# Patient Record
Sex: MALE | Race: WHITE | NOT HISPANIC OR LATINO | Employment: FULL TIME | URBAN - METROPOLITAN AREA
[De-identification: names, ages, dates, MRNs, and addresses within clinical notes are randomized per-mention and may not be internally consistent; named-entity substitution may affect disease eponyms.]

---

## 2018-05-21 ENCOUNTER — HOSPITAL ENCOUNTER (OUTPATIENT)
Facility: MEDICAL CENTER | Age: 38
End: 2018-05-21
Attending: ORTHOPAEDIC SURGERY | Admitting: ORTHOPAEDIC SURGERY
Payer: COMMERCIAL

## 2018-05-21 VITALS
SYSTOLIC BLOOD PRESSURE: 121 MMHG | RESPIRATION RATE: 18 BRPM | HEART RATE: 82 BPM | DIASTOLIC BLOOD PRESSURE: 84 MMHG | BODY MASS INDEX: 31.49 KG/M2 | TEMPERATURE: 98.2 F | OXYGEN SATURATION: 96 % | WEIGHT: 258.6 LBS | HEIGHT: 76 IN

## 2018-05-21 PROCEDURE — 110454 HCHG SHELL REV 250: Performed by: ORTHOPAEDIC SURGERY

## 2018-05-21 PROCEDURE — 700102 HCHG RX REV CODE 250 W/ 637 OVERRIDE(OP)

## 2018-05-21 PROCEDURE — A6454 SELF-ADHER BAND W>=3" <5"/YD: HCPCS | Performed by: ORTHOPAEDIC SURGERY

## 2018-05-21 PROCEDURE — A6402 STERILE GAUZE <= 16 SQ IN: HCPCS | Performed by: ORTHOPAEDIC SURGERY

## 2018-05-21 PROCEDURE — 160046 HCHG PACU - 1ST 60 MINS PHASE II: Performed by: ORTHOPAEDIC SURGERY

## 2018-05-21 PROCEDURE — 160025 RECOVERY II MINUTES (STATS): Performed by: ORTHOPAEDIC SURGERY

## 2018-05-21 PROCEDURE — 700111 HCHG RX REV CODE 636 W/ 250 OVERRIDE (IP)

## 2018-05-21 PROCEDURE — 160022 HCHG BLOCK: Performed by: ORTHOPAEDIC SURGERY

## 2018-05-21 PROCEDURE — 160009 HCHG ANES TIME/MIN: Performed by: ORTHOPAEDIC SURGERY

## 2018-05-21 PROCEDURE — 160029 HCHG SURGERY MINUTES - 1ST 30 MINS LEVEL 4: Performed by: ORTHOPAEDIC SURGERY

## 2018-05-21 PROCEDURE — A6223 GAUZE >16<=48 NO W/SAL W/O B: HCPCS | Performed by: ORTHOPAEDIC SURGERY

## 2018-05-21 PROCEDURE — 500423 HCHG DRESSING, ABD COMBINE: Performed by: ORTHOPAEDIC SURGERY

## 2018-05-21 PROCEDURE — 500881 HCHG PACK, EXTREMITY: Performed by: ORTHOPAEDIC SURGERY

## 2018-05-21 PROCEDURE — 160048 HCHG OR STATISTICAL LEVEL 1-5: Performed by: ORTHOPAEDIC SURGERY

## 2018-05-21 PROCEDURE — A9270 NON-COVERED ITEM OR SERVICE: HCPCS

## 2018-05-21 PROCEDURE — 160035 HCHG PACU - 1ST 60 MINS PHASE I: Performed by: ORTHOPAEDIC SURGERY

## 2018-05-21 PROCEDURE — C1713 ANCHOR/SCREW BN/BN,TIS/BN: HCPCS | Performed by: ORTHOPAEDIC SURGERY

## 2018-05-21 PROCEDURE — 160002 HCHG RECOVERY MINUTES (STAT): Performed by: ORTHOPAEDIC SURGERY

## 2018-05-21 PROCEDURE — 160041 HCHG SURGERY MINUTES - EA ADDL 1 MIN LEVEL 4: Performed by: ORTHOPAEDIC SURGERY

## 2018-05-21 PROCEDURE — 501838 HCHG SUTURE GENERAL: Performed by: ORTHOPAEDIC SURGERY

## 2018-05-21 PROCEDURE — 502000 HCHG MISC OR IMPLANTS RC 0278: Performed by: ORTHOPAEDIC SURGERY

## 2018-05-21 PROCEDURE — 700101 HCHG RX REV CODE 250

## 2018-05-21 DEVICE — SUTURE ANCHOR TWINFIX 3.5 WITH NEEDLES SMALL JOINT: Type: IMPLANTABLE DEVICE | Site: ANKLE | Status: FUNCTIONAL

## 2018-05-21 DEVICE — IMPLANTABLE DEVICE: Type: IMPLANTABLE DEVICE | Site: ANKLE | Status: FUNCTIONAL

## 2018-05-21 RX ORDER — LIDOCAINE HYDROCHLORIDE 10 MG/ML
INJECTION, SOLUTION EPIDURAL; INFILTRATION; INTRACAUDAL; PERINEURAL
Status: COMPLETED
Start: 2018-05-21 | End: 2018-05-21

## 2018-05-21 RX ORDER — BUPIVACAINE HYDROCHLORIDE 5 MG/ML
INJECTION, SOLUTION EPIDURAL; INTRACAUDAL
Status: DISCONTINUED | OUTPATIENT
Start: 2018-05-21 | End: 2018-05-21 | Stop reason: HOSPADM

## 2018-05-21 RX ORDER — LIDOCAINE HYDROCHLORIDE 10 MG/ML
0.5 INJECTION, SOLUTION INFILTRATION; PERINEURAL
Status: DISCONTINUED | OUTPATIENT
Start: 2018-05-21 | End: 2018-05-21 | Stop reason: HOSPADM

## 2018-05-21 RX ORDER — SODIUM CHLORIDE, SODIUM LACTATE, POTASSIUM CHLORIDE, CALCIUM CHLORIDE 600; 310; 30; 20 MG/100ML; MG/100ML; MG/100ML; MG/100ML
INJECTION, SOLUTION INTRAVENOUS CONTINUOUS
Status: DISCONTINUED | OUTPATIENT
Start: 2018-05-21 | End: 2018-05-21 | Stop reason: HOSPADM

## 2018-05-21 RX ORDER — MAGNESIUM HYDROXIDE 1200 MG/15ML
LIQUID ORAL
Status: COMPLETED | OUTPATIENT
Start: 2018-05-21 | End: 2018-05-21

## 2018-05-21 RX ORDER — OXYCODONE HYDROCHLORIDE AND ACETAMINOPHEN 5; 325 MG/1; MG/1
TABLET ORAL
Status: COMPLETED
Start: 2018-05-21 | End: 2018-05-21

## 2018-05-21 RX ADMIN — FENTANYL CITRATE 50 MCG: 50 INJECTION, SOLUTION INTRAMUSCULAR; INTRAVENOUS at 18:15

## 2018-05-21 RX ADMIN — SODIUM CHLORIDE, SODIUM LACTATE, POTASSIUM CHLORIDE, CALCIUM CHLORIDE: 600; 310; 30; 20 INJECTION, SOLUTION INTRAVENOUS at 15:00

## 2018-05-21 RX ADMIN — FENTANYL CITRATE 50 MCG: 50 INJECTION, SOLUTION INTRAMUSCULAR; INTRAVENOUS at 18:22

## 2018-05-21 RX ADMIN — OXYCODONE AND ACETAMINOPHEN 2 TABLET: 5; 325 TABLET ORAL at 18:15

## 2018-05-21 RX ADMIN — HYDROMORPHONE HYDROCHLORIDE 0.5 MG: 10 INJECTION, SOLUTION INTRAMUSCULAR; INTRAVENOUS; SUBCUTANEOUS at 18:30

## 2018-05-21 RX ADMIN — LIDOCAINE HYDROCHLORIDE 0.5 ML: 10 INJECTION, SOLUTION EPIDURAL; INFILTRATION; INTRACAUDAL; PERINEURAL at 15:00

## 2018-05-21 RX ADMIN — LIDOCAINE HYDROCHLORIDE 0.5 ML: 10 INJECTION, SOLUTION INFILTRATION; PERINEURAL at 15:00

## 2018-05-21 ASSESSMENT — PAIN SCALES - GENERAL
PAINLEVEL_OUTOF10: 7
PAINLEVEL_OUTOF10: 7
PAINLEVEL_OUTOF10: 6
PAINLEVEL_OUTOF10: 6
PAINLEVEL_OUTOF10: 9

## 2018-05-21 NOTE — PROGRESS NOTES
Med rec complete per pt at bedside  Interviewed pt with family at bedside with permission from pt  Allergies reviewed and updated.

## 2018-05-22 NOTE — OP REPORT
DATE OF SERVICE:  05/21/2018    PREOPERATIVE DIAGNOSES:  1.  Right ankle arthrofibrosis.  2.  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  3.  Right chronic ankle instability.    POSTOPERATIVE DIAGNOSES:  1.  Right ankle arthrofibrosis.  2.  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  3.  Right chronic ankle instability.    PROCEDURES PERFORMED:  1.  Right ankle arthroscopic extensive debridement.  2.  Right allograft cartilage transplant with mini-open excision to the   lateral talar dome measuring 1.5 cm in diameter.  3.  Right secondary reconstruction of the ATFL and CFL ligaments with modified   Brostrom reconstruction.    SURGEON:  Seth Cruz MD    FIRST ASSISTANT:  Virgilio Ramírez MD    SECOND ASSISTANT:  Jacklyn Viveros.    ANESTHESIA:  General endotracheal with popliteal block per my request for   postoperative pain management.    ESTIMATED BLOOD LOSS:  None.    COMPLICATIONS:  None.    POSTOPERATIVE PLAN:  1.  Nonweightbearing.  2.  Preoperative antibiotics.  3.  Follow up in 2 weeks.    INDICATIONS:  The patient is a 37-year-old male who has had longstanding   problems with his right ankle.  The above diagnoses made and options were   discussed with him including operative and nonoperative.  He elected to   undergo operative intervention.  The above procedure was discussed and all   questions were answered.  Risks of surgery were explained, which include, but   not limited to wound problems, infection, nerve injury, vascular injury, need   for further surgery.  He understands he has got persistent risk of his pain   and need further surgery.  He understands and accepts these risks and agrees   to proceed.  His site was marked by myself prior to receiving psychotropic   medicines.    PROCEDURE IN DETAIL:  The patient was given a popliteal block per my request   for postoperative pain management.   He was brought into the operating room and   underwent general endotracheal anesthesia without complications.  His right   lower extremity was prepped and draped in standard fashion in supine position   with all appropriate padding.  Positive site verification confirmed his right   lower extremity as well as procedure and confirmation that he received   preoperative antibiotics.  Esmarch was used to exsanguinate his foot and ankle   and leg tourniquet was inflated to 250 mmHg.  His leg was over the well leg   bey.  An 18-gauge needle was placed medial to the preoperative marked   tibialis anterior at the level of joint line.  Ankle was insufflated with 10   mL of normal saline.  A 15 blade was used to make a full-thickness arthrotomy   and 4.0 scope was introduced.  An 18-gauge needle was then placed lateral to   the preoperatively marked peroneus tertius at the level of joint line.  Skin   incision was made and blunt dissection was made down into the joint.  He had   extensive arthrofibrosis over the anterior part of ankle.  This took an   extensive amount of time to debride this to allow for exposure of the ankle   joint.  At that time, once it was there, evaluation of the lateral talar dome   and medial talar dome was visible.  The medial talar dome appeared to be   pristine with normal palpation of cartilage.  The lateral talar dome   demonstrated extensive fibrocartilage and delaminated cartilage where his   osteochondral defect was present.  This was all curetted out to stable   margins, shaved down to good subchondral bone for bleeding.  The underlying   bone was stable and there was no obvious cystic or unstable bone at that time.    His final measurements were 1.5 cm in diameter.    At that time, the lateral portal was extended, fluid was turned off.  The area   was dried and a thin layer of glue was placed.  One package of DeNovo was   then transplanted into the defect and a freer was used to smooth it  all out to   fill the defect.  Next, a layer of fibrin glue was placed over the top.  This   was held in place for 5 minutes.  Second look demonstrated all the cartilage   to stay within the defect with good cartilage fill.  The wounds were carefully   irrigated out at skin level.  They were closed with deep and subcutaneous   layer with 3-0 Vicryl and 3-0 nylon for the skin.    Lateral incision was then made at the distal aspect of the fibula.  It was   dissected down.  The peroneal tendon sheath was opened up.  Peroneal tendons   were retracted posteriorly exposing the CFL ligament.  This was released off   of its superior and inferior margins and directed off of the fibula.  The ATFL   was then identified and released off of its superior and inferior margins and   directed off of the fibula.  Fibular periosteum was elevated up and a rongeur   was used to rongeur down to good subchondral bone.  A 3.5 corkscrew anchor   was then placed at the junction of the ATFL and CFL ligament.  One pair of   sutures was brought to the CFL ligament, the other was brought to the ATFL   ligament.  The foot was held in plantar flexion, eversion and the CFL ligament   was pretensioned and tied.  The foot was then brought into dorsiflexion,   eversion and the ATFL ligament was pretensioned and tied.  Extensor   retinaculum was then repaired to the fibular periosteum with multiple 0   Vicryls in mattress-type fashion.  It had excellent stability.  Wounds were   irrigated with copious irrigation and closed in layered fashion with 3-0   Vicryl and 3-0 nylon.  Sterile dressings were applied.  Tourniquet was   released.  All toes were pink.  He was transferred to the recovery room in   good condition.    Utilization of Dr. Ramírez was necessary for patient positioning, holding,   retracting, wound closure, dressing and splint placement.  He was present   throughout the entire procedure.       ____________________________________      MD RUTH PIERCE / DYLAN    DD:  05/21/2018 18:56:45  DT:  05/21/2018 22:15:15    D#:  [id number]  Job#:  [id number]    cc: Antoni Rojas

## 2018-05-22 NOTE — DISCHARGE INSTRUCTIONS
ACTIVITY: Rest and take it easy for the first 24 hours.  A responsible adult is recommended to remain with you during that time.  It is normal to feel sleepy.  We encourage you to not do anything that requires balance, judgment or coordination.    MILD FLU-LIKE SYMPTOMS ARE NORMAL. YOU MAY EXPERIENCE GENERALIZED MUSCLE ACHES, THROAT IRRITATION, HEADACHE AND/OR SOME NAUSEA.    FOR 24 HOURS DO NOT:  Drive, operate machinery or run household appliances.  Drink beer or alcoholic beverages.   Make important decisions or sign legal documents.    SPECIAL INSTRUCTIONS:   Elevate/ice  Take pain medications scheduled until block wears off.  Hold for sedation.  Keep splint on at all times  Do not put weight down onto your operative extremity    Knee Arthroscopy, Care After    Refer to this sheet in the next few weeks. These instructions provide you with information about caring for yourself after your procedure. Your health care provider may also give you more specific instructions. Your treatment has been planned according to current medical practices, but problems sometimes occur. Call your health care provider if you have any problems or questions after your procedure.  WHAT TO EXPECT AFTER THE PROCEDURE  After your procedure, it is common to have:  · Soreness.  · Pain.  HOME CARE INSTRUCTIONS  Bathing  · Do not take baths, swim, or use a hot tub until your health care provider approves.  Incision Care  · There are many different ways to close and cover an incision, including stitches, skin glue, and adhesive strips. Follow your health care provider's instructions about:  ¨ Incision care.  ¨ Bandage (dressing) changes and removal.  ¨ Incision closure removal.  · Check your incision area every day for signs of infection. Watch for:  ¨ Redness, swelling, or pain.  ¨ Fluid, blood, or pus.  Activity  · Avoid strenuous activities for as long as directed by your health care provider.  · Return to your normal activities as  directed by your health care provider. Ask your health care provider what activities are safe for you.  · Perform range-of-motion exercises only as directed by your health care provider.  · Do not lift anything that is heavier than 10 lb (4.5 kg).  · Do not drive or operate heavy machinery while taking pain medicine.  · If you were given crutches, use them as directed by your health care provider.  Managing Pain, Stiffness, and Swelling  · If directed, apply ice to the injured area:  ¨ Put ice in a plastic bag.  ¨ Place a towel between your skin and the bag.  ¨ Leave the ice on for 20 minutes, 2-3 times per day.  · Raise the injured area above the level of your heart while you are sitting or lying down as directed by your health care provider.  General Instructions  · Keep all follow-up visits as directed by your health care provider. This is important.  · Take medicines only as directed by your health care provider.  · Do not use any tobacco products, including cigarettes, chewing tobacco, or electronic cigarettes. If you need help quitting, ask your health care provider.  · If you were given compression stockings, wear them as directed by your health care provider. These stockings help prevent blood clots and reduce swelling in your legs.  SEEK MEDICAL CARE IF:  · You have severe pain with any movement of your knee.  · You notice a bad smell coming from the incision or dressing.  · You have redness, swelling, or pain at the site of your incision.  · You have fluid, blood, or pus coming from your incision.  SEEK IMMEDIATE MEDICAL CARE IF:  · You develop a rash.  · You have a fever.  · You have difficulty breathing or have shortness of breath.  · You develop pain in your calves or in the back of your knee.  · You develop chest pain.  · You develop numbness or tingling in your leg or foot.      DIET: To avoid nausea, slowly advance diet as tolerated, avoiding spicy or greasy foods for the first day.  Add more  substantial food to your diet according to your physician's instructions. INCREASE FLUIDS AND FIBER TO AVOID CONSTIPATION.    SURGICAL DRESSING/BATHING: Keep dressing clean and dry.    FOLLOW-UP APPOINTMENT:  A follow-up appointment should be arranged with your doctor in 1-2 weeks; call to schedule.    You should CALL YOUR PHYSICIAN if you develop:  Fever greater than 101 degrees F.  Pain not relieved by medication, or persistent nausea or vomiting.  Excessive bleeding (blood soaking through dressing) or unexpected drainage from the wound.  Extreme redness or swelling around the incision site, drainage of pus or foul smelling drainage.  Inability to urinate or empty your bladder within 8 hours.  Problems with breathing or chest pain.    You should call 911 if you develop problems with breathing or chest pain.  If you are unable to contact your doctor or surgical center, you should go to the nearest emergency room or urgent care center.  Physician's telephone #: [phone]    If any questions arise, call your doctor.  If your doctor is not available, please feel free to call the Surgical Center at [phone].  The Center is open Monday through Friday from 7AM to 7PM.  You can also call the [facility] HOTLINE open 24 hours/day, 7 days/week and speak to a nurse at [phone], or toll free at [phone].    A registered nurse may call you a few days after your surgery to see how you are doing after your procedure.    MEDICATIONS: Resume taking daily medication.  Take prescribed pain medication with food.  If no medication is prescribed, you may take non-aspirin pain medication if needed.  PAIN MEDICATION CAN BE VERY CONSTIPATING.  Take a stool softener or laxative such as senokot, pericolace, or milk of magnesia if needed.    Prescription given previously to patient.  Last pain medication given at 6:15PM.    If your physician has prescribed pain medication that includes Acetaminophen (Tylenol), do not take  additional Acetaminophen (Tylenol) while taking the prescribed medication.    Depression / Suicide Risk    As you are discharged from this [facility], it is important to learn how to keep safe from harming yourself.    Recognize the warning signs:  · Abrupt changes in personality, positive or negative- including increase in energy   · Giving away possessions  · Change in eating patterns- significant weight changes-  positive or negative  · Change in sleeping patterns- unable to sleep or sleeping all the time   · Unwillingness or inability to communicate  · Depression  · Unusual sadness, discouragement and loneliness  · Talk of wanting to die  · Neglect of personal appearance   · Rebelliousness- reckless behavior  · Withdrawal from people/activities they love  · Confusion- inability to concentrate     If you or a loved one observes any of these behaviors or has concerns about self-harm, here's what you can do:  · Talk about it- your feelings and reasons for harming yourself  · Remove any means that you might use to hurt yourself (examples: pills, rope, extension cords, firearm)  · Get professional help from the community (Mental Health, Substance Abuse, psychological counseling)  · Do not be alone:Call your Safe Contact- someone whom you trust who will be there for you.  · Call your local CRISIS HOTLINE 011-5983 or 153-469-0282  · Call your local Children's Mobile Crisis Response Team [place] (556) 995-7009 or www.De[employer]  · Call the toll free National Suicide Prevention Hotlines   · National Suicide Prevention Lifeline 906-956-IIKJ (2165)  · National Hope Line Network 800-SUICIDE (767-4490)

## 2018-12-29 ENCOUNTER — HOSPITAL ENCOUNTER (OUTPATIENT)
Dept: RADIOLOGY | Facility: MEDICAL CENTER | Age: 38
End: 2018-12-29

## 2019-03-22 ENCOUNTER — HOSPITAL ENCOUNTER (OUTPATIENT)
Dept: RADIOLOGY | Facility: MEDICAL CENTER | Age: 39
End: 2019-03-22
Attending: ORTHOPAEDIC SURGERY
Payer: COMMERCIAL

## 2019-03-22 DIAGNOSIS — M93.271 OSTEOCHONDRITIS DISSECANS OF ANKLE, RIGHT: ICD-10-CM

## 2019-03-22 DIAGNOSIS — M25.571 RIGHT ANKLE PAIN, UNSPECIFIED CHRONICITY: ICD-10-CM

## 2019-03-22 PROCEDURE — 700101 HCHG RX REV CODE 250

## 2019-03-22 PROCEDURE — 77002 NEEDLE LOCALIZATION BY XRAY: CPT

## 2019-03-22 PROCEDURE — 20605 DRAIN/INJ JOINT/BURSA W/O US: CPT | Mod: RT

## 2019-03-22 PROCEDURE — 700111 HCHG RX REV CODE 636 W/ 250 OVERRIDE (IP)

## 2019-03-22 PROCEDURE — 700117 HCHG RX CONTRAST REV CODE 255: Performed by: ORTHOPAEDIC SURGERY

## 2019-03-22 RX ORDER — BUPIVACAINE HYDROCHLORIDE 5 MG/ML
INJECTION, SOLUTION EPIDURAL; INTRACAUDAL
Status: DISPENSED
Start: 2019-03-22 | End: 2019-03-22

## 2019-03-22 RX ORDER — TRIAMCINOLONE ACETONIDE 40 MG/ML
INJECTION, SUSPENSION INTRA-ARTICULAR; INTRAMUSCULAR
Status: DISPENSED
Start: 2019-03-22 | End: 2019-03-22

## 2019-03-22 RX ORDER — LIDOCAINE HYDROCHLORIDE 10 MG/ML
INJECTION, SOLUTION INFILTRATION; PERINEURAL
Status: DISPENSED
Start: 2019-03-22 | End: 2019-03-22

## 2019-03-22 RX ADMIN — IOHEXOL 2 ML: 300 INJECTION, SOLUTION INTRAVENOUS at 11:14

## 2019-03-22 NOTE — OR SURGEON
Immediate Post- Operative Note    PostOp Diagnosis: OA, mild    Procedure(s): Rt tibiotalar injection    Estimated Blood Loss: Less than 5 ml    Complications: None    3/22/2019     1:34 PM     Franco Hernandez

## 2021-09-16 ENCOUNTER — OFFICE VISIT (OUTPATIENT)
Dept: URGENT CARE | Facility: CLINIC | Age: 41
End: 2021-09-16
Payer: COMMERCIAL

## 2021-09-16 ENCOUNTER — HOSPITAL ENCOUNTER (OUTPATIENT)
Facility: MEDICAL CENTER | Age: 41
End: 2021-09-16
Attending: PHYSICIAN ASSISTANT
Payer: COMMERCIAL

## 2021-09-16 ENCOUNTER — HOSPITAL ENCOUNTER (OUTPATIENT)
Dept: RADIOLOGY | Facility: MEDICAL CENTER | Age: 41
End: 2021-09-16
Attending: PHYSICIAN ASSISTANT
Payer: COMMERCIAL

## 2021-09-16 VITALS
BODY MASS INDEX: 30.71 KG/M2 | DIASTOLIC BLOOD PRESSURE: 86 MMHG | OXYGEN SATURATION: 98 % | HEART RATE: 71 BPM | RESPIRATION RATE: 12 BRPM | HEIGHT: 75 IN | WEIGHT: 247 LBS | TEMPERATURE: 97.9 F | SYSTOLIC BLOOD PRESSURE: 116 MMHG

## 2021-09-16 DIAGNOSIS — N50.811 PAIN IN RIGHT TESTICLE: ICD-10-CM

## 2021-09-16 DIAGNOSIS — N50.89 TESTICULAR MASS: ICD-10-CM

## 2021-09-16 LAB
APPEARANCE UR: CLEAR
BILIRUB UR STRIP-MCNC: NORMAL MG/DL
COLOR UR AUTO: YELLOW
GLUCOSE UR STRIP.AUTO-MCNC: NORMAL MG/DL
KETONES UR STRIP.AUTO-MCNC: NORMAL MG/DL
LEUKOCYTE ESTERASE UR QL STRIP.AUTO: NORMAL
NITRITE UR QL STRIP.AUTO: NORMAL
PH UR STRIP.AUTO: 6 [PH] (ref 5–8)
PROT UR QL STRIP: NORMAL MG/DL
RBC UR QL AUTO: NORMAL
SP GR UR STRIP.AUTO: 1.02
UROBILINOGEN UR STRIP-MCNC: NORMAL MG/DL

## 2021-09-16 PROCEDURE — 81002 URINALYSIS NONAUTO W/O SCOPE: CPT | Performed by: PHYSICIAN ASSISTANT

## 2021-09-16 PROCEDURE — 87491 CHLMYD TRACH DNA AMP PROBE: CPT

## 2021-09-16 PROCEDURE — 99204 OFFICE O/P NEW MOD 45 MIN: CPT | Performed by: PHYSICIAN ASSISTANT

## 2021-09-16 PROCEDURE — 76870 US EXAM SCROTUM: CPT

## 2021-09-16 PROCEDURE — 87591 N.GONORRHOEAE DNA AMP PROB: CPT

## 2021-09-16 RX ORDER — IBUPROFEN 800 MG/1
800 TABLET ORAL EVERY 8 HOURS PRN
COMMUNITY
End: 2022-03-26

## 2021-09-16 RX ORDER — DOXYCYCLINE HYCLATE 100 MG
100 TABLET ORAL 2 TIMES DAILY
Qty: 20 TABLET | Refills: 0 | Status: SHIPPED | OUTPATIENT
Start: 2021-09-16 | End: 2021-09-26

## 2021-09-16 RX ORDER — NAPROXEN 500 MG/1
500 TABLET ORAL 2 TIMES DAILY WITH MEALS
Qty: 28 TABLET | Refills: 0 | Status: SHIPPED | OUTPATIENT
Start: 2021-09-16 | End: 2021-09-30

## 2021-09-16 NOTE — PROGRESS NOTES
Subjective:   Roberto Braswell is a 41 y.o. male who presents for Bump (Right testicle, sharp pain. Started a couple weeks ago. Worsening.)      HPI  Patient is a 41-year-old male who presents to clinic with complaints of right testicular pain and a mass.  He noticed a lump to his right testicle 2 months ago.  He reports in the last few weeks the mass has enlarged.  He developed a sharp pain in the last couple days with radiation to his inguinal area.  He is sexually active in a monogamous relationship.  He usually uses a condom but did not use a condom twice about a month ago.  He has no other complaints or concerns.  The mass is located to the top of his testicle area.  Denies any fever, chills, dental pain, nausea, vomiting, penile discharge, dysuria, urinary urgency.    Medications:    • asa/apap/caffeine Tabs  • ibuprofen Tabs    Allergies: Tramadol    Problem List: Roberto Braswell does not have a problem list on file.    Surgical History:  Past Surgical History:   Procedure Laterality Date   • ANKLE ARTHROSCOPY Right 5/21/2018    Procedure: ANKLE ARTHROSCOPY, LATERAL LIGAMENT RECONSTRUCTION;  Surgeon: Seth Cruz M.D.;  Location: SURGERY [facility];  Service: Orthopedics   • BIOPSY ORTHO Right 5/21/2018    Procedure: BIOPSY ORTHO/ FOR CARTILAGE AND DENOVO PROCEDURE;  Surgeon: Seth Cruz M.D.;  Location: SURGERY [facility];  Service: Orthopedics   • OTHER ABDOMINAL SURGERY      Cholecystectomy February 2017   • OTHER ORTHOPEDIC SURGERY      2 previous ankle surgeries (2007, 2009)       Past Social Hx: Roberto Braswell  reports that he has never smoked. He has never used smokeless tobacco. He reports current alcohol use.     Past Family Hx:  Roberto Braswell family history is not on file.     Problem list, medications, and allergies reviewed by myself today in Epic.     Objective:     /86 (BP Location: Left arm, Patient Position: Sitting, BP Cuff Size: Adult long)   Pulse 71   " Temp 36.6 °C (97.9 °F) (Temporal)   Resp 12   Ht 1.905 m (6' 3\")   Wt 112 kg (247 lb)   SpO2 98%   BMI 30.87 kg/m²     Physical Exam  Vitals reviewed.   Constitutional:       General: He is not in acute distress.     Appearance: Normal appearance. He is not ill-appearing or toxic-appearing.   Eyes:      Conjunctiva/sclera: Conjunctivae normal.      Pupils: Pupils are equal, round, and reactive to light.   Cardiovascular:      Rate and Rhythm: Normal rate.   Pulmonary:      Effort: Pulmonary effort is normal.   Abdominal:      General: Abdomen is flat.      Palpations: Abdomen is soft.      Tenderness: There is no abdominal tenderness. There is no guarding or rebound.   Genitourinary:     Penis: Uncircumcised. No discharge, swelling or lesions.       Testes:         Right: Mass (posterior superior testicle ) and tenderness present. Swelling not present. Right testis is descended.      Epididymis:      Right: Mass and tenderness present.   Musculoskeletal:      Cervical back: Neck supple.   Skin:     General: Skin is warm and dry.   Neurological:      General: No focal deficit present.      Mental Status: He is alert and oriented to person, place, and time.   Psychiatric:         Mood and Affect: Mood normal.         Behavior: Behavior normal.         RADIOLOGY RESULTS   [id number]    Result Date: 9/16/2021 9/16/2021 4:37 PM HISTORY/REASON FOR EXAM: painful mass right testicle. TECHNIQUE/EXAM DESCRIPTION: Real-time sonography of the scrotum was performed with gray-scale, color and duplex Doppler imaging. COMPARISON: None FINDINGS: The testes are homogeneous in echotexture and low-resistive waveforms are confirmed bilaterally. No intratesticular mass is seen. Vascular flow is symmetric. The left testis measures 50 x 21 x 29 millimeters. The right testis measures  45 x 22 x 27 millimeters. Appearance of the epididymides are notable for an isoechoic to adjacent epididymal tissue ovoid mass measuring 9 x " 9 x 8 mm. This appears to be the area of palpable concern and this is tender. There is no internal vascular flow or regional hyperemia. There is a minute left hydrocele. No varicocele is detected.     9 mm mass adjacent to the epididymal head likely represents a torsed testicular appendage No sonographic evidence of intratesticular mass, testicular torsion or epididymoorchitis Minute left varicocele Findings were discussed with KAREN LANDA on 9/16/2021 5:50 PM.         Diagnosis and associated orders:     1. Testicular mass  [id number]   2. Pain in right testicle  [id number]    naproxen (NAPROSYN) 500 MG Tab    cefTRIAXone (ROCEPHIN) 500 mg, lidocaine (XYLOCAINE) 1 % 1.8 mL for IM use    doxycycline (VIBRAMYCIN) 100 MG Tab    POCT Urinalysis    Chlamydia/GC PCR Urine Or Swab        Comments/MDM:     • Spoke to radiologist on the phone regarding ultrasound results.  Radiology states nonsurgical at this time.  No evidence of torsion.  • Spoke with patient on the phone regarding torsed testicular appendage.  Discussed with patient these are benign, however they can cause pain as it has been.  Recommend symptomatic supportive treatment such as supportive underwear, ice application, NSAIDs, avoid exacerbating factors.  Discussed pain most likely will improve after 5 to 10 days.  If no improvement or any worsening, follow-up with PCP or return to the urgent care for reevaluation.       I personally reviewed prior external notes and test results pertinent to today's visit. Red flags discussed. Supportive care, natural history, differential diagnoses, and indications for immediate follow-up discussed. Patient expresses understanding and agrees to plan. Patient denies any other questions or concerns.     Please note that this dictation was created using voice recognition software. I have made a reasonable attempt to correct obvious errors, but I expect that there are errors of grammar and possibly  content that I did not discover before finalizing the note.    This note was electronically signed by Jasson Godwin PA-C

## 2021-09-17 LAB
C TRACH DNA SPEC QL NAA+PROBE: NEGATIVE
N GONORRHOEA DNA SPEC QL NAA+PROBE: NEGATIVE
SPECIMEN SOURCE: NORMAL

## 2022-03-26 ENCOUNTER — APPOINTMENT (OUTPATIENT)
Dept: RADIOLOGY | Facility: MEDICAL CENTER | Age: 42
DRG: 392 | End: 2022-03-26
Attending: EMERGENCY MEDICINE
Payer: COMMERCIAL

## 2022-03-26 ENCOUNTER — APPOINTMENT (OUTPATIENT)
Dept: RADIOLOGY | Facility: MEDICAL CENTER | Age: 42
DRG: 392 | End: 2022-03-26
Payer: COMMERCIAL

## 2022-03-26 ENCOUNTER — HOSPITAL ENCOUNTER (INPATIENT)
Facility: MEDICAL CENTER | Age: 42
LOS: 2 days | DRG: 392 | End: 2022-03-28
Attending: EMERGENCY MEDICINE | Admitting: INTERNAL MEDICINE
Payer: COMMERCIAL

## 2022-03-26 DIAGNOSIS — K92.2 UPPER GI BLEED: ICD-10-CM

## 2022-03-26 DIAGNOSIS — K29.80 DUODENITIS: ICD-10-CM

## 2022-03-26 PROBLEM — R68.89 PHYSIOLOGIC DISTURBANCE OF TEMPERATURE REGULATION: Status: ACTIVE | Noted: 2022-03-26

## 2022-03-26 PROBLEM — R10.13 EPIGASTRIC PAIN: Status: ACTIVE | Noted: 2022-03-26

## 2022-03-26 LAB
ABO + RH BLD: NORMAL
ABO GROUP BLD: NORMAL
ALBUMIN SERPL BCP-MCNC: 4.5 G/DL (ref 3.2–4.9)
ALBUMIN/GLOB SERPL: 1.9 G/DL
ALP SERPL-CCNC: 55 U/L (ref 30–99)
ALT SERPL-CCNC: 26 U/L (ref 2–50)
ANION GAP SERPL CALC-SCNC: 13 MMOL/L (ref 7–16)
APTT PPP: 26.2 SEC (ref 24.7–36)
AST SERPL-CCNC: 22 U/L (ref 12–45)
BASOPHILS # BLD AUTO: 1.5 % (ref 0–1.8)
BASOPHILS # BLD: 0.09 K/UL (ref 0–0.12)
BILIRUB SERPL-MCNC: 0.4 MG/DL (ref 0.1–1.5)
BLD GP AB SCN SERPL QL: NORMAL
BUN SERPL-MCNC: 23 MG/DL (ref 8–22)
CALCIUM SERPL-MCNC: 10 MG/DL (ref 8.5–10.5)
CHLORIDE SERPL-SCNC: 105 MMOL/L (ref 96–112)
CO2 SERPL-SCNC: 22 MMOL/L (ref 20–33)
CREAT SERPL-MCNC: 1.11 MG/DL (ref 0.5–1.4)
EOSINOPHIL # BLD AUTO: 0.11 K/UL (ref 0–0.51)
EOSINOPHIL NFR BLD: 1.8 % (ref 0–6.9)
ERYTHROCYTE [DISTWIDTH] IN BLOOD BY AUTOMATED COUNT: 46.5 FL (ref 35.9–50)
GFR SERPLBLD CREATININE-BSD FMLA CKD-EPI: 86 ML/MIN/1.73 M 2
GLOBULIN SER CALC-MCNC: 2.4 G/DL (ref 1.9–3.5)
GLUCOSE SERPL-MCNC: 96 MG/DL (ref 65–99)
HCT VFR BLD AUTO: 47.2 % (ref 42–52)
HGB BLD-MCNC: 16.3 G/DL (ref 14–18)
IMM GRANULOCYTES # BLD AUTO: 0.03 K/UL (ref 0–0.11)
IMM GRANULOCYTES NFR BLD AUTO: 0.5 % (ref 0–0.9)
INR PPP: 1.03 (ref 0.87–1.13)
LIPASE SERPL-CCNC: 19 U/L (ref 11–82)
LYMPHOCYTES # BLD AUTO: 2.06 K/UL (ref 1–4.8)
LYMPHOCYTES NFR BLD: 33.9 % (ref 22–41)
MCH RBC QN AUTO: 31.9 PG (ref 27–33)
MCHC RBC AUTO-ENTMCNC: 34.5 G/DL (ref 33.7–35.3)
MCV RBC AUTO: 92.4 FL (ref 81.4–97.8)
MONOCYTES # BLD AUTO: 0.66 K/UL (ref 0–0.85)
MONOCYTES NFR BLD AUTO: 10.9 % (ref 0–13.4)
NEUTROPHILS # BLD AUTO: 3.12 K/UL (ref 1.82–7.42)
NEUTROPHILS NFR BLD: 51.4 % (ref 44–72)
NRBC # BLD AUTO: 0 K/UL
NRBC BLD-RTO: 0 /100 WBC
PLATELET # BLD AUTO: 283 K/UL (ref 164–446)
PMV BLD AUTO: 9.3 FL (ref 9–12.9)
POTASSIUM SERPL-SCNC: 4.5 MMOL/L (ref 3.6–5.5)
PROT SERPL-MCNC: 6.9 G/DL (ref 6–8.2)
PROTHROMBIN TIME: 13.2 SEC (ref 12–14.6)
RBC # BLD AUTO: 5.11 M/UL (ref 4.7–6.1)
RH BLD: NORMAL
SODIUM SERPL-SCNC: 140 MMOL/L (ref 135–145)
WBC # BLD AUTO: 6.1 K/UL (ref 4.8–10.8)

## 2022-03-26 PROCEDURE — 83690 ASSAY OF LIPASE: CPT

## 2022-03-26 PROCEDURE — 700111 HCHG RX REV CODE 636 W/ 250 OVERRIDE (IP): Performed by: INTERNAL MEDICINE

## 2022-03-26 PROCEDURE — 85025 COMPLETE CBC W/AUTO DIFF WBC: CPT

## 2022-03-26 PROCEDURE — 74174 CTA ABD&PLVS W/CONTRAST: CPT

## 2022-03-26 PROCEDURE — 99285 EMERGENCY DEPT VISIT HI MDM: CPT

## 2022-03-26 PROCEDURE — 85730 THROMBOPLASTIN TIME PARTIAL: CPT

## 2022-03-26 PROCEDURE — C9113 INJ PANTOPRAZOLE SODIUM, VIA: HCPCS | Performed by: EMERGENCY MEDICINE

## 2022-03-26 PROCEDURE — 86900 BLOOD TYPING SEROLOGIC ABO: CPT

## 2022-03-26 PROCEDURE — 96374 THER/PROPH/DIAG INJ IV PUSH: CPT

## 2022-03-26 PROCEDURE — 36415 COLL VENOUS BLD VENIPUNCTURE: CPT

## 2022-03-26 PROCEDURE — 700111 HCHG RX REV CODE 636 W/ 250 OVERRIDE (IP): Performed by: EMERGENCY MEDICINE

## 2022-03-26 PROCEDURE — 770001 HCHG ROOM/CARE - MED/SURG/GYN PRIV*

## 2022-03-26 PROCEDURE — 700102 HCHG RX REV CODE 250 W/ 637 OVERRIDE(OP): Performed by: EMERGENCY MEDICINE

## 2022-03-26 PROCEDURE — 80053 COMPREHEN METABOLIC PANEL: CPT

## 2022-03-26 PROCEDURE — 86901 BLOOD TYPING SEROLOGIC RH(D): CPT

## 2022-03-26 PROCEDURE — 700102 HCHG RX REV CODE 250 W/ 637 OVERRIDE(OP): Performed by: INTERNAL MEDICINE

## 2022-03-26 PROCEDURE — A9270 NON-COVERED ITEM OR SERVICE: HCPCS | Performed by: INTERNAL MEDICINE

## 2022-03-26 PROCEDURE — 700117 HCHG RX CONTRAST REV CODE 255: Performed by: EMERGENCY MEDICINE

## 2022-03-26 PROCEDURE — 700105 HCHG RX REV CODE 258: Performed by: INTERNAL MEDICINE

## 2022-03-26 PROCEDURE — 86850 RBC ANTIBODY SCREEN: CPT

## 2022-03-26 PROCEDURE — A9270 NON-COVERED ITEM OR SERVICE: HCPCS | Performed by: EMERGENCY MEDICINE

## 2022-03-26 PROCEDURE — 71045 X-RAY EXAM CHEST 1 VIEW: CPT

## 2022-03-26 PROCEDURE — 99223 1ST HOSP IP/OBS HIGH 75: CPT | Performed by: INTERNAL MEDICINE

## 2022-03-26 PROCEDURE — 96375 TX/PRO/DX INJ NEW DRUG ADDON: CPT

## 2022-03-26 PROCEDURE — 700105 HCHG RX REV CODE 258: Performed by: EMERGENCY MEDICINE

## 2022-03-26 PROCEDURE — 85610 PROTHROMBIN TIME: CPT

## 2022-03-26 RX ORDER — IBUPROFEN 200 MG
800 TABLET ORAL
Status: ON HOLD | COMMUNITY
End: 2022-03-28

## 2022-03-26 RX ORDER — ONDANSETRON 2 MG/ML
4 INJECTION INTRAMUSCULAR; INTRAVENOUS ONCE
Status: COMPLETED | OUTPATIENT
Start: 2022-03-26 | End: 2022-03-26

## 2022-03-26 RX ORDER — HYDROMORPHONE HYDROCHLORIDE 1 MG/ML
0.25 INJECTION, SOLUTION INTRAMUSCULAR; INTRAVENOUS; SUBCUTANEOUS
Status: DISCONTINUED | OUTPATIENT
Start: 2022-03-26 | End: 2022-03-27

## 2022-03-26 RX ORDER — BISACODYL 10 MG
10 SUPPOSITORY, RECTAL RECTAL
Status: DISCONTINUED | OUTPATIENT
Start: 2022-03-26 | End: 2022-03-28 | Stop reason: HOSPADM

## 2022-03-26 RX ORDER — PROMETHAZINE HYDROCHLORIDE 25 MG/1
12.5-25 SUPPOSITORY RECTAL EVERY 4 HOURS PRN
Status: DISCONTINUED | OUTPATIENT
Start: 2022-03-26 | End: 2022-03-28 | Stop reason: HOSPADM

## 2022-03-26 RX ORDER — SODIUM CHLORIDE 9 MG/ML
1000 INJECTION, SOLUTION INTRAVENOUS ONCE
Status: COMPLETED | OUTPATIENT
Start: 2022-03-26 | End: 2022-03-26

## 2022-03-26 RX ORDER — ONDANSETRON 2 MG/ML
4 INJECTION INTRAMUSCULAR; INTRAVENOUS EVERY 4 HOURS PRN
Status: DISCONTINUED | OUTPATIENT
Start: 2022-03-26 | End: 2022-03-28 | Stop reason: HOSPADM

## 2022-03-26 RX ORDER — PROCHLORPERAZINE EDISYLATE 5 MG/ML
5-10 INJECTION INTRAMUSCULAR; INTRAVENOUS EVERY 4 HOURS PRN
Status: DISCONTINUED | OUTPATIENT
Start: 2022-03-26 | End: 2022-03-28 | Stop reason: HOSPADM

## 2022-03-26 RX ORDER — POLYETHYLENE GLYCOL 3350 17 G/17G
1 POWDER, FOR SOLUTION ORAL
Status: DISCONTINUED | OUTPATIENT
Start: 2022-03-26 | End: 2022-03-28 | Stop reason: HOSPADM

## 2022-03-26 RX ORDER — SODIUM CHLORIDE 9 MG/ML
INJECTION, SOLUTION INTRAVENOUS CONTINUOUS
Status: ACTIVE | OUTPATIENT
Start: 2022-03-26 | End: 2022-03-27

## 2022-03-26 RX ORDER — PROMETHAZINE HYDROCHLORIDE 25 MG/1
12.5-25 TABLET ORAL EVERY 4 HOURS PRN
Status: DISCONTINUED | OUTPATIENT
Start: 2022-03-26 | End: 2022-03-28 | Stop reason: HOSPADM

## 2022-03-26 RX ORDER — AMOXICILLIN 250 MG
2 CAPSULE ORAL 2 TIMES DAILY
Status: DISCONTINUED | OUTPATIENT
Start: 2022-03-27 | End: 2022-03-28 | Stop reason: HOSPADM

## 2022-03-26 RX ORDER — ACETAMINOPHEN 325 MG/1
650 TABLET ORAL EVERY 6 HOURS PRN
Status: DISCONTINUED | OUTPATIENT
Start: 2022-03-26 | End: 2022-03-28 | Stop reason: HOSPADM

## 2022-03-26 RX ORDER — LABETALOL HYDROCHLORIDE 5 MG/ML
10 INJECTION, SOLUTION INTRAVENOUS EVERY 4 HOURS PRN
Status: DISCONTINUED | OUTPATIENT
Start: 2022-03-26 | End: 2022-03-28 | Stop reason: HOSPADM

## 2022-03-26 RX ORDER — HYDROMORPHONE HYDROCHLORIDE 1 MG/ML
1 INJECTION, SOLUTION INTRAMUSCULAR; INTRAVENOUS; SUBCUTANEOUS ONCE
Status: COMPLETED | OUTPATIENT
Start: 2022-03-26 | End: 2022-03-26

## 2022-03-26 RX ORDER — PHYTONADIONE 10 MG/ML
10 INJECTION, EMULSION INTRAMUSCULAR; INTRAVENOUS; SUBCUTANEOUS ONCE
Status: DISCONTINUED | OUTPATIENT
Start: 2022-03-26 | End: 2022-03-26

## 2022-03-26 RX ORDER — PANTOPRAZOLE SODIUM 40 MG/10ML
40 INJECTION, POWDER, LYOPHILIZED, FOR SOLUTION INTRAVENOUS 2 TIMES DAILY
Status: DISCONTINUED | OUTPATIENT
Start: 2022-03-27 | End: 2022-03-28

## 2022-03-26 RX ORDER — OXYCODONE HYDROCHLORIDE 5 MG/1
5 TABLET ORAL
Status: DISCONTINUED | OUTPATIENT
Start: 2022-03-26 | End: 2022-03-27

## 2022-03-26 RX ORDER — OXYCODONE HYDROCHLORIDE 5 MG/1
2.5 TABLET ORAL
Status: DISCONTINUED | OUTPATIENT
Start: 2022-03-26 | End: 2022-03-27

## 2022-03-26 RX ORDER — ONDANSETRON 4 MG/1
4 TABLET, ORALLY DISINTEGRATING ORAL EVERY 4 HOURS PRN
Status: DISCONTINUED | OUTPATIENT
Start: 2022-03-26 | End: 2022-03-28 | Stop reason: HOSPADM

## 2022-03-26 RX ORDER — MORPHINE SULFATE 4 MG/ML
4 INJECTION INTRAVENOUS ONCE
Status: COMPLETED | OUTPATIENT
Start: 2022-03-26 | End: 2022-03-26

## 2022-03-26 RX ORDER — ASPIRIN 325 MG
3 TABLET ORAL EVERY EVENING
Status: ON HOLD | COMMUNITY
End: 2022-03-28

## 2022-03-26 RX ADMIN — HYDROMORPHONE HYDROCHLORIDE 1 MG: 1 INJECTION, SOLUTION INTRAMUSCULAR; INTRAVENOUS; SUBCUTANEOUS at 23:37

## 2022-03-26 RX ADMIN — IOHEXOL 100 ML: 350 INJECTION, SOLUTION INTRAVENOUS at 19:15

## 2022-03-26 RX ADMIN — LIDOCAINE HYDROCHLORIDE 30 ML: 20 SOLUTION OROPHARYNGEAL at 19:29

## 2022-03-26 RX ADMIN — SODIUM CHLORIDE: 9 INJECTION, SOLUTION INTRAVENOUS at 20:51

## 2022-03-26 RX ADMIN — OXYCODONE 5 MG: 5 TABLET ORAL at 20:51

## 2022-03-26 RX ADMIN — HYDROMORPHONE HYDROCHLORIDE 0.25 MG: 1 INJECTION, SOLUTION INTRAMUSCULAR; INTRAVENOUS; SUBCUTANEOUS at 22:15

## 2022-03-26 RX ADMIN — SODIUM CHLORIDE: 9 INJECTION, SOLUTION INTRAVENOUS at 22:45

## 2022-03-26 RX ADMIN — SODIUM CHLORIDE 1000 ML: 9 INJECTION, SOLUTION INTRAVENOUS at 17:44

## 2022-03-26 RX ADMIN — PANTOPRAZOLE SODIUM 80 MG: 40 INJECTION, POWDER, FOR SOLUTION INTRAVENOUS at 17:57

## 2022-03-26 RX ADMIN — CEFTRIAXONE SODIUM 2 G: 10 INJECTION, POWDER, FOR SOLUTION INTRAVENOUS at 20:51

## 2022-03-26 RX ADMIN — MORPHINE SULFATE 4 MG: 4 INJECTION INTRAVENOUS at 17:44

## 2022-03-26 RX ADMIN — ONDANSETRON 4 MG: 2 INJECTION INTRAMUSCULAR; INTRAVENOUS at 17:44

## 2022-03-26 ASSESSMENT — LIFESTYLE VARIABLES
HAVE PEOPLE ANNOYED YOU BY CRITICIZING YOUR DRINKING: NO
ALCOHOL_USE: YES
EVER HAD A DRINK FIRST THING IN THE MORNING TO STEADY YOUR NERVES TO GET RID OF A HANGOVER: NO
HAVE YOU EVER FELT YOU SHOULD CUT DOWN ON YOUR DRINKING: NO
HOW MANY TIMES IN THE PAST YEAR HAVE YOU HAD 5 OR MORE DRINKS IN A DAY: 0
ALCOHOL_USE: NO
TOTAL SCORE: 0
EVER FELT BAD OR GUILTY ABOUT YOUR DRINKING: NO
AVERAGE NUMBER OF DAYS PER WEEK YOU HAVE A DRINK CONTAINING ALCOHOL: 2
TOTAL SCORE: 0
HAVE PEOPLE ANNOYED YOU BY CRITICIZING YOUR DRINKING: NO
DOES PATIENT WANT TO STOP DRINKING: NO
ON A TYPICAL DAY WHEN YOU DRINK ALCOHOL HOW MANY DRINKS DO YOU HAVE: 0
CONSUMPTION TOTAL: NEGATIVE
EVER HAD A DRINK FIRST THING IN THE MORNING TO STEADY YOUR NERVES TO GET RID OF A HANGOVER: NO
HAVE YOU EVER FELT YOU SHOULD CUT DOWN ON YOUR DRINKING: NO
EVER FELT BAD OR GUILTY ABOUT YOUR DRINKING: NO
CONSUMPTION TOTAL: INCOMPLETE
TOTAL SCORE: 0

## 2022-03-26 ASSESSMENT — ENCOUNTER SYMPTOMS
NAUSEA: 0
WEIGHT LOSS: 0
SORE THROAT: 0
HEADACHES: 0
BLURRED VISION: 0
COUGH: 0
MYALGIAS: 0
VOMITING: 0
INSOMNIA: 0
DOUBLE VISION: 0
STRIDOR: 0
BRUISES/BLEEDS EASILY: 0
DIZZINESS: 0
NECK PAIN: 0
HEMOPTYSIS: 0
DEPRESSION: 0
FEVER: 0
PALPITATIONS: 0

## 2022-03-26 ASSESSMENT — COGNITIVE AND FUNCTIONAL STATUS - GENERAL
DAILY ACTIVITIY SCORE: 24
SUGGESTED CMS G CODE MODIFIER MOBILITY: CH
SUGGESTED CMS G CODE MODIFIER DAILY ACTIVITY: CH
DAILY ACTIVITIY SCORE: 24
SUGGESTED CMS G CODE MODIFIER DAILY ACTIVITY: CH
MOBILITY SCORE: 24

## 2022-03-26 ASSESSMENT — PATIENT HEALTH QUESTIONNAIRE - PHQ9
8. MOVING OR SPEAKING SO SLOWLY THAT OTHER PEOPLE COULD HAVE NOTICED. OR THE OPPOSITE, BEING SO FIGETY OR RESTLESS THAT YOU HAVE BEEN MOVING AROUND A LOT MORE THAN USUAL: NOT AT ALL
4. FEELING TIRED OR HAVING LITTLE ENERGY: NOT AT ALL
2. FEELING DOWN, DEPRESSED, IRRITABLE, OR HOPELESS: NOT AT ALL
6. FEELING BAD ABOUT YOURSELF - OR THAT YOU ARE A FAILURE OR HAVE LET YOURSELF OR YOUR FAMILY DOWN: NOT AL ALL
SUM OF ALL RESPONSES TO PHQ QUESTIONS 1-9: 1
3. TROUBLE FALLING OR STAYING ASLEEP OR SLEEPING TOO MUCH: NOT AT ALL
9. THOUGHTS THAT YOU WOULD BE BETTER OFF DEAD, OR OF HURTING YOURSELF: NOT AT ALL
5. POOR APPETITE OR OVEREATING: NOT AT ALL
7. TROUBLE CONCENTRATING ON THINGS, SUCH AS READING THE NEWSPAPER OR WATCHING TELEVISION: NOT AT ALL
1. LITTLE INTEREST OR PLEASURE IN DOING THINGS: SEVERAL DAYS
SUM OF ALL RESPONSES TO PHQ9 QUESTIONS 1 AND 2: 1

## 2022-03-26 ASSESSMENT — FIBROSIS 4 INDEX: FIB4 SCORE: 0.61

## 2022-03-26 ASSESSMENT — PAIN DESCRIPTION - PAIN TYPE: TYPE: ACUTE PAIN

## 2022-03-26 NOTE — ED NOTES
Chief Complaint   Patient presents with   • Melena     Onset today   • Abdominal Pain     Lower abdominal     Pt ambulated to triage with above complaints. Pt said that he had bleeding ulcer before and similar symptoms requiring blood transfusion.   Iv started,blood sent to lab.   Pt to blue

## 2022-03-27 PROBLEM — N17.9 AKI (ACUTE KIDNEY INJURY) (HCC): Status: ACTIVE | Noted: 2022-03-27

## 2022-03-27 LAB
ERYTHROCYTE [DISTWIDTH] IN BLOOD BY AUTOMATED COUNT: 46.6 FL (ref 35.9–50)
HCT VFR BLD AUTO: 43.1 % (ref 42–52)
HGB BLD-MCNC: 14 G/DL (ref 14–18)
HGB BLD-MCNC: 14.1 G/DL (ref 14–18)
HGB BLD-MCNC: 14.3 G/DL (ref 14–18)
HGB BLD-MCNC: 14.4 G/DL (ref 14–18)
LACTATE BLD-SCNC: 0.8 MMOL/L (ref 0.5–2)
LACTATE BLD-SCNC: 0.9 MMOL/L (ref 0.5–2)
LACTATE BLD-SCNC: 0.9 MMOL/L (ref 0.5–2)
LACTATE BLD-SCNC: 1 MMOL/L (ref 0.5–2)
MCH RBC QN AUTO: 31 PG (ref 27–33)
MCHC RBC AUTO-ENTMCNC: 33.4 G/DL (ref 33.7–35.3)
MCV RBC AUTO: 92.9 FL (ref 81.4–97.8)
PLATELET # BLD AUTO: 257 K/UL (ref 164–446)
PMV BLD AUTO: 9.3 FL (ref 9–12.9)
RBC # BLD AUTO: 4.64 M/UL (ref 4.7–6.1)
WBC # BLD AUTO: 6.2 K/UL (ref 4.8–10.8)

## 2022-03-27 PROCEDURE — 36415 COLL VENOUS BLD VENIPUNCTURE: CPT

## 2022-03-27 PROCEDURE — 770001 HCHG ROOM/CARE - MED/SURG/GYN PRIV*

## 2022-03-27 PROCEDURE — 700102 HCHG RX REV CODE 250 W/ 637 OVERRIDE(OP): Performed by: INTERNAL MEDICINE

## 2022-03-27 PROCEDURE — 85027 COMPLETE CBC AUTOMATED: CPT

## 2022-03-27 PROCEDURE — A9270 NON-COVERED ITEM OR SERVICE: HCPCS | Performed by: INTERNAL MEDICINE

## 2022-03-27 PROCEDURE — 700102 HCHG RX REV CODE 250 W/ 637 OVERRIDE(OP): Performed by: NURSE PRACTITIONER

## 2022-03-27 PROCEDURE — 700111 HCHG RX REV CODE 636 W/ 250 OVERRIDE (IP): Performed by: INTERNAL MEDICINE

## 2022-03-27 PROCEDURE — C9113 INJ PANTOPRAZOLE SODIUM, VIA: HCPCS | Performed by: INTERNAL MEDICINE

## 2022-03-27 PROCEDURE — C9803 HOPD COVID-19 SPEC COLLECT: HCPCS | Performed by: NURSE PRACTITIONER

## 2022-03-27 PROCEDURE — 700102 HCHG RX REV CODE 250 W/ 637 OVERRIDE(OP): Performed by: STUDENT IN AN ORGANIZED HEALTH CARE EDUCATION/TRAINING PROGRAM

## 2022-03-27 PROCEDURE — 700105 HCHG RX REV CODE 258: Performed by: STUDENT IN AN ORGANIZED HEALTH CARE EDUCATION/TRAINING PROGRAM

## 2022-03-27 PROCEDURE — 85018 HEMOGLOBIN: CPT

## 2022-03-27 PROCEDURE — A9270 NON-COVERED ITEM OR SERVICE: HCPCS | Performed by: STUDENT IN AN ORGANIZED HEALTH CARE EDUCATION/TRAINING PROGRAM

## 2022-03-27 PROCEDURE — A9270 NON-COVERED ITEM OR SERVICE: HCPCS | Performed by: NURSE PRACTITIONER

## 2022-03-27 PROCEDURE — 700111 HCHG RX REV CODE 636 W/ 250 OVERRIDE (IP): Performed by: HOSPITALIST

## 2022-03-27 PROCEDURE — 83605 ASSAY OF LACTIC ACID: CPT

## 2022-03-27 PROCEDURE — 0240U HCHG SARS-COV-2 COVID-19 NFCT DS RESP RNA 3 TRGT MIC: CPT

## 2022-03-27 PROCEDURE — 99233 SBSQ HOSP IP/OBS HIGH 50: CPT | Performed by: STUDENT IN AN ORGANIZED HEALTH CARE EDUCATION/TRAINING PROGRAM

## 2022-03-27 RX ORDER — HYDROMORPHONE HYDROCHLORIDE 1 MG/ML
0.5 INJECTION, SOLUTION INTRAMUSCULAR; INTRAVENOUS; SUBCUTANEOUS
Status: DISCONTINUED | OUTPATIENT
Start: 2022-03-27 | End: 2022-03-28 | Stop reason: HOSPADM

## 2022-03-27 RX ORDER — OXYCODONE HYDROCHLORIDE 5 MG/1
5 TABLET ORAL
Status: DISCONTINUED | OUTPATIENT
Start: 2022-03-27 | End: 2022-03-28 | Stop reason: HOSPADM

## 2022-03-27 RX ORDER — SUCRALFATE ORAL 1 G/10ML
1 SUSPENSION ORAL EVERY 6 HOURS
Status: DISCONTINUED | OUTPATIENT
Start: 2022-03-27 | End: 2022-03-28 | Stop reason: HOSPADM

## 2022-03-27 RX ORDER — OXYCODONE HYDROCHLORIDE AND ACETAMINOPHEN 5; 325 MG/1; MG/1
1 TABLET ORAL EVERY 4 HOURS PRN
Status: DISCONTINUED | OUTPATIENT
Start: 2022-03-27 | End: 2022-03-28 | Stop reason: HOSPADM

## 2022-03-27 RX ORDER — SODIUM CHLORIDE, SODIUM LACTATE, POTASSIUM CHLORIDE, CALCIUM CHLORIDE 600; 310; 30; 20 MG/100ML; MG/100ML; MG/100ML; MG/100ML
INJECTION, SOLUTION INTRAVENOUS CONTINUOUS
Status: ACTIVE | OUTPATIENT
Start: 2022-03-27 | End: 2022-03-28

## 2022-03-27 RX ADMIN — HYDROMORPHONE HYDROCHLORIDE 0.25 MG: 1 INJECTION, SOLUTION INTRAMUSCULAR; INTRAVENOUS; SUBCUTANEOUS at 17:39

## 2022-03-27 RX ADMIN — OXYCODONE 5 MG: 5 TABLET ORAL at 10:45

## 2022-03-27 RX ADMIN — HYDROMORPHONE HYDROCHLORIDE 0.25 MG: 1 INJECTION, SOLUTION INTRAMUSCULAR; INTRAVENOUS; SUBCUTANEOUS at 08:42

## 2022-03-27 RX ADMIN — OXYCODONE 5 MG: 5 TABLET ORAL at 05:55

## 2022-03-27 RX ADMIN — SENNOSIDES AND DOCUSATE SODIUM 2 TABLET: 50; 8.6 TABLET ORAL at 17:39

## 2022-03-27 RX ADMIN — OXYCODONE 5 MG: 5 TABLET ORAL at 19:36

## 2022-03-27 RX ADMIN — SODIUM CHLORIDE, POTASSIUM CHLORIDE, SODIUM LACTATE AND CALCIUM CHLORIDE: 600; 310; 30; 20 INJECTION, SOLUTION INTRAVENOUS at 23:28

## 2022-03-27 RX ADMIN — HYDROMORPHONE HYDROCHLORIDE 0.25 MG: 1 INJECTION, SOLUTION INTRAMUSCULAR; INTRAVENOUS; SUBCUTANEOUS at 02:23

## 2022-03-27 RX ADMIN — PANTOPRAZOLE SODIUM 40 MG: 40 INJECTION, POWDER, FOR SOLUTION INTRAVENOUS at 05:54

## 2022-03-27 RX ADMIN — OXYCODONE 5 MG: 5 TABLET ORAL at 01:15

## 2022-03-27 RX ADMIN — ONDANSETRON 4 MG: 4 TABLET, ORALLY DISINTEGRATING ORAL at 23:40

## 2022-03-27 RX ADMIN — HYDROMORPHONE HYDROCHLORIDE 0.25 MG: 1 INJECTION, SOLUTION INTRAMUSCULAR; INTRAVENOUS; SUBCUTANEOUS at 13:08

## 2022-03-27 RX ADMIN — SODIUM CHLORIDE, POTASSIUM CHLORIDE, SODIUM LACTATE AND CALCIUM CHLORIDE: 600; 310; 30; 20 INJECTION, SOLUTION INTRAVENOUS at 13:08

## 2022-03-27 RX ADMIN — OXYCODONE HYDROCHLORIDE AND ACETAMINOPHEN 1 TABLET: 5; 325 TABLET ORAL at 14:36

## 2022-03-27 RX ADMIN — PANTOPRAZOLE SODIUM 40 MG: 40 INJECTION, POWDER, FOR SOLUTION INTRAVENOUS at 17:39

## 2022-03-27 RX ADMIN — HYDROMORPHONE HYDROCHLORIDE 0.5 MG: 1 INJECTION, SOLUTION INTRAMUSCULAR; INTRAVENOUS; SUBCUTANEOUS at 22:05

## 2022-03-27 RX ADMIN — SUCRALFATE 1 G: 1 SUSPENSION ORAL at 23:40

## 2022-03-27 RX ADMIN — HYDROMORPHONE HYDROCHLORIDE 0.25 MG: 1 INJECTION, SOLUTION INTRAMUSCULAR; INTRAVENOUS; SUBCUTANEOUS at 20:38

## 2022-03-27 ASSESSMENT — ENCOUNTER SYMPTOMS
RESPIRATORY NEGATIVE: 1
CARDIOVASCULAR NEGATIVE: 1
COUGH: 0
ABDOMINAL PAIN: 1
BRUISES/BLEEDS EASILY: 0
BLURRED VISION: 0
NAUSEA: 0
EYES NEGATIVE: 1
PSYCHIATRIC NEGATIVE: 1
FEVER: 0
NEUROLOGICAL NEGATIVE: 1
CONSTITUTIONAL NEGATIVE: 1
VOMITING: 0
MUSCULOSKELETAL NEGATIVE: 1
DIZZINESS: 0
SHORTNESS OF BREATH: 0
MYALGIAS: 0

## 2022-03-27 ASSESSMENT — PAIN DESCRIPTION - PAIN TYPE
TYPE: ACUTE PAIN

## 2022-03-27 NOTE — CARE PLAN
Problem: Pain - Standard  Goal: Alleviation of pain or a reduction in pain to the patient’s comfort goal  Outcome: Progressing     The patient is Stable - Low risk of patient condition declining or worsening    Shift Goals  Clinical Goals: pain management    Progress made toward(s) clinical / shift goals:  yes    Patient is not progressing towards the following goals:

## 2022-03-27 NOTE — ED PROVIDER NOTES
ED Provider Note    CHIEF COMPLAINT  Chief Complaint   Patient presents with   • Melena     Onset today   • Abdominal Pain     Lower abdominal       HPI  Roberto Braswell is a 41 y.o. male who presents with a chief complaint of severe abdominal pain and melena that started 24 hours ago.  He has had countless black and watery stools since the onset of his symptoms.  He is nauseated and has had several episodes of nonbloody, nonbilious emesis.  He has not tried any medication for his symptoms.  He denies any fevers.  He denies hematemesis.  No chest pain or shortness of breath.  He is not anticoagulated.  He reports a history of GI bleed and was reportedly admitted to [facility] for bleeding ulcers that required an upper endoscopy as well as transfusion of packed red blood cells.  Patient and his wife cannot recall the name of the gastroenterologist who performed the endoscopy.  He reports the symptoms feel similar to prior.  He denies significant alcohol use.  He uses NSAIDs on occasion but not routinely.    REVIEW OF SYSTEMS  See HPI for further details.  Abdominal pain.  Melena.  Nausea and vomiting.  All other systems are negative.     PAST MEDICAL HISTORY   has a past medical history of Arthritis, Gastric ulcer, and Pain.    SOCIAL HISTORY  Social History     Tobacco Use   • Smoking status: Never Smoker   • Smokeless tobacco: Never Used   Substance and Sexual Activity   • Alcohol use: Yes     Comment: a few drinks/month   • Drug use: Not on file   • Sexual activity: Not on file       SURGICAL HISTORY   has a past surgical history that includes other orthopedic surgery; other abdominal surgery; ankle arthroscopy (Right, 5/21/2018); and biopsy ortho (Right, 5/21/2018).    CURRENT MEDICATIONS  Home Medications     Reviewed by Sunitha Ramey R.N. (Registered Nurse) on 03/26/22 at 1617  Med List Status: Complete   Medication Last Dose Status   ibuprofen (MOTRIN) 800 MG Tab 3/24/2022 Active           "      ALLERGIES  Allergies   Allergen Reactions   • Tramadol Unspecified     Seizure  UVC=[id number]       PHYSICAL EXAM  VITAL SIGNS: BP (!) 189/99   Pulse 67   Temp 36.8 °C (98.2 °F) (Temporal)   Resp 18   Ht 1.905 m (6' 3\")   Wt 111 kg (244 lb 7.8 oz)   SpO2 97%   BMI 30.56 kg/m²    Pulse ox interpretation: I interpret this pulse ox as normal.  Constitutional: Alert in no apparent distress.  HENT: No signs of trauma, Bilateral external ears normal, Nose normal.  Dry mucous membranes.  Eyes: Pupils are equal and reactive, Conjunctiva normal, Non-icteric.   Neck: Normal range of motion, No tenderness, Supple, No stridor.   Lymphatic: No lymphadenopathy noted.   Cardiovascular: Regular rate and rhythm, no murmurs. Pulses symmetrical.  Thorax & Lungs: Normal breath sounds, No respiratory distress, No wheezing, No chest tenderness.   Abdomen: Bowel sounds normal, Soft, bilateral lower quadrant tenderness, No masses, No pulsatile masses. No peritoneal signs.  Rectal: No obvious hemorrhoids.  Black Hemoccult positive stool in the rectal vault.  Skin: Warm, Dry, No erythema, No rash.   Back: Normal alignment.  Extremities: Intact distal pulses, No edema, No tenderness, No cyanosis.  Musculoskeletal: No major deformities noted.   Neurologic: Alert, No focal deficits noted.   Psychiatric: Affect normal, Judgment normal, Mood normal.     DIAGNOSTIC STUDIES / PROCEDURES    LABS  Results for orders placed or performed during the hospital encounter of 03/26/22   COD (ADULT)   Result Value Ref Range    ABO Grouping Only A     Rh Grouping Only POS     Antibody Screen-Cod NEG    CBC WITH DIFFERENTIAL   Result Value Ref Range    WBC 6.1 4.8 - 10.8 K/uL    RBC 5.11 4.70 - 6.10 M/uL    Hemoglobin 16.3 14.0 - 18.0 g/dL    Hematocrit 47.2 42.0 - 52.0 %    MCV 92.4 81.4 - 97.8 fL    MCH 31.9 27.0 - 33.0 pg    MCHC 34.5 33.7 - 35.3 g/dL    RDW 46.5 35.9 - 50.0 fL    Platelet Count 283 164 - 446 K/uL    MPV 9.3 9.0 - 12.9 fL    " Neutrophils-Polys 51.40 44.00 - 72.00 %    Lymphocytes 33.90 22.00 - 41.00 %    Monocytes 10.90 0.00 - 13.40 %    Eosinophils 1.80 0.00 - 6.90 %    Basophils 1.50 0.00 - 1.80 %    Immature Granulocytes 0.50 0.00 - 0.90 %    Nucleated RBC 0.00 /100 WBC    Neutrophils (Absolute) 3.12 1.82 - 7.42 K/uL    Lymphs (Absolute) 2.06 1.00 - 4.80 K/uL    Monos (Absolute) 0.66 0.00 - 0.85 K/uL    Eos (Absolute) 0.11 0.00 - 0.51 K/uL    Baso (Absolute) 0.09 0.00 - 0.12 K/uL    Immature Granulocytes (abs) 0.03 0.00 - 0.11 K/uL    NRBC (Absolute) 0.00 K/uL   COMP METABOLIC PANEL   Result Value Ref Range    Sodium 140 135 - 145 mmol/L    Potassium 4.5 3.6 - 5.5 mmol/L    Chloride 105 96 - 112 mmol/L    Co2 22 20 - 33 mmol/L    Anion Gap 13.0 7.0 - 16.0    Glucose 96 65 - 99 mg/dL    Bun 23 (H) 8 - 22 mg/dL    Creatinine 1.11 0.50 - 1.40 mg/dL    Calcium 10.0 8.5 - 10.5 mg/dL    AST(SGOT) 22 12 - 45 U/L    ALT(SGPT) 26 2 - 50 U/L    Alkaline Phosphatase 55 30 - 99 U/L    Total Bilirubin 0.4 0.1 - 1.5 mg/dL    Albumin 4.5 3.2 - 4.9 g/dL    Total Protein 6.9 6.0 - 8.2 g/dL    Globulin 2.4 1.9 - 3.5 g/dL    A-G Ratio 1.9 g/dL   LIPASE   Result Value Ref Range    Lipase 19 11 - 82 U/L   PROTHROMBIN TIME   Result Value Ref Range    PT 13.2 12.0 - 14.6 sec    INR 1.03 0.87 - 1.13   APTT   Result Value Ref Range    APTT 26.2 24.7 - 36.0 sec   ABO Rh Confirm   Result Value Ref Range    ABO Rh Confirm A POS    ESTIMATED GFR   Result Value Ref Range    GFR (CKD-EPI) 86 >60 mL/min/1.73 m 2     RADIOLOGY  CTA ABDOMEN PELVIS W & W/O POST PROCESS   Final Result      1.  No active GI bleed is seen.   2.  Status post cholecystectomy.         DX-CHEST-PORTABLE (1 VIEW)   Final Result      No acute cardiopulmonary process is seen.        COURSE & MEDICAL DECISION MAKING  Pertinent Labs & Imaging studies reviewed. (See chart for details)  Records obtained and reviewed: I cannot find records of a hospitalization at [facility] for GI bleed.    This is  a 41-year-old male with reported history of upper GI bleed who who is here with symptoms (abdominal pain and melena) exactly the same as when he was admitted for his reported GI bleed and required 2 units of packed red blood cells.  Differential diagnosis includes, but is not limited to, upper GI bleed, lower GI bleed, gastritis, PUD, duodenal ulcer, diverticulosis, AVM.    Arrives hypertensive but afebrile with otherwise normal vital signs.  Appears dehydrated with dry mucous membranes but nontoxic.  He does appear to be uncomfortable.  Abdomen is soft with mid abdominal tenderness which would be unusual for a stomach ulcer bleed as he reports he had last time but he states this is exactly where he had pain last time.  He does have melanotic stool in the rectal vault that is Hemoccult positive.    Patient was given pain and nausea medication without significant improvement.  He was also given a GI cocktail and started on a Protonix bolus.    CTA of the abdomen and pelvis did not demonstrate acute abnormality.    Patient will benefit from hospitalization for additional work-up and management.  He was discussed with the hospitalist and admitted in guarded condition.    HYDRATION  HYDRATION: Based on the patient's presentation of Dehydration the patient was given IV fluids. IV Hydration was used because oral hydration was not adequate alone. Upon recheck following hydration, the patient was Unchanged.    FINAL IMPRESSION  1.  Melena  2.  Abdominal pain    Electronically signed by: Gerald Mena M.D., 3/26/2022 5:34 PM

## 2022-03-27 NOTE — ED NOTES
Med rec completed per patient and spouse at bedside.  Allergies reviewed with patient.  Patient denies using any prescription medications.  No outpatient antibiotics in the last 30 days.  Patient's preferred pharmacy: [facility] on [address].

## 2022-03-27 NOTE — PROGRESS NOTES
Hospital Medicine Daily Progress Note    Date of Service  3/27/2022    Chief Complaint  Roberto Braswell is a 41 y.o. male admitted 3/26/2022 with melena    Hospital Course  41-year-old male with history of gastric ulcer, last endoscopy done 2019 presented with multiple episode of black bowel movement associated with abdominal pain.  Patient reportedly had similar episode in 2019 when he had endoscopy done at [facility] which revealed bleeding ulcer when he required 2 L PRBC transfusion.  CTA abdomen pelvis unremarkable Case was discussed with GI.  Admitted for upper GI bleeding  Interval Problem Update  3/27/2022  Vitals remained stable  H&H remained stable.  Elevated BUN  CTA abdomen pelvis unremarkable  Case discussed with GI Dr. chakraborty will be evaluating him for possible endoscopy  Continue IV fluid continue IV Protonix    I have personally seen and examined the patient at bedside. I discussed the plan of care with patient.    Consultants/Specialty  GI    Code Status  Full Code    Disposition  Patient is not medically cleared for discharge.   Anticipate discharge to to home with close outpatient follow-up.  I have placed the appropriate orders for post-discharge needs.    Review of Systems  Review of Systems   Constitutional: Negative for fever.   HENT: Negative for hearing loss.    Eyes: Negative for blurred vision.   Respiratory: Negative for cough and shortness of breath.    Cardiovascular: Negative for chest pain.   Gastrointestinal: Positive for abdominal pain and melena. Negative for nausea and vomiting.   Genitourinary: Negative for dysuria.   Musculoskeletal: Negative for myalgias.   Skin: Negative for rash.   Neurological: Negative for dizziness.   Endo/Heme/Allergies: Does not bruise/bleed easily.        Physical Exam  Temp:  [36.2 °C (97.1 °F)-36.8 °C (98.2 °F)] 36.2 °C (97.1 °F)  Pulse:  [50-75] 50  Resp:  [16-18] 18  BP: (155-189)/() 160/54  SpO2:  [95 %-97 %] 95 %    Physical  Exam  Constitutional:       General: He is not in acute distress.  HENT:      Head: Normocephalic and atraumatic.      Right Ear: Tympanic membrane normal.      Left Ear: Tympanic membrane normal.      Nose: Nose normal.      Mouth/Throat:      Mouth: Mucous membranes are dry.   Eyes:      Extraocular Movements: Extraocular movements intact.      Pupils: Pupils are equal, round, and reactive to light.   Cardiovascular:      Rate and Rhythm: Normal rate and regular rhythm.      Pulses: Normal pulses.   Pulmonary:      Effort: Pulmonary effort is normal. No respiratory distress.   Abdominal:      General: Abdomen is flat. There is no distension.      Tenderness: There is abdominal tenderness.   Musculoskeletal:      Cervical back: Normal range of motion.      Right lower leg: No edema.      Left lower leg: No edema.   Skin:     General: Skin is warm.   Neurological:      General: No focal deficit present.      Mental Status: He is alert and oriented to person, place, and time. Mental status is at baseline.   Psychiatric:         Mood and Affect: Mood normal.         Fluids    Intake/Output Summary (Last 24 hours) at 3/27/2022 1127  Last data filed at 3/27/2022 0200  Gross per 24 hour   Intake 2100 ml   Output 2 ml   Net 2098 ml       Laboratory  Recent Labs     03/26/22  1622 03/27/22  0017 03/27/22  0429   WBC 6.1 6.2  --    RBC 5.11 4.64*  --    HEMOGLOBIN 16.3 14.4 14.1   HEMATOCRIT 47.2 43.1  --    MCV 92.4 92.9  --    MCH 31.9 31.0  --    MCHC 34.5 33.4*  --    RDW 46.5 46.6  --    PLATELETCT 283 257  --    MPV 9.3 9.3  --      Recent Labs     03/26/22  1622   SODIUM 140   POTASSIUM 4.5   CHLORIDE 105   CO2 22   GLUCOSE 96   BUN 23*   CREATININE 1.11   CALCIUM 10.0     Recent Labs     03/26/22  1622   APTT 26.2   INR 1.03               Imaging  CTA ABDOMEN PELVIS W & W/O POST PROCESS   Final Result      1.  No active GI bleed is seen.   2.  Status post cholecystectomy.         DX-CHEST-PORTABLE (1 VIEW)   Final  Result      No acute cardiopulmonary process is seen.           Assessment/Plan  * Upper GI bleed- (present on admission)  Assessment & Plan  Upper GI bleeding,diagnoses likely secondary to peptic/duodenal ulcer  -Monitor CBC every 2 hour  -Guaiac test  -Monitor INR  -PPI therapy  -Continue IVF  -Type and screen  -Blood transfusion  -Avoid non-steroidal anti-inflammatory drugs, anti-platelets, anticoagulants  -Hold BP medication  -Continue n.p.o.  -Case discussed with GI Dr. chakraborty will be evaluating him for possible endoscopy    Epigastric pain  Assessment & Plan  Continue IV pain medication  CTA abdomen pelvis unremarkable  GI consulted       VTE prophylaxis: SCDs/TEDs  Chemical prophylaxis on hold given upper GI bleeding  I have performed a physical exam and reviewed and updated ROS and Plan today (3/27/2022). In review of yesterday's note (3/26/2022), there are no changes except as documented above.

## 2022-03-27 NOTE — PROGRESS NOTES
4 Eyes Skin Assessment Completed by Nicanor ZAVALA RN and Jacques FORD RN.    Head WDL  Ears WDL  Nose WDL  Mouth WDL  Neck WDL  Breast/Chest WDL  Shoulder Blades WDL  Spine WDL  (R) Arm/Elbow/Hand WDL  (L) Arm/Elbow/Hand WDL  Abdomen Bruising  Groin WDL  Scrotum/Coccyx/Buttocks WDL  (R) Leg WDL  (L) Leg WDL  (R) Heel/Foot/Toe WDL  (L) Heel/Foot/Toe WDL          Devices In Places Pulse Ox      Interventions In Place N/A    Possible Skin Injury No    Pictures Uploaded Into Epic N/A  Wound Consult Placed N/A  RN Wound Prevention Protocol Ordered No

## 2022-03-27 NOTE — CONSULTS
Gastroenterology Consult Note:    SHAJI Garcia  Date & Time note created:    3/27/2022   12:06 PM     Referring MD:  Dr. Lucius Kapadia    Patient ID:  Name:             Roberto Braswell     YOB: 1980  Age:                 41 y.o.  male   MRN:               [id number]                                                             Reason for Consult:      RUQ/epigastric pain  Melena x 1 day    History of Present Illness:    This is a 40 yo male PMH bleeding gastric/duodenal ulcers 2019, who was admitted to the hospital with a 3-4 day history of RUQ/epigastric pain with a 1 day history of black tarry stools starting yesterday. VSS. Labs:  Hgb 16.3-->14.4-->14.1. BUN: 23. Crt: 1.11. CTA: no active GI bleed. Occasional NSAID use but denies alcohol. Denies Pepto Bismol use. Has not been taking PPI medication.    In 2019, upper abdominal pain and melenic stools. EGD performed by Dr. Bourgeois: few gastric ulcer, small, clean based without stigmata. 3 duodenal ulcers which were actively bleeding treated with epi injection and hemoclip.  Received 2 units of PRBC's.    During examination, patient had a heart murmur. He stated he was told he had a heart murmur when admitted to the hospital. He denies history of heart murmur. During last hospitalization 2019, he was told that the EKG was abnormal. Has not been evaluated by cardiology    Echocardiogram 2019:  LVEF: 65-70%. Moderate mitral regurgitation. Prolapse of the posterior mitral valve.     Review of Systems:      Review of Systems   Constitutional: Negative.    HENT: Negative.    Eyes: Negative.    Respiratory: Negative.    Cardiovascular: Negative.    Gastrointestinal: Positive for abdominal pain and melena.   Genitourinary: Negative.    Musculoskeletal: Negative.    Skin: Negative.    Neurological: Negative.    Psychiatric/Behavioral: Negative.              Physical Exam:  Vitals/ General Appearance:   Weight/BMI: Body mass index is 30.75  kg/m².    Vitals:    03/27/22 0115 03/27/22 0223 03/27/22 0300 03/27/22 0425   BP:    160/54   Pulse:    (!) 50   Resp: 18 16 16 18   Temp:    36.2 °C (97.1 °F)   TempSrc:    Temporal   SpO2:    95%   Weight:       Height:         Oxygen Therapy:  Pulse Oximetry: 95 %, O2 (LPM): 0, O2 Delivery Device: None - Room Air    Physical Exam  Vitals reviewed.   Constitutional:       Appearance: Normal appearance. He is normal weight.   HENT:      Head: Normocephalic and atraumatic.      Right Ear: External ear normal.      Left Ear: External ear normal.      Mouth/Throat:      Mouth: Mucous membranes are moist.   Eyes:      Extraocular Movements: Extraocular movements intact.      Pupils: Pupils are equal, round, and reactive to light.   Cardiovascular:      Rate and Rhythm: Regular rhythm. Bradycardia present.      Pulses: Normal pulses.      Heart sounds: Murmur heard.   Pulmonary:      Effort: Pulmonary effort is normal.      Breath sounds: Normal breath sounds.   Abdominal:      General: Bowel sounds are normal.      Palpations: Abdomen is soft.      Tenderness: There is abdominal tenderness (epigastric/RUQ).   Musculoskeletal:         General: Normal range of motion.      Cervical back: Normal range of motion and neck supple.   Skin:     General: Skin is warm and dry.      Capillary Refill: Capillary refill takes less than 2 seconds.   Neurological:      General: No focal deficit present.      Mental Status: He is oriented to person, place, and time.   Psychiatric:         Mood and Affect: Mood normal.         Behavior: Behavior normal.         Thought Content: Thought content normal.         Judgment: Judgment normal.         Past Medical History:   Past Medical History:   Diagnosis Date   • Arthritis     Right ankle   • Gastric ulcer    • Pain     Right ankle       Past Surgical History:  Past Surgical History:   Procedure Laterality Date   • ANKLE ARTHROSCOPY Right 5/21/2018    Procedure: ANKLE ARTHROSCOPY, LATERAL  LIGAMENT RECONSTRUCTION;  Surgeon: Seth Cruz M.D.;  Location: SURGERY [facility];  Service: Orthopedics   • BIOPSY ORTHO Right 5/21/2018    Procedure: BIOPSY ORTHO/ FOR CARTILAGE AND DENOVO PROCEDURE;  Surgeon: Seth Cruz M.D.;  Location: SURGERY [facility];  Service: Orthopedics   • OTHER ABDOMINAL SURGERY      Cholecystectomy February 2017   • OTHER ORTHOPEDIC SURGERY      2 previous ankle surgeries (2007, 2009)       Hospital Medications:    Current Facility-Administered Medications:   •  lactated ringers infusion, , Intravenous, Continuous, Lucius Kapadia M.D.  •  senna-docusate (PERICOLACE or SENOKOT S) 8.6-50 MG per tablet 2 Tablet, 2 Tablet, Oral, BID **AND** polyethylene glycol/lytes (MIRALAX) PACKET 1 Packet, 1 Packet, Oral, QDAY PRN **AND** magnesium hydroxide (MILK OF MAGNESIA) suspension 30 mL, 30 mL, Oral, QDAY PRN **AND** bisacodyl (DULCOLAX) suppository 10 mg, 10 mg, Rectal, QDAY PRN, Judd Bess M.D.  •  acetaminophen (Tylenol) tablet 650 mg, 650 mg, Oral, Q6HRS PRN, Judd Bess M.D.  •  Notify provider if pain remains uncontrolled, , , CONTINUOUS **AND** Use the Numeric Rating Scale (NRS), Lugo-Baker Faces (WBF), or FLACC on regular floors and Critical-Care Pain Observation Tool (CPOT) on ICUs/Trauma to assess pain, , , CONTINUOUS **AND** Pulse Ox, , , CONTINUOUS **AND** Pharmacy Consult Request ...Pain Management Review 1 Each, 1 Each, Other, PHARMACY TO DOSE **AND** If patient difficult to arouse and/or has respiratory depression (respiratory rate of 10 or less), stop any opiates that are currently infusing and call a Rapid Response., , , CONTINUOUS, Judd Bess M.D.  •  oxyCODONE immediate-release (ROXICODONE) tablet 2.5 mg, 2.5 mg, Oral, Q3HRS PRN **OR** oxyCODONE immediate-release (ROXICODONE) tablet 5 mg, 5 mg, Oral, Q3HRS PRN, 5 mg at 03/27/22 1045 **OR** HYDROmorphone (Dilaudid) injection 0.25 mg, 0.25 mg, Intravenous, Q3HRS PRN, Judd Bess M.D., 0.25 mg  at 03/27/22 0842  •  Pharmacy Consult Request, , Other, PHARMACY TO DOSE, Judd Bess M.D.  •  labetalol (NORMODYNE/TRANDATE) injection 10 mg, 10 mg, Intravenous, Q4HRS PRN, Judd Bess M.D.  •  ondansetron (ZOFRAN) syringe/vial injection 4 mg, 4 mg, Intravenous, Q4HRS PRN, Judd Bess M.D.  •  ondansetron (ZOFRAN ODT) dispertab 4 mg, 4 mg, Oral, Q4HRS PRN, Judd Bess M.D.  •  promethazine (PHENERGAN) tablet 12.5-25 mg, 12.5-25 mg, Oral, Q4HRS PRN, Judd Bess M.D.  •  promethazine (PHENERGAN) suppository 12.5-25 mg, 12.5-25 mg, Rectal, Q4HRS PRN, Judd Bess M.D.  •  prochlorperazine (COMPAZINE) injection 5-10 mg, 5-10 mg, Intravenous, Q4HRS PRN, Judd Bess M.D.  •  pantoprazole (Protonix) injection 40 mg, 40 mg, Intravenous, BID, Judd eBss M.D., 40 mg at 03/27/22 0554    Current Outpatient Medications:  Current Facility-Administered Medications   Medication Dose Route Frequency Provider Last Rate Last Admin   • lactated ringers infusion   Intravenous Continuous Lucius Kapadia M.D.       • senna-docusate (PERICOLACE or SENOKOT S) 8.6-50 MG per tablet 2 Tablet  2 Tablet Oral BID Judd Bess M.D.        And   • polyethylene glycol/lytes (MIRALAX) PACKET 1 Packet  1 Packet Oral QDAY PRN Judd Bess M.D.        And   • magnesium hydroxide (MILK OF MAGNESIA) suspension 30 mL  30 mL Oral QDAY PRN Judd Bess M.D.        And   • bisacodyl (DULCOLAX) suppository 10 mg  10 mg Rectal QDAY PRN Judd Bess M.D.       • acetaminophen (Tylenol) tablet 650 mg  650 mg Oral Q6HRS PRN Judd Bess M.D.       • Pharmacy Consult Request ...Pain Management Review 1 Each  1 Each Other PHARMACY TO DOSE Judd Bess M.D.       • oxyCODONE immediate-release (ROXICODONE) tablet 2.5 mg  2.5 mg Oral Q3HRS PRN Judd Bess M.D.        Or   • oxyCODONE immediate-release (ROXICODONE) tablet 5 mg  5 mg Oral Q3HRS PRN Judd Bess M.D.   5 mg at 03/27/22 1045    Or   • HYDROmorphone  (Dilaudid) injection 0.25 mg  0.25 mg Intravenous Q3HRS PRN Judd Bess M.D.   0.25 mg at 03/27/22 0842   • Pharmacy Consult Request   Other PHARMACY TO DOSE Judd Bess M.D.       • labetalol (NORMODYNE/TRANDATE) injection 10 mg  10 mg Intravenous Q4HRS PRN Judd Bess M.D.       • ondansetron (ZOFRAN) syringe/vial injection 4 mg  4 mg Intravenous Q4HRS PRN Judd Bess M.D.       • ondansetron (ZOFRAN ODT) dispertab 4 mg  4 mg Oral Q4HRS PRN Judd Bess M.D.       • promethazine (PHENERGAN) tablet 12.5-25 mg  12.5-25 mg Oral Q4HRS PRN Judd Bess M.D.       • promethazine (PHENERGAN) suppository 12.5-25 mg  12.5-25 mg Rectal Q4HRS PRN Judd Bess M.D.       • prochlorperazine (COMPAZINE) injection 5-10 mg  5-10 mg Intravenous Q4HRS PRN Judd Bess M.D.       • pantoprazole (Protonix) injection 40 mg  40 mg Intravenous BID Judd Bess M.D.   40 mg at 03/27/22 0554       Medication Allergy:  Allergies   Allergen Reactions   • Tramadol Unspecified     Seizure  [id number]       Family History:  History reviewed. No pertinent family history.    Social History:  Social History     Socioeconomic History   • Marital status:      Spouse name: Not on file   • Number of children: Not on file   • Years of education: Not on file   • Highest education level: Not on file   Occupational History   • Not on file   Tobacco Use   • Smoking status: Never Smoker   • Smokeless tobacco: Never Used   Substance and Sexual Activity   • Alcohol use: Yes     Comment: a few drinks/month   • Drug use: Not on file   • Sexual activity: Not on file   Other Topics Concern   • Not on file   Social History Narrative   • Not on file     Social Determinants of Health     Financial Resource Strain: Not on file   Food Insecurity: Not on file   Transportation Needs: Not on file   Physical Activity: Not on file   Stress: Not on file   Social Connections: Not on file   Intimate Partner Violence: Not on file   Housing  Stability: Not on file         MDM (Data Review):     Records reviewed and summarized in current documentation    Lab Data Review:  Recent Results (from the past 24 hour(s))   COD (ADULT)    Collection Time: 03/26/22  4:22 PM   Result Value Ref Range    ABO Grouping Only A     Rh Grouping Only POS     Antibody Screen-Cod NEG    CBC WITH DIFFERENTIAL    Collection Time: 03/26/22  4:22 PM   Result Value Ref Range    WBC 6.1 4.8 - 10.8 K/uL    RBC 5.11 4.70 - 6.10 M/uL    Hemoglobin 16.3 14.0 - 18.0 g/dL    Hematocrit 47.2 42.0 - 52.0 %    MCV 92.4 81.4 - 97.8 fL    MCH 31.9 27.0 - 33.0 pg    MCHC 34.5 33.7 - 35.3 g/dL    RDW 46.5 35.9 - 50.0 fL    Platelet Count 283 164 - 446 K/uL    MPV 9.3 9.0 - 12.9 fL    Neutrophils-Polys 51.40 44.00 - 72.00 %    Lymphocytes 33.90 22.00 - 41.00 %    Monocytes 10.90 0.00 - 13.40 %    Eosinophils 1.80 0.00 - 6.90 %    Basophils 1.50 0.00 - 1.80 %    Immature Granulocytes 0.50 0.00 - 0.90 %    Nucleated RBC 0.00 /100 WBC    Neutrophils (Absolute) 3.12 1.82 - 7.42 K/uL    Lymphs (Absolute) 2.06 1.00 - 4.80 K/uL    Monos (Absolute) 0.66 0.00 - 0.85 K/uL    Eos (Absolute) 0.11 0.00 - 0.51 K/uL    Baso (Absolute) 0.09 0.00 - 0.12 K/uL    Immature Granulocytes (abs) 0.03 0.00 - 0.11 K/uL    NRBC (Absolute) 0.00 K/uL   COMP METABOLIC PANEL    Collection Time: 03/26/22  4:22 PM   Result Value Ref Range    Sodium 140 135 - 145 mmol/L    Potassium 4.5 3.6 - 5.5 mmol/L    Chloride 105 96 - 112 mmol/L    Co2 22 20 - 33 mmol/L    Anion Gap 13.0 7.0 - 16.0    Glucose 96 65 - 99 mg/dL    Bun 23 (H) 8 - 22 mg/dL    Creatinine 1.11 0.50 - 1.40 mg/dL    Calcium 10.0 8.5 - 10.5 mg/dL    AST(SGOT) 22 12 - 45 U/L    ALT(SGPT) 26 2 - 50 U/L    Alkaline Phosphatase 55 30 - 99 U/L    Total Bilirubin 0.4 0.1 - 1.5 mg/dL    Albumin 4.5 3.2 - 4.9 g/dL    Total Protein 6.9 6.0 - 8.2 g/dL    Globulin 2.4 1.9 - 3.5 g/dL    A-G Ratio 1.9 g/dL   LIPASE    Collection Time: 03/26/22  4:22 PM   Result Value Ref Range     Lipase 19 11 - 82 U/L   PROTHROMBIN TIME    Collection Time: 03/26/22  4:22 PM   Result Value Ref Range    PT 13.2 12.0 - 14.6 sec    INR 1.03 0.87 - 1.13   APTT    Collection Time: 03/26/22  4:22 PM   Result Value Ref Range    APTT 26.2 24.7 - 36.0 sec   ESTIMATED GFR    Collection Time: 03/26/22  4:22 PM   Result Value Ref Range    GFR (CKD-EPI) 86 >60 mL/min/1.73 m 2   ABO Rh Confirm    Collection Time: 03/26/22  6:00 PM   Result Value Ref Range    ABO Rh Confirm A POS    CBC WITHOUT DIFFERENTIAL    Collection Time: 03/27/22 12:17 AM   Result Value Ref Range    WBC 6.2 4.8 - 10.8 K/uL    RBC 4.64 (L) 4.70 - 6.10 M/uL    Hemoglobin 14.4 14.0 - 18.0 g/dL    Hematocrit 43.1 42.0 - 52.0 %    MCV 92.9 81.4 - 97.8 fL    MCH 31.0 27.0 - 33.0 pg    MCHC 33.4 (L) 33.7 - 35.3 g/dL    RDW 46.6 35.9 - 50.0 fL    Platelet Count 257 164 - 446 K/uL    MPV 9.3 9.0 - 12.9 fL   LACTIC ACID    Collection Time: 03/27/22 12:17 AM   Result Value Ref Range    Lactic Acid 0.8 0.5 - 2.0 mmol/L   LACTIC ACID    Collection Time: 03/27/22  4:29 AM   Result Value Ref Range    Lactic Acid 0.9 0.5 - 2.0 mmol/L   HGB (Hemoglobin) for 48 hours    Collection Time: 03/27/22  4:29 AM   Result Value Ref Range    Hemoglobin 14.1 14.0 - 18.0 g/dL   LACTIC ACID    Collection Time: 03/27/22  7:39 AM   Result Value Ref Range    Lactic Acid 0.9 0.5 - 2.0 mmol/L   LACTIC ACID    Collection Time: 03/27/22 11:36 AM   Result Value Ref Range    Lactic Acid 1.0 0.5 - 2.0 mmol/L   HGB (Hemoglobin) for 48 hours    Collection Time: 03/27/22 11:36 AM   Result Value Ref Range    Hemoglobin 14.0 14.0 - 18.0 g/dL       Imaging/Procedures Review:      CTA ABDOMEN PELVIS W & W/O POST PROCESS   Final Result      1.  No active GI bleed is seen.   2.  Status post cholecystectomy.         DX-CHEST-PORTABLE (1 VIEW)   Final Result      No acute cardiopulmonary process is seen.           MDM (Assessment and Plan):     Patient Active Problem List    Diagnosis Date Noted   •  ELISABET (acute kidney injury) (HCC) 03/27/2022   • Upper GI bleed 03/26/2022   • Physiologic disturbance of temperature regulation 03/26/2022   • Epigastric pain 03/26/2022     This is a 40 yo male admitted to the hospital 3/26/22 with a 1 day history of black tarry stools with epigastric/RUQ pain. VSS. H&H stable. Current Hgb: 14.1. History of upper GI bleed 2019. Occasional NSAID use. Denies alcohol use. Has not been taking any antacids, PPI or H2 blockers. Cardiac exam: murmur. Echocardiogram 2019: LVEF: 65-70%    ASSESSMENT:  1. Epigastric/RUQ pain: likely due to gastric/duodenal ulcers.  2. Melena: history of upper GI bleed 2019 with gastric and duodenal ulcers  3. Heart murmur: incidental finding. Echocardiogram 2019 unremarkable.    PLAN:  Risks, benefits, and alternatives of EGD were discussed with patient.  Consenting person(s) were given opportunities to ask questions and discuss other options.  Risks including but not limited to perforation, infection, bleeding, missed lesion(s), possible need for surgery(ies) and/or interventional radiology, possible need for repeat procedure(s) and/or additional testing, hospitalization possibly prolonged, cardiac and/or pulmonary event, aspiration, hypoxia, stroke, medication and/or anesthesia reaction, indefinite diagnosis, discomfort, unsuccessful and/or incomplete procedure, ineffective therapy and/or persistent symptoms, damage to adjacent organs and/or vascular structures, and other adverse events possibly life-threatening.  Interactive discussion was undertaken with Layman's terms. I answered questions in full and to satisfaction.  Consenting person(s) stated understanding and acceptance of these risks, and wished to proceed.  Informed consent was given in clear state of mind.   Scheduled for Monday at 12:30  --Clear liquid diet then Npo after midnight  --Protonix 40mg IV BID  --Sucralfate 1 g suspension QID  --Trend Hgb and transfuse >7  --Avoid  NSAIDs  --COVID    Thank your for the opportunity to assist in the care of your patient.  Please call for any questions or concerns.    BUSHRA Garcia.

## 2022-03-27 NOTE — CARE PLAN
The patient is Stable - Low risk of patient condition declining or worsening    Shift Goals  Clinical Goals: pain management  Patient Goals: pain mangement    Progress made toward(s) clinical / shift goals:  Pt reports short relief after receiving pain medications. MD aware received new orders. WCTM for effectiveness.    Patient is not progressing towards the following goals:

## 2022-03-27 NOTE — ASSESSMENT & PLAN NOTE
Upper GI bleeding,diagnoses likely secondary to peptic/duodenal ulcer  -Monitor CBC every 2 hour  -Guaiac test  -Monitor INR  -PPI therapy  -Continue IVF  -Type and screen  -Blood transfusion  -Avoid non-steroidal anti-inflammatory drugs, anti-platelets, anticoagulants  -Hold BP medication  -Continue n.p.o.  -Case discussed with GI Dr. chakraborty will be evaluating him for possible endoscopy

## 2022-03-27 NOTE — H&P
Hospital Medicine History & Physical Note    Date of Service  3/26/2022    Primary Care Physician  Pcp Pt States None      Code Status  Full Code    Chief Complaint  Chief Complaint   Patient presents with   • Melena     Onset today   • Abdominal Pain     Lower abdominal       History of Presenting Illness  Roberto Braswell is a 41 y.o. male with history of upper GI bleed requiring transfusion who presented 3/26/2022 with severe abdominal pain and melena that started 24 hours ago.  He has had countless black and watery stools since the onset of his symptoms.  He is nauseated and has had several episodes of nonbloody, nonbilious emesis.  He has not tried any medication for his symptoms.  He denies any fevers.  He denies hematemesis.  No chest pain or shortness of breath.  He is not anticoagulated.  He reports a history of GI bleed and was reportedly admitted to [facility] for bleeding ulcers that required an upper endoscopy as well as transfusion of packed red blood cells.    He denies a history of NSAID gastritis or H. pylori.  Patient and his wife cannot recall the name of the gastroenterologist who performed the endoscopy.  He reports the symptoms feel similar to prior.  He denies significant alcohol use.  He uses NSAIDs on occasion but not routinely.  His work-up shows Hemoccult positive stool with a normal H&H.  He receives symptomatic management and referred to the hospitalist for admission  At bedside he is in moderate distress secondary to epigastric pain      I discussed the plan of care with patient.    Review of Systems  Review of Systems   Constitutional: Negative for fever, malaise/fatigue and weight loss.   HENT: Negative for sore throat and tinnitus.    Eyes: Negative for blurred vision and double vision.   Respiratory: Negative for cough, hemoptysis and stridor.    Cardiovascular: Negative for chest pain and palpitations.   Gastrointestinal: Negative for nausea and vomiting.   Genitourinary:  Negative for dysuria and urgency.   Musculoskeletal: Negative for myalgias and neck pain.   Skin: Negative for itching and rash.   Neurological: Negative for dizziness and headaches.   Endo/Heme/Allergies: Does not bruise/bleed easily.   Psychiatric/Behavioral: Negative for depression. The patient does not have insomnia.        Past Medical History   has a past medical history of Arthritis, Gastric ulcer, and Pain.    Surgical History   has a past surgical history that includes other orthopedic surgery; other abdominal surgery; ankle arthroscopy (Right, 5/21/2018); and biopsy ortho (Right, 5/21/2018).     Family History  Coronary artery disease, denying GI pathology  Family history reviewed with patient. There is no family history that is pertinent to the chief complaint.     Social History   reports that he has never smoked. He has never used smokeless tobacco. He reports current alcohol use.  He denies recreational drugs    Allergies  Allergies   Allergen Reactions   • Tramadol Unspecified     Seizure  SLW=[id number]       Medications  Prior to Admission Medications   Prescriptions Last Dose Informant Patient Reported? Taking?   Multiple Vitamins-Minerals (MULTIVITAMIN GUMMIES ADULT) Chew Tab 3/24/2022 at PM Patient Yes Yes   Sig: Chew 3 Tablets every evening.   ibuprofen (MOTRIN) 200 MG Tab 3/24/2022 at PRN Patient Yes Yes   Sig: Take 800 mg by mouth 1 time a day as needed for Moderate Pain. 4 tablets = 800 mg.      Facility-Administered Medications: None       Physical Exam  Temp:  [36.2 °C (97.2 °F)-36.8 °C (98.2 °F)] 36.2 °C (97.2 °F)  Pulse:  [63-75] 75  Resp:  [18] 18  BP: (155-189)/() 155/101  SpO2:  [95 %-97 %] 97 %  Blood Pressure: 155/101 (RN notified)   Temperature: 36.2 °C (97.2 °F)   Pulse: 75   Respiration: 18   Pulse Oximetry: 97 %       Physical Exam  Vitals and nursing note reviewed.   Constitutional:       General: He is not in acute distress.     Appearance: Normal appearance. He is normal  weight. He is not toxic-appearing.   HENT:      Head: Normocephalic and atraumatic.      Nose: Nose normal. No congestion or rhinorrhea.      Mouth/Throat:      Mouth: Mucous membranes are moist.      Pharynx: Oropharynx is clear.   Eyes:      Extraocular Movements: Extraocular movements intact.      Conjunctiva/sclera: Conjunctivae normal.      Pupils: Pupils are equal, round, and reactive to light.   Neck:      Vascular: No carotid bruit.   Cardiovascular:      Rate and Rhythm: Normal rate and regular rhythm.      Pulses: Normal pulses.      Heart sounds: Murmur heard.     No gallop.   Pulmonary:      Effort: No respiratory distress.      Breath sounds: Normal breath sounds. No wheezing or rales.   Abdominal:      General: Abdomen is flat. There is no distension.      Palpations: Abdomen is soft. There is no mass.      Tenderness: There is abdominal tenderness. There is no guarding or rebound.      Hernia: No hernia is present.   Musculoskeletal:         General: No tenderness or signs of injury.      Cervical back: Normal range of motion and neck supple. No muscular tenderness.   Lymphadenopathy:      Cervical: No cervical adenopathy.   Skin:     Capillary Refill: Capillary refill takes less than 2 seconds.      Coloration: Skin is not jaundiced or pale.      Findings: No bruising.   Neurological:      General: No focal deficit present.      Mental Status: He is alert and oriented to person, place, and time. Mental status is at baseline.      Cranial Nerves: No cranial nerve deficit.      Motor: No weakness.      Coordination: Coordination normal.   Psychiatric:         Mood and Affect: Mood normal.         Thought Content: Thought content normal.         Judgment: Judgment normal.         Laboratory:  Recent Labs     03/26/22  1622   WBC 6.1   RBC 5.11   HEMOGLOBIN 16.3   HEMATOCRIT 47.2   MCV 92.4   MCH 31.9   MCHC 34.5   RDW 46.5   PLATELETCT 283   MPV 9.3     Recent Labs     03/26/22  1622   SODIUM 140    POTASSIUM 4.5   CHLORIDE 105   CO2 22   GLUCOSE 96   BUN 23*   CREATININE 1.11   CALCIUM 10.0     Recent Labs     03/26/22  1622   ALTSGPT 26   ASTSGOT 22   ALKPHOSPHAT 55   TBILIRUBIN 0.4   LIPASE 19   GLUCOSE 96     Recent Labs     03/26/22  1622   APTT 26.2   INR 1.03     No results for input(s): NTPROBNP in the last 72 hours.      No results for input(s): TROPONINT in the last 72 hours.    Imaging:  CTA ABDOMEN PELVIS W & W/O POST PROCESS   Final Result      1.  No active GI bleed is seen.   2.  Status post cholecystectomy.         DX-CHEST-PORTABLE (1 VIEW)   Final Result      No acute cardiopulmonary process is seen.          X-Ray:  I have personally reviewed the images and compared with prior images.    Assessment/Plan:  I anticipate this patient will require at least two midnights for appropriate medical management, necessitating inpatient admission.    * Upper GI bleed- (present on admission)  Assessment & Plan  GI bleed order set deployed, Rocephin, IV Protonix, IV fluid, serial H&H.  Transfuse as needed hemoglobin less than 7    Epigastric pain  Assessment & Plan  Symptomatic management, follow-up CT abdomen pelvis, H&H      VTE prophylaxis: SCDs/TEDs

## 2022-03-27 NOTE — PROGRESS NOTES
This RN has assumed care of pt, pt is axox4, vss, pt c/o 10/10 abdominal pain and states current regimen provides some relief but would like to ask the MD to make changes. This RN has notified MD of the pt request, no new orders at this time, pt started on clear liquid diet, NPO at [place], [facility].

## 2022-03-28 ENCOUNTER — ANESTHESIA EVENT (OUTPATIENT)
Dept: SURGERY | Facility: MEDICAL CENTER | Age: 42
DRG: 392 | End: 2022-03-28
Payer: COMMERCIAL

## 2022-03-28 ENCOUNTER — ANESTHESIA (OUTPATIENT)
Dept: SURGERY | Facility: MEDICAL CENTER | Age: 42
DRG: 392 | End: 2022-03-28
Payer: COMMERCIAL

## 2022-03-28 VITALS
OXYGEN SATURATION: 94 % | HEIGHT: 75 IN | HEART RATE: 49 BPM | BODY MASS INDEX: 30.59 KG/M2 | SYSTOLIC BLOOD PRESSURE: 124 MMHG | WEIGHT: 246 LBS | DIASTOLIC BLOOD PRESSURE: 68 MMHG | RESPIRATION RATE: 16 BRPM | TEMPERATURE: 97.1 F

## 2022-03-28 LAB
EKG IMPRESSION: NORMAL
EXTERNAL QUALITY CONTROL: NORMAL
HGB BLD-MCNC: 13.9 G/DL (ref 14–18)
PATHOLOGY CONSULT NOTE: NORMAL
SARS-COV+SARS-COV-2 AG RESP QL IA.RAPID: NEGATIVE

## 2022-03-28 PROCEDURE — 93005 ELECTROCARDIOGRAM TRACING: CPT | Performed by: STUDENT IN AN ORGANIZED HEALTH CARE EDUCATION/TRAINING PROGRAM

## 2022-03-28 PROCEDURE — 160048 HCHG OR STATISTICAL LEVEL 1-5: Performed by: INTERNAL MEDICINE

## 2022-03-28 PROCEDURE — 99239 HOSP IP/OBS DSCHRG MGMT >30: CPT | Performed by: STUDENT IN AN ORGANIZED HEALTH CARE EDUCATION/TRAINING PROGRAM

## 2022-03-28 PROCEDURE — 85018 HEMOGLOBIN: CPT

## 2022-03-28 PROCEDURE — 93010 ELECTROCARDIOGRAM REPORT: CPT | Performed by: INTERNAL MEDICINE

## 2022-03-28 PROCEDURE — 700111 HCHG RX REV CODE 636 W/ 250 OVERRIDE (IP): Performed by: INTERNAL MEDICINE

## 2022-03-28 PROCEDURE — 88312 SPECIAL STAINS GROUP 1: CPT

## 2022-03-28 PROCEDURE — 88305 TISSUE EXAM BY PATHOLOGIST: CPT

## 2022-03-28 PROCEDURE — C9113 INJ PANTOPRAZOLE SODIUM, VIA: HCPCS | Performed by: INTERNAL MEDICINE

## 2022-03-28 PROCEDURE — 700101 HCHG RX REV CODE 250: Performed by: ANESTHESIOLOGY

## 2022-03-28 PROCEDURE — 36415 COLL VENOUS BLD VENIPUNCTURE: CPT

## 2022-03-28 PROCEDURE — 700102 HCHG RX REV CODE 250 W/ 637 OVERRIDE(OP): Performed by: HOSPITALIST

## 2022-03-28 PROCEDURE — 0DB68ZX EXCISION OF STOMACH, VIA NATURAL OR ARTIFICIAL OPENING ENDOSCOPIC, DIAGNOSTIC: ICD-10-PCS | Performed by: INTERNAL MEDICINE

## 2022-03-28 PROCEDURE — 700111 HCHG RX REV CODE 636 W/ 250 OVERRIDE (IP): Performed by: ANESTHESIOLOGY

## 2022-03-28 PROCEDURE — 160035 HCHG PACU - 1ST 60 MINS PHASE I: Performed by: INTERNAL MEDICINE

## 2022-03-28 PROCEDURE — 700111 HCHG RX REV CODE 636 W/ 250 OVERRIDE (IP): Performed by: HOSPITALIST

## 2022-03-28 PROCEDURE — 160009 HCHG ANES TIME/MIN: Performed by: INTERNAL MEDICINE

## 2022-03-28 PROCEDURE — A9270 NON-COVERED ITEM OR SERVICE: HCPCS | Performed by: HOSPITALIST

## 2022-03-28 PROCEDURE — 87426 SARSCOV CORONAVIRUS AG IA: CPT | Performed by: INTERNAL MEDICINE

## 2022-03-28 PROCEDURE — 160002 HCHG RECOVERY MINUTES (STAT): Performed by: INTERNAL MEDICINE

## 2022-03-28 PROCEDURE — 160203 HCHG ENDO MINUTES - 1ST 30 MINS LEVEL 4: Performed by: INTERNAL MEDICINE

## 2022-03-28 RX ORDER — DEXAMETHASONE SODIUM PHOSPHATE 4 MG/ML
INJECTION, SOLUTION INTRA-ARTICULAR; INTRALESIONAL; INTRAMUSCULAR; INTRAVENOUS; SOFT TISSUE PRN
Status: DISCONTINUED | OUTPATIENT
Start: 2022-03-28 | End: 2022-03-28 | Stop reason: SURG

## 2022-03-28 RX ORDER — LABETALOL HYDROCHLORIDE 5 MG/ML
5 INJECTION, SOLUTION INTRAVENOUS
Status: DISCONTINUED | OUTPATIENT
Start: 2022-03-28 | End: 2022-03-28 | Stop reason: HOSPADM

## 2022-03-28 RX ORDER — SUCCINYLCHOLINE CHLORIDE 20 MG/ML
INJECTION INTRAMUSCULAR; INTRAVENOUS PRN
Status: DISCONTINUED | OUTPATIENT
Start: 2022-03-28 | End: 2022-03-28 | Stop reason: SURG

## 2022-03-28 RX ORDER — OMEPRAZOLE 20 MG/1
40 CAPSULE, DELAYED RELEASE ORAL DAILY
Status: DISCONTINUED | OUTPATIENT
Start: 2022-03-28 | End: 2022-03-28 | Stop reason: HOSPADM

## 2022-03-28 RX ORDER — ONDANSETRON 2 MG/ML
INJECTION INTRAMUSCULAR; INTRAVENOUS PRN
Status: DISCONTINUED | OUTPATIENT
Start: 2022-03-28 | End: 2022-03-28 | Stop reason: SURG

## 2022-03-28 RX ORDER — MIDAZOLAM HYDROCHLORIDE 1 MG/ML
INJECTION INTRAMUSCULAR; INTRAVENOUS PRN
Status: DISCONTINUED | OUTPATIENT
Start: 2022-03-28 | End: 2022-03-28 | Stop reason: SURG

## 2022-03-28 RX ORDER — HALOPERIDOL 5 MG/ML
1 INJECTION INTRAMUSCULAR
Status: DISCONTINUED | OUTPATIENT
Start: 2022-03-28 | End: 2022-03-28 | Stop reason: HOSPADM

## 2022-03-28 RX ORDER — METOPROLOL TARTRATE 1 MG/ML
1 INJECTION, SOLUTION INTRAVENOUS
Status: DISCONTINUED | OUTPATIENT
Start: 2022-03-28 | End: 2022-03-28 | Stop reason: HOSPADM

## 2022-03-28 RX ORDER — OXYCODONE HYDROCHLORIDE AND ACETAMINOPHEN 5; 325 MG/1; MG/1
1 TABLET ORAL EVERY 4 HOURS PRN
Qty: 10 TABLET | Refills: 0 | Status: SHIPPED | OUTPATIENT
Start: 2022-03-28 | End: 2022-04-02

## 2022-03-28 RX ORDER — SUCRALFATE 1 G/1
1 TABLET ORAL
Qty: 42 TABLET | Refills: 0 | Status: SHIPPED | OUTPATIENT
Start: 2022-03-28 | End: 2022-04-11

## 2022-03-28 RX ORDER — DIPHENHYDRAMINE HYDROCHLORIDE 50 MG/ML
12.5 INJECTION INTRAMUSCULAR; INTRAVENOUS
Status: DISCONTINUED | OUTPATIENT
Start: 2022-03-28 | End: 2022-03-28 | Stop reason: HOSPADM

## 2022-03-28 RX ORDER — OMEPRAZOLE 40 MG/1
40 CAPSULE, DELAYED RELEASE ORAL DAILY
Qty: 21 CAPSULE | Refills: 0 | Status: SHIPPED | OUTPATIENT
Start: 2022-03-28 | End: 2022-04-18

## 2022-03-28 RX ORDER — ROCURONIUM BROMIDE 10 MG/ML
INJECTION, SOLUTION INTRAVENOUS PRN
Status: DISCONTINUED | OUTPATIENT
Start: 2022-03-28 | End: 2022-03-28 | Stop reason: SURG

## 2022-03-28 RX ORDER — HYDRALAZINE HYDROCHLORIDE 20 MG/ML
5 INJECTION INTRAMUSCULAR; INTRAVENOUS
Status: DISCONTINUED | OUTPATIENT
Start: 2022-03-28 | End: 2022-03-28 | Stop reason: HOSPADM

## 2022-03-28 RX ORDER — SODIUM CHLORIDE, SODIUM LACTATE, POTASSIUM CHLORIDE, CALCIUM CHLORIDE 600; 310; 30; 20 MG/100ML; MG/100ML; MG/100ML; MG/100ML
INJECTION, SOLUTION INTRAVENOUS CONTINUOUS
Status: DISCONTINUED | OUTPATIENT
Start: 2022-03-28 | End: 2022-03-28 | Stop reason: HOSPADM

## 2022-03-28 RX ADMIN — ONDANSETRON 4 MG: 2 INJECTION INTRAMUSCULAR; INTRAVENOUS at 10:04

## 2022-03-28 RX ADMIN — OXYCODONE 5 MG: 5 TABLET ORAL at 11:11

## 2022-03-28 RX ADMIN — HYDROMORPHONE HYDROCHLORIDE 0.5 MG: 1 INJECTION, SOLUTION INTRAMUSCULAR; INTRAVENOUS; SUBCUTANEOUS at 07:57

## 2022-03-28 RX ADMIN — DEXAMETHASONE SODIUM PHOSPHATE 8 MG: 4 INJECTION, SOLUTION INTRA-ARTICULAR; INTRALESIONAL; INTRAMUSCULAR; INTRAVENOUS; SOFT TISSUE at 10:04

## 2022-03-28 RX ADMIN — PANTOPRAZOLE SODIUM 40 MG: 40 INJECTION, POWDER, FOR SOLUTION INTRAVENOUS at 04:45

## 2022-03-28 RX ADMIN — HYDROMORPHONE HYDROCHLORIDE 0.5 MG: 1 INJECTION, SOLUTION INTRAMUSCULAR; INTRAVENOUS; SUBCUTANEOUS at 01:10

## 2022-03-28 RX ADMIN — HYDROMORPHONE HYDROCHLORIDE 0.5 MG: 1 INJECTION, SOLUTION INTRAMUSCULAR; INTRAVENOUS; SUBCUTANEOUS at 10:18

## 2022-03-28 RX ADMIN — HYDROMORPHONE HYDROCHLORIDE 0.5 MG: 1 INJECTION, SOLUTION INTRAMUSCULAR; INTRAVENOUS; SUBCUTANEOUS at 04:44

## 2022-03-28 RX ADMIN — PROPOFOL 150 MG: 10 INJECTION, EMULSION INTRAVENOUS at 09:54

## 2022-03-28 RX ADMIN — MIDAZOLAM HYDROCHLORIDE 2 MG: 1 INJECTION, SOLUTION INTRAMUSCULAR; INTRAVENOUS at 09:50

## 2022-03-28 RX ADMIN — ROCURONIUM BROMIDE 5 MG: 10 INJECTION, SOLUTION INTRAVENOUS at 09:54

## 2022-03-28 RX ADMIN — SUCCINYLCHOLINE CHLORIDE 120 MG: 20 INJECTION, SOLUTION INTRAMUSCULAR; INTRAVENOUS; PARENTERAL at 09:54

## 2022-03-28 ASSESSMENT — PAIN DESCRIPTION - PAIN TYPE
TYPE: ACUTE PAIN

## 2022-03-28 ASSESSMENT — PAIN SCALES - GENERAL: PAIN_LEVEL: 0

## 2022-03-28 NOTE — PROCEDURES
Pre-procedure Diagnoses   Melena [K92.1]   Personal history of peptic ulcer disease [Z87.11]     Post-procedure Diagnoses   Gastroduodenitis [K29.90]     Procedures   ESOPHAGOGASTRODUODENOSCOPY UPPER GI ENDOSCOPY, MULTIPLE BIOPSIES         Endoscopist: Paco Wiggins MD, [facility], [facility] [place]    Anesthesiologist (general): Juan Abdullahi D.O.    Consent: Risks, benefits, and alternatives of aforementioned procedures were discussed with patient min detail before proceeding. Patient was given opportunities to ask questions and discuss other options.  Risks including but not limited to perforation, infection, bleeding, missed lesion(s), possible need for surgery(ies) and/or interventional radiology, possible need for repeat procedure(s) and/or additional testing, hospitalization possibly prolonged, cardiac and/or pulmonary event, aspiration, hypoxia, stroke, medication (s) and/or anesthesia reaction(s), indefinite diagnosis, discomfort/pain, unsuccessful and/or incomplete procedure, ineffective therapy, persistent symptoms, damage to adjacent organs/structure and/or vascular, and other adverse events possibly life-threatening.  Interactive discussion was undertaken with Layman's terms.  I answered questions in full and to satisfaction.   I gave opportunity to cancel, reschedule and/or delay if not completely comfortable with proceeding.  Patient stated understanding and acceptance of these risks, and wished to proceed.  I  Informed consent was given in clear state of mind and paper permit was confirmed to have been signed before proceeding.    Endoscopic procedures in detail: Diagnostic flexible Olympus endoscope was inserted from mouth into second portion of the duodenum, retroflexion was performed in the stomach.  Gastric biopsies obtained to evaluate H.pylori status.  I suctioned insufflated air and stomach fluid contents upon removal.  I suctioned insufflated air and stomach fluid contents upon removal.    Procedure  times:  - In-room 09:50  - Start 09:57  - Completed 10:00  - Out of room per nursing records    Esophagogastroduodenoscopy Findings:  - Esophagus: endoscopically unremarkable.   - Stomach: erosive antral gastritis of mild severity, biopsied.  - Duodenum: bulbar erythematous duodenitis.      Impression: Erosive gastroduodenitis    Recommendations:   1.  Routine post-endoscopy anesthesia recovery care.  Transfer back to prior hospital room when recovery criteria are met.  Aspiration and fall precautions x 24 hours.   2.  Prilosec 40mg PO QD x 21 days.  3.  Carafate 1 gram TID with meals x 14 days.  4.  Avoid NSAIDs for 8 weeks.  5.  Okay to discharge home later from GI standpoint today if doing well.  6.  GIC signed off but please feel free to contact us with problems, questions, concerns and/or unanticipated changes in patient's clinical status.   7.  No scheduled GI follow-up needed as outpatient since his GI issues have been adddressed in full and treatment implemented, but we are available anytime any needed.  8.  I plan to sent patient a pathology letter with any further recommendations.  9.  I spoke to patient and recovery nurse about impression, diagnosis and recommendations.  I answered questions in full and to satisfaction

## 2022-03-28 NOTE — PROGRESS NOTES
Pt has been discharged to home, dc paperwork discussed with pt and wife, both have verbalized understanding and denied any questions, piv has been removed, pt has left with all belongings, dc paperwork and work note as requested. Pt denies the need for assistance out of building and has left with his wife.

## 2022-03-28 NOTE — PROGRESS NOTES
Pt axox4, vss on room air, pt reports 8/10 abdominal pain, dilaudid given, pain reassessed 6/10. Pt has left unit for EGD.

## 2022-03-28 NOTE — CARE PLAN
Problem: Knowledge Deficit - Standard  Goal: Patient and family/care givers will demonstrate understanding of plan of care, disease process/condition, diagnostic tests and medications  Outcome: Progressing     Problem: Pain - Standard  Goal: Alleviation of pain or a reduction in pain to the patient’s comfort goal  Outcome: Progressing     The patient is Stable - Low risk of patient condition declining or worsening    Shift Goals  Clinical Goals: Pain management  Patient Goals: pain mangement    Progress made toward(s) clinical / shift goals:  yes    Patient is not progressing towards the following goals:

## 2022-03-28 NOTE — ANESTHESIA PROCEDURE NOTES
Airway    Date/Time: 3/28/2022 9:57 AM  Performed by: Juan Abdullahi D.O.  Authorized by: Juan Abdullahi D.O.     Location:  OR  Urgency:  Elective  Indications for Airway Management:  Anesthesia      Spontaneous Ventilation: absent    Sedation Level:  Deep  Preoxygenated: Yes    Patient Position:  Sniffing  Mask Difficulty Assessment:  2 - vent by mask + OA or adjuvant +/- NMBA  Final Airway Type:  Endotracheal airway  Final Endotracheal Airway:  ETT  Cuffed: Yes    Technique Used for Successful ETT Placement:  Direct laryngoscopy    Insertion Site:  Oral  Blade Type:  Bill  Laryngoscope Blade/Videolaryngoscope Blade Size:  3  ETT Size (mm):  7.5  Measured from:  Lips  ETT to Lips (cm):  22  Placement Verified by: auscultation and capnometry    Cormack-Lehane Classification:  Grade I - full view of glottis  Number of Attempts at Approach:  1

## 2022-03-28 NOTE — ANESTHESIA TIME REPORT
Anesthesia Start and Stop Event Times     Date Time Event    3/28/2022 0927 Ready for Procedure     0950 Anesthesia Start     1013 Anesthesia Stop        Responsible Staff  03/28/22    Name Role Begin End    Juan Abdullahi D.O. Anesth 0950 1013        Preop Diagnosis (Free Text):  Pre-op Diagnosis     UGIB        Preop Diagnosis (Codes):    Premium Reason  Non-Premium    Comments:

## 2022-03-28 NOTE — DISCHARGE INSTRUCTIONS
Gastritis, Adult  Gastritis is inflammation of the stomach. There are two kinds of gastritis:  · Acute gastritis. This kind develops suddenly.  · Chronic gastritis. This kind is much more common and lasts for a long time.  Gastritis happens when the lining of the stomach becomes weak or gets damaged. Without treatment, gastritis can lead to stomach bleeding and ulcers.  What are the causes?  This condition may be caused by:  · An infection.  · Drinking too much alcohol.  · Certain medicines. These include steroids, antibiotics, and some over-the-counter medicines, such as aspirin or ibuprofen.  · Having too much acid in the stomach.  · A disease of the intestines or stomach.  · Stress.  · An allergic reaction.  · Crohn's disease.  · Some cancer treatments (radiation).  Sometimes the cause of this condition is not known.  What are the signs or symptoms?  Symptoms of this condition include:  · Pain or a burning sensation in the upper abdomen.  · Nausea.  · Vomiting.  · An uncomfortable feeling of fullness after eating.  · Weight loss.  · Bad breath.  · Blood in your vomit or stools.  In some cases, there are no symptoms.  How is this diagnosed?  This condition may be diagnosed with:  · Your medical history and a description of your symptoms.  · A physical exam.  · Tests. These can include:  ? Blood tests.  ? Stool tests.  ? A test in which a thin, flexible instrument with a light and a camera is passed down the esophagus and into the stomach (upper endoscopy).  ? A test in which a sample of tissue is taken for testing (biopsy).  How is this treated?  This condition may be treated with medicines. The medicines that are used vary depending on the cause of the gastritis:  · If the condition is caused by a bacterial infection, you may be given antibiotic medicines.  · If the condition is caused by too much acid in the stomach, you may be given medicines called H2 blockers, proton pump inhibitors, or antacids.  Treatment  may also involve stopping the use of certain medicines, such as aspirin, ibuprofen, or other NSAIDs.  Follow these instructions at home:  Medicines  · Take over-the-counter and prescription medicines only as told by your health care provider.  · If you were prescribed an antibiotic medicine, take it as told by your health care provider. Do not stop taking the antibiotic even if you start to feel better.  Eating and drinking    · Eat small, frequent meals instead of large meals.  · Avoid foods and drinks that make your symptoms worse.  · Drink enough fluid to keep your urine pale yellow.  Alcohol use  · Do not drink alcohol if:  ? Your health care provider tells you not to drink.  ? You are pregnant, may be pregnant, or are planning to become pregnant.  · If you drink alcohol:  ? Limit your use to:  § 0-1 drink a day for women.  § 0-2 drinks a day for men.  ? Be aware of how much alcohol is in your drink. In the [place] one drink equals one 12 oz bottle of beer (355 mL), one 5 oz glass of wine (148 mL), or one 1½ oz glass of hard liquor (44 mL).  General instructions  · Talk with your health care provider about ways to manage stress, such as getting regular exercise or practicing deep breathing, meditation, or yoga.  · Do not use any products that contain nicotine or tobacco, such as cigarettes and e-cigarettes. If you need help quitting, ask your health care provider.  · Keep all follow-up visits as told by your health care provider. This is important.  Contact a health care provider if:  · Your symptoms get worse.  · Your symptoms return after treatment.  Get help right away if:  · You vomit blood or material that looks like coffee grounds.  · You have black or dark red stools.  · You are unable to keep fluids down.  · Your abdominal pain gets worse.  · You have a fever.  · You do not feel better after one week.  Summary  · Gastritis is inflammation of the lining of the stomach that can occur suddenly (acute) or  develop slowly over time (chronic).  · This condition is diagnosed with a medical history, a physical exam, or tests.  · This condition may be treated with medicines to treat infection or medicines to reduce the amount of acid in your stomach.  · Follow your health care provider's instructions about taking medicines, making changes to your diet, and knowing when to call for help.  This information is not intended to replace advice given to you by your health care provider. Make sure you discuss any questions you have with your health care provider.  Document Released: 12/12/2002 Document Revised: 05/07/2019 Document Reviewed: 05/07/2019  [employer] Patient Education © 2020 [employer]  Discharge Instructions    Discharged to home by car with relative. Discharged via walking, hospital escort: Yes.  Special equipment needed: Not Applicable    Be sure to schedule a follow-up appointment with your primary care doctor or any specialists as instructed.     Discharge Plan:   Diet Plan: Discussed  Activity Level: Discussed  Confirmed Follow up Appointment: Patient to Call and Schedule Appointment  Confirmed Symptoms Management: Discussed  Medication Reconciliation Updated: Yes  Influenza Vaccine Indication: Patient Refuses    I understand that a diet low in cholesterol, fat, and sodium is recommended for good health. Unless I have been given specific instructions below for another diet, I accept this instruction as my diet prescription.   Other diet: regular    Special Instructions: None    · Is patient discharged on Warfarin / Coumadin?   No     Depression / Suicide Risk    As you are discharged from this [facility] facility, it is important to learn how to keep safe from harming yourself.    Recognize the warning signs:  · Abrupt changes in personality, positive or negative- including increase in energy   · Giving away possessions  · Change in eating patterns- significant weight changes-  positive or negative  · Change  in sleeping patterns- unable to sleep or sleeping all the time   · Unwillingness or inability to communicate  · Depression  · Unusual sadness, discouragement and loneliness  · Talk of wanting to die  · Neglect of personal appearance   · Rebelliousness- reckless behavior  · Withdrawal from people/activities they love  · Confusion- inability to concentrate     If you or a loved one observes any of these behaviors or has concerns about self-harm, here's what you can do:  · Talk about it- your feelings and reasons for harming yourself  · Remove any means that you might use to hurt yourself (examples: pills, rope, extension cords, firearm)  · Get professional help from the community (Mental Health, Substance Abuse, psychological counseling)  · Do not be alone:Call your Safe Contact- someone whom you trust who will be there for you.  · Call your local CRISIS HOTLINE 650-5201 or 439-021-8717  · Call your local Children's Mobile Crisis Response Team [place] (642) 586-6865 or www.[employer]  · Call the toll free National Suicide Prevention Hotlines   · National Suicide Prevention Lifeline 079-259-DGZV (1484)  · National Hope Line Network 800-SUICIDE (706-4799)

## 2022-03-28 NOTE — OR NURSING
1008- pt arrives from OR to PACU 7, report received from RN and anesthesia. Pt place on monitor. VSS,  NAD noted. O2 4L via mask.      1020-pt awake, medicated per MAR for continued abd pain,  Tolerating sips of clears.     1030- EKG tech at bedside.  Report called to [facility] S6    1044- pt transported to S621 by [facility], all belongings accounted for.

## 2022-03-28 NOTE — PROGRESS NOTES
Patient seen and examined before proceeding with esophagogastroduodenoscopy with attempted hemostasis and biopsies under anesthesia with possible other endotherapy. Risks, benefits, and alternatives of aforementioned procedures were discussed with patient in detail before proceeding.  Patient was given opportunities to ask questions and discuss other options.  Risks including but not limited to perforation, infection, bleeding, missed lesion(s), possible need for surgery(ies) and/or interventional radiology, possible need for repeat procedure(s) and/or additional testing, hospitalization possibly prolonged, cardiac and/or pulmonary event, aspiration, hypoxia, stroke, medication (s) and/or anesthesia reaction(s), indefinite diagnosis, discomfort/pain, unsuccessful and/or incomplete procedure, ineffective therapy, persistent symptoms, damage to adjacent organs/structure and/or vascular, and other adverse event(s) possibly life-threatening.  Interactive discussion was undertaken with Layman's terms.  I answered questions in full and to satisfaction.  I gave opportunity to cancel, delay and/or reschedule if not completely comfortable with proceeding.  Patient stated understanding and acceptance of these risks, and wished to proceed.   Informed consent was given in clear state of mind and paper permit was confirmed to have been signed before proceeding.

## 2022-03-28 NOTE — PROGRESS NOTES
Assumed care of patient at change of shift. Patient A/Ox4, ambulatory in room. VSS. Patient reports severe 9/10 abdominal pain. PRN medications administered per orders providing minimal relief. MD Tonny Mace notified, new orders placed. Per patient new pain management regimen effectively able to control pain to an acceptable 6/10. Strict NPO maintained after midnight. Pt denies shortness of breath, nausea, and diarrhea. Pt denies having any watery tarry stools since arrival to unit. Call bell in reach. Will continue with plan of care and notify MD of any changes.

## 2022-03-28 NOTE — ANESTHESIA POSTPROCEDURE EVALUATION
Patient: Roberto Braswell    Procedure Summary     Date: 03/28/22 Room / Location: [facility] ROOM 26 / SURGERY SAME DAY [facility]    Anesthesia Start: 0950 Anesthesia Stop: 1013    Procedures:       GASTROSCOPY (Esophagus)      GASTROSCOPY, WITH BIOPSY (Esophagus) Diagnosis: (gastriis)    Surgeons: Paco Wiggins M.D. Responsible Provider: Juan Abdullahi D.O.    Anesthesia Type: general ASA Status: 2          Final Anesthesia Type: general  Last vitals  BP   Blood Pressure: 131/68    Temp   36.2 °C (97.1 °F)    Pulse   (!) 51   Resp   18    SpO2   98 %      Anesthesia Post Evaluation    Patient location during evaluation: PACU  Patient participation: complete - patient participated  Level of consciousness: awake and alert  Pain score: 0    Airway patency: patent  Anesthetic complications: no  Cardiovascular status: hemodynamically stable  Respiratory status: acceptable  Hydration status: euvolemic    PONV: none          No complications documented.     Nurse Pain Score: 7 (NPRS)

## 2022-03-28 NOTE — DISCHARGE PLANNING
Anticipated Discharge Disposition: Home     Action: pt has active discharge order, pt currently on 2 liters O2, 6 clicks are 18/17. Orientation: A&Ox4. Patient discussed in IDT rounds. DC plan to home today with no CM needs.     Barriers to Discharge: None     CM will remain available for any DC planning needs that may arise.

## 2022-03-28 NOTE — DISCHARGE SUMMARY
Discharge Summary    CHIEF COMPLAINT ON ADMISSION  Chief Complaint   Patient presents with   • Melena     Onset today   • Abdominal Pain     Lower abdominal       Reason for Admission  abdominal pain     Admission Date  3/26/2022    CODE STATUS  Full Code    HPI & HOSPITAL COURSE  41-year-old male with history of gastric ulcer, last endoscopy done 2019 presented with multiple episode of black bowel movement associated with abdominal pain.  Patient reportedly had similar episode in 2019 when he had endoscopy done at [facility] which revealed bleeding ulcer when he required 2 L PRBC transfusion.  CTA abdomen pelvis unremarkable Case was discussed with GI.  Admitted for upper GI bleeding.  Endoscopy revealed erosive gastroduodenitis.  GI recommended Prilosec 40 mg for 28 days, Carafate.  At the time of discharge patient vitals remained stable, labs remain unremarkable no new episode of bleeding.  Patient to be discharged close follow-up with PCP.  Discharge plan was discussed with the patient in details  Therefore, he is discharged in good and stable condition to home with close outpatient follow-up.    The patient met 2-midnight criteria for an inpatient stay at the time of discharge.    Discharge Date  3/28/2022          DISCHARGE DIAGNOSES  Principal Problem:    Upper GI bleed POA: Yes  Active Problems:    Epigastric pain POA: Unknown    ELISABET (acute kidney injury) (HCC) POA: Unknown  Resolved Problems:    * No resolved hospital problems. *      FOLLOW UP  No future appointments.  No follow-up provider specified.    MEDICATIONS ON DISCHARGE     Medication List      START taking these medications      Instructions   omeprazole 40 MG delayed-release capsule  Commonly known as: PRILOSEC   Take 1 Capsule by mouth every day for 21 days.  Dose: 40 mg     oxyCODONE-acetaminophen 5-325 MG Tabs  Commonly known as: PERCOCET   Take 1 Tablet by mouth every four hours as needed for up to 5 days.  Dose: 1 Tablet      sucralfate 1 GM Tabs  Commonly known as: CARAFATE   Take 1 Tablet by mouth 3 times a day with meals for 14 days.  Dose: 1 g        STOP taking these medications    ibuprofen 200 MG Tabs  Commonly known as: MOTRIN     Multivitamin Gummies Adult Chew            Allergies  Allergies   Allergen Reactions   • Tramadol Unspecified     Seizure  [id number]       DIET  Orders Placed This Encounter   Procedures   • Diet Order Diet: Regular     Standing Status:   Standing     Number of Occurrences:   1     Order Specific Question:   Diet:     Answer:   Regular [1]       ACTIVITY  As tolerated.  Weight bearing as tolerated    CONSULTATIONS  GI    PROCEDURES  Esophagogastroduodenoscopy Findings:  - Esophagus: endoscopically unremarkable.   - Stomach: erosive antral gastritis of mild severity, biopsied.  - Duodenum: bulbar erythematous duodenitis.        Impression: Erosive gastroduodenitis     Recommendations:   1.  Routine post-endoscopy anesthesia recovery care.  Transfer back to prior hospital room when recovery criteria are met.  Aspiration and fall precautions x 24 hours.   2.  Prilosec 40mg PO QD x 21 days.  3.  Carafate 1 gram TID with meals x 14 days.  4.  Avoid NSAIDs for 8 weeks.    LABORATORY  Lab Results   Component Value Date    SODIUM 140 03/26/2022    POTASSIUM 4.5 03/26/2022    CHLORIDE 105 03/26/2022    CO2 22 03/26/2022    GLUCOSE 96 03/26/2022    BUN 23 (H) 03/26/2022    CREATININE 1.11 03/26/2022        Lab Results   Component Value Date    WBC 6.2 03/27/2022    HEMOGLOBIN 13.9 (L) 03/28/2022    HEMATOCRIT 43.1 03/27/2022    PLATELETCT 257 03/27/2022        Total time of the discharge process exceeds 37  minutes.

## 2022-03-28 NOTE — ANESTHESIA PREPROCEDURE EVALUATION
Case: [id number] Date/Time: 03/28/22 1215    Procedure: GASTROSCOPY    Location: [facility] ROOM 26 / SURGERY SAME DAY [facility]    Surgeons: Paco Wiggins M.D.          Relevant Problems      (positive) ELISABET (acute kidney injury) (HCC)       Physical Exam    Airway   Mallampati: II  TM distance: >3 FB  Neck ROM: full       Cardiovascular - normal exam  Rhythm: regular  Rate: normal  (-) murmur     Dental - normal exam           Pulmonary - normal exam  Breath sounds clear to auscultation     Abdominal    Neurological - normal exam                 Anesthesia Plan    ASA 2       Plan - general       Airway plan will be ETT          Induction: intravenous      Pertinent diagnostic labs and testing reviewed    Informed Consent:    Anesthetic plan and risks discussed with patient.    Use of blood products discussed with: patient whom consented to blood products.

## 2022-03-29 LAB
FLUAV RNA SPEC QL NAA+PROBE: NEGATIVE
FLUBV RNA SPEC QL NAA+PROBE: NEGATIVE
SARS-COV-2 RNA RESP QL NAA+PROBE: NOTDETECTED
SPECIMEN SOURCE: NORMAL

## 2022-11-02 ENCOUNTER — HOSPITAL ENCOUNTER (EMERGENCY)
Facility: MEDICAL CENTER | Age: 42
End: 2022-11-02
Attending: EMERGENCY MEDICINE
Payer: COMMERCIAL

## 2022-11-02 VITALS
DIASTOLIC BLOOD PRESSURE: 91 MMHG | RESPIRATION RATE: 16 BRPM | SYSTOLIC BLOOD PRESSURE: 154 MMHG | OXYGEN SATURATION: 96 % | HEART RATE: 58 BPM | HEIGHT: 76 IN | WEIGHT: 238.32 LBS | TEMPERATURE: 96.9 F | BODY MASS INDEX: 29.02 KG/M2

## 2022-11-02 DIAGNOSIS — R10.13 EPIGASTRIC ABDOMINAL PAIN: ICD-10-CM

## 2022-11-02 LAB
ALBUMIN SERPL BCP-MCNC: 4.6 G/DL (ref 3.2–4.9)
ALBUMIN/GLOB SERPL: 1.6 G/DL
ALP SERPL-CCNC: 66 U/L (ref 30–99)
ALT SERPL-CCNC: 16 U/L (ref 2–50)
ANION GAP SERPL CALC-SCNC: 15 MMOL/L (ref 7–16)
APPEARANCE UR: CLEAR
AST SERPL-CCNC: 16 U/L (ref 12–45)
BASOPHILS # BLD AUTO: 0.8 % (ref 0–1.8)
BASOPHILS # BLD: 0.08 K/UL (ref 0–0.12)
BILIRUB SERPL-MCNC: 0.4 MG/DL (ref 0.1–1.5)
BILIRUB UR QL STRIP.AUTO: NEGATIVE
BUN SERPL-MCNC: 18 MG/DL (ref 8–22)
CALCIUM SERPL-MCNC: 9.6 MG/DL (ref 8.4–10.2)
CHLORIDE SERPL-SCNC: 102 MMOL/L (ref 96–112)
CO2 SERPL-SCNC: 22 MMOL/L (ref 20–33)
COLOR UR: YELLOW
CREAT SERPL-MCNC: 1.12 MG/DL (ref 0.5–1.4)
EKG IMPRESSION: NORMAL
EOSINOPHIL # BLD AUTO: 0.04 K/UL (ref 0–0.51)
EOSINOPHIL NFR BLD: 0.4 % (ref 0–6.9)
ERYTHROCYTE [DISTWIDTH] IN BLOOD BY AUTOMATED COUNT: 42 FL (ref 35.9–50)
GFR SERPLBLD CREATININE-BSD FMLA CKD-EPI: 84 ML/MIN/1.73 M 2
GLOBULIN SER CALC-MCNC: 2.9 G/DL (ref 1.9–3.5)
GLUCOSE SERPL-MCNC: 92 MG/DL (ref 65–99)
GLUCOSE UR STRIP.AUTO-MCNC: NEGATIVE MG/DL
HCT VFR BLD AUTO: 48.9 % (ref 42–52)
HGB BLD-MCNC: 17.2 G/DL (ref 14–18)
IMM GRANULOCYTES # BLD AUTO: 0.07 K/UL (ref 0–0.11)
IMM GRANULOCYTES NFR BLD AUTO: 0.7 % (ref 0–0.9)
KETONES UR STRIP.AUTO-MCNC: 15 MG/DL
LEUKOCYTE ESTERASE UR QL STRIP.AUTO: NEGATIVE
LIPASE SERPL-CCNC: 34 U/L (ref 7–58)
LYMPHOCYTES # BLD AUTO: 2.19 K/UL (ref 1–4.8)
LYMPHOCYTES NFR BLD: 22.6 % (ref 22–41)
MCH RBC QN AUTO: 31.5 PG (ref 27–33)
MCHC RBC AUTO-ENTMCNC: 35.2 G/DL (ref 33.7–35.3)
MCV RBC AUTO: 89.6 FL (ref 81.4–97.8)
MICRO URNS: ABNORMAL
MONOCYTES # BLD AUTO: 0.74 K/UL (ref 0–0.85)
MONOCYTES NFR BLD AUTO: 7.6 % (ref 0–13.4)
NEUTROPHILS # BLD AUTO: 6.57 K/UL (ref 1.82–7.42)
NEUTROPHILS NFR BLD: 67.9 % (ref 44–72)
NITRITE UR QL STRIP.AUTO: NEGATIVE
NRBC # BLD AUTO: 0 K/UL
NRBC BLD-RTO: 0 /100 WBC
PH UR STRIP.AUTO: 5.5 [PH] (ref 5–8)
PLATELET # BLD AUTO: 327 K/UL (ref 164–446)
PMV BLD AUTO: 9.2 FL (ref 9–12.9)
POTASSIUM SERPL-SCNC: 4.5 MMOL/L (ref 3.6–5.5)
PROT SERPL-MCNC: 7.5 G/DL (ref 6–8.2)
PROT UR QL STRIP: NEGATIVE MG/DL
RBC # BLD AUTO: 5.46 M/UL (ref 4.7–6.1)
RBC UR QL AUTO: NEGATIVE
SODIUM SERPL-SCNC: 139 MMOL/L (ref 135–145)
SP GR UR STRIP.AUTO: >=1.03
WBC # BLD AUTO: 9.7 K/UL (ref 4.8–10.8)

## 2022-11-02 PROCEDURE — 96374 THER/PROPH/DIAG INJ IV PUSH: CPT

## 2022-11-02 PROCEDURE — 99285 EMERGENCY DEPT VISIT HI MDM: CPT

## 2022-11-02 PROCEDURE — 700111 HCHG RX REV CODE 636 W/ 250 OVERRIDE (IP): Performed by: EMERGENCY MEDICINE

## 2022-11-02 PROCEDURE — A9270 NON-COVERED ITEM OR SERVICE: HCPCS | Performed by: EMERGENCY MEDICINE

## 2022-11-02 PROCEDURE — 93005 ELECTROCARDIOGRAM TRACING: CPT | Performed by: EMERGENCY MEDICINE

## 2022-11-02 PROCEDURE — 85025 COMPLETE CBC W/AUTO DIFF WBC: CPT

## 2022-11-02 PROCEDURE — 80053 COMPREHEN METABOLIC PANEL: CPT

## 2022-11-02 PROCEDURE — 96375 TX/PRO/DX INJ NEW DRUG ADDON: CPT

## 2022-11-02 PROCEDURE — 81003 URINALYSIS AUTO W/O SCOPE: CPT

## 2022-11-02 PROCEDURE — 36415 COLL VENOUS BLD VENIPUNCTURE: CPT

## 2022-11-02 PROCEDURE — 83690 ASSAY OF LIPASE: CPT

## 2022-11-02 PROCEDURE — 700102 HCHG RX REV CODE 250 W/ 637 OVERRIDE(OP): Performed by: EMERGENCY MEDICINE

## 2022-11-02 RX ORDER — ONDANSETRON 2 MG/ML
4 INJECTION INTRAMUSCULAR; INTRAVENOUS ONCE
Status: COMPLETED | OUTPATIENT
Start: 2022-11-02 | End: 2022-11-02

## 2022-11-02 RX ORDER — HYDROMORPHONE HYDROCHLORIDE 1 MG/ML
1 INJECTION, SOLUTION INTRAMUSCULAR; INTRAVENOUS; SUBCUTANEOUS ONCE
Status: COMPLETED | OUTPATIENT
Start: 2022-11-02 | End: 2022-11-02

## 2022-11-02 RX ORDER — OMEPRAZOLE 40 MG/1
40 CAPSULE, DELAYED RELEASE ORAL DAILY
Qty: 30 CAPSULE | Refills: 1 | Status: SHIPPED | OUTPATIENT
Start: 2022-11-02 | End: 2022-12-24

## 2022-11-02 RX ORDER — OMEPRAZOLE 20 MG/1
40 CAPSULE, DELAYED RELEASE ORAL ONCE
Status: COMPLETED | OUTPATIENT
Start: 2022-11-02 | End: 2022-11-02

## 2022-11-02 RX ORDER — SUCRALFATE ORAL 1 G/10ML
1 SUSPENSION ORAL 2 TIMES DAILY
Qty: 414 ML | Refills: 0 | Status: SHIPPED | OUTPATIENT
Start: 2022-11-02 | End: 2022-12-24

## 2022-11-02 RX ORDER — MORPHINE SULFATE 4 MG/ML
4 INJECTION INTRAVENOUS ONCE
Status: COMPLETED | OUTPATIENT
Start: 2022-11-02 | End: 2022-11-02

## 2022-11-02 RX ORDER — HYDROCODONE BITARTRATE AND ACETAMINOPHEN 5; 325 MG/1; MG/1
1 TABLET ORAL EVERY 4 HOURS PRN
Qty: 15 TABLET | Refills: 0 | Status: SHIPPED | OUTPATIENT
Start: 2022-11-02 | End: 2022-11-06

## 2022-11-02 RX ADMIN — ONDANSETRON 4 MG: 2 INJECTION INTRAMUSCULAR; INTRAVENOUS at 20:49

## 2022-11-02 RX ADMIN — HYDROMORPHONE HYDROCHLORIDE 1 MG: 1 INJECTION, SOLUTION INTRAMUSCULAR; INTRAVENOUS; SUBCUTANEOUS at 21:57

## 2022-11-02 RX ADMIN — LIDOCAINE HYDROCHLORIDE 30 ML: 20 SOLUTION OROPHARYNGEAL at 21:35

## 2022-11-02 RX ADMIN — OMEPRAZOLE 40 MG: 20 CAPSULE, DELAYED RELEASE ORAL at 22:01

## 2022-11-02 RX ADMIN — MORPHINE SULFATE 4 MG: 4 INJECTION INTRAVENOUS at 20:49

## 2022-11-02 ASSESSMENT — FIBROSIS 4 INDEX: FIB4 SCORE: 0.69

## 2022-11-02 ASSESSMENT — PAIN DESCRIPTION - DESCRIPTORS: DESCRIPTORS: STABBING;SHARP

## 2022-11-03 NOTE — ED TRIAGE NOTES
Chief Complaint   Patient presents with    Abdominal Pain     Pt arrives to the ER via POV with wife. Pt complains of epigastric abdominal sharp stabbing, Nausea, diarrhea. Onset yesterday.  Advised NPO until seen by provider. Pt verbalizes understanding.

## 2022-11-03 NOTE — DISCHARGE INSTRUCTIONS
Gastritis    Avoid all ibuprofen and naproxen, aspirin and alcohol for 1 month.  Take Prilosec 40 mg a day a day for 4 weeks.  Take sucralfate twice a day as well.  Return for worsening pain, bleeding or pain and fever.  Followup with your doctor or GI if not better in 3-4 days.   Take maalox and hydrocodone if needed until acid blocker takes effect.  Turn for fever severe pain uncontrolled vomiting or bleeding.      You have gastritis. Gastritis is an irritation of the stomach. This is often caused by medications, but can be from anything that bothers the stomach.   Other stomach irritants are:  Alcohol.  Caffeine.  Nicotine.  Spicy or acid foods.     Medications for pain and arthritis. Aspirin and other anti-inflammatory medicines such as ibuprofen (Advil), naproxen (Aleve), and ketoprofen (Orudis) can be highly irritating.  Emotional distress.     Symptoms of gastritis may include:  Abdominal pain. Indigestion. Nausea and or vomiting. Bleeding.      Some patients with chronic gastritis and ulcers have been infected by a germ. They may need special testing. Medications which kill germs can be used to cure this condition.    Treatment includes avoiding the substances mentioned above that are known to cause stomach trouble.    Medications used to treat gastritis can include:  Antacids.  Medicines to control vomiting.   Acid blocking medicines (Axid, Pepcid, Prevacid, Prilosec, Tagamet, Zantac).     Symptoms of gastritis usually improve within 2-3 days of starting treatment. Call your caregiver if you are not better in a few days.    Seek medical care if you:    Have increased stomach pain or chest pain.  Vomit blood.  Faint or feel light headed. Can not keep fluids down.  Pass bloody or black stools.  Develop severe back pain.     Document Released: 12/18/2006  Document Re-Released: 06/30/2008  [employer]® Patient Information ©2008 [employer].

## 2022-11-03 NOTE — ED NOTES
Assist RN: Patient medicated as ordered,    
EKG done  IV and PO med's given per order   Pain getting better per pt  VSS   
IV placed and labs drawn.   
Pt feeling much better  MD re-evaluated pt and he is now cleared for d/cpt and wife given dischg instructions  Verbally understands  Aware that Rx's norco, carafate and prilosec were sent to listed pharmacy  Instructed pt to  in AM and take as directed  Also instructed to f/u w/ GI and referral was given  Pt d/c'ed to home in NAD    
Re-evaluated pt  No effect after IV pain med per pt  Pain still 10/10   VSS  Will inform MD   
Urine collected and sent to lab  
fingers/arthritis/stiffness

## 2022-11-03 NOTE — ED PROVIDER NOTES
ED Provider Note    CHIEF COMPLAINT  Chief Complaint   Patient presents with    Abdominal Pain     Pt arrives to the ER via POV with wife. Pt complains of epigastric abdominal sharp stabbing, Nausea, diarrhea. Onset yesterday.  Advised NPO until seen by provider. Pt verbalizes understanding.           HPI  Roberto Braswell is a 42 y.o. male who presents with epigastric abdominal pain onset yesterday and worsening today until it is quite severe.  Pain is constant but variable in intensity.  He has not tried eating due to the pain.  He has nausea but no vomiting.  He is a little bit of diarrhea but has chronic diarrhea.  No fever.  Status postcholecystectomy.  He has never had pancreatitis.  Pain does not radiate to the back.  No known ulcers or gastritis now but he has had an ulcer in the past.  He is not certain if this pain is similar..  Infrequent NSAID and alcohol use.  No urinary symptoms.  History of prior kidney stones but this is different.    REVIEW OF SYSTEMS  Pertinent positives include: Gastric abdominal pain, nausea, chronic diarrhea.  Pertinent negatives include: EVAR, dysuria, urgency, frequency, hematuria, chest pain, shortness of breath, sore throat, rash or swelling.  10+ systems reviewed and negative.      PAST MEDICAL HISTORY  Past Medical History:   Diagnosis Date    Arthritis     Right ankle    Gastric ulcer     Pain     Right ankle           SOCIAL HISTORY  Social History     Tobacco Use    Smoking status: Never    Smokeless tobacco: Never   Vaping Use    Vaping Use: Never used   Substance Use Topics    Alcohol use: Yes     Comment: a few drinks/month    Drug use: Never     Social History     Substance and Sexual Activity   Drug Use Never       SURGICAL HISTORY  Past Surgical History:   Procedure Laterality Date    [place] UPPER GI ENDOSCOPY,DIAGNOSIS  3/28/2022    Procedure: GASTROSCOPY;  Surgeon: Paco Wiggins M.D.;  Location: SURGERY SAME DAY [facility];  Service: Gastroenterology    [place] UPPER GI  "ENDOSCOPY,BIOPSY  3/28/2022    Procedure: GASTROSCOPY, WITH BIOPSY;  Surgeon: Paco Wiggins M.D.;  Location: SURGERY SAME DAY [facility];  Service: Gastroenterology    ANKLE ARTHROSCOPY Right 5/21/2018    Procedure: ANKLE ARTHROSCOPY, LATERAL LIGAMENT RECONSTRUCTION;  Surgeon: Seth Cruz M.D.;  Location: SURGERY [facility];  Service: Orthopedics    BIOPSY ORTHO Right 5/21/2018    Procedure: BIOPSY ORTHO/ FOR CARTILAGE AND DENOVO PROCEDURE;  Surgeon: Seth Cruz M.D.;  Location: SURGERY [facility];  Service: Orthopedics    OTHER ABDOMINAL SURGERY      Cholecystectomy February 2017    OTHER ORTHOPEDIC SURGERY      2 previous ankle surgeries (2007, 2009)       CURRENT MEDICATIONS    No current outpatient medications on file.       ALLERGIES  Allergies   Allergen Reactions    Tramadol Unspecified     Seizure  [id number]       PHYSICAL EXAM  VITAL SIGNS: BP (!) 185/111   Pulse 73   Temp 36.7 °C (98.1 °F) (Temporal)   Resp 16   Ht 1.93 m (6' 4\")   Wt 108 kg (238 lb 5.1 oz)   SpO2 98%   BMI 29.01 kg/m²   Reviewed and hypertensive, afebrile  Constitutional: Well developed, Well nourished, holding his epigastrium, appears to be uncomfortable.  HENT: Normocephalic, atraumatic, bilateral external ears normal, Wearing mask.   Eyes: PERRLA, conjunctiva pink, no scleral icterus.   Cardiovascular: Regular S1-S2 without murmur, rub, gallop.  No dependent edema or calf tenderness.  Respiratory: No rales, rhonchi, wheeze or cough.  Gastrointestinal: Soft, moderate epigastric tenderness without rebound guarding or masses., nondistended, no organomegaly.  Skin: No erythema, no rash.   Genitourinary:  No costovertebral angle tenderness.   Neurologic: Alert & oriented x 3, cranial nerves 2-12 intact by passive exam.  No focal deficit noted.  Psychiatric: Affect normal, Judgment normal, Mood normal.     DIFFERENTIAL DIAGNOSIS:  Gastritis, peptic ulcer disease, doubt perforation, pancreatitis, choledocholithiasis, " gastroenteritis.      EKG Interpretation 10:06 PM    Interpreted by me.  Indication: Gastric abdominal pain    Rhythm: normal sinus   Rate: Normal at 67  Axis: normal  Ectopy: none  Conduction: normal  ST/T Waves: no acute change  Q Waves: Inferior  R Wave progression: normal    Clinical Impression: Normal sinus rhythm with possible prior inferior ischemia      LABORATORY:  Results for orders placed or performed during the hospital encounter of 11/02/22   CBC WITH DIFFERENTIAL   Result Value Ref Range    WBC 9.7 4.8 - 10.8 K/uL    RBC 5.46 4.70 - 6.10 M/uL    Hemoglobin 17.2 14.0 - 18.0 g/dL    Hematocrit 48.9 42.0 - 52.0 %    MCV 89.6 81.4 - 97.8 fL    MCH 31.5 27.0 - 33.0 pg    MCHC 35.2 33.7 - 35.3 g/dL    RDW 42.0 35.9 - 50.0 fL    Platelet Count 327 164 - 446 K/uL    MPV 9.2 9.0 - 12.9 fL    Neutrophils-Polys 67.90 44.00 - 72.00 %    Lymphocytes 22.60 22.00 - 41.00 %    Monocytes 7.60 0.00 - 13.40 %    Eosinophils 0.40 0.00 - 6.90 %    Basophils 0.80 0.00 - 1.80 %    Immature Granulocytes 0.70 0.00 - 0.90 %    Nucleated RBC 0.00 /100 WBC    Neutrophils (Absolute) 6.57 1.82 - 7.42 K/uL    Lymphs (Absolute) 2.19 1.00 - 4.80 K/uL    Monos (Absolute) 0.74 0.00 - 0.85 K/uL    Eos (Absolute) 0.04 0.00 - 0.51 K/uL    Baso (Absolute) 0.08 0.00 - 0.12 K/uL    Immature Granulocytes (abs) 0.07 0.00 - 0.11 K/uL    NRBC (Absolute) 0.00 K/uL   COMP METABOLIC PANEL   Result Value Ref Range    Sodium 139 135 - 145 mmol/L    Potassium 4.5 3.6 - 5.5 mmol/L    Chloride 102 96 - 112 mmol/L    Co2 22 20 - 33 mmol/L    Anion Gap 15.0 7.0 - 16.0    Glucose 92 65 - 99 mg/dL    Bun 18 8 - 22 mg/dL    Creatinine 1.12 0.50 - 1.40 mg/dL    Calcium 9.6 8.4 - 10.2 mg/dL    AST(SGOT) 16 12 - 45 U/L    ALT(SGPT) 16 2 - 50 U/L    Alkaline Phosphatase 66 30 - 99 U/L    Total Bilirubin 0.4 0.1 - 1.5 mg/dL    Albumin 4.6 3.2 - 4.9 g/dL    Total Protein 7.5 6.0 - 8.2 g/dL    Globulin 2.9 1.9 - 3.5 g/dL    A-G Ratio 1.6 g/dL   LIPASE   Result Value  Ref Range    Lipase 34 7 - 58 U/L   URINALYSIS    Specimen: Urine   Result Value Ref Range    Color Yellow     Character Clear     Specific Gravity >=1.030 <1.035    Ph 5.5 5.0 - 8.0    Glucose Negative Negative mg/dL    Ketones 15 (A) Negative mg/dL    Protein Negative Negative mg/dL    Bilirubin Negative Negative    Nitrite Negative Negative    Leukocyte Esterase Negative Negative    Occult Blood Negative Negative    Micro Urine Req see below        INTERVENTIONS:  Medications   morphine 4 MG/ML injection 4 mg (4 mg Intravenous Given 11/2/22 2049)   ondansetron (ZOFRAN) syringe/vial injection 4 mg (4 mg Intravenous Given 11/2/22 2049)   hyoscyamine-lidocaine-Maalox (GI Cocktail) oral susp cup 30 mL (30 mL Oral Given 11/2/22 2135)   HYDROmorphone (Dilaudid) injection 1 mg (1 mg Intravenous Given 11/2/22 2157)   omeprazole (PRILOSEC) capsule 40 mg (40 mg Oral Given 11/2/22 2201)     Response: No improvement with morphine or GI cocktail.  Good improvement in pain with hydromorphone.  Mild tenderness in the epigastrium on repeat exam.    COURSE & MEDICAL DECISION MAKING  This patient presents with epigastric abdominal pain nausea and diarrhea.  The pain is quite severe but there is no fever no leukocytosis and no other lab abnormalities.  Given the severity of the pain I discussed obtaining a CT with the patient to exclude perforation although I think this is probably unlikely given the absence of fever and leukocytosis.  The patient had enough pain improvement that he did not wish to have this test at this time.  He verbalized understanding to return for fever worsening pain uncontrolled vomiting or ill appearance.  At this point there is no evidence of pancreatitis or choledocholithiasis.  Colitis is a possibility given his diarrhea. .    PLAN:  New Prescriptions    HYDROCODONE-ACETAMINOPHEN (NORCO) 5-325 MG TAB PER TABLET    Take 1 Tablet by mouth every four hours as needed (mild pain) for up to 4 days.     OMEPRAZOLE (PRILOSEC) 40 MG DELAYED-RELEASE CAPSULE    Take 1 Capsule by mouth every day.    SUCRALFATE (CARAFATE) 1 GM/10ML SUSPENSION    Take 10 mL by mouth 2 times a day.       Prescription monitoring queried and score 70  Opiate risk tool utilized and patient low risk  Informed consent obtained for opiate analgesic  Indication opiate analgesic severe pain    Avoid NSAIDs and alcohol 1 month  Gastritis handout given  Return for fever uncontrolled vomiting severe pain GI bleed    Jt Burton M.D.  [address] [place] [id number]  [phone]    Schedule an appointment as soon as possible for a visit in 1 week      CONDITION: Stable, improved.    FINAL IMPRESSION  1. Epigastric abdominal pain          Electronically signed by: Mick Parra M.D., 11/2/2022 8:45 PM

## 2022-12-24 ENCOUNTER — HOSPITAL ENCOUNTER (EMERGENCY)
Facility: MEDICAL CENTER | Age: 42
End: 2022-12-25
Attending: EMERGENCY MEDICINE
Payer: COMMERCIAL

## 2022-12-24 ENCOUNTER — APPOINTMENT (OUTPATIENT)
Dept: RADIOLOGY | Facility: MEDICAL CENTER | Age: 42
End: 2022-12-24
Attending: EMERGENCY MEDICINE
Payer: COMMERCIAL

## 2022-12-24 DIAGNOSIS — F10.920 ACUTE ALCOHOLIC INTOXICATION WITHOUT COMPLICATION (HCC): ICD-10-CM

## 2022-12-24 DIAGNOSIS — R01.1 HEART MURMUR ON PHYSICAL EXAMINATION: ICD-10-CM

## 2022-12-24 DIAGNOSIS — G47.30 SLEEP APNEA, UNSPECIFIED TYPE: ICD-10-CM

## 2022-12-24 LAB
ALBUMIN SERPL BCP-MCNC: 3.8 G/DL (ref 3.2–4.9)
ALBUMIN/GLOB SERPL: 2 G/DL
ALP SERPL-CCNC: 40 U/L (ref 30–99)
ALT SERPL-CCNC: 16 U/L (ref 2–50)
ANION GAP SERPL CALC-SCNC: 11 MMOL/L (ref 7–16)
AST SERPL-CCNC: 16 U/L (ref 12–45)
BASOPHILS # BLD AUTO: 1.4 % (ref 0–1.8)
BASOPHILS # BLD: 0.09 K/UL (ref 0–0.12)
BILIRUB SERPL-MCNC: 0.2 MG/DL (ref 0.1–1.5)
BUN SERPL-MCNC: 15 MG/DL (ref 8–22)
CALCIUM ALBUM COR SERPL-MCNC: 8 MG/DL (ref 8.5–10.5)
CALCIUM SERPL-MCNC: 7.8 MG/DL (ref 8.4–10.2)
CHLORIDE SERPL-SCNC: 111 MMOL/L (ref 96–112)
CO2 SERPL-SCNC: 20 MMOL/L (ref 20–33)
CREAT SERPL-MCNC: 1.16 MG/DL (ref 0.5–1.4)
EOSINOPHIL # BLD AUTO: 0.16 K/UL (ref 0–0.51)
EOSINOPHIL NFR BLD: 2.4 % (ref 0–6.9)
ERYTHROCYTE [DISTWIDTH] IN BLOOD BY AUTOMATED COUNT: 45.5 FL (ref 35.9–50)
ETHANOL BLD-MCNC: 292.6 MG/DL
GFR SERPLBLD CREATININE-BSD FMLA CKD-EPI: 81 ML/MIN/1.73 M 2
GLOBULIN SER CALC-MCNC: 1.9 G/DL (ref 1.9–3.5)
GLUCOSE SERPL-MCNC: 82 MG/DL (ref 65–99)
HCT VFR BLD AUTO: 40.8 % (ref 42–52)
HGB BLD-MCNC: 14.1 G/DL (ref 14–18)
IMM GRANULOCYTES # BLD AUTO: 0.03 K/UL (ref 0–0.11)
IMM GRANULOCYTES NFR BLD AUTO: 0.5 % (ref 0–0.9)
LYMPHOCYTES # BLD AUTO: 3.13 K/UL (ref 1–4.8)
LYMPHOCYTES NFR BLD: 47.4 % (ref 22–41)
MCH RBC QN AUTO: 32.1 PG (ref 27–33)
MCHC RBC AUTO-ENTMCNC: 34.6 G/DL (ref 33.7–35.3)
MCV RBC AUTO: 92.9 FL (ref 81.4–97.8)
MONOCYTES # BLD AUTO: 0.58 K/UL (ref 0–0.85)
MONOCYTES NFR BLD AUTO: 8.8 % (ref 0–13.4)
NEUTROPHILS # BLD AUTO: 2.62 K/UL (ref 1.82–7.42)
NEUTROPHILS NFR BLD: 39.5 % (ref 44–72)
NRBC # BLD AUTO: 0 K/UL
NRBC BLD-RTO: 0 /100 WBC
PLATELET # BLD AUTO: 247 K/UL (ref 164–446)
PMV BLD AUTO: 9.3 FL (ref 9–12.9)
POTASSIUM SERPL-SCNC: 3.5 MMOL/L (ref 3.6–5.5)
PROT SERPL-MCNC: 5.7 G/DL (ref 6–8.2)
RBC # BLD AUTO: 4.39 M/UL (ref 4.7–6.1)
SODIUM SERPL-SCNC: 142 MMOL/L (ref 135–145)
WBC # BLD AUTO: 6.6 K/UL (ref 4.8–10.8)

## 2022-12-24 PROCEDURE — 99285 EMERGENCY DEPT VISIT HI MDM: CPT

## 2022-12-24 PROCEDURE — 82077 ASSAY SPEC XCP UR&BREATH IA: CPT

## 2022-12-24 PROCEDURE — 71045 X-RAY EXAM CHEST 1 VIEW: CPT

## 2022-12-24 PROCEDURE — 700111 HCHG RX REV CODE 636 W/ 250 OVERRIDE (IP): Performed by: EMERGENCY MEDICINE

## 2022-12-24 PROCEDURE — 96374 THER/PROPH/DIAG INJ IV PUSH: CPT

## 2022-12-24 PROCEDURE — 85025 COMPLETE CBC W/AUTO DIFF WBC: CPT

## 2022-12-24 PROCEDURE — 80053 COMPREHEN METABOLIC PANEL: CPT

## 2022-12-24 PROCEDURE — 700105 HCHG RX REV CODE 258: Performed by: EMERGENCY MEDICINE

## 2022-12-24 PROCEDURE — 36415 COLL VENOUS BLD VENIPUNCTURE: CPT

## 2022-12-24 RX ORDER — ONDANSETRON 2 MG/ML
4 INJECTION INTRAMUSCULAR; INTRAVENOUS ONCE
Status: COMPLETED | OUTPATIENT
Start: 2022-12-24 | End: 2022-12-24

## 2022-12-24 RX ORDER — SODIUM CHLORIDE 9 MG/ML
1000 INJECTION, SOLUTION INTRAVENOUS ONCE
Status: COMPLETED | OUTPATIENT
Start: 2022-12-24 | End: 2022-12-25

## 2022-12-24 RX ADMIN — ONDANSETRON 4 MG: 2 INJECTION INTRAMUSCULAR; INTRAVENOUS at 22:30

## 2022-12-24 RX ADMIN — SODIUM CHLORIDE 1000 ML: 9 INJECTION, SOLUTION INTRAVENOUS at 22:31

## 2022-12-24 ASSESSMENT — FIBROSIS 4 INDEX: FIB4 SCORE: 0.5

## 2022-12-25 VITALS
SYSTOLIC BLOOD PRESSURE: 112 MMHG | HEART RATE: 57 BPM | DIASTOLIC BLOOD PRESSURE: 59 MMHG | WEIGHT: 238 LBS | HEIGHT: 76 IN | TEMPERATURE: 98.6 F | OXYGEN SATURATION: 98 % | BODY MASS INDEX: 28.98 KG/M2 | RESPIRATION RATE: 14 BRPM

## 2022-12-25 NOTE — ED PROVIDER NOTES
"  ER Provider Note    Scribed for Kita Thomas D.o. by Usha Olivo. 12/24/2022  10:19 PM    Primary Care Provider: Pcp Pt States None  Means of Arrival: EMS  History obtained from: Patient and partner    CHIEF COMPLAINT  Chief Complaint   Patient presents with    Alcohol Intoxication     Pt arrives to the ER via EMS with his wife. Pt complains of etoh intoxication. Per the wife pt consumed approximately 2 L of tequila. Pt states he was just being \"stupid\". No SI or HI. Onset 2030.  Advised NPO until seen by provider. Pt verbalizes understanding.   Pt brought in by EMS after wife called 911. EMS gave 1 L of NS. Police did a BA on scene .244.  Pt A&O x 4. Following commands. Pt normally doesn't drink except for a couple times per month. BA here .230. Pt having no s/s or issues.         LIMITATION TO HISTORY   Select: Intoxication    HPI  Roberto Braswell is a 42 y.o. male who presents via EMS to bedside, to the ED for alcohol intoxication. The patient's wife is at the bedside and relays some of the story. The patient states he drank tonight because \"she is stupid\" and pointed to his partner. Partner states that they got into a disagreement and he decided to drink 2 L of tequila. According to her, he usually drinks about once a month, and never this much. He is currently intoxicated with associated nausea and vomiting, but does not complain of any abdominal pains. He is unable to ambulate on his own. En route, he was administered 1 L Normal Saline. Patient is unaware of history of heart murmur.     OUTSIDE HISTORIAN(S):  Select: Significant other Partner    EXTERNAL RECORDS REVIEWED  Select: EMS run sheet      REVIEW OF SYSTEMS  Pertinent positives include intoxication, nausea, vomiting. Pertinent negatives include no abdominal pain.  All other systems reviewed and negative.     PAST MEDICAL HISTORY  Past Medical History:   Diagnosis Date    Arthritis     Right ankle    Gastric ulcer     Pain     Right ankle " "      SURGICAL HISTORY  Past Surgical History:   Procedure Laterality Date    [place] UPPER GI ENDOSCOPY,DIAGNOSIS  3/28/2022    Procedure: GASTROSCOPY;  Surgeon: Paco Wiggins M.D.;  Location: SURGERY SAME DAY [facility];  Service: Gastroenterology    [place] UPPER GI ENDOSCOPY,BIOPSY  3/28/2022    Procedure: GASTROSCOPY, WITH BIOPSY;  Surgeon: Paco Wiggins M.D.;  Location: SURGERY SAME DAY [facility];  Service: Gastroenterology    ANKLE ARTHROSCOPY Right 5/21/2018    Procedure: ANKLE ARTHROSCOPY, LATERAL LIGAMENT RECONSTRUCTION;  Surgeon: Seth Cruz M.D.;  Location: SURGERY [facility];  Service: Orthopedics    BIOPSY ORTHO Right 5/21/2018    Procedure: BIOPSY ORTHO/ FOR CARTILAGE AND DENOVO PROCEDURE;  Surgeon: Seth Cruz M.D.;  Location: SURGERY [facility];  Service: Orthopedics    OTHER ABDOMINAL SURGERY      Cholecystectomy February 2017    OTHER ORTHOPEDIC SURGERY      2 previous ankle surgeries (2007, 2009)       FAMILY HISTORY  None noted on chart review    SOCIAL HISTORY   reports that he has never smoked. He has never used smokeless tobacco. He reports current alcohol use. He reports that he does not use drugs.    CURRENT MEDICATIONS  None noted on chart review    ALLERGIES  Tramadol    PHYSICAL EXAM  Pulse 65   Temp 36.1 °C (97 °F) (Temporal)   Resp 16   Ht 1.93 m (6' 4\")   Wt 108 kg (238 lb)   SpO2 96%   BMI 28.97 kg/m²   Constitutional: Patient is Extremely intoxicated. No acute distress  HENT: Normocephalic, atraumatic.  Oropharynx moist without erythema or exudates.  Eyes: PERRL, EOMI, Conjunctiva without erythema or exudates.   Neck: Supple  Lymphatic: No lymphadenopathy noted.   Cardiovascular: Grade 3 systolic ejection murmur. Normal heart rate and Regular rhythm.   Thorax & Lungs: Clear and equal breath sounds with good excursion. No respiratory distress, no rhonchi, wheezing or rales.   Abdomen: Bowel sounds normal in all four quadrants. Soft,nontender, no rebound , guarding, " palpable masses.   Skin: Warm, Dry  Back: No cervical, thoracic, or lumbosacral tenderness.   Extremities: Peripheral pulses 4/4 No edema, No tenderness,  Musculoskeletal: Normal range of motion in all major joints.  Neurologic: Alert & oriented x 3, Normal motor function, Normal sensory function, No lateralizing or focal deficits noted.   Psychiatric: Patient is very intoxicated and angry with his wife.    DIAGNOSTIC STUDIES    Results for orders placed or performed during the hospital encounter of 12/24/22   CBC WITH DIFFERENTIAL   Result Value Ref Range    WBC 6.6 4.8 - 10.8 K/uL    RBC 4.39 (L) 4.70 - 6.10 M/uL    Hemoglobin 14.1 14.0 - 18.0 g/dL    Hematocrit 40.8 (L) 42.0 - 52.0 %    MCV 92.9 81.4 - 97.8 fL    MCH 32.1 27.0 - 33.0 pg    MCHC 34.6 33.7 - 35.3 g/dL    RDW 45.5 35.9 - 50.0 fL    Platelet Count 247 164 - 446 K/uL    MPV 9.3 9.0 - 12.9 fL    Neutrophils-Polys 39.50 (L) 44.00 - 72.00 %    Lymphocytes 47.40 (H) 22.00 - 41.00 %    Monocytes 8.80 0.00 - 13.40 %    Eosinophils 2.40 0.00 - 6.90 %    Basophils 1.40 0.00 - 1.80 %    Immature Granulocytes 0.50 0.00 - 0.90 %    Nucleated RBC 0.00 /100 WBC    Neutrophils (Absolute) 2.62 1.82 - 7.42 K/uL    Lymphs (Absolute) 3.13 1.00 - 4.80 K/uL    Monos (Absolute) 0.58 0.00 - 0.85 K/uL    Eos (Absolute) 0.16 0.00 - 0.51 K/uL    Baso (Absolute) 0.09 0.00 - 0.12 K/uL    Immature Granulocytes (abs) 0.03 0.00 - 0.11 K/uL    NRBC (Absolute) 0.00 K/uL   COMP METABOLIC PANEL   Result Value Ref Range    Sodium 142 135 - 145 mmol/L    Potassium 3.5 (L) 3.6 - 5.5 mmol/L    Chloride 111 96 - 112 mmol/L    Co2 20 20 - 33 mmol/L    Anion Gap 11.0 7.0 - 16.0    Glucose 82 65 - 99 mg/dL    Bun 15 8 - 22 mg/dL    Creatinine 1.16 0.50 - 1.40 mg/dL    Calcium 7.8 (L) 8.4 - 10.2 mg/dL    AST(SGOT) 16 12 - 45 U/L    ALT(SGPT) 16 2 - 50 U/L    Alkaline Phosphatase 40 30 - 99 U/L    Total Bilirubin 0.2 0.1 - 1.5 mg/dL    Albumin 3.8 3.2 - 4.9 g/dL    Total Protein 5.7 (L) 6.0 -  8.2 g/dL    Globulin 1.9 1.9 - 3.5 g/dL    A-G Ratio 2.0 g/dL   ETHYL ALCOHOL (BLOOD)   Result Value Ref Range    Diagnostic Alcohol 292.6 (H) <10.1 mg/dL   CORRECTED CALCIUM   Result Value Ref Range    Correct Calcium 8.0 (L) 8.5 - 10.5 mg/dL   ESTIMATED GFR   Result Value Ref Range    GFR (CKD-EPI) 81 >60 mL/min/1.73 m 2       COURSE & MEDICAL DECISION MAKING     Nursing notes, vital signs, PMSFSHx reviewed in chart     Differential diagnoses include but not limited to alcohol intoxication.    Prior records reviewed which indicate no prior records available    Escalation of care considered, and ultimately not performed: IV fluids.    Barriers to care at this time, including but not limited to: Select: None .     Diagnostic tests and prescription drugs considered including, but not limited to: Select: None .    In addition to the chief complaint, the following problems were addressed: Referral to PCP or Cardiology for further work up of his heart murmur    The attending emergency physician discussed management of the patient with the following physicians and CARTER's: None    Discussion of management with other [facility] or appropriate source(s): Select: None      PLAN AND DISPOSITION   10:19 PM  I discussed the plan of care with the patient and his partner and informed them that patient will be administered fluids and Zofran for symptom control and rehydration prior to discharge. Patient verbalizes understanding and agreement to this plan of care.     Patient's labs revealed an elevated blood alcohol level of 0.292, remaining labs show potassium at 3.5 otherwise unremarkable.  chest x-ray was negative.    12:35 AM- I reassessed the patient and his wife at this time. I discussed cutting back on his alcohol consumption and schedule an appointment with a PCP for findings of a heart murmur. Discussed plan for discharge; I advised the patient to follow-up with PCP or cardiology as soon as possible, and to return to the [facility]  ED with any new or worsening symptoms. Patient was given the opportunity for questions. I addressed all questions or concerns at this time and they verbalize agreement to the plan of care.     HYDRATION: Based on the patient's presentation of CIWA the patient was given IV fluids. IV Hydration was used because oral hydration was not adequate alone. Upon recheck following hydration, the patient was mildly improved.    The patient will return for new or worsening symptoms and is stable at the time of discharge.    The patient is referred to a primary physician for blood pressure management, diabetic screening, and for all other preventative health concerns.    DISPOSITION:  Patient will be discharged home in stable condition.    FOLLOW UP:  [facility]  [address]  [place] [id number] [phone]  Schedule an appointment as soon as possible for a visit in 2 days  Call first thing Monday morning to schedule an appointment    Edin Singh M.D.  [address]  [place] [id number] [phone]    Schedule an appointment as soon as possible for a visit in 2 days  For evaluation of your sleep apnea    [facility]  [address]  [place] [address]  [phone]          OUTPATIENT MEDICATIONS:  New Prescriptions    No medications on file       FINAL IMPRESSION   1. Acute alcoholic intoxication without complication (HCC)    2. Heart murmur on physical examination    3. Sleep apnea, unspecified type             The note accurately reflects work and decisions made by me.  Kita Thomas D.O.  12/25/2022  3:15 AM     Usha MORENO (Ladi), am scribing for, and in the presence of, Kita Thomas D.O..    Electronically signed by: Usha Olivo (Ladi), 12/24/2022    Kita MORENO D.O. personally performed the services described in this documentation, as scribed by Usha Olivo in my presence, and it is both accurate and complete.

## 2022-12-25 NOTE — ED NOTES
Pt is resting comfortably. Call light within reach. Pt has no needs at this time. Advised to call for help or for any needs or concerns. Pt verbalizes understanding. Wife at bedside, adithya.

## 2022-12-25 NOTE — ED TRIAGE NOTES
"Chief Complaint   Patient presents with    Alcohol Intoxication     Pt arrives to the ER via EMS with his wife. Pt complains of etoh intoxication. Per the wife pt consumed approximately 2 L of tequila. Pt states he was just being \"stupid\". No SI or HI. Onset 2030.  Advised NPO until seen by provider. Pt verbalizes understanding.   Pt brought in by EMS after wife called 911. EMS gave 1 L of NS. Police did a BA on scene .244.  Pt A&O x 4. Following commands. Pt normally doesn't drink except for a couple times per month. BA here .230. Pt having no s/s or issues.           "

## 2022-12-25 NOTE — DISCHARGE INSTRUCTIONS
Call first thing Monday morning to schedule an appointment with [facility] (Dr. Singh for evaluation for sleep apnea)  Please cut back on your alcohol consumption.   Increase fluids and rest for the next 24 hours  Call to schedule an appointment with [facility] to establish a primary care provider.  Return if any problems or worsening.

## 2022-12-25 NOTE — ED NOTES
Pt has episodes of not breathing and saturations dropping while asleep. Wife reports he does this all the time. She has told him he needs to see a doctor but doesn't. She is very worried about this. ERP notified. Oxygen applied while patient is asleep.

## 2023-05-11 ENCOUNTER — OFFICE VISIT (OUTPATIENT)
Dept: URGENT CARE | Facility: CLINIC | Age: 43
End: 2023-05-11
Payer: COMMERCIAL

## 2023-05-11 VITALS
SYSTOLIC BLOOD PRESSURE: 138 MMHG | HEART RATE: 70 BPM | RESPIRATION RATE: 16 BRPM | HEIGHT: 75 IN | DIASTOLIC BLOOD PRESSURE: 90 MMHG | OXYGEN SATURATION: 96 % | WEIGHT: 227 LBS | BODY MASS INDEX: 28.23 KG/M2 | TEMPERATURE: 98.2 F

## 2023-05-11 DIAGNOSIS — R11.2 NAUSEA AND VOMITING, UNSPECIFIED VOMITING TYPE: ICD-10-CM

## 2023-05-11 PROCEDURE — 3075F SYST BP GE 130 - 139MM HG: CPT

## 2023-05-11 PROCEDURE — 99213 OFFICE O/P EST LOW 20 MIN: CPT

## 2023-05-11 PROCEDURE — 3080F DIAST BP >= 90 MM HG: CPT

## 2023-05-11 RX ORDER — ONDANSETRON 4 MG/1
4 TABLET, ORALLY DISINTEGRATING ORAL EVERY 6 HOURS PRN
Qty: 15 TABLET | Refills: 0 | Status: ON HOLD | OUTPATIENT
Start: 2023-05-11 | End: 2023-07-30

## 2023-05-11 RX ORDER — ONDANSETRON 4 MG/1
4 TABLET, ORALLY DISINTEGRATING ORAL ONCE
Status: COMPLETED | OUTPATIENT
Start: 2023-05-11 | End: 2023-05-11

## 2023-05-11 RX ADMIN — ONDANSETRON 4 MG: 4 TABLET, ORALLY DISINTEGRATING ORAL at 18:12

## 2023-05-11 ASSESSMENT — FIBROSIS 4 INDEX: FIB4 SCORE: 0.66

## 2023-05-11 NOTE — LETTER
May 11, 2023      To Whom It May Concern:      This is confirmation that Roberto Braswell was seen in urgent care today for illness. Please excuse him from work until 05/15/23 or sooner if he is feeling better.  If you have any questions please do not hesitate to call me at the phone number listed below.    Sincerely,      Olga Richard, VERONIKA.P.R.N.  [phone]

## 2023-05-12 NOTE — PROGRESS NOTES
Subjective:   Roberto Braswell is a 42 y.o. male who presents for Abdominal Pain (Mid abdominal, vomiting, nausea x today )      HPI:    Patient presents urgent care with his significant other with concerns of food poisoning  Reports he ate tramp last night for dinner and has been nauseated and vomiting multiple times today  He has been unable to maintain adequate oral hydration due to emesis.  He has a reduced appetite  Denies fever, chills  Denies recent URI symptoms  Denies recent travel  He states he has abdominal pain that is in in the middle, pain is described as a cramping sensation.       ROS As above in HPI    Medications:    No current outpatient medications on file prior to visit.     No current facility-administered medications on file prior to visit.        Allergies:   Tramadol    Problem List:   Patient Active Problem List   Diagnosis    Upper GI bleed    Physiologic disturbance of temperature regulation    Epigastric pain    ELISABET (acute kidney injury) (HCC)        Surgical History:  Past Surgical History:   Procedure Laterality Date    VT UPPER GI ENDOSCOPY,DIAGNOSIS  3/28/2022    Procedure: GASTROSCOPY;  Surgeon: Paco Wiggins M.D.;  Location: SURGERY SAME DAY [facility];  Service: Gastroenterology    VT UPPER GI ENDOSCOPY,BIOPSY  3/28/2022    Procedure: GASTROSCOPY, WITH BIOPSY;  Surgeon: Paco Wiggins M.D.;  Location: SURGERY SAME DAY [facility];  Service: Gastroenterology    ANKLE ARTHROSCOPY Right 5/21/2018    Procedure: ANKLE ARTHROSCOPY, LATERAL LIGAMENT RECONSTRUCTION;  Surgeon: Seth Cruz M.D.;  Location: SURGERY [facility];  Service: Orthopedics    BIOPSY ORTHO Right 5/21/2018    Procedure: BIOPSY ORTHO/ FOR CARTILAGE AND DENOVO PROCEDURE;  Surgeon: Seth Cruz M.D.;  Location: SURGERY [facility];  Service: Orthopedics    OTHER ABDOMINAL SURGERY      Cholecystectomy February 2017    OTHER ORTHOPEDIC SURGERY      2 previous ankle surgeries (2007, 2009)       Past Social  "Hx:   Social History     Tobacco Use    Smoking status: Never    Smokeless tobacco: Never   Vaping Use    Vaping Use: Never used   Substance Use Topics    Alcohol use: Yes     Comment: a few drinks/month    Drug use: Never          Problem list, medications, and allergies reviewed by myself today in Epic.     Objective:     BP (!) 138/90 (BP Location: Right arm, Patient Position: Sitting, BP Cuff Size: Adult)   Pulse 70   Temp 36.8 °C (98.2 °F) (Temporal)   Resp 16   Ht 1.905 m (6' 3\")   Wt 103 kg (227 lb)   SpO2 96%   BMI 28.37 kg/m²     Physical Exam  Vitals and nursing note reviewed.   Constitutional:       General: He is not in acute distress.     Appearance: Normal appearance. He is not ill-appearing or diaphoretic.   HENT:      Head: Normocephalic and atraumatic.      Right Ear: Tympanic membrane and ear canal normal.      Left Ear: Tympanic membrane and ear canal normal.      Nose: Nose normal.      Mouth/Throat:      Mouth: Mucous membranes are moist.      Pharynx: Oropharynx is clear.   Eyes:      Conjunctiva/sclera: Conjunctivae normal.   Cardiovascular:      Rate and Rhythm: Normal rate and regular rhythm.      Heart sounds: Normal heart sounds.   Pulmonary:      Effort: Pulmonary effort is normal.      Breath sounds: Normal breath sounds.   Abdominal:      General: Abdomen is flat. Bowel sounds are increased. There is no distension.      Palpations: Abdomen is soft. There is no hepatomegaly, splenomegaly, mass or pulsatile mass.      Tenderness: There is no abdominal tenderness. There is no right CVA tenderness, left CVA tenderness, guarding or rebound. Negative signs include Garcia's sign, Rovsing's sign and McBurney's sign.      Hernia: No hernia is present.   Skin:     General: Skin is warm and dry.      Capillary Refill: Capillary refill takes less than 2 seconds.      Findings: No rash.   Neurological:      Mental Status: He is alert and oriented to person, place, and time. "         Assessment/Plan:     Diagnosis and associated orders:   1. Nausea and vomiting, unspecified vomiting type  - ondansetron (ZOFRAN ODT) dispertab 4 mg  - ondansetron (ZOFRAN ODT) 4 MG TABLET DISPERSIBLE; Take 1 Tablet by mouth every 6 hours as needed for Nausea/Vomiting for up to 15 doses.  Dispense: 15 Tablet; Refill: 0        Comments/MDM:     No labs or stool studies ordered today as symptoms started today.  Patient ministered a friend, which did allow him to orally hydrate in office.  Patient reports improvement of nausea and overall malaise.  No acute abdomen signs requiring surgical intervention appreciated today  Supportive care: increased oral hydration, rehydration packets, diet as tolerated  Return to UC if symptoms do not improve  Strict return to ER precautions reviewed.    Verbalized understanding consented plan of care     Please note that this dictation was created using voice recognition software. I have made a reasonable attempt to correct obvious errors, but I expect that there are errors of grammar and possibly content that I did not discover before finalizing the note.    This note was electronically signed by Olga Richard, RAO

## 2023-05-28 ENCOUNTER — HOSPITAL ENCOUNTER (EMERGENCY)
Facility: MEDICAL CENTER | Age: 43
End: 2023-05-28
Attending: EMERGENCY MEDICINE
Payer: COMMERCIAL

## 2023-05-28 ENCOUNTER — APPOINTMENT (OUTPATIENT)
Dept: RADIOLOGY | Facility: MEDICAL CENTER | Age: 43
End: 2023-05-28
Attending: EMERGENCY MEDICINE
Payer: COMMERCIAL

## 2023-05-28 VITALS
BODY MASS INDEX: 27.6 KG/M2 | OXYGEN SATURATION: 92 % | WEIGHT: 222 LBS | RESPIRATION RATE: 18 BRPM | DIASTOLIC BLOOD PRESSURE: 78 MMHG | HEIGHT: 75 IN | TEMPERATURE: 97.7 F | HEART RATE: 96 BPM | SYSTOLIC BLOOD PRESSURE: 166 MMHG
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
## 2023-05-28 DIAGNOSIS — S39.012A STRAIN OF LUMBAR REGION, INITIAL ENCOUNTER: ICD-10-CM

## 2023-05-28 DIAGNOSIS — S40.012A CONTUSION OF LEFT SHOULDER, INITIAL ENCOUNTER: ICD-10-CM

## 2023-05-28 LAB
ALBUMIN SERPL BCP-MCNC: 4 G/DL (ref 3.2–4.9)
ALBUMIN/GLOB SERPL: 1.8 G/DL
ALP SERPL-CCNC: 43 U/L (ref 30–99)
ALT SERPL-CCNC: 15 U/L (ref 2–50)
ANION GAP SERPL CALC-SCNC: 16 MMOL/L (ref 7–16)
AST SERPL-CCNC: 15 U/L (ref 12–45)
BASOPHILS # BLD AUTO: 0.8 % (ref 0–1.8)
BASOPHILS # BLD: 0.07 K/UL (ref 0–0.12)
BILIRUB SERPL-MCNC: 0.4 MG/DL (ref 0.1–1.5)
BUN SERPL-MCNC: 16 MG/DL (ref 8–22)
CALCIUM ALBUM COR SERPL-MCNC: 8.7 MG/DL (ref 8.5–10.5)
CALCIUM SERPL-MCNC: 8.7 MG/DL (ref 8.5–10.5)
CHLORIDE SERPL-SCNC: 110 MMOL/L (ref 96–112)
CO2 SERPL-SCNC: 16 MMOL/L (ref 20–33)
CREAT SERPL-MCNC: 1.26 MG/DL (ref 0.5–1.4)
EOSINOPHIL # BLD AUTO: 0.01 K/UL (ref 0–0.51)
EOSINOPHIL NFR BLD: 0.1 % (ref 0–6.9)
ERYTHROCYTE [DISTWIDTH] IN BLOOD BY AUTOMATED COUNT: 41.3 FL (ref 35.9–50)
GFR SERPLBLD CREATININE-BSD FMLA CKD-EPI: 73 ML/MIN/1.73 M 2
GLOBULIN SER CALC-MCNC: 2.2 G/DL (ref 1.9–3.5)
GLUCOSE SERPL-MCNC: 94 MG/DL (ref 65–99)
HCT VFR BLD AUTO: 42.8 % (ref 42–52)
HGB BLD-MCNC: 15.5 G/DL (ref 14–18)
IMM GRANULOCYTES # BLD AUTO: 0.03 K/UL (ref 0–0.11)
IMM GRANULOCYTES NFR BLD AUTO: 0.4 % (ref 0–0.9)
LYMPHOCYTES # BLD AUTO: 1.62 K/UL (ref 1–4.8)
LYMPHOCYTES NFR BLD: 19.1 % (ref 22–41)
MCH RBC QN AUTO: 32 PG (ref 27–33)
MCHC RBC AUTO-ENTMCNC: 36.2 G/DL (ref 32.3–36.5)
MCV RBC AUTO: 88.4 FL (ref 81.4–97.8)
MONOCYTES # BLD AUTO: 0.37 K/UL (ref 0–0.85)
MONOCYTES NFR BLD AUTO: 4.4 % (ref 0–13.4)
NEUTROPHILS # BLD AUTO: 6.36 K/UL (ref 1.82–7.42)
NEUTROPHILS NFR BLD: 75.2 % (ref 44–72)
NRBC # BLD AUTO: 0 K/UL
NRBC BLD-RTO: 0 /100 WBC (ref 0–0.2)
PLATELET # BLD AUTO: 265 K/UL (ref 164–446)
PMV BLD AUTO: 9.2 FL (ref 9–12.9)
POTASSIUM SERPL-SCNC: 3.5 MMOL/L (ref 3.6–5.5)
PROT SERPL-MCNC: 6.2 G/DL (ref 6–8.2)
RBC # BLD AUTO: 4.84 M/UL (ref 4.7–6.1)
SODIUM SERPL-SCNC: 142 MMOL/L (ref 135–145)
WBC # BLD AUTO: 8.5 K/UL (ref 4.8–10.8)

## 2023-05-28 PROCEDURE — 73030 X-RAY EXAM OF SHOULDER: CPT | Mod: LT

## 2023-05-28 PROCEDURE — 96372 THER/PROPH/DIAG INJ SC/IM: CPT

## 2023-05-28 PROCEDURE — 73501 X-RAY EXAM HIP UNI 1 VIEW: CPT | Mod: LT

## 2023-05-28 PROCEDURE — 80053 COMPREHEN METABOLIC PANEL: CPT

## 2023-05-28 PROCEDURE — 99285 EMERGENCY DEPT VISIT HI MDM: CPT

## 2023-05-28 PROCEDURE — 74177 CT ABD & PELVIS W/CONTRAST: CPT

## 2023-05-28 PROCEDURE — 71101 X-RAY EXAM UNILAT RIBS/CHEST: CPT | Mod: LT

## 2023-05-28 PROCEDURE — 72131 CT LUMBAR SPINE W/O DYE: CPT

## 2023-05-28 PROCEDURE — A9270 NON-COVERED ITEM OR SERVICE: HCPCS | Performed by: EMERGENCY MEDICINE

## 2023-05-28 PROCEDURE — 700102 HCHG RX REV CODE 250 W/ 637 OVERRIDE(OP): Performed by: EMERGENCY MEDICINE

## 2023-05-28 PROCEDURE — 96374 THER/PROPH/DIAG INJ IV PUSH: CPT

## 2023-05-28 PROCEDURE — 72100 X-RAY EXAM L-S SPINE 2/3 VWS: CPT

## 2023-05-28 PROCEDURE — 85025 COMPLETE CBC W/AUTO DIFF WBC: CPT

## 2023-05-28 PROCEDURE — 36415 COLL VENOUS BLD VENIPUNCTURE: CPT

## 2023-05-28 PROCEDURE — 700111 HCHG RX REV CODE 636 W/ 250 OVERRIDE (IP): Performed by: EMERGENCY MEDICINE

## 2023-05-28 PROCEDURE — 96376 TX/PRO/DX INJ SAME DRUG ADON: CPT

## 2023-05-28 PROCEDURE — 700117 HCHG RX CONTRAST REV CODE 255: Performed by: EMERGENCY MEDICINE

## 2023-05-28 RX ORDER — KETOROLAC TROMETHAMINE 30 MG/ML
60 INJECTION, SOLUTION INTRAMUSCULAR; INTRAVENOUS ONCE
Status: COMPLETED | OUTPATIENT
Start: 2023-05-28 | End: 2023-05-28

## 2023-05-28 RX ORDER — ONDANSETRON 4 MG/1
4 TABLET, ORALLY DISINTEGRATING ORAL ONCE
Status: COMPLETED | OUTPATIENT
Start: 2023-05-28 | End: 2023-05-28

## 2023-05-28 RX ORDER — HYDROMORPHONE HYDROCHLORIDE 1 MG/ML
0.5 INJECTION, SOLUTION INTRAMUSCULAR; INTRAVENOUS; SUBCUTANEOUS ONCE
Status: COMPLETED | OUTPATIENT
Start: 2023-05-28 | End: 2023-05-28

## 2023-05-28 RX ORDER — MORPHINE SULFATE 4 MG/ML
8 INJECTION INTRAVENOUS ONCE
Status: COMPLETED | OUTPATIENT
Start: 2023-05-28 | End: 2023-05-28

## 2023-05-28 RX ORDER — HYDROMORPHONE HYDROCHLORIDE 1 MG/ML
1 INJECTION, SOLUTION INTRAMUSCULAR; INTRAVENOUS; SUBCUTANEOUS ONCE
Status: COMPLETED | OUTPATIENT
Start: 2023-05-28 | End: 2023-05-28

## 2023-05-28 RX ORDER — HYDROCODONE BITARTRATE AND ACETAMINOPHEN 5; 325 MG/1; MG/1
1 TABLET ORAL ONCE
Status: COMPLETED | OUTPATIENT
Start: 2023-05-28 | End: 2023-05-28

## 2023-05-28 RX ADMIN — HYDROCODONE BITARTRATE AND ACETAMINOPHEN 1 TABLET: 5; 325 TABLET ORAL at 15:31

## 2023-05-28 RX ADMIN — HYDROMORPHONE HYDROCHLORIDE 0.5 MG: 1 INJECTION, SOLUTION INTRAMUSCULAR; INTRAVENOUS; SUBCUTANEOUS at 18:25

## 2023-05-28 RX ADMIN — IOHEXOL 100 ML: 350 INJECTION, SOLUTION INTRAVENOUS at 20:19

## 2023-05-28 RX ADMIN — ONDANSETRON 4 MG: 4 TABLET, ORALLY DISINTEGRATING ORAL at 17:31

## 2023-05-28 RX ADMIN — HYDROMORPHONE HYDROCHLORIDE 1 MG: 1 INJECTION, SOLUTION INTRAMUSCULAR; INTRAVENOUS; SUBCUTANEOUS at 21:00

## 2023-05-28 RX ADMIN — MORPHINE SULFATE 8 MG: 4 INJECTION INTRAVENOUS at 16:45

## 2023-05-28 RX ADMIN — KETOROLAC TROMETHAMINE 60 MG: 30 INJECTION, SOLUTION INTRAMUSCULAR; INTRAVENOUS at 15:31

## 2023-05-28 ASSESSMENT — PAIN DESCRIPTION - PAIN TYPE
TYPE: ACUTE PAIN
TYPE: ACUTE PAIN

## 2023-05-28 ASSESSMENT — FIBROSIS 4 INDEX: FIB4 SCORE: 0.66
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
## 2023-05-28 NOTE — DISCHARGE INSTRUCTIONS
Ice packs for specific areas of pain, use Motrin Tylenol for other pains.    This will be painful for the next several days and will improve over time.  Please continue follow-up with a primary doctor for further evaluation and treatment.  Return for any change or worsening symptoms.

## 2023-05-29 NOTE — ED NOTES
Pt discharged to home. Discharge paperwork provided. Education provided by ERP.  Pt was given follow up instruction. Pt verbalized understanding of all instructions for discharge. Patient ambulatory, alert and oriented x 4, out of ER with all belongings and steady gait.     Work note provided per pt's request

## 2023-05-29 NOTE — ED NOTES
Hourly rounding complete. Pt resting on stretcher. VSS on RA . Respirations even and non-labored. No s/sx of distress noted. Call bell within reach, side rails up. No complaints at this time.

## 2023-07-29 ENCOUNTER — HOSPITAL ENCOUNTER (INPATIENT)
Facility: MEDICAL CENTER | Age: 43
LOS: 1 days | DRG: 378 | End: 2023-07-30
Attending: EMERGENCY MEDICINE | Admitting: INTERNAL MEDICINE
Payer: COMMERCIAL

## 2023-07-29 DIAGNOSIS — K26.9 DUODENAL ULCER: ICD-10-CM

## 2023-07-29 DIAGNOSIS — K92.2 UPPER GI BLEED: ICD-10-CM

## 2023-07-29 DIAGNOSIS — R10.13 EPIGASTRIC PAIN: ICD-10-CM

## 2023-07-29 DIAGNOSIS — K92.1 MELENA: ICD-10-CM

## 2023-07-29 DIAGNOSIS — K25.0 ACUTE GASTRIC ULCER WITH HEMORRHAGE: Primary | ICD-10-CM

## 2023-07-29 DIAGNOSIS — K92.0 HEMATEMESIS WITH NAUSEA: ICD-10-CM

## 2023-07-29 LAB
ALBUMIN SERPL BCP-MCNC: 4 G/DL (ref 3.2–4.9)
ALBUMIN/GLOB SERPL: 2.1 G/DL
ALP SERPL-CCNC: 31 U/L (ref 30–99)
ALT SERPL-CCNC: 10 U/L (ref 2–50)
ANION GAP SERPL CALC-SCNC: 12 MMOL/L (ref 7–16)
AST SERPL-CCNC: 10 U/L (ref 12–45)
BASOPHILS # BLD AUTO: 1.2 % (ref 0–1.8)
BASOPHILS # BLD: 0.08 K/UL (ref 0–0.12)
BILIRUB SERPL-MCNC: 0.6 MG/DL (ref 0.1–1.5)
BUN SERPL-MCNC: 53 MG/DL (ref 8–22)
CALCIUM ALBUM COR SERPL-MCNC: 9 MG/DL (ref 8.5–10.5)
CALCIUM SERPL-MCNC: 9 MG/DL (ref 8.4–10.2)
CHLORIDE SERPL-SCNC: 110 MMOL/L (ref 96–112)
CO2 SERPL-SCNC: 20 MMOL/L (ref 20–33)
CREAT SERPL-MCNC: 1.13 MG/DL (ref 0.5–1.4)
EKG IMPRESSION: NORMAL
EOSINOPHIL # BLD AUTO: 0.05 K/UL (ref 0–0.51)
EOSINOPHIL NFR BLD: 0.7 % (ref 0–6.9)
ERYTHROCYTE [DISTWIDTH] IN BLOOD BY AUTOMATED COUNT: 41.2 FL (ref 35.9–50)
ETHANOL BLD-MCNC: 17.8 MG/DL
GFR SERPLBLD CREATININE-BSD FMLA CKD-EPI: 83 ML/MIN/1.73 M 2
GLOBULIN SER CALC-MCNC: 1.9 G/DL (ref 1.9–3.5)
GLUCOSE SERPL-MCNC: 88 MG/DL (ref 65–99)
HCT VFR BLD AUTO: 40.2 % (ref 42–52)
HGB BLD-MCNC: 13.6 G/DL (ref 14–18)
IMM GRANULOCYTES # BLD AUTO: 0.01 K/UL (ref 0–0.11)
IMM GRANULOCYTES NFR BLD AUTO: 0.1 % (ref 0–0.9)
INR PPP: 1.1 (ref 0.87–1.13)
LIPASE SERPL-CCNC: 36 U/L (ref 11–82)
LYMPHOCYTES # BLD AUTO: 2.55 K/UL (ref 1–4.8)
LYMPHOCYTES NFR BLD: 37.7 % (ref 22–41)
MCH RBC QN AUTO: 31.1 PG (ref 27–33)
MCHC RBC AUTO-ENTMCNC: 33.8 G/DL (ref 32.3–36.5)
MCV RBC AUTO: 92 FL (ref 81.4–97.8)
MONOCYTES # BLD AUTO: 0.52 K/UL (ref 0–0.85)
MONOCYTES NFR BLD AUTO: 7.7 % (ref 0–13.4)
NEUTROPHILS # BLD AUTO: 3.56 K/UL (ref 1.82–7.42)
NEUTROPHILS NFR BLD: 52.6 % (ref 44–72)
NRBC # BLD AUTO: 0 K/UL
NRBC BLD-RTO: 0 /100 WBC (ref 0–0.2)
PLATELET # BLD AUTO: 286 K/UL (ref 164–446)
PMV BLD AUTO: 9.7 FL (ref 9–12.9)
POTASSIUM SERPL-SCNC: 4.8 MMOL/L (ref 3.6–5.5)
PROT SERPL-MCNC: 5.9 G/DL (ref 6–8.2)
PROTHROMBIN TIME: 14.6 SEC (ref 12–14.6)
RBC # BLD AUTO: 4.37 M/UL (ref 4.7–6.1)
SODIUM SERPL-SCNC: 142 MMOL/L (ref 135–145)
WBC # BLD AUTO: 6.8 K/UL (ref 4.8–10.8)

## 2023-07-29 PROCEDURE — 93005 ELECTROCARDIOGRAM TRACING: CPT | Performed by: EMERGENCY MEDICINE

## 2023-07-29 PROCEDURE — C9113 INJ PANTOPRAZOLE SODIUM, VIA: HCPCS | Mod: JZ

## 2023-07-29 PROCEDURE — 96374 THER/PROPH/DIAG INJ IV PUSH: CPT

## 2023-07-29 PROCEDURE — 85025 COMPLETE CBC W/AUTO DIFF WBC: CPT

## 2023-07-29 PROCEDURE — 94760 N-INVAS EAR/PLS OXIMETRY 1: CPT

## 2023-07-29 PROCEDURE — 86900 BLOOD TYPING SEROLOGIC ABO: CPT

## 2023-07-29 PROCEDURE — 36415 COLL VENOUS BLD VENIPUNCTURE: CPT

## 2023-07-29 PROCEDURE — 96375 TX/PRO/DX INJ NEW DRUG ADDON: CPT

## 2023-07-29 PROCEDURE — 700105 HCHG RX REV CODE 258: Performed by: EMERGENCY MEDICINE

## 2023-07-29 PROCEDURE — 83690 ASSAY OF LIPASE: CPT

## 2023-07-29 PROCEDURE — 99285 EMERGENCY DEPT VISIT HI MDM: CPT

## 2023-07-29 PROCEDURE — 86901 BLOOD TYPING SEROLOGIC RH(D): CPT

## 2023-07-29 PROCEDURE — 700111 HCHG RX REV CODE 636 W/ 250 OVERRIDE (IP): Mod: JZ | Performed by: EMERGENCY MEDICINE

## 2023-07-29 PROCEDURE — 85610 PROTHROMBIN TIME: CPT

## 2023-07-29 PROCEDURE — 80053 COMPREHEN METABOLIC PANEL: CPT

## 2023-07-29 PROCEDURE — 82077 ASSAY SPEC XCP UR&BREATH IA: CPT

## 2023-07-29 PROCEDURE — 700111 HCHG RX REV CODE 636 W/ 250 OVERRIDE (IP): Mod: JZ

## 2023-07-29 PROCEDURE — 86850 RBC ANTIBODY SCREEN: CPT

## 2023-07-29 RX ORDER — HYDROMORPHONE HYDROCHLORIDE 1 MG/ML
1 INJECTION, SOLUTION INTRAMUSCULAR; INTRAVENOUS; SUBCUTANEOUS ONCE
Status: COMPLETED | OUTPATIENT
Start: 2023-07-29 | End: 2023-07-29

## 2023-07-29 RX ORDER — ONDANSETRON 2 MG/ML
4 INJECTION INTRAMUSCULAR; INTRAVENOUS ONCE
Status: COMPLETED | OUTPATIENT
Start: 2023-07-29 | End: 2023-07-29

## 2023-07-29 RX ORDER — PANTOPRAZOLE SODIUM 40 MG/10ML
INJECTION, POWDER, LYOPHILIZED, FOR SOLUTION INTRAVENOUS
Status: COMPLETED
Start: 2023-07-29 | End: 2023-07-29

## 2023-07-29 RX ORDER — SODIUM CHLORIDE 9 MG/ML
1000 INJECTION, SOLUTION INTRAVENOUS ONCE
Status: COMPLETED | OUTPATIENT
Start: 2023-07-29 | End: 2023-07-29

## 2023-07-29 RX ADMIN — PANTOPRAZOLE SODIUM 80 MG: 40 INJECTION, POWDER, LYOPHILIZED, FOR SOLUTION INTRAVENOUS at 23:40

## 2023-07-29 RX ADMIN — ONDANSETRON 4 MG: 2 INJECTION INTRAMUSCULAR; INTRAVENOUS at 23:18

## 2023-07-29 RX ADMIN — SODIUM CHLORIDE 1000 ML: 9 INJECTION, SOLUTION INTRAVENOUS at 23:30

## 2023-07-29 RX ADMIN — HYDROMORPHONE HYDROCHLORIDE 1 MG: 1 INJECTION, SOLUTION INTRAMUSCULAR; INTRAVENOUS; SUBCUTANEOUS at 23:18

## 2023-07-29 ASSESSMENT — PAIN DESCRIPTION - PAIN TYPE
TYPE: ACUTE PAIN
TYPE: ACUTE PAIN

## 2023-07-29 ASSESSMENT — FIBROSIS 4 INDEX: FIB4 SCORE: 0.61

## 2023-07-30 ENCOUNTER — ANESTHESIA (OUTPATIENT)
Dept: SURGERY | Facility: MEDICAL CENTER | Age: 43
DRG: 378 | End: 2023-07-30
Payer: COMMERCIAL

## 2023-07-30 ENCOUNTER — ANESTHESIA EVENT (OUTPATIENT)
Dept: SURGERY | Facility: MEDICAL CENTER | Age: 43
DRG: 378 | End: 2023-07-30
Payer: COMMERCIAL

## 2023-07-30 ENCOUNTER — APPOINTMENT (OUTPATIENT)
Dept: RADIOLOGY | Facility: MEDICAL CENTER | Age: 43
DRG: 378 | End: 2023-07-30
Attending: INTERNAL MEDICINE
Payer: COMMERCIAL

## 2023-07-30 VITALS
DIASTOLIC BLOOD PRESSURE: 71 MMHG | SYSTOLIC BLOOD PRESSURE: 132 MMHG | BODY MASS INDEX: 27.71 KG/M2 | OXYGEN SATURATION: 98 % | WEIGHT: 227.51 LBS | TEMPERATURE: 97.2 F | HEIGHT: 76 IN | HEART RATE: 51 BPM | RESPIRATION RATE: 8 BRPM

## 2023-07-30 PROBLEM — K26.9 DUODENAL ULCER: Status: ACTIVE | Noted: 2023-07-30

## 2023-07-30 PROBLEM — D62 ACUTE BLOOD LOSS ANEMIA: Status: RESOLVED | Noted: 2023-07-30 | Resolved: 2023-07-30

## 2023-07-30 PROBLEM — D62 ACUTE BLOOD LOSS ANEMIA: Status: ACTIVE | Noted: 2023-07-30

## 2023-07-30 PROBLEM — Z76.5 DRUG-SEEKING BEHAVIOR: Status: ACTIVE | Noted: 2023-07-30

## 2023-07-30 PROBLEM — E86.0 DEHYDRATION: Status: ACTIVE | Noted: 2023-07-30

## 2023-07-30 PROBLEM — K92.2 GI BLEED: Status: ACTIVE | Noted: 2023-07-30

## 2023-07-30 PROBLEM — K92.2 GI BLEED: Status: RESOLVED | Noted: 2023-07-30 | Resolved: 2023-07-30

## 2023-07-30 PROBLEM — E86.0 DEHYDRATION: Status: RESOLVED | Noted: 2023-07-30 | Resolved: 2023-07-30

## 2023-07-30 LAB
ABO GROUP BLD: NORMAL
BLD GP AB SCN SERPL QL: NORMAL
HCT VFR BLD AUTO: 32.4 % (ref 42–52)
HGB BLD-MCNC: 10.9 G/DL (ref 14–18)
LIPASE SERPL-CCNC: 20 U/L (ref 11–82)
PATHOLOGY CONSULT NOTE: NORMAL
RH BLD: NORMAL

## 2023-07-30 PROCEDURE — 00731 ANES UPR GI NDSC PX NOS: CPT | Performed by: ANESTHESIOLOGY

## 2023-07-30 PROCEDURE — 160009 HCHG ANES TIME/MIN: Performed by: INTERNAL MEDICINE

## 2023-07-30 PROCEDURE — A9270 NON-COVERED ITEM OR SERVICE: HCPCS | Performed by: INTERNAL MEDICINE

## 2023-07-30 PROCEDURE — 85018 HEMOGLOBIN: CPT

## 2023-07-30 PROCEDURE — 88312 SPECIAL STAINS GROUP 1: CPT

## 2023-07-30 PROCEDURE — C9113 INJ PANTOPRAZOLE SODIUM, VIA: HCPCS | Performed by: INTERNAL MEDICINE

## 2023-07-30 PROCEDURE — 88305 TISSUE EXAM BY PATHOLOGIST: CPT

## 2023-07-30 PROCEDURE — 160035 HCHG PACU - 1ST 60 MINS PHASE I: Performed by: INTERNAL MEDICINE

## 2023-07-30 PROCEDURE — 700117 HCHG RX CONTRAST REV CODE 255: Performed by: INTERNAL MEDICINE

## 2023-07-30 PROCEDURE — 160036 HCHG PACU - EA ADDL 30 MINS PHASE I: Performed by: INTERNAL MEDICINE

## 2023-07-30 PROCEDURE — 700111 HCHG RX REV CODE 636 W/ 250 OVERRIDE (IP): Performed by: INTERNAL MEDICINE

## 2023-07-30 PROCEDURE — 770001 HCHG ROOM/CARE - MED/SURG/GYN PRIV*

## 2023-07-30 PROCEDURE — 700102 HCHG RX REV CODE 250 W/ 637 OVERRIDE(OP): Performed by: INTERNAL MEDICINE

## 2023-07-30 PROCEDURE — 87040 BLOOD CULTURE FOR BACTERIA: CPT

## 2023-07-30 PROCEDURE — 36415 COLL VENOUS BLD VENIPUNCTURE: CPT

## 2023-07-30 PROCEDURE — 700102 HCHG RX REV CODE 250 W/ 637 OVERRIDE(OP): Performed by: NURSE PRACTITIONER

## 2023-07-30 PROCEDURE — 160048 HCHG OR STATISTICAL LEVEL 1-5: Performed by: INTERNAL MEDICINE

## 2023-07-30 PROCEDURE — 700111 HCHG RX REV CODE 636 W/ 250 OVERRIDE (IP): Mod: JZ | Performed by: ANESTHESIOLOGY

## 2023-07-30 PROCEDURE — 160203 HCHG ENDO MINUTES - 1ST 30 MINS LEVEL 4: Performed by: INTERNAL MEDICINE

## 2023-07-30 PROCEDURE — 96376 TX/PRO/DX INJ SAME DRUG ADON: CPT

## 2023-07-30 PROCEDURE — 160002 HCHG RECOVERY MINUTES (STAT): Performed by: INTERNAL MEDICINE

## 2023-07-30 PROCEDURE — 700105 HCHG RX REV CODE 258: Performed by: INTERNAL MEDICINE

## 2023-07-30 PROCEDURE — 85014 HEMATOCRIT: CPT

## 2023-07-30 PROCEDURE — 99223 1ST HOSP IP/OBS HIGH 75: CPT | Performed by: INTERNAL MEDICINE

## 2023-07-30 PROCEDURE — 700102 HCHG RX REV CODE 250 W/ 637 OVERRIDE(OP): Performed by: ANESTHESIOLOGY

## 2023-07-30 PROCEDURE — 700105 HCHG RX REV CODE 258: Mod: JZ | Performed by: INTERNAL MEDICINE

## 2023-07-30 PROCEDURE — 83690 ASSAY OF LIPASE: CPT

## 2023-07-30 PROCEDURE — A9270 NON-COVERED ITEM OR SERVICE: HCPCS | Performed by: NURSE PRACTITIONER

## 2023-07-30 PROCEDURE — A9270 NON-COVERED ITEM OR SERVICE: HCPCS | Performed by: ANESTHESIOLOGY

## 2023-07-30 PROCEDURE — 74177 CT ABD & PELVIS W/CONTRAST: CPT

## 2023-07-30 PROCEDURE — 0DB68ZX EXCISION OF STOMACH, VIA NATURAL OR ARTIFICIAL OPENING ENDOSCOPIC, DIAGNOSTIC: ICD-10-PCS | Performed by: INTERNAL MEDICINE

## 2023-07-30 PROCEDURE — 700111 HCHG RX REV CODE 636 W/ 250 OVERRIDE (IP): Mod: JZ | Performed by: EMERGENCY MEDICINE

## 2023-07-30 RX ORDER — PROMETHAZINE HYDROCHLORIDE 25 MG/1
12.5-25 TABLET ORAL EVERY 4 HOURS PRN
Status: DISCONTINUED | OUTPATIENT
Start: 2023-07-30 | End: 2023-07-30 | Stop reason: HOSPADM

## 2023-07-30 RX ORDER — ONDANSETRON 4 MG/1
4 TABLET, ORALLY DISINTEGRATING ORAL EVERY 4 HOURS PRN
Status: DISCONTINUED | OUTPATIENT
Start: 2023-07-30 | End: 2023-07-30 | Stop reason: HOSPADM

## 2023-07-30 RX ORDER — PROCHLORPERAZINE EDISYLATE 5 MG/ML
5-10 INJECTION INTRAMUSCULAR; INTRAVENOUS EVERY 4 HOURS PRN
Status: DISCONTINUED | OUTPATIENT
Start: 2023-07-30 | End: 2023-07-30 | Stop reason: HOSPADM

## 2023-07-30 RX ORDER — OXYCODONE HYDROCHLORIDE 5 MG/1
5 TABLET ORAL
Status: DISCONTINUED | OUTPATIENT
Start: 2023-07-30 | End: 2023-07-30

## 2023-07-30 RX ORDER — SUCRALFATE ORAL 1 G/10ML
1 SUSPENSION ORAL EVERY 6 HOURS
Qty: 414 ML | Refills: 0 | Status: ON HOLD | OUTPATIENT
Start: 2023-07-31 | End: 2023-09-16 | Stop reason: SDUPTHER

## 2023-07-30 RX ORDER — PANTOPRAZOLE SODIUM 40 MG/10ML
40 INJECTION, POWDER, LYOPHILIZED, FOR SOLUTION INTRAVENOUS 2 TIMES DAILY
Status: DISCONTINUED | OUTPATIENT
Start: 2023-07-30 | End: 2023-07-30

## 2023-07-30 RX ORDER — HYDROMORPHONE HYDROCHLORIDE 1 MG/ML
0.4 INJECTION, SOLUTION INTRAMUSCULAR; INTRAVENOUS; SUBCUTANEOUS
Status: DISCONTINUED | OUTPATIENT
Start: 2023-07-30 | End: 2023-07-30 | Stop reason: HOSPADM

## 2023-07-30 RX ORDER — AMOXICILLIN 250 MG
2 CAPSULE ORAL 2 TIMES DAILY
Status: DISCONTINUED | OUTPATIENT
Start: 2023-07-30 | End: 2023-07-30 | Stop reason: HOSPADM

## 2023-07-30 RX ORDER — LABETALOL HYDROCHLORIDE 5 MG/ML
10 INJECTION, SOLUTION INTRAVENOUS EVERY 4 HOURS PRN
Status: DISCONTINUED | OUTPATIENT
Start: 2023-07-30 | End: 2023-07-30 | Stop reason: HOSPADM

## 2023-07-30 RX ORDER — SODIUM CHLORIDE 9 MG/ML
INJECTION, SOLUTION INTRAVENOUS CONTINUOUS
Status: DISCONTINUED | OUTPATIENT
Start: 2023-07-30 | End: 2023-07-30 | Stop reason: HOSPADM

## 2023-07-30 RX ORDER — HYDROMORPHONE HYDROCHLORIDE 1 MG/ML
1 INJECTION, SOLUTION INTRAMUSCULAR; INTRAVENOUS; SUBCUTANEOUS ONCE
Status: COMPLETED | OUTPATIENT
Start: 2023-07-30 | End: 2023-07-30

## 2023-07-30 RX ORDER — PROMETHAZINE HYDROCHLORIDE 25 MG/1
12.5-25 SUPPOSITORY RECTAL EVERY 4 HOURS PRN
Status: DISCONTINUED | OUTPATIENT
Start: 2023-07-30 | End: 2023-07-30 | Stop reason: HOSPADM

## 2023-07-30 RX ORDER — OXYCODONE HCL 5 MG/5 ML
5 SOLUTION, ORAL ORAL
Status: COMPLETED | OUTPATIENT
Start: 2023-07-30 | End: 2023-07-30

## 2023-07-30 RX ORDER — POLYETHYLENE GLYCOL 3350 17 G/17G
1 POWDER, FOR SOLUTION ORAL
Status: DISCONTINUED | OUTPATIENT
Start: 2023-07-30 | End: 2023-07-30 | Stop reason: HOSPADM

## 2023-07-30 RX ORDER — IPRATROPIUM BROMIDE AND ALBUTEROL SULFATE 2.5; .5 MG/3ML; MG/3ML
3 SOLUTION RESPIRATORY (INHALATION)
Status: DISCONTINUED | OUTPATIENT
Start: 2023-07-30 | End: 2023-07-30 | Stop reason: HOSPADM

## 2023-07-30 RX ORDER — MEPERIDINE HYDROCHLORIDE 25 MG/ML
12.5 INJECTION INTRAMUSCULAR; INTRAVENOUS; SUBCUTANEOUS
Status: DISCONTINUED | OUTPATIENT
Start: 2023-07-30 | End: 2023-07-30 | Stop reason: HOSPADM

## 2023-07-30 RX ORDER — HALOPERIDOL 5 MG/ML
1 INJECTION INTRAMUSCULAR
Status: DISCONTINUED | OUTPATIENT
Start: 2023-07-30 | End: 2023-07-30 | Stop reason: HOSPADM

## 2023-07-30 RX ORDER — OXYCODONE HCL 5 MG/5 ML
10 SOLUTION, ORAL ORAL
Status: COMPLETED | OUTPATIENT
Start: 2023-07-30 | End: 2023-07-30

## 2023-07-30 RX ORDER — OMEPRAZOLE 20 MG/1
40 CAPSULE, DELAYED RELEASE ORAL 2 TIMES DAILY
Status: DISCONTINUED | OUTPATIENT
Start: 2023-07-30 | End: 2023-07-30 | Stop reason: HOSPADM

## 2023-07-30 RX ORDER — OXYCODONE HYDROCHLORIDE 10 MG/1
10 TABLET ORAL
Status: DISCONTINUED | OUTPATIENT
Start: 2023-07-30 | End: 2023-07-30

## 2023-07-30 RX ORDER — HYDROMORPHONE HYDROCHLORIDE 1 MG/ML
0.5 INJECTION, SOLUTION INTRAMUSCULAR; INTRAVENOUS; SUBCUTANEOUS
Status: DISCONTINUED | OUTPATIENT
Start: 2023-07-30 | End: 2023-07-30

## 2023-07-30 RX ORDER — BISACODYL 10 MG
10 SUPPOSITORY, RECTAL RECTAL
Status: DISCONTINUED | OUTPATIENT
Start: 2023-07-30 | End: 2023-07-30 | Stop reason: HOSPADM

## 2023-07-30 RX ORDER — ONDANSETRON 2 MG/ML
4 INJECTION INTRAMUSCULAR; INTRAVENOUS EVERY 4 HOURS PRN
Status: DISCONTINUED | OUTPATIENT
Start: 2023-07-30 | End: 2023-07-30 | Stop reason: HOSPADM

## 2023-07-30 RX ORDER — METRONIDAZOLE 500 MG/100ML
INJECTION, SOLUTION INTRAVENOUS
Status: DISCONTINUED
Start: 2023-07-30 | End: 2023-07-30

## 2023-07-30 RX ORDER — ACETAMINOPHEN 325 MG/1
650 TABLET ORAL EVERY 6 HOURS PRN
Status: DISCONTINUED | OUTPATIENT
Start: 2023-07-30 | End: 2023-07-30 | Stop reason: HOSPADM

## 2023-07-30 RX ORDER — HYDROMORPHONE HYDROCHLORIDE 1 MG/ML
1 INJECTION, SOLUTION INTRAMUSCULAR; INTRAVENOUS; SUBCUTANEOUS
Status: DISCONTINUED | OUTPATIENT
Start: 2023-07-30 | End: 2023-07-30 | Stop reason: HOSPADM

## 2023-07-30 RX ORDER — METRONIDAZOLE 500 MG/100ML
500 INJECTION, SOLUTION INTRAVENOUS EVERY 12 HOURS
Status: DISCONTINUED | OUTPATIENT
Start: 2023-07-30 | End: 2023-07-30

## 2023-07-30 RX ORDER — ONDANSETRON 2 MG/ML
4 INJECTION INTRAMUSCULAR; INTRAVENOUS
Status: DISCONTINUED | OUTPATIENT
Start: 2023-07-30 | End: 2023-07-30 | Stop reason: HOSPADM

## 2023-07-30 RX ORDER — SODIUM CHLORIDE, SODIUM LACTATE, POTASSIUM CHLORIDE, CALCIUM CHLORIDE 600; 310; 30; 20 MG/100ML; MG/100ML; MG/100ML; MG/100ML
INJECTION, SOLUTION INTRAVENOUS CONTINUOUS
Status: ACTIVE | OUTPATIENT
Start: 2023-07-30 | End: 2023-07-30

## 2023-07-30 RX ORDER — MIDAZOLAM HYDROCHLORIDE 1 MG/ML
1 INJECTION INTRAMUSCULAR; INTRAVENOUS
Status: DISCONTINUED | OUTPATIENT
Start: 2023-07-30 | End: 2023-07-30 | Stop reason: HOSPADM

## 2023-07-30 RX ORDER — OMEPRAZOLE 40 MG/1
40 CAPSULE, DELAYED RELEASE ORAL 2 TIMES DAILY
Qty: 30 CAPSULE | Refills: 0 | Status: ON HOLD | OUTPATIENT
Start: 2023-07-30 | End: 2023-09-16

## 2023-07-30 RX ORDER — CEFTRIAXONE 1 G/1
INJECTION, POWDER, FOR SOLUTION INTRAMUSCULAR; INTRAVENOUS
Status: DISCONTINUED
Start: 2023-07-30 | End: 2023-07-30

## 2023-07-30 RX ORDER — SODIUM CHLORIDE, SODIUM GLUCONATE, SODIUM ACETATE, POTASSIUM CHLORIDE AND MAGNESIUM CHLORIDE 526; 502; 368; 37; 30 MG/100ML; MG/100ML; MG/100ML; MG/100ML; MG/100ML
500 INJECTION, SOLUTION INTRAVENOUS CONTINUOUS
Status: DISCONTINUED | OUTPATIENT
Start: 2023-07-30 | End: 2023-07-30 | Stop reason: HOSPADM

## 2023-07-30 RX ORDER — DIPHENHYDRAMINE HYDROCHLORIDE 50 MG/ML
12.5 INJECTION INTRAMUSCULAR; INTRAVENOUS
Status: DISCONTINUED | OUTPATIENT
Start: 2023-07-30 | End: 2023-07-30 | Stop reason: HOSPADM

## 2023-07-30 RX ORDER — SUCRALFATE ORAL 1 G/10ML
1 SUSPENSION ORAL EVERY 6 HOURS
Status: DISCONTINUED | OUTPATIENT
Start: 2023-07-30 | End: 2023-07-30 | Stop reason: HOSPADM

## 2023-07-30 RX ORDER — HYDROMORPHONE HYDROCHLORIDE 1 MG/ML
0.2 INJECTION, SOLUTION INTRAMUSCULAR; INTRAVENOUS; SUBCUTANEOUS
Status: DISCONTINUED | OUTPATIENT
Start: 2023-07-30 | End: 2023-07-30 | Stop reason: HOSPADM

## 2023-07-30 RX ADMIN — LIDOCAINE HYDROCHLORIDE 15 ML: 20 SOLUTION OROPHARYNGEAL at 05:16

## 2023-07-30 RX ADMIN — PANTOPRAZOLE SODIUM 40 MG: 40 INJECTION, POWDER, LYOPHILIZED, FOR SOLUTION INTRAVENOUS at 17:14

## 2023-07-30 RX ADMIN — OMEPRAZOLE 40 MG: 20 CAPSULE, DELAYED RELEASE ORAL at 18:27

## 2023-07-30 RX ADMIN — SODIUM CHLORIDE, POTASSIUM CHLORIDE, SODIUM LACTATE AND CALCIUM CHLORIDE: 600; 310; 30; 20 INJECTION, SOLUTION INTRAVENOUS at 13:26

## 2023-07-30 RX ADMIN — OXYCODONE HYDROCHLORIDE 10 MG: 10 TABLET ORAL at 01:17

## 2023-07-30 RX ADMIN — SODIUM CHLORIDE: 9 INJECTION, SOLUTION INTRAVENOUS at 09:50

## 2023-07-30 RX ADMIN — HYDROMORPHONE HYDROCHLORIDE 1 MG: 1 INJECTION, SOLUTION INTRAMUSCULAR; INTRAVENOUS; SUBCUTANEOUS at 14:04

## 2023-07-30 RX ADMIN — LIDOCAINE HYDROCHLORIDE 15 ML: 20 SOLUTION OROPHARYNGEAL at 01:11

## 2023-07-30 RX ADMIN — SUCRALFATE 1 G: 1 SUSPENSION ORAL at 01:15

## 2023-07-30 RX ADMIN — SUCRALFATE 1 G: 1 SUSPENSION ORAL at 17:13

## 2023-07-30 RX ADMIN — OXYCODONE HYDROCHLORIDE 10 MG: 10 TABLET ORAL at 08:20

## 2023-07-30 RX ADMIN — OXYCODONE HYDROCHLORIDE 10 MG: 5 SOLUTION ORAL at 14:19

## 2023-07-30 RX ADMIN — IOHEXOL 100 ML: 350 INJECTION, SOLUTION INTRAVENOUS at 09:07

## 2023-07-30 RX ADMIN — HYDROMORPHONE HYDROCHLORIDE 0.5 MG: 1 INJECTION, SOLUTION INTRAMUSCULAR; INTRAVENOUS; SUBCUTANEOUS at 06:08

## 2023-07-30 RX ADMIN — OXYCODONE HYDROCHLORIDE 10 MG: 10 TABLET ORAL at 04:44

## 2023-07-30 RX ADMIN — SUCRALFATE 1 G: 1 SUSPENSION ORAL at 05:16

## 2023-07-30 RX ADMIN — PANTOPRAZOLE SODIUM 40 MG: 40 INJECTION, POWDER, LYOPHILIZED, FOR SOLUTION INTRAVENOUS at 05:15

## 2023-07-30 RX ADMIN — SODIUM CHLORIDE: 9 INJECTION, SOLUTION INTRAVENOUS at 01:15

## 2023-07-30 RX ADMIN — HYDROMORPHONE HYDROCHLORIDE 0.5 MG: 1 INJECTION, SOLUTION INTRAMUSCULAR; INTRAVENOUS; SUBCUTANEOUS at 09:52

## 2023-07-30 RX ADMIN — HYDROMORPHONE HYDROCHLORIDE 1 MG: 1 INJECTION, SOLUTION INTRAMUSCULAR; INTRAVENOUS; SUBCUTANEOUS at 00:12

## 2023-07-30 RX ADMIN — HYDROMORPHONE HYDROCHLORIDE 0.5 MG: 1 INJECTION, SOLUTION INTRAMUSCULAR; INTRAVENOUS; SUBCUTANEOUS at 02:21

## 2023-07-30 ASSESSMENT — ENCOUNTER SYMPTOMS
FEVER: 0
LOSS OF CONSCIOUSNESS: 0
WEAKNESS: 1
VOMITING: 1
PALPITATIONS: 0
EYES NEGATIVE: 1
MUSCULOSKELETAL NEGATIVE: 1
MYALGIAS: 0
STRIDOR: 0
SHORTNESS OF BREATH: 0
DIZZINESS: 0
RESPIRATORY NEGATIVE: 1
CHILLS: 0
DEPRESSION: 0
DIARRHEA: 0
WEAKNESS: 0
ABDOMINAL PAIN: 1
COUGH: 0
CONSTIPATION: 0
SPUTUM PRODUCTION: 0
CARDIOVASCULAR NEGATIVE: 1
FALLS: 0
NAUSEA: 1
HEADACHES: 0
PSYCHIATRIC NEGATIVE: 1
TINGLING: 0

## 2023-07-30 ASSESSMENT — COGNITIVE AND FUNCTIONAL STATUS - GENERAL
SUGGESTED CMS G CODE MODIFIER DAILY ACTIVITY: CH
MOBILITY SCORE: 24
SUGGESTED CMS G CODE MODIFIER MOBILITY: CH
DAILY ACTIVITIY SCORE: 24

## 2023-07-30 ASSESSMENT — PAIN DESCRIPTION - PAIN TYPE
TYPE: SURGICAL PAIN
TYPE: SURGICAL PAIN
TYPE: ACUTE PAIN
TYPE: SURGICAL PAIN
TYPE: SURGICAL PAIN
TYPE: ACUTE PAIN
TYPE: ACUTE PAIN;CHRONIC PAIN

## 2023-07-30 ASSESSMENT — PATIENT HEALTH QUESTIONNAIRE - PHQ9
1. LITTLE INTEREST OR PLEASURE IN DOING THINGS: NOT AT ALL
SUM OF ALL RESPONSES TO PHQ9 QUESTIONS 1 AND 2: 0
2. FEELING DOWN, DEPRESSED, IRRITABLE, OR HOPELESS: NOT AT ALL

## 2023-07-30 ASSESSMENT — LIFESTYLE VARIABLES
TOTAL SCORE: 0
TOTAL SCORE: 0
HOW MANY TIMES IN THE PAST YEAR HAVE YOU HAD 5 OR MORE DRINKS IN A DAY: 0
CONSUMPTION TOTAL: NEGATIVE
ALCOHOL_USE: YES
ON A TYPICAL DAY WHEN YOU DRINK ALCOHOL HOW MANY DRINKS DO YOU HAVE: 1
HAVE PEOPLE ANNOYED YOU BY CRITICIZING YOUR DRINKING: NO
HAVE YOU EVER FELT YOU SHOULD CUT DOWN ON YOUR DRINKING: NO
AVERAGE NUMBER OF DAYS PER WEEK YOU HAVE A DRINK CONTAINING ALCOHOL: 1
EVER FELT BAD OR GUILTY ABOUT YOUR DRINKING: NO
EVER HAD A DRINK FIRST THING IN THE MORNING TO STEADY YOUR NERVES TO GET RID OF A HANGOVER: NO
TOTAL SCORE: 0

## 2023-07-30 ASSESSMENT — FIBROSIS 4 INDEX
FIB4 SCORE: 0.46

## 2023-07-30 ASSESSMENT — PAIN SCALES - GENERAL: PAIN_LEVEL: 9

## 2023-07-30 NOTE — OR NURSING
"1355 - Report received from Cristobal TY.  Pt resting VSS.    1400 -Pt rating his pain 10/10.  Medication given, no nausea, VSS    1415 - Pt still complaining of 10/10 pain, Dr Sandoval contacted r/t pain medication options.  Medication given, no nausea, VSS    1430 - PT rating pain 9/10, \"a little better\".  Pain scale and expectations discussed, pt verbalizes understanding, no nausea, VSS    1445 - Pt resting comfortably, VSS    1455 - Pt rating pain 7/10 - tolerable, no nausea, VSS.  PT meets criteria for transfer to ICU, report called to Ana TY    1500 - Pt transferred to ICU via [facility] on O2 and monitor.  O2 > 1/2 full                "

## 2023-07-30 NOTE — PROGRESS NOTES
Telephone report received from KARSON Salcido. Pt arrived to unit via gurney.  Tele monitor applied, vitals taken. Pt assessed. A&Ox4. Admit profile and med rec completed. Discussed POC with pt, including monitoring, trending lab levels and pertinent tests. Welcome folder provided and discussed. Communication board filled out. Questions and concerns addressed - patient verbalized understanding. Fall precautions in place. Pt demonstrates ability to use call light appropriately. Pt left in lowest position. Bed locked and low.

## 2023-07-30 NOTE — ED TRIAGE NOTES
"Chief Complaint   Patient presents with    Lower GI Bleed    Abdominal Pain     42 yo male ambulates to triage with black tarry stools today x 5 and 3 bloody emesis today.  Patient reports epigastric abdominal pain.  Denies fever.  HX of the same     /87   Pulse (!) 137   Temp 36.6 °C (97.9 °F) (Temporal)   Resp 18   Ht 1.905 m (6' 3\")   Wt 103 kg (226 lb 3.1 oz)   SpO2 98%   BMI 28.27 kg/m²    "

## 2023-07-30 NOTE — ASSESSMENT & PLAN NOTE
Significant bleeding per patient, coffee-ground emesis on multiple occasions as well has a significant melena  Multiple possible causes, patient does have a history of duodenal ulcers as well as erosive gastritis/duodenitis  He does not have a history of significant alcohol intake per patient, did have a normal liver on CT scan in May, I do not feel there is a high risk of variceal bleeding, no need for antibiotic or octreotide drip at this time  Start twice daily Protonix  Start Carafate  Start GI cocktail  ERP did discuss the case with GI, keep patient n.p.o. and await additional recommendations

## 2023-07-30 NOTE — ANESTHESIA TIME REPORT
Anesthesia Start and Stop Event Times     Date Time Event    7/30/2023 1312 Ready for Procedure     1326 Anesthesia Start     1344 Anesthesia Stop        Responsible Staff  07/30/23    Name Role Begin End    Torito Sandoval III, M.D. Anesth 1326 1344        Overtime Reason:  no overtime (within assigned shift)    Comments:

## 2023-07-30 NOTE — OR SURGEON
Immediate Post OP Note    PreOp Diagnosis: Melena, hematemesis and anemia      PostOp Diagnosis: Gastric ulcers and duodenal bulb ulcers. No stigmata      Procedure(s):  GASTROSCOPY - Wound Class: None    Surgeon(s):  Kumar Hope M.D.    Anesthesiologist/Type of Anesthesia:  Anesthesiologist: Torito Sandoval III, M.D./General    Surgical Staff:  Circulator: Olga Gunter R.N.  Endoscopy Technician: Hallie Alberts    Specimens removed if any:  ID Type Source Tests Collected by Time Destination   A : rule out h. pylori Tissue Gastric PATHOLOGY SPECIMEN Kumar Hope M.D. 7/30/2023  1:33 PM        Estimated Blood Loss: minimal    Findings: 1) A few small antral gastric ulcers and a single duodenal bulb ulcer.     Complications: none    Will advance diet to fulls and if tolerated tomorrow can advance to regular. NO NSAIDS!!!. I will send him a result letter with the biopsy results. We will sign off but feel free to call with any questions or concerns.     EMO        7/30/2023 1:39 PM Kumar Hope M.D.

## 2023-07-30 NOTE — PROCEDURES
DATE OF PROCEDURE:  07/30/2023     PROCEDURE:  EGD with biopsy report.     ENDOSCOPIST:  Kumar Hope MD     INDICATIONS:  Melena, anemia and hematemesis.     MEDICATIONS:  The patient received monitored anesthesia care.  Please see   anesthesia flow sheets by Dr. Sandoval for details.     DESCRIPTION OF PROCEDURE:  The patient was explained in detail the indications   as well as the risks, benefits and alternatives of the procedure and he   indicates understanding of all this and agrees to proceed.  Consent is signed   and placed on the chart.  After the patient is deemed adequately sedated, a   standard Olympus flexible gastroscope was lubricated and gently placed through   a bite block over the top of his tongue down into his esophagus.  From here,   the scope was carefully maneuvered through the esophagus into the stomach and   any residual fluid was thoroughly suctioned.  Scope was then traversed through   the body of the stomach, through the pylorus and the duodenum and farthest   reach of the endoscope was second portion of the duodenum.  Air was   insufflated.  Photographs obtained.     The scope was then slowly and carefully withdrawn looking mucosa in a   circumferential methodic manner throughout the rest of the procedure.  It was   noted in the duodenal bulb that there is evidence of a single shallow,   somewhat acute appearing ulcer without high risk stigmata of recent bleeding.    There is no active bleeding or evidence of recent bleeding.  There is   duodenitis.  The scope was withdrawn back into the stomach whereby careful   inspection revealed evidence of a few small and also shallow gastric ulcers in   the antrum with gastritis but no active bleeding, no high risk stigmata or   other concerns.  Biopsies were obtained to rule out H. pylori as a cause of   these ulcers.  Retroflexion was performed.  No additional findings were noted.    Air was suctioned out of the stomach and scope withdrawn back  into the   esophagus whereby a careful inspection revealed no evidence of any   abnormalities.  Scope was then withdrawn and the procedure terminated.     FINDINGS:  1.  Duodenal bulb ulcer.  2.  Small gastric ulcers.     COMPLICATIONS:  None.     TISSUE/BIOPSIES:  Gastric biopsies to rule out H. pylori.     THERAPY:  None.     COMPLICATIONS:  None.     IMPRESSION PLAN MEDICAL DECISION MAKIN.  Melena.  So certainly have to suspect that the cause of the melena was   indeed this peptic disease noted as above.  Cause of this is not completely   clear.  He has been tested negative for H. pylori in the past including a   little over a year ago, I believe, and unclear if he was taking NSAIDs at this   point.  At any rate, I would advise avoiding all NSAIDs.  I will send him a   letter with the results of these biopsies and of course, if he is positive for   H. pylori, would proceed with treatment at that time. I will advance his diet   to full liquids today and he can certainly advance further from there, if he   continues to otherwise do well.  If he continues to complain of abdominal   pain, which we assume would be related to this peptic disease, can start   Carafate.  I will send him result with the biopsies and he can follow up with   primary care from here.  We will sign off.        ______________________________  MD ELIZA CAMPO/LY    DD:  2023 13:48  DT:  2023 14:03    Job#:  [id number]

## 2023-07-30 NOTE — CONSULTS
"Critical Care Consult    Date of Admission: 7/29/23  Date of Consult: 7/30/23  Consulting: Dr. Cesar Sutton  Reason for consult: GIB    Chief Complaint:  Chief Complaint   Patient presents with    Lower GI Bleed    Abdominal Pain     42 yo male ambulates to triage with black tarry stools today x 5 and 3 bloody emesis today.  Patient reports epigastric abdominal pain.  Denies fever.  HX of the same      HPI:   From Dr. Morin's note: \"Roberto Braswell is a 43 y.o. male who presented 7/29/2023 with abdominal pain and cough urinalysis.  Patient states his symptoms started tonight, states they will start it once.  Patient did have significant nausea vomiting, noted coffee-ground emesis initially.  Patient states he also had severe epigastric pain that was sharp.  He states he also noted dark tarry stool.  Patient does have a history of bleeding duodenal ulcers requiring intervention, this was at [facility].  Patient presented to [facility] last March, did have a EGD again however it showed only gastritis and duodenitis, no ulcers.  Patient was placed on PPI and Carafate at that time, he has not followed up with GI or PCP since.  He denies NSAID use, denies any significant alcohol use.  I did discuss the case including labs with the ER physician.\"      ICU upgrade requested after Hgb 13.6 -> 10.9 overnight.  Patient went from being hypertensive at 160/85 to low/normal at 106/72.   General surgery and GI consulted by the hospitalist.  CT abdomen negative.     On my exam and interview, the patient is awake and alert and oriented x4.  He is hemodynamically stable.  He does have tenderness over the epigastrium.  He states he has had a gastric ulcer before and that this feels slightly more intense than last time.  He had pictures on his phone of recent coffee-ground emesis and tarry stool.  Last bowel movement was 11 PM.    Past Medical History:   Diagnosis Date    Arthritis     Right ankle    Gastric ulcer     Pain  "    Right ankle       Past Surgical History:   Procedure Laterality Date    IL UPPER GI ENDOSCOPY,DIAGNOSIS  3/28/2022    Procedure: GASTROSCOPY;  Surgeon: Paco Wiggins M.D.;  Location: SURGERY SAME DAY [facility];  Service: Gastroenterology    IL UPPER GI ENDOSCOPY,BIOPSY  3/28/2022    Procedure: GASTROSCOPY, WITH BIOPSY;  Surgeon: Paco Wiggins M.D.;  Location: SURGERY SAME DAY [facility];  Service: Gastroenterology    ANKLE ARTHROSCOPY Right 5/21/2018    Procedure: ANKLE ARTHROSCOPY, LATERAL LIGAMENT RECONSTRUCTION;  Surgeon: Seth Cruz M.D.;  Location: SURGERY [facility];  Service: Orthopedics    BIOPSY ORTHO Right 5/21/2018    Procedure: BIOPSY ORTHO/ FOR CARTILAGE AND DENOVO PROCEDURE;  Surgeon: Seth Cruz M.D.;  Location: SURGERY [facility];  Service: Orthopedics    OTHER ABDOMINAL SURGERY      Cholecystectomy February 2017    OTHER ORTHOPEDIC SURGERY      2 previous ankle surgeries (2007, 2009)       Social History     Socioeconomic History    Marital status:      Spouse name: Not on file    Number of children: Not on file    Years of education: Not on file    Highest education level: 12th grade   Occupational History    Not on file   Tobacco Use    Smoking status: Never    Smokeless tobacco: Never   Vaping Use    Vaping Use: Never used   Substance and Sexual Activity    Alcohol use: Yes     Comment: occ    Drug use: Never    Sexual activity: Not on file   Other Topics Concern    Not on file   Social History Narrative    Not on file     Social Determinants of Health     Financial Resource Strain: Low Risk  (3/28/2022)    Overall Financial Resource Strain (CARDIA)     Difficulty of Paying Living Expenses: Not hard at all   Food Insecurity: No Food Insecurity (3/28/2022)    Hunger Vital Sign     Worried About Running Out of Food in the Last Year: Never true     Ran Out of Food in the Last Year: Never true   Transportation Needs: No Transportation Needs (3/28/2022)    PRAPARE -  "Transportation     Lack of Transportation (Medical): No     Lack of Transportation (Non-Medical): No   Physical Activity: Sufficiently Active (3/28/2022)    Exercise Vital Sign     Days of Exercise per Week: 5 days     Minutes of Exercise per Session: 150+ min   Stress: No Stress Concern Present (3/28/2022)    [place] [employer] - Occupational Stress Questionnaire     Feeling of Stress : Not at all   Social Connections: Unknown (3/28/2022)    Social Connection and Isolation Panel [NHANES]     Frequency of Communication with Friends and Family: Patient refused     Frequency of Social Gatherings with Friends and Family: Patient refused     Attends Mandaeism Services: Never     Active Member of Clubs or Organizations: No     Attends Club or Organization Meetings: Never     Marital Status:    Intimate Partner Violence: Not on file   Housing Stability: Low Risk  (3/28/2022)    Housing Stability Vital Sign     Unable to Pay for Housing in the Last Year: No     Number of Places Lived in the Last Year: 1     Unstable Housing in the Last Year: No          History reviewed. No pertinent family history.    No current facility-administered medications on file prior to encounter.     Current Outpatient Medications on File Prior to Encounter   Medication Sig Dispense Refill    ASPIRIN-ACETAMINOPHEN-CAFFEINE PO Take 1 Tablet by mouth every 6 hours as needed for Headache.         Allergies: Tramadol and Morphine      ROS: A 12 point ROS was performed on intake and during my interview. ROS negative unless specifically noted in HPI.     Vitals:  /72   Pulse 81   Temp 36.8 °C (98.2 °F) (Oral)   Resp 18   Ht 1.93 m (6' 4\")   Wt 104 kg (229 lb 0.9 oz)   SpO2 99%     Physical Exam:  Physical Exam  Vitals reviewed. Exam conducted with a chaperone present.   Constitutional:       General: He is not in acute distress.     Appearance: He is not toxic-appearing or diaphoretic.   Eyes:      General:    "      Right eye: No discharge.         Left eye: No discharge.      Conjunctiva/sclera: Conjunctivae normal.      Pupils: Pupils are equal, round, and reactive to light.   Cardiovascular:      Rate and Rhythm: Normal rate.   Pulmonary:      Effort: Pulmonary effort is normal.      Breath sounds: Normal breath sounds.   Abdominal:      General: There is no distension.      Palpations: Abdomen is soft.      Tenderness: There is abdominal tenderness.   Musculoskeletal:      Right lower leg: No edema.      Left lower leg: No edema.   Skin:     General: Skin is warm.      Capillary Refill: Capillary refill takes less than 2 seconds.      Coloration: Skin is not jaundiced.   Neurological:      General: No focal deficit present.      Mental Status: He is alert and oriented to person, place, and time.   Psychiatric:         Mood and Affect: Mood is anxious.         Thought Content: Thought content normal.       Laboratory Data: I personally reviewed labs including historical lab trends.     There is no immunization history on file for this patient.    Imaging as reviewed by me personally show:      Assessment/Plan:    Pulmonary problem list:  #GIB - possible gastric ulcer vs. Gastritis   #ETOH use - no reported history of abuse; no documentation of cirrhosis   #Dehydration     CT negative.  No coagulopathy or cirrhosis identified.   GI and general surgery consulted by hospitalist.  Maintain 2 Ivs at all time; ideally at least 18 gauge  Type and cross sent by hospitalist  Q6h H and H  On bid IV PPI  Lipase negative    Upgraded to ICU for closer monitoring.     Total consult time: 50 minutes which included time spent on chart review, personally reviewing pertinent images and labs, time spent counseling and educating the patient and/or family members, and coordinating care with the healthcare team to include consultants.   __________  Barber Bruce, DO  Pulmonary and Critical Care Medicine  [facility]    Please note that  this dictation was created using voice recognition software. The accuracy of the dictation is limited to the abilities of the software. I have made every reasonable attempt to correct obvious errors, but I expect that there are errors of grammar and possibly content that I did not discover before finalizing the note.

## 2023-07-30 NOTE — ED NOTES
Protonix bolus complete. Pt reports they are still in 10/10 pain. Alert and oriented, answering questions appropriately.

## 2023-07-30 NOTE — PROGRESS NOTES
Telemetry Shift Summary     Rhythm SR, PSVT up to 166  HR Range 83-97  Ectopy r-oPVC, rPAC, rBigem  Measurements  0.16/0.08/0.32           Normal Values  Rhythm SR  HR Range    Measurements 0.12-0.20 / 0.06-0.10  / 0.30-0.52

## 2023-07-30 NOTE — PROGRESS NOTES
12-hour chart check complete.    Monitor Summary  Rhythm: SR/SB  Rate: 40's-60's  Ectopy: occ PVCs  Measurements: 0.14/0.08/0.36

## 2023-07-30 NOTE — PROGRESS NOTES
Patient admitted earlier by Dr. Cheung.  For further details please review his H&P.    Patient seen at bedside.  Patient complaining of abdominal pain.  We are pending repeat hemoglobin.  Initially DHA from GI was consulted, however it seems that the patient is a gastroenterology consultants patient.  I have consulted GI see, we appreciate further recommendations.    Patient currently on PPI IV twice daily as well as Carafate.    There is risk of decompensation, will monitor hemoglobin every 6 hours and transfuse as needed.

## 2023-07-30 NOTE — CONSULTS
Gastroenterology Consult Note:    SHAJI Garcia  Date & Time note created:    7/30/2023   10:40 AM     Referring MD:  Dr. Cesar Rea    Patient ID:  Name:             Roberto Braswell     YOB: 1980  Age:                 43 y.o.  male   MRN:               [id number]                                                             Reason for Consult:      Melena  Coffee ground emesis  Epigastric pain  Hx of gastric and duodenal bleeding ulcers      History of Present Illness:    This is a 42 yo male pMH gastric and duodenal ulcers (2019)who was seen in consultation for a sudden onset of epigastric abdominal pain, multiple episodes of melena and coffee ground emesis starting yesterday. Intermittent episodes of NSAID use. Denies alcohol use but alcohol level was 17 on ER labs. VS: Tachycardic. Hgb: 13.6-->10.9. BUN: 53. CT scan abdomen: unremarkable. No signs of perforation.    Hx of gastric ulcers and duodenal ulcers;   EGD 2019 performed by Dr. Bourgeois: Gastric ulcers, small, clean base. Three duodenal  ulcers; 1 was actively bleeding treated with epi and hemoclip.     EGD 2022 performed by Dr. Wiggins: erosive gastritis mild severity. Bulbar erythematous duodenitis. Biopsy: negative HP>    Review of Systems:      Review of Systems   Constitutional:  Positive for malaise/fatigue.   HENT: Negative.     Eyes: Negative.    Respiratory: Negative.     Cardiovascular: Negative.    Gastrointestinal:  Positive for abdominal pain, melena, nausea and vomiting.   Genitourinary: Negative.    Musculoskeletal: Negative.    Skin: Negative.    Neurological:  Positive for weakness.   Endo/Heme/Allergies: Negative.    Psychiatric/Behavioral: Negative.               Physical Exam:  Vitals/ General Appearance:   Weight/BMI: Body mass index is 27.69 kg/m².    Vitals:    07/30/23 0054 07/30/23 0440 07/30/23 0745 07/30/23 0951   BP: 131/73 127/89 106/72 123/79   Pulse: (!) 109 78 81 83   Resp: (!) 24 19 18 (!)  "22   Temp: 36.8 °C (98.2 °F) 36.6 °C (97.9 °F) 36.8 °C (98.2 °F) 36.6 °C (97.8 °F)   TempSrc: Oral Oral Oral Temporal   SpO2: 96% 98% 99% 98%   Weight: 110 kg (241 lb 6.5 oz) 104 kg (229 lb 0.9 oz)  103 kg (227 lb 8.2 oz)   Height: 1.93 m (6' 4\")   1.93 m (6' 4\")     Oxygen Therapy:  Pulse Oximetry: 98 %, O2 Delivery Device: Room air w/o2 available    Physical Exam  Vitals reviewed.   Constitutional:       Appearance: He is ill-appearing.   HENT:      Head: Normocephalic and atraumatic.      Mouth/Throat:      Mouth: Mucous membranes are dry.   Eyes:      Extraocular Movements: Extraocular movements intact.      Pupils: Pupils are equal, round, and reactive to light.   Cardiovascular:      Rate and Rhythm: Regular rhythm. Tachycardia present.      Pulses: Normal pulses.      Heart sounds: Normal heart sounds.   Pulmonary:      Effort: Pulmonary effort is normal.      Breath sounds: Normal breath sounds.   Abdominal:      General: Bowel sounds are normal.      Palpations: Abdomen is soft.      Tenderness: There is abdominal tenderness (Mainly epigastric tenderness but also generalized).   Musculoskeletal:         General: Normal range of motion.      Cervical back: Normal range of motion and neck supple.   Skin:     General: Skin is warm and dry.      Capillary Refill: Capillary refill takes 2 to 3 seconds.      Coloration: Skin is pale.   Neurological:      General: No focal deficit present.      Mental Status: He is alert and oriented to person, place, and time.   Psychiatric:         Mood and Affect: Mood normal.         Behavior: Behavior normal.         Thought Content: Thought content normal.         Judgment: Judgment normal.         Past Medical History:   Past Medical History:   Diagnosis Date    Arthritis     Right ankle    Gastric ulcer     Pain     Right ankle       Past Surgical History:  Past Surgical History:   Procedure Laterality Date    CO UPPER GI ENDOSCOPY,DIAGNOSIS  3/28/2022    Procedure: " GASTROSCOPY;  Surgeon: Paco Wiggins M.D.;  Location: SURGERY SAME DAY [facility];  Service: Gastroenterology    [place] UPPER GI ENDOSCOPY,BIOPSY  3/28/2022    Procedure: GASTROSCOPY, WITH BIOPSY;  Surgeon: Paco Wiggins M.D.;  Location: SURGERY SAME DAY [facility];  Service: Gastroenterology    ANKLE ARTHROSCOPY Right 5/21/2018    Procedure: ANKLE ARTHROSCOPY, LATERAL LIGAMENT RECONSTRUCTION;  Surgeon: Seth Cruz M.D.;  Location: SURGERY [facility];  Service: Orthopedics    BIOPSY ORTHO Right 5/21/2018    Procedure: BIOPSY ORTHO/ FOR CARTILAGE AND DENOVO PROCEDURE;  Surgeon: Seth Cruz M.D.;  Location: SURGERY [facility];  Service: Orthopedics    OTHER ABDOMINAL SURGERY      Cholecystectomy February 2017    OTHER ORTHOPEDIC SURGERY      2 previous ankle surgeries (2007, 2009)       Hospital Medications:    Current Facility-Administered Medications:     senna-docusate (Pericolace Or Senokot S) 8.6-50 MG per tablet 2 Tablet, 2 Tablet, Oral, BID **AND** polyethylene glycol/lytes (Miralax) PACKET 1 Packet, 1 Packet, Oral, QDAY PRN **AND** magnesium hydroxide (Milk Of Magnesia) suspension 30 mL, 30 mL, Oral, QDAY PRN **AND** bisacodyl (Dulcolax) suppository 10 mg, 10 mg, Rectal, QDAY PRN, Pancho Cheung D.O.    NS infusion, , Intravenous, Continuous, Pancho Cheung D.O., Last Rate: 125 mL/hr at 07/30/23 0950, New Bag at 07/30/23 0950    acetaminophen (Tylenol) tablet 650 mg, 650 mg, Oral, Q6HRS PRN, BIN Clayton.O.    Notify provider if pain remains uncontrolled, , , CONTINUOUS **AND** Use the Numeric Rating Scale (NRS), Lugo-Baker Faces (WBF), or FLACC on regular floors and Critical-Care Pain Observation Tool (CPOT) on ICUs/Trauma to assess pain, , , CONTINUOUS **AND** Pulse Ox, , , CONTINUOUS **AND** Pharmacy Consult Request ...Pain Management Review 1 Each, 1 Each, Other, PHARMACY TO DOSE **AND** If patient difficult to arouse and/or has respiratory depression (respiratory rate of 10 or  less), stop any opiates that are currently infusing and call a Rapid Response., , , CONTINUOUS, Pancho Cheung D.O.    oxyCODONE immediate-release (Roxicodone) tablet 5 mg, 5 mg, Oral, Q3HRS PRN **OR** oxyCODONE immediate release (Roxicodone) tablet 10 mg, 10 mg, Oral, Q3HRS PRN, 10 mg at 07/30/23 0820 **OR** HYDROmorphone (Dilaudid) injection 0.5 mg, 0.5 mg, Intravenous, Q3HRS PRN, MERVIN ClaytonO., 0.5 mg at 07/30/23 0952    labetalol (Normodyne/Trandate) injection 10 mg, 10 mg, Intravenous, Q4HRS PRN, Pancho Cheung D.O.    ondansetron (Zofran) syringe/vial injection 4 mg, 4 mg, Intravenous, Q4HRS PRN, Pancho Cheung D.O.    ondansetron (Zofran ODT) dispertab 4 mg, 4 mg, Oral, Q4HRS PRN, Pancho Cheung D.O.    promethazine (Phenergan) tablet 12.5-25 mg, 12.5-25 mg, Oral, Q4HRS PRN, Pancho Cheung D.O.    promethazine (Phenergan) suppository 12.5-25 mg, 12.5-25 mg, Rectal, Q4HRS PRN, Pancho Cheung D.O.    prochlorperazine (Compazine) injection 5-10 mg, 5-10 mg, Intravenous, Q4HRS PRN, Pancho Cheung D.O.    pantoprazole (Protonix) injection 40 mg, 40 mg, Intravenous, BID, MERVIN ClaytonOJennifer, 40 mg at 07/30/23 0515    sucralfate (Carafate) 1 GM/10ML suspension 1 g, 1 g, Oral, Q6HRS, BIN Clayton.O., 1 g at 07/30/23 0516    GI Cocktail (hyoscyamine-lidocaine-Maalox) oral susp cup 15 mL, 15 mL, Oral, Q4HRS PRN, Pancho Cheung D.O., 15 mL at 07/30/23 0516    pantoprazole (Protonix) 80 mg in  mL IVPB, 80 mg, Intravenous, Once, Burton Knox, Held at 07/29/23 2223    Current Outpatient Medications:  Current Facility-Administered Medications   Medication Dose Route Frequency Provider Last Rate Last Admin    senna-docusate (Pericolace Or Senokot S) 8.6-50 MG per tablet 2 Tablet  2 Tablet Oral BID Pancho Cheung D.O.        And    polyethylene glycol/lytes (Miralax) PACKET 1 Packet  1 Packet Oral QDAY PRN Pancho Cheung D.O.        And    magnesium hydroxide (Milk Of Magnesia)  suspension 30 mL  30 mL Oral QDAY PRN Pancho Cheung D.O.        And    bisacodyl (Dulcolax) suppository 10 mg  10 mg Rectal QDAY PRN Pancho hCeung D.O.        NS infusion   Intravenous Continuous Pancho Cheung D.O. 125 mL/hr at 07/30/23 0950 New Bag at 07/30/23 0950    acetaminophen (Tylenol) tablet 650 mg  650 mg Oral Q6HRS PRN Pancho Cheung D.O.        Pharmacy Consult Request ...Pain Management Review 1 Each  1 Each Other PHARMACY TO DOSE Pancho Cheung D.O.        oxyCODONE immediate-release (Roxicodone) tablet 5 mg  5 mg Oral Q3HRS PRN Pancho Cheung D.O.        Or    oxyCODONE immediate release (Roxicodone) tablet 10 mg  10 mg Oral Q3HRS PRN Pancho Cheung D.O.   10 mg at 07/30/23 0820    Or    HYDROmorphone (Dilaudid) injection 0.5 mg  0.5 mg Intravenous Q3HRS PRN Pancho Cheung D.O.   0.5 mg at 07/30/23 0952    labetalol (Normodyne/Trandate) injection 10 mg  10 mg Intravenous Q4HRS PRN Pancho Cheung D.O.        ondansetron (Zofran) syringe/vial injection 4 mg  4 mg Intravenous Q4HRS PRN Pancho Cheung D.O.        ondansetron (Zofran ODT) dispertab 4 mg  4 mg Oral Q4HRS PRN Pancho Cheung D.O.        promethazine (Phenergan) tablet 12.5-25 mg  12.5-25 mg Oral Q4HRS PRN Pancho Cheung D.O.        promethazine (Phenergan) suppository 12.5-25 mg  12.5-25 mg Rectal Q4HRS PRN Pancho Cheung D.O.        prochlorperazine (Compazine) injection 5-10 mg  5-10 mg Intravenous Q4HRS PRN Pancho Cheung D.O.        pantoprazole (Protonix) injection 40 mg  40 mg Intravenous BID Pancho Cheung D.O.   40 mg at 07/30/23 0515    sucralfate (Carafate) 1 GM/10ML suspension 1 g  1 g Oral Q6HRS MERVIN ClaytonO.   1 g at 07/30/23 0516    GI Cocktail (hyoscyamine-lidocaine-Maalox) oral susp cup 15 mL  15 mL Oral Q4HRS PRN MERVIN ClaytonO.   15 mL at 07/30/23 0516    pantoprazole (Protonix) 80 mg in  mL IVPB  80 mg Intravenous Once Burton Knox   Held at 07/29/23 1286        Medication Allergy:  Allergies   Allergen Reactions    Tramadol Unspecified     Seizure  PSO=[id number]    Morphine Vomiting       Family History:  History reviewed. No pertinent family history.    Social History:  Social History     Socioeconomic History    Marital status:      Spouse name: Not on file    Number of children: Not on file    Years of education: Not on file    Highest education level: 12th grade   Occupational History    Not on file   Tobacco Use    Smoking status: Never    Smokeless tobacco: Never   Vaping Use    Vaping Use: Never used   Substance and Sexual Activity    Alcohol use: Yes     Comment: occ    Drug use: Never    Sexual activity: Not on file   Other Topics Concern    Not on file   Social History Narrative    Not on file     Social Determinants of Health     Financial Resource Strain: Low Risk  (3/28/2022)    Overall Financial Resource Strain (CARDIA)     Difficulty of Paying Living Expenses: Not hard at all   Food Insecurity: No Food Insecurity (3/28/2022)    Hunger Vital Sign     Worried About Running Out of Food in the Last Year: Never true     Ran Out of Food in the Last Year: Never true   Transportation Needs: No Transportation Needs (3/28/2022)    PRAPARE - Transportation     Lack of Transportation (Medical): No     Lack of Transportation (Non-Medical): No   Physical Activity: Sufficiently Active (3/28/2022)    Exercise Vital Sign     Days of Exercise per Week: 5 days     Minutes of Exercise per Session: 150+ min   Stress: No Stress Concern Present (3/28/2022)    [employer] [place] [employer] - Occupational Stress Questionnaire     Feeling of Stress : Not at all   Social Connections: Unknown (3/28/2022)    Social Connection and Isolation Panel [NHANES]     Frequency of Communication with Friends and Family: Patient refused     Frequency of Social Gatherings with Friends and Family: Patient refused     Attends Confucianism Services: Never     Active Member of Clubs or  Organizations: No     Attends Club or Organization Meetings: Never     Marital Status:    Intimate Partner Violence: Not on file   Housing Stability: Low Risk  (3/28/2022)    Housing Stability Vital Sign     Unable to Pay for Housing in the Last Year: No     Number of Places Lived in the Last Year: 1     Unstable Housing in the Last Year: No         MDM (Data Review):     Records reviewed and summarized in current documentation    Lab Data Review:  Recent Results (from the past 24 hour(s))   CBC WITH DIFFERENTIAL    Collection Time: 07/29/23 11:05 PM   Result Value Ref Range    WBC 6.8 4.8 - 10.8 K/uL    RBC 4.37 (L) 4.70 - 6.10 M/uL    Hemoglobin 13.6 (L) 14.0 - 18.0 g/dL    Hematocrit 40.2 (L) 42.0 - 52.0 %    MCV 92.0 81.4 - 97.8 fL    MCH 31.1 27.0 - 33.0 pg    MCHC 33.8 32.3 - 36.5 g/dL    RDW 41.2 35.9 - 50.0 fL    Platelet Count 286 164 - 446 K/uL    MPV 9.7 9.0 - 12.9 fL    Neutrophils-Polys 52.60 44.00 - 72.00 %    Lymphocytes 37.70 22.00 - 41.00 %    Monocytes 7.70 0.00 - 13.40 %    Eosinophils 0.70 0.00 - 6.90 %    Basophils 1.20 0.00 - 1.80 %    Immature Granulocytes 0.10 0.00 - 0.90 %    Nucleated RBC 0.00 0.00 - 0.20 /100 WBC    Neutrophils (Absolute) 3.56 1.82 - 7.42 K/uL    Lymphs (Absolute) 2.55 1.00 - 4.80 K/uL    Monos (Absolute) 0.52 0.00 - 0.85 K/uL    Eos (Absolute) 0.05 0.00 - 0.51 K/uL    Baso (Absolute) 0.08 0.00 - 0.12 K/uL    Immature Granulocytes (abs) 0.01 0.00 - 0.11 K/uL    NRBC (Absolute) 0.00 K/uL   COMP METABOLIC PANEL    Collection Time: 07/29/23 11:05 PM   Result Value Ref Range    Sodium 142 135 - 145 mmol/L    Potassium 4.8 3.6 - 5.5 mmol/L    Chloride 110 96 - 112 mmol/L    Co2 20 20 - 33 mmol/L    Anion Gap 12.0 7.0 - 16.0    Glucose 88 65 - 99 mg/dL    Bun 53 (H) 8 - 22 mg/dL    Creatinine 1.13 0.50 - 1.40 mg/dL    Calcium 9.0 8.4 - 10.2 mg/dL    Correct Calcium 9.0 8.5 - 10.5 mg/dL    AST(SGOT) 10 (L) 12 - 45 U/L    ALT(SGPT) 10 2 - 50 U/L    Alkaline Phosphatase 31  30 - 99 U/L    Total Bilirubin 0.6 0.1 - 1.5 mg/dL    Albumin 4.0 3.2 - 4.9 g/dL    Total Protein 5.9 (L) 6.0 - 8.2 g/dL    Globulin 1.9 1.9 - 3.5 g/dL    A-G Ratio 2.1 g/dL   LIPASE    Collection Time: 23 11:05 PM   Result Value Ref Range    Lipase 36 11 - 82 U/L   COD (ADULT)    Collection Time: 23 11:05 PM   Result Value Ref Range    ABO Grouping Only A     Rh Grouping Only POS     Antibody Screen-Cod NEG    PROTHROMBIN TIME (INR)    Collection Time: 23 11:05 PM   Result Value Ref Range    PT 14.6 12.0 - 14.6 sec    INR 1.10 0.87 - 1.13   ETHYL ALCOHOL (BLOOD)    Collection Time: 23 11:05 PM   Result Value Ref Range    Diagnostic Alcohol 17.8 (H) <10.1 mg/dL   ESTIMATED GFR    Collection Time: 23 11:05 PM   Result Value Ref Range    GFR (CKD-EPI) 83 >60 mL/min/1.73 m 2   EKG (NOW)    Collection Time: 23 11:30 PM   Result Value Ref Range    Report       [facility]    Test Date:  2023  Pt Name:    PATI MERCEDES    Department: [facility]  MRN:        [id number]                      Room:       [facility]  Gender:     Male                         Technician: evy  :        1980                   Requested By:BONY METZ  Order #:    [id number]                    Reading MD:    Measurements  Intervals                                Axis  Rate:       97                           P:          56  KS:         164                          QRS:        -1  QRSD:       171                          T:          22  QT:         327  QTc:        416    Interpretive Statements  Sinus rhythm  Left bundle branch block  Compared to ECG 2022 21:57:15  Left bundle-branch block now present  Myocardial infarct finding no longer present     HEMOGLOBIN AND HEMATOCRIT    Collection Time: 23  6:52 AM   Result Value Ref Range    Hemoglobin 10.9 (L) 14.0 - 18.0 g/dL    Hematocrit 32.4 (L) 42.0 - 52.0 %   LIPASE    Collection Time:  07/30/23  9:14 AM   Result Value Ref Range    Lipase 20 11 - 82 U/L       Imaging/Procedures Review:      CT-ABDOMEN-PELVIS WITH   Final Result      No acute process seen.           MDM (Assessment and Plan):     Patient Active Problem List    Diagnosis Date Noted    GI bleed 07/30/2023    Acute blood loss anemia 07/30/2023    Dehydration 07/30/2023    ELISABET (acute kidney injury) (HCC) 03/27/2022    Upper GI bleed 03/26/2022    Physiologic disturbance of temperature regulation 03/26/2022    Epigastric pain 03/26/2022     This is a 44 yo male who comes in with a sudden onset of epigastric pain, melena and coffee ground emesis similar to when he had bleeding gastric/duodenal ulcers in 2019. Does take occasional NSAID's. Denies PPI use. Hgb 13.6-->10. 9. BUN: 53. CT scan no sign of perforation.    ASSESSMENT:  Epigastric abdominal pain likely gastric vs duodenal ulcers  Upper GI bleed  Azotemia  Hx gastric/duodenal ulcers    PLAN:  EGD today  PPI IV BID  NPO  Trend  Hgb and transfuse >7  Complete NSAID and alcohol cessation  Likely will need Sucralfate QID after EGD  Discontinue Octreotide: no history of cirrhosis.       Thank your for the opportunity to assist in the care of your patient.  Please call for any questions or concerns.    BUSHRA Garcia.

## 2023-07-30 NOTE — PROGRESS NOTES
1000- Assumed pt care. Report received from Pradeep. Brought pt over via wheelchair to 318. Pt a&o x4, reporting 10/10 pain in the epigastric area. Medicated per MAR and verified by intensivist. Pt able to stand and walked to bed. All ICU monitoring applied. Second PIV initiated. All orders verified.

## 2023-07-30 NOTE — ASSESSMENT & PLAN NOTE
Mild, due to acute GI bleeding  Patient has had a two-point drop from his apparent baseline  Patient is also dehydrated, anticipate hemoglobin to drop further with IV fluids alone however he does have ongoing bleeding which would not be further  Transfuse as needed   obtain frequent hemoglobin

## 2023-07-30 NOTE — H&P
Hospital Medicine History & Physical Note    Date of Service  7/30/2023    Primary Care Physician  Pcp Pt States None    Consultants  GI    Specialist Names: Dr Cottrell    Code Status  Full Code    Chief Complaint  Chief Complaint   Patient presents with    Lower GI Bleed    Abdominal Pain     42 yo male ambulates to triage with black tarry stools today x 5 and 3 bloody emesis today.  Patient reports epigastric abdominal pain.  Denies fever.  HX of the same        History of Presenting Illness  Roberto Braswell is a 43 y.o. male who presented 7/29/2023 with abdominal pain and cough urinalysis.  Patient states his symptoms started tonight, states they will start it once.  Patient did have significant nausea vomiting, noted coffee-ground emesis initially.  Patient states he also had severe epigastric pain that was sharp.  He states he also noted dark tarry stool.  Patient does have a history of bleeding duodenal ulcers requiring intervention, this was at [facility].  Patient presented to [facility] last March, did have a EGD again however it showed only gastritis and duodenitis, no ulcers.  Patient was placed on PPI and Carafate at that time, he has not followed up with GI or PCP since.  He denies NSAID use, denies any significant alcohol use.  I did discuss the case including labs with the ER physician.    I discussed the plan of care with patient and family.    Review of Systems  Review of Systems   Constitutional:  Negative for chills, fever and malaise/fatigue.   HENT:  Negative for congestion.    Respiratory:  Negative for cough, sputum production, shortness of breath and stridor.    Cardiovascular:  Negative for chest pain, palpitations and leg swelling.   Gastrointestinal:  Positive for abdominal pain, melena, nausea and vomiting. Negative for constipation and diarrhea.   Genitourinary:  Negative for dysuria and urgency.   Musculoskeletal:  Negative for falls and myalgias.   Neurological:  Negative for dizziness,  tingling, loss of consciousness, weakness and headaches.   Psychiatric/Behavioral:  Negative for depression and suicidal ideas.    All other systems reviewed and are negative.      Past Medical History   has a past medical history of Arthritis, Gastric ulcer, and Pain.    Surgical History   has a past surgical history that includes other orthopedic surgery; other abdominal surgery; ankle arthroscopy (Right, 5/21/2018); biopsy ortho (Right, 5/21/2018); pr upper gi endoscopy,diagnosis (3/28/2022); and pr upper gi endoscopy,biopsy (3/28/2022).     Family History  family history is not on file.   Family history reviewed with patient. There is no family history that is pertinent to the chief complaint.     Social History   reports that he has never smoked. He has never used smokeless tobacco. He reports current alcohol use. He reports that he does not use drugs.    Allergies  Allergies   Allergen Reactions    Tramadol Unspecified     Seizure  [id number]    Morphine Vomiting       Medications  Prior to Admission Medications   Prescriptions Last Dose Informant Patient Reported? Taking?   ondansetron (ZOFRAN ODT) 4 MG TABLET DISPERSIBLE   No No   Sig: Take 1 Tablet by mouth every 6 hours as needed for Nausea/Vomiting for up to 15 doses.      Facility-Administered Medications: None       Physical Exam  Temp:  [36.6 °C (97.9 °F)] 36.6 °C (97.9 °F)  Pulse:  [] 102  Resp:  [18-30] 24  BP: (108-160)/(68-87) 108/68  SpO2:  [96 %-98 %] 98 %  Blood Pressure: 108/68   Temperature: 36.6 °C (97.9 °F)   Pulse: (!) 102   Respiration: (!) 24   Pulse Oximetry: 98 %       Physical Exam  Vitals and nursing note reviewed.   Constitutional:       General: He is not in acute distress.     Appearance: He is well-developed. He is not toxic-appearing or diaphoretic.   HENT:      Head: Normocephalic and atraumatic.      Right Ear: External ear normal.      Left Ear: External ear normal.      Nose: Nose normal. No congestion or rhinorrhea.       Mouth/Throat:      Mouth: Mucous membranes are dry.      Pharynx: No oropharyngeal exudate.   Eyes:      General:         Right eye: No discharge.         Left eye: No discharge.   Neck:      Trachea: No tracheal deviation.   Cardiovascular:      Rate and Rhythm: Normal rate and regular rhythm.      Heart sounds: Murmur heard.      No friction rub. No gallop.   Pulmonary:      Effort: Pulmonary effort is normal. No respiratory distress.      Breath sounds: Normal breath sounds. No stridor. No wheezing or rales.   Chest:      Chest wall: No tenderness.   Abdominal:      General: Bowel sounds are normal. There is no distension.      Palpations: Abdomen is soft.      Tenderness: There is abdominal tenderness in the epigastric area.   Musculoskeletal:         General: No tenderness. Normal range of motion.      Cervical back: Normal range of motion and neck supple. No edema or erythema.      Right lower leg: No edema.      Left lower leg: No edema.   Lymphadenopathy:      Cervical: No cervical adenopathy.   Skin:     General: Skin is warm and dry.      Findings: No erythema or rash.   Neurological:      Mental Status: He is alert and oriented to person, place, and time.      Cranial Nerves: No cranial nerve deficit.   Psychiatric:         Mood and Affect: Mood normal.         Behavior: Behavior normal.         Thought Content: Thought content normal.         Judgment: Judgment normal.         Laboratory:  Recent Labs     07/29/23 2305   WBC 6.8   RBC 4.37*   HEMOGLOBIN 13.6*   HEMATOCRIT 40.2*   MCV 92.0   MCH 31.1   MCHC 33.8   RDW 41.2   PLATELETCT 286   MPV 9.7     Recent Labs     07/29/23 2305   SODIUM 142   POTASSIUM 4.8   CHLORIDE 110   CO2 20   GLUCOSE 88   BUN 53*   CREATININE 1.13   CALCIUM 9.0     Recent Labs     07/29/23 2305   ALTSGPT 10   ASTSGOT 10*   ALKPHOSPHAT 31   TBILIRUBIN 0.6   LIPASE 36   GLUCOSE 88     Recent Labs     07/29/23 2305   INR 1.10     No results for input(s): NTPROBNP in the last  72 hours.      No results for input(s): TROPONINT in the last 72 hours.    Imaging:  No orders to display       no X-Ray or EKG requiring interpretation    Assessment/Plan:  Justification for Admission Status  I anticipate this patient will require at least two midnights for appropriate medical management, necessitating inpatient admission because acute GI bleed    Patient will need a Telemetry bed on MEDICAL service .  The need is secondary to acute GI bleed.    * GI bleed- (present on admission)  Assessment & Plan  Significant bleeding per patient, coffee-ground emesis on multiple occasions as well has a significant melena  Multiple possible causes, patient does have a history of duodenal ulcers as well as erosive gastritis/duodenitis  He does not have a history of significant alcohol intake per patient, did have a normal liver on CT scan in May, I do not feel there is a high risk of variceal bleeding, no need for antibiotic or octreotide drip at this time  Start twice daily Protonix  Start Carafate  Start GI cocktail  ERP did discuss the case with GI, keep patient n.p.o. and await additional recommendations    Acute blood loss anemia- (present on admission)  Assessment & Plan  Mild, due to acute GI bleeding  Patient has had a two-point drop from his apparent baseline  Patient is also dehydrated, anticipate hemoglobin to drop further with IV fluids alone however he does have ongoing bleeding which would not be further  Transfuse as needed   obtain frequent hemoglobin    Dehydration- (present on admission)  Assessment & Plan  Start IV fluids  Due to BMP in the morning        VTE prophylaxis: SCDs/TEDs

## 2023-07-30 NOTE — PROGRESS NOTES
Report given to Cuong at bedside as pt had returned from CT.  Antibiotics have not been started.

## 2023-07-30 NOTE — ANESTHESIA POSTPROCEDURE EVALUATION
Patient: Roberto Braswell    Procedure Summary     Date: 07/30/23 Room / Location:  ENDOSCOPIC ULTRASOUND ROOM / SURGERY [facility]    Anesthesia Start: 1326 Anesthesia Stop: 1344    Procedure: GASTROSCOPY, WITH BIOPSY (Mouth) Diagnosis: (duodenal bulb ulcer, small gastric ulcers)    Surgeons: Kumar Hope M.D. Responsible Provider: Torito Sandoval III, M.D.    Anesthesia Type: general ASA Status: 2 - Emergent          Final Anesthesia Type: general  Last vitals  BP   Blood Pressure: 114/60    Temp   36.4 °C (97.5 °F)    Pulse   (!) 45   Resp   14    SpO2   99 %      Anesthesia Post Evaluation    Patient location during evaluation: PACU  Patient participation: complete - patient participated  Level of consciousness: awake and alert  Pain score: 9  Preprocedural elevated pain levels out of proportion to pathology  Airway patency: patent  Anesthetic complications: no  Cardiovascular status: hemodynamically stable  Respiratory status: acceptable  Hydration status: euvolemic    PONV: none          There were no known notable events for this encounter.     Nurse Pain Score: 9 (NPRS)

## 2023-07-30 NOTE — PROGRESS NOTES
4 Eyes Skin Assessment Completed by Cuong RN and KARSON Sahu.    Head WDL  Ears WDL  Nose WDL  Mouth WDL  Neck WDL  Breast/Chest WDL  Shoulder Blades WDL  Spine WDL  (R) Arm/Elbow/Hand WDL  (L) Arm/Elbow/Hand WDL  Abdomen WDL  Groin WDL  Scrotum/Coccyx/Buttocks WDL  (R) Leg WDL  (L) Leg WDL  (R) Heel/Foot/Toe WDL  (L) Heel/Foot/Toe WDL          Devices In Places ECG, Blood Pressure Cuff, and Pulse Ox      Interventions In Place TAP System, Pillows, and Low Air Loss Mattress    Possible Skin Injury No    Pictures Uploaded Into Epic N/A  Wound Consult Placed N/A  RN Wound Prevention Protocol Ordered No

## 2023-07-30 NOTE — CARE PLAN
The patient is Watcher - Medium risk of patient condition declining or worsening    Shift Goals  Clinical Goals: Pain control, trend  Patient Goals: Pain  Family Goals:  (JJ)    Progress made toward(s) clinical / shift goals:    Problem: Knowledge Deficit - Standard  Goal: Patient and family/care givers will demonstrate understanding of plan of care, disease process/condition, diagnostic tests and medications  Outcome: Progressing     Problem: Gastrointestinal Irritability  Goal: Nausea and vomiting will be absent or improve  Outcome: Progressing  Goal: Diarrhea will be absent or improved  Outcome: Progressing     Problem: Risk for Fluid Volume Deficit Related to Bleeding  Goal: Fluid volume balance will be maintained  Outcome: Progressing  Goal: Patient will show no signs and symptoms of excessive bleeding  Outcome: Progressing     Problem: Risk for Bleeding  Goal: Patient will take measures to prevent bleeding and recognizes signs of bleeding that need to be reported immediately to a health care professional  Outcome: Progressing  Goal: Patient will not experience bleeding as evidenced by normal blood pressure, stable hematocrit and hemoglobin levels and desired ranges for coagulation profiles  Outcome: Progressing       Patient is not progressing towards the following goals:      Problem: Pain - Standard  Goal: Alleviation of pain or a reduction in pain to the patient’s comfort goal  Outcome: Not Progressing  Note: Pt complaining of persistent abdominal pain. Administered PRN meds per MAR. Explained to patient that multiple doses may be necessary for therapeutic effect. Pt verbalized understanding.

## 2023-07-30 NOTE — ED PROVIDER NOTES
ED Provider Note    Scribed for Burton Knox by Burton Knxo. 7/29/2023  11:11 PM    Primary care provider: Pcp Pt States None  Means of arrival: private vehicel   History obtained from: Patient  History limited by: None    CHIEF COMPLAINT  Chief Complaint   Patient presents with    Lower GI Bleed    Abdominal Pain     44 yo male ambulates to triage with black tarry stools today x 5 and 3 bloody emesis today.  Patient reports epigastric abdominal pain.  Denies fever.  HX of the same        EXTERNAL RECORDS REVIEWED  Inpatient Notes patient was admitted for 2 days and in March 2022 with an upper GI bleed, abdominal pain, history of gastric ulcers and had endoscopy revealing 2 bleeding ulcers, required 2 L of packed red blood cell transfusion, GI did endoscopy showing erosive gastroduodenitis.  He was supposed to be on Prilosec and Carafate but he is not compliant with these.    HPI/ROS  LIMITATION TO HISTORY   Select: : None  OUTSIDE HISTORIAN(S):  Family wife at bedside provides helpful collateral formation    HPI  Roberto Braswell is a 43 y.o. male who presents to the Emergency Department with epigastric pain, nausea, vomiting, 3 grand rounds of coffee-ground emesis and multiple episodes of black stools.  Patient is a history of gastric ulcer disease requiring cauterization in the past.  He was admitted for 2-day stay in 2022 with findings showing erosive gastroduodenitis and supposed be on Prilosec and Carafate but he is not compliant with these, states he does not have a PCP currently.  Does drink alcohol once twice a month but has been over a month since he had a drink.  Denies any drug or tobacco abuse otherwise.  He is otherwise healthy states.  Denies any chest pain, shortness of breath, headaches, fevers.  No dysuria or hematuria.      REVIEW OF SYSTEMS  As above, all other systems reviewed and are negative.   See HPI for further details.     PAST MEDICAL HISTORY   has a past medical history  "of Arthritis, Gastric ulcer, and Pain.  SURGICAL HISTORY   has a past surgical history that includes other orthopedic surgery; other abdominal surgery; ankle arthroscopy (Right, 5/21/2018); biopsy ortho (Right, 5/21/2018); upper gi endoscopy,diagnosis (3/28/2022); and upper gi endoscopy,biopsy (3/28/2022).  SOCIAL HISTORY  Social History     Tobacco Use    Smoking status: Never    Smokeless tobacco: Never   Vaping Use    Vaping Use: Never used   Substance Use Topics    Alcohol use: Yes     Comment: occ    Drug use: Never      Social History     Substance and Sexual Activity   Drug Use Never     FAMILY HISTORY  History reviewed. No pertinent family history.  CURRENT MEDICATIONS  Home Medications       Reviewed by Kaitlyn Conrad R.N. (Registered Nurse) on 07/29/23 at 2250  Med List Status: Not Addressed     Medication Last Dose Status   ondansetron (ZOFRAN ODT) 4 MG TABLET DISPERSIBLE  Active                  ALLERGIES  Allergies   Allergen Reactions    Tramadol Unspecified     Seizure  [id number]    Morphine Vomiting       PHYSICAL EXAM    VITAL SIGNS:   Vitals:    07/29/23 2245 07/29/23 2248 07/29/23 2301 07/29/23 2329   BP:  125/87 (!) 142/83 (!) 160/85   Pulse:  (!) 137 (!) 130 94   Resp:  18 (!) 24 (!) 22   Temp:  36.6 °C (97.9 °F)     TempSrc:  Temporal     SpO2:  98% 98% 98%   Weight: 103 kg (226 lb 3.1 oz)      Height: 1.905 m (6' 3\")        Vitals: My interpretation: normotensive, tachycardic, afebrile, not hypoxic    Reinterpretation of vitals: Improved    Cardiac Monitor Interpretation: The cardiac monitor revealed normal Sinus Rhythm tachycardia as interpreted by me. The cardiac monitor was ordered secondary to the patient's history of tachycardia and to monitor for dysrhythmia and/or tachycardia.    PE:   Gen: Appears to be in moderate distress, acutely uncomfortable, clutching his epigastrium  ENT: Mucous membranes moist, posterior pharynx clear, uvula midline, nares patent bilaterally  Neck: Supple, " FROM  Pulmonary: Lungs are clear to auscultation bilaterally. No tachypnea  CV:  RRR, no murmur appreciated, pulses 2+ in both upper and lower extremities  Abdomen: soft, moderately tender in the epigastrium/ND; no rebound/guarding  : no CVA or suprapubic tenderness   Neuro: A&Ox4 (person, place, time, situation), speech fluent, gait steady, no focal deficits appreciated  Skin: No rash or lesions.  No pallor or jaundice.  No cyanosis.  Warm and dry.   : Patient's rectal exam has obvious gross melena that is Hemoccult positive, no hemorrhoids present internal or external rectal exam.    DIAGNOSTIC STUDIES / PROCEDURES    LABS  Results for orders placed or performed during the hospital encounter of 07/29/23   CBC WITH DIFFERENTIAL   Result Value Ref Range    WBC 6.8 4.8 - 10.8 K/uL    RBC 4.37 (L) 4.70 - 6.10 M/uL    Hemoglobin 13.6 (L) 14.0 - 18.0 g/dL    Hematocrit 40.2 (L) 42.0 - 52.0 %    MCV 92.0 81.4 - 97.8 fL    MCH 31.1 27.0 - 33.0 pg    MCHC 33.8 32.3 - 36.5 g/dL    RDW 41.2 35.9 - 50.0 fL    Platelet Count 286 164 - 446 K/uL    MPV 9.7 9.0 - 12.9 fL    Neutrophils-Polys 52.60 44.00 - 72.00 %    Lymphocytes 37.70 22.00 - 41.00 %    Monocytes 7.70 0.00 - 13.40 %    Eosinophils 0.70 0.00 - 6.90 %    Basophils 1.20 0.00 - 1.80 %    Immature Granulocytes 0.10 0.00 - 0.90 %    Nucleated RBC 0.00 0.00 - 0.20 /100 WBC    Neutrophils (Absolute) 3.56 1.82 - 7.42 K/uL    Lymphs (Absolute) 2.55 1.00 - 4.80 K/uL    Monos (Absolute) 0.52 0.00 - 0.85 K/uL    Eos (Absolute) 0.05 0.00 - 0.51 K/uL    Baso (Absolute) 0.08 0.00 - 0.12 K/uL    Immature Granulocytes (abs) 0.01 0.00 - 0.11 K/uL    NRBC (Absolute) 0.00 K/uL   COMP METABOLIC PANEL   Result Value Ref Range    Sodium 142 135 - 145 mmol/L    Potassium 4.8 3.6 - 5.5 mmol/L    Chloride 110 96 - 112 mmol/L    Co2 20 20 - 33 mmol/L    Anion Gap 12.0 7.0 - 16.0    Glucose 88 65 - 99 mg/dL    Bun 53 (H) 8 - 22 mg/dL    Creatinine 1.13 0.50 - 1.40 mg/dL    Calcium 9.0 8.4  - 10.2 mg/dL    Correct Calcium 9.0 8.5 - 10.5 mg/dL    AST(SGOT) 10 (L) 12 - 45 U/L    ALT(SGPT) 10 2 - 50 U/L    Alkaline Phosphatase 31 30 - 99 U/L    Total Bilirubin 0.6 0.1 - 1.5 mg/dL    Albumin 4.0 3.2 - 4.9 g/dL    Total Protein 5.9 (L) 6.0 - 8.2 g/dL    Globulin 1.9 1.9 - 3.5 g/dL    A-G Ratio 2.1 g/dL   LIPASE   Result Value Ref Range    Lipase 36 11 - 82 U/L   PROTHROMBIN TIME (INR)   Result Value Ref Range    PT 14.6 12.0 - 14.6 sec    INR 1.10 0.87 - 1.13   ETHYL ALCOHOL (BLOOD)   Result Value Ref Range    Diagnostic Alcohol 17.8 (H) <10.1 mg/dL   ESTIMATED GFR   Result Value Ref Range    GFR (CKD-EPI) 83 >60 mL/min/1.73 m 2      All labs reviewed by me. Labs were compared to prior labs if they were available. Significant for no leukocytosis, mild anemia from 15 two months ago to now 13.6, normal electrolytes, normal renal function, normal liver enzymes, normal bilirubin, lipase normal, PT and INR normal, alcohol slightly elevated at 17.    COURSE & MEDICAL DECISION MAKING  Nursing notes, VS, PMSFHx, labs, imaging, EKG reviewed in chart.    ED Observation Status? Yes; I am placing the patient in to an observation status due to a diagnostic uncertainty as well as therapeutic intensity. Patient placed in observation status at 11:18 PM, 7/29/2023.     Observation plan is as follows: There is diagnostic uncertainty and need for therapy intensity, IV pain and nausea medications, IV fluids, monitoring vital signs, frequent reevaluation before disposition can be made inpatient versus outpatient    Upon Reevaluation, the patient's condition has: not improved; and will be escalated to hospitalization.    Patient discharged from ED Observation status at 12:01 AM (Time) 7/30/2023 (Date).     Ddx: Esophageal varices, gastritis, gastric ulcer disease, GI bleed, colitis, lower GI bleed    MDM: 11:11 PM Roberto Braswell is a 43 y.o. male who presented with acute onset of GI bleed.  Patient is a history of gastric  ulcer disease requiring cauterization in the past.  He notes 3 episodes of coffee-ground emesis tonight, severe epigastric pain, and multiple episodes of black stools that started this evening.  He is noncompliant with his Carafate and outpatient Prilosec, currently does not have a PCP.  Upon arrival here patient appears to be in acute distress, in severe pain, and tachycardic.  Vital signs are hemodynamically stable but pulse is 137, immediately IV placed, started on IV fluids, Dilaudid and Zofran.  Physical exam shows tenderness in the epigastric region and his  exam shows black tarry stool that is Hemoccult positive on testing.  Labs ordered.  All labs reviewed by me. Labs were compared to prior labs if they were available. Significant for no leukocytosis, mild anemia from 15 two months ago to now 13.6, normal electrolytes, normal renal function, normal liver enzymes, normal bilirubin, lipase normal, PT and INR normal, alcohol slightly elevated at 17.  GI consulted and recommends admission and they will do endoscopy in the morning unless patient decompensates overnight.  Patient placed on 80 mg of Protonix, discussed with hospitalist, they are amenable to admission.  Updated patient and family, pain is now more improved and his nausea is now resolved.  They are amenable to admission and verbalized understanding.    HYDRATION: Based on the patient's presentation of Acute Vomiting, Dehydration and Inability to take oral fluids the patient was given IV fluids. IV Hydration was used because oral hydration was not adequate alone. Upon recheck following hydration, the patient was imroved.    ADDITIONAL PROBLEM LIST AND DISPOSITION    I have discussed management of the patient with the following physicians and CARTER's: Hospitalist    Discussion of management with other Q or appropriate source(s): None     FINAL IMPRESSION  1. Acute gastric ulcer with hemorrhage Acute   2. Epigastric pain Acute   3. Hematemesis with  nausea Acute   4. Melena Acute   5. Upper GI bleed Acute      The note accurately reflects work and decisions made by me.  Burton Knox  7/29/2023  11:11 PM

## 2023-07-30 NOTE — OR NURSING
1343 Pt arrived from OR post EGD, report received. Pt breathing spontaneous and unlabored.    1355 Report to Juliana TY

## 2023-07-30 NOTE — PROGRESS NOTES
4 Eyes Skin Assessment Completed by KARSON Galindo and KARSON Markham.    Head WDL  Ears WDL  Nose WDL  Mouth WDL  Neck WDL  Breast/Chest WDL  Shoulder Blades WDL  Spine WDL  (R) Arm/Elbow/Hand WDL  (L) Arm/Elbow/Hand WDL  Abdomen WDL  Groin WDL  Scrotum/Coccyx/Buttocks WDL  (R) Leg WDL  (L) Leg WDL  (R) Heel/Foot/Toe WDL  (L) Heel/Foot/Toe WDL          Devices In Places Tele Box      Interventions In Place Pillows    Possible Skin Injury No    Pictures Uploaded Into Epic N/A  Wound Consult Placed N/A  RN Wound Prevention Protocol Ordered No

## 2023-07-30 NOTE — CARE PLAN
The patient is Watcher - Medium risk of patient condition declining or worsening    Shift Goals  Clinical Goals: EGD, monitor for s/s of bleeding  Patient Goals: Pain control  Family Goals:  (JJ)    Progress made toward(s) clinical / shift goals:      Problem: Hemodynamics  Goal: Patient's hemodynamics, fluid balance and neurologic status will be stable or improve  Outcome: Progressing     Problem: Risk for Bleeding  Goal: Patient will take measures to prevent bleeding and recognizes signs of bleeding that need to be reported immediately to a health care professional  Outcome: Progressing  Note: EGD scheduled for early afternoon.      Patient is not progressing towards the following goals:      Problem: Pain - Standard  Goal: Alleviation of pain or a reduction in pain to the patient’s comfort goal  Outcome: Not Progressing

## 2023-07-30 NOTE — PROGRESS NOTES
Update:    Procedure note reviewed.   No ICU needs.   Continue PPI.   Advance diet.     Pain is out of proportion to exam or procedural findings. Suspect a component of pain seeking behavior. Opiods are discontinued as they are not indicated in the treatment of a duodenal bulb ulcer.    Downgrade to med/surg.     Barber Bruce D.O.     no chills

## 2023-07-31 NOTE — DISCHARGE SUMMARY
"Discharge Summary    CHIEF COMPLAINT ON ADMISSION  Chief Complaint   Patient presents with    Lower GI Bleed    Abdominal Pain     42 yo male ambulates to triage with black tarry stools today x 5 and 3 bloody emesis today.  Patient reports epigastric abdominal pain.  Denies fever.  HX of the same        Reason for Admission  Vomiting:Abdominal Pain      Admission Date  7/29/2023    CODE STATUS  Full Code    HPI & HOSPITAL COURSE  From Dr. Morin's note: \"Roberto Braswell is a 43 y.o. male who presented 7/29/2023 with abdominal pain and cough urinalysis.  Patient states his symptoms started tonight, states they will start it once.  Patient did have significant nausea vomiting, noted coffee-ground emesis initially.  Patient states he also had severe epigastric pain that was sharp.  He states he also noted dark tarry stool.  Patient does have a history of bleeding duodenal ulcers requiring intervention, this was at [facility].  Patient presented to [facility] March, did have a EGD again however it showed only gastritis and duodenitis, no ulcers.  Patient was placed on PPI and Carafate at that time, he has not followed up with GI or PCP since.  He denies NSAID use, denies any significant alcohol use.  I did discuss the case including labs with the ER physician.\"        ICU upgrade requested after Hgb 13.6 -> 10.9 overnight.  Patient went from being hypertensive at 160/85 to low/normal at 106/72.   General surgery and GI consulted by the hospitalist.  CT abdomen negative.     Underwent endoscopy on 7/30/23 with below findings:  FINDINGS:  1.  Duodenal bulb ulcer.  2.  Small gastric ulcers.     COMPLICATIONS:  None.     TISSUE/BIOPSIES:  Gastric biopsies to rule out H. pylori.      Patient was downgraded from ICU post-procedure. However, he began demanding opiod analgesia. He was educated that sucralfate and proton pump inhibitors would alleviate his pain but still insisted on opiods. Add-on of GI cocktail and/or H2 " blockers was offered. He declined and stated that he either wanted to be discharged or would leave against medical advice.     His wife accompanied him. She stated she could provide transportation since he had anesthesia today. The patient stated he can arrange his own follow up with gastroenterology and his primary care.     Therefore, he is discharged in fair and stable condition to home with close outpatient follow-up.    The patient recovered much more quickly than anticipated on admission.    Discharge Date  7/30/23    FOLLOW UP ITEMS POST DISCHARGE  Take your medications as prescribed.  Avoid the use of NSAIDS (ibuprofen, Aleve, Motrin, etc). Acetaminophen (Tylenol) is acceptable for over-the-counter pain relief.  Make appointments with Gastroenterology and your primary care provider to obtain refills, follow up biopsy results, and form a longitudinal plan.     DISCHARGE DIAGNOSES  Principal Problem (Resolved):    GI bleed (POA: Yes)  Active Problems:    Drug-seeking behavior (POA: Yes)      Overview: Demanding narcotics for a duodenal ulcer July 2023. Refused antacids or       sucralfate.     Duodenal ulcer (POA: Yes)  Resolved Problems:    Acute blood loss anemia (POA: Yes)    Dehydration (POA: Yes)      FOLLOW UP  Patient to schedule.     MEDICATIONS ON DISCHARGE     Medication List        START taking these medications        Instructions   omeprazole 40 MG delayed-release capsule  Commonly known as: PriLOSEC   Take 1 Capsule by mouth 2 times a day.  Dose: 40 mg     sucralfate 1 GM/10ML Susp  Start taking on: July 31, 2023  Commonly known as: Carafate   Take 10 mL by mouth every 6 hours.  Dose: 1 g            ASK your doctor about these medications        Instructions   ASPIRIN-ACETAMINOPHEN-CAFFEINE PO   Take 1 Tablet by mouth every 6 hours as needed for Headache.  Dose: 1 Tablet              Allergies  Allergies   Allergen Reactions    Tramadol Unspecified     Seizure  [id number]    Morphine Vomiting        DIET  Orders Placed This Encounter   Procedures    Diet Order Diet: Full Liquid     Standing Status:   Standing     Number of Occurrences:   1     Order Specific Question:   Diet:     Answer:   Full Liquid [11]       ACTIVITY  As tolerated. No alcohol intake or driving or operating machinery until 7/31.   Weight bearing as tolerated    CONSULTATIONS  Gastroenterology - Dr. Kumar Hope    PROCEDURES  Endoscopy     LABORATORY  Lab Results   Component Value Date    SODIUM 142 07/29/2023    POTASSIUM 4.8 07/29/2023    CHLORIDE 110 07/29/2023    CO2 20 07/29/2023    GLUCOSE 88 07/29/2023    BUN 53 (H) 07/29/2023    CREATININE 1.13 07/29/2023        Lab Results   Component Value Date    WBC 6.8 07/29/2023    HEMOGLOBIN 10.9 (L) 07/30/2023    HEMATOCRIT 32.4 (L) 07/30/2023    PLATELETCT 286 07/29/2023        Total time of the discharge process exceeds 20 minutes.

## 2023-08-04 LAB
BACTERIA BLD CULT: NORMAL
BACTERIA BLD CULT: NORMAL
SIGNIFICANT IND 70042: NORMAL
SIGNIFICANT IND 70042: NORMAL
SITE SITE: NORMAL
SITE SITE: NORMAL
SOURCE SOURCE: NORMAL
SOURCE SOURCE: NORMAL

## 2023-08-26 ENCOUNTER — HOSPITAL ENCOUNTER (EMERGENCY)
Facility: MEDICAL CENTER | Age: 43
End: 2023-08-26
Attending: STUDENT IN AN ORGANIZED HEALTH CARE EDUCATION/TRAINING PROGRAM
Payer: COMMERCIAL

## 2023-08-26 ENCOUNTER — APPOINTMENT (OUTPATIENT)
Dept: RADIOLOGY | Facility: MEDICAL CENTER | Age: 43
End: 2023-08-26
Attending: STUDENT IN AN ORGANIZED HEALTH CARE EDUCATION/TRAINING PROGRAM
Payer: COMMERCIAL

## 2023-08-26 VITALS
HEART RATE: 86 BPM | DIASTOLIC BLOOD PRESSURE: 64 MMHG | WEIGHT: 230 LBS | TEMPERATURE: 98.2 F | BODY MASS INDEX: 28.01 KG/M2 | HEIGHT: 76 IN | RESPIRATION RATE: 19 BRPM | SYSTOLIC BLOOD PRESSURE: 128 MMHG | OXYGEN SATURATION: 96 %

## 2023-08-26 DIAGNOSIS — R10.31 RIGHT LOWER QUADRANT ABDOMINAL PAIN: ICD-10-CM

## 2023-08-26 LAB
ALBUMIN SERPL BCP-MCNC: 4.3 G/DL (ref 3.2–4.9)
ALBUMIN/GLOB SERPL: 2.2 G/DL
ALP SERPL-CCNC: 41 U/L (ref 30–99)
ALT SERPL-CCNC: 11 U/L (ref 2–50)
ANION GAP SERPL CALC-SCNC: 11 MMOL/L (ref 7–16)
APPEARANCE UR: CLEAR
AST SERPL-CCNC: 15 U/L (ref 12–45)
BASOPHILS # BLD AUTO: 1.7 % (ref 0–1.8)
BASOPHILS # BLD: 0.08 K/UL (ref 0–0.12)
BILIRUB SERPL-MCNC: 0.3 MG/DL (ref 0.1–1.5)
BILIRUB UR QL STRIP.AUTO: NEGATIVE
BUN SERPL-MCNC: 18 MG/DL (ref 8–22)
CALCIUM ALBUM COR SERPL-MCNC: 8.4 MG/DL (ref 8.5–10.5)
CALCIUM SERPL-MCNC: 8.6 MG/DL (ref 8.5–10.5)
CHLORIDE SERPL-SCNC: 110 MMOL/L (ref 96–112)
CO2 SERPL-SCNC: 22 MMOL/L (ref 20–33)
COLOR UR: YELLOW
CREAT SERPL-MCNC: 1.09 MG/DL (ref 0.5–1.4)
EOSINOPHIL # BLD AUTO: 0.12 K/UL (ref 0–0.51)
EOSINOPHIL NFR BLD: 2.5 % (ref 0–6.9)
ERYTHROCYTE [DISTWIDTH] IN BLOOD BY AUTOMATED COUNT: 43 FL (ref 35.9–50)
GFR SERPLBLD CREATININE-BSD FMLA CKD-EPI: 87 ML/MIN/1.73 M 2
GLOBULIN SER CALC-MCNC: 2 G/DL (ref 1.9–3.5)
GLUCOSE SERPL-MCNC: 102 MG/DL (ref 65–99)
GLUCOSE UR STRIP.AUTO-MCNC: NEGATIVE MG/DL
HCT VFR BLD AUTO: 34 % (ref 42–52)
HGB BLD-MCNC: 10.6 G/DL (ref 14–18)
IMM GRANULOCYTES # BLD AUTO: 0.01 K/UL (ref 0–0.11)
IMM GRANULOCYTES NFR BLD AUTO: 0.2 % (ref 0–0.9)
KETONES UR STRIP.AUTO-MCNC: NEGATIVE MG/DL
LEUKOCYTE ESTERASE UR QL STRIP.AUTO: NEGATIVE
LIPASE SERPL-CCNC: 31 U/L (ref 11–82)
LYMPHOCYTES # BLD AUTO: 1.6 K/UL (ref 1–4.8)
LYMPHOCYTES NFR BLD: 33.6 % (ref 22–41)
MCH RBC QN AUTO: 27 PG (ref 27–33)
MCHC RBC AUTO-ENTMCNC: 31.2 G/DL (ref 32.3–36.5)
MCV RBC AUTO: 86.7 FL (ref 81.4–97.8)
MICRO URNS: NORMAL
MONOCYTES # BLD AUTO: 0.45 K/UL (ref 0–0.85)
MONOCYTES NFR BLD AUTO: 9.5 % (ref 0–13.4)
NEUTROPHILS # BLD AUTO: 2.5 K/UL (ref 1.82–7.42)
NEUTROPHILS NFR BLD: 52.5 % (ref 44–72)
NITRITE UR QL STRIP.AUTO: NEGATIVE
NRBC # BLD AUTO: 0 K/UL
NRBC BLD-RTO: 0 /100 WBC (ref 0–0.2)
PH UR STRIP.AUTO: 6 [PH] (ref 5–8)
PLATELET # BLD AUTO: 364 K/UL (ref 164–446)
PMV BLD AUTO: 8.9 FL (ref 9–12.9)
POTASSIUM SERPL-SCNC: 4.2 MMOL/L (ref 3.6–5.5)
PROT SERPL-MCNC: 6.3 G/DL (ref 6–8.2)
PROT UR QL STRIP: NEGATIVE MG/DL
RBC # BLD AUTO: 3.92 M/UL (ref 4.7–6.1)
RBC UR QL AUTO: NEGATIVE
SODIUM SERPL-SCNC: 143 MMOL/L (ref 135–145)
SP GR UR STRIP.AUTO: 1.02
UROBILINOGEN UR STRIP.AUTO-MCNC: 0.2 MG/DL
WBC # BLD AUTO: 4.8 K/UL (ref 4.8–10.8)

## 2023-08-26 PROCEDURE — 700111 HCHG RX REV CODE 636 W/ 250 OVERRIDE (IP): Mod: JZ | Performed by: STUDENT IN AN ORGANIZED HEALTH CARE EDUCATION/TRAINING PROGRAM

## 2023-08-26 PROCEDURE — 96375 TX/PRO/DX INJ NEW DRUG ADDON: CPT

## 2023-08-26 PROCEDURE — 81003 URINALYSIS AUTO W/O SCOPE: CPT

## 2023-08-26 PROCEDURE — 96376 TX/PRO/DX INJ SAME DRUG ADON: CPT

## 2023-08-26 PROCEDURE — 700105 HCHG RX REV CODE 258: Performed by: STUDENT IN AN ORGANIZED HEALTH CARE EDUCATION/TRAINING PROGRAM

## 2023-08-26 PROCEDURE — 700102 HCHG RX REV CODE 250 W/ 637 OVERRIDE(OP): Performed by: STUDENT IN AN ORGANIZED HEALTH CARE EDUCATION/TRAINING PROGRAM

## 2023-08-26 PROCEDURE — 99285 EMERGENCY DEPT VISIT HI MDM: CPT

## 2023-08-26 PROCEDURE — 85025 COMPLETE CBC W/AUTO DIFF WBC: CPT

## 2023-08-26 PROCEDURE — 83690 ASSAY OF LIPASE: CPT

## 2023-08-26 PROCEDURE — 700117 HCHG RX CONTRAST REV CODE 255: Performed by: STUDENT IN AN ORGANIZED HEALTH CARE EDUCATION/TRAINING PROGRAM

## 2023-08-26 PROCEDURE — A9270 NON-COVERED ITEM OR SERVICE: HCPCS | Performed by: STUDENT IN AN ORGANIZED HEALTH CARE EDUCATION/TRAINING PROGRAM

## 2023-08-26 PROCEDURE — 96374 THER/PROPH/DIAG INJ IV PUSH: CPT

## 2023-08-26 PROCEDURE — 36415 COLL VENOUS BLD VENIPUNCTURE: CPT

## 2023-08-26 PROCEDURE — 74177 CT ABD & PELVIS W/CONTRAST: CPT

## 2023-08-26 PROCEDURE — 80053 COMPREHEN METABOLIC PANEL: CPT

## 2023-08-26 RX ORDER — ONDANSETRON 2 MG/ML
4 INJECTION INTRAMUSCULAR; INTRAVENOUS ONCE
Status: COMPLETED | OUTPATIENT
Start: 2023-08-26 | End: 2023-08-26

## 2023-08-26 RX ORDER — ACETAMINOPHEN 500 MG
1000 TABLET ORAL ONCE
Status: COMPLETED | OUTPATIENT
Start: 2023-08-26 | End: 2023-08-26

## 2023-08-26 RX ORDER — SODIUM CHLORIDE, SODIUM LACTATE, POTASSIUM CHLORIDE, CALCIUM CHLORIDE 600; 310; 30; 20 MG/100ML; MG/100ML; MG/100ML; MG/100ML
1000 INJECTION, SOLUTION INTRAVENOUS ONCE
Status: COMPLETED | OUTPATIENT
Start: 2023-08-26 | End: 2023-08-26

## 2023-08-26 RX ORDER — HYDROMORPHONE HYDROCHLORIDE 1 MG/ML
1 INJECTION, SOLUTION INTRAMUSCULAR; INTRAVENOUS; SUBCUTANEOUS ONCE
Status: COMPLETED | OUTPATIENT
Start: 2023-08-26 | End: 2023-08-26

## 2023-08-26 RX ADMIN — SODIUM CHLORIDE, POTASSIUM CHLORIDE, SODIUM LACTATE AND CALCIUM CHLORIDE 1000 ML: 600; 310; 30; 20 INJECTION, SOLUTION INTRAVENOUS at 03:02

## 2023-08-26 RX ADMIN — ONDANSETRON 4 MG: 2 INJECTION INTRAMUSCULAR; INTRAVENOUS at 03:00

## 2023-08-26 RX ADMIN — ACETAMINOPHEN 1000 MG: 500 TABLET, FILM COATED ORAL at 03:00

## 2023-08-26 RX ADMIN — HYDROMORPHONE HYDROCHLORIDE 1 MG: 1 INJECTION, SOLUTION INTRAMUSCULAR; INTRAVENOUS; SUBCUTANEOUS at 03:51

## 2023-08-26 RX ADMIN — IOHEXOL 95 ML: 350 INJECTION, SOLUTION INTRAVENOUS at 04:45

## 2023-08-26 RX ADMIN — HYDROMORPHONE HYDROCHLORIDE 1 MG: 1 INJECTION, SOLUTION INTRAMUSCULAR; INTRAVENOUS; SUBCUTANEOUS at 03:01

## 2023-08-26 ASSESSMENT — FIBROSIS 4 INDEX: FIB4 SCORE: 0.46

## 2023-08-26 ASSESSMENT — PAIN DESCRIPTION - PAIN TYPE
TYPE: ACUTE PAIN
TYPE: ACUTE PAIN

## 2023-08-26 NOTE — ED NOTES
Notified to the provider that pt is still having abdominal pain,Pain: 9/10 scale  Awaiting for order

## 2023-08-26 NOTE — DISCHARGE INSTRUCTIONS
You were seen in the ER for abdominal pain. Your CT scan showed no signs of appendicitis. Take tylenol for pain. Return to ER for worsening pain, fever, persistently vomiting or any other concerns.

## 2023-08-26 NOTE — ED NOTES
Checked on bed, connected to monitor,  with unlabored respirations. Vital signs is stable.   Denied any new complaints. No current needs identified.  Gurney in low position, side rail up for pt safety. Call light within reach.

## 2023-08-26 NOTE — Clinical Note
Roberto Barswell was seen and treated in our emergency department on 8/26/2023.  He may return to work on 08/28/2023.       If you have any questions or concerns, please don't hesitate to call.      Shawna Chou M.D.

## 2023-08-26 NOTE — ED NOTES
Pt discharged to home. Discharge paperwork provided. Education provided by ERP. Reinforced discharge instructions.  Pt was given follow up instructions  Pt verbalized understanding of all instructions for discharge.   Patient went out of the ER ambulatory with steady gait., alert and oriented x 4, with all belongings.

## 2023-08-26 NOTE — ED TRIAGE NOTES
"Chief Complaint   Patient presents with    Abdominal Pain     The pt reports lower right abd pain that started 10pm, suppen onset. The pain is stabby, 10/10, radiates to back. The pt reports N/V and pain upon inspiration.        Pt wheelchair to triage. Pt A&Ox4, for the above complaint.     Pt to lobby . Pt educated on alerting staff in changes to condition. Pt verbalized understanding. Protocol abd pain ordered.     BP (!) 132/105   Pulse 83   Temp 36.4 °C (97.5 °F) (Temporal)   Resp 18   Ht 1.93 m (6' 4\")   Wt 104 kg (230 lb)   SpO2 100%   BMI 28.00 kg/m²     "

## 2023-09-09 ENCOUNTER — HOSPITAL ENCOUNTER (EMERGENCY)
Facility: MEDICAL CENTER | Age: 43
End: 2023-09-09
Attending: STUDENT IN AN ORGANIZED HEALTH CARE EDUCATION/TRAINING PROGRAM
Payer: COMMERCIAL

## 2023-09-09 ENCOUNTER — APPOINTMENT (OUTPATIENT)
Dept: RADIOLOGY | Facility: MEDICAL CENTER | Age: 43
End: 2023-09-09
Attending: STUDENT IN AN ORGANIZED HEALTH CARE EDUCATION/TRAINING PROGRAM
Payer: COMMERCIAL

## 2023-09-09 VITALS
SYSTOLIC BLOOD PRESSURE: 143 MMHG | BODY MASS INDEX: 28.01 KG/M2 | DIASTOLIC BLOOD PRESSURE: 63 MMHG | RESPIRATION RATE: 18 BRPM | OXYGEN SATURATION: 94 % | TEMPERATURE: 98 F | WEIGHT: 230 LBS | HEIGHT: 76 IN | HEART RATE: 80 BPM

## 2023-09-09 DIAGNOSIS — M79.89 LEG SWELLING: ICD-10-CM

## 2023-09-09 DIAGNOSIS — D64.9 ANEMIA, UNSPECIFIED TYPE: ICD-10-CM

## 2023-09-09 LAB
ALBUMIN SERPL BCP-MCNC: 4 G/DL (ref 3.2–4.9)
ALBUMIN/GLOB SERPL: 2 G/DL
ALP SERPL-CCNC: 39 U/L (ref 30–99)
ALT SERPL-CCNC: 18 U/L (ref 2–50)
ANION GAP SERPL CALC-SCNC: 11 MMOL/L (ref 7–16)
APPEARANCE UR: CLEAR
AST SERPL-CCNC: 16 U/L (ref 12–45)
BASOPHILS # BLD AUTO: 1.5 % (ref 0–1.8)
BASOPHILS # BLD: 0.09 K/UL (ref 0–0.12)
BILIRUB SERPL-MCNC: 0.2 MG/DL (ref 0.1–1.5)
BILIRUB UR QL STRIP.AUTO: NEGATIVE
BUN SERPL-MCNC: 20 MG/DL (ref 8–22)
CALCIUM ALBUM COR SERPL-MCNC: 8.7 MG/DL (ref 8.5–10.5)
CALCIUM SERPL-MCNC: 8.7 MG/DL (ref 8.5–10.5)
CHLORIDE SERPL-SCNC: 107 MMOL/L (ref 96–112)
CO2 SERPL-SCNC: 23 MMOL/L (ref 20–33)
COLOR UR: YELLOW
CREAT SERPL-MCNC: 1.21 MG/DL (ref 0.5–1.4)
D DIMER PPP IA.FEU-MCNC: 0.32 UG/ML (FEU) (ref 0–0.5)
EKG IMPRESSION: NORMAL
EOSINOPHIL # BLD AUTO: 0.17 K/UL (ref 0–0.51)
EOSINOPHIL NFR BLD: 2.9 % (ref 0–6.9)
ERYTHROCYTE [DISTWIDTH] IN BLOOD BY AUTOMATED COUNT: 45 FL (ref 35.9–50)
GFR SERPLBLD CREATININE-BSD FMLA CKD-EPI: 77 ML/MIN/1.73 M 2
GLOBULIN SER CALC-MCNC: 2 G/DL (ref 1.9–3.5)
GLUCOSE SERPL-MCNC: 91 MG/DL (ref 65–99)
GLUCOSE UR STRIP.AUTO-MCNC: NEGATIVE MG/DL
HCT VFR BLD AUTO: 27.4 % (ref 42–52)
HGB BLD-MCNC: 8.4 G/DL (ref 14–18)
IMM GRANULOCYTES # BLD AUTO: 0.02 K/UL (ref 0–0.11)
IMM GRANULOCYTES NFR BLD AUTO: 0.3 % (ref 0–0.9)
KETONES UR STRIP.AUTO-MCNC: NEGATIVE MG/DL
LEUKOCYTE ESTERASE UR QL STRIP.AUTO: NEGATIVE
LYMPHOCYTES # BLD AUTO: 2.22 K/UL (ref 1–4.8)
LYMPHOCYTES NFR BLD: 37.6 % (ref 22–41)
MCH RBC QN AUTO: 25.8 PG (ref 27–33)
MCHC RBC AUTO-ENTMCNC: 30.7 G/DL (ref 32.3–36.5)
MCV RBC AUTO: 84 FL (ref 81.4–97.8)
MICRO URNS: NORMAL
MONOCYTES # BLD AUTO: 0.5 K/UL (ref 0–0.85)
MONOCYTES NFR BLD AUTO: 8.5 % (ref 0–13.4)
NEUTROPHILS # BLD AUTO: 2.91 K/UL (ref 1.82–7.42)
NEUTROPHILS NFR BLD: 49.2 % (ref 44–72)
NITRITE UR QL STRIP.AUTO: NEGATIVE
NRBC # BLD AUTO: 0 K/UL
NRBC BLD-RTO: 0 /100 WBC (ref 0–0.2)
NT-PROBNP SERPL IA-MCNC: 139 PG/ML (ref 0–125)
PH UR STRIP.AUTO: 6 [PH] (ref 5–8)
PLATELET # BLD AUTO: 413 K/UL (ref 164–446)
PMV BLD AUTO: 9 FL (ref 9–12.9)
POTASSIUM SERPL-SCNC: 3.9 MMOL/L (ref 3.6–5.5)
PROT SERPL-MCNC: 6 G/DL (ref 6–8.2)
PROT UR QL STRIP: NEGATIVE MG/DL
RBC # BLD AUTO: 3.26 M/UL (ref 4.7–6.1)
RBC UR QL AUTO: NEGATIVE
SODIUM SERPL-SCNC: 141 MMOL/L (ref 135–145)
SP GR UR STRIP.AUTO: 1.01
TROPONIN T SERPL-MCNC: 12 NG/L (ref 6–19)
UROBILINOGEN UR STRIP.AUTO-MCNC: 0.2 MG/DL
WBC # BLD AUTO: 5.9 K/UL (ref 4.8–10.8)

## 2023-09-09 PROCEDURE — A9270 NON-COVERED ITEM OR SERVICE: HCPCS | Performed by: STUDENT IN AN ORGANIZED HEALTH CARE EDUCATION/TRAINING PROGRAM

## 2023-09-09 PROCEDURE — 84484 ASSAY OF TROPONIN QUANT: CPT

## 2023-09-09 PROCEDURE — 80053 COMPREHEN METABOLIC PANEL: CPT

## 2023-09-09 PROCEDURE — 85025 COMPLETE CBC W/AUTO DIFF WBC: CPT

## 2023-09-09 PROCEDURE — 36415 COLL VENOUS BLD VENIPUNCTURE: CPT

## 2023-09-09 PROCEDURE — 71046 X-RAY EXAM CHEST 2 VIEWS: CPT

## 2023-09-09 PROCEDURE — 96374 THER/PROPH/DIAG INJ IV PUSH: CPT

## 2023-09-09 PROCEDURE — 700111 HCHG RX REV CODE 636 W/ 250 OVERRIDE (IP): Mod: JZ | Performed by: STUDENT IN AN ORGANIZED HEALTH CARE EDUCATION/TRAINING PROGRAM

## 2023-09-09 PROCEDURE — 81003 URINALYSIS AUTO W/O SCOPE: CPT

## 2023-09-09 PROCEDURE — 83880 ASSAY OF NATRIURETIC PEPTIDE: CPT

## 2023-09-09 PROCEDURE — 73630 X-RAY EXAM OF FOOT: CPT | Mod: LT

## 2023-09-09 PROCEDURE — 99284 EMERGENCY DEPT VISIT MOD MDM: CPT

## 2023-09-09 PROCEDURE — 85379 FIBRIN DEGRADATION QUANT: CPT

## 2023-09-09 PROCEDURE — 93005 ELECTROCARDIOGRAM TRACING: CPT | Performed by: STUDENT IN AN ORGANIZED HEALTH CARE EDUCATION/TRAINING PROGRAM

## 2023-09-09 PROCEDURE — 700102 HCHG RX REV CODE 250 W/ 637 OVERRIDE(OP): Performed by: STUDENT IN AN ORGANIZED HEALTH CARE EDUCATION/TRAINING PROGRAM

## 2023-09-09 RX ORDER — FUROSEMIDE 40 MG/1
20 TABLET ORAL DAILY
Qty: 2 TABLET | Refills: 0 | Status: ON HOLD | OUTPATIENT
Start: 2023-09-09 | End: 2023-09-16

## 2023-09-09 RX ORDER — ACETAMINOPHEN 325 MG/1
650 TABLET ORAL ONCE
Status: COMPLETED | OUTPATIENT
Start: 2023-09-09 | End: 2023-09-09

## 2023-09-09 RX ORDER — KETOROLAC TROMETHAMINE 30 MG/ML
30 INJECTION, SOLUTION INTRAMUSCULAR; INTRAVENOUS ONCE
Status: COMPLETED | OUTPATIENT
Start: 2023-09-09 | End: 2023-09-09

## 2023-09-09 RX ADMIN — KETOROLAC TROMETHAMINE 30 MG: 30 INJECTION, SOLUTION INTRAMUSCULAR at 23:05

## 2023-09-09 RX ADMIN — ACETAMINOPHEN 650 MG: 325 TABLET, FILM COATED ORAL at 23:06

## 2023-09-09 ASSESSMENT — PAIN DESCRIPTION - DESCRIPTORS: DESCRIPTORS: THROBBING

## 2023-09-09 ASSESSMENT — PAIN SCALES - WONG BAKER: WONGBAKER_NUMERICALRESPONSE: HURTS EVEN MORE

## 2023-09-09 ASSESSMENT — FIBROSIS 4 INDEX: FIB4 SCORE: 0.52

## 2023-09-10 NOTE — ED TRIAGE NOTES
"Chief Complaint   Patient presents with    Foot Pain     Pt reports pain and swelling to right left foot. Pt stated; left foot region has greater swelling and pain. Symptoms appeared x 2 days ago. Denies trauma/falls.        42 y/o male ambulatory to triage for above complaint. Aaox4. Reports throbbing pain to left foot 7/10.     Pt place in waiting area. Educated on triage process. Pt will inform staff of any medical changes.    BP (!) 157/71   Pulse 84   Temp 36.6 °C (97.9 °F) (Temporal)   Resp 17   Ht 1.93 m (6' 4\")   Wt 104 kg (230 lb)   SpO2 99%   BMI 28.00 kg/m²     "

## 2023-09-10 NOTE — DISCHARGE INSTRUCTIONS
We have given you a short course of Lasix which you should take in the morning time.  If you develop chest pain, trouble breathing, passing out, fever, abdominal pain you should return to the emergency room.

## 2023-09-10 NOTE — ED PROVIDER NOTES
ED Provider Note    CHIEF COMPLAINT  Chief Complaint   Patient presents with    Foot Pain     Pt reports pain and swelling to right left foot. Pt stated; left foot region has greater swelling and pain. Symptoms appeared x 2 days ago. Denies trauma/falls.        EXTERNAL RECORDS REVIEWED      HPI/ROS  LIMITATION TO HISTORY     OUTSIDE HISTORIAN(S):      Roberto Braswell is a 43 y.o. male who presents bilateral foot swelling and pain.  Patient says that 2 days ago he noticed swelling in both of his feet with some pain while walking.  Patient says swelling and pain has been more prominent in his left foot and has wife and father encouraged him to come to the emergency room.  Patient denies recent trauma.  Patient denies drug or alcohol use.  Patient said he had 20-minute episode of sharp right-sided chest pain 1 day ago.  Patient otherwise denies chest pain, shortness of breath, abdominal pain, vomiting, diarrhea, bloody stools, black stools, fever, chills.    PAST MEDICAL HISTORY   has a past medical history of Arthritis, Drug-seeking behavior (7/30/2023), Duodenal ulcer (7/30/2023), Gastric ulcer, and Pain.    SURGICAL HISTORY   has a past surgical history that includes other orthopedic surgery; other abdominal surgery; ankle arthroscopy (Right, 5/21/2018); biopsy ortho (Right, 5/21/2018); upper gi endoscopy,diagnosis (3/28/2022); upper gi endoscopy,biopsy (3/28/2022); and upper gi endoscopy,biopsy (N/A, 7/30/2023).    FAMILY HISTORY  History reviewed. No pertinent family history.    SOCIAL HISTORY  Social History     Tobacco Use    Smoking status: Never    Smokeless tobacco: Never   Vaping Use    Vaping Use: Never used   Substance and Sexual Activity    Alcohol use: Yes     Comment: occ    Drug use: Never    Sexual activity: Not on file       CURRENT MEDICATIONS  Home Medications       Reviewed by Ramana Roberto R.N. (Registered Nurse) on 09/09/23 at 1950  Med List Status: Not Addressed     Medication Last  "Dose Status   ASPIRIN-ACETAMINOPHEN-CAFFEINE PO  Active   omeprazole (PRILOSEC) 40 MG delayed-release capsule  Active   sucralfate (CARAFATE) 1 GM/10ML Suspension  Active                    ALLERGIES  Allergies   Allergen Reactions    Tramadol Unspecified     Seizure  [id number]    Morphine Vomiting       PHYSICAL EXAM  VITAL SIGNS: BP (!) 143/63   Pulse 80   Temp 36.7 °C (98 °F)   Resp 18   Ht 1.93 m (6' 4\")   Wt 104 kg (230 lb)   SpO2 94%   BMI 28.00 kg/m²    No acute distress, clear bilateral breath sounds,, normal heart sounds, abdomen soft nontender, chest wall nontender, gross motor function sensation intact, bilateral pitting edema below  ankles more prominent on the left, equal DP/PT, compartment soft    DIAGNOSTIC STUDIES / PROCEDURES  EKG  I have independently interpreted this EKG  Results for orders placed or performed during the hospital encounter of 23   EKG (NOW)   Result Value Ref Range    Report       [facility] Emergency Dept.    Test Date:  2023  Pt Name:    PATI MERCEDES    Department: ER  MRN:        [id number]                      Room:       [facility]  Gender:     Male                         Technician: [id number]  :        1980                   Requested By:FISH MACE  Order #:    [id number]                    Reading MD: Fish Mace    Measurements  Intervals                                Axis  Rate:       82                           P:          44  NV:         177                          QRS:        -11  QRSD:       83                           T:          40  QT:         360  QTc:        421    Interpretive Statements  Interpreted by me: Sinus rhythm, rate 82, normal intervals no signs of acute  ischemia  Electronically Signed On 2023 22:39:16 PDT by Fish Mace           LABS  Labs Reviewed   PROBRAIN NATRIURETIC PEPTIDE, NT - Abnormal; Notable for the following components:       Result Value    NT-proBNP " 139 (*)     All other components within normal limits    Narrative:     Biotin intake of greater than 5 mg per day may interfere with  troponin levels, causing false low values.   CBC WITH DIFFERENTIAL - Abnormal; Notable for the following components:    RBC 3.26 (*)     Hemoglobin 8.4 (*)     Hematocrit 27.4 (*)     MCH 25.8 (*)     MCHC 30.7 (*)     All other components within normal limits    Narrative:     Biotin intake of greater than 5 mg per day may interfere with  troponin levels, causing false low values.   TROPONIN    Narrative:     Biotin intake of greater than 5 mg per day may interfere with  troponin levels, causing false low values.   COMP METABOLIC PANEL    Narrative:     Biotin intake of greater than 5 mg per day may interfere with  troponin levels, causing false low values.   D-DIMER    Narrative:     Biotin intake of greater than 5 mg per day may interfere with  troponin levels, causing false low values.   ESTIMATED GFR    Narrative:     Biotin intake of greater than 5 mg per day may interfere with  troponin levels, causing false low values.   URINALYSIS,CULTURE IF INDICATED    Narrative:     Indication for culture:->Patient WITHOUT an indwelling Bai  catheter in place with new onset of Dysuria, Frequency,  Urgency, and/or Suprapubic pain  Release to patient->Immediate         RADIOLOGY  I have independently interpreted the diagnostic imaging associated with this visit and am waiting the final reading from the radiologist.   My preliminary interpretation is as follows: No pneumothorax  Radiologist interpretation:   DX-FOOT-COMPLETE 3+ LEFT   Final Result      No evidence of fracture or dislocation.      DX-CHEST-2 VIEWS   Final Result      No active disease.            COURSE & MEDICAL DECISION MAKING    ED Observation Status?     INITIAL ASSESSMENT, COURSE AND PLAN  Care Narrative: Differential includes heart failure, kidney disease, septic arthritis, DVT.  Patient low risk for DVT this is less  likely as she has bilateral swelling no significant calf or thigh swelling  given patient's sharp pleuritic chest pain as well as lower extremity swelling will obtain D-dimer to risk stratify.  Doubt septic arthritis as his symptoms are bilateral patient is afebrile able to range ankle joint.  Obtain blood work to assess for liver disease, heart failure, metabolic arrangements as well as ECG and troponin given patient's other chest pain.    ECG nonischemic.  Troponin normal range.  D-dimer normal range.  Blood work notable for drop in hemoglobin.  Patient did have brief bout of right flank pain, abdomen nontender, nondistended.  Discussed with patient history of bleeding ulcers drop in hemoglobin to return immediately to the Emergency Department with intractable pain, bloody stools, chest pain, shortness of breath.  Patient taking PPI.  Placed order for outpatient CBC.  Given slight elevation in BNP and bilateral lower extremity swelling patient given low-dose of diuretic.  No hypoxemia, respiratory stress or pulmonary edema on chest x-ray.  Discussed with patient need to closely follow-up with primary care provider regarding lower extremity swelling and abnormal blood work.  Patient comfortable with return precautions and outpatient follow-up plan.      ADDITIONAL PROBLEM LIST    DISPOSITION AND   FINAL DIAGNOSIS  1. Leg swelling    2. Anemia, unspecified type           Electronically signed by: Fish Mace D.O., 9/9/2023 9:02 PM

## 2023-09-13 ENCOUNTER — HOSPITAL ENCOUNTER (INPATIENT)
Facility: MEDICAL CENTER | Age: 43
LOS: 3 days | DRG: 378 | End: 2023-09-16
Attending: EMERGENCY MEDICINE | Admitting: INTERNAL MEDICINE
Payer: COMMERCIAL

## 2023-09-13 ENCOUNTER — APPOINTMENT (OUTPATIENT)
Dept: RADIOLOGY | Facility: MEDICAL CENTER | Age: 43
DRG: 378 | End: 2023-09-13
Attending: EMERGENCY MEDICINE
Payer: COMMERCIAL

## 2023-09-13 DIAGNOSIS — K92.2 UPPER GI BLEED: ICD-10-CM

## 2023-09-13 DIAGNOSIS — K26.9 DUODENAL ULCER: ICD-10-CM

## 2023-09-13 DIAGNOSIS — I34.0 MITRAL VALVE INSUFFICIENCY, UNSPECIFIED ETIOLOGY: ICD-10-CM

## 2023-09-13 DIAGNOSIS — D64.9 ANEMIA, UNSPECIFIED TYPE: ICD-10-CM

## 2023-09-13 DIAGNOSIS — K92.0 HEMATEMESIS WITH NAUSEA: ICD-10-CM

## 2023-09-13 DIAGNOSIS — M79.89 LEG SWELLING: ICD-10-CM

## 2023-09-13 DIAGNOSIS — R10.13 EPIGASTRIC ABDOMINAL PAIN: ICD-10-CM

## 2023-09-13 DIAGNOSIS — K92.2 GASTROINTESTINAL HEMORRHAGE, UNSPECIFIED GASTROINTESTINAL HEMORRHAGE TYPE: ICD-10-CM

## 2023-09-13 DIAGNOSIS — R10.13 EPIGASTRIC PAIN: ICD-10-CM

## 2023-09-13 DIAGNOSIS — K26.4 DUODENAL ULCER WITH HEMORRHAGE: ICD-10-CM

## 2023-09-13 LAB
ABO GROUP BLD: NORMAL
ALBUMIN SERPL BCP-MCNC: 4.3 G/DL (ref 3.2–4.9)
ALBUMIN/GLOB SERPL: 2.2 G/DL
ALP SERPL-CCNC: 41 U/L (ref 30–99)
ALT SERPL-CCNC: 24 U/L (ref 2–50)
ANION GAP SERPL CALC-SCNC: 12 MMOL/L (ref 7–16)
APTT PPP: 26.7 SEC (ref 24.7–36)
AST SERPL-CCNC: 18 U/L (ref 12–45)
BASOPHILS # BLD AUTO: 1.4 % (ref 0–1.8)
BASOPHILS # BLD: 0.11 K/UL (ref 0–0.12)
BILIRUB SERPL-MCNC: 0.2 MG/DL (ref 0.1–1.5)
BLD GP AB SCN SERPL QL: NORMAL
BUN SERPL-MCNC: 18 MG/DL (ref 8–22)
CALCIUM ALBUM COR SERPL-MCNC: 9.1 MG/DL (ref 8.5–10.5)
CALCIUM SERPL-MCNC: 9.3 MG/DL (ref 8.5–10.5)
CHLORIDE SERPL-SCNC: 109 MMOL/L (ref 96–112)
CO2 SERPL-SCNC: 20 MMOL/L (ref 20–33)
CREAT SERPL-MCNC: 1.16 MG/DL (ref 0.5–1.4)
EOSINOPHIL # BLD AUTO: 0.04 K/UL (ref 0–0.51)
EOSINOPHIL NFR BLD: 0.5 % (ref 0–6.9)
ERYTHROCYTE [DISTWIDTH] IN BLOOD BY AUTOMATED COUNT: 48.1 FL (ref 35.9–50)
GFR SERPLBLD CREATININE-BSD FMLA CKD-EPI: 81 ML/MIN/1.73 M 2
GLOBULIN SER CALC-MCNC: 2 G/DL (ref 1.9–3.5)
GLUCOSE SERPL-MCNC: 93 MG/DL (ref 65–99)
HCT VFR BLD AUTO: 29 % (ref 42–52)
HGB BLD-MCNC: 8 G/DL (ref 14–18)
HGB BLD-MCNC: 8.5 G/DL (ref 14–18)
IMM GRANULOCYTES # BLD AUTO: 0.03 K/UL (ref 0–0.11)
IMM GRANULOCYTES NFR BLD AUTO: 0.4 % (ref 0–0.9)
INR PPP: 1.09 (ref 0.87–1.13)
LACTATE SERPL-SCNC: 1 MMOL/L (ref 0.5–2)
LIPASE SERPL-CCNC: 28 U/L (ref 11–82)
LYMPHOCYTES # BLD AUTO: 2.19 K/UL (ref 1–4.8)
LYMPHOCYTES NFR BLD: 27.6 % (ref 22–41)
MCH RBC QN AUTO: 24.6 PG (ref 27–33)
MCHC RBC AUTO-ENTMCNC: 29.3 G/DL (ref 32.3–36.5)
MCV RBC AUTO: 83.8 FL (ref 81.4–97.8)
MONOCYTES # BLD AUTO: 0.83 K/UL (ref 0–0.85)
MONOCYTES NFR BLD AUTO: 10.5 % (ref 0–13.4)
NEUTROPHILS # BLD AUTO: 4.73 K/UL (ref 1.82–7.42)
NEUTROPHILS NFR BLD: 59.6 % (ref 44–72)
NRBC # BLD AUTO: 0 K/UL
NRBC BLD-RTO: 0 /100 WBC (ref 0–0.2)
PLATELET # BLD AUTO: 475 K/UL (ref 164–446)
PMV BLD AUTO: 9.2 FL (ref 9–12.9)
POTASSIUM SERPL-SCNC: 4.2 MMOL/L (ref 3.6–5.5)
PROT SERPL-MCNC: 6.3 G/DL (ref 6–8.2)
PROTHROMBIN TIME: 14.3 SEC (ref 12–14.6)
RBC # BLD AUTO: 3.46 M/UL (ref 4.7–6.1)
RH BLD: NORMAL
SODIUM SERPL-SCNC: 141 MMOL/L (ref 135–145)
WBC # BLD AUTO: 7.9 K/UL (ref 4.8–10.8)

## 2023-09-13 PROCEDURE — 85018 HEMOGLOBIN: CPT

## 2023-09-13 PROCEDURE — 85025 COMPLETE CBC W/AUTO DIFF WBC: CPT

## 2023-09-13 PROCEDURE — 86901 BLOOD TYPING SEROLOGIC RH(D): CPT

## 2023-09-13 PROCEDURE — 80053 COMPREHEN METABOLIC PANEL: CPT

## 2023-09-13 PROCEDURE — 96376 TX/PRO/DX INJ SAME DRUG ADON: CPT

## 2023-09-13 PROCEDURE — 71045 X-RAY EXAM CHEST 1 VIEW: CPT

## 2023-09-13 PROCEDURE — 86900 BLOOD TYPING SEROLOGIC ABO: CPT

## 2023-09-13 PROCEDURE — 700117 HCHG RX CONTRAST REV CODE 255: Performed by: EMERGENCY MEDICINE

## 2023-09-13 PROCEDURE — 83605 ASSAY OF LACTIC ACID: CPT

## 2023-09-13 PROCEDURE — 85610 PROTHROMBIN TIME: CPT

## 2023-09-13 PROCEDURE — 85730 THROMBOPLASTIN TIME PARTIAL: CPT

## 2023-09-13 PROCEDURE — 83690 ASSAY OF LIPASE: CPT

## 2023-09-13 PROCEDURE — 700105 HCHG RX REV CODE 258: Performed by: INTERNAL MEDICINE

## 2023-09-13 PROCEDURE — 700111 HCHG RX REV CODE 636 W/ 250 OVERRIDE (IP): Performed by: EMERGENCY MEDICINE

## 2023-09-13 PROCEDURE — 36415 COLL VENOUS BLD VENIPUNCTURE: CPT

## 2023-09-13 PROCEDURE — 700102 HCHG RX REV CODE 250 W/ 637 OVERRIDE(OP): Performed by: EMERGENCY MEDICINE

## 2023-09-13 PROCEDURE — 86850 RBC ANTIBODY SCREEN: CPT

## 2023-09-13 PROCEDURE — 74177 CT ABD & PELVIS W/CONTRAST: CPT

## 2023-09-13 PROCEDURE — 99285 EMERGENCY DEPT VISIT HI MDM: CPT

## 2023-09-13 PROCEDURE — A9270 NON-COVERED ITEM OR SERVICE: HCPCS | Performed by: EMERGENCY MEDICINE

## 2023-09-13 PROCEDURE — 96375 TX/PRO/DX INJ NEW DRUG ADDON: CPT

## 2023-09-13 PROCEDURE — 96374 THER/PROPH/DIAG INJ IV PUSH: CPT

## 2023-09-13 PROCEDURE — 99223 1ST HOSP IP/OBS HIGH 75: CPT | Performed by: INTERNAL MEDICINE

## 2023-09-13 PROCEDURE — C9113 INJ PANTOPRAZOLE SODIUM, VIA: HCPCS | Performed by: EMERGENCY MEDICINE

## 2023-09-13 PROCEDURE — 770020 HCHG ROOM/CARE - TELE (206)

## 2023-09-13 PROCEDURE — 700105 HCHG RX REV CODE 258: Performed by: EMERGENCY MEDICINE

## 2023-09-13 RX ORDER — PROMETHAZINE HYDROCHLORIDE 25 MG/1
12.5-25 SUPPOSITORY RECTAL EVERY 4 HOURS PRN
Status: DISCONTINUED | OUTPATIENT
Start: 2023-09-13 | End: 2023-09-16 | Stop reason: HOSPADM

## 2023-09-13 RX ORDER — OXYCODONE HYDROCHLORIDE 5 MG/1
5 TABLET ORAL
Status: DISCONTINUED | OUTPATIENT
Start: 2023-09-13 | End: 2023-09-14

## 2023-09-13 RX ORDER — ONDANSETRON 4 MG/1
4 TABLET, ORALLY DISINTEGRATING ORAL EVERY 4 HOURS PRN
Status: DISCONTINUED | OUTPATIENT
Start: 2023-09-13 | End: 2023-09-16 | Stop reason: HOSPADM

## 2023-09-13 RX ORDER — HYDROMORPHONE HYDROCHLORIDE 1 MG/ML
0.5 INJECTION, SOLUTION INTRAMUSCULAR; INTRAVENOUS; SUBCUTANEOUS
Status: DISCONTINUED | OUTPATIENT
Start: 2023-09-13 | End: 2023-09-14

## 2023-09-13 RX ORDER — SODIUM CHLORIDE 9 MG/ML
INJECTION, SOLUTION INTRAVENOUS CONTINUOUS
Status: DISCONTINUED | OUTPATIENT
Start: 2023-09-13 | End: 2023-09-16

## 2023-09-13 RX ORDER — PANTOPRAZOLE SODIUM 40 MG/10ML
40 INJECTION, POWDER, LYOPHILIZED, FOR SOLUTION INTRAVENOUS 2 TIMES DAILY
Status: DISCONTINUED | OUTPATIENT
Start: 2023-09-14 | End: 2023-09-14

## 2023-09-13 RX ORDER — LABETALOL HYDROCHLORIDE 5 MG/ML
10 INJECTION, SOLUTION INTRAVENOUS EVERY 4 HOURS PRN
Status: DISCONTINUED | OUTPATIENT
Start: 2023-09-13 | End: 2023-09-16 | Stop reason: HOSPADM

## 2023-09-13 RX ORDER — ONDANSETRON 2 MG/ML
4 INJECTION INTRAMUSCULAR; INTRAVENOUS ONCE
Status: COMPLETED | OUTPATIENT
Start: 2023-09-13 | End: 2023-09-13

## 2023-09-13 RX ORDER — BISACODYL 10 MG
10 SUPPOSITORY, RECTAL RECTAL
Status: DISCONTINUED | OUTPATIENT
Start: 2023-09-13 | End: 2023-09-16 | Stop reason: HOSPADM

## 2023-09-13 RX ORDER — ACETAMINOPHEN 325 MG/1
650 TABLET ORAL EVERY 6 HOURS PRN
Status: DISCONTINUED | OUTPATIENT
Start: 2023-09-13 | End: 2023-09-16 | Stop reason: HOSPADM

## 2023-09-13 RX ORDER — HYDROMORPHONE HYDROCHLORIDE 1 MG/ML
0.5 INJECTION, SOLUTION INTRAMUSCULAR; INTRAVENOUS; SUBCUTANEOUS ONCE
Status: COMPLETED | OUTPATIENT
Start: 2023-09-13 | End: 2023-09-13

## 2023-09-13 RX ORDER — AMOXICILLIN 250 MG
2 CAPSULE ORAL 2 TIMES DAILY
Status: DISCONTINUED | OUTPATIENT
Start: 2023-09-14 | End: 2023-09-16 | Stop reason: HOSPADM

## 2023-09-13 RX ORDER — ONDANSETRON 2 MG/ML
4 INJECTION INTRAMUSCULAR; INTRAVENOUS EVERY 4 HOURS PRN
Status: DISCONTINUED | OUTPATIENT
Start: 2023-09-13 | End: 2023-09-16 | Stop reason: HOSPADM

## 2023-09-13 RX ORDER — PROMETHAZINE HYDROCHLORIDE 25 MG/1
12.5-25 TABLET ORAL EVERY 4 HOURS PRN
Status: DISCONTINUED | OUTPATIENT
Start: 2023-09-13 | End: 2023-09-16 | Stop reason: HOSPADM

## 2023-09-13 RX ORDER — POLYETHYLENE GLYCOL 3350 17 G/17G
1 POWDER, FOR SOLUTION ORAL
Status: DISCONTINUED | OUTPATIENT
Start: 2023-09-13 | End: 2023-09-16 | Stop reason: HOSPADM

## 2023-09-13 RX ORDER — PROCHLORPERAZINE EDISYLATE 5 MG/ML
5-10 INJECTION INTRAMUSCULAR; INTRAVENOUS EVERY 4 HOURS PRN
Status: DISCONTINUED | OUTPATIENT
Start: 2023-09-13 | End: 2023-09-16 | Stop reason: HOSPADM

## 2023-09-13 RX ORDER — OXYCODONE HYDROCHLORIDE 10 MG/1
10 TABLET ORAL
Status: DISCONTINUED | OUTPATIENT
Start: 2023-09-13 | End: 2023-09-14

## 2023-09-13 RX ADMIN — SODIUM CHLORIDE: 9 INJECTION, SOLUTION INTRAVENOUS at 22:10

## 2023-09-13 RX ADMIN — ONDANSETRON 4 MG: 2 INJECTION INTRAMUSCULAR; INTRAVENOUS at 17:58

## 2023-09-13 RX ADMIN — HYDROMORPHONE HYDROCHLORIDE 0.5 MG: 1 INJECTION, SOLUTION INTRAMUSCULAR; INTRAVENOUS; SUBCUTANEOUS at 19:00

## 2023-09-13 RX ADMIN — PANTOPRAZOLE SODIUM 80 MG: 40 INJECTION, POWDER, FOR SOLUTION INTRAVENOUS at 19:02

## 2023-09-13 RX ADMIN — HYDROMORPHONE HYDROCHLORIDE 0.5 MG: 1 INJECTION, SOLUTION INTRAMUSCULAR; INTRAVENOUS; SUBCUTANEOUS at 20:53

## 2023-09-13 RX ADMIN — LIDOCAINE HYDROCHLORIDE 30 ML: 20 SOLUTION OROPHARYNGEAL at 17:58

## 2023-09-13 RX ADMIN — IOHEXOL 100 ML: 350 INJECTION, SOLUTION INTRAVENOUS at 18:30

## 2023-09-13 ASSESSMENT — COGNITIVE AND FUNCTIONAL STATUS - GENERAL
MOBILITY SCORE: 23
DAILY ACTIVITIY SCORE: 24
SUGGESTED CMS G CODE MODIFIER MOBILITY: CI
CLIMB 3 TO 5 STEPS WITH RAILING: A LITTLE
SUGGESTED CMS G CODE MODIFIER DAILY ACTIVITY: CH

## 2023-09-13 ASSESSMENT — LIFESTYLE VARIABLES
DOES PATIENT WANT TO STOP DRINKING: NO
TOTAL SCORE: 0
HAVE YOU EVER FELT YOU SHOULD CUT DOWN ON YOUR DRINKING: NO
CONSUMPTION TOTAL: NEGATIVE
HAVE PEOPLE ANNOYED YOU BY CRITICIZING YOUR DRINKING: NO
TOTAL SCORE: 0
ON A TYPICAL DAY WHEN YOU DRINK ALCOHOL HOW MANY DRINKS DO YOU HAVE: 0
AVERAGE NUMBER OF DAYS PER WEEK YOU HAVE A DRINK CONTAINING ALCOHOL: 0
HOW MANY TIMES IN THE PAST YEAR HAVE YOU HAD 5 OR MORE DRINKS IN A DAY: 0
EVER HAD A DRINK FIRST THING IN THE MORNING TO STEADY YOUR NERVES TO GET RID OF A HANGOVER: NO
TOTAL SCORE: 0
ALCOHOL_USE: NO
EVER FELT BAD OR GUILTY ABOUT YOUR DRINKING: NO

## 2023-09-13 ASSESSMENT — PAIN DESCRIPTION - PAIN TYPE: TYPE: ACUTE PAIN

## 2023-09-13 ASSESSMENT — PATIENT HEALTH QUESTIONNAIRE - PHQ9
SUM OF ALL RESPONSES TO PHQ9 QUESTIONS 1 AND 2: 0
2. FEELING DOWN, DEPRESSED, IRRITABLE, OR HOPELESS: NOT AT ALL
1. LITTLE INTEREST OR PLEASURE IN DOING THINGS: NOT AT ALL

## 2023-09-13 ASSESSMENT — FIBROSIS 4 INDEX: FIB4 SCORE: 0.38

## 2023-09-13 NOTE — ED NOTES
Chief Complaint   Patient presents with    Blood in Vomit     History of bleeding ulcer, recently d/c from hospital    Abdominal Pain     Pt ambulated to triage with above complaints. Pt said that he was discharged couple days ago for GI bleed.   Discharge H/H 8.4/27.4

## 2023-09-14 ENCOUNTER — ANESTHESIA EVENT (OUTPATIENT)
Dept: SURGERY | Facility: MEDICAL CENTER | Age: 43
DRG: 378 | End: 2023-09-14
Payer: COMMERCIAL

## 2023-09-14 ENCOUNTER — ANESTHESIA (OUTPATIENT)
Dept: SURGERY | Facility: MEDICAL CENTER | Age: 43
DRG: 378 | End: 2023-09-14
Payer: COMMERCIAL

## 2023-09-14 PROBLEM — K26.4 DUODENAL ULCER WITH HEMORRHAGE: Status: ACTIVE | Noted: 2023-07-30

## 2023-09-14 PROBLEM — D50.0 IRON DEFICIENCY ANEMIA DUE TO CHRONIC BLOOD LOSS: Status: ACTIVE | Noted: 2023-09-14

## 2023-09-14 LAB
ALBUMIN SERPL BCP-MCNC: 3.8 G/DL (ref 3.2–4.9)
ALBUMIN/GLOB SERPL: 2.2 G/DL
ALP SERPL-CCNC: 37 U/L (ref 30–99)
ALT SERPL-CCNC: 20 U/L (ref 2–50)
ANION GAP SERPL CALC-SCNC: 10 MMOL/L (ref 7–16)
ANISOCYTOSIS BLD QL SMEAR: ABNORMAL
AST SERPL-CCNC: 15 U/L (ref 12–45)
BASOPHILS # BLD AUTO: 1.5 % (ref 0–1.8)
BASOPHILS # BLD: 0.07 K/UL (ref 0–0.12)
BILIRUB SERPL-MCNC: 0.4 MG/DL (ref 0.1–1.5)
BUN SERPL-MCNC: 15 MG/DL (ref 8–22)
CALCIUM ALBUM COR SERPL-MCNC: 8.6 MG/DL (ref 8.5–10.5)
CALCIUM SERPL-MCNC: 8.4 MG/DL (ref 8.5–10.5)
CHLORIDE SERPL-SCNC: 109 MMOL/L (ref 96–112)
CO2 SERPL-SCNC: 19 MMOL/L (ref 20–33)
COMMENT 1642: NORMAL
CREAT SERPL-MCNC: 1.09 MG/DL (ref 0.5–1.4)
EOSINOPHIL # BLD AUTO: 0.09 K/UL (ref 0–0.51)
EOSINOPHIL NFR BLD: 1.9 % (ref 0–6.9)
ERYTHROCYTE [DISTWIDTH] IN BLOOD BY AUTOMATED COUNT: 47.4 FL (ref 35.9–50)
GFR SERPLBLD CREATININE-BSD FMLA CKD-EPI: 87 ML/MIN/1.73 M 2
GLOBULIN SER CALC-MCNC: 1.7 G/DL (ref 1.9–3.5)
GLUCOSE SERPL-MCNC: 93 MG/DL (ref 65–99)
HCT VFR BLD AUTO: 26.5 % (ref 42–52)
HGB BLD-MCNC: 7.7 G/DL (ref 14–18)
HGB BLD-MCNC: 7.9 G/DL (ref 14–18)
HGB BLD-MCNC: 8.2 G/DL (ref 14–18)
HYPOCHROMIA BLD QL SMEAR: ABNORMAL
IMM GRANULOCYTES # BLD AUTO: 0.01 K/UL (ref 0–0.11)
IMM GRANULOCYTES NFR BLD AUTO: 0.2 % (ref 0–0.9)
LYMPHOCYTES # BLD AUTO: 2.23 K/UL (ref 1–4.8)
LYMPHOCYTES NFR BLD: 46.4 % (ref 22–41)
MCH RBC QN AUTO: 24.4 PG (ref 27–33)
MCHC RBC AUTO-ENTMCNC: 29.1 G/DL (ref 32.3–36.5)
MCV RBC AUTO: 84.1 FL (ref 81.4–97.8)
MICROCYTES BLD QL SMEAR: ABNORMAL
MONOCYTES # BLD AUTO: 0.44 K/UL (ref 0–0.85)
MONOCYTES NFR BLD AUTO: 9.1 % (ref 0–13.4)
MORPHOLOGY BLD-IMP: NORMAL
NEUTROPHILS # BLD AUTO: 1.97 K/UL (ref 1.82–7.42)
NEUTROPHILS NFR BLD: 40.9 % (ref 44–72)
NRBC # BLD AUTO: 0 K/UL
NRBC BLD-RTO: 0 /100 WBC (ref 0–0.2)
OVALOCYTES BLD QL SMEAR: NORMAL
PATHOLOGY CONSULT NOTE: NORMAL
PLATELET # BLD AUTO: 350 K/UL (ref 164–446)
PLATELET BLD QL SMEAR: NORMAL
PMV BLD AUTO: 8.8 FL (ref 9–12.9)
POIKILOCYTOSIS BLD QL SMEAR: NORMAL
POTASSIUM SERPL-SCNC: 3.9 MMOL/L (ref 3.6–5.5)
PROT SERPL-MCNC: 5.5 G/DL (ref 6–8.2)
RBC # BLD AUTO: 3.15 M/UL (ref 4.7–6.1)
RBC BLD AUTO: PRESENT
SODIUM SERPL-SCNC: 138 MMOL/L (ref 135–145)
WBC # BLD AUTO: 4.8 K/UL (ref 4.8–10.8)

## 2023-09-14 PROCEDURE — 770020 HCHG ROOM/CARE - TELE (206)

## 2023-09-14 PROCEDURE — A9270 NON-COVERED ITEM OR SERVICE: HCPCS | Performed by: STUDENT IN AN ORGANIZED HEALTH CARE EDUCATION/TRAINING PROGRAM

## 2023-09-14 PROCEDURE — 85025 COMPLETE CBC W/AUTO DIFF WBC: CPT

## 2023-09-14 PROCEDURE — 160035 HCHG PACU - 1ST 60 MINS PHASE I: Performed by: INTERNAL MEDICINE

## 2023-09-14 PROCEDURE — C9113 INJ PANTOPRAZOLE SODIUM, VIA: HCPCS | Performed by: INTERNAL MEDICINE

## 2023-09-14 PROCEDURE — 43239 EGD BIOPSY SINGLE/MULTIPLE: CPT | Performed by: INTERNAL MEDICINE

## 2023-09-14 PROCEDURE — 88312 SPECIAL STAINS GROUP 1: CPT

## 2023-09-14 PROCEDURE — 0DB98ZX EXCISION OF DUODENUM, VIA NATURAL OR ARTIFICIAL OPENING ENDOSCOPIC, DIAGNOSTIC: ICD-10-PCS | Performed by: INTERNAL MEDICINE

## 2023-09-14 PROCEDURE — 160203 HCHG ENDO MINUTES - 1ST 30 MINS LEVEL 4: Performed by: INTERNAL MEDICINE

## 2023-09-14 PROCEDURE — 700105 HCHG RX REV CODE 258: Performed by: INTERNAL MEDICINE

## 2023-09-14 PROCEDURE — 700111 HCHG RX REV CODE 636 W/ 250 OVERRIDE (IP): Performed by: STUDENT IN AN ORGANIZED HEALTH CARE EDUCATION/TRAINING PROGRAM

## 2023-09-14 PROCEDURE — 160036 HCHG PACU - EA ADDL 30 MINS PHASE I: Performed by: INTERNAL MEDICINE

## 2023-09-14 PROCEDURE — 700111 HCHG RX REV CODE 636 W/ 250 OVERRIDE (IP): Performed by: INTERNAL MEDICINE

## 2023-09-14 PROCEDURE — 160009 HCHG ANES TIME/MIN: Performed by: INTERNAL MEDICINE

## 2023-09-14 PROCEDURE — 99222 1ST HOSP IP/OBS MODERATE 55: CPT | Mod: 25 | Performed by: INTERNAL MEDICINE

## 2023-09-14 PROCEDURE — 85018 HEMOGLOBIN: CPT

## 2023-09-14 PROCEDURE — 160002 HCHG RECOVERY MINUTES (STAT): Performed by: INTERNAL MEDICINE

## 2023-09-14 PROCEDURE — 0DB68ZX EXCISION OF STOMACH, VIA NATURAL OR ARTIFICIAL OPENING ENDOSCOPIC, DIAGNOSTIC: ICD-10-PCS | Performed by: INTERNAL MEDICINE

## 2023-09-14 PROCEDURE — 700102 HCHG RX REV CODE 250 W/ 637 OVERRIDE(OP): Performed by: INTERNAL MEDICINE

## 2023-09-14 PROCEDURE — 36415 COLL VENOUS BLD VENIPUNCTURE: CPT

## 2023-09-14 PROCEDURE — 700111 HCHG RX REV CODE 636 W/ 250 OVERRIDE (IP): Performed by: ANESTHESIOLOGY

## 2023-09-14 PROCEDURE — 88305 TISSUE EXAM BY PATHOLOGIST: CPT

## 2023-09-14 PROCEDURE — A9270 NON-COVERED ITEM OR SERVICE: HCPCS | Performed by: INTERNAL MEDICINE

## 2023-09-14 PROCEDURE — 80053 COMPREHEN METABOLIC PANEL: CPT

## 2023-09-14 PROCEDURE — 160048 HCHG OR STATISTICAL LEVEL 1-5: Performed by: INTERNAL MEDICINE

## 2023-09-14 PROCEDURE — 700101 HCHG RX REV CODE 250: Performed by: ANESTHESIOLOGY

## 2023-09-14 PROCEDURE — 700102 HCHG RX REV CODE 250 W/ 637 OVERRIDE(OP): Performed by: STUDENT IN AN ORGANIZED HEALTH CARE EDUCATION/TRAINING PROGRAM

## 2023-09-14 PROCEDURE — 99233 SBSQ HOSP IP/OBS HIGH 50: CPT | Mod: GC | Performed by: STUDENT IN AN ORGANIZED HEALTH CARE EDUCATION/TRAINING PROGRAM

## 2023-09-14 RX ORDER — LIDOCAINE HYDROCHLORIDE 20 MG/ML
INJECTION, SOLUTION EPIDURAL; INFILTRATION; INTRACAUDAL; PERINEURAL PRN
Status: DISCONTINUED | OUTPATIENT
Start: 2023-09-14 | End: 2023-09-14 | Stop reason: SURG

## 2023-09-14 RX ORDER — HYDROMORPHONE HYDROCHLORIDE 1 MG/ML
0.5 INJECTION, SOLUTION INTRAMUSCULAR; INTRAVENOUS; SUBCUTANEOUS
Status: DISCONTINUED | OUTPATIENT
Start: 2023-09-14 | End: 2023-09-16 | Stop reason: HOSPADM

## 2023-09-14 RX ORDER — PANTOPRAZOLE SODIUM 40 MG/10ML
40 INJECTION, POWDER, LYOPHILIZED, FOR SOLUTION INTRAVENOUS DAILY
Status: DISCONTINUED | OUTPATIENT
Start: 2023-09-15 | End: 2023-09-16 | Stop reason: HOSPADM

## 2023-09-14 RX ORDER — HYDROMORPHONE HYDROCHLORIDE 1 MG/ML
1 INJECTION, SOLUTION INTRAMUSCULAR; INTRAVENOUS; SUBCUTANEOUS
Status: DISPENSED | OUTPATIENT
Start: 2023-09-14 | End: 2023-09-14

## 2023-09-14 RX ORDER — OMEPRAZOLE 20 MG/1
40 CAPSULE, DELAYED RELEASE ORAL 2 TIMES DAILY
Status: DISCONTINUED | OUTPATIENT
Start: 2023-09-14 | End: 2023-09-14

## 2023-09-14 RX ORDER — HALOPERIDOL 5 MG/ML
1 INJECTION INTRAMUSCULAR
Status: DISCONTINUED | OUTPATIENT
Start: 2023-09-14 | End: 2023-09-14 | Stop reason: HOSPADM

## 2023-09-14 RX ORDER — ONDANSETRON 2 MG/ML
4 INJECTION INTRAMUSCULAR; INTRAVENOUS
Status: DISCONTINUED | OUTPATIENT
Start: 2023-09-14 | End: 2023-09-14 | Stop reason: HOSPADM

## 2023-09-14 RX ORDER — OXYCODONE HYDROCHLORIDE 10 MG/1
10 TABLET ORAL
Status: DISCONTINUED | OUTPATIENT
Start: 2023-09-14 | End: 2023-09-16 | Stop reason: HOSPADM

## 2023-09-14 RX ORDER — OXYCODONE HYDROCHLORIDE 5 MG/1
5 TABLET ORAL
Status: DISCONTINUED | OUTPATIENT
Start: 2023-09-14 | End: 2023-09-16 | Stop reason: HOSPADM

## 2023-09-14 RX ORDER — DIPHENHYDRAMINE HYDROCHLORIDE 50 MG/ML
12.5 INJECTION INTRAMUSCULAR; INTRAVENOUS
Status: DISCONTINUED | OUTPATIENT
Start: 2023-09-14 | End: 2023-09-14 | Stop reason: HOSPADM

## 2023-09-14 RX ADMIN — HYDROMORPHONE HYDROCHLORIDE 0.5 MG: 1 INJECTION, SOLUTION INTRAMUSCULAR; INTRAVENOUS; SUBCUTANEOUS at 08:16

## 2023-09-14 RX ADMIN — PANTOPRAZOLE SODIUM 40 MG: 40 INJECTION, POWDER, FOR SOLUTION INTRAVENOUS at 05:04

## 2023-09-14 RX ADMIN — OXYCODONE HYDROCHLORIDE 10 MG: 10 TABLET ORAL at 19:47

## 2023-09-14 RX ADMIN — PROPOFOL 150 MG: 10 INJECTION, EMULSION INTRAVENOUS at 10:20

## 2023-09-14 RX ADMIN — OMEPRAZOLE 40 MG: 20 CAPSULE, DELAYED RELEASE ORAL at 17:11

## 2023-09-14 RX ADMIN — HYDROMORPHONE HYDROCHLORIDE 1 MG: 1 INJECTION, SOLUTION INTRAMUSCULAR; INTRAVENOUS; SUBCUTANEOUS at 10:47

## 2023-09-14 RX ADMIN — FENTANYL CITRATE 25 MCG: 50 INJECTION, SOLUTION INTRAMUSCULAR; INTRAVENOUS at 10:29

## 2023-09-14 RX ADMIN — OXYCODONE HYDROCHLORIDE 10 MG: 10 TABLET ORAL at 03:49

## 2023-09-14 RX ADMIN — HYDROMORPHONE HYDROCHLORIDE 0.5 MG: 1 INJECTION, SOLUTION INTRAMUSCULAR; INTRAVENOUS; SUBCUTANEOUS at 05:03

## 2023-09-14 RX ADMIN — SODIUM CHLORIDE: 9 INJECTION, SOLUTION INTRAVENOUS at 14:07

## 2023-09-14 RX ADMIN — HYDROMORPHONE HYDROCHLORIDE 0.5 MG: 1 INJECTION, SOLUTION INTRAMUSCULAR; INTRAVENOUS; SUBCUTANEOUS at 01:33

## 2023-09-14 RX ADMIN — OXYCODONE HYDROCHLORIDE 10 MG: 10 TABLET ORAL at 17:11

## 2023-09-14 RX ADMIN — LIDOCAINE HYDROCHLORIDE 100 MG: 20 INJECTION, SOLUTION EPIDURAL; INFILTRATION; INTRACAUDAL at 10:20

## 2023-09-14 RX ADMIN — HYDROMORPHONE HYDROCHLORIDE 0.5 MG: 1 INJECTION, SOLUTION INTRAMUSCULAR; INTRAVENOUS; SUBCUTANEOUS at 14:59

## 2023-09-14 RX ADMIN — OXYCODONE HYDROCHLORIDE 10 MG: 10 TABLET ORAL at 13:47

## 2023-09-14 RX ADMIN — OXYCODONE HYDROCHLORIDE 10 MG: 10 TABLET ORAL at 00:10

## 2023-09-14 RX ADMIN — HYDROMORPHONE HYDROCHLORIDE 0.5 MG: 1 INJECTION, SOLUTION INTRAMUSCULAR; INTRAVENOUS; SUBCUTANEOUS at 18:12

## 2023-09-14 RX ADMIN — HYDROMORPHONE HYDROCHLORIDE 0.5 MG: 1 INJECTION, SOLUTION INTRAMUSCULAR; INTRAVENOUS; SUBCUTANEOUS at 21:24

## 2023-09-14 RX ADMIN — OXYCODONE HYDROCHLORIDE 10 MG: 10 TABLET ORAL at 23:33

## 2023-09-14 RX ADMIN — FENTANYL CITRATE 75 MCG: 50 INJECTION, SOLUTION INTRAMUSCULAR; INTRAVENOUS at 10:32

## 2023-09-14 RX ADMIN — PROPOFOL 50 MG: 10 INJECTION, EMULSION INTRAVENOUS at 10:26

## 2023-09-14 ASSESSMENT — ENCOUNTER SYMPTOMS
NERVOUS/ANXIOUS: 0
HEARTBURN: 0
HEADACHES: 0
TREMORS: 0
WEIGHT LOSS: 0
WEAKNESS: 0
PALPITATIONS: 0
PHOTOPHOBIA: 0
DIARRHEA: 0
FOCAL WEAKNESS: 0
NAUSEA: 0
NECK PAIN: 0
POLYDIPSIA: 0
BLURRED VISION: 0
DOUBLE VISION: 0
SHORTNESS OF BREATH: 0
HALLUCINATIONS: 0
ORTHOPNEA: 0
ABDOMINAL PAIN: 1
BACK PAIN: 0
VOMITING: 0
ROS GI COMMENTS: COFFEE-GROUND EMESIS
CONSTIPATION: 0
SPEECH CHANGE: 0
BRUISES/BLEEDS EASILY: 0
FLANK PAIN: 0
HEMOPTYSIS: 0
NAUSEA: 1
CHILLS: 0
VOMITING: 1
FEVER: 0
DIZZINESS: 1
SPUTUM PRODUCTION: 0
COUGH: 0
BLOOD IN STOOL: 0

## 2023-09-14 ASSESSMENT — COGNITIVE AND FUNCTIONAL STATUS - GENERAL
DAILY ACTIVITIY SCORE: 24
SUGGESTED CMS G CODE MODIFIER DAILY ACTIVITY: CH
MOBILITY SCORE: 24
SUGGESTED CMS G CODE MODIFIER MOBILITY: CH

## 2023-09-14 ASSESSMENT — PAIN SCALES - PAIN ASSESSMENT IN ADVANCED DEMENTIA (PAINAD)
TOTALSCORE: 1
BREATHING: OCCASIONAL LABORED BREATHING, SHORT PERIOD OF HYPERVENTILATION
CONSOLABILITY: NO NEED TO CONSOLE
BODYLANGUAGE: TENSE, DISTRESSED PACING, FIDGETING
CONSOLABILITY: DISTRACTED OR REASSURED BY VOICE/TOUCH
CONSOLABILITY: NO NEED TO CONSOLE
BODYLANGUAGE: TENSE, DISTRESSED PACING, FIDGETING
BREATHING: NORMAL
BODYLANGUAGE: RELAXED
BREATHING: NORMAL
BREATHING: NORMAL
BODYLANGUAGE: RELAXED
FACIALEXPRESSION: SAD, FRIGHTENED, FROWN
TOTALSCORE: 3
FACIALEXPRESSION: SAD, FRIGHTENED, FROWN
NEGVOCALIZATION: OCCASIONAL MOAN/GROAN, LOW SPEECH, NEGATIVE/DISAPPROVING QUALITY
FACIALEXPRESSION: SAD, FRIGHTENED, FROWN

## 2023-09-14 ASSESSMENT — LIFESTYLE VARIABLES: SUBSTANCE_ABUSE: 0

## 2023-09-14 ASSESSMENT — PAIN SCALES - WONG BAKER
WONGBAKER_NUMERICALRESPONSE: HURTS A LITTLE MORE
WONGBAKER_NUMERICALRESPONSE: HURTS A WHOLE LOT

## 2023-09-14 ASSESSMENT — PAIN DESCRIPTION - PAIN TYPE
TYPE: ACUTE PAIN

## 2023-09-14 ASSESSMENT — FIBROSIS 4 INDEX: FIB4 SCORE: 0.32

## 2023-09-14 ASSESSMENT — PATIENT HEALTH QUESTIONNAIRE - PHQ9
SUM OF ALL RESPONSES TO PHQ9 QUESTIONS 1 AND 2: 0
1. LITTLE INTEREST OR PLEASURE IN DOING THINGS: NOT AT ALL
2. FEELING DOWN, DEPRESSED, IRRITABLE, OR HOPELESS: NOT AT ALL

## 2023-09-14 NOTE — ANESTHESIA TIME REPORT
Anesthesia Start and Stop Event Times     Date Time Event    9/14/2023 0956 Ready for Procedure     1016 Anesthesia Start     1039 Anesthesia Stop        Responsible Staff  09/14/23    Name Role Begin End    Jethro Aburto M.D. Anesth 1016 1039        Overtime Reason:  no overtime (within assigned shift)    Comments:

## 2023-09-14 NOTE — PROGRESS NOTES
[facility] Internal Medicine Daily Progress Note    Date of Service  9/14/2023    [facility] Team: R IM Orange Team   Attending: Jung Pelayo M.d.  Senior Resident: Dr. Chin  Contact Number: [phone]    Chief Complaint  Roberto Braswell is a 43 y.o. male admitted 9/13/2023 with abdominal pain and hematemesis    Hospital Course  Roberto Braswell is a 43 year-old male who was admitted on 9/13/2023 due to abdominal pain and hematemesis.  His past medical history of previous GI bleeds with recent admission on July 30, 2023.  At that time he had an EGD done per GI which showed a gastric ulcer and duodenal ulcer.  H. pylori was negative per biopsy.  He was discharged on omeprazole and Carafate, but reports persistence of symptoms.  Patient reports that he has been having epigastric abdominal pain, sharp and stabbing in character, without radiation.  He denies any aggravating factors such as NSAID use or alcohol and further denies any alleviating factors.  Does not note any hematochezia or melena.  In the ED, CT abdomen pelvis with contrast was negative for perforation but showed likely duodenitis.  Hemoglobin found to be 8.5, which was previously around 10 2 weeks prior.  GI was consulted in the ED who recommended IV Protonix twice daily, blood transfusion as needed, with plans for EGD.    Interval Problem Update  This AM, patient still with significant abdominal pain. Did not report recurrence of hematemesis and denied hematochezia/melena.   AM labs show hgb dropping to 7.7, repeat 7.9  EGD done today per GI which showed: incompletely healed duodenal ulcer, biopsy taken. Esophagus normal.  -Likely discharge tomorrow AM.    I have discussed this patient's plan of care and discharge plan at IDT rounds today with Case Management, Nursing, Nursing leadership, and other members of the IDT team.    Consultants/Specialty  GI    Code Status  Full Code    Disposition  The patient is not medically cleared for discharge to home or  a post-acute facility.  Anticipate discharge to: home with close outpatient follow-up    I have placed the appropriate orders for post-discharge needs.    Review of Systems  Review of Systems   Constitutional:  Positive for malaise/fatigue. Negative for chills, fever and weight loss.   Respiratory:  Negative for cough, hemoptysis and shortness of breath.    Cardiovascular:  Negative for chest pain and palpitations.   Gastrointestinal:  Positive for abdominal pain. Negative for blood in stool, constipation, diarrhea, melena, nausea and vomiting.   Genitourinary:  Negative for hematuria.   Neurological:  Positive for dizziness. Negative for weakness and headaches.        Physical Exam  Temp:  [36.2 °C (97.1 °F)-36.7 °C (98.1 °F)] 36.6 °C (97.8 °F)  Pulse:  [48-93] 48  Resp:  [12-24] 24  BP: (128-179)/() 128/70  SpO2:  [95 %-100 %] 96 %    Physical Exam  Constitutional:       General: He is not in acute distress.     Appearance: He is not toxic-appearing.   HENT:      Head: Normocephalic and atraumatic.      Right Ear: External ear normal.      Left Ear: External ear normal.      Nose: Nose normal. No congestion.      Mouth/Throat:      Mouth: Mucous membranes are moist.      Pharynx: Oropharynx is clear.   Eyes:      General:         Right eye: No discharge.         Left eye: No discharge.      Extraocular Movements: Extraocular movements intact.      Comments: Pale conjunctiva   Cardiovascular:      Rate and Rhythm: Normal rate and regular rhythm.      Pulses: Normal pulses.      Heart sounds: No murmur heard.  Pulmonary:      Effort: Pulmonary effort is normal. No respiratory distress.      Breath sounds: Normal breath sounds. No wheezing or rhonchi.   Abdominal:      General: There is no distension.      Palpations: Abdomen is soft.      Tenderness: There is abdominal tenderness. There is no guarding or rebound.   Musculoskeletal:         General: No swelling or deformity. Normal range of motion.   Skin:      General: Skin is warm and dry.      Coloration: Skin is pale.   Neurological:      General: No focal deficit present.      Mental Status: He is alert and oriented to person, place, and time.      Motor: No weakness.   Psychiatric:         Mood and Affect: Mood normal.         Behavior: Behavior normal.         Fluids    Intake/Output Summary (Last 24 hours) at 9/14/2023 1529  Last data filed at 9/14/2023 1039  Gross per 24 hour   Intake 839.02 ml   Output 550 ml   Net 289.02 ml       Laboratory  Recent Labs     09/13/23  1658 09/13/23  1929 09/14/23  0517 09/14/23  1402   WBC 7.9  --  4.8  --    RBC 3.46*  --  3.15*  --    HEMOGLOBIN 8.5* 8.0* 7.7* 7.9*   HEMATOCRIT 29.0*  --  26.5*  --    MCV 83.8  --  84.1  --    MCH 24.6*  --  24.4*  --    MCHC 29.3*  --  29.1*  --    RDW 48.1  --  47.4  --    PLATELETCT 475*  --  350  --    MPV 9.2  --  8.8*  --      Recent Labs     09/13/23  1658 09/14/23  0517   SODIUM 141 138   POTASSIUM 4.2 3.9   CHLORIDE 109 109   CO2 20 19*   GLUCOSE 93 93   BUN 18 15   CREATININE 1.16 1.09   CALCIUM 9.3 8.4*     Recent Labs     09/13/23  1658   APTT 26.7   INR 1.09               Imaging  CT-ABDOMEN-PELVIS WITH   Final Result      1.  Mild fat stranding adjacent to the first portion of the duodenum may represent mild duodenitis.   2.  Prior cholecystectomy. No biliary dilatation.      DX-CHEST-PORTABLE (1 VIEW)   Final Result      No acute cardiopulmonary abnormality.      US-EXTREMITY VENOUS LOWER BILAT    (Results Pending)   EC-ECHOCARDIOGRAM COMPLETE W/O CONT    (Results Pending)        Assessment/Plan  Problem Representation:    * Upper GI bleed- (present on admission)  Assessment & Plan  43-year-old patient with prior history of bleeding from peptic ulcer disease in stomach and duodenum, presented with upper abdominal pain, coffee-ground emesis  -CT of the abdomen and pelvis with contrast showed signs of duodenitis, no evidence of active bleeding  -Hemoglobin 8.5, with repeat 7.7,  then 7.9  -EGD done per GI showed: incompletely healed duodenal ulcer, biopsy taken. Esophagus normal  -Will change IV Protonix to oral omeprazole 40mg twice daily  -Monitor H&H, transfuse for hemoglobin 7.0 and below  -Follow up with GI as outpatient    Acute blood loss anemia  Assessment & Plan  -Iron studies pending, consider iron replacement         VTE prophylaxis: SCDs/TEDs    I have performed a physical exam and reviewed and updated ROS and Plan today (9/14/2023). In review of yesterday's note (9/13/2023), there are no changes except as documented above.

## 2023-09-14 NOTE — PROGRESS NOTES
4 Eyes Skin Assessment Completed by Raúl RN and KARSON Velazquez.    Head WDL  Ears WDL  Nose WDL  Mouth WDL  Neck WDL  Breast/Chest WDL  Shoulder Blades WDL  Spine WDL  (R) Arm/Elbow/Hand WDL  (L) Arm/Elbow/Hand WDL  Abdomen WDL  Groin WDL  Scrotum/Coccyx/Buttocks WDL  (R) Leg WDL  (L) Leg WDL  (R) Heel/Foot/Toe WDL  (L) Heel/Foot/Toe WDL          Devices In Places Tele Box      Interventions In Place Waffle Overlay    Possible Skin Injury No    Pictures Uploaded Into Epic N/A  Wound Consult Placed N/A  RN Wound Prevention Protocol Ordered No

## 2023-09-14 NOTE — ANESTHESIA POSTPROCEDURE EVALUATION
Patient: Roberto Braswell    Procedure Summary     Date: 09/14/23 Room / Location: [facility] ROOM 26 / SURGERY SAME DAY [facility]    Anesthesia Start: 1016 Anesthesia Stop: 1039    Procedures:       GASTROSCOPY (Esophagus)      GASTROSCOPY, WITH BIOPSY (Esophagus) Diagnosis: (Duodenal Ulcer)    Surgeons: Johnny Dalton M.D. Responsible Provider: Jethro Aburto M.D.    Anesthesia Type: general ASA Status: 2          Final Anesthesia Type: general  Last vitals  BP   Blood Pressure: (!) 154/80    Temp   36.6 °C (97.8 °F)    Pulse   69   Resp   20    SpO2   100 %      Anesthesia Post Evaluation    Patient location during evaluation: PACU  Patient participation: complete - patient participated  Level of consciousness: awake and alert    Airway patency: patent  Anesthetic complications: no  Cardiovascular status: hemodynamically stable  Respiratory status: acceptable  Hydration status: euvolemic    PONV: none          There were no known notable events for this encounter.     Nurse Pain Score: 9 (NPRS)

## 2023-09-14 NOTE — PROGRESS NOTES
Patient: : Roberto Braswell  MRN: [id number]    The patient is a 43 years old gentleman with recent admission to the hospital for abdominal pain, s/p upper GI scope by GI consultant provider on July 30 this year, which show gastric ulcer and duodenal ulcer with negative HP biopsy, presented to our emergency room for persistent epigastric/abdominal pain.    CAT scan at emergency showed duodenitis, without perforation or other complication.    Hemoglobin dropped to 8.5 from recent 10.62 weeks ago.  Chemical panel was grossly normal.    GI team agree overnight observation, midnight n.p.o., we will consider repeat upper GI scope on September 14.     Continue IV PPI twice daily.  Hemoglobin follow-up.  Transfusion per protocol.    Diagnosis: upper gastrointestinal bleeding.   Procedure: Esophagogastroduodenoscopy with bleeding control including banding.           Labs:  Recent Labs     09/13/23  1658   WBC 7.9   RBC 3.46*   HEMOGLOBIN 8.5*   HEMATOCRIT 29.0*   MCV 83.8   MCH 24.6*   MCHC 29.3*   RDW 48.1   PLATELETCT 475*   MPV 9.2     Recent Labs     09/13/23  1658   SODIUM 141   POTASSIUM 4.2   CHLORIDE 109   CO2 20   GLUCOSE 93   BUN 18     Recent Labs     09/13/23  1658   APTT 26.7   INR 1.09       Recent Labs     09/13/23  1658   ASTSGOT 18   ALTSGPT 24   TBILIRUBIN 0.2   ALKPHOSPHAT 41   GLOBULIN 2.0   INR 1.09

## 2023-09-14 NOTE — ED PROVIDER NOTES
"  ER Provider Note    Scribed for Octaviano Johnson M.d. by Misa Elliott. 9/13/2023  5:33 PM    Primary Care Provider: Pcp Pt States None Noted    CHIEF COMPLAINT  Chief Complaint   Patient presents with    Blood in Vomit     History of bleeding ulcer, recently d/c from hospital    Abdominal Pain     EXTERNAL RECORDS REVIEWED  Outpatient Notes Patient was seen at ED on 9/9/23 for foot pain. On 7/30/23 he had an EEG found to have duodenal ulcer and small gastric ulcer and supposed to start 40 mg of omeprazole and Carafate.    HPI/ROS  LIMITATION TO HISTORY   Select: : None  OUTSIDE HISTORIAN(S):  None    Roberto Braswell is a 43 y.o. male who presents to the ED complaining of worsening abdominal pain onset prior to arrival. Patient has a history of a bleeding ulcer. He notes he was recently discharged from the hospital a couple of days ago for a GI bleed. He notes it feels like \"knives are stabbing\" his stomach. He has associated blood in his vomit and notes he had an episode of coffee ground emesis. Patient denies light headedness and dizziness. Patient denies alcohol use. Patient notes he has been compliant with Omeprazole and Carafate. No alleviating or exacerbating factors noted.     PAST MEDICAL HISTORY  Past Medical History:   Diagnosis Date    Arthritis     Right ankle    Drug-seeking behavior 7/30/2023    Demanding narcotics for a duodenal ulcer July 2023. Refused antacids or sucralfate.     Duodenal ulcer 7/30/2023    Gastric ulcer     Pain     Right ankle     SURGICAL HISTORY  Past Surgical History:   Procedure Laterality Date    DE UPPER GI ENDOSCOPY,BIOPSY N/A 7/30/2023    Procedure: GASTROSCOPY, WITH BIOPSY;  Surgeon: Kumar Hope M.D.;  Location: SURGERY [facility];  Service: Gastroenterology    DE UPPER GI ENDOSCOPY,DIAGNOSIS  3/28/2022    Procedure: GASTROSCOPY;  Surgeon: Paco Wiggins M.D.;  Location: SURGERY SAME DAY [facility];  Service: Gastroenterology    DE UPPER GI ENDOSCOPY,BIOPSY  " "3/28/2022    Procedure: GASTROSCOPY, WITH BIOPSY;  Surgeon: Paco Wiggins M.D.;  Location: SURGERY SAME DAY [facility];  Service: Gastroenterology    ANKLE ARTHROSCOPY Right 5/21/2018    Procedure: ANKLE ARTHROSCOPY, LATERAL LIGAMENT RECONSTRUCTION;  Surgeon: Seth Cruz M.D.;  Location: SURGERY [facility];  Service: Orthopedics    BIOPSY ORTHO Right 5/21/2018    Procedure: BIOPSY ORTHO/ FOR CARTILAGE AND DENOVO PROCEDURE;  Surgeon: Seth Cruz M.D.;  Location: SURGERY [facility];  Service: Orthopedics    OTHER ABDOMINAL SURGERY      Cholecystectomy February 2017    OTHER ORTHOPEDIC SURGERY      2 previous ankle surgeries (2007, 2009)     FAMILY HISTORY  No pertinent family history.    SOCIAL HISTORY   reports that he has never smoked. He has never used smokeless tobacco. He reports current alcohol use. He reports that he does not use drugs.    CURRENT MEDICATIONS  Previous Medications    ASPIRIN-ACETAMINOPHEN-CAFFEINE PO    Take 1 Tablet by mouth every 6 hours as needed for Headache.    FUROSEMIDE (LASIX) 40 MG TAB    Take 0.5 Tablets by mouth every day for 4 days.    OMEPRAZOLE (PRILOSEC) 40 MG DELAYED-RELEASE CAPSULE    Take 1 Capsule by mouth 2 times a day.    SUCRALFATE (CARAFATE) 1 GM/10ML SUSPENSION    Take 10 mL by mouth every 6 hours.     ALLERGIES  Tramadol and Morphine    PHYSICAL EXAM  BP (!) 179/102   Pulse 71   Temp 36.7 °C (98.1 °F) (Temporal)   Resp 16   Ht 1.93 m (6' 4\")   Wt 108 kg (238 lb 12.1 oz)   SpO2 100%   BMI 29.06 kg/m²   Constitutional: Well developed, Well nourished, mild distress.   HENT: Normocephalic, Atraumatic.   Eyes: No discharge. Slightly pale conjuctiva.  Cardiovascular: Normal heart rate, Normal rhythm, No murmurs, equal pulses.   Pulmonary: Normal breath sounds, No respiratory distress, No wheezing, No rales, No rhonchi.  Chest: No chest wall tenderness or deformity.   Abdomen:Soft, No masses, no rebound, no guarding. Significant tenderness in the " epigastric region. No McBurney's Point Tenderness. Negative Garcia's sign.   Back: No CVA tenderness.   Musculoskeletal: No major deformities noted, No tenderness.   Skin: Warm, Dry, No erythema, No rash.   Neurologic: Alert & oriented x 3, Normal motor function,  No focal deficits noted.   Psychiatric: Affect normal, Judgment normal, Mood normal.     DIAGNOSTIC STUDIES  Labs:   Results for orders placed or performed during the hospital encounter of 09/13/23   COD (ADULT)   Result Value Ref Range    ABO Grouping Only A     Rh Grouping Only POS     Antibody Screen-Cod NEG    COMP METABOLIC PANEL   Result Value Ref Range    Sodium 141 135 - 145 mmol/L    Potassium 4.2 3.6 - 5.5 mmol/L    Chloride 109 96 - 112 mmol/L    Co2 20 20 - 33 mmol/L    Anion Gap 12.0 7.0 - 16.0    Glucose 93 65 - 99 mg/dL    Bun 18 8 - 22 mg/dL    Creatinine 1.16 0.50 - 1.40 mg/dL    Calcium 9.3 8.5 - 10.5 mg/dL    Correct Calcium 9.1 8.5 - 10.5 mg/dL    AST(SGOT) 18 12 - 45 U/L    ALT(SGPT) 24 2 - 50 U/L    Alkaline Phosphatase 41 30 - 99 U/L    Total Bilirubin 0.2 0.1 - 1.5 mg/dL    Albumin 4.3 3.2 - 4.9 g/dL    Total Protein 6.3 6.0 - 8.2 g/dL    Globulin 2.0 1.9 - 3.5 g/dL    A-G Ratio 2.2 g/dL   LIPASE   Result Value Ref Range    Lipase 28 11 - 82 U/L   ESTIMATED GFR   Result Value Ref Range    GFR (CKD-EPI) 81 >60 mL/min/1.73 m 2     Radiology:   The attending emergency physician has independently interpreted the diagnostic imaging associated with this visit and am waiting the final reading from the radiologist.   Preliminary interpretation is a follows: No obvious free air on CT of the abdomen pelvis  Radiologist interpretation:   CT-ABDOMEN-PELVIS WITH   Final Result      1.  Mild fat stranding adjacent to the first portion of the duodenum may represent mild duodenitis.   2.  Prior cholecystectomy. No biliary dilatation.      DX-CHEST-PORTABLE (1 VIEW)   Final Result      No acute cardiopulmonary abnormality.        COURSE & MEDICAL  DECISION MAKING     ED Observation Status? Yes; I am placing the patient in to an observation status due to a diagnostic uncertainty as well as therapeutic intensity. Patient placed in observation status at 5:40 PM, 9/13/2023.     Observation plan is as follows: We will manage their symptoms, evaluate with diagnostic testing, and then reassess after results are reviewed     Upon Reevaluation, the patient's condition has: not improved; and will be escalated to hospitalization.    Patient discharged from ED Observation status at 8:45 PM (Time) 9/13/2023  (Date).     INITIAL ASSESSMENT, COURSE AND PLAN  Care Narrative:   5:33 PM - Patient was seen and evaluated at bedside. Patient presents to the ED for worsening abdominal pain onset prior to arrival. After my exam, I discussed with the patient the plan of care, which includes treating the patient with medication for their symptoms, as well as obtaining lab work and imaging for further evaluation. Patient understands and verbalizes agreement to plan of care. Patient will be treated with Protonix 80 mg in  mL, GI cocktail 30 mL and Zofran 4 mg. Ordered CT-abdomen-pelvis, DX-chest, lactic acid, COD, CBC w/ diff, lipase, prothrombin time and APTT to evaluate. Differential diagnoses include but not limited to: Duodenal ulcer, anemia, perforated ulcer    8:08 PM - Patient was reevaluated at bedside. Informed him we are waiting for lab work at this time.     8:50 PM discussed the case with Dr. Sha PENG.  He recommends the patient be admitted for CDU and kept n.p.o,  The PPI Drip and they will plan to see him in the morning for possible repeat EGD.    Dr. Jimenez hospitalist is agreed to consult on hospitalization    HTN/IDDM FOLLOW UP:  The patient has known hypertension and is being followed by their primary care doctor  PROBLEM LIST  Problem #1 hematemesis at this point time I think this is likely secondary from bleeding from his duodenal ulcer.  There is  inflammation seen on CT but no obvious perforation.  I think the patient would benefit from hospitalization to trend his hemoglobin as well as for pain control.  Patient may need to have EGD in the morning.    DISPOSITION AND DISCUSSIONS  I have discussed management of the patient with the following physicians and CARTER's: Dr. Sha Rodriguez    Discussion of management with other [facility] [place] or appropriate source(s): None       Barriers to care at this time, including but not limited to: Patient does not have established PCP.     Decision tools and prescription drugs considered including, but not limited to: Pain Medications patient was given Dilaudid and PPI .    DISPOSITION:  Patient will be hospitalized by Dr. Jimenez  in guarded condition.    FINAL DIAGNOSIS  1. Hematemesis with nausea    2. Duodenal ulcer    3. Epigastric abdominal pain           The note accurately reflects work and decisions made by me.  Octaviano Johnson M.D.  9/13/2023  9:02 PM

## 2023-09-14 NOTE — CARE PLAN
The patient is Watcher - Medium risk of patient condition declining or worsening    Shift Goals  Clinical Goals: Monitor hgb, echo, GI scope  Patient Goals: pain management, inform nurses of blood in stool or emesis  Family Goals: JJ    Progress made toward(s) clinical / shift goals:      Problem: Knowledge Deficit - Standard  Goal: Patient and family/care givers will demonstrate understanding of plan of care, disease process/condition, diagnostic tests and medications  Outcome: Progressing  Note: Patient is a good historian and has an understanding of his disease process and how it relates to his medical history of gastric ulcers. Patient also has a good understanding of his plan of care while in the hospital and patient has been actively tracking his hemoglobin levels through mychart.        Patient is not progressing towards the following goals:      Problem: Pain - Standard  Goal: Alleviation of pain or a reduction in pain to the patient’s comfort goal  Outcome: Not Progressing  Flowsheets (Taken 9/14/2023 0635)  OB Pain Intervention:   Medication - See MAR   Rest  Pain Rating Scale (NPRS): 9  Note: Patient complained of 9/10 sharp, stabbing pain in his mid abdomen throughout the shift which was not relieved effectively by his prn Oxy 10 and 0.5 dilaudid. Patient consistently rated his pain an 8-9/10 throughout the shift.

## 2023-09-14 NOTE — ANESTHESIA PREPROCEDURE EVALUATION
Case: [id number] Date/Time: 09/14/23 1230    Procedure: GASTROSCOPY (Esophagus)    Anesthesia type: MAC    Pre-op diagnosis: GI Bleed    Location: [facility] ROOM 26 / SURGERY SAME DAY [facility]    Surgeons: Johnny Dalton M.D.          Relevant Problems   GI   (positive) Duodenal ulcer         (positive) ELISABET (acute kidney injury) (HCC)       Physical Exam    Airway   Mallampati: II  TM distance: >3 FB  Neck ROM: full       Cardiovascular - normal exam  Rhythm: regular  Rate: normal  (-) murmur     Dental - normal exam           Pulmonary - normal exam  Breath sounds clear to auscultation     Abdominal    Neurological - normal exam                 Anesthesia Plan    ASA 2       Plan - general       Airway plan will be mask          Induction: intravenous      Pertinent diagnostic labs and testing reviewed    Informed Consent:    Anesthetic plan and risks discussed with patient.

## 2023-09-14 NOTE — HOSPITAL COURSE
Roberto Braswell is a 43 year-old male who was admitted on 9/13/2023 due to abdominal pain and hematemesis.  His past medical history of previous GI bleeds with recent admission on July 30, 2023.  At that time he had an EGD done per GI which showed a gastric ulcer and duodenal ulcer.  H. pylori was negative per biopsy.  He was discharged on omeprazole and Carafate, but reports persistence of symptoms.  Patient reports that he has been having epigastric abdominal pain, sharp and stabbing in character, without radiation.  He denies any aggravating factors such as NSAID use or alcohol and further denies any alleviating factors.  Does not note any hematochezia or melena.  In the ED, CT abdomen pelvis with contrast was negative for perforation but showed likely duodenitis.  Hemoglobin found to be 8.5, which was previously around 10 2 weeks prior.  GI was consulted in the ED who recommended IV Protonix twice daily, blood transfusion as needed, with plans for EGD.  EGD done 9/14/23 showed incompletely healed duodenal ulcer, biopsy taken. Esophagus normal.  Discharged patient on omeprazole 40mg twice daily for 8 weeks. Plans for outpatient GI follow up with possible repeat EGD.

## 2023-09-14 NOTE — CONSULTS
Date of Consultation:  9/14/2023    Patient: : Roberto Braswell  MRN: [id number]    Referring Physician:  Dr. Jung Pelayo      GI:Johnny Dalton M.D.     Reason for Consultation: GI bleeding abd pain.     History of Present Illness:   The patient is a 43 years old gentleman with recent admission to the hospital for abdominal pain, s/p upper GI scope by GI consultant provider on July 30 this year, which show gastric ulcer and duodenal ulcer with negative HP biopsy, presented to our emergency room for persistent epigastric/abdominal pain.     GI team visited the patient early morning at bedside.  The patient continued to have abdominal pain.  He looks pale.  Hemoglobin in the morning back around 7.5, dropped another 1 unit for him yesterday.  No evidence of acute abdomen however over tenderness in the middle abdomen    No bowel movement this morning.      Past Medical History:   Diagnosis Date    Drug-seeking behavior 7/30/2023    Demanding narcotics for a duodenal ulcer July 2023. Refused antacids or sucralfate.     Duodenal ulcer 7/30/2023    Arthritis     Right ankle    Gastric ulcer     Pain     Right ankle         Past Surgical History:   Procedure Laterality Date    NH UPPER GI ENDOSCOPY,BIOPSY N/A 7/30/2023    Procedure: GASTROSCOPY, WITH BIOPSY;  Surgeon: Kumar Hope M.D.;  Location: SURGERY [facility];  Service: Gastroenterology    NH UPPER GI ENDOSCOPY,DIAGNOSIS  3/28/2022    Procedure: GASTROSCOPY;  Surgeon: Paco Wiggins M.D.;  Location: SURGERY SAME DAY [facility];  Service: Gastroenterology    NH UPPER GI ENDOSCOPY,BIOPSY  3/28/2022    Procedure: GASTROSCOPY, WITH BIOPSY;  Surgeon: Paco Wiggins M.D.;  Location: SURGERY SAME DAY [facility];  Service: Gastroenterology    ANKLE ARTHROSCOPY Right 5/21/2018    Procedure: ANKLE ARTHROSCOPY, LATERAL LIGAMENT RECONSTRUCTION;  Surgeon: Seth Cruz M.D.;  Location: SURGERY [facility];  Service: Orthopedics    BIOPSY ORTHO Right 5/21/2018    Procedure:  BIOPSY ORTHO/ FOR CARTILAGE AND DENOVO PROCEDURE;  Surgeon: Seth Cruz M.D.;  Location: SURGERY [facility];  Service: Orthopedics    OTHER ABDOMINAL SURGERY      Cholecystectomy February 2017    OTHER ORTHOPEDIC SURGERY      2 previous ankle surgeries (2007, 2009)       No family history on file.    Social History     Socioeconomic History    Marital status:     Highest education level: 12th grade   Tobacco Use    Smoking status: Never    Smokeless tobacco: Never   Vaping Use    Vaping Use: Never used   Substance and Sexual Activity    Alcohol use: Yes     Comment: occ    Drug use: Never     Social Determinants of Health     Financial Resource Strain: Low Risk  (3/28/2022)    Overall Financial Resource Strain (CARDIA)     Difficulty of Paying Living Expenses: Not hard at all   Food Insecurity: No Food Insecurity (3/28/2022)    Hunger Vital Sign     Worried About Running Out of Food in the Last Year: Never true     Ran Out of Food in the Last Year: Never true   Transportation Needs: No Transportation Needs (3/28/2022)    PRAPARE - Transportation     Lack of Transportation (Medical): No     Lack of Transportation (Non-Medical): No   Physical Activity: Sufficiently Active (3/28/2022)    Exercise Vital Sign     Days of Exercise per Week: 5 days     Minutes of Exercise per Session: 150+ min   Stress: No Stress Concern Present (3/28/2022)    [place] [employer] - Occupational Stress Questionnaire     Feeling of Stress : Not at all   Social Connections: Unknown (3/28/2022)    Social Connection and Isolation Panel [NHANES]     Frequency of Communication with Friends and Family: Patient refused     Frequency of Social Gatherings with Friends and Family: Patient refused     Attends [employer] Services: Never     Active Member of Clubs or Organizations: No     Attends Club or Organization Meetings: Never     Marital Status:    Housing Stability: Low Risk  (3/28/2022)    Housing  Stability Vital Sign     Unable to Pay for Housing in the Last Year: No     Number of Places Lived in the Last Year: 1     Unstable Housing in the Last Year: No     Constitutional: Denies fevers.  Eyes: Denies symptoms.   Ears/Nose/Throat/Mouth: Denies choking.  Cardiovascular: Denies chest pain.   Respiratory: Denies shortness of breath.  Gastrointestinal/Hepatic: Per H/P.   Skin/Breast: Denies rash       HEENT: grossly normal.  Cardiovascular: Normal heart rate.  Lungs: Respiratory effort is normal.   Abdomen: No acute abdomen.   Skin: No erythema, No rash.  Lower limbs: normal, no pitting edema.   Neurologic: Alert & oriented x 3, Normal motor function, No focal deficits noted.  PSY: stable mood.         Physical Exam:  Vitals:    09/14/23 0128 09/14/23 0348 09/14/23 0420 09/14/23 0503   BP: 133/75 130/83 138/78    Pulse:   64    Resp:   18    Temp:   36.4 °C (97.5 °F)    TempSrc:   Temporal    SpO2:   98%    Weight:    108 kg (237 lb 10.5 oz)   Height:                 Labs:  Recent Labs     09/13/23 1658 09/13/23 1929 09/14/23  0517   WBC 7.9  --  4.8   RBC 3.46*  --  3.15*   HEMOGLOBIN 8.5* 8.0* 7.7*   HEMATOCRIT 29.0*  --  26.5*   MCV 83.8  --  84.1   MCH 24.6*  --  24.4*   MCHC 29.3*  --  29.1*   RDW 48.1  --  47.4   PLATELETCT 475*  --  350   MPV 9.2  --  8.8*     Recent Labs     09/13/23 1658 09/14/23  0517   SODIUM 141 138   POTASSIUM 4.2 3.9   CHLORIDE 109 109   CO2 20 19*   GLUCOSE 93 93   BUN 18 15     Recent Labs     09/13/23 1658   APTT 26.7   INR 1.09       Recent Labs     09/13/23 1658 09/14/23  0517   ASTSGOT 18 15   ALTSGPT 24 20   TBILIRUBIN 0.2 0.4   ALKPHOSPHAT 41 37   GLOBULIN 2.0 1.7*   INR 1.09  --          Imaging:  CT-ABDOMEN-PELVIS WITH  Narrative: 9/13/2023 6:22 PM    HISTORY/REASON FOR EXAM:  IV contrast only.  Abdominal pain. Hematemesis    TECHNIQUE/EXAM DESCRIPTION:   CT scan of the abdomen and pelvis with contrast.    Contrast-enhanced helical scanning was obtained from the  diaphragmatic domes through the pubic symphysis following the bolus administration of nonionic contrast without complication.    100 mL of Omnipaque 350 nonionic contrast was administered without complication.    Low dose optimization technique was utilized for this CT exam including automated exposure control and adjustment of the mA and/or kV according to patient size.    COMPARISON: No prior studies available.    FINDINGS:  Lower Chest: Unremarkable.    Liver: Normal.    Spleen: Unremarkable.    Pancreas: Unremarkable.    Gallbladder: The gallbladder has been resected.    Biliary: Nondilated.    Adrenal glands: Normal.    Kidneys: Unremarkable without hydronephrosis.    Bowel: There is mild fat stranding about the gastroduodenal junction which may represent mild infection/inflammation. Discrete ulcer is not visualized however correlate with endoscopy. Normal appendix.    Lymph nodes: No adenopathy.    Vasculature: Unremarkable.    Peritoneum: Unremarkable without ascites.    Musculoskeletal: No acute or destructive process.    Pelvis: No adenopathy or free fluid.  Impression: 1.  Mild fat stranding adjacent to the first portion of the duodenum may represent mild duodenitis.  2.  Prior cholecystectomy. No biliary dilatation.  DX-CHEST-PORTABLE (1 VIEW)  Narrative: 9/13/2023 5:34 PM    HISTORY/REASON FOR EXAM:  Blood in vomit or stool or dark tarry stool.    TECHNIQUE/EXAM DESCRIPTION AND NUMBER OF VIEWS:  Single portable view of the chest.    COMPARISON: 9/9/2023    FINDINGS:    Cardiomediastinal silhouette is normal.    No focal consolidation, pleural effusion, pulmonary edema or pneumothorax.    No acute osseous abnormality.  Impression: No acute cardiopulmonary abnormality.            Impressions:  The patient is a 43 years old gentleman with recent admission to the hospital for abdominal pain, s/p upper GI scope by GI consultant provider on July 30 this year, which show gastric ulcer and duodenal ulcer with  negative HP biopsy, presented to our emergency room for persistent epigastric/abdominal pain.     CAT scan at emergency showed duodenitis, without perforation or other complication.     Hemoglobin dropped to 8.5 from recent 10.62 weeks ago.  Chemical panel was grossly normal.     GI team agree overnight observation, midnight n.p.o., we will consider repeat upper GI scope on September 14.      Continue IV PPI twice daily.  Hemoglobin follow-up.  Transfusion per protocol.     Diagnosis: upper gastrointestinal bleeding.   Procedure: Esophagogastroduodenoscopy with bleeding control including banding.          This note was generated using voice recognition software which has a small chance of producing errors of grammar and possibly content. I have made every reasonable attempt to find and correct any obvious errors, but expect that some may not be found prior to finalization of this note.

## 2023-09-14 NOTE — H&P
Hospital Medicine History & Physical Note    Date of Service  9/13/2023    Primary Care Physician  Pcp Pt States None    Consultants  GI    Code Status  Full Code    Chief Complaint  Chief Complaint   Patient presents with    Blood in Vomit     History of bleeding ulcer, recently d/c from hospital    Abdominal Pain       History of Presenting Illness  Roberto Braswell is a 43 y.o. male with history of bleeding from gastric and duodenal ulcer who presented 9/13/2023 with complaints of acute onset abdominal pain in the epigastrium, sharp stabbing and dull constant without elevating aggravating factors, nausea and vomiting several times with coffee-ground.  He denies melena.  He was taking omeprazole and Carafate after recent admission/discharge for duodenal/gastric ulcer bleeding on EGD.  CT of the abdomen pelvis with contrast did not show signs of perforation, showed some fat stranding at the first portion of duodenum.  Hemoglobin 8.5.  Dr. Dalton recommended to continue Protonix and RBC transfusion as needed with consideration for repeat EGD tomorrow.      I discussed the plan of care with patient and ERP, gastroenterology .    Review of Systems  Review of Systems   Constitutional:  Negative for chills, fever and weight loss.   HENT:  Negative for ear pain, hearing loss and tinnitus.    Eyes:  Negative for blurred vision, double vision and photophobia.   Respiratory:  Negative for cough, hemoptysis and sputum production.    Cardiovascular:  Negative for chest pain, palpitations and orthopnea.   Gastrointestinal:  Positive for abdominal pain, nausea and vomiting. Negative for constipation, diarrhea and heartburn.        Coffee-ground emesis   Genitourinary:  Negative for dysuria, flank pain, frequency and hematuria.   Musculoskeletal:  Negative for back pain, joint pain and neck pain.   Skin:  Negative for itching and rash.   Neurological:  Negative for tremors, speech change, focal weakness and headaches.    Endo/Heme/Allergies:  Negative for environmental allergies and polydipsia. Does not bruise/bleed easily.   Psychiatric/Behavioral:  Negative for hallucinations and substance abuse. The patient is not nervous/anxious.        Past Medical History   has a past medical history of Arthritis, Drug-seeking behavior (7/30/2023), Duodenal ulcer (7/30/2023), Gastric ulcer, and Pain.    Surgical History   has a past surgical history that includes other orthopedic surgery; other abdominal surgery; ankle arthroscopy (Right, 5/21/2018); biopsy ortho (Right, 5/21/2018); pr upper gi endoscopy,diagnosis (3/28/2022); pr upper gi endoscopy,biopsy (3/28/2022); and pr upper gi endoscopy,biopsy (N/A, 7/30/2023).     Family History  family history is not on file.   Family history reviewed with patient. There is no family history that is pertinent to the chief complaint.     Social History   reports that he has never smoked. He has never used smokeless tobacco. He reports current alcohol use. He reports that he does not use drugs.    Allergies  Allergies   Allergen Reactions    Tramadol Unspecified     Seizure  STC=[id number]    Morphine Vomiting       Medications  Prior to Admission Medications   Prescriptions Last Dose Informant Patient Reported? Taking?   ASPIRIN-ACETAMINOPHEN-CAFFEINE PO  Patient Yes No   Sig: Take 1 Tablet by mouth every 6 hours as needed for Headache.   furosemide (LASIX) 40 MG Tab   No No   Sig: Take 0.5 Tablets by mouth every day for 4 days.   omeprazole (PRILOSEC) 40 MG delayed-release capsule   No No   Sig: Take 1 Capsule by mouth 2 times a day.   sucralfate (CARAFATE) 1 GM/10ML Suspension   No No   Sig: Take 10 mL by mouth every 6 hours.      Facility-Administered Medications: None       Physical Exam  Temp:  [36.7 °C (98.1 °F)] 36.7 °C (98.1 °F)  Pulse:  [60-93] 65  Resp:  [12-18] 18  BP: (142-179)/() 158/91  SpO2:  [95 %-100 %] 96 %  Blood Pressure: (!) 158/91   Temperature: 36.7 °C (98.1 °F)   Pulse: 65  "  Respiration: 18   Pulse Oximetry: 96 %       Physical Exam  Vitals and nursing note reviewed.   Constitutional:       General: He is not in acute distress.     Appearance: Normal appearance.   HENT:      Head: Normocephalic and atraumatic.      Nose: Nose normal.      Mouth/Throat:      Mouth: Mucous membranes are moist.   Eyes:      Extraocular Movements: Extraocular movements intact.      Pupils: Pupils are equal, round, and reactive to light.   Cardiovascular:      Rate and Rhythm: Normal rate and regular rhythm.   Pulmonary:      Effort: Pulmonary effort is normal.      Breath sounds: Normal breath sounds.   Abdominal:      General: Abdomen is flat. There is no distension.      Tenderness: There is abdominal tenderness in the epigastric area. There is no guarding or rebound.   Musculoskeletal:         General: No swelling or deformity. Normal range of motion.      Cervical back: Normal range of motion and neck supple.   Skin:     General: Skin is warm and dry.   Neurological:      General: No focal deficit present.      Mental Status: He is alert and oriented to person, place, and time.   Psychiatric:         Mood and Affect: Mood normal.         Behavior: Behavior normal.         Laboratory:  Recent Labs     09/13/23  1658   WBC 7.9   RBC 3.46*   HEMOGLOBIN 8.5*   HEMATOCRIT 29.0*   MCV 83.8   MCH 24.6*   MCHC 29.3*   RDW 48.1   PLATELETCT 475*   MPV 9.2     Recent Labs     09/13/23  1658   SODIUM 141   POTASSIUM 4.2   CHLORIDE 109   CO2 20   GLUCOSE 93   BUN 18   CREATININE 1.16   CALCIUM 9.3     Recent Labs     09/13/23  1658   ALTSGPT 24   ASTSGOT 18   ALKPHOSPHAT 41   TBILIRUBIN 0.2   LIPASE 28   GLUCOSE 93     Recent Labs     09/13/23  1658   APTT 26.7   INR 1.09     No results for input(s): \"NTPROBNP\" in the last 72 hours.      No results for input(s): \"TROPONINT\" in the last 72 hours.    Imaging:  CT-ABDOMEN-PELVIS WITH   Final Result      1.  Mild fat stranding adjacent to the first portion of the " duodenum may represent mild duodenitis.   2.  Prior cholecystectomy. No biliary dilatation.      DX-CHEST-PORTABLE (1 VIEW)   Final Result      No acute cardiopulmonary abnormality.          X-Ray:  I have personally reviewed the images and compared with prior images.    Assessment/Plan:  Justification for Admission Status  I anticipate this patient will require at least two midnights for appropriate medical management, necessitating inpatient admission because GI bleed    Patient will need a Telemetry bed on MEDICAL service .  The need is secondary to GI bleed.    Upper GI bleed- (present on admission)  Assessment & Plan  43-year-old patient with prior history of bleeding from peptic ulcer disease in stomach and duodenum, presented with upper abdominal pain, coffee-ground emesis  CT of the abdomen and pelvis with contrast showed signs of duodenitis, no evidence of active bleeding  Hemoglobin 8.5, did not significantly changed from 9/9 which is reassuring  Plan to observe on telemetry closely  IV Protonix  H&H, transfuse for hemoglobin 7.0 and below  GI input noted    Acute blood loss anemia  Assessment & Plan  We will check iron studies, consider iron replacement        VTE prophylaxis: SCDs/TEDs

## 2023-09-14 NOTE — OR NURSING
1035- Pt to PACU from Endo. Report from anesthesia and OR RN. On 4L O2 via oxymask. Respirations even and unlabored. VSS. Pt awake & alert.    1037-Sips of water given. Pt declined heat pack or repositioning.    1042-MD to bedside.    1047-PRN pain medication given per MAR for 10/10 abdominal pain.    1102- Pt appears more comfortable, no grimacing    1110-pt nodding off to sleep, appears comfortable. Sinus elda. Pt states HR has been sinus elda. Message sent to primary RN.    1120-Report given to primary RN on floor, [facility]. All questions answered. Pt up to BR with assistance. Hooked to Tele box     1140-pt off floor via ada with RN.

## 2023-09-14 NOTE — PROCEDURES
OPERATIVE REPORT    PATIENT:   Roberto Braswell   1980       PREOPERATIVE DIAGNOSES/INDICATIONS: Upper Gi bleeding.     POSTOPERATIVE DIAGNOSIS:   Duodenal ulcer, not completely healed, some oozing at post side of duodenal sweep. We used transparent cap to find and investigate it. S/p biopsy. The location of this 1cm ulcer could possibly give the patient some partial obstruction symptoms. No hemostatic intervention needed.    Gastric ulcers from previous scopes healed. S/p biopsy to recheck HP.   Esophagus was grossly normal.     PROCEDURE:  ESOPHAGOGASTRODUODENOSCOPY    PHYSICIAN:  Johnny Dalton MD. MPH.    ANESTHESIA:  Per anesthesiologist or ICU team.     LOCATION: [facility]    CONSENT:  OBTAINED. The risks, benefits and alternatives of the procedure were discussed in details. The risks include and are not limited to bleeding, infection, perforation, missed lesions, and sedations risks (cardiopulmonary compromise and allergic reaction to medications).    DESCRIPTION: The patient presented to the procedure room.  After routine checkup was performed, patient was brought into the endoscopy suite.  Patient was placed on his left lateral decubitus position. A bite block was placed in patient's mouth. Patient was sedated by anesthesia.  Vital signs were monitored throughout the procedure.  Oxygenation support was provided throughout the procedure. Upper endoscope was inserted into patient's mouth and advanced to the second portion of the duodenum under direct visualization.      Once the site was reached and examined, the upper endoscope was withdrawn.  Retroflexion was made within the stomach.  The stomach was decompressed, scope was withdrawn and the procedure was terminated.    The patient tolerated the procedure well.  There were no immediate complications.    OPERATIVE FINDINGS:  Duodenal ulcer, not completely healed, some oozing at post side of duodenal sweep. We used transparent cap to find and investigate  it. S/p biopsy. The location of this 1cm ulcer could possibly give the patient some partial obstruction symptoms.     Gastric ulcers from previous scopes healed. S/p biopsy to recheck HP.     Esophagus was grossly normal.     RECOMMENDATIONS:  PPI 40mg bid oral dose for 8 weeks then taper.    Consider outpatient GI referral to have follow up and repeat EGD to confirm the healing.   Okay to resume diet, soft today, then advanced by primary team.   GI team will follow up on the pathology with the patient.   GI sign off.       This note has been transcribed with digital voice recognition software and although it has been reviewed may contain grammatical or word errors

## 2023-09-14 NOTE — CARE PLAN
Patient complained of pain at 9/10 this shift receiving PRN pain medications RTC. EGD completed and CBC being monitored for decrease in hemoglobin. Attended to needs. Call light within reach.       Problem: Pain - Standard  Goal: Alleviation of pain or a reduction in pain to the patient’s comfort goal  Outcome: Progressing     Problem: Knowledge Deficit - Standard  Goal: Patient and family/care givers will demonstrate understanding of plan of care, disease process/condition, diagnostic tests and medications  Outcome: Progressing         The patient is Stable - Low risk of patient condition declining or worsening    Shift Goals  Clinical Goals: Monitor hgb, echo, GI scope  Patient Goals: pain management, inform nurses of blood in stool or emesis  Family Goals: JJ

## 2023-09-15 ENCOUNTER — APPOINTMENT (OUTPATIENT)
Dept: CARDIOLOGY | Facility: MEDICAL CENTER | Age: 43
DRG: 378 | End: 2023-09-15
Attending: INTERNAL MEDICINE
Payer: COMMERCIAL

## 2023-09-15 ENCOUNTER — APPOINTMENT (OUTPATIENT)
Dept: RADIOLOGY | Facility: MEDICAL CENTER | Age: 43
DRG: 378 | End: 2023-09-15
Attending: INTERNAL MEDICINE
Payer: COMMERCIAL

## 2023-09-15 PROBLEM — I34.0 MITRAL REGURGITATION: Status: ACTIVE | Noted: 2023-09-15

## 2023-09-15 LAB
FERRITIN SERPL-MCNC: 9.1 NG/ML (ref 22–322)
HGB BLD-MCNC: 8.2 G/DL (ref 14–18)
HGB BLD-MCNC: 8.8 G/DL (ref 14–18)
HGB RETIC QN AUTO: 17 PG/CELL (ref 29–35)
IMM RETICS NFR: 19.5 % (ref 2.6–16.1)
IRON SATN MFR SERPL: 4 % (ref 15–55)
IRON SERPL-MCNC: 12 UG/DL (ref 50–180)
LV EJECT FRACT  99904: 65
LV EJECT FRACT MOD 2C 99903: 66.5
LV EJECT FRACT MOD 4C 99902: 59.41
LV EJECT FRACT MOD BP 99901: 62.4
RETICS # AUTO: 0.08 M/UL (ref 0.04–0.12)
RETICS/RBC NFR: 2.2 % (ref 0.8–2.6)
TIBC SERPL-MCNC: 340 UG/DL (ref 250–450)
UIBC SERPL-MCNC: 328 UG/DL (ref 110–370)

## 2023-09-15 PROCEDURE — A9270 NON-COVERED ITEM OR SERVICE: HCPCS | Performed by: INTERNAL MEDICINE

## 2023-09-15 PROCEDURE — 93970 EXTREMITY STUDY: CPT | Mod: 26 | Performed by: INTERNAL MEDICINE

## 2023-09-15 PROCEDURE — 700111 HCHG RX REV CODE 636 W/ 250 OVERRIDE (IP): Performed by: STUDENT IN AN ORGANIZED HEALTH CARE EDUCATION/TRAINING PROGRAM

## 2023-09-15 PROCEDURE — 82728 ASSAY OF FERRITIN: CPT

## 2023-09-15 PROCEDURE — 93306 TTE W/DOPPLER COMPLETE: CPT | Mod: 26 | Performed by: INTERNAL MEDICINE

## 2023-09-15 PROCEDURE — 85046 RETICYTE/HGB CONCENTRATE: CPT

## 2023-09-15 PROCEDURE — C9113 INJ PANTOPRAZOLE SODIUM, VIA: HCPCS | Performed by: STUDENT IN AN ORGANIZED HEALTH CARE EDUCATION/TRAINING PROGRAM

## 2023-09-15 PROCEDURE — 700102 HCHG RX REV CODE 250 W/ 637 OVERRIDE(OP): Performed by: STUDENT IN AN ORGANIZED HEALTH CARE EDUCATION/TRAINING PROGRAM

## 2023-09-15 PROCEDURE — 93970 EXTREMITY STUDY: CPT

## 2023-09-15 PROCEDURE — 700102 HCHG RX REV CODE 250 W/ 637 OVERRIDE(OP): Performed by: INTERNAL MEDICINE

## 2023-09-15 PROCEDURE — 700105 HCHG RX REV CODE 258: Performed by: STUDENT IN AN ORGANIZED HEALTH CARE EDUCATION/TRAINING PROGRAM

## 2023-09-15 PROCEDURE — 770020 HCHG ROOM/CARE - TELE (206)

## 2023-09-15 PROCEDURE — 83550 IRON BINDING TEST: CPT

## 2023-09-15 PROCEDURE — 93306 TTE W/DOPPLER COMPLETE: CPT

## 2023-09-15 PROCEDURE — 700105 HCHG RX REV CODE 258: Performed by: INTERNAL MEDICINE

## 2023-09-15 PROCEDURE — 36415 COLL VENOUS BLD VENIPUNCTURE: CPT

## 2023-09-15 PROCEDURE — 99233 SBSQ HOSP IP/OBS HIGH 50: CPT | Mod: GC | Performed by: STUDENT IN AN ORGANIZED HEALTH CARE EDUCATION/TRAINING PROGRAM

## 2023-09-15 PROCEDURE — A9270 NON-COVERED ITEM OR SERVICE: HCPCS | Performed by: STUDENT IN AN ORGANIZED HEALTH CARE EDUCATION/TRAINING PROGRAM

## 2023-09-15 PROCEDURE — 85018 HEMOGLOBIN: CPT

## 2023-09-15 PROCEDURE — 83540 ASSAY OF IRON: CPT

## 2023-09-15 RX ORDER — ACETAMINOPHEN 500 MG
1000 TABLET ORAL EVERY 6 HOURS
Status: DISCONTINUED | OUTPATIENT
Start: 2023-09-15 | End: 2023-09-16 | Stop reason: HOSPADM

## 2023-09-15 RX ORDER — SUCRALFATE ORAL 1 G/10ML
1 SUSPENSION ORAL EVERY 6 HOURS
Status: DISCONTINUED | OUTPATIENT
Start: 2023-09-15 | End: 2023-09-16 | Stop reason: HOSPADM

## 2023-09-15 RX ADMIN — ACETAMINOPHEN 1000 MG: 500 TABLET, FILM COATED ORAL at 09:57

## 2023-09-15 RX ADMIN — PANTOPRAZOLE SODIUM 40 MG: 40 INJECTION, POWDER, FOR SOLUTION INTRAVENOUS at 05:53

## 2023-09-15 RX ADMIN — SODIUM CHLORIDE: 9 INJECTION, SOLUTION INTRAVENOUS at 16:00

## 2023-09-15 RX ADMIN — SUCRALFATE 1 G: 1 SUSPENSION ORAL at 23:13

## 2023-09-15 RX ADMIN — SENNOSIDES AND DOCUSATE SODIUM 2 TABLET: 50; 8.6 TABLET ORAL at 05:52

## 2023-09-15 RX ADMIN — OXYCODONE HYDROCHLORIDE 10 MG: 10 TABLET ORAL at 23:12

## 2023-09-15 RX ADMIN — HYDROMORPHONE HYDROCHLORIDE 0.5 MG: 1 INJECTION, SOLUTION INTRAMUSCULAR; INTRAVENOUS; SUBCUTANEOUS at 04:02

## 2023-09-15 RX ADMIN — HYDROMORPHONE HYDROCHLORIDE 0.5 MG: 1 INJECTION, SOLUTION INTRAMUSCULAR; INTRAVENOUS; SUBCUTANEOUS at 00:41

## 2023-09-15 RX ADMIN — SUCRALFATE 1 G: 1 SUSPENSION ORAL at 12:18

## 2023-09-15 RX ADMIN — HYDROMORPHONE HYDROCHLORIDE 0.5 MG: 1 INJECTION, SOLUTION INTRAMUSCULAR; INTRAVENOUS; SUBCUTANEOUS at 13:30

## 2023-09-15 RX ADMIN — LIDOCAINE HYDROCHLORIDE 15 ML: 20 SOLUTION OROPHARYNGEAL at 12:18

## 2023-09-15 RX ADMIN — OXYCODONE HYDROCHLORIDE 10 MG: 10 TABLET ORAL at 09:06

## 2023-09-15 RX ADMIN — SENNOSIDES AND DOCUSATE SODIUM 2 TABLET: 50; 8.6 TABLET ORAL at 17:38

## 2023-09-15 RX ADMIN — OXYCODONE HYDROCHLORIDE 10 MG: 10 TABLET ORAL at 02:29

## 2023-09-15 RX ADMIN — SODIUM CHLORIDE 250 MG: 9 INJECTION, SOLUTION INTRAVENOUS at 09:09

## 2023-09-15 RX ADMIN — HYDROMORPHONE HYDROCHLORIDE 0.5 MG: 1 INJECTION, SOLUTION INTRAMUSCULAR; INTRAVENOUS; SUBCUTANEOUS at 09:57

## 2023-09-15 RX ADMIN — OXYCODONE HYDROCHLORIDE 10 MG: 10 TABLET ORAL at 05:53

## 2023-09-15 RX ADMIN — ACETAMINOPHEN 1000 MG: 500 TABLET, FILM COATED ORAL at 23:13

## 2023-09-15 RX ADMIN — HYDROMORPHONE HYDROCHLORIDE 0.5 MG: 1 INJECTION, SOLUTION INTRAMUSCULAR; INTRAVENOUS; SUBCUTANEOUS at 17:08

## 2023-09-15 RX ADMIN — SODIUM CHLORIDE 250 MG: 9 INJECTION, SOLUTION INTRAVENOUS at 17:38

## 2023-09-15 RX ADMIN — HYDROMORPHONE HYDROCHLORIDE 0.5 MG: 1 INJECTION, SOLUTION INTRAMUSCULAR; INTRAVENOUS; SUBCUTANEOUS at 07:32

## 2023-09-15 RX ADMIN — OXYCODONE HYDROCHLORIDE 10 MG: 10 TABLET ORAL at 20:09

## 2023-09-15 RX ADMIN — SUCRALFATE 1 G: 1 SUSPENSION ORAL at 17:39

## 2023-09-15 RX ADMIN — HYDROMORPHONE HYDROCHLORIDE 0.5 MG: 1 INJECTION, SOLUTION INTRAMUSCULAR; INTRAVENOUS; SUBCUTANEOUS at 21:49

## 2023-09-15 RX ADMIN — LIDOCAINE HYDROCHLORIDE 15 ML: 20 SOLUTION OROPHARYNGEAL at 17:08

## 2023-09-15 RX ADMIN — OXYCODONE HYDROCHLORIDE 10 MG: 10 TABLET ORAL at 15:56

## 2023-09-15 RX ADMIN — ACETAMINOPHEN 1000 MG: 500 TABLET, FILM COATED ORAL at 17:38

## 2023-09-15 RX ADMIN — OXYCODONE HYDROCHLORIDE 10 MG: 10 TABLET ORAL at 12:18

## 2023-09-15 ASSESSMENT — ENCOUNTER SYMPTOMS
VOMITING: 0
HEADACHES: 0
WEAKNESS: 0
SHORTNESS OF BREATH: 0
DIZZINESS: 1
WEIGHT LOSS: 0
NAUSEA: 0
COUGH: 0
DIARRHEA: 0
HEMOPTYSIS: 0
CONSTIPATION: 0
BLOOD IN STOOL: 0
ABDOMINAL PAIN: 1
PALPITATIONS: 0
CHILLS: 0
FEVER: 0

## 2023-09-15 ASSESSMENT — PAIN DESCRIPTION - PAIN TYPE
TYPE: ACUTE PAIN

## 2023-09-15 ASSESSMENT — FIBROSIS 4 INDEX: FIB4 SCORE: 0.4

## 2023-09-15 NOTE — PROGRESS NOTES
Monitor Summary:  Rhythm: SB/SR  Rate: 45-56  Ectopy: BARBARA, PVC, PAC    .14/.08/.40       No monitoring strips uploaded

## 2023-09-15 NOTE — CARE PLAN
The patient is Watcher - Medium risk of patient condition declining or worsening    Shift Goals  Clinical Goals: Monitor HGB, echo, bilat LE US  Patient Goals: pain control, bleed prevention, rest, ambulate as tolerated  Family Goals: JJ    Progress made toward(s) clinical / shift goals:    Problem: Knowledge Deficit - Standard  Goal: Patient and family/care givers will demonstrate understanding of plan of care, disease process/condition, diagnostic tests and medications  Outcome: Progressing  Note: Patient's wife is a nurse and the patient has a good understanding of hospital interventions and the medications in which he is being administered during this current stay.        Patient is not progressing towards the following goals:      Problem: Pain - Standard  Goal: Alleviation of pain or a reduction in pain to the patient’s comfort goal  Outcome: Not Progressing  Flowsheets (Taken 9/15/2023 2732)  OB Pain Intervention: Medication - See MAR  Pain Rating Scale (NPRS): 9  Note: Patient's pain is consistently a 9/10 and the patient requires his pain medications to be administered around the clock when they are due.

## 2023-09-15 NOTE — PROGRESS NOTES
Bedside report received and patient care assumed. Pt is resting in bed, A&O4, with complaints of sharp stabbing 8/10 abdominal pain, and is on room air. Tele box on. All low fall precautions are in place, belongings at bedside table.  Pt was updated on POC, no questions or concerns. Pt educated on use of call light for assistance.

## 2023-09-15 NOTE — CARE PLAN
The patient is Stable - Low risk of patient condition declining or worsening    Shift Goals  Clinical Goals: comfort, monitor labs  Patient Goals: pain control  Family Goals: JJ    Progress made toward(s) clinical / shift goals:  Hgb stable though remains low and iron levels also low. IV iron given per MAR. Monitoring pain levels and providing prn medication as ordered. Also collaborating with care team to provide alternatives to narcotics for pain relief. Abhijit was educated on prn medications, including the pain scale and breakthrough medications.     Patient is not progressing towards the following goals:  N/A

## 2023-09-16 ENCOUNTER — PHARMACY VISIT (OUTPATIENT)
Dept: PHARMACY | Facility: MEDICAL CENTER | Age: 43
End: 2023-09-16
Payer: COMMERCIAL

## 2023-09-16 VITALS
TEMPERATURE: 98 F | OXYGEN SATURATION: 98 % | HEART RATE: 59 BPM | BODY MASS INDEX: 29.72 KG/M2 | DIASTOLIC BLOOD PRESSURE: 81 MMHG | HEIGHT: 75 IN | WEIGHT: 239 LBS | SYSTOLIC BLOOD PRESSURE: 155 MMHG | RESPIRATION RATE: 18 BRPM

## 2023-09-16 PROBLEM — R10.9 INTRACTABLE ABDOMINAL PAIN: Status: ACTIVE | Noted: 2023-09-16

## 2023-09-16 LAB
ALBUMIN SERPL BCP-MCNC: 3.7 G/DL (ref 3.2–4.9)
ALBUMIN/GLOB SERPL: 2.2 G/DL
ALP SERPL-CCNC: 41 U/L (ref 30–99)
ALT SERPL-CCNC: 14 U/L (ref 2–50)
ANION GAP SERPL CALC-SCNC: 9 MMOL/L (ref 7–16)
AST SERPL-CCNC: 14 U/L (ref 12–45)
BASOPHILS # BLD AUTO: 2.2 % (ref 0–1.8)
BASOPHILS # BLD: 0.1 K/UL (ref 0–0.12)
BILIRUB SERPL-MCNC: 0.2 MG/DL (ref 0.1–1.5)
BUN SERPL-MCNC: 16 MG/DL (ref 8–22)
CALCIUM ALBUM COR SERPL-MCNC: 8.1 MG/DL (ref 8.5–10.5)
CALCIUM SERPL-MCNC: 7.9 MG/DL (ref 8.5–10.5)
CHLORIDE SERPL-SCNC: 110 MMOL/L (ref 96–112)
CO2 SERPL-SCNC: 23 MMOL/L (ref 20–33)
CREAT SERPL-MCNC: 1.03 MG/DL (ref 0.5–1.4)
EOSINOPHIL # BLD AUTO: 0.17 K/UL (ref 0–0.51)
EOSINOPHIL NFR BLD: 3.8 % (ref 0–6.9)
ERYTHROCYTE [DISTWIDTH] IN BLOOD BY AUTOMATED COUNT: 45.2 FL (ref 35.9–50)
GFR SERPLBLD CREATININE-BSD FMLA CKD-EPI: 93 ML/MIN/1.73 M 2
GLOBULIN SER CALC-MCNC: 1.7 G/DL (ref 1.9–3.5)
GLUCOSE SERPL-MCNC: 93 MG/DL (ref 65–99)
HCT VFR BLD AUTO: 27.9 % (ref 42–52)
HGB BLD-MCNC: 8.6 G/DL (ref 14–18)
IMM GRANULOCYTES # BLD AUTO: 0.02 K/UL (ref 0–0.11)
IMM GRANULOCYTES NFR BLD AUTO: 0.4 % (ref 0–0.9)
LYMPHOCYTES # BLD AUTO: 1.7 K/UL (ref 1–4.8)
LYMPHOCYTES NFR BLD: 37.9 % (ref 22–41)
MAGNESIUM SERPL-MCNC: 2.3 MG/DL (ref 1.5–2.5)
MCH RBC QN AUTO: 24.9 PG (ref 27–33)
MCHC RBC AUTO-ENTMCNC: 30.8 G/DL (ref 32.3–36.5)
MCV RBC AUTO: 80.9 FL (ref 81.4–97.8)
MONOCYTES # BLD AUTO: 0.57 K/UL (ref 0–0.85)
MONOCYTES NFR BLD AUTO: 12.7 % (ref 0–13.4)
NEUTROPHILS # BLD AUTO: 1.92 K/UL (ref 1.82–7.42)
NEUTROPHILS NFR BLD: 43 % (ref 44–72)
NRBC # BLD AUTO: 0 K/UL
NRBC BLD-RTO: 0 /100 WBC (ref 0–0.2)
PHOSPHATE SERPL-MCNC: 3 MG/DL (ref 2.5–4.5)
PLATELET # BLD AUTO: 342 K/UL (ref 164–446)
PMV BLD AUTO: 8.6 FL (ref 9–12.9)
POTASSIUM SERPL-SCNC: 4.1 MMOL/L (ref 3.6–5.5)
PROT SERPL-MCNC: 5.4 G/DL (ref 6–8.2)
RBC # BLD AUTO: 3.45 M/UL (ref 4.7–6.1)
SODIUM SERPL-SCNC: 142 MMOL/L (ref 135–145)
WBC # BLD AUTO: 4.5 K/UL (ref 4.8–10.8)

## 2023-09-16 PROCEDURE — 83735 ASSAY OF MAGNESIUM: CPT

## 2023-09-16 PROCEDURE — 700102 HCHG RX REV CODE 250 W/ 637 OVERRIDE(OP): Performed by: STUDENT IN AN ORGANIZED HEALTH CARE EDUCATION/TRAINING PROGRAM

## 2023-09-16 PROCEDURE — 700111 HCHG RX REV CODE 636 W/ 250 OVERRIDE (IP): Performed by: STUDENT IN AN ORGANIZED HEALTH CARE EDUCATION/TRAINING PROGRAM

## 2023-09-16 PROCEDURE — 700105 HCHG RX REV CODE 258: Performed by: INTERNAL MEDICINE

## 2023-09-16 PROCEDURE — A9270 NON-COVERED ITEM OR SERVICE: HCPCS | Performed by: STUDENT IN AN ORGANIZED HEALTH CARE EDUCATION/TRAINING PROGRAM

## 2023-09-16 PROCEDURE — 99239 HOSP IP/OBS DSCHRG MGMT >30: CPT | Mod: GC | Performed by: STUDENT IN AN ORGANIZED HEALTH CARE EDUCATION/TRAINING PROGRAM

## 2023-09-16 PROCEDURE — 36415 COLL VENOUS BLD VENIPUNCTURE: CPT

## 2023-09-16 PROCEDURE — 84100 ASSAY OF PHOSPHORUS: CPT

## 2023-09-16 PROCEDURE — 700105 HCHG RX REV CODE 258: Performed by: STUDENT IN AN ORGANIZED HEALTH CARE EDUCATION/TRAINING PROGRAM

## 2023-09-16 PROCEDURE — A9270 NON-COVERED ITEM OR SERVICE: HCPCS | Performed by: INTERNAL MEDICINE

## 2023-09-16 PROCEDURE — 80053 COMPREHEN METABOLIC PANEL: CPT

## 2023-09-16 PROCEDURE — C9113 INJ PANTOPRAZOLE SODIUM, VIA: HCPCS | Performed by: STUDENT IN AN ORGANIZED HEALTH CARE EDUCATION/TRAINING PROGRAM

## 2023-09-16 PROCEDURE — 85025 COMPLETE CBC W/AUTO DIFF WBC: CPT

## 2023-09-16 PROCEDURE — 700102 HCHG RX REV CODE 250 W/ 637 OVERRIDE(OP): Performed by: INTERNAL MEDICINE

## 2023-09-16 PROCEDURE — RXMED WILLOW AMBULATORY MEDICATION CHARGE

## 2023-09-16 PROCEDURE — 700111 HCHG RX REV CODE 636 W/ 250 OVERRIDE (IP): Mod: JZ | Performed by: STUDENT IN AN ORGANIZED HEALTH CARE EDUCATION/TRAINING PROGRAM

## 2023-09-16 RX ORDER — SUCRALFATE ORAL 1 G/10ML
1 SUSPENSION ORAL EVERY 6 HOURS
Qty: 414 ML | Refills: 1 | Status: SHIPPED | OUTPATIENT
Start: 2023-09-16 | End: 2023-10-10 | Stop reason: SDUPTHER

## 2023-09-16 RX ORDER — FUROSEMIDE 20 MG/1
20 TABLET ORAL
Qty: 10 TABLET | Refills: 0 | Status: SHIPPED | OUTPATIENT
Start: 2023-09-16 | End: 2023-09-26

## 2023-09-16 RX ORDER — DULOXETIN HYDROCHLORIDE 30 MG/1
30 CAPSULE, DELAYED RELEASE ORAL DAILY
Status: DISCONTINUED | OUTPATIENT
Start: 2023-09-16 | End: 2023-09-16 | Stop reason: HOSPADM

## 2023-09-16 RX ORDER — OMEPRAZOLE 40 MG/1
40 CAPSULE, DELAYED RELEASE ORAL 2 TIMES DAILY
Qty: 60 CAPSULE | Refills: 0 | Status: CANCELLED | OUTPATIENT
Start: 2023-09-16

## 2023-09-16 RX ORDER — PANTOPRAZOLE SODIUM 40 MG/1
40 TABLET, DELAYED RELEASE ORAL 2 TIMES DAILY
Qty: 120 TABLET | Refills: 0 | Status: SHIPPED | OUTPATIENT
Start: 2023-09-16 | End: 2023-10-10 | Stop reason: SDUPTHER

## 2023-09-16 RX ORDER — DULOXETIN HYDROCHLORIDE 30 MG/1
CAPSULE, DELAYED RELEASE ORAL
Qty: 53 CAPSULE | Refills: 0 | Status: SHIPPED | OUTPATIENT
Start: 2023-09-16 | End: 2023-10-16

## 2023-09-16 RX ORDER — ACETAMINOPHEN 325 MG/1
650 TABLET ORAL EVERY 6 HOURS PRN
Qty: 30 TABLET | Refills: 0 | Status: SHIPPED | OUTPATIENT
Start: 2023-09-16 | End: 2023-11-17

## 2023-09-16 RX ORDER — OXYCODONE HYDROCHLORIDE 5 MG/1
5 TABLET ORAL EVERY 8 HOURS PRN
Qty: 15 TABLET | Refills: 0 | Status: SHIPPED | OUTPATIENT
Start: 2023-09-16 | End: 2023-10-10 | Stop reason: SDUPTHER

## 2023-09-16 RX ADMIN — HYDROMORPHONE HYDROCHLORIDE 0.5 MG: 1 INJECTION, SOLUTION INTRAMUSCULAR; INTRAVENOUS; SUBCUTANEOUS at 00:44

## 2023-09-16 RX ADMIN — SENNOSIDES AND DOCUSATE SODIUM 2 TABLET: 50; 8.6 TABLET ORAL at 05:17

## 2023-09-16 RX ADMIN — OXYCODONE HYDROCHLORIDE 10 MG: 10 TABLET ORAL at 12:25

## 2023-09-16 RX ADMIN — LIDOCAINE HYDROCHLORIDE 15 ML: 20 SOLUTION OROPHARYNGEAL at 05:18

## 2023-09-16 RX ADMIN — SODIUM CHLORIDE 250 MG: 9 INJECTION, SOLUTION INTRAVENOUS at 13:58

## 2023-09-16 RX ADMIN — OXYCODONE HYDROCHLORIDE 10 MG: 10 TABLET ORAL at 15:31

## 2023-09-16 RX ADMIN — SUCRALFATE 1 G: 1 SUSPENSION ORAL at 12:25

## 2023-09-16 RX ADMIN — OXYCODONE HYDROCHLORIDE 10 MG: 10 TABLET ORAL at 09:15

## 2023-09-16 RX ADMIN — OXYCODONE HYDROCHLORIDE 10 MG: 10 TABLET ORAL at 02:18

## 2023-09-16 RX ADMIN — SODIUM CHLORIDE 250 MG: 9 INJECTION, SOLUTION INTRAVENOUS at 05:23

## 2023-09-16 RX ADMIN — ACETAMINOPHEN 1000 MG: 500 TABLET, FILM COATED ORAL at 05:17

## 2023-09-16 RX ADMIN — HYDROMORPHONE HYDROCHLORIDE 0.5 MG: 1 INJECTION, SOLUTION INTRAMUSCULAR; INTRAVENOUS; SUBCUTANEOUS at 04:05

## 2023-09-16 RX ADMIN — DULOXETINE HYDROCHLORIDE 30 MG: 30 CAPSULE, DELAYED RELEASE ORAL at 09:16

## 2023-09-16 RX ADMIN — PANTOPRAZOLE SODIUM 40 MG: 40 INJECTION, POWDER, FOR SOLUTION INTRAVENOUS at 05:17

## 2023-09-16 RX ADMIN — ACETAMINOPHEN 1000 MG: 500 TABLET, FILM COATED ORAL at 12:25

## 2023-09-16 RX ADMIN — SODIUM CHLORIDE: 9 INJECTION, SOLUTION INTRAVENOUS at 05:23

## 2023-09-16 RX ADMIN — HYDROMORPHONE HYDROCHLORIDE 0.5 MG: 1 INJECTION, SOLUTION INTRAMUSCULAR; INTRAVENOUS; SUBCUTANEOUS at 13:51

## 2023-09-16 RX ADMIN — HYDROMORPHONE HYDROCHLORIDE 0.5 MG: 1 INJECTION, SOLUTION INTRAMUSCULAR; INTRAVENOUS; SUBCUTANEOUS at 06:28

## 2023-09-16 RX ADMIN — OXYCODONE HYDROCHLORIDE 10 MG: 10 TABLET ORAL at 05:27

## 2023-09-16 RX ADMIN — SUCRALFATE 1 G: 1 SUSPENSION ORAL at 05:17

## 2023-09-16 ASSESSMENT — PAIN DESCRIPTION - PAIN TYPE
TYPE: ACUTE PAIN

## 2023-09-16 ASSESSMENT — FIBROSIS 4 INDEX: FIB4 SCORE: 0.4

## 2023-09-16 NOTE — DISCHARGE INSTRUCTIONS
Discharge Instructions    Discharged to home by car with self. Discharged via walking, hospital escort: Refused.  Special equipment needed: Not Applicable    Be sure to schedule a follow-up appointment with your primary care doctor or any specialists as instructed.     Discharge Plan:   Diet Plan: Discussed  Activity Level: Discussed  Confirmed Follow up Appointment: Patient to Call and Schedule Appointment    I understand that a diet low in cholesterol, fat, and sodium is recommended for good health. Unless I have been given specific instructions below for another diet, I accept this instruction as my diet prescription.   Other diet: regular    Special Instructions: None    -Is this patient being discharged with medication to prevent blood clots?  No    Is patient discharged on Warfarin / Coumadin?   No

## 2023-09-16 NOTE — PROGRESS NOTES
[facility] Internal Medicine Daily Progress Note    Date of Service  9/15/2023    R Team: R IM Orange Team   Attending: Jung Pelayo M.d.  Senior Resident: Dr. Chin  Contact Number: [phone]    Chief Complaint  Roberto Braswell is a 43 y.o. male admitted 9/13/2023 with abdominal pain and hematemesis    Hospital Course  Roberto Braswell is a 43 year-old male who was admitted on 9/13/2023 due to abdominal pain and hematemesis.  His past medical history of previous GI bleeds with recent admission on July 30, 2023.  At that time he had an EGD done per GI which showed a gastric ulcer and duodenal ulcer.  H. pylori was negative per biopsy.  He was discharged on omeprazole and Carafate, but reports persistence of symptoms.  Patient reports that he has been having epigastric abdominal pain, sharp and stabbing in character, without radiation.  He denies any aggravating factors such as NSAID use or alcohol and further denies any alleviating factors.  Does not note any hematochezia or melena.  In the ED, CT abdomen pelvis with contrast was negative for perforation but showed likely duodenitis.  Hemoglobin found to be 8.5, which was previously around 10 2 weeks prior.  GI was consulted in the ED who recommended IV Protonix twice daily, blood transfusion as needed, with plans for EGD.  EGD done 9/14/23 showed incompletely healed duodenal ulcer, biopsy taken. Esophagus normal.  Discharged patient on omeprazole 40mg twice daily for 8 weeks. Plans for outpatient GI follow up with possible repeat EGD.    Interval Problem Update    Continues to feel significant abdominal pain and weakness, does not feel ready for discharge.  Has not had any bowel movements in the past 2 days.  VSS, -150s  Hgb stable at 8, INR/PTT WNL  *H. pylori biopsy negative  *Iron panel (9/15/2023): Iron 12, TIBC 340, 4% sat, ferritin 9.1  *Echo (9/15/23): probably severe mitral regurgitation. Consider LEONEL    -Patient was given scheduled  acetaminophen 1 g 4 times daily  -Sucralfate was added  -GI cocktail was added to help control pain  -Outpatient f/u for probably severe mitral regurgitation after recovery from GIB  -Possible discharge tomorrow if pain under control    I have discussed this patient's plan of care and discharge plan at IDT rounds today with Case Management, Nursing, Nursing leadership, and other members of the IDT team.    Consultants/Specialty  GI    Code Status  Full Code    Disposition  The patient is not medically cleared for discharge to home or a post-acute facility.  Anticipate discharge to: home with close outpatient follow-up    I have placed the appropriate orders for post-discharge needs.    Review of Systems  Review of Systems   Constitutional:  Positive for malaise/fatigue. Negative for chills, fever and weight loss.   Respiratory:  Negative for cough, hemoptysis and shortness of breath.    Cardiovascular:  Negative for chest pain and palpitations.   Gastrointestinal:  Positive for abdominal pain. Negative for blood in stool, constipation, diarrhea, melena, nausea and vomiting.   Genitourinary:  Negative for hematuria.   Neurological:  Positive for dizziness. Negative for weakness and headaches.        Physical Exam  Temp:  [36.2 °C (97.2 °F)-36.8 °C (98.2 °F)] 36.7 °C (98.1 °F)  Pulse:  [58-81] 64  Resp:  [16-18] 16  BP: (125-155)/(69-87) 155/87  SpO2:  [97 %-100 %] 100 %    Physical Exam  Constitutional:       General: He is not in acute distress.     Appearance: He is not toxic-appearing.   HENT:      Head: Normocephalic and atraumatic.      Right Ear: External ear normal.      Left Ear: External ear normal.      Nose: Nose normal. No congestion.      Mouth/Throat:      Mouth: Mucous membranes are moist.      Pharynx: Oropharynx is clear.   Eyes:      General:         Right eye: No discharge.         Left eye: No discharge.      Extraocular Movements: Extraocular movements intact.      Comments: Pale conjunctiva    Cardiovascular:      Rate and Rhythm: Normal rate and regular rhythm.      Pulses: Normal pulses.      Heart sounds: No murmur heard.  Pulmonary:      Effort: Pulmonary effort is normal. No respiratory distress.      Breath sounds: Normal breath sounds. No wheezing or rhonchi.   Abdominal:      General: There is no distension.      Palpations: Abdomen is soft.      Tenderness: There is abdominal tenderness. There is no guarding or rebound.   Musculoskeletal:         General: No swelling or deformity. Normal range of motion.   Skin:     General: Skin is warm and dry.      Coloration: Skin is pale.   Neurological:      General: No focal deficit present.      Mental Status: He is alert and oriented to person, place, and time.      Motor: No weakness.   Psychiatric:         Mood and Affect: Mood normal.         Behavior: Behavior normal.         Fluids    Intake/Output Summary (Last 24 hours) at 9/15/2023 1823  Last data filed at 9/15/2023 1300  Gross per 24 hour   Intake 800.01 ml   Output 150 ml   Net 650.01 ml       Laboratory  Recent Labs     09/13/23  1658 09/13/23  1929 09/14/23  0517 09/14/23  1402 09/14/23  2104 09/15/23  0526 09/15/23  1347   WBC 7.9  --  4.8  --   --   --   --    RBC 3.46*  --  3.15*  --   --   --   --    HEMOGLOBIN 8.5*   < > 7.7*   < > 8.2* 8.2* 8.8*   HEMATOCRIT 29.0*  --  26.5*  --   --   --   --    MCV 83.8  --  84.1  --   --   --   --    MCH 24.6*  --  24.4*  --   --   --   --    MCHC 29.3*  --  29.1*  --   --   --   --    RDW 48.1  --  47.4  --   --   --   --    PLATELETCT 475*  --  350  --   --   --   --    MPV 9.2  --  8.8*  --   --   --   --     < > = values in this interval not displayed.     Recent Labs     09/13/23 1658 09/14/23  0517   SODIUM 141 138   POTASSIUM 4.2 3.9   CHLORIDE 109 109   CO2 20 19*   GLUCOSE 93 93   BUN 18 15   CREATININE 1.16 1.09   CALCIUM 9.3 8.4*     Recent Labs     09/13/23  1658   APTT 26.7   INR 1.09               Imaging  EC-ECHOCARDIOGRAM COMPLETE  W/O CONT   Final Result      US-EXTREMITY VENOUS LOWER BILAT   Final Result      CT-ABDOMEN-PELVIS WITH   Final Result      1.  Mild fat stranding adjacent to the first portion of the duodenum may represent mild duodenitis.   2.  Prior cholecystectomy. No biliary dilatation.      DX-CHEST-PORTABLE (1 VIEW)   Final Result      No acute cardiopulmonary abnormality.           Assessment/Plan  Problem Representation:    * Upper GI bleed- (present on admission)  Assessment & Plan  43-year-old patient with prior history of bleeding from peptic ulcer disease in stomach and duodenum, presented with upper abdominal pain, coffee-ground emesis  -CT of the abdomen and pelvis with contrast showed signs of duodenitis, no evidence of active bleeding  -Hemoglobin 8.5, with repeat 7.7, then 7.9  -EGD done per GI showed: incompletely healed duodenal ulcer, biopsy taken. Esophagus normal.  H. pylori negative  -Continue oral omeprazole 40mg twice daily  -Monitor H&H, transfuse for hemoglobin 7.0 and below  -Follow up with GI as outpatient  -Patient was given scheduled acetaminophen 1 g 4 times daily  -Sucralfate was added  -GI cocktail was added to help control pain    Mitral regurgitation  Assessment & Plan  *Echo (9/15/23): probably severe mitral regurgitation. Consider LEONEL    -Outpatient f/u after recovery from GIB    Iron deficiency anemia due to chronic blood loss- (present on admission)  Assessment & Plan  IV iron    Duodenal ulcer with hemorrhage- (present on admission)  Assessment & Plan  Likely cause of upper GI bleed.  Continue omeprazole.    Acute blood loss anemia  Assessment & Plan  **Iron panel (9/15/2023): Iron 12, TIBC 340, 4% sat, ferritin 9.1    -Iron replacement         VTE prophylaxis: SCDs/TEDs    I have performed a physical exam and reviewed and updated ROS and Plan today (9/15/2023). In review of yesterday's note (9/14/2023), there are no changes except as documented above.

## 2023-09-16 NOTE — DISCHARGE SUMMARY
[facility] Internal Medicine Discharge Summary    Attending: Jung Pelayo M.d.  Resident: Dr. Luh Heredia   Contact Number: [phone]    CHIEF COMPLAINT ON ADMISSION  Chief Complaint   Patient presents with    Blood in Vomit     History of bleeding ulcer, recently d/c from hospital    Abdominal Pain       Reason for Admission  Abdominal Pain     Admission Date  9/13/2023    Discharge Date  09/16/23    CODE STATUS  Full Code    HPI & HOSPITAL COURSE  Roberto Braswell is a 43 year-old male who was admitted on 9/13/2023 due to abdominal pain and hematemesis.  His past medical history of previous GI bleeds with recent admission on July 30, 2023.  At that time he had an EGD done per GI which showed a gastric ulcer and duodenal ulcer.  H. pylori was negative per biopsy.  He was discharged on omeprazole and Carafate, but reports persistence of symptoms. He reported that he had been having epigastric abdominal pain, sharp and stabbing in character, without radiation.  He denied any aggravating factors such as NSAID use or alcohol. He did not note any hematochezia or melena.  In the ED, CT abdomen pelvis with contrast was negative for perforation but showed likely duodenitis.  Hemoglobin found to be 8.5, which was previously around 10 2 weeks prior.  GI was consulted and he was started on IV Protonix and underwent EGD which showed an incompletely healed duodenal ulcer, and biopsy results were significant for gastric antral and oxyntic gastritis with erosion, negative H pylori, and duodenal biopsy with no evidence of celiac disease but with focal duodenitis. He did not require blood transfusion during hospitalization, however was given IV iron infusion. IV Protonix was completed and switched to oral PPI to be continued on discharge. In addition his sucralfate was resumed and GI cocktail was added for the associated abdominal pain; duloxetine was prescribed at discharge to aid with pain and his mood. He did require pain  control with narcotics but his narcotic use decreased with the sucralfate addition. Of note he was found to have severe mitral regurgitation on echocardiogram. He had no evidence of cardiovascular or pulmonary compromise from this, and thus outpatient cardiology referral was placed.     Therefore, he is discharged in fair and stable condition to home with close outpatient follow-up.    The patient met 2-midnight criteria for an inpatient stay at the time of discharge.      Physical Exam on Day of Discharge  Physical Exam  Vitals reviewed.   Constitutional:       General: He is not in acute distress.     Appearance: Normal appearance. He is not ill-appearing, toxic-appearing or diaphoretic.   HENT:      Head: Normocephalic and atraumatic.      Mouth/Throat:      Mouth: Mucous membranes are moist.      Pharynx: Oropharynx is clear.   Cardiovascular:      Rate and Rhythm: Normal rate and regular rhythm.      Heart sounds: Murmur (systolic) heard.   Pulmonary:      Effort: Pulmonary effort is normal. No respiratory distress.      Breath sounds: Normal breath sounds. No wheezing or rales.   Abdominal:      General: Abdomen is flat. Bowel sounds are normal. There is no distension.      Palpations: Abdomen is soft.      Tenderness: There is abdominal tenderness (mild epigastric). There is no guarding or rebound.   Musculoskeletal:      Right lower leg: No edema.      Left lower leg: No edema.   Skin:     General: Skin is warm and dry.      Coloration: Skin is pale.   Neurological:      General: No focal deficit present.      Mental Status: He is alert and oriented to person, place, and time. Mental status is at baseline.   Psychiatric:         Mood and Affect: Mood normal.         Behavior: Behavior normal.         FOLLOW UP ITEMS POST DISCHARGE  #Duodenal ulcer with duodenitis  #Gastritis   #Iron deficiency anemia   -Discharging with BID pantoprazole, sucralfate, GI cocktail  -Avoid NSAIDs and alcohol  -Discharging with  pain control in form of limited supply of oxycodone as well as duloxetine  -Outpatient GI referral placed    #Severe mitral regurgitation  -Outpatient cardiology referral placed  -Discharging with Lasix as needed for BLE edema (patient-reported)     DISCHARGE DIAGNOSES  Principal Problem:    Upper GI bleed (POA: Yes)  Active Problems:    Acute blood loss anemia (POA: Unknown)    Duodenal ulcer with hemorrhage (POA: Yes)    GI bleed (POA: Yes)    Iron deficiency anemia due to chronic blood loss (POA: Yes)    Mitral regurgitation (POA: Unknown)  Resolved Problems:    * No resolved hospital problems. *      FOLLOW UP  Future Appointments   Date Time Provider Department Center   9/26/2023 11:40 AM Sal Schafer D.O. [facility]     No follow-up provider specified.    MEDICATIONS ON DISCHARGE     Medication List        START taking these medications        Instructions   acetaminophen 325 MG Tabs  Commonly known as: Tylenol   Take 2 Tablets by mouth every 6 hours as needed for Moderate Pain.  Dose: 650 mg     DULoxetine 30 MG Cpep  Start taking on: September 16, 2023  Commonly known as: Cymbalta   Take 1 Capsule by mouth every day for 7 days, THEN 2 Capsules every day for 23 days.     hyoscyamine-mag hydrox-al hydrox-simeth-lidocaine   Doctor's comments: Ingredients: 34 mL hyoscyamine 0.125 mg/5 mL, 126 mL Maalox, and 40 mL viscous lidocaine 2%.  Take 15ml by mouth every 6 hours as needed (abdominal pain or cramping).     oxyCODONE immediate-release 5 MG Tabs  Commonly known as: Roxicodone   Take 1 Tablet by mouth every 8 hours as needed for Severe Pain for up to 5 days.  Dose: 5 mg     pantoprazole 40 MG Tbec  Commonly known as: Protonix   Take 1 Tablet by mouth 2 times a day.  Dose: 40 mg            CHANGE how you take these medications        Instructions   furosemide 20 MG Tabs  What changed:   medication strength  when to take this  reasons to take this  Commonly known as: Lasix   Take 1 Tablet by mouth 1  "time a day as needed (for leg swelling) for up to 10 days.  Dose: 20 mg            CONTINUE taking these medications        Instructions   doxylamine 25 MG Tabs tablet  Commonly known as: Unisom   Take 25-50 mg by mouth at bedtime. \"Santacruz brand sleep aid\"  Dose: 25-50 mg     sucralfate 1 GM/10ML Susp  Commonly known as: Carafate   Take 10 mL by mouth every 6 hours.  Dose: 1 g            STOP taking these medications      ASPIRIN-ACETAMINOPHEN-CAFFEINE PO     omeprazole 40 MG delayed-release capsule  Commonly known as: PriLOSEC              Allergies  Allergies   Allergen Reactions    Morphine Vomiting    Tramadol Unspecified     Seizure  [id number]       DIET  Orders Placed This Encounter   Procedures    Diet Order Diet: Low Fiber(GI Soft) (Avoid red food/drinks)     Standing Status:   Standing     Number of Occurrences:   1     Order Specific Question:   Diet:     Answer:   Low Fiber(GI Soft) [2]     Comments:   Avoid red food/drinks       ACTIVITY  As tolerated.  Weight bearing as tolerated    CONSULTATIONS  Gastroenterology- Dr. Dalton     PROCEDURES  ESOPHAGOGASTRODUODENOSCOPY- 9/14/23    LABORATORY  Lab Results   Component Value Date    SODIUM 142 09/16/2023    POTASSIUM 4.1 09/16/2023    CHLORIDE 110 09/16/2023    CO2 23 09/16/2023    GLUCOSE 93 09/16/2023    BUN 16 09/16/2023    CREATININE 1.03 09/16/2023        Lab Results   Component Value Date    WBC 4.5 (L) 09/16/2023    HEMOGLOBIN 8.6 (L) 09/16/2023    HEMATOCRIT 27.9 (L) 09/16/2023    PLATELETCT 342 09/16/2023        Total time of the discharge process: 60 minutes.  "

## 2023-09-16 NOTE — CARE PLAN
The patient is Stable - Low risk of patient condition declining or worsening    Shift Goals  Clinical Goals: Patient will report pain at 5 or less this shift  Patient Goals: pain control  Family Goals: JJ    Progress made toward(s) clinical / shift goals:  Patient reports high pain levels, even with pain medication, both PO and IV      Problem: Pain - Standard  Goal: Alleviation of pain or a reduction in pain to the patient’s comfort goal  Outcome: Not Progressing

## 2023-09-16 NOTE — ASSESSMENT & PLAN NOTE
*Echo (9/15/23): probably severe mitral regurgitation. Consider ELONEL    -Outpatient f/u after recovery from GIB

## 2023-09-16 NOTE — PROGRESS NOTES
Pt discharged at 1540, pt walked out of hospital by self, refused escort Discharge education paper work and education has been completed. All IV's pulled. All belongings checked and taken home with patient. All questions and concerns were met at time of discharge.

## 2023-09-19 ENCOUNTER — TELEPHONE (OUTPATIENT)
Dept: CARDIOLOGY | Facility: MEDICAL CENTER | Age: 43
End: 2023-09-19
Payer: COMMERCIAL

## 2023-09-20 NOTE — TELEPHONE ENCOUNTER
Referral from: Dr. Luh Heredia for sev MR    Patient called on 9/19/2023. Scheduled with Dr. Whitney as patient's next available.     All questions answered.    Phone number given to patient for [facility] for any further questions or concerns.

## 2023-09-26 ENCOUNTER — OFFICE VISIT (OUTPATIENT)
Dept: MEDICAL GROUP | Facility: LAB | Age: 43
End: 2023-09-26
Payer: COMMERCIAL

## 2023-09-26 VITALS
BODY MASS INDEX: 29.05 KG/M2 | HEART RATE: 76 BPM | DIASTOLIC BLOOD PRESSURE: 78 MMHG | HEIGHT: 75 IN | WEIGHT: 233.6 LBS | RESPIRATION RATE: 18 BRPM | SYSTOLIC BLOOD PRESSURE: 144 MMHG | TEMPERATURE: 97.6 F | OXYGEN SATURATION: 98 %

## 2023-09-26 DIAGNOSIS — I34.0 MITRAL VALVE INSUFFICIENCY, UNSPECIFIED ETIOLOGY: ICD-10-CM

## 2023-09-26 DIAGNOSIS — D50.0 IRON DEFICIENCY ANEMIA DUE TO CHRONIC BLOOD LOSS: ICD-10-CM

## 2023-09-26 DIAGNOSIS — Z00.00 HEALTHCARE MAINTENANCE: ICD-10-CM

## 2023-09-26 PROBLEM — R10.9 INTRACTABLE ABDOMINAL PAIN: Status: RESOLVED | Noted: 2023-09-16 | Resolved: 2023-09-26

## 2023-09-26 PROBLEM — D62 ACUTE BLOOD LOSS ANEMIA: Status: RESOLVED | Noted: 2023-07-30 | Resolved: 2023-09-26

## 2023-09-26 PROBLEM — R68.89 PHYSIOLOGIC DISTURBANCE OF TEMPERATURE REGULATION: Status: RESOLVED | Noted: 2022-03-26 | Resolved: 2023-09-26

## 2023-09-26 PROBLEM — Z76.5 DRUG-SEEKING BEHAVIOR: Status: RESOLVED | Noted: 2023-07-30 | Resolved: 2023-09-26

## 2023-09-26 PROBLEM — N17.9 AKI (ACUTE KIDNEY INJURY) (HCC): Status: RESOLVED | Noted: 2022-03-27 | Resolved: 2023-09-26

## 2023-09-26 PROBLEM — R10.13 EPIGASTRIC PAIN: Status: RESOLVED | Noted: 2022-03-26 | Resolved: 2023-09-26

## 2023-09-26 PROCEDURE — 3077F SYST BP >= 140 MM HG: CPT | Performed by: STUDENT IN AN ORGANIZED HEALTH CARE EDUCATION/TRAINING PROGRAM

## 2023-09-26 PROCEDURE — 99204 OFFICE O/P NEW MOD 45 MIN: CPT | Performed by: STUDENT IN AN ORGANIZED HEALTH CARE EDUCATION/TRAINING PROGRAM

## 2023-09-26 PROCEDURE — 3078F DIAST BP <80 MM HG: CPT | Performed by: STUDENT IN AN ORGANIZED HEALTH CARE EDUCATION/TRAINING PROGRAM

## 2023-09-26 SDOH — ECONOMIC STABILITY: TRANSPORTATION INSECURITY
IN THE PAST 12 MONTHS, HAS THE LACK OF TRANSPORTATION KEPT YOU FROM MEDICAL APPOINTMENTS OR FROM GETTING MEDICATIONS?: NO

## 2023-09-26 SDOH — ECONOMIC STABILITY: INCOME INSECURITY: HOW HARD IS IT FOR YOU TO PAY FOR THE VERY BASICS LIKE FOOD, HOUSING, MEDICAL CARE, AND HEATING?: NOT HARD AT ALL

## 2023-09-26 SDOH — HEALTH STABILITY: MENTAL HEALTH
STRESS IS WHEN SOMEONE FEELS TENSE, NERVOUS, ANXIOUS, OR CAN'T SLEEP AT NIGHT BECAUSE THEIR MIND IS TROUBLED. HOW STRESSED ARE YOU?: TO SOME EXTENT

## 2023-09-26 SDOH — ECONOMIC STABILITY: HOUSING INSECURITY: IN THE LAST 12 MONTHS, HOW MANY PLACES HAVE YOU LIVED?: 1

## 2023-09-26 SDOH — ECONOMIC STABILITY: FOOD INSECURITY: WITHIN THE PAST 12 MONTHS, THE FOOD YOU BOUGHT JUST DIDN'T LAST AND YOU DIDN'T HAVE MONEY TO GET MORE.: NEVER TRUE

## 2023-09-26 SDOH — ECONOMIC STABILITY: HOUSING INSECURITY
IN THE LAST 12 MONTHS, WAS THERE A TIME WHEN YOU DID NOT HAVE A STEADY PLACE TO SLEEP OR SLEPT IN A SHELTER (INCLUDING NOW)?: NO

## 2023-09-26 SDOH — ECONOMIC STABILITY: TRANSPORTATION INSECURITY
IN THE PAST 12 MONTHS, HAS LACK OF TRANSPORTATION KEPT YOU FROM MEETINGS, WORK, OR FROM GETTING THINGS NEEDED FOR DAILY LIVING?: NO

## 2023-09-26 SDOH — ECONOMIC STABILITY: INCOME INSECURITY: IN THE LAST 12 MONTHS, WAS THERE A TIME WHEN YOU WERE NOT ABLE TO PAY THE MORTGAGE OR RENT ON TIME?: NO

## 2023-09-26 SDOH — ECONOMIC STABILITY: TRANSPORTATION INSECURITY
IN THE PAST 12 MONTHS, HAS LACK OF RELIABLE TRANSPORTATION KEPT YOU FROM MEDICAL APPOINTMENTS, MEETINGS, WORK OR FROM GETTING THINGS NEEDED FOR DAILY LIVING?: NO

## 2023-09-26 SDOH — ECONOMIC STABILITY: FOOD INSECURITY: WITHIN THE PAST 12 MONTHS, YOU WORRIED THAT YOUR FOOD WOULD RUN OUT BEFORE YOU GOT MONEY TO BUY MORE.: NEVER TRUE

## 2023-09-26 SDOH — HEALTH STABILITY: PHYSICAL HEALTH: ON AVERAGE, HOW MANY DAYS PER WEEK DO YOU ENGAGE IN MODERATE TO STRENUOUS EXERCISE (LIKE A BRISK WALK)?: 6 DAYS

## 2023-09-26 SDOH — HEALTH STABILITY: PHYSICAL HEALTH: ON AVERAGE, HOW MANY MINUTES DO YOU ENGAGE IN EXERCISE AT THIS LEVEL?: 120 MIN

## 2023-09-26 ASSESSMENT — ENCOUNTER SYMPTOMS
CHILLS: 0
FEVER: 0
SHORTNESS OF BREATH: 0

## 2023-09-26 ASSESSMENT — SOCIAL DETERMINANTS OF HEALTH (SDOH)
DO YOU BELONG TO ANY CLUBS OR ORGANIZATIONS SUCH AS CHURCH GROUPS UNIONS, FRATERNAL OR ATHLETIC GROUPS, OR SCHOOL GROUPS?: NO
HOW OFTEN DO YOU GET TOGETHER WITH FRIENDS OR RELATIVES?: TWICE A WEEK
WITHIN THE PAST 12 MONTHS, YOU WORRIED THAT YOUR FOOD WOULD RUN OUT BEFORE YOU GOT THE MONEY TO BUY MORE: NEVER TRUE
HOW OFTEN DO YOU HAVE A DRINK CONTAINING ALCOHOL: MONTHLY OR LESS
HOW OFTEN DO YOU GET TOGETHER WITH FRIENDS OR RELATIVES?: TWICE A WEEK
HOW OFTEN DO YOU ATTENT MEETINGS OF THE CLUB OR ORGANIZATION YOU BELONG TO?: NEVER
IN A TYPICAL WEEK, HOW MANY TIMES DO YOU TALK ON THE PHONE WITH FAMILY, FRIENDS, OR NEIGHBORS?: ONCE A WEEK
HOW HARD IS IT FOR YOU TO PAY FOR THE VERY BASICS LIKE FOOD, HOUSING, MEDICAL CARE, AND HEATING?: NOT HARD AT ALL
IN A TYPICAL WEEK, HOW MANY TIMES DO YOU TALK ON THE PHONE WITH FAMILY, FRIENDS, OR NEIGHBORS?: ONCE A WEEK
HOW MANY DRINKS CONTAINING ALCOHOL DO YOU HAVE ON A TYPICAL DAY WHEN YOU ARE DRINKING: 1 OR 2
DO YOU BELONG TO ANY CLUBS OR ORGANIZATIONS SUCH AS CHURCH GROUPS UNIONS, FRATERNAL OR ATHLETIC GROUPS, OR SCHOOL GROUPS?: NO
HOW OFTEN DO YOU HAVE SIX OR MORE DRINKS ON ONE OCCASION: NEVER
HOW OFTEN DO YOU ATTENT MEETINGS OF THE CLUB OR ORGANIZATION YOU BELONG TO?: NEVER
HOW OFTEN DO YOU ATTEND CHURCH OR RELIGIOUS SERVICES?: NEVER
HOW OFTEN DO YOU ATTEND CHURCH OR RELIGIOUS SERVICES?: NEVER

## 2023-09-26 ASSESSMENT — LIFESTYLE VARIABLES
AUDIT-C TOTAL SCORE: 1
HOW OFTEN DO YOU HAVE A DRINK CONTAINING ALCOHOL: MONTHLY OR LESS
HOW OFTEN DO YOU HAVE SIX OR MORE DRINKS ON ONE OCCASION: NEVER
SKIP TO QUESTIONS 9-10: 1
HOW MANY STANDARD DRINKS CONTAINING ALCOHOL DO YOU HAVE ON A TYPICAL DAY: 1 OR 2

## 2023-09-26 ASSESSMENT — FIBROSIS 4 INDEX: FIB4 SCORE: 0.46

## 2023-09-26 NOTE — PROGRESS NOTES
"Subjective:     CC: establishing with new provider    HPI:   44 yo male with history chronic blood loss anemia, recently diagnosed with severe mitral valve disease. He was discharged from the hospital 10 days ago.    Problem   Mitral Regurgitation    recently diagnosed with echo due to murmer finding. Patient will be seeing cardiology soon. Patient denies history of rheumatic heart disease or history of meth. Denies chest pain, syncope. He did have a recent episode with leg swelling      Iron Deficiency Anemia Due to Chronic Blood Loss    Secondary to GI etiology. Has had two transfusions in the past. Has had multiple egds. He did have iron infusion during his recent hospitalizations      Intractable Abdominal Pain (Resolved)   Acute Blood Loss Anemia (Resolved)   Drug-Seeking Behavior (Resolved)    Demanding narcotics for a duodenal ulcer July 2023. Refused antacids or sucralfate.      Dwain (Acute Kidney Injury) (Hcc) (Resolved)   Physiologic Disturbance of Temperature Regulation (Resolved)   Epigastric Pain (Resolved)       Current Outpatient Medications Ordered in Epic   Medication Sig Dispense Refill    sucralfate (CARAFATE) 1 GM/10ML Suspension Take 10 mL by mouth every 6 hours. 414 mL 1    acetaminophen (TYLENOL) 325 MG Tab Take 2 Tablets by mouth every 6 hours as needed for Moderate Pain. 30 Tablet 0    DULoxetine (CYMBALTA) 30 MG Cap DR Particles Take 1 Capsule by mouth every day for 7 days, THEN 2 Capsules every day for 23 days. 53 Capsule 0    furosemide (LASIX) 20 MG Tab Take 1 Tablet by mouth 1 time a day as needed (for leg swelling) for up to 10 days. 10 Tablet 0    pantoprazole (PROTONIX) 40 MG Tablet Delayed Response Take 1 Tablet by mouth 2 times a day. 120 Tablet 0    hyoscyamine-mag hydrox-al hydrox-simeth-lidocaine Take 15ml by mouth every 6 hours as needed (abdominal pain or cramping). 200 mL 1    doxylamine (UNISOM) 25 MG Tab tablet Take 25-50 mg by mouth at bedtime. \"Santacruz brand sleep aid\"   " "    No current [facility]-ordered facility-administered medications on file.           ROS:  Review of Systems   Constitutional:  Negative for chills and fever.   Respiratory:  Negative for shortness of breath.    Cardiovascular:  Negative for chest pain.       Objective:     Exam:  BP (!) 144/78 (BP Location: Right arm, Patient Position: Sitting, BP Cuff Size: Large adult)   Pulse 76   Temp 36.4 °C (97.6 °F)   Resp 18   Ht 1.905 m (6' 3\")   Wt 106 kg (233 lb 9.6 oz)   SpO2 98%   BMI 29.20 kg/m²  Body mass index is 29.2 kg/m².    Physical Exam  Constitutional:       General: He is not in acute distress.     Appearance: He is not ill-appearing.   Pulmonary:      Effort: Pulmonary effort is normal.   Neurological:      Mental Status: He is alert.   Psychiatric:         Mood and Affect: Mood normal.         Behavior: Behavior normal.         Thought Content: Thought content normal.         Judgment: Judgment normal.               Assessment & Plan:     Problem List Items Addressed This Visit       Iron deficiency anemia due to chronic blood loss     Chronic  -check cbc  -continue protonix and sucralfate          Relevant Orders    CBC WITH DIFFERENTIAL    Comp Metabolic Panel    Mitral regurgitation     Other Visit Diagnoses       Healthcare maintenance        Relevant Orders    HEMOGLOBIN A1C    Lipid Profile    TSH WITH REFLEX TO FT4                2-3 week follow up     Please note that this dictation was created using voice recognition software. I have made every reasonable attempt to correct obvious errors, but I expect that there are errors of grammar and possibly content that I did not discover before finalizing the note.        "

## 2023-10-05 ENCOUNTER — TELEPHONE (OUTPATIENT)
Dept: CARDIOLOGY | Facility: MEDICAL CENTER | Age: 43
End: 2023-10-05
Payer: COMMERCIAL

## 2023-10-05 NOTE — TELEPHONE ENCOUNTER
Called patient in regards to records for NP appointment with Dr. Mitchell. To review most recent lab results, ekg, any cardiac testing or ,if patient has been treated by a cardiologist. No answer, left voicemail to call back

## 2023-10-05 NOTE — TELEPHONE ENCOUNTER
Fco Kline,    This pt was returning your call and stated he's not had any previous cardio provider ever and all recent lab/imaging has been done at [facility].      Thank you,    HUONG

## 2023-10-09 ENCOUNTER — HOSPITAL ENCOUNTER (OUTPATIENT)
Dept: LAB | Facility: MEDICAL CENTER | Age: 43
End: 2023-10-09
Attending: STUDENT IN AN ORGANIZED HEALTH CARE EDUCATION/TRAINING PROGRAM
Payer: COMMERCIAL

## 2023-10-09 DIAGNOSIS — Z00.00 HEALTHCARE MAINTENANCE: ICD-10-CM

## 2023-10-09 DIAGNOSIS — D50.0 IRON DEFICIENCY ANEMIA DUE TO CHRONIC BLOOD LOSS: ICD-10-CM

## 2023-10-09 LAB
ALBUMIN SERPL BCP-MCNC: 4.2 G/DL (ref 3.2–4.9)
ALBUMIN/GLOB SERPL: 2 G/DL
ALP SERPL-CCNC: 45 U/L (ref 30–99)
ALT SERPL-CCNC: 14 U/L (ref 2–50)
ANION GAP SERPL CALC-SCNC: 12 MMOL/L (ref 7–16)
ANISOCYTOSIS BLD QL SMEAR: ABNORMAL
AST SERPL-CCNC: 14 U/L (ref 12–45)
BASOPHILS # BLD AUTO: 6 % (ref 0–1.8)
BASOPHILS # BLD: 0.27 K/UL (ref 0–0.12)
BILIRUB SERPL-MCNC: 0.3 MG/DL (ref 0.1–1.5)
BUN SERPL-MCNC: 18 MG/DL (ref 8–22)
CALCIUM ALBUM COR SERPL-MCNC: 8.6 MG/DL (ref 8.5–10.5)
CALCIUM SERPL-MCNC: 8.8 MG/DL (ref 8.5–10.5)
CHLORIDE SERPL-SCNC: 109 MMOL/L (ref 96–112)
CHOLEST SERPL-MCNC: 183 MG/DL (ref 100–199)
CO2 SERPL-SCNC: 22 MMOL/L (ref 20–33)
CREAT SERPL-MCNC: 1.25 MG/DL (ref 0.5–1.4)
EOSINOPHIL # BLD AUTO: 0.27 K/UL (ref 0–0.51)
EOSINOPHIL NFR BLD: 6 % (ref 0–6.9)
ERYTHROCYTE [DISTWIDTH] IN BLOOD BY AUTOMATED COUNT: 53.5 FL (ref 35.9–50)
EST. AVERAGE GLUCOSE BLD GHB EST-MCNC: 91 MG/DL
FASTING STATUS PATIENT QL REPORTED: NORMAL
GFR SERPLBLD CREATININE-BSD FMLA CKD-EPI: 74 ML/MIN/1.73 M 2
GLOBULIN SER CALC-MCNC: 2.1 G/DL (ref 1.9–3.5)
GLUCOSE SERPL-MCNC: 87 MG/DL (ref 65–99)
HBA1C MFR BLD: 4.8 % (ref 4–5.6)
HCT VFR BLD AUTO: 42 % (ref 42–52)
HDLC SERPL-MCNC: 59 MG/DL
HGB BLD-MCNC: 12.5 G/DL (ref 14–18)
LDLC SERPL CALC-MCNC: 110 MG/DL
LYMPHOCYTES # BLD AUTO: 1.48 K/UL (ref 1–4.8)
LYMPHOCYTES NFR BLD: 32.8 % (ref 22–41)
MACROCYTES BLD QL SMEAR: ABNORMAL
MANUAL DIFF BLD: NORMAL
MCH RBC QN AUTO: 24.2 PG (ref 27–33)
MCHC RBC AUTO-ENTMCNC: 29.8 G/DL (ref 32.3–36.5)
MCV RBC AUTO: 81.2 FL (ref 81.4–97.8)
MICROCYTES BLD QL SMEAR: ABNORMAL
MONOCYTES # BLD AUTO: 0.39 K/UL (ref 0–0.85)
MONOCYTES NFR BLD AUTO: 8.6 % (ref 0–13.4)
MORPHOLOGY BLD-IMP: NORMAL
NEUTROPHILS # BLD AUTO: 2.1 K/UL (ref 1.82–7.42)
NEUTROPHILS NFR BLD: 46.6 % (ref 44–72)
NRBC # BLD AUTO: 0 K/UL
NRBC BLD-RTO: 0 /100 WBC (ref 0–0.2)
OVALOCYTES BLD QL SMEAR: NORMAL
PLATELET # BLD AUTO: 348 K/UL (ref 164–446)
PLATELET BLD QL SMEAR: NORMAL
PMV BLD AUTO: 9.5 FL (ref 9–12.9)
POIKILOCYTOSIS BLD QL SMEAR: NORMAL
POLYCHROMASIA BLD QL SMEAR: NORMAL
POTASSIUM SERPL-SCNC: 4 MMOL/L (ref 3.6–5.5)
PROT SERPL-MCNC: 6.3 G/DL (ref 6–8.2)
RBC # BLD AUTO: 5.17 M/UL (ref 4.7–6.1)
RBC BLD AUTO: PRESENT
SODIUM SERPL-SCNC: 143 MMOL/L (ref 135–145)
TRIGL SERPL-MCNC: 69 MG/DL (ref 0–149)
TSH SERPL DL<=0.005 MIU/L-ACNC: 3.08 UIU/ML (ref 0.38–5.33)
WBC # BLD AUTO: 4.5 K/UL (ref 4.8–10.8)

## 2023-10-09 PROCEDURE — 83036 HEMOGLOBIN GLYCOSYLATED A1C: CPT

## 2023-10-09 PROCEDURE — 84443 ASSAY THYROID STIM HORMONE: CPT

## 2023-10-09 PROCEDURE — 85025 COMPLETE CBC W/AUTO DIFF WBC: CPT

## 2023-10-09 PROCEDURE — 85007 BL SMEAR W/DIFF WBC COUNT: CPT

## 2023-10-09 PROCEDURE — 80061 LIPID PANEL: CPT

## 2023-10-09 PROCEDURE — 36415 COLL VENOUS BLD VENIPUNCTURE: CPT

## 2023-10-09 PROCEDURE — 80053 COMPREHEN METABOLIC PANEL: CPT

## 2023-10-10 ENCOUNTER — TELEPHONE (OUTPATIENT)
Dept: MEDICAL GROUP | Facility: LAB | Age: 43
End: 2023-10-10

## 2023-10-10 ENCOUNTER — OFFICE VISIT (OUTPATIENT)
Dept: MEDICAL GROUP | Facility: LAB | Age: 43
End: 2023-10-10
Payer: COMMERCIAL

## 2023-10-10 VITALS
SYSTOLIC BLOOD PRESSURE: 142 MMHG | OXYGEN SATURATION: 96 % | RESPIRATION RATE: 20 BRPM | WEIGHT: 238.1 LBS | BODY MASS INDEX: 29.6 KG/M2 | HEART RATE: 72 BPM | TEMPERATURE: 97.4 F | DIASTOLIC BLOOD PRESSURE: 86 MMHG | HEIGHT: 75 IN

## 2023-10-10 DIAGNOSIS — K26.9 DUODENAL ULCER: ICD-10-CM

## 2023-10-10 DIAGNOSIS — R10.13 EPIGASTRIC PAIN: ICD-10-CM

## 2023-10-10 DIAGNOSIS — K92.2 GASTROINTESTINAL HEMORRHAGE, UNSPECIFIED GASTROINTESTINAL HEMORRHAGE TYPE: ICD-10-CM

## 2023-10-10 DIAGNOSIS — D50.0 IRON DEFICIENCY ANEMIA DUE TO CHRONIC BLOOD LOSS: ICD-10-CM

## 2023-10-10 DIAGNOSIS — I10 ESSENTIAL HYPERTENSION: ICD-10-CM

## 2023-10-10 PROCEDURE — 99214 OFFICE O/P EST MOD 30 MIN: CPT | Performed by: STUDENT IN AN ORGANIZED HEALTH CARE EDUCATION/TRAINING PROGRAM

## 2023-10-10 PROCEDURE — 3079F DIAST BP 80-89 MM HG: CPT | Performed by: STUDENT IN AN ORGANIZED HEALTH CARE EDUCATION/TRAINING PROGRAM

## 2023-10-10 PROCEDURE — 3077F SYST BP >= 140 MM HG: CPT | Performed by: STUDENT IN AN ORGANIZED HEALTH CARE EDUCATION/TRAINING PROGRAM

## 2023-10-10 RX ORDER — LOSARTAN POTASSIUM 25 MG/1
25 TABLET ORAL DAILY
Qty: 60 TABLET | Refills: 0 | Status: SHIPPED | OUTPATIENT
Start: 2023-10-10 | End: 2023-12-06

## 2023-10-10 RX ORDER — SUCRALFATE ORAL 1 G/10ML
1 SUSPENSION ORAL EVERY 6 HOURS
Qty: 414 ML | Refills: 1 | Status: ON HOLD | OUTPATIENT
Start: 2023-10-10 | End: 2023-11-22 | Stop reason: SDUPTHER

## 2023-10-10 RX ORDER — PANTOPRAZOLE SODIUM 40 MG/1
40 TABLET, DELAYED RELEASE ORAL 2 TIMES DAILY
Qty: 120 TABLET | Refills: 0 | Status: SHIPPED | OUTPATIENT
Start: 2023-10-10 | End: 2023-12-06

## 2023-10-10 RX ORDER — OXYCODONE HYDROCHLORIDE 5 MG/1
5 TABLET ORAL EVERY 8 HOURS PRN
Qty: 15 TABLET | Refills: 0 | Status: SHIPPED | OUTPATIENT
Start: 2023-10-10 | End: 2023-10-15

## 2023-10-10 ASSESSMENT — ENCOUNTER SYMPTOMS
SHORTNESS OF BREATH: 0
CHILLS: 0
FEVER: 0

## 2023-10-10 ASSESSMENT — FIBROSIS 4 INDEX: FIB4 SCORE: 0.45

## 2023-10-10 NOTE — PROGRESS NOTES
"Subjective:     CC: follow up    HPI:   Roberto presents today for the following;    Problem   Essential Hypertension    BP elevated at two office appointments      Iron Deficiency Anemia Due to Chronic Blood Loss    Secondary to GI etiology. Has had two transfusions in the past. Has had multiple egds. He did have iron infusion during his recent hospitalizations     10/10/23  -hgb now 12.5 improving from 8.6 3 weeks ago         Current Outpatient Medications Ordered in Epic   Medication Sig Dispense Refill    losartan (COZAAR) 25 MG Tab Take 1 Tablet by mouth every day. 60 Tablet 0    pantoprazole (PROTONIX) 40 MG Tablet Delayed Response Take 1 Tablet by mouth 2 times a day. 120 Tablet 0    sucralfate (CARAFATE) 1 GM/10ML Suspension Take 10 mL by mouth every 6 hours. 414 mL 1    oxyCODONE immediate-release (ROXICODONE) 5 MG Tab Take 1 Tablet by mouth every 8 hours as needed for Severe Pain for up to 5 days. 15 Tablet 0    acetaminophen (TYLENOL) 325 MG Tab Take 2 Tablets by mouth every 6 hours as needed for Moderate Pain. 30 Tablet 0    DULoxetine (CYMBALTA) 30 MG Cap DR Particles Take 1 Capsule by mouth every day for 7 days, THEN 2 Capsules every day for 23 days. 53 Capsule 0    hyoscyamine-mag hydrox-al hydrox-simeth-lidocaine Take 15ml by mouth every 6 hours as needed (abdominal pain or cramping). 200 mL 1    doxylamine (UNISOM) 25 MG Tab tablet Take 25-50 mg by mouth at bedtime. \"Santacruz brand sleep aid\"       No current Epic-ordered facility-administered medications on file.           ROS:  Review of Systems   Constitutional:  Negative for chills and fever.   Respiratory:  Negative for shortness of breath.    Cardiovascular:  Negative for chest pain.       Objective:     Exam:  BP (!) 142/86 (BP Location: Right arm, Patient Position: Sitting, BP Cuff Size: Adult long)   Pulse 72   Temp 36.3 °C (97.4 °F)   Resp 20   Ht 1.905 m (6' 3\")   Wt 108 kg (238 lb 1.6 oz)   SpO2 96%   BMI 29.76 kg/m²  Body " mass index is 29.76 kg/m².    Physical Exam  Constitutional:       General: He is not in acute distress.     Appearance: He is not ill-appearing.   Pulmonary:      Effort: Pulmonary effort is normal.   Neurological:      Mental Status: He is alert.   Psychiatric:         Mood and Affect: Mood normal.         Behavior: Behavior normal.         Thought Content: Thought content normal.         Judgment: Judgment normal.               Assessment & Plan:     Problem List Items Addressed This Visit       Essential hypertension     New diag  -start losartan 25mg          Relevant Medications    losartan (COZAAR) 25 MG Tab    GI bleed    Relevant Medications    pantoprazole (PROTONIX) 40 MG Tablet Delayed Response    Iron deficiency anemia due to chronic blood loss     Acute-improving          Relevant Medications    pantoprazole (PROTONIX) 40 MG Tablet Delayed Response     Other Visit Diagnoses       Duodenal ulcer        Relevant Medications    sucralfate (CARAFATE) 1 GM/10ML Suspension    Epigastric pain        Relevant Medications    oxyCODONE immediate-release (ROXICODONE) 5 MG Tab    Other Relevant Orders    Controlled Substance Treatment Agreement          CS signed, pdmp checked    3+ labs reviewed         1-2 month annual     Please note that this dictation was created using voice recognition software. I have made every reasonable attempt to correct obvious errors, but I expect that there are errors of grammar and possibly content that I did not discover before finalizing the note.

## 2023-10-10 NOTE — TELEPHONE ENCOUNTER
MEDICATION PRIOR AUTHORIZATION NEEDED:    1. Name of Medication: oxyCODONE immediate-release (ROXICODONE)       2. Requested By (Name of Pharmacy): [facility]     3. Is insurance on file current? yes    4. What is the name & phone number of the 3rd party payor? [phone]

## 2023-10-23 ENCOUNTER — TELEPHONE (OUTPATIENT)
Dept: CARDIOLOGY | Facility: MEDICAL CENTER | Age: 43
End: 2023-10-23
Payer: COMMERCIAL

## 2023-10-23 NOTE — TELEPHONE ENCOUNTER
Received call from centralized scheduling regarding patient's scheduling concerns. Advised RN would reach out to patient to reschedule.     Called and spoke to patient. Consultation appointment rescheduled. All questions answered. Patient verbalizes understanding.

## 2023-10-24 ENCOUNTER — TELEPHONE (OUTPATIENT)
Dept: CARDIOLOGY | Facility: MEDICAL CENTER | Age: 43
End: 2023-10-24
Payer: COMMERCIAL

## 2023-10-24 ENCOUNTER — OFFICE VISIT (OUTPATIENT)
Dept: CARDIOLOGY | Facility: MEDICAL CENTER | Age: 43
End: 2023-10-24
Payer: COMMERCIAL

## 2023-10-24 VITALS
BODY MASS INDEX: 30.09 KG/M2 | SYSTOLIC BLOOD PRESSURE: 122 MMHG | WEIGHT: 242 LBS | HEIGHT: 75 IN | OXYGEN SATURATION: 98 % | DIASTOLIC BLOOD PRESSURE: 88 MMHG | RESPIRATION RATE: 18 BRPM | HEART RATE: 73 BPM

## 2023-10-24 DIAGNOSIS — I34.0 SEVERE MITRAL REGURGITATION: ICD-10-CM

## 2023-10-24 PROCEDURE — 3074F SYST BP LT 130 MM HG: CPT | Performed by: INTERNAL MEDICINE

## 2023-10-24 PROCEDURE — 99204 OFFICE O/P NEW MOD 45 MIN: CPT | Performed by: INTERNAL MEDICINE

## 2023-10-24 PROCEDURE — 99212 OFFICE O/P EST SF 10 MIN: CPT | Performed by: INTERNAL MEDICINE

## 2023-10-24 PROCEDURE — 3079F DIAST BP 80-89 MM HG: CPT | Performed by: INTERNAL MEDICINE

## 2023-10-24 ASSESSMENT — FIBROSIS 4 INDEX: FIB4 SCORE: 0.45

## 2023-10-24 NOTE — PROGRESS NOTES
"    Interventional cardiology Initial Consultation Note      Chief Complaint: Severe mitral regurgitation    Roberto Braswell is a 43 y.o. male  patient presented today for consultation regarding mitral regurgitation.  He had recurrent hospital admissions due to GI bleed from nonhealing duodenal ulcer, underwent an echocardiogram which showed posterior mitral valve prolapse with possibly severe mitral regurgitation.  For several months he has been having significant dyspnea on exertion, not able to do exercise that he used to do, it is lifestyle limiting for him.        Past Medical History:   Diagnosis Date    Arthritis     Right ankle    Drug-seeking behavior 7/30/2023    Demanding narcotics for a duodenal ulcer July 2023. Refused antacids or sucralfate.     Duodenal ulcer 7/30/2023    Gastric ulcer     Pain     Right ankle             Current Outpatient Medications   Medication Sig Dispense Refill    losartan (COZAAR) 25 MG Tab Take 1 Tablet by mouth every day. 60 Tablet 0    pantoprazole (PROTONIX) 40 MG Tablet Delayed Response Take 1 Tablet by mouth 2 times a day. 120 Tablet 0    sucralfate (CARAFATE) 1 GM/10ML Suspension Take 10 mL by mouth every 6 hours. 414 mL 1    acetaminophen (TYLENOL) 325 MG Tab Take 2 Tablets by mouth every 6 hours as needed for Moderate Pain. 30 Tablet 0    hyoscyamine-mag hydrox-al hydrox-simeth-lidocaine Take 15ml by mouth every 6 hours as needed (abdominal pain or cramping). (Patient not taking: Reported on 10/24/2023) 200 mL 1    doxylamine (UNISOM) 25 MG Tab tablet Take 25-50 mg by mouth at bedtime. \"Santacruz brand sleep aid\" (Patient not taking: Reported on 10/24/2023)       No current facility-administered medications for this visit.             Physical Exam:  Ambulatory Vitals  /88 (BP Location: Left arm, Patient Position: Sitting, BP Cuff Size: Adult)   Pulse 73   Resp 18   Ht 1.905 m (6' 3\")   Wt 110 kg (242 lb)   SpO2 98%    Oxygen Therapy:  Pulse Oximetry: 98 " %  BP Readings from Last 4 Encounters:   10/24/23 122/88   10/10/23 (!) 142/86   09/26/23 (!) 144/78   09/16/23 (!) 155/81       Weight/BMI: Body mass index is 30.25 kg/m².  Wt Readings from Last 4 Encounters:   10/24/23 110 kg (242 lb)   10/10/23 108 kg (238 lb 1.6 oz)   09/26/23 106 kg (233 lb 9.6 oz)   09/16/23 108 kg (239 lb)           General: Well appearing and in no apparent distress  Neck: carotid bruits absent  Lungs: respiratory sounds  normal  Heart: Regular rhythm,  No palpable thrills on palpation, murmurs present, no rubs,   Lower extremity edema absent.     Echocardiogram reviewed, personally interpreted, posterior mitral valve leaflet prolapse with eccentric mitral regurgitation, likely severe    Medical Decision Making:  Problem List Items Addressed This Visit    None  Visit Diagnoses       Severe mitral regurgitation        Relevant Orders    EC-LEONEL W/O CONT          His most recent hemoglobin improved however he still reports lifestyle-limiting dyspnea on exertion, fatigue NYHA class II stage C.  Recommend transesophageal echocardiogram to evaluate mitral regurgitation, if severe will perform coronary angiogram, CT surgery consultation.  He will most likely need a repeat endoscopy to make sure duodenal ulcer is healing prior to setting up surgical mitral valve repair.  Discussed pathophysiology of mitral valve, work-up, treatment options in detail.      This note was dictated using Dragon speech recognition software.    Yinka CLARKE  Interventional cardiologist  [facility], [address]  [address] [place] [address]  [place]A Cor[place]es [id number]  Phone: [phone]  Fax: [phone]

## 2023-10-24 NOTE — TELEPHONE ENCOUNTER
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    Called patient back and LVM advising that's fine, we will still see him.

## 2023-10-24 NOTE — TELEPHONE ENCOUNTER
For this LEONEL, do you want anesthesia or conscious sedation? Any time frame I need to schedule this in?

## 2023-10-25 ENCOUNTER — APPOINTMENT (OUTPATIENT)
Dept: CARDIOLOGY | Facility: MEDICAL CENTER | Age: 43
End: 2023-10-25
Attending: INTERNAL MEDICINE
Payer: COMMERCIAL

## 2023-10-25 NOTE — TELEPHONE ENCOUNTER
Patient is scheduled for a LEONEL with anesthesia on 11- with Dr. Spain. Patient has been instructed to check in at 11:00 am for 1:00 procedure. No meds to hold. Message sent to authorizations. Sent [employer] message to pt with instructions. FYI sent to Jessy.

## 2023-10-26 ENCOUNTER — APPOINTMENT (OUTPATIENT)
Dept: ADMISSIONS | Facility: MEDICAL CENTER | Age: 43
End: 2023-10-26
Attending: INTERNAL MEDICINE
Payer: COMMERCIAL

## 2023-10-31 ENCOUNTER — TELEPHONE (OUTPATIENT)
Dept: CARDIOLOGY | Facility: MEDICAL CENTER | Age: 43
End: 2023-10-31
Payer: COMMERCIAL

## 2023-11-01 ENCOUNTER — TELEPHONE (OUTPATIENT)
Dept: CARDIOLOGY | Facility: MEDICAL CENTER | Age: 43
End: 2023-11-01
Payer: COMMERCIAL

## 2023-11-01 NOTE — TELEPHONE ENCOUNTER
Phone Number Called: [phone]    Call outcome: Left detailed message for patient. Informed to call back with any additional questions.    Message: Left message with patient that FMLA forms had been filled out and faxed to appropriate location.

## 2023-11-03 ENCOUNTER — PRE-ADMISSION TESTING (OUTPATIENT)
Dept: ADMISSIONS | Facility: MEDICAL CENTER | Age: 43
End: 2023-11-03
Attending: INTERNAL MEDICINE
Payer: COMMERCIAL

## 2023-11-09 ENCOUNTER — ANESTHESIA EVENT (OUTPATIENT)
Dept: CARDIOLOGY | Facility: MEDICAL CENTER | Age: 43
End: 2023-11-09
Payer: COMMERCIAL

## 2023-11-09 NOTE — TELEPHONE ENCOUNTER
Due to scheduling conflict, patient is now to check in at 12:00 pm for a 2:00 procedure time. I have spoken to patient regarding this and he is ok with this change. Updated case with cath lab scheduling.

## 2023-11-10 ENCOUNTER — ANESTHESIA (OUTPATIENT)
Dept: CARDIOLOGY | Facility: MEDICAL CENTER | Age: 43
End: 2023-11-10
Payer: COMMERCIAL

## 2023-11-10 ENCOUNTER — HOSPITAL ENCOUNTER (OUTPATIENT)
Facility: MEDICAL CENTER | Age: 43
End: 2023-11-10
Attending: INTERNAL MEDICINE | Admitting: INTERNAL MEDICINE
Payer: COMMERCIAL

## 2023-11-10 ENCOUNTER — APPOINTMENT (OUTPATIENT)
Dept: CARDIOLOGY | Facility: MEDICAL CENTER | Age: 43
End: 2023-11-10
Attending: INTERNAL MEDICINE
Payer: COMMERCIAL

## 2023-11-10 VITALS
HEIGHT: 75 IN | WEIGHT: 232.81 LBS | TEMPERATURE: 98 F | HEART RATE: 66 BPM | OXYGEN SATURATION: 94 % | DIASTOLIC BLOOD PRESSURE: 73 MMHG | BODY MASS INDEX: 28.95 KG/M2 | RESPIRATION RATE: 18 BRPM | SYSTOLIC BLOOD PRESSURE: 127 MMHG

## 2023-11-10 DIAGNOSIS — I34.0 SEVERE MITRAL REGURGITATION: ICD-10-CM

## 2023-11-10 LAB — LV EJECT FRACT  99904: 70

## 2023-11-10 PROCEDURE — 160046 HCHG PACU - 1ST 60 MINS PHASE II

## 2023-11-10 PROCEDURE — 93312 ECHO TRANSESOPHAGEAL: CPT | Mod: 26 | Performed by: INTERNAL MEDICINE

## 2023-11-10 PROCEDURE — 93319 3D ECHO IMG CGEN CAR ANOMAL: CPT | Performed by: INTERNAL MEDICINE

## 2023-11-10 PROCEDURE — 160002 HCHG RECOVERY MINUTES (STAT)

## 2023-11-10 PROCEDURE — 160035 HCHG PACU - 1ST 60 MINS PHASE I

## 2023-11-10 PROCEDURE — 700111 HCHG RX REV CODE 636 W/ 250 OVERRIDE (IP): Performed by: ANESTHESIOLOGY

## 2023-11-10 PROCEDURE — 93320 DOPPLER ECHO COMPLETE: CPT | Mod: 26 | Performed by: INTERNAL MEDICINE

## 2023-11-10 PROCEDURE — 700105 HCHG RX REV CODE 258: Performed by: INTERNAL MEDICINE

## 2023-11-10 PROCEDURE — 4410588 EC-TEE W/O CONT

## 2023-11-10 RX ORDER — HYDROMORPHONE HYDROCHLORIDE 1 MG/ML
0.2 INJECTION, SOLUTION INTRAMUSCULAR; INTRAVENOUS; SUBCUTANEOUS
Status: DISCONTINUED | OUTPATIENT
Start: 2023-11-10 | End: 2023-11-10 | Stop reason: HOSPADM

## 2023-11-10 RX ORDER — HYDROMORPHONE HYDROCHLORIDE 1 MG/ML
0.4 INJECTION, SOLUTION INTRAMUSCULAR; INTRAVENOUS; SUBCUTANEOUS
Status: DISCONTINUED | OUTPATIENT
Start: 2023-11-10 | End: 2023-11-10 | Stop reason: HOSPADM

## 2023-11-10 RX ORDER — OXYCODONE HCL 5 MG/5 ML
5 SOLUTION, ORAL ORAL
Status: DISCONTINUED | OUTPATIENT
Start: 2023-11-10 | End: 2023-11-10 | Stop reason: HOSPADM

## 2023-11-10 RX ORDER — SODIUM CHLORIDE, SODIUM LACTATE, POTASSIUM CHLORIDE, CALCIUM CHLORIDE 600; 310; 30; 20 MG/100ML; MG/100ML; MG/100ML; MG/100ML
INJECTION, SOLUTION INTRAVENOUS CONTINUOUS
Status: DISCONTINUED | OUTPATIENT
Start: 2023-11-10 | End: 2023-11-10 | Stop reason: HOSPADM

## 2023-11-10 RX ORDER — DIPHENHYDRAMINE HYDROCHLORIDE 50 MG/ML
12.5 INJECTION INTRAMUSCULAR; INTRAVENOUS
Status: DISCONTINUED | OUTPATIENT
Start: 2023-11-10 | End: 2023-11-10 | Stop reason: HOSPADM

## 2023-11-10 RX ORDER — HYDROMORPHONE HYDROCHLORIDE 1 MG/ML
0.1 INJECTION, SOLUTION INTRAMUSCULAR; INTRAVENOUS; SUBCUTANEOUS
Status: DISCONTINUED | OUTPATIENT
Start: 2023-11-10 | End: 2023-11-10 | Stop reason: HOSPADM

## 2023-11-10 RX ORDER — OXYCODONE HCL 5 MG/5 ML
10 SOLUTION, ORAL ORAL
Status: DISCONTINUED | OUTPATIENT
Start: 2023-11-10 | End: 2023-11-10 | Stop reason: HOSPADM

## 2023-11-10 RX ORDER — MIDAZOLAM HYDROCHLORIDE 1 MG/ML
INJECTION INTRAMUSCULAR; INTRAVENOUS PRN
Status: DISCONTINUED | OUTPATIENT
Start: 2023-11-10 | End: 2023-11-10 | Stop reason: SURG

## 2023-11-10 RX ORDER — ONDANSETRON 2 MG/ML
4 INJECTION INTRAMUSCULAR; INTRAVENOUS
Status: DISCONTINUED | OUTPATIENT
Start: 2023-11-10 | End: 2023-11-10 | Stop reason: HOSPADM

## 2023-11-10 RX ADMIN — PROPOFOL 50 MG: 10 INJECTION, EMULSION INTRAVENOUS at 12:48

## 2023-11-10 RX ADMIN — FENTANYL CITRATE 100 MCG: 50 INJECTION, SOLUTION INTRAMUSCULAR; INTRAVENOUS at 12:43

## 2023-11-10 RX ADMIN — MIDAZOLAM HYDROCHLORIDE 2 MG: 2 INJECTION, SOLUTION INTRAMUSCULAR; INTRAVENOUS at 12:44

## 2023-11-10 RX ADMIN — PROPOFOL 50 MG: 10 INJECTION, EMULSION INTRAVENOUS at 13:13

## 2023-11-10 RX ADMIN — PROPOFOL 50 MG: 10 INJECTION, EMULSION INTRAVENOUS at 12:58

## 2023-11-10 RX ADMIN — SODIUM CHLORIDE, POTASSIUM CHLORIDE, SODIUM LACTATE AND CALCIUM CHLORIDE: 600; 310; 30; 20 INJECTION, SOLUTION INTRAVENOUS at 12:42

## 2023-11-10 RX ADMIN — PROPOFOL 50 MG: 10 INJECTION, EMULSION INTRAVENOUS at 12:46

## 2023-11-10 ASSESSMENT — PAIN SCALES - GENERAL: PAIN_LEVEL: 0

## 2023-11-10 ASSESSMENT — PAIN DESCRIPTION - PAIN TYPE: TYPE: ACUTE PAIN

## 2023-11-10 ASSESSMENT — FIBROSIS 4 INDEX: FIB4 SCORE: 0.45

## 2023-11-10 NOTE — DISCHARGE INSTRUCTIONS
If any questions arise, call your provider.  If your provider is not available, please feel free to call the Surgical Center at [phone] ([phone]).    MEDICATIONS: Resume taking daily medication.  Take prescribed pain medication with food.  If no medication is prescribed, you may take non-aspirin pain medication if needed.  PAIN MEDICATION CAN BE VERY CONSTIPATING.  Take a stool softener or laxative such as senokot, pericolace, or milk of magnesia if needed.    Last pain medication given at n/a    What to Expect Post Anesthesia    Rest and take it easy for the first 24 hours.  A responsible adult is recommended to remain with you during that time.  It is normal to feel sleepy.  We encourage you to not do anything that requires balance, judgment or coordination.    FOR 24 HOURS DO NOT:  Drive, operate machinery or run household appliances.  Drink beer or alcoholic beverages.  Make important decisions or sign legal documents.    To avoid nausea, slowly advance diet as tolerated, avoiding spicy or greasy foods for the first day.  Add more substantial food to your diet according to your provider's instructions.  Babies can be fed formula or breast milk as soon as they are hungry.  INCREASE FLUIDS AND FIBER TO AVOID CONSTIPATION.    MILD FLU-LIKE SYMPTOMS ARE NORMAL.  YOU MAY EXPERIENCE GENERALIZED MUSCLE ACHES, THROAT IRRITATION, HEADACHE AND/OR SOME NAUSEA.

## 2023-11-10 NOTE — ANESTHESIA POSTPROCEDURE EVALUATION
Patient: Roberto Braswell    Procedure Summary       Date: 11/10/23 Room / Location: [facility] Imaging - Echocardiology [facility]    Anesthesia Start: 1242 Anesthesia Stop: 1330    Procedure: EC-LEONEL W/O CONT Diagnosis:       Severe mitral regurgitation      Nonrheumatic mitral (valve) insufficiency    Scheduled Providers: Mike Spain M.D.; Spencer Fox M.D. Responsible Provider: Spencer Fox M.D.    Anesthesia Type: MAC ASA Status: 2            Final Anesthesia Type: MAC  Last vitals  BP   Blood Pressure: (!) 161/88    Temp   36.7 °C (98 °F)    Pulse   67   Resp   18    SpO2   97 %      Anesthesia Post Evaluation    Patient location during evaluation: PACU  Patient participation: complete - patient participated  Level of consciousness: awake and alert  Pain score: 0    Airway patency: patent  Anesthetic complications: no  Cardiovascular status: hemodynamically stable  Respiratory status: acceptable  Hydration status: euvolemic    PONV: none          No notable events documented.     Nurse Pain Score: 8 (NPRS)

## 2023-11-10 NOTE — ANESTHESIA PREPROCEDURE EVALUATION
Date/Time: 11/10/23 1400    Scheduled providers: Mike Spain M.D.; Spencer Fox M.D.    Procedure: EC-LEONEL W/O CONT    Diagnosis:       Severe mitral regurgitation [I34.0]      Nonrheumatic mitral (valve) insufficiency [I34.0]    Location: [facility] Imaging - Echocardiology [facility]            Relevant Problems   CARDIAC   (positive) Essential hypertension   (positive) Mitral regurgitation      GI   (positive) Duodenal ulcer with hemorrhage       Physical Exam    Airway   Mallampati: II  TM distance: >3 FB  Neck ROM: full       Cardiovascular - normal exam  Rhythm: regular  Rate: normal  (-) murmur     Dental - normal exam           Pulmonary - normal exam  Breath sounds clear to auscultation     Abdominal    Neurological - normal exam         Other findings: Hoarse voice              Anesthesia Plan    ASA 2       Plan - MAC               Induction: intravenous    Postoperative Plan: Postoperative administration of opioids is intended.    Pertinent diagnostic labs and testing reviewed    Informed Consent:    Anesthetic plan and risks discussed with patient.    Use of blood products discussed with: patient whom consented to blood products.

## 2023-11-10 NOTE — ANESTHESIA TIME REPORT
Anesthesia Start and Stop Event Times       Date Time Event    11/10/2023 1225 Ready for Procedure     1242 Anesthesia Start     1330 Anesthesia Stop          Responsible Staff  11/10/23      Name Role Begin End    Spencer Fox M.D. Anesth 1242 1330          Overtime Reason:  no overtime (within assigned shift)    Comments:

## 2023-11-10 NOTE — OR NURSING
1321 Patient arrived to PACU from OR. Report received from RN and anesthesia. Patient attached to monitoring. VSS. Patient oxygenating well on 6 L via mask.     1340 Patient meets phase two criteria. Tolerating PO liquids without complication.     1400 RN went over discharge instructions with patients father, over the phone. All questions answered.     1410 Intact IV removed by RN. Patient meets discharge criteria. VSS. Pt discharged to wife who is in [facility] as a patient. Patient father is to pick patient up in the afternoon. Pt in possession of all personal belongings.

## 2023-11-13 ENCOUNTER — TELEPHONE (OUTPATIENT)
Dept: CARDIOLOGY | Facility: MEDICAL CENTER | Age: 43
End: 2023-11-13
Payer: COMMERCIAL

## 2023-11-13 DIAGNOSIS — I34.0 SEVERE MITRAL REGURGITATION: ICD-10-CM

## 2023-11-13 NOTE — TELEPHONE ENCOUNTER
Yinka Mitchell M.D.  You; Georgia Reed, BUSHRA.; Mike Spain M.D.3 days ago     Thank you Mike.  Patricia Spears set up coronary angio and Cts consult  Please notify his GI doc about need for open heart and ask them to repeat endoscope to ensure ulcer healing   -------------------------------------------------------------------------------------------  Received VM from patient wanting to know next steps post LEONEL.    Called patient back. Advised of Dr. Mitchell's recommendations. He is agreeable to the plan. Advised I would send a message to Allyssa to call him to schedule. He also states he gave more FMLA paperwork to one of the nurses at the hospital. Advised I would alert Jordyn TY.

## 2023-11-14 NOTE — TELEPHONE ENCOUNTER
Patient called stating he's scheduled for his angiogram on 11/20. He states he will be sending more [facility] paperwork to be filled out for his work. We also discussed getting in with GI per Dr. Mitchell's recommendations. Patient has not established with GI yet. Advised to call and get established.     Patient called back again he states GI can't get him in for a consult until mid-December so he didn't book the appt. Advised him to book the appt and request to be put on a cancellation list. He states he has not had any abdominal symptoms, and his PCP advised him his H&H has improved. Advised patient I would let Dr. Mitchell know about the delay in GI appt.

## 2023-11-14 NOTE — TELEPHONE ENCOUNTER
Patient is scheduled on 11-20-23 for a Pre mitral clip angio with . No meds to stop and patient to check in at 9:30 for an 11:30 procedure. H&P was done on 10-24-23 by . Pre admit to call patient.

## 2023-11-15 ENCOUNTER — TELEPHONE (OUTPATIENT)
Dept: CARDIOLOGY | Facility: MEDICAL CENTER | Age: 43
End: 2023-11-15
Payer: COMMERCIAL

## 2023-11-15 ENCOUNTER — HOSPITAL ENCOUNTER (OUTPATIENT)
Facility: MEDICAL CENTER | Age: 43
End: 2023-11-15
Attending: INTERNAL MEDICINE | Admitting: INTERNAL MEDICINE
Payer: COMMERCIAL

## 2023-11-15 ENCOUNTER — PATIENT MESSAGE (OUTPATIENT)
Dept: CARDIOLOGY | Facility: MEDICAL CENTER | Age: 43
End: 2023-11-15
Payer: COMMERCIAL

## 2023-11-15 DIAGNOSIS — Z01.812 PRE-OPERATIVE LABORATORY EXAMINATION: ICD-10-CM

## 2023-11-15 DIAGNOSIS — I34.0 SEVERE MITRAL REGURGITATION: ICD-10-CM

## 2023-11-15 DIAGNOSIS — Z01.810 PRE-OPERATIVE CARDIOVASCULAR EXAMINATION: ICD-10-CM

## 2023-11-15 NOTE — TELEPHONE ENCOUNTER
Yinka Mitchell M.D.  You19 hours ago (3:25 PM)     [facility]     You  Yinka Mitchell M.D.23 hours ago (11:05 AM)     Soonest patient can get established with GI is mid-December. He states he's not having any abdominal issues and his H&H has improved. Please advise.

## 2023-11-15 NOTE — TELEPHONE ENCOUNTER
Phone Number Called: [phone]    Call outcome:  spoke to [employer]     Message: Called to clarify which documentation was needed on our end. Informed that patient has not been seen in office since original office visit note was sent but has had a LEONEL and has upcoming heart cath. Informed by representative that she would add this to patient documentation and that at this time no further documents were needed.

## 2023-11-15 NOTE — TELEPHONE ENCOUNTER
Patient has been rescheduled to 11-21-23 for Pre mitral clip angio with . Patient to check in at 6:30 for an 8:30 procedure.No meds to stop. H&P was done on 10-24-23 by Dr. Arechiga. Pre admit to call patient.

## 2023-11-16 ENCOUNTER — PRE-ADMISSION TESTING (OUTPATIENT)
Dept: ADMISSIONS | Facility: MEDICAL CENTER | Age: 43
End: 2023-11-16
Attending: INTERNAL MEDICINE
Payer: COMMERCIAL

## 2023-11-16 DIAGNOSIS — Z01.810 PRE-OPERATIVE CARDIOVASCULAR EXAMINATION: ICD-10-CM

## 2023-11-16 DIAGNOSIS — Z01.812 PRE-OPERATIVE LABORATORY EXAMINATION: ICD-10-CM

## 2023-11-16 LAB
ALBUMIN SERPL BCP-MCNC: 4.2 G/DL (ref 3.2–4.9)
ALBUMIN/GLOB SERPL: 2 G/DL
ALP SERPL-CCNC: 40 U/L (ref 30–99)
ALT SERPL-CCNC: 11 U/L (ref 2–50)
ANION GAP SERPL CALC-SCNC: 9 MMOL/L (ref 7–16)
APTT PPP: 27.3 SEC (ref 24.7–36)
AST SERPL-CCNC: 13 U/L (ref 12–45)
BILIRUB SERPL-MCNC: 0.2 MG/DL (ref 0.1–1.5)
BUN SERPL-MCNC: 19 MG/DL (ref 8–22)
CALCIUM ALBUM COR SERPL-MCNC: 8.2 MG/DL (ref 8.5–10.5)
CALCIUM SERPL-MCNC: 8.4 MG/DL (ref 8.5–10.5)
CHLORIDE SERPL-SCNC: 109 MMOL/L (ref 96–112)
CO2 SERPL-SCNC: 22 MMOL/L (ref 20–33)
CREAT SERPL-MCNC: 1.05 MG/DL (ref 0.5–1.4)
EKG IMPRESSION: NORMAL
ERYTHROCYTE [DISTWIDTH] IN BLOOD BY AUTOMATED COUNT: 57.4 FL (ref 35.9–50)
GFR SERPLBLD CREATININE-BSD FMLA CKD-EPI: 91 ML/MIN/1.73 M 2
GLOBULIN SER CALC-MCNC: 2.1 G/DL (ref 1.9–3.5)
GLUCOSE SERPL-MCNC: 112 MG/DL (ref 65–99)
HCT VFR BLD AUTO: 34 % (ref 42–52)
HGB BLD-MCNC: 10.4 G/DL (ref 14–18)
INR PPP: 1.13 (ref 0.87–1.13)
MCH RBC QN AUTO: 24.1 PG (ref 27–33)
MCHC RBC AUTO-ENTMCNC: 30.6 G/DL (ref 32.3–36.5)
MCV RBC AUTO: 78.9 FL (ref 81.4–97.8)
PLATELET # BLD AUTO: 302 K/UL (ref 164–446)
PMV BLD AUTO: 9.2 FL (ref 9–12.9)
POTASSIUM SERPL-SCNC: 3.7 MMOL/L (ref 3.6–5.5)
PROT SERPL-MCNC: 6.3 G/DL (ref 6–8.2)
PROTHROMBIN TIME: 14.7 SEC (ref 12–14.6)
RBC # BLD AUTO: 4.31 M/UL (ref 4.7–6.1)
SODIUM SERPL-SCNC: 140 MMOL/L (ref 135–145)
WBC # BLD AUTO: 4.8 K/UL (ref 4.8–10.8)

## 2023-11-16 PROCEDURE — 80053 COMPREHEN METABOLIC PANEL: CPT

## 2023-11-16 PROCEDURE — 85610 PROTHROMBIN TIME: CPT

## 2023-11-16 PROCEDURE — 36415 COLL VENOUS BLD VENIPUNCTURE: CPT

## 2023-11-16 PROCEDURE — 85730 THROMBOPLASTIN TIME PARTIAL: CPT

## 2023-11-16 PROCEDURE — 93010 ELECTROCARDIOGRAM REPORT: CPT | Performed by: INTERNAL MEDICINE

## 2023-11-16 PROCEDURE — 93005 ELECTROCARDIOGRAM TRACING: CPT | Performed by: INTERNAL MEDICINE

## 2023-11-16 PROCEDURE — 85027 COMPLETE CBC AUTOMATED: CPT

## 2023-11-17 ENCOUNTER — HOSPITAL ENCOUNTER (INPATIENT)
Facility: MEDICAL CENTER | Age: 43
LOS: 5 days | DRG: 356 | End: 2023-11-22
Attending: EMERGENCY MEDICINE | Admitting: STUDENT IN AN ORGANIZED HEALTH CARE EDUCATION/TRAINING PROGRAM
Payer: COMMERCIAL

## 2023-11-17 ENCOUNTER — APPOINTMENT (OUTPATIENT)
Dept: RADIOLOGY | Facility: MEDICAL CENTER | Age: 43
DRG: 356 | End: 2023-11-17
Attending: EMERGENCY MEDICINE
Payer: COMMERCIAL

## 2023-11-17 DIAGNOSIS — R42 LIGHTHEADEDNESS: ICD-10-CM

## 2023-11-17 DIAGNOSIS — D62 ACUTE BLOOD LOSS ANEMIA: ICD-10-CM

## 2023-11-17 DIAGNOSIS — R10.13 EPIGASTRIC PAIN: ICD-10-CM

## 2023-11-17 DIAGNOSIS — R00.0 SINUS TACHYCARDIA: ICD-10-CM

## 2023-11-17 DIAGNOSIS — K92.0 HEMATEMESIS WITH NAUSEA: Primary | ICD-10-CM

## 2023-11-17 DIAGNOSIS — K26.4 GASTROINTESTINAL HEMORRHAGE ASSOCIATED WITH DUODENAL ULCER: ICD-10-CM

## 2023-11-17 DIAGNOSIS — K26.9 DUODENAL ULCER: ICD-10-CM

## 2023-11-17 DIAGNOSIS — K92.1 MELANOTIC STOOLS: ICD-10-CM

## 2023-11-17 LAB
ABO GROUP BLD: NORMAL
ALBUMIN SERPL BCP-MCNC: 3.4 G/DL (ref 3.2–4.9)
ALBUMIN/GLOB SERPL: 2.1 G/DL
ALP SERPL-CCNC: 35 U/L (ref 30–99)
ALT SERPL-CCNC: 13 U/L (ref 2–50)
ANION GAP SERPL CALC-SCNC: 13 MMOL/L (ref 7–16)
APTT PPP: 26.4 SEC (ref 24.7–36)
AST SERPL-CCNC: 12 U/L (ref 12–45)
BARCODED ABORH UBTYP: 6200
BARCODED ABORH UBTYP: 6200
BARCODED PRD CODE UBPRD: NORMAL
BARCODED PRD CODE UBPRD: NORMAL
BARCODED UNIT NUM UBUNT: NORMAL
BARCODED UNIT NUM UBUNT: NORMAL
BASOPHILS # BLD AUTO: 0.6 % (ref 0–1.8)
BASOPHILS # BLD: 0.04 K/UL (ref 0–0.12)
BILIRUB SERPL-MCNC: 0.2 MG/DL (ref 0.1–1.5)
BLD GP AB SCN SERPL QL: NORMAL
BUN SERPL-MCNC: 33 MG/DL (ref 8–22)
CALCIUM ALBUM COR SERPL-MCNC: 8.3 MG/DL (ref 8.5–10.5)
CALCIUM SERPL-MCNC: 7.8 MG/DL (ref 8.5–10.5)
CHLORIDE SERPL-SCNC: 111 MMOL/L (ref 96–112)
CO2 SERPL-SCNC: 17 MMOL/L (ref 20–33)
COMPONENT R 8504R: NORMAL
COMPONENT R 8504R: NORMAL
CREAT SERPL-MCNC: 1.36 MG/DL (ref 0.5–1.4)
EOSINOPHIL # BLD AUTO: 0.03 K/UL (ref 0–0.51)
EOSINOPHIL NFR BLD: 0.4 % (ref 0–6.9)
ERYTHROCYTE [DISTWIDTH] IN BLOOD BY AUTOMATED COUNT: 58.4 FL (ref 35.9–50)
GFR SERPLBLD CREATININE-BSD FMLA CKD-EPI: 66 ML/MIN/1.73 M 2
GLOBULIN SER CALC-MCNC: 1.6 G/DL (ref 1.9–3.5)
GLUCOSE SERPL-MCNC: 109 MG/DL (ref 65–99)
HCT VFR BLD AUTO: 25.2 % (ref 42–52)
HGB BLD-MCNC: 7.9 G/DL (ref 14–18)
IMM GRANULOCYTES # BLD AUTO: 0.03 K/UL (ref 0–0.11)
IMM GRANULOCYTES NFR BLD AUTO: 0.4 % (ref 0–0.9)
INR PPP: 1.32 (ref 0.87–1.13)
LIPASE SERPL-CCNC: 24 U/L (ref 11–82)
LYMPHOCYTES # BLD AUTO: 2.23 K/UL (ref 1–4.8)
LYMPHOCYTES NFR BLD: 33 % (ref 22–41)
MCH RBC QN AUTO: 24.9 PG (ref 27–33)
MCHC RBC AUTO-ENTMCNC: 31.3 G/DL (ref 32.3–36.5)
MCV RBC AUTO: 79.5 FL (ref 81.4–97.8)
MONOCYTES # BLD AUTO: 0.58 K/UL (ref 0–0.85)
MONOCYTES NFR BLD AUTO: 8.6 % (ref 0–13.4)
NEUTROPHILS # BLD AUTO: 3.84 K/UL (ref 1.82–7.42)
NEUTROPHILS NFR BLD: 57 % (ref 44–72)
NRBC # BLD AUTO: 0 K/UL
NRBC BLD-RTO: 0 /100 WBC (ref 0–0.2)
PLATELET # BLD AUTO: 266 K/UL (ref 164–446)
PMV BLD AUTO: 9.4 FL (ref 9–12.9)
POTASSIUM SERPL-SCNC: 4 MMOL/L (ref 3.6–5.5)
PRODUCT TYPE UPROD: NORMAL
PRODUCT TYPE UPROD: NORMAL
PROT SERPL-MCNC: 5 G/DL (ref 6–8.2)
PROTHROMBIN TIME: 16.6 SEC (ref 12–14.6)
RBC # BLD AUTO: 3.17 M/UL (ref 4.7–6.1)
RH BLD: NORMAL
SODIUM SERPL-SCNC: 141 MMOL/L (ref 135–145)
UNIT STATUS USTAT: NORMAL
UNIT STATUS USTAT: NORMAL
WBC # BLD AUTO: 6.8 K/UL (ref 4.8–10.8)

## 2023-11-17 PROCEDURE — 83690 ASSAY OF LIPASE: CPT

## 2023-11-17 PROCEDURE — P9016 RBC LEUKOCYTES REDUCED: HCPCS

## 2023-11-17 PROCEDURE — C9113 INJ PANTOPRAZOLE SODIUM, VIA: HCPCS | Performed by: EMERGENCY MEDICINE

## 2023-11-17 PROCEDURE — 99223 1ST HOSP IP/OBS HIGH 75: CPT | Mod: GC,AI | Performed by: STUDENT IN AN ORGANIZED HEALTH CARE EDUCATION/TRAINING PROGRAM

## 2023-11-17 PROCEDURE — 700105 HCHG RX REV CODE 258: Performed by: EMERGENCY MEDICINE

## 2023-11-17 PROCEDURE — 36415 COLL VENOUS BLD VENIPUNCTURE: CPT

## 2023-11-17 PROCEDURE — 86900 BLOOD TYPING SEROLOGIC ABO: CPT

## 2023-11-17 PROCEDURE — 82728 ASSAY OF FERRITIN: CPT

## 2023-11-17 PROCEDURE — 99285 EMERGENCY DEPT VISIT HI MDM: CPT

## 2023-11-17 PROCEDURE — 700111 HCHG RX REV CODE 636 W/ 250 OVERRIDE (IP): Performed by: EMERGENCY MEDICINE

## 2023-11-17 PROCEDURE — 85730 THROMBOPLASTIN TIME PARTIAL: CPT

## 2023-11-17 PROCEDURE — 96375 TX/PRO/DX INJ NEW DRUG ADDON: CPT

## 2023-11-17 PROCEDURE — 86850 RBC ANTIBODY SCREEN: CPT

## 2023-11-17 PROCEDURE — 700102 HCHG RX REV CODE 250 W/ 637 OVERRIDE(OP): Performed by: EMERGENCY MEDICINE

## 2023-11-17 PROCEDURE — A9270 NON-COVERED ITEM OR SERVICE: HCPCS | Performed by: EMERGENCY MEDICINE

## 2023-11-17 PROCEDURE — 770020 HCHG ROOM/CARE - TELE (206)

## 2023-11-17 PROCEDURE — 36430 TRANSFUSION BLD/BLD COMPNT: CPT

## 2023-11-17 PROCEDURE — 85025 COMPLETE CBC W/AUTO DIFF WBC: CPT

## 2023-11-17 PROCEDURE — 71045 X-RAY EXAM CHEST 1 VIEW: CPT

## 2023-11-17 PROCEDURE — 86923 COMPATIBILITY TEST ELECTRIC: CPT

## 2023-11-17 PROCEDURE — 96376 TX/PRO/DX INJ SAME DRUG ADON: CPT

## 2023-11-17 PROCEDURE — 83540 ASSAY OF IRON: CPT

## 2023-11-17 PROCEDURE — 80053 COMPREHEN METABOLIC PANEL: CPT

## 2023-11-17 PROCEDURE — 86901 BLOOD TYPING SEROLOGIC RH(D): CPT

## 2023-11-17 PROCEDURE — 85610 PROTHROMBIN TIME: CPT

## 2023-11-17 PROCEDURE — 83550 IRON BINDING TEST: CPT

## 2023-11-17 RX ORDER — POLYETHYLENE GLYCOL 3350 17 G/17G
1 POWDER, FOR SOLUTION ORAL
Status: DISCONTINUED | OUTPATIENT
Start: 2023-11-17 | End: 2023-11-20

## 2023-11-17 RX ORDER — SUCRALFATE ORAL 1 G/10ML
1 SUSPENSION ORAL ONCE
Status: COMPLETED | OUTPATIENT
Start: 2023-11-17 | End: 2023-11-17

## 2023-11-17 RX ORDER — HYDROMORPHONE HYDROCHLORIDE 1 MG/ML
1 INJECTION, SOLUTION INTRAMUSCULAR; INTRAVENOUS; SUBCUTANEOUS ONCE
Status: COMPLETED | OUTPATIENT
Start: 2023-11-17 | End: 2023-11-17

## 2023-11-17 RX ORDER — BISACODYL 10 MG
10 SUPPOSITORY, RECTAL RECTAL
Status: DISCONTINUED | OUTPATIENT
Start: 2023-11-17 | End: 2023-11-20

## 2023-11-17 RX ORDER — AMOXICILLIN 250 MG
2 CAPSULE ORAL 2 TIMES DAILY
Status: DISCONTINUED | OUTPATIENT
Start: 2023-11-18 | End: 2023-11-20

## 2023-11-17 RX ORDER — SODIUM CHLORIDE 9 MG/ML
1000 INJECTION, SOLUTION INTRAVENOUS ONCE
Status: COMPLETED | OUTPATIENT
Start: 2023-11-17 | End: 2023-11-17

## 2023-11-17 RX ORDER — ONDANSETRON 2 MG/ML
4 INJECTION INTRAMUSCULAR; INTRAVENOUS ONCE
Status: DISCONTINUED | OUTPATIENT
Start: 2023-11-17 | End: 2023-11-18

## 2023-11-17 RX ADMIN — SUCRALFATE 1 G: 1 SUSPENSION ORAL at 22:23

## 2023-11-17 RX ADMIN — SODIUM CHLORIDE 1000 ML: 9 INJECTION, SOLUTION INTRAVENOUS at 22:23

## 2023-11-17 RX ADMIN — HYDROMORPHONE HYDROCHLORIDE 1 MG: 1 INJECTION, SOLUTION INTRAMUSCULAR; INTRAVENOUS; SUBCUTANEOUS at 22:08

## 2023-11-17 RX ADMIN — HYDROMORPHONE HYDROCHLORIDE 1 MG: 1 INJECTION, SOLUTION INTRAMUSCULAR; INTRAVENOUS; SUBCUTANEOUS at 23:19

## 2023-11-17 RX ADMIN — PANTOPRAZOLE SODIUM 80 MG: 40 INJECTION, POWDER, FOR SOLUTION INTRAVENOUS at 22:49

## 2023-11-17 ASSESSMENT — ENCOUNTER SYMPTOMS
HEMOPTYSIS: 0
BRUISES/BLEEDS EASILY: 0
DIZZINESS: 1
VOMITING: 1
NECK PAIN: 0
CHILLS: 0
HEADACHES: 0
FEVER: 0
MYALGIAS: 0
PALPITATIONS: 0
NAUSEA: 1
BLOOD IN STOOL: 1
BLURRED VISION: 0
DEPRESSION: 0
COUGH: 0

## 2023-11-17 ASSESSMENT — PAIN DESCRIPTION - PAIN TYPE: TYPE: ACUTE PAIN

## 2023-11-17 ASSESSMENT — FIBROSIS 4 INDEX: FIB4 SCORE: 0.55

## 2023-11-18 ENCOUNTER — APPOINTMENT (OUTPATIENT)
Dept: RADIOLOGY | Facility: MEDICAL CENTER | Age: 43
DRG: 356 | End: 2023-11-18
Attending: INTERNAL MEDICINE
Payer: COMMERCIAL

## 2023-11-18 ENCOUNTER — APPOINTMENT (OUTPATIENT)
Dept: RADIOLOGY | Facility: MEDICAL CENTER | Age: 43
DRG: 356 | End: 2023-11-18
Attending: STUDENT IN AN ORGANIZED HEALTH CARE EDUCATION/TRAINING PROGRAM
Payer: COMMERCIAL

## 2023-11-18 PROBLEM — E87.20 NORMAL ANION GAP METABOLIC ACIDOSIS: Status: ACTIVE | Noted: 2023-11-18

## 2023-11-18 PROBLEM — I10 HYPERTENSION: Status: ACTIVE | Noted: 2023-11-18

## 2023-11-18 PROBLEM — R10.9 ABDOMINAL PAIN: Status: ACTIVE | Noted: 2023-11-18

## 2023-11-18 PROBLEM — K26.9 DUODENAL ULCER: Status: ACTIVE | Noted: 2023-11-18

## 2023-11-18 LAB
ALBUMIN SERPL BCP-MCNC: 2.7 G/DL (ref 3.2–4.9)
ALBUMIN/GLOB SERPL: 2.1 G/DL
ALP SERPL-CCNC: 27 U/L (ref 30–99)
ALT SERPL-CCNC: 10 U/L (ref 2–50)
ANION GAP SERPL CALC-SCNC: 8 MMOL/L (ref 7–16)
ANION GAP SERPL CALC-SCNC: 8 MMOL/L (ref 7–16)
AST SERPL-CCNC: 10 U/L (ref 12–45)
BARCODED ABORH UBTYP: 5100
BARCODED ABORH UBTYP: 6200
BARCODED PRD CODE UBPRD: NORMAL
BARCODED UNIT NUM UBUNT: NORMAL
BILIRUB SERPL-MCNC: 0.5 MG/DL (ref 0.1–1.5)
BUN SERPL-MCNC: 22 MG/DL (ref 8–22)
BUN SERPL-MCNC: 32 MG/DL (ref 8–22)
CALCIUM ALBUM COR SERPL-MCNC: 8.2 MG/DL (ref 8.5–10.5)
CALCIUM SERPL-MCNC: 7.2 MG/DL (ref 8.5–10.5)
CALCIUM SERPL-MCNC: 7.3 MG/DL (ref 8.5–10.5)
CFT BLD TEG: 3.7 MIN (ref 4.6–9.1)
CFT BLD TEG: 3.7 MIN (ref 4.6–9.1)
CFT P HPASE BLD TEG: 3.9 MIN (ref 4.3–8.3)
CFT P HPASE BLD TEG: 4.7 MIN (ref 4.3–8.3)
CHLORIDE SERPL-SCNC: 112 MMOL/L (ref 96–112)
CHLORIDE SERPL-SCNC: 115 MMOL/L (ref 96–112)
CLOT ANGLE BLD TEG: 70.9 DEGREES (ref 63–78)
CLOT ANGLE BLD TEG: 73.9 DEGREES (ref 63–78)
CLOT LYSIS 30M P MA LENFR BLD TEG: 0 % (ref 0–2.6)
CLOT LYSIS 30M P MA LENFR BLD TEG: 0 % (ref 0–2.6)
CO2 SERPL-SCNC: 17 MMOL/L (ref 20–33)
CO2 SERPL-SCNC: 22 MMOL/L (ref 20–33)
COMPONENT F 8504F: NORMAL
COMPONENT P 8504P: NORMAL
COMPONENT R 8504R: NORMAL
CREAT SERPL-MCNC: 1.08 MG/DL (ref 0.5–1.4)
CREAT SERPL-MCNC: 1.13 MG/DL (ref 0.5–1.4)
CT.EXTRINSIC BLD ROTEM: 1.2 MIN (ref 0.8–2.1)
CT.EXTRINSIC BLD ROTEM: 1.3 MIN (ref 0.8–2.1)
EKG IMPRESSION: NORMAL
EKG IMPRESSION: NORMAL
ERYTHROCYTE [DISTWIDTH] IN BLOOD BY AUTOMATED COUNT: 51.8 FL (ref 35.9–50)
ERYTHROCYTE [DISTWIDTH] IN BLOOD BY AUTOMATED COUNT: 51.9 FL (ref 35.9–50)
ERYTHROCYTE [DISTWIDTH] IN BLOOD BY AUTOMATED COUNT: 53.1 FL (ref 35.9–50)
ERYTHROCYTE [DISTWIDTH] IN BLOOD BY AUTOMATED COUNT: 53.4 FL (ref 35.9–50)
ERYTHROCYTE [DISTWIDTH] IN BLOOD BY AUTOMATED COUNT: 54.6 FL (ref 35.9–50)
FERRITIN SERPL-MCNC: 14.2 NG/ML (ref 22–322)
GFR SERPLBLD CREATININE-BSD FMLA CKD-EPI: 83 ML/MIN/1.73 M 2
GFR SERPLBLD CREATININE-BSD FMLA CKD-EPI: 88 ML/MIN/1.73 M 2
GLOBULIN SER CALC-MCNC: 1.3 G/DL (ref 1.9–3.5)
GLUCOSE SERPL-MCNC: 114 MG/DL (ref 65–99)
GLUCOSE SERPL-MCNC: 92 MG/DL (ref 65–99)
HCT VFR BLD AUTO: 21.1 % (ref 42–52)
HCT VFR BLD AUTO: 22.9 % (ref 42–52)
HCT VFR BLD AUTO: 23.4 % (ref 42–52)
HCT VFR BLD AUTO: 24 % (ref 42–52)
HCT VFR BLD AUTO: 24.4 % (ref 42–52)
HCT VFR BLD AUTO: 25.4 % (ref 42–52)
HGB BLD-MCNC: 6.5 G/DL (ref 14–18)
HGB BLD-MCNC: 7 G/DL (ref 14–18)
HGB BLD-MCNC: 7.8 G/DL (ref 14–18)
HGB BLD-MCNC: 7.9 G/DL (ref 14–18)
HGB BLD-MCNC: 7.9 G/DL (ref 14–18)
HGB BLD-MCNC: 8.2 G/DL (ref 14–18)
IRON SATN MFR SERPL: 10 % (ref 15–55)
IRON SERPL-MCNC: 25 UG/DL (ref 50–180)
LACTATE SERPL-SCNC: 0.6 MMOL/L (ref 0.5–2)
LACTATE SERPL-SCNC: 1 MMOL/L (ref 0.5–2)
LACTATE SERPL-SCNC: 1.1 MMOL/L (ref 0.5–2)
LACTATE SERPL-SCNC: 1.7 MMOL/L (ref 0.5–2)
MAGNESIUM SERPL-MCNC: 1.9 MG/DL (ref 1.5–2.5)
MAGNESIUM SERPL-MCNC: 2.7 MG/DL (ref 1.5–2.5)
MCF BLD TEG: 57.3 MM (ref 52–69)
MCF BLD TEG: 58 MM (ref 52–69)
MCF.PLATELET INHIB BLD ROTEM: 15.9 MM (ref 15–32)
MCF.PLATELET INHIB BLD ROTEM: 16.4 MM (ref 15–32)
MCH RBC QN AUTO: 25.1 PG (ref 27–33)
MCH RBC QN AUTO: 26.8 PG (ref 27–33)
MCH RBC QN AUTO: 26.9 PG (ref 27–33)
MCH RBC QN AUTO: 27.2 PG (ref 27–33)
MCH RBC QN AUTO: 27.3 PG (ref 27–33)
MCHC RBC AUTO-ENTMCNC: 30.6 G/DL (ref 32.3–36.5)
MCHC RBC AUTO-ENTMCNC: 32 G/DL (ref 32.3–36.5)
MCHC RBC AUTO-ENTMCNC: 32.3 G/DL (ref 32.3–36.5)
MCHC RBC AUTO-ENTMCNC: 32.9 G/DL (ref 32.3–36.5)
MCHC RBC AUTO-ENTMCNC: 33.8 G/DL (ref 32.3–36.5)
MCV RBC AUTO: 81 FL (ref 81.4–97.8)
MCV RBC AUTO: 82.1 FL (ref 81.4–97.8)
MCV RBC AUTO: 82.8 FL (ref 81.4–97.8)
MCV RBC AUTO: 83 FL (ref 81.4–97.8)
MCV RBC AUTO: 84.1 FL (ref 81.4–97.8)
PA AA BLD-ACNC: 33.6 % (ref 0–11)
PA AA BLD-ACNC: 74.5 % (ref 0–11)
PA ADP BLD-ACNC: 32 % (ref 0–17)
PA ADP BLD-ACNC: 35.4 % (ref 0–17)
PLATELET # BLD AUTO: 152 K/UL (ref 164–446)
PLATELET # BLD AUTO: 154 K/UL (ref 164–446)
PLATELET # BLD AUTO: 224 K/UL (ref 164–446)
PMV BLD AUTO: 9.2 FL (ref 9–12.9)
PMV BLD AUTO: 9.3 FL (ref 9–12.9)
PMV BLD AUTO: 9.5 FL (ref 9–12.9)
PMV BLD AUTO: 9.6 FL (ref 9–12.9)
PMV BLD AUTO: 9.7 FL (ref 9–12.9)
POTASSIUM SERPL-SCNC: 3.8 MMOL/L (ref 3.6–5.5)
POTASSIUM SERPL-SCNC: 4.4 MMOL/L (ref 3.6–5.5)
PRODUCT TYPE UPROD: NORMAL
PROT SERPL-MCNC: 4 G/DL (ref 6–8.2)
RBC # BLD AUTO: 2.79 M/UL (ref 4.7–6.1)
RBC # BLD AUTO: 2.89 M/UL (ref 4.7–6.1)
RBC # BLD AUTO: 2.9 M/UL (ref 4.7–6.1)
RBC # BLD AUTO: 2.9 M/UL (ref 4.7–6.1)
RBC # BLD AUTO: 3.06 M/UL (ref 4.7–6.1)
SODIUM SERPL-SCNC: 140 MMOL/L (ref 135–145)
SODIUM SERPL-SCNC: 142 MMOL/L (ref 135–145)
TEG ALGORITHM TGALG: ABNORMAL
TEG ALGORITHM TGALG: ABNORMAL
TIBC SERPL-MCNC: 252 UG/DL (ref 250–450)
UIBC SERPL-MCNC: 227 UG/DL (ref 110–370)
UNIT STATUS USTAT: NORMAL
WBC # BLD AUTO: 4.7 K/UL (ref 4.8–10.8)
WBC # BLD AUTO: 4.7 K/UL (ref 4.8–10.8)
WBC # BLD AUTO: 5.1 K/UL (ref 4.8–10.8)
WBC # BLD AUTO: 6.2 K/UL (ref 4.8–10.8)
WBC # BLD AUTO: 6.5 K/UL (ref 4.8–10.8)

## 2023-11-18 PROCEDURE — 99291 CRITICAL CARE FIRST HOUR: CPT | Performed by: STUDENT IN AN ORGANIZED HEALTH CARE EDUCATION/TRAINING PROGRAM

## 2023-11-18 PROCEDURE — 99291 CRITICAL CARE FIRST HOUR: CPT | Performed by: INTERNAL MEDICINE

## 2023-11-18 PROCEDURE — 85018 HEMOGLOBIN: CPT

## 2023-11-18 PROCEDURE — 80053 COMPREHEN METABOLIC PANEL: CPT

## 2023-11-18 PROCEDURE — 700111 HCHG RX REV CODE 636 W/ 250 OVERRIDE (IP): Performed by: INTERNAL MEDICINE

## 2023-11-18 PROCEDURE — 700102 HCHG RX REV CODE 250 W/ 637 OVERRIDE(OP)

## 2023-11-18 PROCEDURE — 700105 HCHG RX REV CODE 258: Performed by: INTERNAL MEDICINE

## 2023-11-18 PROCEDURE — 99292 CRITICAL CARE ADDL 30 MIN: CPT | Performed by: INTERNAL MEDICINE

## 2023-11-18 PROCEDURE — 85014 HEMATOCRIT: CPT

## 2023-11-18 PROCEDURE — 700102 HCHG RX REV CODE 250 W/ 637 OVERRIDE(OP): Performed by: RADIOLOGY

## 2023-11-18 PROCEDURE — 99153 MOD SED SAME PHYS/QHP EA: CPT

## 2023-11-18 PROCEDURE — A9270 NON-COVERED ITEM OR SERVICE: HCPCS | Performed by: RADIOLOGY

## 2023-11-18 PROCEDURE — 85347 COAGULATION TIME ACTIVATED: CPT

## 2023-11-18 PROCEDURE — 36247 INS CATH ABD/L-EXT ART 3RD: CPT

## 2023-11-18 PROCEDURE — 93010 ELECTROCARDIOGRAM REPORT: CPT | Performed by: STUDENT IN AN ORGANIZED HEALTH CARE EDUCATION/TRAINING PROGRAM

## 2023-11-18 PROCEDURE — 30233R1 TRANSFUSION OF NONAUTOLOGOUS PLATELETS INTO PERIPHERAL VEIN, PERCUTANEOUS APPROACH: ICD-10-PCS | Performed by: STUDENT IN AN ORGANIZED HEALTH CARE EDUCATION/TRAINING PROGRAM

## 2023-11-18 PROCEDURE — 30233N1 TRANSFUSION OF NONAUTOLOGOUS RED BLOOD CELLS INTO PERIPHERAL VEIN, PERCUTANEOUS APPROACH: ICD-10-PCS | Performed by: STUDENT IN AN ORGANIZED HEALTH CARE EDUCATION/TRAINING PROGRAM

## 2023-11-18 PROCEDURE — P9016 RBC LEUKOCYTES REDUCED: HCPCS

## 2023-11-18 PROCEDURE — P9034 PLATELETS, PHERESIS: HCPCS

## 2023-11-18 PROCEDURE — 85027 COMPLETE CBC AUTOMATED: CPT | Mod: 91

## 2023-11-18 PROCEDURE — 700111 HCHG RX REV CODE 636 W/ 250 OVERRIDE (IP): Mod: JZ

## 2023-11-18 PROCEDURE — 93005 ELECTROCARDIOGRAM TRACING: CPT | Performed by: INTERNAL MEDICINE

## 2023-11-18 PROCEDURE — 93005 ELECTROCARDIOGRAM TRACING: CPT

## 2023-11-18 PROCEDURE — 700111 HCHG RX REV CODE 636 W/ 250 OVERRIDE (IP)

## 2023-11-18 PROCEDURE — 86923 COMPATIBILITY TEST ELECTRIC: CPT | Mod: 91

## 2023-11-18 PROCEDURE — 36415 COLL VENOUS BLD VENIPUNCTURE: CPT

## 2023-11-18 PROCEDURE — 36430 TRANSFUSION BLD/BLD COMPNT: CPT

## 2023-11-18 PROCEDURE — 83605 ASSAY OF LACTIC ACID: CPT | Mod: 91

## 2023-11-18 PROCEDURE — 96365 THER/PROPH/DIAG IV INF INIT: CPT

## 2023-11-18 PROCEDURE — 700105 HCHG RX REV CODE 258

## 2023-11-18 PROCEDURE — 770022 HCHG ROOM/CARE - ICU (200)

## 2023-11-18 PROCEDURE — 85384 FIBRINOGEN ACTIVITY: CPT

## 2023-11-18 PROCEDURE — 04L33DZ OCCLUSION OF HEPATIC ARTERY WITH INTRALUMINAL DEVICE, PERCUTANEOUS APPROACH: ICD-10-PCS | Performed by: RADIOLOGY

## 2023-11-18 PROCEDURE — 700117 HCHG RX CONTRAST REV CODE 255: Performed by: RADIOLOGY

## 2023-11-18 PROCEDURE — 83735 ASSAY OF MAGNESIUM: CPT

## 2023-11-18 PROCEDURE — 700117 HCHG RX CONTRAST REV CODE 255: Performed by: STUDENT IN AN ORGANIZED HEALTH CARE EDUCATION/TRAINING PROGRAM

## 2023-11-18 PROCEDURE — 700111 HCHG RX REV CODE 636 W/ 250 OVERRIDE (IP): Mod: JZ | Performed by: RADIOLOGY

## 2023-11-18 PROCEDURE — 71275 CT ANGIOGRAPHY CHEST: CPT

## 2023-11-18 PROCEDURE — A9270 NON-COVERED ITEM OR SERVICE: HCPCS

## 2023-11-18 PROCEDURE — C9113 INJ PANTOPRAZOLE SODIUM, VIA: HCPCS

## 2023-11-18 PROCEDURE — 80048 BASIC METABOLIC PNL TOTAL CA: CPT

## 2023-11-18 PROCEDURE — B4121ZZ FLUOROSCOPY OF HEPATIC ARTERY USING LOW OSMOLAR CONTRAST: ICD-10-PCS | Performed by: RADIOLOGY

## 2023-11-18 PROCEDURE — C9113 INJ PANTOPRAZOLE SODIUM, VIA: HCPCS | Performed by: INTERNAL MEDICINE

## 2023-11-18 PROCEDURE — 30233K1 TRANSFUSION OF NONAUTOLOGOUS FROZEN PLASMA INTO PERIPHERAL VEIN, PERCUTANEOUS APPROACH: ICD-10-PCS | Performed by: STUDENT IN AN ORGANIZED HEALTH CARE EDUCATION/TRAINING PROGRAM

## 2023-11-18 PROCEDURE — 96375 TX/PRO/DX INJ NEW DRUG ADDON: CPT

## 2023-11-18 PROCEDURE — 85576 BLOOD PLATELET AGGREGATION: CPT

## 2023-11-18 RX ORDER — SODIUM CHLORIDE 9 MG/ML
500 INJECTION, SOLUTION INTRAVENOUS
Status: DISCONTINUED | OUTPATIENT
Start: 2023-11-18 | End: 2023-11-18 | Stop reason: HOSPADM

## 2023-11-18 RX ORDER — ONDANSETRON 2 MG/ML
4 INJECTION INTRAMUSCULAR; INTRAVENOUS PRN
Status: DISCONTINUED | OUTPATIENT
Start: 2023-11-18 | End: 2023-11-18 | Stop reason: HOSPADM

## 2023-11-18 RX ORDER — OXYCODONE HYDROCHLORIDE 5 MG/1
5-10 TABLET ORAL
Status: DISCONTINUED | OUTPATIENT
Start: 2023-11-18 | End: 2023-11-18

## 2023-11-18 RX ORDER — CALCIUM CHLORIDE 100 MG/ML
1 INJECTION INTRAVENOUS; INTRAVENTRICULAR ONCE
Status: COMPLETED | OUTPATIENT
Start: 2023-11-18 | End: 2023-11-18

## 2023-11-18 RX ORDER — HYDROMORPHONE HYDROCHLORIDE 1 MG/ML
1-2 INJECTION, SOLUTION INTRAMUSCULAR; INTRAVENOUS; SUBCUTANEOUS
Status: DISCONTINUED | OUTPATIENT
Start: 2023-11-18 | End: 2023-11-18

## 2023-11-18 RX ORDER — ONDANSETRON 2 MG/ML
4 INJECTION INTRAMUSCULAR; INTRAVENOUS EVERY 4 HOURS PRN
Status: DISCONTINUED | OUTPATIENT
Start: 2023-11-18 | End: 2023-11-22 | Stop reason: HOSPADM

## 2023-11-18 RX ORDER — SODIUM CHLORIDE 9 MG/ML
INJECTION, SOLUTION INTRAVENOUS CONTINUOUS
Status: ACTIVE | OUTPATIENT
Start: 2023-11-18 | End: 2023-11-18

## 2023-11-18 RX ORDER — SUCRALFATE ORAL 1 G/10ML
1 SUSPENSION ORAL EVERY 6 HOURS
Status: DISCONTINUED | OUTPATIENT
Start: 2023-11-18 | End: 2023-11-22 | Stop reason: HOSPADM

## 2023-11-18 RX ORDER — HYDROMORPHONE HYDROCHLORIDE 1 MG/ML
1 INJECTION, SOLUTION INTRAMUSCULAR; INTRAVENOUS; SUBCUTANEOUS ONCE
Status: DISCONTINUED | OUTPATIENT
Start: 2023-11-18 | End: 2023-11-18

## 2023-11-18 RX ORDER — MIDAZOLAM HYDROCHLORIDE 1 MG/ML
.5-2 INJECTION INTRAMUSCULAR; INTRAVENOUS PRN
Status: DISCONTINUED | OUTPATIENT
Start: 2023-11-18 | End: 2023-11-18 | Stop reason: HOSPADM

## 2023-11-18 RX ORDER — HYDROMORPHONE HYDROCHLORIDE 1 MG/ML
0.5 INJECTION, SOLUTION INTRAMUSCULAR; INTRAVENOUS; SUBCUTANEOUS EVERY 4 HOURS PRN
Status: DISCONTINUED | OUTPATIENT
Start: 2023-11-18 | End: 2023-11-18

## 2023-11-18 RX ORDER — HYDROMORPHONE HYDROCHLORIDE 1 MG/ML
.5-2 INJECTION, SOLUTION INTRAMUSCULAR; INTRAVENOUS; SUBCUTANEOUS
Status: DISCONTINUED | OUTPATIENT
Start: 2023-11-18 | End: 2023-11-18

## 2023-11-18 RX ORDER — LOSARTAN POTASSIUM 50 MG/1
25 TABLET ORAL DAILY
Status: DISCONTINUED | OUTPATIENT
Start: 2023-11-18 | End: 2023-11-18

## 2023-11-18 RX ORDER — OXYCODONE HYDROCHLORIDE 5 MG/1
5 TABLET ORAL
Status: DISCONTINUED | OUTPATIENT
Start: 2023-11-18 | End: 2023-11-18

## 2023-11-18 RX ORDER — CALCIUM GLUCONATE 20 MG/ML
2 INJECTION, SOLUTION INTRAVENOUS ONCE
Status: DISCONTINUED | OUTPATIENT
Start: 2023-11-18 | End: 2023-11-18

## 2023-11-18 RX ORDER — PANTOPRAZOLE SODIUM 40 MG/10ML
40 INJECTION, POWDER, LYOPHILIZED, FOR SOLUTION INTRAVENOUS 2 TIMES DAILY
Status: DISCONTINUED | OUTPATIENT
Start: 2023-11-18 | End: 2023-11-18

## 2023-11-18 RX ORDER — MIDAZOLAM HYDROCHLORIDE 1 MG/ML
INJECTION INTRAMUSCULAR; INTRAVENOUS
Status: COMPLETED
Start: 2023-11-18 | End: 2023-11-18

## 2023-11-18 RX ORDER — MAGNESIUM SULFATE HEPTAHYDRATE 40 MG/ML
2 INJECTION, SOLUTION INTRAVENOUS ONCE
Status: COMPLETED | OUTPATIENT
Start: 2023-11-18 | End: 2023-11-18

## 2023-11-18 RX ORDER — SODIUM CHLORIDE, SODIUM LACTATE, POTASSIUM CHLORIDE, AND CALCIUM CHLORIDE .6; .31; .03; .02 G/100ML; G/100ML; G/100ML; G/100ML
1000 INJECTION, SOLUTION INTRAVENOUS ONCE
Status: COMPLETED | OUTPATIENT
Start: 2023-11-18 | End: 2023-11-18

## 2023-11-18 RX ADMIN — HYDROMORPHONE HYDROCHLORIDE 0.5 MG: 1 INJECTION, SOLUTION INTRAMUSCULAR; INTRAVENOUS; SUBCUTANEOUS at 03:07

## 2023-11-18 RX ADMIN — HYDROMORPHONE HYDROCHLORIDE 1 MG: 1 INJECTION, SOLUTION INTRAMUSCULAR; INTRAVENOUS; SUBCUTANEOUS at 07:56

## 2023-11-18 RX ADMIN — SUCRALFATE ORAL 1 G: 1 SUSPENSION ORAL at 17:03

## 2023-11-18 RX ADMIN — SUCRALFATE ORAL 1 G: 1 SUSPENSION ORAL at 12:21

## 2023-11-18 RX ADMIN — PANTOPRAZOLE SODIUM 8 MG/HR: 40 INJECTION, POWDER, FOR SOLUTION INTRAVENOUS at 06:45

## 2023-11-18 RX ADMIN — MIDAZOLAM HYDROCHLORIDE 2 MG: 1 INJECTION INTRAMUSCULAR; INTRAVENOUS at 09:39

## 2023-11-18 RX ADMIN — Medication: at 21:03

## 2023-11-18 RX ADMIN — FENTANYL CITRATE 50 MCG: 50 INJECTION, SOLUTION INTRAMUSCULAR; INTRAVENOUS at 09:39

## 2023-11-18 RX ADMIN — SUCRALFATE ORAL 1 G: 1 SUSPENSION ORAL at 00:45

## 2023-11-18 RX ADMIN — OXYCODONE 5 MG: 5 TABLET ORAL at 12:20

## 2023-11-18 RX ADMIN — HYDROMORPHONE HYDROCHLORIDE 2 MG: 1 INJECTION, SOLUTION INTRAMUSCULAR; INTRAVENOUS; SUBCUTANEOUS at 16:21

## 2023-11-18 RX ADMIN — HYDROMORPHONE HYDROCHLORIDE 2 MG: 1 INJECTION, SOLUTION INTRAMUSCULAR; INTRAVENOUS; SUBCUTANEOUS at 19:09

## 2023-11-18 RX ADMIN — HYDROMORPHONE HYDROCHLORIDE 1 MG: 1 INJECTION, SOLUTION INTRAMUSCULAR; INTRAVENOUS; SUBCUTANEOUS at 10:37

## 2023-11-18 RX ADMIN — IOHEXOL 75 ML: 300 INJECTION, SOLUTION INTRAVENOUS at 10:45

## 2023-11-18 RX ADMIN — ONDANSETRON 4 MG: 2 INJECTION INTRAMUSCULAR; INTRAVENOUS at 00:28

## 2023-11-18 RX ADMIN — IOHEXOL 95 ML: 350 INJECTION, SOLUTION INTRAVENOUS at 05:45

## 2023-11-18 RX ADMIN — LIDOCAINE HYDROCHLORIDE 30 ML: 20 SOLUTION OROPHARYNGEAL at 01:43

## 2023-11-18 RX ADMIN — HYDROMORPHONE HYDROCHLORIDE 1 MG: 1 INJECTION, SOLUTION INTRAMUSCULAR; INTRAVENOUS; SUBCUTANEOUS at 05:00

## 2023-11-18 RX ADMIN — PANTOPRAZOLE SODIUM 40 MG: 40 INJECTION, POWDER, FOR SOLUTION INTRAVENOUS at 03:45

## 2023-11-18 RX ADMIN — MAGNESIUM SULFATE HEPTAHYDRATE 2 G: 2 INJECTION, SOLUTION INTRAVENOUS at 08:25

## 2023-11-18 RX ADMIN — CALCIUM CHLORIDE 1 G: 100 INJECTION INTRAVENOUS; INTRAVENTRICULAR at 07:02

## 2023-11-18 RX ADMIN — HYDROMORPHONE HYDROCHLORIDE 1 MG: 1 INJECTION, SOLUTION INTRAMUSCULAR; INTRAVENOUS; SUBCUTANEOUS at 06:41

## 2023-11-18 RX ADMIN — HYDROMORPHONE HYDROCHLORIDE 2 MG: 1 INJECTION, SOLUTION INTRAMUSCULAR; INTRAVENOUS; SUBCUTANEOUS at 17:40

## 2023-11-18 RX ADMIN — FENTANYL CITRATE 50 MCG: 50 INJECTION, SOLUTION INTRAMUSCULAR; INTRAVENOUS at 09:44

## 2023-11-18 RX ADMIN — SUCRALFATE ORAL 1 G: 1 SUSPENSION ORAL at 23:20

## 2023-11-18 RX ADMIN — HYDROMORPHONE HYDROCHLORIDE 2 MG: 1 INJECTION, SOLUTION INTRAMUSCULAR; INTRAVENOUS; SUBCUTANEOUS at 13:31

## 2023-11-18 RX ADMIN — SODIUM CHLORIDE, POTASSIUM CHLORIDE, SODIUM LACTATE AND CALCIUM CHLORIDE 1000 ML: 600; 310; 30; 20 INJECTION, SOLUTION INTRAVENOUS at 01:48

## 2023-11-18 RX ADMIN — PANTOPRAZOLE SODIUM 8 MG/HR: 40 INJECTION, POWDER, FOR SOLUTION INTRAVENOUS at 17:02

## 2023-11-18 RX ADMIN — PANTOPRAZOLE SODIUM 8 MG/HR: 40 INJECTION, POWDER, FOR SOLUTION INTRAVENOUS at 00:08

## 2023-11-18 RX ADMIN — MIDAZOLAM HYDROCHLORIDE 2 MG: 1 INJECTION, SOLUTION INTRAMUSCULAR; INTRAVENOUS at 09:39

## 2023-11-18 RX ADMIN — HYDROMORPHONE HYDROCHLORIDE 2 MG: 1 INJECTION, SOLUTION INTRAMUSCULAR; INTRAVENOUS; SUBCUTANEOUS at 14:38

## 2023-11-18 ASSESSMENT — PAIN DESCRIPTION - PAIN TYPE
TYPE: ACUTE PAIN

## 2023-11-18 ASSESSMENT — COGNITIVE AND FUNCTIONAL STATUS - GENERAL
WALKING IN HOSPITAL ROOM: A LITTLE
CLIMB 3 TO 5 STEPS WITH RAILING: A LITTLE
SUGGESTED CMS G CODE MODIFIER MOBILITY: CJ
SUGGESTED CMS G CODE MODIFIER DAILY ACTIVITY: CH
DAILY ACTIVITIY SCORE: 24
MOBILITY SCORE: 22

## 2023-11-18 ASSESSMENT — LIFESTYLE VARIABLES
AVERAGE NUMBER OF DAYS PER WEEK YOU HAVE A DRINK CONTAINING ALCOHOL: 0
HOW MANY TIMES IN THE PAST YEAR HAVE YOU HAD 5 OR MORE DRINKS IN A DAY: 0
TOTAL SCORE: 0
HAVE PEOPLE ANNOYED YOU BY CRITICIZING YOUR DRINKING: NO
ALCOHOL_USE: NO
HAVE YOU EVER FELT YOU SHOULD CUT DOWN ON YOUR DRINKING: NO
TOTAL SCORE: 0
EVER FELT BAD OR GUILTY ABOUT YOUR DRINKING: NO
TOTAL SCORE: 0
DOES PATIENT WANT TO STOP DRINKING: NO
EVER HAD A DRINK FIRST THING IN THE MORNING TO STEADY YOUR NERVES TO GET RID OF A HANGOVER: NO
ON A TYPICAL DAY WHEN YOU DRINK ALCOHOL HOW MANY DRINKS DO YOU HAVE: 0
CONSUMPTION TOTAL: NEGATIVE

## 2023-11-18 ASSESSMENT — FIBROSIS 4 INDEX
FIB4 SCORE: 0.53
FIB4 SCORE: 0.53
FIB4 SCORE: 0.5
FIB4 SCORE: 0.5

## 2023-11-18 NOTE — ED NOTES
Med rec complete per patient  Allergies reviewed.   Patient denies any outpatient antibiotics in the last 30 days.   Anticoagulants taken in the last 14 days? No    Dayanara Montenegro CPhT

## 2023-11-18 NOTE — PROGRESS NOTES
Pt back to T924.  Monitored, RA.  AAOx4.  Right femoral access site c/d/I.  No s/s of hematoma.  Right pedal pulse +2, normal.   n/a

## 2023-11-18 NOTE — ASSESSMENT & PLAN NOTE
Healed in 3/2023. Previous biopsies H. Pylori negative.   CTA abd negative  11/20-keep PPI, Hb stable, advance diet slowly, carafate  11/19 start clear liquid diet  11/18 had GDA embolization by IR

## 2023-11-18 NOTE — PROGRESS NOTES
0430 pt arrives with RRT RN and Dr. Murray.   Red box brought to pt room, verbal order from Dr. Murray to transfuse 2 PRBCs, 2 FFP, and 1 platelet. These products were transfused and the patient was transported to CT.     Blood that was collected and sent at 0445 was drawn before all of these blood products were transfused.     Pts wife was updated by MD.

## 2023-11-18 NOTE — ED NOTES
Pt is A&Ox4 and awake in bed. Pt placed on cardiac monitor, pulse ox, and automatic BP. VSS, saturating above 95% on RA, ST in the 100s. Call light within reach and chart up for ERP.

## 2023-11-18 NOTE — CARE PLAN
The patient is Unstable - High likelihood or risk of patient condition declining or worsening    Shift Goals  Clinical Goals: Pain Management, Monitoring Labs, Monitoring H + H, Blood Transfusion, Monitoring Intake and Output  Patient Goals: Pain Management, Comfort  Family Goals: JJ    Progress made toward(s) clinical / shift goals:    Problem: Pain - Standard  Goal: Alleviation of pain or a reduction in pain to the patient’s comfort goal  11/18/2023 0454 by Evelia Martins R.N.  Outcome: Progressing  Flowsheets (Taken 11/18/2023 0454)  Pain Rating Scale (NPRS): 10  Note: Pt educated about pharmacologic and non-pharmacologic measures for pain management.Pt complained of 10/10 abdominal pain. Pt received 0.5mg Dilaudid, but pain did not decrease. Provider aware and pt transferred to TICU.   11/18/2023 0453 by Evelia Martins R.N.  Outcome: Progressing     Problem: Knowledge Deficit - Standard  Goal: Patient and family/care givers will demonstrate understanding of plan of care, disease process/condition, diagnostic tests and medications  Outcome: Progressing  Note: Pt educated about the disease process, course of treatment, medications to be administered, diagnostic tests.      Problem: Fall Risk  Goal: Patient will remain free from falls  Outcome: Progressing  Note: Fall risk precautions in place, call light within reach, bed in lowest position, upper bed rails raised, bed alarm activated.      Problem: Risk for Fluid Volume Deficit Related to Bleeding  Goal: Fluid volume balance will be maintained  Outcome: Progressing  Note: Pt's intake and output monitored, Pt assessed for signs and symptoms of bleeding. Pt bleeding excessively. He has had 4 bowel movements which have been very bloody and black.      Problem: Risk for Fluid Volume Deficit Related to Bleeding  Goal: Patient will show no signs and symptoms of excessive bleeding  Outcome: Progressing  Flowsheets (Taken 11/18/2023 0454)  Stool Description:    Large   Black   Bloody  Number of Times Stooled: 4  Last BM: 11/18/23  Note: The pt has had x 4 large black and bloody bowel movements. Provider notified.

## 2023-11-18 NOTE — ED NOTES
Bedside report received from off going RN/tech: Amanda assumed care of patient.  POC discussed with patient. Call light within reach, all needs addressed at this time.       Fall risk interventions in place: Patient's personal possessions are with in their safe reach, Place fall risk sign on patient's door, Give patient urinal if applicable, Keep floor surfaces clean and dry, and Accompanied to restroom (all applicable per [place] Fall risk assessment)   Continuous monitoring: Cardiac Leads, Pulse Ox, or Blood Pressure  IVF/IV medications: Not Applicable   Oxygen: Room Air  Bedside sitter: Not Applicable   Isolation: Not Applicable

## 2023-11-18 NOTE — PROGRESS NOTES
..4 Eyes Skin Assessment Completed by KARSON Altamirano and KARSON Ho.    Head WDLPale  Ears WDLPale  Nose WDLPale  Mouth WDLPale  Neck WDLPale  Breast/Chest WDLPale  Shoulder Blades WDLPale  Spine WDLPale  (R) Arm/Elbow/Hand WDLPale  (L) Arm/Elbow/Hand WDLPale  Abdomen WDLPale  Groin WDLPale  Scrotum/Coccyx/Buttocks WDLPale  (R) Leg WDLPale  (L) Leg WDLPale  (R) Heel/Foot/Toe WDLPale  (L) Heel/Foot/Toe WDLPale          Devices In Places Tele Box      Interventions In Place Pillows    Possible Skin Injury No    Pictures Uploaded Into Epic N/A  Wound Consult Placed N/A  RN Wound Prevention Protocol Ordered No

## 2023-11-18 NOTE — HOSPITAL COURSE
Mr. Braswell is a 43 year old male with the past medical history significant for severe mitral regurgitation with for heart catheterization and mitral clip in the next week, and a known nonhealing duodenal ulcer with last EG on 9/14/2023 and in July who presented to the ER on 11/17/2023 with complaints of hematochezia, melena, and lightheadedness.  The patient was initially admitted to the medical/telemetry floor; however, had hematemesis and signs of hemorrhagic shock.  He was emergently transferred to the ICU for massive transfusion protocol.

## 2023-11-18 NOTE — ASSESSMENT & PLAN NOTE
Unclear why such large quantities of IV opiates are needed for his pain  Repeat 2-view abdominal xray personally reviewed by me shows no free air and otherwise normal, some stool  Admission CT scan with minimal findings, vitals are ok  I suspect his N/V is from severe constipation  Greatly reduce IV dilaudid today  Encourage oral oxycodone PRN and oral APAP, defer NSAIDs given ulcerations  Encourage mobility and aggressive bowel regimen  No prior opiate dependence per patient, but I suspect some element of pain medication seeking  See GI ulcer

## 2023-11-18 NOTE — ED NOTES
Bedside report to KARSON Bethea. All questions answered. Pt awake and breathing with saturating above 90% on RA at time of report.

## 2023-11-18 NOTE — ASSESSMENT & PLAN NOTE
Known poorly healing duodenal ulcer  S/p Massive transfusion protocol: 4 units PRBCs, 4 FFP, 1 unit platelets, calcium chloride  TEG reviewed, no coagulopathy  Stat CTA-->no active bleeding  IR consulted-->s/p GDA coil embolization on 11/18  Continue Protonix infusion  Follow Hb/Hct

## 2023-11-18 NOTE — ED PROVIDER NOTES
ED Provider Note    Scribed for Burton Knox by Aniya Brown. 11/17/2023  9:56 PM    Primary care provider: Sal Schafer D.O.  Means of arrival: EMS  History obtained from: Patient  History limited by: None    CHIEF COMPLAINT  Chief Complaint   Patient presents with    Throwing Up Blood     Pt was walking, became extremely nauseous and dizzy, and started throwing up bright red blood.     Abdominal Pain     Generalized abdominal pain x2 hours.      EXTERNAL RECORDS REVIEWED  Other The patient has history of a recurrent GI bleed secondary to duodenal ulcer that is not healing. Mitral valve regergitation and the patient is scheduled for surgical intervention with cardiology. Further chart review shows that the patient was admitted on the 13th for 3 days for abdominal pain and a bleeding ulcer. Repeated EDG which showed an incompletely healed ulcer. Negative h pylori test.      HPI/ROS  LIMITATION TO HISTORY   Select: : None  OUTSIDE HISTORIAN(S):  Significant other  at bedside to confirm sequence of events and collateral information provided.     HPI  Roberto Braswell is a 43 y.o. male who presents to the Emergency Department for evaluation of hematemesis onset 7:30 this afternoon. The patient's girlfriend reports that the patient has had multiple episodes of hematemesis. The patient notes that he felt fine earlier today and that this came on suddenly. The girlfriend notes that the patient is currently regurgitating 116. She also states that he has a cath scheduled on Tuesday and then plans to meet with the surgeon to schedule the surgery. The patient states he also has dizziness and generalized abdominal pain but denies any dark tarry stools. The patient's girlfriend mentions that the patient has been getting dizzy when he stands up. She also notes that the patient has been having more frequent bowel movements today. The patient has history of blood transfusions and cholecystectomy.   "    REVIEW OF SYSTEMS  As above, all other systems reviewed and are negative.   See HPI for further details.     PAST MEDICAL HISTORY   has a past medical history of Arthritis, Breath shortness (11/03/2023), Drug-seeking behavior (07/30/2023), Duodenal ulcer (07/30/2023), Gastric ulcer, Heart valve disease, Hypertension, and Pain.    SURGICAL HISTORY   has a past surgical history that includes other orthopedic surgery; other abdominal surgery; ankle arthroscopy (Right, 5/21/2018); biopsy ortho (Right, 5/21/2018); upper gi endoscopy,diagnosis (3/28/2022); upper gi endoscopy,biopsy (3/28/2022); upper gi endoscopy,biopsy (N/A, 7/30/2023); upper gi endoscopy,diagnosis (N/A, 9/14/2023); and upper gi endoscopy,biopsy (N/A, 9/14/2023).    SOCIAL HISTORY  Social History     Tobacco Use    Smoking status: Never    Smokeless tobacco: Never   Vaping Use    Vaping Use: Never used   Substance Use Topics    Alcohol use: Not Currently    Drug use: Never      Social History     Substance and Sexual Activity   Drug Use Never     FAMILY HISTORY  No family history pertinent.    CURRENT MEDICATIONS  Home Medications    **Home medications have not yet been reviewed for this encounter**       ALLERGIES  Allergies   Allergen Reactions    Morphine Vomiting    Tramadol Unspecified     Seizure  PQT=2202       PHYSICAL EXAM    VITAL SIGNS:   Vitals:    11/17/23 2137 11/17/23 2139   BP:  132/73   Pulse:  (!) 102   Resp:  (!) 22   Temp:  36.5 °C (97.7 °F)   TempSrc:  Temporal   SpO2:  99%   Weight: 109 kg (240 lb)    Height: 1.905 m (6' 3\")      Vitals: My interpretation: normotensive, tachycardic, afebrile, not hypoxic    Reinterpretation of vitals: Improving with pain medication    PE:   Gen: sitting comfortably, speaking clearly, appears to be grimacing in pain. Not comfortable appearing.   ENT: Mucous membranes moist, posterior pharynx clear, uvula midline, nares patent bilaterally   Neck: Supple, FROM  Pulmonary: Lungs are clear to " auscultation bilaterally. No tachypnea  CV:  tachycardic, no murmur appreciated, pulses 2+ in both upper and lower extremities  Abdomen: Generalized tenderness throughout the belly. Non surgical, soft. ND; no rebound/guarding  : no CVA or suprapubic tenderness   Neuro: A&Ox4 (person, place, time, situation), speech fluent, gait steady, no focal deficits appreciated  Skin: No rash or lesions.  No pallor or jaundice.  No cyanosis.  Warm and dry.     DIAGNOSTIC STUDIES / PROCEDURES    LABS  Results for orders placed or performed during the hospital encounter of 11/17/23   COD (ADULT)   Result Value Ref Range    ABO Grouping Only A     Rh Grouping Only POS     Antibody Screen-Cod NEG    CBC WITH DIFFERENTIAL   Result Value Ref Range    WBC 6.8 4.8 - 10.8 K/uL    RBC 3.17 (L) 4.70 - 6.10 M/uL    Hemoglobin 7.9 (L) 14.0 - 18.0 g/dL    Hematocrit 25.2 (L) 42.0 - 52.0 %    MCV 79.5 (L) 81.4 - 97.8 fL    MCH 24.9 (L) 27.0 - 33.0 pg    MCHC 31.3 (L) 32.3 - 36.5 g/dL    RDW 58.4 (H) 35.9 - 50.0 fL    Platelet Count 266 164 - 446 K/uL    MPV 9.4 9.0 - 12.9 fL    Neutrophils-Polys 57.00 44.00 - 72.00 %    Lymphocytes 33.00 22.00 - 41.00 %    Monocytes 8.60 0.00 - 13.40 %    Eosinophils 0.40 0.00 - 6.90 %    Basophils 0.60 0.00 - 1.80 %    Immature Granulocytes 0.40 0.00 - 0.90 %    Nucleated RBC 0.00 0.00 - 0.20 /100 WBC    Neutrophils (Absolute) 3.84 1.82 - 7.42 K/uL    Lymphs (Absolute) 2.23 1.00 - 4.80 K/uL    Monos (Absolute) 0.58 0.00 - 0.85 K/uL    Eos (Absolute) 0.03 0.00 - 0.51 K/uL    Baso (Absolute) 0.04 0.00 - 0.12 K/uL    Immature Granulocytes (abs) 0.03 0.00 - 0.11 K/uL    NRBC (Absolute) 0.00 K/uL   COMP METABOLIC PANEL   Result Value Ref Range    Sodium 141 135 - 145 mmol/L    Potassium 4.0 3.6 - 5.5 mmol/L    Chloride 111 96 - 112 mmol/L    Co2 17 (L) 20 - 33 mmol/L    Anion Gap 13.0 7.0 - 16.0    Glucose 109 (H) 65 - 99 mg/dL    Bun 33 (H) 8 - 22 mg/dL    Creatinine 1.36 0.50 - 1.40 mg/dL    Calcium 7.8 (L)  8.5 - 10.5 mg/dL    Correct Calcium 8.3 (L) 8.5 - 10.5 mg/dL    AST(SGOT) 12 12 - 45 U/L    ALT(SGPT) 13 2 - 50 U/L    Alkaline Phosphatase 35 30 - 99 U/L    Total Bilirubin 0.2 0.1 - 1.5 mg/dL    Albumin 3.4 3.2 - 4.9 g/dL    Total Protein 5.0 (L) 6.0 - 8.2 g/dL    Globulin 1.6 (L) 1.9 - 3.5 g/dL    A-G Ratio 2.1 g/dL   LIPASE   Result Value Ref Range    Lipase 24 11 - 82 U/L   PROTHROMBIN TIME   Result Value Ref Range    PT 16.6 (H) 12.0 - 14.6 sec    INR 1.32 (H) 0.87 - 1.13   APTT   Result Value Ref Range    APTT 26.4 24.7 - 36.0 sec   ESTIMATED GFR   Result Value Ref Range    GFR (CKD-EPI) 66 >60 mL/min/1.73 m 2      All labs reviewed by me. Labs were compared to prior labs if they were available. Significant for no leukocytosis, severe anemia with a hemoglobin of 7.9 and active bleeding, electrolytes show bicarb of 17 concerning for dehydration but otherwise are normal, normal glucose, normal renal function, normal liver enzymes, normal bilirubin, lipase normal, PT and INR are essentially normal.    RADIOLOGY  I have independently interpreted the diagnostic imaging associated with this visit and am waiting the final reading from the radiologist.   My preliminary interpretation is a follows: no signs of widened mediastinum. No infiltrates. No sign of cardiomegaly.   Radiologist interpretation is as follows:  DX-CHEST-PORTABLE (1 VIEW)   Final Result      No acute cardiac or pulmonary abnormalities are identified.        COURSE & MEDICAL DECISION MAKING  Nursing notes, VS, PMSFHx, labs, imaging, EKG reviewed in chart.    Ddx: GI bleeding, gastritis, diverticulitis    MDM: 9:56 PM Roberto Braswell is a 43 y.o. male who presented with acute, severe hematemesis.  Patient has a longstanding history of known duodenal ulcer.  He has undergone multiple EGDs in the past.  He had negative H. pylori study done not long ago.  Had been well controlled since the last effort by GI to cauterize his ulcer.  However tonight he  had sudden onset of epigastric pain and nausea and vomiting of coffee-ground emesis, x6.  Wife of samples at bedside to show me melanotic emesis.  Wife provides helpful collateral formation regarding patient's presentation.  Upon arrival patient is writhing in pain, appears uncomfortable and was given Dilaudid as he is allergic to morphine.  He already received 8 mg of Zofran by EMS in route.  EMS did provide helpful collateral information about patient's vitals and presenting symptoms when they picked them up.  Abdomen and has some mild upper quadrant tenderness but nonsurgical, soft and no rebound or guarding noted.  Vital signs show tachycardia likely secondary to pain but thankfully patient is hemodynamically stable.  Initiated work-up here in the ED including laboratory evaluation and chest x-ray.  Patient has required multiple blood transfusions in the past for persistent hematemesis with acute blood loss anemia.  All labs reviewed by me. Labs were compared to prior labs if they were available. Significant for no leukocytosis, severe anemia with a hemoglobin of 7.9 and active bleeding, electrolytes show bicarb of 17 concerning for dehydration but otherwise are normal, normal glucose, normal renal function, normal liver enzymes, normal bilirubin, lipase normal, PT and INR are essentially normal.  Chest x-ray my independent or potation shows no complications such as Boerhaave syndrome and is otherwise negative, radiologist agrees.  Ultimately as patient's hemoglobin is 7.9 he is still having active bleeding here in the emergency department passing melanotic stools now, he will be transfused 2 units of packed red blood cells and admitted to the hospitalist team for emergent GI consultation and likely repeat EGD.  Patient and wife at bedside verbalized understand plan to admit.  Patient's repeat abdominal exam is improved and appears more comfortable after pain medications administered.  Verbalized understanding  plan for admission.  1 L of IV fluids was administered for patient's dehydration with a bicarb of 17 and is sinus tachycardia    CRITICAL CARE TIME 38 minutes: Acute blood loss anemia requiring blood transfusion secondary to GI bleeding, and admission for emergent EGD  There was a very real possibility of deterioration of the patient's condition.  This patient required the highest level of care.  I provided critical care services which included: review of the medical record, treatment orders, ordering and reviewing test results, frequent reevaluation of the patient's condition and response to treatment, as well as discussing the case with appropriate personnel and various consultants. The critical care time associated with the care of this patient is exclusive of any procedures or specific interventions.     ADDITIONAL PROBLEM LIST AND DISPOSITION    I have discussed management of the patient with the following physicians and CARTER's: Hospitalist    Discussion of management with other [facility] or appropriate source(s): None     FINAL IMPRESSION  1. Hematemesis with nausea Acute   2. Epigastric pain Acute   3. Sinus tachycardia Acute   4. Lightheadedness Acute   5. Gastrointestinal hemorrhage associated with duodenal ulcer Acute   6. Acute blood loss anemia Acute   7. Melanotic stools Acute      Aniya MORENO (Scribe), am scribing for, and in the presence of, Burton Knox.    Electronically signed by: Aniya Brown (Ladi), 11/17/2023    IBurton personally performed the services described in this documentation, as scribed by Aniya Brown in my presence, and it is both accurate and complete.    The note accurately reflects work and decisions made by me.  Burton Knox  11/17/2023  10:44 PM

## 2023-11-18 NOTE — ED NOTES
Lab called with critical result of Hemoglobin at 7.9. Critical lab result read back to lab.   Dr. Knox notified of critical lab result at 2221.  Critical lab result read back by Dr. Knox.

## 2023-11-18 NOTE — PROGRESS NOTES
Spoke with Dr. Murray at change of shift. Verbal order to give the rest of red box. Pt still having active bloody bowel movements. Verified and administered 2 units PRBCs and 2 units FFP. Plan for pt to go to IR.

## 2023-11-18 NOTE — ED TRIAGE NOTES
"Chief Complaint   Patient presents with    Throwing Up Blood     Pt was walking, became extremely nauseous and dizzy, and started throwing up bright red blood.     Abdominal Pain     Generalized abdominal pain x2 hours.      Pt BIB EMS with above complaints. Pt has history of gastric ulcers, requiring transfusing. Pt arrives pale and diaphoretic. EMS administered 300 fentanyl, 8 zofran, and 1L NS.     /73   Pulse (!) 102   Temp 36.5 °C (97.7 °F) (Temporal)   Resp (!) 22   Ht 1.905 m (6' 3\")   Wt 109 kg (240 lb)   SpO2 99%   BMI 30.00 kg/m²     "

## 2023-11-18 NOTE — PROGRESS NOTES
Pt transferred safely to the TICU. Pt is A&O x4. He has been experiencing severe 10/10 abdominal pain and he had 4 large black and bloody bowel movements. Provider notified. Administered 0.5 mg Dilaudid, but pain did not resolve.

## 2023-11-18 NOTE — H&P
"History & Physical Note    Date of Admission: 11/18/2023  Admission Status: Admit to tele  [facility] Team:   Attending: Kolton Schafer  Senior Resident: Dr. Estevez  Contact Number: [phone]    Chief Complaint: Hematemesis     History of Present Illness (HPI):     Roberto Braswell is a 43 y.o. male who presented 11/17/2023 with a PMHx of severe MVP with regurgitation h/o duodenal ulcer, mitral valve disease who presents with hematemesis with nausea melena, hematocheiza, abdominal pain, dizziness and lightheadedness today at 7PM with multiple episodes of \"coffee ground\" emesis and melanic stools with one episode of BM with melanic stool which occurred just prior to patient encounter. Patient has a h/o bleeding duodenal ulcers (biopsies H. Pylori-negative) and 4-5 admissions for UGIB bleed for the last four years.  Patient has a h/o posterior mitral valve prolapse and suspected mitral valve disease which is currently being evaluated by cardiology.  Scheduled for cath planning for mitral clip on 10/22/2023.    In ED, VSS with exception , RR 22. Hgb 7.9 (previous 10.4; baseline 14-15).  AG 13. Bicarb 17.  BUN 33. Cr 1.36.  Ca 7.8.  Cedric Ca 8.3.  In ED, patient was given 1 mg hydromorphone ONCE x 2.  80 mg IV pantoprazole.  Sucralfate. 1L NS.      Received called from nursing around 0200 noting that patient's abdominal pain had worsened. Performed serial abdominal exams and noted patient had developed peritoneal sx.  He had only been given 2 units pRBCs even though 3-4 had been ordered.  Hgb 10.4-->7.9-->6.4 after one unit.  Patient's BM had changed from melanic to bright red blood concerning for brisk bleeding.  Consulted ICU doc.  Massive transfusion protocl initiated.  IR consulted.  CTA negative.      Review of Systems:   Review of Systems   Constitutional:  Negative for chills and fever.   HENT:  Negative for ear discharge and hearing loss.    Eyes:  Negative for blurred vision.   Respiratory:  Negative for cough " "and hemoptysis.    Cardiovascular:  Negative for chest pain, palpitations and leg swelling.   Gastrointestinal:  Positive for blood in stool, melena, nausea and vomiting (hematemesis).   Genitourinary:  Negative for dysuria and urgency.   Musculoskeletal:  Negative for myalgias and neck pain.   Skin:  Negative for rash.   Neurological:  Positive for dizziness. Negative for headaches.   Endo/Heme/Allergies:  Negative for environmental allergies. Does not bruise/bleed easily.   Psychiatric/Behavioral:  Negative for depression and suicidal ideas.        Past Medical History:   Past Medical History was reviewed with patient.   has a past medical history of Arthritis, Breath shortness (11/03/2023), Drug-seeking behavior (07/30/2023), Duodenal ulcer (07/30/2023), Gastric ulcer, Heart valve disease, Hypertension, and Pain.    Past Surgical History: Past Surgical History was reviewed with patient. Cholecystectomy.   has a past surgical history that includes other orthopedic surgery; other abdominal surgery; ankle arthroscopy (Right, 5/21/2018); biopsy ortho (Right, 5/21/2018); pr upper gi endoscopy,diagnosis (3/28/2022); pr upper gi endoscopy,biopsy (3/28/2022); pr upper gi endoscopy,biopsy (N/A, 7/30/2023); pr upper gi endoscopy,diagnosis (N/A, 9/14/2023); and pr upper gi endoscopy,biopsy (N/A, 9/14/2023).    Medications: Medications have been reviewed with patient.  Prior to Admission Medications   Prescriptions Last Dose Informant Patient Reported? Taking?   acetaminophen (TYLENOL) 325 MG Tab  Patient No No   Sig: Take 2 Tablets by mouth every 6 hours as needed for Moderate Pain.   doxylamine (UNISOM) 25 MG Tab tablet  Patient Yes No   Sig: Take 25-50 mg by mouth at bedtime. \"Santacruz brand sleep aid\"   Patient not taking: Reported on 10/24/2023   hyoscyamine-mag hydrox-al hydrox-simeth-lidocaine  Patient No No   Sig: Take 15ml by mouth every 6 hours as needed (abdominal pain or cramping).   Patient not taking: Reported " on 10/24/2023   losartan (COZAAR) 25 MG Tab  Patient No No   Sig: Take 1 Tablet by mouth every day.   pantoprazole (PROTONIX) 40 MG Tablet Delayed Response  Patient No No   Sig: Take 1 Tablet by mouth 2 times a day.   sucralfate (CARAFATE) 1 GM/10ML Suspension  Patient No No   Sig: Take 10 mL by mouth every 6 hours.      Facility-Administered Medications: None        Allergies: Allergies have been reviewed with patient.  Allergies   Allergen Reactions    Morphine Vomiting    Tramadol Unspecified     Seizure  CPW=0411       Family History:     Dad  at 42 years ago from OD.  Grandfather had CAD with stents, CABG, T2DM.  Mother is currently in her 60s.  Had a stroke in her 40s.       Social History:   Tobacco: Never   Alcohol: Socially but not anymore   Recreational drugs (illegal and prescription):  Marijuana, cocaine, crystal meth, or any other street drug.   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   Activity Level: No restrictions.    Living situation:  [place], [place] with wife.    Recent travel:    Primary Care Provider: reviewed Sal Schafer D.O.  Other (stressors, spirituality, exposures):      Physical Exam:  Vitals:  Temp:  [36.5 °C (97.7 °F)-37.1 °C (98.8 °F)] 36.9 °C (98.4 °F)  Pulse:  [] 83  Resp:  [18-50] 26  BP: (100-156)/(56-88) 131/60  SpO2:  [96 %-100 %] 100 %    Physical Exam  Constitutional:       Appearance: He is ill-appearing and diaphoretic.   HENT:      Head: Normocephalic and atraumatic.   Eyes:      General: No scleral icterus.     Extraocular Movements: Extraocular movements intact.      Pupils: Pupils are equal, round, and reactive to light.   Cardiovascular:      Rate and Rhythm: Tachycardia present.   Abdominal:      Tenderness: There is abdominal tenderness. There is guarding (involuntary) and rebound.   Musculoskeletal:         General: No swelling. Normal range of motion.      Cervical back: Normal range of motion. No rigidity.   Skin:     General: Skin is warm.       Capillary Refill: Capillary refill takes more than 3 seconds.      Coloration: Skin is not jaundiced.   Neurological:      Mental Status: He is oriented to person, place, and time.      Cranial Nerves: No cranial nerve deficit.      Sensory: No sensory deficit.      Motor: No weakness.      Coordination: Coordination normal.   Psychiatric:         Mood and Affect: Mood normal.         Behavior: Behavior normal.         Labs:   Lab Results   Component Value Date/Time    WBC 6.5 11/18/2023 04:45 AM    RBC 2.79 (L) 11/18/2023 04:45 AM    HEMOGLOBIN 7.0 (L) 11/18/2023 04:45 AM    HEMATOCRIT 22.9 (L) 11/18/2023 04:45 AM    MCV 82.1 11/18/2023 04:45 AM    MCH 25.1 (L) 11/18/2023 04:45 AM    MCHC 30.6 (L) 11/18/2023 04:45 AM    MPV 9.3 11/18/2023 04:45 AM    NEUTSPOLYS 57.00 11/17/2023 09:39 PM    LYMPHOCYTES 33.00 11/17/2023 09:39 PM    MONOCYTES 8.60 11/17/2023 09:39 PM    EOSINOPHILS 0.40 11/17/2023 09:39 PM    BASOPHILS 0.60 11/17/2023 09:39 PM    HYPOCHROMIA 1+ 09/14/2023 05:17 AM    ANISOCYTOSIS 1+ 10/09/2023 09:33 AM       Lab Results   Component Value Date/Time    SODIUM 140 11/18/2023 02:17 AM    POTASSIUM 4.4 11/18/2023 02:17 AM    CHLORIDE 115 (H) 11/18/2023 02:17 AM    CO2 17 (L) 11/18/2023 02:17 AM    GLUCOSE 114 (H) 11/18/2023 02:17 AM    BUN 32 (H) 11/18/2023 02:17 AM    CREATININE 1.13 11/18/2023 02:17 AM       Lab Results   Component Value Date/Time    PROTHROMBTM 16.6 (H) 11/17/2023 09:39 PM    INR 1.32 (H) 11/17/2023 09:39 PM             Imaging:   CT-CTA COMPLETE THORACOABDOMINAL AORTA   Final Result   Addendum (preliminary) 1 of 1   Additional history provided of potential gastrointestinal bleeding and    duodenal abnormality.      In review of the scan, there is no evidence of active gastrointestinal    bleeding. There is no contrast extravasation. Also, there is no high    density fluid within the bowel to suggest recent bleeding.      In regards to the duodenum, there is very subtle fat stranding  "adjacent to    the proximal second portion of the duodenum. Therefore, ulceration or    duodenitis is possible. Recommend endoscopy if not previously performed.      Final      Normal exam. No acute process seen.         DX-CHEST-PORTABLE (1 VIEW)   Final Result      No acute cardiac or pulmonary abnormalities are identified.      IR-CONSULT AND TREAT    (Results Pending)       Previous Data Review: reviewed    Problem Representation:    Roberto Braswell is a 43 y.o. male who admitted on 11/17/2023 with UGIB suspected to be due to erosion of known non-healing duodenal ulcer.  Patient had initially been admitted to [facility], but sx worsened requiring transfer to ICU to perform massive transfusion protocol.      * Upper GI bleed- (present on admission)  Assessment & Plan  Gastric and/or duodenal ulcer. Previous EGD in 3/2023 had suggested healing.  However, patient had presented with   - transfer to ICU  - pulse ox  - massive transfusion protocol per ICU doc ( additional 2 units PRBCs, 2 FFP, 1 unit platelets, calcium chloride)  - TEG showed no coagulopathy  - IR consulted  - discussed case with GI who recommend general surgery consult  - pantoprazole drip  - transfusion parameters set at Hbg<7  - CTA abd/pelvis negative    Abdominal pain  Assessment & Plan  Received call from RN about abdominal pain around 0142.  Patient had been given carafate.  GI cocktail given.  On recheck, patient now had involuntary guarding in the epigastrium.  Unable to perform deep palpation secondary to pain.  - 0.5 mg IV hydromorphone Q4H PRN  - ICU consult  - General surgery consult    Hematemesis- (present on admission)  Assessment & Plan  - please refer to \"upper GI bleed\"    Acute blood loss anemia- (present on admission)  Assessment & Plan  Suspected source: duodenum  - iron panel  - ferritin  - H&H Q4H   - pRBCs x 2 units total  - GI recommended general surgery consult  - ICU consulted  - massive transfusion protocol  - CT A&P to " determine urgency of general surgery consult  - pantoprazole drip    Normal anion gap metabolic acidosis  Assessment & Plan  *2/2 loose stools.    Hypertension  Assessment & Plan  - continue home antihypertensives    Duodenal ulcer  Assessment & Plan  Healed in 3/2023. GI consulted.  Previous biopsies H. Pylori negative.  Possible source of perforation.  - CTA negative  - pantoprazole drip    Mitral regurgitation- (present on admission)  Assessment & Plan  - followed by surgery as outpatient consult        Code Status: FULL  Diet: NPO with meds  Lines: pIV  DVT ppx: SCDs only  GI ppx: Protonix drip  Disposition: Admit to T

## 2023-11-18 NOTE — ASSESSMENT & PLAN NOTE
Due to upper GI bleeding  S/p 4 units pRBCs, 4 units FFP, and 1 platelet  Serial Hb/Hct, stable  S/p GDA embolization

## 2023-11-18 NOTE — OR SURGEON
Immediate Post- Operative Note        Findings: Faint extravasation from duodenal branch of GDA      Procedure(s): Coil Embolization of GDA      Estimated Blood Loss: Less than 5 ml        Complications: None            11/18/2023     10:13 AM     Savage Ortiz M.D.

## 2023-11-18 NOTE — PROGRESS NOTES
Contacted blood bank about the remaining product in the red box. Plan communicated to me by Dr. Murray was that the patient would be going to IR. The timer on the red box not running, Verónica from blood bank said the cooler just needs to be returned to them by 0800. Plan to send remaining product with patient if he leaves before 0800.

## 2023-11-18 NOTE — PROGRESS NOTES
Patient admitted before midnight by Dr. Estevez and myself.     Approximately 0200 visited patient at [facility] to reassess  Had 1 episode of melena here in room, first since arrival.  On abdominal exam he now has rebounding, increased tenderness, complaining of increased pain, abdomen is soft, not rigid.  Prompted concern for possible perforation, I ordered stat CTA of the abdomen pelvis at 0214.  Notified resident and nursing, requested to call CT to have patient prioritized.  At this time patient had received one of the 2 units PRBC that were ordered on admission.  Requested nursing run the second as fast as possible, and to call blood bank for 1 more unit at bedside, orders placed.    0340 Returned to reassess patient.  H&H returned at 6.5 after 1 unit PRBC.  Second unit had just finished running at this time.  CTA pending.  Patient now reports he has had hematochezia.  Immediately consulted GI, ICU and gen surg.  I have ordered massive transfusion protocol stat.    Discussed with Dr. Murray in ICU, agrees with massive transfusion protocol and CTA, will transfer patient to ICU.  Dr. Oliver recommend general surgery consult, currently have paged out to general surgery.     Patient heading to CT    Called blood bank to have them prepare red box stat.  Patient transferred to T924, I arrived to room and red box had arrived, started transfusion.  Dr. Murray at bedside as well, discussing with surgery.  We discussed plan of care and had warm handoff, patient under care of intensivist at this time, much appreciated.    I called patient's wife Destin and gave her an update.    Patient is critically ill.   The patient continues to have: GI bleed  The vital organ system that is affected is the: Gastrointestinal, circulatory  If untreated there is a high chance of deterioration into: Hemorrhagic shock, cardiopulmonary collapse, exsanguination  And eventually death.   The critical care that I am providing today is: IV Protonix,  massive transfusion of blood products, frequent reassessments, consulting specialist including ICU, GI, general surgery  The critical that has been undertaken is medically complex.   There has been no overlap in critical care time.   Critical Care Time not including procedures: 55 minutes

## 2023-11-18 NOTE — CARE PLAN
The patient is Watcher - Medium risk of patient condition declining or worsening    Shift Goals  Clinical Goals: IR for embolization; pain management  Patient Goals: control pain; stop bleed  Family Goals: Updates    Progress made toward(s) clinical / shift goals:    Problem: Pain - Standard  Goal: Alleviation of pain or a reduction in pain to the patient’s comfort goal  Outcome: Progressing     Problem: Skin Integrity  Goal: Skin integrity is maintained or improved  Outcome: Progressing     Problem: Fall Risk  Goal: Patient will remain free from falls  Outcome: Progressing     Problem: Risk for Fluid Volume Deficit Related to Bleeding  Goal: Fluid volume balance will be maintained  Outcome: Progressing     Problem: Risk for Bleeding  Goal: Patient will take measures to prevent bleeding and recognizes signs of bleeding that need to be reported immediately to a health care professional  Outcome: Progressing       Patient is not progressing towards the following goals:      Problem: Risk for Fluid Volume Deficit Related to Bleeding  Goal: Patient will show no signs and symptoms of excessive bleeding  Outcome: Not Progressing     Problem: Risk for Bleeding  Goal: Patient will not experience bleeding as evidenced by normal blood pressure, stable hematocrit and hemoglobin levels and desired ranges for coagulation profiles  Outcome: Not Progressing

## 2023-11-18 NOTE — PROGRESS NOTES
Pt presents to IR 2. Pt was consented by MD at bedside, confirmed by this RN and consent at bedside. Patient underwent a visceral angiogram with possible embolization of duodenal ulcer by Dr. Ortiz. Site marked and initialed by MD prior to procedure starting and verified by patient and procedure team. Pedal pulses prior to case Right +2, marked and Left +2, marked. Patient was placed in a supine position. Right femoral artery was accessed. Vitals were taken every 5 minutes and remained stable during procedure (see doc flow sheet for results). CO2 waveform capnography was monitored and remained WNL throughout procedure. Angioseal was used to achieve hemostasis. Report called to KARSON Fernández. Pt transported via gurney with RN to T924 in a stable condition.    Angioseal 6 Fr  REF: [id number]  LOT: [id number]  EXP: 2024-06-12    [employer] POD 5 Coil  REF: [id number]  LOT: [id number]  EXP: 2031-06-26    [employer] Embold Fibered Coil  REF: [id number]  LOT: [id number]  EXP: 2026-07-24

## 2023-11-18 NOTE — PROGRESS NOTES
Critical Care Progress Note    Date of admission  11/17/2023    Chief Complaint  43 y.o. male admitted 11/17/2023 with acute upper GI bleed with hematemesis and melena with known duodenal ulcer    Hospital Course  Mr. Braswell is a 43 year old male with the past medical history significant for severe mitral regurgitation with for heart catheterization and mitral clip in the next week, and a known nonhealing duodenal ulcer with last EG on 9/14/2023 and in July who presented to the ER on 11/17/2023 with complaints of hematochezia, melena, and lightheadedness.  The patient was initially admitted to the medical/telemetry floor; however, had hematemesis and signs of hemorrhagic shock.  He was emergently transferred to the ICU for massive transfusion protocol.    Interval Problem Update  Reviewed last 24 hour events:  - s/p 2 units pRBCs, 2 units FFP, and 1 platelet  - discussed case with IR and plan for embolization  - GI aware  - a/ox4  - SR/ST 70-120s  - -150s  - Tmax 98.8  - NPO  - adequate UOP since arrival  - melena  - no RT issues  - CTA abd without active extravasation  - SCDs  - PPI gtt  - carafate    Review of Systems  Review of Systems   Unable to perform ROS: Critical illness        Vital Signs for last 24 hours   Temp:  [36.5 °C (97.7 °F)-37.1 °C (98.8 °F)] 37.1 °C (98.8 °F)  Pulse:  [] 77  Resp:  [18-50] 31  BP: (100-152)/(58-88) 115/58  SpO2:  [96 %-100 %] 99 %    Hemodynamic parameters for last 24 hours       Respiratory Information for the last 24 hours       Physical Exam   Physical Exam  Vitals and nursing note reviewed.   Constitutional:       General: He is not in acute distress.     Appearance: He is ill-appearing. He is not toxic-appearing.   HENT:      Head: Normocephalic and atraumatic.      Right Ear: External ear normal.      Left Ear: External ear normal.      Nose: Nose normal. No rhinorrhea.      Mouth/Throat:      Mouth: Mucous membranes are moist.      Pharynx: Oropharynx is  clear. No oropharyngeal exudate.   Eyes:      General: No scleral icterus.     Conjunctiva/sclera: Conjunctivae normal.      Pupils: Pupils are equal, round, and reactive to light.   Cardiovascular:      Rate and Rhythm: Normal rate and regular rhythm.      Pulses: Normal pulses.      Heart sounds: Normal heart sounds. No murmur heard.  Pulmonary:      Breath sounds: Normal breath sounds. No wheezing.   Chest:      Chest wall: No tenderness.   Abdominal:      Palpations: Abdomen is soft.      Tenderness: There is no abdominal tenderness. There is no guarding or rebound.   Musculoskeletal:         General: Normal range of motion.      Cervical back: Normal range of motion and neck supple.      Right lower leg: No edema.      Left lower leg: No edema.   Lymphadenopathy:      Cervical: No cervical adenopathy.   Skin:     General: Skin is warm and dry.      Capillary Refill: Capillary refill takes less than 2 seconds.      Coloration: Skin is not pale.      Findings: No rash.   Neurological:      Mental Status: He is alert and oriented to person, place, and time.      Cranial Nerves: No cranial nerve deficit.      Sensory: No sensory deficit.      Motor: No weakness.   Psychiatric:         Mood and Affect: Mood normal.         Behavior: Behavior normal.         Thought Content: Thought content normal.         Medications  Current Facility-Administered Medications   Medication Dose Route Frequency Provider Last Rate Last Admin    ondansetron (Zofran) syringe/vial injection 4 mg  4 mg Intravenous Q4HRS PRN Burton Estevez M.D.   4 mg at 11/18/23 0028    sucralfate (Carafate) 1 GM/10ML suspension 1 g  1 g Oral Q6HRS Burotn Estevez M.D.   1 g at 11/18/23 0045    GI Cocktail (hyoscyamine-lidocaine-Maalox) oral susp cup 30 mL  30 mL Oral Q4HRS PRN Burton Estevez M.D.   30 mL at 11/18/23 0143    NS infusion   Intravenous Continuous Kolton Schafer M.D.        calcium CHLORIDE 10 % (Emergency Push Dose Syringe) injection 1  g  1 g Intravenous Once Darien Murray M.D.        HYDROmorphone (Dilaudid) injection 0.5-2 mg  0.5-2 mg Intravenous Q HOUR PRN Darien Murray M.D.   1 mg at 11/18/23 0641    pantoprazole (Protonix) 80 mg in  mL infusion  8 mg/hr Intravenous Continuous Darien Murray M.D. 25 mL/hr at 11/18/23 0645 8 mg/hr at 11/18/23 0645    magnesium sulfate IVPB premix 2 g  2 g Intravenous Once Darien Murray M.D.        MD Alert...ICU Electrolyte Replacement per Pharmacy   Other PHARMACY TO DOSE Darien Murray M.D.        senna-docusate (Pericolace Or Senokot S) 8.6-50 MG per tablet 2 Tablet  2 Tablet Oral BID Burton Estevez M.D.        And    polyethylene glycol/lytes (Miralax) PACKET 1 Packet  1 Packet Oral QDAY PRN Burton Estevez M.D.        And    magnesium hydroxide (Milk Of Magnesia) suspension 30 mL  30 mL Oral QDAY PRN Burton Estevez M.D.        And    bisacodyl (Dulcolax) suppository 10 mg  10 mg Rectal QDAY PRN Burton Estevez M.D.           Fluids    Intake/Output Summary (Last 24 hours) at 11/18/2023 0648  Last data filed at 11/18/2023 0600  Gross per 24 hour   Intake 1765.9 ml   Output 0 ml   Net 1765.9 ml       Laboratory          Recent Labs     11/16/23  1407 11/17/23 2139 11/18/23 0217   SODIUM 140 141 140   POTASSIUM 3.7 4.0 4.4   CHLORIDE 109 111 115*   CO2 22 17* 17*   BUN 19 33* 32*   CREATININE 1.05 1.36 1.13   MAGNESIUM  --   --  1.9   CALCIUM 8.4* 7.8* 7.2*     Recent Labs     11/16/23  1407 11/17/23 2139 11/18/23 0217   ALTSGPT 11 13 10   ASTSGOT 13 12 10*   ALKPHOSPHAT 40 35 27*   TBILIRUBIN 0.2 0.2 0.5   LIPASE  --  24  --    GLUCOSE 112* 109* 114*     Recent Labs     11/16/23  1407 11/17/23 2139 11/18/23  0217 11/18/23  0445   WBC 4.8 6.8  --  6.5   NEUTSPOLYS  --  57.00  --   --    LYMPHOCYTES  --  33.00  --   --    MONOCYTES  --  8.60  --   --    EOSINOPHILS  --  0.40  --   --    BASOPHILS  --  0.60  --   --    ASTSGOT 13 12 10*  --    ALTSGPT 11 13 10  --    ALKPHOSPHAT 40 35 27*   --    TBILIRUBIN 0.2 0.2 0.5  --      Recent Labs     11/16/23  1407 11/17/23  2139 11/18/23  0313 11/18/23  0445   RBC 4.31* 3.17*  --  2.79*   HEMOGLOBIN 10.4* 7.9* 6.5* 7.0*   HEMATOCRIT 34.0* 25.2* 21.1* 22.9*   PLATELETCT 302 266  --  224   PROTHROMBTM 14.7* 16.6*  --   --    APTT 27.3 26.4  --   --    INR 1.13 1.32*  --   --    IRON  --  25*  --   --    FERRITIN  --  14.2*  --   --    TOTIRONBC  --  252  --   --        Imaging  CTA thoracoabdominal aorta:  Normal exam. No acute process seen.     Assessment/Plan  * Upper GI bleed- (present on admission)  Assessment & Plan  Known poorly healing duodenal ulcer  Acutely bleeding with hematemesis and ongoing hematochezia  Massive transfusion protocol: 4 units PRBCs, 4 FFP, 1 unit platelets, calcium chloride  TEG reviewed, no coagulopathy  Stat CTA-->no active bleeding  IR consulted-->s/p GDA coil embolization  Continue Protonix infusion  Trend H&H    Hypertension  Assessment & Plan  Holding home BP medications while actively bleeding    Duodenal ulcer  Assessment & Plan  Known poorly healing DU as above  GI consulted  Continue PPI, sucralfate  S/p GDA embolization     Mitral regurgitation- (present on admission)  Assessment & Plan  Severe MR  No history of rheumatic heart disease or drug use by report  Scheduled for Cath Lab and mitral valve clip next week  Consider inpatient cardiology consultation to facilitate  Monitor for volume overload/pulmonary edema with massive transfusion protocol resuscitation for GI bleed.    Acute blood loss anemia- (present on admission)  Assessment & Plan  Due to upper GI bleeding  S/p 4 units pRBCs, 4 units FFP, and 1 platelet  Serial Hb/Hct  S/p GDA embolization           VTE:  Contraindicated  Ulcer: PPI  Lines:  2 18G IVs    I have performed a physical exam and reviewed and updated ROS and Plan today (11/18/2023). In review of yesterday's note (11/17/2023), there are no changes except as documented above.     Discussed patient  condition and risk of morbidity and/or mortality with RN, RT, Pharmacy, Charge nurse / hot rounds, and Patient    The patient remains critically ill requiring massive transfusion protocol and continuous hemodynamic monitoring prior to and after undergoing GDA embolization.  I have assessed and reassessed the patient's hemodynamics, cardiovascular status, and laboratories.  The patient remains at high risk of clinical deterioration, worsening vital organ dysfunction, and death without the above critical care interventions.    Additional critical care time to Dr. Murray earlier today = 65 minutes in directly providing and coordinating critical care and extensive data review.  No time overlap and excludes procedures.

## 2023-11-18 NOTE — ASSESSMENT & PLAN NOTE
Followed by surgery as outpatient consult  Unclear etiology, severe in nature, volume status is ok

## 2023-11-18 NOTE — CONSULTS
Critical Care Consultation    Date of consult: 11/18/2023    Referring Physician  Kolton Schafer M.D.    Reason for Consultation  Acute upper GIB with hematemesis and melena, known DU    History of Presenting Illness  43 y.o. male, PMH severe MR with plan for catheterization and mitral clip next week, known nonhealing duodenal ulcer, EGD in July and again 9/14/2023 incompletely healed DU with some oozing, who presented 11/17/2023 with acute onset hematochezia melena dizziness and lightheadedness.  Initial hemoglobin 7.9, dropped to 6.5.  Patient was initially admitted to [facility] telemetry, developed ongoing hematochezia and tachycardia.  Transferred to ICU emergently for further resuscitation with MTP and plan for urgent CTA and embolization.    Code Status  Full Code    Review of Systems  Review of Systems   Unable to perform ROS: Acuity of condition       Past Medical History  Past Medical History:   Diagnosis Date    Arthritis     Right ankle    Breath shortness 11/03/2023    With exertion.    Drug-seeking behavior 07/30/2023    Demanding narcotics for a duodenal ulcer July 2023. Refused antacids or sucralfate.     Duodenal ulcer 07/30/2023    Gastric ulcer     Heart valve disease     Mitral valve prolapse    Hypertension     Pain     Resolved; Right ankle       Surgical History   has a past surgical history that includes other orthopedic surgery; other abdominal surgery; ankle arthroscopy (Right, 5/21/2018); biopsy ortho (Right, 5/21/2018); pr upper gi endoscopy,diagnosis (3/28/2022); pr upper gi endoscopy,biopsy (3/28/2022); pr upper gi endoscopy,biopsy (N/A, 7/30/2023); pr upper gi endoscopy,diagnosis (N/A, 9/14/2023); and pr upper gi endoscopy,biopsy (N/A, 9/14/2023).    Family History  family history is not on file.    Social History   reports that he has never smoked. He has never used smokeless tobacco. He reports that he does not currently use alcohol. He reports that he does not use  drugs.    Medications  Home Medications       Reviewed by Evelia Martins R.N. (Registered Nurse) on 11/18/23 at 0149  Med List Status: Complete     Medication Last Dose Status   doxylamine (UNISOM) 25 MG Tab tablet PRN Active   hyoscyamine-mag hydrox-al hydrox-simeth-lidocaine 11/17/2023 Active   losartan (COZAAR) 25 MG Tab 11/17/2023 Active   pantoprazole (PROTONIX) 40 MG Tablet Delayed Response FEW DAYS AGO Active   sucralfate (CARAFATE) 1 GM/10ML Suspension 11/17/2023 Active                  Current Facility-Administered Medications   Medication Dose Route Frequency Provider Last Rate Last Admin    ondansetron (Zofran) syringe/vial injection 4 mg  4 mg Intravenous Q4HRS PRN Burton Estevez M.D.   4 mg at 11/18/23 0028    sucralfate (Carafate) 1 GM/10ML suspension 1 g  1 g Oral Q6HRS Burton Estevez M.D.   1 g at 11/18/23 0045    losartan (Cozaar) tablet 25 mg  25 mg Oral DAILY Burton Estevez M.D.        GI Cocktail (hyoscyamine-lidocaine-Maalox) oral susp cup 30 mL  30 mL Oral Q4HRS PRN Burton Estevez M.D.   30 mL at 11/18/23 0143    HYDROmorphone (Dilaudid) injection 0.5 mg  0.5 mg Intravenous Q4HRS PRN Burton Estevez M.D.   0.5 mg at 11/18/23 0307    pantoprazole (Protonix) injection 40 mg  40 mg Intravenous BID Burton Estevez M.D.   40 mg at 11/18/23 0345    NS infusion   Intravenous Continuous Kolton Schafer M.D.        calcium CHLORIDE 10 % (Emergency Push Dose Syringe) injection 1 g  1 g Intravenous Once Darien Murray M.D.        HYDROmorphone (Dilaudid) injection 1 mg  1 mg Intravenous Once Darien Murray M.D.        [COMPLETED] iohexol (OMNIPAQUE) 350 mg/mL (IV)  95 mL Intravenous Once Kolton Schafer M.D.   95 mL at 11/18/23 0545    senna-docusate (Pericolace Or Senokot S) 8.6-50 MG per tablet 2 Tablet  2 Tablet Oral BID Burton Estevez M.D.        And    polyethylene glycol/lytes (Miralax) PACKET 1 Packet  1 Packet Oral QDAY PRN Burton Estevez M.D.        And    magnesium hydroxide  (Milk Of Magnesia) suspension 30 mL  30 mL Oral QDAY PRN Burton Estevez M.D.        And    bisacodyl (Dulcolax) suppository 10 mg  10 mg Rectal QDAY PRN Burton Estevez M.D.           Allergies  Allergies   Allergen Reactions    Morphine Vomiting    Tramadol Unspecified     Seizure  [id number]       Vital Signs last 24 hours  Temp:  [36.5 °C (97.7 °F)-37.1 °C (98.8 °F)] 36.6 °C (97.8 °F)  Pulse:  [] 86  Resp:  [18-22] 18  BP: (100-137)/(59-78) 100/63  SpO2:  [96 %-99 %] 98 %    Physical Exam  Physical Exam  Vitals and nursing note reviewed.   Constitutional:       General: He is in acute distress.      Appearance: He is ill-appearing.      Comments: Pale, Moaning in pain, hematemesis   HENT:      Mouth/Throat:      Mouth: Mucous membranes are moist.   Eyes:      Pupils: Pupils are equal, round, and reactive to light.   Cardiovascular:      Rate and Rhythm: Regular rhythm. Tachycardia present.      Pulses: Normal pulses.   Pulmonary:      Effort: Pulmonary effort is normal. No respiratory distress.      Breath sounds: No wheezing.   Abdominal:      General: There is distension.      Tenderness: There is abdominal tenderness.   Musculoskeletal:         General: No swelling.      Cervical back: Neck supple.   Skin:     General: Skin is dry.      Comments: Cool, dry, pale   Neurological:      General: No focal deficit present.      Mental Status: He is oriented to person, place, and time.      Cranial Nerves: No cranial nerve deficit.         Fluids    Intake/Output Summary (Last 24 hours) at 11/18/2023 0522  Last data filed at 11/18/2023 0300  Gross per 24 hour   Intake 1765.9 ml   Output 0 ml   Net 1765.9 ml       Laboratory  Recent Results (from the past 48 hour(s))   CBC WITHOUT DIFFERENTIAL    Collection Time: 11/16/23  2:07 PM   Result Value Ref Range    WBC 4.8 4.8 - 10.8 K/uL    RBC 4.31 (L) 4.70 - 6.10 M/uL    Hemoglobin 10.4 (L) 14.0 - 18.0 g/dL    Hematocrit 34.0 (L) 42.0 - 52.0 %    MCV 78.9 (L) 81.4 -  97.8 fL    MCH 24.1 (L) 27.0 - 33.0 pg    MCHC 30.6 (L) 32.3 - 36.5 g/dL    RDW 57.4 (H) 35.9 - 50.0 fL    Platelet Count 302 164 - 446 K/uL    MPV 9.2 9.0 - 12.9 fL   Comp Metabolic Panel    Collection Time: 23  2:07 PM   Result Value Ref Range    Sodium 140 135 - 145 mmol/L    Potassium 3.7 3.6 - 5.5 mmol/L    Chloride 109 96 - 112 mmol/L    Co2 22 20 - 33 mmol/L    Anion Gap 9.0 7.0 - 16.0    Glucose 112 (H) 65 - 99 mg/dL    Bun 19 8 - 22 mg/dL    Creatinine 1.05 0.50 - 1.40 mg/dL    Calcium 8.4 (L) 8.5 - 10.5 mg/dL    Correct Calcium 8.2 (L) 8.5 - 10.5 mg/dL    AST(SGOT) 13 12 - 45 U/L    ALT(SGPT) 11 2 - 50 U/L    Alkaline Phosphatase 40 30 - 99 U/L    Total Bilirubin 0.2 0.1 - 1.5 mg/dL    Albumin 4.2 3.2 - 4.9 g/dL    Total Protein 6.3 6.0 - 8.2 g/dL    Globulin 2.1 1.9 - 3.5 g/dL    A-G Ratio 2.0 g/dL   PT    Collection Time: 23  2:07 PM   Result Value Ref Range    PT 14.7 (H) 12.0 - 14.6 sec    INR 1.13 0.87 - 1.13   APTT    Collection Time: 23  2:07 PM   Result Value Ref Range    APTT 27.3 24.7 - 36.0 sec   ESTIMATED GFR    Collection Time: 23  2:07 PM   Result Value Ref Range    GFR (CKD-EPI) 91 >60 mL/min/1.73 m 2   EKG    Collection Time: 23  2:09 PM   Result Value Ref Range    Report       [facility]    Test Date:  2023  Pt Name:    PATI MERCEDES           Department: [facility]  MRN:        [id number]                      Room:  Gender:     Male                         Technician: LYDIA  :        1980                   Requested By:MATT ZURITA  Order #:    [id number]                    Reading MD: Pati Pantoja MD    Measurements  Intervals                                Axis  Rate:       59                           P:          56  NJ:         166                          QRS:        4  QRSD:       100                          T:          -24  QT:         391  QTc:        388    Interpretive Statements  Slow sinus arrhythmia  Borderline T  abnormalities, inferior leads  Compared to ECG 09/09/2023 21:43:53  NO SIGNIFICANT CHANGES  Electronically Signed On 11- 21:37:34 PST by Roberto Pantoja MD     COD (ADULT)    Collection Time: 11/17/23  9:34 PM   Result Value Ref Range    ABO Grouping Only A     Rh Grouping Only POS     Antibody Screen-Cod NEG     Component R       R66                 Red Blood Cells6    [id number]   transfused   11/17/23   23:01      Product Type Red Blood Cells  LR Pheresis     Dispense Status transfused     Unit Number (Barcoded) [id number]     Product Code (Barcoded) P5687B86     Blood Type (Barcoded) 6200     Component R       R99                 Red Cells, LR       [id number]   issued       11/18/23   02:18      Product Type R99     Dispense Status issued     Unit Number (Barcoded) [id number]     Product Code (Barcoded) R3886I15     Blood Type (Barcoded) 6200    MASSIVE TRANSFUSION    Collection Time: 11/17/23  9:34 PM   Result Value Ref Range    Component R       R99                 Red Cells, LR       [id number]   issued       11/18/23   04:30      Product Type R99     Dispense Status issued     Unit Number (Barcoded) [id number]     Product Code (Barcoded) W2895U25     Blood Type (Barcoded) 5100     Component R       R99                 Red Cells, LR       [id number]   issued       11/18/23   04:30      Product Type R99     Dispense Status issued     Unit Number (Barcoded) [id number]     Product Code (Barcoded) V1791O28     Blood Type (Barcoded) 5100     Component R       R99                 Red Cells, LR       [id number]   issued       11/18/23   04:30      Product Type R99     Dispense Status issued     Unit Number (Barcoded) [id number]     Product Code (Barcoded) C6415W09     Blood Type (Barcoded) 5100     Component R       R3.                 Red Blood Cells     [id number]   issued       11/18/23   04:30      Product Type Red Blood Cells  LR Pheresis     Dispense Status issued      Unit Number (Barcoded) [id number]     Product Code (Barcoded) E8187M81     Blood Type (Barcoded) 5100     Component P       P07                 Plts,Pheresis       [id number]   issued       11/18/23   04:36      Product Type Platelets  Pheresis LR     Dispense Status issued     Unit Number (Barcoded) [id number]     Product Code (Barcoded) O8173L00     Blood Type (Barcoded) 6200     Component F       LP                  LiquidPlasmaIRR     [id number]   issued       11/18/23   04:36      Product Type LP     Dispense Status issued     Unit Number (Barcoded) [id number]     Product Code (Barcoded) Z9753M57     Blood Type (Barcoded) 6200     Component F       LP                  LiquidPlasmaIRR     [id number]   issued       11/18/23   04:36      Product Type LP     Dispense Status issued     Unit Number (Barcoded) [id number]     Product Code (Barcoded) Q9995X89     Blood Type (Barcoded) 6200     Component F       LP                  LiquidPlasmaIRR     [id number]   issued       11/18/23   04:36      Product Type LP     Dispense Status issued     Unit Number (Barcoded) [id number]     Product Code (Barcoded) M9753D90     Blood Type (Barcoded) 6200     Component F       LP                  LiquidPlasmaIRR     [id number]   issued       11/18/23   04:36      Product Type LP     Dispense Status issued     Unit Number (Barcoded) [id number]     Product Code (Barcoded) W5886K68     Blood Type (Barcoded) 6200    CBC WITH DIFFERENTIAL    Collection Time: 11/17/23  9:39 PM   Result Value Ref Range    WBC 6.8 4.8 - 10.8 K/uL    RBC 3.17 (L) 4.70 - 6.10 M/uL    Hemoglobin 7.9 (L) 14.0 - 18.0 g/dL    Hematocrit 25.2 (L) 42.0 - 52.0 %    MCV 79.5 (L) 81.4 - 97.8 fL    MCH 24.9 (L) 27.0 - 33.0 pg    MCHC 31.3 (L) 32.3 - 36.5 g/dL    RDW 58.4 (H) 35.9 - 50.0 fL    Platelet Count 266 164 - 446 K/uL    MPV 9.4 9.0 - 12.9 fL    Neutrophils-Polys 57.00 44.00 - 72.00 %    Lymphocytes 33.00 22.00 - 41.00 %     Monocytes 8.60 0.00 - 13.40 %    Eosinophils 0.40 0.00 - 6.90 %    Basophils 0.60 0.00 - 1.80 %    Immature Granulocytes 0.40 0.00 - 0.90 %    Nucleated RBC 0.00 0.00 - 0.20 /100 WBC    Neutrophils (Absolute) 3.84 1.82 - 7.42 K/uL    Lymphs (Absolute) 2.23 1.00 - 4.80 K/uL    Monos (Absolute) 0.58 0.00 - 0.85 K/uL    Eos (Absolute) 0.03 0.00 - 0.51 K/uL    Baso (Absolute) 0.04 0.00 - 0.12 K/uL    Immature Granulocytes (abs) 0.03 0.00 - 0.11 K/uL    NRBC (Absolute) 0.00 K/uL   COMP METABOLIC PANEL    Collection Time: 11/17/23  9:39 PM   Result Value Ref Range    Sodium 141 135 - 145 mmol/L    Potassium 4.0 3.6 - 5.5 mmol/L    Chloride 111 96 - 112 mmol/L    Co2 17 (L) 20 - 33 mmol/L    Anion Gap 13.0 7.0 - 16.0    Glucose 109 (H) 65 - 99 mg/dL    Bun 33 (H) 8 - 22 mg/dL    Creatinine 1.36 0.50 - 1.40 mg/dL    Calcium 7.8 (L) 8.5 - 10.5 mg/dL    Correct Calcium 8.3 (L) 8.5 - 10.5 mg/dL    AST(SGOT) 12 12 - 45 U/L    ALT(SGPT) 13 2 - 50 U/L    Alkaline Phosphatase 35 30 - 99 U/L    Total Bilirubin 0.2 0.1 - 1.5 mg/dL    Albumin 3.4 3.2 - 4.9 g/dL    Total Protein 5.0 (L) 6.0 - 8.2 g/dL    Globulin 1.6 (L) 1.9 - 3.5 g/dL    A-G Ratio 2.1 g/dL   LIPASE    Collection Time: 11/17/23  9:39 PM   Result Value Ref Range    Lipase 24 11 - 82 U/L   PROTHROMBIN TIME    Collection Time: 11/17/23  9:39 PM   Result Value Ref Range    PT 16.6 (H) 12.0 - 14.6 sec    INR 1.32 (H) 0.87 - 1.13   APTT    Collection Time: 11/17/23  9:39 PM   Result Value Ref Range    APTT 26.4 24.7 - 36.0 sec   ESTIMATED GFR    Collection Time: 11/17/23  9:39 PM   Result Value Ref Range    GFR (CKD-EPI) 66 >60 mL/min/1.73 m 2   FERRITIN    Collection Time: 11/17/23  9:39 PM   Result Value Ref Range    Ferritin 14.2 (L) 22.0 - 322.0 ng/mL   IRON/TOTAL IRON BIND    Collection Time: 11/17/23  9:39 PM   Result Value Ref Range    Iron 25 (L) 50 - 180 ug/dL    Total Iron Binding 252 250 - 450 ug/dL    Unsat Iron Binding 227 110 - 370 ug/dL    % Saturation 10 (L)  15 - 55 %   EKG    Collection Time: 23 12:36 AM   Result Value Ref Range    Report       [facility]    Test Date:  2023  Pt Name:    PATI MERCEDES           Department: ER  MRN:        [id number]                      Room:        12  Gender:     Male                         Technician: [id number]  :        1980                   Requested By:BONY MILLER  Order #:    [id number]                    Reading MD:    Measurements  Intervals                                Axis  Rate:       90                           P:          64  DC:         150                          QRS:        16  QRSD:       79                           T:          -17  QT:         341  QTc:        418    Interpretive Statements  Sinus rhythm  Borderline T abnormalities, inferior leads  Compared to ECG 2023 14:09:38  Sinus arrhythmia no longer present  T-wave abnormality still present     Comp Metabolic Panel (CMP)    Collection Time: 23  2:17 AM   Result Value Ref Range    Sodium 140 135 - 145 mmol/L    Potassium 4.4 3.6 - 5.5 mmol/L    Chloride 115 (H) 96 - 112 mmol/L    Co2 17 (L) 20 - 33 mmol/L    Anion Gap 8.0 7.0 - 16.0    Glucose 114 (H) 65 - 99 mg/dL    Bun 32 (H) 8 - 22 mg/dL    Creatinine 1.13 0.50 - 1.40 mg/dL    Calcium 7.2 (L) 8.5 - 10.5 mg/dL    Correct Calcium 8.2 (L) 8.5 - 10.5 mg/dL    AST(SGOT) 10 (L) 12 - 45 U/L    ALT(SGPT) 10 2 - 50 U/L    Alkaline Phosphatase 27 (L) 30 - 99 U/L    Total Bilirubin 0.5 0.1 - 1.5 mg/dL    Albumin 2.7 (L) 3.2 - 4.9 g/dL    Total Protein 4.0 (L) 6.0 - 8.2 g/dL    Globulin 1.3 (L) 1.9 - 3.5 g/dL    A-G Ratio 2.1 g/dL   Magnesium    Collection Time: 23  2:17 AM   Result Value Ref Range    Magnesium 1.9 1.5 - 2.5 mg/dL   PLATELET MAPPING WITH BASIC TEG    Collection Time: 23  2:17 AM   Result Value Ref Range    Reaction Time Initial-R 3.7 (L) 4.6 - 9.1 min    React Time Initial Hep 3.9 (L) 4.3 - 8.3 min    Clot  Kinetics-K 1.3 0.8 - 2.1 min    Clot Angle-Angle 70.9 63.0 - 78.0 degrees    Maximum Clot Strength-MA 57.3 52.0 - 69.0 mm    TEG Functional Fibrinogen(MA) 16.4 15.0 - 32.0 mm    Lysis 30 minutes-LY30 0.0 0.0 - 2.6 %    % Inhibition ADP 32.0 (H) 0.0 - 17.0 %    % Inhibition AA 74.5 (H) 0.0 - 11.0 %    TEG Algorithm Link Algorithm    ESTIMATED GFR    Collection Time: 11/18/23  2:17 AM   Result Value Ref Range    GFR (CKD-EPI) 83 >60 mL/min/1.73 m 2   LACTIC ACID    Collection Time: 11/18/23  3:13 AM   Result Value Ref Range    Lactic Acid 1.1 0.5 - 2.0 mmol/L   HEMOGLOBIN AND HEMATOCRIT    Collection Time: 11/18/23  3:13 AM   Result Value Ref Range    Hemoglobin 6.5 (L) 14.0 - 18.0 g/dL    Hematocrit 21.1 (L) 42.0 - 52.0 %   LACTIC ACID    Collection Time: 11/18/23  4:45 AM   Result Value Ref Range    Lactic Acid 1.7 0.5 - 2.0 mmol/L   CBC WITHOUT DIFFERENTIAL    Collection Time: 11/18/23  4:45 AM   Result Value Ref Range    WBC 6.5 4.8 - 10.8 K/uL    RBC 2.79 (L) 4.70 - 6.10 M/uL    Hemoglobin 7.0 (L) 14.0 - 18.0 g/dL    Hematocrit 22.9 (L) 42.0 - 52.0 %    MCV 82.1 81.4 - 97.8 fL    MCH 25.1 (L) 27.0 - 33.0 pg    MCHC 30.6 (L) 32.3 - 36.5 g/dL    RDW 54.6 (H) 35.9 - 50.0 fL    Platelet Count 224 164 - 446 K/uL    MPV 9.3 9.0 - 12.9 fL       Imaging  CT-CTA COMPLETE THORACOABDOMINAL AORTA   Final Result   Addendum (preliminary) 1 of 1   Additional history provided of potential gastrointestinal bleeding and    duodenal abnormality.      In review of the scan, there is no evidence of active gastrointestinal    bleeding. There is no contrast extravasation. Also, there is no high    density fluid within the bowel to suggest recent bleeding.      In regards to the duodenum, there is very subtle fat stranding adjacent to    the proximal second portion of the duodenum. Therefore, ulceration or    duodenitis is possible. Recommend endoscopy if not previously performed.      Final      Normal exam. No acute process seen.          DX-CHEST-PORTABLE (1 VIEW)   Final Result      No acute cardiac or pulmonary abnormalities are identified.      IR-CONSULT AND TREAT    (Results Pending)       Assessment/Plan  Duodenal ulcer  Assessment & Plan  Known poorly healing DU as above  GI consulted, recommend embolization for more definitive therapy rather than repeat EGD  Continue PPI, sucralfate    Mitral regurgitation- (present on admission)  Assessment & Plan  Severe MR  No history of rheumatic heart disease or drug use by report  Scheduled for Cath Lab and mitral valve clip next week  Consider inpatient cardiology consultation to facilitate  Monitor for volume overload/pulmonary edema with massive transfusion protocol resuscitation for GI bleed    Upper GI bleed- (present on admission)  Assessment & Plan  Known poorly healing duodenal ulcer  Acutely bleeding with hematemesis and ongoing hematochezia  Massive transfusion protocol initiated, additional 2 units PRBCs, 2 FFP, 1 unit platelets, calcium chloride  TEG reviewed, no coagulopathy  Stat CTA  IR consulted, plan for urgent embolization  Transition to Protonix infusion  Trend H&H        Discussed patient condition and risk of morbidity and/or mortality with RN, RT, Patient, and GI, IR .    The patient remains critically ill.  Critical care time = 110 minutes in directly providing and coordinating critical care and extensive data review.  No time overlap and excludes procedures.

## 2023-11-18 NOTE — ASSESSMENT & PLAN NOTE
Severe MR  No history of rheumatic heart disease or drug use by report  Scheduled for Cath Lab and mitral valve clip next week  Consider inpatient cardiology consultation to facilitate  Monitor for volume overload/pulmonary edema with massive transfusion protocol resuscitation for GI bleed.

## 2023-11-18 NOTE — PROGRESS NOTES
CHLOE Daniel RN at bedside.  Bedside report given.  Pt transported by CHLOE Daniel RN to IR via bed, monitored.  Family notified of procedure.

## 2023-11-19 LAB
ALBUMIN SERPL BCP-MCNC: 2.7 G/DL (ref 3.2–4.9)
ALBUMIN/GLOB SERPL: 1.3 G/DL
ALP SERPL-CCNC: 35 U/L (ref 30–99)
ALT SERPL-CCNC: 9 U/L (ref 2–50)
ANION GAP SERPL CALC-SCNC: 8 MMOL/L (ref 7–16)
AST SERPL-CCNC: 15 U/L (ref 12–45)
BASOPHILS # BLD AUTO: 0.6 % (ref 0–1.8)
BASOPHILS # BLD: 0.03 K/UL (ref 0–0.12)
BILIRUB SERPL-MCNC: 0.5 MG/DL (ref 0.1–1.5)
BUN SERPL-MCNC: 15 MG/DL (ref 8–22)
CALCIUM ALBUM COR SERPL-MCNC: 8.5 MG/DL (ref 8.5–10.5)
CALCIUM SERPL-MCNC: 7.5 MG/DL (ref 8.5–10.5)
CHLORIDE SERPL-SCNC: 109 MMOL/L (ref 96–112)
CO2 SERPL-SCNC: 23 MMOL/L (ref 20–33)
CREAT SERPL-MCNC: 1.02 MG/DL (ref 0.5–1.4)
EOSINOPHIL # BLD AUTO: 0.16 K/UL (ref 0–0.51)
EOSINOPHIL NFR BLD: 3.2 % (ref 0–6.9)
ERYTHROCYTE [DISTWIDTH] IN BLOOD BY AUTOMATED COUNT: 53.1 FL (ref 35.9–50)
GFR SERPLBLD CREATININE-BSD FMLA CKD-EPI: 94 ML/MIN/1.73 M 2
GLOBULIN SER CALC-MCNC: 2.1 G/DL (ref 1.9–3.5)
GLUCOSE SERPL-MCNC: 85 MG/DL (ref 65–99)
HCT VFR BLD AUTO: 24.2 % (ref 42–52)
HGB BLD-MCNC: 7.9 G/DL (ref 14–18)
IMM GRANULOCYTES # BLD AUTO: 0.01 K/UL (ref 0–0.11)
IMM GRANULOCYTES NFR BLD AUTO: 0.2 % (ref 0–0.9)
LYMPHOCYTES # BLD AUTO: 1.62 K/UL (ref 1–4.8)
LYMPHOCYTES NFR BLD: 32.4 % (ref 22–41)
MAGNESIUM SERPL-MCNC: 2.3 MG/DL (ref 1.5–2.5)
MCH RBC QN AUTO: 26.9 PG (ref 27–33)
MCHC RBC AUTO-ENTMCNC: 32.6 G/DL (ref 32.3–36.5)
MCV RBC AUTO: 82.3 FL (ref 81.4–97.8)
MONOCYTES # BLD AUTO: 0.45 K/UL (ref 0–0.85)
MONOCYTES NFR BLD AUTO: 9 % (ref 0–13.4)
NEUTROPHILS # BLD AUTO: 2.73 K/UL (ref 1.82–7.42)
NEUTROPHILS NFR BLD: 54.6 % (ref 44–72)
NRBC # BLD AUTO: 0 K/UL
NRBC BLD-RTO: 0 /100 WBC (ref 0–0.2)
PLATELET # BLD AUTO: 151 K/UL (ref 164–446)
PMV BLD AUTO: 9.2 FL (ref 9–12.9)
POTASSIUM SERPL-SCNC: 4.4 MMOL/L (ref 3.6–5.5)
PROT SERPL-MCNC: 4.8 G/DL (ref 6–8.2)
RBC # BLD AUTO: 2.94 M/UL (ref 4.7–6.1)
SODIUM SERPL-SCNC: 140 MMOL/L (ref 135–145)
WBC # BLD AUTO: 5 K/UL (ref 4.8–10.8)

## 2023-11-19 PROCEDURE — C9113 INJ PANTOPRAZOLE SODIUM, VIA: HCPCS | Performed by: INTERNAL MEDICINE

## 2023-11-19 PROCEDURE — 85025 COMPLETE CBC W/AUTO DIFF WBC: CPT

## 2023-11-19 PROCEDURE — 83735 ASSAY OF MAGNESIUM: CPT

## 2023-11-19 PROCEDURE — A9270 NON-COVERED ITEM OR SERVICE: HCPCS

## 2023-11-19 PROCEDURE — 700111 HCHG RX REV CODE 636 W/ 250 OVERRIDE (IP): Performed by: HOSPITALIST

## 2023-11-19 PROCEDURE — 99233 SBSQ HOSP IP/OBS HIGH 50: CPT | Performed by: INTERNAL MEDICINE

## 2023-11-19 PROCEDURE — 700111 HCHG RX REV CODE 636 W/ 250 OVERRIDE (IP): Performed by: NURSE PRACTITIONER

## 2023-11-19 PROCEDURE — A9270 NON-COVERED ITEM OR SERVICE: HCPCS | Performed by: NURSE PRACTITIONER

## 2023-11-19 PROCEDURE — 99233 SBSQ HOSP IP/OBS HIGH 50: CPT | Mod: GC | Performed by: HOSPITALIST

## 2023-11-19 PROCEDURE — 700102 HCHG RX REV CODE 250 W/ 637 OVERRIDE(OP)

## 2023-11-19 PROCEDURE — 700111 HCHG RX REV CODE 636 W/ 250 OVERRIDE (IP): Performed by: INTERNAL MEDICINE

## 2023-11-19 PROCEDURE — 80053 COMPREHEN METABOLIC PANEL: CPT

## 2023-11-19 PROCEDURE — 700102 HCHG RX REV CODE 250 W/ 637 OVERRIDE(OP): Performed by: NURSE PRACTITIONER

## 2023-11-19 PROCEDURE — 700111 HCHG RX REV CODE 636 W/ 250 OVERRIDE (IP): Mod: JZ

## 2023-11-19 PROCEDURE — 770006 HCHG ROOM/CARE - MED/SURG/GYN SEMI*

## 2023-11-19 PROCEDURE — 700105 HCHG RX REV CODE 258: Performed by: INTERNAL MEDICINE

## 2023-11-19 RX ORDER — HYDROMORPHONE HYDROCHLORIDE 2 MG/ML
4 INJECTION, SOLUTION INTRAMUSCULAR; INTRAVENOUS; SUBCUTANEOUS
Status: DISCONTINUED | OUTPATIENT
Start: 2023-11-19 | End: 2023-11-19

## 2023-11-19 RX ORDER — PANTOPRAZOLE SODIUM 40 MG/10ML
40 INJECTION, POWDER, LYOPHILIZED, FOR SOLUTION INTRAVENOUS 2 TIMES DAILY
Status: DISCONTINUED | OUTPATIENT
Start: 2023-11-19 | End: 2023-11-22

## 2023-11-19 RX ORDER — ACETAMINOPHEN 325 MG/1
650 TABLET ORAL EVERY 6 HOURS PRN
Status: DISCONTINUED | OUTPATIENT
Start: 2023-11-19 | End: 2023-11-20

## 2023-11-19 RX ORDER — HYDROMORPHONE HYDROCHLORIDE 2 MG/ML
4 INJECTION, SOLUTION INTRAMUSCULAR; INTRAVENOUS; SUBCUTANEOUS EVERY 4 HOURS PRN
Status: DISCONTINUED | OUTPATIENT
Start: 2023-11-19 | End: 2023-11-20

## 2023-11-19 RX ADMIN — HYDROMORPHONE HYDROCHLORIDE 4 MG: 2 INJECTION, SOLUTION INTRAMUSCULAR; INTRAVENOUS; SUBCUTANEOUS at 19:32

## 2023-11-19 RX ADMIN — HYDROMORPHONE HYDROCHLORIDE 4 MG: 2 INJECTION, SOLUTION INTRAMUSCULAR; INTRAVENOUS; SUBCUTANEOUS at 23:29

## 2023-11-19 RX ADMIN — ONDANSETRON 4 MG: 2 INJECTION INTRAMUSCULAR; INTRAVENOUS at 16:53

## 2023-11-19 RX ADMIN — PANTOPRAZOLE SODIUM 40 MG: 40 INJECTION, POWDER, FOR SOLUTION INTRAVENOUS at 16:08

## 2023-11-19 RX ADMIN — SUCRALFATE ORAL 1 G: 1 SUSPENSION ORAL at 16:08

## 2023-11-19 RX ADMIN — SUCRALFATE ORAL 1 G: 1 SUSPENSION ORAL at 05:31

## 2023-11-19 RX ADMIN — PANTOPRAZOLE SODIUM 8 MG/HR: 40 INJECTION, POWDER, FOR SOLUTION INTRAVENOUS at 03:29

## 2023-11-19 RX ADMIN — HYDROMORPHONE HYDROCHLORIDE 4 MG: 2 INJECTION, SOLUTION INTRAMUSCULAR; INTRAVENOUS; SUBCUTANEOUS at 08:14

## 2023-11-19 RX ADMIN — SUCRALFATE ORAL 1 G: 1 SUSPENSION ORAL at 11:17

## 2023-11-19 RX ADMIN — DOCUSATE SODIUM 50 MG AND SENNOSIDES 8.6 MG 2 TABLET: 8.6; 5 TABLET, FILM COATED ORAL at 16:08

## 2023-11-19 RX ADMIN — ACETAMINOPHEN 650 MG: 325 TABLET, FILM COATED ORAL at 08:14

## 2023-11-19 RX ADMIN — HYDROMORPHONE HYDROCHLORIDE 4 MG: 2 INJECTION, SOLUTION INTRAMUSCULAR; INTRAVENOUS; SUBCUTANEOUS at 15:18

## 2023-11-19 RX ADMIN — ACETAMINOPHEN 650 MG: 325 TABLET, FILM COATED ORAL at 00:48

## 2023-11-19 RX ADMIN — HYDROMORPHONE HYDROCHLORIDE 4 MG: 2 INJECTION, SOLUTION INTRAMUSCULAR; INTRAVENOUS; SUBCUTANEOUS at 11:17

## 2023-11-19 RX ADMIN — Medication: at 04:16

## 2023-11-19 RX ADMIN — SUCRALFATE ORAL 1 G: 1 SUSPENSION ORAL at 23:29

## 2023-11-19 RX ADMIN — DOCUSATE SODIUM 50 MG AND SENNOSIDES 8.6 MG 2 TABLET: 8.6; 5 TABLET, FILM COATED ORAL at 05:30

## 2023-11-19 ASSESSMENT — ENCOUNTER SYMPTOMS
FEVER: 0
BACK PAIN: 0
EYE DISCHARGE: 0
DIZZINESS: 1
WEIGHT LOSS: 0
NAUSEA: 0
NERVOUS/ANXIOUS: 0
DIZZINESS: 0
BRUISES/BLEEDS EASILY: 0
CHILLS: 0
NECK PAIN: 0
NAUSEA: 1
FLANK PAIN: 0
BLOOD IN STOOL: 1
EYE REDNESS: 0
VOMITING: 1
HEADACHES: 0
DEPRESSION: 0
PALPITATIONS: 0
SORE THROAT: 0
SHORTNESS OF BREATH: 0
ABDOMINAL PAIN: 1
COUGH: 0

## 2023-11-19 ASSESSMENT — PAIN DESCRIPTION - PAIN TYPE
TYPE: ACUTE PAIN

## 2023-11-19 NOTE — CARE PLAN
The patient is Watcher - Medium risk of patient condition declining or worsening    Shift Goals  Clinical Goals: Pain management  Patient Goals: Pain management  Family Goals: JJ, family not at bedside    Progress made toward(s) clinical / shift goals:      Problem: Knowledge Deficit - Standard  Goal: Patient and family/care givers will demonstrate understanding of plan of care, disease process/condition, diagnostic tests and medications  11/19/2023 0649 by Hallie Mace R.N.  Outcome: Progressing  11/19/2023 0648 by Hallie Mace RJJ.  Outcome: Progressing     Problem: Skin Integrity  Goal: Skin integrity is maintained or improved  11/19/2023 0649 by Hallie Mace R.N.  Outcome: Progressing  11/19/2023 0648 by Hallie Mace R.N.  Outcome: Progressing     Problem: Fall Risk  Goal: Patient will remain free from falls  11/19/2023 0649 by Hallie Mace RJenniferN.  Outcome: Progressing  11/19/2023 0648 by Hallie Mace R.N.  Outcome: Progressing     Problem: Risk for Fluid Volume Deficit Related to Bleeding  Goal: Fluid volume balance will be maintained  11/19/2023 0649 by Hallie Mace R.N.  Outcome: Progressing  11/19/2023 0648 by Hallie Mace R.N.  Outcome: Progressing  Goal: Patient will show no signs and symptoms of excessive bleeding  11/19/2023 0649 by Hallie Mace R.N.  Outcome: Progressing  11/19/2023 0648 by Hallie Mace R.N.  Outcome: Progressing     Problem: Risk for Bleeding  Goal: Patient will take measures to prevent bleeding and recognizes signs of bleeding that need to be reported immediately to a health care professional  11/19/2023 0649 by Hallie Mace RJenniferN.  Outcome: Progressing  11/19/2023 0648 by Hallie Mace R.N.  Outcome: Progressing  Goal: Patient will not experience bleeding as evidenced by normal blood pressure, stable hematocrit and hemoglobin levels and desired ranges for coagulation profiles  11/19/2023 0649 by Hallie Mace R.N.  Outcome:  Progressing  11/19/2023 0648 by Hallie Mace, R.N.  Outcome: Progressing       Patient is not progressing towards the following goals:      Problem: Pain - Standard  Goal: Alleviation of pain or a reduction in pain to the patient’s comfort goal  11/19/2023 0649 by Hallie Mace, R.N.  Outcome: Not Met  11/19/2023 0648 by Hallie Mace, R.N.  Outcome: Not Met

## 2023-11-19 NOTE — PROGRESS NOTES
Pt with ongoing c/o abdominal pain this morning, despite use of PCA.  Discussed during rounds, pt with transfer orders out of ICU and PCA discontinued.  New orders received for dilaudid IV for pain control.  Explained plan to patient and he expressed understanding, medicated for pain per order.  No hematemesis or bloody stool this morning.

## 2023-11-19 NOTE — PROGRESS NOTES
Hospital Medicine Daily Progress Note    Date of Service  11/19/2023    Chief Complaint  Vomiting blood and dark stools.    Hospital Course  Roberto Braswell is a 43 y.o. male admitted 11/17/2023 with vomiting bright red blood and black stool.  He has a history of a known duodenal ulcer for which he has been battling over the past year.    He has the additional history of severe mitral valve regurgitation and is suppose to have near future surgical repair.  His last EGD was 9/14/23 with nonhealing duodenal ulcer. He was admitted but had additional hematemesis and due to hemorrhagic shock was transferred to ICU for massive transfusion.  He required GDA embolization.      Interval Problem Update  11/19/23 He has 7/10 abdominal pain.  I have stopped his dilaudid pca in hopes we can wean the narcotics.  Patient amenable.  Start clear liquids and advance as tolerates. Hgb:7.9.  Feels fatigued.    I have discussed this patient's plan of care and discharge plan at IDT rounds today with Case Management, Nursing, Nursing leadership, and other members of the IDT team.      Code Status  Full Code    Disposition  The patient is not medically cleared for discharge to home or a post-acute facility.      I have placed the appropriate orders for post-discharge needs.    Review of Systems  Review of Systems   Constitutional:  Positive for malaise/fatigue. Negative for weight loss.   Respiratory:  Negative for shortness of breath.    Cardiovascular:  Negative for chest pain and palpitations.   Gastrointestinal:  Positive for abdominal pain. Negative for nausea.   Genitourinary:  Negative for dysuria.   Musculoskeletal:  Negative for neck pain.   Neurological:  Positive for dizziness (upon standing). Negative for headaches.   Psychiatric/Behavioral:  The patient is not nervous/anxious.         Physical Exam  Temp:  [36.2 °C (97.1 °F)-37.1 °C (98.8 °F)] 36.7 °C (98 °F)  Pulse:  [46-80] 72  Resp:  [10-34] 19  BP: (102-141)/(52-78)  114/52  SpO2:  [90 %-100 %] 93 %    Physical Exam  Vitals reviewed.   Constitutional:       Appearance: Normal appearance. He is not diaphoretic.   HENT:      Head: Normocephalic and atraumatic.      Nose: Nose normal.      Mouth/Throat:      Mouth: Mucous membranes are moist.      Pharynx: No oropharyngeal exudate.   Eyes:      General: No scleral icterus.        Right eye: No discharge.         Left eye: No discharge.      Extraocular Movements: Extraocular movements intact.      Conjunctiva/sclera: Conjunctivae normal.   Cardiovascular:      Rate and Rhythm: Normal rate and regular rhythm.      Pulses:           Radial pulses are 2+ on the right side and 2+ on the left side.        Dorsalis pedis pulses are 2+ on the right side and 2+ on the left side.      Heart sounds: Murmur heard.   Pulmonary:      Effort: Pulmonary effort is normal. No respiratory distress.      Breath sounds: Normal breath sounds. No wheezing or rales.   Abdominal:      General: Bowel sounds are normal. There is no distension.      Palpations: Abdomen is soft.   Musculoskeletal:         General: No swelling or tenderness.      Cervical back: Neck supple. No muscular tenderness.      Right lower leg: No edema.      Left lower leg: No edema.   Lymphadenopathy:      Cervical: No cervical adenopathy.   Skin:     Coloration: Skin is pale. Skin is not jaundiced.      Comments: Tattoos over upper chest and bilateral arms   Neurological:      General: No focal deficit present.      Mental Status: He is alert and oriented to person, place, and time. Mental status is at baseline.      Cranial Nerves: No cranial nerve deficit.   Psychiatric:         Mood and Affect: Mood normal.         Behavior: Behavior normal.         Fluids    Intake/Output Summary (Last 24 hours) at 11/19/2023 1324  Last data filed at 11/19/2023 0800  Gross per 24 hour   Intake 492.33 ml   Output 1380 ml   Net -887.67 ml       Laboratory  Recent Labs     11/18/23  5708  11/18/23  2310 11/19/23  0430   WBC 4.7* 5.1 5.0   RBC 2.89* 2.90* 2.94*   HEMOGLOBIN 7.9* 7.8* 7.9*   HEMATOCRIT 23.4* 24.4* 24.2*   MCV 81.0* 84.1 82.3   MCH 27.3 26.9* 26.9*   MCHC 33.8 32.0* 32.6   RDW 51.9* 53.4* 53.1*   PLATELETCT 154* 152* 151*   MPV 9.7 9.6 9.2     Recent Labs     11/18/23  0217 11/18/23  1403 11/19/23  0430   SODIUM 140 142 140   POTASSIUM 4.4 3.8 4.4   CHLORIDE 115* 112 109   CO2 17* 22 23   GLUCOSE 114* 92 85   BUN 32* 22 15   CREATININE 1.13 1.08 1.02   CALCIUM 7.2* 7.3* 7.5*     Recent Labs     11/16/23  1407 11/17/23 2139   APTT 27.3 26.4   INR 1.13 1.32*               Imaging  IR-VISCERAL ANGIOGRAM - SELECTIVE   Final Result      1.  Celiac axis and gastroduodenal arteriograms demonstrating normal anatomy without evidence of active extravasation.      2.  Empiric coiling of the gastroduodenal artery.      3.  Normal right common femoral arteriogram with deployment of Angio-Seal device for hemostasis.            CT-CTA COMPLETE THORACOABDOMINAL AORTA   Final Result   Addendum (preliminary) 1 of 1   Additional history provided of potential gastrointestinal bleeding and    duodenal abnormality.      In review of the scan, there is no evidence of active gastrointestinal    bleeding. There is no contrast extravasation. Also, there is no high    density fluid within the bowel to suggest recent bleeding.      In regards to the duodenum, there is very subtle fat stranding adjacent to    the proximal second portion of the duodenum. Therefore, ulceration or    duodenitis is possible. Recommend endoscopy if not previously performed.      Final      Normal exam. No acute process seen.         DX-CHEST-PORTABLE (1 VIEW)   Final Result      No acute cardiac or pulmonary abnormalities are identified.           Assessment/Plan  * Upper GI bleed- (present on admission)  Assessment & Plan  Vomiting bright red blood and dark stools  Gastric and/or duodenal ulcer. Previous EGD in 3/2023 had suggested  healing.    Hpylori negative on past biopsy  PPI IV BID  S/p GDA emoblization  Monitor Hgb.  Transfuse for Hgb < 7  S/p massive transfusion protocol per ICU doc ( additional 2 units PRBCs, 2 FFP, 1 unit platelets, calcium chloride)  TEG showed no coagulopathy  During admit GI was consulted who recommend general surgery and/or IR for consult  CTA abd/pelvis negative  No NSAIDS, no EtOH    Abdominal pain  Assessment & Plan  Stop dialudid PCA  Wean narcotics  Carafate and PPI    Normal anion gap metabolic acidosis  Assessment & Plan  *2/2 loose stools.    Hypertension  Assessment & Plan  Was on losartan 25mg qd prior to admit  Holding losartan in face of hypotension  Restart as vitals require    Duodenal ulcer  Assessment & Plan  Healed in 3/2023. Previous biopsies H. Pylori negative.   CTA abd negative  pantoprazole drip  11/19 start clear liquid diet  11/18 had GDA embolization by IR    Hematemesis- (present on admission)  Assessment & Plan  Due to duodenal ulcer    Mitral regurgitation- (present on admission)  Assessment & Plan  Followed by surgery as outpatient consult    Acute blood loss anemia- (present on admission)  Assessment & Plan  S/p massive transfusion 11/18  Monitor hgb  Transfusion for Hgb<7         VTE prophylaxis:   SCDs/TEDs      I have performed a physical exam and reviewed and updated ROS and Plan today (11/19/2023). In review of yesterday's note (11/18/2023), there are no changes except as documented above.

## 2023-11-19 NOTE — PROGRESS NOTES
Critical Care Progress Note    Date of admission  11/17/2023    Chief Complaint  43 y.o. male admitted 11/17/2023 with acute upper GI bleed with hematemesis and melena with known duodenal ulcer    Hospital Course  Mr. Braswell is a 43 year old male with the past medical history significant for severe mitral regurgitation with for heart catheterization and mitral clip in the next week, and a known nonhealing duodenal ulcer with last EG on 9/14/2023 and in July who presented to the ER on 11/17/2023 with complaints of hematochezia, melena, and lightheadedness.  The patient was initially admitted to the medical/telemetry floor; however, had hematemesis and signs of hemorrhagic shock.  He was emergently transferred to the ICU for massive transfusion protocol.    Interval Problem Update  Reviewed last 24 hour events:   - s/p GDA embolization   - no further events overnight   - pt c/o abdominal pain   - a/ox4   - SB/SR 40-60s   - -140s   - Tmax 98.4   - tolerating sips/ice chips   - UOP 580cc overnight, urinal   - BM yesterday   - no RT issues   - SCDs   - PPI gtt   - Hb 7.9   - platelets 151    Yesterday's Events:  - s/p 2 units pRBCs, 2 units FFP, and 1 platelet  - discussed case with IR and plan for embolization  - GI aware  - a/ox4  - SR/ST 70-120s  - -150s  - Tmax 98.8  - NPO  - adequate UOP since arrival  - melena  - no RT issues  - CTA abd without active extravasation  - SCDs  - PPI gtt  - carafate    Review of Systems  Review of Systems   Constitutional:  Positive for malaise/fatigue. Negative for chills and fever.   HENT:  Negative for congestion and sore throat.    Eyes:  Negative for discharge and redness.   Respiratory:  Negative for cough and shortness of breath.    Cardiovascular:  Negative for chest pain and leg swelling.   Gastrointestinal:  Positive for abdominal pain, blood in stool, melena, nausea and vomiting.   Genitourinary:  Negative for flank pain and hematuria.   Musculoskeletal:   Negative for back pain and neck pain.   Skin:  Negative for rash.   Neurological:  Negative for dizziness and headaches.   Endo/Heme/Allergies:  Does not bruise/bleed easily.   Psychiatric/Behavioral:  Negative for depression. The patient is not nervous/anxious.         Vital Signs for last 24 hours   Temp:  [36.2 °C (97.1 °F)-37.1 °C (98.8 °F)] 37.1 °C (98.8 °F)  Pulse:  [46-98] 54  Resp:  [10-64] 14  BP: (102-166)/(54-82) 114/60  SpO2:  [86 %-100 %] 90 %    Hemodynamic parameters for last 24 hours       Respiratory Information for the last 24 hours       Physical Exam   Physical Exam  Vitals and nursing note reviewed.   Constitutional:       General: He is not in acute distress.     Appearance: He is ill-appearing. He is not toxic-appearing.   HENT:      Head: Normocephalic and atraumatic.      Right Ear: External ear normal.      Left Ear: External ear normal.      Nose: Nose normal. No rhinorrhea.      Mouth/Throat:      Mouth: Mucous membranes are moist.      Pharynx: Oropharynx is clear. No oropharyngeal exudate.   Eyes:      General: No scleral icterus.     Conjunctiva/sclera: Conjunctivae normal.      Pupils: Pupils are equal, round, and reactive to light.   Cardiovascular:      Rate and Rhythm: Normal rate and regular rhythm.      Pulses: Normal pulses.      Heart sounds: Normal heart sounds. No murmur heard.  Pulmonary:      Breath sounds: Normal breath sounds. No wheezing.   Chest:      Chest wall: No tenderness.   Abdominal:      Palpations: Abdomen is soft.      Tenderness: There is no abdominal tenderness. There is no guarding or rebound.   Musculoskeletal:         General: Normal range of motion.      Cervical back: Normal range of motion and neck supple.      Right lower leg: No edema.      Left lower leg: No edema.   Lymphadenopathy:      Cervical: No cervical adenopathy.   Skin:     General: Skin is warm and dry.      Capillary Refill: Capillary refill takes less than 2 seconds.      Coloration:  Skin is not pale.      Findings: No rash.   Neurological:      Mental Status: He is alert and oriented to person, place, and time.      Cranial Nerves: No cranial nerve deficit.      Sensory: No sensory deficit.      Motor: No weakness.   Psychiatric:         Mood and Affect: Mood normal.         Behavior: Behavior normal.         Thought Content: Thought content normal.         Medications  Current Facility-Administered Medications   Medication Dose Route Frequency Provider Last Rate Last Admin    acetaminophen (Tylenol) tablet 650 mg  650 mg Oral Q6HRS PRN Glenis L. Latona   650 mg at 11/19/23 0048    HYDROmorphone (Dilaudid) 0.2 mg/mL in 50 mL NS (PCA)   Intravenous Continuous Glenis L. Latona   New Bag at 11/19/23 0416    And    Pharmacy Consult Request ...Pain Management Review 1 Each  1 Each Other PHARMACY TO DOSE Glenis BEY [facility]ats[facility]        ondansetron (Zofran) syringe/vial injection 4 mg  4 mg Intravenous Q4HRS PRN Burton Estevez M.D.   4 mg at 11/18/23 0028    sucralfate (Carafate) 1 GM/10ML suspension 1 g  1 g Oral Q6HRS Burton Estevez M.D.   1 g at 11/19/23 0531    GI Cocktail (hyoscyamine-lidocaine-Maalox) oral susp cup 30 mL  30 mL Oral Q4HRS PRN Burton Estevez M.D.   30 mL at 11/18/23 0143    pantoprazole (Protonix) 80 mg in  mL infusion  8 mg/hr Intravenous Continuous Darien Murray M.D. 25 mL/hr at 11/19/23 0329 8 mg/hr at 11/19/23 0329    MD Alert...ICU Electrolyte Replacement per Pharmacy   Other PHARMACY TO DOSE Darien Murray M.D.        senna-docusate (Pericolace Or Senokot S) 8.6-50 MG per tablet 2 Tablet  2 Tablet Oral BID Burton Estevez M.D.   2 Tablet at 11/19/23 0530    And    polyethylene glycol/lytes (Miralax) PACKET 1 Packet  1 Packet Oral QDAY PRN Burton Estevez M.D.        And    magnesium hydroxide (Milk Of Magnesia) suspension 30 mL  30 mL Oral QDAY PRN Burton Estevez M.D.        And    bisacodyl (Dulcolax) suppository 10 mg  10 mg Rectal QDAY PRN Burton Estevez M.D.            Fluids    Intake/Output Summary (Last 24 hours) at 11/19/2023 0636  Last data filed at 11/19/2023 0400  Gross per 24 hour   Intake 275.16 ml   Output 2480 ml   Net -2204.84 ml         Laboratory          Recent Labs     11/18/23 0217 11/18/23 1403 11/19/23  0430   SODIUM 140 142 140   POTASSIUM 4.4 3.8 4.4   CHLORIDE 115* 112 109   CO2 17* 22 23   BUN 32* 22 15   CREATININE 1.13 1.08 1.02   MAGNESIUM 1.9 2.7* 2.3   CALCIUM 7.2* 7.3* 7.5*       Recent Labs     11/17/23 2139 11/18/23 0217 11/18/23 1403 11/19/23  0430   ALTSGPT 13 10  --  9   ASTSGOT 12 10*  --  15   ALKPHOSPHAT 35 27*  --  35   TBILIRUBIN 0.2 0.5  --  0.5   LIPASE 24  --   --   --    GLUCOSE 109* 114* 92 85       Recent Labs     11/17/23 2139 11/18/23 0217 11/18/23 0445 11/18/23 1708 11/18/23 2310 11/19/23  0430   WBC 6.8  --    < > 4.7* 5.1 5.0   NEUTSPOLYS 57.00  --   --   --   --  54.60   LYMPHOCYTES 33.00  --   --   --   --  32.40   MONOCYTES 8.60  --   --   --   --  9.00   EOSINOPHILS 0.40  --   --   --   --  3.20   BASOPHILS 0.60  --   --   --   --  0.60   ASTSGOT 12 10*  --   --   --  15   ALTSGPT 13 10  --   --   --  9   ALKPHOSPHAT 35 27*  --   --   --  35   TBILIRUBIN 0.2 0.5  --   --   --  0.5    < > = values in this interval not displayed.       Recent Labs     11/16/23 1407 11/17/23 2139 11/18/23 0313 11/18/23 1708 11/18/23 2310 11/19/23  0430   RBC 4.31* 3.17*   < > 2.89* 2.90* 2.94*   HEMOGLOBIN 10.4* 7.9*   < > 7.9* 7.8* 7.9*   HEMATOCRIT 34.0* 25.2*   < > 23.4* 24.4* 24.2*   PLATELETCT 302 266   < > 154* 152* 151*   PROTHROMBTM 14.7* 16.6*  --   --   --   --    APTT 27.3 26.4  --   --   --   --    INR 1.13 1.32*  --   --   --   --    IRON  --  25*  --   --   --   --    FERRITIN  --  14.2*  --   --   --   --    TOTIRONBC  --  252  --   --   --   --     < > = values in this interval not displayed.         Imaging  CTA thoracoabdominal aorta:  Normal exam. No acute process seen.     Assessment/Plan  * Upper GI bleed-  (present on admission)  Assessment & Plan  Known poorly healing duodenal ulcer  S/p Massive transfusion protocol: 4 units PRBCs, 4 FFP, 1 unit platelets, calcium chloride  TEG reviewed, no coagulopathy  Stat CTA-->no active bleeding  IR consulted-->s/p GDA coil embolization on 11/18  Continue Protonix infusion  Follow Hb/Hct    Abdominal pain  Assessment & Plan  Ongoing  Getting dilaudid/oxycodone    Hypertension  Assessment & Plan  Holding home BP medication and will resumed in next 1-2 days    Duodenal ulcer  Assessment & Plan  Known poorly healing DU as above  GI consulted  Continue PPI, sucralfate  S/p GDA embolization     Mitral regurgitation- (present on admission)  Assessment & Plan  Severe MR  No history of rheumatic heart disease or drug use by report  Scheduled for Cath Lab and mitral valve clip next week  Consider inpatient cardiology consultation to facilitate  Monitor for volume overload/pulmonary edema with massive transfusion protocol resuscitation for GI bleed.    Acute blood loss anemia- (present on admission)  Assessment & Plan  Due to upper GI bleeding  S/p 4 units pRBCs, 4 units FFP, and 1 platelet  Serial Hb/Hct, stable  S/p GDA embolization           VTE:  Contraindicated  Ulcer: PPI  Lines:  2 18G IVs    I have performed a physical exam and reviewed and updated ROS and Plan today (11/19/2023). In review of yesterday's note (11/18/2023), there are no changes except as documented above.     Discussed patient condition and risk of morbidity and/or mortality with Hospitalist, RN, RT, Pharmacy, Charge nurse / hot rounds, and Patient    The patient underwent embolization with IR yesterday and hemoglobins have remained stable.  The patient is also hemodynamically stable and no longer requires the ICU.  The case was discussed with the hospitalist team who will take over care at this time.  The critical care team will sign off at this point.

## 2023-11-19 NOTE — PROGRESS NOTES
Pt transferred to [place] [id number] via wheelchair by CORONA Lozano.  All personal belongings sent with pt.  Dilaudid PCA discontinued and extra medication wasted with KARSON Sanches.  Protonix drip stopped per order with BID administration ordered.

## 2023-11-20 ENCOUNTER — APPOINTMENT (OUTPATIENT)
Dept: RADIOLOGY | Facility: MEDICAL CENTER | Age: 43
DRG: 356 | End: 2023-11-20
Attending: INTERNAL MEDICINE
Payer: COMMERCIAL

## 2023-11-20 PROBLEM — M79.89 LEFT ARM SWELLING: Status: ACTIVE | Noted: 2023-11-20

## 2023-11-20 LAB
ANION GAP SERPL CALC-SCNC: 10 MMOL/L (ref 7–16)
BUN SERPL-MCNC: 10 MG/DL (ref 8–22)
CALCIUM SERPL-MCNC: 7.9 MG/DL (ref 8.5–10.5)
CHLORIDE SERPL-SCNC: 107 MMOL/L (ref 96–112)
CO2 SERPL-SCNC: 21 MMOL/L (ref 20–33)
CREAT SERPL-MCNC: 0.96 MG/DL (ref 0.5–1.4)
ERYTHROCYTE [DISTWIDTH] IN BLOOD BY AUTOMATED COUNT: 53.1 FL (ref 35.9–50)
GFR SERPLBLD CREATININE-BSD FMLA CKD-EPI: 101 ML/MIN/1.73 M 2
GLUCOSE SERPL-MCNC: 87 MG/DL (ref 65–99)
HCT VFR BLD AUTO: 26.7 % (ref 42–52)
HGB BLD-MCNC: 8.4 G/DL (ref 14–18)
MCH RBC QN AUTO: 26.4 PG (ref 27–33)
MCHC RBC AUTO-ENTMCNC: 31.5 G/DL (ref 32.3–36.5)
MCV RBC AUTO: 84 FL (ref 81.4–97.8)
PLATELET # BLD AUTO: 175 K/UL (ref 164–446)
PMV BLD AUTO: 9.4 FL (ref 9–12.9)
POTASSIUM SERPL-SCNC: 3.9 MMOL/L (ref 3.6–5.5)
RBC # BLD AUTO: 3.18 M/UL (ref 4.7–6.1)
SODIUM SERPL-SCNC: 138 MMOL/L (ref 135–145)
WBC # BLD AUTO: 6.6 K/UL (ref 4.8–10.8)

## 2023-11-20 PROCEDURE — 700102 HCHG RX REV CODE 250 W/ 637 OVERRIDE(OP): Performed by: INTERNAL MEDICINE

## 2023-11-20 PROCEDURE — 700102 HCHG RX REV CODE 250 W/ 637 OVERRIDE(OP)

## 2023-11-20 PROCEDURE — 700111 HCHG RX REV CODE 636 W/ 250 OVERRIDE (IP): Performed by: INTERNAL MEDICINE

## 2023-11-20 PROCEDURE — 80048 BASIC METABOLIC PNL TOTAL CA: CPT

## 2023-11-20 PROCEDURE — 99232 SBSQ HOSP IP/OBS MODERATE 35: CPT | Performed by: INTERNAL MEDICINE

## 2023-11-20 PROCEDURE — 36415 COLL VENOUS BLD VENIPUNCTURE: CPT

## 2023-11-20 PROCEDURE — C9113 INJ PANTOPRAZOLE SODIUM, VIA: HCPCS | Performed by: INTERNAL MEDICINE

## 2023-11-20 PROCEDURE — 700111 HCHG RX REV CODE 636 W/ 250 OVERRIDE (IP): Mod: JZ

## 2023-11-20 PROCEDURE — 74019 RADEX ABDOMEN 2 VIEWS: CPT

## 2023-11-20 PROCEDURE — 700111 HCHG RX REV CODE 636 W/ 250 OVERRIDE (IP): Performed by: HOSPITALIST

## 2023-11-20 PROCEDURE — A9270 NON-COVERED ITEM OR SERVICE: HCPCS | Performed by: INTERNAL MEDICINE

## 2023-11-20 PROCEDURE — 85027 COMPLETE CBC AUTOMATED: CPT

## 2023-11-20 PROCEDURE — 770006 HCHG ROOM/CARE - MED/SURG/GYN SEMI*

## 2023-11-20 PROCEDURE — A9270 NON-COVERED ITEM OR SERVICE: HCPCS

## 2023-11-20 RX ORDER — OXYCODONE HYDROCHLORIDE 10 MG/1
20 TABLET ORAL EVERY 6 HOURS PRN
Status: DISCONTINUED | OUTPATIENT
Start: 2023-11-20 | End: 2023-11-21

## 2023-11-20 RX ORDER — AMOXICILLIN 250 MG
2 CAPSULE ORAL 2 TIMES DAILY
Status: DISCONTINUED | OUTPATIENT
Start: 2023-11-20 | End: 2023-11-22 | Stop reason: HOSPADM

## 2023-11-20 RX ORDER — OXYCODONE HYDROCHLORIDE 10 MG/1
20 TABLET ORAL
Status: DISCONTINUED | OUTPATIENT
Start: 2023-11-20 | End: 2023-11-20

## 2023-11-20 RX ORDER — ACETAMINOPHEN 500 MG
1000 TABLET ORAL EVERY 6 HOURS
Status: DISCONTINUED | OUTPATIENT
Start: 2023-11-20 | End: 2023-11-20

## 2023-11-20 RX ORDER — HYDROMORPHONE HYDROCHLORIDE 1 MG/ML
1 INJECTION, SOLUTION INTRAMUSCULAR; INTRAVENOUS; SUBCUTANEOUS EVERY 6 HOURS PRN
Status: DISCONTINUED | OUTPATIENT
Start: 2023-11-20 | End: 2023-11-21

## 2023-11-20 RX ORDER — ACETAMINOPHEN 500 MG
1000 TABLET ORAL EVERY 6 HOURS PRN
Status: DISCONTINUED | OUTPATIENT
Start: 2023-11-25 | End: 2023-11-22 | Stop reason: HOSPADM

## 2023-11-20 RX ORDER — HYDROMORPHONE HYDROCHLORIDE 1 MG/ML
1 INJECTION, SOLUTION INTRAMUSCULAR; INTRAVENOUS; SUBCUTANEOUS
Status: DISCONTINUED | OUTPATIENT
Start: 2023-11-20 | End: 2023-11-20

## 2023-11-20 RX ORDER — PROCHLORPERAZINE EDISYLATE 5 MG/ML
10 INJECTION INTRAMUSCULAR; INTRAVENOUS EVERY 6 HOURS PRN
Status: DISCONTINUED | OUTPATIENT
Start: 2023-11-20 | End: 2023-11-22 | Stop reason: HOSPADM

## 2023-11-20 RX ORDER — OXYCODONE HYDROCHLORIDE 10 MG/1
10 TABLET ORAL
Status: DISCONTINUED | OUTPATIENT
Start: 2023-11-20 | End: 2023-11-20

## 2023-11-20 RX ORDER — ACETAMINOPHEN 500 MG
1000 TABLET ORAL EVERY 6 HOURS PRN
Status: DISCONTINUED | OUTPATIENT
Start: 2023-11-25 | End: 2023-11-20

## 2023-11-20 RX ORDER — BISACODYL 10 MG
10 SUPPOSITORY, RECTAL RECTAL
Status: DISCONTINUED | OUTPATIENT
Start: 2023-11-20 | End: 2023-11-22 | Stop reason: HOSPADM

## 2023-11-20 RX ORDER — POLYETHYLENE GLYCOL 3350 17 G/17G
1 POWDER, FOR SOLUTION ORAL DAILY
Status: DISCONTINUED | OUTPATIENT
Start: 2023-11-20 | End: 2023-11-22 | Stop reason: HOSPADM

## 2023-11-20 RX ORDER — OXYCODONE HYDROCHLORIDE 10 MG/1
10 TABLET ORAL EVERY 6 HOURS PRN
Status: DISCONTINUED | OUTPATIENT
Start: 2023-11-20 | End: 2023-11-21

## 2023-11-20 RX ORDER — ACETAMINOPHEN 500 MG
1000 TABLET ORAL EVERY 6 HOURS
Status: DISCONTINUED | OUTPATIENT
Start: 2023-11-20 | End: 2023-11-22 | Stop reason: HOSPADM

## 2023-11-20 RX ADMIN — SUCRALFATE ORAL 1 G: 1 SUSPENSION ORAL at 13:44

## 2023-11-20 RX ADMIN — DOCUSATE SODIUM 50 MG AND SENNOSIDES 8.6 MG 2 TABLET: 8.6; 5 TABLET, FILM COATED ORAL at 17:59

## 2023-11-20 RX ADMIN — OXYCODONE HYDROCHLORIDE 20 MG: 10 TABLET ORAL at 13:44

## 2023-11-20 RX ADMIN — PROCHLORPERAZINE EDISYLATE 10 MG: 5 INJECTION INTRAMUSCULAR; INTRAVENOUS at 13:44

## 2023-11-20 RX ADMIN — HYDROMORPHONE HYDROCHLORIDE 1 MG: 1 INJECTION, SOLUTION INTRAMUSCULAR; INTRAVENOUS; SUBCUTANEOUS at 15:01

## 2023-11-20 RX ADMIN — HYDROMORPHONE HYDROCHLORIDE 1 MG: 1 INJECTION, SOLUTION INTRAMUSCULAR; INTRAVENOUS; SUBCUTANEOUS at 21:56

## 2023-11-20 RX ADMIN — SUCRALFATE ORAL 1 G: 1 SUSPENSION ORAL at 18:00

## 2023-11-20 RX ADMIN — HYDROMORPHONE HYDROCHLORIDE 4 MG: 2 INJECTION, SOLUTION INTRAMUSCULAR; INTRAVENOUS; SUBCUTANEOUS at 03:28

## 2023-11-20 RX ADMIN — OXYCODONE HYDROCHLORIDE 10 MG: 10 TABLET ORAL at 20:21

## 2023-11-20 RX ADMIN — POLYETHYLENE GLYCOL 3350 1 PACKET: 17 POWDER, FOR SOLUTION ORAL at 08:25

## 2023-11-20 RX ADMIN — PANTOPRAZOLE SODIUM 40 MG: 40 INJECTION, POWDER, FOR SOLUTION INTRAVENOUS at 05:21

## 2023-11-20 RX ADMIN — ACETAMINOPHEN 1000 MG: 500 TABLET, FILM COATED ORAL at 17:59

## 2023-11-20 RX ADMIN — PANTOPRAZOLE SODIUM 40 MG: 40 INJECTION, POWDER, FOR SOLUTION INTRAVENOUS at 18:00

## 2023-11-20 RX ADMIN — ACETAMINOPHEN 1000 MG: 500 TABLET, FILM COATED ORAL at 08:26

## 2023-11-20 RX ADMIN — SUCRALFATE ORAL 1 G: 1 SUSPENSION ORAL at 05:21

## 2023-11-20 RX ADMIN — HYDROMORPHONE HYDROCHLORIDE 4 MG: 2 INJECTION, SOLUTION INTRAMUSCULAR; INTRAVENOUS; SUBCUTANEOUS at 07:41

## 2023-11-20 RX ADMIN — ONDANSETRON 4 MG: 2 INJECTION INTRAMUSCULAR; INTRAVENOUS at 07:41

## 2023-11-20 RX ADMIN — ACETAMINOPHEN 1000 MG: 500 TABLET, FILM COATED ORAL at 13:44

## 2023-11-20 ASSESSMENT — PAIN DESCRIPTION - PAIN TYPE
TYPE: ACUTE PAIN

## 2023-11-20 ASSESSMENT — ENCOUNTER SYMPTOMS
WEIGHT LOSS: 0
SHORTNESS OF BREATH: 0
DIZZINESS: 0
VOMITING: 1
PALPITATIONS: 0
HEADACHES: 0
NAUSEA: 1
NERVOUS/ANXIOUS: 0
ABDOMINAL PAIN: 1
NECK PAIN: 0
FEVER: 0
CHILLS: 0
WEAKNESS: 0
VOMITING: 0

## 2023-11-20 NOTE — CARE PLAN
The patient is Stable - Low risk of patient condition declining or worsening    Shift Goals  Clinical Goals: pain control, advance diet  Patient Goals: pain control  Family Goals: none present    Progress made toward(s) clinical / shift goals:    Problem: Pain - Standard  Goal: Alleviation of pain or a reduction in pain to the patient’s comfort goal  Outcome: Progressing     Problem: Knowledge Deficit - Standard  Goal: Patient and family/care givers will demonstrate understanding of plan of care, disease process/condition, diagnostic tests and medications  Outcome: Progressing       Patient is not progressing towards the following goals:

## 2023-11-20 NOTE — ASSESSMENT & PLAN NOTE
In prior PIV site  Mild purulence, but minimal surrounding erythema, but some swelling  Defer abx's for now  Check LUE US to R/O SVT/DVT  Serial exams

## 2023-11-20 NOTE — CARE PLAN
The patient is Stable - Low risk of patient condition declining or worsening    Shift Goals  Clinical Goals: Patient's pain will be <3 throughout shift  Patient Goals: Sleep and rest  Family Goals: none present    Progress made toward(s) clinical / shift goals:      Patient is alert and oriented x4; able to communicate needs. Patient's pain is controlled with current PRN pain medication and rest. Patient reported having nausea that is controlled with ice chips and rest. Educated pt on safety precautions, to dangle feet on the edge of the bed before standing and to call for help when feeling dizzy before standing up. Pt verbalized understanding. Pt's call light within reach.

## 2023-11-20 NOTE — PROGRESS NOTES
Report received. Pt reported pain during report 9/10, checked on patient post report Pain medication given, IV Dilaudid 4 mg and also reported nausea, IV Zofran 4 mg given. Pt A&Ox4. Reports he sometimes gets a little dizzy when getting out of bed, there fore he dangles legs before getting up. Pt encouraged to use call light for help to restroom as needed for dizziness. Pt agreeable. No other complaints reported to Nurse at this time.

## 2023-11-20 NOTE — PROGRESS NOTES
Hospital Medicine Daily Progress Note    Date of Service  11/20/2023    Chief Complaint  Vomiting blood and dark stools.    Hospital Course  Roberto Braswell is a 43 y.o. male admitted 11/17/2023 with vomiting bright red blood and black stool.  He has a history of a known duodenal ulcer for which he has been battling over the past year.    He has the additional history of severe mitral valve regurgitation and is suppose to have near future surgical repair.  His last EGD was 9/14/23 with nonhealing duodenal ulcer. He was admitted but had additional hematemesis and due to hemorrhagic shock was transferred to ICU for massive transfusion.  He required GDA embolization.      Interval Problem Update  11/20  Transferred to the general medical floor. Patient appears comfortable in bed, but having some mild vomiting, non bloody. Patient has been receiving large quantities of IV opiates since being admitted. I asked patient about prior opiate use and he said none for a long time and denies having prior issues with these medications. Has not had a BM In 4-5 days and is passing some gas. Vitals are ok. Nursing removed his PIV from the L antecubital area and some mild purulence came out with associated swelling.     I have discussed this patient's plan of care and discharge plan at IDT rounds today with Case Management, Nursing, Nursing leadership, and other members of the IDT team.    Code Status  Full Code    Disposition  The patient is not medically cleared for discharge to home or a post-acute facility.  Anticipate discharge to: home with close outpatient follow-up    I have placed the appropriate orders for post-discharge needs.    Review of Systems  Review of Systems   Constitutional:  Negative for malaise/fatigue and weight loss.   Respiratory:  Negative for shortness of breath.    Cardiovascular:  Negative for chest pain and palpitations.   Gastrointestinal:  Positive for abdominal pain, nausea and vomiting.    Genitourinary:  Negative for dysuria.   Musculoskeletal:  Negative for neck pain.   Neurological:  Negative for dizziness (upon standing) and headaches.   Psychiatric/Behavioral:  The patient is not nervous/anxious.         Physical Exam  Temp:  [36.4 °C (97.5 °F)-36.6 °C (97.8 °F)] 36.5 °C (97.7 °F)  Pulse:  [64-86] 71  Resp:  [16-18] 18  BP: (130-141)/(64-66) 130/64  SpO2:  [90 %-100 %] 90 %    Physical Exam  Vitals reviewed.   Constitutional:       Appearance: Normal appearance. He is not diaphoretic.      Comments: Appears mostly comfortable lying in bed   HENT:      Head: Normocephalic and atraumatic.      Nose: Nose normal.      Mouth/Throat:      Mouth: Mucous membranes are moist.      Pharynx: No oropharyngeal exudate.   Eyes:      General: No scleral icterus.        Right eye: No discharge.         Left eye: No discharge.      Extraocular Movements: Extraocular movements intact.      Conjunctiva/sclera: Conjunctivae normal.   Cardiovascular:      Rate and Rhythm: Normal rate and regular rhythm.      Pulses:           Radial pulses are 2+ on the right side and 2+ on the left side.        Dorsalis pedis pulses are 2+ on the right side and 2+ on the left side.      Heart sounds: Murmur heard.   Pulmonary:      Effort: Pulmonary effort is normal. No respiratory distress.      Breath sounds: Normal breath sounds. No wheezing or rales.   Abdominal:      General: Bowel sounds are normal. There is no distension.      Palpations: Abdomen is soft. There is no mass.      Tenderness: There is abdominal tenderness (mild, some voluntary guarding). There is guarding. There is no rebound.   Musculoskeletal:         General: No swelling or tenderness.      Cervical back: Neck supple. No muscular tenderness.      Right lower leg: No edema.      Left lower leg: No edema.   Lymphadenopathy:      Cervical: No cervical adenopathy.   Skin:     Coloration: Skin is pale (mild). Skin is not jaundiced.      Comments: Tattoos over  upper chest and bilateral arms   Neurological:      General: No focal deficit present.      Mental Status: He is alert and oriented to person, place, and time. Mental status is at baseline.      Cranial Nerves: No cranial nerve deficit.   Psychiatric:         Mood and Affect: Mood normal.         Behavior: Behavior normal.         Fluids    Intake/Output Summary (Last 24 hours) at 11/20/2023 1516  Last data filed at 11/20/2023 1100  Gross per 24 hour   Intake 600 ml   Output --   Net 600 ml       Laboratory  Recent Labs     11/18/23  2310 11/19/23  0430 11/20/23  0518   WBC 5.1 5.0 6.6   RBC 2.90* 2.94* 3.18*   HEMOGLOBIN 7.8* 7.9* 8.4*   HEMATOCRIT 24.4* 24.2* 26.7*   MCV 84.1 82.3 84.0   MCH 26.9* 26.9* 26.4*   MCHC 32.0* 32.6 31.5*   RDW 53.4* 53.1* 53.1*   PLATELETCT 152* 151* 175   MPV 9.6 9.2 9.4     Recent Labs     11/18/23  1403 11/19/23  0430 11/20/23  0518   SODIUM 142 140 138   POTASSIUM 3.8 4.4 3.9   CHLORIDE 112 109 107   CO2 22 23 21   GLUCOSE 92 85 87   BUN 22 15 10   CREATININE 1.08 1.02 0.96   CALCIUM 7.3* 7.5* 7.9*     Recent Labs     11/17/23  2139   APTT 26.4   INR 1.32*               Imaging  [id number] VIEWS   Final Result      Normal two views of the abdomen.      IR-VISCERAL ANGIOGRAM - SELECTIVE   Final Result      1.  Celiac axis and gastroduodenal arteriograms demonstrating normal anatomy without evidence of active extravasation.      2.  Empiric coiling of the gastroduodenal artery.      3.  Normal right common femoral arteriogram with deployment of Angio-Seal device for hemostasis.            CT-CTA COMPLETE THORACOABDOMINAL AORTA   Final Result   Addendum (preliminary) 1 of 1   Additional history provided of potential gastrointestinal bleeding and    duodenal abnormality.      In review of the scan, there is no evidence of active gastrointestinal    bleeding. There is no contrast extravasation. Also, there is no high    density fluid within the bowel to suggest recent bleeding.       In regards to the duodenum, there is very subtle fat stranding adjacent to    the proximal second portion of the duodenum. Therefore, ulceration or    duodenitis is possible. Recommend endoscopy if not previously performed.      Final      Normal exam. No acute process seen.         DX-CHEST-PORTABLE (1 VIEW)   Final Result      No acute cardiac or pulmonary abnormalities are identified.      US-EXTREMITY VENOUS UPPER UNILAT LEFT    (Results Pending)        Assessment/Plan  * Upper GI bleed- (present on admission)  Assessment & Plan  See above  CTA abd/pelvis negative  No NSAIDS, no EtOH    Left arm swelling  Assessment & Plan  In prior PIV site  Mild purulence, but minimal surrounding erythema, but some swelling  Defer abx's for now  Check LUE US to R/O SVT/DVT  Serial exams    Abdominal pain- (present on admission)  Assessment & Plan  Unclear why such large quantities of IV opiates are needed for his pain  Repeat 2-view abdominal xray personally reviewed by me shows no free air and otherwise normal, some stool  Admission CT scan with minimal findings, vitals are ok  I suspect his N/V is from severe constipation  Greatly reduce IV dilaudid today  Encourage oral oxycodone PRN and oral APAP, defer NSAIDs given ulcerations  Encourage mobility and aggressive bowel regimen  No prior opiate dependence per patient, but I suspect some element of pain medication seeking  See GI ulcer    Normal anion gap metabolic acidosis- (present on admission)  Assessment & Plan  *2/2 loose stools.    Hypertension- (present on admission)  Assessment & Plan  Consider restarting outpatient losartan given hemorrhagic shock resolving    Duodenal ulcer- (present on admission)  Assessment & Plan  Healed in 3/2023. Previous biopsies H. Pylori negative.   CTA abd negative  11/20-keep PPI, Hb stable, advance diet slowly, carafate  11/19 start clear liquid diet  11/18 had GDA embolization by IR    Hematemesis- (present on admission)  Assessment &  Plan  Due to duodenal ulcer    Mitral regurgitation- (present on admission)  Assessment & Plan  Followed by surgery as outpatient consult  Unclear etiology, severe in nature, volume status is ok    Acute blood loss anemia- (present on admission)  Assessment & Plan  Hb stable  I personally reviewed the cbc on 11/20         VTE prophylaxis:   SCDs/TEDs      I have performed a physical exam and reviewed and updated ROS and Plan today (11/20/2023). In review of yesterday's note (11/19/2023), there are no changes except as documented above.

## 2023-11-21 ENCOUNTER — PATIENT MESSAGE (OUTPATIENT)
Dept: CARDIOLOGY | Facility: MEDICAL CENTER | Age: 43
End: 2023-11-21
Payer: COMMERCIAL

## 2023-11-21 ENCOUNTER — APPOINTMENT (OUTPATIENT)
Dept: CARDIOLOGY | Facility: MEDICAL CENTER | Age: 43
DRG: 356 | End: 2023-11-21
Attending: INTERNAL MEDICINE
Payer: COMMERCIAL

## 2023-11-21 ENCOUNTER — APPOINTMENT (OUTPATIENT)
Dept: CARDIOLOGY | Facility: MEDICAL CENTER | Age: 43
End: 2023-11-21
Attending: INTERNAL MEDICINE
Payer: COMMERCIAL

## 2023-11-21 LAB
ALBUMIN SERPL BCP-MCNC: 2.9 G/DL (ref 3.2–4.9)
ALBUMIN/GLOB SERPL: 1.5 G/DL
ALP SERPL-CCNC: 37 U/L (ref 30–99)
ALT SERPL-CCNC: 12 U/L (ref 2–50)
ANION GAP SERPL CALC-SCNC: 7 MMOL/L (ref 7–16)
AST SERPL-CCNC: 11 U/L (ref 12–45)
BASOPHILS # BLD AUTO: 0.5 % (ref 0–1.8)
BASOPHILS # BLD: 0.02 K/UL (ref 0–0.12)
BILIRUB SERPL-MCNC: 0.2 MG/DL (ref 0.1–1.5)
BUN SERPL-MCNC: 7 MG/DL (ref 8–22)
CALCIUM ALBUM COR SERPL-MCNC: 8.9 MG/DL (ref 8.5–10.5)
CALCIUM SERPL-MCNC: 8 MG/DL (ref 8.5–10.5)
CHLORIDE SERPL-SCNC: 110 MMOL/L (ref 96–112)
CO2 SERPL-SCNC: 25 MMOL/L (ref 20–33)
CREAT SERPL-MCNC: 0.97 MG/DL (ref 0.5–1.4)
EOSINOPHIL # BLD AUTO: 0.07 K/UL (ref 0–0.51)
EOSINOPHIL NFR BLD: 1.6 % (ref 0–6.9)
ERYTHROCYTE [DISTWIDTH] IN BLOOD BY AUTOMATED COUNT: 51.7 FL (ref 35.9–50)
GFR SERPLBLD CREATININE-BSD FMLA CKD-EPI: 100 ML/MIN/1.73 M 2
GLOBULIN SER CALC-MCNC: 1.9 G/DL (ref 1.9–3.5)
GLUCOSE SERPL-MCNC: 120 MG/DL (ref 65–99)
HCT VFR BLD AUTO: 26.4 % (ref 42–52)
HGB BLD-MCNC: 8.8 G/DL (ref 14–18)
IMM GRANULOCYTES # BLD AUTO: 0.02 K/UL (ref 0–0.11)
IMM GRANULOCYTES NFR BLD AUTO: 0.5 % (ref 0–0.9)
LYMPHOCYTES # BLD AUTO: 0.96 K/UL (ref 1–4.8)
LYMPHOCYTES NFR BLD: 21.9 % (ref 22–41)
MCH RBC QN AUTO: 27.3 PG (ref 27–33)
MCHC RBC AUTO-ENTMCNC: 33.3 G/DL (ref 32.3–36.5)
MCV RBC AUTO: 82 FL (ref 81.4–97.8)
MONOCYTES # BLD AUTO: 0.38 K/UL (ref 0–0.85)
MONOCYTES NFR BLD AUTO: 8.7 % (ref 0–13.4)
NEUTROPHILS # BLD AUTO: 2.94 K/UL (ref 1.82–7.42)
NEUTROPHILS NFR BLD: 66.8 % (ref 44–72)
NRBC # BLD AUTO: 0 K/UL
NRBC BLD-RTO: 0 /100 WBC (ref 0–0.2)
PLATELET # BLD AUTO: 200 K/UL (ref 164–446)
PMV BLD AUTO: 9.1 FL (ref 9–12.9)
POTASSIUM SERPL-SCNC: 3.6 MMOL/L (ref 3.6–5.5)
PROT SERPL-MCNC: 4.8 G/DL (ref 6–8.2)
RBC # BLD AUTO: 3.22 M/UL (ref 4.7–6.1)
SODIUM SERPL-SCNC: 142 MMOL/L (ref 135–145)
WBC # BLD AUTO: 4.4 K/UL (ref 4.8–10.8)

## 2023-11-21 PROCEDURE — A9270 NON-COVERED ITEM OR SERVICE: HCPCS | Performed by: STUDENT IN AN ORGANIZED HEALTH CARE EDUCATION/TRAINING PROGRAM

## 2023-11-21 PROCEDURE — 700101 HCHG RX REV CODE 250

## 2023-11-21 PROCEDURE — C9113 INJ PANTOPRAZOLE SODIUM, VIA: HCPCS | Performed by: INTERNAL MEDICINE

## 2023-11-21 PROCEDURE — 700111 HCHG RX REV CODE 636 W/ 250 OVERRIDE (IP): Performed by: STUDENT IN AN ORGANIZED HEALTH CARE EDUCATION/TRAINING PROGRAM

## 2023-11-21 PROCEDURE — 700111 HCHG RX REV CODE 636 W/ 250 OVERRIDE (IP): Performed by: INTERNAL MEDICINE

## 2023-11-21 PROCEDURE — 4A023N7 MEASUREMENT OF CARDIAC SAMPLING AND PRESSURE, LEFT HEART, PERCUTANEOUS APPROACH: ICD-10-PCS | Performed by: INTERNAL MEDICINE

## 2023-11-21 PROCEDURE — B2111ZZ FLUOROSCOPY OF MULTIPLE CORONARY ARTERIES USING LOW OSMOLAR CONTRAST: ICD-10-PCS | Performed by: INTERNAL MEDICINE

## 2023-11-21 PROCEDURE — B2151ZZ FLUOROSCOPY OF LEFT HEART USING LOW OSMOLAR CONTRAST: ICD-10-PCS | Performed by: INTERNAL MEDICINE

## 2023-11-21 PROCEDURE — 700111 HCHG RX REV CODE 636 W/ 250 OVERRIDE (IP)

## 2023-11-21 PROCEDURE — 700102 HCHG RX REV CODE 250 W/ 637 OVERRIDE(OP): Performed by: STUDENT IN AN ORGANIZED HEALTH CARE EDUCATION/TRAINING PROGRAM

## 2023-11-21 PROCEDURE — 700102 HCHG RX REV CODE 250 W/ 637 OVERRIDE(OP)

## 2023-11-21 PROCEDURE — A9270 NON-COVERED ITEM OR SERVICE: HCPCS

## 2023-11-21 PROCEDURE — 80053 COMPREHEN METABOLIC PANEL: CPT

## 2023-11-21 PROCEDURE — 93458 L HRT ARTERY/VENTRICLE ANGIO: CPT | Mod: 26 | Performed by: INTERNAL MEDICINE

## 2023-11-21 PROCEDURE — 160002 HCHG RECOVERY MINUTES (STAT)

## 2023-11-21 PROCEDURE — 160035 HCHG PACU - 1ST 60 MINS PHASE I

## 2023-11-21 PROCEDURE — 85025 COMPLETE CBC W/AUTO DIFF WBC: CPT

## 2023-11-21 PROCEDURE — 700102 HCHG RX REV CODE 250 W/ 637 OVERRIDE(OP): Performed by: INTERNAL MEDICINE

## 2023-11-21 PROCEDURE — 770006 HCHG ROOM/CARE - MED/SURG/GYN SEMI*

## 2023-11-21 PROCEDURE — 99152 MOD SED SAME PHYS/QHP 5/>YRS: CPT

## 2023-11-21 PROCEDURE — 700105 HCHG RX REV CODE 258: Performed by: INTERNAL MEDICINE

## 2023-11-21 PROCEDURE — A9270 NON-COVERED ITEM OR SERVICE: HCPCS | Performed by: INTERNAL MEDICINE

## 2023-11-21 PROCEDURE — 99233 SBSQ HOSP IP/OBS HIGH 50: CPT | Performed by: STUDENT IN AN ORGANIZED HEALTH CARE EDUCATION/TRAINING PROGRAM

## 2023-11-21 PROCEDURE — 99152 MOD SED SAME PHYS/QHP 5/>YRS: CPT | Performed by: INTERNAL MEDICINE

## 2023-11-21 PROCEDURE — 700117 HCHG RX CONTRAST REV CODE 255: Performed by: INTERNAL MEDICINE

## 2023-11-21 RX ORDER — MIDAZOLAM HYDROCHLORIDE 1 MG/ML
INJECTION INTRAMUSCULAR; INTRAVENOUS
Status: COMPLETED
Start: 2023-11-21 | End: 2023-11-21

## 2023-11-21 RX ORDER — OXYCODONE HYDROCHLORIDE 10 MG/1
20 TABLET ORAL EVERY 4 HOURS PRN
Status: DISCONTINUED | OUTPATIENT
Start: 2023-11-21 | End: 2023-11-22 | Stop reason: HOSPADM

## 2023-11-21 RX ORDER — SODIUM CHLORIDE 9 MG/ML
3 INJECTION, SOLUTION INTRAVENOUS CONTINUOUS
Status: ACTIVE | OUTPATIENT
Start: 2023-11-21 | End: 2023-11-21

## 2023-11-21 RX ORDER — HYDROMORPHONE HYDROCHLORIDE 1 MG/ML
1 INJECTION, SOLUTION INTRAMUSCULAR; INTRAVENOUS; SUBCUTANEOUS EVERY 4 HOURS PRN
Status: DISCONTINUED | OUTPATIENT
Start: 2023-11-21 | End: 2023-11-21

## 2023-11-21 RX ORDER — LIDOCAINE HYDROCHLORIDE 20 MG/ML
INJECTION, SOLUTION INFILTRATION; PERINEURAL
Status: COMPLETED
Start: 2023-11-21 | End: 2023-11-21

## 2023-11-21 RX ORDER — HEPARIN SODIUM 1000 [USP'U]/ML
INJECTION, SOLUTION INTRAVENOUS; SUBCUTANEOUS
Status: COMPLETED
Start: 2023-11-21 | End: 2023-11-21

## 2023-11-21 RX ORDER — OXYCODONE HYDROCHLORIDE 10 MG/1
20 TABLET ORAL EVERY 4 HOURS PRN
Status: DISCONTINUED | OUTPATIENT
Start: 2023-11-21 | End: 2023-11-21

## 2023-11-21 RX ORDER — OXYCODONE HYDROCHLORIDE 10 MG/1
10 TABLET ORAL EVERY 4 HOURS PRN
Status: DISCONTINUED | OUTPATIENT
Start: 2023-11-21 | End: 2023-11-22 | Stop reason: HOSPADM

## 2023-11-21 RX ORDER — HEPARIN SODIUM 200 [USP'U]/100ML
INJECTION, SOLUTION INTRAVENOUS
Status: COMPLETED
Start: 2023-11-21 | End: 2023-11-21

## 2023-11-21 RX ORDER — VERAPAMIL HYDROCHLORIDE 2.5 MG/ML
INJECTION, SOLUTION INTRAVENOUS
Status: COMPLETED
Start: 2023-11-21 | End: 2023-11-21

## 2023-11-21 RX ORDER — OXYCODONE HYDROCHLORIDE 10 MG/1
10 TABLET ORAL EVERY 4 HOURS PRN
Status: DISCONTINUED | OUTPATIENT
Start: 2023-11-21 | End: 2023-11-21

## 2023-11-21 RX ORDER — HYDROMORPHONE HYDROCHLORIDE 1 MG/ML
1 INJECTION, SOLUTION INTRAMUSCULAR; INTRAVENOUS; SUBCUTANEOUS EVERY 4 HOURS PRN
Status: DISCONTINUED | OUTPATIENT
Start: 2023-11-21 | End: 2023-11-22

## 2023-11-21 RX ADMIN — FENTANYL CITRATE 100 MCG: 50 INJECTION, SOLUTION INTRAMUSCULAR; INTRAVENOUS at 09:08

## 2023-11-21 RX ADMIN — SUCRALFATE ORAL 1 G: 1 SUSPENSION ORAL at 04:28

## 2023-11-21 RX ADMIN — HEPARIN SODIUM: 1000 INJECTION, SOLUTION INTRAVENOUS; SUBCUTANEOUS at 08:48

## 2023-11-21 RX ADMIN — FENTANYL CITRATE 50 MCG: 50 INJECTION, SOLUTION INTRAMUSCULAR; INTRAVENOUS at 09:11

## 2023-11-21 RX ADMIN — PANTOPRAZOLE SODIUM 40 MG: 40 INJECTION, POWDER, FOR SOLUTION INTRAVENOUS at 04:28

## 2023-11-21 RX ADMIN — OXYCODONE HYDROCHLORIDE 20 MG: 10 TABLET ORAL at 02:43

## 2023-11-21 RX ADMIN — SUCRALFATE ORAL 1 G: 1 SUSPENSION ORAL at 23:02

## 2023-11-21 RX ADMIN — OXYCODONE HYDROCHLORIDE 20 MG: 10 TABLET ORAL at 20:58

## 2023-11-21 RX ADMIN — HYDROMORPHONE HYDROCHLORIDE 1 MG: 1 INJECTION, SOLUTION INTRAMUSCULAR; INTRAVENOUS; SUBCUTANEOUS at 12:12

## 2023-11-21 RX ADMIN — HYDROMORPHONE HYDROCHLORIDE 1 MG: 1 INJECTION, SOLUTION INTRAMUSCULAR; INTRAVENOUS; SUBCUTANEOUS at 04:26

## 2023-11-21 RX ADMIN — SUCRALFATE ORAL 1 G: 1 SUSPENSION ORAL at 00:17

## 2023-11-21 RX ADMIN — SUCRALFATE ORAL 1 G: 1 SUSPENSION ORAL at 17:37

## 2023-11-21 RX ADMIN — ACETAMINOPHEN 1000 MG: 500 TABLET, FILM COATED ORAL at 00:17

## 2023-11-21 RX ADMIN — LIDOCAINE HYDROCHLORIDE: 20 INJECTION, SOLUTION INFILTRATION; PERINEURAL at 08:47

## 2023-11-21 RX ADMIN — IOHEXOL 50 ML: 350 INJECTION, SOLUTION INTRAVENOUS at 09:13

## 2023-11-21 RX ADMIN — HYDROMORPHONE HYDROCHLORIDE 1 MG: 1 INJECTION, SOLUTION INTRAMUSCULAR; INTRAVENOUS; SUBCUTANEOUS at 22:30

## 2023-11-21 RX ADMIN — HYDROMORPHONE HYDROCHLORIDE 1 MG: 1 INJECTION, SOLUTION INTRAMUSCULAR; INTRAVENOUS; SUBCUTANEOUS at 18:14

## 2023-11-21 RX ADMIN — OXYCODONE HYDROCHLORIDE 20 MG: 10 TABLET ORAL at 09:35

## 2023-11-21 RX ADMIN — MIDAZOLAM HYDROCHLORIDE 2 MG: 1 INJECTION, SOLUTION INTRAMUSCULAR; INTRAVENOUS at 09:06

## 2023-11-21 RX ADMIN — ACETAMINOPHEN 1000 MG: 500 TABLET, FILM COATED ORAL at 17:37

## 2023-11-21 RX ADMIN — ACETAMINOPHEN 1000 MG: 500 TABLET, FILM COATED ORAL at 12:07

## 2023-11-21 RX ADMIN — VERAPAMIL HYDROCHLORIDE 2.5 MG: 2.5 INJECTION, SOLUTION INTRAVENOUS at 08:48

## 2023-11-21 RX ADMIN — SODIUM CHLORIDE 3 ML/KG/HR: 9 INJECTION, SOLUTION INTRAVENOUS at 12:11

## 2023-11-21 RX ADMIN — HEPARIN SODIUM 2000 UNITS: 200 INJECTION, SOLUTION INTRAVENOUS at 08:30

## 2023-11-21 RX ADMIN — ACETAMINOPHEN 1000 MG: 500 TABLET, FILM COATED ORAL at 23:02

## 2023-11-21 RX ADMIN — OXYCODONE HYDROCHLORIDE 20 MG: 10 TABLET ORAL at 15:51

## 2023-11-21 RX ADMIN — PANTOPRAZOLE SODIUM 40 MG: 40 INJECTION, POWDER, FOR SOLUTION INTRAVENOUS at 17:38

## 2023-11-21 RX ADMIN — SUCRALFATE ORAL 1 G: 1 SUSPENSION ORAL at 12:07

## 2023-11-21 RX ADMIN — NITROGLYCERIN 10 ML: 20 INJECTION INTRAVENOUS at 08:48

## 2023-11-21 ASSESSMENT — PAIN DESCRIPTION - PAIN TYPE
TYPE: ACUTE PAIN

## 2023-11-21 ASSESSMENT — ENCOUNTER SYMPTOMS
VOMITING: 1
ABDOMINAL PAIN: 1
SHORTNESS OF BREATH: 0
WEIGHT LOSS: 0
NERVOUS/ANXIOUS: 0
PALPITATIONS: 0
DIZZINESS: 0
NAUSEA: 1
NECK PAIN: 0
HEADACHES: 0

## 2023-11-21 NOTE — PROGRESS NOTES
Radiology Progress Note   Author: SHAJI Baeza Date & Time created: 11/20/2023  4:04 PM   Date of admission  11/17/2023  Note to reader: this note follows the APSO format rather than the historical SOAP format. Assessment and plan located at the top of the note for ease of use.    Chief Complaint  43 y.o. male admitted 11/17/2023 with   Chief Complaint   Patient presents with    Throwing Up Blood     Pt was walking, became extremely nauseous and dizzy, and started throwing up bright red blood.     Abdominal Pain     Generalized abdominal pain x2 hours.          HPI  43-year-old male with history of duodenal ulcer, mitral valve regurgitation admitted 11/17/2023 for hematemesis, melena, and abdominal pain.  Patient underwent an angiogram that did not show any apparent active extravasation followed by an empiric GDA coil embolization with IR Dr. Ortiz on 11/18/2023.    Interval history:  11/20/2023 - hemoglobin and hematocrit improving.  Transferred out of ICU to general medical floor yesterday.      Assessment/Plan     Principal Problem:    Upper GI bleed  Active Problems:    Acute blood loss anemia    Mitral regurgitation    Hematemesis    Duodenal ulcer    Hypertension    Normal anion gap metabolic acidosis    Abdominal pain    Left arm swelling      Plan IR  - Post-angioseal instructions: no lifting greater than 5 lbs and no baths/ swimming/ soaking in tub for 5 days. Shower OK. OK to change dressings/band aid as needed.  - H&H improving; no further interventions planned at this time  - Signing off    -  -Thank you for allowing Interventional Radiology team to participate in the patients care, if any additional care or requests are needed in the future please do not hesitate call or place IR order   [phone]           Review of Systems  Physical Exam   Review of Systems   Constitutional:  Positive for malaise/fatigue. Negative for chills and fever.   Respiratory:  Negative for shortness of breath.     Cardiovascular:  Negative for chest pain and palpitations.   Gastrointestinal:  Positive for abdominal pain and nausea. Negative for vomiting.   Neurological:  Negative for dizziness, weakness and headaches.      Vitals:    11/20/23 1501   BP:    Pulse:    Resp: 18   Temp:    SpO2:         Physical Exam  Constitutional:       General: He is sleeping.   Cardiovascular:      Rate and Rhythm: Normal rate.      Pulses: Normal pulses.   Pulmonary:      Effort: Pulmonary effort is normal.   Abdominal:      Palpations: Abdomen is soft.      Tenderness: There is abdominal tenderness.   Skin:     General: Skin is warm and dry.      Comments: Right groin access site soft, non-tender.   Neurological:      General: No focal deficit present.      Mental Status: He is oriented to person, place, and time and easily aroused.   Psychiatric:         Mood and Affect: Mood normal.         Behavior: Behavior normal.             Labs    Recent Labs     11/18/23  2310 11/19/23  0430 11/20/23  0518   WBC 5.1 5.0 6.6   RBC 2.90* 2.94* 3.18*   HEMOGLOBIN 7.8* 7.9* 8.4*   HEMATOCRIT 24.4* 24.2* 26.7*   MCV 84.1 82.3 84.0   MCH 26.9* 26.9* 26.4*   MCHC 32.0* 32.6 31.5*   RDW 53.4* 53.1* 53.1*   PLATELETCT 152* 151* 175   MPV 9.6 9.2 9.4     Recent Labs     11/18/23  1403 11/19/23  0430 11/20/23  0518   SODIUM 142 140 138   POTASSIUM 3.8 4.4 3.9   CHLORIDE 112 109 107   CO2 22 23 21   GLUCOSE 92 85 87   BUN 22 15 10   CREATININE 1.08 1.02 0.96   CALCIUM 7.3* 7.5* 7.9*     Recent Labs     11/17/23  2139 11/18/23  0217 11/18/23  1403 11/19/23  0430 11/20/23  0518   ALBUMIN 3.4 2.7*  --  2.7*  --    TBILIRUBIN 0.2 0.5  --  0.5  --    ALKPHOSPHAT 35 27*  --  35  --    TOTPROTEIN 5.0* 4.0*  --  4.8*  --    ALTSGPT 13 10  --  9  --    ASTSGOT 12 10*  --  15  --    CREATININE 1.36 1.13 1.08 1.02 0.96     [id number]-4 VIEWS   Final Result      Normal two views of the abdomen.      IR-VISCERAL ANGIOGRAM - SELECTIVE   Final Result      1.  Celiac axis  "and gastroduodenal arteriograms demonstrating normal anatomy without evidence of active extravasation.      2.  Empiric coiling of the gastroduodenal artery.      3.  Normal right common femoral arteriogram with deployment of Angio-Seal device for hemostasis.            CT-CTA COMPLETE THORACOABDOMINAL AORTA   Final Result   Addendum (preliminary) 1 of 1   Additional history provided of potential gastrointestinal bleeding and    duodenal abnormality.      In review of the scan, there is no evidence of active gastrointestinal    bleeding. There is no contrast extravasation. Also, there is no high    density fluid within the bowel to suggest recent bleeding.      In regards to the duodenum, there is very subtle fat stranding adjacent to    the proximal second portion of the duodenum. Therefore, ulceration or    duodenitis is possible. Recommend endoscopy if not previously performed.      Final      Normal exam. No acute process seen.         DX-CHEST-PORTABLE (1 VIEW)   Final Result      No acute cardiac or pulmonary abnormalities are identified.      US-EXTREMITY VENOUS UPPER UNILAT LEFT    (Results Pending)       INR   Date Value Ref Range Status   11/17/2023 1.32 (H) 0.87 - 1.13 Final     Comment:     INR - Non-therapeutic Reference Range: 0.87-1.13  INR - Therapeutic Reference Range: 2.0-4.0       No results found for: \"POCINR\"     Intake/Output Summary (Last 24 hours) at 11/20/2023 1604  Last data filed at 11/20/2023 1100  Gross per 24 hour   Intake 600 ml   Output --   Net 600 ml      Labs not explicitly included in this progress note were reviewed by the author. Radiology/imaging not explicitly included in this progress note was reviewed by the author.     I have performed a physical exam and reviewed and updated ROS and Plan today (11/20/2023).     40 minutes in directly providing and coordinating care and extensive data review.  No time overlap and excludes procedures.  "

## 2023-11-21 NOTE — PROCEDURES
Cardiac Catheterization report    11/21/2023  9:23 AM      Primary Care Provider: Sal Schafer D.O.    Indication for procedure: Severe mitral regurgitation    Procedures performed:   Coronary arteriograms  Left heart catheterization and Left ventriculogram     Final impression:  Angiographically normal appearing coronary arteries.  Dilated aortic root    Findings:  1.  Left main coronary artery:  Normal.  2.  Left anterior descending artery:  Normal. Two major diagonal branches are seen.  3.  Left circumflex coronary artery:  Normal. Gives one large marginal branch, which has no significant disease   4.  Right coronary artery:  Normal.  This is a right dominant system.  5.  Left ventricular end diastolic pressure:  12 mmHg.  No signficant gradient across the aortic valve.  6. Left ventriculogram showed normal EF 65%, dilated aortic root.    EBL: <10 CC    Specimens: None    Procedure details:  The risks and benefits of cardiac catheterization and possible intervention were explained to the patient including death, heart attack, stroke, and emergency surgery.  The patient verbalized understanding and wished to proceed.  The patient was brought to the cardiac catheterization laboratory in the fasting state and prepped and draped in the usual sterile fashion.  Timeout was performed.  The right wrist was locally anesthetized with lidocaine and the right radial artery was cannulated with 5/6-[place] equipment and standard radial cocktail was given.  Coronary angiography was performed using JR 4 and JL 3.5  diagnostic catheters in the usual fashion, results mentioned below.  Pigtail catheter was used to cross the aortic valve to perform  left heart catheterization and left ventriculogram.      Once all the views were obtained, all wires and catheters were removed from the patient without difficulty.  A Vasc-Band was placed over the right radial artery and the radial artery sheath was removed without difficulty.       Complications:  None    Contrast: 50 cc    Sedation time:  I supervised moderate sedation over a trained independent nursing staff,  Sedation Start time:  08:55   Sedation Stop time: 09:15      VINCE Camargo  [facility]

## 2023-11-21 NOTE — PATIENT COMMUNICATION
Phone Number Called: [phone]    Call outcome:  spoke to [employer]     Message: Spoke to [employer] and provided updated information on patients situation. Advised to send updated notes from current admission at this time.

## 2023-11-21 NOTE — CARE PLAN
The patient is Stable - Low risk of patient condition declining or worsening    Shift Goals  Clinical Goals: Patient's pain will be <5 throughout shift  Patient Goals: Sleep and rest  Family Goals: JJ    Progress made toward(s) clinical / shift goals:      Patient is alert and oriented x4; able to communicate needs. Patient schedule for mitral clip this morning. Patient on NPO sips with meds. Patient's pain is currently controlled with PRN pain meds and rest. Call light within reach.

## 2023-11-21 NOTE — PROGRESS NOTES
Patient has a schedule mitral clip procedure tomorrow at [facility]; APRN notified, NPO sips with meds ordered.

## 2023-11-21 NOTE — OR NURSING
0931: Pt arrived from cath lab post L heart cath. Pt is awake and alert. R radial sight is CDI with TR band; pt reports some pain radiating up arm from wrist. Cardiac rhythm appears to be SR.     0938: POC discussed; pt tolerating orals.     1127: TR band removed; gauze and tegaderm to sight; education provided.     1131: Report to KARSON Fischer.     1138: Pt back to room via bed with transport.

## 2023-11-21 NOTE — PROGRESS NOTES
[facility] Medicine Daily Progress Note    Date of Service  11/21/2023    Chief Complaint  Vomiting blood and dark stools.    Hospital Course  Roberto Braswell is a 43 y.o. male admitted 11/17/2023 with vomiting bright red blood and black stool.  He has a history of a known duodenal ulcer for which he has been battling over the past year.    He has the additional history of severe mitral valve regurgitation and is suppose to have near future surgical repair.  His last EGD was 9/14/23 with nonhealing duodenal ulcer. He was admitted but had additional hematemesis and due to hemorrhagic shock was transferred to ICU for massive transfusion.  He required GDA embolization.      Interval Problem Update  No acute events overnight.  Patient had left heart catheterization and angiogram today in anticipation of outpatient mitral valve repair. LHC showing clear coronary arteries.  Discussed with cardiothoracic surgery, not ideal timing now for open surgical repair as he would need to be on anticoagulation for 3 months post op. Due to currently healing duodenal ulcer bleed causing hemorrhagic shock, do not recommend heart surgery and AC until cleared by GI.  Recommend close follow up with GI next month for EGD to assess healing of known duodenal ulcer, then can follow up with CTS thereafter for open repair of mitral valve. Discussed plan of care with patient who is understanding.  GI referral placed.  Patient feeling well overall, having dark stools.  Hgb stable and uptrending, monitor.  Resume diet.  Bilateral upper extremity US ordered to evaluate for suspected superficial thrombophlebitis.  Anticipate discharge home tomorrow if hgb remains stable and patient tolerating oral diet.    Code Status  Full Code    Disposition  The patient is not medically cleared for discharge to home or a post-acute facility.  Anticipate discharge to: home with close outpatient follow-up    I have placed the appropriate orders for post-discharge  needs.    Review of Systems  Review of Systems   Constitutional:  Negative for malaise/fatigue and weight loss.   Respiratory:  Negative for shortness of breath.    Cardiovascular:  Negative for chest pain and palpitations.   Gastrointestinal:  Positive for abdominal pain, nausea and vomiting.   Genitourinary:  Negative for dysuria.   Musculoskeletal:  Negative for neck pain.   Neurological:  Negative for dizziness (upon standing) and headaches.   Psychiatric/Behavioral:  The patient is not nervous/anxious.         Physical Exam  Temp:  [36.4 °C (97.5 °F)-37.3 °C (99.1 °F)] 36.9 °C (98.4 °F)  Pulse:  [61-96] (P) 69  Resp:  [11-25] (P) 17  BP: (133-154)/(61-89) (P) 154/84  SpO2:  [89 %-98 %] (P) 98 %    Physical Exam  Vitals reviewed.   Constitutional:       Appearance: Normal appearance. He is not diaphoretic.      Comments: Appears mostly comfortable lying in bed   HENT:      Head: Normocephalic and atraumatic.      Nose: Nose normal.      Mouth/Throat:      Mouth: Mucous membranes are moist.      Pharynx: No oropharyngeal exudate.   Eyes:      General: No scleral icterus.        Right eye: No discharge.         Left eye: No discharge.      Extraocular Movements: Extraocular movements intact.      Conjunctiva/sclera: Conjunctivae normal.   Cardiovascular:      Rate and Rhythm: Normal rate and regular rhythm.      Pulses:           Radial pulses are 2+ on the right side and 2+ on the left side.        Dorsalis pedis pulses are 2+ on the right side and 2+ on the left side.      Heart sounds: Murmur heard.   Pulmonary:      Effort: Pulmonary effort is normal. No respiratory distress.      Breath sounds: Normal breath sounds. No wheezing or rales.   Abdominal:      General: Bowel sounds are normal. There is no distension.      Palpations: Abdomen is soft. There is no mass.      Tenderness: There is abdominal tenderness (mild, some voluntary guarding). There is guarding. There is no rebound.   Musculoskeletal:          General: No swelling or tenderness.      Cervical back: Neck supple. No muscular tenderness.      Right lower leg: No edema.      Left lower leg: No edema.   Lymphadenopathy:      Cervical: No cervical adenopathy.   Skin:     Coloration: Skin is pale (mild). Skin is not jaundiced.      Comments: Tattoos over upper chest and bilateral arms   Neurological:      General: No focal deficit present.      Mental Status: He is alert and oriented to person, place, and time. Mental status is at baseline.      Cranial Nerves: No cranial nerve deficit.   Psychiatric:         Mood and Affect: Mood normal.         Behavior: Behavior normal.         Fluids    Intake/Output Summary (Last 24 hours) at 11/21/2023 1431  Last data filed at 11/20/2023 2125  Gross per 24 hour   Intake 240 ml   Output --   Net 240 ml       Laboratory  Recent Labs     11/19/23  0430 11/20/23  0518 11/21/23  0204   WBC 5.0 6.6 4.4*   RBC 2.94* 3.18* 3.22*   HEMOGLOBIN 7.9* 8.4* 8.8*   HEMATOCRIT 24.2* 26.7* 26.4*   MCV 82.3 84.0 82.0   MCH 26.9* 26.4* 27.3   MCHC 32.6 31.5* 33.3   RDW 53.1* 53.1* 51.7*   PLATELETCT 151* 175 200   MPV 9.2 9.4 9.1     Recent Labs     11/19/23  0430 11/20/23  0518 11/21/23  0204   SODIUM 140 138 142   POTASSIUM 4.4 3.9 3.6   CHLORIDE 109 107 110   CO2 23 21 25   GLUCOSE 85 87 120*   BUN 15 10 7*   CREATININE 1.02 0.96 0.97   CALCIUM 7.5* 7.9* 8.0*                     Imaging  [id number] [id number]   Final Result      Normal two views of the abdomen.      IR-VISCERAL ANGIOGRAM - SELECTIVE   Final Result      1.  Celiac axis and gastroduodenal arteriograms demonstrating normal anatomy without evidence of active extravasation.      2.  Empiric coiling of the gastroduodenal artery.      3.  Normal right common femoral arteriogram with deployment of Angio-Seal device for hemostasis.            CT-CTA COMPLETE THORACOABDOMINAL AORTA   Final Result   Addendum (preliminary) 1 of 1   Additional history provided of potential  gastrointestinal bleeding and    duodenal abnormality.      In review of the scan, there is no evidence of active gastrointestinal    bleeding. There is no contrast extravasation. Also, there is no high    density fluid within the bowel to suggest recent bleeding.      In regards to the duodenum, there is very subtle fat stranding adjacent to    the proximal second portion of the duodenum. Therefore, ulceration or    duodenitis is possible. Recommend endoscopy if not previously performed.      Final      Normal exam. No acute process seen.         DX-CHEST-PORTABLE (1 VIEW)   Final Result      No acute cardiac or pulmonary abnormalities are identified.      CL-LEFT HEART CATHETERIZATION WITH POSSIBLE INTERVENTION    (Results Pending)   US-EXTREMITY VENOUS UPPER BILAT    (Results Pending)        Assessment/Plan  * Upper GI bleed- (present on admission)  Assessment & Plan  See above  CTA abd/pelvis negative  No NSAIDS, no EtOH    Left arm swelling  Assessment & Plan  In prior PIV site  Mild purulence, but minimal surrounding erythema, but some swelling  Defer abx's for now  Check LUE US to R/O SVT/DVT  Serial exams    Abdominal pain- (present on admission)  Assessment & Plan  Unclear why such large quantities of IV opiates are needed for his pain  Repeat 2-view abdominal xray personally reviewed by me shows no free air and otherwise normal, some stool  Admission CT scan with minimal findings, vitals are ok  I suspect his N/V is from severe constipation  Greatly reduce IV dilaudid today  Encourage oral oxycodone PRN and oral APAP, defer NSAIDs given ulcerations  Encourage mobility and aggressive bowel regimen  No prior opiate dependence per patient, but I suspect some element of pain medication seeking  See GI ulcer    Normal anion gap metabolic acidosis- (present on admission)  Assessment & Plan  *2/2 loose stools.    Hypertension- (present on admission)  Assessment & Plan  Consider restarting outpatient losartan  given hemorrhagic shock resolving    Duodenal ulcer- (present on admission)  Assessment & Plan  Healed in 3/2023. Previous biopsies H. Pylori negative.   CTA abd negative  11/20-keep PPI, Hb stable, advance diet slowly, carafate  11/19 start clear liquid diet  11/18 had GDA embolization by IR    Hematemesis- (present on admission)  Assessment & Plan  Due to duodenal ulcer    Mitral regurgitation- (present on admission)  Assessment & Plan  Followed by surgery as outpatient consult  Unclear etiology, severe in nature, volume status is ok    Acute blood loss anemia- (present on admission)  Assessment & Plan  Hb stable  I personally reviewed the cbc on 11/20         VTE prophylaxis:   SCDs/TEDs      My total time spent caring for the patient on the day of the encounter was 53 minutes. This time includes reviewing the hospital chart vitals, lab tests, and imaging; counseling and educating the patient about their diagnoses; coordinating care with the nurse, and specialists; documenting clinical information in the electronic medical record.  This does not include time spent on separately billable procedures/tests.

## 2023-11-21 NOTE — CARE PLAN
"The patient is Stable - Low risk of patient condition declining or worsening    Shift Goals  Clinical Goals: Pt will be able to tolerate advancing diet with out vomitting after eating  Patient Goals: Pain control  Family Goals: JJ    Progress made toward(s) clinical / shift goals: Pt reports not vomitting post lunch  Patient  rating pain at 7/10 after medication but stating that is \"tolerable\" for him    Problem: Pain - Standard  Goal: Alleviation of pain or a reduction in pain to the patient’s comfort goal  Outcome: Progressing     Problem: Knowledge Deficit - Standard  Goal: Patient and family/care givers will demonstrate understanding of plan of care, disease process/condition, diagnostic tests and medications  Outcome: Progressing     Problem: Skin Integrity  Goal: Skin integrity is maintained or improved  Outcome: Progressing     Problem: Fall Risk  Goal: Patient will remain free from falls  Outcome: Progressing     Problem: Risk for Fluid Volume Deficit Related to Bleeding  Goal: Fluid volume balance will be maintained  Outcome: Progressing     Problem: Risk for Bleeding  Goal: Patient will not experience bleeding as evidenced by normal blood pressure, stable hematocrit and hemoglobin levels and desired ranges for coagulation profiles  Outcome: Progressing       Patient is not progressing towards the following goals: pt reports vomitting after breakfast      "

## 2023-11-22 ENCOUNTER — PHARMACY VISIT (OUTPATIENT)
Dept: PHARMACY | Facility: MEDICAL CENTER | Age: 43
End: 2023-11-22
Payer: COMMERCIAL

## 2023-11-22 ENCOUNTER — APPOINTMENT (OUTPATIENT)
Dept: RADIOLOGY | Facility: MEDICAL CENTER | Age: 43
DRG: 356 | End: 2023-11-22
Attending: STUDENT IN AN ORGANIZED HEALTH CARE EDUCATION/TRAINING PROGRAM
Payer: COMMERCIAL

## 2023-11-22 VITALS
DIASTOLIC BLOOD PRESSURE: 75 MMHG | WEIGHT: 235.45 LBS | TEMPERATURE: 98.3 F | BODY MASS INDEX: 29.28 KG/M2 | HEART RATE: 68 BPM | OXYGEN SATURATION: 94 % | HEIGHT: 75 IN | SYSTOLIC BLOOD PRESSURE: 143 MMHG | RESPIRATION RATE: 17 BRPM

## 2023-11-22 LAB
ANION GAP SERPL CALC-SCNC: 9 MMOL/L (ref 7–16)
BUN SERPL-MCNC: 6 MG/DL (ref 8–22)
CALCIUM SERPL-MCNC: 8.1 MG/DL (ref 8.5–10.5)
CHLORIDE SERPL-SCNC: 109 MMOL/L (ref 96–112)
CO2 SERPL-SCNC: 22 MMOL/L (ref 20–33)
CREAT SERPL-MCNC: 1.02 MG/DL (ref 0.5–1.4)
ERYTHROCYTE [DISTWIDTH] IN BLOOD BY AUTOMATED COUNT: 54.1 FL (ref 35.9–50)
GFR SERPLBLD CREATININE-BSD FMLA CKD-EPI: 94 ML/MIN/1.73 M 2
GLUCOSE SERPL-MCNC: 91 MG/DL (ref 65–99)
HCT VFR BLD AUTO: 26.1 % (ref 42–52)
HGB BLD-MCNC: 8.4 G/DL (ref 14–18)
MCH RBC QN AUTO: 26.7 PG (ref 27–33)
MCHC RBC AUTO-ENTMCNC: 32.2 G/DL (ref 32.3–36.5)
MCV RBC AUTO: 82.9 FL (ref 81.4–97.8)
PLATELET # BLD AUTO: 223 K/UL (ref 164–446)
PMV BLD AUTO: 9 FL (ref 9–12.9)
POTASSIUM SERPL-SCNC: 3.4 MMOL/L (ref 3.6–5.5)
RBC # BLD AUTO: 3.15 M/UL (ref 4.7–6.1)
SODIUM SERPL-SCNC: 140 MMOL/L (ref 135–145)
WBC # BLD AUTO: 3.8 K/UL (ref 4.8–10.8)

## 2023-11-22 PROCEDURE — 700102 HCHG RX REV CODE 250 W/ 637 OVERRIDE(OP): Performed by: INTERNAL MEDICINE

## 2023-11-22 PROCEDURE — C9113 INJ PANTOPRAZOLE SODIUM, VIA: HCPCS | Performed by: INTERNAL MEDICINE

## 2023-11-22 PROCEDURE — A9270 NON-COVERED ITEM OR SERVICE: HCPCS | Performed by: STUDENT IN AN ORGANIZED HEALTH CARE EDUCATION/TRAINING PROGRAM

## 2023-11-22 PROCEDURE — 700102 HCHG RX REV CODE 250 W/ 637 OVERRIDE(OP)

## 2023-11-22 PROCEDURE — 700102 HCHG RX REV CODE 250 W/ 637 OVERRIDE(OP): Performed by: STUDENT IN AN ORGANIZED HEALTH CARE EDUCATION/TRAINING PROGRAM

## 2023-11-22 PROCEDURE — 700111 HCHG RX REV CODE 636 W/ 250 OVERRIDE (IP)

## 2023-11-22 PROCEDURE — 80048 BASIC METABOLIC PNL TOTAL CA: CPT

## 2023-11-22 PROCEDURE — RXMED WILLOW AMBULATORY MEDICATION CHARGE: Performed by: STUDENT IN AN ORGANIZED HEALTH CARE EDUCATION/TRAINING PROGRAM

## 2023-11-22 PROCEDURE — 700111 HCHG RX REV CODE 636 W/ 250 OVERRIDE (IP): Performed by: INTERNAL MEDICINE

## 2023-11-22 PROCEDURE — 700111 HCHG RX REV CODE 636 W/ 250 OVERRIDE (IP): Mod: JZ

## 2023-11-22 PROCEDURE — A9270 NON-COVERED ITEM OR SERVICE: HCPCS

## 2023-11-22 PROCEDURE — 93970 EXTREMITY STUDY: CPT | Mod: 26,50 | Performed by: INTERNAL MEDICINE

## 2023-11-22 PROCEDURE — 93970 EXTREMITY STUDY: CPT

## 2023-11-22 PROCEDURE — A9270 NON-COVERED ITEM OR SERVICE: HCPCS | Performed by: INTERNAL MEDICINE

## 2023-11-22 PROCEDURE — 85027 COMPLETE CBC AUTOMATED: CPT

## 2023-11-22 PROCEDURE — 99239 HOSP IP/OBS DSCHRG MGMT >30: CPT | Performed by: STUDENT IN AN ORGANIZED HEALTH CARE EDUCATION/TRAINING PROGRAM

## 2023-11-22 RX ORDER — OMEPRAZOLE 20 MG/1
20 CAPSULE, DELAYED RELEASE ORAL 2 TIMES DAILY
Status: DISCONTINUED | OUTPATIENT
Start: 2023-11-22 | End: 2023-11-22 | Stop reason: HOSPADM

## 2023-11-22 RX ORDER — OXYCODONE HYDROCHLORIDE 20 MG/1
20 TABLET ORAL EVERY 6 HOURS PRN
Qty: 20 TABLET | Refills: 0 | Status: SHIPPED | OUTPATIENT
Start: 2023-11-22 | End: 2023-12-05 | Stop reason: SDUPTHER

## 2023-11-22 RX ORDER — HYDROMORPHONE HYDROCHLORIDE 2 MG/1
1 TABLET ORAL EVERY 4 HOURS PRN
Status: DISCONTINUED | OUTPATIENT
Start: 2023-11-22 | End: 2023-11-22 | Stop reason: HOSPADM

## 2023-11-22 RX ORDER — SUCRALFATE ORAL 1 G/10ML
1 SUSPENSION ORAL EVERY 6 HOURS
Qty: 414 ML | Refills: 1 | Status: SHIPPED | OUTPATIENT
Start: 2023-11-22 | End: 2023-12-29

## 2023-11-22 RX ADMIN — HYDROMORPHONE HYDROCHLORIDE 1 MG: 2 TABLET ORAL at 10:42

## 2023-11-22 RX ADMIN — HYDROMORPHONE HYDROCHLORIDE 1 MG: 1 INJECTION, SOLUTION INTRAMUSCULAR; INTRAVENOUS; SUBCUTANEOUS at 03:40

## 2023-11-22 RX ADMIN — PANTOPRAZOLE SODIUM 40 MG: 40 INJECTION, POWDER, FOR SOLUTION INTRAVENOUS at 05:35

## 2023-11-22 RX ADMIN — ACETAMINOPHEN 1000 MG: 500 TABLET, FILM COATED ORAL at 05:35

## 2023-11-22 RX ADMIN — ACETAMINOPHEN 1000 MG: 500 TABLET, FILM COATED ORAL at 11:34

## 2023-11-22 RX ADMIN — DOCUSATE SODIUM 50 MG AND SENNOSIDES 8.6 MG 2 TABLET: 8.6; 5 TABLET, FILM COATED ORAL at 05:35

## 2023-11-22 RX ADMIN — OXYCODONE HYDROCHLORIDE 20 MG: 10 TABLET ORAL at 06:20

## 2023-11-22 RX ADMIN — SUCRALFATE ORAL 1 G: 1 SUSPENSION ORAL at 05:34

## 2023-11-22 RX ADMIN — ONDANSETRON 4 MG: 2 INJECTION INTRAMUSCULAR; INTRAVENOUS at 04:12

## 2023-11-22 RX ADMIN — SUCRALFATE ORAL 1 G: 1 SUSPENSION ORAL at 10:42

## 2023-11-22 ASSESSMENT — PAIN DESCRIPTION - PAIN TYPE: TYPE: ACUTE PAIN

## 2023-11-22 NOTE — DISCHARGE PLANNING
Care Transition Team Final Discharge Disposition    Actual Discharge Information  Discharge Disposition: Discharged to home/self care (01)    Pt cleared for DC today   No DC needs identified      Care Transition Team Assessment    Pt lives with his spouse, he is IADL's, and drives at baseline.   Anticipate DC to home upon medical clearance.     Information Source  Orientation Level: Oriented X4  Information Given By: Patient  Who is responsible for making decisions for patient? : Patient    Readmission Evaluation  Is this a readmission?: No    Elopement Risk  Legal Hold: No  Ambulatory or Self Mobile in Wheelchair: Yes  Disoriented: No  Psychiatric Symptoms: None  History of Wandering: No  Elopement this Admit: No  Vocalizing Wanting to Leave: No  Displays Behaviors, Body Language Wanting to Leave: No-Not at Risk for Elopement  Elopement Risk: Not at Risk for Elopement    Interdisciplinary Discharge Planning  Primary Care Physician: Sal Schafer DO  Lives with - Patient's Self Care Capacity: Spouse  Patient or legal guardian wants to designate a caregiver: No  Support Systems: Spouse / Significant Other  Housing / Facility: [address]  Do You Take your Prescribed Medications Regularly: Yes  Able to Return to Previous ADL's: Yes  Mobility Issues: No  Prior Services: None, Home-Independent  Patient Prefers to be Discharged to:: Home  Durable Medical Equipment: Not Applicable    Discharge Preparedness  What is your plan after discharge?: Home with help  What are your discharge supports?: Spouse  Prior Functional Level: Ambulatory, Drives Self  Difficulity with ADLs: None    Functional Assesment  Prior Functional Level: Ambulatory, Drives Self    Finances  Financial Barriers to Discharge: No  Prescription Coverage: Yes    Vision / Hearing Impairment  Vision Impairment : No  Hearing Impairment : No    Values / Beliefs / Concerns  Values / Beliefs Concerns : No    Advance Directive  Advance Directive?: None  Advance  Directive offered?: AD Booklet refused    Domestic Abuse  Have you ever been the victim of abuse or violence?: No  Physical Abuse or Sexual Abuse: No  Verbal Abuse or Emotional Abuse: No  Possible Abuse/Neglect Reported to:: Not Applicable    Anticipated Discharge Information  Discharge Disposition: Discharged to home/self care (01)

## 2023-11-22 NOTE — DISCHARGE SUMMARY
Discharge Summary    CHIEF COMPLAINT ON ADMISSION  Chief Complaint   Patient presents with    Throwing Up Blood     Pt was walking, became extremely nauseous and dizzy, and started throwing up bright red blood.     Abdominal Pain     Generalized abdominal pain x2 hours.        Reason for Admission  EMS     Admission Date  11/17/2023    CODE STATUS  Full Code    HPI & HOSPITAL COURSE  Roberto Braswell is a 43 y.o. male admitted 11/17/2023 with vomiting bright red blood and black stool.  He has a history of a known duodenal ulcer for which he has been battling over the past year.    He has the additional history of severe mitral valve regurgitation with plans for open surgical repair in the future.  His last EGD was 9/14/23 with nonhealing duodenal ulcer. Patient developed hemorrhagic shock and was transferred to ICU for massive transfusion.  He required empiric GDA embolization for stabilization of his bleeding. After embolization patient without any recurrence of bleeding. His hemoglobin remains stable. He is treated for suspected duodenal bleed with PPI. Patient tolerating diet well with stable hemoglobin. He is discharged to home, GI referral placed for patient. He is to continue PPI BID therapy and have repeat EGD for follow up of duodenal ulcer. During hospitalization, he also underwent cardiac catheterization in preparation for his mitral valve repair. His left heart cath showed clean coronary arteries. Discussed with cardiothoracic surgery, patient will need open surgical repair of mitral valve, not a mitral valve clip candidate. However this surgery would require full heparinization and 3 months of anticoagulation post operatively. Patient is not a good candidate for this surgery at this time given profuse hemorrhagic shock. Patient is advised to follow up closely with GI for EGD, and they can provide medical risk assessment for mitral valve repair and anticoagulation. Patient is understanding and will  follow up with his medical team. Hospitalization also notable for superficial thrombophlebitis from peripheral IV use, superficial clot seen bilaterally on upper extremities. He is to continue supportive care at home for these.    Therefore, he is discharged in fair and stable condition to home with close outpatient follow-up.    The patient met 2-midnight criteria for an inpatient stay at the time of discharge.    Discharge Date  11/22/2023    FOLLOW UP ITEMS POST DISCHARGE  Take medications as prescribed.  Follow up with GI, cardiology/cardiothoracic surgery, PCP.    DISCHARGE DIAGNOSES  Principal Problem:    Upper GI bleed (POA: Yes)  Active Problems:    Acute blood loss anemia (POA: Yes)    Mitral regurgitation (POA: Yes)      Overview: recently diagnosed with echo due to murmer finding. Patient will be seeing       cardiology soon. Patient denies history of rheumatic heart disease or       history of meth. Denies chest pain, syncope. He did have a recent episode       with leg swelling     Hematemesis (POA: Yes)    Duodenal ulcer (POA: Yes)    Hypertension (POA: Yes)    Normal anion gap metabolic acidosis (POA: Yes)    Abdominal pain (POA: Yes)    Left arm swelling (POA: No)  Resolved Problems:    * No resolved hospital problems. *      FOLLOW UP  Future Appointments   Date Time Provider Department Center   11/29/2023  9:00 AM Bigg Santana M.D. [facility] None     Sal Schafer D.O.  [address] [facility] [address]  [facility] [address]  [phone]    Follow up in 1 week(s)      Bigg Santana M.D.  [address] [place] [address] [address]  [facility] [address]  [phone]    Go in 7 day(s)  0900 AM    please arrive 30min prior to your appointment      MEDICATIONS ON DISCHARGE     Medication List        START taking these medications        Instructions   Oxycodone HCl 20 MG Tabs   Take 1 Tablet by mouth every 6 hours as needed for Severe Pain for up to 7 days.  Dose: 20 mg            CHANGE how you take these medications         "Instructions   * lidocaine-hyoscyamine-mag hydrox-al hydrox-simeth  What changed: You were already taking a medication with the same name, and this prescription was added. Make sure you understand how and when to take each.   Doctor's comments: Ingredients: 34 mL hyoscyamine 0.125 mg/5 mL, 126 mL Maalox, and 40 mL viscous lidocaine 2%.  Take 30mL  by mouth every 6 hours as needed (upset stomach).     * hyoscyamine-mag hydrox-al hydrox-simeth-lidocaine  What changed: how much to take   Doctor's comments: Ingredients: 34 mL hyoscyamine 0.125 mg/5 mL, 126 mL Maalox, and 40 mL viscous lidocaine 2%.  Take 15ml by mouth every 6 hours as needed (abdominal pain or cramping).           * This list has 2 medication(s) that are the same as other medications prescribed for you. Read the directions carefully, and ask your doctor or other care provider to review them with you.                CONTINUE taking these medications        Instructions   Carafate 1 GM/10ML Susp  Generic drug: sucralfate   Take 10 mL by mouth every 6 hours.  Dose: 1 g     doxylamine 25 MG Tabs tablet  Commonly known as: Unisom   Take 25-50 mg by mouth at bedtime. \"Santacruz brand sleep aid\"  Dose: 25-50 mg     losartan 25 MG Tabs  Commonly known as: Cozaar   Take 1 Tablet by mouth every day.  Dose: 25 mg     pantoprazole 40 MG Tbec  Commonly known as: Protonix   Take 1 Tablet by mouth 2 times a day.  Dose: 40 mg              Allergies  Allergies   Allergen Reactions    Morphine Vomiting    Tramadol Unspecified     Seizure  [id number]       DIET  Orders Placed This Encounter   Procedures    Diet Order Diet: Low Fiber(GI Soft)     Standing Status:   Standing     Number of Occurrences:   1     Order Specific Question:   Diet:     Answer:   Low Fiber(GI Soft) [2]       ACTIVITY  As tolerated.  Weight bearing as tolerated    CONSULTATIONS  Critical care    PROCEDURES  11/18: coil embolization of GDA  11/21: coronary arteriogram, left heart " catheterization    LABORATORY  Lab Results   Component Value Date    SODIUM 140 11/22/2023    POTASSIUM 3.4 (L) 11/22/2023    CHLORIDE 109 11/22/2023    CO2 22 11/22/2023    GLUCOSE 91 11/22/2023    BUN 6 (L) 11/22/2023    CREATININE 1.02 11/22/2023        Lab Results   Component Value Date    WBC 3.8 (L) 11/22/2023    HEMOGLOBIN 8.4 (L) 11/22/2023    HEMATOCRIT 26.1 (L) 11/22/2023    PLATELETCT 223 11/22/2023        Total time of the discharge process exceeds 39 minutes.

## 2023-11-22 NOTE — CARE PLAN
The patient is Watcher - Medium risk of patient condition declining or worsening    Shift Goals  Clinical Goals: Pain control 4-5/10  Patient Goals: rest  Family Goals: JJ    Progress made toward(s) clinical / shift goals:  Patient still having severe abdominal pain.     Patient is not progressing towards the following goals: N/A

## 2023-11-22 NOTE — PROGRESS NOTES
REFERRING PHYSICIAN: Yinka Mitchell MD    CONSULTING PHYSICIAN: Bigg Santana MD, FACS    CHIEF COMPLAINT:  Shortness of breath    HISTORY OF PRESENT ILLNESS: The patient is a 43 y.o. male with history of hypertension, recent GI bleed - duodenal ulcer coiling 5 weeks ago and severe mitral regurgitation. Today, he states he has been having increasing shortness of breath for the last 6 months. He has occasional chest pains even at rest that resolve with rest. He denies orthopnea, syncope. He went from going to the gym and 3 days a week to only being able to walk around his house and climb 1 flight of stairs. He was seen by GI on 12/7/23 and had EGD and colonoscopy scheduled for 12/28/23 to check that the duodenal ulcer is healed prior to proceeding with surgery.    PAST MEDICAL HISTORY:   Active Ambulatory Problems     Diagnosis Date Noted    Upper GI bleed 03/26/2022    Acute blood loss anemia 07/30/2023    Duodenal ulcer with hemorrhage 07/30/2023    GI bleed 09/13/2023    Iron deficiency anemia due to chronic blood loss 09/14/2023    Mitral regurgitation 09/15/2023    Essential hypertension 10/10/2023    Hematemesis 11/17/2023    Duodenal ulcer 11/18/2023    Hypertension 11/18/2023    Normal anion gap metabolic acidosis 11/18/2023    Abdominal pain 11/18/2023    Left arm swelling 11/20/2023    Superficial thrombophlebitis of right upper extremity 12/05/2023     Resolved Ambulatory Problems     Diagnosis Date Noted    Physiologic disturbance of temperature regulation 03/26/2022    Epigastric pain 03/26/2022    ELISABET (acute kidney injury) (HCC) 03/27/2022    GI bleed 07/30/2023    Dehydration 07/30/2023    Drug-seeking behavior 07/30/2023    Intractable abdominal pain 09/16/2023     Past Medical History:   Diagnosis Date    Arthritis     Breath shortness 11/03/2023    Gastric ulcer     Heart valve disease     Pain      PAST SURGICAL HISTORY:   Past Surgical History:   Procedure Laterality Date    MA UPPER GI  ENDOSCOPY,DIAGNOSIS N/A 9/14/2023    Procedure: GASTROSCOPY;  Surgeon: Johnny Dalton M.D.;  Location: SURGERY SAME DAY [facility];  Service: Gastroenterology    IN UPPER GI ENDOSCOPY,BIOPSY N/A 9/14/2023    Procedure: GASTROSCOPY, WITH BIOPSY;  Surgeon: Johnny Dalton M.D.;  Location: SURGERY SAME DAY [facility];  Service: Gastroenterology    IN UPPER GI ENDOSCOPY,BIOPSY N/A 7/30/2023    Procedure: GASTROSCOPY, WITH BIOPSY;  Surgeon: Kumar Hope M.D.;  Location: SURGERY [facility];  Service: Gastroenterology    IN UPPER GI ENDOSCOPY,DIAGNOSIS  3/28/2022    Procedure: GASTROSCOPY;  Surgeon: Paco Wiggins M.D.;  Location: SURGERY SAME DAY [facility];  Service: Gastroenterology    IN UPPER GI ENDOSCOPY,BIOPSY  3/28/2022    Procedure: GASTROSCOPY, WITH BIOPSY;  Surgeon: Paco Wiggins M.D.;  Location: SURGERY SAME DAY [facility];  Service: Gastroenterology    ANKLE ARTHROSCOPY Right 5/21/2018    Procedure: ANKLE ARTHROSCOPY, LATERAL LIGAMENT RECONSTRUCTION;  Surgeon: Seth Cruz M.D.;  Location: SURGERY [facility];  Service: Orthopedics    BIOPSY ORTHO Right 5/21/2018    Procedure: BIOPSY ORTHO/ FOR CARTILAGE AND DENOVO PROCEDURE;  Surgeon: Seth Cruz M.D.;  Location: SURGERY [facility];  Service: Orthopedics    OTHER ABDOMINAL SURGERY      Cholecystectomy February 2017    OTHER ORTHOPEDIC SURGERY      2 previous ankle surgeries (2007, 2009)     ALLERGIES:   Allergies   Allergen Reactions    Morphine Vomiting    Tramadol Unspecified     Seizure  [id number]     CURRENT MEDICATIONS:   Current Outpatient Medications:     Oxycodone HCl 20 MG Tab, Take 1 Tablet by mouth every 6 hours as needed (severe pain) for up to 7 days., Disp: 20 Tablet, Rfl: 0    losartan (COZAAR) 25 MG Tab, TAKE 1 TABLET BY MOUTH EVERY DAY, Disp: 60 Tablet, Rfl: 0    pantoprazole (PROTONIX) 40 MG Tablet Delayed Response, TAKE 1 TABLET BY MOUTH TWICE A DAY, Disp: 120 Tablet, Rfl: 0    lidocaine-hyoscyamine-mag hydrox-al  "hydrox-simeth, Take 30mL  by mouth every 6 hours as needed (upset stomach)., Disp: 200 mL, Rfl: 0    hyoscyamine-mag hydrox-al hydrox-simeth-lidocaine, Take 15ml by mouth every 6 hours as needed (abdominal pain or cramping)., Disp: 200 mL, Rfl: 1    doxylamine (UNISOM) 25 MG Tab tablet, Take 25-50 mg by mouth at bedtime. \"Santacruz brand sleep aid\", Disp: , Rfl:     sucralfate (CARAFATE) 1 GM/10ML Suspension, Take 10 mL by mouth every 6 hours. (Patient not taking: Reported on 12/20/2023), Disp: 414 mL, Rfl: 1    FAMILY HISTORY:   Family History   Problem Relation Age of Onset    Heart Disease Mother     No Known Problems Father      SOCIAL HISTORY:   Social History     Socioeconomic History    Marital status:      Spouse name: Not on file    Number of children: Not on file    Years of education: Not on file    Highest education level: Associate degree: occupational, technical, or vocational program   Occupational History    Not on file   Tobacco Use    Smoking status: Never    Smokeless tobacco: Never   Vaping Use    Vaping Use: Never used   Substance and Sexual Activity    Alcohol use: Not Currently    Drug use: Never    Sexual activity: Not on file   Other Topics Concern    Not on file   Social History Narrative    Not on file     Social Determinants of Health     Financial Resource Strain: Low Risk  (9/26/2023)    Overall Financial Resource Strain (CARDIA)     Difficulty of Paying Living Expenses: Not hard at all   Food Insecurity: No Food Insecurity (9/26/2023)    Hunger Vital Sign     Worried About Running Out of Food in the Last Year: Never true     Ran Out of Food in the Last Year: Never true   Transportation Needs: No Transportation Needs (9/26/2023)    PRAPARE - Transportation     Lack of Transportation (Medical): No     Lack of Transportation (Non-Medical): No   Physical Activity: Sufficiently Active (9/26/2023)    Exercise Vital Sign     Days of Exercise per Week: 6 days     Minutes of Exercise per " "Session: 120 min   Stress: Stress Concern Present (9/26/2023)    [employer] [place] [employer] - Occupational Stress Questionnaire     Feeling of Stress : To some extent   Social Connections: Moderately Isolated (9/26/2023)    Social Connection and Isolation Panel [NHANES]     Frequency of Communication with Friends and Family: Once a week     Frequency of Social Gatherings with Friends and Family: Twice a week     Attends [employer]al Services: Never     Active Member of Clubs or Organizations: No     Attends Club or Organization Meetings: Never     Marital Status:    Intimate Partner Violence: Not on file   Housing Stability: Low Risk  (9/26/2023)    Housing Stability Vital Sign     Unable to Pay for Housing in the Last Year: No     Number of Places Lived in the Last Year: 1     Unstable Housing in the Last Year: No     REVIEW OF SYSTEMS:  Review of Systems   Constitutional:  Positive for malaise/fatigue.   HENT: Negative.     Eyes: Negative.    Respiratory:  Positive for shortness of breath.    Cardiovascular:  Positive for chest pain and leg swelling.   Gastrointestinal: Negative.    Genitourinary: Negative.    Musculoskeletal: Negative.    Skin: Negative.    Neurological: Negative.    Endo/Heme/Allergies: Negative.    Psychiatric/Behavioral: Negative.       PHYSICAL EXAMINATION:    /80 (BP Location: Left arm, Patient Position: Sitting, BP Cuff Size: Adult)   Pulse 89   Temp 37.2 °C (99 °F) (Temporal)   Ht 1.905 m (6' 3\")   Wt 103 kg (227 lb)   SpO2 100%   BMI 28.37 kg/m²      Physical Exam  Constitutional:       General: He is not in acute distress.  HENT:      Head: Normocephalic.   Eyes:      Pupils: Pupils are equal, round, and reactive to light.   Cardiovascular:      Rate and Rhythm: Normal rate and regular rhythm.      Heart sounds: Murmur heard.      Systolic murmur is present with a grade of 3/6.      No gallop.   Pulmonary:      Effort: Pulmonary effort is normal. No " respiratory distress.      Breath sounds: Normal breath sounds. No wheezing or rales.   Abdominal:      General: Bowel sounds are normal. There is no distension.      Palpations: Abdomen is soft.      Tenderness: There is no abdominal tenderness.   Musculoskeletal:         General: Normal range of motion.      Cervical back: Neck supple.   Skin:     General: Skin is warm and dry.   Neurological:      Mental Status: He is alert and oriented to person, place, and time.   Psychiatric:         Mood and Affect: Mood and affect normal.         Cognition and Memory: Memory normal.         Judgment: Judgment normal.     LABS REVIEWED:  Lab Results   Component Value Date/Time    SODIUM 140 11/22/2023 03:31 AM    POTASSIUM 3.4 (L) 11/22/2023 03:31 AM    CHLORIDE 109 11/22/2023 03:31 AM    CO2 22 11/22/2023 03:31 AM    GLUCOSE 91 11/22/2023 03:31 AM    BUN 6 (L) 11/22/2023 03:31 AM    CREATININE 1.02 11/22/2023 03:31 AM      Lab Results   Component Value Date/Time    PROTHROMBTM 16.6 (H) 11/17/2023 09:39 PM    INR 1.32 (H) 11/17/2023 09:39 PM      Lab Results   Component Value Date/Time    WBC 4.7 (L) 12/05/2023 03:29 PM    RBC 3.93 (L) 12/05/2023 03:29 PM    HEMOGLOBIN 9.5 (L) 12/05/2023 03:29 PM    HEMATOCRIT 31.2 (L) 12/05/2023 03:29 PM    MCV 79.4 (L) 12/05/2023 03:29 PM    MCH 24.2 (L) 12/05/2023 03:29 PM    MCHC 30.4 (L) 12/05/2023 03:29 PM    MPV 9.3 12/05/2023 03:29 PM    NEUTSPOLYS 53.60 12/05/2023 03:29 PM    LYMPHOCYTES 34.50 12/05/2023 03:29 PM    MONOCYTES 9.80 12/05/2023 03:29 PM    EOSINOPHILS 0.40 12/05/2023 03:29 PM    BASOPHILS 1.50 12/05/2023 03:29 PM    HYPOCHROMIA 1+ 09/14/2023 05:17 AM    ANISOCYTOSIS 1+ 10/09/2023 09:33 AM     IMAGING REVIEWED AND INTERPRETED:    TRANSESOPHAGEAL ECHOCARDIOGRAM [facility] 11/10/2023:  There is extremely severe eccentric mitral regurgitation that appears to be secondary to flail P1/P2 segments.  Flail gap measures 6mm.EROA 1.3cm2.MR volume 115mL.  Normal left ventricular systolic  function.  The left ventricular ejection fraction is visually estimated to be 70%.  Normal right ventricular size and systolic function.  No thrombus detected in the left atrial appendage.  Color doppler appeared to show patent foramen ovale (PFO).  Bubble study was negative which could be secondary to higher left atrial pressures due to MR.  Unable to estimate right ventricular systolic pressure due to an   inadequate tricuspid regurgitant jet.     CARDIAC CATHETERIZATION [facility] 11/21/2023:  Final impression:  Angiographically normal appearing coronary arteries.  Dilated aortic root  Findings:  1.  Left main coronary artery:  Normal.  2.  Left anterior descending artery:  Normal. Two major diagonal branches are seen.  3.  Left circumflex coronary artery:  Normal. Gives one large marginal branch, which has no significant disease   4.  Right coronary artery:  Normal.  This is a right dominant system.  5.  Left ventricular end diastolic pressure:  12 mmHg.  No signficant gradient across the aortic valve.  6. Left ventriculogram showed normal EF 65%, dilated aortic root.    CT SCAN CHEST [facility] 11/18/2023:  Normal exam. No acute process seen.       IMPRESSION:  Severe symptomatic mitral regurgitation (4+, degenerative), mitral valve prolapse, recent GI bleeding (status post duodenal ulcer coiling), anemia, hypertension    PLAN:  I recommend that he undergo mitral valve repair or replacement and intraoperative transesophageal echocardiography.    The procedure, its risks, benefits, potential complications and alternative treatments were discussed with the patient in detail including the risks should he decide not to undergo my recommended treatment. All of his questions were answered to his satisfaction and he is willing to proceed with the operation. The risks include death, stroke, infection: to include a rare bacterial infection related to the use of the heart/lung machine, oleg-operative myocardial infarction,  dysrhythmias, diaphragmatic paralysis, chest wall paresthesia, tracheostomy, kidney or other organ failure, possible return to the operating room for bleeding, bleeding requiring transfusion with its attendant risks including AIDS or hepatitis, dehiscence of surgical incisions, respiratory complications including the need for prolonged ventilator support, Protamine or other drug reaction, peripheral neuropathy, loss of limb, and miscount of surgical items. The operative mortality risk is approximately 1%. The STS mortality risk score is 0.8% and the morbidity and mortality risk score is 9% for mitral valve repair. The STS mortality risk score is 0.9% and the morbidity and mortality risk score is 10% for mitral valve repair. The scores were discussed with patient.    The operation is scheduled for Thursday, January 25, 2024 at 7:30 AM at [facility].  The differences between tissue and mechanical valves were discussed in detail with the patient including the anticoagulation requirements with each.  At this point the patient would prefer to have a mechanical valve implanted.    Findings and recommendations have been discussed with the patient’s cardiologist, Yinka Mitchell MD.  Thank you for this very challenging consultation and participation in the patient’s care.  I will keep you apprised of all future developments.    Sincerely,    Bigg Santana MD, FACS

## 2023-11-22 NOTE — CARE PLAN
The patient is Stable - Low risk of patient condition declining or worsening    Shift Goals  Clinical Goals: pain control; monitor hgb  Patient Goals: rest  Family Goals: JJ    Progress made toward(s) clinical / shift goals:  pt verbalizes understanding of his plan of care such as monitoring trend of his hemoglobin; pt is receiving pain medicine accordingly.   Patient is not progressing towards the following goals:

## 2023-11-22 NOTE — CARE PLAN
The patient is Stable - Low risk of patient condition declining or worsening    Shift Goals  Clinical Goals: pain control, monitor Hgb trends  Patient Goals: rest and comfort  Family Goals: eloise    Progress made toward(s) clinical / shift goals:    AXO4. Pt ambulatory. Skin intact. Able to make needs known. VSS WNL. Afebrile. Pt remains free from injury or fall. Due medications given as ordered. Placed call light and personal belongings within reach and encouraged increased oral fluid intake as tolerated.      Problem: Pain - Standard  Goal: Alleviation of pain or a reduction in pain to the patient’s comfort goal  Outcome: Met     Problem: Knowledge Deficit - Standard  Goal: Patient and family/care givers will demonstrate understanding of plan of care, disease process/condition, diagnostic tests and medications  Outcome: Met     Problem: Skin Integrity  Goal: Skin integrity is maintained or improved  Outcome: Met     Problem: Risk for Fluid Volume Deficit Related to Bleeding  Goal: Fluid volume balance will be maintained  Outcome: Met  Goal: Patient will show no signs and symptoms of excessive bleeding  Outcome: Met     Problem: Risk for Bleeding  Goal: Patient will take measures to prevent bleeding and recognizes signs of bleeding that need to be reported immediately to a health care professional  Outcome: Met  Goal: Patient will not experience bleeding as evidenced by normal blood pressure, stable hematocrit and hemoglobin levels and desired ranges for coagulation profiles  Outcome: Met

## 2023-11-22 NOTE — DISCHARGE INSTRUCTIONS
Radial Site Care  The following information offers guidance on how to care for yourself after your procedure. Your health care provider may also give you more specific instructions. If you have problems or questions, contact your health care provider.  What can I expect after the procedure?  After the procedure, it is common to have bruising and tenderness in the incision area.  Follow these instructions at home:  Incision site care    Follow instructions from your health care provider about how to take care of your incision site. Make sure you:  Wash your hands with soap and water for at least 20 seconds before and after you change your bandage (dressing). If soap and water are not available, use hand .  Change or remove your dressing as told by your health care provider.  Leave stitches (sutures), skin glue, or adhesive strips in place. These skin closures may need to stay in place for 2 weeks or longer. If adhesive strip edges start to loosen and curl up, you may trim the loose edges. Do not remove adhesive strips completely unless your health care provider tells you to do that.  Do not take baths, swim, or use a hot tub until your health care provider approves.  You may shower 24-48 hours after the procedure or as told by your health care provider.  Remove the dressing and gently wash the incision area with plain soap and water.  Pat the area dry with a clean towel.  Do not rub the site. That could cause bleeding.  Do not apply powder or lotion to the site.  Check your incision site every day for signs of infection. Check for:  Redness, swelling, or pain.  Fluid or blood.  Warmth.  Pus or a bad smell.  Activity  For 24 hours after the procedure, or as directed by your health care provider:  Do not flex or bend the affected arm.  Do not push or pull heavy objects with the affected arm.  Do not operate machinery or power tools.  Do not drive. You should not drive yourself home from the hospital or clinic  if you go home during that time period. You may drive 24 hours after the procedure unless your health care provider tells you not to.  Do not lift anything that is heavier than 10 lb (4.5 kg), or the limit that you are told, until your health care provider says that it is safe.  Return to your normal activities as told by your health care provider. Ask your health care provider what activities are safe for you and when you can return to work.  If you were given a sedative during the procedure, it can affect you for several hours. Do not drive or operate machinery until your health care provider says that it is safe.  General instructions  Take over-the-counter and prescription medicines only as told by your health care provider.  If you will be going home right after the procedure, plan to have a responsible adult care for you for the time you are told. This is important.  Keep all follow-up visits. This is important.  Contact a health care provider if:  You have a fever or chills.  You have any of these signs of infection at your incision site:  Redness, swelling, or pain.  Fluid or blood.  Warmth.  Pus or a bad smell.  Get help right away if:  The incision area swells very fast.  The incision area is bleeding, and the bleeding does not stop when you hold steady pressure on the area.  Your arm or hand becomes pale, cool, tingly, or numb.  These symptoms may represent a serious problem that is an emergency. Do not wait to see if the symptoms will go away. Get medical help right away. Call your local emergency services (911 in the [place] Do not drive yourself to the hospital.  Summary  After the procedure, it is common to have bruising and tenderness at the incision site.  Follow instructions from your health care provider about how to take care of your radial site incision. Check the incision every day for signs of infection.  Do not lift anything that is heavier than 10 lb (4.5 kg), or the limit that you are told,  until your health care provider says that it is safe.  Get help right away if the incision area swells very fast, you have bleeding at the incision site that will not stop, or your arm or hand becomes pale, cool, or numb.  This information is not intended to replace advice given to you by your health care provider. Make sure you discuss any questions you have with your health care provider.  Document Revised: 02/06/2022 Document Reviewed: 02/06/2022  Elsevier Patient Education © 2023 [employer]

## 2023-12-05 ENCOUNTER — HOSPITAL ENCOUNTER (OUTPATIENT)
Dept: LAB | Facility: MEDICAL CENTER | Age: 43
End: 2023-12-05
Attending: STUDENT IN AN ORGANIZED HEALTH CARE EDUCATION/TRAINING PROGRAM
Payer: COMMERCIAL

## 2023-12-05 ENCOUNTER — OFFICE VISIT (OUTPATIENT)
Dept: MEDICAL GROUP | Facility: LAB | Age: 43
End: 2023-12-05
Payer: COMMERCIAL

## 2023-12-05 VITALS
HEIGHT: 75 IN | HEART RATE: 66 BPM | WEIGHT: 224.4 LBS | SYSTOLIC BLOOD PRESSURE: 132 MMHG | BODY MASS INDEX: 27.9 KG/M2 | OXYGEN SATURATION: 96 % | DIASTOLIC BLOOD PRESSURE: 72 MMHG | RESPIRATION RATE: 18 BRPM | TEMPERATURE: 97.8 F

## 2023-12-05 DIAGNOSIS — D50.0 IRON DEFICIENCY ANEMIA DUE TO CHRONIC BLOOD LOSS: ICD-10-CM

## 2023-12-05 DIAGNOSIS — K26.4 GASTROINTESTINAL HEMORRHAGE ASSOCIATED WITH DUODENAL ULCER: ICD-10-CM

## 2023-12-05 DIAGNOSIS — I80.8 SUPERFICIAL THROMBOPHLEBITIS OF RIGHT UPPER EXTREMITY: ICD-10-CM

## 2023-12-05 DIAGNOSIS — Z09 HOSPITAL DISCHARGE FOLLOW-UP: Primary | ICD-10-CM

## 2023-12-05 LAB
BASOPHILS # BLD AUTO: 1.5 % (ref 0–1.8)
BASOPHILS # BLD: 0.07 K/UL (ref 0–0.12)
EOSINOPHIL # BLD AUTO: 0.02 K/UL (ref 0–0.51)
EOSINOPHIL NFR BLD: 0.4 % (ref 0–6.9)
ERYTHROCYTE [DISTWIDTH] IN BLOOD BY AUTOMATED COUNT: 50.9 FL (ref 35.9–50)
HCT VFR BLD AUTO: 31.2 % (ref 42–52)
HGB BLD-MCNC: 9.5 G/DL (ref 14–18)
IMM GRANULOCYTES # BLD AUTO: 0.01 K/UL (ref 0–0.11)
IMM GRANULOCYTES NFR BLD AUTO: 0.2 % (ref 0–0.9)
LYMPHOCYTES # BLD AUTO: 1.62 K/UL (ref 1–4.8)
LYMPHOCYTES NFR BLD: 34.5 % (ref 22–41)
MCH RBC QN AUTO: 24.2 PG (ref 27–33)
MCHC RBC AUTO-ENTMCNC: 30.4 G/DL (ref 32.3–36.5)
MCV RBC AUTO: 79.4 FL (ref 81.4–97.8)
MONOCYTES # BLD AUTO: 0.46 K/UL (ref 0–0.85)
MONOCYTES NFR BLD AUTO: 9.8 % (ref 0–13.4)
NEUTROPHILS # BLD AUTO: 2.52 K/UL (ref 1.82–7.42)
NEUTROPHILS NFR BLD: 53.6 % (ref 44–72)
NRBC # BLD AUTO: 0 K/UL
NRBC BLD-RTO: 0 /100 WBC (ref 0–0.2)
PLATELET # BLD AUTO: 549 K/UL (ref 164–446)
PMV BLD AUTO: 9.3 FL (ref 9–12.9)
RBC # BLD AUTO: 3.93 M/UL (ref 4.7–6.1)
WBC # BLD AUTO: 4.7 K/UL (ref 4.8–10.8)

## 2023-12-05 PROCEDURE — 3078F DIAST BP <80 MM HG: CPT | Performed by: STUDENT IN AN ORGANIZED HEALTH CARE EDUCATION/TRAINING PROGRAM

## 2023-12-05 PROCEDURE — 36415 COLL VENOUS BLD VENIPUNCTURE: CPT

## 2023-12-05 PROCEDURE — 85025 COMPLETE CBC W/AUTO DIFF WBC: CPT

## 2023-12-05 PROCEDURE — 3075F SYST BP GE 130 - 139MM HG: CPT | Performed by: STUDENT IN AN ORGANIZED HEALTH CARE EDUCATION/TRAINING PROGRAM

## 2023-12-05 PROCEDURE — 99214 OFFICE O/P EST MOD 30 MIN: CPT | Performed by: STUDENT IN AN ORGANIZED HEALTH CARE EDUCATION/TRAINING PROGRAM

## 2023-12-05 RX ORDER — OXYCODONE HYDROCHLORIDE 20 MG/1
20 TABLET ORAL EVERY 6 HOURS PRN
Qty: 20 TABLET | Refills: 0 | Status: SHIPPED | OUTPATIENT
Start: 2023-12-05 | End: 2023-12-15 | Stop reason: SDUPTHER

## 2023-12-05 ASSESSMENT — ENCOUNTER SYMPTOMS
FEVER: 0
CHILLS: 0
SHORTNESS OF BREATH: 0

## 2023-12-05 ASSESSMENT — FIBROSIS 4 INDEX: FIB4 SCORE: 0.6

## 2023-12-05 NOTE — PROGRESS NOTES
"Subjective:     Roberto Braswell is a 43 y.o. male who presents for Hospital Follow-up.    Transitional Care Management          HPI:   Recently hospitalized for severe GI bleed require rapid transfusion in the ICU. He was admitted for a total of 5 days. DC summary below     \"Roberto Braswell is a 43 y.o. male admitted 11/17/2023 with vomiting bright red blood and black stool.  He has a history of a known duodenal ulcer for which he has been battling over the past year.    He has the additional history of severe mitral valve regurgitation with plans for open surgical repair in the future.  His last EGD was 9/14/23 with nonhealing duodenal ulcer. Patient developed hemorrhagic shock and was transferred to ICU for massive transfusion.  He required empiric GDA embolization for stabilization of his bleeding. After embolization patient without any recurrence of bleeding. His hemoglobin remains stable. He is treated for suspected duodenal bleed with PPI. Patient tolerating diet well with stable hemoglobin. He is discharged to home, GI referral placed for patient. He is to continue PPI BID therapy and have repeat EGD for follow up of duodenal ulcer. During hospitalization, he also underwent cardiac catheterization in preparation for his mitral valve repair. His left heart cath showed clean coronary arteries. Discussed with cardiothoracic surgery, patient will need open surgical repair of mitral valve, not a mitral valve clip candidate. However this surgery would require full heparinization and 3 months of anticoagulation post operatively. Patient is not a good candidate for this surgery at this time given profuse hemorrhagic shock. Patient is advised to follow up closely with GI for EGD, and they can provide medical risk assessment for mitral valve repair and anticoagulation. Patient is understanding and will follow up with his medical team. Hospitalization also notable for superficial thrombophlebitis from peripheral " "IV use, superficial clot seen bilaterally on upper extremities. He is to continue supportive care at home for these\"     Interval hx:   -patient is improving but continues to have fatigue   -he has an appointment with GI tomorrow for clearance for possible cardiothoracic surgery       Current medicines (including reconciliation performed today)  Current Outpatient Medications   Medication Sig Dispense Refill    Oxycodone HCl 20 MG Tab Take 1 Tablet by mouth every 6 hours as needed (severe pain) for up to 7 days. 20 Tablet 0    sucralfate (CARAFATE) 1 GM/10ML Suspension Take 10 mL by mouth every 6 hours. 414 mL 1    lidocaine-hyoscyamine-mag hydrox-al hydrox-simeth Take 30mL  by mouth every 6 hours as needed (upset stomach). 200 mL 0    hyoscyamine-mag hydrox-al hydrox-simeth-lidocaine Take 15ml by mouth every 6 hours as needed (abdominal pain or cramping). 200 mL 1    losartan (COZAAR) 25 MG Tab Take 1 Tablet by mouth every day. 60 Tablet 0    pantoprazole (PROTONIX) 40 MG Tablet Delayed Response Take 1 Tablet by mouth 2 times a day. 120 Tablet 0    doxylamine (UNISOM) 25 MG Tab tablet Take 25-50 mg by mouth at bedtime. \"Housekeep brand sleep aid\"       No current facility-administered medications for this visit.       Allergies:   Morphine and Tramadol    Social History     Tobacco Use    Smoking status: Never    Smokeless tobacco: Never   Vaping Use    Vaping Use: Never used   Substance Use Topics    Alcohol use: Not Currently    Drug use: Never       ROS:  Review of Systems   Constitutional:  Negative for chills and fever.   Respiratory:  Negative for shortness of breath.    Cardiovascular:  Negative for chest pain.         Objective:     Vitals:    12/05/23 1311   BP: 132/72   BP Location: Right arm   Patient Position: Sitting   BP Cuff Size: Adult   Pulse: 66   Resp: 18   Temp: 36.6 °C (97.8 °F)   SpO2: 96%   Weight: 102 kg (224 lb 6.4 oz)   Height: 1.905 m (6' 3\")     Body mass index is 28.05 kg/m².    Physical " Exam:  Physical Exam  Constitutional:       General: He is not in acute distress.     Appearance: He is not ill-appearing.      Comments: Pale appearing skin    Pulmonary:      Effort: Pulmonary effort is normal.   Skin:     Comments: B/l hardened veins palpations on medial aspect of upper arms    Neurological:      Mental Status: He is alert.   Psychiatric:         Mood and Affect: Mood normal.         Behavior: Behavior normal.         Thought Content: Thought content normal.         Judgment: Judgment normal.           Assessment and Plan:   1. Hospital discharge follow-up    2. Iron deficiency anemia due to chronic blood loss  - CBC WITH DIFFERENTIAL; Future    3. Gastrointestinal hemorrhage associated with duodenal ulcer  - Oxycodone HCl 20 MG Tab; Take 1 Tablet by mouth every 6 hours as needed (severe pain) for up to 7 days.  Dispense: 20 Tablet; Refill: 0    4. Superficial thrombophlebitis of right upper extremity  - Oxycodone HCl 20 MG Tab; Take 1 Tablet by mouth every 6 hours as needed (severe pain) for up to 7 days.  Dispense: 20 Tablet; Refill: 0    - Chart and discharge summary were reviewed.   - Hospitalization and results reviewed with patient.   - Medications reviewed including instructions regarding high risk medications, dosing and side effects.  - Recommended Services: No services needed at this time  - Advance directive/POLST on file?  No     Follow-up:No follow-ups on file.    Face-to-face transitional care management services with [facility] (today's visit is within 14 days post discharge & LACE+ score of 28-58) medical decision complexity were provided.

## 2023-12-06 DIAGNOSIS — D50.0 IRON DEFICIENCY ANEMIA DUE TO CHRONIC BLOOD LOSS: ICD-10-CM

## 2023-12-06 DIAGNOSIS — I10 ESSENTIAL HYPERTENSION: ICD-10-CM

## 2023-12-06 DIAGNOSIS — K92.2 GASTROINTESTINAL HEMORRHAGE, UNSPECIFIED GASTROINTESTINAL HEMORRHAGE TYPE: ICD-10-CM

## 2023-12-06 RX ORDER — LOSARTAN POTASSIUM 25 MG/1
25 TABLET ORAL DAILY
Qty: 60 TABLET | Refills: 0 | Status: ON HOLD | OUTPATIENT
Start: 2023-12-06 | End: 2024-01-30

## 2023-12-06 RX ORDER — PANTOPRAZOLE SODIUM 40 MG/1
40 TABLET, DELAYED RELEASE ORAL 2 TIMES DAILY
Qty: 120 TABLET | Refills: 0 | Status: SHIPPED | OUTPATIENT
Start: 2023-12-06 | End: 2023-12-29

## 2023-12-06 NOTE — TELEPHONE ENCOUNTER
Received request via: Pharmacy    Was the patient seen in the last year in this department? Yes  12/5/23  Does the patient have an active prescription (recently filled or refills available) for medication(s) requested? No    Does the patient have [employer] Plus and need 100 day supply (blood pressure, diabetes and cholesterol meds only)? Medication is not for cholesterol, blood pressure or diabetes

## 2023-12-08 ENCOUNTER — HOSPITAL ENCOUNTER (EMERGENCY)
Facility: MEDICAL CENTER | Age: 43
End: 2023-12-08
Attending: EMERGENCY MEDICINE
Payer: COMMERCIAL

## 2023-12-08 ENCOUNTER — APPOINTMENT (OUTPATIENT)
Dept: RADIOLOGY | Facility: MEDICAL CENTER | Age: 43
End: 2023-12-08
Attending: EMERGENCY MEDICINE
Payer: COMMERCIAL

## 2023-12-08 ENCOUNTER — PATIENT MESSAGE (OUTPATIENT)
Dept: CARDIOLOGY | Facility: MEDICAL CENTER | Age: 43
End: 2023-12-08
Payer: COMMERCIAL

## 2023-12-08 VITALS
HEIGHT: 75 IN | WEIGHT: 227.07 LBS | DIASTOLIC BLOOD PRESSURE: 86 MMHG | RESPIRATION RATE: 18 BRPM | SYSTOLIC BLOOD PRESSURE: 131 MMHG | BODY MASS INDEX: 28.23 KG/M2 | TEMPERATURE: 99.4 F | OXYGEN SATURATION: 97 % | HEART RATE: 90 BPM

## 2023-12-08 DIAGNOSIS — S59.912A INJURY OF LEFT FOREARM, INITIAL ENCOUNTER: ICD-10-CM

## 2023-12-08 PROCEDURE — 73090 X-RAY EXAM OF FOREARM: CPT | Mod: LT

## 2023-12-08 PROCEDURE — 700102 HCHG RX REV CODE 250 W/ 637 OVERRIDE(OP): Performed by: EMERGENCY MEDICINE

## 2023-12-08 PROCEDURE — 99284 EMERGENCY DEPT VISIT MOD MDM: CPT

## 2023-12-08 PROCEDURE — A9270 NON-COVERED ITEM OR SERVICE: HCPCS | Performed by: EMERGENCY MEDICINE

## 2023-12-08 RX ORDER — OXYCODONE HYDROCHLORIDE AND ACETAMINOPHEN 5; 325 MG/1; MG/1
1 TABLET ORAL ONCE
Status: COMPLETED | OUTPATIENT
Start: 2023-12-08 | End: 2023-12-08

## 2023-12-08 RX ADMIN — OXYCODONE AND ACETAMINOPHEN 1 TABLET: 5; 325 TABLET ORAL at 20:00

## 2023-12-08 ASSESSMENT — PAIN DESCRIPTION - PAIN TYPE
TYPE: ACUTE PAIN
TYPE: ACUTE PAIN

## 2023-12-08 ASSESSMENT — FIBROSIS 4 INDEX: FIB4 SCORE: 0.24

## 2023-12-08 NOTE — PATIENT COMMUNICATION
Phone Number Called: [phone]    Call outcome: Spoke to patient regarding message below.    Message: Spoke to patient about disability claim. Patient updated that he was recently in the hospital in the ICU with a GI bleed. Patient reports he needs an endoscopy/colonoscopy completed but that he made need his heart surgery prior to this. RN updated that she would reach out to disability to provide updated information to extend claim. All questions answered at this time. Advised to call back with any further questions or concerns.     Phone Number Called: [phone]    Call outcome:  spoke to [employer]    Message: CLAIM NUMBER [id number] Spoke to disability. Disability informed they would fax over extension paperwork to office. Patient condition updates also provided at this time.

## 2023-12-09 NOTE — ED TRIAGE NOTES
"Patient presents to the ER with the following complaints:    Chief Complaint   Patient presents with    Arm Injury     Patient slipped on icy ramp at home. Landed on left proximal forearm and left elbow. Sensation intact in lower extremity. Patient able to move fingers.        Pulse 99   Temp 36.9 °C (98.5 °F) (Temporal)   Resp 20   Ht 1.905 m (6' 3\")   Wt 103 kg (227 lb 1.2 oz)   SpO2 98%   BMI 28.38 kg/m²       "

## 2023-12-09 NOTE — ED NOTES
Patient discharged in ambulatory state after verbally confirming understanding of discharge paperwork and education provided by ER physician. Patient advised to return to the ER for any worsening pain of any other symptoms.

## 2023-12-09 NOTE — DISCHARGE INSTRUCTIONS
Keep your arm at rest and protected, use intermittent cold compresses on the area that hurts.  Contact the orthopedic clinic Monday morning and arrange recheck during the week you may use Tylenol and Motrin if needed for discomfort

## 2023-12-09 NOTE — ED PROVIDER NOTES
ED Provider Note    CHIEF COMPLAINT  Chief Complaint   Patient presents with    Arm Injury     Patient slipped on icy ramp at home. Landed on left proximal forearm and left elbow. Sensation intact in lower extremity. Patient able to move fingers.        EXTERNAL RECORDS REVIEWED  PDMP PDMP review shows the patient has been feeling monthly prescriptions for oxycodone since September of this year the last was filled 3 days ago for 7-day course of oxycodone.    HPI/ROS    Roberto Braswell is a 43 y.o. male who presents to the emergency department complaining of left forearm pain after a fall.  The patient says that he slipped on an icy ramp at his house and landed striking the ulnar aspect of his left forearm on a rail.  He now has pain in that area but feels he was otherwise uninjured.  Review of systems: He did not hit his head he does not have any other extremity pains or torso injury.    PAST MEDICAL HISTORY   has a past medical history of Arthritis, Breath shortness (11/03/2023), Drug-seeking behavior (07/30/2023), Duodenal ulcer (07/30/2023), Gastric ulcer, Heart valve disease, Hypertension, and Pain.    SURGICAL HISTORY   has a past surgical history that includes other orthopedic surgery; other abdominal surgery; ankle arthroscopy (Right, 5/21/2018); biopsy ortho (Right, 5/21/2018); upper gi endoscopy,diagnosis (3/28/2022); upper gi endoscopy,biopsy (3/28/2022); upper gi endoscopy,biopsy (N/A, 7/30/2023); upper gi endoscopy,diagnosis (N/A, 9/14/2023); and upper gi endoscopy,biopsy (N/A, 9/14/2023).    FAMILY HISTORY  History reviewed. No pertinent family history.    SOCIAL HISTORY  Social History     Tobacco Use    Smoking status: Never    Smokeless tobacco: Never   Vaping Use    Vaping Use: Never used   Substance and Sexual Activity    Alcohol use: Not Currently    Drug use: Never    Sexual activity: Not on file       CURRENT MEDICATIONS  Home Medications       Reviewed by Adolfo Sapp R.N. (Registered  "Nurse) on 12/08/23 at 1914  Med List Status: Not Addressed     Medication Last Dose Status   doxylamine (UNISOM) 25 MG Tab tablet  Active   hyoscyamine-mag hydrox-al hydrox-simeth-lidocaine  Active   lidocaine-hyoscyamine-mag hydrox-al hydrox-simeth  Active   losartan (COZAAR) 25 MG Tab  Active   Oxycodone HCl 20 MG Tab  Active   pantoprazole (PROTONIX) 40 MG Tablet Delayed Response  Active   sucralfate (CARAFATE) 1 GM/10ML Suspension  Active                    ALLERGIES  Allergies   Allergen Reactions    Morphine Vomiting    Tramadol Unspecified     Seizure  [id number]       PHYSICAL EXAM  VITAL SIGNS: Pulse 99   Temp 36.9 °C (98.5 °F) (Temporal)   Resp 20   Ht 1.905 m (6' 3\")   Wt 103 kg (227 lb 1.2 oz)   SpO2 98%   BMI 28.38 kg/m²    Constitutional: Awake lucid verbal anxious but nontoxic-appearing  HENT: No sign of trauma to the head  Musculoskeletal: Examination of the left upper extremity shows no tenderness around the clavicle or shoulder no sulcus sign no tenderness of the bony structures of the elbow.  The patient does complain of tenderness about 3 inches distal from the elbow over the ulna but I do not see marks or any contusions or swelling or visual signs of trauma in that area and there is no bony deformity.  Distally the hand looks normal there is a strong easily palpable radial pulse the patient can flex and extend his fingers without difficulty    DIAGNOSTIC STUDIES / PROCEDURES    RADIOLOGY  I have independently interpreted the diagnostic imaging associated with this visit and am waiting the final reading from the radiologist.   My preliminary interpretation is as follows: I do not see fracture or dislocation on the x-ray  Radiologist interpretation:   DX-FOREARM LEFT   Final Result         No acute osseous abnormality.            COURSE & MEDICAL DECISION MAKING  In the emergency department the patient generally looks well there is no bony deformity on physical exam.  The patient was given 1 " Percocet tablet while we waited for his x-ray results and fortunately no fracture or dislocation is identified.  I reviewed the x-ray findings with the patient and his family and at this point in time I think it is safe for him to go home I have ordered him an arm sling to help with comfort I recommended over-the-counter Tylenol and Motrin if needed and he can intermittently apply cold compresses to the area that hurts.  I have recommended that he call the [facility] Monday morning and arrange office recheck during the week.  The patient is inquiring about prescription pain medications but given that there is no fracture or dislocations and minimal findings on physical exam I do not feel that prescription pain medications are indicated at this time.      FINAL DIAGNOSIS  1. Injury of left forearm, initial encounter           Electronically signed by: Jun Mendoza M.D., 12/8/2023 7:59 PM

## 2023-12-14 ENCOUNTER — TELEPHONE (OUTPATIENT)
Dept: CARDIOLOGY | Facility: MEDICAL CENTER | Age: 43
End: 2023-12-14
Payer: COMMERCIAL

## 2023-12-14 NOTE — TELEPHONE ENCOUNTER
Last OV: 10/24/2023  Proposed Surgery: Colonoscopy with Anesthesia   Surgery Date: 12/27/2023  Requesting Office Name: [facility]   Fax Number: [phone]  Preference of Location (default is surgery center unless specified by Cardiologist or [facility])  Prior Clearance Addressed: No      Anticoags/Antiplatelets: Other none   Outstanding Cardiac Imaging : No  Stent, Cardiac Devices, or Catheterization: Yes  Date : 11/21/2023   Ablation, TAVR/Valve (including open heart), Cardioversion: No  Recent Cardiac Hospitalization: Yes  Date:  11/21/2023            When: Hospitalized in the last 6 months. Forward to provider to review.   History (cardiac history):   Past Medical History:   Diagnosis Date    Arthritis     Right ankle    Breath shortness 11/03/2023    With exertion.    Drug-seeking behavior 07/30/2023    Demanding narcotics for a duodenal ulcer July 2023. Refused antacids or sucralfate.     Duodenal ulcer 07/30/2023    Gastric ulcer     Heart valve disease     Mitral valve prolapse    Hypertension     Pain     Resolved; Right ankle             Surgical Clearance Letter Sent: NO. Provider to advise.   **Scan clearance request [facility] into [facility].**

## 2023-12-14 NOTE — LETTER
PROCEDURE/SURGERY CLEARANCE FORM      Encounter Date: 12/14/2023    Patient: Roberto Braswell  YOB: 1980    CARDIOLOGIST:  SHAJI Vargas    REFERRING DOCTOR:  No ref. provider found    The above patient is cleared to have the following procedure/surgery: Colonoscopy with Anesthesia                                             Additional comments:  Ok to proceed in hospital setting. SC        Electronically signed         MD Signature   SHAJI Vargas

## 2023-12-14 NOTE — TELEPHONE ENCOUNTER
Per SC - Ok to proceed in hospital setting. SC    Letter faxed to [facility]   Fax Number: [phone]

## 2023-12-15 DIAGNOSIS — K26.4 GASTROINTESTINAL HEMORRHAGE ASSOCIATED WITH DUODENAL ULCER: ICD-10-CM

## 2023-12-15 DIAGNOSIS — I80.8 SUPERFICIAL THROMBOPHLEBITIS OF RIGHT UPPER EXTREMITY: ICD-10-CM

## 2023-12-15 RX ORDER — OXYCODONE HYDROCHLORIDE 20 MG/1
20 TABLET ORAL EVERY 6 HOURS PRN
Qty: 20 TABLET | Refills: 0 | Status: SHIPPED | OUTPATIENT
Start: 2023-12-15 | End: 2023-12-22 | Stop reason: SDUPTHER

## 2023-12-15 NOTE — TELEPHONE ENCOUNTER
Received request via: Pharmacy    Was the patient seen in the last year in this department? Yes  12/5/23  Does the patient have an active prescription (recently filled or refills available) for medication(s) requested? No    Does the patient have retirement Plus and need 100 day supply (blood pressure, diabetes and cholesterol meds only)? Medication is not for cholesterol, blood pressure or diabetes

## 2023-12-19 ENCOUNTER — PATIENT MESSAGE (OUTPATIENT)
Dept: CARDIOLOGY | Facility: MEDICAL CENTER | Age: 43
End: 2023-12-19
Payer: COMMERCIAL

## 2023-12-19 DIAGNOSIS — I80.8 SUPERFICIAL THROMBOPHLEBITIS OF RIGHT UPPER EXTREMITY: ICD-10-CM

## 2023-12-19 DIAGNOSIS — K26.4 GASTROINTESTINAL HEMORRHAGE ASSOCIATED WITH DUODENAL ULCER: ICD-10-CM

## 2023-12-19 NOTE — PATIENT COMMUNICATION
Phone Number Called: [phone]    Call outcome:  spoke to [employer]     Message: [employer] informed that extension documentation was never received. Requested that paperwork be emailed directly to this RN at this time. Informed that paperwork is for additional clinical documentation as well as extension form.

## 2023-12-20 ENCOUNTER — OFFICE VISIT (OUTPATIENT)
Dept: CARDIOTHORACIC SURGERY | Facility: MEDICAL CENTER | Age: 43
End: 2023-12-20
Payer: COMMERCIAL

## 2023-12-20 ENCOUNTER — TELEPHONE (OUTPATIENT)
Dept: MEDICAL GROUP | Facility: LAB | Age: 43
End: 2023-12-20

## 2023-12-20 VITALS
WEIGHT: 227 LBS | SYSTOLIC BLOOD PRESSURE: 118 MMHG | OXYGEN SATURATION: 100 % | BODY MASS INDEX: 28.23 KG/M2 | HEART RATE: 89 BPM | HEIGHT: 75 IN | DIASTOLIC BLOOD PRESSURE: 80 MMHG | TEMPERATURE: 99 F

## 2023-12-20 DIAGNOSIS — I34.0 SEVERE MITRAL REGURGITATION: ICD-10-CM

## 2023-12-20 PROCEDURE — 99205 OFFICE O/P NEW HI 60 MIN: CPT | Performed by: THORACIC SURGERY (CARDIOTHORACIC VASCULAR SURGERY)

## 2023-12-20 PROCEDURE — 3079F DIAST BP 80-89 MM HG: CPT | Performed by: THORACIC SURGERY (CARDIOTHORACIC VASCULAR SURGERY)

## 2023-12-20 PROCEDURE — 3074F SYST BP LT 130 MM HG: CPT | Performed by: THORACIC SURGERY (CARDIOTHORACIC VASCULAR SURGERY)

## 2023-12-20 RX ORDER — OXYCODONE HYDROCHLORIDE 20 MG/1
20 TABLET ORAL EVERY 6 HOURS PRN
Qty: 20 TABLET | Refills: 0 | OUTPATIENT
Start: 2023-12-20 | End: 2023-12-27

## 2023-12-20 ASSESSMENT — ENCOUNTER SYMPTOMS
NEUROLOGICAL NEGATIVE: 1
PSYCHIATRIC NEGATIVE: 1
MUSCULOSKELETAL NEGATIVE: 1
EYES NEGATIVE: 1
SHORTNESS OF BREATH: 1
GASTROINTESTINAL NEGATIVE: 1

## 2023-12-20 ASSESSMENT — FIBROSIS 4 INDEX: FIB4 SCORE: 0.24

## 2023-12-20 NOTE — PROGRESS NOTES
Problem: Prehabilitation    Goal: optimize identified modifiable risk factors prior to cardiac surgery.     Intervention: Screening and interventions for the following  risk factors with educational materials provided if indicated  and patient demonstrates readiness to participate.  Dentition, malnutrition, CAD and dietary cholesterol,  obesity, alcohol and tobacco abuse, illegal drug use, and   social support system for post discharge planning.    Additionally, home exercise regimen initiation or continuation  (if appropriate), and teaching of and participation of  inspiratory muscle training via incentive spirometer (provided).    Review of post-surgical physical limitations, upcoming  appointments & testing, ordered diagnostics, and medication review  to identify and educate on anticoagulant and antihypertensives  that need cessation in the days prior to surgery.    The cardiac surgery prehabilitation sheet, surgery instruction sheet,  MAP, education booklet, vitals logbook, normals after heart surgery,  and cardiac rehab flier was provided for patient to read and review.    Surgical CHG wipes were also provided with instructions on use.    DENTITION:  Routine dental appointment prior to surgery is   indicated for valve procedure.    he was not encouraged to get a dental   cleaning as he does get regular  dental cleaning and denies current dental   infection or issues that should be  addressed prior to surgery.    INCENTIVE SPIROMETRY:  Discussed importance of incentive spirometry (IS) use,  20 times a day AT MINIMUM but more often if possible to  optimize cardio-pulmonary function prior to surgery.  They were instructed to allow a 5 minute rest in  between uses to achieve max IS volume.  Education with return demonstration performed.   Patient is effective, coaching was not needed.    Prehabilitation IS baseline is 4000.    HOME EXERCISE REGIMEN:  We discussed importance of preventing deconditioning and  muscle  wasting in the time preceding surgery as this will occur  post surgery.    > 50 % left main disease present? NO  EF-- 65%  Active sub lingual nitro use? NO  he is appropriate for initiation  of a home exercise regimen.    he is minimally physically active; current  exercise tolerance/level is low due to significant  symptoms of SOB with minimal activity.    he  was educated to start an   exercise regimen of walking on   a flat surface 30 minutes a day, adjusting the  length for tolerance level, even if that is just around his house.     he was educated  that if chest pain or SOB occurs patient is to stop  immediately and if symptoms do not  resolve after stopping to call 911.    he was also encouraged to practice sit to stand  from a chair with one arm to assist or no arm assistance  12 times a day to strengthen quadriceps and improve balance.    CAD:  Patient does not have known CAD; education on  cholesterol, diet, and the heart are not indicated  and were not provided.    OBESITY:  Patient's BMI is not over 30.  Education regarding portion sizing and diet are not indicated and were not provided.    MALNUTRITION:  Malnutrition screening tool (MST) shows he is not at risk  with a score of 0. (0-1 not at risk; greater or equal to 2 is at risk).    Given MST score, functional capacity,   and no reported muscle loss, it was not  recommended they increase their protein  intake to 1.2 to 2.5 gram/kg/day.    SMOKING:  Patient denies current smoking history.  Smoking risks  and cessation education not indicated and was not provided.    ALCOHOL ABUSE:  Patient denies alcohol abuse.  Alcohol risks and cessation  education not indicated and was not provided.    ILLEGAL DRUG USE:  Patient denies illicit drug use.  Illicit drug use risks  and cessation education not indicated and was not provided    SOCIAL SUPPORT SYSTEM FOR DISCHARGE NEEDS:    Patient does have family,  his wife and dad to stay for  1-week post discharge to  assist with ADL's. No barriers  to hospital discharge anticipated.    PSYCHOLOGICAL PREPARATION:  We discussed the basics of physical limitation post op to include:               No driving for 4 weeks               No lifting, pushing or pulling > 10 lbs for 6 weeks  Sternal precautions to include moving within the tube and safe mobility in and out of bed and the chair.    ANTIBIOTIC STEWARDSHIP:  MRSA swab will be collected by [facility] during pre-admit testing.    MEDICATION AN UPCOMING APPOINTMENTS REVIEW:  Current medications were reviewed that may need  specific stop dates prior to surgery. The patient was instructed   to stop the following medications after the indicated date.    Losartan to stop 2 full days prior to surgery; last dose 1/21    Vascular scheduling sheet was not provided and explained.    Walk test Times: 4 4 4    A phone appointment for pre op education was made  for 1/22 to review all provided education materials.

## 2023-12-20 NOTE — TELEPHONE ENCOUNTER
Patient called regarding script requested for Oxycodone.  Pt. Will be out of the medication today.  Patient has sent [employer] message regarding the request yesterday.    Please respond to patient through [employer].

## 2023-12-20 NOTE — TELEPHONE ENCOUNTER
Patient needs to follow-up in person for any refills of opiates as this is a very high dose of oxycodone.  Would suggest he switch over to 1000 mg of Tylenol 2-3 times a day.

## 2023-12-20 NOTE — PATIENT COMMUNICATION
[facility] disability form completed, signed, uploaded to media and faxed to [facility] with clinical documentation.

## 2023-12-21 ENCOUNTER — APPOINTMENT (OUTPATIENT)
Dept: ADMISSIONS | Facility: MEDICAL CENTER | Age: 43
DRG: 219 | End: 2023-12-21
Attending: THORACIC SURGERY (CARDIOTHORACIC VASCULAR SURGERY)
Payer: COMMERCIAL

## 2023-12-22 ENCOUNTER — OFFICE VISIT (OUTPATIENT)
Dept: MEDICAL GROUP | Facility: LAB | Age: 43
End: 2023-12-22
Payer: COMMERCIAL

## 2023-12-22 VITALS
WEIGHT: 227 LBS | HEIGHT: 75 IN | BODY MASS INDEX: 28.23 KG/M2 | SYSTOLIC BLOOD PRESSURE: 124 MMHG | RESPIRATION RATE: 18 BRPM | TEMPERATURE: 98.2 F | OXYGEN SATURATION: 97 % | HEART RATE: 98 BPM | DIASTOLIC BLOOD PRESSURE: 80 MMHG

## 2023-12-22 DIAGNOSIS — I80.8 SUPERFICIAL THROMBOPHLEBITIS OF RIGHT UPPER EXTREMITY: ICD-10-CM

## 2023-12-22 DIAGNOSIS — M79.632 PAIN OF LEFT FOREARM: ICD-10-CM

## 2023-12-22 DIAGNOSIS — K26.4 GASTROINTESTINAL HEMORRHAGE ASSOCIATED WITH DUODENAL ULCER: ICD-10-CM

## 2023-12-22 PROCEDURE — 3074F SYST BP LT 130 MM HG: CPT | Performed by: FAMILY MEDICINE

## 2023-12-22 PROCEDURE — 3079F DIAST BP 80-89 MM HG: CPT | Performed by: FAMILY MEDICINE

## 2023-12-22 PROCEDURE — 99214 OFFICE O/P EST MOD 30 MIN: CPT | Performed by: FAMILY MEDICINE

## 2023-12-22 RX ORDER — OXYCODONE HYDROCHLORIDE 20 MG/1
20 TABLET ORAL
Qty: 10 TABLET | Refills: 0 | Status: SHIPPED | OUTPATIENT
Start: 2023-12-22 | End: 2023-12-28 | Stop reason: SDUPTHER

## 2023-12-22 ASSESSMENT — FIBROSIS 4 INDEX: FIB4 SCORE: 0.24

## 2023-12-22 NOTE — PROGRESS NOTES
Chief Complaint:   Chief Complaint   Patient presents with    Follow-Up     Medication followup/refills       HPI: Established patient, PCP out of office  Roberto Braswell is a 43 y.o. male who presents for refill of his oxycodone    1. Gastrointestinal hemorrhage associated with duodenal ulcer  Recently admitted back in November for GI bleeding.  Patient is unable to use NSAIDs because of that.  He is having pain in forearm because of recent fall.  Not well-controlled on over-the-counter analgesia.  Tylenol is not helping much    2. Superficial thrombophlebitis of right upper extremity  Still complaining of pain on his right upper extremity because of recent thrombophlebitis.  No severe pain    3. Pain of left forearm    Status post ER visit for recent fall, still having pain in the left forearm, Tylenol is not helping much and he is unable to use NSAIDs because of recent GI bleed.  Requesting to refill limited amount of oxycodone to help him control his symptoms through the holidays.  And he will come back and follow-up with his PCP to help further evaluate.        Past medical history, family history, social history and medications reviewed and updated in the record.  Today  Current medications, problem list and allergies reviewed in [facility] today  Health maintenance topics are reviewed and updated.    Patient Active Problem List    Diagnosis Date Noted    Superficial thrombophlebitis of right upper extremity 12/05/2023    Left arm swelling 11/20/2023    Duodenal ulcer 11/18/2023    Hypertension 11/18/2023    Normal anion gap metabolic acidosis 11/18/2023    Abdominal pain 11/18/2023    Hematemesis 11/17/2023    Essential hypertension 10/10/2023    Mitral regurgitation 09/15/2023    Iron deficiency anemia due to chronic blood loss 09/14/2023    GI bleed 09/13/2023    Acute blood loss anemia 07/30/2023    Duodenal ulcer with hemorrhage 07/30/2023    Upper GI bleed 03/26/2022     Family History   Problem Relation  Age of Onset    Heart Disease Mother     No Known Problems Father      Social History     Socioeconomic History    Marital status:      Spouse name: Not on file    Number of children: Not on file    Years of education: Not on file    Highest education level: Associate degree: occupational, technical, or vocational program   Occupational History    Not on file   Tobacco Use    Smoking status: Never    Smokeless tobacco: Never   Vaping Use    Vaping Use: Never used   Substance and Sexual Activity    Alcohol use: Not Currently    Drug use: Never    Sexual activity: Not on file   Other Topics Concern    Not on file   Social History Narrative    Not on file     Social Determinants of Health     Financial Resource Strain: Low Risk  (9/26/2023)    Overall Financial Resource Strain (CARDIA)     Difficulty of Paying Living Expenses: Not hard at all   Food Insecurity: No Food Insecurity (9/26/2023)    Hunger Vital Sign     Worried About Running Out of Food in the Last Year: Never true     Ran Out of Food in the Last Year: Never true   Transportation Needs: No Transportation Needs (9/26/2023)    PRAPARE - Transportation     Lack of Transportation (Medical): No     Lack of Transportation (Non-Medical): No   Physical Activity: Sufficiently Active (9/26/2023)    Exercise Vital Sign     Days of Exercise per Week: 6 days     Minutes of Exercise per Session: 120 min   Stress: Stress Concern Present (9/26/2023)    [employer] [place] [place] [employer] - Occupational Stress Questionnaire     Feeling of Stress : To some extent   Social Connections: Moderately Isolated (9/26/2023)    Social Connection and Isolation Panel [NHANES]     Frequency of Communication with Friends and Family: Once a week     Frequency of Social Gatherings with Friends and Family: Twice a week     Attends Jehovah's witness Services: Never     Active Member of Clubs or Organizations: No     Attends Club or Organization Meetings: Never     Marital Status:  "   Intimate Partner Violence: Not on file   Housing Stability: Low Risk  (9/26/2023)    Housing Stability Vital Sign     Unable to Pay for Housing in the Last Year: No     Number of Places Lived in the Last Year: 1     Unstable Housing in the Last Year: No     Current Outpatient Medications   Medication Sig Dispense Refill    Oxycodone HCl 20 MG Tab Take 1 Tablet by mouth 1 time a day as needed (severe pain) for up to 10 days. 10 Tablet 0    losartan (COZAAR) 25 MG Tab TAKE 1 TABLET BY MOUTH EVERY DAY 60 Tablet 0    pantoprazole (PROTONIX) 40 MG Tablet Delayed Response TAKE 1 TABLET BY MOUTH TWICE A  Tablet 0    sucralfate (CARAFATE) 1 GM/10ML Suspension Take 10 mL by mouth every 6 hours. (Patient not taking: Reported on 12/20/2023) 414 mL 1    lidocaine-hyoscyamine-mag hydrox-al hydrox-simeth Take 30mL  by mouth every 6 hours as needed (upset stomach). 200 mL 0    hyoscyamine-mag hydrox-al hydrox-simeth-lidocaine Take 15ml by mouth every 6 hours as needed (abdominal pain or cramping). 200 mL 1    doxylamine (UNISOM) 25 MG Tab tablet Take 25-50 mg by mouth at bedtime. \"Syntensia brand sleep aid\"       No current facility-administered medications for this visit.           Review Of Systems  As documented in HPI above  PHYSICAL EXAMINATION:    /80 (BP Location: Right arm, Patient Position: Sitting, BP Cuff Size: Adult)   Pulse 98   Temp 36.8 °C (98.2 °F) (Temporal)   Resp 18   Ht 1.905 m (6' 3\")   Wt 103 kg (227 lb)   SpO2 97%   BMI 28.37 kg/m²   Gen.: Well-developed, well-nourished, no apparent distress, pleasant and cooperative with the examination  HEENT: Normocephalic/atraumatic,   Neck: No JVD or bruits, no adenopathy  Cor: Regular rate and rhythm without murmur gallop or rub  Lungs: Clear to auscultation with equal breath sounds bilaterally. No wheezes, rhonchi.  Abdomen: Soft nontender without hepatosplenomegaly or masses appreciated, normoactive bowel sounds  Extremities: No cyanosis, " clubbing or edema  Forearm exam no deformity noted no tenderness    Reviewed most recent ER visit, x-ray without fracture    ASSESSMENT/Plan:  1. Gastrointestinal hemorrhage associated with duodenal ulcer Acute Patient to avoid NSAIDs, Tylenol is not controlling his arm pain.          2. Superficial thrombophlebitis of right upper extremity  Chronic, continue pain control and follow-up with PCP for further evaluation      3. Pain of left forearm  Status post ER visit for recent fall, x-ray without fracture.  Discussed with the patient to avoid the use of oxycodone unless if he is having severe pain, to avoid addiction since it is a high risk of addiction medication.  Will give him only limited amount until he is able to follow-up with his PCP and evaluate further if he needs to be on narcotics at all.  Patient voices understanding, he said he will follow-up with PCP, 10 tablets was provided to patient today, PDMP reviewed today  Oxycodone HCl 20 MG Tab

## 2023-12-28 ENCOUNTER — OFFICE VISIT (OUTPATIENT)
Dept: MEDICAL GROUP | Facility: LAB | Age: 43
End: 2023-12-28
Payer: COMMERCIAL

## 2023-12-28 VITALS
TEMPERATURE: 98.3 F | DIASTOLIC BLOOD PRESSURE: 74 MMHG | WEIGHT: 225 LBS | BODY MASS INDEX: 27.98 KG/M2 | SYSTOLIC BLOOD PRESSURE: 120 MMHG | RESPIRATION RATE: 12 BRPM | OXYGEN SATURATION: 100 % | HEIGHT: 75 IN | HEART RATE: 101 BPM

## 2023-12-28 DIAGNOSIS — I80.8 SUPERFICIAL THROMBOPHLEBITIS OF RIGHT UPPER EXTREMITY: ICD-10-CM

## 2023-12-28 DIAGNOSIS — K26.4 GASTROINTESTINAL HEMORRHAGE ASSOCIATED WITH DUODENAL ULCER: ICD-10-CM

## 2023-12-28 DIAGNOSIS — M79.632 PAIN OF LEFT FOREARM: ICD-10-CM

## 2023-12-28 PROBLEM — M79.89 LEFT ARM SWELLING: Status: RESOLVED | Noted: 2023-11-20 | Resolved: 2023-12-28

## 2023-12-28 PROCEDURE — 3078F DIAST BP <80 MM HG: CPT | Performed by: STUDENT IN AN ORGANIZED HEALTH CARE EDUCATION/TRAINING PROGRAM

## 2023-12-28 PROCEDURE — 99213 OFFICE O/P EST LOW 20 MIN: CPT | Performed by: STUDENT IN AN ORGANIZED HEALTH CARE EDUCATION/TRAINING PROGRAM

## 2023-12-28 PROCEDURE — 3074F SYST BP LT 130 MM HG: CPT | Performed by: STUDENT IN AN ORGANIZED HEALTH CARE EDUCATION/TRAINING PROGRAM

## 2023-12-28 RX ORDER — OXYCODONE HYDROCHLORIDE 20 MG/1
20 TABLET ORAL
Qty: 30 TABLET | Refills: 0 | Status: ON HOLD | OUTPATIENT
Start: 2023-12-28 | End: 2024-01-30

## 2023-12-28 ASSESSMENT — FIBROSIS 4 INDEX: FIB4 SCORE: 0.24

## 2023-12-28 ASSESSMENT — ENCOUNTER SYMPTOMS
CHILLS: 0
FEVER: 0
SHORTNESS OF BREATH: 0

## 2023-12-28 NOTE — PROGRESS NOTES
"Subjective:     CC: follow up     HPI:   Roberto presents today for the following;    Problem   Superficial Thrombophlebitis of Right Upper Extremity    Patient has multiple superficial thrombophlebitis from his IV's. Her reports having constant pain that occasionally he uses oxycodone 20mg which does help and he only takes when his pain is severe. He has been trying superficial warm compresses as well     12/28/23  -patient continues to have pain and is unable to take NSAIDS due to bleed risks with recent gi bleed . He has also been using voltaren.     Left Arm Swelling (Resolved)       Current Outpatient Medications Ordered in Epic   Medication Sig Dispense Refill    Oxycodone HCl 20 MG Tab Take 1 Tablet by mouth 1 time a day as needed (severe pain) for up to 30 days. 30 Tablet 0    losartan (COZAAR) 25 MG Tab TAKE 1 TABLET BY MOUTH EVERY DAY 60 Tablet 0    pantoprazole (PROTONIX) 40 MG Tablet Delayed Response TAKE 1 TABLET BY MOUTH TWICE A  Tablet 0    sucralfate (CARAFATE) 1 GM/10ML Suspension Take 10 mL by mouth every 6 hours. (Patient not taking: Reported on 12/20/2023) 414 mL 1    lidocaine-hyoscyamine-mag hydrox-al hydrox-simeth Take 30mL  by mouth every 6 hours as needed (upset stomach). 200 mL 0    hyoscyamine-mag hydrox-al hydrox-simeth-lidocaine Take 15ml by mouth every 6 hours as needed (abdominal pain or cramping). 200 mL 1    doxylamine (UNISOM) 25 MG Tab tablet Take 25-50 mg by mouth at bedtime. \"Santacruz brand sleep aid\"       No current Epic-ordered facility-administered medications on file.           ROS:  Review of Systems   Constitutional:  Negative for chills and fever.   Respiratory:  Negative for shortness of breath.    Cardiovascular:  Negative for chest pain.       Objective:     Exam:  /74 (BP Location: Left arm, Patient Position: Sitting, BP Cuff Size: Adult)   Pulse (!) 101   Temp 36.8 °C (98.3 °F)   Resp 12   Ht 1.905 m (6' 3\")   Wt 102 kg (225 lb)   SpO2 100%   " BMI 28.12 kg/m²  Body mass index is 28.12 kg/m².    Physical Exam  Constitutional:       General: He is not in acute distress.     Appearance: He is not ill-appearing.   Pulmonary:      Effort: Pulmonary effort is normal.   Musculoskeletal:      Comments: Hard vein palpated at medial surface at elbow moving to upper arm. Tender to touch   Neurological:      Mental Status: He is alert.   Psychiatric:         Mood and Affect: Mood normal.         Behavior: Behavior normal.         Thought Content: Thought content normal.         Judgment: Judgment normal.               Assessment & Plan:     Problem List Items Addressed This Visit       GI bleed    Relevant Medications    Oxycodone HCl 20 MG Tab    Superficial thrombophlebitis of right upper extremity     Acute   -will refill oxycodone  -pdmp checked  -cs signed previously            Relevant Medications    Oxycodone HCl 20 MG Tab    Other Relevant Orders    Referral to Vascular Medicine     Other Visit Diagnoses       Pain of left forearm        Relevant Medications    Oxycodone HCl 20 MG Tab            3+ labs and er note reviewed     Please note that this dictation was created using voice recognition software. I have made every reasonable attempt to correct obvious errors, but I expect that there are errors of grammar and possibly content that I did not discover before finalizing the note.

## 2023-12-29 ENCOUNTER — PRE-ADMISSION TESTING (OUTPATIENT)
Dept: ADMISSIONS | Facility: MEDICAL CENTER | Age: 43
DRG: 219 | End: 2023-12-29
Attending: THORACIC SURGERY (CARDIOTHORACIC VASCULAR SURGERY)
Payer: COMMERCIAL

## 2023-12-29 ENCOUNTER — TELEPHONE (OUTPATIENT)
Dept: MEDICAL GROUP | Facility: LAB | Age: 43
End: 2023-12-29
Payer: COMMERCIAL

## 2023-12-29 RX ORDER — SUCRALFATE 1 G/1
1 TABLET ORAL
COMMUNITY
Start: 2023-12-06

## 2023-12-29 RX ORDER — OMEPRAZOLE 20 MG/1
20 CAPSULE, DELAYED RELEASE ORAL DAILY
COMMUNITY

## 2023-12-29 NOTE — TELEPHONE ENCOUNTER
Phone Number Called: There are no phone numbers on file.     Call outcome: Left detailed message for patient. Informed to call back with any additional questions.    Message: LVM letting pt know that I called the pharmacy to ok the early refill but it is his insurance that won't pay that he can pick it up with an out of pocket cost if any questions to call the office back at [phone]

## 2024-01-11 ENCOUNTER — APPOINTMENT (OUTPATIENT)
Dept: VASCULAR LAB | Facility: MEDICAL CENTER | Age: 44
End: 2024-01-11
Payer: COMMERCIAL

## 2024-01-22 ENCOUNTER — TELEPHONE (OUTPATIENT)
Dept: CARDIOTHORACIC SURGERY | Facility: MEDICAL CENTER | Age: 44
End: 2024-01-22
Payer: COMMERCIAL

## 2024-01-22 NOTE — TELEPHONE ENCOUNTER
Called patient and left a VM with my call back number and instructions to stop losartan; and remind him of his PAT appointment tomorrow at 0900, and to call me back at his convenience.

## 2024-01-22 NOTE — TELEPHONE ENCOUNTER
Patient was reminded that MV repair vs replacement surgery with  Dr. Santana is on 1/25 at 0730 in the morning. he   are aware check in is at 0515 am.    PAT date and time was reviewed with patient and  verified it is within 72 hours of surgery.    Vascular studies and procedures: none  were scheduled, completed,  and reviewed by myself for concerning  results did not need escalation to the CARTER/MD.    he did not need a dental check/work.    Baseline IS was 4000. he has been compliant   with the IS. Volume has improved to.    he was prescribed a walking regimen or  told to continue he current regimen and   he has been compliant.   he has been practicing sit to stand.    he was reminded that losartan needs to stop after 1/22.    he was told that no medication(s) are okay to  take the morning of surgery prior to check in.    The CHG wipes instructions were reviewed and understood.    he was reminded no food after midnight.    Call time 5 minutes

## 2024-01-23 ENCOUNTER — HOSPITAL ENCOUNTER (OUTPATIENT)
Dept: RADIOLOGY | Facility: MEDICAL CENTER | Age: 44
DRG: 219 | End: 2024-01-23
Attending: THORACIC SURGERY (CARDIOTHORACIC VASCULAR SURGERY) | Admitting: THORACIC SURGERY (CARDIOTHORACIC VASCULAR SURGERY)
Payer: COMMERCIAL

## 2024-01-23 ENCOUNTER — PRE-ADMISSION TESTING (OUTPATIENT)
Dept: ADMISSIONS | Facility: MEDICAL CENTER | Age: 44
DRG: 219 | End: 2024-01-23
Attending: THORACIC SURGERY (CARDIOTHORACIC VASCULAR SURGERY)
Payer: COMMERCIAL

## 2024-01-23 DIAGNOSIS — Z01.812 PRE-OPERATIVE LABORATORY EXAMINATION: ICD-10-CM

## 2024-01-23 DIAGNOSIS — Z01.811 PRE-PROCEDURAL RESPIRATORY EXAMINATION: ICD-10-CM

## 2024-01-23 DIAGNOSIS — Z01.810 PRE-OPERATIVE CARDIOVASCULAR EXAMINATION: ICD-10-CM

## 2024-01-23 LAB
ABO GROUP BLD: ABNORMAL
ALBUMIN SERPL BCP-MCNC: 4.1 G/DL (ref 3.2–4.9)
ALBUMIN/GLOB SERPL: 1.8 G/DL
ALP SERPL-CCNC: 50 U/L (ref 30–99)
ALT SERPL-CCNC: 17 U/L (ref 2–50)
AMPHET UR QL SCN: NEGATIVE
ANION GAP SERPL CALC-SCNC: 8 MMOL/L (ref 7–16)
APPEARANCE UR: CLEAR
APTT PPP: 26.8 SEC (ref 24.7–36)
AST SERPL-CCNC: 13 U/L (ref 12–45)
BARBITURATES UR QL SCN: NEGATIVE
BASOPHILS # BLD AUTO: 2.1 % (ref 0–1.8)
BASOPHILS # BLD: 0.08 K/UL (ref 0–0.12)
BENZODIAZ UR QL SCN: NEGATIVE
BILIRUB SERPL-MCNC: 0.4 MG/DL (ref 0.1–1.5)
BILIRUB UR QL STRIP.AUTO: NEGATIVE
BLD GP AB SCN SERPL QL: ABNORMAL
BUN SERPL-MCNC: 14 MG/DL (ref 8–22)
BZE UR QL SCN: NEGATIVE
CALCIUM ALBUM COR SERPL-MCNC: 8.4 MG/DL (ref 8.5–10.5)
CALCIUM SERPL-MCNC: 8.5 MG/DL (ref 8.5–10.5)
CANNABINOIDS UR QL SCN: NEGATIVE
CHLORIDE SERPL-SCNC: 108 MMOL/L (ref 96–112)
CO2 SERPL-SCNC: 24 MMOL/L (ref 20–33)
COLOR UR: YELLOW
CREAT SERPL-MCNC: 0.99 MG/DL (ref 0.5–1.4)
EOSINOPHIL # BLD AUTO: 0.18 K/UL (ref 0–0.51)
EOSINOPHIL NFR BLD: 4.6 % (ref 0–6.9)
ERYTHROCYTE [DISTWIDTH] IN BLOOD BY AUTOMATED COUNT: 47.5 FL (ref 35.9–50)
EST. AVERAGE GLUCOSE BLD GHB EST-MCNC: 108 MG/DL
FENTANYL UR QL: NEGATIVE
GFR SERPLBLD CREATININE-BSD FMLA CKD-EPI: 97 ML/MIN/1.73 M 2
GLOBULIN SER CALC-MCNC: 2.3 G/DL (ref 1.9–3.5)
GLUCOSE SERPL-MCNC: 90 MG/DL (ref 65–99)
GLUCOSE UR STRIP.AUTO-MCNC: NEGATIVE MG/DL
HBA1C MFR BLD: 5.4 % (ref 4–5.6)
HCT VFR BLD AUTO: 35.7 % (ref 42–52)
HGB BLD-MCNC: 10.7 G/DL (ref 14–18)
IMM GRANULOCYTES # BLD AUTO: 0.01 K/UL (ref 0–0.11)
IMM GRANULOCYTES NFR BLD AUTO: 0.3 % (ref 0–0.9)
INR PPP: 1.03 (ref 0.87–1.13)
KETONES UR STRIP.AUTO-MCNC: NEGATIVE MG/DL
LEUKOCYTE ESTERASE UR QL STRIP.AUTO: NEGATIVE
LYMPHOCYTES # BLD AUTO: 1.43 K/UL (ref 1–4.8)
LYMPHOCYTES NFR BLD: 36.8 % (ref 22–41)
MCH RBC QN AUTO: 21.7 PG (ref 27–33)
MCHC RBC AUTO-ENTMCNC: 30 G/DL (ref 32.3–36.5)
MCV RBC AUTO: 72.4 FL (ref 81.4–97.8)
METHADONE UR QL SCN: NEGATIVE
MICRO URNS: NORMAL
MONOCYTES # BLD AUTO: 0.46 K/UL (ref 0–0.85)
MONOCYTES NFR BLD AUTO: 11.8 % (ref 0–13.4)
NEUTROPHILS # BLD AUTO: 1.73 K/UL (ref 1.82–7.42)
NEUTROPHILS NFR BLD: 44.4 % (ref 44–72)
NITRITE UR QL STRIP.AUTO: NEGATIVE
NRBC # BLD AUTO: 0 K/UL
NRBC BLD-RTO: 0 /100 WBC (ref 0–0.2)
OPIATES UR QL SCN: NEGATIVE
OXYCODONE UR QL SCN: NEGATIVE
PCP UR QL SCN: NEGATIVE
PH UR STRIP.AUTO: 6.5 [PH] (ref 5–8)
PLATELET # BLD AUTO: 412 K/UL (ref 164–446)
PMV BLD AUTO: 9.3 FL (ref 9–12.9)
POTASSIUM SERPL-SCNC: 4.2 MMOL/L (ref 3.6–5.5)
PROPOXYPH UR QL SCN: NEGATIVE
PROT SERPL-MCNC: 6.4 G/DL (ref 6–8.2)
PROT UR QL STRIP: NEGATIVE MG/DL
PROTHROMBIN TIME: 13.6 SEC (ref 12–14.6)
RBC # BLD AUTO: 4.93 M/UL (ref 4.7–6.1)
RBC UR QL AUTO: NEGATIVE
RH BLD: ABNORMAL
SCCMEC + MECA PNL NOSE NAA+PROBE: NEGATIVE
SCCMEC + MECA PNL NOSE NAA+PROBE: POSITIVE
SODIUM SERPL-SCNC: 140 MMOL/L (ref 135–145)
SP GR UR STRIP.AUTO: 1.01
UROBILINOGEN UR STRIP.AUTO-MCNC: 0.2 MG/DL
WBC # BLD AUTO: 3.9 K/UL (ref 4.8–10.8)

## 2024-01-23 PROCEDURE — 83036 HEMOGLOBIN GLYCOSYLATED A1C: CPT

## 2024-01-23 PROCEDURE — 93005 ELECTROCARDIOGRAM TRACING: CPT

## 2024-01-23 PROCEDURE — 87640 STAPH A DNA AMP PROBE: CPT

## 2024-01-23 PROCEDURE — 86850 RBC ANTIBODY SCREEN: CPT

## 2024-01-23 PROCEDURE — 71046 X-RAY EXAM CHEST 2 VIEWS: CPT

## 2024-01-23 PROCEDURE — 87641 MR-STAPH DNA AMP PROBE: CPT

## 2024-01-23 PROCEDURE — 81003 URINALYSIS AUTO W/O SCOPE: CPT

## 2024-01-23 PROCEDURE — 80053 COMPREHEN METABOLIC PANEL: CPT

## 2024-01-23 PROCEDURE — 85025 COMPLETE CBC W/AUTO DIFF WBC: CPT

## 2024-01-23 PROCEDURE — 86900 BLOOD TYPING SEROLOGIC ABO: CPT

## 2024-01-23 PROCEDURE — 80307 DRUG TEST PRSMV CHEM ANLYZR: CPT

## 2024-01-23 PROCEDURE — 36415 COLL VENOUS BLD VENIPUNCTURE: CPT

## 2024-01-23 PROCEDURE — 85610 PROTHROMBIN TIME: CPT

## 2024-01-23 PROCEDURE — 86901 BLOOD TYPING SEROLOGIC RH(D): CPT

## 2024-01-23 PROCEDURE — 85730 THROMBOPLASTIN TIME PARTIAL: CPT

## 2024-01-24 ENCOUNTER — ANESTHESIA EVENT (OUTPATIENT)
Dept: SURGERY | Facility: MEDICAL CENTER | Age: 44
DRG: 219 | End: 2024-01-24
Payer: COMMERCIAL

## 2024-01-24 LAB — EKG IMPRESSION: NORMAL

## 2024-01-24 PROCEDURE — 93010 ELECTROCARDIOGRAM REPORT: CPT | Performed by: INTERNAL MEDICINE

## 2024-01-24 RX ORDER — DEXMEDETOMIDINE HYDROCHLORIDE 4 UG/ML
0-1.5 INJECTION, SOLUTION INTRAVENOUS CONTINUOUS
Status: DISCONTINUED | OUTPATIENT
Start: 2024-01-25 | End: 2024-01-25

## 2024-01-24 RX ORDER — NOREPINEPHRINE BITARTRATE 0.03 MG/ML
0-1 INJECTION, SOLUTION INTRAVENOUS CONTINUOUS
Status: DISCONTINUED | OUTPATIENT
Start: 2024-01-25 | End: 2024-01-25

## 2024-01-24 RX ORDER — EPINEPHRINE HCL IN 0.9 % NACL 4MG/250ML
0-.5 PLASTIC BAG, INJECTION (ML) INTRAVENOUS CONTINUOUS
Status: DISCONTINUED | OUTPATIENT
Start: 2024-01-25 | End: 2024-01-25

## 2024-01-25 ENCOUNTER — ANESTHESIA (OUTPATIENT)
Dept: SURGERY | Facility: MEDICAL CENTER | Age: 44
DRG: 219 | End: 2024-01-25
Payer: COMMERCIAL

## 2024-01-25 ENCOUNTER — APPOINTMENT (OUTPATIENT)
Dept: CARDIOLOGY | Facility: MEDICAL CENTER | Age: 44
DRG: 219 | End: 2024-01-25
Attending: ANESTHESIOLOGY
Payer: COMMERCIAL

## 2024-01-25 ENCOUNTER — APPOINTMENT (OUTPATIENT)
Dept: RADIOLOGY | Facility: MEDICAL CENTER | Age: 44
DRG: 219 | End: 2024-01-25
Attending: THORACIC SURGERY (CARDIOTHORACIC VASCULAR SURGERY)
Payer: COMMERCIAL

## 2024-01-25 ENCOUNTER — HOSPITAL ENCOUNTER (INPATIENT)
Facility: MEDICAL CENTER | Age: 44
LOS: 6 days | DRG: 219 | End: 2024-01-31
Attending: THORACIC SURGERY (CARDIOTHORACIC VASCULAR SURGERY) | Admitting: THORACIC SURGERY (CARDIOTHORACIC VASCULAR SURGERY)
Payer: COMMERCIAL

## 2024-01-25 DIAGNOSIS — G89.18 ACUTE POST-OPERATIVE PAIN: ICD-10-CM

## 2024-01-25 DIAGNOSIS — R11.0 NAUSEA: ICD-10-CM

## 2024-01-25 DIAGNOSIS — Z98.890 S/P MVR (MITRAL VALVE REPAIR): ICD-10-CM

## 2024-01-25 DIAGNOSIS — I34.0 MITRAL VALVE INSUFFICIENCY, UNSPECIFIED ETIOLOGY: ICD-10-CM

## 2024-01-25 PROBLEM — Z99.11 ENCOUNTER FOR WEANING FROM VENTILATOR (HCC): Status: ACTIVE | Noted: 2024-01-25

## 2024-01-25 LAB
APTT PPP: 28.1 SEC (ref 24.7–36)
BARCODED ABORH UBTYP: 6200
BARCODED PRD CODE UBPRD: NORMAL
BARCODED UNIT NUM UBUNT: NORMAL
BASE EXCESS BLDA CALC-SCNC: -1 MMOL/L (ref -4–3)
BASE EXCESS BLDA CALC-SCNC: -3 MMOL/L (ref -4–3)
BASE EXCESS BLDA CALC-SCNC: -3 MMOL/L (ref -4–3)
BASE EXCESS BLDA CALC-SCNC: -4 MMOL/L (ref -4–3)
BASE EXCESS BLDA CALC-SCNC: -5 MMOL/L (ref -4–3)
BASE EXCESS BLDA CALC-SCNC: -5 MMOL/L (ref -4–3)
BASE EXCESS BLDA CALC-SCNC: -6 MMOL/L (ref -4–3)
BASE EXCESS BLDA CALC-SCNC: -6 MMOL/L (ref -4–3)
BASE EXCESS BLDA CALC-SCNC: -7 MMOL/L (ref -4–3)
BASE EXCESS BLDA CALC-SCNC: -7 MMOL/L (ref -4–3)
BODY TEMPERATURE: ABNORMAL DEGREES
CA-I BLD ISE-SCNC: 1.01 MMOL/L (ref 1.1–1.3)
CA-I BLD ISE-SCNC: 1.03 MMOL/L (ref 1.1–1.3)
CA-I BLD ISE-SCNC: 1.06 MMOL/L (ref 1.1–1.3)
CA-I BLD ISE-SCNC: 1.11 MMOL/L (ref 1.1–1.3)
CA-I BLD ISE-SCNC: 1.19 MMOL/L (ref 1.1–1.3)
CA-I BLD ISE-SCNC: 1.22 MMOL/L (ref 1.1–1.3)
CA-I BLD ISE-SCNC: 1.29 MMOL/L (ref 1.1–1.3)
CO2 BLDA-SCNC: 18 MMOL/L (ref 20–33)
CO2 BLDA-SCNC: 18 MMOL/L (ref 20–33)
CO2 BLDA-SCNC: 21 MMOL/L (ref 20–33)
CO2 BLDA-SCNC: 22 MMOL/L (ref 20–33)
CO2 BLDA-SCNC: 22 MMOL/L (ref 20–33)
CO2 BLDA-SCNC: 23 MMOL/L (ref 20–33)
CO2 BLDA-SCNC: 24 MMOL/L (ref 20–33)
CO2 BLDA-SCNC: 25 MMOL/L (ref 20–33)
COMPONENT P 8504P: NORMAL
DELSYS IDSYS: ABNORMAL
EKG IMPRESSION: NORMAL
END TIDAL CARBON DIOXIDE IECO2: 32 MMHG
END TIDAL CARBON DIOXIDE IECO2: 34 MMHG
END TIDAL CARBON DIOXIDE IECO2: 36 MMHG
GLUCOSE BLD STRIP.AUTO-MCNC: 113 MG/DL (ref 65–99)
GLUCOSE BLD STRIP.AUTO-MCNC: 143 MG/DL (ref 65–99)
GLUCOSE BLD STRIP.AUTO-MCNC: 148 MG/DL (ref 65–99)
GLUCOSE BLD STRIP.AUTO-MCNC: 176 MG/DL (ref 65–99)
GLUCOSE BLD STRIP.AUTO-MCNC: 185 MG/DL (ref 65–99)
GLUCOSE BLD STRIP.AUTO-MCNC: 200 MG/DL (ref 65–99)
GLUCOSE BLD STRIP.AUTO-MCNC: 218 MG/DL (ref 65–99)
GLUCOSE BLD STRIP.AUTO-MCNC: 90 MG/DL (ref 65–99)
HCO3 BLDA-SCNC: 17 MMOL/L (ref 17–25)
HCO3 BLDA-SCNC: 17.2 MMOL/L (ref 17–25)
HCO3 BLDA-SCNC: 19.6 MMOL/L (ref 17–25)
HCO3 BLDA-SCNC: 20.4 MMOL/L (ref 17–25)
HCO3 BLDA-SCNC: 21.1 MMOL/L (ref 17–25)
HCO3 BLDA-SCNC: 21.7 MMOL/L (ref 17–25)
HCO3 BLDA-SCNC: 22.9 MMOL/L (ref 17–25)
HCO3 BLDA-SCNC: 23.9 MMOL/L (ref 17–25)
HCT VFR BLD AUTO: 29.6 % (ref 42–52)
HCT VFR BLD CALC: 26 % (ref 42–52)
HCT VFR BLD CALC: 26 % (ref 42–52)
HCT VFR BLD CALC: 29 % (ref 42–52)
HCT VFR BLD CALC: 29 % (ref 42–52)
HCT VFR BLD CALC: 30 % (ref 42–52)
HCT VFR BLD CALC: 34 % (ref 42–52)
HGB BLD-MCNC: 10.2 G/DL (ref 14–18)
HGB BLD-MCNC: 11.6 G/DL (ref 14–18)
HGB BLD-MCNC: 8.8 G/DL (ref 14–18)
HGB BLD-MCNC: 8.8 G/DL (ref 14–18)
HGB BLD-MCNC: 9 G/DL (ref 14–18)
HGB BLD-MCNC: 9.9 G/DL (ref 14–18)
HGB BLD-MCNC: 9.9 G/DL (ref 14–18)
HOROWITZ INDEX BLDA+IHG-RTO: 230 MM[HG]
HOROWITZ INDEX BLDA+IHG-RTO: 240 MM[HG]
HOROWITZ INDEX BLDA+IHG-RTO: 324 MM[HG]
HOROWITZ INDEX BLDA+IHG-RTO: 333 MM[HG]
INR PPP: 1.36 (ref 0.87–1.13)
LACTATE BLD-SCNC: 3.4 MMOL/L (ref 0.5–2)
LACTATE BLD-SCNC: 3.9 MMOL/L (ref 0.5–2)
LACTATE BLD-SCNC: 4.4 MMOL/L (ref 0.5–2)
MAGNESIUM SERPL-MCNC: 2.9 MG/DL (ref 1.5–2.5)
MODE IMODE: ABNORMAL
O2/TOTAL GAS SETTING VFR VENT: 100 %
O2/TOTAL GAS SETTING VFR VENT: 40 %
O2/TOTAL GAS SETTING VFR VENT: 50 %
O2/TOTAL GAS SETTING VFR VENT: 50 %
PATHOLOGY CONSULT NOTE: NORMAL
PCO2 BLDA: 30.2 MMHG (ref 26–37)
PCO2 BLDA: 30.9 MMHG (ref 26–37)
PCO2 BLDA: 37.1 MMHG (ref 26–37)
PCO2 BLDA: 42.1 MMHG (ref 26–37)
PCO2 BLDA: 42.1 MMHG (ref 26–37)
PCO2 BLDA: 42.4 MMHG (ref 26–37)
PCO2 BLDA: 45.8 MMHG (ref 26–37)
PCO2 BLDA: 47.1 MMHG (ref 26–37)
PCO2 BLDA: 47.5 MMHG (ref 26–37)
PCO2 BLDA: 48.4 MMHG (ref 26–37)
PCO2 TEMP ADJ BLDA: 30.4 MMHG (ref 26–37)
PCO2 TEMP ADJ BLDA: 30.6 MMHG (ref 26–37)
PCO2 TEMP ADJ BLDA: 35.8 MMHG (ref 26–37)
PCO2 TEMP ADJ BLDA: 40.7 MMHG (ref 26–37)
PCO2 TEMP ADJ BLDA: 40.7 MMHG (ref 26–37)
PCO2 TEMP ADJ BLDA: 41.3 MMHG (ref 26–37)
PCO2 TEMP ADJ BLDA: 44.8 MMHG (ref 26–37)
PCO2 TEMP ADJ BLDA: 44.9 MMHG (ref 26–37)
PCO2 TEMP ADJ BLDA: 44.9 MMHG (ref 26–37)
PCO2 TEMP ADJ BLDA: 46.8 MMHG (ref 26–37)
PEEP END EXPIRATORY PRESSURE IPEEP: 8 CMH20
PERCENT MINUTE VOLUME IPMV: 160
PH BLDA: 7.26 [PH] (ref 7.4–7.5)
PH BLDA: 7.29 [PH] (ref 7.4–7.5)
PH BLDA: 7.29 [PH] (ref 7.4–7.5)
PH BLDA: 7.3 [PH] (ref 7.4–7.5)
PH BLDA: 7.31 [PH] (ref 7.4–7.5)
PH BLDA: 7.31 [PH] (ref 7.4–7.5)
PH BLDA: 7.33 [PH] (ref 7.4–7.5)
PH BLDA: 7.35 [PH] (ref 7.4–7.5)
PH BLDA: 7.36 [PH] (ref 7.4–7.5)
PH BLDA: 7.36 [PH] (ref 7.4–7.5)
PH TEMP ADJ BLDA: 7.27 [PH] (ref 7.4–7.5)
PH TEMP ADJ BLDA: 7.3 [PH] (ref 7.4–7.5)
PH TEMP ADJ BLDA: 7.31 [PH] (ref 7.4–7.5)
PH TEMP ADJ BLDA: 7.32 [PH] (ref 7.4–7.5)
PH TEMP ADJ BLDA: 7.34 [PH] (ref 7.4–7.5)
PH TEMP ADJ BLDA: 7.36 [PH] (ref 7.4–7.5)
PH TEMP ADJ BLDA: 7.36 [PH] (ref 7.4–7.5)
PH TEMP ADJ BLDA: 7.37 [PH] (ref 7.4–7.5)
PLATELET # BLD AUTO: 377 K/UL (ref 164–446)
PO2 BLDA: 115 MMHG (ref 64–87)
PO2 BLDA: 120 MMHG (ref 64–87)
PO2 BLDA: 133 MMHG (ref 64–87)
PO2 BLDA: 266 MMHG (ref 64–87)
PO2 BLDA: 322 MMHG (ref 64–87)
PO2 BLDA: 324 MMHG (ref 64–87)
PO2 BLDA: 339 MMHG (ref 64–87)
PO2 BLDA: 362 MMHG (ref 64–87)
PO2 BLDA: 460 MMHG (ref 64–87)
PO2 BLDA: 470 MMHG (ref 64–87)
PO2 TEMP ADJ BLDA: 114 MMHG (ref 64–87)
PO2 TEMP ADJ BLDA: 115 MMHG (ref 64–87)
PO2 TEMP ADJ BLDA: 134 MMHG (ref 64–87)
PO2 TEMP ADJ BLDA: 262 MMHG (ref 64–87)
PO2 TEMP ADJ BLDA: 319 MMHG (ref 64–87)
PO2 TEMP ADJ BLDA: 320 MMHG (ref 64–87)
PO2 TEMP ADJ BLDA: 332 MMHG (ref 64–87)
PO2 TEMP ADJ BLDA: 358 MMHG (ref 64–87)
PO2 TEMP ADJ BLDA: 453 MMHG (ref 64–87)
PO2 TEMP ADJ BLDA: 466 MMHG (ref 64–87)
POTASSIUM BLD-SCNC: 3.4 MMOL/L (ref 3.6–5.5)
POTASSIUM BLD-SCNC: 3.9 MMOL/L (ref 3.6–5.5)
POTASSIUM BLD-SCNC: 4.3 MMOL/L (ref 3.6–5.5)
POTASSIUM BLD-SCNC: 4.3 MMOL/L (ref 3.6–5.5)
POTASSIUM BLD-SCNC: 4.6 MMOL/L (ref 3.6–5.5)
POTASSIUM BLD-SCNC: 5.4 MMOL/L (ref 3.6–5.5)
POTASSIUM BLD-SCNC: 5.7 MMOL/L (ref 3.6–5.5)
POTASSIUM SERPL-SCNC: 3.7 MMOL/L (ref 3.6–5.5)
POTASSIUM SERPL-SCNC: 4.8 MMOL/L (ref 3.6–5.5)
POTASSIUM SERPL-SCNC: 5.2 MMOL/L (ref 3.6–5.5)
PRESSURE SUPPORT SETTING VENT: 5 CM[H2O]
PRODUCT TYPE UPROD: NORMAL
PROTHROMBIN TIME: 16.9 SEC (ref 12–14.6)
SAO2 % BLDA: 100 % (ref 93–99)
SAO2 % BLDA: 98 % (ref 93–99)
SAO2 % BLDA: 98 % (ref 93–99)
SAO2 % BLDA: 99 % (ref 93–99)
SODIUM BLD-SCNC: 136 MMOL/L (ref 135–145)
SODIUM BLD-SCNC: 139 MMOL/L (ref 135–145)
SODIUM BLD-SCNC: 139 MMOL/L (ref 135–145)
SODIUM BLD-SCNC: 141 MMOL/L (ref 135–145)
SODIUM BLD-SCNC: 141 MMOL/L (ref 135–145)
SPECIMEN DRAWN FROM PATIENT: ABNORMAL
UNIT STATUS USTAT: NORMAL

## 2024-01-25 PROCEDURE — C1894 INTRO/SHEATH, NON-LASER: HCPCS | Performed by: THORACIC SURGERY (CARDIOTHORACIC VASCULAR SURGERY)

## 2024-01-25 PROCEDURE — 82330 ASSAY OF CALCIUM: CPT | Mod: 91

## 2024-01-25 PROCEDURE — 85014 HEMATOCRIT: CPT | Mod: 91

## 2024-01-25 PROCEDURE — 700102 HCHG RX REV CODE 250 W/ 637 OVERRIDE(OP): Performed by: THORACIC SURGERY (CARDIOTHORACIC VASCULAR SURGERY)

## 2024-01-25 PROCEDURE — 700111 HCHG RX REV CODE 636 W/ 250 OVERRIDE (IP): Performed by: ANESTHESIOLOGY

## 2024-01-25 PROCEDURE — 93005 ELECTROCARDIOGRAM TRACING: CPT | Performed by: NURSE PRACTITIONER

## 2024-01-25 PROCEDURE — C1898 LEAD, PMKR, OTHER THAN TRANS: HCPCS | Performed by: THORACIC SURGERY (CARDIOTHORACIC VASCULAR SURGERY)

## 2024-01-25 PROCEDURE — P9034 PLATELETS, PHERESIS: HCPCS

## 2024-01-25 PROCEDURE — 700101 HCHG RX REV CODE 250

## 2024-01-25 PROCEDURE — 700111 HCHG RX REV CODE 636 W/ 250 OVERRIDE (IP)

## 2024-01-25 PROCEDURE — C1713 ANCHOR/SCREW BN/BN,TIS/BN: HCPCS | Performed by: THORACIC SURGERY (CARDIOTHORACIC VASCULAR SURGERY)

## 2024-01-25 PROCEDURE — 5A1221Z PERFORMANCE OF CARDIAC OUTPUT, CONTINUOUS: ICD-10-PCS | Performed by: THORACIC SURGERY (CARDIOTHORACIC VASCULAR SURGERY)

## 2024-01-25 PROCEDURE — 503001 HCHG PERFUSION: Performed by: THORACIC SURGERY (CARDIOTHORACIC VASCULAR SURGERY)

## 2024-01-25 PROCEDURE — 33427 REPAIR OF MITRAL VALVE: CPT | Performed by: THORACIC SURGERY (CARDIOTHORACIC VASCULAR SURGERY)

## 2024-01-25 PROCEDURE — 700101 HCHG RX REV CODE 250: Performed by: NURSE PRACTITIONER

## 2024-01-25 PROCEDURE — 36430 TRANSFUSION BLD/BLD COMPNT: CPT

## 2024-01-25 PROCEDURE — 160031 HCHG SURGERY MINUTES - 1ST 30 MINS LEVEL 5: Performed by: THORACIC SURGERY (CARDIOTHORACIC VASCULAR SURGERY)

## 2024-01-25 PROCEDURE — 94669 MECHANICAL CHEST WALL OSCILL: CPT

## 2024-01-25 PROCEDURE — 94002 VENT MGMT INPAT INIT DAY: CPT

## 2024-01-25 PROCEDURE — 02UG0JZ SUPPLEMENT MITRAL VALVE WITH SYNTHETIC SUBSTITUTE, OPEN APPROACH: ICD-10-PCS | Performed by: THORACIC SURGERY (CARDIOTHORACIC VASCULAR SURGERY)

## 2024-01-25 PROCEDURE — 85347 COAGULATION TIME ACTIVATED: CPT | Mod: 91

## 2024-01-25 PROCEDURE — 160048 HCHG OR STATISTICAL LEVEL 1-5: Performed by: THORACIC SURGERY (CARDIOTHORACIC VASCULAR SURGERY)

## 2024-01-25 PROCEDURE — 770022 HCHG ROOM/CARE - ICU (200)

## 2024-01-25 PROCEDURE — 30233R1 TRANSFUSION OF NONAUTOLOGOUS PLATELETS INTO PERIPHERAL VEIN, PERCUTANEOUS APPROACH: ICD-10-PCS | Performed by: THORACIC SURGERY (CARDIOTHORACIC VASCULAR SURGERY)

## 2024-01-25 PROCEDURE — 82803 BLOOD GASES ANY COMBINATION: CPT | Mod: 91

## 2024-01-25 PROCEDURE — 700101 HCHG RX REV CODE 250: Performed by: ANESTHESIOLOGY

## 2024-01-25 PROCEDURE — 99292 CRITICAL CARE ADDL 30 MIN: CPT | Performed by: INTERNAL MEDICINE

## 2024-01-25 PROCEDURE — 33427 REPAIR OF MITRAL VALVE: CPT | Mod: AS | Performed by: NURSE PRACTITIONER

## 2024-01-25 PROCEDURE — C9248 INJ, CLEVIDIPINE BUTYRATE: HCPCS | Mod: JZ | Performed by: ANESTHESIOLOGY

## 2024-01-25 PROCEDURE — 84132 ASSAY OF SERUM POTASSIUM: CPT | Mod: 91

## 2024-01-25 PROCEDURE — 700111 HCHG RX REV CODE 636 W/ 250 OVERRIDE (IP): Mod: JZ | Performed by: THORACIC SURGERY (CARDIOTHORACIC VASCULAR SURGERY)

## 2024-01-25 PROCEDURE — C1751 CATH, INF, PER/CENT/MIDLINE: HCPCS | Performed by: THORACIC SURGERY (CARDIOTHORACIC VASCULAR SURGERY)

## 2024-01-25 PROCEDURE — 700105 HCHG RX REV CODE 258

## 2024-01-25 PROCEDURE — 83735 ASSAY OF MAGNESIUM: CPT

## 2024-01-25 PROCEDURE — 94799 UNLISTED PULMONARY SVC/PX: CPT

## 2024-01-25 PROCEDURE — 700111 HCHG RX REV CODE 636 W/ 250 OVERRIDE (IP): Performed by: NURSE PRACTITIONER

## 2024-01-25 PROCEDURE — 83605 ASSAY OF LACTIC ACID: CPT | Mod: 91

## 2024-01-25 PROCEDURE — B24BZZ4 ULTRASONOGRAPHY OF HEART WITH AORTA, TRANSESOPHAGEAL: ICD-10-PCS | Performed by: THORACIC SURGERY (CARDIOTHORACIC VASCULAR SURGERY)

## 2024-01-25 PROCEDURE — 85018 HEMOGLOBIN: CPT

## 2024-01-25 PROCEDURE — 37799 UNLISTED PX VASCULAR SURGERY: CPT

## 2024-01-25 PROCEDURE — 82962 GLUCOSE BLOOD TEST: CPT | Mod: 91

## 2024-01-25 PROCEDURE — 700102 HCHG RX REV CODE 250 W/ 637 OVERRIDE(OP): Performed by: NURSE PRACTITIONER

## 2024-01-25 PROCEDURE — 93319 3D ECHO IMG CGEN CAR ANOMAL: CPT

## 2024-01-25 PROCEDURE — 99291 CRITICAL CARE FIRST HOUR: CPT | Performed by: INTERNAL MEDICINE

## 2024-01-25 PROCEDURE — C1729 CATH, DRAINAGE: HCPCS | Performed by: THORACIC SURGERY (CARDIOTHORACIC VASCULAR SURGERY)

## 2024-01-25 PROCEDURE — 84295 ASSAY OF SERUM SODIUM: CPT | Mod: 91

## 2024-01-25 PROCEDURE — 94150 VITAL CAPACITY TEST: CPT

## 2024-01-25 PROCEDURE — 88305 TISSUE EXAM BY PATHOLOGIST: CPT

## 2024-01-25 PROCEDURE — 160042 HCHG SURGERY MINUTES - EA ADDL 1 MIN LEVEL 5: Performed by: THORACIC SURGERY (CARDIOTHORACIC VASCULAR SURGERY)

## 2024-01-25 PROCEDURE — 110372 HCHG SHELL REV 278: Performed by: THORACIC SURGERY (CARDIOTHORACIC VASCULAR SURGERY)

## 2024-01-25 PROCEDURE — 700105 HCHG RX REV CODE 258: Performed by: ANESTHESIOLOGY

## 2024-01-25 PROCEDURE — 71045 X-RAY EXAM CHEST 1 VIEW: CPT

## 2024-01-25 PROCEDURE — A9270 NON-COVERED ITEM OR SERVICE: HCPCS | Performed by: NURSE PRACTITIONER

## 2024-01-25 PROCEDURE — 160009 HCHG ANES TIME/MIN: Performed by: THORACIC SURGERY (CARDIOTHORACIC VASCULAR SURGERY)

## 2024-01-25 PROCEDURE — 85730 THROMBOPLASTIN TIME PARTIAL: CPT

## 2024-01-25 PROCEDURE — 85049 AUTOMATED PLATELET COUNT: CPT

## 2024-01-25 PROCEDURE — P9047 ALBUMIN (HUMAN), 25%, 50ML: HCPCS

## 2024-01-25 PROCEDURE — 700111 HCHG RX REV CODE 636 W/ 250 OVERRIDE (IP): Mod: JZ

## 2024-01-25 PROCEDURE — 93010 ELECTROCARDIOGRAM REPORT: CPT | Performed by: STUDENT IN AN ORGANIZED HEALTH CARE EDUCATION/TRAINING PROGRAM

## 2024-01-25 PROCEDURE — 85610 PROTHROMBIN TIME: CPT

## 2024-01-25 PROCEDURE — 700102 HCHG RX REV CODE 250 W/ 637 OVERRIDE(OP): Performed by: ANESTHESIOLOGY

## 2024-01-25 PROCEDURE — A9270 NON-COVERED ITEM OR SERVICE: HCPCS | Performed by: THORACIC SURGERY (CARDIOTHORACIC VASCULAR SURGERY)

## 2024-01-25 PROCEDURE — 700105 HCHG RX REV CODE 258: Performed by: NURSE PRACTITIONER

## 2024-01-25 DEVICE — ANNULO. RING MITRAL 4600-38 ---ALWAYS SHIP OVERNIGHT---: Type: IMPLANTABLE DEVICE | Site: HEART | Status: FUNCTIONAL

## 2024-01-25 DEVICE — BONE PUTTY HEMOSTATIC ABSORBABLE (12/BX): Type: IMPLANTABLE DEVICE | Site: HEART | Status: FUNCTIONAL

## 2024-01-25 RX ORDER — SODIUM CHLORIDE 9 MG/ML
INJECTION, SOLUTION INTRAVENOUS CONTINUOUS
Status: DISCONTINUED | OUTPATIENT
Start: 2024-01-25 | End: 2024-01-27

## 2024-01-25 RX ORDER — METHADONE HYDROCHLORIDE 10 MG/ML
INJECTION, SOLUTION INTRAMUSCULAR; INTRAVENOUS; SUBCUTANEOUS PRN
Status: DISCONTINUED | OUTPATIENT
Start: 2024-01-25 | End: 2024-01-25 | Stop reason: SURG

## 2024-01-25 RX ORDER — METHADONE HYDROCHLORIDE 10 MG/ML
20 INJECTION, SOLUTION INTRAMUSCULAR; INTRAVENOUS; SUBCUTANEOUS ONCE
Status: DISCONTINUED | OUTPATIENT
Start: 2024-01-25 | End: 2024-01-25 | Stop reason: HOSPADM

## 2024-01-25 RX ORDER — MIDAZOLAM HYDROCHLORIDE 1 MG/ML
INJECTION INTRAMUSCULAR; INTRAVENOUS PRN
Status: DISCONTINUED | OUTPATIENT
Start: 2024-01-25 | End: 2024-01-25

## 2024-01-25 RX ORDER — PROTAMINE SULFATE 10 MG/ML
INJECTION, SOLUTION INTRAVENOUS PRN
Status: DISCONTINUED | OUTPATIENT
Start: 2024-01-25 | End: 2024-01-25 | Stop reason: SURG

## 2024-01-25 RX ORDER — HYDROMORPHONE HYDROCHLORIDE 1 MG/ML
1 INJECTION, SOLUTION INTRAMUSCULAR; INTRAVENOUS; SUBCUTANEOUS
Status: DISCONTINUED | OUTPATIENT
Start: 2024-01-25 | End: 2024-01-29

## 2024-01-25 RX ORDER — ONDANSETRON 2 MG/ML
INJECTION INTRAMUSCULAR; INTRAVENOUS PRN
Status: DISCONTINUED | OUTPATIENT
Start: 2024-01-25 | End: 2024-01-25

## 2024-01-25 RX ORDER — OXYCODONE HYDROCHLORIDE 10 MG/1
20 TABLET ORAL
Status: DISCONTINUED | OUTPATIENT
Start: 2024-01-25 | End: 2024-01-25

## 2024-01-25 RX ORDER — CEFAZOLIN 2 G/1
2 INJECTION, POWDER, FOR SOLUTION INTRAMUSCULAR; INTRAVENOUS ONCE
Status: DISCONTINUED | OUTPATIENT
Start: 2024-01-25 | End: 2024-01-25 | Stop reason: HOSPADM

## 2024-01-25 RX ORDER — NOREPINEPHRINE BITARTRATE 0.03 MG/ML
0-1 INJECTION, SOLUTION INTRAVENOUS CONTINUOUS
Status: DISCONTINUED | OUTPATIENT
Start: 2024-01-25 | End: 2024-01-27

## 2024-01-25 RX ORDER — SODIUM CHLORIDE, SODIUM GLUCONATE, SODIUM ACETATE, POTASSIUM CHLORIDE AND MAGNESIUM CHLORIDE 526; 502; 368; 37; 30 MG/100ML; MG/100ML; MG/100ML; MG/100ML; MG/100ML
INJECTION, SOLUTION INTRAVENOUS
Status: DISCONTINUED | OUTPATIENT
Start: 2024-01-25 | End: 2024-01-25

## 2024-01-25 RX ORDER — MIDAZOLAM HYDROCHLORIDE 1 MG/ML
2 INJECTION INTRAMUSCULAR; INTRAVENOUS
Status: DISCONTINUED | OUTPATIENT
Start: 2024-01-25 | End: 2024-01-27

## 2024-01-25 RX ORDER — MORPHINE SULFATE 4 MG/ML
INJECTION INTRAVENOUS
Status: COMPLETED
Start: 2024-01-25 | End: 2024-01-25

## 2024-01-25 RX ORDER — BISACODYL 10 MG
10 SUPPOSITORY, RECTAL RECTAL
Status: DISCONTINUED | OUTPATIENT
Start: 2024-01-25 | End: 2024-01-31 | Stop reason: HOSPADM

## 2024-01-25 RX ORDER — MORPHINE SULFATE 15 MG/1
15 TABLET, FILM COATED, EXTENDED RELEASE ORAL EVERY 12 HOURS
Status: DISCONTINUED | OUTPATIENT
Start: 2024-01-25 | End: 2024-01-28

## 2024-01-25 RX ORDER — EPHEDRINE SULFATE 50 MG/ML
INJECTION, SOLUTION INTRAVENOUS PRN
Status: DISCONTINUED | OUTPATIENT
Start: 2024-01-25 | End: 2024-01-25 | Stop reason: SURG

## 2024-01-25 RX ORDER — SODIUM CHLORIDE 9 MG/ML
INJECTION, SOLUTION INTRAVENOUS
Status: DISCONTINUED | OUTPATIENT
Start: 2024-01-25 | End: 2024-01-25 | Stop reason: SURG

## 2024-01-25 RX ORDER — ACETAMINOPHEN 500 MG
1000 TABLET ORAL ONCE
Status: COMPLETED | OUTPATIENT
Start: 2024-01-25 | End: 2024-01-25

## 2024-01-25 RX ORDER — DEXAMETHASONE SODIUM PHOSPHATE 4 MG/ML
INJECTION, SOLUTION INTRA-ARTICULAR; INTRALESIONAL; INTRAMUSCULAR; INTRAVENOUS; SOFT TISSUE PRN
Status: DISCONTINUED | OUTPATIENT
Start: 2024-01-25 | End: 2024-01-25 | Stop reason: SURG

## 2024-01-25 RX ORDER — DEXMEDETOMIDINE HYDROCHLORIDE 4 UG/ML
0-1.5 INJECTION, SOLUTION INTRAVENOUS CONTINUOUS
Status: DISCONTINUED | OUTPATIENT
Start: 2024-01-25 | End: 2024-01-27

## 2024-01-25 RX ORDER — OXYCODONE HYDROCHLORIDE 10 MG/1
10 TABLET ORAL
Status: DISCONTINUED | OUTPATIENT
Start: 2024-01-25 | End: 2024-01-25

## 2024-01-25 RX ORDER — HEPARIN SODIUM,PORCINE 1000/ML
VIAL (ML) INJECTION PRN
Status: DISCONTINUED | OUTPATIENT
Start: 2024-01-25 | End: 2024-01-25 | Stop reason: SURG

## 2024-01-25 RX ORDER — NITROGLYCERIN 20 MG/100ML
0-100 INJECTION INTRAVENOUS CONTINUOUS
Status: DISCONTINUED | OUTPATIENT
Start: 2024-01-25 | End: 2024-01-27

## 2024-01-25 RX ORDER — PROCHLORPERAZINE EDISYLATE 5 MG/ML
10 INJECTION INTRAMUSCULAR; INTRAVENOUS EVERY 6 HOURS PRN
Status: DISCONTINUED | OUTPATIENT
Start: 2024-01-25 | End: 2024-01-31 | Stop reason: HOSPADM

## 2024-01-25 RX ORDER — DEXMEDETOMIDINE HYDROCHLORIDE 4 UG/ML
INJECTION, SOLUTION INTRAVENOUS
Status: DISCONTINUED | OUTPATIENT
Start: 2024-01-25 | End: 2024-01-25 | Stop reason: SURG

## 2024-01-25 RX ORDER — SUCRALFATE 1 G/1
1 TABLET ORAL
Status: DISCONTINUED | OUTPATIENT
Start: 2024-01-25 | End: 2024-01-31 | Stop reason: HOSPADM

## 2024-01-25 RX ORDER — HYDROMORPHONE HYDROCHLORIDE 1 MG/ML
1 INJECTION, SOLUTION INTRAMUSCULAR; INTRAVENOUS; SUBCUTANEOUS
Status: DISCONTINUED | OUTPATIENT
Start: 2024-01-25 | End: 2024-01-25

## 2024-01-25 RX ORDER — MORPHINE SULFATE 15 MG/1
15 TABLET, FILM COATED, EXTENDED RELEASE ORAL EVERY 12 HOURS
Status: DISCONTINUED | OUTPATIENT
Start: 2024-01-25 | End: 2024-01-25

## 2024-01-25 RX ORDER — PHENYLEPHRINE HYDROCHLORIDE 10 MG/ML
INJECTION, SOLUTION INTRAMUSCULAR; INTRAVENOUS; SUBCUTANEOUS PRN
Status: DISCONTINUED | OUTPATIENT
Start: 2024-01-25 | End: 2024-01-25 | Stop reason: SURG

## 2024-01-25 RX ORDER — ONDANSETRON 2 MG/ML
8 INJECTION INTRAMUSCULAR; INTRAVENOUS EVERY 6 HOURS PRN
Status: DISCONTINUED | OUTPATIENT
Start: 2024-01-25 | End: 2024-01-31 | Stop reason: HOSPADM

## 2024-01-25 RX ORDER — POLYETHYLENE GLYCOL 3350 17 G/17G
1 POWDER, FOR SOLUTION ORAL DAILY
Status: DISCONTINUED | OUTPATIENT
Start: 2024-01-26 | End: 2024-01-31 | Stop reason: HOSPADM

## 2024-01-25 RX ORDER — INSULIN LISPRO 100 [IU]/ML
0-14 INJECTION, SOLUTION INTRAVENOUS; SUBCUTANEOUS
Status: DISCONTINUED | OUTPATIENT
Start: 2024-01-25 | End: 2024-01-26

## 2024-01-25 RX ORDER — AMOXICILLIN 250 MG
2 CAPSULE ORAL 2 TIMES DAILY
Status: DISCONTINUED | OUTPATIENT
Start: 2024-01-25 | End: 2024-01-31 | Stop reason: HOSPADM

## 2024-01-25 RX ORDER — HYDROMORPHONE HYDROCHLORIDE 1 MG/ML
1 INJECTION, SOLUTION INTRAMUSCULAR; INTRAVENOUS; SUBCUTANEOUS ONCE
Status: COMPLETED | OUTPATIENT
Start: 2024-01-25 | End: 2024-01-25

## 2024-01-25 RX ORDER — ENOXAPARIN SODIUM 100 MG/ML
40 INJECTION SUBCUTANEOUS DAILY
Status: DISCONTINUED | OUTPATIENT
Start: 2024-01-26 | End: 2024-01-31 | Stop reason: HOSPADM

## 2024-01-25 RX ORDER — OXYCODONE HYDROCHLORIDE 5 MG/1
5 TABLET ORAL
Status: DISCONTINUED | OUTPATIENT
Start: 2024-01-25 | End: 2024-01-25

## 2024-01-25 RX ORDER — MAGNESIUM SULFATE 1 G/100ML
1 INJECTION INTRAVENOUS DAILY
Status: COMPLETED | OUTPATIENT
Start: 2024-01-25 | End: 2024-01-27

## 2024-01-25 RX ORDER — OXYCODONE HYDROCHLORIDE 10 MG/1
20 TABLET ORAL
Status: DISCONTINUED | OUTPATIENT
Start: 2024-01-25 | End: 2024-01-31 | Stop reason: HOSPADM

## 2024-01-25 RX ORDER — ACETAMINOPHEN 500 MG
1000 TABLET ORAL EVERY 6 HOURS PRN
Status: DISCONTINUED | OUTPATIENT
Start: 2024-02-04 | End: 2024-01-31 | Stop reason: HOSPADM

## 2024-01-25 RX ORDER — DIPHENHYDRAMINE HCL 25 MG
25 TABLET ORAL
Status: DISCONTINUED | OUTPATIENT
Start: 2024-01-25 | End: 2024-01-31 | Stop reason: HOSPADM

## 2024-01-25 RX ORDER — EPINEPHRINE HCL IN 0.9 % NACL 4MG/250ML
0-.5 PLASTIC BAG, INJECTION (ML) INTRAVENOUS CONTINUOUS
Status: DISCONTINUED | OUTPATIENT
Start: 2024-01-25 | End: 2024-01-27

## 2024-01-25 RX ORDER — VANCOMYCIN HYDROCHLORIDE 500 MG/10ML
INJECTION, POWDER, LYOPHILIZED, FOR SOLUTION INTRAVENOUS
Status: COMPLETED | OUTPATIENT
Start: 2024-01-25 | End: 2024-01-25

## 2024-01-25 RX ORDER — ACETAMINOPHEN 500 MG
1000 TABLET ORAL EVERY 6 HOURS
Status: DISCONTINUED | OUTPATIENT
Start: 2024-01-25 | End: 2024-01-31 | Stop reason: HOSPADM

## 2024-01-25 RX ORDER — DEXTROSE MONOHYDRATE 25 G/50ML
12.5-25 INJECTION, SOLUTION INTRAVENOUS PRN
Status: ACTIVE | OUTPATIENT
Start: 2024-01-25 | End: 2024-01-26

## 2024-01-25 RX ORDER — ASPIRIN 81 MG/1
81 TABLET ORAL DAILY
Status: DISCONTINUED | OUTPATIENT
Start: 2024-01-26 | End: 2024-01-31 | Stop reason: HOSPADM

## 2024-01-25 RX ORDER — SODIUM CHLORIDE, SODIUM LACTATE, POTASSIUM CHLORIDE, CALCIUM CHLORIDE 600; 310; 30; 20 MG/100ML; MG/100ML; MG/100ML; MG/100ML
INJECTION, SOLUTION INTRAVENOUS
Status: DISCONTINUED | OUTPATIENT
Start: 2024-01-25 | End: 2024-01-25 | Stop reason: SURG

## 2024-01-25 RX ORDER — CEFAZOLIN SODIUM 1 G/3ML
INJECTION, POWDER, FOR SOLUTION INTRAMUSCULAR; INTRAVENOUS PRN
Status: DISCONTINUED | OUTPATIENT
Start: 2024-01-25 | End: 2024-01-25 | Stop reason: SURG

## 2024-01-25 RX ORDER — ACETAMINOPHEN 500 MG
1000 TABLET ORAL
COMMUNITY

## 2024-01-25 RX ORDER — LIDOCAINE HYDROCHLORIDE 20 MG/ML
INJECTION, SOLUTION EPIDURAL; INFILTRATION; INTRACAUDAL; PERINEURAL PRN
Status: DISCONTINUED | OUTPATIENT
Start: 2024-01-25 | End: 2024-01-25 | Stop reason: SURG

## 2024-01-25 RX ORDER — OXYCODONE HYDROCHLORIDE 10 MG/1
10 TABLET ORAL
Status: DISCONTINUED | OUTPATIENT
Start: 2024-01-25 | End: 2024-01-31 | Stop reason: HOSPADM

## 2024-01-25 RX ORDER — MORPHINE SULFATE 4 MG/ML
4 INJECTION INTRAVENOUS ONCE
Status: COMPLETED | OUTPATIENT
Start: 2024-01-25 | End: 2024-01-25

## 2024-01-25 RX ORDER — SODIUM CHLORIDE, SODIUM GLUCONATE, SODIUM ACETATE, POTASSIUM CHLORIDE AND MAGNESIUM CHLORIDE 526; 502; 368; 37; 30 MG/100ML; MG/100ML; MG/100ML; MG/100ML; MG/100ML
INJECTION, SOLUTION INTRAVENOUS PRN
Status: DISCONTINUED | OUTPATIENT
Start: 2024-01-25 | End: 2024-01-31 | Stop reason: HOSPADM

## 2024-01-25 RX ORDER — OMEPRAZOLE 20 MG/1
20 CAPSULE, DELAYED RELEASE ORAL DAILY
Status: DISCONTINUED | OUTPATIENT
Start: 2024-01-26 | End: 2024-01-31 | Stop reason: HOSPADM

## 2024-01-25 RX ORDER — METOCLOPRAMIDE HYDROCHLORIDE 5 MG/ML
INJECTION INTRAMUSCULAR; INTRAVENOUS PRN
Status: DISCONTINUED | OUTPATIENT
Start: 2024-01-25 | End: 2024-01-25

## 2024-01-25 RX ORDER — POTASSIUM CHLORIDE 14.9 MG/ML
20 INJECTION INTRAVENOUS ONCE
Status: COMPLETED | OUTPATIENT
Start: 2024-01-25 | End: 2024-01-25

## 2024-01-25 RX ORDER — ALUMINA, MAGNESIA, AND SIMETHICONE 2400; 2400; 240 MG/30ML; MG/30ML; MG/30ML
30 SUSPENSION ORAL EVERY 4 HOURS PRN
Status: DISCONTINUED | OUTPATIENT
Start: 2024-01-25 | End: 2024-01-31 | Stop reason: HOSPADM

## 2024-01-25 RX ADMIN — SODIUM CHLORIDE 2 UNITS/HR: 9 INJECTION, SOLUTION INTRAVENOUS at 10:02

## 2024-01-25 RX ADMIN — MORPHINE SULFATE 4 MG: 4 INJECTION INTRAVENOUS at 11:26

## 2024-01-25 RX ADMIN — OXYCODONE HYDROCHLORIDE 10 MG: 10 TABLET ORAL at 23:24

## 2024-01-25 RX ADMIN — MORPHINE SULFATE 15 MG: 15 TABLET, FILM COATED, EXTENDED RELEASE ORAL at 16:43

## 2024-01-25 RX ADMIN — OXYCODONE HYDROCHLORIDE 20 MG: 10 TABLET ORAL at 15:46

## 2024-01-25 RX ADMIN — SODIUM CHLORIDE, POTASSIUM CHLORIDE, SODIUM LACTATE AND CALCIUM CHLORIDE: 600; 310; 30; 20 INJECTION, SOLUTION INTRAVENOUS at 07:28

## 2024-01-25 RX ADMIN — ACETAMINOPHEN 1000 MG: 500 TABLET ORAL at 23:24

## 2024-01-25 RX ADMIN — SODIUM CHLORIDE: 9 INJECTION, SOLUTION INTRAVENOUS at 16:09

## 2024-01-25 RX ADMIN — EPHEDRINE SULFATE 10 MG: 50 INJECTION, SOLUTION INTRAVENOUS at 08:50

## 2024-01-25 RX ADMIN — Medication 1 APPLICATOR: at 11:37

## 2024-01-25 RX ADMIN — DEXMEDETOMIDINE HYDROCHLORIDE 0.4 MCG/KG/HR: 100 INJECTION, SOLUTION INTRAVENOUS at 10:19

## 2024-01-25 RX ADMIN — METHADONE HYDROCHLORIDE 10 MG: 10 INJECTION, SOLUTION INTRAMUSCULAR; INTRAVENOUS; SUBCUTANEOUS at 07:34

## 2024-01-25 RX ADMIN — DEXAMETHASONE SODIUM PHOSPHATE 8 MG: 4 INJECTION INTRA-ARTICULAR; INTRALESIONAL; INTRAMUSCULAR; INTRAVENOUS; SOFT TISSUE at 07:34

## 2024-01-25 RX ADMIN — CLEVIPIDINE 2 MG/HR: 0.5 EMULSION INTRAVENOUS at 10:18

## 2024-01-25 RX ADMIN — OXYCODONE HYDROCHLORIDE 20 MG: 10 TABLET ORAL at 18:47

## 2024-01-25 RX ADMIN — ACETAMINOPHEN 1000 MG: 500 TABLET ORAL at 06:46

## 2024-01-25 RX ADMIN — SODIUM CHLORIDE, SODIUM GLUCONATE, SODIUM ACETATE, POTASSIUM CHLORIDE AND MAGNESIUM CHLORIDE: 526; 502; 368; 37; 30 INJECTION, SOLUTION INTRAVENOUS at 08:05

## 2024-01-25 RX ADMIN — FENTANYL CITRATE 50 MCG: 50 INJECTION, SOLUTION INTRAMUSCULAR; INTRAVENOUS at 13:31

## 2024-01-25 RX ADMIN — POTASSIUM CHLORIDE 20 MEQ: 14.9 INJECTION, SOLUTION INTRAVENOUS at 12:10

## 2024-01-25 RX ADMIN — Medication 1 APPLICATOR: at 21:29

## 2024-01-25 RX ADMIN — CLEVIPIDINE 250 MCG: 0.5 EMULSION INTRAVENOUS at 10:12

## 2024-01-25 RX ADMIN — METHADONE HYDROCHLORIDE 10 MG: 10 INJECTION, SOLUTION INTRAMUSCULAR; INTRAVENOUS; SUBCUTANEOUS at 08:20

## 2024-01-25 RX ADMIN — PROPOFOL 200 MG: 10 INJECTION, EMULSION INTRAVENOUS at 07:34

## 2024-01-25 RX ADMIN — ONDANSETRON 4 MG: 2 INJECTION INTRAMUSCULAR; INTRAVENOUS at 10:30

## 2024-01-25 RX ADMIN — AMINOCAPROIC ACID 5 G: 250 INJECTION, SOLUTION INTRAVENOUS at 08:45

## 2024-01-25 RX ADMIN — LIDOCAINE HYDROCHLORIDE 100 MG: 20 INJECTION, SOLUTION EPIDURAL; INFILTRATION; INTRACAUDAL at 07:34

## 2024-01-25 RX ADMIN — HEPARIN SODIUM 43000 UNITS: 1000 INJECTION, SOLUTION INTRAVENOUS; SUBCUTANEOUS at 08:27

## 2024-01-25 RX ADMIN — PHENYLEPHRINE HYDROCHLORIDE 200 MCG: 10 INJECTION INTRAVENOUS at 08:46

## 2024-01-25 RX ADMIN — ACETAMINOPHEN 1000 MG: 500 TABLET ORAL at 18:10

## 2024-01-25 RX ADMIN — DEXMEDETOMIDINE 1.2 MCG/KG/HR: 100 INJECTION, SOLUTION INTRAVENOUS at 18:13

## 2024-01-25 RX ADMIN — HYDROMORPHONE HYDROCHLORIDE 1 MG: 1 INJECTION, SOLUTION INTRAMUSCULAR; INTRAVENOUS; SUBCUTANEOUS at 15:25

## 2024-01-25 RX ADMIN — AMINOCAPROIC ACID 10 G: 250 INJECTION, SOLUTION INTRAVENOUS at 08:05

## 2024-01-25 RX ADMIN — MAGNESIUM SULFATE IN DEXTROSE 1 G: 10 INJECTION, SOLUTION INTRAVENOUS at 12:12

## 2024-01-25 RX ADMIN — HEPARIN SODIUM 10000 UNITS: 1000 INJECTION, SOLUTION INTRAVENOUS; SUBCUTANEOUS at 08:41

## 2024-01-25 RX ADMIN — ROCURONIUM BROMIDE 50 MG: 10 INJECTION, SOLUTION INTRAVENOUS at 09:37

## 2024-01-25 RX ADMIN — ROCURONIUM BROMIDE 100 MG: 10 INJECTION, SOLUTION INTRAVENOUS at 07:34

## 2024-01-25 RX ADMIN — ROCURONIUM BROMIDE 50 MG: 10 INJECTION, SOLUTION INTRAVENOUS at 08:20

## 2024-01-25 RX ADMIN — EPINEPHRINE 0.04 MCG/KG/MIN: 1 INJECTION INTRAMUSCULAR; INTRAVENOUS; SUBCUTANEOUS at 09:57

## 2024-01-25 RX ADMIN — SUCRALFATE 1 G: 1 TABLET ORAL at 19:35

## 2024-01-25 RX ADMIN — MORPHINE SULFATE 4 MG: 4 INJECTION, SOLUTION INTRAMUSCULAR; INTRAVENOUS at 11:26

## 2024-01-25 RX ADMIN — CEFAZOLIN 2 G: 2 INJECTION, POWDER, FOR SOLUTION INTRAMUSCULAR; INTRAVENOUS at 16:05

## 2024-01-25 RX ADMIN — MIDAZOLAM HYDROCHLORIDE 4 MG: 1 INJECTION, SOLUTION INTRAMUSCULAR; INTRAVENOUS at 07:34

## 2024-01-25 RX ADMIN — CEFAZOLIN 2 G: 1 INJECTION, POWDER, FOR SOLUTION INTRAMUSCULAR; INTRAVENOUS at 07:34

## 2024-01-25 RX ADMIN — METOPROLOL TARTRATE 12.5 MG: 25 TABLET, FILM COATED ORAL at 06:46

## 2024-01-25 RX ADMIN — EPHEDRINE SULFATE 5 MG: 50 INJECTION, SOLUTION INTRAVENOUS at 08:27

## 2024-01-25 RX ADMIN — DEXMEDETOMIDINE 1.3 MCG/KG/HR: 100 INJECTION, SOLUTION INTRAVENOUS at 21:29

## 2024-01-25 RX ADMIN — SODIUM CHLORIDE: 9 INJECTION, SOLUTION INTRAVENOUS at 08:05

## 2024-01-25 RX ADMIN — PROTAMINE SULFATE 500 MG: 10 INJECTION, SOLUTION INTRAVENOUS at 10:14

## 2024-01-25 RX ADMIN — METOCLOPRAMIDE 10 MG: 5 INJECTION, SOLUTION INTRAMUSCULAR; INTRAVENOUS at 10:30

## 2024-01-25 RX ADMIN — HYDROMORPHONE HYDROCHLORIDE 1 MG: 1 INJECTION, SOLUTION INTRAMUSCULAR; INTRAVENOUS; SUBCUTANEOUS at 20:07

## 2024-01-25 RX ADMIN — DOCUSATE SODIUM 50 MG AND SENNOSIDES 8.6 MG 2 TABLET: 8.6; 5 TABLET, FILM COATED ORAL at 18:10

## 2024-01-25 ASSESSMENT — ENCOUNTER SYMPTOMS
GASTROINTESTINAL NEGATIVE: 1
NEUROLOGICAL NEGATIVE: 1
SHORTNESS OF BREATH: 1
MUSCULOSKELETAL NEGATIVE: 1
EYES NEGATIVE: 1
PSYCHIATRIC NEGATIVE: 1

## 2024-01-25 ASSESSMENT — PAIN DESCRIPTION - PAIN TYPE
TYPE: SURGICAL PAIN;ACUTE PAIN
TYPE: ACUTE PAIN;SURGICAL PAIN
TYPE: SURGICAL PAIN
TYPE: ACUTE PAIN;SURGICAL PAIN
TYPE: SURGICAL PAIN
TYPE: ACUTE PAIN;SURGICAL PAIN

## 2024-01-25 ASSESSMENT — COPD QUESTIONNAIRES
DO YOU EVER COUGH UP ANY MUCUS OR PHLEGM?: NO/ONLY WITH OCCASIONAL COLDS OR INFECTIONS
HAVE YOU SMOKED AT LEAST 100 CIGARETTES IN YOUR ENTIRE LIFE: NO/DON'T KNOW
DURING THE PAST 4 WEEKS HOW MUCH DID YOU FEEL SHORT OF BREATH: NONE/LITTLE OF THE TIME
COPD SCREENING SCORE: 0

## 2024-01-25 ASSESSMENT — PULMONARY FUNCTION TESTS: FVC: 1.8

## 2024-01-25 ASSESSMENT — FIBROSIS 4 INDEX: FIB4 SCORE: 0.32

## 2024-01-25 NOTE — CONSULTS
Critical Care Consultation    Date of consult: 1/25/2024    Referring Physician  Bigg Santana M.D.    Reason for Consultation  Post operative ventilator management after mitral valve repair    History of Presenting Illness  All history was obtained from thorough chart review as well as bedside handoff with anesthesiology, Dr. Delaney, as the patient was intubated and sedated at the time of my evaluation    Mr. Braswell is a 43 y.o. male with the past medical history significant for hypertension, non-healing duodenal/gastric ulcer s/p GDA embolization in 11/2023, and severe mitral regurgitation who presented on 1/25/2024 to undergo elective mitral valve repair.  The surgery was uncomplicated aside from minor bleeding from the sternum and received 1 unit of platelets.  He did not require pacing and came off pump easily.  He was admitted to the ICU still intubated and sedated on Precedex and epinephrine infusions.    Code Status  Full Code    Review of Systems  Review of Systems   Unable to perform ROS: Intubated       Past Medical History   has a past medical history of Arthritis, Breath shortness (11/03/2023), Drug-seeking behavior (07/30/2023), Duodenal ulcer (07/30/2023), Gastric ulcer, Heart valve disease, Hemorrhagic disorder (HCC), Hypertension, Pain, and Seizure (HCC).    Surgical History   has a past surgical history that includes other orthopedic surgery; other abdominal surgery; ankle arthroscopy (Right, 5/21/2018); biopsy ortho (Right, 5/21/2018); pr upper gi endoscopy,diagnosis (3/28/2022); pr upper gi endoscopy,biopsy (3/28/2022); pr upper gi endoscopy,biopsy (N/A, 7/30/2023); pr upper gi endoscopy,diagnosis (N/A, 9/14/2023); and pr upper gi endoscopy,biopsy (N/A, 9/14/2023).    Family History  family history includes Heart Disease in his mother; No Known Problems in his father.    Social History   reports that he has never smoked. He has never used smokeless tobacco. He reports that he does not currently  use alcohol. He reports that he does not use drugs.    Medications  Home Medications       Reviewed by Juli Cooley (Pharmacy [facility]) on 01/25/24 at 0718  Med List Status: Complete     Medication Last Dose Status   acetaminophen (TYLENOL) 500 MG Tab 1/22/2024 Active   lidocaine-hyoscyamine-mag hydrox-al hydrox-simeth NEW Active   losartan (COZAAR) 25 MG Tab > 1 MONTH Active   omeprazole (PRILOSEC) 20 MG delayed-release capsule 1/24/2024 Active   Oxycodone HCl 20 MG Tab 1/22/2024 Active   sucralfate (CARAFATE) 1 GM Tab 1/24/2024 Active                  Current Facility-Administered Medications   Medication Dose Route Frequency Provider Last Rate Last Admin    lidocaine (Xylocaine) 1 % injection 0.5 mL  0.5 mL Intradermal Once PRN Bigg Santana M.D.        methadone 10 mg/mL (Dolophine) injection 20 mg  20 mg Intravenous Once Bigg Santana M.D.        ceFAZolin (Ancef) injection 2 g  2 g Intravenous Once Bigg Santana M.D.        K+ Scale: Goal of 4.5  1 Each Intravenous Q6HRS Keerthi Arzate, A.P.NJennifer        sucralfate (Carafate) tablet 1 g  1 g Oral TID AC Keerthi Arzate, A.P.NJennifer        vancomycin (Vancocin) injection    Intra-Op Continuous Bigg Santana M.D.   1 g at 01/25/24 1017    insulin regular (Humulin R) 100 Units in  mL Infusion  1-6 Units/hr Intravenous Continuous Bigg Snatana M.D.        EPINEPHrine (Adrenalin) infusion 4 mg/250 mL (premix)  0-0.5 mcg/kg/min (Ideal) Intravenous Continuous Bigg Santana M.D.        norepinephrine (Levophed) 8 mg in 250 mL NS infusion (premix)  0-1 mcg/kg/min (Ideal) Intravenous Continuous Bigg Santana M.D.        dexmedetomidine (PRECEDEX) 400 mcg/100mL NS premix infusion  0-1.5 mcg/kg/hr (Ideal) Intravenous Continuous Bigg Santana M.D.        aminocaproic acid (Amicar) 10 g in  mL IV Bolus  10 g Intravenous Once Bigg Santana M.D.        aminocaproic acid (Amicar) 5 g in  mL Infusion  2 g/hr Intravenous Once Bigg Santana M.D.          Facility-Administered Medications Ordered in Other Encounters   Medication Dose Route Frequency Provider Last Rate Last Admin    heparin OR USE ONLY   Intravenous MAURICIO Delaney M.D.   10,000 Units at 01/25/24 0841    ePHEDrine injection   Intravenous MAURICIO Delaney M.D.   10 mg at 01/25/24 0850    phenylephrine (Aston-Synephrine) injection   Intravenous MAURICIO Delaney M.D.   200 mcg at 01/25/24 0846    Lactated Ringers   Intravenous Intra-Op Continuous Marvin Delaney M.D.   New Bag at 01/25/24 0728    NS infusion   Intravenous Intra-Op Continuous Marvin Delaney M.D.   New Bag at 01/25/24 0805    electrolyte-A (Plasmalyte-A) infusion   Intravenous Intra-Op Continuous Marvin Delaney M.D.   New Bag at 01/25/24 0805    ceFAZolin (Ancef) injection   Intravenous MAURICIO Delaney M.D.   2 g at 01/25/24 0734    midazolam (Versed) injection   Intravenous MAURICIO Delaney M.D.   4 mg at 01/25/24 0734    methadone 10 mg/mL (Dolophine) injection   Intravenous MAURICIO Delaney M.D.   10 mg at 01/25/24 0820    lidocaine PF (Xylocaine-MPF) 2 % injection PF   Intravenous MAURICIO Delaney M.D.   100 mg at 01/25/24 0734    propofol (DIPRIVAN) injection   Intravenous MAURICIO Delaney M.D.   200 mg at 01/25/24 0734    rocuronium (Zemuron) injection   Intravenous MAURICIO Delaney M.D.   50 mg at 01/25/24 0937    dexamethasone (Decadron) injection   Intravenous MAURICIO Delaney M.D.   8 mg at 01/25/24 0734    aminocaproic acid (Amicar) IVPB 10 g   Intravenous Intra-Op Continuous Marvin Delaney M.D.   5 g at 01/25/24 0845    EPINEPHrine (Adrenalin) 16 mg in  mL Infusion   Intravenous Intra-Op Derrell Delaney M.D. 6.338 mL/hr at 01/25/24 0957 0.04 mcg/kg/min at 01/25/24 0957    insulin regular (Humulin R) 100 Units in  mL Infusion   Intravenous Intra-Op Continuous Marvin Delaney M.D.   Stopped at 01/25/24 1029    protamine injection   Intravenous PRN Marvin Delaney M.D.   500 mg at 01/25/24 1014    clevidipine (Cleviprex) IV emulsion    Intravenous Intra-Op Continuous Marvin Delaney M.D. 2 mL/hr at 01/25/24 1100 1 mg/hr at 01/25/24 1100    dexmedetomidine (PRECEDEX) 400 mcg/100mL NS premix infusion   Intravenous Intra-Op Continuous Marvin Delaney M.D. 8.45 mL/hr at 01/25/24 1019 0.4 mcg/kg/hr at 01/25/24 1019    ondansetron (Zofran) syringe/vial injection   Intravenous PRN Marvin Delaney M.D.   4 mg at 01/25/24 1030    metoclopramide (Reglan) injection   Intravenous PRN Marvin Delaney M.D.   10 mg at 01/25/24 1030       Allergies  Allergies   Allergen Reactions    Morphine Vomiting    Tramadol Unspecified     Seizure  GPQ=[id number]       Vital Signs last 24 hours  Temp:  [35.9 °C (96.6 °F)] 35.9 °C (96.6 °F)  Pulse:  [76-77] 77  Resp:  [18] 18  BP: (144-149)/(88-89) 144/89  SpO2:  [98 %] 98 %    Physical Exam  Physical Exam  Vitals and nursing note reviewed.   Constitutional:       General: He is not in acute distress.     Appearance: He is ill-appearing. He is not toxic-appearing.   HENT:      Head: Normocephalic and atraumatic.      Right Ear: External ear normal.      Left Ear: External ear normal.      Nose: Nose normal. No rhinorrhea.      Mouth/Throat:      Mouth: Mucous membranes are moist.      Comments: ETT in place  Eyes:      General: No scleral icterus.     Conjunctiva/sclera: Conjunctivae normal.      Pupils: Pupils are equal, round, and reactive to light.   Neck:      Comments: Right IJ cordis with PA catheter  Cardiovascular:      Rate and Rhythm: Normal rate and regular rhythm.      Pulses:           Radial pulses are 2+ on the right side and 2+ on the left side.        Dorsalis pedis pulses are 2+ on the right side and 2+ on the left side.      Heart sounds: Heart sounds not distant. No murmur heard.     Friction rub present.   Pulmonary:      Breath sounds: Normal breath sounds. No wheezing.      Comments: Breathing comfortably on the ventilator, chest tubes in place  Chest:      Chest wall: No tenderness.   Abdominal:      Palpations: Abdomen is  soft.      Tenderness: There is no abdominal tenderness. There is no guarding or rebound.   Musculoskeletal:         General: Normal range of motion.      Cervical back: Normal range of motion and neck supple.      Right lower leg: No edema.      Left lower leg: No edema.   Lymphadenopathy:      Cervical: No cervical adenopathy.   Skin:     General: Skin is warm and dry.      Capillary Refill: Capillary refill takes less than 2 seconds.      Findings: No rash.   Neurological:      Cranial Nerves: No cranial nerve deficit.      Sensory: No sensory deficit.      Motor: No weakness.      Comments: Sedated, but moving all   Psychiatric:      Comments: Unable to assess         Fluids    Intake/Output Summary (Last 24 hours) at 1/25/2024 1100  Last data filed at 1/25/2024 1050  Gross per 24 hour   Intake 400 ml   Output 400 ml   Net 0 ml       Laboratory  Recent Results (from the past 48 hour(s))   PLATELETS REQUEST    Collection Time: 01/25/24  6:09 AM   Result Value Ref Range    Component P       P75                 Plts,PheresisIRR    [id number]   issued       01/25/24   10:27      Product Type Platelets Pheresis IRR LR     Dispense Status issued     Unit Number (Barcoded) [id number]     Product Code (Barcoded) O1873U79     Blood Type (Barcoded) 6200        Imaging  CXR(reviewed): ETT about 5cm above the kay, slight cephalization    Assessment/Plan  Encounter for weaning from ventilator (HCC)- (present on admission)  Assessment & Plan  Intubated for surgery on 1/25  Cont full vent support  RT/O2 protocols  Cont rapid weaning protocols  I am actively adjusting ASV settings based on hourly ABGs  IS and early mobility upon extubation    Essential hypertension- (present on admission)  Assessment & Plan  SBP goals > 90 and < 120  Monitor need for oral BP medications    Mitral regurgitation- (present on admission)  Assessment & Plan  S/p mitral valve repair with mean gradient of 2 and no leak  Cont CTS  protocols  Hemodynamics per CTS  SBP goals >90 and <120  Active titration of epinephrine infusion for SBP goals  Chest tubes per CTS  Warfarin therapy to start in next 1-2 days    Duodenal ulcer with hemorrhage- (present on admission)  Assessment & Plan  S/p EGD and GDA embolization  Cont PPI post operatively  Monitor Hb    Acute blood loss anemia- (present on admission)  Assessment & Plan  Likely due to surgery  S/p 1 unit platelets and 200cc cell saver  Serial Hb  Transfuse for Hb<7  Monitor chest tube output        Discussed patient condition and risk of morbidity and/or mortality with RN, RT, Pharmacy, Charge nurse / hot rounds, Patient, and CVS.      The patient remains critically ill.  I have assessed and reassessed the respiratory status and made ventilator adjustments based upon arterial blood gas analysis, ventilator waveforms and airway mechanics.  I have assessed and reassessed the blood pressure, hemodynamics, cardiovascular status. This patient remains at high risk for worsening cardiopulmonary dysfunction and death without the above critical care interventions.    Critical care time = 110 minutes in directly providing and coordinating critical care and extensive data review.  No time overlap and excludes procedures.

## 2024-01-25 NOTE — OR NURSING
Assume care for patient in pre-op. Allergies and NPO status verified. Consents for surgical procedure signed and on chart.Call light within reach. Per blood bank, no need for COD everything is completed and ready.   Bed at lowest position. Clipping done.   0650 PIV started .

## 2024-01-25 NOTE — PROGRESS NOTES
Patient awake and following commands, moves all extremities, PERRLA intact at 3. More in depth neuro assessment to be completed once extubated.

## 2024-01-25 NOTE — PROGRESS NOTES
Medication history reviewed with PT at bedside    [facility] rec is complete per PT reporting    Allergies reviewed.     Patient denies any outpatient antibiotics in the last 30 days.     Patient is not taking anticoagulants.    Preferred pharmacy for this visit - [facility] on [address] ([phone])

## 2024-01-25 NOTE — PROGRESS NOTES
Pt eligible for extubation per protocol, parameters as follows:    RR 14    VC 1300  NIF -28  Pinsp 5  PEEP 8  FiO2 30  PaCO2 30  PH 7.36  SpO2 99  RSBI <100 (26)  Hemodynamically stable, less than 2 pressors.    Pt extubated to 4 L NC by Tavo, RTs. No stridor noted. Tolerated well.    Total time intubated 3 hrs 53 minutes

## 2024-01-25 NOTE — FLOWSHEET NOTE
01/25/24 1504   Weaning Parameters   RR (bpm) 24   $ FVC / Vital Capacity (liters)  1.8   NIF (cm H2O)  -27   Rapid Shallow Breathing Index (RR/VT) 24

## 2024-01-25 NOTE — H&P
REFERRING PHYSICIAN: Yinka Mitchell MD    CONSULTING PHYSICIAN: Bigg Santana MD, FACS    CHIEF COMPLAINT: Shortness of breath    HISTORY OF PRESENT ILLNESS: The patient is a 43 y.o. male with history of hypertension, recent GI bleed - duodenal ulcer coiling 5 weeks ago and severe mitral regurgitation. Today, he states he has been having increasing shortness of breath for the last 6-8 months. He has occasional chest pressure with exertion that resolves with rest. He denies orthopnea and syncope. He had a GI bleed and was awaiting GI clearance prior to surgery.    PAST MEDICAL HISTORY:   Active Ambulatory Problems     Diagnosis Date Noted    Upper GI bleed 03/26/2022    Acute blood loss anemia 07/30/2023    Duodenal ulcer with hemorrhage 07/30/2023    GI bleed 09/13/2023    Iron deficiency anemia due to chronic blood loss 09/14/2023    Mitral regurgitation 09/15/2023    Essential hypertension 10/10/2023    Hematemesis 11/17/2023    Duodenal ulcer 11/18/2023    Hypertension 11/18/2023    Normal anion gap metabolic acidosis 11/18/2023    Abdominal pain 11/18/2023    Superficial thrombophlebitis of right upper extremity 12/05/2023     Resolved Ambulatory Problems     Diagnosis Date Noted    Physiologic disturbance of temperature regulation 03/26/2022    Epigastric pain 03/26/2022    ELISABET (acute kidney injury) (HCC) 03/27/2022    GI bleed 07/30/2023    Dehydration 07/30/2023    Drug-seeking behavior 07/30/2023    Intractable abdominal pain 09/16/2023    Left arm swelling 11/20/2023     Past Medical History:   Diagnosis Date    Arthritis     Breath shortness 11/03/2023    Gastric ulcer     Heart valve disease     Hemorrhagic disorder (HCC)     Pain     Seizure (HCC)      PAST SURGICAL HISTORY:   Past Surgical History:   Procedure Laterality Date    [place] UPPER GI ENDOSCOPY,DIAGNOSIS N/A 9/14/2023    Procedure: GASTROSCOPY;  Surgeon: Johnny Dalton M.D.;  Location: SURGERY SAME DAY [facility];  Service: Gastroenterology     LA UPPER GI ENDOSCOPY,BIOPSY N/A 9/14/2023    Procedure: GASTROSCOPY, WITH BIOPSY;  Surgeon: Johnny Dalton M.D.;  Location: SURGERY SAME DAY [facility];  Service: Gastroenterology    LA UPPER GI ENDOSCOPY,BIOPSY N/A 7/30/2023    Procedure: GASTROSCOPY, WITH BIOPSY;  Surgeon: Kumar Hope M.D.;  Location: SURGERY [facility];  Service: Gastroenterology    LA UPPER GI ENDOSCOPY,DIAGNOSIS  3/28/2022    Procedure: GASTROSCOPY;  Surgeon: Paco Wiggins M.D.;  Location: SURGERY SAME DAY [facility];  Service: Gastroenterology    LA UPPER GI ENDOSCOPY,BIOPSY  3/28/2022    Procedure: GASTROSCOPY, WITH BIOPSY;  Surgeon: Paco Wiggins M.D.;  Location: SURGERY SAME DAY [facility];  Service: Gastroenterology    ANKLE ARTHROSCOPY Right 5/21/2018    Procedure: ANKLE ARTHROSCOPY, LATERAL LIGAMENT RECONSTRUCTION;  Surgeon: Seth Cruz M.D.;  Location: SURGERY [facility];  Service: Orthopedics    BIOPSY ORTHO Right 5/21/2018    Procedure: BIOPSY ORTHO/ FOR CARTILAGE AND DENOVO PROCEDURE;  Surgeon: Seth Cruz M.D.;  Location: SURGERY [facility];  Service: Orthopedics    OTHER ABDOMINAL SURGERY      Cholecystectomy February 2017    OTHER ORTHOPEDIC SURGERY      2 previous ankle surgeries (2007, 2009)     ALLERGIES:   Allergies   Allergen Reactions    Morphine Vomiting    Tramadol Unspecified     Seizure  [id number]     CURRENT MEDICATIONS:   Current Facility-Administered Medications:     acetaminophen (Tylenol) tablet 1,000 mg, 1,000 mg, Oral, Once, Bigg Santana M.D.    lidocaine (Xylocaine) 1 % injection 0.5 mL, 0.5 mL, Intradermal, Once PRN, Bigg Santana M.D.    metoprolol tartrate (Lopressor) tablet 12.5 mg, 12.5 mg, Oral, Once, Bigg Santana M.D.    ceFAZolin (Ancef) injection 2 g, 2 g, Intravenous, Once, Bigg Santana M.D.    insulin regular (Humulin R) 100 Units in  mL Infusion, 1-6 Units/hr, Intravenous, Continuous, Bigg Santana M.D.    EPINEPHrine (Adrenalin) infusion 4 mg/250 mL  (premix), 0-0.5 mcg/kg/min (Ideal), Intravenous, Continuous, Bigg Santana M.D.    norepinephrine (Levophed) 8 mg in 250 mL NS infusion (premix), 0-1 mcg/kg/min (Ideal), Intravenous, Continuous, Bigg Santana M.D.    dexmedetomidine (PRECEDEX) 400 mcg/100mL NS premix infusion, 0-1.5 mcg/kg/hr (Ideal), Intravenous, Continuous, Bigg Santana M.D.    aminocaproic acid (Amicar) 10 g in  mL IV Bolus, 10 g, Intravenous, Once, Bigg Santana M.D.    aminocaproic acid (Amicar) 5 g in  mL Infusion, 2 g/hr, Intravenous, Once, Bigg Santana M.D.    FAMILY HISTORY:   Family History   Problem Relation Age of Onset    Heart Disease Mother     No Known Problems Father      SOCIAL HISTORY:   Social History     Socioeconomic History    Marital status:      Spouse name: Not on file    Number of children: Not on file    Years of education: Not on file    Highest education level: Associate degree: occupational, technical, or vocational program   Occupational History    Not on file   Tobacco Use    Smoking status: Never    Smokeless tobacco: Never   Vaping Use    Vaping Use: Never used   Substance and Sexual Activity    Alcohol use: Not Currently    Drug use: Never    Sexual activity: Not on file   Other Topics Concern    Not on file   Social History Narrative    Not on file     Social Determinants of Health     Financial Resource Strain: Low Risk  (9/26/2023)    Overall Financial Resource Strain (CARDIA)     Difficulty of Paying Living Expenses: Not hard at all   Food Insecurity: No Food Insecurity (9/26/2023)    Hunger Vital Sign     Worried About Running Out of Food in the Last Year: Never true     Ran Out of Food in the Last Year: Never true   Transportation Needs: No Transportation Needs (9/26/2023)    PRAPARE - Transportation     Lack of Transportation (Medical): No     Lack of Transportation (Non-Medical): No   Physical Activity: Sufficiently Active (9/26/2023)    Exercise Vital Sign     Days of  "Exercise per Week: 6 days     Minutes of Exercise per Session: 120 min   Stress: Stress Concern Present (9/26/2023)    [employer] [place] [employer] - Occupational Stress Questionnaire     Feeling of Stress : To some extent   Social Connections: Moderately Isolated (9/26/2023)    Social Connection and Isolation Panel [NHANES]     Frequency of Communication with Friends and Family: Once a week     Frequency of Social Gatherings with Friends and Family: Twice a week     Attends Latter-day Services: Never     Active Member of Clubs or Organizations: No     Attends Club or Organization Meetings: Never     Marital Status:    Intimate Partner Violence: Not on file   Housing Stability: Low Risk  (9/26/2023)    Housing Stability Vital Sign     Unable to Pay for Housing in the Last Year: No     Number of Places Lived in the Last Year: 1     Unstable Housing in the Last Year: No     REVIEW OF SYSTEMS:  Review of Systems   Constitutional:  Positive for malaise/fatigue.   HENT: Negative.     Eyes: Negative.    Respiratory:  Positive for shortness of breath.    Cardiovascular:  Positive for chest pain.   Gastrointestinal: Negative.    Genitourinary: Negative.    Musculoskeletal: Negative.    Skin: Negative.    Neurological: Negative.    Endo/Heme/Allergies: Negative.    Psychiatric/Behavioral: Negative.       PHYSICAL EXAMINATION:    BP (!) 144/89   Pulse 77   Temp 35.9 °C (96.6 °F) (Temporal)   Resp 18   Ht 1.905 m (6' 3\")   Wt 107 kg (236 lb 15.9 oz)   SpO2 98%   BMI 29.62 kg/m²    ,Physical Exam  Constitutional:       General: He is not in acute distress.  HENT:      Head: Normocephalic and atraumatic.      Right Ear: Hearing normal.      Left Ear: Hearing normal.      Nose: Nose normal.      Mouth/Throat:      Dentition: Normal dentition.      Pharynx: Uvula midline.   Eyes:      Conjunctiva/sclera: Conjunctivae normal.      Pupils: Pupils are equal, round, and reactive to light.   Neck:      Thyroid: " No thyromegaly.      Vascular: No JVD.      Trachea: Trachea normal.   Cardiovascular:      Rate and Rhythm: Normal rate and regular rhythm.      Heart sounds: Murmur heard.      Systolic murmur is present with a grade of 3/6.   Pulmonary:      Effort: Pulmonary effort is normal.      Breath sounds: Normal breath sounds.   Abdominal:      General: Bowel sounds are normal.      Palpations: Abdomen is soft.      Tenderness: There is no abdominal tenderness.   Musculoskeletal:         General: Normal range of motion.   Skin:     General: Skin is warm and dry.   Neurological:      Mental Status: He is alert and oriented to person, place, and time.      Sensory: Sensation is intact.      Motor: Motor function is intact.   Psychiatric:         Mood and Affect: Mood and affect normal.         Cognition and Memory: Memory normal.         Judgment: Judgment normal.     LABS REVIEWED:  Lab Results   Component Value Date/Time    SODIUM 140 01/23/2024 09:04 AM    POTASSIUM 4.2 01/23/2024 09:04 AM    CHLORIDE 108 01/23/2024 09:04 AM    CO2 24 01/23/2024 09:04 AM    GLUCOSE 90 01/23/2024 09:04 AM    BUN 14 01/23/2024 09:04 AM    CREATININE 0.99 01/23/2024 09:04 AM      Lab Results   Component Value Date/Time    PROTHROMBTM 13.6 01/23/2024 09:04 AM    INR 1.03 01/23/2024 09:04 AM      Lab Results   Component Value Date/Time    WBC 3.9 (L) 01/23/2024 09:04 AM    RBC 4.93 01/23/2024 09:04 AM    HEMOGLOBIN 10.7 (L) 01/23/2024 09:04 AM    HEMATOCRIT 35.7 (L) 01/23/2024 09:04 AM    MCV 72.4 (L) 01/23/2024 09:04 AM    MCH 21.7 (L) 01/23/2024 09:04 AM    MCHC 30.0 (L) 01/23/2024 09:04 AM    MPV 9.3 01/23/2024 09:04 AM    NEUTSPOLYS 44.40 01/23/2024 09:04 AM    LYMPHOCYTES 36.80 01/23/2024 09:04 AM    MONOCYTES 11.80 01/23/2024 09:04 AM    EOSINOPHILS 4.60 01/23/2024 09:04 AM    BASOPHILS 2.10 (H) 01/23/2024 09:04 AM    HYPOCHROMIA 1+ 09/14/2023 05:17 AM    ANISOCYTOSIS 1+ 10/09/2023 09:33 AM      IMAGING REVIEWED AND  INTERPRETED:    TRANSESOPHAGEAL ECHOCARDIOGRAM [facility] [place] 11/10/2023:  There is extremely severe eccentric mitral regurgitation that appears to be secondary to flail P1/P2 segments.  Flail gap measures 6mm.EROA 1.3cm2.MR volume 115mL.  Normal left ventricular systolic function.  The left ventricular ejection fraction is visually estimated to be 70%.  Normal right ventricular size and systolic function.  No thrombus detected in the left atrial appendage.  Color doppler appeared to show patent foramen ovale (PFO).  Bubble study was negative which could be secondary to higher left atrial pressures due to MR.  Unable to estimate right ventricular systolic pressure due to an   inadequate tricuspid regurgitant jet.     CARDIAC CATHETERIZATION [facility] [place] 11/21/2023:  Final impression:  Angiographically normal appearing coronary arteries.  Dilated aortic root  Findings:  1.  Left main coronary artery:  Normal.  2.  Left anterior descending artery:  Normal. Two major diagonal branches are seen.  3.  Left circumflex coronary artery:  Normal. Gives one large marginal branch, which has no significant disease   4.  Right coronary artery:  Normal.  This is a right dominant system.  5.  Left ventricular end diastolic pressure:  12 mmHg.  No signficant gradient across the aortic valve.  6. Left ventriculogram showed normal EF 65%, dilated aortic root.     CT SCAN CHEST [facility] [place] 11/18/2023:  Normal exam. No acute process seen.      IMPRESSION:  Severe symptomatic mitral regurgitation (4+, degenerative), mitral valve prolapse, recent GI bleeding (status post duodenal ulcer coiling), anemia, hypertension    PLAN:  I recommend that he undergo mitral valve repair or replacement and intraoperative transesophageal echocardiography.    The procedure, its risks, benefits, potential complications and alternative treatments were discussed with the patient in detail including the risks should he decide not to undergo my recommended treatment. All of his  questions were answered to his satisfaction and he is willing to proceed with the operation. The risks include death, stroke, infection: to include a rare bacterial infection related to the use of the heart/lung machine, oleg-operative myocardial infarction, dysrhythmias, diaphragmatic paralysis, chest wall paresthesia, tracheostomy, kidney or other organ failure, possible return to the operating room for bleeding, bleeding requiring transfusion with its attendant risks including AIDS or hepatitis, dehiscence of surgical incisions, respiratory complications including the need for prolonged ventilator support, Protamine or other drug reaction, peripheral neuropathy, loss of limb, and miscount of surgical items. The operative mortality risk is approximately 1%. The STS mortality risk score is 0.8% and the morbidity and mortality risk score is 9% for mitral valve repair. The STS mortality risk score is 0.9% and the morbidity and mortality risk score is 10% for mitral valve repair. The scores were discussed with patient.     The operation is scheduled for Thursday, January 25, 2024 at 7:30 AM at [facility].  The differences between tissue and mechanical valves were discussed in detail with the patient including the anticoagulation requirements with each.  At this point the patient would prefer to have a mechanical valve implanted.     Findings and recommendations have been discussed with the patient’s cardiologist, Yinka Mitchell MD.  Thank you for this very challenging consultation and participation in the patient’s care.  I will keep you apprised of all future developments.    Sincerely,    Bigg Santana MD, FACS

## 2024-01-25 NOTE — ANESTHESIA PROCEDURE NOTES
LEONEL    Date/Time: 1/25/2024 8:05 AM    Performed by: Marvin Delaney M.D.  Authorized by: Marvin Delaney M.D.    Start Time:1/25/2024 8:05 AM  Preanesthetic Checklist: patient identified, IV checked, risks and benefits discussed, surgical consent, monitors and equipment checked, pre-op evaluation and timeout performed    Indication for LEONEL: diagnostic   Patient Location: OR  Intubated: Yes  Bite Block: Yes  Heart Visualized: Yes  Insertion: atraumatic    **See FULL LEONEL report in patient's chart via CV Synapse**

## 2024-01-25 NOTE — ANESTHESIA PROCEDURE NOTES
Central Venous Line    Performed by: Marvin Delaney M.D.  Authorized by: Marvin Delaney M.D.    Start Time:  1/25/2024 7:55 AM  End Time:  1/25/2024 8:00 AM  Patient Location:  OR  Indication: central venous access and hemodynamic monitoring        provider hand hygiene performed prior to central venous catheter insertion, all 5 sterile barriers used (gloves, gown, cap, mask, large sterile drape) during central venous catheter insertion and skin prep agent completely dried prior to procedure    Patient Position:  Trendelenburg  Laterality:  Left  Site:  Internal jugular  Prep:  Chlorhexidine  Catheter Size:  8.5 Fr  Catheter Length (cm):  10  Catheter Type:  Introducer  Number of Lumens:  Single lumen  target vein identified, needle advanced into vein and blood aspirated and guidewire advanced into vein    Seldinger Technique?: Yes    Ultrasound-Guided: ultrasound-guided  Image captured, interpreted and electronically stored.  Sterile Gel and Probe Cover Used for Ultrasound?: Yes    Intravenous Verification: verified by ultrasound, venous blood return and chest x-ray pending    all ports aspirated, all ports flushed easily, guidewire was removed intact, biopatch was applied, line was sutured in place and dressing was applied    Events: patient tolerated procedure well with no complications    PA Catheter Placed?: Yes    PA Catheter Type:  Oximetric  PA Catheter Size:  7  Laterality:  Right  Site:  Through introducer  Placement Confirmation: waveform    PA Catheter Depth (cm):  60  Events: patient tolerated procedure well with no complications

## 2024-01-25 NOTE — ANESTHESIA PROCEDURE NOTES
Central Venous Line    Performed by: Marvin Delaney M.D.  Authorized by: Marvin Delaney M.D.    Start Time:  1/25/2024 7:50 AM  End Time:  1/25/2024 7:55 AM  Patient Location:  OR  Indication: central venous access        provider hand hygiene performed prior to central venous catheter insertion, all 5 sterile barriers used (gloves, gown, cap, mask, large sterile drape) during central venous catheter insertion and skin prep agent completely dried prior to procedure    Patient Position:  Trendelenburg  Site:  Internal jugular  Prep:  Chlorhexidine  Catheter Size:  8 Fr  Catheter Length (cm):  16  Number of Lumens:  Double lumen  target vein identified, needle advanced into vein and blood aspirated and guidewire advanced into vein    Seldinger Technique?: Yes    Ultrasound-Guided: ultrasound-guided  Image captured, interpreted and electronically stored.  Sterile Gel and Probe Cover Used for Ultrasound?: Yes    Intravenous Verification: verified by ultrasound, venous blood return and chest x-ray pending    all ports aspirated, all ports flushed easily, guidewire was removed intact, biopatch was applied, line was sutured in place and dressing was applied    Events: patient tolerated procedure well with no complications

## 2024-01-25 NOTE — PROGRESS NOTES
Pt transported to T622 by OR and Rhett Anesthesia MD on zoll. RT and Ruben BALES at bedside. Art line connected and patient placed on monitor. Chest tubes connected to suction, EKG called for, initial labs drawn, blood glucose checked, and ABG running. Epi, clevi, dex gtts verified. PA catheter locked at ~62 cm at yellow hub, wedging appropriately. Initial patient assessment complete. Patient awake, alert, and uncomfortable. One time dose of analgesia administered per MD.     Late entry: Pacer inappropriately pacing upon connecting, sensitivity decreased to 0.8 mV with resolution of over-pacing.

## 2024-01-25 NOTE — ANESTHESIA PROCEDURE NOTES
Arterial Line    Performed by: Marvin Delaney M.D.  Authorized by: Marvin Delaney M.D.    Start Time:  1/25/2024 7:41 AM  End Time:  1/25/2024 7:43 AM  Localization: ultrasound guidance  Image captured, interpreted and electronically stored.  Patient Location:  OR  Indication: continuous blood pressure monitoring and blood sampling needed        Catheter Size:  20 G  Seldinger Technique?: Yes    Laterality:  Left  Site:  Radial artery  Line Secured:  Antimicrobial disc, tape and transparent dressing  Events: patient tolerated procedure well with no complications

## 2024-01-25 NOTE — DIETARY
Nutrition Services: Day 0 of admit.  Roberto Braswell is a 43 y.o. male with admitting DX of Severe mitral valve regurgitation.    Consult received for Cardiac Rehab Diet Education.  S/p RADICAL MITRAL VALVE REPAIR (P2 quadrangular resection, cord reimplantation, 38 mm Syed flexible annuloplasty band) and intraoperative transesophageal echocardiography.    Specific cardiac diet education is not indicated for the above Dx/procedure.

## 2024-01-25 NOTE — ANESTHESIA PROCEDURE NOTES
Airway    Date/Time: 1/25/2024 7:35 AM    Performed by: Marvin Delaney M.D.  Authorized by: Marvin Delaney M.D.    Location:  OR  Urgency:  Elective  Difficult Airway: No    Indications for Airway Management:  Anesthesia      Spontaneous Ventilation: absent    Sedation Level:  Deep  Preoxygenated: Yes    Patient Position:  Sniffing  Final Airway Type:  Endotracheal airway  Final Endotracheal Airway:  ETT  Cuffed: Yes    Technique Used for Successful ETT Placement:  Direct laryngoscopy  Devices/Methods Used in Placement:  Intubating stylet    Insertion Site:  Oral  Blade Type:  Bill  Laryngoscope Blade/Videolaryngoscope Blade Size:  4  ETT Size (mm):  8.5  Measured from:  Teeth  ETT to Teeth (cm):  23  Placement Verified by: auscultation and capnometry    Cormack-Lehane Classification:  Grade I - full view of glottis  Number of Attempts at Approach:  1

## 2024-01-25 NOTE — ASSESSMENT & PLAN NOTE
Likely due to surgery  S/p 1 unit platelets and 200cc cell saver  Serial Hb at 7.7-->2 units pRBCs now  Transfuse for Hb<8  Monitor chest tube output

## 2024-01-25 NOTE — RESPIRATORY CARE
Extubation    Cuff leak noted Yes  Stridor present No     FiO2%: 40 % (01/25/24 1354)  O2 (LPM): 4 (01/25/24 1504)     Patient toleration Well, resting on 4L  RCP Complete? Yes  Events/Summary/Plan: Pt extubated, placed on NC @ 4L (01/25/24 1504)

## 2024-01-25 NOTE — ANESTHESIA PREPROCEDURE EVALUATION
Case: [id number] Anesthesia Start Date/Time: 01/25/24 0728    Procedures:       MITRAL VALVE REPAIR VERSUS REPLACEMENT, TRANSESOPHAGEAL ECHOCARDIOGRAM (Chest)      VALVULOPLASTY, MITRAL VALVE (Chest)      ECHOCARDIOGRAM, TRANSESOPHAGEAL, INTRAOPERATIVE (Chest)    Anesthesia type: general    Pre-op diagnosis: SEVERE MITRAL REGURGITATION    Location: [facility] OR 02 / SURGERY [facility]    Surgeons: Bigg Santana M.D.            Relevant Problems   CARDIAC   (positive) Essential hypertension   (positive) Mitral regurgitation      GI   (positive) Duodenal ulcer       Physical Exam    Airway   Mallampati: II  TM distance: >3 FB  Neck ROM: full       Cardiovascular - normal exam  Rhythm: regular  Rate: normal  (-) murmur     Dental - normal exam           Pulmonary - normal exam  Breath sounds clear to auscultation     Abdominal    Neurological - normal exam                   Anesthesia Plan    ASA 4 (Severe valvular dysfunction)   ASA physical status 4 criteria: other (comment)    Plan - general       Airway plan will be ETT  LEONEL Planned        Induction: intravenous    Postoperative Plan: Postoperative administration of opioids is intended.    Pertinent diagnostic labs and testing reviewed    Informed Consent:    Anesthetic plan and risks discussed with patient and spouse.    Use of blood products discussed with: patient and spouse whom.

## 2024-01-25 NOTE — OR SURGEON
OPERATIVE REPORT    Operation date: 1/25/2024    Referring physician: Yinka Mitchell MD     PreOp Diagnosis: Severe symptomatic mitral regurgitation (4+, degenerative), mitral valve prolapse, recent GI bleeding (status post duodenal ulcer coiling), anemia, hypertension     PostOp Diagnosis: Severe symptomatic mitral regurgitation (4+, degenerative), mitral valve prolapse, recent GI bleeding (status post duodenal ulcer coiling), anemia, hypertension     Procedure(s):  RADICAL MITRAL VALVE REPAIR (P2 quadrangular resection, cord reimplantation, 38 mm Syed flexible annuloplasty band) and intraoperative transesophageal echocardiography    Surgeon(s):  Bigg Santana M.D.    Assistant:  IVAN Rolon    Anesthesiologist/Type of Anesthesia:  Anesthesiologist: Marvin Delaney M.D./General    Surgical Staff:  Assistant: CLEOPATRA Muhammad  Circulator: Lukas D. Gansert, R.N.  Perfusionist: Nolan Jackson Circulator: Roel Mace R.N.  Scrub Person: Nahomy Amaya; Veronica Perez    Specimens removed if any:  ID Type Source Tests Collected by Time Destination   A : PIECE OF MITRAL VALVE Tissue Heart Valve PATHOLOGY SPECIMEN Bigg Santana M.D. 1/25/2024  9:10 AM      Estimated Blood Loss: Minimal    Findings: Severe mitral regurgitation, vision P2 prolapse with ruptured cords, LVEF approximately 60%    Complications: None    Indications:  Mr. Braswell is a 43-year-old male with severe mitral regurgitation.    Procedure:  The patient was brought to the operating room and placed on the operating room table in the supine position.  After successful induction of general anesthesia and endotracheal intubation, the patient was prepped and draped in the usual sterile fashion.  IVAN Rolon assisted with retraction and exposure during the operation and closed the sternal wound.  Intraoperative transesophageal echocardiography showed severe mitral regurgitation, severe P2 prolapse with  ruptured cords and good left ventricular ejection fraction of approximately 60%.    An incision was made from the sternal notch to the xiphoid.  The sternum was opened longitudinally with a sternal saw.  Hemostasis was obtained with electrocautery at the sternal edges.  The patient was systemically heparinized.  The pericardium was opened longitudinally and tented anteriorly with Ethibond stay stitches.  The aortic cannula was inserted first followed by a dual stage venous cannula.  An antegrade cardioplegia cannula was placed in the ascending aorta.  Cardiopulmonary bypass was instituted.  The aorta was crossclamped and the patient was given 1 L of cold [place] cardioplegia in an antegrade fashion.  There was prompt cardiac arrest.  Ice slush was placed on the heart for further myocardial protection.  A phrenic nerve protector pad was used.    A left atriotomy was performed just below the interatrial groove.  There was severe P2 prolapse with ruptured primary cords.  The P2 scallop was stretched out.  A large quadrangular resection of the P2 scallop was performed.  Several large primary cords were preserved and reimplanted along the repair.  The leaflet edges were reapproximated using #4-0 Ethibond stitches in a figure-of-eight fashion.  Testing of the repair with cold saline did not show any mitral regurgitation.  #2-0 Ethibond stitches were then placed around the posterior mitral valve annulus from trigone to trigone.  A 38 mm Syed flexible annuloplasty band was then placed in the mitral valve annulus and secured in place with the Ethibond stitches utilizing the CorKnot device.  Rewarming of the patient was initiated.  The left atriotomy was closed in 2 layers using #4-0 Prolene sutures.  The left ventricle was de-aired in the usual fashion.  The aortic cross-clamp was removed.  Aortic cross-clamp time was 62 minutes and total cardiopulmonary bypass time was 78 minutes.  The carbon dioxide which had been  released over the operative field during the operation was discontinued.  A straight and an angled 32 [place] chest tubes were placed in the mediastinum.  Temporary epicardial ventricular pacemaker wires were inserted.  There was spontaneous conversion into sinus rhythm.  The antegrade cardioplegia cannula was removed.  When he was adequately warmed, he was slowly taken off cardiopulmonary bypass which he tolerated well.  The dual stage venous cannula was removed.  Protamine was given to reverse the effects of the heparin.  The aortic cannula was removed.  When adequate hemostasis had been obtained, the sternum was reapproximated using size 5 sternal wires and the remainder of the incision was closed in several layers using Vicryl sutures.    Intraoperative transesophageal echocardiography showed no evidence of residual mitral regurgitation.  There was no systolic anterior motion of the mitral valve and the mean mitral transvalvular gradient was 2 mmHg.  His left ventricular ejection fraction remained normal at approximately 60%.  There were no apparent complications.  The patient tolerated the procedure well and left the operating room in guarded condition.      1/25/2024 10:39 AM Bigg Santana MD, FACS

## 2024-01-25 NOTE — ANESTHESIA POSTPROCEDURE EVALUATION
Patient: Roberto Braswell    Procedure Summary       Date: 01/25/24 Room / Location: [facility] 02 / SURGERY [facility]    Anesthesia Start: 0728 Anesthesia Stop: 1124    Procedures:       MITRAL VALVE REPAIR, TRANSESOPHAGEAL ECHOCARDIOGRAM (Chest)      ECHOCARDIOGRAM, TRANSESOPHAGEAL, INTRAOPERATIVE (Chest) Diagnosis: (SEVERE MITRAL REGURGITATION)    Surgeons: Bigg Santana M.D. Responsible Provider: Marvin Delaney M.D.    Anesthesia Type: general ASA Status: 4            Final Anesthesia Type: general  Last vitals  BP   Blood Pressure: (!) 87/48, Arterial BP: (!) 96/48    Temp   36.7 °C (98.1 °F)    Pulse   62   Resp   17    SpO2   99 %      Anesthesia Post Evaluation    Patient location during evaluation: ICU  Level of consciousness: awake and alert    Airway patency: patent  Anesthetic complications: no  Cardiovascular status: adequate  Respiratory status: acceptable  Hydration status: euvolemic    PONV: none          No notable events documented.     Nurse Pain Score: 0 (NPRS)

## 2024-01-25 NOTE — DISCHARGE PLANNING
Discharge Appointments/outpatient referrals/ and HH set up:    Cardiac surgery follow up appointment made for 4-5 weeks out    Hospital discharge team/schedulers called for cardiology f/u appointment for MD or APRN within 3-4 weeks if possible.    Aortic surveillance program/vascular medicine referral not needed, orders not placed.    Anticoagulation referral/ coumadin clinic referral needed and orders placed .    INR draws are indicated for MV repair procedure and coumadin.    Will await PT/OT notes and medical progression to determine further discharge needs.

## 2024-01-25 NOTE — ASSESSMENT & PLAN NOTE
S/p mitral valve repair with mean gradient of 2 and no leak  Cont CTS protocols  Hemodynamics per CTS  SBP goals >90 and <120  Off vasopressors  Chest tubes per CTS  Warfarin therapy to start tonight

## 2024-01-25 NOTE — PROGRESS NOTES
Patient in excruciating 10/10 pain post extubation. States the IV fentanyl given before did not help at all. Dex gtt resumed post extubation. Dr. Miranda updated. RAE Rolon updated, orders for increased oxycodone doses, dilaudid for breakthrough pain, one time dose of dilaudid now, and order scheduled MS contin (see orders).

## 2024-01-25 NOTE — ASSESSMENT & PLAN NOTE
Intubated for surgery on 1/25  Extubated on 1/25 per rapid weaning protocols  RT/O2 protocols  Early mobility  IS/PEP

## 2024-01-25 NOTE — ANESTHESIA TIME REPORT
Anesthesia Start and Stop Event Times       Date Time Event    1/25/2024 0647 Ready for Procedure     0728 Anesthesia Start     1124 Anesthesia Stop          Responsible Staff  01/25/24      Name Role Begin End    Marvin Delaney M.D. Anesth 0728 1124          Overtime Reason:  no overtime (within assigned shift)    Comments:

## 2024-01-26 ENCOUNTER — HOSPITAL ENCOUNTER (OUTPATIENT)
Dept: RADIOLOGY | Facility: MEDICAL CENTER | Age: 44
End: 2024-01-26
Attending: NURSE PRACTITIONER
Payer: COMMERCIAL

## 2024-01-26 LAB
ACT BLD: 114 SEC (ref 74–137)
ACT BLD: 120 SEC (ref 74–137)
ACT BLD: 385 SEC (ref 74–137)
ACT BLD: 488 SEC (ref 74–137)
ACT BLD: 493 SEC (ref 74–137)
ACT BLD: 531 SEC (ref 74–137)
ACT BLD: 547 SEC (ref 74–137)
ANION GAP SERPL CALC-SCNC: 11 MMOL/L (ref 7–16)
BARCODED ABORH UBTYP: 6200
BARCODED ABORH UBTYP: 6200
BARCODED PRD CODE UBPRD: NORMAL
BARCODED PRD CODE UBPRD: NORMAL
BARCODED UNIT NUM UBUNT: NORMAL
BARCODED UNIT NUM UBUNT: NORMAL
BUN SERPL-MCNC: 20 MG/DL (ref 8–22)
CALCIUM SERPL-MCNC: 7.6 MG/DL (ref 8.5–10.5)
CHLORIDE SERPL-SCNC: 108 MMOL/L (ref 96–112)
CO2 SERPL-SCNC: 19 MMOL/L (ref 20–33)
COMPONENT R 8504R: NORMAL
COMPONENT R 8504R: NORMAL
CREAT SERPL-MCNC: 0.95 MG/DL (ref 0.5–1.4)
EKG IMPRESSION: NORMAL
ERYTHROCYTE [DISTWIDTH] IN BLOOD BY AUTOMATED COUNT: 47.8 FL (ref 35.9–50)
GFR SERPLBLD CREATININE-BSD FMLA CKD-EPI: 102 ML/MIN/1.73 M 2
GLUCOSE BLD STRIP.AUTO-MCNC: 107 MG/DL (ref 65–99)
GLUCOSE BLD STRIP.AUTO-MCNC: 115 MG/DL (ref 65–99)
GLUCOSE BLD STRIP.AUTO-MCNC: 116 MG/DL (ref 65–99)
GLUCOSE BLD STRIP.AUTO-MCNC: 129 MG/DL (ref 65–99)
GLUCOSE BLD STRIP.AUTO-MCNC: 132 MG/DL (ref 65–99)
GLUCOSE BLD STRIP.AUTO-MCNC: 138 MG/DL (ref 65–99)
GLUCOSE BLD STRIP.AUTO-MCNC: 138 MG/DL (ref 65–99)
GLUCOSE BLD STRIP.AUTO-MCNC: 145 MG/DL (ref 65–99)
GLUCOSE BLD STRIP.AUTO-MCNC: 145 MG/DL (ref 65–99)
GLUCOSE BLD STRIP.AUTO-MCNC: 146 MG/DL (ref 65–99)
GLUCOSE BLD STRIP.AUTO-MCNC: 148 MG/DL (ref 65–99)
GLUCOSE BLD STRIP.AUTO-MCNC: 151 MG/DL (ref 65–99)
GLUCOSE BLD STRIP.AUTO-MCNC: 151 MG/DL (ref 65–99)
GLUCOSE BLD STRIP.AUTO-MCNC: 153 MG/DL (ref 65–99)
GLUCOSE BLD STRIP.AUTO-MCNC: 161 MG/DL (ref 65–99)
GLUCOSE BLD STRIP.AUTO-MCNC: 97 MG/DL (ref 65–99)
GLUCOSE SERPL-MCNC: 152 MG/DL (ref 65–99)
HCT VFR BLD AUTO: 26.6 % (ref 42–52)
HCT VFR BLD AUTO: 29.6 % (ref 42–52)
HGB BLD-MCNC: 7.7 G/DL (ref 14–18)
HGB BLD-MCNC: 8.5 G/DL (ref 14–18)
INR PPP: 1.23 (ref 0.87–1.13)
MCH RBC QN AUTO: 21.2 PG (ref 27–33)
MCHC RBC AUTO-ENTMCNC: 28.7 G/DL (ref 32.3–36.5)
MCV RBC AUTO: 73.8 FL (ref 81.4–97.8)
PLATELET # BLD AUTO: 270 K/UL (ref 164–446)
PMV BLD AUTO: 9.1 FL (ref 9–12.9)
POTASSIUM SERPL-SCNC: 4.9 MMOL/L (ref 3.6–5.5)
POTASSIUM SERPL-SCNC: 5.1 MMOL/L (ref 3.6–5.5)
PRODUCT TYPE UPROD: NORMAL
PRODUCT TYPE UPROD: NORMAL
PROTHROMBIN TIME: 15.7 SEC (ref 12–14.6)
RBC # BLD AUTO: 4.01 M/UL (ref 4.7–6.1)
SODIUM SERPL-SCNC: 138 MMOL/L (ref 135–145)
UNIT STATUS USTAT: NORMAL
UNIT STATUS USTAT: NORMAL
WBC # BLD AUTO: 13.8 K/UL (ref 4.8–10.8)

## 2024-01-26 PROCEDURE — P9016 RBC LEUKOCYTES REDUCED: HCPCS

## 2024-01-26 PROCEDURE — 700101 HCHG RX REV CODE 250: Performed by: NURSE PRACTITIONER

## 2024-01-26 PROCEDURE — 85027 COMPLETE CBC AUTOMATED: CPT

## 2024-01-26 PROCEDURE — 700102 HCHG RX REV CODE 250 W/ 637 OVERRIDE(OP): Performed by: THORACIC SURGERY (CARDIOTHORACIC VASCULAR SURGERY)

## 2024-01-26 PROCEDURE — 700111 HCHG RX REV CODE 636 W/ 250 OVERRIDE (IP): Mod: JZ | Performed by: NURSE PRACTITIONER

## 2024-01-26 PROCEDURE — 71045 X-RAY EXAM CHEST 1 VIEW: CPT

## 2024-01-26 PROCEDURE — 93010 ELECTROCARDIOGRAM REPORT: CPT | Performed by: STUDENT IN AN ORGANIZED HEALTH CARE EDUCATION/TRAINING PROGRAM

## 2024-01-26 PROCEDURE — 93005 ELECTROCARDIOGRAM TRACING: CPT | Performed by: NURSE PRACTITIONER

## 2024-01-26 PROCEDURE — 770022 HCHG ROOM/CARE - ICU (200)

## 2024-01-26 PROCEDURE — 36430 TRANSFUSION BLD/BLD COMPNT: CPT

## 2024-01-26 PROCEDURE — 94669 MECHANICAL CHEST WALL OSCILL: CPT

## 2024-01-26 PROCEDURE — A9270 NON-COVERED ITEM OR SERVICE: HCPCS | Performed by: THORACIC SURGERY (CARDIOTHORACIC VASCULAR SURGERY)

## 2024-01-26 PROCEDURE — 99024 POSTOP FOLLOW-UP VISIT: CPT | Performed by: NURSE PRACTITIONER

## 2024-01-26 PROCEDURE — 700102 HCHG RX REV CODE 250 W/ 637 OVERRIDE(OP): Performed by: NURSE PRACTITIONER

## 2024-01-26 PROCEDURE — 700105 HCHG RX REV CODE 258: Performed by: NURSE PRACTITIONER

## 2024-01-26 PROCEDURE — 86923 COMPATIBILITY TEST ELECTRIC: CPT

## 2024-01-26 PROCEDURE — A9270 NON-COVERED ITEM OR SERVICE: HCPCS | Performed by: NURSE PRACTITIONER

## 2024-01-26 PROCEDURE — 99233 SBSQ HOSP IP/OBS HIGH 50: CPT | Performed by: INTERNAL MEDICINE

## 2024-01-26 PROCEDURE — 82962 GLUCOSE BLOOD TEST: CPT | Mod: 91

## 2024-01-26 PROCEDURE — 85018 HEMOGLOBIN: CPT

## 2024-01-26 PROCEDURE — 85610 PROTHROMBIN TIME: CPT

## 2024-01-26 PROCEDURE — 80048 BASIC METABOLIC PNL TOTAL CA: CPT

## 2024-01-26 PROCEDURE — 85014 HEMATOCRIT: CPT

## 2024-01-26 PROCEDURE — 84132 ASSAY OF SERUM POTASSIUM: CPT

## 2024-01-26 RX ORDER — DEXTROSE MONOHYDRATE 25 G/50ML
25 INJECTION, SOLUTION INTRAVENOUS
Status: DISCONTINUED | OUTPATIENT
Start: 2024-01-26 | End: 2024-01-27

## 2024-01-26 RX ORDER — LIDOCAINE 4 G/G
1 PATCH TOPICAL EVERY 24 HOURS
Status: DISCONTINUED | OUTPATIENT
Start: 2024-01-26 | End: 2024-01-31 | Stop reason: HOSPADM

## 2024-01-26 RX ORDER — POTASSIUM CHLORIDE 20 MEQ/1
20 TABLET, EXTENDED RELEASE ORAL 2 TIMES DAILY
Status: DISCONTINUED | OUTPATIENT
Start: 2024-01-26 | End: 2024-01-26

## 2024-01-26 RX ORDER — FUROSEMIDE 10 MG/ML
20 INJECTION INTRAMUSCULAR; INTRAVENOUS
Status: DISCONTINUED | OUTPATIENT
Start: 2024-01-26 | End: 2024-01-27

## 2024-01-26 RX ORDER — WARFARIN SODIUM 5 MG/1
5 TABLET ORAL
Status: COMPLETED | OUTPATIENT
Start: 2024-01-26 | End: 2024-01-26

## 2024-01-26 RX ORDER — KETOROLAC TROMETHAMINE 15 MG/ML
15 INJECTION, SOLUTION INTRAMUSCULAR; INTRAVENOUS EVERY 6 HOURS
Status: DISCONTINUED | OUTPATIENT
Start: 2024-01-26 | End: 2024-01-26

## 2024-01-26 RX ORDER — BACLOFEN 10 MG/1
10 TABLET ORAL 3 TIMES DAILY
Status: DISCONTINUED | OUTPATIENT
Start: 2024-01-26 | End: 2024-01-30

## 2024-01-26 RX ORDER — GABAPENTIN 100 MG/1
200 CAPSULE ORAL 3 TIMES DAILY
Status: DISCONTINUED | OUTPATIENT
Start: 2024-01-26 | End: 2024-01-31 | Stop reason: HOSPADM

## 2024-01-26 RX ADMIN — ACETAMINOPHEN 1000 MG: 500 TABLET ORAL at 05:42

## 2024-01-26 RX ADMIN — SODIUM CHLORIDE, SODIUM GLUCONATE, SODIUM ACETATE, POTASSIUM CHLORIDE AND MAGNESIUM CHLORIDE: 526; 502; 368; 37; 30 INJECTION, SOLUTION INTRAVENOUS at 04:09

## 2024-01-26 RX ADMIN — WARFARIN SODIUM 5 MG: 5 TABLET ORAL at 17:24

## 2024-01-26 RX ADMIN — BACLOFEN 10 MG: 10 TABLET ORAL at 17:24

## 2024-01-26 RX ADMIN — MORPHINE SULFATE 15 MG: 15 TABLET, FILM COATED, EXTENDED RELEASE ORAL at 09:13

## 2024-01-26 RX ADMIN — OXYCODONE HYDROCHLORIDE 20 MG: 10 TABLET ORAL at 23:06

## 2024-01-26 RX ADMIN — HYDROMORPHONE HYDROCHLORIDE 1 MG: 1 INJECTION, SOLUTION INTRAMUSCULAR; INTRAVENOUS; SUBCUTANEOUS at 05:40

## 2024-01-26 RX ADMIN — ASPIRIN 81 MG: 81 TABLET, COATED ORAL at 05:42

## 2024-01-26 RX ADMIN — ACETAMINOPHEN 1000 MG: 500 TABLET ORAL at 23:06

## 2024-01-26 RX ADMIN — SUCRALFATE 1 G: 1 TABLET ORAL at 09:13

## 2024-01-26 RX ADMIN — HYDROMORPHONE HYDROCHLORIDE 1 MG: 1 INJECTION, SOLUTION INTRAMUSCULAR; INTRAVENOUS; SUBCUTANEOUS at 22:01

## 2024-01-26 RX ADMIN — DOCUSATE SODIUM 50 MG AND SENNOSIDES 8.6 MG 2 TABLET: 8.6; 5 TABLET, FILM COATED ORAL at 17:24

## 2024-01-26 RX ADMIN — SUCRALFATE 1 G: 1 TABLET ORAL at 17:25

## 2024-01-26 RX ADMIN — BACLOFEN 10 MG: 10 TABLET ORAL at 11:26

## 2024-01-26 RX ADMIN — Medication 1 APPLICATOR: at 09:13

## 2024-01-26 RX ADMIN — ACETAMINOPHEN 1000 MG: 500 TABLET ORAL at 11:26

## 2024-01-26 RX ADMIN — FUROSEMIDE 20 MG: 10 INJECTION, SOLUTION INTRAVENOUS at 11:26

## 2024-01-26 RX ADMIN — HYDROMORPHONE HYDROCHLORIDE 1 MG: 1 INJECTION, SOLUTION INTRAMUSCULAR; INTRAVENOUS; SUBCUTANEOUS at 12:58

## 2024-01-26 RX ADMIN — OXYCODONE HYDROCHLORIDE 20 MG: 10 TABLET ORAL at 04:27

## 2024-01-26 RX ADMIN — OXYCODONE HYDROCHLORIDE 20 MG: 10 TABLET ORAL at 20:01

## 2024-01-26 RX ADMIN — SUCRALFATE 1 G: 1 TABLET ORAL at 13:40

## 2024-01-26 RX ADMIN — MAGNESIUM SULFATE IN DEXTROSE 1 G: 10 INJECTION, SOLUTION INTRAVENOUS at 05:47

## 2024-01-26 RX ADMIN — POLYETHYLENE GLYCOL 3350 1 PACKET: 17 POWDER, FOR SOLUTION ORAL at 05:42

## 2024-01-26 RX ADMIN — HYDROMORPHONE HYDROCHLORIDE 1 MG: 1 INJECTION, SOLUTION INTRAMUSCULAR; INTRAVENOUS; SUBCUTANEOUS at 16:51

## 2024-01-26 RX ADMIN — OXYCODONE HYDROCHLORIDE 10 MG: 10 TABLET ORAL at 11:29

## 2024-01-26 RX ADMIN — ACETAMINOPHEN 1000 MG: 500 TABLET ORAL at 17:24

## 2024-01-26 RX ADMIN — GABAPENTIN 200 MG: 100 CAPSULE ORAL at 08:26

## 2024-01-26 RX ADMIN — FUROSEMIDE 20 MG: 10 INJECTION, SOLUTION INTRAVENOUS at 16:41

## 2024-01-26 RX ADMIN — HYDROMORPHONE HYDROCHLORIDE 1 MG: 1 INJECTION, SOLUTION INTRAMUSCULAR; INTRAVENOUS; SUBCUTANEOUS at 00:28

## 2024-01-26 RX ADMIN — OXYCODONE HYDROCHLORIDE 20 MG: 10 TABLET ORAL at 15:48

## 2024-01-26 RX ADMIN — DOCUSATE SODIUM 50 MG AND SENNOSIDES 8.6 MG 2 TABLET: 8.6; 5 TABLET, FILM COATED ORAL at 05:42

## 2024-01-26 RX ADMIN — ENOXAPARIN SODIUM 40 MG: 100 INJECTION SUBCUTANEOUS at 17:24

## 2024-01-26 RX ADMIN — Medication 1 APPLICATOR: at 20:13

## 2024-01-26 RX ADMIN — GABAPENTIN 200 MG: 100 CAPSULE ORAL at 17:24

## 2024-01-26 RX ADMIN — GABAPENTIN 200 MG: 100 CAPSULE ORAL at 11:25

## 2024-01-26 RX ADMIN — KETOROLAC TROMETHAMINE 15 MG: 15 INJECTION, SOLUTION INTRAMUSCULAR; INTRAVENOUS at 08:22

## 2024-01-26 RX ADMIN — OMEPRAZOLE 20 MG: 20 CAPSULE, DELAYED RELEASE ORAL at 05:42

## 2024-01-26 RX ADMIN — DEXMEDETOMIDINE 0.9 MCG/KG/HR: 100 INJECTION, SOLUTION INTRAVENOUS at 02:04

## 2024-01-26 RX ADMIN — MORPHINE SULFATE 15 MG: 15 TABLET, FILM COATED, EXTENDED RELEASE ORAL at 17:24

## 2024-01-26 RX ADMIN — LIDOCAINE 1 PATCH: 4 PATCH TOPICAL at 11:32

## 2024-01-26 ASSESSMENT — ENCOUNTER SYMPTOMS
FEVER: 0
EYE REDNESS: 0
PALPITATIONS: 1
CHILLS: 0
SHORTNESS OF BREATH: 1
BRUISES/BLEEDS EASILY: 0
SENSORY CHANGE: 0
DEPRESSION: 0
DIZZINESS: 0
BACK PAIN: 0
SORE THROAT: 0
SPEECH CHANGE: 0
FLANK PAIN: 0
EYE DISCHARGE: 0
ABDOMINAL PAIN: 1
NECK PAIN: 0
NAUSEA: 0
HEADACHES: 0
NERVOUS/ANXIOUS: 0
COUGH: 0
VOMITING: 0

## 2024-01-26 ASSESSMENT — PAIN DESCRIPTION - PAIN TYPE
TYPE: CHRONIC PAIN;SURGICAL PAIN
TYPE: SURGICAL PAIN;CHRONIC PAIN
TYPE: CHRONIC PAIN;SURGICAL PAIN
TYPE: CHRONIC PAIN
TYPE: SURGICAL PAIN
TYPE: CHRONIC PAIN;SURGICAL PAIN
TYPE: CHRONIC PAIN;SURGICAL PAIN
TYPE: SURGICAL PAIN;CHRONIC PAIN
TYPE: CHRONIC PAIN;SURGICAL PAIN
TYPE: SURGICAL PAIN
TYPE: SURGICAL PAIN;CHRONIC PAIN
TYPE: CHRONIC PAIN;SURGICAL PAIN
TYPE: CHRONIC PAIN;SURGICAL PAIN
TYPE: SURGICAL PAIN
TYPE: CHRONIC PAIN;SURGICAL PAIN
TYPE: SURGICAL PAIN
TYPE: CHRONIC PAIN;SURGICAL PAIN
TYPE: CHRONIC PAIN;SURGICAL PAIN

## 2024-01-26 NOTE — PROGRESS NOTES
Cardiovascular Surgery Progress Note    Name: Roberto Braswell  MRN: [id number]  : 1980  Admit Date: 2024  5:25 AM  1 Day Post-Op     Procedure:  Procedure(s) and Anesthesia Type:     * MITRAL VALVE REPAIR, TRANSESOPHAGEAL ECHOCARDIOGRAM - General     * ECHOCARDIOGRAM, TRANSESOPHAGEAL, INTRAOPERATIVE - General    Vitals:  Vitals:    24 0839 24 0854 24 0915 24 0942   BP: 97/57 97/56 99/51    Pulse: 61 62 60    Resp: 16 12 19 16   Temp: 36.3 °C (97.3 °F) 36.2 °C (97.1 °F) 36.1 °C (96.9 °F) 36.1 °C (96.9 °F)   TempSrc: Temporal Temporal Temporal    SpO2: 94% 99% 97%    Weight:       Height:          Temp (24hrs), Av.6 °C (97.9 °F), Min:36.1 °C (96.9 °F), Max:37.4 °C (99.3 °F)      Respiratory:  Vent Mode: Spont, PEEP/CPAP: 8, PIP: 19, MAP: 12 Respiration: 16, Pulse Oximetry: 97 %       Fluids:    Intake/Output Summary (Last 24 hours) at 2024 1008  Last data filed at 2024 0915  Gross per 24 hour   Intake 6463.61 ml   Output 3660 ml   Net 2803.61 ml     Admit weight: Weight: 107 kg (236 lb 15.9 oz)  Current weight: Weight: 107 kg (236 lb 15.9 oz) (24 0546)    Labs:  Recent Labs     24  1115 24  0135 24  0605   WBC  --  13.8*  --    RBC  --  4.01*  --    HEMOGLOBIN 9.0* 8.5* 7.7*   HEMATOCRIT 29.6* 29.6* 26.6*   MCV  --  73.8*  --    MCH  --  21.2*  --    MCHC  --  28.7*  --    RDW  --  47.8  --    PLATELETCT 377 270  --    MPV  --  9.1  --      Recent Labs     24  2329 24  0135 24  0525   SODIUM  --  138  --    POTASSIUM 5.2 4.9 5.1   CHLORIDE  --  108  --    CO2  --  19*  --    GLUCOSE  --  152*  --    BUN  --  20  --    CREATININE  --  0.95  --    CALCIUM  --  7.6*  --      Recent Labs     24  1115 24  0525   APTT 28.1  --    INR 1.36* 1.23*       HgbA1c:  5.4    Diabetic Educator Consult:  no    Medications:  Scheduled Medications   Medication Dose Frequency    gabapentin  200 mg TID    ketorolac  15 mg Q6HRS     enoxaparin (LOVENOX) injection  40 mg DAILY AT 1800    Nozin nasal  swab  1 Applicator BID    magnesium sulfate  1 g DAILY    K+ Scale: Goal of 4.5  1 Each Q6HRS    aspirin  81 mg DAILY    Pharmacy Consult Request  1 Each PHARMACY TO DOSE    acetaminophen  1,000 mg Q6HRS    senna-docusate  2 Tablet BID    And    polyethylene glycol/lytes  1 Packet DAILY    And    [START ON 1/27/2024] magnesium hydroxide  30 mL DAILY    omeprazole  20 mg DAILY    MD Alert...Warfarin per Pharmacy   PHARMACY TO DOSE    sucralfate  1 g TID AC    insulin regular  0-14 Units Once    insulin lispro  0-14 Units TID AC    morphine ER  15 mg Q12HRS        Exam:   Physical Exam  Vitals and nursing note reviewed.   Constitutional:       General: He is not in acute distress.     Appearance: Normal appearance.   HENT:      Head: Normocephalic.      Right Ear: External ear normal.      Left Ear: External ear normal.      Nose: Nose normal. No congestion.      Mouth/Throat:      Mouth: Mucous membranes are moist.      Pharynx: Oropharynx is clear.   Eyes:      Extraocular Movements: Extraocular movements intact.      Pupils: Pupils are equal, round, and reactive to light.   Cardiovascular:      Rate and Rhythm: Normal rate and regular rhythm.      Pulses: Normal pulses.      Heart sounds: Normal heart sounds.   Pulmonary:      Effort: Pulmonary effort is normal.      Breath sounds: Decreased breath sounds present.   Abdominal:      General: Bowel sounds are decreased. There is no distension.      Palpations: Abdomen is soft.   Musculoskeletal:      Cervical back: Normal range of motion and neck supple. No tenderness.      Right lower leg: Edema present.      Left lower leg: Edema present.   Skin:     General: Skin is warm and dry.      Comments: Midline surgical incision   Neurological:      General: No focal deficit present.      Mental Status: He is alert and oriented to person, place, and time. Mental status is at baseline.    Psychiatric:         Mood and Affect: Mood normal.         Behavior: Behavior normal.         Thought Content: Thought content normal.       Cardiac Medications:    ASA - Yes    Plavix - No; contraindicated because of On Coumadin / NOAC    Post-operative Beta Blockers - Yes    Ace/ARB- No; contraindicated because of Normal EF    Statin - No; contraindicated because of No CAD    Aldactone- No; contraindicated because of Normal EF    SGLT2i-  No contraindicated because of No; contraindicated because of Normal EF    Ejection Fraction:  70%    Telemetry:   1/26 SB-SR    Assessment/Plan:  POD 1  HDS, SB/SR, neuro intact, wounds intact, abdomen soft, fluid balance positive, wt up,  2 L NC. 270 out of chest tubes overnight. H/H 7.7/26.6 transfused this morning. Pt c/o uncontrolled pain. Plan:  Keep chest tubes, pacing wires and walker. Diurese. Add baclofen and lidocaine for pain. IS/ambulate.     Disposition:  TBD

## 2024-01-26 NOTE — CARE PLAN
The patient is Watcher - Medium risk of patient condition declining or worsening    Shift Goals  Clinical Goals: extubate, stable BP  Patient Goals: Extubate  Family Goals: Stable, pain manageable    Progress made toward(s) clinical / shift goals:  extubated, BP stable, pain control improving      Problem: Day of surgery post CABG/Heart valve replacement  Goal: Stabilization in immediate post op period  Outcome: Progressing  Intervention: VS q 15 min x 4 hours, then q 1 hour. Include temperature immediately upon arrival. Check CO/CI q 2-4 hours and PRN  Note: VS per protocol, CO/CI q4hrs per protocol  Intervention: If radial artery used, elevate arm, no BP checks or needle sticks from affected arm, monitor ulnar pulse and capillary refill  Note: NA  Intervention: First post op hour labs and EKG per order  Note: Labs and EKG completed  Intervention: Serum K q 6 hours x 24 hours.  ABG and CBC prn.  Note: K scale per protocol, ABGs completed.  Intervention: Initiate post cardiac insulin infusion protocol orders for FSBS greater than 140 and check frequency per protocol  Note: Insulin gtt per protocol  Intervention: FSBS frequency as per Cardiac Surgery Insulin Drip Protocol  Note: BG checks q1hr  Intervention: For patients on Beta Blockers: verify dose given prior to surgery or within 6 hours after arrival to the unit  Note: NA  Intervention: Chest tube to 20 cm suction, record CT drainage with VS, and check for air leak  Note: CT to suction, output recorded per protocol, no air leak  Intervention: For CT drainage >300 mL in first hour post op and/or 150 mL in subsequent hours: Stat platelets, PT, INR, TEG, iSTAT, and H&H per order  Note: No excessive drainage  Intervention: Titrate and wean off vasoactive drips per patient's condition and per MD order while maintaining SBP  mmHg per MD order  Note: Epi and clevi weaned off per protocol  Intervention: VAP protocol in place  Note: HOB elevated, oral care per  protocol, extubated  Intervention: Wean from Vent per protocol (see protocol), extubation goal within 6 hours post op  Note: Extubated 1504, 3 hrs 53 mins intubated  Intervention: IS q 1 hour while awake post extubation  Note: IS as tolerated d/t excessive pain issues, 1500 with baseline of >3500  Intervention: Bedrest until extubated and groin lines out  Note: Extubated, unable to mobilize to EOB dayshift d/t excessive uncontrolled pain, APRN aware  Intervention: Dangle within 4 hours post extubation  Note: Pain control issues, APRN aware  Intervention: Up in chair 4 hours, day of extubation  Note: NA  Intervention: Maintain all original surgical dressings per provider orders and specifications  Note: Dressings in place per protocol  Intervention: Clear liquids post extubation, order carbohydrate free (post cardiac surgery) diet, advance as tolerated  Note: Clear liquid carb free ordered  Intervention: Discontinue Independence denisha and arterial line 12-18 hours post op if hemodynamically stable and off vasoactive drips  Note: NA yet  Intervention: A-Fib and DVT prophylaxis per MD order or contraindications documented (refer to DVT/VTE problem on Care Plan)  Note: SCDs, mag repleted, and k scale per protocol  Intervention: Amiodarone protocol per MD order  Note: NA     Problem: Pain - Standard  Goal: Alleviation of pain or a reduction in pain to the patient’s comfort goal  Outcome: Progressing, pain improving slowly with ordered interventions     Problem: Knowledge Deficit - Standard  Goal: Patient and family/care givers will demonstrate understanding of plan of care, disease process/condition, diagnostic tests and medications  Outcome: Progressing     Problem: Skin Integrity  Goal: Skin integrity is maintained or improved  Outcome: Progressing     Problem: Fall Risk  Goal: Patient will remain free from falls  Outcome: Progressing       Patient is not progressing towards the following goals:

## 2024-01-26 NOTE — CARE PLAN
Problem: Hyperinflation  Goal: Prevent or improve atelectasis  Description: Target End Date:  3 to 4 days    1. Instruct incentive spirometry usage  2.  Perform hyperinflation therapy as indicated  Outcome: Progressing     IS 1000

## 2024-01-26 NOTE — PROGRESS NOTES
"Critical Care Progress Note    Date of admission  1/25/2024    Chief Complaint  43 y.o. male admitted 1/25/2024 with elective mitral valve repair    Hospital Course  Mr. Braswell is a 43 y.o. male with the past medical history significant for hypertension, non-healing duodenal/gastric ulcer s/p GDA embolization in 11/2023, and severe mitral regurgitation who presented on 1/25/2024 and underwent elective mitral valve repair.  The surgery was uncomplicated aside from minor bleeding from the sternum and received 1 unit of platelets.  He did not require pacing and came off pump easily.  He was admitted to the ICU still intubated and sedated on Precedex and epinephrine infusions.     Interval Problem Update  Reviewed last 24 hour events:   - POD# for mitral valve repair   - extubated per rapid weaning protocols   - pt c/o of \"severe pain\"   - precedex at 0.8   - a/ox4   - SB/SR 50-60s   - SBP 90-100s   - Tmax 97.3   - clear liquids-->feels nauseous   - UOP of 2.1 liters overnight, Bai   - chest tube output: 272 cc overnight   - BM pta   - edge of bed with dizziness-->2 units of pRBCs   - O2 2 lpm NC   - IS 1750cc   - CXR(reviewed): hypoinflated, bibasilar atelectasis L>R, mild cephalization   - lovenox   - PPI   - asa   - Warfarin   - Hb 7.7-->2 units pRBCs   - K 4.9   - creat 0.95      Review of Systems  Review of Systems   Constitutional:  Positive for malaise/fatigue. Negative for chills and fever.   HENT:  Negative for congestion and sore throat.    Eyes:  Negative for discharge and redness.   Respiratory:  Positive for shortness of breath. Negative for cough.    Cardiovascular:  Positive for chest pain and palpitations. Negative for leg swelling.   Gastrointestinal:  Positive for abdominal pain. Negative for nausea and vomiting.   Genitourinary:  Negative for flank pain and hematuria.   Musculoskeletal:  Negative for back pain and neck pain.   Skin:  Negative for rash.   Neurological:  Negative for dizziness, " sensory change, speech change and headaches.   Endo/Heme/Allergies:  Does not bruise/bleed easily.   Psychiatric/Behavioral:  Negative for depression. The patient is not nervous/anxious.         Vital Signs for last 24 hours   Temp:  [36.1 °C (97 °F)-37.4 °C (99.3 °F)] 36.2 °C (97.2 °F)  Pulse:  [51-74] 58  Resp:  [11-35] 13  BP: ()/(42-67) 93/51  SpO2:  [93 %-100 %] 95 %    Hemodynamic parameters for last 24 hours  CVP:  [3 MM HG-10 MM HG] 9 MM HG  PCWP:  [10 MM HG-14 MM HG] 10 MM HG  CO:  [5.63-11.4] 7.55  CI:  [2.39-4.84] 3.21    Respiratory Information for the last 24 hours  Vent Mode: Spont  PEEP/CPAP: 8  MAP: 12  Control VTE (exp VT): 971    Physical Exam   Physical Exam  Vitals and nursing note reviewed.   Constitutional:       General: He is not in acute distress.     Appearance: He is ill-appearing. He is not toxic-appearing.   HENT:      Head: Normocephalic and atraumatic.      Right Ear: External ear normal.      Left Ear: External ear normal.      Nose: Nose normal. No rhinorrhea.      Mouth/Throat:      Mouth: Mucous membranes are moist.      Pharynx: Oropharynx is clear. No oropharyngeal exudate.   Eyes:      General: No scleral icterus.     Conjunctiva/sclera: Conjunctivae normal.      Pupils: Pupils are equal, round, and reactive to light.   Neck:      Comments: Right IJ cordis with PA catheter  Cardiovascular:      Rate and Rhythm: Normal rate and regular rhythm.      Pulses:           Radial pulses are 2+ on the right side and 2+ on the left side.        Dorsalis pedis pulses are 2+ on the right side and 2+ on the left side.      Heart sounds: Heart sounds not distant. No murmur heard.     Friction rub present.   Pulmonary:      Breath sounds: Normal breath sounds. No wheezing.      Comments: chest tubes in place  Chest:      Chest wall: No tenderness.   Abdominal:      Palpations: Abdomen is soft.      Tenderness: There is no abdominal tenderness. There is no guarding or rebound.    Musculoskeletal:         General: Normal range of motion.      Cervical back: Normal range of motion and neck supple.      Right lower leg: No edema.      Left lower leg: No edema.   Lymphadenopathy:      Cervical: No cervical adenopathy.   Skin:     General: Skin is warm and dry.      Capillary Refill: Capillary refill takes less than 2 seconds.      Findings: No rash.   Neurological:      Mental Status: He is alert and oriented to person, place, and time.      Cranial Nerves: No cranial nerve deficit.      Sensory: No sensory deficit.      Motor: No weakness.   Psychiatric:         Behavior: Behavior normal.         Thought Content: Thought content normal.         Medications  Current Facility-Administered Medications   Medication Dose Route Frequency Provider Last Rate Last Admin    gabapentin (Neurontin) capsule 200 mg  200 mg Oral TID VERONIKA Muhammad.P.N.        ketorolac (Toradol) 15 MG/ML injection 15 mg  15 mg Intravenous Q6HRS VERONIKA Muhammad.P.N.        Respiratory Therapy Consult   Nebulization Continuous RT Keerthi Arzate A.P.N.        NS infusion   Intravenous Continuous VERONIKA Muhammad.P.N. 10 mL/hr at 01/25/24 1115 Rate Verify at 01/25/24 1115    NS infusion   Intravenous Continuous VERONIKA Muhammad.P.N. 30 mL/hr at 01/25/24 1609 New Bag at 01/25/24 1609    electrolyte-A (Plasmalyte-A) infusion   Intravenous PRN VERONIKA Muhammad.P.N. 999 mL/hr at 01/26/24 0409 New Bag at 01/26/24 0409    enoxaparin (Lovenox) inj 40 mg  40 mg Subcutaneous DAILY AT 1800 VERONIKA Muhammad.P.N.        Nozin nasal  swab  1 Applicator Each Nostril BID VERONIKA Muhammad.P.N.   1 Applicator at 01/25/24 2129    calcium CHLORIDE 10 % 1,000 mg in dextrose 5% 100 mL IVPB  1,000 mg Intravenous Once PRN VERONIKA Muhammad.P.N.        magnesium sulfate in D5W IVPB premix 1 g  1 g Intravenous DAILY VERONIKA Muhammad.P.N. 100 mL/hr at 01/26/24 0547 1 g at 01/26/24 0547    K+ Scale: Goal of 4.5  1 Each  Intravenous Q6HRS VERONIKA Muhammad.P.N.   1 Each at 01/25/24 1200    aspirin EC tablet 81 mg  81 mg Oral DAILY VERONIKA Muhammad.P.NJennifer   81 mg at 01/26/24 0542    clevidipine (Cleviprex) IV emulsion  0-21 mg/hr Intravenous Continuous RAJEEV MuhammadP.GYPSY.   Stopped at 01/25/24 2015    nitroglycerin 50 mg in D5W 250 ml infusion  0-100 mcg/min Intravenous Continuous VERONIKA Muhammad.P.N.        Pharmacy Consult Request ...Pain Management Review 1 Each  1 Each Other PHARMACY TO DOSE VERONIKA Muhammad.P.ESTER        acetaminophen (Tylenol) tablet 1,000 mg  1,000 mg Oral Q6HRS VERONIKA Muhammad.P.N.   1,000 mg at 01/26/24 0542    Followed by    [START ON 2/4/2024] acetaminophen (Tylenol) tablet 1,000 mg  1,000 mg Oral Q6HRS PRN VERONIKA Muhammad.P.N.        midazolam (Versed) injection 2 mg  2 mg Intravenous Q HOUR PRN VERONIKA Muhammad.P.N.        dexmedetomidine (PRECEDEX) 400 mcg/100mL NS premix infusion  0-1.5 mcg/kg/hr (Ideal) Intravenous Continuous VERONIKA Muhammad.P.N. 16.9 mL/hr at 01/26/24 0310 0.8 mcg/kg/hr at 01/26/24 0310    sodium bicarbonate 8.4 % injection 50 mEq  50 mEq Intravenous Q HOUR PRN VERONIKA Muhammad.P.N.        ondansetron (Zofran) syringe/vial injection 8 mg  8 mg Intravenous Q6HRS PRN VERONIKA Muhammad.P.N.        Or    prochlorperazine (Compazine) injection 10 mg  10 mg Intravenous Q6HRS PRN VERONIKA Muhammad.P.N.        senna-docusate (Pericolace Or Senokot S) 8.6-50 MG per tablet 2 Tablet  2 Tablet Oral BID VERONIKA Muhammad.P.N.   2 Tablet at 01/26/24 0542    And    polyethylene glycol/lytes (Miralax) Packet 1 Packet  1 Packet Oral DAILY Keerthi Arzate, A.P.N.   1 Packet at 01/26/24 0542    And    [START ON 1/27/2024] magnesium hydroxide (Milk Of Magnesia) suspension 30 mL  30 mL Oral DAILY Keerthi Arzate, A.P.N.        And    bisacodyl (Dulcolax) suppository 10 mg  10 mg Rectal QDAY PRN Keerthi Arzate, A.P.N.        omeprazole (PriLOSEC) capsule 20 mg  20 mg Oral DAILY  VERONIKA Muhammad.P.NJennifer   20 mg at 01/26/24 0542    mag hydrox-al hydrox-simeth (Maalox Plus Es Or Mylanta Ds) suspension 30 mL  30 mL Oral Q4HRS PRN RAJEEV MuhammadPTANVI        diphenhydrAMINE (Benadryl) tablet/capsule 25 mg  25 mg Oral HS PRN - MR X 1 CLEOPATRA Muhammad MD Alert...Warfarin per Pharmacy   Other PHARMACY TO DOSE RAJEEV MuhammadPTANVI        sucralfate (Carafate) tablet 1 g  1 g Oral TID AC RAJEEV MuhammadPJenniferNJennifer   1 g at 01/25/24 1935    insulin regular (HumuLIN R,NovoLIN R) injection  0-14 Units Intravenous Once RAJEEV MuhammadP.ESTER        insulin lispro (HumaLOG,AdmeLOG) injection  0-14 Units Subcutaneous TID AC RAJEEV MuhammadPTANVI        insulin regular (Humulin R) 100 Units in  mL Infusion  0-29 Units/hr Intravenous Continuous RAJEEV MuhammadPTANVI   Stopped. (Insulin or Heparin) at 01/25/24 2029    dextrose 50% (D50W) injection 12.5-25 g  12.5-25 g Intravenous PRN CLEOPATRA Muhammad MD Alert...Pharmacy to initiate transition from insulin infusion to subcutaneous insulin for cardiothoracic surgery 1 Each  1 Each Other Continuous RAJEEV MuhammadPTANVI        norepinephrine (Levophed) 8 mg in 250 mL NS infusion (premix)  0-1 mcg/kg/min (Ideal) Intravenous Continuous RAJEEV MuhammadP.NJennifer   Dose not Required at 01/26/24 0529    EPINEPHrine (Adrenalin) infusion 4 mg/250 mL (premix)  0-0.5 mcg/kg/min (Ideal) Intravenous Continuous VERONIKA Muhammad.P.N.   Stopped at 01/25/24 1715    morphine ER (Ms Contin) tablet 15 mg  15 mg Oral Q12HRS RAJEEV MuhammadP.NJennifer   15 mg at 01/25/24 1643    oxyCODONE immediate release (Roxicodone) tablet 10 mg  10 mg Oral Q3HRS PRN Keerthi Arzate, A.P.N.   10 mg at 01/25/24 2324    Or    oxyCODONE immediate release (Roxicodone) tablet 20 mg  20 mg Oral Q3HRS PRN Keerthi Arzate, A.P.N.   20 mg at 01/26/24 0427    Or    HYDROmorphone (Dilaudid) injection 1 mg  1 mg Intravenous Q3HRS PRN Keerthi Arzate, A.P.N.   1 mg at  01/26/24 0540       Fluids    Intake/Output Summary (Last 24 hours) at 1/26/2024 0651  Last data filed at 1/26/2024 0600  Gross per 24 hour   Intake 6163.61 ml   Output 3660 ml   Net 2503.61 ml       Laboratory  Recent Labs     01/25/24  1145 01/25/24  1241 01/25/24  1459   ISTATAPH 7.330* 7.354* 7.359*   ISTATAPCO2 37.1* 30.9 30.2   ISTATAPO2 120* 115* 133*   ISTATATCO2 21 18* 18*   [id number] 98 98 99   ISTATARTHCO3 19.6 17.2 17.0   ISTATARTBE -6* -7* -7*   ISTATTEMP 36.2 C 36.8 C 37.2 C   ISTATFIO2 50 50 40   ISTATSPEC Arterial Arterial Arterial   ISTATAPHTC 7.342* 7.357* 7.356*   GHAXFNEN0NW 115* 114* 134*         Recent Labs     01/23/24  0904 01/25/24  1115 01/25/24  1712 01/25/24  2329 01/26/24  0135 01/26/24  0525   SODIUM 140  --   --   --  138  --    POTASSIUM 4.2 3.7   < > 5.2 4.9 5.1   CHLORIDE 108  --   --   --  108  --    CO2 24  --   --   --  19*  --    BUN 14  --   --   --  20  --    CREATININE 0.99  --   --   --  0.95  --    MAGNESIUM  --  2.9*  --   --   --   --    CALCIUM 8.5  --   --   --  7.6*  --     < > = values in this interval not displayed.     Recent Labs     01/23/24  0904 01/26/24  0135   ALTSGPT 17  --    ASTSGOT 13  --    ALKPHOSPHAT 50  --    TBILIRUBIN 0.4  --    GLUCOSE 90 152*     Recent Labs     01/23/24  0904 01/26/24  0135   WBC 3.9* 13.8*   NEUTSPOLYS 44.40  --    LYMPHOCYTES 36.80  --    MONOCYTES 11.80  --    EOSINOPHILS 4.60  --    BASOPHILS 2.10*  --    ASTSGOT 13  --    ALTSGPT 17  --    ALKPHOSPHAT 50  --    TBILIRUBIN 0.4  --      Recent Labs     01/23/24  0904 01/25/24  1115 01/26/24  0135 01/26/24  0525 01/26/24  0605   RBC 4.93  --  4.01*  --   --    HEMOGLOBIN 10.7* 9.0* 8.5*  --  7.7*   HEMATOCRIT 35.7* 29.6* 29.6*  --  26.6*   PLATELETCT 412 377 270  --   --    PROTHROMBTM 13.6 16.9*  --  15.7*  --    APTT 26.8 28.1  --   --   --    INR 1.03 1.36*  --  1.23*  --        Imaging  Reviewed    Assessment/Plan  Encounter for weaning from ventilator (HCC)- (present on  admission)  Assessment & Plan  Intubated for surgery on 1/25  Extubated on 1/25 per rapid weaning protocols  RT/O2 protocols  Early mobility  IS/PEP    Essential hypertension- (present on admission)  Assessment & Plan  SBP goals > 90 and < 120  Monitor need for oral BP medications    Mitral regurgitation- (present on admission)  Assessment & Plan  S/p mitral valve repair with mean gradient of 2 and no leak  Cont CTS protocols  Hemodynamics per CTS  SBP goals >90 and <120  Off vasopressors  Chest tubes per CTS  Warfarin therapy to start tonight    Duodenal ulcer with hemorrhage- (present on admission)  Assessment & Plan  S/p EGD and GDA embolization  Cont PPI post operatively  Monitor Hb  No signs of GI bleeding    Acute blood loss anemia- (present on admission)  Assessment & Plan  Likely due to surgery  S/p 1 unit platelets and 200cc cell saver  Serial Hb at 7.7-->2 units pRBCs now  Transfuse for Hb<8  Monitor chest tube output         VTE:  Lovenox  Ulcer: PPI  Lines: Central Line  Ongoing indication addressed, Arterial Line  Ongoing indication addressed, and Bai Catheter  Ongoing indication addressed    I have performed a physical exam and reviewed and updated ROS and Plan today (1/26/2024). In review of yesterday's note (1/25/2024), there are no changes except as documented above.     Discussed patient condition and risk of morbidity and/or mortality with RN, RT, Pharmacy, Charge nurse / hot rounds, Patient, and CVS    The patient was successfully extubated per rapid weaning protocols on 1/25 and has been weaned off all vasopressors.  He remains hemodynamically stable.  He is receiving 2 units of blood for hemoglobin of 7.7, but otherwise continues to improve and do well.  He has been transitioned to telemetry status and the critical care team will sign off at this point.

## 2024-01-26 NOTE — CARE PLAN
12 hr chart check complete    MS  SB-SR 50-80s, rare PVCs, epicardial pacer wires present, no pacing required  0.16/0.08/0.52      VVI  40  10  0.8

## 2024-01-26 NOTE — THERAPY
Physical Therapy Contact Note    PT consult received and acknowledged. EMR indicates patient is POD1 following OHS. Will initiate PT evaluation POD2 per protocol. Protocol in place due to limited tolerance to OOB activity and limited ability to meaningfully participate with cardiac rehab education for majority of patients following OHS until at least POD2.     Carlota Brown, PT, DPT  [phone]

## 2024-01-26 NOTE — HOSPITAL COURSE
Mr. Braswell is a 43 y.o. male with the past medical history significant for hypertension, non-healing duodenal/gastric ulcer s/p GDA embolization in 11/2023, and severe mitral regurgitation who presented on 1/25/2024 and underwent elective mitral valve repair.  The surgery was uncomplicated aside from minor bleeding from the sternum and received 1 unit of platelets.  He did not require pacing and came off pump easily.  He was admitted to the ICU still intubated and sedated on Precedex and epinephrine infusions.

## 2024-01-26 NOTE — CARE PLAN
Problem: Pain - Standard  Goal: Alleviation of pain or a reduction in pain to the patient’s comfort goal  Outcome: Not Progressing  Note: Patient reports 9/10 pain despite prn pain medicataions. CPOT at 0, and RASS of -1 during the same time. Education provided regarding next dose available for pain medication. Patient agreeable      Problem: Knowledge Deficit - Standard  Goal: Patient and family/care givers will demonstrate understanding of plan of care, disease process/condition, diagnostic tests and medications  Outcome: Progressing     Problem: Skin Integrity  Goal: Skin integrity is maintained or improved  Outcome: Progressing     Problem: Day of surgery post CABG/Heart valve replacement  Goal: Stabilization in immediate post op period  Note: Completed   Intervention: VS q 15 min x 4 hours, then q 1 hour. Include temperature immediately upon arrival. Check CO/CI q 2-4 hours and PRN  Note: Completed   Intervention: If radial artery used, elevate arm, no BP checks or needle sticks from affected arm, monitor ulnar pulse and capillary refill  Note: Completed   Intervention: First post op hour labs and EKG per order  Note: Completed   Intervention: Serum K q 6 hours x 24 hours.  ABG and CBC prn.  Note: Completed   Intervention: Initiate post cardiac insulin infusion protocol orders for FSBS greater than 140 and check frequency per protocol  Note: Insulin turned off per protocol   Intervention: FSBS frequency as per Cardiac Surgery Insulin Drip Protocol  Note: Completed   Intervention: For patients on Beta Blockers: verify dose given prior to surgery or within 6 hours after arrival to the unit  Note: Not applicable   Intervention: Chest tube to 20 cm suction, record CT drainage with VS, and check for air leak  Note: No air leak noted, Connected to suction, output in Is and Os   Intervention: For CT drainage >300 mL in first hour post op and/or 150 mL in subsequent hours: Stat platelets, PT, INR, TEG, iSTAT, and H&H  per order  Note: WDL   Intervention: Titrate and wean off vasoactive drips per patient's condition and per MD order while maintaining SBP  mmHg per MD order  Note: Completed   Intervention: VAP protocol in place  Note: Completed   Intervention: Wean from Vent per protocol (see protocol), extubation goal within 6 hours post op  Note: Per Dayshift   Intervention: IS q 1 hour while awake post extubation  Note: Weak effort. Education provided   Intervention: Bedrest until extubated and groin lines out  Note: Completed   Intervention: Dangle within 4 hours post extubation  Note: Performed   Intervention: Up in chair 4 hours, day of extubation  Note: Orthostatic hypotensive during attempt this am. BP improved , to attempt again for breakfast   Intervention: Maintain all original surgical dressings per provider orders and specifications  Note: Complete   Intervention: Clear liquids post extubation, order carbohydrate free (post cardiac surgery) diet, advance as tolerated  Note: Clear liquid diet ordered   Intervention: Discontinue [place] denisha and arterial line 12-18 hours post op if hemodynamically stable and off vasoactive drips  Note: Discontinued   Intervention: A-Fib and DVT prophylaxis per MD order or contraindications documented (refer to DVT/VTE problem on Care Plan)  Note: DVT Prophylaxis   Intervention: Amiodarone protocol per MD order  Note: Not applicable    The patient is Watcher - Medium risk of patient condition declining or worsening    Shift Goals  Clinical Goals: monitor Is and Os, hemodynamic stability, pain tolerance  Patient Goals: decrease pain  Family Goals: eloise    Progress made toward(s) clinical / shift goals:  I's and O's WDL, BP WDL     Patient is not progressing towards the following goals: Pain not controlled per patient's report       Problem: Pain - Standard  Goal: Alleviation of pain or a reduction in pain to the patient’s comfort goal  Outcome: Not Progressing  Note: Patient reports 9/10  pain despite prn pain medicataions. CPOT at 0, and RASS of -1 during the same time. Education provided regarding next dose available for pain medication. Patient agreeable

## 2024-01-26 NOTE — PROGRESS NOTES
Updated APRN White on patient's continued pain despite ordered interventions, pain with stripping mediastinals that limits ability to strip them, and inability to mobilize to EOB d/t pain. Continue with current interventions.

## 2024-01-26 NOTE — PROGRESS NOTES
Patient's BP decreased to 77/42 during mobilization attempt.  With minimal dizziness. Plasmalyte increased, chest tube output adequate, H/H drawn. Patient returned back to bed. Patient's BP improved to 93/51. CTS paged.  RAE Arzate returned call, order receive to give two units of PRBCs.

## 2024-01-26 NOTE — PROGRESS NOTES
Inpatient Anticoagulation Service Note for 1/26/2024    Reason for Anticoagulation: Mitral Valve Repair     Hemoglobin Value: (Abnormal) 7.7  Hematocrit Value: (Abnormal) 26.6  Lab Platelet Value: 270  Target INR: 2.0 to 3.0    INR from last 7 days       Date/Time INR Value    01/26/24 0525 (Abnormal) 1.23    01/25/24 1115 (Abnormal) 1.36          Dose from last 7 days       Date/Time Dose (mg)    01/26/24 1418 5          Average Dose (mg):  (new start)  Significant Interactions: NSAID, Antiplatelet Medications  Bridge Therapy: No  Reversal Agent Administered: Not Applicable    A/P  Cleared to resume warfarin following cardiac surgery.  DDI noted above.  Give warfarin 5 mg x 1 tonight.  INR tomorrow to guide subsequent dosing.    Education Material Provided?: No    Pharmacist suggested discharge dosing: TBD pending INR trends, perhaps warfarin 2.5 mg PO daily.  Recommend a follow-up PT/INR within 48-72 hours of discharge.    Cesia Zacarias, PharmD, BCPS, BCCCP

## 2024-01-26 NOTE — PROGRESS NOTES
Monitor Summary    SB- SR Rates 56- 70, with occasional PVCs, PACs    P 0.19 QRS 0.08 QTC 0.47

## 2024-01-26 NOTE — PROGRESS NOTES
4 Eyes Skin Assessment Completed by KARSON Guzmán and KARSON Ahn.    Head WDL  Ears WDL  Nose WDL  Mouth WDL  Neck WDL  Breast/Chest Incision  Shoulder Blades WDL  Spine WDL  (R) Arm/Elbow/Hand WDL  (L) Arm/Elbow/Hand WDL  Abdomen Bruising  Groin WDL  Scrotum/Coccyx/Buttocks WDL  (R) Leg WDL  (L) Leg WDL  (R) Heel/Foot/Toe WDL  (L) Heel/Foot/Toe WDL          Devices In Places ECG, Blood Pressure Cuff, Pulse Ox, Bai, Arterial Line, SCD's, Central Line, and Pacer      Interventions In Place Gray Ear Foams, NC W/Ear Foams, Pillows, Q2 Turns, Low Air Loss Mattress, Heels Loaded W/Pillows, and Pressure Redistribution Mattress    Possible Skin Injury No    Pictures Uploaded Into Epic N/A  Wound Consult Placed N/A  RN Wound Prevention Protocol Ordered Yes

## 2024-01-27 LAB
ANION GAP SERPL CALC-SCNC: 7 MMOL/L (ref 7–16)
BUN SERPL-MCNC: 19 MG/DL (ref 8–22)
CALCIUM SERPL-MCNC: 7.6 MG/DL (ref 8.5–10.5)
CHLORIDE SERPL-SCNC: 106 MMOL/L (ref 96–112)
CO2 SERPL-SCNC: 25 MMOL/L (ref 20–33)
CREAT SERPL-MCNC: 1.05 MG/DL (ref 0.5–1.4)
ERYTHROCYTE [DISTWIDTH] IN BLOOD BY AUTOMATED COUNT: 52.1 FL (ref 35.9–50)
GFR SERPLBLD CREATININE-BSD FMLA CKD-EPI: 91 ML/MIN/1.73 M 2
GLUCOSE BLD STRIP.AUTO-MCNC: 101 MG/DL (ref 65–99)
GLUCOSE BLD STRIP.AUTO-MCNC: 105 MG/DL (ref 65–99)
GLUCOSE BLD STRIP.AUTO-MCNC: 121 MG/DL (ref 65–99)
GLUCOSE SERPL-MCNC: 102 MG/DL (ref 65–99)
HCT VFR BLD AUTO: 29.5 % (ref 42–52)
HGB BLD-MCNC: 8.9 G/DL (ref 14–18)
INR PPP: 1.2 (ref 0.87–1.13)
MCH RBC QN AUTO: 22.9 PG (ref 27–33)
MCHC RBC AUTO-ENTMCNC: 30.2 G/DL (ref 32.3–36.5)
MCV RBC AUTO: 76 FL (ref 81.4–97.8)
PLATELET # BLD AUTO: 236 K/UL (ref 164–446)
PMV BLD AUTO: 9.5 FL (ref 9–12.9)
POTASSIUM SERPL-SCNC: 3.9 MMOL/L (ref 3.6–5.5)
PROTHROMBIN TIME: 15.3 SEC (ref 12–14.6)
RBC # BLD AUTO: 3.88 M/UL (ref 4.7–6.1)
SODIUM SERPL-SCNC: 138 MMOL/L (ref 135–145)
WBC # BLD AUTO: 9.2 K/UL (ref 4.8–10.8)

## 2024-01-27 PROCEDURE — 700102 HCHG RX REV CODE 250 W/ 637 OVERRIDE(OP): Performed by: NURSE PRACTITIONER

## 2024-01-27 PROCEDURE — A9270 NON-COVERED ITEM OR SERVICE: HCPCS | Performed by: NURSE PRACTITIONER

## 2024-01-27 PROCEDURE — 85027 COMPLETE CBC AUTOMATED: CPT

## 2024-01-27 PROCEDURE — 85610 PROTHROMBIN TIME: CPT

## 2024-01-27 PROCEDURE — 700111 HCHG RX REV CODE 636 W/ 250 OVERRIDE (IP): Mod: JZ | Performed by: NURSE PRACTITIONER

## 2024-01-27 PROCEDURE — 80048 BASIC METABOLIC PNL TOTAL CA: CPT

## 2024-01-27 PROCEDURE — 770020 HCHG ROOM/CARE - TELE (206)

## 2024-01-27 PROCEDURE — 99024 POSTOP FOLLOW-UP VISIT: CPT | Performed by: NURSE PRACTITIONER

## 2024-01-27 PROCEDURE — 82962 GLUCOSE BLOOD TEST: CPT

## 2024-01-27 PROCEDURE — 94669 MECHANICAL CHEST WALL OSCILL: CPT

## 2024-01-27 PROCEDURE — 700102 HCHG RX REV CODE 250 W/ 637 OVERRIDE(OP): Performed by: THORACIC SURGERY (CARDIOTHORACIC VASCULAR SURGERY)

## 2024-01-27 PROCEDURE — A9270 NON-COVERED ITEM OR SERVICE: HCPCS | Performed by: THORACIC SURGERY (CARDIOTHORACIC VASCULAR SURGERY)

## 2024-01-27 RX ORDER — WARFARIN SODIUM 2.5 MG/1
2.5 TABLET ORAL DAILY
Status: DISCONTINUED | OUTPATIENT
Start: 2024-01-27 | End: 2024-01-29

## 2024-01-27 RX ORDER — LOSARTAN POTASSIUM 25 MG/1
25 TABLET ORAL DAILY
Status: DISCONTINUED | OUTPATIENT
Start: 2024-01-27 | End: 2024-01-29

## 2024-01-27 RX ORDER — POTASSIUM CHLORIDE 20 MEQ/1
40 TABLET, EXTENDED RELEASE ORAL DAILY
Status: DISCONTINUED | OUTPATIENT
Start: 2024-01-27 | End: 2024-01-29

## 2024-01-27 RX ORDER — FUROSEMIDE 10 MG/ML
40 INJECTION INTRAMUSCULAR; INTRAVENOUS
Status: DISCONTINUED | OUTPATIENT
Start: 2024-01-27 | End: 2024-01-29

## 2024-01-27 RX ADMIN — Medication 1 APPLICATOR: at 09:24

## 2024-01-27 RX ADMIN — ACETAMINOPHEN 1000 MG: 500 TABLET ORAL at 17:47

## 2024-01-27 RX ADMIN — OXYCODONE HYDROCHLORIDE 20 MG: 10 TABLET ORAL at 03:31

## 2024-01-27 RX ADMIN — GABAPENTIN 200 MG: 100 CAPSULE ORAL at 11:52

## 2024-01-27 RX ADMIN — OXYCODONE HYDROCHLORIDE 20 MG: 10 TABLET ORAL at 19:40

## 2024-01-27 RX ADMIN — FUROSEMIDE 20 MG: 10 INJECTION, SOLUTION INTRAVENOUS at 05:20

## 2024-01-27 RX ADMIN — GABAPENTIN 200 MG: 100 CAPSULE ORAL at 05:20

## 2024-01-27 RX ADMIN — MAGNESIUM HYDROXIDE 30 ML: 1200 LIQUID ORAL at 10:54

## 2024-01-27 RX ADMIN — SUCRALFATE 1 G: 1 TABLET ORAL at 10:54

## 2024-01-27 RX ADMIN — MAGNESIUM SULFATE IN DEXTROSE 1 G: 10 INJECTION, SOLUTION INTRAVENOUS at 05:26

## 2024-01-27 RX ADMIN — ASPIRIN 81 MG: 81 TABLET, COATED ORAL at 05:19

## 2024-01-27 RX ADMIN — FUROSEMIDE 40 MG: 10 INJECTION, SOLUTION INTRAMUSCULAR; INTRAVENOUS at 17:46

## 2024-01-27 RX ADMIN — HYDROMORPHONE HYDROCHLORIDE 1 MG: 1 INJECTION, SOLUTION INTRAMUSCULAR; INTRAVENOUS; SUBCUTANEOUS at 01:15

## 2024-01-27 RX ADMIN — BACLOFEN 10 MG: 10 TABLET ORAL at 05:19

## 2024-01-27 RX ADMIN — WARFARIN SODIUM 2.5 MG: 2.5 TABLET ORAL at 17:48

## 2024-01-27 RX ADMIN — OMEPRAZOLE 20 MG: 20 CAPSULE, DELAYED RELEASE ORAL at 05:19

## 2024-01-27 RX ADMIN — OXYCODONE HYDROCHLORIDE 20 MG: 10 TABLET ORAL at 08:01

## 2024-01-27 RX ADMIN — SUCRALFATE 1 G: 1 TABLET ORAL at 17:47

## 2024-01-27 RX ADMIN — BACLOFEN 10 MG: 10 TABLET ORAL at 17:47

## 2024-01-27 RX ADMIN — DOCUSATE SODIUM 50 MG AND SENNOSIDES 8.6 MG 2 TABLET: 8.6; 5 TABLET, FILM COATED ORAL at 17:48

## 2024-01-27 RX ADMIN — HYDROMORPHONE HYDROCHLORIDE 1 MG: 1 INJECTION, SOLUTION INTRAMUSCULAR; INTRAVENOUS; SUBCUTANEOUS at 05:19

## 2024-01-27 RX ADMIN — GABAPENTIN 200 MG: 100 CAPSULE ORAL at 17:48

## 2024-01-27 RX ADMIN — LOSARTAN POTASSIUM 25 MG: 25 TABLET, FILM COATED ORAL at 09:24

## 2024-01-27 RX ADMIN — ENOXAPARIN SODIUM 40 MG: 100 INJECTION SUBCUTANEOUS at 18:38

## 2024-01-27 RX ADMIN — Medication 1 APPLICATOR: at 21:31

## 2024-01-27 RX ADMIN — POLYETHYLENE GLYCOL 3350 1 PACKET: 17 POWDER, FOR SOLUTION ORAL at 05:18

## 2024-01-27 RX ADMIN — BACLOFEN 10 MG: 10 TABLET ORAL at 11:53

## 2024-01-27 RX ADMIN — OXYCODONE HYDROCHLORIDE 20 MG: 10 TABLET ORAL at 12:43

## 2024-01-27 RX ADMIN — DOCUSATE SODIUM 50 MG AND SENNOSIDES 8.6 MG 2 TABLET: 8.6; 5 TABLET, FILM COATED ORAL at 05:19

## 2024-01-27 RX ADMIN — MORPHINE SULFATE 15 MG: 15 TABLET, FILM COATED, EXTENDED RELEASE ORAL at 05:19

## 2024-01-27 RX ADMIN — HYDROMORPHONE HYDROCHLORIDE 1 MG: 1 INJECTION, SOLUTION INTRAMUSCULAR; INTRAVENOUS; SUBCUTANEOUS at 21:32

## 2024-01-27 RX ADMIN — MORPHINE SULFATE 15 MG: 15 TABLET, FILM COATED, EXTENDED RELEASE ORAL at 17:47

## 2024-01-27 RX ADMIN — ACETAMINOPHEN 1000 MG: 500 TABLET ORAL at 23:42

## 2024-01-27 RX ADMIN — HYDROMORPHONE HYDROCHLORIDE 1 MG: 1 INJECTION, SOLUTION INTRAMUSCULAR; INTRAVENOUS; SUBCUTANEOUS at 10:55

## 2024-01-27 RX ADMIN — OXYCODONE HYDROCHLORIDE 20 MG: 10 TABLET ORAL at 23:43

## 2024-01-27 RX ADMIN — ONDANSETRON 8 MG: 2 INJECTION INTRAMUSCULAR; INTRAVENOUS at 09:27

## 2024-01-27 RX ADMIN — ACETAMINOPHEN 1000 MG: 500 TABLET ORAL at 05:19

## 2024-01-27 RX ADMIN — ACETAMINOPHEN 1000 MG: 500 TABLET ORAL at 11:52

## 2024-01-27 RX ADMIN — POTASSIUM CHLORIDE 40 MEQ: 20 TABLET, EXTENDED RELEASE ORAL at 09:23

## 2024-01-27 ASSESSMENT — COGNITIVE AND FUNCTIONAL STATUS - GENERAL
WALKING IN HOSPITAL ROOM: A LITTLE
TOILETING: A LITTLE
HELP NEEDED FOR BATHING: A LITTLE
TURNING FROM BACK TO SIDE WHILE IN FLAT BAD: A LITTLE
PERSONAL GROOMING: A LITTLE
EATING MEALS: A LITTLE
DRESSING REGULAR LOWER BODY CLOTHING: A LITTLE
STANDING UP FROM CHAIR USING ARMS: A LITTLE
SUGGESTED CMS G CODE MODIFIER DAILY ACTIVITY: CK
DAILY ACTIVITIY SCORE: 18
MOBILITY SCORE: 18
SUGGESTED CMS G CODE MODIFIER MOBILITY: CK
MOVING FROM LYING ON BACK TO SITTING ON SIDE OF FLAT BED: A LITTLE
MOVING TO AND FROM BED TO CHAIR: A LITTLE
DRESSING REGULAR UPPER BODY CLOTHING: A LITTLE
CLIMB 3 TO 5 STEPS WITH RAILING: A LITTLE

## 2024-01-27 ASSESSMENT — PAIN SCALES - PAIN ASSESSMENT IN ADVANCED DEMENTIA (PAINAD)
BODYLANGUAGE: RELAXED
CONSOLABILITY: NO NEED TO CONSOLE
TOTALSCORE: 0
BREATHING: NORMAL
FACIALEXPRESSION: SMILING OR INEXPRESSIVE

## 2024-01-27 ASSESSMENT — LIFESTYLE VARIABLES
ON A TYPICAL DAY WHEN YOU DRINK ALCOHOL HOW MANY DRINKS DO YOU HAVE: 0
CONSUMPTION TOTAL: NEGATIVE
TOTAL SCORE: 0
HOW MANY TIMES IN THE PAST YEAR HAVE YOU HAD 5 OR MORE DRINKS IN A DAY: 0
EVER HAD A DRINK FIRST THING IN THE MORNING TO STEADY YOUR NERVES TO GET RID OF A HANGOVER: NO
TOTAL SCORE: 0
HAVE PEOPLE ANNOYED YOU BY CRITICIZING YOUR DRINKING: NO
HAVE YOU EVER FELT YOU SHOULD CUT DOWN ON YOUR DRINKING: NO
DOES PATIENT WANT TO STOP DRINKING: NO
EVER FELT BAD OR GUILTY ABOUT YOUR DRINKING: NO
ALCOHOL_USE: NO
TOTAL SCORE: 0
AVERAGE NUMBER OF DAYS PER WEEK YOU HAVE A DRINK CONTAINING ALCOHOL: 0

## 2024-01-27 ASSESSMENT — PAIN SCALES - WONG BAKER: WONGBAKER_NUMERICALRESPONSE: HURTS JUST A LITTLE BIT

## 2024-01-27 ASSESSMENT — PAIN DESCRIPTION - PAIN TYPE
TYPE: SURGICAL PAIN
TYPE: CHRONIC PAIN;SURGICAL PAIN
TYPE: ACUTE PAIN;SURGICAL PAIN
TYPE: ACUTE PAIN
TYPE: SURGICAL PAIN
TYPE: ACUTE PAIN

## 2024-01-27 ASSESSMENT — FIBROSIS 4 INDEX
FIB4 SCORE: 0.56

## 2024-01-27 NOTE — CARE PLAN
Problem: Hyperinflation  Goal: Prevent or improve atelectasis  Description: Target End Date:  3 to 4 days    1. Instruct incentive spirometry usage  2.  Perform hyperinflation therapy as indicated  Outcome: Progressing   PEP QID IS: 3000

## 2024-01-27 NOTE — CARE PLAN
Problem: Hyperinflation  Goal: Prevent or improve atelectasis  Description: Target End Date:  3 to 4 days    1. Instruct incentive spirometry usage  2.  Perform hyperinflation therapy as indicated  Outcome: Progressing   PEP QID.  Patient achieving 1500 ml on IS  Receiving 2L

## 2024-01-27 NOTE — CARE PLAN
The patient is Watcher - Medium risk of patient condition declining or worsening    Shift Goals  Clinical Goals: Maintain hemodyanmic stability, transfuse blood, ambulate, drsg change, IS, up to chair  Patient Goals: Pain management  Family Goals: JJ    Progress made toward(s) clinical / shift goals:    Problem: Pain - Standard  Goal: Alleviation of pain or a reduction in pain to the patient’s comfort goal  Outcome: Progressing     Problem: Knowledge Deficit - Standard  Goal: Patient and family/care givers will demonstrate understanding of plan of care, disease process/condition, diagnostic tests and medications  Outcome: Progressing     Problem: Post Op Day 1 CABG/Heart Valve Replacement  Goal: Optimal care of the post op CABG/heart valve replacement Post Op Day 1  Outcome: Progressing  Intervention: EKG and CXR completed  Note: Completed by NOC shft  Intervention: All valve patients: PT/INR daily  Note: PT/INR assessed, pt started on warfarin and lovenox  Intervention: Antibiotics are discontinued within 24 hours of anesthesia end time unless indication documented for continuation beyond 24 hours  Note: Abx discontinued  Intervention: Daily weights in the morning  Note: Weight assessed by NOC RN  Intervention: Up in chair for all meals  Note: Pt up in chair for lunch, dinner. Receiving blood during breakfast, did not mobilize until after.  Intervention: Ambulate in am if stable. First ambulation 25 feet. Repeat x 3 as tolerated  Note: Pt delayed mobilization due to transfusion. Did mobilize x2, best 250' with FWW  Intervention: Discontinue walker catheter unless documented reason for continuation  Note: Walker to remain until POD per CTS  Intervention: Assess surgical dressing and check provider orders for potential removal  Note: [place] drsg removed, chest tube drsg changed  Intervention: OHS trained RN to remove chest tubes if ordered by provider  Note: Chest tubes to be removed POD per CTS  Intervention: IS q 1  hour while awake and record best IS volume  Note: Pt performs IS hourly, is highly motivated. Best 3000. Base 4000  Intervention: Knee high JONO hose, on during the day, off at night  Note: JONO in place during day shift  Intervention: Saline lock IV  Note: PIV removed  Intervention: After 24th hour post-anesthesia end time, transition patient to Cardiac Surgery SQ Insulin Protocol  Note: Pt transitioned to SQ insulin  Intervention: If patient is CABG or on home beta-blocker, start/resume beta-blocker on POD 1 or POD 2 or document contraindication  Note: Valve, not CABG. N/A       Patient is not progressing towards the following goals:

## 2024-01-27 NOTE — CARE PLAN
The patient is Watcher - Medium risk of patient condition declining or worsening    Shift Goals  Clinical Goals: Ambulate, Pain control  Patient Goals: Pain control  Family Goals: JJ    Problem: Post Op Day 1 CABG/Heart Valve Replacement  Goal: Optimal care of the post op CABG/heart valve replacement Post Op Day 1  Outcome: Progressing  Intervention: Daily weights in the morning  Note: 110.6kg   Intervention: Up in chair for all meals  Note: Pt. Up to chair this morning for breakfast.   Intervention: Ambulate in am if stable. First ambulation 25 feet. Repeat x 3 as tolerated  Note: Pt. Ambulated in the morning.   Intervention: Discontinue walker catheter unless documented reason for continuation  Note: Walker still in place.   Intervention: Assess surgical dressing and check provider orders for potential removal  Note: Sternal incision open to air.   Intervention: OHS trained RN to remove chest tubes if ordered by provider  Note: Chest tubes still in place.   Intervention: IS q 1 hour while awake and record best IS volume  Note: 1500   Intervention: Knee high JONO hose, on during the day, off at night  Note: JNOO hose placed on pt. Before walk.      Problem: Pain - Standard  Goal: Alleviation of pain or a reduction in pain to the patient’s comfort goal  Description: Target End Date:  Prior to discharge or change in level of care    Document on Vitals flowsheet    1.  Document pain using the appropriate pain scale per order or unit policy  2.  Educate and implement non-pharmacologic comfort measures (i.e. relaxation, distraction, massage, cold/heat therapy, etc.)  3.  Pain management medications as ordered  4.  Reassess pain after pain med administration per policy  5.  If opiods administered assess patient's response to pain medication is appropriate per POSS sedation scale  6.  Follow pain management plan developed in collaboration with patient and interdisciplinary team (including palliative care or pain specialists  if applicable)  Outcome: Progressing

## 2024-01-27 NOTE — PROGRESS NOTES
Inpatient Anticoagulation Service Note for 1/27/2024    Reason for Anticoagulation: Mitral Valve Repair      Hemoglobin Value: (Abnormal) 8.9  Hematocrit Value: (Abnormal) 29.5  Lab Platelet Value: 236  Target INR: 2.0 to 3.0    INR from last 7 days       Date/Time INR Value    01/27/24 0335 (Abnormal) 1.2    01/26/24 0525 (Abnormal) 1.23    01/25/24 1115 (Abnormal) 1.36          Dose from last 7 days       Date/Time Dose (mg)    01/27/24 1508 2.5    01/26/24 1418 5          Average Dose (mg):  (new start)  Significant Interactions: Antiplatelet Medications  Bridge Therapy: No   Reversal Agent Administered: Not Applicable    A/P  Sub-therapeutic INR as expected after starting warfarin yesteray following surgery.  Still have not seen impact of this dose.  Will start warfarin 2.5 mg PO daily.  INR tomorrow.    Education Material Provided?: No    Pharmacist suggested discharge dosing: TBD pending INR trends, perhaps warfarin 2.5 mg PO daily.  Recommend a follow-up PT/INR within 48-72 hours of discharge.     Cesia Zacarias, PharmD, BCPS, BCCCP

## 2024-01-27 NOTE — PROGRESS NOTES
Cardiovascular Surgery Progress Note    Name: Roberto Braswell  MRN: [id number]  : 1980  Admit Date: 2024  5:25 AM  2 Days Post-Op     Procedure:  Procedure(s) and Anesthesia Type:     * MITRAL VALVE REPAIR, TRANSESOPHAGEAL ECHOCARDIOGRAM - General     * ECHOCARDIOGRAM, TRANSESOPHAGEAL, INTRAOPERATIVE - General    Vitals:  Vitals:    24 0538 24 0600 24 0700 24 0705   BP:  (!) 140/66 137/71 137/71   Pulse:  73 72 73   Resp:    18   Temp:       TempSrc:       SpO2:  91% 92% 92%   Weight: 111 kg (243 lb 13.3 oz)      Height:          Temp (24hrs), Av.3 °C (97.3 °F), Min:36.1 °C (96.9 °F), Max:36.5 °C (97.7 °F)      Respiratory:    Respiration: 18, Pulse Oximetry: 92 %       Fluids:    Intake/Output Summary (Last 24 hours) at 2024 0755  Last data filed at 2024 0600  Gross per 24 hour   Intake 2165.97 ml   Output 1960 ml   Net 205.97 ml       Admit weight: Weight: 107 kg (236 lb 15.9 oz)  Current weight: Weight: 111 kg (243 lb 13.3 oz) (24 0538)    Labs:  Recent Labs     24  1115 24  0135 24  0605 24  0335   WBC  --  13.8*  --  9.2   RBC  --  4.01*  --  3.88*   HEMOGLOBIN 9.0* 8.5* 7.7* 8.9*   HEMATOCRIT 29.6* 29.6* 26.6* 29.5*   MCV  --  73.8*  --  76.0*   MCH  --  21.2*  --  22.9*   MCHC  --  28.7*  --  30.2*   RDW  --  47.8  --  52.1*   PLATELETCT 377 270  --  236   MPV  --  9.1  --  9.5       Recent Labs     24  0135 24  0525 24  0335   SODIUM 138  --  138   POTASSIUM 4.9 5.1 3.9   CHLORIDE 108  --  106   CO2 19*  --  25   GLUCOSE 152*  --  102*   BUN 20  --  19   CREATININE 0.95  --  1.05   CALCIUM 7.6*  --  7.6*       Recent Labs     24  1115 24  0525 24  0335   APTT 28.1  --   --    INR 1.36* 1.23* 1.20*         HgbA1c:  5.4    Diabetic Educator Consult:  no    Medications:  Scheduled Medications   Medication Dose Frequency    gabapentin  200 mg TID    baclofen  10 mg TID    lidocaine  1 Patch Q24HR     furosemide  20 mg BID DIURETIC    insulin regular  2-9 Units 4X/DAY ACHS    enoxaparin (LOVENOX) injection  40 mg DAILY AT 1800    Nozin nasal  swab  1 Applicator BID    aspirin  81 mg DAILY    Pharmacy Consult Request  1 Each PHARMACY TO DOSE    acetaminophen  1,000 mg Q6HRS    senna-docusate  2 Tablet BID    And    polyethylene glycol/lytes  1 Packet DAILY    And    magnesium hydroxide  30 mL DAILY    omeprazole  20 mg DAILY    MD Alert...Warfarin per Pharmacy   PHARMACY TO DOSE    sucralfate  1 g TID AC    morphine ER  15 mg Q12HRS        Exam:   Physical Exam  Vitals and nursing note reviewed.   Constitutional:       General: He is not in acute distress.     Appearance: Normal appearance.   HENT:      Head: Normocephalic.      Right Ear: External ear normal.      Left Ear: External ear normal.      Nose: Nose normal. No congestion.      Mouth/Throat:      Mouth: Mucous membranes are moist.      Pharynx: Oropharynx is clear.   Eyes:      Extraocular Movements: Extraocular movements intact.      Pupils: Pupils are equal, round, and reactive to light.   Cardiovascular:      Rate and Rhythm: Normal rate and regular rhythm.      Pulses: Normal pulses.      Heart sounds: Normal heart sounds.   Pulmonary:      Effort: Pulmonary effort is normal.      Breath sounds: Decreased breath sounds present.   Abdominal:      General: Bowel sounds are decreased. There is no distension.      Palpations: Abdomen is soft.   Musculoskeletal:      Cervical back: Normal range of motion and neck supple. No tenderness.      Right lower leg: Edema present.      Left lower leg: Edema present.   Skin:     General: Skin is warm and dry.      Comments: Midline surgical incision   Neurological:      General: No focal deficit present.      Mental Status: He is alert and oriented to person, place, and time. Mental status is at baseline.   Psychiatric:         Mood and Affect: Mood normal.         Behavior: Behavior normal.          Thought Content: Thought content normal.       Cardiac Medications:    ASA - Yes    Plavix - No; contraindicated because of On Coumadin / NOAC    Post-operative Beta Blockers - Yes    Ace/ARB- No; contraindicated because of Normal EF    Statin - No; contraindicated because of No CAD    Aldactone- No; contraindicated because of Normal EF    SGLT2i-  No contraindicated because of No; contraindicated because of Normal EF    Ejection Fraction:  70%    Telemetry:   1/26 SB-SR  1/27 SR    Assessment/Plan:  POD 1  HDS, SB/SR, neuro intact, wounds intact, abdomen soft, fluid balance positive, wt up,  2 L NC. 270 out of chest tubes overnight. H/H 7.7/26.6 transfused this morning. Pt c/o uncontrolled pain. Plan:  Keep chest tubes, pacing wires and walker. Diurese. Add baclofen and lidocaine for pain. IS/ambulate.     POD 2  HDS, SR, neuro intact, wounds intact, abdomen soft, fluid balance positive, wt up,  3 L NC.  60 out of chest tubes overnight. Plan:  Remove chest tubes, pacing wires, and walker. Increase diuresis. Add losartan. IS/ambulate. Transfer to [facility].     Disposition:  TBD

## 2024-01-28 LAB
ANION GAP SERPL CALC-SCNC: 7 MMOL/L (ref 7–16)
BUN SERPL-MCNC: 13 MG/DL (ref 8–22)
CALCIUM SERPL-MCNC: 7.7 MG/DL (ref 8.5–10.5)
CHLORIDE SERPL-SCNC: 106 MMOL/L (ref 96–112)
CO2 SERPL-SCNC: 25 MMOL/L (ref 20–33)
CREAT SERPL-MCNC: 0.9 MG/DL (ref 0.5–1.4)
ERYTHROCYTE [DISTWIDTH] IN BLOOD BY AUTOMATED COUNT: 52 FL (ref 35.9–50)
GFR SERPLBLD CREATININE-BSD FMLA CKD-EPI: 109 ML/MIN/1.73 M 2
GLUCOSE SERPL-MCNC: 100 MG/DL (ref 65–99)
HCT VFR BLD AUTO: 29.2 % (ref 42–52)
HGB BLD-MCNC: 8.8 G/DL (ref 14–18)
INR PPP: 1.14 (ref 0.87–1.13)
MCH RBC QN AUTO: 22.6 PG (ref 27–33)
MCHC RBC AUTO-ENTMCNC: 30.1 G/DL (ref 32.3–36.5)
MCV RBC AUTO: 74.9 FL (ref 81.4–97.8)
PLATELET # BLD AUTO: 252 K/UL (ref 164–446)
PMV BLD AUTO: 9.2 FL (ref 9–12.9)
POTASSIUM SERPL-SCNC: 4.1 MMOL/L (ref 3.6–5.5)
PROTHROMBIN TIME: 14.7 SEC (ref 12–14.6)
RBC # BLD AUTO: 3.9 M/UL (ref 4.7–6.1)
SODIUM SERPL-SCNC: 138 MMOL/L (ref 135–145)
WBC # BLD AUTO: 7.3 K/UL (ref 4.8–10.8)

## 2024-01-28 PROCEDURE — A9270 NON-COVERED ITEM OR SERVICE: HCPCS | Performed by: THORACIC SURGERY (CARDIOTHORACIC VASCULAR SURGERY)

## 2024-01-28 PROCEDURE — 99024 POSTOP FOLLOW-UP VISIT: CPT | Performed by: NURSE PRACTITIONER

## 2024-01-28 PROCEDURE — 97535 SELF CARE MNGMENT TRAINING: CPT

## 2024-01-28 PROCEDURE — 700111 HCHG RX REV CODE 636 W/ 250 OVERRIDE (IP): Mod: JZ | Performed by: NURSE PRACTITIONER

## 2024-01-28 PROCEDURE — 97163 PT EVAL HIGH COMPLEX 45 MIN: CPT

## 2024-01-28 PROCEDURE — 700111 HCHG RX REV CODE 636 W/ 250 OVERRIDE (IP): Performed by: NURSE PRACTITIONER

## 2024-01-28 PROCEDURE — 700102 HCHG RX REV CODE 250 W/ 637 OVERRIDE(OP): Performed by: THORACIC SURGERY (CARDIOTHORACIC VASCULAR SURGERY)

## 2024-01-28 PROCEDURE — 85610 PROTHROMBIN TIME: CPT

## 2024-01-28 PROCEDURE — A9270 NON-COVERED ITEM OR SERVICE: HCPCS | Performed by: NURSE PRACTITIONER

## 2024-01-28 PROCEDURE — 770020 HCHG ROOM/CARE - TELE (206)

## 2024-01-28 PROCEDURE — 97165 OT EVAL LOW COMPLEX 30 MIN: CPT

## 2024-01-28 PROCEDURE — 80048 BASIC METABOLIC PNL TOTAL CA: CPT

## 2024-01-28 PROCEDURE — 700102 HCHG RX REV CODE 250 W/ 637 OVERRIDE(OP): Performed by: NURSE PRACTITIONER

## 2024-01-28 PROCEDURE — 94669 MECHANICAL CHEST WALL OSCILL: CPT

## 2024-01-28 PROCEDURE — 85027 COMPLETE CBC AUTOMATED: CPT

## 2024-01-28 RX ADMIN — LOSARTAN POTASSIUM 25 MG: 25 TABLET, FILM COATED ORAL at 05:12

## 2024-01-28 RX ADMIN — OXYCODONE HYDROCHLORIDE 20 MG: 10 TABLET ORAL at 02:55

## 2024-01-28 RX ADMIN — BACLOFEN 10 MG: 10 TABLET ORAL at 12:44

## 2024-01-28 RX ADMIN — SUCRALFATE 1 G: 1 TABLET ORAL at 16:39

## 2024-01-28 RX ADMIN — ACETAMINOPHEN 1000 MG: 500 TABLET ORAL at 12:45

## 2024-01-28 RX ADMIN — BACLOFEN 10 MG: 10 TABLET ORAL at 05:12

## 2024-01-28 RX ADMIN — Medication 1 APPLICATOR: at 21:00

## 2024-01-28 RX ADMIN — Medication 1 APPLICATOR: at 09:40

## 2024-01-28 RX ADMIN — OXYCODONE HYDROCHLORIDE 20 MG: 10 TABLET ORAL at 12:44

## 2024-01-28 RX ADMIN — ONDANSETRON 8 MG: 2 INJECTION INTRAMUSCULAR; INTRAVENOUS at 07:19

## 2024-01-28 RX ADMIN — HYDROMORPHONE HYDROCHLORIDE 1 MG: 1 INJECTION, SOLUTION INTRAMUSCULAR; INTRAVENOUS; SUBCUTANEOUS at 15:39

## 2024-01-28 RX ADMIN — GABAPENTIN 200 MG: 100 CAPSULE ORAL at 18:26

## 2024-01-28 RX ADMIN — FUROSEMIDE 40 MG: 10 INJECTION, SOLUTION INTRAMUSCULAR; INTRAVENOUS at 05:13

## 2024-01-28 RX ADMIN — OXYCODONE HYDROCHLORIDE 20 MG: 10 TABLET ORAL at 18:32

## 2024-01-28 RX ADMIN — ACETAMINOPHEN 1000 MG: 500 TABLET ORAL at 18:26

## 2024-01-28 RX ADMIN — GABAPENTIN 200 MG: 100 CAPSULE ORAL at 05:12

## 2024-01-28 RX ADMIN — ONDANSETRON 8 MG: 2 INJECTION INTRAMUSCULAR; INTRAVENOUS at 18:21

## 2024-01-28 RX ADMIN — ASPIRIN 81 MG: 81 TABLET, COATED ORAL at 05:12

## 2024-01-28 RX ADMIN — ENOXAPARIN SODIUM 40 MG: 100 INJECTION SUBCUTANEOUS at 18:26

## 2024-01-28 RX ADMIN — ACETAMINOPHEN 1000 MG: 500 TABLET ORAL at 05:12

## 2024-01-28 RX ADMIN — WARFARIN SODIUM 2.5 MG: 2.5 TABLET ORAL at 18:27

## 2024-01-28 RX ADMIN — MORPHINE SULFATE 15 MG: 15 TABLET, FILM COATED, EXTENDED RELEASE ORAL at 05:12

## 2024-01-28 RX ADMIN — FUROSEMIDE 40 MG: 10 INJECTION, SOLUTION INTRAMUSCULAR; INTRAVENOUS at 16:39

## 2024-01-28 RX ADMIN — GABAPENTIN 200 MG: 100 CAPSULE ORAL at 12:44

## 2024-01-28 RX ADMIN — OXYCODONE HYDROCHLORIDE 20 MG: 10 TABLET ORAL at 21:31

## 2024-01-28 RX ADMIN — OMEPRAZOLE 20 MG: 20 CAPSULE, DELAYED RELEASE ORAL at 05:13

## 2024-01-28 RX ADMIN — BACLOFEN 10 MG: 10 TABLET ORAL at 18:27

## 2024-01-28 RX ADMIN — DOCUSATE SODIUM 50 MG AND SENNOSIDES 8.6 MG 2 TABLET: 8.6; 5 TABLET, FILM COATED ORAL at 05:12

## 2024-01-28 RX ADMIN — HYDROMORPHONE HYDROCHLORIDE 1 MG: 1 INJECTION, SOLUTION INTRAMUSCULAR; INTRAVENOUS; SUBCUTANEOUS at 23:09

## 2024-01-28 RX ADMIN — DOCUSATE SODIUM 50 MG AND SENNOSIDES 8.6 MG 2 TABLET: 8.6; 5 TABLET, FILM COATED ORAL at 18:28

## 2024-01-28 RX ADMIN — SUCRALFATE 1 G: 1 TABLET ORAL at 09:40

## 2024-01-28 RX ADMIN — POTASSIUM CHLORIDE 40 MEQ: 20 TABLET, EXTENDED RELEASE ORAL at 05:13

## 2024-01-28 ASSESSMENT — GAIT ASSESSMENTS
DISTANCE (FEET): 125
DEVIATION: BRADYKINETIC
GAIT LEVEL OF ASSIST: SUPERVISED

## 2024-01-28 ASSESSMENT — COGNITIVE AND FUNCTIONAL STATUS - GENERAL
HELP NEEDED FOR BATHING: A LITTLE
CLIMB 3 TO 5 STEPS WITH RAILING: A LITTLE
SUGGESTED CMS G CODE MODIFIER DAILY ACTIVITY: CJ
SUGGESTED CMS G CODE MODIFIER MOBILITY: CJ
MOBILITY SCORE: 21
HELP NEEDED FOR BATHING: A LITTLE
DRESSING REGULAR LOWER BODY CLOTHING: A LITTLE
MOBILITY SCORE: 22
DAILY ACTIVITIY SCORE: 21
DRESSING REGULAR LOWER BODY CLOTHING: A LITTLE
CLIMB 3 TO 5 STEPS WITH RAILING: A LITTLE
DRESSING REGULAR UPPER BODY CLOTHING: A LITTLE
SUGGESTED CMS G CODE MODIFIER MOBILITY: CJ
TOILETING: A LITTLE
SUGGESTED CMS G CODE MODIFIER DAILY ACTIVITY: CJ
TURNING FROM BACK TO SIDE WHILE IN FLAT BAD: A LITTLE
TURNING FROM BACK TO SIDE WHILE IN FLAT BAD: A LOT
DAILY ACTIVITIY SCORE: 21

## 2024-01-28 ASSESSMENT — PAIN DESCRIPTION - PAIN TYPE
TYPE: ACUTE PAIN
TYPE: ACUTE PAIN
TYPE: ACUTE PAIN;SURGICAL PAIN
TYPE: ACUTE PAIN

## 2024-01-28 ASSESSMENT — FIBROSIS 4 INDEX: FIB4 SCORE: 0.53

## 2024-01-28 ASSESSMENT — ACTIVITIES OF DAILY LIVING (ADL): TOILETING: INDEPENDENT

## 2024-01-28 NOTE — PROGRESS NOTES
Monitor Summary:     Rhythm: SR  Rate: 66-69  Ectopy: (R) PVC  Measurements: 0.14/0.08/0.37           12 Hour Chart Check

## 2024-01-28 NOTE — PROGRESS NOTES
4 Eyes Skin Assessment Completed by KARSON Love and KARSON Helton.    Head WDL  Ears WDL  Nose WDL  Mouth WDL  Neck WDL  Breast/Chest Redness, Bruising, and Incision  Shoulder Blades WDL  Spine WDL  (R) Arm/Elbow/Hand WDL  (L) Arm/Elbow/Hand WDL  Abdomen Bruising  Groin WDL  Scrotum/Coccyx/Buttocks WDL  (R) Leg WDL  (L) Leg WDL  (R) Heel/Foot/Toe WDL  (L) Heel/Foot/Toe WDL          Devices In Places Tele Box, Blood Pressure Cuff, Pulse Ox, SCD's, and Nasal Cannula      Interventions In Place Gray Ear Foams, Waffle Overlay, and Pillows    Possible Skin Injury No    Pictures Uploaded Into Epic N/A  Wound Consult Placed N/A  RN Wound Prevention Protocol Ordered No

## 2024-01-28 NOTE — PROGRESS NOTES
Received bedside report from KARSON Love, pt care assumed. VS WDL, pt assessment complete. Pt A&Ox4, c/o 9/10 pain at this time. POC discussed with pt and verbalizes no questions. Pt denies any additional needs at this time. Bed locked and in lowest position, bed alarm off as client is a low fall risk, fully alert and oriented, and calls appropriately. Pt educated on fall risk and verbalized understanding, call light within reach, hourly rounding initiated.

## 2024-01-28 NOTE — PROGRESS NOTES
Inpatient Anticoagulation Service Note for 1/28/2024    Reason for Anticoagulation: Mitral Valve Repair   Target INR: 2.0 to 3.0      Hemoglobin Value: (!) 8.8  Hematocrit Value: (!) 29.2  Lab Platelet Value: 252    INR from last 7 days       Date/Time INR Value    01/28/24 0220 1.14    01/27/24 0335 1.2    01/26/24 0525 1.23    01/25/24 1115 1.36          Dose from last 7 days       Date/Time Dose (mg)    01/28/24 0923 2.5    01/27/24 1508 2.5    01/26/24 1418 5          Assessment:  - Day #3 of new start warfarin s/p MV repair.  - INR 1.14 (goal 2-3), subtherapeutic as anticipated. Likely seeing no impact of previous 2 doses yet. Hopeful for INR movement in the next 48-72 hours; if not, may need to cautiously increase warfarin dose.  - CBC stable w/o overt bleeding, however acute blood loss anemia s/p 1 unit of PLTs & recent duodenal ulcer bleed s/p embolization in Nov' 23 noted. Will remain conservative w/ initial warfarin dosing given high risk. Ongoing ASA noted.  - No indication for full bridging. Utilizing prophylactic lovenox per cardiac surgery recommendations.  - Remains on cardiac diet.    Plan:  - Continue conservative warfarin dosing, 2.5 mg daily.  - Continue prophylactic lovenox.  - Daily INR's and CBC's ordered.    Pharmacist suggested discharge dosing:  - Warfarin 2.5 mg daily may be appropriate, however TBD pending subsequent INR trends.  - Recommend INR check within 3-4 days of discharge.    Pharmacy will continue following.     Kumar Shoemaker, TrevorD

## 2024-01-28 NOTE — THERAPY
Occupational Therapy   Initial Evaluation     Patient Name: Roberto Braswell  Age:  43 y.o., Sex:  male  Medical Record #: [id number]  Today's Date: 1/28/2024     Precautions  Precautions: Fall Risk, Cardiac Precautions (See Comments), Sternal Precautions (See Comments)    Assessment    Patient is 43 y.o. male admitted for SOB and occasional chest pressure with exertion, found to have severe symptomatic mitral regurgitation, mitral valve prolapse, and anemia. Pt is now s/p radical mitral valve repair. Other pertinent medical history includes recent GI bleed s/p duodenal ulcer coiling, HTN, and abdominal pain. Pt seen for OT evaluation. Pt donned/doffed socks, stood to wash hands, performed toilet transfer, and toilet hygiene w/ supv-SBA. Pt lives with his wife who requires occasional steadying assist. Pt has an uncle who lives downstairs and reported that his father is moving in to assist as needed. Pt educated regarding the role of OT, precautions in relation to ADLs, shower safety, and energy conservation principles. Patient will not be actively followed for occupational therapy services at this time, however may be seen if requested by physician for 1 more visit within 30 days to address any discharge or equipment needs.      Plan    Occupational Therapy Initial Treatment Plan   Duration: Discharge Needs Only    DC Equipment Recommendations: None  Discharge Recommendations: Anticipate that the patient will have no further occupational therapy needs after discharge from the hospital      Objective     01/28/24 1134   Prior Living Situation   Prior Services None   Housing / Facility 3 Story House   Steps Into Home   (ramp)   Steps In Home   (2 FOS, can stay on entry level)   Bathroom Set up Walk In Shower;Shower Chair;Grab Bars   Equipment Owned Front-Wheel Walker;Wheelchair;Tub / Shower Seat;Grab Bar(s) In Tub / Shower   Lives with - Patient's Self Care Capacity Spouse   Comments Pt lives with his wife who requires  stabilizing assist occasionally. Pt has an uncle who lives downstairs and his father is moving in to assist as needed.   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[employer])   Precautions   Precautions Fall Risk;Cardiac Precautions (See Comments);Sternal Precautions (See Comments)   Vitals   Vitals Comments Remained WDL throughout   Pain   Pain Scales 0 to 10 Scale    Pain 0 - 10 Group   Therapist Pain Assessment Post Activity Pain Same as Prior to Activity;Nurse Notified  (not rated, agreeable to session)   Non Verbal Descriptors   Non Verbal Scale  Calm   Cognition    Cognition / Consciousness WDL   Level of Consciousness Alert   Comments Very pleasant and cooperative, receptive to education, flat affect   Active ROM Upper Body   Active ROM Upper Body  WDL   Comments tested within precautions   Strength Upper Body   Upper Body Strength  WDL   Comments tested within precautions   Sensation Upper Body   Comments reported intermittent numb/tingly feeling to ulnar region B UE   Upper Body Muscle Tone   Upper Body Muscle Tone  WDL   Coordination Upper Body   Coordination WDL   Balance Assessment   Sitting Balance (Static) Fair   Sitting Balance (Dynamic) Fair   Standing Balance (Static) Fair   Standing Balance (Dynamic) Fair   Weight Shift Sitting Fair   Weight Shift Standing Fair   Comments w/ no AD   Bed Mobility    Supine to Sit Supervised   Sit to Supine Supervised   Scooting  "Supervised   Rolling Supervised   Comments via log roll, HOB elevated slightly, reported adjustable bed at home   ADL Assessment   Grooming Supervision;Standing  (washed hands)   Lower Body Dressing Standby Assist  (don/doff socks)   Toileting Supervision  (stood to urinate)   Functional Mobility   Sit to Stand Supervised   Bed, Chair, Wheelchair Transfer Supervised   Toilet Transfers Supervised   Transfer Method Stand Step   Mobility EOB>bathroom>bed   Comments w/ no AD   Activity Tolerance   Sitting in Chair <2 min on toilet, declined up to chair post, reported he has \"been up to the chair a lot\"   Sitting Edge of Bed >5 min   Standing >5 min   Comments functional   Education Group   Education Provided Role of Occupational Therapist;Cardiac Precautions;Sternal Precautions;Energy Conservation;Home Safety;Activities of Daily Living   Role of Occupational Therapist Patient Response Patient;Acceptance;Explanation;Verbal Demonstration   Cardiac Precautions Patient Response Patient;Acceptance;Demonstration;Explanation;Verbal Demonstration;Action Demonstration   Sternal Precautions Patient Response Patient;Acceptance;Explanation;Demonstration;Verbal Demonstration;Action Demonstration   Energy Conservation Patient Response Patient;Acceptance;Demonstration;Explanation;Verbal Demonstration;Action Demonstration   Home Safety Patient Response Patient;Acceptance;Explanation;Verbal Demonstration  (shower safety)   ADL Patient Response Patient;Acceptance;Explanation;Demonstration;Verbal Demonstration;Action Demonstration   Occupational Therapy Initial Treatment Plan    Duration Discharge Needs Only                 "

## 2024-01-28 NOTE — PROGRESS NOTES
Bedside report received by RN and patient care assumed. Tele Box in place, patient is A&O4, resting in bed, and on 1/2 LNC. Patient states 6/10 pain. Fall precautions in place and patient educated on use of call light for assistance. Pt updated on POC with no questions or concerns. Hourly monitoring initiated.

## 2024-01-28 NOTE — CARE PLAN
"The patient is Watcher - Medium risk of patient condition declining or worsening    Shift Goals  Clinical Goals: Patient will verbalize POC for POD #2 and #3 S/P open heart surgery for valve repair. Patient will acheive adequate pain management throughout shift.  Patient Goals: \"Less pain.\"  Family Goals: JJ    Progress made toward(s) clinical / shift goals:  Provided patient with a verbal discussion related to plan of care for POD#3 S/P open heart surgery. Discussed the need to ambulate x4/day, being up to chair for all meals, importance of incision care and daily CHG bath. Discussed importance of using incentive spirometer x10/hour as a means to improve respiratory function as a means of helping wean off of supplemental oxygen. Patient encouraged to finish at least 50% of all meals on tray. Provided patient with a verbal discussion and printed handout related to after care instructions following open heart surgery for mitral valve repair. Patient verbalized understanding. Pt is able to verbalize pain on a scale of 1-10 and is able to verbalize comfort goal. Pain management plan followed and pt educated on nonpharmacologic and pharmacological comfort measures. Pt remains free from falls at this time. Safety precautions in place. Pt educated on calling for assistance when needed. Proper hand hygiene before and after patient care to ensure patient will remain free from infection.     Patient is not progressing towards the following goals: Patient's pain remains at a 7/10 despite aggressive pain management. Patient has yet to have a bowel movement, but patient did self report he has not been eating very much because he has not liked anything he has been served while admitted.        Problem: Pain - Standard  Goal: Alleviation of pain or a reduction in pain to the patient’s comfort goal  Outcome: Not Progressing     "

## 2024-01-28 NOTE — PROGRESS NOTES
"Cardiovascular Surgery Progress Note    Name: Roberto Braswell  MRN: [id number]  : 1980  Admit Date: 2024  5:25 AM  3 Days Post-Op     Procedure:  Procedure(s) and Anesthesia Type:     * MITRAL VALVE REPAIR, TRANSESOPHAGEAL ECHOCARDIOGRAM - General     * ECHOCARDIOGRAM, TRANSESOPHAGEAL, INTRAOPERATIVE - General    Vitals:  Vitals:    24 0510 24 0527 24 0600 24 0700   BP: 122/65   121/65   Pulse: 70   70   Resp: 16   17   Temp:    36.3 °C (97.3 °F)   TempSrc:    Temporal   SpO2: 94%  93% 91%   Weight:  107 kg (236 lb 12.4 oz)     Height:  1.905 m (6' 3\")        Temp (24hrs), Av.7 °C (98 °F), Min:36.3 °C (97.3 °F), Max:37.1 °C (98.8 °F)      Respiratory:    Respiration: 17, Pulse Oximetry: 91 %       Fluids:    Intake/Output Summary (Last 24 hours) at 2024 0731  Last data filed at 2024 0529  Gross per 24 hour   Intake 1130 ml   Output 3525 ml   Net -2395 ml       Admit weight: Weight: 107 kg (236 lb 15.9 oz)  Current weight: Weight: 107 kg (236 lb 12.4 oz) (24 05)    Labs:  Recent Labs     24  0135 24  0605 24  0335 24  0220   WBC 13.8*  --  9.2 7.3   RBC 4.01*  --  3.88* 3.90*   HEMOGLOBIN 8.5* 7.7* 8.9* 8.8*   HEMATOCRIT 29.6* 26.6* 29.5* 29.2*   MCV 73.8*  --  76.0* 74.9*   MCH 21.2*  --  22.9* 22.6*   MCHC 28.7*  --  30.2* 30.1*   RDW 47.8  --  52.1* 52.0*   PLATELETCT 270  --  236 252   MPV 9.1  --  9.5 9.2       Recent Labs     24  0135 24  0524   SODIUM 138  --  138 138   POTASSIUM 4.9 5.1 3.9 4.1   CHLORIDE 108  --  106 106   CO2 19*  --  25 25   GLUCOSE 152*  --  102* 100*   BUN 20  --  19 13   CREATININE 0.95  --  1.05 0.90   CALCIUM 7.6*  --  7.6* 7.7*       Recent Labs     24  1115 24  0524   APTT 28.1  --   --   --    INR 1.36* 1.23* 1.20* 1.14*         HgbA1c:  5.4    Diabetic Educator Consult:  no    Medications:  Scheduled Medications "   Medication Dose Frequency    losartan  25 mg DAILY    furosemide  40 mg BID DIURETIC    potassium chloride SA  40 mEq DAILY    warfarin  2.5 mg DAILY AT 1800    gabapentin  200 mg TID    baclofen  10 mg TID    lidocaine  1 Patch Q24HR    enoxaparin (LOVENOX) injection  40 mg DAILY AT 1800    Nozin nasal  swab  1 Applicator BID    aspirin  81 mg DAILY    Pharmacy Consult Request  1 Each PHARMACY TO DOSE    acetaminophen  1,000 mg Q6HRS    senna-docusate  2 Tablet BID    And    polyethylene glycol/lytes  1 Packet DAILY    And    magnesium hydroxide  30 mL DAILY    omeprazole  20 mg DAILY    MD Alert...Warfarin per Pharmacy   PHARMACY TO DOSE    sucralfate  1 g TID AC    morphine ER  15 mg Q12HRS        Exam:   Physical Exam  Vitals and nursing note reviewed.   Constitutional:       General: He is not in acute distress.     Appearance: Normal appearance.   HENT:      Head: Normocephalic.      Right Ear: External ear normal.      Left Ear: External ear normal.      Nose: Nose normal. No congestion.      Mouth/Throat:      Mouth: Mucous membranes are moist.      Pharynx: Oropharynx is clear.   Eyes:      Extraocular Movements: Extraocular movements intact.      Pupils: Pupils are equal, round, and reactive to light.   Cardiovascular:      Rate and Rhythm: Normal rate and regular rhythm.      Pulses: Normal pulses.      Heart sounds: Normal heart sounds.   Pulmonary:      Effort: Pulmonary effort is normal.      Breath sounds: Decreased breath sounds present.   Abdominal:      General: Bowel sounds are decreased. There is no distension.      Palpations: Abdomen is soft.   Musculoskeletal:      Cervical back: Normal range of motion and neck supple. No tenderness.      Right lower leg: Edema present.      Left lower leg: Edema present.   Skin:     General: Skin is warm and dry.      Comments: Midline surgical incision   Neurological:      General: No focal deficit present.      Mental Status: He is alert and  oriented to person, place, and time. Mental status is at baseline.   Psychiatric:         Mood and Affect: Mood normal.         Behavior: Behavior normal.         Thought Content: Thought content normal.       Cardiac Medications:    ASA - Yes    Plavix - No; contraindicated because of On Coumadin / NOAC    Post-operative Beta Blockers - Yes    Ace/ARB- No; contraindicated because of Normal EF    Statin - No; contraindicated because of No CAD    Aldactone- No; contraindicated because of Normal EF    SGLT2i-  No contraindicated because of No; contraindicated because of Normal EF    Ejection Fraction:  70%    Telemetry:   1/26 SB-SR  1/27 SR    Assessment/Plan:  POD 1  HDS, SB/SR, neuro intact, wounds intact, abdomen soft, fluid balance positive, wt up,  2 L NC. 270 out of chest tubes overnight. H/H 7.7/26.6 transfused this morning. Pt c/o uncontrolled pain. Plan:  Keep chest tubes, pacing wires and walker. Diurese. Add baclofen and lidocaine for pain. IS/ambulate.     POD 2  HDS, SR, neuro intact, wounds intact, abdomen soft, fluid balance positive, wt up,  3 L NC.  60 out of chest tubes overnight. Plan:  Remove chest tubes, pacing wires, and walker. Increase diuresis. Add losartan. IS/ambulate. Transfer to [facility].     POD 3  HDS, SR, neuro intact, wounds intact, abdomen soft no BM, fluid balance positive, wt at admission wt,  0.5 L NC.  Plan:  Continue diuresis, wean MS contin, IS/ambulate. Anticipate home 1-2 days.    Disposition:  D

## 2024-01-28 NOTE — CARE PLAN
The patient is Stable - Low risk of patient condition declining or worsening    Shift Goals  Clinical Goals: POD2 OH Management, Mobility, Pain Management  Patient Goals: Rest, Pain Management  Family Goals: JJ    Progress made toward(s) clinical / shift goals:      Problem: Post Op Day 3 CABG/Heart Valve replacement  Goal: Optimal care of the post op CABG/Heart Valve replacement post op day 3  Intervention: Daily weights in the morning  Note: Stand up weight completed and charted  Intervention: Shower daily and clean incisions twice daily with soap and water  Note: Educated patient about taking a shower on day 3 and the importance of incision care for infection prevention.   Intervention: Up in chair for all meals  Note: Patient up in chair for lunch and for 2 hours afterwards. Tolerated well.   Intervention: Ambulate 4 times daily, increasing the distance each time  Note: Patient ambulated 3 times during this RN's shift and tolerated well. Ambulated in room twice and half around the unit for 250 feet on a walk. Tolerated well.   Intervention: IS q 1 hour while awake and record best IS volume  Note: Patient using IS and getting up to 1500. Strong effort.   Intervention: Consider removal of walker, chest tube and pacer wires if not already done  Note: Walker removed in CIC and voided 150 during this RN's shift.         Problem: Pain - Standard  Goal: Alleviation of pain or a reduction in pain to the patient’s comfort goal  Outcome: Progressing       Patient is not progressing towards the following goals: NA

## 2024-01-29 DIAGNOSIS — F11.220 OPIOID DEPENDENCE WITH UNCOMPLICATED INTOXICATION (HCC): ICD-10-CM

## 2024-01-29 DIAGNOSIS — G89.29 OTHER CHRONIC PAIN: ICD-10-CM

## 2024-01-29 DIAGNOSIS — G89.18 ACUTE POST-OPERATIVE PAIN: ICD-10-CM

## 2024-01-29 LAB
ANION GAP SERPL CALC-SCNC: 12 MMOL/L (ref 7–16)
BUN SERPL-MCNC: 14 MG/DL (ref 8–22)
CALCIUM SERPL-MCNC: 8.5 MG/DL (ref 8.5–10.5)
CHLORIDE SERPL-SCNC: 106 MMOL/L (ref 96–112)
CO2 SERPL-SCNC: 24 MMOL/L (ref 20–33)
CREAT SERPL-MCNC: 0.88 MG/DL (ref 0.5–1.4)
ERYTHROCYTE [DISTWIDTH] IN BLOOD BY AUTOMATED COUNT: 52 FL (ref 35.9–50)
GFR SERPLBLD CREATININE-BSD FMLA CKD-EPI: 110 ML/MIN/1.73 M 2
GLUCOSE SERPL-MCNC: 100 MG/DL (ref 65–99)
HCT VFR BLD AUTO: 35.1 % (ref 42–52)
HGB BLD-MCNC: 10.8 G/DL (ref 14–18)
INR PPP: 1.03 (ref 0.87–1.13)
MCH RBC QN AUTO: 22.9 PG (ref 27–33)
MCHC RBC AUTO-ENTMCNC: 30.8 G/DL (ref 32.3–36.5)
MCV RBC AUTO: 74.4 FL (ref 81.4–97.8)
PLATELET # BLD AUTO: 322 K/UL (ref 164–446)
PMV BLD AUTO: 9 FL (ref 9–12.9)
POTASSIUM SERPL-SCNC: 4.1 MMOL/L (ref 3.6–5.5)
PROTHROMBIN TIME: 13.6 SEC (ref 12–14.6)
RBC # BLD AUTO: 4.72 M/UL (ref 4.7–6.1)
SODIUM SERPL-SCNC: 142 MMOL/L (ref 135–145)
WBC # BLD AUTO: 6.6 K/UL (ref 4.8–10.8)

## 2024-01-29 PROCEDURE — A9270 NON-COVERED ITEM OR SERVICE: HCPCS | Performed by: THORACIC SURGERY (CARDIOTHORACIC VASCULAR SURGERY)

## 2024-01-29 PROCEDURE — 80048 BASIC METABOLIC PNL TOTAL CA: CPT

## 2024-01-29 PROCEDURE — A9270 NON-COVERED ITEM OR SERVICE: HCPCS | Performed by: NURSE PRACTITIONER

## 2024-01-29 PROCEDURE — 770020 HCHG ROOM/CARE - TELE (206)

## 2024-01-29 PROCEDURE — 700102 HCHG RX REV CODE 250 W/ 637 OVERRIDE(OP): Performed by: NURSE PRACTITIONER

## 2024-01-29 PROCEDURE — 700101 HCHG RX REV CODE 250: Performed by: NURSE PRACTITIONER

## 2024-01-29 PROCEDURE — 700111 HCHG RX REV CODE 636 W/ 250 OVERRIDE (IP): Performed by: NURSE PRACTITIONER

## 2024-01-29 PROCEDURE — 85610 PROTHROMBIN TIME: CPT

## 2024-01-29 PROCEDURE — 700102 HCHG RX REV CODE 250 W/ 637 OVERRIDE(OP): Performed by: THORACIC SURGERY (CARDIOTHORACIC VASCULAR SURGERY)

## 2024-01-29 PROCEDURE — 85027 COMPLETE CBC AUTOMATED: CPT

## 2024-01-29 RX ORDER — FUROSEMIDE 10 MG/ML
40 INJECTION INTRAMUSCULAR; INTRAVENOUS
Status: DISCONTINUED | OUTPATIENT
Start: 2024-01-30 | End: 2024-01-29

## 2024-01-29 RX ORDER — POTASSIUM CHLORIDE 20 MEQ/1
20 TABLET, EXTENDED RELEASE ORAL DAILY
Status: DISCONTINUED | OUTPATIENT
Start: 2024-01-30 | End: 2024-01-29

## 2024-01-29 RX ORDER — TORSEMIDE 20 MG/1
20 TABLET ORAL
Status: DISCONTINUED | OUTPATIENT
Start: 2024-01-30 | End: 2024-01-31 | Stop reason: HOSPADM

## 2024-01-29 RX ORDER — POTASSIUM CHLORIDE 20 MEQ/1
10 TABLET, EXTENDED RELEASE ORAL DAILY
Status: DISCONTINUED | OUTPATIENT
Start: 2024-01-30 | End: 2024-01-31 | Stop reason: HOSPADM

## 2024-01-29 RX ORDER — WARFARIN SODIUM 5 MG/1
5 TABLET ORAL DAILY
Status: DISCONTINUED | OUTPATIENT
Start: 2024-01-29 | End: 2024-01-30

## 2024-01-29 RX ORDER — LOSARTAN POTASSIUM 50 MG/1
50 TABLET ORAL DAILY
Status: DISCONTINUED | OUTPATIENT
Start: 2024-01-30 | End: 2024-01-31 | Stop reason: HOSPADM

## 2024-01-29 RX ADMIN — OMEPRAZOLE 20 MG: 20 CAPSULE, DELAYED RELEASE ORAL at 05:39

## 2024-01-29 RX ADMIN — DOCUSATE SODIUM 50 MG AND SENNOSIDES 8.6 MG 2 TABLET: 8.6; 5 TABLET, FILM COATED ORAL at 05:39

## 2024-01-29 RX ADMIN — OXYCODONE HYDROCHLORIDE 20 MG: 10 TABLET ORAL at 15:07

## 2024-01-29 RX ADMIN — OXYCODONE HYDROCHLORIDE 20 MG: 10 TABLET ORAL at 18:10

## 2024-01-29 RX ADMIN — DOCUSATE SODIUM 50 MG AND SENNOSIDES 8.6 MG 2 TABLET: 8.6; 5 TABLET, FILM COATED ORAL at 16:53

## 2024-01-29 RX ADMIN — FUROSEMIDE 40 MG: 10 INJECTION, SOLUTION INTRAMUSCULAR; INTRAVENOUS at 05:39

## 2024-01-29 RX ADMIN — OXYCODONE HYDROCHLORIDE 10 MG: 10 TABLET ORAL at 10:34

## 2024-01-29 RX ADMIN — Medication 1 APPLICATOR: at 09:00

## 2024-01-29 RX ADMIN — POLYETHYLENE GLYCOL 3350 1 PACKET: 17 POWDER, FOR SOLUTION ORAL at 05:38

## 2024-01-29 RX ADMIN — BACLOFEN 10 MG: 10 TABLET ORAL at 16:53

## 2024-01-29 RX ADMIN — ACETAMINOPHEN 1000 MG: 500 TABLET ORAL at 16:53

## 2024-01-29 RX ADMIN — ASPIRIN 81 MG: 81 TABLET, COATED ORAL at 05:39

## 2024-01-29 RX ADMIN — OXYCODONE HYDROCHLORIDE 20 MG: 10 TABLET ORAL at 05:44

## 2024-01-29 RX ADMIN — BACLOFEN 10 MG: 10 TABLET ORAL at 05:40

## 2024-01-29 RX ADMIN — GABAPENTIN 200 MG: 100 CAPSULE ORAL at 05:39

## 2024-01-29 RX ADMIN — POTASSIUM CHLORIDE 40 MEQ: 20 TABLET, EXTENDED RELEASE ORAL at 05:44

## 2024-01-29 RX ADMIN — OXYCODONE HYDROCHLORIDE 10 MG: 10 TABLET ORAL at 12:05

## 2024-01-29 RX ADMIN — OXYCODONE HYDROCHLORIDE 20 MG: 10 TABLET ORAL at 21:25

## 2024-01-29 RX ADMIN — LIDOCAINE 1 PATCH: 4 PATCH TOPICAL at 10:35

## 2024-01-29 RX ADMIN — HYDROMORPHONE HYDROCHLORIDE 1 MG: 1 INJECTION, SOLUTION INTRAMUSCULAR; INTRAVENOUS; SUBCUTANEOUS at 03:38

## 2024-01-29 RX ADMIN — HYDROMORPHONE HYDROCHLORIDE 1 MG: 1 INJECTION, SOLUTION INTRAMUSCULAR; INTRAVENOUS; SUBCUTANEOUS at 07:17

## 2024-01-29 RX ADMIN — ENOXAPARIN SODIUM 40 MG: 100 INJECTION SUBCUTANEOUS at 16:53

## 2024-01-29 RX ADMIN — ACETAMINOPHEN 1000 MG: 500 TABLET ORAL at 12:05

## 2024-01-29 RX ADMIN — SUCRALFATE 1 G: 1 TABLET ORAL at 10:35

## 2024-01-29 RX ADMIN — SUCRALFATE 1 G: 1 TABLET ORAL at 16:53

## 2024-01-29 RX ADMIN — MAGNESIUM HYDROXIDE 30 ML: 1200 LIQUID ORAL at 05:38

## 2024-01-29 RX ADMIN — OXYCODONE HYDROCHLORIDE 20 MG: 10 TABLET ORAL at 02:40

## 2024-01-29 RX ADMIN — BACLOFEN 10 MG: 10 TABLET ORAL at 12:05

## 2024-01-29 RX ADMIN — SUCRALFATE 1 G: 1 TABLET ORAL at 07:17

## 2024-01-29 RX ADMIN — WARFARIN SODIUM 5 MG: 5 TABLET ORAL at 18:10

## 2024-01-29 RX ADMIN — ACETAMINOPHEN 1000 MG: 500 TABLET ORAL at 05:38

## 2024-01-29 RX ADMIN — GABAPENTIN 200 MG: 100 CAPSULE ORAL at 12:06

## 2024-01-29 RX ADMIN — Medication 1 APPLICATOR: at 20:17

## 2024-01-29 RX ADMIN — ONDANSETRON 8 MG: 2 INJECTION INTRAMUSCULAR; INTRAVENOUS at 02:40

## 2024-01-29 RX ADMIN — LOSARTAN POTASSIUM 25 MG: 25 TABLET, FILM COATED ORAL at 05:39

## 2024-01-29 RX ADMIN — GABAPENTIN 200 MG: 100 CAPSULE ORAL at 16:53

## 2024-01-29 ASSESSMENT — PAIN DESCRIPTION - PAIN TYPE
TYPE: ACUTE PAIN

## 2024-01-29 ASSESSMENT — FIBROSIS 4 INDEX: FIB4 SCORE: 0.41

## 2024-01-29 NOTE — DISCHARGE PLANNING
Case Management Discharge Planning    Admission Date: 1/25/2024  GMLOS: 8.7  ALOS: 4    6-Clicks ADL Score: 21  6-Clicks Mobility Score: 22      Anticipated Discharge Dispo: Discharge Disposition: D/T to home under HHA care in anticipation of covered skilled care (06)    DME Needed: No    Action(s) Taken: Updated Provider/Nurse on Discharge Plan, DC Assessment Complete (See below), Choice obtained, and Referral(s) sent    RN CM obtained choice for Home Health and fax to [facility].     Pt provided his physical address: [address] [place] [address] [place] [address].    Escalations Completed: None    Medically Clear: No    Next Steps: CM will continue to assist Pt with discharge needs.      Barriers to Discharge: Medical clearance    Is the patient up for discharge tomorrow: No      Care Transition Team Assessment  RN CM met with Pt at bedside to obtain assessment informations. Demographics verified. RN CM introduced self and purpose of visit.   Pt lives with spouse, parent and family members in a 3 story house with a ramp to main entrance.  Pt has  spouse and family members for support.  Pt has VIOLA CHAPMAN D.O. for PCP  Pt was independent with ADLs prior to hospitalization.        Information Source  Orientation Level: Oriented X4  Information Given By: Patient  Who is responsible for making decisions for patient? : Patient     Elopement Risk  Legal Hold: No  Ambulatory or Self Mobile in Wheelchair: Yes  Disoriented: No  Psychiatric Symptoms: None  History of Wandering: No  Elopement this Admit: No  Vocalizing Wanting to Leave: No  Displays Behaviors, Body Language Wanting to Leave: No-Not at Risk for Elopement  Elopement Risk: Not at Risk for Elopement    Interdisciplinary Discharge Planning  Primary Care Physician: VIOLA CHAPMAN D.O.  Lives with - Patient's Self Care Capacity: Spouse, Parents  Patient or legal guardian wants to designate a caregiver: No  Support Systems: Spouse / Significant Other, Family  Member(s)  Housing / Facility: 3 Story House (with a ramp to main entrance)  Prior Services: None  Durable Medical Equipment: Not Applicable    Discharge Preparedness  What is your plan after discharge?: Home with help  What are your discharge supports?: Spouse  Prior Functional Level: Ambulatory, Independent with Activities of Daily Living, Independent with Medication Management    Functional Assesment  Prior Functional Level: Ambulatory, Independent with Activities of Daily Living, Independent with Medication Management    Finances  Financial Barriers to Discharge: No  Prescription Coverage: Yes      Domestic Abuse  Have you ever been the victim of abuse or violence?: No  Physical Abuse or Sexual Abuse: No  Verbal Abuse or Emotional Abuse: No  Possible Abuse/Neglect Reported to:: Not Applicable    Psychological Assessment  History of Substance Abuse: None  History of Psychiatric Problems: No         Anticipated Discharge Information  Discharge Disposition: D/T to home under A care in anticipation of covered skilled care (06)

## 2024-01-29 NOTE — DISCHARGE PLANNING
"Discharge Discussion and home care recap prior to discharge:      Discharge review was completed with patient.    Patient was reminded that outpatient cardiac rehab beginning 6 weeks post op. They were told it is highly recommended and available to them if desired, but not required. They were told to look at the provided flyer for the contact number and additional information.    Patient was reminded to utilize the vitals log book provided to take his/her weight daily and record.    Patient was told to call me with weight gain > 3 lbs in 1 day or 5 lbs in 1 week  Patient was also told to record heart rate and blood pressure morning and night; and to follow the hold parameters provided in the discharge summary on beta blockers and ACE/ARB (if prescribed on discharge).    Patient was reminded to review the \"recovery after heart surgery\" packet with information on normal expectations such as clicking in the chest, loud/pounding heart/ hot and cold flashes, weight loss, constipation, insomnia, tingling on left side of chest (CABG with LIMA).  I also reviewed the decision tree of whom to call and when with concerns after going home. RN navigator Monday through Friday during business hours for any questions or urgent concerns, and the office for urgent concerns at night or on the weekends.    I briefly recapped the discharge instructions that their primary RN will review in depth prior to discharge   1) appointments   2) medication reconciliation (start, continue, change, stop)   3) care instructions    All additional questions were answered or deferred to the bedside RN.    Event time 20 minutes    "

## 2024-01-29 NOTE — PROGRESS NOTES
Monitor Summary:     Rhythm: SR  Rate: 64-72  Ectopy: (R) PVC  Measurements: 0.13/0.07/0.42           12 Hour Chart Check

## 2024-01-29 NOTE — DISCHARGE PLANNING
[id number]  Received Choice form at [phone]  Agency/Facility Name: [facility]   Referral sent per Choice form @ [phone]

## 2024-01-29 NOTE — PROGRESS NOTES
Inpatient Anticoagulation Service Note for 1/29/2024    Reason for Anticoagulation: Mitral Valve Repair   Target INR: 2.0 to 3.0     Hemoglobin Value: (!) 10.8  Hematocrit Value: (!) 35.1  Lab Platelet Value: 322    INR from last 7 days       Date/Time INR Value    01/29/24 0305 1.03    01/28/24 0220 1.14    01/27/24 0335 1.2    01/26/24 0525 1.23    01/25/24 1115 1.36          Dose from last 7 days       Date/Time Dose (mg)    01/29/24 0856 5    01/28/24 0923 2.5    01/27/24 1508 2.5    01/26/24 1418 5          Assessment:  - Day #4 of new start warfarin s/p MV repair.  - INR 1.03 (goal 2-3), remains subtherapeutic. Initial warfarin doses should be taking effect, however INR has suprisingly downtrended from baseline. Started with cautious dosing given postop acute blood loss anemia & recent duodenal ulcer bleed s/p embolization in Nov' 23. Cautious dose increase is now warranted given stagnant INR.  - No indication for full bridging; utilizing prophylactic lovenox per cardiac surgery recommendations. Ongoing ASA therapy also noted.  - Remains on cardiac diet.     Plan:  - Increase warfarin to 5 mg daily.  - Continue prophylactic lovenox.  - Daily INR's and CBC's ordered.     Pharmacist suggested discharge dosing:  - Consider warfarin 5 mg daily. TBD pending subsequent INR trends.  - Recommend INR check within 3-4 days of discharge.     Pharmacy will continue following.     Kumar Shoemaker, TrevorD

## 2024-01-29 NOTE — THERAPY
"Physical Therapy   Initial Evaluation     Patient Name: Roberto Braswell  Age:  43 y.o., Sex:  male  Medical Record #: [id number]  Today's Date: 1/28/2024     Precautions  Precautions: Fall Risk;Cardiac Precautions (See Comments);Sternal Precautions (See Comments)  Comments: Mitral Valve Repair    Assessment  Patient is 43 y.o. male s/p radical mitral valve repair.  Other pertinent medical history includes recent GI bleed s/p duodenal ulcer coiling, HTN, and abdominal pain.    Pt doing well with mobility. BP dropped with standing, pt asymptomatic. Did well with amb and talk test, no S/S distress. Pt educated on the following information from the \"Recovering from Heart Surgery\" handout:  Hospital Exercise Program  Sternal Precautions  Home Exercise Program  Normal Expectations after Open Heart Surgery  RPE  Talk Test  Activity Schedule Following Discharge after Open Heart Surgery  Cardiac Surgery Discharge Concerns Decision Tree  Heart Healthy Program Cardiac Rehab Phase II  Heart Surgery Log  S/S heart failure    Pt will benefit from 1-2 more visits to review education and stair training. Anticipate pt will benefit from home with family assistance and Cardiac Rehab Phase II upon DC from hospital.      Plan    Physical Therapy Initial Treatment Plan   Treatment Plan : Gait Training, Stair Training, Therapeutic Activities, Therapeutic Exercise  Treatment Frequency: 4 Times per Week  Duration: Until Therapy Goals Met    DC Equipment Recommendations: None  Discharge Recommendations: Anticipate that the patient will have no further physical therapy needs after discharge from the hospital       Subjective    Pt agreed to PT eval. Requesting to use the bathroom first.      Objective       01/28/24 1530   Initial Contact Note    Initial Contact Note Order Received and Verified, Physical Therapy Evaluation in Progress with Full Report to Follow.   Precautions   Precautions Fall Risk;Cardiac Precautions (See Comments);Sternal " Precautions (See Comments)   Comments Mitral Valve Repair   Vitals   Vitals Comments BP sitting 134/73, standing 113/76, after amb 132/82. No c/o dizziness or distress. Notified RN of decrease in systolic with standing.   Pain 0 - 10 Group   Location Incision;Sternum   Therapist Pain Assessment During Activity;Post Activity Pain Same as Prior to Activity;Nurse Notified   Prior Living Situation   Prior Services None   Housing / Facility 3 Story House   Steps Into Home   (steep ramp)   Steps In Home 2  (3 flights of stairs- does not need to do them)   Rail None   Bathroom Set up Walk In Shower;Shower Chair;Grab Bars   Equipment Owned Front-Wheel Walker;Wheelchair;Tub / Shower Seat;Grab Bar(s) In Tub / Shower   Lives with - Patient's Self Care Capacity Spouse   Comments Pt lives with spouse who has a back injury. She was a surgery nurse. Father is going to move in, he is a nurse. Uncle lives downstairs. Has a sleep number bed.   Prior Level of Functional Mobility   Ambulation Independent   Ambulation Distance community   Assistive Devices Used None   Comments works at [employer], has 3 months off   Cognition    Cognition / Consciousness WDL   Level of Consciousness Alert   Comments pleasant, cooperative, receptive and involved with education   Active ROM Lower Body    Active ROM Lower Body  WDL   Strength Lower Body   Lower Body Strength  WDL   Sensation Lower Body   Lower Extremity Sensation   WDL   Coordination Lower Body    Coordination Lower Body  WDL   Balance Assessment   Sitting Balance (Static) Good   Sitting Balance (Dynamic) Good   Standing Balance (Static) Fair +   Standing Balance (Dynamic) Fair +   Weight Shift Sitting Good   Weight Shift Standing Good   Comments no AD   Bed Mobility    Supine to Sit Supervised   Sit to Supine Supervised   Scooting Supervised   Rolling Supervised   Comments HOB min up, log roll   Gait Analysis   Gait Level Of Assist Supervised   Assistive Device None   Distance (Feet) 125   #  of Times Distance was Traveled 2   Deviation Bradykinetic  (hx of R ankle injury, pt with antalgic type gait)   Functional Mobility   Sit to Stand Supervised   Bed, Chair, Wheelchair Transfer Supervised   Transfer Method Stand Step   Mobility no AD   How much difficulty does the patient currently have...   Turning over in bed (including adjusting bedclothes, sheets and blankets)? 3   Sitting down on and standing up from a chair with arms (e.g., wheelchair, bedside commode, etc.) 4   Moving from lying on back to sitting on the side of the bed? 4   How much help from another person does the patient currently need...   Moving to and from a bed to a chair (including a wheelchair)? 4   Need to walk in a hospital room? 4   Climbing 3-5 steps with a railing? 3   6 clicks Mobility Score 22   Activity Tolerance   Comments no SOB with talk test with amb, no distress. Pt about to have a shower.   Short Term Goals    Short Term Goal # 1 amb 350ft supervised in 6 visits to progress with functional distances for cardiac rehab   Short Term Goal # 2 up/down 2 steps with SBA in 6 visits to enter bedroom   Short Term Goal # 3 Patient will verbalize cardiac rehab education including sternal precautions, talk test, and strategies for monitoring and modifying activity within 6tx in order to demonstrate understanding   Education Group   Education Provided Cardiac Precautions;Sternal Precautions;Role of Physical Therapist;Gait Training   Cardiac Precautions Patient Response Patient;Acceptance;Explanation;Demonstration;Handout;Teach Back;Verbal Demonstration;Action Demonstration   Sternal Precautions Patient Response Patient;Acceptance;Explanation;Demonstration;Handout;Teach Back;Action Demonstration;Verbal Demonstration   Role of Physical Therapist Patient Response Patient;Acceptance;Explanation;Verbal Demonstration   Gait Training Patient Response Patient;Acceptance;Explanation;Demonstration;Action Demonstration   Physical Therapy  Initial Treatment Plan    Treatment Plan  Gait Training;Stair Training;Therapeutic Activities;Therapeutic Exercise   Treatment Frequency 4 Times per Week   Duration Until Therapy Goals Met   Problem List    Problems Pain;Impaired Ambulation;Decreased Activity Tolerance;Limited Knowledge of Post-Op Precautions   Anticipated Discharge Equipment and Recommendations   DC Equipment Recommendations None   Discharge Recommendations Anticipate that the patient will have no further physical therapy needs after discharge from the hospital   Interdisciplinary Plan of Care Collaboration   IDT Collaboration with  Nursing;Occupational Therapist   Patient Position at End of Therapy In Bed;Call Light within Reach;Tray Table within Reach;Phone within Reach;Other (Comments)  (CNA entered room to assist pt with shower)   Collaboration Comments RN updated   Session Information   Date / Session Number  1/28 1(1/4, 2/3)   Priority 4  ([facility])

## 2024-01-29 NOTE — CARE PLAN
The patient is Stable - Low risk of patient condition declining or worsening    Shift Goals  Clinical Goals: increase INR with wafarin, pain control  Patient Goals: control nausea  Family Goals: JJ    Progress made toward(s) clinical / shift goals:       Problem: Knowledge Deficit - Standard  Goal: Patient and family/care givers will demonstrate understanding of plan of care, disease process/condition, diagnostic tests and medications  Outcome: Progressing     Problem: Post Op Day 4 CABG/Heart Valve Replacement  Goal: Optimal care of the Post Op CABG/Heart Valve replacement Post Op Day 4  Outcome: Progressing     Problem: Respiratory  Goal: Patient will achieve/maintain optimum respiratory ventilation and gas exchange  Outcome: Progressing       Patient is not progressing towards the following goals:      Problem: Pain - Standard  Goal: Alleviation of pain or a reduction in pain to the patient’s comfort goal  Outcome: Not Progressing  Had discussion with provider and patient about pain management. Discontinued dilauded

## 2024-01-29 NOTE — CARE PLAN
The patient is Stable - Low risk of patient condition declining or worsening    Shift Goals  Clinical Goals: POD3 OH Management, Pain Management  Patient Goals: Pain Control, Eat Better food  Family Goals: JJ    Progress made toward(s) clinical / shift goals:     Problem: Post Op Day 4 CABG/Heart Valve Replacement  Goal: Optimal care of the Post Op CABG/Heart Valve replacement Post Op Day 4  Outcome: Progressing  Intervention: Daily weights in the morning  Note: Stand up weight completed and documented.  Intervention: Shower daily and clean incisions twice daily with soap and water  Note: Shower and incision care and education provided. Patient tolerated well.   Intervention: Up in chair for all meals  Note: Patient up in chair for all meals today. Tolerated well and Motivated.   Intervention: Ambulate 4 times daily, increasing the distance each time  Note: Patient ambulated 3 times around the unit today, tolerated well. Ambulated to the bathroom several times, tolerating very well. Patient no longer needing walker.   Intervention: IS q 1 hour while awake and record best IS volume  Note: Weaned patient down to RA today, tolerating well at 95%. Patient IS at 2000.   Intervention: Consider removal of walker, chest tube and pacer wires if not already done  Note: Walker, chest tubes, and pacer wires removed in CIC. All sites are clean, dry, and intact. Midline incision and chest tube sites healing well and open to air.   Intervention: Discharge Education  Note: DC Education initiated with importance of use of IS, walking, keeping incision clean, compliance with medications.        Problem: Pain - Standard  Goal: Alleviation of pain or a reduction in pain to the patient’s comfort goal  Outcome: Progressing         Patient is not progressing towards the following goals: NA

## 2024-01-29 NOTE — CARE PLAN
Problem: Pain - Standard  Goal: Alleviation of pain or a reduction in pain to the patient’s comfort goal  1/28/2024 2156 by Liseth Rosa, R.N.  Outcome: Progressing  1/28/2024 2109 by Liseth Rosa, R.N.  Outcome: Progressing  1/28/2024 1911 by Liseth Rosa R.N.  Outcome: Progressing     Problem: Knowledge Deficit - Standard  Goal: Patient and family/care givers will demonstrate understanding of plan of care, disease process/condition, diagnostic tests and medications  1/28/2024 2156 by Liseth Rosa, R.N.  Outcome: Progressing  1/28/2024 2109 by Liseth Rosa R.N.  Outcome: Progressing  1/28/2024 1911 by Liseth Rosa R.N.  Outcome: Progressing     Problem: Skin Integrity  Goal: Skin integrity is maintained or improved  1/28/2024 2156 by Liseth Rosa R.N.  Outcome: Progressing  1/28/2024 2109 by Liseth Rosa, R.N.  Outcome: Progressing  1/28/2024 1911 by Liseth Rosa, R.N.  Outcome: Progressing     Problem: Fall Risk  Goal: Patient will remain free from falls  1/28/2024 2156 by Liseth Rosa, R.N.  Outcome: Progressing  1/28/2024 2109 by Liseth Rosa, R.N.  Outcome: Progressing  1/28/2024 1911 by Liseth Rosa, R.N.  Outcome: Progressing     Problem: Post Op Day 3 CABG/Heart Valve replacement  Goal: Optimal care of the post op CABG/Heart Valve replacement post op day 3  1/28/2024 2156 by Listeh Rosa, R.N.  Outcome: Progressing  1/28/2024 2109 by Liseth Rosa, R.N.  Outcome: Progressing  1/28/2024 1911 by Liseth Rosa, R.N.  Outcome: Progressing     Problem: Hemodynamics  Goal: Patient's hemodynamics, fluid balance and neurologic status will be stable or improve  1/28/2024 2156 by Liseth Rosa, R.N.  Outcome: Progressing  1/28/2024 2109 by Liseth Rosa R.N.  Outcome: Progressing  1/28/2024 1911 by Liseth Rosa, R.N.  Outcome: Progressing     Problem: Respiratory  Goal: Patient will achieve/maintain optimum respiratory ventilation and gas exchange  1/28/2024 2156 by Liseth Rosa R.N.  Outcome: Progressing  1/28/2024 2109 by Liseth Glass  Shelley R.N.  Outcome: Progressing  1/28/2024 1911 by Liseth Rosa R.N.  Outcome: Progressing     Problem: Risk for Aspiration  Goal: Patient's risk for aspiration will be absent or decrease  1/28/2024 2156 by Liseth Rosa R.N.  Outcome: Progressing  1/28/2024 2109 by Liseth Rosa R.N.  Outcome: Progressing  1/28/2024 1911 by Liseth Rosa R.N.  Outcome: Progressing     Problem: Nutrition - Advanced  Goal: Patient will display progressive weight gain toward goal have adequate food and fluid intake  1/28/2024 2156 by Liseth Rosa R.N.  Outcome: Progressing  1/28/2024 2109 by Liseth Rosa R.N.  Outcome: Progressing  1/28/2024 1911 by Liseth Rosa R.N.  Outcome: Progressing     Problem: Self Care  Goal: Patient will have the ability to perform ADLs independently or with assistance (bathe, groom, dress, toilet and feed)  1/28/2024 2156 by Liseth Rosa R.N.  Outcome: Progressing  1/28/2024 2109 by Liseth Rosa R.N.  Outcome: Progressing  1/28/2024 1911 by Liseth Rosa R.N.  Outcome: Progressing     Problem: Bowel Elimination - Post Surgical  Goal: Patient will resume regular bowel sounds and function with no discomfort or distention  1/28/2024 2156 by Listeh Rosa R.N.  Outcome: Progressing  1/28/2024 2109 by Liseth Rosa R.N.  Outcome: Progressing  1/28/2024 1911 by Liseth Rosa R.N.  Outcome: Progressing   The patient is Stable - Low risk of patient condition declining or worsening    Shift Goals  Clinical Goals: VSS, safety, POD 3 open heart  Patient Goals: pain management, to go home  Family Goals: JJ    Progress made toward(s) clinical / shift goals:  VSS, safety    Patient is not progressing towards the following goals:

## 2024-01-29 NOTE — PROGRESS NOTES
Cardiovascular Surgery Progress Note    Name: Roberto Braswell  MRN: [id number]  : 1980  Admit Date: 2024  5:25 AM  4 Days Post-Op     Procedure:  Procedure(s) and Anesthesia Type:     * MITRAL VALVE REPAIR, TRANSESOPHAGEAL ECHOCARDIOGRAM - General     * ECHOCARDIOGRAM, TRANSESOPHAGEAL, INTRAOPERATIVE - General    Vitals:  Vitals:    24 0337 24 0345 24 0544 24 0725   BP: 130/72 130/72  125/76   Pulse: 78   80   Resp: 20   18   Temp:    36.7 °C (98 °F)   TempSrc: Temporal   Temporal   SpO2:  93%  94%   Weight:   104 kg (229 lb 8 oz)    Height:          Temp (24hrs), Av.8 °C (98.2 °F), Min:36.6 °C (97.9 °F), Max:37.1 °C (98.8 °F)      Respiratory:    Respiration: 18, Pulse Oximetry: 94 %       Fluids:    Intake/Output Summary (Last 24 hours) at 2024 1109  Last data filed at 2024 0700  Gross per 24 hour   Intake 1820 ml   Output 1475 ml   Net 345 ml       Admit weight: Weight: 107 kg (236 lb 15.9 oz)  Current weight: Weight: 104 kg (229 lb 8 oz) (24 0544)    Labs:  Recent Labs     24  03324  0220 24  0305   WBC 9.2 7.3 6.6   RBC 3.88* 3.90* 4.72   HEMOGLOBIN 8.9* 8.8* 10.8*   HEMATOCRIT 29.5* 29.2* 35.1*   MCV 76.0* 74.9* 74.4*   MCH 22.9* 22.6* 22.9*   MCHC 30.2* 30.1* 30.8*   RDW 52.1* 52.0* 52.0*   PLATELETCT 236 252 322   MPV 9.5 9.2 9.0       Recent Labs     24  0335 24  0220 24  0305   SODIUM 138 138 142   POTASSIUM 3.9 4.1 4.1   CHLORIDE 106 106 106   CO2  24   GLUCOSE 102* 100* 100*   BUN 19 13 14   CREATININE 1.05 0.90 0.88   CALCIUM 7.6* 7.7* 8.5       Recent Labs     24  0335 24  0220 24  0305   INR 1.20* 1.14* 1.03         HgbA1c:  5.4    Diabetic Educator Consult:  no    Medications:  Scheduled Medications   Medication Dose Frequency    [START ON 2024] losartan  50 mg DAILY    [START ON 2024] furosemide  40 mg Q DAY    [START ON 2024] potassium chloride SA  20 mEq DAILY     warfarin  5 mg DAILY AT 1800    gabapentin  200 mg TID    baclofen  10 mg TID    lidocaine  1 Patch Q24HR    enoxaparin (LOVENOX) injection  40 mg DAILY AT 1800    Nozin nasal  swab  1 Applicator BID    aspirin  81 mg DAILY    Pharmacy Consult Request  1 Each PHARMACY TO DOSE    acetaminophen  1,000 mg Q6HRS    senna-docusate  2 Tablet BID    And    polyethylene glycol/lytes  1 Packet DAILY    And    magnesium hydroxide  30 mL DAILY    omeprazole  20 mg DAILY    MD Alert...Warfarin per Pharmacy   PHARMACY TO DOSE    sucralfate  1 g TID AC        Exam:   Physical Exam  Vitals and nursing note reviewed.   Constitutional:       General: He is not in acute distress.     Appearance: Normal appearance.   HENT:      Head: Normocephalic.      Nose: Nose normal. No congestion.      Mouth/Throat:      Mouth: Mucous membranes are moist.   Eyes:      Pupils: Pupils are equal, round, and reactive to light.   Cardiovascular:      Rate and Rhythm: Normal rate and regular rhythm.      Pulses: Normal pulses.      Heart sounds: Normal heart sounds.   Pulmonary:      Effort: Pulmonary effort is normal.      Breath sounds: Decreased breath sounds present.   Abdominal:      General: Bowel sounds are normal. There is no distension.      Palpations: Abdomen is soft.   Musculoskeletal:      Cervical back: Neck supple. No tenderness.   Skin:     General: Skin is warm and dry.      Comments: Midline surgical incision   Neurological:      General: No focal deficit present.      Mental Status: He is alert and oriented to person, place, and time.       Cardiac Medications:    ASA - Yes    Plavix - No; contraindicated because of On Coumadin / NOAC    Post-operative Beta Blockers - Yes    Ace/ARB- No; contraindicated because of Normal EF    Statin - No; contraindicated because of No CAD    Aldactone- No; contraindicated because of Normal EF    SGLT2i-  No contraindicated because of No; contraindicated because of Normal EF    Ejection  Fraction:  70%    Telemetry:   1/26 SB-SR  1/27 SR  1/29 SR    Assessment/Plan:  POD 1  HDS, SB/SR, neuro intact, wounds intact, abdomen soft, fluid balance positive, wt up,  2 L NC. 270 out of chest tubes overnight. H/H 7.7/26.6 transfused this morning. Pt c/o uncontrolled pain. Plan:  Keep chest tubes, pacing wires and walker. Diurese. Add baclofen and lidocaine for pain. IS/ambulate.     POD 2  HDS, SR, neuro intact, wounds intact, abdomen soft, fluid balance positive, wt up,  3 L NC.  60 out of chest tubes overnight. Plan:  Remove chest tubes, pacing wires, and walker. Increase diuresis. Add losartan. IS/ambulate. Transfer to tele.     POD 3  HDS, SR, neuro intact, wounds intact, abdomen soft no BM, fluid balance positive, wt at admission wt,  0.5 L NC.  Plan:  Continue diuresis, wean MS contin, IS/ambulate. Anticipate home 1-2 days.    POD 4 HTN - inc losartan, SR, diuresed well- dec lasix, Room air, ambulating, pain- improved- d/c dilaudid, INR- not therapeutic- keep one more day to ensure INR trending in the right direction     Disposition:  PT/OT- no needs  1/29- referral sent to outpatient pain management

## 2024-01-30 ENCOUNTER — APPOINTMENT (OUTPATIENT)
Dept: RADIOLOGY | Facility: MEDICAL CENTER | Age: 44
DRG: 219 | End: 2024-01-30
Attending: NURSE PRACTITIONER
Payer: COMMERCIAL

## 2024-01-30 LAB
ANION GAP SERPL CALC-SCNC: 11 MMOL/L (ref 7–16)
BUN SERPL-MCNC: 13 MG/DL (ref 8–22)
CALCIUM SERPL-MCNC: 8.6 MG/DL (ref 8.5–10.5)
CHLORIDE SERPL-SCNC: 107 MMOL/L (ref 96–112)
CO2 SERPL-SCNC: 24 MMOL/L (ref 20–33)
CREAT SERPL-MCNC: 0.81 MG/DL (ref 0.5–1.4)
ERYTHROCYTE [DISTWIDTH] IN BLOOD BY AUTOMATED COUNT: 52.8 FL (ref 35.9–50)
GFR SERPLBLD CREATININE-BSD FMLA CKD-EPI: 112 ML/MIN/1.73 M 2
GLUCOSE SERPL-MCNC: 100 MG/DL (ref 65–99)
HCT VFR BLD AUTO: 34.6 % (ref 42–52)
HGB BLD-MCNC: 10.4 G/DL (ref 14–18)
INR PPP: 1.12 (ref 0.87–1.13)
MCH RBC QN AUTO: 22.6 PG (ref 27–33)
MCHC RBC AUTO-ENTMCNC: 30.1 G/DL (ref 32.3–36.5)
MCV RBC AUTO: 75.1 FL (ref 81.4–97.8)
PLATELET # BLD AUTO: 367 K/UL (ref 164–446)
PMV BLD AUTO: 9 FL (ref 9–12.9)
POTASSIUM SERPL-SCNC: 4.2 MMOL/L (ref 3.6–5.5)
PROTHROMBIN TIME: 14.5 SEC (ref 12–14.6)
RBC # BLD AUTO: 4.61 M/UL (ref 4.7–6.1)
SODIUM SERPL-SCNC: 142 MMOL/L (ref 135–145)
WBC # BLD AUTO: 5.8 K/UL (ref 4.8–10.8)

## 2024-01-30 PROCEDURE — A9270 NON-COVERED ITEM OR SERVICE: HCPCS | Performed by: NURSE PRACTITIONER

## 2024-01-30 PROCEDURE — A9270 NON-COVERED ITEM OR SERVICE: HCPCS | Performed by: THORACIC SURGERY (CARDIOTHORACIC VASCULAR SURGERY)

## 2024-01-30 PROCEDURE — 770020 HCHG ROOM/CARE - TELE (206)

## 2024-01-30 PROCEDURE — 700102 HCHG RX REV CODE 250 W/ 637 OVERRIDE(OP): Performed by: NURSE PRACTITIONER

## 2024-01-30 PROCEDURE — 700102 HCHG RX REV CODE 250 W/ 637 OVERRIDE(OP): Performed by: THORACIC SURGERY (CARDIOTHORACIC VASCULAR SURGERY)

## 2024-01-30 PROCEDURE — 80048 BASIC METABOLIC PNL TOTAL CA: CPT

## 2024-01-30 PROCEDURE — 85027 COMPLETE CBC AUTOMATED: CPT

## 2024-01-30 PROCEDURE — 97530 THERAPEUTIC ACTIVITIES: CPT

## 2024-01-30 PROCEDURE — 85610 PROTHROMBIN TIME: CPT

## 2024-01-30 PROCEDURE — 97116 GAIT TRAINING THERAPY: CPT

## 2024-01-30 PROCEDURE — RXMED WILLOW AMBULATORY MEDICATION CHARGE: Performed by: NURSE PRACTITIONER

## 2024-01-30 PROCEDURE — 36415 COLL VENOUS BLD VENIPUNCTURE: CPT

## 2024-01-30 PROCEDURE — 74018 RADEX ABDOMEN 1 VIEW: CPT

## 2024-01-30 PROCEDURE — 700111 HCHG RX REV CODE 636 W/ 250 OVERRIDE (IP): Mod: JZ | Performed by: NURSE PRACTITIONER

## 2024-01-30 RX ORDER — ASPIRIN 81 MG/1
81 TABLET ORAL DAILY
Qty: 100 TABLET | Refills: 0 | Status: SHIPPED | OUTPATIENT
Start: 2024-01-31 | End: 2024-02-13 | Stop reason: SDUPTHER

## 2024-01-30 RX ORDER — WARFARIN SODIUM 7.5 MG/1
7.5 TABLET ORAL DAILY
Status: DISCONTINUED | OUTPATIENT
Start: 2024-01-30 | End: 2024-01-31 | Stop reason: HOSPADM

## 2024-01-30 RX ORDER — OXYCODONE HYDROCHLORIDE 10 MG/1
10 TABLET ORAL EVERY 6 HOURS PRN
Qty: 56 TABLET | Refills: 0 | Status: SHIPPED | OUTPATIENT
Start: 2024-01-30 | End: 2024-02-13 | Stop reason: SDUPTHER

## 2024-01-30 RX ORDER — WARFARIN SODIUM 7.5 MG/1
7.5 TABLET ORAL DAILY
Qty: 30 TABLET | Refills: 3 | Status: ACTIVE | OUTPATIENT
Start: 2024-01-30 | End: 2024-02-13 | Stop reason: SDUPTHER

## 2024-01-30 RX ORDER — TORSEMIDE 20 MG/1
10 TABLET ORAL DAILY
Qty: 30 TABLET | Refills: 0 | Status: SHIPPED | OUTPATIENT
Start: 2024-01-31 | End: 2024-02-27

## 2024-01-30 RX ORDER — LOSARTAN POTASSIUM 50 MG/1
50 TABLET ORAL DAILY
Qty: 30 TABLET | Refills: 2 | Status: SHIPPED | OUTPATIENT
Start: 2024-01-31 | End: 2024-02-13 | Stop reason: SDUPTHER

## 2024-01-30 RX ORDER — POTASSIUM CHLORIDE 750 MG/1
10 TABLET, EXTENDED RELEASE ORAL DAILY
Qty: 30 TABLET | Refills: 0 | Status: SHIPPED | OUTPATIENT
Start: 2024-01-31 | End: 2024-02-27

## 2024-01-30 RX ADMIN — ONDANSETRON 8 MG: 2 INJECTION INTRAMUSCULAR; INTRAVENOUS at 04:20

## 2024-01-30 RX ADMIN — METOPROLOL TARTRATE 12.5 MG: 25 TABLET, FILM COATED ORAL at 11:44

## 2024-01-30 RX ADMIN — POLYETHYLENE GLYCOL 3350 1 PACKET: 17 POWDER, FOR SOLUTION ORAL at 08:20

## 2024-01-30 RX ADMIN — Medication 1 APPLICATOR: at 19:28

## 2024-01-30 RX ADMIN — SUCRALFATE 1 G: 1 TABLET ORAL at 08:21

## 2024-01-30 RX ADMIN — ASPIRIN 81 MG: 81 TABLET, COATED ORAL at 08:20

## 2024-01-30 RX ADMIN — MAGNESIUM HYDROXIDE 30 ML: 1200 LIQUID ORAL at 08:22

## 2024-01-30 RX ADMIN — OXYCODONE HYDROCHLORIDE 20 MG: 10 TABLET ORAL at 22:29

## 2024-01-30 RX ADMIN — PROCHLORPERAZINE EDISYLATE 10 MG: 5 INJECTION INTRAMUSCULAR; INTRAVENOUS at 07:30

## 2024-01-30 RX ADMIN — ACETAMINOPHEN 1000 MG: 500 TABLET ORAL at 00:20

## 2024-01-30 RX ADMIN — DOCUSATE SODIUM 50 MG AND SENNOSIDES 8.6 MG 2 TABLET: 8.6; 5 TABLET, FILM COATED ORAL at 08:21

## 2024-01-30 RX ADMIN — ACETAMINOPHEN 1000 MG: 500 TABLET ORAL at 11:44

## 2024-01-30 RX ADMIN — ENOXAPARIN SODIUM 40 MG: 100 INJECTION SUBCUTANEOUS at 17:58

## 2024-01-30 RX ADMIN — GABAPENTIN 200 MG: 100 CAPSULE ORAL at 08:21

## 2024-01-30 RX ADMIN — OXYCODONE HYDROCHLORIDE 20 MG: 10 TABLET ORAL at 01:08

## 2024-01-30 RX ADMIN — METOPROLOL TARTRATE 12.5 MG: 25 TABLET, FILM COATED ORAL at 17:58

## 2024-01-30 RX ADMIN — OXYCODONE HYDROCHLORIDE 20 MG: 10 TABLET ORAL at 19:28

## 2024-01-30 RX ADMIN — LOSARTAN POTASSIUM 50 MG: 50 TABLET, FILM COATED ORAL at 08:22

## 2024-01-30 RX ADMIN — TORSEMIDE 20 MG: 20 TABLET ORAL at 08:22

## 2024-01-30 RX ADMIN — WARFARIN SODIUM 7.5 MG: 7.5 TABLET ORAL at 17:58

## 2024-01-30 RX ADMIN — SUCRALFATE 1 G: 1 TABLET ORAL at 17:58

## 2024-01-30 RX ADMIN — OXYCODONE HYDROCHLORIDE 20 MG: 10 TABLET ORAL at 11:43

## 2024-01-30 RX ADMIN — SUCRALFATE 1 G: 1 TABLET ORAL at 11:44

## 2024-01-30 RX ADMIN — POTASSIUM CHLORIDE 10 MEQ: 1500 TABLET, EXTENDED RELEASE ORAL at 08:21

## 2024-01-30 RX ADMIN — GABAPENTIN 200 MG: 100 CAPSULE ORAL at 17:58

## 2024-01-30 RX ADMIN — DOCUSATE SODIUM 50 MG AND SENNOSIDES 8.6 MG 2 TABLET: 8.6; 5 TABLET, FILM COATED ORAL at 17:58

## 2024-01-30 RX ADMIN — GABAPENTIN 200 MG: 100 CAPSULE ORAL at 11:44

## 2024-01-30 RX ADMIN — OXYCODONE HYDROCHLORIDE 20 MG: 10 TABLET ORAL at 08:31

## 2024-01-30 RX ADMIN — ACETAMINOPHEN 1000 MG: 500 TABLET ORAL at 08:22

## 2024-01-30 RX ADMIN — ACETAMINOPHEN 1000 MG: 500 TABLET ORAL at 17:58

## 2024-01-30 RX ADMIN — OXYCODONE HYDROCHLORIDE 20 MG: 10 TABLET ORAL at 04:19

## 2024-01-30 RX ADMIN — Medication 1 APPLICATOR: at 08:31

## 2024-01-30 RX ADMIN — ACETAMINOPHEN 1000 MG: 500 TABLET ORAL at 22:40

## 2024-01-30 RX ADMIN — OMEPRAZOLE 20 MG: 20 CAPSULE, DELAYED RELEASE ORAL at 08:21

## 2024-01-30 RX ADMIN — OXYCODONE HYDROCHLORIDE 20 MG: 10 TABLET ORAL at 16:08

## 2024-01-30 ASSESSMENT — COGNITIVE AND FUNCTIONAL STATUS - GENERAL
MOBILITY SCORE: 24
SUGGESTED CMS G CODE MODIFIER MOBILITY: CH

## 2024-01-30 ASSESSMENT — PAIN DESCRIPTION - PAIN TYPE
TYPE: ACUTE PAIN
TYPE: ACUTE PAIN
TYPE: ACUTE PAIN;SURGICAL PAIN
TYPE: ACUTE PAIN
TYPE: ACUTE PAIN;SURGICAL PAIN
TYPE: ACUTE PAIN
TYPE: ACUTE PAIN
TYPE: ACUTE PAIN;SURGICAL PAIN

## 2024-01-30 ASSESSMENT — GAIT ASSESSMENTS
DISTANCE (FEET): 600
GAIT LEVEL OF ASSIST: SUPERVISED

## 2024-01-30 ASSESSMENT — FIBROSIS 4 INDEX: FIB4 SCORE: 0.36

## 2024-01-30 NOTE — PROGRESS NOTES
Patient having nausea and vomiting. Patient open to taking morning medications after ZOfran has had time to take effect.

## 2024-01-30 NOTE — THERAPY
Physical Therapy   Daily Treatment     Patient Name: Roberto Braswell  Age:  43 y.o., Sex:  male  Medical Record #: [id number]  Today's Date: 1/30/2024     Precautions  Precautions: Fall Risk;Cardiac Precautions (See Comments);Sternal Precautions (See Comments)    Assessment    Pt tolerating ambulation around unit with no problems, no balance issues, no weakness, good endurance. Education done, reviewed packet completely, pt with no questions. Pt reports that his Dad is coming to stay to help while pt's wife is in hospital. Dad can do driving and assist with all as needed.     Plan    Treatment Plan Status: Modify Current Treatment Plan  Type of Treatment: Gait Training, Stair Training, Therapeutic Activities, Therapeutic Exercise  Treatment Frequency: 4 Times per Week  Treatment Duration: Until Therapy Goals Met    DC Equipment Recommendations: None  Discharge Recommendations: Other - (phase II CR)         Objective       01/30/24 0852   Charge Group   Charges  Yes   PT Gait Training (Units) 1   PT Therapeutic Activities (Units) 1   Total Time Spent   PT Total Time Yes   PT Therapeutic Activities Time Spent (Mins) 10   PT Self Care/Home Evaluation Time Spent (Mins) 8   PT Total Time Spent (Calculated) 18   Precautions   Precautions Fall Risk;Cardiac Precautions (See Comments);Sternal Precautions (See Comments)   Cognition    Cognition / Consciousness WDL   Level of Consciousness Alert   Active ROM Lower Body    Active ROM Lower Body  WDL   Strength Lower Body   Lower Body Strength  WDL   Home Exercise Program   Comments reviewed walking program for home, handout reviewed   Balance   Sitting Balance (Static) Good   Sitting Balance (Dynamic) Good   Standing Balance (Static) Good   Standing Balance (Dynamic) Good   Weight Shift Sitting Good   Weight Shift Standing Good   Skilled Intervention Verbal Cuing   Comments with no AD   Bed Mobility    Supine to Sit Supervised   Sit to Supine Supervised   Scooting Supervised    Rolling Supervised   Skilled Intervention Verbal Cuing   Comments HOB up, has mechanical HOB at home.   Gait Analysis   Gait Level Of Assist Supervised   Assistive Device None   Distance (Feet) 600   # of Times Distance was Traveled 1   Deviation   (stable gait, no limp today)   # of Stairs Climbed 5   Level of Assist with Stairs Supervised   Skilled Intervention Verbal Cuing;Sequencing   Functional Mobility   Sit to Stand Supervised   Bed, Chair, Wheelchair Transfer Supervised   Skilled Intervention Verbal Cuing   How much difficulty does the patient currently have...   Turning over in bed (including adjusting bedclothes, sheets and blankets)? 4   Sitting down on and standing up from a chair with arms (e.g., wheelchair, bedside commode, etc.) 4   Moving from lying on back to sitting on the side of the bed? 4   How much help from another person does the patient currently need...   Moving to and from a bed to a chair (including a wheelchair)? 4   Need to walk in a hospital room? 4   Climbing 3-5 steps with a railing? 4   6 clicks Mobility Score 24   Activity Tolerance   Standing 10   Short Term Goals    Short Term Goal # 1 amb 350ft supervised in 6 visits to progress with functional distances for cardiac rehab   Goal Outcome # 1 Goal met   Short Term Goal # 2 up/down 2 steps with SBA in 6 visits to enter bedroom   Goal Outcome # 2 Goal met   Short Term Goal # 3 Patient will verbalize cardiac rehab education including sternal precautions, talk test, and strategies for monitoring and modifying activity within 6tx in order to demonstrate understanding   Goal Outcome # 3 Goal met   Education Group   Cardiac Precautions Patient Response Patient;Acceptance;Explanation;Verbal Demonstration   Sternal Precautions Patient Response Patient;Acceptance;Explanation;Action Demonstration   Additional Comments pt reports clear on handout. Reviewed entire packet, pt with no additional questions.   Physical Therapy Treatment Plan    Physical Therapy Treatment Plan Modify Current Treatment Plan   Reason For Discharge Discharge Secondary to Goals Met   Anticipated Discharge Equipment and Recommendations   DC Equipment Recommendations None   Discharge Recommendations Other -  (phase II CR)   Interdisciplinary Plan of Care Collaboration   IDT Collaboration with  Nursing   Patient Position at End of Therapy In Bed;Call Light within Reach;Tray Table within Reach;Phone within Reach   Collaboration Comments nsg updated. Pt up in chair after therapy eating breakfast, stopped in to check pt a few minutes later and pt climbing back into bed with no assist needed.   Session Information   Date / Session Number  1/30-2, dc PT, goals met   Priority   (dc PT, goals met)

## 2024-01-30 NOTE — CARE PLAN
"The patient is Stable - Low risk of patient condition declining or worsening    Shift Goals  Clinical Goals: Patient will verbalize understanding of POC. Patient's pain will remain at mutually defined goal of 7/10. Patient will develop a support system plan for when discharged home.  Patient Goals: \"Stay admitted until my INR levels are where they should be.\"  Family Goals: JJ    Progress made toward(s) clinical / shift goals: Educated patient about 1-10 pain scale, regular pain assessments, and available pharmaciological and non-pharmacological pain relief.  Provided PRN pain relief as ordered and utilized oxycodone 20 mg PO. Patient expressed understanding and had no further questions at this time.  Proper hand hygiene before and after patient care to ensure patient will remain free from infection. Discussed importance of watching sodium intake and total fat/saturate fate content of foods following discharge.   Provided patient with therapeutic communication while patient was upset and tearful over his wife whom is currently admitted to the hospital for spinal injury that resulted from a ground level fall at home. Patient was provided a verbal discussion related to social support system prior to discharge to help him in his recovery. Proper hand hygiene before and after patient care to ensure patient will remain free from infection.        Problem: Post Op Day 5 CABG/Heart Valve Replacement  Goal: Optimal care of the Post Op CABG/Heart Valve replacement Post Op Day 5  1/30/2024 0431 by Aftab Castillo R.N.  Note: Provided patient with a verbal discussion related to POC POD#5 to include importance of being up to chair for all meals, ambulating x4/day, IS every hour while awake. Need for incision care and daily CHG bath. Standing weight obtained  1/30/2024 0431 by Aftab Castillo R.N.  Outcome: Progressing  1/30/2024 0309 by Aftab Castillo R.N.  Outcome: Progressing         Patient is not progressing towards " the following goals: Patient's pain remains uncontrolled and above goal of 7/10 despite pharmacologic means of aggressive pain management.   Patient has not had a bowel movement in 5 days since procedure. Will provide all stool softeners/laxatives. Encouraged more ambulation, increased water intake, and prune juice.   Provided patient with a verbal discussion related to diet following admission.       Problem: Pain - Standard  Goal: Alleviation of pain or a reduction in pain to the patient’s comfort goal  1/30/2024 0431 by Aftab Castillo R.N.  Outcome: Not Progressing  1/30/2024 0431 by Aftab Castillo R.N.  Outcome: Progressing  1/30/2024 0309 by Aftab Castillo R.N.  Outcome: Not Progressing     Problem: Nutrition - Advanced  Goal: Patient will display progressive weight gain toward goal have adequate food and fluid intake  1/30/2024 0431 by Aftab Castillo R.N.  Outcome: Not Progressing  1/30/2024 0431 by Aftab Castillo R.N.  Outcome: Progressing     Problem: Bowel Elimination - Post Surgical  Goal: Patient will resume regular bowel sounds and function with no discomfort or distention  1/30/2024 0431 by Aftab Castillo R.N.  Outcome: Not Progressing  1/30/2024 0431 by Aftab Castillo, R.N.  Outcome: Progressing     Problem: Psychosocial  Goal: Patient's ability to verbalize feelings about condition will improve  1/30/2024 0431 by Aftab Castillo R.N.  Outcome: Not Progressing  1/30/2024 0431 by Aftab Castillo, R.N.  Outcome: Progressing  Goal: Patient's ability to re-evaluate and adapt role responsibilities will improve  1/30/2024 0431 by Aftab Castillo R.N.  Outcome: Not Progressing  1/30/2024 0431 by Aftab Castillo, R.N.  Outcome: Progressing  Goal: Patient and family will demonstrate ability to cope with life altering diagnosis and/or procedure  1/30/2024 0431 by Aftab Castillo R.N.  Outcome: Not Progressing  1/30/2024 0431 by Aftab Castillo R.N.  Outcome: Progressing     Problem:  Discharge Barriers/Planning  Goal: Patient's continuum of care needs are met  1/30/2024 0431 by Aftab Castillo RJJ.  Outcome: Not Progressing  1/30/2024 0431 by Aftab Castillo R.GYPSY.  Outcome: Progressing

## 2024-01-30 NOTE — DISCHARGE PLANNING
Case Management Discharge Planning    Admission Date: 1/25/2024  GMLOS: 8.7  ALOS: 5    6-Clicks ADL Score: 21  6-Clicks Mobility Score: 24      Anticipated Discharge Dispo: Discharge Disposition: D/T to home under [facility] care in anticipation of covered skilled care (06)    DME Needed: No    Action(s) Taken: CHAGO informed by APRN that pt is medically cleared. Wanting to know if HH accepted. Per Epic response, [facility] accepted pt. Notified APRN.    [facility] informed by APRN that pt is no longer medically cleared.    Escalations Completed: None    Medically Clear: Yes    Next Steps: F/U with medical team to discuss discharge needs and barriers.    Barriers to Discharge: Medical clearance

## 2024-01-30 NOTE — PROGRESS NOTES
Cardiovascular Surgery Progress Note    Name: Roberto Braswell  MRN: [id number]  : 1980  Admit Date: 2024  5:25 AM  5 Days Post-Op     Procedure:  Procedure(s) and Anesthesia Type:     * MITRAL VALVE REPAIR, TRANSESOPHAGEAL ECHOCARDIOGRAM - General     * ECHOCARDIOGRAM, TRANSESOPHAGEAL, INTRAOPERATIVE - General    Vitals:  Vitals:    24 2355 24 0428 24 0731 24 0822   BP: 134/78 (!) 145/81 139/79 (!) 147/88   Pulse: 61 95 78    Resp: 18 18 17    Temp: 36.5 °C (97.7 °F) 36.7 °C (98.1 °F) 36.6 °C (97.9 °F)    TempSrc: Temporal Temporal Temporal    SpO2: 95% 96% 94%    Weight:  103 kg (227 lb 15.3 oz)     Height:          Temp (24hrs), Av.6 °C (97.9 °F), Min:36.4 °C (97.5 °F), Max:36.7 °C (98.1 °F)      Respiratory:    Respiration: 17, Pulse Oximetry: 94 %       Fluids:    Intake/Output Summary (Last 24 hours) at 2024 1036  Last data filed at 2024 1000  Gross per 24 hour   Intake 1240 ml   Output 450 ml   Net 790 ml       Admit weight: Weight: 107 kg (236 lb 15.9 oz)  Current weight: Weight: 103 kg (227 lb 15.3 oz) (24 042)    Labs:  Recent Labs     24  03024  0314   WBC 7.3 6.6 5.8   RBC 3.90* 4.72 4.61*   HEMOGLOBIN 8.8* 10.8* 10.4*   HEMATOCRIT 29.2* 35.1* 34.6*   MCV 74.9* 74.4* 75.1*   MCH 22.6* 22.9* 22.6*   MCHC 30.1* 30.8* 30.1*   RDW 52.0* 52.0* 52.8*   PLATELETCT 252 322 367   MPV 9.2 9.0 9.0       Recent Labs     01/28/24   SODIUM 138 142 142   POTASSIUM 4.1 4.1 4.2   CHLORIDE 106 106 107   CO2  24   GLUCOSE 100* 100* 100*   BUN 13 14 13   CREATININE 0.90 0.88 0.81   CALCIUM 7.7* 8.5 8.6       Recent Labs     24   INR 1.14* 1.03 1.12         HgbA1c:  5.4    Diabetic Educator Consult:  no    Medications:  Scheduled Medications   Medication Dose Frequency    warfarin  7.5 mg DAILY AT 1800    losartan  50 mg DAILY    torsemide  20 mg Q DAY     potassium chloride SA  10 mEq DAILY    gabapentin  200 mg TID    lidocaine  1 Patch Q24HR    enoxaparin (LOVENOX) injection  40 mg DAILY AT 1800    Nozin nasal  swab  1 Applicator BID    aspirin  81 mg DAILY    Pharmacy Consult Request  1 Each PHARMACY TO DOSE    acetaminophen  1,000 mg Q6HRS    senna-docusate  2 Tablet BID    And    polyethylene glycol/lytes  1 Packet DAILY    And    magnesium hydroxide  30 mL DAILY    omeprazole  20 mg DAILY    MD Alert...Warfarin per Pharmacy   PHARMACY TO DOSE    sucralfate  1 g TID AC        Exam:   Physical Exam  Vitals and nursing note reviewed.   Constitutional:       General: He is not in acute distress.     Appearance: Normal appearance.   HENT:      Head: Normocephalic.      Nose: Nose normal. No congestion.      Mouth/Throat:      Mouth: Mucous membranes are moist.   Eyes:      Pupils: Pupils are equal, round, and reactive to light.   Cardiovascular:      Rate and Rhythm: Normal rate and regular rhythm.      Pulses: Normal pulses.      Heart sounds: Normal heart sounds.   Pulmonary:      Effort: Pulmonary effort is normal.      Breath sounds: Decreased breath sounds present.   Abdominal:      General: Bowel sounds are normal. There is no distension.      Palpations: Abdomen is soft.   Musculoskeletal:      Cervical back: Neck supple. No tenderness.   Skin:     General: Skin is warm and dry.      Comments: Midline surgical incision   Neurological:      General: No focal deficit present.      Mental Status: He is alert and oriented to person, place, and time.       Cardiac Medications:    ASA - Yes    Plavix - No; contraindicated because of On Coumadin / NOAC    Post-operative Beta Blockers - Yes    Ace/ARB- No; contraindicated because of Normal EF    Statin - No; contraindicated because of No CAD    Aldactone- No; contraindicated because of Normal EF    SGLT2i-  No contraindicated because of No; contraindicated because of Normal EF    Ejection  Fraction:  70%    Telemetry:   1/26 SB-SR  1/27 SR  1/29 SR    Assessment/Plan:  POD 1  HDS, SB/SR, neuro intact, wounds intact, abdomen soft, fluid balance positive, wt up,  2 L NC. 270 out of chest tubes overnight. H/H 7.7/26.6 transfused this morning. Pt c/o uncontrolled pain. Plan:  Keep chest tubes, pacing wires and walker. Diurese. Add baclofen and lidocaine for pain. IS/ambulate.     POD 2  HDS, SR, neuro intact, wounds intact, abdomen soft, fluid balance positive, wt up,  3 L NC.  60 out of chest tubes overnight. Plan:  Remove chest tubes, pacing wires, and walker. Increase diuresis. Add losartan. IS/ambulate. Transfer to [facility].     POD 3  HDS, SR, neuro intact, wounds intact, abdomen soft no BM, fluid balance positive, wt at admission wt,  0.5 L NC.  Plan:  Continue diuresis, wean MS contin, IS/ambulate. Anticipate home 1-2 days.    POD 4 HTN - inc losartan, SR, diuresed well- dec lasix, Room air, ambulating, pain- improved- d/c dilaudid, INR- not therapeutic- keep one more day to ensure INR trending in the right direction     POD 5  HDS, SR, neuro intact, wounds intact, abdomen soft +BM, pt c/o N/V this morning. Fluid balance positive, wt below admission wt,  room air. INR remains subtherapeutic. Plan: KUB today, continue bowel protocol. Plan for DC tomorrow with home health for INR draws. Referral placed to pain management.       Disposition:  PT/OT- no needs  1/29- referral sent to outpatient pain management

## 2024-01-30 NOTE — DISCHARGE SUMMARY
DISCHARGE SUMMARY    ADMISSION DATE: 1/25/2024    DISCHARGE DATE: 1/30/24    ADMITTING DIAGNOSES: Severe symptomatic mitral regurgitation (4+, degenerative), mitral valve prolapse, recent GI bleeding (status post duodenal ulcer coiling), anemia, hypertension     DISCHARGE DIAGNOSES: Severe symptomatic mitral regurgitation (4+, degenerative), mitral valve prolapse, recent GI bleeding (status post duodenal ulcer coiling), anemia, hypertension     PROCEDURES PERFORMED:   1/25/24 Dr. Santana  RADICAL MITRAL VALVE REPAIR (P2 quadrangular resection, cord reimplantation, 38 mm Syed flexible annuloplasty band) and intraoperative transesophageal echocardiography     HISTORY OF PRESENT ILLNESS:  The patient is a 43 y.o. male with history of hypertension, recent GI bleed - duodenal ulcer coiling 5 weeks ago and severe mitral regurgitation. Today, he states he has been having increasing shortness of breath for the last 6 months. He has occasional chest pains even at rest that resolve with rest. He denies orthopnea, syncope. He went from going to the gym and 3 days a week to only being able to walk around his house and climb 1 flight of stairs. He was seen by GI on 12/7/23 and had EGD and colonoscopy scheduled for 12/28/23 to check that the duodenal ulcer is healed prior to proceeding with surgery.     HOSPITAL COURSE:   POD 1  HDS, SB/SR, neuro intact, wounds intact, abdomen soft, fluid balance positive, wt up,  2 L NC. 270 out of chest tubes overnight. H/H 7.7/26.6 transfused this morning. Pt c/o uncontrolled pain. Plan:  Keep chest tubes, pacing wires and walker. Diurese. Add baclofen and lidocaine for pain. IS/ambulate.      POD 2  HDS, SR, neuro intact, wounds intact, abdomen soft, fluid balance positive, wt up,  3 L NC.  60 out of chest tubes overnight. Plan:  Remove chest tubes, pacing wires, and walker. Increase diuresis. Add losartan. IS/ambulate. Transfer to [facility].      POD 3  HDS, SR, neuro intact, wounds intact,  abdomen soft no BM, fluid balance positive, wt at admission wt,  0.5 L NC.  Plan:  Continue diuresis, wean MS contin, IS/ambulate. Anticipate home 1-2 days.     POD 4 HTN - inc losartan, SR, diuresed well- dec lasix, Room air, ambulating, pain- improved- d/c dilaudid, INR- not therapeutic- keep one more day to ensure INR trending in the right direction     POD 5  HDS, SR, neuro intact, wounds intact, abdomen soft +BM, fluid balance positive, wt below admission wt,  room air.  Plan:  Discharge home today.  for INR draws- next INR draw 72 hours after discharge. Referral placed to pain management. Patient to follow up in clinic in one month.     POD 6 HDS. SR. Neuro intact.  Wounds CDI.  Abdomen soft +BM yesterday.  Nausea resolved.  Weight below admit.  RA. Cr 0.81 Hgb stable.  Plan: DC home with home health.  Meds to beds.  Add PRN zofran.    TELEMETRY:  1/26 SB-SR  1/27 SR  1/28 SR  1/29 SR  1/30 SR  1/31 SR    RECENT LABS:     Lab Results   Component Value Date/Time    SODIUM 142 01/31/2024 01:42 AM    POTASSIUM 4.3 01/31/2024 01:42 AM    CHLORIDE 106 01/31/2024 01:42 AM    CO2 25 01/31/2024 01:42 AM    GLUCOSE 96 01/31/2024 01:42 AM    BUN 19 01/31/2024 01:42 AM    CREATININE 1.25 01/31/2024 01:42 AM      Lab Results   Component Value Date/Time    WBC 6.7 01/31/2024 01:42 AM    RBC 4.96 01/31/2024 01:42 AM    HEMOGLOBIN 11.1 (L) 01/31/2024 01:42 AM    HEMATOCRIT 37.1 (L) 01/31/2024 01:42 AM    MCV 74.8 (L) 01/31/2024 01:42 AM    MCH 22.4 (L) 01/31/2024 01:42 AM    MCHC 29.9 (L) 01/31/2024 01:42 AM    MPV 9.0 01/31/2024 01:42 AM    NEUTSPOLYS 44.40 01/23/2024 09:04 AM    LYMPHOCYTES 36.80 01/23/2024 09:04 AM    MONOCYTES 11.80 01/23/2024 09:04 AM    EOSINOPHILS 4.60 01/23/2024 09:04 AM    BASOPHILS 2.10 (H) 01/23/2024 09:04 AM    HYPOCHROMIA 1+ 09/14/2023 05:17 AM    ANISOCYTOSIS 1+ 10/09/2023 09:33 AM      Lab Results   Component Value Date/Time    PROTHROMBTM 13.8 01/31/2024 01:42 AM    INR 1.05 01/31/2024  01:42 AM        Fluids:    Intake/Output Summary (Last 24 hours) at 1/31/2024 1045  Last data filed at 1/31/2024 0754  Gross per 24 hour   Intake 1376 ml   Output 1775 ml   Net -399 ml     Admit weight: Weight: 107 kg (236 lb 15.9 oz)  Current weight: Weight: 104 kg (228 lb 13.4 oz) (01/31/24 0147)    ALLERGIES:     Morphine and Tramadol    EJECTION FRACTION:  70%    CARDIAC MEDICATIONS:    ASA - Yes    Plavix - No; contraindicated because of On Coumadin / NOAC    Post-operative Beta Blockers - Yes    Ace/ARB- Yes    Statin - No; contraindicated because of No CAD    Aldactone- No; contraindicated because of Normal EF    SGLT2i-  No contraindicated because of No; contraindicated because of Normal EF    DISCHARGE MEDICATIONS:      Medication List        START taking these medications        Instructions   aspirin 81 MG EC tablet   Take 1 Tablet by mouth every day.  Dose: 81 mg     metoprolol tartrate 25 MG Tabs  Commonly known as: Lopressor   Take 0.5 Tablet by mouth 2 times a day.  Dose: 12.5 mg     ondansetron 4 MG Tabs tablet  Commonly known as: Zofran   Take 1 Tablet by mouth every four hours as needed for Nausea/Vomiting for up to 5 days.  Dose: 4 mg     potassium chloride SA 10 MEQ Tbcr  Commonly known as: K-Dur   Take 1 Tablet by mouth every day.  Dose: 10 mEq     torsemide 20 MG Tabs  Commonly known as: Demadex   Take 0.5 Tablet by mouth every day.  Dose: 10 mg     warfarin 7.5 MG Tabs  Commonly known as: Coumadin   Take 1 Tablet by mouth every day at 6 PM.  Dose: 7.5 mg            CHANGE how you take these medications        Instructions   losartan 50 MG Tabs  What changed:   medication strength  how much to take  Commonly known as: Cozaar   Take 1 Tablet by mouth every day.  Dose: 50 mg     oxyCODONE immediate release 10 MG immediate release tablet  What changed:   medication strength  how much to take  when to take this  reasons to take this  Commonly known as: Roxicodone   Take 1 Tablet by mouth every 6  hours as needed for Severe Pain for up to 14 days.  Dose: 10 mg            CONTINUE taking these medications        Instructions   acetaminophen 500 MG Tabs  Commonly known as: Tylenol   Take 1,000 mg by mouth 1 time a day as needed for Moderate Pain.  Dose: 1,000 mg     lidocaine-hyoscyamine-mag hydrox-al hydrox-simeth   Doctor's comments: Ingredients: 34 mL hyoscyamine 0.125 mg/5 mL, 126 mL Maalox, and 40 mL viscous lidocaine 2%.  Take 30mL  by mouth every 6 hours as needed (upset stomach).     omeprazole 20 MG delayed-release capsule  Commonly known as: PriLOSEC   Take 20 mg by mouth every day.  Dose: 20 mg     sucralfate 1 GM Tabs  Commonly known as: Carafate   Take 1 g by mouth 3 times a day before meals.  Dose: 1 g              NARCOTIC PAIN MEDICATIONS:   In prescribing controlled substances to this patient, I certify that I have obtained and reviewed their medical history. I have also made a good johnnie effort to obtain applicable records from other providers who have treated the patient .    I have conducted a physical exam and documented it. I have reviewed the patient's prescription history as maintained by the [place] Prescription Monitoring Program.     I have assessed the patient’s risk for abuse, dependency, and addiction using the validated Opioid Risk Tool.    Given the above, I believe the benefits of controlled substance therapy outweigh the risks. The reasons for prescribing controlled substances include non-narcotic, oral analgesic alternatives have been inadequate for pain control. Accordingly, I have discussed the risk and benefits, treatment plan, and alternative therapies with the patient.     Pt understands this prescription is a controlled substance which is potentially habit-forming and its use is regulated by the [employer]. It must be submitted to the pharmacy within 5 days of the date written and can not be called in or faxed to the pharmacy. Refills are subject to terms of a medicine  agreement. Any refill requires a new prescription that must be obtained from this office during regular office hours Monday through Thursday 7 am to 4 pm. We ask for 72 hours notice to get an appointment for a narcotic pain medication refill. This medicine can cause nausea, significant constipation, sedation, confusion.     DIET:   Cardiac diet    DISCHARGE INSTRUCTIONS DISCUSSED WITH THE PATIENT:      1. NO driving for 4 weeks after surgery. You may ride as a passenger.  2. NO lifting of any item over 10 lbs (e.g. gallon of milk) for 6 weeks after surgery.  3. DO walk as much as possible! Walk a minimum of once a day. Depending on your fatigue and comfort level, you may walk as much as you wish. There is no maximum.  4. Other physical activities (sex, housework, gardening, etc.) are OK after 4 weeks   5. Continue using incentive spirometer for 2 weeks, especially if going home on oxygen.    Incision Care:  1. SHOWER ONLY - no baths. Clean incision daily with plain Ivory ® soap or any other dye or perfume free soap. Then pat incision dry with clean towel. Avoid creams or lotions on the incision(s).  a. If there is any increase in redness or swelling, or separation of the incision line, or thick drainage* from any of the incisions, call right away  * Clear, thin drainage is not abnormal especially from the leg incision and/or                         chest tube sites.  2. Continue to wear your JONO Stockings for 4 weeks. You may take off the stockings when in bed or when the legs are elevated.    Patient instructed to call [facility] surgery at [phone]  if any increased shortness of breath, uncontrolled pain, weight gain greater than 3 pounds in 1 day or 5 pounds in 1 week, SBP >140, HR <60 or redness swelling or drainage of incisions.      FOLLOW-UP:   Future Appointments   Date Time Provider Department Center   2/20/2024  2:40 PM Kermit Valerio M.D. [facility] None   2/26/2024 12:15 PM CT RESOURCE PROVIDER CT  None   2/27/2024  3:45 PM SHAJI Carpenter [facility] None

## 2024-01-30 NOTE — PROGRESS NOTES
Received bedside report from KARSON Morelos, pt care assumed. VS WDL, pt assessment complete. Pt A&Ox4, c/o 7/10 pain at this time. POC discussed with pt and verbalizes no questions. Pt denies any additional needs at this time. Bed locked and in lowest position, bed alarm off as client is up to self. Pt educated on fall risk and verbalized understanding, call light within reach, hourly rounding initiated.

## 2024-01-30 NOTE — PROGRESS NOTES
Inpatient Anticoagulation Service Note for 1/30/2024    Reason for Anticoagulation: Mitral Valve Repair   Target INR: 2.0 to 3.0     Hemoglobin Value: (!) 10.4  Hematocrit Value: (!) 34.6  Lab Platelet Value: 367    INR from last 7 days       Date/Time INR Value    01/30/24 0314 1.12    01/29/24 0305 1.03    01/28/24 0220 1.14    01/27/24 0335 1.2    01/26/24 0525 1.23    01/25/24 1115 1.36          Dose from last 7 days       Date/Time Dose (mg)    01/30/24 0816 7.5    01/29/24 0856 5    01/28/24 0923 2.5    01/27/24 1508 2.5    01/26/24 1418 5          Assessment:  - Day #5 of new start warfarin s/p MV repair.  - INR 1.12 (goal 2-3), remains subtherapeutic. Initial warfarin doses should be taking effect, however INR remains stagnant. Started with cautious dosing given postop anemia (which is improving) & recent duodenal ulcer bleed s/p embolization in Nov' 23. Given no overt bleeding and high thrombotic risk s/p MV repair - further dose increase is now warranted.  - No indication for full bridging; utilizing prophylactic lovenox per cardiac surgery recommendations. Ongoing ASA therapy also noted.  - Remains on cardiac diet.     Plan:  - Increase warfarin to 7.5 mg daily.  - Continue prophylactic lovenox.  - Daily INR's and CBC's ordered.     Pharmacist suggested discharge dosing:  - Consider warfarin 7.5 mg daily. TBD pending subsequent INR trends.  - Recommend prompt INR check within 3-4 days of discharge.     Pharmacy will continue following.     Kumar Shoemaker, TrevorD

## 2024-01-31 ENCOUNTER — PHARMACY VISIT (OUTPATIENT)
Dept: PHARMACY | Facility: MEDICAL CENTER | Age: 44
End: 2024-01-31
Payer: MEDICARE

## 2024-01-31 VITALS
TEMPERATURE: 98.1 F | RESPIRATION RATE: 16 BRPM | SYSTOLIC BLOOD PRESSURE: 124 MMHG | HEIGHT: 75 IN | OXYGEN SATURATION: 95 % | HEART RATE: 65 BPM | BODY MASS INDEX: 28.45 KG/M2 | DIASTOLIC BLOOD PRESSURE: 77 MMHG | WEIGHT: 228.84 LBS

## 2024-01-31 PROBLEM — K26.4 DUODENAL ULCER WITH HEMORRHAGE: Status: RESOLVED | Noted: 2023-07-30 | Resolved: 2024-01-31

## 2024-01-31 PROBLEM — Z99.11 ENCOUNTER FOR WEANING FROM VENTILATOR (HCC): Status: RESOLVED | Noted: 2024-01-25 | Resolved: 2024-01-31

## 2024-01-31 LAB
ANION GAP SERPL CALC-SCNC: 11 MMOL/L (ref 7–16)
BUN SERPL-MCNC: 19 MG/DL (ref 8–22)
CALCIUM SERPL-MCNC: 9.1 MG/DL (ref 8.5–10.5)
CHLORIDE SERPL-SCNC: 106 MMOL/L (ref 96–112)
CO2 SERPL-SCNC: 25 MMOL/L (ref 20–33)
CREAT SERPL-MCNC: 1.25 MG/DL (ref 0.5–1.4)
ERYTHROCYTE [DISTWIDTH] IN BLOOD BY AUTOMATED COUNT: 52.8 FL (ref 35.9–50)
GFR SERPLBLD CREATININE-BSD FMLA CKD-EPI: 73 ML/MIN/1.73 M 2
GLUCOSE SERPL-MCNC: 96 MG/DL (ref 65–99)
HCT VFR BLD AUTO: 37.1 % (ref 42–52)
HGB BLD-MCNC: 11.1 G/DL (ref 14–18)
INR PPP: 1.05 (ref 0.87–1.13)
LV EJECT FRACT  99904: 60
MCH RBC QN AUTO: 22.4 PG (ref 27–33)
MCHC RBC AUTO-ENTMCNC: 29.9 G/DL (ref 32.3–36.5)
MCV RBC AUTO: 74.8 FL (ref 81.4–97.8)
PLATELET # BLD AUTO: 443 K/UL (ref 164–446)
PMV BLD AUTO: 9 FL (ref 9–12.9)
POTASSIUM SERPL-SCNC: 4.3 MMOL/L (ref 3.6–5.5)
PROTHROMBIN TIME: 13.8 SEC (ref 12–14.6)
RBC # BLD AUTO: 4.96 M/UL (ref 4.7–6.1)
SODIUM SERPL-SCNC: 142 MMOL/L (ref 135–145)
WBC # BLD AUTO: 6.7 K/UL (ref 4.8–10.8)

## 2024-01-31 PROCEDURE — A9270 NON-COVERED ITEM OR SERVICE: HCPCS | Performed by: NURSE PRACTITIONER

## 2024-01-31 PROCEDURE — 99024 POSTOP FOLLOW-UP VISIT: CPT | Performed by: NURSE PRACTITIONER

## 2024-01-31 PROCEDURE — 700102 HCHG RX REV CODE 250 W/ 637 OVERRIDE(OP): Performed by: NURSE PRACTITIONER

## 2024-01-31 PROCEDURE — 85027 COMPLETE CBC AUTOMATED: CPT

## 2024-01-31 PROCEDURE — 85610 PROTHROMBIN TIME: CPT

## 2024-01-31 PROCEDURE — RXMED WILLOW AMBULATORY MEDICATION CHARGE: Performed by: NURSE PRACTITIONER

## 2024-01-31 PROCEDURE — 80048 BASIC METABOLIC PNL TOTAL CA: CPT

## 2024-01-31 RX ORDER — ONDANSETRON 4 MG/1
4 TABLET, FILM COATED ORAL EVERY 4 HOURS PRN
Qty: 20 TABLET | Refills: 0 | Status: SHIPPED | OUTPATIENT
Start: 2024-01-31 | End: 2024-02-05

## 2024-01-31 RX ADMIN — OXYCODONE HYDROCHLORIDE 20 MG: 10 TABLET ORAL at 07:54

## 2024-01-31 RX ADMIN — DOCUSATE SODIUM 50 MG AND SENNOSIDES 8.6 MG 2 TABLET: 8.6; 5 TABLET, FILM COATED ORAL at 04:38

## 2024-01-31 RX ADMIN — SUCRALFATE 1 G: 1 TABLET ORAL at 11:41

## 2024-01-31 RX ADMIN — MAGNESIUM HYDROXIDE 30 ML: 1200 LIQUID ORAL at 04:39

## 2024-01-31 RX ADMIN — POTASSIUM CHLORIDE 10 MEQ: 1500 TABLET, EXTENDED RELEASE ORAL at 04:38

## 2024-01-31 RX ADMIN — ACETAMINOPHEN 1000 MG: 500 TABLET ORAL at 11:41

## 2024-01-31 RX ADMIN — POLYETHYLENE GLYCOL 3350 1 PACKET: 17 POWDER, FOR SOLUTION ORAL at 04:39

## 2024-01-31 RX ADMIN — LOSARTAN POTASSIUM 50 MG: 50 TABLET, FILM COATED ORAL at 04:38

## 2024-01-31 RX ADMIN — SUCRALFATE 1 G: 1 TABLET ORAL at 07:46

## 2024-01-31 RX ADMIN — OXYCODONE HYDROCHLORIDE 20 MG: 10 TABLET ORAL at 01:32

## 2024-01-31 RX ADMIN — GABAPENTIN 200 MG: 100 CAPSULE ORAL at 04:38

## 2024-01-31 RX ADMIN — ACETAMINOPHEN 1000 MG: 500 TABLET ORAL at 04:37

## 2024-01-31 RX ADMIN — GABAPENTIN 200 MG: 100 CAPSULE ORAL at 11:41

## 2024-01-31 RX ADMIN — METOPROLOL TARTRATE 12.5 MG: 25 TABLET, FILM COATED ORAL at 04:39

## 2024-01-31 RX ADMIN — OXYCODONE HYDROCHLORIDE 20 MG: 10 TABLET ORAL at 04:38

## 2024-01-31 RX ADMIN — ASPIRIN 81 MG: 81 TABLET, COATED ORAL at 04:39

## 2024-01-31 RX ADMIN — Medication 1 APPLICATOR: at 08:56

## 2024-01-31 RX ADMIN — OXYCODONE HYDROCHLORIDE 10 MG: 10 TABLET ORAL at 11:41

## 2024-01-31 RX ADMIN — TORSEMIDE 20 MG: 20 TABLET ORAL at 04:38

## 2024-01-31 RX ADMIN — OMEPRAZOLE 20 MG: 20 CAPSULE, DELAYED RELEASE ORAL at 04:38

## 2024-01-31 ASSESSMENT — PAIN DESCRIPTION - PAIN TYPE
TYPE: ACUTE PAIN

## 2024-01-31 ASSESSMENT — FIBROSIS 4 INDEX: FIB4 SCORE: 0.36

## 2024-01-31 NOTE — PROGRESS NOTES
Received bedside report from RN Loly, pt care assumed. VS WDL, pt assessment complete. Pt A&Ox4, no c/o pain at this time. POC discussed with pt and verbalizes no questions. Pt denies any additional needs at this time. Bed locked and in lowest position, bed alarm off as client is up to self. Pt educated on fall risk and verbalized understanding, call light within reach, hourly rounding initiated.

## 2024-01-31 NOTE — PROGRESS NOTES
Handoff report received from night shift nurse and pt care assumed. Pt is currently resting in bed, A&Ox4 with reports of 7/10 sternal site pain, on RA, breathing equal and unlabored. Tele box on, rate and rhythm verified. Call light and belongings within reach, bed in lowest and locked position, fall precautions in place. Pt educated on use of call light, all needs addressed. Hourly rounding in place.

## 2024-01-31 NOTE — CARE PLAN
"The patient is Stable - Low risk of patient condition declining or worsening    Shift Goal  Clinical Goals: Patient will receive education on non-pharmacological means of pain management. Patient will receive education on Warfarin. Patient will verbalize understanding of POC.  Patient Goals: \"Keep my pain down and get to go home tomorrow.\"  Family Goals: JJ    Progress made toward(s) clinical / shift goals:  Provided patient with a review of the plan of care for POD#6. Patient verbalized understanding and has been meeting all expected outcomes for POD plan of care. Educated patient about 1-10 pain scale, regular pain assessments, and available pharmaciological and non-pharmacological pain relief.  Provided PRN pain relief as ordered and utilized breathing technique, relaxation techniques, and PRN oxycodone. Patient expressed understanding and had no further questions at this time. Provided patient with a verbal discussion and printed hand out from the [employer] related to; heart healthy cooking tips, cutting back on sodium, and increasing omega-3 fatty acids, and fiber recommendations. Patient educated to use call light for assistance. Provided patient with a verbal discussion related to Warfarin to include need for blood work as outpatient to confirm therapeutic levels. Effect of Vitamin K on Warfarin. Patient verbalized understanding.     Patient is not progressing towards the following goals: Pain remains at 6-7/10 despite aggressive pain management with PRN oxycodone and non-pharm methods.       Problem: Pain - Standard  Goal: Alleviation of pain or a reduction in pain to the patient’s comfort goal  Outcome: Not Progressing  Problem: Nutrition - Advanced  Goal: Patient will display progressive weight gain toward goal have adequate food and fluid intake  Outcome: Not Progressing     "

## 2024-01-31 NOTE — PROGRESS NOTES
AVS and discharge instructions, including post-open heart surgery, appointments, and medication instructions, have been reviewed. All patient questions answered. AVS and opioid agreement signed. All lines and tele monitor removed.

## 2024-01-31 NOTE — CARE PLAN
"The patient is Stable - Low risk of patient condition declining or worsening    Shift Goals  Clinical Goals: pain control, mobility, IS, incision care, have BM  Patient Goals: pain management, nausea control  Family Goals: JJ    Progress made toward(s) clinical / shift goals:      Problem: Post Op Day 5 CABG/Heart Valve Replacement  Goal: Optimal care of the Post Op CABG/Heart Valve replacement Post Op Day 5  Outcome: Progressing  Intervention: Shower daily and clean incisions twice daily with soap and water  Note: Shower and second incision care completed  Intervention: Up in chair for all meals  Note: Patient up to chair for all meals  Intervention: Ambulate 4 times daily, increasing the distance each time  Note: Patient on track with walks for the day and requires to assistance  Intervention: Complete \"Home O2 Assessment' in vitals flowsheets if unable to wean off O2  Note: Patient is off of oxygen  Intervention: Consider removal of walker, chest tube and pacer wires if not already done  Note: All items previously removed  Intervention: Add special instructions for cardiac surgery discharge instructions to after visit summary and review with patient  Note: Ongoing discharge instructions provider     Problem: Bowel Elimination - Post Surgical  Goal: Patient will resume regular bowel sounds and function with no discomfort or distention  Outcome: Progressing  Note: Patient had a bowel movement today       Patient is not progressing towards the following goals:      "

## 2024-01-31 NOTE — DISCHARGE PLANNING
Case Management Discharge Planning    Admission Date: 1/25/2024  GMLOS: 8.7  ALOS: 6    6-Clicks ADL Score: 21  6-Clicks Mobility Score: 24    Anticipated Discharge Dispo: Discharge Disposition: D/T to home under [facility]HA care in anticipation of covered skilled care (06)    DME Needed: No    Action(s) Taken: Pt's physical address is [address] [place] [id number].    Escalations Completed: None    Medically Clear: Yes    Next Steps: None    Barriers to Discharge: None

## 2024-01-31 NOTE — DISCHARGE INSTRUCTIONS
DIVISION OF CARDIAC SURGERY   DISCHARGE INSTRUCTIONS    Activity:    NO driving for 4 weeks after surgery. You may ride as a passenger.  NO lifting, pushing, or pulling more than 10 pounds for 6 weeks.  For the next 6 weeks, keep your elbows close to your body and move within a pain-free motion when lifting, pushing or pulling.  Do not stretch both arms backwards at the same time.    Walk at least 4 times per day, there is no maximum. The goal is to increase your distance over time.  Continue using incentive spirometer for 2 weeks or until your baseline volume is reached.  If you are going home on oxygen and you were not on oxygen prior to surgery, keep using until you are oxygen free.  Weigh yourself daily.  Call your Cardiologist for a weight gain of 3 or more pounds in 1 day or more than 5 pounds in 7 days.  Take all of your medications as prescribed. Do not use a pill box for the first month at home. If you have questions, please call your nurse navigator at [phone].  Continue to wear the JONO (compression) stockings for 2-4 weeks or until all swelling is gone. You may take them off when you are in bed or when your legs are elevated.    Incision Care:    Make sure to clean your incision(s) TWICE DAILY.  Once by showering AND once using the no rinse Foam cleanser provided in the hospital.  During the shower, cleanse the incision(s) with a perfume and dye free soap (Dial, Dove, [place])  Use gentle pressure and rub up and down over incision with your hands or a washcloth. Rinse off and pat incision(s) dry with clean towel.  Keep the incision open to air. No creams or lotions on your incision(s). No baths.  If there is any increased redness or swelling, separation of the incision line, or thick drainage from any of your incisions, call the Cardiac Surgeons ([phone]).      General Instructions:    You have been referred to Cardiac Rehab.  You can start Cardiac Rehab 30 days after surgery.  If you do  not have an appointment at the time of discharge call [phone] to schedule an appointment.  Your Primary Care Doctor typically handles home oxygen. Oxygen may be stopped when your oxygen level is consistently greater than 90.  Check with your Primary Care Doctor if you are unsure.  Take all of your medications (including pain medications) as prescribed.  Taking medications other than prescribed can result in serious injury.    For Patients Discharged with Narcotic Pain Medication:     If a refill is needed, understand that only 1 refill will be provided and you must come to the Cardiac Surgeons’ office for an appointment (72 hours’ notice is required to schedule and there are no weekend appointments).  If the pain medications you are discharged on are not working, you will need to bring your remaining prescription into the office in order to receive a new prescription.  If you were taking narcotics prior to your heart surgery, the Cardiac Surgeons will provide you with one prescription and additional medications will need to be provided by your pain management doctor.  Do not drink alcohol while taking narcotics.  Lost or stolen medications will not be refilled.  If medications are stolen, report to law enforcement.    Contact Cardiac Surgery at [phone] if you have any questions.

## 2024-01-31 NOTE — DISCHARGE PLANNING
[id number]  Per Epic response [facility] has accepted. Per internal comment, [facility] is requesting a physical address. DPA to find address and call [facility] with updates.

## 2024-02-01 ENCOUNTER — TELEPHONE (OUTPATIENT)
Dept: VASCULAR LAB | Facility: MEDICAL CENTER | Age: 44
End: 2024-02-01
Payer: COMMERCIAL

## 2024-02-01 DIAGNOSIS — I34.0 MITRAL VALVE INSUFFICIENCY, UNSPECIFIED ETIOLOGY: ICD-10-CM

## 2024-02-01 DIAGNOSIS — I80.8 SUPERFICIAL THROMBOPHLEBITIS OF RIGHT UPPER EXTREMITY: ICD-10-CM

## 2024-02-01 DIAGNOSIS — Z86.718 HISTORY OF DVT IN ADULTHOOD: ICD-10-CM

## 2024-02-01 NOTE — TELEPHONE ENCOUNTER
[facility]     Received anticoagulation referral from hospital team on 1/25/24.     Pt was discharged on warfarin w/ [facility]  Spoke w/ [facility]ure[facility] Clinical Manager who confirmed he will be on service with them and is pending initial visit. Will send INR order to check upon 1st visit to fax #[phone]    1st call     Insurance: [employer]  PCP: [facility]  Locations to be seen: Any    If no response by 2/25/24 OR 2 no shows/cancellations, will remove from referral list     [facility] at [phone], fax [phone]    Jenni Vasquez, PharmD

## 2024-02-01 NOTE — DISCHARGE PLANNING
Meds-to-Beds: Discharge prescription orders listed below delivered to patient's bedside. KARSON Otto notified. Patient counseled. Patient elected to have co-payment billed to patient account.      Provided warfarin new education booklet. Reviewed signs/symptoms of bleeding and when to seek medical attention. Reviewed dietary restrictions and potential drug-drug interactions. Educated patient on importance of follow-up for INR monitoring.     Current Outpatient Medications   Medication Sig Dispense Refill    ondansetron (ZOFRAN) 4 MG Tab tablet Take 1 Tablet by mouth every four hours as needed for Nausea/Vomiting for up to 5 days. 20 Tablet 0    aspirin 81 MG EC tablet Take 1 Tablet by mouth every day. 100 Tablet 0    losartan (COZAAR) 50 MG Tab Take 1 Tablet by mouth every day. 30 Tablet 2    torsemide (DEMADEX) 20 MG Tab Take 0.5 Tablet by mouth every day. 30 Tablet 0    potassium chloride SA (K-DUR) 10 MEQ Tab CR Take 1 Tablet by mouth every day. 30 Tablet 0    warfarin (COUMADIN) 7.5 MG Tab Take 1 Tablet by mouth every day at 6 PM. 30 Tablet 3    oxyCODONE immediate release (ROXICODONE) 10 MG immediate release tablet Take 1 Tablet by mouth every 6 hours as needed for Severe Pain for up to 14 days. 56 Tablet 0    metoprolol tartrate (LOPRESSOR) 25 MG Tab Take 0.5 Tablet by mouth 2 times a day. 60 Tablet 2      Bernadette Beard, PharmD

## 2024-02-02 ENCOUNTER — TELEPHONE (OUTPATIENT)
Dept: VASCULAR LAB | Facility: MEDICAL CENTER | Age: 44
End: 2024-02-02
Payer: COMMERCIAL

## 2024-02-02 ENCOUNTER — ANTICOAGULATION MONITORING (OUTPATIENT)
Dept: VASCULAR LAB | Facility: MEDICAL CENTER | Age: 44
End: 2024-02-02
Payer: COMMERCIAL

## 2024-02-02 DIAGNOSIS — I34.0 MITRAL VALVE INSUFFICIENCY, UNSPECIFIED ETIOLOGY: ICD-10-CM

## 2024-02-02 LAB — INR PPP: 1.4 (ref 2–3.5)

## 2024-02-02 NOTE — TELEPHONE ENCOUNTER
24 Hour Monitor / ABPM / Standing Order / INR / Other: INR    PT Name: Roberto Braswell      PT MRN: [id number]      Best Call Back Number: [phone]     What does the PT need to schedule?: n/a    Any special instructions: n/a    INR Result: 1.4 (Temperature of 100.5 and other vitals normal per Anabelle with [facility])      Thank you    -Kolton RENAE

## 2024-02-03 NOTE — PROGRESS NOTES
OP Anticoagulation Service Note    Date: 2024  There were no vitals filed for this visit. - this visit was conducted telephonically as patient is receiving his INR's from home health    Anticoagulation Summary  As of 2024      INR goal:  2.0-3.0   TTR:  --   INR used for dosin.40 (2024)   Warfarin maintenance plan:  7.5 mg (7.5 mg x 1) every day   Weekly warfarin total:  52.5 mg   Plan last modified:  Trevor SingletaryD (2024)   Next INR check:  2024   Target end date:  2024    Indications    Mitral regurgitation [I34.0]                 Anticoagulation Episode Summary       INR check location:      Preferred lab:      Send INR reminders to:      Comments:  Mitral valve repair          Anticoagulation Patient Findings  Patient Findings       Negatives:  Signs/symptoms of thrombosis, Signs/symptoms of bleeding, Laboratory test error suspected, Change in health, Change in alcohol use, Change in activity, Upcoming invasive procedure, Emergency department visit, Upcoming dental procedure, Missed doses, Extra doses, Change in medications, Change in diet/appetite, Hospital admission, Bruising, Other complaints            HPI:   Roberto Braswell is new to our services, on anticoagulation therapy with warfarin (a high risk medication) for Mitral valve repair.    Also has a history of     CHADS-VASC = n/a  HAS - BLED = 1 (prior disposition to bleeding)    Provided pt education on warfarin. Including how to take, what to do if missed dose, importance of INR monitoring, drug and food interactions. Patient is aware to avoid NSAIDs and ASA (unless directed by provider). Educated pt on s/s of bleeding and thrombosis. Patient is aware to seek medical attention for severe falls or injury to their heads, to report all medication changes to our office and to notify our office of unusual bleeding or bruising. Reinforced education provided from inpatient.   Medication reviewed and updated  Hx of  bleeding ? duodenal ulcers w/ coiling (5 weeks ago) - imaging showed healed prior to surgery   Pt counseled to avoid pregnancy - n/a  Pt counseled to avoid estrogen- n/a  Pt counseled to quit smoking -n/a    A/P   INR  SUB-therapeutic.   Continue warfarin 7.5 mg daily      CC: Dr Bloch    Follow up appointment in 3 day(s) as pt is new to warfarin and will require close follow up.

## 2024-02-05 ENCOUNTER — TELEPHONE (OUTPATIENT)
Dept: CARDIOLOGY | Facility: MEDICAL CENTER | Age: 44
End: 2024-02-05
Payer: COMMERCIAL

## 2024-02-05 ENCOUNTER — ANTICOAGULATION MONITORING (OUTPATIENT)
Dept: VASCULAR LAB | Facility: MEDICAL CENTER | Age: 44
End: 2024-02-05
Payer: COMMERCIAL

## 2024-02-05 ENCOUNTER — DOCUMENTATION (OUTPATIENT)
Dept: VASCULAR LAB | Facility: MEDICAL CENTER | Age: 44
End: 2024-02-05
Payer: COMMERCIAL

## 2024-02-05 DIAGNOSIS — I34.0 MITRAL VALVE INSUFFICIENCY, UNSPECIFIED ETIOLOGY: ICD-10-CM

## 2024-02-05 LAB — INR PPP: 1.4 (ref 2–3.5)

## 2024-02-05 NOTE — PROGRESS NOTES
Anticoagulation Summary  As of 2024      INR goal:  2.0-3.0   TTR:  --   INR used for dosin.40 (2024)   Warfarin maintenance plan:  7.5 mg (7.5 mg x 1) every day   Weekly warfarin total:  52.5 mg   Plan last modified:  Trevor SingletaryD (2024)   Next INR check:  2024   Target end date:  2024    Indications    Mitral regurgitation [I34.0]                 Anticoagulation Episode Summary       INR check location:      Preferred lab:      Send INR reminders to:      Comments:  Mitral valve repair          Anticoagulation Patient Findings      Left voicemail message to report a SUB therapeutic INR of 1.4.    Will have pt BOLUS x 2 doses w/ 11.25 mg.   Requested pt contact the clinic for any s/s of unusual bleeding, bruising, clotting or any changes to diet or medication.    FU INR in 2 days via Nadine Mcmillan, PharmD, BCACP

## 2024-02-05 NOTE — TELEPHONE ENCOUNTER
Caller; Home health     PT Name: Roberto Braswell     PT Reports INR Value: 1.4    Concerns/Questions Reported: or N/A: NA    Callback Number: [phone]     Thank you  Jennifer BOO

## 2024-02-06 NOTE — PROGRESS NOTES
Initial anticoagulation clinic note and discharge summary reviewed    Patient started on anticoagulation status post mitral valve repair    Pending further recommendations from cardiology, we will continue with 3 months of oral anti-coagulation with warfarin and indefinite low-dose aspirin.    Will defer all other cardiovascular care, aside from anticoagulation, to cardiology    Michael J. Bloch, MD  [facility]    CC:   LIANNE Robledo

## 2024-02-07 ENCOUNTER — TELEPHONE (OUTPATIENT)
Dept: CARDIOTHORACIC SURGERY | Facility: MEDICAL CENTER | Age: 44
End: 2024-02-07
Payer: COMMERCIAL

## 2024-02-07 ENCOUNTER — ANTICOAGULATION MONITORING (OUTPATIENT)
Dept: VASCULAR LAB | Facility: MEDICAL CENTER | Age: 44
End: 2024-02-07
Payer: COMMERCIAL

## 2024-02-07 ENCOUNTER — TELEPHONE (OUTPATIENT)
Dept: VASCULAR LAB | Facility: MEDICAL CENTER | Age: 44
End: 2024-02-07
Payer: COMMERCIAL

## 2024-02-07 DIAGNOSIS — I34.0 MITRAL VALVE INSUFFICIENCY, UNSPECIFIED ETIOLOGY: ICD-10-CM

## 2024-02-07 LAB — INR PPP: 2 (ref 2–3.5)

## 2024-02-07 NOTE — TELEPHONE ENCOUNTER
Hospital follow up call    he is showering everyday or every other day.    he states that all incisions are healing well and approximated with no s/s of infection (redness, puss, fever, purulent drainage, or tenderness).    he states that the post op pain is mostly well controlled.  Narcotics are being utilized. Tylenol is being utilized.    he is using the IS; volume is improved, he is now at 4000.    he is walking everyday, distance is increased from the hospital.    he is obtaining daily weights. Current weight is 227 lbs which is more than the first home weight of 226 lbs. He is wearing the compression/JONO hose. He denies LE edema. He states his appetite is better too.    he is taking all prescribed meds as directed.  he has no medication questions.    he is taking vitals (HR and BP).    BP's: 130's to 150's / 70's to 80's  HR's: 80's to 90's    he has had a bowel movement since discharge.    he has no additional questions at this time. Follow up education was not needed on anything. Follow up appointments were reviewed and I ensured they have my number if issues or concerns arise.      Follow up call time was 6 minutes.

## 2024-02-07 NOTE — TELEPHONE ENCOUNTER
PT Name: Roberto Braswell    PT Reports INR Value: 2.0    Concerns/Questions Reported: or N/A: N/A    Callback Number: [phone]    Thank you,     Kalani JEFFRIES

## 2024-02-07 NOTE — PROGRESS NOTES
"CHIEF COMPLAINT: Post-op visit     PROCEDURE:   1/25/24 Dr. Santana  RADICAL MITRAL VALVE REPAIR (P2 quadrangular resection, cord reimplantation, 38 mm Syed flexible annuloplasty band) and intraoperative transesophageal echocardiography     HPI: He has been improving in his post operative period.  He verbalizes having to go to the ER for high blood pressure once but has been following up with Dr Valerio for his blood pressure which is better controlled in the office today.  His incisions are nearly fully healed with a small scab over his left mediastinal tube site, improved over the last few days per his SO.  He denies fever, chills.  He has been walking for exercise.      /84 (BP Location: Left arm, Patient Position: Sitting, BP Cuff Size: Adult)   Pulse 87   Temp 36.5 °C (97.7 °F) (Temporal)   Ht 1.905 m (6' 3\")   Wt 106 kg (233 lb)   SpO2 97%     PHYSICAL EXAM:  Cardiac: S1S2  Neuro:  AAO x 3  Resp:  CTA  Wounds:  CDI, small scab over left mediastinal site. No purulence or drainage on exam  Sternum:  Stable    PLAN: Discharge from clinic.    Continue coumadin for 3 months or per your Cardiologist's recommendations.  We reviewed post operative sternal precautions, weight limits and driving precautions moving forward.  We reviewed SBE prophylaxis.      Overall, we are very pleased with the patient’s progress and we have instructed the patient to follow-up with us in the future should they have any concerns related to their surgery. Otherwise, we will see the patient on a PRN basis. The patient will continue to follow-up with their Cardiologist and PCP.  The patient has been informed that any further prescription refills should be done through their primary care physician and/or cardiologist.  They acknowledged understanding.  Thank you for allowing us to participate in the care of this very pleasant patient and please let us know if there is any way we may be of further assistance.    "

## 2024-02-07 NOTE — PROGRESS NOTES
Anticoagulation Summary  As of 2024      INR goal:  2.0-3.0   TTR:  --   INR used for dosin.00 (2024)   Warfarin maintenance plan:  11.25 mg (7.5 mg x 1.5) every day   Weekly warfarin total:  78.75 mg   Plan last modified:  Jenni Vasquez, PharmD (2024)   Next INR check:  2024   Target end date:  2024    Indications    Mitral regurgitation [I34.0]                 Anticoagulation Episode Summary       INR check location:      Preferred lab:      Send INR reminders to:      Comments:  Mitral valve repair          Anticoagulation Patient Findings      INR is therapeutic    Called and left VM for patient.    Pt is on antiplatelet therapy with ASA for mitral valve repair.    Requested patient to contact the clinic for any s/sx of unusual bleeding, bruising, clotting or any changes to diet or medications. Unless patient reports any changes that would warrant an adjustment to the plan:  Warfarin Plan: Increase to 11.25mg daily    Next INR in 2 day(s) via Nadine FERMIN.    Jenni Vasquez, PharmD

## 2024-02-09 ENCOUNTER — ANTICOAGULATION MONITORING (OUTPATIENT)
Dept: MEDICAL GROUP | Facility: PHYSICIAN GROUP | Age: 44
End: 2024-02-09
Payer: COMMERCIAL

## 2024-02-09 ENCOUNTER — TELEPHONE (OUTPATIENT)
Dept: VASCULAR LAB | Facility: MEDICAL CENTER | Age: 44
End: 2024-02-09

## 2024-02-09 DIAGNOSIS — I34.0 MITRAL VALVE INSUFFICIENCY, UNSPECIFIED ETIOLOGY: ICD-10-CM

## 2024-02-09 LAB — INR PPP: 2.4 (ref 2–3.5)

## 2024-02-09 NOTE — TELEPHONE ENCOUNTER
PT Name: Roberto Braswell    PT Reports INR Value: 2.4    Concerns/Questions Reported: or N/A: N/A     Callback Number: [phone]    Thank you,    Kalani JEFFRIES

## 2024-02-10 NOTE — PROGRESS NOTES
OP   Telephone Anticoagulation Service Note      Anticoagulation Summary  As of 2024      INR goal:  2.0-3.0   TTR:  --   INR used for dosin.40 (2024)   Warfarin maintenance plan:  11.25 mg (7.5 mg x 1.5) every day   Weekly warfarin total:  78.75 mg   Plan last modified:  Jenni Vasquez PharmD (2024)   Next INR check:  2024   Target end date:  2024    Indications    Mitral regurgitation [I34.0]                 Anticoagulation Episode Summary       INR check location:      Preferred lab:      Send INR reminders to:      Comments:  Mitral valve repair          Anticoagulation Patient Findings  Patient Findings       Negatives:  Signs/symptoms of thrombosis, Signs/symptoms of bleeding, Laboratory test error suspected, Change in health, Change in alcohol use, Change in activity, Upcoming invasive procedure, Emergency department visit, Upcoming dental procedure, Missed doses, Extra doses, Change in medications, Change in diet/appetite, Hospital admission, Bruising, Other complaints          Spoke with the patient on the phone today, reporting a therapeutic INR of 2.4.  Confirmed the current warfarin dosing regimen and patient compliance.  Patient denies any interval changes to diet and/or medications. Patient denies any signs/symptoms of bleeding or clotting.  Patient instructed to continue with the current warfarin dosing regimen, and asked to follow up again in 3 days when HH is due back to see the patient.   Orders sent to Nadine FERMIN.     Soraya Cadena PharmD

## 2024-02-12 ENCOUNTER — APPOINTMENT (OUTPATIENT)
Dept: RADIOLOGY | Facility: MEDICAL CENTER | Age: 44
End: 2024-02-12
Attending: EMERGENCY MEDICINE
Payer: COMMERCIAL

## 2024-02-12 ENCOUNTER — TELEPHONE (OUTPATIENT)
Dept: VASCULAR LAB | Facility: MEDICAL CENTER | Age: 44
End: 2024-02-12
Payer: COMMERCIAL

## 2024-02-12 ENCOUNTER — ANTICOAGULATION MONITORING (OUTPATIENT)
Dept: MEDICAL GROUP | Facility: PHYSICIAN GROUP | Age: 44
End: 2024-02-12
Payer: COMMERCIAL

## 2024-02-12 ENCOUNTER — HOSPITAL ENCOUNTER (EMERGENCY)
Facility: MEDICAL CENTER | Age: 44
End: 2024-02-12
Attending: EMERGENCY MEDICINE
Payer: COMMERCIAL

## 2024-02-12 VITALS
OXYGEN SATURATION: 97 % | HEART RATE: 99 BPM | SYSTOLIC BLOOD PRESSURE: 119 MMHG | HEIGHT: 75 IN | DIASTOLIC BLOOD PRESSURE: 79 MMHG | TEMPERATURE: 98.6 F | BODY MASS INDEX: 28.12 KG/M2 | WEIGHT: 226.19 LBS | RESPIRATION RATE: 18 BRPM

## 2024-02-12 DIAGNOSIS — R07.9 CHEST PAIN, UNSPECIFIED TYPE: ICD-10-CM

## 2024-02-12 DIAGNOSIS — Z86.718 HISTORY OF DVT IN ADULTHOOD: ICD-10-CM

## 2024-02-12 DIAGNOSIS — I34.0 MITRAL VALVE INSUFFICIENCY, UNSPECIFIED ETIOLOGY: ICD-10-CM

## 2024-02-12 DIAGNOSIS — G89.18 ACUTE POST-OPERATIVE PAIN: ICD-10-CM

## 2024-02-12 LAB
ALBUMIN SERPL BCP-MCNC: 4.2 G/DL (ref 3.2–4.9)
ALBUMIN/GLOB SERPL: 1.2 G/DL
ALP SERPL-CCNC: 97 U/L (ref 30–99)
ALT SERPL-CCNC: 15 U/L (ref 2–50)
ANION GAP SERPL CALC-SCNC: 14 MMOL/L (ref 7–16)
AST SERPL-CCNC: 14 U/L (ref 12–45)
BASOPHILS # BLD AUTO: 1.6 % (ref 0–1.8)
BASOPHILS # BLD: 0.09 K/UL (ref 0–0.12)
BILIRUB SERPL-MCNC: 0.3 MG/DL (ref 0.1–1.5)
BUN SERPL-MCNC: 11 MG/DL (ref 8–22)
CALCIUM ALBUM COR SERPL-MCNC: 9.1 MG/DL (ref 8.5–10.5)
CALCIUM SERPL-MCNC: 9.3 MG/DL (ref 8.4–10.2)
CHLORIDE SERPL-SCNC: 106 MMOL/L (ref 96–112)
CO2 SERPL-SCNC: 20 MMOL/L (ref 20–33)
CREAT SERPL-MCNC: 1.02 MG/DL (ref 0.5–1.4)
EKG IMPRESSION: NORMAL
EOSINOPHIL # BLD AUTO: 0.03 K/UL (ref 0–0.51)
EOSINOPHIL NFR BLD: 0.5 % (ref 0–6.9)
ERYTHROCYTE [DISTWIDTH] IN BLOOD BY AUTOMATED COUNT: 49.7 FL (ref 35.9–50)
GFR SERPLBLD CREATININE-BSD FMLA CKD-EPI: 94 ML/MIN/1.73 M 2
GLOBULIN SER CALC-MCNC: 3.4 G/DL (ref 1.9–3.5)
GLUCOSE SERPL-MCNC: 101 MG/DL (ref 65–99)
HCT VFR BLD AUTO: 38.7 % (ref 42–52)
HGB BLD-MCNC: 11.6 G/DL (ref 14–18)
IMM GRANULOCYTES # BLD AUTO: 0.02 K/UL (ref 0–0.11)
IMM GRANULOCYTES NFR BLD AUTO: 0.3 % (ref 0–0.9)
INR PPP: 2.3 (ref 2–3.5)
LYMPHOCYTES # BLD AUTO: 1.51 K/UL (ref 1–4.8)
LYMPHOCYTES NFR BLD: 26.3 % (ref 22–41)
MCH RBC QN AUTO: 21.8 PG (ref 27–33)
MCHC RBC AUTO-ENTMCNC: 30 G/DL (ref 32.3–36.5)
MCV RBC AUTO: 72.6 FL (ref 81.4–97.8)
MONOCYTES # BLD AUTO: 0.41 K/UL (ref 0–0.85)
MONOCYTES NFR BLD AUTO: 7.1 % (ref 0–13.4)
NEUTROPHILS # BLD AUTO: 3.68 K/UL (ref 1.82–7.42)
NEUTROPHILS NFR BLD: 64.2 % (ref 44–72)
NRBC # BLD AUTO: 0 K/UL
NRBC BLD-RTO: 0 /100 WBC (ref 0–0.2)
PLATELET # BLD AUTO: 866 K/UL (ref 164–446)
PMV BLD AUTO: 7.9 FL (ref 9–12.9)
POTASSIUM SERPL-SCNC: 4.3 MMOL/L (ref 3.6–5.5)
PROT SERPL-MCNC: 7.6 G/DL (ref 6–8.2)
RBC # BLD AUTO: 5.33 M/UL (ref 4.7–6.1)
SODIUM SERPL-SCNC: 140 MMOL/L (ref 135–145)
TROPONIN T SERPL-MCNC: 26 NG/L (ref 6–19)
TROPONIN T SERPL-MCNC: 29 NG/L (ref 6–19)
WBC # BLD AUTO: 5.7 K/UL (ref 4.8–10.8)

## 2024-02-12 PROCEDURE — 93005 ELECTROCARDIOGRAM TRACING: CPT

## 2024-02-12 PROCEDURE — A9270 NON-COVERED ITEM OR SERVICE: HCPCS | Performed by: EMERGENCY MEDICINE

## 2024-02-12 PROCEDURE — 84484 ASSAY OF TROPONIN QUANT: CPT

## 2024-02-12 PROCEDURE — 80053 COMPREHEN METABOLIC PANEL: CPT

## 2024-02-12 PROCEDURE — 99284 EMERGENCY DEPT VISIT MOD MDM: CPT

## 2024-02-12 PROCEDURE — 36415 COLL VENOUS BLD VENIPUNCTURE: CPT

## 2024-02-12 PROCEDURE — 93005 ELECTROCARDIOGRAM TRACING: CPT | Performed by: EMERGENCY MEDICINE

## 2024-02-12 PROCEDURE — 85025 COMPLETE CBC W/AUTO DIFF WBC: CPT

## 2024-02-12 PROCEDURE — 71045 X-RAY EXAM CHEST 1 VIEW: CPT

## 2024-02-12 PROCEDURE — 700102 HCHG RX REV CODE 250 W/ 637 OVERRIDE(OP): Performed by: EMERGENCY MEDICINE

## 2024-02-12 RX ORDER — WARFARIN SODIUM 5 MG/1
10 TABLET ORAL DAILY
Status: COMPLETED | OUTPATIENT
Start: 2024-02-12 | End: 2024-02-12

## 2024-02-12 RX ORDER — OXYCODONE HYDROCHLORIDE 10 MG/1
10 TABLET ORAL ONCE
Status: COMPLETED | OUTPATIENT
Start: 2024-02-12 | End: 2024-02-12

## 2024-02-12 RX ORDER — OXYCODONE HYDROCHLORIDE 5 MG/1
5 TABLET ORAL ONCE
Status: COMPLETED | OUTPATIENT
Start: 2024-02-12 | End: 2024-02-12

## 2024-02-12 RX ORDER — GABAPENTIN 100 MG/1
CAPSULE ORAL
Qty: 207 CAPSULE | Refills: 0 | Status: SHIPPED | OUTPATIENT
Start: 2024-02-12 | End: 2024-03-09

## 2024-02-12 RX ADMIN — OXYCODONE HYDROCHLORIDE 10 MG: 10 TABLET ORAL at 17:43

## 2024-02-12 RX ADMIN — WARFARIN SODIUM 10 MG: 5 TABLET ORAL at 18:49

## 2024-02-12 RX ADMIN — OXYCODONE HYDROCHLORIDE 5 MG: 5 TABLET ORAL at 20:04

## 2024-02-12 ASSESSMENT — FIBROSIS 4 INDEX: FIB4 SCORE: 0.3

## 2024-02-12 NOTE — TELEPHONE ENCOUNTER
INR Reporting  PT Name: Roberto Braswell     PT Reports INR Value: 2.3    Concerns/Questions Reported: or N/A: N/A    Callback Number: [phone]     Thank you,  Lisy GARLAND

## 2024-02-12 NOTE — PROGRESS NOTES
Anticoagulation Summary  As of 2024      INR goal:  2.0-3.0   TTR:  --   INR used for dosin.30 (2024)   Warfarin maintenance plan:  11.25 mg (7.5 mg x 1.5) every day   Weekly warfarin total:  78.75 mg   Plan last modified:  Trevor RiceD (2024)   Next INR check:  2024   Target end date:  2024    Indications    Mitral regurgitation [I34.0]                 Anticoagulation Episode Summary       INR check location:      Preferred lab:      Send INR reminders to:      Comments:  Mitral valve repair          Anticoagulation Care Providers       Provider Role Specialty Phone number    Bigg Santana M.D. Referring Thoracic Surgery (Cardiothoracic Vascular Surgery) [phone]    [facility] Responsible  [phone]          Anticoagulation Patient Findings  Patient Findings       Negatives:  Signs/symptoms of thrombosis, Signs/symptoms of bleeding, Laboratory test error suspected, Change in health, Change in alcohol use, Change in activity, Upcoming invasive procedure, Emergency department visit, Upcoming dental procedure, Missed doses, Extra doses, Change in medications, Change in diet/appetite, Hospital admission, Bruising, Other complaints          Spoke with patient today regarding therapeutic INR of 2.3.  Patient denies any signs/symptoms of bruising or bleeding or any changes in diet and medications.  Instructed patient to call clinic with any questions or concerns.    Pt is on antiplatelet therapy with ASA for mitral valve repair.     Pt is to continue with current warfarin dosing regimen.  Follow up in 4 days, to reduce risk of adverse events related to this high risk medication,  Warfarin.    Octaviano Jones, PharmD, BCACP

## 2024-02-13 ENCOUNTER — APPOINTMENT (OUTPATIENT)
Dept: MEDICAL GROUP | Facility: LAB | Age: 44
End: 2024-02-13
Payer: COMMERCIAL

## 2024-02-13 ENCOUNTER — PATIENT MESSAGE (OUTPATIENT)
Dept: VASCULAR LAB | Facility: MEDICAL CENTER | Age: 44
End: 2024-02-13

## 2024-02-13 ENCOUNTER — OFFICE VISIT (OUTPATIENT)
Dept: CARDIOTHORACIC SURGERY | Facility: MEDICAL CENTER | Age: 44
End: 2024-02-13
Payer: COMMERCIAL

## 2024-02-13 VITALS
BODY MASS INDEX: 28.2 KG/M2 | WEIGHT: 226.8 LBS | HEART RATE: 114 BPM | SYSTOLIC BLOOD PRESSURE: 122 MMHG | DIASTOLIC BLOOD PRESSURE: 74 MMHG | TEMPERATURE: 98.6 F | OXYGEN SATURATION: 98 % | HEIGHT: 75 IN

## 2024-02-13 DIAGNOSIS — Z98.890 S/P MVR (MITRAL VALVE REPAIR): ICD-10-CM

## 2024-02-13 PROCEDURE — 99024 POSTOP FOLLOW-UP VISIT: CPT | Performed by: NURSE PRACTITIONER

## 2024-02-13 PROCEDURE — 3074F SYST BP LT 130 MM HG: CPT | Performed by: NURSE PRACTITIONER

## 2024-02-13 PROCEDURE — 3078F DIAST BP <80 MM HG: CPT | Performed by: NURSE PRACTITIONER

## 2024-02-13 RX ORDER — LOSARTAN POTASSIUM 50 MG/1
50 TABLET ORAL DAILY
Qty: 30 TABLET | Refills: 2 | Status: SHIPPED | OUTPATIENT
Start: 2024-02-13 | End: 2024-02-27 | Stop reason: SDUPTHER

## 2024-02-13 RX ORDER — OXYCODONE HYDROCHLORIDE 10 MG/1
10 TABLET ORAL EVERY 6 HOURS PRN
Qty: 28 TABLET | Refills: 0 | Status: SHIPPED | OUTPATIENT
Start: 2024-02-13 | End: 2024-02-20

## 2024-02-13 RX ORDER — ASPIRIN 81 MG/1
81 TABLET ORAL DAILY
Qty: 100 TABLET | Refills: 0 | Status: SHIPPED | OUTPATIENT
Start: 2024-02-13 | End: 2024-02-27

## 2024-02-13 RX ORDER — WARFARIN SODIUM 7.5 MG/1
7.5 TABLET ORAL DAILY
Qty: 30 TABLET | Refills: 3 | Status: SHIPPED | OUTPATIENT
Start: 2024-02-13 | End: 2024-02-20 | Stop reason: SDUPTHER

## 2024-02-13 RX ORDER — BACLOFEN 10 MG/1
10 TABLET ORAL 3 TIMES DAILY
Qty: 21 TABLET | Refills: 0 | Status: SHIPPED | OUTPATIENT
Start: 2024-02-13 | End: 2024-02-20

## 2024-02-13 ASSESSMENT — FIBROSIS 4 INDEX: FIB4 SCORE: 0.18

## 2024-02-13 NOTE — PATIENT COMMUNICATION
Spoke to patient in regarding NP appointment with  Dr. Valerio.    Patient has seen a Vascular Provider before?  No    Any recent testing (labs or imaging) outside of [facility]?  No    Were any records requested?  No    New patient packet sent via [employer] or mail?  Yes  Correct appointment type (New/Established/virtual)? Yes  Referral attached to appointment (renewals and New patients only)? Yes

## 2024-02-13 NOTE — DISCHARGE INSTRUCTIONS
Your tests here were reassuring.  There is no sign of a dangerous cause of your discomfort. It is safe to let you go to follow up with [facility] regular doctors.      Long-term narcotic pain medication gradually makes pain worse, and the medications themselves stop working.  We strongly recommend that you work to get off of narcotic pain medication with the help of the pain management doctor.     [facility] may be an option for you between now and your pain management appointment.

## 2024-02-13 NOTE — ED PROVIDER NOTES
".ER Provider Note    Scribed for Dr. Hidalgo by Jonah Cabello. 2/12/2024  5:38 PM    Primary Care Provider: Sal Schafer D.O.    CHIEF COMPLAINT  Chief Complaint   Patient presents with    Chest Pain     Pt s/p open heart surg  [facility]  1/25 for MVRepair  by Dr Dc  Ran out of pain meds last night and pain incr. All day  No SOB but hurts to breath/cough  Achy and sharp pain  Denies cough No fever No N/V     EXTERNAL RECORDS REVIEWED  Other Records reviewed indicate a history of pre narcotic dependency. Concern for drug seeking behavior was documented in July 2023.  PDMP review shows patient has prematurely run out of his most recent narcotic prescription.     HPI/ROS  LIMITATION TO HISTORY   Select: : None    OUTSIDE HISTORIAN(S):  The patient's significant other was present at bedside to provide additional context to history.     Roberto Braswell is a 43 y.o. male who presents to the ED complaining of chest pain onset earlier today. The patient explains he had an open heart surgery 2 weeks ago with [facility] performed by Dr. Santana (cardiology). He notes he recently ran out of his pain medication which is when his symptoms began. The patient tried to receive a new dose of medication, but was unable to communicate with his provider, which prompted him to present at the ED. The patient reports an abnormal heart rate and blood pressure and adds that it is \"all over the place\". He states associated symptoms of shortness of breath. The patient notes he has a pain management talk with [facility] on 2/22/2024. The patient's next [facility] post-op follow up visit is on 2/26/2024. The patient's significant other adds that the patient is mediated with Warfarin which he takes daily at 6 PM .    PAST MEDICAL HISTORY  Past Medical History:   Diagnosis Date    Arthritis     Right ankle    Breath shortness 11/03/2023    With exertion.    Drug-seeking behavior 07/30/2023    Demanding narcotics for a duodenal ulcer July 2023. " Refused antacids or sucralfate.     Duodenal ulcer 07/30/2023    Gastric ulcer     Heart valve disease     Mitral valve prolapse    Hemorrhagic disorder (HCC)     GI bleed    Hypertension     Pain     Resolved; Right ankle    Seizure (HCC)     while taking tramadol     SURGICAL HISTORY  Past Surgical History:   Procedure Laterality Date    KS REPLACEMENT OF MITRAL VALVE  1/25/2024    Procedure: MITRAL VALVE REPAIR, TRANSESOPHAGEAL ECHOCARDIOGRAM;  Surgeon: Bigg Santana M.D.;  Location: SURGERY [facility];  Service: Cardiothoracic    ECHOCARDIOGRAM, TRANSESOPHAGEAL, INTRAOPERATIVE  1/25/2024    Procedure: ECHOCARDIOGRAM, TRANSESOPHAGEAL, INTRAOPERATIVE;  Surgeon: Bigg Santana M.D.;  Location: [facility];  Service: Cardiothoracic    KS UPPER GI ENDOSCOPY,DIAGNOSIS N/A 9/14/2023    Procedure: GASTROSCOPY;  Surgeon: Johnny Dalton M.D.;  Location: SURGERY SAME DAY [facility];  Service: Gastroenterology    KS UPPER GI ENDOSCOPY,BIOPSY N/A 9/14/2023    Procedure: GASTROSCOPY, WITH BIOPSY;  Surgeon: Johnny Dalton M.D.;  Location: SURGERY SAME DAY [facility];  Service: Gastroenterology    KS UPPER GI ENDOSCOPY,BIOPSY N/A 7/30/2023    Procedure: GASTROSCOPY, WITH BIOPSY;  Surgeon: Kumar Hope M.D.;  Location: [facility];  Service: Gastroenterology    KS UPPER GI ENDOSCOPY,DIAGNOSIS  3/28/2022    Procedure: GASTROSCOPY;  Surgeon: Paco Wiggins M.D.;  Location: SURGERY SAME DAY [facility];  Service: Gastroenterology    KS UPPER GI ENDOSCOPY,BIOPSY  3/28/2022    Procedure: GASTROSCOPY, WITH BIOPSY;  Surgeon: Paco Wiggins M.D.;  Location: SURGERY SAME DAY [facility];  Service: Gastroenterology    ANKLE ARTHROSCOPY Right 5/21/2018    Procedure: ANKLE ARTHROSCOPY, LATERAL LIGAMENT RECONSTRUCTION;  Surgeon: Seth Cruz M.D.;  Location: [facility];  Service: Orthopedics    BIOPSY ORTHO Right 5/21/2018    Procedure: BIOPSY ORTHO/ FOR CARTILAGE AND DENOVO PROCEDURE;  Surgeon: Seth  ASHLYN Cruz;  Location: SURGERY [facility];  Service: Orthopedics    OTHER ABDOMINAL SURGERY      Cholecystectomy February 2017    OTHER ORTHOPEDIC SURGERY      2 previous ankle surgeries (2007, 2009)     FAMILY HISTORY  Family History   Problem Relation Age of Onset    Heart Disease Mother     No Known Problems Father      SOCIAL HISTORY   reports that he has never smoked. He has never used smokeless tobacco. He reports that he does not currently use alcohol. He reports that he does not use drugs.    CURRENT MEDICATIONS  Discharge Medication List as of 2/12/2024  8:08 PM        CONTINUE these medications which have NOT CHANGED    Details   aspirin 81 MG EC tablet Take 1 Tablet by mouth every day., Disp-100 Tablet, R-0, Normal      losartan (COZAAR) 50 MG Tab Take 1 Tablet by mouth every day., Disp-30 Tablet, R-2, Normal      torsemide (DEMADEX) 20 MG Tab Take 0.5 Tablet by mouth every day., Disp-30 Tablet, R-0, Normal      potassium chloride SA (K-DUR) 10 MEQ Tab CR Take 1 Tablet by mouth every day., Disp-30 Tablet, R-0, Normal      warfarin (COUMADIN) 7.5 MG Tab Take 1 Tablet by mouth every day at 6 PM., Disp-30 Tablet, R-3, Normal      oxyCODONE immediate release (ROXICODONE) 10 MG immediate release tablet Take 1 Tablet by mouth every 6 hours as needed for Severe Pain for up to 14 days., Disp-56 Tablet, R-0, Normal      metoprolol tartrate (LOPRESSOR) 25 MG Tab Take 0.5 Tablet by mouth 2 times a day., Disp-60 Tablet, R-2, Normal      acetaminophen (TYLENOL) 500 MG Tab Take 1,000 mg by mouth 1 time a day as needed for Moderate Pain., Historical Med      sucralfate (CARAFATE) 1 GM Tab Take 1 g by mouth 3 times a day before meals., Historical Med      omeprazole (PRILOSEC) 20 MG delayed-release capsule Take 20 mg by mouth every day., Historical Med      lidocaine-hyoscyamine-mag hydrox-al hydrox-simeth Take 30mL  by mouth every 6 hours as needed (upset stomach).Ingredients: 34 mL hyoscyamine 0.125 mg/5 mL,  "126 mL Maalox, and 40 mL viscous lidocaine 2%.Disp-200 mL, R-0, Normal           ALLERGIES  Morphine and Tramadol    PHYSICAL EXAM  VITAL SIGNS: /86   Pulse (!) 114   Temp 36.7 °C (98.1 °F) (Temporal)   Resp 18   Ht 1.905 m (6' 3\")   Wt 103 kg (226 lb 3.1 oz)   SpO2 100%   BMI 28.27 kg/m²     Constitutional: Alert in no apparent distress.  HENT: No signs of trauma, Bilateral external ears normal, Nose normal.   Eyes: Conjunctiva normal, Non-icteric.   Neck: Normal range of motion, Supple, No stridor.   Lymphatic: No lymphadenopathy noted.   Cardiovascular: Regular tachycardia and rhythm, no murmurs.   Thorax & Lungs: Normal breath sounds, No respiratory distress, No wheezing, Lungs clear, Well healed vertical midline sternotomy scar.   Abdomen: Bowel sounds normal, Soft, No tenderness, No masses, No pulsatile masses. No peritoneal signs.  Skin: Warm, Dry, No erythema, No rash.   Back: No midline bony tenderness.   Extremities: Intact distal pulses, No edema, No cyanosis.  Musculoskeletal: Good range of motion in all major joints. No or major deformities noted.   Neurologic: Alert , Normal motor function, Normal sensory function, No focal deficits noted.   Psychiatric: Inconsistent affect, Judgment normal, Anxious, Patient calmly answers questions and then returns to hyperventilating.     DIAGNOSTIC STUDIES    Labs:   Labs Reviewed   CBC WITH DIFFERENTIAL - Abnormal; Notable for the following components:       Result Value    Hemoglobin 11.6 (*)     Hematocrit 38.7 (*)     MCV 72.6 (*)     MCH 21.8 (*)     MCHC 30.0 (*)     Platelet Count 866 (*)     MPV 7.9 (*)     All other components within normal limits    Narrative:     Biotin intake of greater than 5 mg per day may interfere with  troponin levels, causing false low values.   COMP METABOLIC PANEL - Abnormal; Notable for the following components:    Glucose 101 (*)     All other components within normal limits    Narrative:     Biotin intake of " greater than 5 mg per day may interfere with  troponin levels, causing false low values.   TROPONIN - Abnormal; Notable for the following components:    Troponin T 26 (*)     All other components within normal limits    Narrative:     Biotin intake of greater than 5 mg per day may interfere with  troponin levels, causing false low values.   TROPONIN - Abnormal; Notable for the following components:    Troponin T 29 (*)     All other components within normal limits    Narrative:     Biotin intake of greater than 5 mg per day may interfere with  troponin levels, causing false low values.   ESTIMATED GFR    Narrative:     Biotin intake of greater than 5 mg per day may interfere with  troponin levels, causing false low values.     EKG:   I have independently interpreted this EKG  Results for orders placed or performed during the hospital encounter of 24   EKG   Result Value Ref Range    Report       [facility] Emergency Dept[facility]    Test Date:  2024  Pt Name:    PATI MERCEDES           Department: [facility]  MRN:        [id number]                      Room:  Gender:     Male                         Technician: [id number]  :        1980                   Requested By:ER TRIAGE PROTOCOL  Order #:    [id number]                    Reading MD:    Measurements  Intervals                                Axis  Rate:       106                          P:          77  RI:         163                          QRS:        41  QRSD:       86                           T:          80  QT:         338  QTc:        449    Interpretive Statements  Sinus tachycardia  Consider left atrial enlargement  Low voltage, extremity and precordial leads  Anteroseptal infarct, old  ST elevation, consider inferior injury  Compared to ECG 2024 00:24:38  Low QRS voltage now present  ST (T wave) deviation now present  Sinus rhythm no longer present  Left ventricular hypertrophy no l onger present  Myocardial infarct  finding still present       Radiology:   The attending emergency physician has independently interpreted the diagnostic imaging associated with this visit and am waiting the final reading from the radiologist.   Preliminary interpretation is a follows: unremarkable    Radiologist interpretation:   DX-CHEST-PORTABLE (1 VIEW)   Final Result      No acute cardiac or pulmonary abnormalities are identified.        COURSE & MEDICAL DECISION MAKING     ED Observation Status? No; Patient does not meet criteria for ED Observation.     INITIAL ASSESSMENT, COURSE AND PLAN  Care Narrative:     5:38 PM - Patient presents to the ED with chest pain. Patient evaluated at bedside and discussed plan of care, including lab analysis, imaging, and an EKG. I explained to the patient that his current symptoms are concerning for at least some component of narcotic withdrawal. Patient will be treated with Roxicodone 10 mg PO. Ordered for DX-Chest, EKG, CMP, Troponin, and CBC w/ Diff to evaluate his symptoms. Differential diagnoses include but not limited to: Narcotic withdrawal as above, but also ACS, postop infection or complication.    6:13 PM  - tachycardia resolved after oxycodone.  As above, discussed with the patient that due to his symptoms, I am concerned for opioid dependence and withdrawal.The patient was medicated with Coumadin.     8:20 PM - I reevaluated the patient at bedside. The patient informs me they feel improved following medication administration. I advised the patient to taper off of their narcotic pain medication or switch to Buprenorphine, with the help of pain management. Given his history, I expressed concern for existing or developing dependence on prescribed narcotics. We discussed the slight stable troponin elevations, which are understandable given recent open heart surgery, but there is no sign of ACS or other emergency. The patient and his wife inquired about other pain management options, including the  gabapentin he found helpful in the hospital but was not prescribed on discharge. I discussed the patient's diagnostic study results which are generally reassuring. I discussed plan for discharge and follow up as outlined below. The patient is stable for discharge at this time and will return for any new or worsening symptoms. Patient verbalizes understanding and support with my plan for discharge, expresses appreciation for thoughtful discussion of pain amangement strategies and follow up options.     ADDITIONAL PROBLEM LIST  PT and family and I discussed our shared concern for evolving prescription narcotic dependence, and how this can be managed/avoided.    DISPOSITION AND DISCUSSIONS  I have discussed management of the patient with the following physicians and CARTER's:  None    Discussion of management with other QHP or appropriate source(s): None     Escalation of care considered, and ultimately not performed: acute inpatient care management, however at this time, the patient is most appropriate for outpatient management.    Barriers to care at this time, including but not limited to:  None .     Decision tools and prescription drugs considered including, but not limited to: Pain Medications Gabapentin .    The patient will return for new or worsening symptoms and is stable at the time of discharge.    The patient is referred to a primary physician for blood pressure management, diabetic screening, and for all other preventative health concerns.    DISPOSITION:  Patient will be discharged home in stable condition.    FOLLOW UP:  [facility]  [address] [facility] [place] [address]  [place] [id number]  [phone]  Go to       FINAL DIAGNOSIS  1. Chest pain, unspecified type       I, Jonah Cabello (Scribe), am scribing for, and in the presence of, Harry Hidalgo M.D..    Electronically signed by: Jonah Cabello (Ladi), 2/12/2024    IHarry M.D. personally performed the services described in this  documentation, as scribed by Jonah Cabello in my presence, and it is both accurate and complete.

## 2024-02-13 NOTE — PROGRESS NOTES
"CHIEF COMPLAINT: Post-op visit     PROCEDURE:   1/25/24 Dr. Santana  RADICAL MITRAL VALVE REPAIR (P2 quadrangular resection, cord reimplantation, 38 mm Syed flexible annuloplasty band) and intraoperative transesophageal echocardiography     HPI: Patient is status post MVR with Dr. Santana on 1/25/24. He presents to clinic complaining of pain. He reports he has run out of his pain medication that he received upon discharge. Patient reports 8-10/10 sternal pain. Patient was seen in the emergency department yesterday due to uncontrolled pain. He reports he was on pain medication prior to surgery due to a fall. He is planning to see pain management at [facility] on 2/22/24. Patient also reports increased heart rate and blood pressures with SBP 140s-150s.       /74 (BP Location: Right arm, Patient Position: Sitting, BP Cuff Size: Adult)   Pulse (!) 114   Temp 37 °C (98.6 °F) (Temporal)   Ht 1.905 m (6' 3\")   Wt 103 kg (226 lb 12.8 oz)   SpO2 98%     PHYSICAL EXAM:  Cardiac: S1S2  Neuro:  AAO x 3  Resp:  CTA  Wounds:  CDI  Sternum:  Stable      PLAN: Reviewed office opiate policies with patient and had extensive discussion regarding sternal precautions. One week supply of oxycodone provided as well as baclofen. Referral sent to pain management and patient encouraged to get in with provider within the week if possible.     "

## 2024-02-13 NOTE — ED TRIAGE NOTES
Pt pale Warm and dry  Conjunctiva and hand creases pink  Appears very uncomfortable Restless    Pt reports rapid heart beat and HTN all day    BP Max - 150/ 82

## 2024-02-14 RX ORDER — OXYCODONE HYDROCHLORIDE 10 MG/1
10 TABLET ORAL EVERY 6 HOURS PRN
Qty: 56 TABLET | Refills: 0 | OUTPATIENT
Start: 2024-02-14 | End: 2024-02-28

## 2024-02-16 ENCOUNTER — TELEPHONE (OUTPATIENT)
Dept: VASCULAR LAB | Facility: MEDICAL CENTER | Age: 44
End: 2024-02-16
Payer: COMMERCIAL

## 2024-02-16 ENCOUNTER — ANTICOAGULATION MONITORING (OUTPATIENT)
Dept: VASCULAR LAB | Facility: MEDICAL CENTER | Age: 44
End: 2024-02-16
Payer: COMMERCIAL

## 2024-02-16 DIAGNOSIS — I34.0 MITRAL VALVE INSUFFICIENCY, UNSPECIFIED ETIOLOGY: ICD-10-CM

## 2024-02-16 LAB — INR PPP: 2 (ref 2–3.5)

## 2024-02-16 NOTE — TELEPHONE ENCOUNTER
INR Reading    Reported by : Anabelle Mccoy [facility]    2/1624  INR: 2.0    [facility] - [phone]    Thank you,   Seble BETANCUR

## 2024-02-17 NOTE — PROGRESS NOTES
Anticoagulation Summary  As of 2024      INR goal:  2.0-3.0   TTR:  100.0% (4 d)   INR used for dosin.00 (2024)   Warfarin maintenance plan:  11.25 mg (7.5 mg x 1.5) every day   Weekly warfarin total:  78.75 mg   Plan last modified:  Trevor RiceD (2024)   Next INR check:  2024   Target end date:  2024    Indications    Mitral regurgitation [I34.0]                 Anticoagulation Episode Summary       INR check location:      Preferred lab:      Send INR reminders to:      Comments:  Mitral valve repair          Anticoagulation Care Providers       Provider Role Specialty Phone number    Bigg Santana M.D. Referring Thoracic Surgery (Cardiothoracic Vascular Surgery) [phone]    [facility] Responsible  [phone]          Anticoagulation Patient Findings  Patient Findings       Negatives:  Signs/symptoms of thrombosis, Signs/symptoms of bleeding, Laboratory test error suspected, Change in health, Change in alcohol use, Change in activity, Upcoming invasive procedure, Emergency department visit, Upcoming dental procedure, Missed doses, Extra doses, Change in medications, Change in diet/appetite, Hospital admission, Bruising, Other complaints            Called and spoke to patient.    INR therapeutic but trending down. Will bolus today.    Warfarin Plan: Bolus with 15 mg today, then Continue regimen as listed above.    Next INR in 5 days via Nadine Perez, TrevorD, BCACP

## 2024-02-20 ENCOUNTER — ANTICOAGULATION MONITORING (OUTPATIENT)
Dept: VASCULAR LAB | Facility: MEDICAL CENTER | Age: 44
End: 2024-02-20

## 2024-02-20 ENCOUNTER — OFFICE VISIT (OUTPATIENT)
Dept: VASCULAR LAB | Facility: MEDICAL CENTER | Age: 44
End: 2024-02-20
Attending: FAMILY MEDICINE
Payer: COMMERCIAL

## 2024-02-20 VITALS
WEIGHT: 233 LBS | DIASTOLIC BLOOD PRESSURE: 78 MMHG | SYSTOLIC BLOOD PRESSURE: 112 MMHG | HEART RATE: 76 BPM | BODY MASS INDEX: 28.97 KG/M2 | HEIGHT: 75 IN

## 2024-02-20 DIAGNOSIS — K27.9 PUD (PEPTIC ULCER DISEASE): ICD-10-CM

## 2024-02-20 DIAGNOSIS — I34.0 MITRAL VALVE INSUFFICIENCY, UNSPECIFIED ETIOLOGY: ICD-10-CM

## 2024-02-20 DIAGNOSIS — Z79.01 CHRONIC ANTICOAGULATION: ICD-10-CM

## 2024-02-20 DIAGNOSIS — I80.8 SUPERFICIAL THROMBOPHLEBITIS OF RIGHT UPPER EXTREMITY: ICD-10-CM

## 2024-02-20 DIAGNOSIS — I10 PRIMARY HYPERTENSION: ICD-10-CM

## 2024-02-20 DIAGNOSIS — Z98.890 S/P MVR (MITRAL VALVE REPAIR): ICD-10-CM

## 2024-02-20 DIAGNOSIS — I34.0 SEVERE MITRAL REGURGITATION: ICD-10-CM

## 2024-02-20 LAB — INR PPP: 2.3 (ref 2–3.5)

## 2024-02-20 PROCEDURE — 85610 PROTHROMBIN TIME: CPT

## 2024-02-20 PROCEDURE — 3078F DIAST BP <80 MM HG: CPT | Performed by: FAMILY MEDICINE

## 2024-02-20 PROCEDURE — 99203 OFFICE O/P NEW LOW 30 MIN: CPT | Performed by: FAMILY MEDICINE

## 2024-02-20 PROCEDURE — 99212 OFFICE O/P EST SF 10 MIN: CPT

## 2024-02-20 PROCEDURE — 3074F SYST BP LT 130 MM HG: CPT | Performed by: FAMILY MEDICINE

## 2024-02-20 RX ORDER — PANTOPRAZOLE SODIUM 40 MG/1
TABLET, DELAYED RELEASE ORAL
COMMUNITY
Start: 2024-01-31 | End: 2024-02-27

## 2024-02-20 RX ORDER — WARFARIN SODIUM 7.5 MG/1
11.25-15 TABLET ORAL DAILY
Qty: 180 TABLET | Refills: 1 | Status: SHIPPED | OUTPATIENT
Start: 2024-02-20

## 2024-02-20 ASSESSMENT — ENCOUNTER SYMPTOMS
COUGH: 0
FEVER: 0
PND: 0
HEMOPTYSIS: 0
CHILLS: 0
SHORTNESS OF BREATH: 0
ORTHOPNEA: 0
BLOOD IN STOOL: 0
BRUISES/BLEEDS EASILY: 0
PALPITATIONS: 0
CLAUDICATION: 0
WHEEZING: 0
SPUTUM PRODUCTION: 0

## 2024-02-20 ASSESSMENT — FIBROSIS 4 INDEX: FIB4 SCORE: 0.18

## 2024-02-20 NOTE — PROGRESS NOTES
INITIAL VASCULAR ANTICOAGULATION VISIT  02/20/24     Roberto Braswell is a 43 y.o. male who presents for initial  Initially referred by Sal Schafer D.O. for length of therapy (LOT) determination and management of anticoagulation in context of acute VTE disease, est 2/2024    Subjective      VTE disease / Anticoagulation:   Date of initiation of anticoagulation: 1/2024   Date of Diagnosis: 11/2023  Type of Venous thromboembolic disease (VTE): acute bilat cephalic vv SVTs   Initial visit hx/sx:  after IV line placed after GIB requiring blood transfusions.  Unable to use OAC or NSAIDs at the time due to PUD and severe GIB  Antithrombotic therapy at time of VTE event: none   Current antithrombotic agent: none   VTE tx course: VKA initiated 1/2024   Complications: none, tolerating well, no bleeding or bruising   Adherence: reports complete, no missed doses    PROVOKING FACTORS:  Personal VTE hx? No  Family VTE hx or clotting d/o? No  Recent surgery ? No  Recent trauma ? Peripheral IV line placement with high volume transfusions   Smoker?  reports that he has never smoked. He has never used smokeless tobacco.    Extended travel? No  Recent periods of immobility? Yes, Details: hospitalized due to hemorrhagic shock, severe anemia, PUD  Other risk factors for VTE:  no  MEN:  Hx of cancer or abnormal cancer screenings: no  Testosterone therapy: no  Hypercoagulability work-up completed?  No    Valvular disease, s/p MVR 1/2025 (Dr. Santana)  INITIAL VISIT HISTORY: s/p surgery, reports ongoing chronic pain despite current pain mgmt  Ongoing care with cardiology, CT surgeon.   Reports ongoing pain from surgery, prior tx with oxycodone by PCP and CT surg, pending ref to [facility] for ongoing  care.    Patient Active Problem List   Diagnosis    Upper GI bleed    Acute blood loss anemia    GI bleed    Iron deficiency anemia due to chronic blood loss    Mitral regurgitation    Essential hypertension    Hematemesis     Duodenal ulcer    Hypertension    Normal anion gap metabolic acidosis    Abdominal pain    Superficial thrombophlebitis of right upper extremity      Past Surgical History:   Procedure Laterality Date    TN REPLACEMENT OF MITRAL VALVE  1/25/2024    Procedure: MITRAL VALVE REPAIR, TRANSESOPHAGEAL ECHOCARDIOGRAM;  Surgeon: Bigg Santana M.D.;  Location: [facility];  Service: Cardiothoracic    ECHOCARDIOGRAM, TRANSESOPHAGEAL, INTRAOPERATIVE  1/25/2024    Procedure: ECHOCARDIOGRAM, TRANSESOPHAGEAL, INTRAOPERATIVE;  Surgeon: Bigg Santana M.D.;  Location: [facility];  Service: Cardiothoracic    TN UPPER GI ENDOSCOPY,DIAGNOSIS N/A 9/14/2023    Procedure: GASTROSCOPY;  Surgeon: Johnny Dalton M.D.;  Location: SURGERY SAME DAY [facility];  Service: Gastroenterology    TN UPPER GI ENDOSCOPY,BIOPSY N/A 9/14/2023    Procedure: GASTROSCOPY, WITH BIOPSY;  Surgeon: Johnny Dalton M.D.;  Location: SURGERY SAME DAY [facility];  Service: Gastroenterology    TN UPPER GI ENDOSCOPY,BIOPSY N/A 7/30/2023    Procedure: GASTROSCOPY, WITH BIOPSY;  Surgeon: Kumar Hope M.D.;  Location: SURGERY [facility];  Service: Gastroenterology    TN UPPER GI ENDOSCOPY,DIAGNOSIS  3/28/2022    Procedure: GASTROSCOPY;  Surgeon: Paco Wiggins M.D.;  Location: SURGERY SAME DAY [facility];  Service: Gastroenterology    TN UPPER GI ENDOSCOPY,BIOPSY  3/28/2022    Procedure: GASTROSCOPY, WITH BIOPSY;  Surgeon: Paco Wiggins M.D.;  Location: SURGERY SAME DAY [facility];  Service: Gastroenterology    ANKLE ARTHROSCOPY Right 5/21/2018    Procedure: ANKLE ARTHROSCOPY, LATERAL LIGAMENT RECONSTRUCTION;  Surgeon: Seth Cruz M.D.;  Location: SURGERY [facility];  Service: Orthopedics    BIOPSY ORTHO Right 5/21/2018    Procedure: BIOPSY ORTHO/ FOR CARTILAGE AND DENOVO PROCEDURE;  Surgeon: Seth Cruz M.D.;  Location: [facility];  Service: Orthopedics    OTHER ABDOMINAL SURGERY      Cholecystectomy February 2017    OTHER  ORTHOPEDIC SURGERY      2 previous ankle surgeries (2007, 2009)       Current Outpatient Medications:     pantoprazole, PLEASE SEE ATTACHED FOR DETAILED DIRECTIONS    oxyCODONE immediate release, 10 mg, Oral, Q6HRS PRN, Taking    metoprolol tartrate, 25 mg, Oral, BID, Taking    warfarin, 7.5 mg, Oral, DAILY AT 1800, Taking    baclofen, 10 mg, Oral, TID, Taking    losartan, 50 mg, Oral, DAILY, Taking    aspirin, 81 mg, Oral, DAILY, Taking    gabapentin, Take 1 Capsule by mouth 3 times a day for 3 days, THEN 2 Capsules 3 times a day for 3 days, THEN 3 Capsules 3 times a day for 20 days., Taking    torsemide, 10 mg, Oral, DAILY, Taking    potassium chloride SA, 10 mEq, Oral, DAILY, Taking    acetaminophen, 1,000 mg, Oral, QDAY PRN, Taking    sucralfate, 1 g, Oral, TID AC, Taking    omeprazole, 20 mg, Oral, DAILY, Taking    lidocaine-hyoscyamine-mag hydrox-al hydrox-simeth, Take 30mL  by mouth every 6 hours as needed (upset stomach)., Taking     Allergies   Allergen Reactions    Morphine Vomiting    Tramadol Unspecified     Seizure  [id number]     Family History   Problem Relation Age of Onset    Heart Disease Mother     No Known Problems Father     Clotting Disorder Neg Hx      Social History     Tobacco Use    Smoking status: Never    Smokeless tobacco: Never   Vaping Use    Vaping Use: Never used   Substance Use Topics    Alcohol use: Not Currently    Drug use: Never     DIET AND EXERCISE:  Weight Change:stable   Wt Readings from Last 3 Encounters:   02/20/24 106 kg (233 lb)   02/13/24 103 kg (226 lb 12.8 oz)   02/12/24 103 kg (226 lb 3.1 oz)      Diet: common adult  Exercise: minimal exercise     Review of Systems   Constitutional:  Negative for chills, fever and malaise/fatigue.   HENT:  Negative for nosebleeds.    Respiratory:  Negative for cough, hemoptysis, sputum production, shortness of breath and wheezing.    Cardiovascular:  Negative for chest pain, palpitations, orthopnea, claudication, leg swelling and PND.  "  Gastrointestinal:  Negative for blood in stool.   Endo/Heme/Allergies:  Does not bruise/bleed easily.         Objective    Vitals:    02/20/24 1453   BP: 112/78   BP Location: Left arm   Patient Position: Sitting   BP Cuff Size: Large adult   Pulse: 76   Weight: 106 kg (233 lb)   Height: 1.905 m (6' 3\")      BP Readings from Last 5 Encounters:   02/20/24 112/78   02/13/24 122/74   02/12/24 119/79   01/31/24 124/77   12/28/23 120/74      Body mass index is 29.12 kg/m².  Physical Exam  Vitals reviewed.   Constitutional:       General: He is not in acute distress.     Appearance: He is well-developed. He is not diaphoretic.   HENT:      Head: Normocephalic and atraumatic.   Neck:      Thyroid: No thyromegaly.      Vascular: No JVD.   Cardiovascular:      Rate and Rhythm: Normal rate and regular rhythm.      Pulses: Normal pulses.           Radial pulses are 2+ on the right side and 2+ on the left side.        Posterior tibial pulses are 2+ on the right side and 2+ on the left side.      Heart sounds: No murmur heard.     Comments: Warm  No RUE/LUE venous cording, stasis changes   Pulmonary:      Effort: No respiratory distress.      Breath sounds: Normal breath sounds. No wheezing or rales.   Musculoskeletal:         General: No swelling or tenderness.      Right lower leg: No edema.      Left lower leg: No edema.   Neurological:      General: No focal deficit present.      Mental Status: He is oriented to person, place, and time.      Cranial Nerves: No cranial nerve deficit.      Coordination: Coordination normal.   Psychiatric:         Mood and Affect: Mood normal.         Behavior: Behavior normal.         Lab Results   Component Value Date    CHOLSTRLTOT 183 10/09/2023     (H) 10/09/2023    HDL 59 10/09/2023    TRIGLYCERIDE 69 10/09/2023      No results found for: \"LIPOPROTA\"   No results found for: \"APOB\"    Lab Results   Component Value Date    PROTHROMBTM 13.8 01/31/2024    INR 2.00 02/16/2024     "   Lab Results   Component Value Date    HBA1C 5.4 2024      Lab Results   Component Value Date    SODIUM 140 2024    POTASSIUM 4.3 2024    CHLORIDE 106 2024    CO2 20 2024    GLUCOSE 101 (H) 2024    BUN 11 2024    CREATININE 1.02 2024        Lab Results   Component Value Date    WBC 5.7 2024    RBC 5.33 2024    HEMOGLOBIN 11.6 (L) 2024    HEMATOCRIT 38.7 (L) 2024    MCV 72.6 (L) 2024    MCH 21.8 (L) 2024    MCHC 30.0 (L) 2024    MPV 7.9 (L) 2024        VASCULAR IMAGING:  Results for orders placed or performed during the hospital encounter of 24   EKG   Result Value Ref Range    Report       [facility] Emergency Dept.    Test Date:  2024  Pt Name:    PATI MERCEDES           Department: [facility]  MRN:        [id number]                      Room:  Gender:     Male                         Technician: [id number]  :        1980                   Requested By:ER TRIAGE PROTOCOL  Order #:    [id number]                    Reading MD:    Measurements  Intervals                                Axis  Rate:       106                          P:          77  SD:         163                          QRS:        41  QRSD:       86                           T:          80  QT:         338  QTc:        449    Interpretive Statements  Sinus tachycardia  Consider left atrial enlargement  Low voltage, extremity and precordial leads  Anteroseptal infarct, old  ST elevation, consider inferior injury  Compared to ECG 2024 00:24:38  Low QRS voltage now present  ST (T wave) deviation now present  Sinus rhythm no longer present  Left ventricular hypertrophy no l onger present  Myocardial infarct finding still present        CT a/p 2023   Vasculature: Unremarkable.     BLE venous 2023   No prior study is available for comparison.    Bilateral lower extremities.    No deep venous thrombosis.     Echo  9/2023   The left ventricular ejection fraction is visually estimated to be 65%.  Probably severe mitral regurgitation. Consider LEONEL if clinically indicated.  Prolapse of the posterior mitral leaflet.    BUE venous 11/22/23  Superficial thrombus, near-occlusive and sub-acute, visualized within the right distal cephalic vein and with partial extension into the right median antecubital vein.   Superficial thrombus visualized within the left cephalic vein at the proximal-mid bicep level extending distally through the bicep and into the cephalic vein at the proximal forearm. Does not extend into the median antecubital vein.    OP report 1/2024   PostOp Diagnosis: Severe symptomatic mitral regurgitation (4+, degenerative), mitral valve prolapse, recent GI bleeding (status post duodenal ulcer coiling), anemia, hypertension    Procedure(s):  RADICAL MITRAL VALVE REPAIR (P2 quadrangular resection, cord reimplantation, 38 mm Syed flexible annuloplasty band) and intraoperative transesophageal echocardiography    Echo 1/25/24  Post:   MV:   Perfectly repaird mitral valve.  No evidence of mitral valve   regurgitation.  Mean PG across mitral valve of 2 mmHg.  There is no   evidence of SUMIT.  There is no longer reveral of pulmonary vein flow.          Medical Decision Making:  Today's Assessment / Status / Plan:     1. Severe mitral regurgitation        2. S/P MVR (mitral valve repair)        3. Superficial thrombophlebitis of right upper extremity        4. Chronic anticoagulation        5. Primary hypertension        6. PUD (peptic ulcer disease)          PATIENT TYPE: Primary Prevention    Etiology of Established CVD if Present:     1) VTE disease   11/2023 - acute RUE cephalic, median antecub and LUE cephalic v SVT - normal exam today, resolved, minimal RUE cording   Thrombophilia/hypercoag evaluation:  not recommended as isolated and related to periph IVs and blood product transfusions  Plan:  - no imaging surveillance  needed at this time  - antithrombotic plan as noted below   - counseled on signs and symptoms of acute VTE that require seeking prompt attention in the ED including shortness of breath, chest pain, pain with deep inhalation, acute leg swelling and/or pain in calf or leg   - elevate legs as much as possible, use compression stockings/socks if directed by your provider  - avoid hormonal therapies containing E2 or testosterone, raloxifene, tamoxifene, and other meds increasing risk for VTE   - avoid or limit sedentary periods  - continue complete avoidance of tobacco products  - if having any invasive procedure,please make sure the doctor knows of your history of blood clots and current anticoagulation status  - recommend f/u with PCP for age-appropriate cancer-screening due to risk of malig-associated VTE    2) MV regurg, severe, s/p 1/2024 (DR. Santana)  Stable postop echo with good MVR function   Plan  - ongoing postop f/u with CT surg, then ongoing with cardiology for surveillance     ANTITHROMBOTIC THERAPY:  Date of initiation: 1/2024   HAS-BLED bleeding risk calc ([email or web address]): 1 pt, 3.4%, low risk  Factors to consider for indefinite OAC: N/A  Last CBC, BMP: reviewed above   Expected duration: on VKA due to s/p MV repair, mgmt per INR clinic, defer LOT to CT surg and cardiology as not for VTE tx   Antithrombotic therapy plan:  - continue VKA, INR testing today and ongoing with AC clinic   - continue ASA 81mg daily as per CT surg recs   - LOT per CT surg recs  - avoid Aspirin and NSAIDs while anticoagulated   - limit or avoid sports or high-risk for head injury to reduce risk for intracranial bleeds  - advised to seek urgent eval for any GI bleeding, stroke-like sx, or minor bleeding >30min duration     BLOOD PRESSURE CONTROL   ACC/AHA (2017) goal <130/80  Home BP at goal: yes  Office BP at goal:  yes   Plan:   - continue healthy diet, activity, weight mgmt   Monitoring:   - routine clinic-based BP measurements at  least once annually   Medications:   - continue losartan 50mg daily   - continue metoprolol 25mg BID   - continue torsemide 20mg 1/2 tab daily with Kcl     GLYCEMIC STATUS:  Normal    LIFESTYLE INTERVENTIONS:    SMOKING:    reports that he has never smoked. He has never used smokeless tobacco.   - continued complete avoidance of all tobacco products     PHYSICAL ACTIVITY: continue healthy activity to improve CV fitness.  In general, targeting >150min/week of moderate-level activity or as much as tolerated in light of functional status and co-morbidities     WEIGHT MANAGEMENT AND NUTRITION: Mediterranean style dietary approach       OTHER:     # hx PUD, s/p transfusions - stable h/h, mild low     # iron def anemia, presumed blood loss - improving, ongoing surveillance per PCP     Instructed to follow-up with PCP for remainder of adult medical needs: yes  We will partner with other providers in the management of established vascular disease and cardiometabolic risk factors.    Studies to Be Obtained: none    Labs to Be Obtained: as noted in assessment    Follow up in:  ongoing with CT surg, cardiology, AC clinic.   VM available prfito Valerio M.D.  [facility]   [facility]   [phone]

## 2024-02-20 NOTE — PROGRESS NOTES
Anticoagulation Summary  As of 2024      INR goal:  2.0-3.0   TTR:  100.0% (1.1 wk)   INR used for dosin.30 (2024)   Warfarin maintenance plan:  15 mg (7.5 mg x 2) every Fri; 11.25 mg (7.5 mg x 1.5) all other days   Weekly warfarin total:  82.5 mg   Plan last modified:  Trevor RiceD (2024)   Next INR check:  3/1/2024   Target end date:  2024    Indications    Mitral regurgitation [I34.0]                 Anticoagulation Episode Summary       INR check location:      Preferred lab:      Send INR reminders to:      Comments:  Mitral valve repair          Anticoagulation Care Providers       Provider Role Specialty Phone number    Bigg Santana M.D. Referring Thoracic Surgery (Cardiothoracic Vascular Surgery) [phone]    [facility] Responsible  [phone]                  Refer to Patient Findings for HPI:  Patient Findings       Negatives:  Signs/symptoms of thrombosis, Signs/symptoms of bleeding, Laboratory test error suspected, Change in health, Change in alcohol use, Change in activity, Upcoming invasive procedure, Emergency department visit, Upcoming dental procedure, Missed doses, Extra doses, Change in medications, Change in diet/appetite, Hospital admission, Bruising, Other complaints            There were no vitals filed for this visit.  See vascular encounter for vitals    Verified current warfarin dosing schedule.    Medications reconciled: Yes  Pt is on antiplatelet therapy with ASA for mitral valve repair.      A/P   INR is therapeutic    Warfarin dosing recommendation: Continue regimen as listed above.    Pt educated to contact our clinic with any changes in medications or s/s of bleeding or thrombosis. Pt is aware to seek immediate medical attention for falls, head injury or deep cuts.    INR in 1 week via [facility] - will let HH know not to draw INR tomorrow.    Jenni Vasquez, TrevorD

## 2024-02-26 ENCOUNTER — OFFICE VISIT (OUTPATIENT)
Dept: CARDIOTHORACIC SURGERY | Facility: MEDICAL CENTER | Age: 44
End: 2024-02-26
Payer: COMMERCIAL

## 2024-02-26 VITALS
HEART RATE: 87 BPM | BODY MASS INDEX: 28.97 KG/M2 | OXYGEN SATURATION: 97 % | WEIGHT: 233 LBS | DIASTOLIC BLOOD PRESSURE: 84 MMHG | TEMPERATURE: 97.7 F | HEIGHT: 75 IN | SYSTOLIC BLOOD PRESSURE: 122 MMHG

## 2024-02-26 DIAGNOSIS — Z98.890 POST-OPERATIVE STATE: ICD-10-CM

## 2024-02-26 PROCEDURE — 99024 POSTOP FOLLOW-UP VISIT: CPT | Performed by: NURSE PRACTITIONER

## 2024-02-26 PROCEDURE — 3074F SYST BP LT 130 MM HG: CPT | Performed by: NURSE PRACTITIONER

## 2024-02-26 PROCEDURE — 3079F DIAST BP 80-89 MM HG: CPT | Performed by: NURSE PRACTITIONER

## 2024-02-26 RX ORDER — OXYCODONE HYDROCHLORIDE 20 MG/1
TABLET ORAL
COMMUNITY
Start: 2024-02-22

## 2024-02-26 RX ORDER — BACLOFEN 10 MG/1
TABLET ORAL
COMMUNITY
Start: 2024-02-22

## 2024-02-26 RX ORDER — NALOXONE HYDROCHLORIDE 4 MG/.1ML
SPRAY NASAL
COMMUNITY
Start: 2024-02-22

## 2024-02-26 ASSESSMENT — FIBROSIS 4 INDEX: FIB4 SCORE: 0.18

## 2024-02-27 ENCOUNTER — OFFICE VISIT (OUTPATIENT)
Dept: CARDIOLOGY | Facility: MEDICAL CENTER | Age: 44
End: 2024-02-27
Payer: COMMERCIAL

## 2024-02-27 VITALS
RESPIRATION RATE: 16 BRPM | DIASTOLIC BLOOD PRESSURE: 72 MMHG | HEIGHT: 75 IN | OXYGEN SATURATION: 100 % | WEIGHT: 236 LBS | HEART RATE: 92 BPM | BODY MASS INDEX: 29.34 KG/M2 | SYSTOLIC BLOOD PRESSURE: 114 MMHG

## 2024-02-27 DIAGNOSIS — Z98.890 S/P MITRAL VALVE REPAIR: ICD-10-CM

## 2024-02-27 DIAGNOSIS — I10 ESSENTIAL HYPERTENSION: ICD-10-CM

## 2024-02-27 DIAGNOSIS — Z98.890 S/P MVR (MITRAL VALVE REPAIR): ICD-10-CM

## 2024-02-27 DIAGNOSIS — I10 PRIMARY HYPERTENSION: ICD-10-CM

## 2024-02-27 PROBLEM — I34.0 MITRAL REGURGITATION: Status: RESOLVED | Noted: 2023-09-15 | Resolved: 2024-02-27

## 2024-02-27 PROBLEM — E87.20 NORMAL ANION GAP METABOLIC ACIDOSIS: Status: RESOLVED | Noted: 2023-11-18 | Resolved: 2024-02-27

## 2024-02-27 PROBLEM — D62 ACUTE BLOOD LOSS ANEMIA: Status: RESOLVED | Noted: 2023-07-30 | Resolved: 2024-02-27

## 2024-02-27 PROCEDURE — 3074F SYST BP LT 130 MM HG: CPT

## 2024-02-27 PROCEDURE — 99213 OFFICE O/P EST LOW 20 MIN: CPT

## 2024-02-27 PROCEDURE — 99214 OFFICE O/P EST MOD 30 MIN: CPT

## 2024-02-27 PROCEDURE — 3078F DIAST BP <80 MM HG: CPT

## 2024-02-27 RX ORDER — LOSARTAN POTASSIUM 50 MG/1
50 TABLET ORAL DAILY
Qty: 100 TABLET | Refills: 3 | Status: SHIPPED | OUTPATIENT
Start: 2024-02-27

## 2024-02-27 ASSESSMENT — ENCOUNTER SYMPTOMS
NERVOUS/ANXIOUS: 0
MUSCULOSKELETAL NEGATIVE: 1
NEUROLOGICAL NEGATIVE: 1
SHORTNESS OF BREATH: 0
DEPRESSION: 0
PALPITATIONS: 0
PND: 0
GASTROINTESTINAL NEGATIVE: 1
ORTHOPNEA: 0
EYES NEGATIVE: 1
CONSTITUTIONAL NEGATIVE: 1

## 2024-02-27 ASSESSMENT — FIBROSIS 4 INDEX: FIB4 SCORE: 0.18

## 2024-02-27 NOTE — PROGRESS NOTES
Chief Complaint   Patient presents with    Follow-Up     Hospital Follow up       Subjective     Roberto Braswell is a 43 y.o. male who presents today for hospital follow up from mitral valve repair.  He has a history of hypertension, recent GI bleed - duodenal ulcer coiling 5 weeks ago and severe mitral regurgitation.     He underwent mitral valve repair on 1/25/2024 with Dr. Santana.  Postoperative course without significant complication.    Today 2/27/2024 he has been having some residual chest wall pain, he has been going on frequent walks with his dogs.  He reports that his wife has been dealing with significant back issues and has also been in the hospital.    Past Medical History:   Diagnosis Date    Arthritis     Right ankle    Breath shortness 11/03/2023    With exertion.    Drug-seeking behavior 07/30/2023    Demanding narcotics for a duodenal ulcer July 2023. Refused antacids or sucralfate.     Duodenal ulcer 07/30/2023    Gastric ulcer     Heart valve disease     Mitral valve prolapse    Hemorrhagic disorder (HCC)     GI bleed    Hypertension     Pain     Resolved; Right ankle    Seizure (HCC)     while taking tramadol     Past Surgical History:   Procedure Laterality Date    MO REPLACEMENT OF MITRAL VALVE  1/25/2024    Procedure: MITRAL VALVE REPAIR, TRANSESOPHAGEAL ECHOCARDIOGRAM;  Surgeon: Bigg Santana M.D.;  Location: SURGERY [facility];  Service: Cardiothoracic    ECHOCARDIOGRAM, TRANSESOPHAGEAL, INTRAOPERATIVE  1/25/2024    Procedure: ECHOCARDIOGRAM, TRANSESOPHAGEAL, INTRAOPERATIVE;  Surgeon: Bigg Santana M.D.;  Location: SURGERY [facility];  Service: Cardiothoracic    MO UPPER GI ENDOSCOPY,DIAGNOSIS N/A 9/14/2023    Procedure: GASTROSCOPY;  Surgeon: Johnny Dalton M.D.;  Location: SURGERY SAME DAY [facility];  Service: Gastroenterology    MO UPPER GI ENDOSCOPY,BIOPSY N/A 9/14/2023    Procedure: GASTROSCOPY, WITH BIOPSY;  Surgeon: Johnny Dalton M.D.;  Location: SURGERY SAME DAY [facility];   Service: Gastroenterology    MD UPPER GI ENDOSCOPY,BIOPSY N/A 7/30/2023    Procedure: GASTROSCOPY, WITH BIOPSY;  Surgeon: Kumar Hope M.D.;  Location: SURGERY [facility];  Service: Gastroenterology    MD UPPER GI ENDOSCOPY,DIAGNOSIS  3/28/2022    Procedure: GASTROSCOPY;  Surgeon: Paco Wiggins M.D.;  Location: SURGERY SAME DAY [facility];  Service: Gastroenterology    MD UPPER GI ENDOSCOPY,BIOPSY  3/28/2022    Procedure: GASTROSCOPY, WITH BIOPSY;  Surgeon: Paco Wiggins M.D.;  Location: SURGERY SAME DAY [facility];  Service: Gastroenterology    ANKLE ARTHROSCOPY Right 5/21/2018    Procedure: ANKLE ARTHROSCOPY, LATERAL LIGAMENT RECONSTRUCTION;  Surgeon: Seth Cruz M.D.;  Location: SURGERY [facility];  Service: Orthopedics    BIOPSY ORTHO Right 5/21/2018    Procedure: BIOPSY ORTHO/ FOR CARTILAGE AND DENOVO PROCEDURE;  Surgeon: Seth Cruz M.D.;  Location: SURGERY [facility];  Service: Orthopedics    OTHER ABDOMINAL SURGERY      Cholecystectomy February 2017    OTHER ORTHOPEDIC SURGERY      2 previous ankle surgeries (2007, 2009)     Family History   Problem Relation Age of Onset    Heart Disease Mother     No Known Problems Father     Clotting Disorder Neg Hx      Social History     Socioeconomic History    Marital status:      Spouse name: Not on file    Number of children: Not on file    Years of education: Not on file    Highest education level: Associate degree: occupational, technical, or vocational program   Occupational History    Not on file   Tobacco Use    Smoking status: Never    Smokeless tobacco: Never   Vaping Use    Vaping Use: Never used   Substance and Sexual Activity    Alcohol use: Not Currently    Drug use: Never    Sexual activity: Not on file   Other Topics Concern    Not on file   Social History Narrative    Not on file     Social Determinants of Health     Financial Resource Strain: Low Risk  (9/26/2023)    Overall Financial Resource Strain (CARDIA)      Difficulty of Paying Living Expenses: Not hard at all   Food Insecurity: No Food Insecurity (9/26/2023)    Hunger Vital Sign     Worried About Running Out of Food in the Last Year: Never true     Ran Out of Food in the Last Year: Never true   Transportation Needs: No Transportation Needs (9/26/2023)    PRAPARE - Transportation     Lack of Transportation (Medical): No     Lack of Transportation (Non-Medical): No   Physical Activity: Sufficiently Active (9/26/2023)    Exercise Vital Sign     Days of Exercise per Week: 6 days     Minutes of Exercise per Session: 120 min   Stress: Stress Concern Present (9/26/2023)    [employer] [place] [employer] - Occupational Stress Questionnaire     Feeling of Stress : To some extent   Social Connections: Moderately Isolated (9/26/2023)    Social Connection and Isolation Panel [NHANES]     Frequency of Communication with Friends and Family: Once a week     Frequency of Social Gatherings with Friends and Family: Twice a week     Attends Church Services: Never     Active Member of Clubs or Organizations: No     Attends Club or Organization Meetings: Never     Marital Status:    Intimate Partner Violence: Not on file   Housing Stability: Low Risk  (9/26/2023)    Housing Stability Vital Sign     Unable to Pay for Housing in the Last Year: No     Number of Places Lived in the Last Year: 1     Unstable Housing in the Last Year: No     Allergies   Allergen Reactions    Morphine Vomiting    Tramadol Unspecified     Seizure  [id number]     Outpatient Encounter Medications as of 2/27/2024   Medication Sig Dispense Refill    baclofen (LIORESAL) 10 MG Tab TAKE 1 TABLET 3 TIMES A DAY BY ORAL ROUTE AS NEEDED FOR 30 DAYS, FOR MUSCLE SPASMS.      Oxycodone HCl 20 MG Tab PLEASE SEE ATTACHED FOR DETAILED DIRECTIONS      Naloxone (NARCAN) 4 MG/0.1ML Liquid USE AS DIRECTED FOR OPIOID EMERGENCY      warfarin (COUMADIN) 7.5 MG Tab Take 1.5-2 Tablets by mouth every day at 6 PM. 180  Tablet 1    metoprolol tartrate (LOPRESSOR) 25 MG Tab Take 1 Tablet by mouth 2 times a day. 60 Tablet 2    losartan (COZAAR) 50 MG Tab Take 1 Tablet by mouth every day. 30 Tablet 2    gabapentin (NEURONTIN) 100 MG Cap Take 1 Capsule by mouth 3 times a day for 3 days, THEN 2 Capsules 3 times a day for 3 days, THEN 3 Capsules 3 times a day for 20 days. 207 Capsule 0    acetaminophen (TYLENOL) 500 MG Tab Take 1,000 mg by mouth 1 time a day as needed for Moderate Pain.      sucralfate (CARAFATE) 1 GM Tab Take 1 g by mouth 3 times a day before meals.      omeprazole (PRILOSEC) 20 MG delayed-release capsule Take 20 mg by mouth every day.      lidocaine-hyoscyamine-mag hydrox-al hydrox-simeth Take 30mL  by mouth every 6 hours as needed (upset stomach). 200 mL 0    [DISCONTINUED] pantoprazole (PROTONIX) 40 MG Tablet Delayed Response PLEASE SEE ATTACHED FOR DETAILED DIRECTIONS (Patient not taking: Reported on 2/27/2024)      [DISCONTINUED] aspirin 81 MG EC tablet Take 1 Tablet by mouth every day. 100 Tablet 0    [DISCONTINUED] torsemide (DEMADEX) 20 MG Tab Take 0.5 Tablet by mouth every day. (Patient not taking: Reported on 2/27/2024) 30 Tablet 0    [DISCONTINUED] potassium chloride SA (K-DUR) 10 MEQ Tab CR Take 1 Tablet by mouth every day. (Patient not taking: Reported on 2/27/2024) 30 Tablet 0     No facility-administered encounter medications on file as of 2/27/2024.     Review of Systems   Constitutional: Negative.    HENT: Negative.     Eyes: Negative.    Respiratory:  Negative for shortness of breath.    Cardiovascular:  Negative for chest pain, palpitations, orthopnea, leg swelling and PND.   Gastrointestinal: Negative.    Genitourinary: Negative.    Musculoskeletal: Negative.    Skin: Negative.    Neurological: Negative.    Endo/Heme/Allergies: Negative.    Psychiatric/Behavioral:  Negative for depression. The patient is not nervous/anxious.               Objective     /72 (BP Location: Left arm, Patient  "Position: Sitting, BP Cuff Size: Adult)   Pulse 92   Resp 16   Ht 1.905 m (6' 3\")   Wt 107 kg (236 lb)   SpO2 100%   BMI 29.50 kg/m²     Physical Exam  Constitutional:       Appearance: Normal appearance.   HENT:      Head: Normocephalic.   Neck:      Vascular: No JVD.   Cardiovascular:      Rate and Rhythm: Normal rate and regular rhythm.      Pulses: Normal pulses.      Heart sounds: Normal heart sounds. No murmur heard.     No friction rub.      Comments: Midline incision is CDI, 3 tips of sutures visible  Pulmonary:      Effort: Pulmonary effort is normal.      Breath sounds: Normal breath sounds.   Abdominal:      Palpations: Abdomen is soft.   Musculoskeletal:         General: Normal range of motion.      Right lower leg: No edema.      Left lower leg: No edema.   Skin:     General: Skin is warm and dry.   Neurological:      General: No focal deficit present.      Mental Status: He is alert and oriented to person, place, and time.   Psychiatric:         Mood and Affect: Mood normal.         Behavior: Behavior normal.            Lab Results   Component Value Date/Time    CHOLSTRLTOT 183 10/09/2023 09:33 AM     (H) 10/09/2023 09:33 AM    HDL 59 10/09/2023 09:33 AM    TRIGLYCERIDE 69 10/09/2023 09:33 AM       Lab Results   Component Value Date/Time    SODIUM 140 02/12/2024 05:25 PM    POTASSIUM 4.3 02/12/2024 05:25 PM    CHLORIDE 106 02/12/2024 05:25 PM    CO2 20 02/12/2024 05:25 PM    GLUCOSE 101 (H) 02/12/2024 05:25 PM    BUN 11 02/12/2024 05:25 PM    CREATININE 1.02 02/12/2024 05:25 PM     Lab Results   Component Value Date/Time    ALKPHOSPHAT 97 02/12/2024 05:25 PM    ASTSGOT 14 02/12/2024 05:25 PM    ALTSGPT 15 02/12/2024 05:25 PM    TBILIRUBIN 0.3 02/12/2024 05:25 PM          Assessment & Plan     1. S/P mitral valve repair  EC-ECHOCARDIOGRAM COMPLETE W/O CONT      2. Essential hypertension        3. Primary hypertension            Medical Decision Making: Today's Assessment/Status/Plan:      "     Status post Mitral valve repair on 1/25/2024  -Doing well postop  -continue coumadin, followed by anticoagulation clinic, restart baby aspirin when coumadin therapy completed  -Continue BB: Metoprolol 25 mg twice daily  -echo in 3 months   - referral to cardiac rehab   -Continue postop precautions:  1. NO driving for 4 weeks after surgery. You may ride as a passenger.  2. NO lifting of any item over 10 lbs (e.g. gallon of milk) for 6 weeks after surgery.  3. DO walk as much as possible! Walk a minimum of once a day. Depending on your fatigue and comfort level, you may walk as much as you wish. There is no maximum.  4. Other physical activities (sex, housework, gardening, etc.) are OK after 4 weeks     Hypertension  - good control  - continue current regimen  - goal < 130/80     Hyperlipidemia  -Most recent   -Continue dietary measures to lower cholesterol  -Goal of less than 100  -Check lipid panel annually    Follow up with Caroline MCBRIDE in 3 months with echo    This note was dictated using Dragon speech recognition software.      .

## 2024-02-29 RX ORDER — ONDANSETRON 4 MG/1
4 TABLET, FILM COATED ORAL EVERY 6 HOURS PRN
Qty: 20 TABLET | Refills: 0 | Status: SHIPPED | OUTPATIENT
Start: 2024-02-29 | End: 2024-03-05

## 2024-03-01 ENCOUNTER — APPOINTMENT (OUTPATIENT)
Dept: RADIOLOGY | Facility: MEDICAL CENTER | Age: 44
End: 2024-03-01
Attending: EMERGENCY MEDICINE
Payer: COMMERCIAL

## 2024-03-01 ENCOUNTER — HOSPITAL ENCOUNTER (EMERGENCY)
Facility: MEDICAL CENTER | Age: 44
End: 2024-03-01
Attending: EMERGENCY MEDICINE
Payer: COMMERCIAL

## 2024-03-01 ENCOUNTER — ANTICOAGULATION MONITORING (OUTPATIENT)
Dept: VASCULAR LAB | Facility: MEDICAL CENTER | Age: 44
End: 2024-03-01

## 2024-03-01 ENCOUNTER — TELEPHONE (OUTPATIENT)
Dept: VASCULAR LAB | Facility: MEDICAL CENTER | Age: 44
End: 2024-03-01

## 2024-03-01 ENCOUNTER — OFFICE VISIT (OUTPATIENT)
Dept: CARDIOTHORACIC SURGERY | Facility: MEDICAL CENTER | Age: 44
End: 2024-03-01
Payer: COMMERCIAL

## 2024-03-01 VITALS
TEMPERATURE: 97.8 F | SYSTOLIC BLOOD PRESSURE: 121 MMHG | BODY MASS INDEX: 29.24 KG/M2 | DIASTOLIC BLOOD PRESSURE: 69 MMHG | RESPIRATION RATE: 16 BRPM | WEIGHT: 233.91 LBS | HEART RATE: 70 BPM | OXYGEN SATURATION: 97 %

## 2024-03-01 VITALS
WEIGHT: 233 LBS | HEIGHT: 75 IN | BODY MASS INDEX: 28.97 KG/M2 | OXYGEN SATURATION: 99 % | DIASTOLIC BLOOD PRESSURE: 76 MMHG | SYSTOLIC BLOOD PRESSURE: 128 MMHG | HEART RATE: 78 BPM | TEMPERATURE: 96.8 F

## 2024-03-01 DIAGNOSIS — R07.89 CHEST WALL PAIN: ICD-10-CM

## 2024-03-01 DIAGNOSIS — R11.2 NAUSEA AND VOMITING, UNSPECIFIED VOMITING TYPE: ICD-10-CM

## 2024-03-01 LAB
ALBUMIN SERPL BCP-MCNC: 4.1 G/DL (ref 3.2–4.9)
ALBUMIN/GLOB SERPL: 1.6 G/DL
ALP SERPL-CCNC: 73 U/L (ref 30–99)
ALT SERPL-CCNC: 14 U/L (ref 2–50)
AMPHET UR QL SCN: NEGATIVE
ANION GAP SERPL CALC-SCNC: 11 MMOL/L (ref 7–16)
APPEARANCE UR: CLEAR
AST SERPL-CCNC: 16 U/L (ref 12–45)
BARBITURATES UR QL SCN: NEGATIVE
BASOPHILS # BLD AUTO: 1.3 % (ref 0–1.8)
BASOPHILS # BLD: 0.07 K/UL (ref 0–0.12)
BENZODIAZ UR QL SCN: NEGATIVE
BILIRUB SERPL-MCNC: 0.5 MG/DL (ref 0.1–1.5)
BILIRUB UR QL STRIP.AUTO: NEGATIVE
BUN SERPL-MCNC: 16 MG/DL (ref 8–22)
BZE UR QL SCN: NEGATIVE
CALCIUM ALBUM COR SERPL-MCNC: 8.9 MG/DL (ref 8.5–10.5)
CALCIUM SERPL-MCNC: 9 MG/DL (ref 8.5–10.5)
CANNABINOIDS UR QL SCN: NEGATIVE
CHLORIDE SERPL-SCNC: 106 MMOL/L (ref 96–112)
CO2 SERPL-SCNC: 22 MMOL/L (ref 20–33)
COLOR UR: YELLOW
CREAT SERPL-MCNC: 1.17 MG/DL (ref 0.5–1.4)
EKG IMPRESSION: NORMAL
EOSINOPHIL # BLD AUTO: 0.04 K/UL (ref 0–0.51)
EOSINOPHIL NFR BLD: 0.7 % (ref 0–6.9)
ERYTHROCYTE [DISTWIDTH] IN BLOOD BY AUTOMATED COUNT: 48.4 FL (ref 35.9–50)
FENTANYL UR QL: NEGATIVE
GFR SERPLBLD CREATININE-BSD FMLA CKD-EPI: 79 ML/MIN/1.73 M 2
GLOBULIN SER CALC-MCNC: 2.6 G/DL (ref 1.9–3.5)
GLUCOSE SERPL-MCNC: 101 MG/DL (ref 65–99)
GLUCOSE UR STRIP.AUTO-MCNC: NEGATIVE MG/DL
HCT VFR BLD AUTO: 36.7 % (ref 42–52)
HGB BLD-MCNC: 11.2 G/DL (ref 14–18)
IMM GRANULOCYTES # BLD AUTO: 0.02 K/UL (ref 0–0.11)
IMM GRANULOCYTES NFR BLD AUTO: 0.4 % (ref 0–0.9)
INR PPP: 2.04 (ref 0.87–1.13)
KETONES UR STRIP.AUTO-MCNC: NEGATIVE MG/DL
LEUKOCYTE ESTERASE UR QL STRIP.AUTO: NEGATIVE
LIPASE SERPL-CCNC: 26 U/L (ref 11–82)
LYMPHOCYTES # BLD AUTO: 1.94 K/UL (ref 1–4.8)
LYMPHOCYTES NFR BLD: 34.9 % (ref 22–41)
MCH RBC QN AUTO: 21.5 PG (ref 27–33)
MCHC RBC AUTO-ENTMCNC: 30.5 G/DL (ref 32.3–36.5)
MCV RBC AUTO: 70.6 FL (ref 81.4–97.8)
METHADONE UR QL SCN: NEGATIVE
MICRO URNS: ABNORMAL
MONOCYTES # BLD AUTO: 0.44 K/UL (ref 0–0.85)
MONOCYTES NFR BLD AUTO: 7.9 % (ref 0–13.4)
NEUTROPHILS # BLD AUTO: 3.05 K/UL (ref 1.82–7.42)
NEUTROPHILS NFR BLD: 54.8 % (ref 44–72)
NITRITE UR QL STRIP.AUTO: NEGATIVE
NRBC # BLD AUTO: 0 K/UL
NRBC BLD-RTO: 0 /100 WBC (ref 0–0.2)
OPIATES UR QL SCN: NEGATIVE
OXYCODONE UR QL SCN: POSITIVE
PCP UR QL SCN: NEGATIVE
PH UR STRIP.AUTO: 6.5 [PH] (ref 5–8)
PLATELET # BLD AUTO: 365 K/UL (ref 164–446)
PMV BLD AUTO: 8.6 FL (ref 9–12.9)
POTASSIUM SERPL-SCNC: 4.3 MMOL/L (ref 3.6–5.5)
PROPOXYPH UR QL SCN: NEGATIVE
PROT SERPL-MCNC: 6.7 G/DL (ref 6–8.2)
PROT UR QL STRIP: NEGATIVE MG/DL
PROTHROMBIN TIME: 23.4 SEC (ref 12–14.6)
RBC # BLD AUTO: 5.2 M/UL (ref 4.7–6.1)
RBC UR QL AUTO: NEGATIVE
SODIUM SERPL-SCNC: 139 MMOL/L (ref 135–145)
SP GR UR STRIP.AUTO: >=1.045
TROPONIN T SERPL-MCNC: 15 NG/L (ref 6–19)
TROPONIN T SERPL-MCNC: 16 NG/L (ref 6–19)
UROBILINOGEN UR STRIP.AUTO-MCNC: 0.2 MG/DL
WBC # BLD AUTO: 5.6 K/UL (ref 4.8–10.8)

## 2024-03-01 PROCEDURE — 36415 COLL VENOUS BLD VENIPUNCTURE: CPT

## 2024-03-01 PROCEDURE — 93005 ELECTROCARDIOGRAM TRACING: CPT | Performed by: EMERGENCY MEDICINE

## 2024-03-01 PROCEDURE — 84484 ASSAY OF TROPONIN QUANT: CPT | Mod: 91

## 2024-03-01 PROCEDURE — 85610 PROTHROMBIN TIME: CPT

## 2024-03-01 PROCEDURE — 3078F DIAST BP <80 MM HG: CPT | Performed by: NURSE PRACTITIONER

## 2024-03-01 PROCEDURE — 93005 ELECTROCARDIOGRAM TRACING: CPT

## 2024-03-01 PROCEDURE — 99285 EMERGENCY DEPT VISIT HI MDM: CPT

## 2024-03-01 PROCEDURE — 3074F SYST BP LT 130 MM HG: CPT | Performed by: NURSE PRACTITIONER

## 2024-03-01 PROCEDURE — 80053 COMPREHEN METABOLIC PANEL: CPT

## 2024-03-01 PROCEDURE — 99024 POSTOP FOLLOW-UP VISIT: CPT | Performed by: NURSE PRACTITIONER

## 2024-03-01 PROCEDURE — 85025 COMPLETE CBC W/AUTO DIFF WBC: CPT

## 2024-03-01 PROCEDURE — 96375 TX/PRO/DX INJ NEW DRUG ADDON: CPT

## 2024-03-01 PROCEDURE — 700117 HCHG RX CONTRAST REV CODE 255: Performed by: EMERGENCY MEDICINE

## 2024-03-01 PROCEDURE — 700111 HCHG RX REV CODE 636 W/ 250 OVERRIDE (IP): Performed by: EMERGENCY MEDICINE

## 2024-03-01 PROCEDURE — 80307 DRUG TEST PRSMV CHEM ANLYZR: CPT

## 2024-03-01 PROCEDURE — 96376 TX/PRO/DX INJ SAME DRUG ADON: CPT

## 2024-03-01 PROCEDURE — 81003 URINALYSIS AUTO W/O SCOPE: CPT

## 2024-03-01 PROCEDURE — 83690 ASSAY OF LIPASE: CPT

## 2024-03-01 PROCEDURE — 700105 HCHG RX REV CODE 258: Performed by: EMERGENCY MEDICINE

## 2024-03-01 PROCEDURE — 96374 THER/PROPH/DIAG INJ IV PUSH: CPT

## 2024-03-01 PROCEDURE — 74177 CT ABD & PELVIS W/CONTRAST: CPT

## 2024-03-01 PROCEDURE — 71045 X-RAY EXAM CHEST 1 VIEW: CPT

## 2024-03-01 RX ORDER — HYDROMORPHONE HYDROCHLORIDE 1 MG/ML
0.5 INJECTION, SOLUTION INTRAMUSCULAR; INTRAVENOUS; SUBCUTANEOUS ONCE
Status: COMPLETED | OUTPATIENT
Start: 2024-03-01 | End: 2024-03-01

## 2024-03-01 RX ORDER — AMOXICILLIN AND CLAVULANATE POTASSIUM 500; 125 MG/1; MG/1
1 TABLET, FILM COATED ORAL 3 TIMES DAILY
Qty: 30 TABLET | Refills: 0 | Status: SHIPPED | OUTPATIENT
Start: 2024-03-01

## 2024-03-01 RX ORDER — SODIUM CHLORIDE 9 MG/ML
1000 INJECTION, SOLUTION INTRAVENOUS ONCE
Status: COMPLETED | OUTPATIENT
Start: 2024-03-01 | End: 2024-03-01

## 2024-03-01 RX ORDER — ONDANSETRON 4 MG/1
4 TABLET, ORALLY DISINTEGRATING ORAL EVERY 6 HOURS PRN
Qty: 10 TABLET | Refills: 0 | Status: SHIPPED | OUTPATIENT
Start: 2024-03-01 | End: 2024-03-11

## 2024-03-01 RX ORDER — DIPHENHYDRAMINE HYDROCHLORIDE 50 MG/ML
25 INJECTION INTRAMUSCULAR; INTRAVENOUS ONCE
Status: COMPLETED | OUTPATIENT
Start: 2024-03-01 | End: 2024-03-01

## 2024-03-01 RX ORDER — PROCHLORPERAZINE MALEATE 5 MG/1
5 TABLET ORAL EVERY 6 HOURS PRN
Qty: 30 TABLET | Refills: 0 | Status: SHIPPED | OUTPATIENT
Start: 2024-03-01

## 2024-03-01 RX ORDER — ONDANSETRON 2 MG/ML
4 INJECTION INTRAMUSCULAR; INTRAVENOUS ONCE
Status: COMPLETED | OUTPATIENT
Start: 2024-03-01 | End: 2024-03-01

## 2024-03-01 RX ORDER — METOCLOPRAMIDE 10 MG/1
10 TABLET ORAL 4 TIMES DAILY PRN
Qty: 10 TABLET | Refills: 0 | Status: SHIPPED | OUTPATIENT
Start: 2024-03-01

## 2024-03-01 RX ORDER — HYDROMORPHONE HYDROCHLORIDE 1 MG/ML
0.5 INJECTION, SOLUTION INTRAMUSCULAR; INTRAVENOUS; SUBCUTANEOUS ONCE
Status: DISCONTINUED | OUTPATIENT
Start: 2024-03-01 | End: 2024-03-01 | Stop reason: HOSPADM

## 2024-03-01 RX ORDER — HYDROMORPHONE HYDROCHLORIDE 1 MG/ML
0.5 INJECTION, SOLUTION INTRAMUSCULAR; INTRAVENOUS; SUBCUTANEOUS
Status: COMPLETED | OUTPATIENT
Start: 2024-03-01 | End: 2024-03-01

## 2024-03-01 RX ORDER — METOCLOPRAMIDE HYDROCHLORIDE 5 MG/ML
10 INJECTION INTRAMUSCULAR; INTRAVENOUS ONCE
Status: COMPLETED | OUTPATIENT
Start: 2024-03-01 | End: 2024-03-01

## 2024-03-01 RX ADMIN — HYDROMORPHONE HYDROCHLORIDE 0.5 MG: 1 INJECTION, SOLUTION INTRAMUSCULAR; INTRAVENOUS; SUBCUTANEOUS at 13:11

## 2024-03-01 RX ADMIN — IOHEXOL 100 ML: 350 INJECTION, SOLUTION INTRAVENOUS at 13:32

## 2024-03-01 RX ADMIN — SODIUM CHLORIDE 1000 ML: 9 INJECTION, SOLUTION INTRAVENOUS at 13:17

## 2024-03-01 RX ADMIN — HYDROMORPHONE HYDROCHLORIDE 0.5 MG: 1 INJECTION, SOLUTION INTRAMUSCULAR; INTRAVENOUS; SUBCUTANEOUS at 14:51

## 2024-03-01 RX ADMIN — METOCLOPRAMIDE 10 MG: 5 INJECTION, SOLUTION INTRAMUSCULAR; INTRAVENOUS at 14:12

## 2024-03-01 RX ADMIN — DIPHENHYDRAMINE HYDROCHLORIDE 25 MG: 50 INJECTION, SOLUTION INTRAMUSCULAR; INTRAVENOUS at 14:11

## 2024-03-01 RX ADMIN — HYDROMORPHONE HYDROCHLORIDE 0.5 MG: 1 INJECTION, SOLUTION INTRAMUSCULAR; INTRAVENOUS; SUBCUTANEOUS at 14:16

## 2024-03-01 RX ADMIN — ONDANSETRON 4 MG: 2 INJECTION INTRAMUSCULAR; INTRAVENOUS at 13:01

## 2024-03-01 ASSESSMENT — FIBROSIS 4 INDEX
FIB4 SCORE: 0.18
FIB4 SCORE: 0.18

## 2024-03-01 ASSESSMENT — PAIN DESCRIPTION - PAIN TYPE: TYPE: ACUTE PAIN

## 2024-03-01 NOTE — PROGRESS NOTES
CHIEF COMPLAINT: Wound Check    HPI: He had a small amount of purulent drainage from his wound 2 days ago that has resolved.  No drainage with palpation currently.  Small scab over mid sternal incision, likely superficial drainage.  He has been nauseated and vomited yesterday a few times.  Having a hard time tolerating PO.  Still urinating well.  Abdomen is soft non-distended.  Last bowel movement was yesterday.  Has tried zofran but doesn't feel that it is helping.      PHYSICAL EXAM:   Cardiac: S1S2  Neuro:  AAO x 3  Resp:  CTA  Wounds:  CDI, small scab over middle of the incision.  No drainage, purulence currently.     Sternum:  Stable      PLAN: Compazine PRN for nausea.  Discussed small sips and try to rehydrate.  Discussed may need to visit urgent care/ER if unable to tolerate PO and becoming more dehydrated without resolution of N/V for IV hydration.  Augmentin if continued drainage and tolerating PO, otherwise ok to watch and wait.  He verbalizes understanding and will contact the office if his symptoms are not improving or wound looks worse.

## 2024-03-01 NOTE — ED TRIAGE NOTES
Chief Complaint   Patient presents with    Vomiting     X 24 hours, accompanied with abdominal pain.  Pt reports his emesis is lime green and he is not able to tolerate any po intake or meds.      Chest Wall Pain     H/O open heart surgery 1 month ago, he is now experiencing severe chest wall pain secondary to vomiting.       CP protocol and abdominal pain protocol initiated due to pt h /o recent mitral valve replacement and current symptoms.

## 2024-03-01 NOTE — ED PROVIDER NOTES
Emergency Physician Note    Chief Concern:  Chief Complaint   Patient presents with    Vomiting     X 24 hours, accompanied with abdominal pain.  Pt reports his emesis is lime green and he is not able to tolerate any po intake or meds.      Chest Wall Pain     H/O open heart surgery 1 month ago, he is now experiencing severe chest wall pain secondary to vomiting.       HPI/ROS     External Records Reviewed:  1.  Outpatient Clinic Notes: Mr. Braswell was seen in [facility] earlier today.  APRN note reviewed from that visit.  He reported a small amount of purulent drainage from his wound 2 days prior that resolved.  This was thought to be superficial.  He also reported nausea and vomiting that began yesterday, states that he is having difficulty tolerating p.o.  Compazine was prescribed for nausea.  He was counseled to go to urgent care or the emergency department if inability to tolerate p.o. continued.    2.  Outpatient clinic notes: Patient was seen in cardiothoracic surgery clinic 2/27/2024.  APRN note reviewed from that visit.  He presented for follow-up from mitral valve repair.  Noted to have a history of hypertension, and recent GI bleed from duodenal ulcer requiring coiling 5 weeks ago.  He underwent mitral valve repair on 1/25/2024 with Dr. Santana, postoperative course without significant complication.    HPI:  Roberto Braswell is a 43 y.o. male who presents to the emergency department today for evaluation of nausea and vomiting.  His symptoms began yesterday, he describes frequent vomiting, and difficulty tolerating oral food and fluids that began yesterday.  He has a history of open heart surgery for mitral valve repair and went to a clinic appointment today for evaluation of his surgical incision which had a small area of drainage a few days ago.  He discussed the nausea and vomiting with APRGYPSY in clinic, who recommended presenting to the emergency department if his symptoms were  uncontrolled.  He has tried Zofran at home without improvement, states he is having difficulty keeping that medication down due to ongoing vomiting.  He was also prescribed Compazine today, which did not improve his symptoms.  He is not having any associated fevers.  He states he does not have any significant abdominal pain, his primary concern is chest pain in the area of his surgical incision, which becomes much more severe with vomiting.  He reports no pain radiating to the thoracic back, no constipation, no diarrhea.  He does report a past surgical history significant for cholecystectomy for treatment of cholelithiasis.  He does not report any recent hematemesis.    PAST MEDICAL HISTORY  Past Medical History:   Diagnosis Date    Arthritis     Right ankle    Breath shortness 11/03/2023    With exertion.    Drug-seeking behavior 07/30/2023    Demanding narcotics for a duodenal ulcer July 2023. Refused antacids or sucralfate.     Duodenal ulcer 07/30/2023    Gastric ulcer     Heart valve disease     Mitral valve prolapse    Hemorrhagic disorder (HCC)     GI bleed    Hypertension     Pain     Resolved; Right ankle    Seizure (HCC)     while taking tramadol       SURGICAL HISTORY  Past Surgical History:   Procedure Laterality Date    MI REPLACEMENT OF MITRAL VALVE  1/25/2024    Procedure: MITRAL VALVE REPAIR, TRANSESOPHAGEAL ECHOCARDIOGRAM;  Surgeon: Bigg Santana M.D.;  Location: SURGERY [facility];  Service: Cardiothoracic    ECHOCARDIOGRAM, TRANSESOPHAGEAL, INTRAOPERATIVE  1/25/2024    Procedure: ECHOCARDIOGRAM, TRANSESOPHAGEAL, INTRAOPERATIVE;  Surgeon: Bigg Santana M.D.;  Location: SURGERY [facility];  Service: Cardiothoracic    MI UPPER GI ENDOSCOPY,DIAGNOSIS N/A 9/14/2023    Procedure: GASTROSCOPY;  Surgeon: Johnny Dalton M.D.;  Location: SURGERY SAME DAY [facility];  Service: Gastroenterology    MI UPPER GI ENDOSCOPY,BIOPSY N/A 9/14/2023    Procedure: GASTROSCOPY, WITH BIOPSY;  Surgeon: Johnny Dalton  M.D.;  Location: SURGERY SAME DAY [facility];  Service: Gastroenterology    MD UPPER GI ENDOSCOPY,BIOPSY N/A 7/30/2023    Procedure: GASTROSCOPY, WITH BIOPSY;  Surgeon: Kumar Hope M.D.;  Location: SURGERY [facility];  Service: Gastroenterology    MD UPPER GI ENDOSCOPY,DIAGNOSIS  3/28/2022    Procedure: GASTROSCOPY;  Surgeon: Paco Wiggins M.D.;  Location: SURGERY SAME DAY [facility];  Service: Gastroenterology    MD UPPER GI ENDOSCOPY,BIOPSY  3/28/2022    Procedure: GASTROSCOPY, WITH BIOPSY;  Surgeon: Paco Wiggins M.D.;  Location: SURGERY SAME DAY [facility];  Service: Gastroenterology    ANKLE ARTHROSCOPY Right 5/21/2018    Procedure: ANKLE ARTHROSCOPY, LATERAL LIGAMENT RECONSTRUCTION;  Surgeon: Seth Cruz M.D.;  Location: SURGERY [facility];  Service: Orthopedics    BIOPSY ORTHO Right 5/21/2018    Procedure: BIOPSY ORTHO/ FOR CARTILAGE AND DENOVO PROCEDURE;  Surgeon: Seth Cruz M.D.;  Location: SURGERY [facility];  Service: Orthopedics    OTHER ABDOMINAL SURGERY      Cholecystectomy February 2017    OTHER ORTHOPEDIC SURGERY      2 previous ankle surgeries (2007, 2009)       FAMILY HISTORY  Family History   Problem Relation Age of Onset    Heart Disease Mother     No Known Problems Father     Clotting Disorder Neg Hx        SOCIAL HISTORY   reports that he has never smoked. He has never used smokeless tobacco. He reports that he does not currently use alcohol. He reports that he does not use drugs.    CURRENT MEDICATIONS  Discharge Medication List as of 3/1/2024  4:04 PM        CONTINUE these medications which have NOT CHANGED    Details   prochlorperazine (COMPAZINE) 5 MG Tab Take 1 Tablet by mouth every 6 hours as needed for Nausea/Vomiting., Disp-30 Tablet, R-0, Normal      amoxicillin-clavulanate (AUGMENTIN) 500-125 MG Tab Take 1 Tablet by mouth 3 times a day., Disp-30 Tablet, R-0, Normal      ondansetron (ZOFRAN) 4 MG Tab tablet Take 1 Tablet by mouth every 6 hours as needed for  Nausea/Vomiting for up to 5 days., Disp-20 Tablet, R-0, Normal      losartan (COZAAR) 50 MG Tab Take 1 Tablet by mouth every day., Disp-100 Tablet, R-3, Normal      metoprolol tartrate (LOPRESSOR) 25 MG Tab Take 1 Tablet by mouth 2 times a day., Disp-200 Tablet, R-3, Normal      baclofen (LIORESAL) 10 MG Tab TAKE 1 TABLET 3 TIMES A DAY BY ORAL ROUTE AS NEEDED FOR 30 DAYS, FOR MUSCLE SPASMS., Historical Med      Oxycodone HCl 20 MG Tab PLEASE SEE ATTACHED FOR DETAILED DIRECTIONS, Historical Med      Naloxone (NARCAN) 4 MG/0.1ML Liquid USE AS DIRECTED FOR OPIOID EMERGENCY, Historical Med      warfarin (COUMADIN) 7.5 MG Tab Take 1.5-2 Tablets by mouth every day at 6 PM., Disp-180 Tablet, R-1, Normal      gabapentin (NEURONTIN) 100 MG Cap Take 1 Capsule by mouth 3 times a day for 3 days, THEN 2 Capsules 3 times a day for 3 days, THEN 3 Capsules 3 times a day for 20 days., Disp-207 Capsule, R-0, Normal      acetaminophen (TYLENOL) 500 MG Tab Take 1,000 mg by mouth 1 time a day as needed for Moderate Pain., Historical Med      sucralfate (CARAFATE) 1 GM Tab Take 1 g by mouth 3 times a day before meals., Historical Med      omeprazole (PRILOSEC) 20 MG delayed-release capsule Take 20 mg by mouth every day., Historical Med      lidocaine-hyoscyamine-mag hydrox-al hydrox-simeth Take 30mL  by mouth every 6 hours as needed (upset stomach).Ingredients: 34 mL hyoscyamine 0.125 mg/5 mL, 126 mL Maalox, and 40 mL viscous lidocaine 2%.Disp-200 mL, R-0, Normal             ALLERGIES  Morphine and Tramadol    PHYSICAL EXAM  Vital Signs: /69   Pulse 70   Temp 36.6 °C (97.8 °F) (Temporal)   Resp 16   Wt 106 kg (233 lb 14.5 oz)   SpO2 97%   BMI 29.24 kg/m²   Constitutional: Alert, no acute distress  HENT: Normocephalic, atraumatic.  Cardiovascular: Regular rate and rhythm, no tachycardia, healing midline sternal incision with a tiny clot overlying the incision site, no active drainage, no purulent drainage, no palpable fluid  collection.  Pulmonary: No respiratory distress, normal work of breathing  Abdomen: Soft, non tender, no peritoneal signs, no rebound, no guarding  Skin: Warm, dry, no rashes or lesions  Musculoskeletal: Normal range of motion in all extremities, no swelling or deformity noted  Neurologic: Alert, oriented, normal motor function, no speech deficits  Psychiatric: Normal and appropriate mood and affect    Diagnostic Studies & Procedures    Labs:  All labs reviewed by me as noted below.    EK Lead EKG interpreted by me as noted below  Results for orders placed or performed during the hospital encounter of 24   EKG   Result Value Ref Range    Report       [facility] Emergency Dept.    Test Date:  2024  Pt Name:    PATI MERCEDES           Department: ER  MRN:        [id number]                      Room:        15  Gender:     Male                         Technician: [id number]  :        1980                   Requested By:ER TRIAGE PROTOCOL  Order #:    [id number]                    Reading MD: ISRRAEL NATH MD    Measurements  Intervals                                Axis  Rate:       78                           P:          56  IN:         169                          QRS:        -10  QRSD:       91                           T:          56  QT:         369  QTc:        421    Interpretive Statements  Rate 78, sinus rhythm, no ST elevation or depression, no pathologic T wave  inversions, no ectopy.  Electronically Signed On 2024 15:30:41 PST by ISRRAEL NATH MD         Radiology:  The attending Emergency Physician has independently interpreted the following imaging:  I independently interpreted the CT of the abdomen and pelvis, no large dilated loops of bowel to suggest obstruction.    CT-ABDOMEN-PELVIS WITH   Final Result      1. No acute inflammatory process in the abdomen or pelvis.   2. I do not identify the appendix, but there are no inflammatory changes in the  right lower quadrant.   3. Diverticulosis, without diverticulitis.   4. Embolization coil in the gastroduodenal artery.      DX-CHEST-PORTABLE (1 VIEW)   Final Result      No acute cardiac or pulmonary abnormalities are identified.          Course and Medical Decision Making    Initial Assessment and Plan:  Mr. Braswell presents to the emergency department today for an episode of nausea and vomiting.  Symptoms began yesterday, he does not describe significant associated abdominal pain, but states that he does have chest wall pain from open heart surgery in January, and the vomiting has significantly exacerbated the pain localized to the sternum and side of the incision.  On arrival to the emergency department his vital signs are reassuring, he has no tachycardia, no hypotension, he is afebrile.  His abdominal exam is benign without peritoneal signs or localizable tenderness to palpation.  He does have some discomfort on palpation of the sternum.  He is not having any significant diarrhea, primary concern is pain from the vomiting.    On laboratory evaluation urinalysis is negative for urinary tract infection, negative for ketones.  CMP does not show any significant abnormalities, no electrolyte abnormalities, glucose is 101.  Lipase is within normal limits, no evidence of pancreatitis.  Screening high-sensitivity troponin was ordered due to chest pain, this is negative, no evidence of myocardial injury.  Believe chest pain is musculoskeletal, and due to ongoing vomiting.  He has a normal white blood count, hemoglobin today is 11.2 consistent with prior baseline values.  Platelet count is normal.  No abnormalities on differential, no bands resulted at this time, less concerning for infectious etiology.    Due to ongoing vomiting, CT of the abdomen pelvis was obtained.  This demonstrates no acute inflammatory process.  Diverticulosis present without diverticulitis.    He was initially treated with Zofran, and continued  to have ongoing nausea and vomiting.  He was then treated with Reglan and Benadryl.  He also required repeat doses of hydromorphone, which she states is typical as he does have a tolerance to these medications.  Due to ongoing vomiting he received IV fluid bolus.    He states that he had been on opiate pain medications for some time due to his open heart surgery, opiate dosing was escalated gradually in the emergency department, but we were able to control his pain after a few doses.  Antiemetics were effective, and he was able to stop vomiting.  On reassessment after receiving medications and fluids he is feeling significantly improved, and feels well enough for discharge home.  He had previously been prescribed Zofran tablets, but had difficulty taking the medication because he get vomiting the tablets up.  I prescribed ODT Zofran, which is easier to take with nausea.  I also provided a prescription for Reglan if that works better, he is counseled not to take the medications together.  He will follow-up with his primary care clinic within 2 to 3 days as long as his symptoms continue to improve. Return precautions were discussed with the patient, and provided in written form with the patient's discharge instructions.       Additional Problems and Disposition    Escalation of care considered, and ultimately not performed:   As he required multiple doses of IV narcotic pain medications, I considered admission, in the event that his pain and nausea were intractable.  However, once we were able to escalate the pain medication overcome his opiate tolerance, we were safely able to control his pain.  Given good response to therapy, do not believe he requires admission at this time and can be safely discharged with close follow-up and return precautions.    Prescription medication considerations and management:  1.  ODT Zofran prescription provided, Reglan prescription provided    Disposition:  Discharged in stable  condition    FINAL IMPRESSION   1. Chest wall pain    2. Nausea and vomiting, unspecified vomiting type        FOLLOW UP:  Sal Schafer D.O.  [address] [address] [phone]    Call in 2 days      [facility], Emergency Dept  [address] [facility] [address] [phone]  Go to   If symptoms worsen      OUTPATIENT MEDICATIONS:  Discharge Medication List as of 3/1/2024  4:04 PM        START taking these medications    Details   ondansetron (ZOFRAN ODT) 4 MG TABLET DISPERSIBLE Take 1 Tablet by mouth every 6 hours as needed for Nausea/Vomiting for up to 10 days., Disp-10 Tablet, R-0, Normal      metoclopramide (REGLAN) 10 MG Tab Take 1 Tablet by mouth 4 times a day as needed (nausea and vomiting) for up to 10 doses., Disp-10 Tablet, R-0, Normal

## 2024-03-02 NOTE — PROGRESS NOTES
OP   Telephone Anticoagulation Service Note      Anticoagulation Summary  As of 3/1/2024      INR goal:  2.0-3.0   TTR:  100.0% (2.6 wk)   INR used for dosin.04 (3/1/2024)   Warfarin maintenance plan:  15 mg (7.5 mg x 2) every Fri; 11.25 mg (7.5 mg x 1.5) all other days   Weekly warfarin total:  82.5 mg   Plan last modified:  Jenni Vasquez, TrevorD (2024)   Next INR check:  3/8/2024   Target end date:  2024    Indications    Mitral regurgitation (Resolved) [I34.0]                 Anticoagulation Episode Summary       INR check location:      Preferred lab:      Send INR reminders to:      Comments:  Mitral valve repair          Anticoagulation Care Providers       Provider Role Specialty Phone number    Bigg Santana M.D. Referring Thoracic Surgery (Cardiothoracic Vascular Surgery) [phone]    [facility] Responsible  [phone]          Anticoagulation Patient Findings  Patient Findings       Negatives:  Signs/symptoms of thrombosis, Signs/symptoms of bleeding, Laboratory test error suspected, Change in health, Change in alcohol use, Change in activity, Upcoming invasive procedure, Emergency department visit, Upcoming dental procedure, Missed doses, Extra doses, Change in medications, Change in diet/appetite, Hospital admission, Bruising, Other complaints          Spoke with the patient on the phone today, reporting a therapeutic INR of 2.04.  Confirmed the current warfarin dosing regimen and patient compliance.  Patient reports he was in ED earlier today due to uncontrollable nausea and vomiting. He reports antinausea medication helped to get this under control and he is feeling better.   Patient denies any interval changes to diet.   Patient denies any signs/symptoms of bleeding or clotting.  Patient instructed to continue with the current dosing regimen and will retest again in 1 week.   Orders sent to Nadine  to retest INR again in 1 week.     Soraya Cadena  PharmD

## 2024-03-02 NOTE — TELEPHONE ENCOUNTER
INR Reading    3/1/24  INR : 2.04  [facility] - Home Health Nurse    CallBack  - Roberto  -[phone]    Thank you,   Seble BETANCUR

## 2024-03-08 ENCOUNTER — ANTICOAGULATION MONITORING (OUTPATIENT)
Dept: VASCULAR LAB | Facility: MEDICAL CENTER | Age: 44
End: 2024-03-08
Payer: COMMERCIAL

## 2024-03-08 LAB — INR PPP: 1.5 (ref 2–3.5)

## 2024-03-08 NOTE — PROGRESS NOTES
OP   Telephone Anticoagulation Service Note      Anticoagulation Summary  As of 3/8/2024      INR goal:  2.0-3.0   TTR:  76.0% (3.6 wk)   INR used for dosin.50 (3/8/2024)   Warfarin maintenance plan:  15 mg (7.5 mg x 2) every Fri; 11.25 mg (7.5 mg x 1.5) all other days   Weekly warfarin total:  82.5 mg   Plan last modified:  Trevor RiceD (2024)   Next INR check:  3/11/2024   Target end date:  2024    Indications    Mitral regurgitation (Resolved) [I34.0]                 Anticoagulation Episode Summary       INR check location:      Preferred lab:      Send INR reminders to:      Comments:  Mitral valve repair          Anticoagulation Care Providers       Provider Role Specialty Phone number    Bigg Santana M.D. Referring Thoracic Surgery (Cardiothoracic Vascular Surgery) [phone]    [facility] Responsible  [phone]          Anticoagulation Patient Findings  Patient Findings       Negatives:  Signs/symptoms of thrombosis, Signs/symptoms of bleeding, Laboratory test error suspected, Change in health, Change in alcohol use, Change in activity, Upcoming invasive procedure, Emergency department visit, Upcoming dental procedure, Missed doses, Extra doses, Change in medications, Change in diet/appetite, Hospital admission, Bruising, Other complaints          Spoke with the patient on the phone today, reporting a SUB-therapeutic INR of 1.5.  Confirmed the current warfarin dosing regimen and patient compliance.  Patient denies any missed doses.   Patient denies any interval changes to diet and/or medications.   Reason for low INR is undetermined.   Patient denies any signs/symptoms of bleeding or clotting.  Patient instructed to bolus with 18.75mg TONIGHT,  then 15mg TOMORROW, then 11.25mg on Sun.   Patient to retest INR again on Monday via Nadine [facility].   Orders sent to Nadine FERMIN.     Soraya Cadena PharmD

## 2024-03-12 ENCOUNTER — TELEPHONE (OUTPATIENT)
Dept: VASCULAR LAB | Facility: MEDICAL CENTER | Age: 44
End: 2024-03-12
Payer: COMMERCIAL

## 2024-03-12 ENCOUNTER — ANTICOAGULATION MONITORING (OUTPATIENT)
Dept: VASCULAR LAB | Facility: MEDICAL CENTER | Age: 44
End: 2024-03-12
Payer: COMMERCIAL

## 2024-03-12 LAB — INR PPP: 1.9 (ref 2–3.5)

## 2024-03-12 NOTE — PROGRESS NOTES
Anticoagulation Summary  As of 3/12/2024      INR goal:  2.0-3.0   TTR:  65.5% (4.1 wk)   INR used for dosin.90 (3/12/2024)   Warfarin maintenance plan:  15 mg (7.5 mg x 2) every Fri; 11.25 mg (7.5 mg x 1.5) all other days   Weekly warfarin total:  82.5 mg   Plan last modified:  Jenni Vasquez PharmD (2024)   Next INR check:  3/15/2024   Target end date:  2024    Indications    Mitral regurgitation (Resolved) [I34.0]                 Anticoagulation Episode Summary       INR check location:      Preferred lab:      Send INR reminders to:      Comments:  Mitral valve repair          Anticoagulation Care Providers       Provider Role Specialty Phone number    Bigg Santana M.D. Referring Thoracic Surgery (Cardiothoracic Vascular Surgery) [phone]    [facility] Responsible  [phone]          Anticoagulation Patient Findings  Patient Findings       Positives:  Change in health (has been vomiting but denies vomiting up his warfarin doses), Emergency department visit (yesterday for nausea), Extra doses (took 18.75mg on Sat instead of 15mg)    Negatives:  Signs/symptoms of thrombosis, Signs/symptoms of bleeding, Laboratory test error suspected, Change in alcohol use, Change in activity, Upcoming invasive procedure, Upcoming dental procedure, Missed doses, Change in medications, Change in diet/appetite, Hospital admission, Bruising, Other complaints            INR is slightly subtherapeutic    Called and spoke to patient.    Pt is not on antiplatelet therapy.    Warfarin Plan: Bolus with 22.5mg x1 day, then Continue regimen as listed above.    Next INR in 3 day(s) via Nadine FERMIN.    Jenni Vasquez, PharmD

## 2024-03-12 NOTE — TELEPHONE ENCOUNTER
FITO Crain    Called in by; [facility] - Home Health Nurse    Roberto was in ER yesterday, and they sent him home with Nausea medication.     3/12/24  INR: 1.9    Call Back - Roberto  - [phone]    Thank you,   Seble BETANCUR

## 2024-03-12 NOTE — TELEPHONE ENCOUNTER
Called Nad[facility] and spoke with Noemy. She states that patient is scheduled for a visit today. Await INR result.    Taylor Perez, TrevorD, BCACP

## 2024-03-15 ENCOUNTER — PATIENT MESSAGE (OUTPATIENT)
Dept: CARDIOLOGY | Facility: MEDICAL CENTER | Age: 44
End: 2024-03-15
Payer: COMMERCIAL

## 2024-03-15 ENCOUNTER — ANTICOAGULATION MONITORING (OUTPATIENT)
Dept: VASCULAR LAB | Facility: MEDICAL CENTER | Age: 44
End: 2024-03-15
Payer: COMMERCIAL

## 2024-03-15 LAB — INR PPP: 2.1 (ref 2–3.5)

## 2024-03-15 NOTE — PROGRESS NOTES
OP   Telephone Anticoagulation Service Note      Anticoagulation Summary  As of 3/15/2024      INR goal:  2.0-3.0   TTR:  64.0% (1.1 mo)   INR used for dosin.10 (3/15/2024)   Warfarin maintenance plan:  11.25 mg (7.5 mg x 1.5) every Mon, Wed, Fri; 15 mg (7.5 mg x 2) all other days   Weekly warfarin total:  93.75 mg   Plan last modified:  Shabnam Cadena (3/15/2024)   Next INR check:  3/19/2024   Target end date:  2024    Indications    Mitral regurgitation (Resolved) [I34.0]                 Anticoagulation Episode Summary       INR check location:      Preferred lab:      Send INR reminders to:      Comments:  Mitral valve repair          Anticoagulation Care Providers       Provider Role Specialty Phone number    Bigg Santana M.D. Referring Thoracic Surgery (Cardiothoracic Vascular Surgery) [phone]    [facility] Responsible  [phone]          Anticoagulation Patient Findings  Patient Findings       Negatives:  Signs/symptoms of thrombosis, Signs/symptoms of bleeding, Laboratory test error suspected, Change in health, Change in alcohol use, Change in activity, Upcoming invasive procedure, Emergency department visit, Upcoming dental procedure, Missed doses, Extra doses, Change in medications, Change in diet/appetite, Hospital admission, Bruising, Other complaints          Spoke with the patient on the phone today, reporting a therapeutic INR of 2.1.  Confirmed the current warfarin dosing regimen and patient compliance.  Patient has taken boost doses with increased weekly regimen to be about 93.75mg.   Patient denies any missed doses.   Patient denies any interval changes to diet and/or medications.   Patient denies any signs/symptoms of bleeding or clotting.  Patient instructed to begin increased weekly regimen of 11.25mg (1.5 tablets) on Mon, Wed, Fri and 15mg (2 tabs) all the other days.   Patient asked to retest again in 4 days on Tuesday.  Orders sent to Nadine FERMIN.      Soraya MurrayD

## 2024-03-19 ENCOUNTER — ANTICOAGULATION MONITORING (OUTPATIENT)
Dept: CARDIOLOGY | Facility: MEDICAL CENTER | Age: 44
End: 2024-03-19
Payer: COMMERCIAL

## 2024-03-19 DIAGNOSIS — Z98.890 S/P MITRAL VALVE REPAIR: ICD-10-CM

## 2024-03-19 LAB — INR PPP: 1.7 (ref 2–3.5)

## 2024-03-19 NOTE — PATIENT COMMUNICATION
Phone Number Called: [phone]    Call outcome:  spoke to Michael    Message: Michael disability reports that at this time no updated information is required from our office for patients claim. Informed that it is in the process of being transferred to long term claims and that [employer] will reach out for information.

## 2024-03-20 ENCOUNTER — PATIENT MESSAGE (OUTPATIENT)
Dept: CARDIOLOGY | Facility: MEDICAL CENTER | Age: 44
End: 2024-03-20
Payer: COMMERCIAL

## 2024-03-20 NOTE — TELEPHONE ENCOUNTER
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  He will not have any effect restrictions after 6 weeks postop.  However I do not recommend going straight into working where he would have to be lifting really heavy objects, best option is if he can ease back into the physical aspects of his job

## 2024-03-20 NOTE — PROGRESS NOTES
INR Reporting  PT Name: Roberto Braswell     PT Reports INR Value: 1.7     Date of INR Results: 2024     Concerns/Questions Reported: or N/A: Patient will be getting discharged from Home Health on 2024. Home health nurse is wondering what the plan is for INR draws going forward. Please call her back to discuss.              Anabelle Mccoy [facility]              Ph.: [phone]     Callback Number: [phone]     Thank you,  Lisy DAVE Anticoagulation Service Note    Date: 3/19/2024    Anticoagulation Summary  As of 3/19/2024      INR goal:  2.0-3.0   TTR:  59.7% (1.2 mo)   INR used for dosin.70 (3/19/2024)   Warfarin maintenance plan:  15 mg (7.5 mg x 2) every day   Weekly warfarin total:  105 mg   Plan last modified:  Julio Ashley, PharmD (3/19/2024)   Next INR check:  3/23/2024   Target end date:  2024    Indications    Mitral regurgitation (Resolved) [I34.0]                 Anticoagulation Episode Summary       INR check location:      Preferred lab:      Send INR reminders to:      Comments:  Mitral valve repair          Anticoagulation Care Providers       Provider Role Specialty Phone number    Bigg Santana M.D. Referring Thoracic Surgery (Cardiothoracic Vascular Surgery) [phone]    [facility] Responsible  [phone]          Anticoagulation Patient Findings        Patient's preferred phone number:  [phone]        HPI:   The reason for today's call is to prevent morbidity and mortality from a blood clot and/or stroke and to reduce the risk of bleeding while on a anticoagulant.     PCP:  Sal Schafer D.O.  [address] [address]    Assessment:     INR goal for this PT: 2.0-3.0  INR   Date Value Ref Range Status   2024 1.70 (A) 2 - 3.5 Final       Lab Results   Component Value Date/Time    BUN 16 2024 12:13 PM    CREATININE 1.17 2024 12:13 PM     Lab Results   Component Value Date/Time     HEMOGLOBIN 11.2 (L) 03/01/2024 12:13 PM    HEMATOCRIT 36.7 (L) 03/01/2024 12:13 PM    PLATELETCT 365 03/01/2024 12:13 PM    ALKPHOSPHAT 73 03/01/2024 12:13 PM    ASTSGOT 16 03/01/2024 12:13 PM    ALTSGPT 14 03/01/2024 12:13 PM          Current Outpatient Medications:     prochlorperazine, 5 mg, Oral, Q6HRS PRN    amoxicillin-clavulanate, 1 Tablet, Oral, TID    metoclopramide, 10 mg, Oral, 4X/DAY PRN    losartan, 50 mg, Oral, DAILY    metoprolol tartrate, 25 mg, Oral, BID    baclofen, TAKE 1 TABLET 3 TIMES A DAY BY ORAL ROUTE AS NEEDED FOR 30 DAYS, FOR MUSCLE SPASMS.    Oxycodone HCl, PLEASE SEE ATTACHED FOR DETAILED DIRECTIONS    Naloxone, USE AS DIRECTED FOR OPIOID EMERGENCY    warfarin, 11.25-15 mg, Oral, DAILY AT 1800    acetaminophen, 1,000 mg, Oral, QDAY PRN    sucralfate, 1 g, Oral, TID AC    omeprazole, 20 mg, Oral, DAILY    lidocaine-hyoscyamine-mag hydrox-al hydrox-simeth, Take 30mL  by mouth every 6 hours as needed (upset stomach).      Plan:     Increase weekly warfarin dose as noted      Follow-up:     On date seen above    Additional information discussed with patient:     Asked patient to please call the anticoagulation clinic if they have any signs/symptoms of bleeding and/or thrombosis or any changes to diet or medications.      National recommendations regarding anticoagulation therapy:     The CHEST guidelines recommends frequent INR monitoring at regular intervals (a few days up to a max of 12 weeks) to ensure patients are on the proper dose of warfarin, and patients are not having any complications from therapy.  INRs can dramatically change over a short time period due to diet, medications, and medical conditions.       [facility]  Phone: [phone]  Fax: [phone]  On call: [phone]  General scheduling/information [phone]  For emergencies please dial 911  Please do not use [facility] for urgent matters, call the phone numbers listed  above.    This note was created using voice recognition software (Dragon). The accuracy of the dictation is limited by the abilities of the software. I have reviewed the note prior to signing, however some errors in grammar and context are still possible. If you have any questions related to this note please do not hesitate to contact our office.

## 2024-03-21 ENCOUNTER — PATIENT MESSAGE (OUTPATIENT)
Dept: CARDIOLOGY | Facility: MEDICAL CENTER | Age: 44
End: 2024-03-21
Payer: COMMERCIAL

## 2024-03-23 ENCOUNTER — ANTICOAGULATION MONITORING (OUTPATIENT)
Dept: VASCULAR LAB | Facility: MEDICAL CENTER | Age: 44
End: 2024-03-23
Payer: COMMERCIAL

## 2024-03-23 DIAGNOSIS — Z98.890 S/P MITRAL VALVE REPAIR: ICD-10-CM

## 2024-03-23 LAB — INR PPP: 1.3 (ref 2–3.5)

## 2024-03-24 NOTE — PROGRESS NOTES
Anticoagulation Summary  As of 3/23/2024      INR goal:  2.0-3.0   TTR:  53.7% (1.3 mo)   INR used for dosin.30 (3/23/2024)   Warfarin maintenance plan:  15 mg (7.5 mg x 2) every day   Weekly warfarin total:  105 mg   Plan last modified:  Trevor CoyleD (3/19/2024)   Next INR check:  3/25/2024   Target end date:  2024    Indications    Mitral regurgitation (Resolved) [I34.0]                 Anticoagulation Episode Summary       INR check location:      Preferred lab:      Send INR reminders to:      Comments:  Mitral valve repair          Anticoagulation Care Providers       Provider Role Specialty Phone number    Bigg Santana M.D. Referring Thoracic Surgery (Cardiothoracic Vascular Surgery) [phone]    [facility] Responsible  [phone]            Refer to Anticoagulation Patient Findings for HPI  Patient Findings       Negatives:  Signs/symptoms of thrombosis, Signs/symptoms of bleeding, Laboratory test error suspected, Change in health, Change in alcohol use, Change in activity, Upcoming invasive procedure, Emergency department visit, Upcoming dental procedure, Missed doses, Extra doses, Change in medications, Change in diet/appetite, Hospital admission, Bruising, Other complaints            Spoke with pt.  INR is SUB therapeutic.     Pt verifies warfarin weekly dosing.     Will have pt BOLUS x 2 doses w/ 22.5 mg today and tomorrow.    Repeat INR in 2 days via [facility].     Emmanuel Mcmillan, TrevorD, BCACP

## 2024-03-25 ENCOUNTER — ANTICOAGULATION MONITORING (OUTPATIENT)
Dept: VASCULAR LAB | Facility: MEDICAL CENTER | Age: 44
End: 2024-03-25
Payer: COMMERCIAL

## 2024-03-25 ENCOUNTER — PATIENT MESSAGE (OUTPATIENT)
Dept: CARDIOLOGY | Facility: MEDICAL CENTER | Age: 44
End: 2024-03-25
Payer: COMMERCIAL

## 2024-03-25 ENCOUNTER — TELEPHONE (OUTPATIENT)
Dept: VASCULAR LAB | Facility: MEDICAL CENTER | Age: 44
End: 2024-03-25
Payer: COMMERCIAL

## 2024-03-25 LAB — INR PPP: 1.9 (ref 2–3.5)

## 2024-03-25 NOTE — PROGRESS NOTES
Anticoagulation Summary  As of 3/25/2024      INR goal:  2.0-3.0   TTR:  51.2% (1.4 mo)   INR used for dosin.90 (3/25/2024)   Warfarin maintenance plan:  15 mg (7.5 mg x 2) every day   Weekly warfarin total:  105 mg   Plan last modified:  Julio Ashley PharmD (3/19/2024)   Next INR check:  3/28/2024   Target end date:  2024    Indications    Mitral regurgitation (Resolved) [I34.0]                 Anticoagulation Episode Summary       INR check location:      Preferred lab:      Send INR reminders to:      Comments:  Mitral valve repair          Anticoagulation Care Providers       Provider Role Specialty Phone number    Bigg Santana M.D. Referring Thoracic Surgery (Cardiothoracic Vascular Surgery) [phone]    [facility] Responsible  [phone]          Anticoagulation Patient Findings  Patient Findings       Positives:  Other complaints (Pt d/c from [facility] on 24, will schedule clinic f/u thereafter)    Negatives:  Signs/symptoms of thrombosis, Signs/symptoms of bleeding, Laboratory test error suspected, Change in health, Change in alcohol use, Change in activity, Upcoming invasive procedure, Emergency department visit, Upcoming dental procedure, Missed doses, Extra doses, Change in medications, Change in diet/appetite, Hospital admission, Bruising            Called and spoke to patient .    INR slightly subtherapeutic following bolus doses though anticipate INR will continue to trend up since we are not observing the full effect of the bolus doses yet. Will resume 15 mg daily and follow closely    Warfarin Plan: Continue regimen as listed above.    Next INR in 3 day(s) via [facility] Perez, TrevorD, BCACP

## 2024-03-25 NOTE — TELEPHONE ENCOUNTER
PT Name: Roberto Braswell    PT Reports INR Value: 1.9    Date of INR Results: 3.25.24    Concerns/Questions Reported: or N/A: Patient is being discharged from home health on 04.01.24 and will need to be set up for his INR for after    Callback Number: [phone]    Thank you,     Tabby

## 2024-03-28 ENCOUNTER — ANTICOAGULATION MONITORING (OUTPATIENT)
Dept: VASCULAR LAB | Facility: MEDICAL CENTER | Age: 44
End: 2024-03-28
Payer: COMMERCIAL

## 2024-03-28 ENCOUNTER — TELEPHONE (OUTPATIENT)
Dept: VASCULAR LAB | Facility: MEDICAL CENTER | Age: 44
End: 2024-03-28
Payer: COMMERCIAL

## 2024-03-28 LAB — INR PPP: 2.2 (ref 2–3.5)

## 2024-03-28 NOTE — PROGRESS NOTES
Anticoagulation Summary  As of 3/28/2024      INR goal:  2.0-3.0   TTR:  52.2% (1.5 mo)   INR used for dosin.20 (3/28/2024)   Warfarin maintenance plan:  15 mg (7.5 mg x 2) every day   Weekly warfarin total:  105 mg   Plan last modified:  Trevor CoyleD (3/19/2024)   Next INR check:  2024   Target end date:  2024    Indications    Mitral regurgitation (Resolved) [I34.0]                 Anticoagulation Episode Summary       INR check location:      Preferred lab:      Send INR reminders to:      Comments:  Mitral valve repair          Anticoagulation Care Providers       Provider Role Specialty Phone number    Bigg Santana M.D. Referring Thoracic Surgery (Cardiothoracic Vascular Surgery) [phone]    [facility] Responsible  [phone]            Refer to Anticoagulation Patient Findings for HPI  Patient Findings       Negatives:  Signs/symptoms of thrombosis, Signs/symptoms of bleeding, Laboratory test error suspected, Change in health, Change in alcohol use, Change in activity, Upcoming invasive procedure, Emergency department visit, Upcoming dental procedure, Missed doses, Extra doses, Change in medications, Change in diet/appetite, Hospital admission, Bruising, Other complaints            Spoke with patient.  INR is slightly sub-therapeutic.     Pt verifies warfarin weekly dosing.     Pt is NOT on antiplatelet therapy     Will have pt continue warfarin 15 mg daily.    Repeat INR in 4 day(s) via home health.     Trevor SingletaryD

## 2024-03-28 NOTE — TELEPHONE ENCOUNTER
INR Reporting  PT Name: Roberto Braswell    PT Reports INR Value: 2.2    Date of INR Results: 03/28/2024    Concerns/Questions Reported: or N/A: N/A    Callback Number: [phone]    Thank you,  Lisy GARLAND

## 2024-03-29 ENCOUNTER — PATIENT MESSAGE (OUTPATIENT)
Dept: CARDIOLOGY | Facility: MEDICAL CENTER | Age: 44
End: 2024-03-29
Payer: COMMERCIAL

## 2024-03-29 NOTE — PATIENT COMMUNICATION
Phone Number Called: [phone]    Call outcome:  spoke to [employer]     Message: Spoke to long-term disability for [facility] and was informed that patient does not have claim with them at this time. Spoke with [facility] short term disability to find information on a return to work form that would need to be filled out for patient when he returns to work. Informed that claim was being transferred over to long term disability through [facility] not through [facility].

## 2024-03-29 NOTE — PATIENT COMMUNICATION
Phone Number Called: [phone]    Call outcome:  spoke to Teto with [facility]    Message: Teto reports that once patient is ready to return to work that at as far as he know there is not a particular form we have to fill out just a letter stating if patient can return on full duty or with restrictions.

## 2024-04-01 ENCOUNTER — ANTICOAGULATION MONITORING (OUTPATIENT)
Dept: VASCULAR LAB | Facility: MEDICAL CENTER | Age: 44
End: 2024-04-01
Payer: COMMERCIAL

## 2024-04-01 LAB — INR PPP: 2.1 (ref 2–3.5)

## 2024-04-01 NOTE — PROGRESS NOTES
Anticoagulation Summary  As of 2024      INR goal:  2.0-3.0   TTR:  56.1% (1.6 mo)   INR used for dosin.10 (2024)   Warfarin maintenance plan:  22.5 mg (7.5 mg x 3) every Mon; 15 mg (7.5 mg x 2) all other days   Weekly warfarin total:  112.5 mg   Plan last modified:  Jenni Vasquez PharmD (2024)   Next INR check:  2024   Target end date:  2024    Indications    Mitral regurgitation (Resolved) [I34.0]                 Anticoagulation Episode Summary       INR check location:      Preferred lab:      Send INR reminders to:      Comments:  Mitral valve repair          Anticoagulation Care Providers       Provider Role Specialty Phone number    Bigg Santana M.D. Referring Thoracic Surgery (Cardiothoracic Vascular Surgery) [phone]    [facility] Responsible  [phone]          Anticoagulation Patient Findings  Patient Findings       Negatives:  Signs/symptoms of thrombosis, Signs/symptoms of bleeding, Laboratory test error suspected, Change in health, Change in alcohol use, Change in activity, Upcoming invasive procedure, Emergency department visit, Upcoming dental procedure, Missed doses, Extra doses, Change in medications, Change in diet/appetite, Hospital admission, Bruising, Other complaints            INR is therapeutic    Called and spoke to patient's wife    Pt is not on antiplatelet therapy.    Warfarin Plan: Increase to 22.5mg Mon, 15mg AOD    Next INR in 1 week.    Jenni Vasquez, Aida

## 2024-04-08 ENCOUNTER — APPOINTMENT (OUTPATIENT)
Dept: VASCULAR LAB | Facility: MEDICAL CENTER | Age: 44
End: 2024-04-08
Attending: INTERNAL MEDICINE
Payer: COMMERCIAL

## 2024-04-10 ENCOUNTER — ANTICOAGULATION VISIT (OUTPATIENT)
Dept: VASCULAR LAB | Facility: MEDICAL CENTER | Age: 44
End: 2024-04-10
Attending: INTERNAL MEDICINE
Payer: COMMERCIAL

## 2024-04-10 DIAGNOSIS — Z98.890 S/P MITRAL VALVE REPAIR: ICD-10-CM

## 2024-04-10 DIAGNOSIS — I80.8 SUPERFICIAL THROMBOPHLEBITIS OF RIGHT UPPER EXTREMITY: ICD-10-CM

## 2024-04-10 LAB — INR PPP: 2.6 (ref 2–3.5)

## 2024-04-10 PROCEDURE — 99211 OFF/OP EST MAY X REQ PHY/QHP: CPT | Performed by: NURSE PRACTITIONER

## 2024-04-10 PROCEDURE — 85610 PROTHROMBIN TIME: CPT

## 2024-04-10 NOTE — ED NOTES
1 [id number] 03/29/2024 03/29/2024 ALPRAZolam (Tablet) 90.0 30 0.5 MG NA [facility] Commercial Insurance 0 / 0 [place]    1 [id number] 03/13/2024 03/12/2024 Zolpidem Tartrate (Tablet) 30.0 30 10 MG [facility] Commercial Insurance 0 / 0 [place]    1 [id number] 02/28/2024 02/27/2024 ALPRAZolam (Tablet) 90.0 30 0.5 MG NA [facility] Commercial Insurance 0 / 0 [place]    1 [id number] 02/15/2024 02/15/2024 Zolpidem Tartrate (Tablet) 30.0 30 10 MG [facility] Commercial Insurance 0 / 0 [place]    1 [id number] 01/31/2024 01/31/2024 ALPRAZolam (Tablet) 90.0 30 0.5 MG NA [facility] Private Pay 0 / 0 [place]    1 [id number] 01/17/2024 01/17/2024 Zolpidem Tartrate (Tablet) 30.0 30 10 MG [facility] Commercial Insurance 0 / 0 [place]    1 [id number] 12/20/2023 12/20/2023 Zolpidem Tartrate (Tablet) 30.0 30 10 MG [facility] Commercial Insurance 0 / 0 [place]    1 [id number] 12/19/2023 12/19/2023 ALPRAZolam (Tablet) 90.0 30 0.5 MG NA [facility] Commercial Insurance 0 / 0 [place]    1 [id number] 11/22/2023 11/22/2023 Zolpidem Tartrate (Tablet) 30.0 30 10 MG NA [facility] Commercial Insurance 0 / 0 [place]    1 [id number] 11/20/2023 11/20/2023 ALPRAZolam (Tablet) 90.0 30 0.5 MG NA [facility]OADT [facility] Commercial Insurance 0 / 1        Urine sent to lab. Pt ambulatory to and from the bathroom

## 2024-04-10 NOTE — PROGRESS NOTES
Anticoagulation Summary  As of 4/10/2024      INR goal:  2.0-3.0   TTR:  62.9% (1.9 mo)   INR used for dosin.60 (4/10/2024)   Warfarin maintenance plan:  22.5 mg (7.5 mg x 3) every Fri; 15 mg (7.5 mg x 2) all other days   Weekly warfarin total:  112.5 mg   Plan last modified:  RAJEEV RdzPTANVI (4/10/2024)   Next INR check:  2024   Target end date:  2024    Indications    GI bleed (Resolved) [K92.2]  Mitral regurgitation (Resolved) [I34.0]  Superficial thrombophlebitis of right upper extremity [I80.8]  S/P mitral valve repair [Z98.890]                 Anticoagulation Episode Summary       INR check location:      Preferred lab:      Send INR reminders to:      Comments:  ASA 81 mg curently on hold until after 3 mo of warfarin due to GI bleed in March per cards          Anticoagulation Care Providers       Provider Role Specialty Phone number    Bigg Santana M.D. Referring Thoracic Surgery (Cardiothoracic Vascular Surgery) [phone]    [facility] Responsible  [phone]                  Refer to Patient Findings for HPI:  Patient Findings       Negatives:  Signs/symptoms of thrombosis, Signs/symptoms of bleeding, Laboratory test error suspected, Change in health, Change in alcohol use, Change in activity, Upcoming invasive procedure, Emergency department visit, Upcoming dental procedure, Missed doses, Extra doses, Change in medications, Change in diet/appetite, Hospital admission, Bruising, Other complaints    Comments:  He is taking 22.5 mg on  (not Monday per our dosing calendar). He has been dc'd from .            There were no vitals filed for this visit.    Verified current warfarin dosing schedule.    Medications reconciled: Yes  Pt is not on antiplatelet therapy. No ASA currently due to GIB. Will start after 3 mo of warfarin per cards.    Pt aware of the need to stay consistent with his vitamin k intake. Also knows to monitor for any prolonged or abnormal  bleeding, as well as for s/sx of CVA/TIA. Today is his first face to face visit, however, he has been on warfarin since his MV repair 1/25/24.      A/P   INR is therapeutic. Will have pt continue his current regimen. Will update dosing calendar to reflect that he is taking 22.5 mg on Fridays instead of Mondays.    Warfarin dosing recommendation: Continue regimen as listed above.    Pt educated to contact our clinic with any changes in medications or s/s of bleeding or thrombosis. Pt is aware to seek immediate medical attention for falls, head injury or deep cuts.    Request pt to return in 1 week(s). Pt agrees.    INEZ Rdz.

## 2024-04-12 ENCOUNTER — PATIENT MESSAGE (OUTPATIENT)
Dept: CARDIOLOGY | Facility: MEDICAL CENTER | Age: 44
End: 2024-04-12
Payer: COMMERCIAL

## 2024-04-15 ENCOUNTER — PATIENT MESSAGE (OUTPATIENT)
Dept: CARDIOLOGY | Facility: MEDICAL CENTER | Age: 44
End: 2024-04-15
Payer: COMMERCIAL

## 2024-04-16 ENCOUNTER — ANTICOAGULATION VISIT (OUTPATIENT)
Dept: VASCULAR LAB | Facility: MEDICAL CENTER | Age: 44
End: 2024-04-16
Attending: INTERNAL MEDICINE
Payer: COMMERCIAL

## 2024-04-16 DIAGNOSIS — I80.8 SUPERFICIAL THROMBOPHLEBITIS OF RIGHT UPPER EXTREMITY: ICD-10-CM

## 2024-04-16 DIAGNOSIS — Z98.890 S/P MITRAL VALVE REPAIR: ICD-10-CM

## 2024-04-16 LAB — INR PPP: 3 (ref 2–3.5)

## 2024-04-16 PROCEDURE — 99211 OFF/OP EST MAY X REQ PHY/QHP: CPT

## 2024-04-16 PROCEDURE — 85610 PROTHROMBIN TIME: CPT

## 2024-04-16 NOTE — PROGRESS NOTES
Anticoagulation Summary  As of 4/16/2024      INR goal:  2.0-3.0   TTR:  66.4% (2.1 mo)   INR used for dosing:  3.00 (4/16/2024)   Warfarin maintenance plan:  22.5 mg (7.5 mg x 3) every Fri; 15 mg (7.5 mg x 2) all other days   Weekly warfarin total:  112.5 mg   Plan last modified:  RAJEEV RdzPJenniferNJennifer (4/10/2024)   Next INR check:  4/29/2024   Target end date:  5/2/2024    Indications    GI bleed (Resolved) [K92.2]  Mitral regurgitation (Resolved) [I34.0]  Superficial thrombophlebitis of right upper extremity [I80.8]  S/P mitral valve repair [Z98.890]                 Anticoagulation Episode Summary       INR check location:      Preferred lab:      Send INR reminders to:      Comments:  ASA 81 mg curently on hold until after 3 mo of warfarin due to GI bleed in March per cards          Anticoagulation Care Providers       Provider Role Specialty Phone number    Bigg Santana M.D. Referring Thoracic Surgery (Cardiothoracic Vascular Surgery) [phone]    [facility] Responsible  [phone]                  Refer to Patient Findings for HPI:  Patient Findings       Negatives:  Signs/symptoms of thrombosis, Signs/symptoms of bleeding, Laboratory test error suspected, Change in health, Change in alcohol use, Change in activity, Upcoming invasive procedure, Emergency department visit, Upcoming dental procedure, Missed doses, Extra doses, Change in medications, Change in diet/appetite, Hospital admission, Bruising, Other complaints            There were no vitals filed for this visit.    Verified current warfarin dosing schedule.    Medications reconciled: No  Pt is not on antiplatelet therapy.      A/P   INR is therapeutic    Warfarin dosing recommendation: Continue regimen as listed above.    Pt educated to contact our clinic with any changes in medications or s/s of bleeding or thrombosis. Pt is aware to seek immediate medical attention for falls, head injury or deep cuts.    Request pt to  return in 2 week(s). Pt agrees.    Jenni Vasquez, PharmD

## 2024-04-19 NOTE — ED NOTES
"History  Chief Complaint   Patient presents with    Psychiatric Evaluation     Pt BIBA c/o \"episodes\" where he loses track of time for the past year. States the last thing he remembers is using the bathroom and the police were called by family as he was reportedly unresponsive. Patient has no recollection of blackout. Aox4. Reports hx of fentanyl use w/ OD in 2022. Denies recent drug use. - SI/HI     48-year-old male history of chronic back pain, history of substance use disorder (in remission) who presents to the emergency department for altered mental status.  Patient has been having these episodes where he has uncontrolled jerking/motor tics.  Patient was admitted in February 2024 for similar symptoms had extensive workup at that time.  Exposed to see psychiatry next week.  Patient has been doing well since 3/31/2024 until today.  Per patient's wife patient had been doing well, patient had gone to the bathroom I have been there for a little bit.  Patient wife states that she heard him grunting when she went to check on him he was sitting on the toilet making the jerking/motor tic movements with his legs and his arms, rubbing his face with his hand.  His wife states that his eyes also rolled back of his head and was unresponsive.  Patient states last thing he remembers that he went to the bathroom and the next thing he knew was that EMS and police right at home and he is being taken to the emergency department.  Patient's wife states that towards the end of his episode in the bathroom he  developed stiffness in arms and legs but did not have any shaking.  Wife states patient does not drink water frequently.  Patient wife also states during the episodes of the bathroom he had 2 episodes of vomiting.  No fevers, chills, chest pain, shortness of breath, blood in vomit, bowel changes, bladder changes, headache, focal weakness, paresthesias.      History provided by:  Patient and spouse      Prior to Admission " PIV established.   Medications   Prescriptions Last Dose Informant Patient Reported? Taking?   LORazepam (ATIVAN) 0.5 mg tablet   No No   Sig: Take 1 tablet (0.5 mg total) by mouth every 8 (eight) hours as needed for anxiety   sertraline (ZOLOFT) 25 mg tablet   No No   Sig: Take 1 tablet (25 mg total) by mouth daily      Facility-Administered Medications: None       Past Medical History:   Diagnosis Date    Chronic back pain        Past Surgical History:   Procedure Laterality Date    WISDOM TOOTH EXTRACTION         Family History   Problem Relation Age of Onset    Cirrhosis Mother     Cholelithiasis Mother     Diabetes type II Father     Stroke Maternal Grandfather     Diabetes Paternal Grandfather      I have reviewed and agree with the history as documented.    E-Cigarette/Vaping    E-Cigarette Use Never User      E-Cigarette/Vaping Substances    Nicotine No     THC No     CBD No     Flavoring No     Other No     Unknown No      Social History     Tobacco Use    Smoking status: Former     Types: Cigars    Smokeless tobacco: Never    Tobacco comments:     has a cigar about 2/month   Vaping Use    Vaping status: Never Used   Substance Use Topics    Alcohol use: Yes     Alcohol/week: 2.0 standard drinks of alcohol     Types: 1 Glasses of wine, 1 Cans of beer per week     Comment: 3 drinks/week    Drug use: Not Currently     Types: Marijuana, Cocaine, Oxycodone     Comment: snorted cocain about 10 years ago for 1 year        Review of Systems   Constitutional:  Negative for chills and fever.   HENT:  Negative for ear pain and sore throat.    Eyes:  Negative for pain and visual disturbance.   Respiratory:  Negative for cough and shortness of breath.    Cardiovascular:  Negative for chest pain and palpitations.   Gastrointestinal:  Positive for nausea and vomiting. Negative for abdominal pain, blood in stool, constipation and diarrhea.   Genitourinary:  Negative for dysuria and hematuria.   Musculoskeletal:  Negative for arthralgias and  back pain.   Skin:  Negative for color change and rash.   Neurological:  Negative for seizures, syncope, light-headedness and headaches.        +jerks/motor tics, AMS   All other systems reviewed and are negative.      Physical Exam  ED Triage Vitals   Temperature Pulse Respirations Blood Pressure SpO2   04/18/24 2050 04/18/24 1951 04/18/24 1951 04/18/24 1952 04/18/24 1951   98.2 °F (36.8 °C) (!) 107 22 111/75 97 %      Temp Source Heart Rate Source Patient Position - Orthostatic VS BP Location FiO2 (%)   04/18/24 2050 04/18/24 1951 04/18/24 1951 04/18/24 1952 --   Oral Monitor Lying Right arm       Pain Score       --                    Orthostatic Vital Signs  Vitals:    04/18/24 2030 04/18/24 2050 04/18/24 2200 04/18/24 2300   BP: 107/76  148/75 137/75   Pulse: (!) 110 95 101 97   Patient Position - Orthostatic VS: Lying  Sitting        Physical Exam  Vitals and nursing note reviewed.   Constitutional:       General: He is in acute distress.      Appearance: He is well-developed. He is ill-appearing and diaphoretic.      Comments: Patient was slightly somnolent but easily arousable, able to answer questions.   HENT:      Head: Normocephalic and atraumatic.      Mouth/Throat:      Mouth: Mucous membranes are dry.   Eyes:      Conjunctiva/sclera: Conjunctivae normal.   Cardiovascular:      Rate and Rhythm: Regular rhythm. Tachycardia present.      Heart sounds: No murmur heard.  Pulmonary:      Effort: Pulmonary effort is normal. No respiratory distress.      Breath sounds: Normal breath sounds.   Abdominal:      Palpations: Abdomen is soft.      Tenderness: There is no abdominal tenderness.   Musculoskeletal:         General: No swelling.      Cervical back: Neck supple.      Right lower leg: No edema.      Left lower leg: No edema.   Skin:     General: Skin is warm and moist.      Capillary Refill: Capillary refill takes less than 2 seconds.   Neurological:      Mental Status: He is oriented to person, place, and  time.      Cranial Nerves: Cranial nerves 2-12 are intact.      Sensory: Sensation is intact.      Motor: Motor function is intact.      Comments: Intermittent jerking of bilateral lower extremities, appears to be involuntary.  Initially intermittently dozing off during exam however is easily arousable.  Strength sensation intact to all 4 extremities.  Patient is able to answer questions appropriately.   Psychiatric:         Mood and Affect: Mood normal.         ED Medications  Medications   lactated ringers bolus 1,000 mL (0 mL Intravenous Stopped 4/18/24 2206)   lactated ringers bolus 1,000 mL (1,000 mL Intravenous New Bag 4/18/24 2206)       Diagnostic Studies  Results Reviewed       Procedure Component Value Units Date/Time    HS Troponin I 4hr [[id number]]     Lab Status: No result Specimen: Blood     Lipase [[id number]]  (Normal) Collected: 04/18/24 2023    Lab Status: Final result Specimen: Blood from Arm, Right Updated: 04/18/24 2155     Lipase 12 u/L     CK [[id number]]  (Normal) Collected: 04/18/24 2023    Lab Status: Final result Specimen: Blood from Arm, Right Updated: 04/18/24 2118     Total  U/L     Comprehensive metabolic panel [[id number]]  (Abnormal) Collected: 04/18/24 2023    Lab Status: Final result Specimen: Blood from Arm, Right Updated: 04/18/24 2117     Sodium 142 mmol/L      Potassium 4.2 mmol/L      Chloride 101 mmol/L      CO2 26 mmol/L      ANION GAP 15 mmol/L      BUN 22 mg/dL      Creatinine 1.85 mg/dL      Glucose 115 mg/dL      Calcium 10.2 mg/dL      AST 18 U/L      ALT 22 U/L      Alkaline Phosphatase 100 U/L      Total Protein 8.0 g/dL      Albumin 5.0 g/dL      Total Bilirubin 0.55 mg/dL      eGFR 42 ml/min/1.73sq m     Narrative:      National Kid[employer] Foundation guidelines for Chronic Kidney Disease (CKD):     Stage 1 with normal or high GFR (GFR > 90 mL/min/1.73 square meters)    Stage 2 Mild CKD (GFR = 60-89 mL/min/1.73 square meters)    Stage 3A Moderate CKD (GFR =  45-59 mL/min/1.73 square meters)    Stage 3B Moderate CKD (GFR = 30-44 mL/min/1.73 square meters)    Stage 4 Severe CKD (GFR = 15-29 mL/min/1.73 square meters)    Stage 5 End Stage CKD (GFR <15 mL/min/1.73 square meters)  Note: GFR calculation is accurate only with a steady state creatinine    TSH, 3rd generation with Free T4 reflex [[id number]]  (Normal) Collected: 04/18/24 2023    Lab Status: Final result Specimen: Blood from Arm, Right Updated: 04/18/24 2115     TSH 3RD GENERATON 3.463 uIU/mL     Salicylate level [[id number]]  (Normal) Collected: 04/18/24 2023    Lab Status: Final result Specimen: Blood from Arm, Right Updated: 04/18/24 2111     Salicylate Lvl <5 mg/dL     Acetaminophen level-If concentration is detectable, please discuss with medical  on call. [[id number]]  (Abnormal) Collected: 04/18/24 2023    Lab Status: Final result Specimen: Blood from Arm, Right Updated: 04/18/24 2111     Acetaminophen Level <2 ug/mL     HS Troponin 0hr (reflex protocol) [[id number]]  (Normal) Collected: 04/18/24 2023    Lab Status: Final result Specimen: Blood from Arm, Right Updated: 04/18/24 2106     hs TnI 0hr 6 ng/L     HS Troponin I 2hr [[id number]]     Lab Status: No result Specimen: Blood     Ethanol [[id number]]  (Normal) Collected: 04/18/24 2023    Lab Status: Final result Specimen: Blood from Arm, Right Updated: 04/18/24 2059     Ethanol Lvl <10 mg/dL     Magnesium [[id number]]  (Normal) Collected: 04/18/24 2023    Lab Status: Final result Specimen: Blood from Arm, Right Updated: 04/18/24 2059     Magnesium 2.3 mg/dL     Protime-INR [[id number]]  (Normal) Collected: 04/18/24 2023    Lab Status: Final result Specimen: Blood from Arm, Right Updated: 04/18/24 2058     Protime 13.1 seconds      INR 0.93    APTT [[id number]]  (Normal) Collected: 04/18/24 2023    Lab Status: Final result Specimen: Blood from Arm, Right Updated: 04/18/24 2058     PTT 24 seconds     CBC and differential [[id number]]  (Abnormal)  Collected: 04/18/24 2023    Lab Status: Final result Specimen: Blood from Arm, Right Updated: 04/18/24 2047     WBC 17.24 Thousand/uL      RBC 5.19 Million/uL      Hemoglobin 15.8 g/dL      Hematocrit 47.1 %      MCV 91 fL      MCH 30.4 pg      MCHC 33.5 g/dL      RDW 13.0 %      MPV 9.7 fL      Platelets 295 Thousands/uL      nRBC 0 /100 WBCs      Segmented % 84 %      Immature Grans % 1 %      Lymphocytes % 7 %      Monocytes % 6 %      Eosinophils Relative 1 %      Basophils Relative 1 %      Absolute Neutrophils 14.78 Thousands/µL      Absolute Immature Grans 0.08 Thousand/uL      Absolute Lymphocytes 1.22 Thousands/µL      Absolute Monocytes 0.99 Thousand/µL      Eosinophils Absolute 0.09 Thousand/µL      Basophils Absolute 0.08 Thousands/µL     Blood gas, venous [[id number]]  (Abnormal) Collected: 04/18/24 2023    Lab Status: Final result Specimen: Blood from Arm, Right Updated: 04/18/24 2043     pH, Abdoul 7.410     pCO2, Abdoul 39.2 mm Hg      pO2, Abdoul 99.1 mm Hg      HCO3, Abdoul 24.3 mmol/L      Base Excess, Abdoul -0.2 mmol/L      O2 Content, Abdoul 21.4 ml/dL      O2 HGB, VENOUS 97.1 %     UA w Reflex to Microscopic w Reflex to Culture [[id number]]     Lab Status: No result Specimen: Urine     Rapid drug screen, urine [[id number]]     Lab Status: No result Specimen: Urine                    XR chest 1 view portable   ED Interpretation by Theresa Sanchez DO (04/18 2219)   No acute cardiopulmonary process visualized.            Procedures  ECG 12 Lead Documentation Only    Date/Time: 4/18/2024 8:10 PM    Performed by: Theresa Sanchez DO  Authorized by: Theresa Sanchez DO    Patient location:  ED  Previous ECG:     Previous ECG:  Compared to current  Interpretation:     Interpretation: non-specific    Quality:     Tracing quality:  Limited by artifact  Rate:     ECG rate:  106    ECG rate assessment: tachycardic    Rhythm:     Rhythm: sinus tachycardia    Ectopy:     Ectopy: none    QRS:     QRS axis:   Normal    QRS intervals:  Normal  Conduction:     Conduction: normal    ST segments:     ST segments:  Non-specific  T waves:     T waves: non-specific          ED Course  ED Course as of 04/18/24 2314   Thu Apr 18, 2024 2044 Blood gas, venous(!)  Normal pH.  Slightly decreased pCO2.  Elevated oxygen.  Normal calculated bicarb.   2056 CBC and differential(!)  Leukocytosis however per patient's family he was contracted in arms and legs and has been twitching significantly, could be the cause of leukocytosis.  Hemoglobin hematocrit within normal limits.  Platelets within normal limits.  Otherwise unremarkable.   2114 ETHANOL: <10   2114 SALICYLATE LEVEL: <5   2114 ACETAMINOPHEN LEVEL(!): <2   2114 PTT: 24   2114 hs TnI 0hr: 6  EKG did not show any acute ischemic changes.  Will obtain repeat troponin.   2114 MAGNESIUM: 2.3   2114 PROTIME: 13.1   2114 POCT INR: 0.93   2117 TSH 3RD GENERATON: 3.463   2122 Comprehensive metabolic panel(!)  CHAGO.  Elevated anion gap.  Otherwise electrolytes within normal limits.  Liver function testing within normal limits.   2123 Total CK: 112   2218 LIPASE: 12   2219 XR chest 1 view portable  No acute cardiopulmonary process visualized.   2227 Patient had an episode of vomiting here in the emergency department after drinking water and or juice.  Patient was offered Zofran, declined at this time.  Patient states if he has another episode of nausea and vomiting he will take the Zofran.            Clinical Opiate Withdrawal Scale       Row Name 04/18/24 2005                Pulse 109  -AH        Resting Pulse Rate: Measured After Patient is Sitting or Lying for One Minute 2  -AH        GI Upset: Over Last Half Hour 2  -AH        Sweating: Over Past Half Hour Not Accounted for by Room Temperature of Patient Activity 2  -AH        Tremor: Observation of Outstretched Hands 4  -AH        Restlessness: Observation During Assessment 5  -AH        Yawning: Observation During Assessment 1  -AH         Pupil Size 1  -        Anxiety and Irritability 2  -AH        Bone or Joint Aches: If Patient was Having Pain Previously, Only the Additional Component Attributed to Opiate Withdrawal is Scored 1  -        Gooseflesh Skin 0  -        Runny Nose or Tearing: Not Accounted for by Cold Symptoms or Allergies 1  -        Clinical Opiate Withdrawal Scale Total Score 21  -AH        Heart Rate Source --        Patient Position - Orthostatic VS --                  User Key  (r) = Recorded By, (t) = Taken By, (c) = Cosigned By      Initials Name    WILMAR Melita Zavala RN                                  SBIRT 20yo+      Flowsheet Row Most Recent Value   Initial Alcohol Screen: US AUDIT-C     1. How often do you have a drink containing alcohol? 5 Filed at: 04/18/2024 1956   2. How many drinks containing alcohol do you have on a typical day you are drinking?  1 Filed at: 04/18/2024 1956   3a. Male UNDER 65: How often do you have five or more drinks on one occasion? 1 Filed at: 04/18/2024 1956   3b. FEMALE Any Age, or MALE 65+: How often do you have 4 or more drinks on one occassion? 0 Filed at: 04/18/2024 1956   Audit-C Score 7 Filed at: 04/18/2024 1956   Full Alcohol Screen: US AUDIT    4. How often during the last year have you found that you were not able to stop drinking once you had started? 0 Filed at: 04/18/2024 1956   5. How often during past year have you failed to do what was normally expected of you because of drinking?  0 Filed at: 04/18/2024 1956   6. How often in past year have you needed a first drink in the morning to get yourself going after a heavy drinking session?  0 Filed at: 04/18/2024 1956   7. How often in past year have you had feeling of guilt or remorse after drinking?  0 Filed at: 04/18/2024 1956   8. How often in past year have you been unable to remember what happened night before because you had been drinking?  0 Filed at: 04/18/2024 1956   9. Have you or someone else been injured as a  result of your drinking?  0 Filed at: 04/18/2024 1956   10. Has a relative, friend, doctor or other health worker been concerned about your drinking and suggested you cut down?  0 Filed at: 04/18/2024 1956   AUDIT Total Score 7 Filed at: 04/18/2024 1956   HANY: How many times in the past year have you...    Used an illegal drug or used a prescription medication for non-medical reasons? Once or Twice Filed at: 04/18/2024 1956                  Medical Decision Making  49 yo M presented to ED for AMS. Associated symptoms: diaphoresis, intermittent involuntary jerking movements. Exam findings: Intermittent jerking of bilateral lower extremities, appears to be involuntary.  Initially intermittently dozing off during exam however is easily arousable.  Strength sensation intact to all 4 extremities.  Patient is able to answer questions appropriately.  Diaphoretic, tachycardic but regular rhythm.  Lungs clear to auscultation bilaterally, abdomen soft nontender.  Differentials diagnoses considered: Seizure versus psychiatric conversion disorder versus electrolyte disturbance versus dysrhythmia versus ACS versus ingestion versus pancreatitis versus rhabdomyolysis. Patient was given IV fluids for dehydration. On re-examination, patient had some improvement.  See ED course for more details on lab and imaging interpretation.  Based on these results and H&P, unsure of cause of the jerking movements, patient currently has an CHAGO. Results and clinical impressions were discussed with patient and family. They expressed understanding. Plan: Admit to the hospital due to CHAGO and further evaluation of jerking movements. This plan was also discussed with patient, who was agreeable with this plan. Patient was given the opportunity to ask questions in ED. All questions and concerns were addressed in ED.     Amount and/or Complexity of Data Reviewed  Labs: ordered. Decision-making details documented in ED Course.  Radiology: ordered and  independent interpretation performed. Decision-making details documented in ED Course.    Risk  Decision regarding hospitalization.          Disposition  Final diagnoses:   CHAGO (acute kidney injury) (HCC)   Jerking movements of extremities     Time reflects when diagnosis was documented in both MDM as applicable and the Disposition within this note       Time User Action Codes Description Comment    4/18/2024 10:39 PM Theresa Sanchez Add [N17.9] CHAGO (acute kidney injury) (HCC)     4/18/2024 10:40 PM Theresa Sanchez Add [R25.2] Jerking movements of extremities           ED Disposition       ED Disposition   Admit    Condition   Stable    Date/Time   Thu Apr 18, 2024 9957    Comment   Case was discussed with SCOTT and the patient's admission status was agreed to be Admission Status: inpatient status to the service of Dr. Ramires .               Follow-up Information    None         Patient's Medications   Discharge Prescriptions    No medications on file     No discharge procedures on file.    PDMP Review       None             ED Provider  Attending physically available and evaluated Ibrahima Adams. I managed the patient along with the ED Attending.    Electronically Signed by           Theresa Sanchez DO  04/18/24 9628

## 2024-04-29 ENCOUNTER — APPOINTMENT (OUTPATIENT)
Dept: VASCULAR LAB | Facility: MEDICAL CENTER | Age: 44
End: 2024-04-29
Attending: INTERNAL MEDICINE
Payer: COMMERCIAL

## 2024-04-29 VITALS — SYSTOLIC BLOOD PRESSURE: 125 MMHG | DIASTOLIC BLOOD PRESSURE: 79 MMHG | HEART RATE: 106 BPM

## 2024-04-29 DIAGNOSIS — I80.8 SUPERFICIAL THROMBOPHLEBITIS OF RIGHT UPPER EXTREMITY: ICD-10-CM

## 2024-04-29 DIAGNOSIS — Z98.890 S/P MITRAL VALVE REPAIR: ICD-10-CM

## 2024-04-29 LAB — INR PPP: 7.4 (ref 2–3.5)

## 2024-04-29 PROCEDURE — 85610 PROTHROMBIN TIME: CPT

## 2024-04-29 PROCEDURE — 99212 OFFICE O/P EST SF 10 MIN: CPT

## 2024-04-29 NOTE — PROGRESS NOTES
Anticoagulation Summary  As of 2024      INR goal:  2.0-3.0   TTR:  55.2% (2.6 mo)   INR used for dosin.40 (2024)   Warfarin maintenance plan:  22.5 mg (7.5 mg x 3) every Fri; 15 mg (7.5 mg x 2) all other days   Weekly warfarin total:  112.5 mg   Plan last modified:  RAJEEV RdzPJenniferNJennifer (4/10/2024)   Next INR check:  2024   Target end date:  2024    Indications    GI bleed (Resolved) [K92.2]  Mitral regurgitation (Resolved) [I34.0]  Superficial thrombophlebitis of right upper extremity [I80.8]  S/P mitral valve repair [Z98.890]                 Anticoagulation Episode Summary       INR check location:      Preferred lab:      Send INR reminders to:      Comments:  ASA 81 mg curently on hold until after 3 mo of warfarin due to GI bleed in March per cards          Anticoagulation Care Providers       Provider Role Specialty Phone number    Bigg Santana M.D. Referring Thoracic Surgery (Cardiothoracic Vascular Surgery) [phone]    [facility] Responsible  [phone]             Refer to Patient Findings for HPI:  Patient Findings       Negatives:  Signs/symptoms of thrombosis, Signs/symptoms of bleeding, Laboratory test error suspected, Change in health, Change in alcohol use, Change in activity, Upcoming invasive procedure, Emergency department visit, Upcoming dental procedure, Missed doses, Extra doses, Change in medications, Change in diet/appetite, Hospital admission, Bruising, Other complaints            Vitals:    24 1111   BP: 125/79   Pulse: (!) 106       Verified current warfarin dosing schedule.    Medications reconciled: Yes  Pt is not on antiplatelet therapy.      A/P   INR is supratherapeutic    Warfarin dosing recommendation: Hold until your appointment on Thursday.    Of note, patient's expected EOT is 24. Will send patient's chart to cardiology to see if patient is ok to stop warfarin on the expected EOT date or if he should continue.     Pt  educated to contact our clinic with any changes in medications or s/s of bleeding or thrombosis. Pt is aware to seek immediate medical attention for falls, head injury or deep cuts.    Request pt to return in 3 day(s). Pt agrees.    Bobby Mclaughlin, TrevorD

## 2024-05-02 ENCOUNTER — ANTICOAGULATION VISIT (OUTPATIENT)
Dept: VASCULAR LAB | Facility: MEDICAL CENTER | Age: 44
End: 2024-05-02
Attending: INTERNAL MEDICINE
Payer: COMMERCIAL

## 2024-05-02 DIAGNOSIS — Z98.890 S/P MITRAL VALVE REPAIR: ICD-10-CM

## 2024-05-02 DIAGNOSIS — I80.8 SUPERFICIAL THROMBOPHLEBITIS OF RIGHT UPPER EXTREMITY: ICD-10-CM

## 2024-05-02 LAB — INR PPP: 1.3 (ref 2–3.5)

## 2024-05-02 RX ORDER — ASPIRIN 81 MG/1
81 TABLET ORAL DAILY
COMMUNITY

## 2024-05-02 NOTE — PROGRESS NOTES
Anticoagulation Summary  As of 2024      INR goal:  2.0-3.0   TTR:  53.7% (2.7 mo)   INR used for dosin.30 (2024)   Warfarin maintenance plan:  No maintenance plan   Plan last modified:  CLEOPATRA Rdz (4/10/2024)   Target end date:  2024    Indications    GI bleed (Resolved) [K92.2]  Mitral regurgitation (Resolved) [I34.0]  Superficial thrombophlebitis of right upper extremity (Resolved) [I80.8]  S/P mitral valve repair (Resolved) [Z98.890]                 Anticoagulation Episode Summary       INR check location:      Preferred lab:      Resolved date:  2024    Resolved reason:  Therapy Completed    Send INR reminders to:      Comments:  ASA 81 mg curently on hold until after 3 mo of warfarin due to GI bleed in March per cards          Anticoagulation Care Providers       Provider Role Specialty Phone number    Bigg Santana M.D. Referring Thoracic Surgery (Cardiothoracic Vascular Surgery) [phone]    [facility] Responsible  [phone]                  Refer to Patient Findings for HPI:  Patient Findings       Negatives:  Signs/symptoms of thrombosis, Signs/symptoms of bleeding, Laboratory test error suspected, Change in health, Change in alcohol use, Change in activity, Upcoming invasive procedure, Emergency department visit, Upcoming dental procedure, Missed doses, Extra doses, Change in medications, Change in diet/appetite, Hospital admission, Bruising, Other complaints          Verified current warfarin dosing schedule.    Medications reconciled: Yes  Pt is not on antiplatelet therapy.      A/P   INR is subtherapeutic at 1.3. INR down from 7.4 on Monday with three dose holds. He has completed 3 mo of therapy s/p MV repair done 24. Will have patient stop warfarin and let his cardiologist know he has completed 3 months of therapy. Instructed to began taking aspirin 81 mg by mouth daily. Med rec updated.    Pt educated to contact our clinic with any  changes in medications or s/s of bleeding or thrombosis. Pt is aware to seek immediate medical attention for falls, head injury or deep cuts.    Will discharge from the anticoagulation clinic    INEZ Rdz.    Cc: ORALIA MCBRIDE

## 2024-05-02 NOTE — Clinical Note
Fco Ho, This pt has completed 3 mo of warfarin therapy s/p MV repair 1/25/24. I stopped warfarin and now he's on asa 81 mg. Just FYI. Thanks, Aparna

## 2024-05-12 ENCOUNTER — APPOINTMENT (OUTPATIENT)
Dept: RADIOLOGY | Facility: MEDICAL CENTER | Age: 44
End: 2024-05-12
Attending: EMERGENCY MEDICINE
Payer: COMMERCIAL

## 2024-05-12 ENCOUNTER — HOSPITAL ENCOUNTER (EMERGENCY)
Facility: MEDICAL CENTER | Age: 44
End: 2024-05-13
Attending: EMERGENCY MEDICINE
Payer: COMMERCIAL

## 2024-05-12 DIAGNOSIS — L02.413 ABSCESS OF FOREARM, RIGHT: ICD-10-CM

## 2024-05-12 LAB
ALBUMIN SERPL BCP-MCNC: 4.3 G/DL (ref 3.2–4.9)
ALBUMIN/GLOB SERPL: 1.6 G/DL
ALP SERPL-CCNC: 67 U/L (ref 30–99)
ALT SERPL-CCNC: 17 U/L (ref 2–50)
ANION GAP SERPL CALC-SCNC: 15 MMOL/L (ref 7–16)
AST SERPL-CCNC: 16 U/L (ref 12–45)
BASOPHILS # BLD AUTO: 1 % (ref 0–1.8)
BASOPHILS # BLD: 0.09 K/UL (ref 0–0.12)
BILIRUB SERPL-MCNC: 0.5 MG/DL (ref 0.1–1.5)
BUN SERPL-MCNC: 18 MG/DL (ref 8–22)
CALCIUM ALBUM COR SERPL-MCNC: 9.1 MG/DL (ref 8.5–10.5)
CALCIUM SERPL-MCNC: 9.3 MG/DL (ref 8.5–10.5)
CHLORIDE SERPL-SCNC: 106 MMOL/L (ref 96–112)
CK SERPL-CCNC: 100 U/L (ref 0–154)
CO2 SERPL-SCNC: 18 MMOL/L (ref 20–33)
CREAT SERPL-MCNC: 1.16 MG/DL (ref 0.5–1.4)
EOSINOPHIL # BLD AUTO: 0.02 K/UL (ref 0–0.51)
EOSINOPHIL NFR BLD: 0.2 % (ref 0–6.9)
ERYTHROCYTE [DISTWIDTH] IN BLOOD BY AUTOMATED COUNT: 57.1 FL (ref 35.9–50)
GFR SERPLBLD CREATININE-BSD FMLA CKD-EPI: 80 ML/MIN/1.73 M 2
GLOBULIN SER CALC-MCNC: 2.7 G/DL (ref 1.9–3.5)
GLUCOSE SERPL-MCNC: 103 MG/DL (ref 65–99)
HCT VFR BLD AUTO: 44.1 % (ref 42–52)
HGB BLD-MCNC: 13.5 G/DL (ref 14–18)
IMM GRANULOCYTES # BLD AUTO: 0.03 K/UL (ref 0–0.11)
IMM GRANULOCYTES NFR BLD AUTO: 0.3 % (ref 0–0.9)
LACTATE SERPL-SCNC: 1.6 MMOL/L (ref 0.5–2)
LYMPHOCYTES # BLD AUTO: 3.14 K/UL (ref 1–4.8)
LYMPHOCYTES NFR BLD: 33.6 % (ref 22–41)
MCH RBC QN AUTO: 22 PG (ref 27–33)
MCHC RBC AUTO-ENTMCNC: 30.6 G/DL (ref 32.3–36.5)
MCV RBC AUTO: 71.7 FL (ref 81.4–97.8)
MONOCYTES # BLD AUTO: 0.73 K/UL (ref 0–0.85)
MONOCYTES NFR BLD AUTO: 7.8 % (ref 0–13.4)
NEUTROPHILS # BLD AUTO: 5.34 K/UL (ref 1.82–7.42)
NEUTROPHILS NFR BLD: 57.1 % (ref 44–72)
NRBC # BLD AUTO: 0 K/UL
NRBC BLD-RTO: 0 /100 WBC (ref 0–0.2)
PLATELET # BLD AUTO: 459 K/UL (ref 164–446)
PMV BLD AUTO: 8.8 FL (ref 9–12.9)
POTASSIUM SERPL-SCNC: 3.9 MMOL/L (ref 3.6–5.5)
PROT SERPL-MCNC: 7 G/DL (ref 6–8.2)
RBC # BLD AUTO: 6.15 M/UL (ref 4.7–6.1)
SODIUM SERPL-SCNC: 139 MMOL/L (ref 135–145)
WBC # BLD AUTO: 9.4 K/UL (ref 4.8–10.8)

## 2024-05-12 PROCEDURE — 36415 COLL VENOUS BLD VENIPUNCTURE: CPT

## 2024-05-12 PROCEDURE — 87641 MR-STAPH DNA AMP PROBE: CPT

## 2024-05-12 PROCEDURE — 99285 EMERGENCY DEPT VISIT HI MDM: CPT

## 2024-05-12 PROCEDURE — 80053 COMPREHEN METABOLIC PANEL: CPT

## 2024-05-12 PROCEDURE — 71045 X-RAY EXAM CHEST 1 VIEW: CPT

## 2024-05-12 PROCEDURE — 700111 HCHG RX REV CODE 636 W/ 250 OVERRIDE (IP): Mod: JZ | Performed by: EMERGENCY MEDICINE

## 2024-05-12 PROCEDURE — 83605 ASSAY OF LACTIC ACID: CPT

## 2024-05-12 PROCEDURE — 87040 BLOOD CULTURE FOR BACTERIA: CPT

## 2024-05-12 PROCEDURE — 85025 COMPLETE CBC W/AUTO DIFF WBC: CPT

## 2024-05-12 PROCEDURE — 700101 HCHG RX REV CODE 250: Performed by: EMERGENCY MEDICINE

## 2024-05-12 PROCEDURE — 700105 HCHG RX REV CODE 258: Performed by: EMERGENCY MEDICINE

## 2024-05-12 PROCEDURE — 82550 ASSAY OF CK (CPK): CPT

## 2024-05-12 PROCEDURE — 96365 THER/PROPH/DIAG IV INF INIT: CPT

## 2024-05-12 PROCEDURE — 96375 TX/PRO/DX INJ NEW DRUG ADDON: CPT

## 2024-05-12 RX ORDER — HYDROMORPHONE HYDROCHLORIDE 1 MG/ML
0.5 INJECTION, SOLUTION INTRAMUSCULAR; INTRAVENOUS; SUBCUTANEOUS ONCE
Status: COMPLETED | OUTPATIENT
Start: 2024-05-12 | End: 2024-05-12

## 2024-05-12 RX ORDER — ONDANSETRON 2 MG/ML
4 INJECTION INTRAMUSCULAR; INTRAVENOUS ONCE
Status: COMPLETED | OUTPATIENT
Start: 2024-05-12 | End: 2024-05-12

## 2024-05-12 RX ORDER — SODIUM CHLORIDE, SODIUM LACTATE, POTASSIUM CHLORIDE, AND CALCIUM CHLORIDE .6; .31; .03; .02 G/100ML; G/100ML; G/100ML; G/100ML
30 INJECTION, SOLUTION INTRAVENOUS ONCE
Status: COMPLETED | OUTPATIENT
Start: 2024-05-12 | End: 2024-05-13

## 2024-05-12 RX ADMIN — VANCOMYCIN HYDROCHLORIDE 2750 MG: 5 INJECTION, POWDER, LYOPHILIZED, FOR SOLUTION INTRAVENOUS at 23:02

## 2024-05-12 RX ADMIN — HYDROMORPHONE HYDROCHLORIDE 0.5 MG: 1 INJECTION, SOLUTION INTRAMUSCULAR; INTRAVENOUS; SUBCUTANEOUS at 22:36

## 2024-05-12 RX ADMIN — ONDANSETRON 4 MG: 2 INJECTION INTRAMUSCULAR; INTRAVENOUS at 22:36

## 2024-05-12 RX ADMIN — KETAMINE HYDROCHLORIDE 25 MG: 10 INJECTION INTRAMUSCULAR; INTRAVENOUS at 23:40

## 2024-05-12 RX ADMIN — SODIUM CHLORIDE, POTASSIUM CHLORIDE, SODIUM LACTATE AND CALCIUM CHLORIDE 2535 ML: 600; 310; 30; 20 INJECTION, SOLUTION INTRAVENOUS at 22:37

## 2024-05-12 ASSESSMENT — FIBROSIS 4 INDEX: FIB4 SCORE: 0.49

## 2024-05-13 VITALS
SYSTOLIC BLOOD PRESSURE: 159 MMHG | HEART RATE: 88 BPM | TEMPERATURE: 97.6 F | OXYGEN SATURATION: 95 % | WEIGHT: 240.3 LBS | DIASTOLIC BLOOD PRESSURE: 97 MMHG | HEIGHT: 75 IN | RESPIRATION RATE: 18 BRPM | BODY MASS INDEX: 29.88 KG/M2

## 2024-05-13 LAB — SCCMEC + MECA PNL NOSE NAA+PROBE: NEGATIVE

## 2024-05-13 PROCEDURE — 700102 HCHG RX REV CODE 250 W/ 637 OVERRIDE(OP): Performed by: EMERGENCY MEDICINE

## 2024-05-13 PROCEDURE — A9270 NON-COVERED ITEM OR SERVICE: HCPCS | Performed by: EMERGENCY MEDICINE

## 2024-05-13 PROCEDURE — 73201 CT UPPER EXTREMITY W/DYE: CPT | Mod: RT

## 2024-05-13 PROCEDURE — 700117 HCHG RX CONTRAST REV CODE 255: Performed by: EMERGENCY MEDICINE

## 2024-05-13 PROCEDURE — 93971 EXTREMITY STUDY: CPT | Mod: RT

## 2024-05-13 RX ORDER — OXYCODONE HCL 20 MG/1
20 TABLET, FILM COATED, EXTENDED RELEASE ORAL ONCE
Status: DISCONTINUED | OUTPATIENT
Start: 2024-05-13 | End: 2024-05-13

## 2024-05-13 RX ORDER — OXYCODONE HYDROCHLORIDE 10 MG/1
20 TABLET ORAL ONCE
Status: COMPLETED | OUTPATIENT
Start: 2024-05-13 | End: 2024-05-13

## 2024-05-13 RX ADMIN — OXYCODONE HYDROCHLORIDE 20 MG: 10 TABLET ORAL at 00:57

## 2024-05-13 RX ADMIN — IOHEXOL 100 ML: 350 INJECTION, SOLUTION INTRAVENOUS at 00:15

## 2024-05-13 NOTE — ED TRIAGE NOTES
Chief Complaint   Patient presents with    Arm Pain     Right arm pain, swelling, warm to touch  Pt said he is concern that it may be DVT    History of open heart surgery January 2024  Off to Coumadin 2 weeks ago, but on daily asprin       + Numbness and tingling sensation at right fingers    Pain: 10/10  Taken Oxycodone 20 mg tab PO 11 am     Pt came in to triage ambulatory with steady gait for the above complaints.     Pt is alert and oriented x 4, speaking in full sentences, follows commands and responds appropriately to questions.     Respirations are even and unlabored.    Pt placed in lobby. Pt educated on triage process.     Pt encouraged to inform staff for any changes in condition or if needs help while waiting to be room in.    Vitals:    05/12/24 2057   BP: (!) 161/115   Pulse: (!) 111   Resp: 18   Temp: 36.4 °C (97.6 °F)   SpO2: 98%     Past Medical History:   Diagnosis Date    Arthritis     Right ankle    Breath shortness 11/03/2023    With exertion.    Drug-seeking behavior 07/30/2023    Demanding narcotics for a duodenal ulcer July 2023. Refused antacids or sucralfate.     Duodenal ulcer 07/30/2023    Gastric ulcer     Heart valve disease     Mitral valve prolapse    Hemorrhagic disorder (HCC)     GI bleed    Hypertension     Pain     Resolved; Right ankle    Seizure (HCC)     while taking tramadol     Past Surgical History:   Procedure Laterality Date    ND REPLACEMENT OF MITRAL VALVE  1/25/2024    Procedure: MITRAL VALVE REPAIR, TRANSESOPHAGEAL ECHOCARDIOGRAM;  Surgeon: Bigg Santana M.D.;  Location: SURGERY [facility];  Service: Cardiothoracic    ECHOCARDIOGRAM, TRANSESOPHAGEAL, INTRAOPERATIVE  1/25/2024    Procedure: ECHOCARDIOGRAM, TRANSESOPHAGEAL, INTRAOPERATIVE;  Surgeon: Bigg Santana M.D.;  Location: SURGERY [facility];  Service: Cardiothoracic    ND UPPER GI ENDOSCOPY,DIAGNOSIS N/A 9/14/2023    Procedure: GASTROSCOPY;  Surgeon: Johnny Dalton M.D.;  Location: SURGERY SAME DAY [facility];   Service: Gastroenterology    ND UPPER GI ENDOSCOPY,BIOPSY N/A 9/14/2023    Procedure: GASTROSCOPY, WITH BIOPSY;  Surgeon: Johnny Dalton M.D.;  Location: SURGERY SAME DAY [facility];  Service: Gastroenterology    ND UPPER GI ENDOSCOPY,BIOPSY N/A 7/30/2023    Procedure: GASTROSCOPY, WITH BIOPSY;  Surgeon: Kumar Hope M.D.;  Location: SURGERY [facility];  Service: Gastroenterology    ND UPPER GI ENDOSCOPY,DIAGNOSIS  3/28/2022    Procedure: GASTROSCOPY;  Surgeon: Paco Wiggins M.D.;  Location: SURGERY SAME DAY [facility];  Service: Gastroenterology    ND UPPER GI ENDOSCOPY,BIOPSY  3/28/2022    Procedure: GASTROSCOPY, WITH BIOPSY;  Surgeon: Paco Wiggins M.D.;  Location: SURGERY SAME DAY [facility];  Service: Gastroenterology    ANKLE ARTHROSCOPY Right 5/21/2018    Procedure: ANKLE ARTHROSCOPY, LATERAL LIGAMENT RECONSTRUCTION;  Surgeon: Seth Cruz M.D.;  Location: SURGERY [facility];  Service: Orthopedics    BIOPSY ORTHO Right 5/21/2018    Procedure: BIOPSY ORTHO/ FOR CARTILAGE AND DENOVO PROCEDURE;  Surgeon: Seth Cruz M.D.;  Location: SURGERY [facility];  Service: Orthopedics    OTHER ABDOMINAL SURGERY      Cholecystectomy February 2017    OTHER ORTHOPEDIC SURGERY      2 previous ankle surgeries (2007, 2009)

## 2024-05-13 NOTE — ED NOTES
Phlebotomy asked to draw second set of blood cultures, piv access established, blood drawn and sent to lab, patient medicated per mar, updated on poc, call light within reach, fall precautions in place.

## 2024-05-13 NOTE — ED NOTES
Patient ambulatory from lobby to blue 18 with steady gait, agree with triage note, visible swelling to right arm with palpable mass, changed into hospital gown, placed on continuous monitoring, chart up for ERP to see.

## 2024-05-13 NOTE — ED NOTES
Ketamine infusion completed, US remains at bedside. Patient tolerated infusion well, vitals stable, call light within reach, fall precautions in place.

## 2024-05-13 NOTE — ED PROVIDER NOTES
ED Provider Note    CHIEF COMPLAINT  Chief Complaint   Patient presents with    Arm Pain     Right arm pain, swelling, warm to touch  Pt said he is concern that it may be DVT    History of open heart surgery January 2024  Off to Coumadin 2 weeks ago, but on daily asprin         EXTERNAL RECORDS REVIEWED  Inpatient Notes ER visit 3/1/2024 when the patient presented with chest pain with vomiting    HPI/ROS  LIMITATION TO HISTORY   Select: : None  OUTSIDE HISTORIAN(S):  Family At bedside providing majority of history    Roberto Braswell is a 43 y.o. male who presents to the ER with right arm nodular density and pain.  Patient on chronic pain management secondary to recent cardiac surgery.  He did have some thrombophlebitis after his hospitalization.  Now with focal right forearm discomfort which is moderate to severe.  On aspirin.    PAST MEDICAL HISTORY   has a past medical history of Arthritis, Breath shortness (11/03/2023), Drug-seeking behavior (07/30/2023), Duodenal ulcer (07/30/2023), Gastric ulcer, Heart valve disease, Hemorrhagic disorder (HCC), Hypertension, Pain, and Seizure (HCC).    SURGICAL HISTORY   has a past surgical history that includes other orthopedic surgery; other abdominal surgery; ankle arthroscopy (Right, 5/21/2018); biopsy ortho (Right, 5/21/2018); upper gi endoscopy,diagnosis (3/28/2022); upper gi endoscopy,biopsy (3/28/2022); upper gi endoscopy,biopsy (N/A, 7/30/2023); upper gi endoscopy,diagnosis (N/A, 9/14/2023); upper gi endoscopy,biopsy (N/A, 9/14/2023); replacement of mitral valve (1/25/2024); and echocardiogram, transesophageal, intraoperative (1/25/2024).    FAMILY HISTORY  Family History   Problem Relation Age of Onset    Heart Disease Mother     No Known Problems Father     Clotting Disorder Neg Hx        SOCIAL HISTORY  Social History     Tobacco Use    Smoking status: Never    Smokeless tobacco: Never   Vaping Use    Vaping Use: Never used   Substance and Sexual Activity     "Alcohol use: Not Currently    Drug use: Never    Sexual activity: Not on file       CURRENT MEDICATIONS  Home Medications       Reviewed by Tiara Neville R.N. (Registered Nurse) on 05/12/24 at 2107  Med List Status: Partial     Medication Last Dose Status   acetaminophen (TYLENOL) 500 MG Tab  Active   amoxicillin-clavulanate (AUGMENTIN) 500-125 MG Tab  Active   aspirin 81 MG EC tablet  Active   baclofen (LIORESAL) 10 MG Tab  Active   lidocaine-hyoscyamine-mag hydrox-al hydrox-simeth  Active   losartan (COZAAR) 50 MG Tab  Active   metoclopramide (REGLAN) 10 MG Tab  Active   metoprolol tartrate (LOPRESSOR) 25 MG Tab  Active   Naloxone (NARCAN) 4 MG/0.1ML Liquid  Active   omeprazole (PRILOSEC) 20 MG delayed-release capsule  Active   Oxycodone HCl 20 MG Tab  Active   prochlorperazine (COMPAZINE) 5 MG Tab  Active   sucralfate (CARAFATE) 1 GM Tab  Active                    ALLERGIES  Allergies   Allergen Reactions    Morphine Vomiting    Tramadol Unspecified     Seizure  RBV=[id number]       PHYSICAL EXAM  VITAL SIGNS: BP (!) 159/97   Pulse 88   Temp 36.4 °C (97.6 °F) (Temporal)   Resp 18   Ht 1.905 m (6' 3\")   Wt 109 kg (240 lb 4.8 oz)   SpO2 95%   BMI 30.04 kg/m²      Pulse ox interpretation: I interpret this pulse ox as normal.  Constitutional: Alert in no apparent distress.  HENT: No signs of trauma, Bilateral external ears normal, Nose normal.   Eyes: Pupils are equal and reactive  Neck: Normal range of motion, No tenderness, Supple  Cardiovascular: tachy, Regular rate and rhythm, no murmurs.   Thorax & Lungs: Normal breath sounds, No respiratory distress, No wheezing, No chest tenderness.   Skin: Warm, Dry    Extremities: right upper ext: Roughly 2 cm circumference and 2 cm height lesion to the mid forearm which is tender.  No significant erythema or warmth noted.  No drainage.  Upper extremity otherwise without abnormalities or symmetric difference to left on inspection    Musculoskeletal: Good range of motion " in all major joints. No tenderness to palpation or major deformities noted.   Neurologic: Alert , Normal motor function, Normal sensory function, No focal deficits noted.   Psychiatric: Affect normal, Judgment normal, Mood normal.         EKG/LABS  Results for orders placed or performed during the hospital encounter of 05/12/24   Lactic Acid   Result Value Ref Range    Lactic Acid 1.6 0.5 - 2.0 mmol/L   CBC with Differential   Result Value Ref Range    WBC 9.4 4.8 - 10.8 K/uL    RBC 6.15 (H) 4.70 - 6.10 M/uL    Hemoglobin 13.5 (L) 14.0 - 18.0 g/dL    Hematocrit 44.1 42.0 - 52.0 %    MCV 71.7 (L) 81.4 - 97.8 fL    MCH 22.0 (L) 27.0 - 33.0 pg    MCHC 30.6 (L) 32.3 - 36.5 g/dL    RDW 57.1 (H) 35.9 - 50.0 fL    Platelet Count 459 (H) 164 - 446 K/uL    MPV 8.8 (L) 9.0 - 12.9 fL    Neutrophils-Polys 57.10 44.00 - 72.00 %    Lymphocytes 33.60 22.00 - 41.00 %    Monocytes 7.80 0.00 - 13.40 %    Eosinophils 0.20 0.00 - 6.90 %    Basophils 1.00 0.00 - 1.80 %    Immature Granulocytes 0.30 0.00 - 0.90 %    Nucleated RBC 0.00 0.00 - 0.20 /100 WBC    Neutrophils (Absolute) 5.34 1.82 - 7.42 K/uL    Lymphs (Absolute) 3.14 1.00 - 4.80 K/uL    Monos (Absolute) 0.73 0.00 - 0.85 K/uL    Eos (Absolute) 0.02 0.00 - 0.51 K/uL    Baso (Absolute) 0.09 0.00 - 0.12 K/uL    Immature Granulocytes (abs) 0.03 0.00 - 0.11 K/uL    NRBC (Absolute) 0.00 K/uL   Complete Metabolic Panel   Result Value Ref Range    Sodium 139 135 - 145 mmol/L    Potassium 3.9 3.6 - 5.5 mmol/L    Chloride 106 96 - 112 mmol/L    Co2 18 (L) 20 - 33 mmol/L    Anion Gap 15.0 7.0 - 16.0    Glucose 103 (H) 65 - 99 mg/dL    Bun 18 8 - 22 mg/dL    Creatinine 1.16 0.50 - 1.40 mg/dL    Calcium 9.3 8.5 - 10.5 mg/dL    Correct Calcium 9.1 8.5 - 10.5 mg/dL    AST(SGOT) 16 12 - 45 U/L    ALT(SGPT) 17 2 - 50 U/L    Alkaline Phosphatase 67 30 - 99 U/L    Total Bilirubin 0.5 0.1 - 1.5 mg/dL    Albumin 4.3 3.2 - 4.9 g/dL    Total Protein 7.0 6.0 - 8.2 g/dL    Globulin 2.7 1.9 - 3.5 g/dL     A-G Ratio 1.6 g/dL   Blood Culture - Draw one from central line and one from peripheral site    Specimen: Peripheral; Blood   Result Value Ref Range    Significant Indicator NEG     Source BLD     Site PERIPHERAL     Culture Result       No Growth  Note: Blood cultures are incubated for 5 days and  are monitored continuously.Positive blood cultures  are called to the RN and reported as soon as  they are identified.     Blood Culture - Draw one from central line and one from peripheral site    Specimen: Line; Blood   Result Value Ref Range    Significant Indicator NEG     Source BLD     Site Peripheral     Culture Result       No Growth  Note: Blood cultures are incubated for 5 days and  are monitored continuously.Positive blood cultures  are called to the RN and reported as soon as  they are identified.     CREATINE KINASE   Result Value Ref Range    CPK Total 100 0 - 154 U/L   ESTIMATED GFR   Result Value Ref Range    GFR (CKD-EPI) 80 >60 mL/min/1.73 m 2         RADIOLOGY/PROCEDURES   I have independently interpreted the diagnostic imaging associated with this visit and am waiting the final reading from the radiologist.   My preliminary interpretation is as follows: Chest x-ray without acute consolidative process    Radiologist interpretation:  US-EXTREMITY VENOUS UPPER UNILAT RIGHT         CT-EXTREMITY, UPPER WITH RIGHT   Final Result         1.  Features favoring focal cellulitis of the mid forearm overlying the ulna with small focal phlegmon or developing abscess.   2.  No evidence of DVT. If there is continued clinical concern, ultrasound can be performed.   3.  Likely focal superficial thrombophlebitis of the vein adjacent to the inflammatory changes of the mid forearm.      DX-CHEST-PORTABLE (1 VIEW)   Final Result      1.  No acute cardiac or pulmonary abnormalities are identified.          COURSE & MEDICAL DECISION MAKING    ASSESSMENT, COURSE AND PLAN  Care Narrative: Patient presenting with right  forearm pain.  History and physical exam as above.  Will complete imaging to include DVT rule out as well as soft tissue evaluation given focal mass    DISPOSITION AND DISCUSSIONS  I have discussed management of the patient with the following physicians and CARTER's: None    Discussion of management with other QHP or appropriate source(s): None     Escalation of care considered, and ultimately not performed:acute inpatient care management, however at this time, the patient is most appropriate for outpatient management    Barriers to care at this time, including but not limited to:  None .     43-year-old male presenting to the emergency department with the above presentation.  Workup as above.  As I clinically Specter there is no finding of DVT.  Ultrasound and CT however do confirm inflammatory process.  Given vascularity, and on ultrasound I will not complete I&D and will initiate antibiotics empirically.  Patient setting of ongoing home care and will return to the ER with any change or worsening.    The patient is followed by chronic pain management I have had bedside conversation with regards to opiate safety and risks.    FINAL DIAGNOSIS  1. Abscess of forearm, right           Electronically signed by: Kumar Horton M.D., 5/12/2024 10:22 PM

## 2024-05-18 ENCOUNTER — ANESTHESIA (OUTPATIENT)
Dept: SURGERY | Facility: MEDICAL CENTER | Age: 44
End: 2024-05-18
Payer: COMMERCIAL

## 2024-05-18 ENCOUNTER — ANESTHESIA EVENT (OUTPATIENT)
Dept: SURGERY | Facility: MEDICAL CENTER | Age: 44
End: 2024-05-18
Payer: COMMERCIAL

## 2024-05-18 ENCOUNTER — APPOINTMENT (OUTPATIENT)
Dept: RADIOLOGY | Facility: MEDICAL CENTER | Age: 44
End: 2024-05-18
Attending: EMERGENCY MEDICINE
Payer: COMMERCIAL

## 2024-05-18 ENCOUNTER — HOSPITAL ENCOUNTER (OUTPATIENT)
Facility: MEDICAL CENTER | Age: 44
End: 2024-05-19
Attending: EMERGENCY MEDICINE | Admitting: STUDENT IN AN ORGANIZED HEALTH CARE EDUCATION/TRAINING PROGRAM
Payer: COMMERCIAL

## 2024-05-18 DIAGNOSIS — L02.413 ABSCESS OF FOREARM, RIGHT: ICD-10-CM

## 2024-05-18 DIAGNOSIS — D50.0 IRON DEFICIENCY ANEMIA DUE TO CHRONIC BLOOD LOSS: ICD-10-CM

## 2024-05-18 DIAGNOSIS — K92.2 GASTROINTESTINAL HEMORRHAGE, UNSPECIFIED GASTROINTESTINAL HEMORRHAGE TYPE: ICD-10-CM

## 2024-05-18 DIAGNOSIS — K92.2 UPPER GI BLEED: ICD-10-CM

## 2024-05-18 PROBLEM — L02.419 ABSCESS OF FOREARM: Status: ACTIVE | Noted: 2024-05-18

## 2024-05-18 PROBLEM — L02.91 ABSCESS: Status: ACTIVE | Noted: 2024-05-18

## 2024-05-18 LAB
ABO GROUP BLD: NORMAL
ALBUMIN SERPL BCP-MCNC: 3.8 G/DL (ref 3.2–4.9)
ALBUMIN/GLOB SERPL: 1.9 G/DL
ALP SERPL-CCNC: 57 U/L (ref 30–99)
ALT SERPL-CCNC: 16 U/L (ref 2–50)
ANION GAP SERPL CALC-SCNC: 12 MMOL/L (ref 7–16)
ANISOCYTOSIS BLD QL SMEAR: ABNORMAL
APTT PPP: 24.9 SEC (ref 24.7–36)
AST SERPL-CCNC: 19 U/L (ref 12–45)
BACTERIA BLD CULT: NORMAL
BACTERIA BLD CULT: NORMAL
BASOPHILS # BLD AUTO: 1.5 % (ref 0–1.8)
BASOPHILS # BLD: 0.07 K/UL (ref 0–0.12)
BILIRUB SERPL-MCNC: <0.2 MG/DL (ref 0.1–1.5)
BLD GP AB SCN SERPL QL: NORMAL
BUN SERPL-MCNC: 26 MG/DL (ref 8–22)
CALCIUM ALBUM COR SERPL-MCNC: 8.6 MG/DL (ref 8.5–10.5)
CALCIUM SERPL-MCNC: 8.4 MG/DL (ref 8.5–10.5)
CHLORIDE SERPL-SCNC: 108 MMOL/L (ref 96–112)
CO2 SERPL-SCNC: 18 MMOL/L (ref 20–33)
COMMENT 1642: NORMAL
CREAT SERPL-MCNC: 1.05 MG/DL (ref 0.5–1.4)
EOSINOPHIL # BLD AUTO: 0.26 K/UL (ref 0–0.51)
EOSINOPHIL NFR BLD: 5.4 % (ref 0–6.9)
ERYTHROCYTE [DISTWIDTH] IN BLOOD BY AUTOMATED COUNT: 63.6 FL (ref 35.9–50)
FERRITIN SERPL-MCNC: 13 NG/ML (ref 22–322)
GFR SERPLBLD CREATININE-BSD FMLA CKD-EPI: 90 ML/MIN/1.73 M 2
GLOBULIN SER CALC-MCNC: 2 G/DL (ref 1.9–3.5)
GLUCOSE SERPL-MCNC: 81 MG/DL (ref 65–99)
HCT VFR BLD AUTO: 31.8 % (ref 42–52)
HCT VFR BLD AUTO: 32.8 % (ref 42–52)
HCT VFR BLD AUTO: 33.1 % (ref 42–52)
HGB BLD-MCNC: 10.1 G/DL (ref 14–18)
HGB BLD-MCNC: 9.4 G/DL (ref 14–18)
HGB BLD-MCNC: 9.9 G/DL (ref 14–18)
HYPOCHROMIA BLD QL SMEAR: ABNORMAL
IMM GRANULOCYTES # BLD AUTO: 0.02 K/UL (ref 0–0.11)
IMM GRANULOCYTES NFR BLD AUTO: 0.4 % (ref 0–0.9)
INR PPP: 0.98 (ref 0.87–1.13)
IRON SATN MFR SERPL: 6 % (ref 15–55)
IRON SERPL-MCNC: 22 UG/DL (ref 50–180)
LIPASE SERPL-CCNC: 28 U/L (ref 11–82)
LYMPHOCYTES # BLD AUTO: 1.98 K/UL (ref 1–4.8)
LYMPHOCYTES NFR BLD: 41.1 % (ref 22–41)
MCH RBC QN AUTO: 22.9 PG (ref 27–33)
MCHC RBC AUTO-ENTMCNC: 29.9 G/DL (ref 32.3–36.5)
MCV RBC AUTO: 76.6 FL (ref 81.4–97.8)
MICROCYTES BLD QL SMEAR: ABNORMAL
MONOCYTES # BLD AUTO: 0.43 K/UL (ref 0–0.85)
MONOCYTES NFR BLD AUTO: 8.9 % (ref 0–13.4)
MORPHOLOGY BLD-IMP: NORMAL
NEUTROPHILS # BLD AUTO: 2.06 K/UL (ref 1.82–7.42)
NEUTROPHILS NFR BLD: 42.7 % (ref 44–72)
NRBC # BLD AUTO: 0 K/UL
NRBC BLD-RTO: 0 /100 WBC (ref 0–0.2)
OVALOCYTES BLD QL SMEAR: NORMAL
PLATELET # BLD AUTO: 346 K/UL (ref 164–446)
PLATELET BLD QL SMEAR: NORMAL
PMV BLD AUTO: 8.8 FL (ref 9–12.9)
POTASSIUM SERPL-SCNC: 3.8 MMOL/L (ref 3.6–5.5)
PROT SERPL-MCNC: 5.8 G/DL (ref 6–8.2)
PROTHROMBIN TIME: 13.1 SEC (ref 12–14.6)
RBC # BLD AUTO: 4.32 M/UL (ref 4.7–6.1)
RBC BLD AUTO: PRESENT
RH BLD: NORMAL
SIGNIFICANT IND 70042: NORMAL
SIGNIFICANT IND 70042: NORMAL
SITE SITE: NORMAL
SITE SITE: NORMAL
SODIUM SERPL-SCNC: 138 MMOL/L (ref 135–145)
SOURCE SOURCE: NORMAL
SOURCE SOURCE: NORMAL
TIBC SERPL-MCNC: 363 UG/DL (ref 250–450)
UIBC SERPL-MCNC: 341 UG/DL (ref 110–370)
WBC # BLD AUTO: 4.8 K/UL (ref 4.8–10.8)

## 2024-05-18 PROCEDURE — 85025 COMPLETE CBC W/AUTO DIFF WBC: CPT

## 2024-05-18 PROCEDURE — 83540 ASSAY OF IRON: CPT

## 2024-05-18 PROCEDURE — 700111 HCHG RX REV CODE 636 W/ 250 OVERRIDE (IP): Performed by: STUDENT IN AN ORGANIZED HEALTH CARE EDUCATION/TRAINING PROGRAM

## 2024-05-18 PROCEDURE — 80053 COMPREHEN METABOLIC PANEL: CPT

## 2024-05-18 PROCEDURE — G0378 HOSPITAL OBSERVATION PER HR: HCPCS

## 2024-05-18 PROCEDURE — 43255 EGD CONTROL BLEEDING ANY: CPT | Performed by: SPECIALIST

## 2024-05-18 PROCEDURE — 85610 PROTHROMBIN TIME: CPT

## 2024-05-18 PROCEDURE — 86900 BLOOD TYPING SEROLOGIC ABO: CPT

## 2024-05-18 PROCEDURE — 700111 HCHG RX REV CODE 636 W/ 250 OVERRIDE (IP): Mod: JZ | Performed by: STUDENT IN AN ORGANIZED HEALTH CARE EDUCATION/TRAINING PROGRAM

## 2024-05-18 PROCEDURE — 86850 RBC ANTIBODY SCREEN: CPT

## 2024-05-18 PROCEDURE — 82728 ASSAY OF FERRITIN: CPT

## 2024-05-18 PROCEDURE — 99222 1ST HOSP IP/OBS MODERATE 55: CPT | Mod: 25 | Performed by: SPECIALIST

## 2024-05-18 PROCEDURE — 700111 HCHG RX REV CODE 636 W/ 250 OVERRIDE (IP): Performed by: EMERGENCY MEDICINE

## 2024-05-18 PROCEDURE — 700102 HCHG RX REV CODE 250 W/ 637 OVERRIDE(OP): Performed by: STUDENT IN AN ORGANIZED HEALTH CARE EDUCATION/TRAINING PROGRAM

## 2024-05-18 PROCEDURE — 160035 HCHG PACU - 1ST 60 MINS PHASE I: Performed by: SPECIALIST

## 2024-05-18 PROCEDURE — 88305 TISSUE EXAM BY PATHOLOGIST: CPT

## 2024-05-18 PROCEDURE — A9270 NON-COVERED ITEM OR SERVICE: HCPCS | Performed by: STUDENT IN AN ORGANIZED HEALTH CARE EDUCATION/TRAINING PROGRAM

## 2024-05-18 PROCEDURE — 85014 HEMATOCRIT: CPT

## 2024-05-18 PROCEDURE — 36415 COLL VENOUS BLD VENIPUNCTURE: CPT

## 2024-05-18 PROCEDURE — 700101 HCHG RX REV CODE 250: Performed by: EMERGENCY MEDICINE

## 2024-05-18 PROCEDURE — 85730 THROMBOPLASTIN TIME PARTIAL: CPT

## 2024-05-18 PROCEDURE — C9113 INJ PANTOPRAZOLE SODIUM, VIA: HCPCS | Performed by: EMERGENCY MEDICINE

## 2024-05-18 PROCEDURE — 83550 IRON BINDING TEST: CPT

## 2024-05-18 PROCEDURE — 96376 TX/PRO/DX INJ SAME DRUG ADON: CPT

## 2024-05-18 PROCEDURE — 303977 HCHG I & D

## 2024-05-18 PROCEDURE — 160048 HCHG OR STATISTICAL LEVEL 1-5: Performed by: SPECIALIST

## 2024-05-18 PROCEDURE — 700105 HCHG RX REV CODE 258: Performed by: STUDENT IN AN ORGANIZED HEALTH CARE EDUCATION/TRAINING PROGRAM

## 2024-05-18 PROCEDURE — C9113 INJ PANTOPRAZOLE SODIUM, VIA: HCPCS | Performed by: STUDENT IN AN ORGANIZED HEALTH CARE EDUCATION/TRAINING PROGRAM

## 2024-05-18 PROCEDURE — 99223 1ST HOSP IP/OBS HIGH 75: CPT | Performed by: STUDENT IN AN ORGANIZED HEALTH CARE EDUCATION/TRAINING PROGRAM

## 2024-05-18 PROCEDURE — 83690 ASSAY OF LIPASE: CPT

## 2024-05-18 PROCEDURE — 86901 BLOOD TYPING SEROLOGIC RH(D): CPT

## 2024-05-18 PROCEDURE — 99285 EMERGENCY DEPT VISIT HI MDM: CPT

## 2024-05-18 PROCEDURE — 96375 TX/PRO/DX INJ NEW DRUG ADDON: CPT

## 2024-05-18 PROCEDURE — 82941 ASSAY OF GASTRIN: CPT

## 2024-05-18 PROCEDURE — 160203 HCHG ENDO MINUTES - 1ST 30 MINS LEVEL 4: Performed by: SPECIALIST

## 2024-05-18 PROCEDURE — 85018 HEMOGLOBIN: CPT

## 2024-05-18 PROCEDURE — 304217 HCHG IRRIGATION SYSTEM

## 2024-05-18 PROCEDURE — 160009 HCHG ANES TIME/MIN: Performed by: SPECIALIST

## 2024-05-18 PROCEDURE — 160002 HCHG RECOVERY MINUTES (STAT): Performed by: SPECIALIST

## 2024-05-18 RX ORDER — LABETALOL HYDROCHLORIDE 5 MG/ML
5 INJECTION, SOLUTION INTRAVENOUS
Status: DISCONTINUED | OUTPATIENT
Start: 2024-05-18 | End: 2024-05-18 | Stop reason: HOSPADM

## 2024-05-18 RX ORDER — HYDROMORPHONE HYDROCHLORIDE 1 MG/ML
1 INJECTION, SOLUTION INTRAMUSCULAR; INTRAVENOUS; SUBCUTANEOUS EVERY 4 HOURS PRN
Status: DISCONTINUED | OUTPATIENT
Start: 2024-05-18 | End: 2024-05-19 | Stop reason: HOSPADM

## 2024-05-18 RX ORDER — PANTOPRAZOLE SODIUM 40 MG/10ML
40 INJECTION, POWDER, LYOPHILIZED, FOR SOLUTION INTRAVENOUS ONCE
Status: COMPLETED | OUTPATIENT
Start: 2024-05-18 | End: 2024-05-18

## 2024-05-18 RX ORDER — HYDROMORPHONE HYDROCHLORIDE 1 MG/ML
0.4 INJECTION, SOLUTION INTRAMUSCULAR; INTRAVENOUS; SUBCUTANEOUS
Status: DISCONTINUED | OUTPATIENT
Start: 2024-05-18 | End: 2024-05-18 | Stop reason: HOSPADM

## 2024-05-18 RX ORDER — ONDANSETRON 4 MG/1
4 TABLET, ORALLY DISINTEGRATING ORAL EVERY 4 HOURS PRN
Status: DISCONTINUED | OUTPATIENT
Start: 2024-05-18 | End: 2024-05-19 | Stop reason: HOSPADM

## 2024-05-18 RX ORDER — PROMETHAZINE HYDROCHLORIDE 25 MG/1
12.5-25 TABLET ORAL EVERY 4 HOURS PRN
Status: DISCONTINUED | OUTPATIENT
Start: 2024-05-18 | End: 2024-05-19 | Stop reason: HOSPADM

## 2024-05-18 RX ORDER — HYDRALAZINE HYDROCHLORIDE 20 MG/ML
5 INJECTION INTRAMUSCULAR; INTRAVENOUS
Status: DISCONTINUED | OUTPATIENT
Start: 2024-05-18 | End: 2024-05-18 | Stop reason: HOSPADM

## 2024-05-18 RX ORDER — HYDROMORPHONE HYDROCHLORIDE 1 MG/ML
0.2 INJECTION, SOLUTION INTRAMUSCULAR; INTRAVENOUS; SUBCUTANEOUS
Status: DISCONTINUED | OUTPATIENT
Start: 2024-05-18 | End: 2024-05-18 | Stop reason: HOSPADM

## 2024-05-18 RX ORDER — ONDANSETRON 2 MG/ML
4 INJECTION INTRAMUSCULAR; INTRAVENOUS EVERY 4 HOURS PRN
Status: DISCONTINUED | OUTPATIENT
Start: 2024-05-18 | End: 2024-05-19 | Stop reason: HOSPADM

## 2024-05-18 RX ORDER — OXYCODONE HCL 5 MG/5 ML
10 SOLUTION, ORAL ORAL
Status: COMPLETED | OUTPATIENT
Start: 2024-05-18 | End: 2024-05-18

## 2024-05-18 RX ORDER — ONDANSETRON 2 MG/ML
4 INJECTION INTRAMUSCULAR; INTRAVENOUS
Status: DISCONTINUED | OUTPATIENT
Start: 2024-05-18 | End: 2024-05-18 | Stop reason: HOSPADM

## 2024-05-18 RX ORDER — GABAPENTIN 300 MG/1
300 CAPSULE ORAL 3 TIMES DAILY
Status: DISCONTINUED | OUTPATIENT
Start: 2024-05-18 | End: 2024-05-19 | Stop reason: HOSPADM

## 2024-05-18 RX ORDER — GABAPENTIN 300 MG/1
300 CAPSULE ORAL 3 TIMES DAILY
COMMUNITY
Start: 2024-04-19

## 2024-05-18 RX ORDER — OXYCODONE HYDROCHLORIDE 5 MG/1
2.5 TABLET ORAL
Status: DISCONTINUED | OUTPATIENT
Start: 2024-05-18 | End: 2024-05-18

## 2024-05-18 RX ORDER — ONDANSETRON 2 MG/ML
4 INJECTION INTRAMUSCULAR; INTRAVENOUS ONCE
Status: COMPLETED | OUTPATIENT
Start: 2024-05-18 | End: 2024-05-18

## 2024-05-18 RX ORDER — LOSARTAN POTASSIUM 50 MG/1
50 TABLET ORAL DAILY
Status: DISCONTINUED | OUTPATIENT
Start: 2024-05-18 | End: 2024-05-19 | Stop reason: HOSPADM

## 2024-05-18 RX ORDER — DIPHENHYDRAMINE HYDROCHLORIDE 50 MG/ML
12.5 INJECTION INTRAMUSCULAR; INTRAVENOUS
Status: DISCONTINUED | OUTPATIENT
Start: 2024-05-18 | End: 2024-05-18 | Stop reason: HOSPADM

## 2024-05-18 RX ORDER — LIDOCAINE HYDROCHLORIDE 20 MG/ML
INJECTION, SOLUTION EPIDURAL; INFILTRATION; INTRACAUDAL; PERINEURAL PRN
Status: DISCONTINUED | OUTPATIENT
Start: 2024-05-18 | End: 2024-05-18 | Stop reason: SURG

## 2024-05-18 RX ORDER — SODIUM CHLORIDE 9 MG/ML
INJECTION, SOLUTION INTRAVENOUS CONTINUOUS
Status: DISCONTINUED | OUTPATIENT
Start: 2024-05-18 | End: 2024-05-19 | Stop reason: HOSPADM

## 2024-05-18 RX ORDER — PANTOPRAZOLE SODIUM 40 MG/10ML
40 INJECTION, POWDER, LYOPHILIZED, FOR SOLUTION INTRAVENOUS 2 TIMES DAILY
Status: DISCONTINUED | OUTPATIENT
Start: 2024-05-18 | End: 2024-05-19 | Stop reason: HOSPADM

## 2024-05-18 RX ORDER — ACETAMINOPHEN 325 MG/1
650 TABLET ORAL EVERY 6 HOURS PRN
Status: DISCONTINUED | OUTPATIENT
Start: 2024-05-18 | End: 2024-05-19 | Stop reason: HOSPADM

## 2024-05-18 RX ORDER — OXYCODONE HCL 5 MG/5 ML
5 SOLUTION, ORAL ORAL
Status: COMPLETED | OUTPATIENT
Start: 2024-05-18 | End: 2024-05-18

## 2024-05-18 RX ORDER — EPHEDRINE SULFATE 50 MG/ML
5 INJECTION, SOLUTION INTRAVENOUS
Status: DISCONTINUED | OUTPATIENT
Start: 2024-05-18 | End: 2024-05-18 | Stop reason: HOSPADM

## 2024-05-18 RX ORDER — LIDOCAINE HYDROCHLORIDE AND EPINEPHRINE BITARTRATE 20; .01 MG/ML; MG/ML
10 INJECTION, SOLUTION SUBCUTANEOUS ONCE
Status: COMPLETED | OUTPATIENT
Start: 2024-05-18 | End: 2024-05-18

## 2024-05-18 RX ORDER — HYDROMORPHONE HYDROCHLORIDE 1 MG/ML
0.25 INJECTION, SOLUTION INTRAMUSCULAR; INTRAVENOUS; SUBCUTANEOUS
Status: DISCONTINUED | OUTPATIENT
Start: 2024-05-18 | End: 2024-05-18

## 2024-05-18 RX ORDER — PROCHLORPERAZINE EDISYLATE 5 MG/ML
5-10 INJECTION INTRAMUSCULAR; INTRAVENOUS EVERY 4 HOURS PRN
Status: DISCONTINUED | OUTPATIENT
Start: 2024-05-18 | End: 2024-05-19 | Stop reason: HOSPADM

## 2024-05-18 RX ORDER — AMOXICILLIN AND CLAVULANATE POTASSIUM 875; 125 MG/1; MG/1
1 TABLET, FILM COATED ORAL EVERY 12 HOURS
Qty: 4 TABLET | Refills: 0 | Status: DISCONTINUED | OUTPATIENT
Start: 2024-05-18 | End: 2024-05-19 | Stop reason: HOSPADM

## 2024-05-18 RX ORDER — SODIUM CHLORIDE, SODIUM LACTATE, POTASSIUM CHLORIDE, CALCIUM CHLORIDE 600; 310; 30; 20 MG/100ML; MG/100ML; MG/100ML; MG/100ML
INJECTION, SOLUTION INTRAVENOUS CONTINUOUS
Status: DISCONTINUED | OUTPATIENT
Start: 2024-05-18 | End: 2024-05-18 | Stop reason: HOSPADM

## 2024-05-18 RX ORDER — OXYCODONE HYDROCHLORIDE 5 MG/1
5 TABLET ORAL
Status: DISCONTINUED | OUTPATIENT
Start: 2024-05-18 | End: 2024-05-18

## 2024-05-18 RX ORDER — OXYCODONE HYDROCHLORIDE 10 MG/1
10 TABLET ORAL EVERY 4 HOURS PRN
Status: DISCONTINUED | OUTPATIENT
Start: 2024-05-18 | End: 2024-05-19 | Stop reason: HOSPADM

## 2024-05-18 RX ORDER — HALOPERIDOL 5 MG/ML
1 INJECTION INTRAMUSCULAR
Status: DISCONTINUED | OUTPATIENT
Start: 2024-05-18 | End: 2024-05-18 | Stop reason: HOSPADM

## 2024-05-18 RX ORDER — IPRATROPIUM BROMIDE AND ALBUTEROL SULFATE 2.5; .5 MG/3ML; MG/3ML
3 SOLUTION RESPIRATORY (INHALATION)
Status: DISCONTINUED | OUTPATIENT
Start: 2024-05-18 | End: 2024-05-18 | Stop reason: HOSPADM

## 2024-05-18 RX ORDER — HYDROMORPHONE HYDROCHLORIDE 1 MG/ML
0.1 INJECTION, SOLUTION INTRAMUSCULAR; INTRAVENOUS; SUBCUTANEOUS
Status: DISCONTINUED | OUTPATIENT
Start: 2024-05-18 | End: 2024-05-18 | Stop reason: HOSPADM

## 2024-05-18 RX ORDER — HYDROMORPHONE HYDROCHLORIDE 1 MG/ML
1 INJECTION, SOLUTION INTRAMUSCULAR; INTRAVENOUS; SUBCUTANEOUS ONCE
Status: COMPLETED | OUTPATIENT
Start: 2024-05-18 | End: 2024-05-18

## 2024-05-18 RX ORDER — SODIUM CHLORIDE, SODIUM LACTATE, POTASSIUM CHLORIDE, CALCIUM CHLORIDE 600; 310; 30; 20 MG/100ML; MG/100ML; MG/100ML; MG/100ML
INJECTION, SOLUTION INTRAVENOUS
Status: DISCONTINUED | OUTPATIENT
Start: 2024-05-18 | End: 2024-05-18 | Stop reason: SURG

## 2024-05-18 RX ORDER — PROMETHAZINE HYDROCHLORIDE 25 MG/1
12.5-25 SUPPOSITORY RECTAL EVERY 4 HOURS PRN
Status: DISCONTINUED | OUTPATIENT
Start: 2024-05-18 | End: 2024-05-19 | Stop reason: HOSPADM

## 2024-05-18 RX ORDER — BACLOFEN 10 MG/1
10 TABLET ORAL 3 TIMES DAILY PRN
Status: DISCONTINUED | OUTPATIENT
Start: 2024-05-18 | End: 2024-05-19 | Stop reason: HOSPADM

## 2024-05-18 RX ORDER — MEPERIDINE HYDROCHLORIDE 25 MG/ML
12.5 INJECTION INTRAMUSCULAR; INTRAVENOUS; SUBCUTANEOUS
Status: DISCONTINUED | OUTPATIENT
Start: 2024-05-18 | End: 2024-05-18 | Stop reason: HOSPADM

## 2024-05-18 RX ADMIN — PROPOFOL 50 MG: 10 INJECTION, EMULSION INTRAVENOUS at 16:21

## 2024-05-18 RX ADMIN — PROPOFOL 50 MG: 10 INJECTION, EMULSION INTRAVENOUS at 16:19

## 2024-05-18 RX ADMIN — SODIUM CHLORIDE: 9 INJECTION, SOLUTION INTRAVENOUS at 09:58

## 2024-05-18 RX ADMIN — LIDOCAINE HYDROCHLORIDE,EPINEPHRINE BITARTRATE 10 ML: 20; .01 INJECTION, SOLUTION INFILTRATION; PERINEURAL at 08:11

## 2024-05-18 RX ADMIN — PANTOPRAZOLE SODIUM 40 MG: 40 INJECTION, POWDER, FOR SOLUTION INTRAVENOUS at 17:53

## 2024-05-18 RX ADMIN — OXYCODONE HYDROCHLORIDE 10 MG: 5 SOLUTION ORAL at 16:47

## 2024-05-18 RX ADMIN — HYDROMORPHONE HYDROCHLORIDE 0.4 MG: 1 INJECTION, SOLUTION INTRAMUSCULAR; INTRAVENOUS; SUBCUTANEOUS at 17:02

## 2024-05-18 RX ADMIN — AMOXICILLIN AND CLAVULANATE POTASSIUM 1 TABLET: 875; 125 TABLET, FILM COATED ORAL at 09:49

## 2024-05-18 RX ADMIN — SODIUM CHLORIDE, POTASSIUM CHLORIDE, SODIUM LACTATE AND CALCIUM CHLORIDE: 600; 310; 30; 20 INJECTION, SOLUTION INTRAVENOUS at 16:12

## 2024-05-18 RX ADMIN — HYDROMORPHONE HYDROCHLORIDE 0.2 MG: 1 INJECTION, SOLUTION INTRAMUSCULAR; INTRAVENOUS; SUBCUTANEOUS at 17:07

## 2024-05-18 RX ADMIN — OXYCODONE HYDROCHLORIDE 10 MG: 10 TABLET ORAL at 10:32

## 2024-05-18 RX ADMIN — GABAPENTIN 300 MG: 300 CAPSULE ORAL at 12:04

## 2024-05-18 RX ADMIN — FENTANYL CITRATE 50 MCG: 50 INJECTION, SOLUTION INTRAMUSCULAR; INTRAVENOUS at 16:47

## 2024-05-18 RX ADMIN — ONDANSETRON 4 MG: 2 INJECTION INTRAMUSCULAR; INTRAVENOUS at 09:49

## 2024-05-18 RX ADMIN — GABAPENTIN 300 MG: 300 CAPSULE ORAL at 17:53

## 2024-05-18 RX ADMIN — HYDROMORPHONE HYDROCHLORIDE 1 MG: 1 INJECTION, SOLUTION INTRAMUSCULAR; INTRAVENOUS; SUBCUTANEOUS at 09:41

## 2024-05-18 RX ADMIN — OXYCODONE HYDROCHLORIDE 10 MG: 10 TABLET ORAL at 20:21

## 2024-05-18 RX ADMIN — ONDANSETRON 4 MG: 2 INJECTION INTRAMUSCULAR; INTRAVENOUS at 07:26

## 2024-05-18 RX ADMIN — HYDROMORPHONE HYDROCHLORIDE 0.4 MG: 1 INJECTION, SOLUTION INTRAMUSCULAR; INTRAVENOUS; SUBCUTANEOUS at 16:57

## 2024-05-18 RX ADMIN — PROPOFOL 50 MG: 10 INJECTION, EMULSION INTRAVENOUS at 16:15

## 2024-05-18 RX ADMIN — HYDROMORPHONE HYDROCHLORIDE 1 MG: 1 INJECTION, SOLUTION INTRAMUSCULAR; INTRAVENOUS; SUBCUTANEOUS at 22:19

## 2024-05-18 RX ADMIN — HYDROMORPHONE HYDROCHLORIDE 0.4 MG: 1 INJECTION, SOLUTION INTRAMUSCULAR; INTRAVENOUS; SUBCUTANEOUS at 17:10

## 2024-05-18 RX ADMIN — PANTOPRAZOLE SODIUM 40 MG: 40 INJECTION, POWDER, FOR SOLUTION INTRAVENOUS at 07:26

## 2024-05-18 RX ADMIN — METOPROLOL TARTRATE 25 MG: 25 TABLET, FILM COATED ORAL at 17:52

## 2024-05-18 RX ADMIN — PROPOFOL 50 MG: 10 INJECTION, EMULSION INTRAVENOUS at 16:23

## 2024-05-18 RX ADMIN — SODIUM CHLORIDE: 9 INJECTION, SOLUTION INTRAVENOUS at 18:00

## 2024-05-18 RX ADMIN — HYDROMORPHONE HYDROCHLORIDE 1 MG: 1 INJECTION, SOLUTION INTRAMUSCULAR; INTRAVENOUS; SUBCUTANEOUS at 07:26

## 2024-05-18 RX ADMIN — BACLOFEN 10 MG: 10 TABLET ORAL at 12:04

## 2024-05-18 RX ADMIN — AMOXICILLIN AND CLAVULANATE POTASSIUM 1 TABLET: 875; 125 TABLET, FILM COATED ORAL at 17:52

## 2024-05-18 RX ADMIN — HYDROMORPHONE HYDROCHLORIDE 0.4 MG: 1 INJECTION, SOLUTION INTRAMUSCULAR; INTRAVENOUS; SUBCUTANEOUS at 17:16

## 2024-05-18 RX ADMIN — PROPOFOL 50 MG: 10 INJECTION, EMULSION INTRAVENOUS at 16:17

## 2024-05-18 RX ADMIN — LIDOCAINE HYDROCHLORIDE 100 MG: 20 INJECTION, SOLUTION EPIDURAL; INFILTRATION; INTRACAUDAL at 16:14

## 2024-05-18 RX ADMIN — PROPOFOL 50 MG: 10 INJECTION, EMULSION INTRAVENOUS at 16:14

## 2024-05-18 RX ADMIN — FENTANYL CITRATE 50 MCG: 50 INJECTION, SOLUTION INTRAMUSCULAR; INTRAVENOUS at 16:55

## 2024-05-18 RX ADMIN — HYDROMORPHONE HYDROCHLORIDE 0.2 MG: 1 INJECTION, SOLUTION INTRAMUSCULAR; INTRAVENOUS; SUBCUTANEOUS at 17:22

## 2024-05-18 RX ADMIN — LOSARTAN POTASSIUM 50 MG: 50 TABLET, FILM COATED ORAL at 09:49

## 2024-05-18 SDOH — ECONOMIC STABILITY: TRANSPORTATION INSECURITY
IN THE PAST 12 MONTHS, HAS LACK OF RELIABLE TRANSPORTATION KEPT YOU FROM MEDICAL APPOINTMENTS, MEETINGS, WORK OR FROM GETTING THINGS NEEDED FOR DAILY LIVING?: PATIENT DECLINED

## 2024-05-18 SDOH — ECONOMIC STABILITY: TRANSPORTATION INSECURITY
IN THE PAST 12 MONTHS, HAS THE LACK OF TRANSPORTATION KEPT YOU FROM MEDICAL APPOINTMENTS OR FROM GETTING MEDICATIONS?: PATIENT DECLINED

## 2024-05-18 ASSESSMENT — ENCOUNTER SYMPTOMS
FEVER: 0
SHORTNESS OF BREATH: 0
BACK PAIN: 0
COUGH: 0
NAUSEA: 1
VOMITING: 0
HEADACHES: 0
DIZZINESS: 0
BLURRED VISION: 0
INSOMNIA: 0
ABDOMINAL PAIN: 1
EYE PAIN: 0
CHILLS: 0
PALPITATIONS: 0

## 2024-05-18 ASSESSMENT — LIFESTYLE VARIABLES
TOTAL SCORE: 0
EVER FELT BAD OR GUILTY ABOUT YOUR DRINKING: NO
ALCOHOL_USE: NO
TOTAL SCORE: 0
ON A TYPICAL DAY WHEN YOU DRINK ALCOHOL HOW MANY DRINKS DO YOU HAVE: 0
HAVE PEOPLE ANNOYED YOU BY CRITICIZING YOUR DRINKING: NO
HOW MANY TIMES IN THE PAST YEAR HAVE YOU HAD 5 OR MORE DRINKS IN A DAY: 0
DOES PATIENT WANT TO STOP DRINKING: NO
HAVE YOU EVER FELT YOU SHOULD CUT DOWN ON YOUR DRINKING: NO
CONSUMPTION TOTAL: NEGATIVE
EVER HAD A DRINK FIRST THING IN THE MORNING TO STEADY YOUR NERVES TO GET RID OF A HANGOVER: NO
SUBSTANCE_ABUSE: 0
TOTAL SCORE: 0
AVERAGE NUMBER OF DAYS PER WEEK YOU HAVE A DRINK CONTAINING ALCOHOL: 0

## 2024-05-18 ASSESSMENT — SOCIAL DETERMINANTS OF HEALTH (SDOH)
WITHIN THE LAST YEAR, HAVE YOU BEEN AFRAID OF YOUR PARTNER OR EX-PARTNER?: PATIENT DECLINED
WITHIN THE LAST YEAR, HAVE YOU BEEN KICKED, HIT, SLAPPED, OR OTHERWISE PHYSICALLY HURT BY YOUR PARTNER OR EX-PARTNER?: PATIENT DECLINED
WITHIN THE LAST YEAR, HAVE TO BEEN RAPED OR FORCED TO HAVE ANY KIND OF SEXUAL ACTIVITY BY YOUR PARTNER OR EX-PARTNER?: PATIENT DECLINED
WITHIN THE PAST 12 MONTHS, THE FOOD YOU BOUGHT JUST DIDN'T LAST AND YOU DIDN'T HAVE MONEY TO GET MORE: PATIENT DECLINED
WITHIN THE LAST YEAR, HAVE YOU BEEN HUMILIATED OR EMOTIONALLY ABUSED IN OTHER WAYS BY YOUR PARTNER OR EX-PARTNER?: PATIENT DECLINED
WITHIN THE PAST 12 MONTHS, YOU WORRIED THAT YOUR FOOD WOULD RUN OUT BEFORE YOU GOT THE MONEY TO BUY MORE: PATIENT DECLINED
IN THE PAST 12 MONTHS, HAS THE ELECTRIC, GAS, OIL, OR WATER COMPANY THREATENED TO SHUT OFF SERVICE IN YOUR HOME?: PATIENT DECLINED

## 2024-05-18 ASSESSMENT — FIBROSIS 4 INDEX
FIB4 SCORE: 0.36
FIB4 SCORE: 0.58

## 2024-05-18 ASSESSMENT — PAIN DESCRIPTION - PAIN TYPE
TYPE: ACUTE PAIN
TYPE: SURGICAL PAIN
TYPE: ACUTE PAIN
TYPE: SURGICAL PAIN
TYPE: ACUTE PAIN
TYPE: ACUTE PAIN
TYPE: SURGICAL PAIN
TYPE: SURGICAL PAIN

## 2024-05-18 ASSESSMENT — PAIN SCALES - GENERAL: PAIN_LEVEL: 2

## 2024-05-18 NOTE — ANESTHESIA POSTPROCEDURE EVALUATION
Patient: Roberto Braswell    Procedure Summary       Date: 05/18/24 Room / Location: [facility] OR 06 / SURGERY [facility]    Anesthesia Start: 1612 Anesthesia Stop: 1637    Procedures:       GASTROSCOPY (Esophagus)      EGD, WITH CAUTERIZATION (Esophagus)      GASTROSCOPY, WITH BIOPSY (Esophagus) Diagnosis: (eosinophilic esophagitis)    Surgeons: Kaitlyn Ontiveros M.D. Responsible Provider: Cesar Winn M.D.    Anesthesia Type: MAC ASA Status: 3 - Emergent            Final Anesthesia Type: MAC  Last vitals  BP   Blood Pressure: 137/73    Temp   36.6 °C (97.8 °F)    Pulse   84   Resp   16    SpO2   97 %      Anesthesia Post Evaluation    Patient location during evaluation: PACU  Patient participation: complete - patient participated  Level of consciousness: awake  Pain score: 2    Airway patency: patent  Anesthetic complications: no  Cardiovascular status: hemodynamically stable  Respiratory status: acceptable  Hydration status: acceptable    PONV: none          No notable events documented.     Nurse Pain Score: 9 (NPRS)

## 2024-05-18 NOTE — ED NOTES
Pt ambulated to  red 8 from the lobby with a steady gait, changed into gown, monitors on. This RN agrees with triage note. Chart up for ERP.

## 2024-05-18 NOTE — H&P
Hospital Medicine History & Physical Note    Date of Service  5/18/2024    Primary Care Physician  Sal Schafer D.O.    Consultants  GI      Code Status  Full Code    Chief Complaint  Chief Complaint   Patient presents with    Abdominal Pain     Since 0400, RUQ and epigastric abdominal pain. Reports black tarry stools since yesterday, hx of GI bleeds and states this is similar to last time. Required surgery for GI bleed. Taken off blood thinners 3 weeks ago after a mitral valve repair. (+) nausea. Per Dad pt looks pale.        History of Presenting Illness  Roberto Braswell is a 43 y.o. male who presented 5/18/2024 with abdominal pain. Patient with history of mitral regurgitation s/p MVR, duodenal ulcer s/p empiric GDA coiling embolization, who presents with epigastric abdominal pain for one day. Patient reports upper mid abdominal pain since yesterday, severe quality, non radiating, similar to previous duodenal ulcer pain, associated with melena yesterday. Patient also with nausea, denies vomiting. He completed 3 months of post MVR anticoagulation with warfarin, only takes baby aspirin at home now for past few weeks. Denies taking OTC NSAIDs. Denies chest pain, dyspnea. Patient recently in the ED last week for forearm pain and nodularity, found to have abscess and discharged home on oral antibiotics, US negative for DVT at that time. He has been compliant with augmentin.  In the ED, /111. Hgb 9.9 (previously 13.5), MCV 76. CMP unremarkable. Patient given PPI. GI consulted by ERP. ERP has also performed bedside incision and drainage of forearm abscess with gauze packing. Patient is admitted for suspected upper GI bleed and anemia.    I discussed the plan of care with patient and family.    Review of Systems  Review of Systems   Constitutional:  Negative for chills and fever.   Eyes:  Negative for blurred vision and pain.   Respiratory:  Negative for cough and shortness of breath.    Cardiovascular:   Negative for chest pain, palpitations and leg swelling.   Gastrointestinal:  Positive for abdominal pain, melena and nausea. Negative for vomiting.   Genitourinary:  Negative for dysuria and urgency.   Musculoskeletal:  Negative for back pain.   Skin:  Negative for itching and rash.   Neurological:  Negative for dizziness and headaches.   Psychiatric/Behavioral:  Negative for substance abuse. The patient does not have insomnia.        Past Medical History   has a past medical history of Arthritis, Breath shortness (11/03/2023), Drug-seeking behavior (07/30/2023), Duodenal ulcer (07/30/2023), Gastric ulcer, Heart valve disease, Hemorrhagic disorder (HCC), Hypertension, Pain, and Seizure (HCC).    Surgical History   has a past surgical history that includes other orthopedic surgery; other abdominal surgery; ankle arthroscopy (Right, 5/21/2018); biopsy ortho (Right, 5/21/2018); pr upper gi endoscopy,diagnosis (3/28/2022); pr upper gi endoscopy,biopsy (3/28/2022); pr upper gi endoscopy,biopsy (N/A, 7/30/2023); pr upper gi endoscopy,diagnosis (N/A, 9/14/2023); pr upper gi endoscopy,biopsy (N/A, 9/14/2023); pr replacement of mitral valve (1/25/2024); and echocardiogram, transesophageal, intraoperative (1/25/2024).     Family History  family history includes Heart Disease in his mother; No Known Problems in his father.   Family history reviewed with patient. There is no family history that is pertinent to the chief complaint.     Social History   reports that he has never smoked. He has never used smokeless tobacco. He reports that he does not currently use alcohol. He reports that he does not use drugs.    Allergies  Allergies   Allergen Reactions    Morphine Vomiting    Tramadol Unspecified     Seizure  [id number]       Medications  Prior to Admission Medications   Prescriptions Last Dose Informant Patient Reported? Taking?   Oxycodone HCl 20 MG Tab 5/18/2024 at AM Patient Yes Yes   Sig: Take 20 mg by mouth 3 times a day  as needed (Pain).   acetaminophen (TYLENOL) 500 MG Tab PRN at PRN Patient Yes No   Sig: Take 500-1,000 mg by mouth every 8 hours as needed. Indications: Pain   amoxicillin-clavulanate (AUGMENTIN) 875-125 MG Tab 5/17/2024 at PM Patient No Yes   Sig: Take 1 Tablet by mouth 2 times a day for 7 days.   aspirin 81 MG EC tablet 5/17/2024 at PM Patient Yes Yes   Sig: Take 81 mg by mouth every day.   baclofen (LIORESAL) 10 MG Tab 5/18/2024 at AM Patient Yes Yes   Sig: Take 10 mg by mouth 3 times a day as needed. Indications: Muscle Spasm   gabapentin (NEURONTIN) 300 MG Cap 5/18/2024 at AM Patient Yes Yes   Sig: Take 300 mg by mouth 3 times a day.   losartan (COZAAR) 50 MG Tab > 1 WEEK at UNK Patient No No   Sig: Take 1 Tablet by mouth every day.   metoprolol tartrate (LOPRESSOR) 25 MG Tab 5/18/2024 at AM Patient No Yes   Sig: Take 1 Tablet by mouth 2 times a day.   omeprazole (PRILOSEC) 20 MG delayed-release capsule 5/18/2024 at AM Patient Yes Yes   Sig: Take 20 mg by mouth every day.   sucralfate (CARAFATE) 1 GM Tab 5/18/2024 at AM Patient Yes Yes   Sig: Take 1 g by mouth 3 times a day before meals.      Facility-Administered Medications: None       Physical Exam  Temp:  [36.2 °C (97.1 °F)-36.5 °C (97.7 °F)] 36.5 °C (97.7 °F)  Pulse:  [80-98] 87  Resp:  [16-20] 20  BP: ()/() 144/79  SpO2:  [96 %-97 %] 96 %  Blood Pressure: 120/88   Temperature: 36.2 °C (97.1 °F)   Pulse: 80   Respiration: 16   Pulse Oximetry: 97 %       Physical Exam  Constitutional:       General: He is not in acute distress.     Appearance: He is not ill-appearing.   HENT:      Head: Normocephalic and atraumatic.      Right Ear: External ear normal.      Left Ear: External ear normal.      Mouth/Throat:      Pharynx: No oropharyngeal exudate or posterior oropharyngeal erythema.   Eyes:      Extraocular Movements: Extraocular movements intact.      Pupils: Pupils are equal, round, and reactive to light.   Cardiovascular:      Rate and Rhythm:  "Normal rate and regular rhythm.      Pulses: Normal pulses.      Heart sounds: Murmur heard.   Pulmonary:      Effort: Pulmonary effort is normal. No respiratory distress.      Breath sounds: Normal breath sounds. No wheezing.   Abdominal:      General: Bowel sounds are normal. There is no distension.      Palpations: Abdomen is soft.      Tenderness: There is abdominal tenderness. There is no guarding or rebound.   Musculoskeletal:         General: No swelling or tenderness.      Cervical back: Normal range of motion and neck supple.      Right lower leg: No edema.      Left lower leg: No edema.   Skin:     General: Skin is warm and dry.   Neurological:      General: No focal deficit present.      Mental Status: He is oriented to person, place, and time.      Cranial Nerves: No cranial nerve deficit.      Sensory: No sensory deficit.      Motor: No weakness.   Psychiatric:         Mood and Affect: Mood normal.         Behavior: Behavior normal.         Laboratory:  Recent Labs     05/18/24  0617   WBC 4.8   RBC 4.32*   HEMOGLOBIN 9.9*   HEMATOCRIT 33.1*   MCV 76.6*   MCH 22.9*   MCHC 29.9*   RDW 63.6*   PLATELETCT 346   MPV 8.8*     Recent Labs     05/18/24  0617   SODIUM 138   POTASSIUM 3.8   CHLORIDE 108   CO2 18*   GLUCOSE 81   BUN 26*   CREATININE 1.05   CALCIUM 8.4*     Recent Labs     05/18/24  0617   ALTSGPT 16   ASTSGOT 19   ALKPHOSPHAT 57   TBILIRUBIN <0.2   LIPASE 28   GLUCOSE 81     Recent Labs     05/18/24  0617   APTT 24.9   INR 0.98     No results for input(s): \"NTPROBNP\" in the last 72 hours.      No results for input(s): \"TROPONINT\" in the last 72 hours.    Imaging:  No orders to display           Assessment/Plan:  Justification for Admission Status  I anticipate this patient is appropriate for observation status at this time because treatment of anemia and upper GI bleed is expected to take <2 midnights.    Patient will need a Med/Surg bed on MEDICAL service .  The need is secondary to continued " monitoring and treatment of upper GI bleed.    * Upper GI bleed- (present on admission)  Assessment & Plan  Patient presents with one day of epigastric pain and melena  Hx of duodenal ulcer s/p GDA coiling embolization  Hx of mitral valve repair on baby aspirin  Hgb 9.9, previously 13.5 last week  GI consulted  Start PPI BID, IV fluids  NPO for now pending GI recommendations, anticipate EGD  Monitor H/H q8h, transfuse for Hgb<7    Duodenal ulcer- (present on admission)  Assessment & Plan  History of,  S/p GDA embolization during previous hospitalization  GI consulted given suspected upper GI bleed  Continue PPI    Abscess of forearm  Assessment & Plan  Recent ED visit last week for same  US negative for DVT at that time, discharged home on oral antibiotics  S/p I+D in ED on presentation 5/18  Wound consult  Continue oral augmentin    H/O mitral valve repair- (present on admission)  Assessment & Plan  S/p MVR January 2024  Completed 3 months warfarin postop, now on baby aspirin at home  Holding aspirin for now in setting of GI bleed, resume when appropriate    Hypertension- (present on admission)  Assessment & Plan  Continue home medications, monitor BP closely        VTE prophylaxis: SCDs/TEDs

## 2024-05-18 NOTE — PROGRESS NOTES
2 RN Skin Assessment Completed by Shanon RN and Luc RN.     Head: WDL  Ears: WDL  Nose: WDL  Mouth: WDL  Neck: WDL  Breasts/Chest: WDL  Shoulder Blades: WDL  Spine: WDL  (R) Arm/Elbow/hand: I&D site from ER. Wrapped in koban and gauze.  (L) Arm/Elbow/hand: WDL  Abdomen:WDL  Groin: WDL  Sacrum/Coccyx/Buttocks: blanching  (R) Leg: WDL  (L) Leg: WDL  (R) Heel/Foot/Toe: blanching  (L) Heel/Foot/Toe: blanching              Devices in place: BP Cuff and Pulse Ox     Interventions in place: Pillows     Possible skin injury found: No     Pictures uploaded into Epic: N/A  Wound Consult Placed: N/A

## 2024-05-18 NOTE — ED TRIAGE NOTES
"Chief Complaint   Patient presents with    Abdominal Pain     Since 0400, RUQ and epigastric abdominal pain. Reports black tarry stools since yesterday, hx of GI bleeds and states this is similar to last time. Required surgery for GI bleed. Taken off blood thinners 3 weeks ago after a mitral valve repair. (+) nausea. Per Dad pt looks pale.        Pt ambulatory to triage. Pt A&Ox4, room air.     Pt to phlebotomy . Pt educated on alerting staff in changes to condition. Pt verbalized understanding. Protocol ordered.     BP (!) 141/111   Pulse 98   Temp 36.2 °C (97.1 °F) (Temporal)   Resp 18   Ht 1.905 m (6' 3\")   Wt 109 kg (240 lb)   SpO2 96%   BMI 30.00 kg/m²     "

## 2024-05-18 NOTE — ANESTHESIA PREPROCEDURE EVALUATION
Case: [id number] Date/Time: 05/18/24 1700    Procedure: GASTROSCOPY    Location: [facility] OR 06 / SURGERY [facility]    Surgeons: Kaitlyn Ontiveros M.D.          42 yo M w/ melena and h/o duodenal ulcer, h/o MVR (1/2024)    Hgb 9.9 from 13.4    Relevant Problems   CARDIAC   (positive) Essential hypertension   (positive) Hypertension      GI   (positive) Duodenal ulcer       Physical Exam    Airway   Mallampati: III  TM distance: >3 FB  Neck ROM: full       Cardiovascular - normal exam  Rhythm: regular  Rate: normal  (-) murmur     Dental - normal exam           Pulmonary - normal exam  Breath sounds clear to auscultation     Abdominal    Neurological - normal exam                   Anesthesia Plan    ASA 3 (H/o mitral valve repair)- EMERGENT   ASA physical status 3 criteria: a thrombophilic disease requiring anticoagulationASA physical status emergent criteria: acute hemorrhage    Plan - MAC               Induction: intravenous    Postoperative Plan: Postoperative administration of opioids is intended.    Pertinent diagnostic labs and testing reviewed    Informed Consent:    Anesthetic plan and risks discussed with patient.    Use of blood products discussed with: patient whom consented to blood products.

## 2024-05-18 NOTE — PROGRESS NOTES
Pt arrived to unit via stretcher at 0845. Pt oriented to room, unit, and plan of care. All questions answered at this time. Call light within reach; fall precautions in place.

## 2024-05-18 NOTE — ASSESSMENT & PLAN NOTE
Recent ED visit last week for same  US negative for DVT at that time, discharged home on oral antibiotics  S/p I+D in ED on presentation 5/18  Wound consult  Continue oral augmentin

## 2024-05-18 NOTE — CARE PLAN
The patient is Watcher - Medium risk of patient condition declining or worsening    Shift Goals  Clinical Goals: pain control, EGD  Patient Goals: pain control, rest  Family Goals: JJ    Progress made toward(s) clinical / shift goals: Patient understands plan of care and hospitalization      Problem: Knowledge Deficit - Standard  Goal: Patient and family/care givers will demonstrate understanding of plan of care, disease process/condition, diagnostic tests and medications  Outcome: Progressing     Patient is not progressing towards the following goals:    Unable to meet pain goals, utilizing both pharm and non-pharm interventions      Problem: Pain - Standard  Goal: Alleviation of pain or a reduction in pain to the patient’s comfort goal  Outcome: Not Met

## 2024-05-18 NOTE — CONSULTS
GASTROENTEROLOGY CONSULTATION    PATIENT NAME: Roberto Braswell  : 1980  CSN: [id number]  MRN:  [id number]     CONSULTATION DATE:  2024    PRIMARY CARE PROVIDER:  Sal Schafer D.O.      REASON FOR CONSULT:  Melena  Consult requested by Dr. Pancho Lozano    HISTORY OF PRESENT ILLNESS:  Roberto Braswell is a 43 y.o. male who has had three EGD's previously for gastric and duodenal ulcers. He had one duodenal ulcer  23 and he was scoped by Dr. Dalton. EGD showed: Duodenal ulcer, not completely healed, some oozing at post side of duodenal sweep. We used transparent cap to find and investigate it. S/p biopsy. The location of this 1cm ulcer could possibly give the patient some partial obstruction symptoms. No hemostatic intervention needed He also had EGD  that showed the gastric ulcer.s This was done by Jayy Xiao.     He states that he has been taking his PPI and carafate daily since that time. He does not have symptoms in between his episodes and need for hospitalization. He has no acid reflux or pain. He states this morning around 4 am he woke with pain and has had 4 stools consistent with melena. He just had another one in the room. His hemoglobin was 13.4 and now is 9.9. Patient also with nausea, denies vomiting. He has his pain controlled by dilaudid that was just given. He is on PPI. His blood pressure has been labile.       Taken off blood thinners 3 weeks ago after a mitral valve repair. He does take a baby ASA a day    PAST MEDICAL HISTORY:  Past Medical History:   Diagnosis Date    Arthritis     Right ankle    Breath shortness 2023    With exertion.    Drug-seeking behavior 2023    Demanding narcotics for a duodenal ulcer 2023. Refused antacids or sucralfate.     Duodenal ulcer 2023    Gastric ulcer     Heart valve disease     Mitral valve prolapse    Hemorrhagic disorder (HCC)     GI bleed    Hypertension     Pain     Resolved; Right ankle    Seizure (HCC)     while  taking tramadol       PAST SURGICAL HISTORY:  Past Surgical History:   Procedure Laterality Date    IN REPLACEMENT OF MITRAL VALVE  1/25/2024    Procedure: MITRAL VALVE REPAIR, TRANSESOPHAGEAL ECHOCARDIOGRAM;  Surgeon: Bigg Santana M.D.;  Location: [facility];  Service: Cardiothoracic    ECHOCARDIOGRAM, TRANSESOPHAGEAL, INTRAOPERATIVE  1/25/2024    Procedure: ECHOCARDIOGRAM, TRANSESOPHAGEAL, INTRAOPERATIVE;  Surgeon: Bigg Santana M.D.;  Location: [facility];  Service: Cardiothoracic    IN UPPER GI ENDOSCOPY,DIAGNOSIS N/A 9/14/2023    Procedure: GASTROSCOPY;  Surgeon: Johnny Dalton M.D.;  Location: SURGERY SAME DAY [facility];  Service: Gastroenterology    IN UPPER GI ENDOSCOPY,BIOPSY N/A 9/14/2023    Procedure: GASTROSCOPY, WITH BIOPSY;  Surgeon: Johnny Dalton M.D.;  Location: SURGERY SAME DAY [facility];  Service: Gastroenterology    IN UPPER GI ENDOSCOPY,BIOPSY N/A 7/30/2023    Procedure: GASTROSCOPY, WITH BIOPSY;  Surgeon: Kumar Hope M.D.;  Location: [facility];  Service: Gastroenterology    IN UPPER GI ENDOSCOPY,DIAGNOSIS  3/28/2022    Procedure: GASTROSCOPY;  Surgeon: Paco Wiggins M.D.;  Location: SURGERY SAME DAY [facility];  Service: Gastroenterology    IN UPPER GI ENDOSCOPY,BIOPSY  3/28/2022    Procedure: GASTROSCOPY, WITH BIOPSY;  Surgeon: Paco Wiggins M.D.;  Location: SURGERY SAME DAY [facility];  Service: Gastroenterology    ANKLE ARTHROSCOPY Right 5/21/2018    Procedure: ANKLE ARTHROSCOPY, LATERAL LIGAMENT RECONSTRUCTION;  Surgeon: Seth Cruz M.D.;  Location: [facility];  Service: Orthopedics    BIOPSY ORTHO Right 5/21/2018    Procedure: BIOPSY ORTHO/ FOR CARTILAGE AND DENOVO PROCEDURE;  Surgeon: Seth Cruz M.D.;  Location: [facility];  Service: Orthopedics    OTHER ABDOMINAL SURGERY      Cholecystectomy February 2017    OTHER ORTHOPEDIC SURGERY      2 previous ankle surgeries (2007, 2009)        CURRENT MEDS:  Current  Facility-Administered Medications   Medication Dose Route Frequency Provider Last Rate Last Admin    NS infusion   Intravenous Continuous Pancho Lozano M.D.        acetaminophen (Tylenol) tablet 650 mg  650 mg Oral Q6HRS PRN Pancho Lozano M.D.        Pharmacy Consult Request ...Pain Management Review 1 Each  1 Each Other PHARMACY TO DOSE Pancho Lozano M.D.        ondansetron (Zofran) syringe/vial injection 4 mg  4 mg Intravenous Q4HRS PRN Pancho Lozano M.D.        ondansetron (Zofran ODT) dispertab 4 mg  4 mg Oral Q4HRS PRN Pancho Lozano M.D.        promethazine (Phenergan) tablet 12.5-25 mg  12.5-25 mg Oral Q4HRS PRN Pancho Lozano M.D.        promethazine (Phenergan) suppository 12.5-25 mg  12.5-25 mg Rectal Q4HRS PRN Pancho Lozano M.D.        prochlorperazine (Compazine) injection 5-10 mg  5-10 mg Intravenous Q4HRS PRN Pancho Lozano M.D.        pantoprazole (Protonix) injection 40 mg  40 mg Intravenous BID Pancho Lozano M.D.        losartan (Cozaar) tablet 50 mg  50 mg Oral DAILY Pancho Lozano M.D.        metoprolol tartrate (Lopressor) tablet 25 mg  25 mg Oral BID Pancho Lozano M.D.        oxyCODONE immediate release (Roxicodone) tablet 10 mg  10 mg Oral Q4HRS PRN Pancho Lozano M.D.        HYDROmorphone (Dilaudid) injection 1 mg  1 mg Intravenous Q4HRS PRN Pancho Lozano M.D.        amoxicillin-clavulanate (Augmentin) 875-125 MG per tablet 1 Tablet  1 Tablet Oral Q12HRS Pancho Lozano M.D.         Current Outpatient Medications   Medication Sig Dispense Refill    gabapentin (NEURONTIN) 300 MG Cap Take 300 mg by mouth 3 times a day.      amoxicillin-clavulanate (AUGMENTIN) 875-125 MG Tab Take 1 Tablet by mouth 2 times a day for 7 days. 14 Tablet 0    aspirin 81 MG EC tablet Take 81 mg by mouth every day.      metoprolol tartrate (LOPRESSOR) 25 MG Tab Take 1 Tablet by mouth 2 times a day. 200 Tablet 3    baclofen (LIORESAL) 10 MG Tab Take 10 mg by mouth 3 times a day as needed. Indications: Muscle Spasm       Oxycodone HCl 20 MG Tab Take 20 mg by mouth 3 times a day as needed (Pain).      sucralfate (CARAFATE) 1 GM Tab Take 1 g by mouth 3 times a day before meals.      omeprazole (PRILOSEC) 20 MG delayed-release capsule Take 20 mg by mouth every day.      losartan (COZAAR) 50 MG Tab Take 1 Tablet by mouth every day. 100 Tablet 3    acetaminophen (TYLENOL) 500 MG Tab Take 500-1,000 mg by mouth every 8 hours as needed. Indications: Pain          ALLERGIES:  Allergies   Allergen Reactions    Morphine Vomiting    Tramadol Unspecified     Seizure  [id number]       SOCIAL HISTORY:  Social History     Socioeconomic History    Marital status:      Spouse name: Not on file    Number of children: Not on file    Years of education: Not on file    Highest education level: Associate degree: occupational, technical, or vocational program   Occupational History    Not on file   Tobacco Use    Smoking status: Never    Smokeless tobacco: Never   Vaping Use    Vaping status: Never Used   Substance and Sexual Activity    Alcohol use: Not Currently    Drug use: Never    Sexual activity: Not on file   Other Topics Concern    Not on file   Social History Narrative    Not on file     Social Determinants of Health     Financial Resource Strain: Low Risk  (9/26/2023)    Overall Financial Resource Strain (CARDIA)     Difficulty of Paying Living Expenses: Not hard at all   Food Insecurity: No Food Insecurity (9/26/2023)    Hunger Vital Sign     Worried About Running Out of Food in the Last Year: Never true     Ran Out of Food in the Last Year: Never true   Transportation Needs: No Transportation Needs (9/26/2023)    PRAPARE - Transportation     Lack of Transportation (Medical): No     Lack of Transportation (Non-Medical): No   Physical Activity: Sufficiently Active (9/26/2023)    Exercise Vital Sign     Days of Exercise per Week: 6 days     Minutes of Exercise per Session: 120 min   Stress: Stress Concern Present (9/26/2023)    [place]  "[place] [employer] Occup[employer] - Occupational Stress Questionnaire     Feeling of Stress : To some extent   Social Connections: Moderately Isolated (9/26/2023)    Social Connection and Isolation Panel [NHANES]     Frequency of Communication with Friends and Family: Once a week     Frequency of Social Gatherings with Friends and Family: Twice a week     Attends [employer] Services: Never     Active Member of Clubs or Organizations: No     Attends Club or Organization Meetings: Never     Marital Status:    Intimate Partner Violence: Not on file   Housing Stability: Low Risk  (9/26/2023)    Housing Stability Vital Sign     Unable to Pay for Housing in the Last Year: No     Number of Places Lived in the Last Year: 1     Unstable Housing in the Last Year: No       FAMILY HISTORY:  Family History   Problem Relation Age of Onset    Heart Disease Mother     No Known Problems Father     Clotting Disorder Neg Hx         REVIEW OF SYSTEMS:  General ROS: Negative for - chills, fever, night sweats or weight loss.  HEENT ROS: Negative  Respiratory ROS: Negative for - cough or shortness of breath.  Cardiovascular ROS:  Negative for - chest pain or palpitations.  Gastrointestinal ROS: As per the history of present illness.  Genito-Urinary ROS: Negative  Musculoskeletal ROS: Negative.  Neurological ROS: Negative  Skin ROS: negative  Hematology ROS: negative  Endocrinology ROS: Negative        PHYSICAL EXAM:  VITALS: /88   Pulse 80   Temp 36.2 °C (97.1 °F) (Temporal)   Resp 16   Ht 1.905 m (6' 3\")   Wt 109 kg (240 lb)   SpO2 97%   BMI 30.00 kg/m²   GEN:  Roberto Braswell is a 43 y.o. male in no acute distress.  HEENT: Mucous membranes pink and moist.  Sclera anicteric.    NECK:    Neck supple without lymphadenopathy or thyromegaly.  LUNGS: Clear to auscultation posteriorly.  HEART: Regular rate and rhythm. S1 and S2 normal. No murmurs, gallops  ABD:  + BS tenderness in epigastric area and RUQ  RECTAL: Not done " "at this time.  EXT:  Without cyanosis, deformity or pitting edema.  SKIN:  Pink, warm, dry.  NEURO: Grossly intact, A/OR.    LABS:  Recent Labs     05/18/24 0617   WBC 4.8   MCV 76.6*     Recent Labs     05/18/24 0617   GLUCOSE 81   BUN 26*   CO2 18*     Lab Results   Component Value Date    INR 0.98 05/18/2024    INR 1.30 (A) 05/02/2024    INR 7.40 (A) 04/29/2024     No components found for: \"ALT\", \"AST\", \"GGT\", \"ALKPHOS\"  No results found for: \"BILINEO\"      @LASTIMGCAT(CE4644)@     @LASTIMGCAT(BO5515)@       IMPRESSION/PLAN:  Melena  Anemia due to GI bleed - microcytic  History of bleeding duodenal ulcer and gastric ulcers  Abdominal pain - epigastric and RUQ  Nausea without vomiting    EGD  NPO   PPI  Trend h/h  Iron studies and replace if low  Discussed with Dr. Lozano.          Kaitlyn Ontiveros M.D.  Gastroenterology      "

## 2024-05-18 NOTE — ED NOTES
Med Rec completed per patient   Allergies reviewed    Patient started a 7 day course of Augmentin 5/13/2024     Patient is not taking anticoagulants

## 2024-05-18 NOTE — PROGRESS NOTES
Patient up to unit via gurney, ambulated from gurney to bed, breathing even and unlabored, bed locked and in lowest position

## 2024-05-18 NOTE — ANESTHESIA TIME REPORT
Anesthesia Start and Stop Event Times       Date Time Event    5/18/2024 1604 Ready for Procedure     1612 Anesthesia Start     1637 Anesthesia Stop          Responsible Staff  05/18/24      Name Role Begin End    Cesar Winn M.D. Anesth 1612 1637          Overtime Reason:  no overtime (within assigned shift)    Comments:

## 2024-05-18 NOTE — ASSESSMENT & PLAN NOTE
Patient presents with one day of epigastric pain and melena  Hx of duodenal ulcer s/p GDA coiling embolization  Hx of mitral valve repair on baby aspirin  Hgb 9.9, previously 13.5 last week  GI consulted  Start PPI BID, IV fluids  NPO for now pending GI recommendations, anticipate EGD  Monitor H/H q8h, transfuse for Hgb<7

## 2024-05-18 NOTE — ASSESSMENT & PLAN NOTE
History of,  S/p GDA embolization during previous hospitalization  GI consulted given suspected upper GI bleed  Continue PPI

## 2024-05-18 NOTE — ASSESSMENT & PLAN NOTE
S/p MVR January 2024  Completed 3 months warfarin postop, now on baby aspirin at home  Holding aspirin for now in setting of GI bleed, resume when appropriate

## 2024-05-18 NOTE — ED PROVIDER NOTES
ED Provider Note    CHIEF COMPLAINT  Chief Complaint   Patient presents with    Abdominal Pain     Since 0400, RUQ and epigastric abdominal pain. Reports black tarry stools since yesterday, hx of GI bleeds and states this is similar to last time. Required surgery for GI bleed. Taken off blood thinners 3 weeks ago after a mitral valve repair. (+) nausea. Per Dad pt looks pale.        HPI/ROS    Roberto Braswell is a 43 y.o. male who presents with abdominal pain.  The patient states that started around 4 AM.  He states he has severe epigastric pain with associated nausea and vomiting.  He states his stools have been black and tarry.  He states he has a history of a duodenal artery bleed that required banding in the past.  He states has been taking his Prilosec, sucralfate, and pain medication at home with no relief.  He has not had any associated fevers.    The patient also has an abscess to the right forearm.  He states that he had arm pain a couple of days ago and was seen here and is instructed return if he is not worse.    PAST MEDICAL HISTORY   has a past medical history of Arthritis, Breath shortness (11/03/2023), Drug-seeking behavior (07/30/2023), Duodenal ulcer (07/30/2023), Gastric ulcer, Heart valve disease, Hemorrhagic disorder (HCC), Hypertension, Pain, and Seizure (HCC).    SURGICAL HISTORY   has a past surgical history that includes other orthopedic surgery; other abdominal surgery; ankle arthroscopy (Right, 5/21/2018); biopsy ortho (Right, 5/21/2018); upper gi endoscopy,diagnosis (3/28/2022); upper gi endoscopy,biopsy (3/28/2022); upper gi endoscopy,biopsy (N/A, 7/30/2023); upper gi endoscopy,diagnosis (N/A, 9/14/2023); upper gi endoscopy,biopsy (N/A, 9/14/2023); replacement of mitral valve (1/25/2024); and echocardiogram, transesophageal, intraoperative (1/25/2024).    FAMILY HISTORY  Family History   Problem Relation Age of Onset    Heart Disease Mother     No Known Problems Father     Clotting  "Disorder Neg Hx        SOCIAL HISTORY  Social History     Tobacco Use    Smoking status: Never    Smokeless tobacco: Never   Vaping Use    Vaping status: Never Used   Substance and Sexual Activity    Alcohol use: Not Currently    Drug use: Never    Sexual activity: Not on file       CURRENT MEDICATIONS  Home Medications       Reviewed by Ellie Alcaraz R.N. (Registered Nurse) on 05/18/24 at 0606  Med List Status: Partial     Medication Last Dose Status   acetaminophen (TYLENOL) 500 MG Tab  Active   amoxicillin-clavulanate (AUGMENTIN) 500-125 MG Tab  Active   amoxicillin-clavulanate (AUGMENTIN) 875-125 MG Tab  Active   aspirin 81 MG EC tablet  Active   baclofen (LIORESAL) 10 MG Tab  Active   lidocaine-hyoscyamine-mag hydrox-al hydrox-simeth  Active   losartan (COZAAR) 50 MG Tab  Active   metoclopramide (REGLAN) 10 MG Tab  Active   metoprolol tartrate (LOPRESSOR) 25 MG Tab  Active   Naloxone (NARCAN) 4 MG/0.1ML Liquid  Active   omeprazole (PRILOSEC) 20 MG delayed-release capsule  Active   Oxycodone HCl 20 MG Tab  Active   prochlorperazine (COMPAZINE) 5 MG Tab  Active   sucralfate (CARAFATE) 1 GM Tab  Active                  Audit from Redirected Encounters    **Home medications have not yet been reviewed for this encounter**         ALLERGIES  Allergies   Allergen Reactions    Morphine Vomiting    Tramadol Unspecified     Seizure  WBP=9112       PHYSICAL EXAM  VITAL SIGNS: BP (!) 141/111   Pulse 98   Temp 36.2 °C (97.1 °F) (Temporal)   Resp 18   Ht 1.905 m (6' 3\")   Wt 109 kg (240 lb)   SpO2 96%   BMI 30.00 kg/m²    In general the patient appears ill    HEENT unremarkable    Pulmonary the patient's lungs are clear to auscultation bilaterally    Cardiovascular S1-S2 with a regular rate and rhythm    GI the patient is epigastric discomfort to deep palpation with no rebound or guarding    Extremities the patient also has a fluctuant abscess on the right forearm    Skin the abscess described " above    Neurovascular examination is grossly intact    EKG/LABS  Results for orders placed or performed during the hospital encounter of 05/18/24   COD (ADULT)   Result Value Ref Range    ABO Grouping Only A     Rh Grouping Only POS     Antibody Screen-Cod NEG    CBC WITH DIFFERENTIAL   Result Value Ref Range    WBC 4.8 4.8 - 10.8 K/uL    RBC 4.32 (L) 4.70 - 6.10 M/uL    Hemoglobin 9.9 (L) 14.0 - 18.0 g/dL    Hematocrit 33.1 (L) 42.0 - 52.0 %    MCV 76.6 (L) 81.4 - 97.8 fL    MCH 22.9 (L) 27.0 - 33.0 pg    MCHC 29.9 (L) 32.3 - 36.5 g/dL    RDW 63.6 (H) 35.9 - 50.0 fL    Platelet Count 346 164 - 446 K/uL    MPV 8.8 (L) 9.0 - 12.9 fL    Neutrophils-Polys 42.70 (L) 44.00 - 72.00 %    Lymphocytes 41.10 (H) 22.00 - 41.00 %    Monocytes 8.90 0.00 - 13.40 %    Eosinophils 5.40 0.00 - 6.90 %    Basophils 1.50 0.00 - 1.80 %    Immature Granulocytes 0.40 0.00 - 0.90 %    Nucleated RBC 0.00 0.00 - 0.20 /100 WBC    Neutrophils (Absolute) 2.06 1.82 - 7.42 K/uL    Lymphs (Absolute) 1.98 1.00 - 4.80 K/uL    Monos (Absolute) 0.43 0.00 - 0.85 K/uL    Eos (Absolute) 0.26 0.00 - 0.51 K/uL    Baso (Absolute) 0.07 0.00 - 0.12 K/uL    Immature Granulocytes (abs) 0.02 0.00 - 0.11 K/uL    NRBC (Absolute) 0.00 K/uL    Hypochromia 1+     Anisocytosis 1+     Microcytosis 2+ (A)    COMP METABOLIC PANEL   Result Value Ref Range    Sodium 138 135 - 145 mmol/L    Potassium 3.8 3.6 - 5.5 mmol/L    Chloride 108 96 - 112 mmol/L    Co2 18 (L) 20 - 33 mmol/L    Anion Gap 12.0 7.0 - 16.0    Glucose 81 65 - 99 mg/dL    Bun 26 (H) 8 - 22 mg/dL    Creatinine 1.05 0.50 - 1.40 mg/dL    Calcium 8.4 (L) 8.5 - 10.5 mg/dL    Correct Calcium 8.6 8.5 - 10.5 mg/dL    AST(SGOT) 19 12 - 45 U/L    ALT(SGPT) 16 2 - 50 U/L    Alkaline Phosphatase 57 30 - 99 U/L    Total Bilirubin <0.2 0.1 - 1.5 mg/dL    Albumin 3.8 3.2 - 4.9 g/dL    Total Protein 5.8 (L) 6.0 - 8.2 g/dL    Globulin 2.0 1.9 - 3.5 g/dL    A-G Ratio 1.9 g/dL   LIPASE   Result Value Ref Range    Lipase 28  11 - 82 U/L   PROTHROMBIN TIME   Result Value Ref Range    PT 13.1 12.0 - 14.6 sec    INR 0.98 0.87 - 1.13   APTT   Result Value Ref Range    APTT 24.9 24.7 - 36.0 sec   ESTIMATED GFR   Result Value Ref Range    GFR (CKD-EPI) 90 >60 mL/min/1.73 m 2   PLATELET ESTIMATE   Result Value Ref Range    Plt Estimation Normal    MORPHOLOGY   Result Value Ref Range    RBC Morphology Present     Ovalocytes 1+    PERIPHERAL SMEAR REVIEW   Result Value Ref Range    Peripheral Smear Review see below    DIFFERENTIAL COMMENT   Result Value Ref Range    Comments-Diff see below        I have independently interpreted this EKG    PROCEDURES incision and drainage of right forearm abscess    The abscess was initially cleansed with alcohol swab.  Subsequently anesthetized the abscess with approximately 6 cc of lidocaine with epinephrine.  I then used a #11 blade to open up the abscess cavity with a mild amount of drainage.  The cavity was then irrigated.  Subsequently packed the cavity with 1/4 inch gauze.      COURSE & MEDICAL DECISION MAKING    This a 43-year-old male who presents to the emergency department with abdominal discomfort and associated melanotic stool.  He does have a history of a duodenal bleed in the past and therefore he was treated aggressively with peripheral intravenous access and IV fluids.  He has had a significant drop in his hemoglobin from 13.5-9.9 consistent with a GI bleed.  He has not had any vomiting throughout his stay.  His hemodynamics have been stable.  The patient's metabolic panel does show slight acidosis and a slight elevation of BUN at 26 all consistent with upper GI bleed.  He did receive Protonix intravenously as well as Zofran.  Incidentally the patient also had an abscess to the right forearm that was drained without any difficulty please see my procedural note.  I did speak with GI for consultation and the patient be admitted to the hospitalist in guarded condition.    FINAL DIAGNOSIS  1.   Upper GI bleed  2.  Anemia  3.  Right forearm abscess with incision and drainage  4.  Critical care time 35 minutes    Disposition  The patient will be admitted in guarded condition       Electronically signed by: Homer Hooks M.D., 5/18/2024 6:48 AM

## 2024-05-19 ENCOUNTER — PHARMACY VISIT (OUTPATIENT)
Dept: PHARMACY | Facility: MEDICAL CENTER | Age: 44
End: 2024-05-19
Payer: MEDICARE

## 2024-05-19 VITALS
HEIGHT: 75 IN | TEMPERATURE: 97.2 F | RESPIRATION RATE: 14 BRPM | WEIGHT: 251.32 LBS | BODY MASS INDEX: 31.25 KG/M2 | OXYGEN SATURATION: 97 % | DIASTOLIC BLOOD PRESSURE: 83 MMHG | SYSTOLIC BLOOD PRESSURE: 142 MMHG | HEART RATE: 65 BPM

## 2024-05-19 PROBLEM — K92.2 UPPER GI BLEED: Status: RESOLVED | Noted: 2022-03-26 | Resolved: 2024-05-19

## 2024-05-19 LAB
ANION GAP SERPL CALC-SCNC: 8 MMOL/L (ref 7–16)
BASOPHILS # BLD AUTO: 1.6 % (ref 0–1.8)
BASOPHILS # BLD: 0.07 K/UL (ref 0–0.12)
BUN SERPL-MCNC: 13 MG/DL (ref 8–22)
CALCIUM SERPL-MCNC: 7.5 MG/DL (ref 8.5–10.5)
CHLORIDE SERPL-SCNC: 112 MMOL/L (ref 96–112)
CO2 SERPL-SCNC: 20 MMOL/L (ref 20–33)
CREAT SERPL-MCNC: 0.99 MG/DL (ref 0.5–1.4)
EOSINOPHIL # BLD AUTO: 0.24 K/UL (ref 0–0.51)
EOSINOPHIL NFR BLD: 5.4 % (ref 0–6.9)
ERYTHROCYTE [DISTWIDTH] IN BLOOD BY AUTOMATED COUNT: 65.1 FL (ref 35.9–50)
GFR SERPLBLD CREATININE-BSD FMLA CKD-EPI: 97 ML/MIN/1.73 M 2
GLUCOSE SERPL-MCNC: 117 MG/DL (ref 65–99)
H PYLORI AG STL QL IA: NOT DETECTED
HCT VFR BLD AUTO: 30.7 % (ref 42–52)
HCT VFR BLD AUTO: 31 % (ref 42–52)
HGB BLD-MCNC: 8.9 G/DL (ref 14–18)
HGB BLD-MCNC: 9.3 G/DL (ref 14–18)
IMM GRANULOCYTES # BLD AUTO: 0.01 K/UL (ref 0–0.11)
IMM GRANULOCYTES NFR BLD AUTO: 0.2 % (ref 0–0.9)
LYMPHOCYTES # BLD AUTO: 1.79 K/UL (ref 1–4.8)
LYMPHOCYTES NFR BLD: 40.3 % (ref 22–41)
MCH RBC QN AUTO: 22.4 PG (ref 27–33)
MCHC RBC AUTO-ENTMCNC: 29 G/DL (ref 32.3–36.5)
MCV RBC AUTO: 77.3 FL (ref 81.4–97.8)
MONOCYTES # BLD AUTO: 0.5 K/UL (ref 0–0.85)
MONOCYTES NFR BLD AUTO: 11.3 % (ref 0–13.4)
NEUTROPHILS # BLD AUTO: 1.83 K/UL (ref 1.82–7.42)
NEUTROPHILS NFR BLD: 41.2 % (ref 44–72)
NRBC # BLD AUTO: 0 K/UL
NRBC BLD-RTO: 0 /100 WBC (ref 0–0.2)
PLATELET # BLD AUTO: 295 K/UL (ref 164–446)
PMV BLD AUTO: 9 FL (ref 9–12.9)
POTASSIUM SERPL-SCNC: 3.7 MMOL/L (ref 3.6–5.5)
RBC # BLD AUTO: 3.97 M/UL (ref 4.7–6.1)
SODIUM SERPL-SCNC: 140 MMOL/L (ref 135–145)
WBC # BLD AUTO: 4.4 K/UL (ref 4.8–10.8)

## 2024-05-19 PROCEDURE — 99232 SBSQ HOSP IP/OBS MODERATE 35: CPT | Performed by: NURSE PRACTITIONER

## 2024-05-19 PROCEDURE — 85014 HEMATOCRIT: CPT

## 2024-05-19 PROCEDURE — 700111 HCHG RX REV CODE 636 W/ 250 OVERRIDE (IP): Mod: JZ

## 2024-05-19 PROCEDURE — 36415 COLL VENOUS BLD VENIPUNCTURE: CPT

## 2024-05-19 PROCEDURE — 700102 HCHG RX REV CODE 250 W/ 637 OVERRIDE(OP): Performed by: STUDENT IN AN ORGANIZED HEALTH CARE EDUCATION/TRAINING PROGRAM

## 2024-05-19 PROCEDURE — 700105 HCHG RX REV CODE 258

## 2024-05-19 PROCEDURE — 700111 HCHG RX REV CODE 636 W/ 250 OVERRIDE (IP): Performed by: STUDENT IN AN ORGANIZED HEALTH CARE EDUCATION/TRAINING PROGRAM

## 2024-05-19 PROCEDURE — 87338 HPYLORI STOOL AG IA: CPT

## 2024-05-19 PROCEDURE — 80048 BASIC METABOLIC PNL TOTAL CA: CPT

## 2024-05-19 PROCEDURE — G0378 HOSPITAL OBSERVATION PER HR: HCPCS

## 2024-05-19 PROCEDURE — 85018 HEMOGLOBIN: CPT

## 2024-05-19 PROCEDURE — A9270 NON-COVERED ITEM OR SERVICE: HCPCS | Performed by: STUDENT IN AN ORGANIZED HEALTH CARE EDUCATION/TRAINING PROGRAM

## 2024-05-19 PROCEDURE — 85025 COMPLETE CBC W/AUTO DIFF WBC: CPT

## 2024-05-19 PROCEDURE — C9113 INJ PANTOPRAZOLE SODIUM, VIA: HCPCS | Performed by: STUDENT IN AN ORGANIZED HEALTH CARE EDUCATION/TRAINING PROGRAM

## 2024-05-19 PROCEDURE — RXMED WILLOW AMBULATORY MEDICATION CHARGE

## 2024-05-19 PROCEDURE — 700105 HCHG RX REV CODE 258: Performed by: STUDENT IN AN ORGANIZED HEALTH CARE EDUCATION/TRAINING PROGRAM

## 2024-05-19 PROCEDURE — 96366 THER/PROPH/DIAG IV INF ADDON: CPT

## 2024-05-19 PROCEDURE — 96376 TX/PRO/DX INJ SAME DRUG ADON: CPT

## 2024-05-19 PROCEDURE — 99239 HOSP IP/OBS DSCHRG MGMT >30: CPT | Performed by: HOSPITALIST

## 2024-05-19 RX ORDER — FERROUS SULFATE 325(65) MG
325 TABLET ORAL DAILY
Qty: 30 TABLET | Refills: 0 | Status: SHIPPED | OUTPATIENT
Start: 2024-05-19 | End: 2024-06-18

## 2024-05-19 RX ORDER — OMEPRAZOLE 20 MG/1
20 CAPSULE, DELAYED RELEASE ORAL 2 TIMES DAILY
Qty: 60 CAPSULE | Refills: 0 | Status: ON HOLD | OUTPATIENT
Start: 2024-05-19 | End: 2024-05-29

## 2024-05-19 RX ORDER — AMOXICILLIN AND CLAVULANATE POTASSIUM 875; 125 MG/1; MG/1
1 TABLET, FILM COATED ORAL 2 TIMES DAILY
Qty: 8 TABLET | Refills: 0 | Status: ON HOLD | OUTPATIENT
Start: 2024-05-19 | End: 2024-05-29

## 2024-05-19 RX ADMIN — OXYCODONE HYDROCHLORIDE 10 MG: 10 TABLET ORAL at 10:22

## 2024-05-19 RX ADMIN — HYDROMORPHONE HYDROCHLORIDE 1 MG: 1 INJECTION, SOLUTION INTRAMUSCULAR; INTRAVENOUS; SUBCUTANEOUS at 02:32

## 2024-05-19 RX ADMIN — HYDROMORPHONE HYDROCHLORIDE 1 MG: 1 INJECTION, SOLUTION INTRAMUSCULAR; INTRAVENOUS; SUBCUTANEOUS at 07:33

## 2024-05-19 RX ADMIN — GABAPENTIN 300 MG: 300 CAPSULE ORAL at 06:04

## 2024-05-19 RX ADMIN — PANTOPRAZOLE SODIUM 40 MG: 40 INJECTION, POWDER, FOR SOLUTION INTRAVENOUS at 06:05

## 2024-05-19 RX ADMIN — METOPROLOL TARTRATE 25 MG: 25 TABLET, FILM COATED ORAL at 06:04

## 2024-05-19 RX ADMIN — LOSARTAN POTASSIUM 50 MG: 50 TABLET, FILM COATED ORAL at 06:04

## 2024-05-19 RX ADMIN — AMOXICILLIN AND CLAVULANATE POTASSIUM 1 TABLET: 875; 125 TABLET, FILM COATED ORAL at 06:04

## 2024-05-19 RX ADMIN — GABAPENTIN 300 MG: 300 CAPSULE ORAL at 11:16

## 2024-05-19 RX ADMIN — SODIUM CHLORIDE: 9 INJECTION, SOLUTION INTRAVENOUS at 02:08

## 2024-05-19 RX ADMIN — SODIUM CHLORIDE 250 MG: 9 INJECTION, SOLUTION INTRAVENOUS at 08:24

## 2024-05-19 RX ADMIN — OXYCODONE HYDROCHLORIDE 10 MG: 10 TABLET ORAL at 00:33

## 2024-05-19 RX ADMIN — OXYCODONE HYDROCHLORIDE 10 MG: 10 TABLET ORAL at 04:36

## 2024-05-19 ASSESSMENT — ENCOUNTER SYMPTOMS
COUGH: 0
FALLS: 0
MYALGIAS: 0
DIARRHEA: 0
HEADACHES: 0
BLOOD IN STOOL: 0
ABDOMINAL PAIN: 1
INSOMNIA: 0
FEVER: 0
WEAKNESS: 0
FLANK PAIN: 0
CONSTIPATION: 0
SHORTNESS OF BREATH: 0
VOMITING: 0
SORE THROAT: 1
CHILLS: 0
NERVOUS/ANXIOUS: 0
NAUSEA: 0

## 2024-05-19 ASSESSMENT — PAIN DESCRIPTION - PAIN TYPE
TYPE: SURGICAL PAIN
TYPE: CHRONIC PAIN
TYPE: CHRONIC PAIN
TYPE: ACUTE PAIN
TYPE: SURGICAL PAIN
TYPE: CHRONIC PAIN

## 2024-05-19 NOTE — DISCHARGE INSTRUCTIONS
Discharge Instructions    Discharged to home by car with relative. Discharged via wheelchair, hospital escort: Yes.  Special equipment needed: Not Applicable    Be sure to schedule a follow-up appointment with your primary care doctor or any specialists as instructed.     Discharge Plan:   Diet Plan: Discussed  Activity Level: Discussed  Confirmed Follow up Appointment: Patient to Call and Schedule Appointment  Confirmed Symptoms Management: Discussed  Medication Reconciliation Updated: Yes    I understand that a diet low in cholesterol, fat, and sodium is recommended for good health. Unless I have been given specific instructions below for another diet, I accept this instruction as my diet prescription.   Other diet: GI soft    Special Instructions: None    -Is this patient being discharged with medication to prevent blood clots?  No    Is patient discharged on Warfarin / Coumadin?   No     .          Gastrointestinal Bleeding  Gastrointestinal (GI) bleeding is bleeding anywhere along the digestive tract. The digestive tract carries food from your mouth until it leaves your body as waste, or poop. You may have blood in your poop or have black poop. If you vomit, there may be blood in it too.  This condition can be mild, serious, or even life-threatening. If you have a lot of bleeding, you may need to stay in the hospital.  What are the causes?  This condition may be caused by:  Irritation and swelling of the esophagus (esophagitis). The esophagus is part of the body that moves food from your mouth to your stomach.  Swollen veins in the butt (hemorrhoids).  Tears in the opening of the butt. These are often caused by passing hard poop.  Pouches that form on the colon (diverticulosis).  Irritation and swelling of pouches that have formed in your colon (diverticulitis).  Growths (polyps) or cancer.  Irritation of the stomach lining (gastritis).  Sores (ulcers) in the stomach.  What increases the risk?  You are  more likely to develop this condition if:  You have a certain type of infection in your stomach called Helicobacter pylori infection.  You take certain medicines.  You smoke.  You drink alcohol.  What are the signs or symptoms?  Common symptoms of this condition include:  Vomiting material that has bright red blood in it. It may look like coffee grounds.  Changes in your poop. The poop:  May have red blood in it.  May be black, look like tar, and smell stronger than normal.  May be red.  Pain or cramping in the belly (abdomen).  How is this treated?  Treatment for this condition depends on the cause of the bleeding. For example:  Your doctor may treat the bleeding during diagnosis. Common ways of diagnosing this condition is endoscopy or colonoscopy.  Medicines can be used to:  Help control irritation, swelling, or infection.  Reduce acid in your stomach.  Certain problems can be treated with:  Creams.  Medicines that are put in the butt (suppositories).  Warm baths.  Surgery is sometimes needed.  If you lose a lot of blood, you may need a blood transfusion.  If there is a lot of bleeding, you will need to stay in the hospital. If bleeding is mild, you may be allowed to go home.  Follow these instructions at home:    Take over-the-counter and prescription medicines only as told by your doctor.  Eat foods that have a lot of fiber in them. These foods include beans, whole grains, and fresh fruits and vegetables. You can also try eating 1-3 prunes each day.  Drink enough fluid to keep your pee (urine) pale yellow.  Keep all follow-up visits.  Contact a doctor if:  Your symptoms do not get better with treatment.  Get help right away if:  Your bleeding does not stop.  You feel dizzy or you pass out (faint).  You feel weak.  You have very bad cramps in your back or belly.  You pass large clumps of blood (clots) in your poop.  Your symptoms are getting worse.  You have chest pain or fast heartbeats.  These symptoms may be  an emergency. Get help right away. Call 911.  Do not wait to see if the symptoms will go away.  Do not drive yourself to the hospital.  Summary  Gastrointestinal (GI) bleeding is bleeding anywhere along the digestive tract. The digestive tract carries food from your mouth until it leaves your body as waste, or poop.  This bleeding can be caused by many things. Treatment depends on the cause of the bleeding.  Take medicines only as told by your doctor.  Keep all follow-up visits.  This information is not intended to replace advice given to you by your health care provider. Make sure you discuss any questions you have with your health care provider.  Document Revised: 07/22/2022 Document Reviewed: 07/22/2022  [employer] Patient Education © 2023 [employer]      Plan:  PPI IV, then PPI twice daily x 3 months.  Repeat EGD in 3 months for biopsy and reassess healing-referral to outpatient GI placed  Check 8 point H. pylori antigen-this to be done with stool sampling on outpatient basis follow-up with primary care provider  Gastrin level pending  GI to follow biopsies     No further interventions from acute GI service. GI to sign off. Please reconsult for any further questions or concerns.   Per Carlota Roes NP- GI         Discharge Instructions per BUSHRA Perea    -Follow-up with your primary care status post hospitalization.  -A referral has been placed for outpatient GI.  Anticipate a phone call to schedule an appointment.  Per GI recommendations you will likely require a follow-up EGD with biopsy in 3 months.  -I have adjusted your prescription for omeprazole.  Please start taking it 2 times a day.  -Due to completing an I&D on the abscess on your forearm I am prolonging your antibiotic duration for 4 more days.  End date being 5/23/2024.  Prescription has been sent to pharmacy.  -Will be sending home with supplies for dressing changes of the abscess.  If the wound starts to worsen please reach out to your  primary care provider for referral to outpatient wound clinic further assistance with dressing changes.  -Lab work showed findings of iron deficiency anemia.  We have given you an IV replacement while here in the hospital.  I have now prescribed you iron p.o. daily.  There is a possibility that your stools will become black with the iron.  -I have placed a lab order for H. pylori.  Please complete prior to seeing your primary care provider.     DIET: GI soft (low fiber)     ACTIVITY: As tolerated     DIAGNOSIS: GI bleed     Return to ER if you should experience any numbness and tingling, palpitations, chest pain, shortness of breath.

## 2024-05-19 NOTE — PROGRESS NOTES
Report received from Cliff RN. Assume care. Pt is AAOx4.  Assessment completed. VSS. Denies pain, fully ambulatory, Pt was update for the care for the day. White board updated, All question answered. Pt has call light within reach,  bed is in the lowest position. Pt has no other needs at this time.   [address] APRN at bedside

## 2024-05-19 NOTE — DISCHARGE SUMMARY
Discharge Summary    CHIEF COMPLAINT ON ADMISSION  Chief Complaint   Patient presents with    Abdominal Pain     Since 0400, RUQ and epigastric abdominal pain. Reports black tarry stools since yesterday, hx of GI bleeds and states this is similar to last time. Required surgery for GI bleed. Taken off blood thinners 3 weeks ago after a mitral valve repair. (+) nausea. Per Dad pt looks pale.        Reason for Admission  Blood in stool     Admission Date  5/18/2024    CODE STATUS  Full Code    HPI & HOSPITAL COURSE    Roberto Braswell is a 43 y.o. male with a past medical history of mitral regurgitation s/p MVR, duodenal ulcer s/p empiric GDA coiling embolization who presented 5/18/2024 with abdominal pain.     Patient states that he has been experiencing epigastric abdominal pain for one day. Patient reports upper mid abdominal pain since yesterday, severe quality, non radiating, similar to previous duodenal ulcer pain, associated with melena yesterday. Patient also with nausea, denies vomiting. He completed 3 months of post MVR anticoagulation with warfarin, only takes baby aspirin at home now for past few weeks. Denies taking OTC NSAIDs. Denies chest pain, dyspnea. Patient recently in the ED last week for forearm pain and nodularity, found to have abscess and discharged home on oral antibiotics, US negative for DVT at that time. He has been compliant with augmentin.    In the ED, /111. Hgb 9.9 (previously 13.5), MCV 76. CMP unremarkable. Patient given PPI. GI consulted by ERP. ERP has also performed bedside incision and drainage of forearm abscess with gauze packing. Patient is admitted for suspected upper GI bleed and anemia.    Patient seen and examined this a.m.  He states that he has chronic pain and is following with a pain management doctor outpatient.  Current pain management regimen is equivalent to what he takes at home.  I discussed with him the results of the EGD and recommendations per GI.   Patient expresses understanding.  Additionally we looked at the patient's abscess under the dressing.  There is some dried blood on the dressing but otherwise the wound looks clean with no pus.  There is packed gauze inside the wound currently.  We recommended that we would place a outpatient wound clinic order for further dressing changes.  The patient did state that his wife is a nurse for the past 9 years and would prefer that she do the dressing changes at this time.  Educated the patient that we will be sending him home with materials for dressing changes but that if it worsened to reach out to his primary care provider for referral for outpatient wound clinic.    Patient expresses understanding for all recommendations.  All questions answered at this time.  Patient okay to discharge.    Therefore, he is discharged in good and stable condition to home with close outpatient follow-up.    The patient recovered much more quickly than anticipated on admission.    Discharge Date  5/19/2024    FOLLOW UP ITEMS POST DISCHARGE      DISCHARGE DIAGNOSES  Principal Problem (Resolved):    Upper GI bleed (POA: Yes)  Active Problems:    Duodenal ulcer (POA: Yes)    Hypertension (POA: Yes)    H/O mitral valve repair (POA: Yes)    Abscess of forearm (POA: Unknown)      FOLLOW UP  Future Appointments   Date Time Provider Department Center   5/28/2024  8:15 AM [facility] 1 RAZIA Jang   6/5/2024  2:45 PM SHAJI Carpenter None     Sal Schafer D.O.  [address] [address]  [phone]    Call in 1 day(s)        MEDICATIONS ON DISCHARGE     Medication List        START taking these medications        Instructions   ferrous sulfate 325 (65 Fe) MG tablet   Take 1 Tablet by mouth every day for 30 days.  Dose: 325 mg            CHANGE how you take these medications        Instructions   omeprazole 20 MG delayed-release capsule  What changed: when to take this  Commonly known as:  PriLOSEC   Take 1 Capsule by mouth 2 times a day for 30 days.  Dose: 20 mg            CONTINUE taking these medications        Instructions   acetaminophen 500 MG Tabs  Commonly known as: Tylenol   Take 500-1,000 mg by mouth every 8 hours as needed. Indications: Pain  Dose: 500-1,000 mg     amoxicillin-clavulanate 875-125 MG Tabs  Commonly known as: Augmentin   Take 1 Tablet by mouth 2 times a day for 4 days.  Dose: 1 Tablet     aspirin 81 MG EC tablet   Take 81 mg by mouth every day.  Dose: 81 mg     baclofen 10 MG Tabs  Commonly known as: Lioresal   Take 10 mg by mouth 3 times a day as needed. Indications: Muscle Spasm  Dose: 10 mg     gabapentin 300 MG Caps  Commonly known as: Neurontin   Take 300 mg by mouth 3 times a day.  Dose: 300 mg     losartan 50 MG Tabs  Commonly known as: Cozaar   Take 1 Tablet by mouth every day.  Dose: 50 mg     metoprolol tartrate 25 MG Tabs  Commonly known as: Lopressor   Take 1 Tablet by mouth 2 times a day.  Dose: 25 mg     Oxycodone HCl 20 MG Tabs   Take 20 mg by mouth 3 times a day as needed (Pain).  Dose: 20 mg     sucralfate 1 GM Tabs  Commonly known as: Carafate   Take 1 g by mouth 3 times a day before meals.  Dose: 1 g              Allergies  Allergies   Allergen Reactions    Morphine Vomiting    Tramadol Unspecified     Seizure  [id number]       DIET  Orders Placed This Encounter   Procedures    Diet Order Diet: Low Fiber(GI Soft)     Standing Status:   Standing     Number of Occurrences:   1     Order Specific Question:   Diet:     Answer:   Low Fiber(GI Soft) [2]       ACTIVITY  As tolerated.  Weight bearing as tolerated    CONSULTATIONS      PROCEDURES      LABORATORY  Lab Results   Component Value Date    SODIUM 140 05/19/2024    POTASSIUM 3.7 05/19/2024    CHLORIDE 112 05/19/2024    CO2 20 05/19/2024    GLUCOSE 117 (H) 05/19/2024    BUN 13 05/19/2024    CREATININE 0.99 05/19/2024        Lab Results   Component Value Date    WBC 4.4 (L) 05/19/2024    HEMOGLOBIN 9.3 (L)  05/19/2024    HEMATOCRIT 31.0 (L) 05/19/2024    PLATELETCT 295 05/19/2024        Total time of the discharge process exceeds 38  minutes.

## 2024-05-19 NOTE — PROGRESS NOTES
Received report from Luc RN. Pt alert and oriented x4. Pt on 3 liters of oxygen reports pain 7/10. Call light in reach. Bed in lowest position. Plan of care discussed with pt. Pt agreeing to plan of care. Communication board updated. All questions answered. Assessment completed.

## 2024-05-19 NOTE — PROGRESS NOTES
Gastroenterology Progress Note               Author:  SHAJI Salter Date & Time Created: 5/19/2024 10:53 AM       Patient ID:  Name:             Roberto Braswell    YOB: 1980  Age:                 43 y.o.  male  MRN:               [id number]        Medical Decision Making, by Problem:  Active Hospital Problems    Diagnosis     Abscess of forearm [L02.419]     H/O mitral valve repair [Z98.890]     Duodenal ulcer [K26.9]     Hypertension [I10]            Presenting Chief Complaint:  Melena  Consult requested by Dr. Pancho Lozano     HISTORY OF PRESENT ILLNESS:  Roberto Braswell is a 43 y.o. male who has had three EGD's previously for gastric and duodenal ulcers. He had one duodenal ulcer  9/14/23 and he was scoped by Dr. Dalton. EGD showed: Duodenal ulcer, not completely healed, some oozing at post side of duodenal sweep. We used transparent cap to find and investigate it. S/p biopsy. The location of this 1cm ulcer could possibly give the patient some partial obstruction symptoms. No hemostatic intervention needed He also had EGD 7/23 that showed the gastric ulcer.s This was done by Jayy Xiao.      He states that he has been taking his PPI and carafate daily since that time. He does not have symptoms in between his episodes and need for hospitalization. He has no acid reflux or pain. He states this morning around 4 am he woke with pain and has had 4 stools consistent with melena. He just had another one in the room. His hemoglobin was 13.4 and now is 9.9. Patient also with nausea, denies vomiting. He has his pain controlled by dilaudid that was just given. He is on PPI. His blood pressure has been labile.         Taken off blood thinners 3 weeks ago after a mitral valve repair. He does take a baby ASA a day      Interval History:  5/19/2024: Patient seen at bedside.  He reports some abdominal pain but otherwise her feels well without nausea, vomiting.  He is tolerating oral intake without  difficulty.  Denies bowel movement today or sense of urgency.  Hemoglobin stable 9.4-8.9-9.3.  BUN normal at 13.  EGD findings discussed with patient as well as recommendations to check for H. pylori and stool.  Patient states he is not able to provide a bowel movement at this time.  He has a primary care provider that he is happy to follow-up with.  Discussed that he will need to see outpatient GI for repeat EGD in 3 months as well as ongoing monitoring of ringed esophagus.  He is agreeable to this.        Hospital Medications:  Current Facility-Administered Medications   Medication Dose Frequency Provider Last Rate Last Admin    ferric gluconate complex (Ferrlecit) 250 mg in  mL IVPB  250 mg BID SHAJI Wilkinson   Stopped at 05/19/24 0924    NS infusion   Continuous Pancho Lozano M.D. 125 mL/hr at 05/19/24 0208 New Bag at 05/19/24 0208    acetaminophen (Tylenol) tablet 650 mg  650 mg Q6HRS PRN Pancho Lozano M.D.        Pharmacy Consult Request ...Pain Management Review 1 Each  1 Each PHARMACY TO DOSE Pancho Lozano M.D.        ondansetron (Zofran) syringe/vial injection 4 mg  4 mg Q4HRS PRN Pancho Lozano M.D.   4 mg at 05/18/24 0949    ondansetron (Zofran ODT) dispertab 4 mg  4 mg Q4HRS PRN Pancho Lozano M.D.        promethazine (Phenergan) tablet 12.5-25 mg  12.5-25 mg Q4HRS PRN Pancho Lozano M.D.        promethazine (Phenergan) suppository 12.5-25 mg  12.5-25 mg Q4HRS PRN Pancho Lozano M.D.        prochlorperazine (Compazine) injection 5-10 mg  5-10 mg Q4HRS PRN Pancho Lozano M.D.        pantoprazole (Protonix) injection 40 mg  40 mg BID Pancho Lozano M.D.   40 mg at 05/19/24 0605    losartan (Cozaar) tablet 50 mg  50 mg DAILY Pancho Lozano M.D.   50 mg at 05/19/24 0604    metoprolol tartrate (Lopressor) tablet 25 mg  25 mg BID Pancho Lozano M.D.   25 mg at 05/19/24 0604    oxyCODONE immediate release (Roxicodone) tablet 10 mg  10 mg Q4HRS PRN Pancho Lozano M.D.   10 mg at 05/19/24 1022     "HYDROmorphone (Dilaudid) injection 1 mg  1 mg Q4HRS PRN Pancho Lozano M.D.   1 mg at 05/19/24 0733    amoxicillin-clavulanate (Augmentin) 875-125 MG per tablet 1 Tablet  1 Tablet Q12HRS Pancho Lozano M.D.   1 Tablet at 05/19/24 0604    baclofen (Lioresal) tablet 10 mg  10 mg TID PRN Pancho Lozano M.D.   10 mg at 05/18/24 1204    gabapentin (Neurontin) capsule 300 mg  300 mg TID Pancho Lozano M.D.   300 mg at 05/19/24 0604   Last reviewed on 5/18/2024  8:03 AM by Gypsy Gates T       Review of Systems:  Review of Systems   Constitutional:  Negative for chills, fever and malaise/fatigue.   HENT:  Positive for sore throat. Negative for congestion.    Respiratory:  Negative for cough and shortness of breath.    Cardiovascular:  Negative for chest pain and leg swelling.   Gastrointestinal:  Positive for abdominal pain. Negative for blood in stool, constipation, diarrhea, melena, nausea and vomiting.   Genitourinary:  Negative for dysuria and flank pain.   Musculoskeletal:  Negative for falls and myalgias.   Neurological:  Negative for weakness and headaches.   Psychiatric/Behavioral:  The patient is not nervous/anxious and does not have insomnia.    All other systems reviewed and are negative.        Vital signs:  Weight/BMI: Body mass index is 31.41 kg/m².  BP (!) 142/83   Pulse 65   Temp 36.2 °C (97.2 °F) (Temporal)   Resp 14   Ht 1.905 m (6' 3\")   Wt 114 kg (251 lb 5.2 oz)   SpO2 97%   Vitals:    05/19/24 0012 05/19/24 0408 05/19/24 0600 05/19/24 0722   BP: 111/55 110/74 125/73 (!) 142/83   Pulse: 67 65  65   Resp: 16 16  14   Temp: 36.1 °C (97 °F) 36.2 °C (97.1 °F)  36.2 °C (97.2 °F)   TempSrc: Temporal Temporal  Temporal   SpO2: 97% 95% 97% 97%   Weight:       Height:         Oxygen Therapy:  Pulse Oximetry: 97 %, O2 (LPM): 0, O2 Delivery Device: None - Room Air    Intake/Output Summary (Last 24 hours) at 5/19/2024 1053  Last data filed at 5/19/2024 0824  Gross per 24 hour   Intake 840 ml   Output --   Net " 840 ml         Physical Exam:  Physical Exam  Vitals and nursing note reviewed.   Constitutional:       General: He is awake. He is not in acute distress.     Appearance: Normal appearance. He is well-developed, well-groomed and normal weight. He is not toxic-appearing.   HENT:      Head: Normocephalic.      Mouth/Throat:      Mouth: Mucous membranes are moist.      Pharynx: Oropharynx is clear. No oropharyngeal exudate.   Eyes:      General: No scleral icterus.     Extraocular Movements: Extraocular movements intact.      Conjunctiva/sclera: Conjunctivae normal.   Cardiovascular:      Rate and Rhythm: Normal rate and regular rhythm.      Pulses: Normal pulses.      Heart sounds: Normal heart sounds.   Pulmonary:      Effort: Pulmonary effort is normal. No respiratory distress.      Breath sounds: Normal breath sounds.   Abdominal:      General: Abdomen is flat. Bowel sounds are normal. There is no distension.      Palpations: Abdomen is soft.      Tenderness: There is no abdominal tenderness. There is no guarding.   Musculoskeletal:      Right lower leg: No edema.      Left lower leg: No edema.   Skin:     General: Skin is warm and dry.      Capillary Refill: Capillary refill takes less than 2 seconds.      Coloration: Skin is not jaundiced.   Neurological:      General: No focal deficit present.      Mental Status: He is alert and oriented to person, place, and time. Mental status is at baseline.   Psychiatric:         Mood and Affect: Mood normal.         Behavior: Behavior normal. Behavior is cooperative.             Labs:  Recent Labs     05/18/24 0617 05/19/24 0106   SODIUM 138 140   POTASSIUM 3.8 3.7   CHLORIDE 108 112   CO2 18* 20   BUN 26* 13   CREATININE 1.05 0.99   CALCIUM 8.4* 7.5*     Recent Labs     05/18/24  0617 05/19/24  0106   ALTSGPT 16  --    ASTSGOT 19  --    ALKPHOSPHAT 57  --    TBILIRUBIN <0.2  --    LIPASE 28  --    GLUCOSE 81 117*     Recent Labs     05/18/24  0617 05/19/24  0106   WBC  4.8 4.4*   NEUTSPOLYS 42.70* 41.20*   LYMPHOCYTES 41.10* 40.30   MONOCYTES 8.90 11.30   EOSINOPHILS 5.40 5.40   BASOPHILS 1.50 1.60   ASTSGOT 19  --    ALTSGPT 16  --    ALKPHOSPHAT 57  --    TBILIRUBIN <0.2  --      Recent Labs     05/18/24  0617 05/18/24  0917 05/18/24  1817 05/19/24  0106 05/19/24  0917   RBC 4.32*  --   --  3.97*  --    HEMOGLOBIN 9.9*   < > 9.4* 8.9* 9.3*   HEMATOCRIT 33.1*   < > 31.8* 30.7* 31.0*   PLATELETCT 346  --   --  295  --    PROTHROMBTM 13.1  --   --   --   --    APTT 24.9  --   --   --   --    INR 0.98  --   --   --   --    IRON 22*  --   --   --   --    FERRITIN 13.0*  --   --   --   --    TOTIRONBC 363  --   --   --   --     < > = values in this interval not displayed.     Recent Results (from the past 24 hour(s))   HEMOGLOBIN AND HEMATOCRIT    Collection Time: 05/18/24  6:17 PM   Result Value Ref Range    Hemoglobin 9.4 (L) 14.0 - 18.0 g/dL    Hematocrit 31.8 (L) 42.0 - 52.0 %   CBC with Differential    Collection Time: 05/19/24  1:06 AM   Result Value Ref Range    WBC 4.4 (L) 4.8 - 10.8 K/uL    RBC 3.97 (L) 4.70 - 6.10 M/uL    Hemoglobin 8.9 (L) 14.0 - 18.0 g/dL    Hematocrit 30.7 (L) 42.0 - 52.0 %    MCV 77.3 (L) 81.4 - 97.8 fL    MCH 22.4 (L) 27.0 - 33.0 pg    MCHC 29.0 (L) 32.3 - 36.5 g/dL    RDW 65.1 (H) 35.9 - 50.0 fL    Platelet Count 295 164 - 446 K/uL    MPV 9.0 9.0 - 12.9 fL    Neutrophils-Polys 41.20 (L) 44.00 - 72.00 %    Lymphocytes 40.30 22.00 - 41.00 %    Monocytes 11.30 0.00 - 13.40 %    Eosinophils 5.40 0.00 - 6.90 %    Basophils 1.60 0.00 - 1.80 %    Immature Granulocytes 0.20 0.00 - 0.90 %    Nucleated RBC 0.00 0.00 - 0.20 /100 WBC    Neutrophils (Absolute) 1.83 1.82 - 7.42 K/uL    Lymphs (Absolute) 1.79 1.00 - 4.80 K/uL    Monos (Absolute) 0.50 0.00 - 0.85 K/uL    Eos (Absolute) 0.24 0.00 - 0.51 K/uL    Baso (Absolute) 0.07 0.00 - 0.12 K/uL    Immature Granulocytes (abs) 0.01 0.00 - 0.11 K/uL    NRBC (Absolute) 0.00 K/uL   Basic Metabolic Panel (BMP)     Collection Time: 05/19/24  1:06 AM   Result Value Ref Range    Sodium 140 135 - 145 mmol/L    Potassium 3.7 3.6 - 5.5 mmol/L    Chloride 112 96 - 112 mmol/L    Co2 20 20 - 33 mmol/L    Glucose 117 (H) 65 - 99 mg/dL    Bun 13 8 - 22 mg/dL    Creatinine 0.99 0.50 - 1.40 mg/dL    Calcium 7.5 (L) 8.5 - 10.5 mg/dL    Anion Gap 8.0 7.0 - 16.0   ESTIMATED GFR    Collection Time: 05/19/24  1:06 AM   Result Value Ref Range    GFR (CKD-EPI) 97 >60 mL/min/1.73 m 2   HEMOGLOBIN AND HEMATOCRIT    Collection Time: 05/19/24  9:17 AM   Result Value Ref Range    Hemoglobin 9.3 (L) 14.0 - 18.0 g/dL    Hematocrit 31.0 (L) 42.0 - 52.0 %       Radiology Review:  No orders to display         MDM (Data Review):   -Records reviewed and summarized in current documentation  -I personally reviewed and interpreted the laboratory results  -I personally reviewed the radiology images    Assessment/Recommendations:  Possible EOE  Gastric ulcers  Narrowing of pylorus due to ulceration  Duodenal ulcers    MDM:  This is a 43-year-old male with a past medical history as listed above who presented to [facility] on 5/18/2024 with melena and abdominal pain.  He underwent EGD the same day for which he was found to have a longitudinal furrows and ringed esophagus suspicious for possible EOE.  Biopsy was obtained and is pending.  He was also found to have multiple clean-based ulcers in the antrum and the prepyloric and pyloric region was ulcerated, narrow, oozing.  Treated with gold probe.  Furthermore, patient had oozing ulcerations at the duodenal bulb which is also treated with gold probe cautery.  Postprocedurally, patient AAOx4.  States he has some abdominal pain but otherwise reports feeling well without nausea, vomiting, melena, hematochezia.  Tolerating oral intake without difficulty.  Hemoglobin stable 9.4-8.9-9.3.  BUN normal at 13.  Findings discussed with patient regarding EGD as well as recommendations for evaluation for H.  pylori and outpatient GI follow-up.  Patient states that he does not have an outpatient GI doctor that he sees.  He is agreeable to follow-up with primary care provider for stool collection for H. pylori in the meantime while waiting to get into outpatient GI.    Plan:  PPI IV, then PPI twice daily x 3 months.  Repeat EGD in 3 months for biopsy and reassess healing-referral to outpatient GI placed  Check 8 point H. pylori antigen-this to be done with stool sampling on outpatient basis follow-up with primary care provider  Gastrin level pending  GI to follow biopsies    No further interventions from acute GI service. GI to sign off. Please reconsult for any further questions or concerns.      Discussed with patient, Cande TY, Gayle MCBRIDE, Dr. Meadows, Dr. Seals.    Core Quality Measures   Reviewed items::  Labs, Medications and Radiology reports reviewed

## 2024-05-19 NOTE — PROCEDURES
OPERATIVE REPORT    PATIENT:   Roberto Braswell   1980       PREOPERATIVE DIAGNOSES/INDICATIONS: melena, abdominal pain    POSTOPERATIVE DIAGNOSIS: appearance of eosinophilic esophagitis, clean based ulcers in the antrum, food in stomach , narrowing due to ulcer at pylorus, oozing and multiple ulcers of duodenum    PROCEDURE:  ESOPHAGOGASTRODUODENOSCOPY with control of bleeding and biopsy    PHYSICIAN:  Kaitlyn Ontiveros MD    ANESTHESIA:  Per anesthesiologist.    LOCATION: [facility]    CONSENT:  OBTAINED. The risks, benefits and alternatives of the procedure were discussed in details. The risks include and are not limited to bleeding, infection, perforation, missed lesions, and sedations risks (cardiopulmonary compromise and allergic reaction to medications).    DESCRIPTION: The patient presented to the procedure room.  After routine checkup was performed, patient was brought into the endoscopy suite.  Patient was placed on his left lateral decubitus position. A bite block was placed in patient's mouth. Patient was sedated by anesthesia.  Vital signs were monitored throughout the procedure.  Oxygenation support was provided throughout the procedure. Upper endoscope was inserted into patient's mouth and advanced to the second portion of the duodenum under direct visualization.      Once the site was reached and examined, the upper endoscope was withdrawn.  Retroflexion was made within the stomach.  The stomach was decompressed, scope was withdrawn and the procedure was terminated.    The patient tolerated the procedure well.  There were no immediate complications.    OPERATIVE FINDINGS:    1. Esophagus: longitudinal furrows and ringed esophagus - biopsy for EOE  2. Stomach: There are a couple of clean based ulcers in antrum, and the pre-pyloric and pyloric region is ulcerated and narrow. There is a lot of food in stomach. The pylorus is oozing. Treated with Gold Probe  3. Duodenum: the bulb had oozing ulcerations  and they were treated with Gold Probe. The second portion as well. The duodenal mucosa was abnormal with ulcerations throughout      IMPRESSION/RECOMMENDATIONS:  Possible EOE  Gastric ulcers  Narrowing of Pylorus due to ulceration  Food in stomach due to narrowing from ulceration  Duodenum ulcers throughout, small and oozing    IV PPI, then PPI BID for three months, if he is taking medication this doesnt make since  He also needs to have repeat EGD in three months to biopsy and look for healing  Check h. Pylori antigen  Check gastrin level  Await biopsy        This note has been transcribed with digital voice recognition software and although it has been reviewed may contain grammatical or word errors

## 2024-05-19 NOTE — CARE PLAN
The patient is Stable - Low risk of patient condition declining or worsening    Shift Goals  Clinical Goals: pain control, wean off of O2  Patient Goals: pain control, rest  Family Goals: not at the bedside    Progress made toward(s) clinical / shift goals:    Problem: Pain - Standard  Goal: Alleviation of pain or a reduction in pain to the patient’s comfort goal  Description: Target End Date:  5/19/24    1.  Document pain using the appropriate pain scale per order or unit policy  2.  Educate and implement non-pharmacologic comfort measures (i.e. relaxation, distraction, massage, cold/heat therapy, etc.)  3.  Pain management medications as ordered  4.  Reassess pain after pain med administration per policy  5.  If opiods administered assess patient's response to pain medication is appropriate per POSS sedation scale  6.  Follow pain management plan developed in collaboration with patient and interdisciplinary team (including palliative care or pain specialists if applicable)  Outcome: Progressing     Problem: Knowledge Deficit - Standard  Goal: Patient and family/care givers will demonstrate understanding of plan of care, disease process/condition, diagnostic tests and medications  Description: Target End Date:  5/19/24    1.  Patient oriented to unit, equipment, visitation policy and means for communicating concern  2.  Complete/review Learning Assessment  3.  Assess knowledge level of disease process/condition, treatment plan, diagnostic tests and medications  4.  Explain disease process/condition, treatment plan, diagnostic tests and medications  Outcome: Progressing

## 2024-05-19 NOTE — HOSPITAL COURSE
Roberto Braswell is a 43 y.o. male with a past medical history of mitral regurgitation s/p MVR, duodenal ulcer s/p empiric GDA coiling embolization who presented 5/18/2024 with abdominal pain.     Patient states that he has been experiencing epigastric abdominal pain for one day. Patient reports upper mid abdominal pain since yesterday, severe quality, non radiating, similar to previous duodenal ulcer pain, associated with melena yesterday. Patient also with nausea, denies vomiting. He completed 3 months of post MVR anticoagulation with warfarin, only takes baby aspirin at home now for past few weeks. Denies taking OTC NSAIDs. Denies chest pain, dyspnea. Patient recently in the ED last week for forearm pain and nodularity, found to have abscess and discharged home on oral antibiotics, US negative for DVT at that time. He has been compliant with augmentin.    In the ED, /111. Hgb 9.9 (previously 13.5), MCV 76. CMP unremarkable. Patient given PPI. GI consulted by TASHI. ERP has also performed bedside incision and drainage of forearm abscess with gauze packing. Patient is admitted for suspected upper GI bleed and anemia.    Patient seen and examined this a.m.  He states that he has chronic pain and is following with a pain management doctor outpatient.  Current pain management regimen is equivalent to what he takes at home.  I discussed with him the results of the EGD and recommendations per GI.  Patient expresses understanding.  Additionally we looked at the patient's abscess under the dressing.  There is some dried blood on the dressing but otherwise the wound looks clean with no pus.  There is packed gauze inside the wound currently.  We recommended that we would place a outpatient wound clinic order for further dressing changes.  The patient did state that his wife is a nurse for the past 9 years and would prefer that she do the dressing changes at this time.  Educated the patient that we will be sending him  home with materials for dressing changes but that if it worsened to reach out to his primary care provider for referral for outpatient wound clinic.    Patient expresses understanding for all recommendations.  All questions answered at this time.  Patient okay to discharge.

## 2024-05-19 NOTE — CARE PLAN
Problem: Pain - Standard  Goal: Alleviation of pain or a reduction in pain to the patient’s comfort goal  Outcome: Met     Problem: Knowledge Deficit - Standard  Goal: Patient and family/care givers will demonstrate understanding of plan of care, disease process/condition, diagnostic tests and medications  Outcome: Met   The patient is Stable - Low risk of patient condition declining or worsening    Shift Goals  Clinical Goals: monitor and tx H&H if low  Patient Goals: pain control  Family Goals: not at the bedside    Progress made toward(s) clinical / shift goals:  Yes. Ferric G. Given IV, pt tolerated well. GI sing off. Pt is going home in good and stable conditions    Patient is not progressing towards the following goals:

## 2024-05-20 LAB — PATHOLOGY CONSULT NOTE: NORMAL

## 2024-05-21 LAB — GASTRIN SERPL-MCNC: 35 PG/ML (ref 0–100)

## 2024-05-22 ENCOUNTER — HOSPITAL ENCOUNTER (INPATIENT)
Facility: MEDICAL CENTER | Age: 44
LOS: 6 days | End: 2024-05-29
Attending: EMERGENCY MEDICINE | Admitting: INTERNAL MEDICINE
Payer: COMMERCIAL

## 2024-05-22 ENCOUNTER — APPOINTMENT (OUTPATIENT)
Dept: MEDICAL GROUP | Facility: LAB | Age: 44
End: 2024-05-22
Payer: COMMERCIAL

## 2024-05-22 DIAGNOSIS — K92.1 MELENA: ICD-10-CM

## 2024-05-22 DIAGNOSIS — K92.0 HEMATEMESIS, UNSPECIFIED WHETHER NAUSEA PRESENT: ICD-10-CM

## 2024-05-22 DIAGNOSIS — K92.2 UPPER GI BLEED: ICD-10-CM

## 2024-05-22 DIAGNOSIS — R10.9 ACUTE ABDOMINAL PAIN: ICD-10-CM

## 2024-05-22 DIAGNOSIS — R10.13 ABDOMINAL PAIN, ACUTE, EPIGASTRIC: ICD-10-CM

## 2024-05-22 PROBLEM — G89.4 CHRONIC PAIN SYNDROME: Status: ACTIVE | Noted: 2024-05-22

## 2024-05-22 LAB
ALBUMIN SERPL BCP-MCNC: 4 G/DL (ref 3.2–4.9)
ALBUMIN/GLOB SERPL: 1.4 G/DL
ALP SERPL-CCNC: 66 U/L (ref 30–99)
ALT SERPL-CCNC: 21 U/L (ref 2–50)
ANION GAP SERPL CALC-SCNC: 14 MMOL/L (ref 7–16)
AST SERPL-CCNC: 22 U/L (ref 12–45)
BASOPHILS # BLD AUTO: 0.8 % (ref 0–1.8)
BASOPHILS # BLD: 0.05 K/UL (ref 0–0.12)
BILIRUB SERPL-MCNC: 0.3 MG/DL (ref 0.1–1.5)
BUN SERPL-MCNC: 7 MG/DL (ref 8–22)
CALCIUM ALBUM COR SERPL-MCNC: 9 MG/DL (ref 8.5–10.5)
CALCIUM SERPL-MCNC: 9 MG/DL (ref 8.5–10.5)
CHLORIDE SERPL-SCNC: 111 MMOL/L (ref 96–112)
CO2 SERPL-SCNC: 17 MMOL/L (ref 20–33)
CREAT SERPL-MCNC: 1.07 MG/DL (ref 0.5–1.4)
EKG IMPRESSION: NORMAL
EOSINOPHIL # BLD AUTO: 0.02 K/UL (ref 0–0.51)
EOSINOPHIL NFR BLD: 0.3 % (ref 0–6.9)
ERYTHROCYTE [DISTWIDTH] IN BLOOD BY AUTOMATED COUNT: 64.2 FL (ref 35.9–50)
GFR SERPLBLD CREATININE-BSD FMLA CKD-EPI: 88 ML/MIN/1.73 M 2
GLOBULIN SER CALC-MCNC: 2.8 G/DL (ref 1.9–3.5)
GLUCOSE SERPL-MCNC: 98 MG/DL (ref 65–99)
HCT VFR BLD AUTO: 36.5 % (ref 42–52)
HGB BLD-MCNC: 11.6 G/DL (ref 14–18)
IMM GRANULOCYTES # BLD AUTO: 0.03 K/UL (ref 0–0.11)
IMM GRANULOCYTES NFR BLD AUTO: 0.5 % (ref 0–0.9)
LIPASE SERPL-CCNC: 23 U/L (ref 11–82)
LYMPHOCYTES # BLD AUTO: 1.55 K/UL (ref 1–4.8)
LYMPHOCYTES NFR BLD: 24 % (ref 22–41)
MCH RBC QN AUTO: 23.2 PG (ref 27–33)
MCHC RBC AUTO-ENTMCNC: 31.8 G/DL (ref 32.3–36.5)
MCV RBC AUTO: 72.9 FL (ref 81.4–97.8)
MONOCYTES # BLD AUTO: 0.49 K/UL (ref 0–0.85)
MONOCYTES NFR BLD AUTO: 7.6 % (ref 0–13.4)
NEUTROPHILS # BLD AUTO: 4.31 K/UL (ref 1.82–7.42)
NEUTROPHILS NFR BLD: 66.8 % (ref 44–72)
NRBC # BLD AUTO: 0 K/UL
NRBC BLD-RTO: 0 /100 WBC (ref 0–0.2)
PLATELET # BLD AUTO: 436 K/UL (ref 164–446)
PMV BLD AUTO: 8.6 FL (ref 9–12.9)
POTASSIUM SERPL-SCNC: 3.8 MMOL/L (ref 3.6–5.5)
PROT SERPL-MCNC: 6.8 G/DL (ref 6–8.2)
RBC # BLD AUTO: 5.01 M/UL (ref 4.7–6.1)
SODIUM SERPL-SCNC: 142 MMOL/L (ref 135–145)
WBC # BLD AUTO: 6.5 K/UL (ref 4.8–10.8)

## 2024-05-22 PROCEDURE — 36415 COLL VENOUS BLD VENIPUNCTURE: CPT

## 2024-05-22 PROCEDURE — 96376 TX/PRO/DX INJ SAME DRUG ADON: CPT

## 2024-05-22 PROCEDURE — G0378 HOSPITAL OBSERVATION PER HR: HCPCS

## 2024-05-22 PROCEDURE — 99222 1ST HOSP IP/OBS MODERATE 55: CPT | Performed by: NURSE PRACTITIONER

## 2024-05-22 PROCEDURE — 96374 THER/PROPH/DIAG INJ IV PUSH: CPT

## 2024-05-22 PROCEDURE — 96375 TX/PRO/DX INJ NEW DRUG ADDON: CPT

## 2024-05-22 PROCEDURE — 80053 COMPREHEN METABOLIC PANEL: CPT

## 2024-05-22 PROCEDURE — C9113 INJ PANTOPRAZOLE SODIUM, VIA: HCPCS | Performed by: NURSE PRACTITIONER

## 2024-05-22 PROCEDURE — 700102 HCHG RX REV CODE 250 W/ 637 OVERRIDE(OP): Performed by: NURSE PRACTITIONER

## 2024-05-22 PROCEDURE — 93005 ELECTROCARDIOGRAM TRACING: CPT | Performed by: EMERGENCY MEDICINE

## 2024-05-22 PROCEDURE — 700105 HCHG RX REV CODE 258: Performed by: EMERGENCY MEDICINE

## 2024-05-22 PROCEDURE — A9270 NON-COVERED ITEM OR SERVICE: HCPCS | Performed by: NURSE PRACTITIONER

## 2024-05-22 PROCEDURE — 99285 EMERGENCY DEPT VISIT HI MDM: CPT

## 2024-05-22 PROCEDURE — 93005 ELECTROCARDIOGRAM TRACING: CPT

## 2024-05-22 PROCEDURE — 700111 HCHG RX REV CODE 636 W/ 250 OVERRIDE (IP): Mod: JZ | Performed by: EMERGENCY MEDICINE

## 2024-05-22 PROCEDURE — 700102 HCHG RX REV CODE 250 W/ 637 OVERRIDE(OP): Performed by: EMERGENCY MEDICINE

## 2024-05-22 PROCEDURE — 700111 HCHG RX REV CODE 636 W/ 250 OVERRIDE (IP): Performed by: NURSE PRACTITIONER

## 2024-05-22 PROCEDURE — A9270 NON-COVERED ITEM OR SERVICE: HCPCS | Performed by: EMERGENCY MEDICINE

## 2024-05-22 PROCEDURE — 83690 ASSAY OF LIPASE: CPT

## 2024-05-22 PROCEDURE — 85025 COMPLETE CBC W/AUTO DIFF WBC: CPT

## 2024-05-22 RX ORDER — HYDROMORPHONE HYDROCHLORIDE 1 MG/ML
1 INJECTION, SOLUTION INTRAMUSCULAR; INTRAVENOUS; SUBCUTANEOUS ONCE
Status: COMPLETED | OUTPATIENT
Start: 2024-05-22 | End: 2024-05-22

## 2024-05-22 RX ORDER — SODIUM CHLORIDE, SODIUM LACTATE, POTASSIUM CHLORIDE, CALCIUM CHLORIDE 600; 310; 30; 20 MG/100ML; MG/100ML; MG/100ML; MG/100ML
1000 INJECTION, SOLUTION INTRAVENOUS ONCE
Status: COMPLETED | OUTPATIENT
Start: 2024-05-22 | End: 2024-05-22

## 2024-05-22 RX ORDER — METOPROLOL TARTRATE 25 MG/1
25 TABLET, FILM COATED ORAL 2 TIMES DAILY
Status: DISCONTINUED | OUTPATIENT
Start: 2024-05-22 | End: 2024-05-29 | Stop reason: HOSPADM

## 2024-05-22 RX ORDER — PROMETHAZINE HYDROCHLORIDE 25 MG/1
12.5-25 SUPPOSITORY RECTAL EVERY 4 HOURS PRN
Status: DISCONTINUED | OUTPATIENT
Start: 2024-05-22 | End: 2024-05-29 | Stop reason: HOSPADM

## 2024-05-22 RX ORDER — OXYCODONE HYDROCHLORIDE 5 MG/1
10 TABLET ORAL
Status: DISCONTINUED | OUTPATIENT
Start: 2024-05-22 | End: 2024-05-29 | Stop reason: HOSPADM

## 2024-05-22 RX ORDER — OXYCODONE HYDROCHLORIDE 10 MG/1
10 TABLET ORAL EVERY 4 HOURS PRN
Status: ON HOLD | COMMUNITY
Start: 2024-05-16 | End: 2024-06-23

## 2024-05-22 RX ORDER — LOSARTAN POTASSIUM 50 MG/1
50 TABLET ORAL DAILY
Status: DISCONTINUED | OUTPATIENT
Start: 2024-05-23 | End: 2024-05-29 | Stop reason: HOSPADM

## 2024-05-22 RX ORDER — ONDANSETRON 2 MG/ML
4 INJECTION INTRAMUSCULAR; INTRAVENOUS EVERY 4 HOURS PRN
Status: DISCONTINUED | OUTPATIENT
Start: 2024-05-22 | End: 2024-05-29 | Stop reason: HOSPADM

## 2024-05-22 RX ORDER — BACLOFEN 10 MG/1
10 TABLET ORAL 3 TIMES DAILY
Status: DISCONTINUED | OUTPATIENT
Start: 2024-05-22 | End: 2024-05-29 | Stop reason: HOSPADM

## 2024-05-22 RX ORDER — ASPIRIN 81 MG/1
81 TABLET ORAL DAILY
Status: DISCONTINUED | OUTPATIENT
Start: 2024-05-23 | End: 2024-05-23

## 2024-05-22 RX ORDER — PROMETHAZINE HYDROCHLORIDE 25 MG/1
12.5-25 TABLET ORAL EVERY 4 HOURS PRN
Status: DISCONTINUED | OUTPATIENT
Start: 2024-05-22 | End: 2024-05-29 | Stop reason: HOSPADM

## 2024-05-22 RX ORDER — PANTOPRAZOLE SODIUM 40 MG/10ML
40 INJECTION, POWDER, LYOPHILIZED, FOR SOLUTION INTRAVENOUS 3 TIMES DAILY
Status: DISCONTINUED | OUTPATIENT
Start: 2024-05-22 | End: 2024-05-23

## 2024-05-22 RX ORDER — ONDANSETRON 4 MG/1
4 TABLET, ORALLY DISINTEGRATING ORAL EVERY 4 HOURS PRN
Status: DISCONTINUED | OUTPATIENT
Start: 2024-05-22 | End: 2024-05-29 | Stop reason: HOSPADM

## 2024-05-22 RX ORDER — PANTOPRAZOLE SODIUM 40 MG/1
40 TABLET, DELAYED RELEASE ORAL 3 TIMES DAILY
Status: ON HOLD | COMMUNITY
Start: 2024-05-10 | End: 2024-06-23

## 2024-05-22 RX ORDER — FERROUS SULFATE 325(65) MG
325 TABLET ORAL DAILY
Status: DISCONTINUED | OUTPATIENT
Start: 2024-05-23 | End: 2024-05-23

## 2024-05-22 RX ORDER — PROCHLORPERAZINE EDISYLATE 5 MG/ML
5-10 INJECTION INTRAMUSCULAR; INTRAVENOUS EVERY 4 HOURS PRN
Status: DISCONTINUED | OUTPATIENT
Start: 2024-05-22 | End: 2024-05-29 | Stop reason: HOSPADM

## 2024-05-22 RX ORDER — ONDANSETRON 2 MG/ML
4 INJECTION INTRAMUSCULAR; INTRAVENOUS ONCE
Status: COMPLETED | OUTPATIENT
Start: 2024-05-22 | End: 2024-05-22

## 2024-05-22 RX ORDER — SUCRALFATE 1 G/1
1 TABLET ORAL
Status: DISCONTINUED | OUTPATIENT
Start: 2024-05-22 | End: 2024-05-29 | Stop reason: HOSPADM

## 2024-05-22 RX ORDER — GABAPENTIN 300 MG/1
300 CAPSULE ORAL 3 TIMES DAILY
Status: DISCONTINUED | OUTPATIENT
Start: 2024-05-22 | End: 2024-05-29 | Stop reason: HOSPADM

## 2024-05-22 RX ADMIN — GABAPENTIN 300 MG: 300 CAPSULE ORAL at 20:39

## 2024-05-22 RX ADMIN — PANTOPRAZOLE SODIUM 40 MG: 40 INJECTION, POWDER, FOR SOLUTION INTRAVENOUS at 20:38

## 2024-05-22 RX ADMIN — OXYCODONE HYDROCHLORIDE 10 MG: 10 TABLET ORAL at 20:39

## 2024-05-22 RX ADMIN — LIDOCAINE HYDROCHLORIDE 30 ML: 20 SOLUTION ORAL; TOPICAL at 17:11

## 2024-05-22 RX ADMIN — BACLOFEN 10 MG: 10 TABLET ORAL at 20:39

## 2024-05-22 RX ADMIN — SUCRALFATE 1 G: 1 TABLET ORAL at 20:39

## 2024-05-22 RX ADMIN — HYDROMORPHONE HYDROCHLORIDE 1 MG: 1 INJECTION, SOLUTION INTRAMUSCULAR; INTRAVENOUS; SUBCUTANEOUS at 17:11

## 2024-05-22 RX ADMIN — HYDROMORPHONE HYDROCHLORIDE 1 MG: 1 INJECTION, SOLUTION INTRAMUSCULAR; INTRAVENOUS; SUBCUTANEOUS at 18:56

## 2024-05-22 RX ADMIN — LIDOCAINE HYDROCHLORIDE 15 ML: 20 SOLUTION OROPHARYNGEAL at 20:39

## 2024-05-22 RX ADMIN — ONDANSETRON 4 MG: 2 INJECTION INTRAMUSCULAR; INTRAVENOUS at 17:11

## 2024-05-22 RX ADMIN — METOPROLOL TARTRATE 25 MG: 25 TABLET, FILM COATED ORAL at 20:39

## 2024-05-22 RX ADMIN — SODIUM CHLORIDE, POTASSIUM CHLORIDE, SODIUM LACTATE AND CALCIUM CHLORIDE 1000 ML: 600; 310; 30; 20 INJECTION, SOLUTION INTRAVENOUS at 17:12

## 2024-05-22 RX ADMIN — AMOXICILLIN AND CLAVULANATE POTASSIUM 1 TABLET: 875; 125 TABLET, FILM COATED ORAL at 20:39

## 2024-05-22 RX ADMIN — OXYCODONE HYDROCHLORIDE 10 MG: 10 TABLET ORAL at 23:46

## 2024-05-22 ASSESSMENT — SOCIAL DETERMINANTS OF HEALTH (SDOH)
WITHIN THE LAST YEAR, HAVE YOU BEEN AFRAID OF YOUR PARTNER OR EX-PARTNER?: NO
WITHIN THE LAST YEAR, HAVE YOU BEEN HUMILIATED OR EMOTIONALLY ABUSED IN OTHER WAYS BY YOUR PARTNER OR EX-PARTNER?: NO
WITHIN THE LAST YEAR, HAVE TO BEEN RAPED OR FORCED TO HAVE ANY KIND OF SEXUAL ACTIVITY BY YOUR PARTNER OR EX-PARTNER?: NO
WITHIN THE PAST 12 MONTHS, YOU WORRIED THAT YOUR FOOD WOULD RUN OUT BEFORE YOU GOT THE MONEY TO BUY MORE: NEVER TRUE
WITHIN THE LAST YEAR, HAVE YOU BEEN KICKED, HIT, SLAPPED, OR OTHERWISE PHYSICALLY HURT BY YOUR PARTNER OR EX-PARTNER?: NO
IN THE PAST 12 MONTHS, HAS THE ELECTRIC, GAS, OIL, OR WATER COMPANY THREATENED TO SHUT OFF SERVICE IN YOUR HOME?: NO
WITHIN THE PAST 12 MONTHS, THE FOOD YOU BOUGHT JUST DIDN'T LAST AND YOU DIDN'T HAVE MONEY TO GET MORE: NEVER TRUE

## 2024-05-22 ASSESSMENT — ENCOUNTER SYMPTOMS
DEPRESSION: 0
WHEEZING: 0
NERVOUS/ANXIOUS: 0
PALPITATIONS: 0
DIZZINESS: 0
SORE THROAT: 0
WEIGHT LOSS: 0
MYALGIAS: 0
HEADACHES: 0
DIARRHEA: 1
NAUSEA: 1
FEVER: 0
FLANK PAIN: 0
CHILLS: 0
ORTHOPNEA: 0
DIAPHORESIS: 0
ABDOMINAL PAIN: 1
SINUS PAIN: 0
SHORTNESS OF BREATH: 0
COUGH: 0

## 2024-05-22 ASSESSMENT — LIFESTYLE VARIABLES
TOTAL SCORE: 0
EVER HAD A DRINK FIRST THING IN THE MORNING TO STEADY YOUR NERVES TO GET RID OF A HANGOVER: NO
TOTAL SCORE: 0
CONSUMPTION TOTAL: NEGATIVE
TOTAL SCORE: 0
HAVE YOU EVER FELT YOU SHOULD CUT DOWN ON YOUR DRINKING: NO
HAVE PEOPLE ANNOYED YOU BY CRITICIZING YOUR DRINKING: NO
HOW MANY TIMES IN THE PAST YEAR HAVE YOU HAD 5 OR MORE DRINKS IN A DAY: 0
AVERAGE NUMBER OF DAYS PER WEEK YOU HAVE A DRINK CONTAINING ALCOHOL: 0
DOES PATIENT WANT TO STOP DRINKING: NO
EVER FELT BAD OR GUILTY ABOUT YOUR DRINKING: NO
ON A TYPICAL DAY WHEN YOU DRINK ALCOHOL HOW MANY DRINKS DO YOU HAVE: 0
ALCOHOL_USE: NO

## 2024-05-22 ASSESSMENT — FIBROSIS 4 INDEX
FIB4 SCORE: 0.68
FIB4 SCORE: 0.46

## 2024-05-22 ASSESSMENT — PAIN DESCRIPTION - PAIN TYPE
TYPE: ACUTE PAIN
TYPE: ACUTE PAIN

## 2024-05-22 NOTE — ED PROVIDER NOTES
ER Provider Note    Scribed for Jose De Jesus Duff D.O. by Alisa Palmer. 5/22/2024  4:58 PM    Primary Care Provider: Sal Schafer D.O.    CHIEF COMPLAINT  Chief Complaint   Patient presents with    Abdominal Pain     'Mid abd pain X2 hours. Radiates across entire stomach'.   +nausea.        HPI/ROS    Roberto Braswell is a 43 y.o. male who presents to the Emergency Department for abdominal pain onset 2 hours ago. The patient was hospitalized 5/18/24 for bleeding ulcers that were surgically repaired. The patient was discharged with pain medication and antibiotics. He reports that he did not have any abdominal pain following discharge but his pain slowly returned today. He confirms that he finished his medications. He describes his abdominal pain as radiating throughout his entire stomach and severe. He has associated nausea and diarrhea. Denies chest pain, black or bloody stool, or vomiting. The patient has history of hypertension and a Mitral valve repair in January 2024.     ROS as per HPI.    PAST MEDICAL HISTORY  Past Medical History:   Diagnosis Date    Arthritis     Right ankle    Breath shortness 11/03/2023    With exertion.    Drug-seeking behavior 07/30/2023    Demanding narcotics for a duodenal ulcer July 2023. Refused antacids or sucralfate.     Duodenal ulcer 07/30/2023    Gastric ulcer     Heart valve disease     Mitral valve prolapse    Hemorrhagic disorder (HCC)     GI bleed    Hypertension     Pain     Resolved; Right ankle    Seizure (HCC)     while taking tramadol       SURGICAL HISTORY  Past Surgical History:   Procedure Laterality Date    KS UPPER GI ENDOSCOPY,DIAGNOSIS N/A 5/18/2024    Procedure: GASTROSCOPY;  Surgeon: Kaitlyn Ontiveros M.D.;  Location: SURGERY [facility];  Service: Gastroenterology    KS UPPER GI ENDOSCOPY,BIOPSY N/A 5/18/2024    Procedure: GASTROSCOPY, WITH BIOPSY;  Surgeon: Kaitlyn Ontiveros M.D.;  Location: SURGERY [facility];  Service: Gastroenterology    GASTROSCOPY  WITH ENDOSTAT N/A 5/18/2024    Procedure: EGD, WITH CAUTERIZATION;  Surgeon: Kaitlyn Ontiveros M.D.;  Location: SURGERY [facility];  Service: Gastroenterology    DC REPLACEMENT OF MITRAL VALVE  1/25/2024    Procedure: MITRAL VALVE REPAIR, TRANSESOPHAGEAL ECHOCARDIOGRAM;  Surgeon: Bigg Santana M.D.;  Location: SURGERY [facility];  Service: Cardiothoracic    ECHOCARDIOGRAM, TRANSESOPHAGEAL, INTRAOPERATIVE  1/25/2024    Procedure: ECHOCARDIOGRAM, TRANSESOPHAGEAL, INTRAOPERATIVE;  Surgeon: Bigg Santana M.D.;  Location: [facility];  Service: Cardiothoracic    DC UPPER GI ENDOSCOPY,DIAGNOSIS N/A 9/14/2023    Procedure: GASTROSCOPY;  Surgeon: Johnny Dalton M.D.;  Location: SURGERY SAME DAY [facility];  Service: Gastroenterology    DC UPPER GI ENDOSCOPY,BIOPSY N/A 9/14/2023    Procedure: GASTROSCOPY, WITH BIOPSY;  Surgeon: Johnny Dalton M.D.;  Location: SURGERY SAME DAY [facility];  Service: Gastroenterology    DC UPPER GI ENDOSCOPY,BIOPSY N/A 7/30/2023    Procedure: GASTROSCOPY, WITH BIOPSY;  Surgeon: Kumar Hope M.D.;  Location: SURGERY [facility];  Service: Gastroenterology    DC UPPER GI ENDOSCOPY,DIAGNOSIS  3/28/2022    Procedure: GASTROSCOPY;  Surgeon: Paco Wiggins M.D.;  Location: SURGERY SAME DAY [facility];  Service: Gastroenterology    DC UPPER GI ENDOSCOPY,BIOPSY  3/28/2022    Procedure: GASTROSCOPY, WITH BIOPSY;  Surgeon: Paco Wiggins M.D.;  Location: SURGERY SAME DAY [facility];  Service: Gastroenterology    ANKLE ARTHROSCOPY Right 5/21/2018    Procedure: ANKLE ARTHROSCOPY, LATERAL LIGAMENT RECONSTRUCTION;  Surgeon: Seth Cruz M.D.;  Location: SURGERY [facility];  Service: Orthopedics    BIOPSY ORTHO Right 5/21/2018    Procedure: BIOPSY ORTHO/ FOR CARTILAGE AND DENOVO PROCEDURE;  Surgeon: Seth Cruz M.D.;  Location: [facility];  Service: Orthopedics    OTHER ABDOMINAL SURGERY      Cholecystectomy February 2017    OTHER ORTHOPEDIC SURGERY      2 previous ankle  "surgeries (2007, 2009)       FAMILY HISTORY  Family History   Problem Relation Age of Onset    Heart Disease Mother     No Known Problems Father     Clotting Disorder Neg Hx        SOCIAL HISTORY   reports that he has never smoked. He has never used smokeless tobacco. He reports that he does not currently use alcohol. He reports that he does not use drugs.    CURRENT MEDICATIONS  Current Outpatient Medications   Medication Instructions    acetaminophen (TYLENOL) 500-1,000 mg, Oral, EVERY 8 HOURS PRN    amoxicillin-clavulanate (AUGMENTIN) 875-125 MG Tab 1 Tablet, Oral, 2 TIMES DAILY    aspirin 81 mg, Oral, DAILY    baclofen (LIORESAL) 10 mg, Oral, 3 TIMES DAILY PRN    ferrous sulfate 325 mg, Oral, DAILY    gabapentin (NEURONTIN) 300 mg, Oral, 3 TIMES DAILY    losartan (COZAAR) 50 mg, Oral, DAILY    metoprolol tartrate (LOPRESSOR) 25 mg, Oral, 2 TIMES DAILY    omeprazole (PRILOSEC) 20 mg, Oral, 2 TIMES DAILY    Oxycodone HCl 20 mg, Oral, 3 TIMES DAILY PRN    sucralfate (CARAFATE) 1 g, Oral, 3 TIMES DAILY BEFORE MEALS      ALLERGIES  Morphine and Tramadol    PHYSICAL EXAM  BP (!) 165/108   Pulse (!) 111   Temp 36.7 °C (98.1 °F) (Temporal)   Resp 20   Ht 1.905 m (6' 3\")   Wt 111 kg (244 lb 7.8 oz)   SpO2 99%   BMI 30.56 kg/m²     General: Severe distress due to pain. Patient is using arms to splint abdomen.   HENT: Normocephalic, Mucus membranes are moist.   Chest: Lungs have even and unlabored respirations, Clear to auscultation.   Cardiovascular: Tachycardic rate and regular rhythm, No peripheral cyanosis.  Abdomen: Mid-epigastric tenderness to palpation, Non distended.  Neuro: Awake, Conversive, Able to relay recent events.  Psychiatric: Calm and cooperative.     EXTERNAL RECORDS REVIEWED  Review of patient's past medical records show hospitalization 5/18/24 for bleeding ulcers. These were surgically repaired and the patient was discharged with pain medication and antibiotics. The patient had a mitral valve " repair January 25 2024.     INITIAL ASSESSMENT  Patient has recent diagnosis of bleeding ulcers. His pain is similar to past pain. He has abdominal tenderness in epigastric area.  There are concerns for gastritis, esophagitis, and pancreatitis. Patient is tachycardic. This may related to his pain. However,  hypovolemia will be evaluated. Will treat with IV fluids. Will treat for pain and evaluate for anemia.     ED Observation Status? No; Patient does not meet criteria for ED Observation.     DIAGNOSTIC STUDIES    Labs:   Results for orders placed or performed during the hospital encounter of 05/22/24   CBC WITH DIFFERENTIAL   Result Value Ref Range    WBC 6.5 4.8 - 10.8 K/uL    RBC 5.01 4.70 - 6.10 M/uL    Hemoglobin 11.6 (L) 14.0 - 18.0 g/dL    Hematocrit 36.5 (L) 42.0 - 52.0 %    MCV 72.9 (L) 81.4 - 97.8 fL    MCH 23.2 (L) 27.0 - 33.0 pg    MCHC 31.8 (L) 32.3 - 36.5 g/dL    RDW 64.2 (H) 35.9 - 50.0 fL    Platelet Count 436 164 - 446 K/uL    MPV 8.6 (L) 9.0 - 12.9 fL    Neutrophils-Polys 66.80 44.00 - 72.00 %    Lymphocytes 24.00 22.00 - 41.00 %    Monocytes 7.60 0.00 - 13.40 %    Eosinophils 0.30 0.00 - 6.90 %    Basophils 0.80 0.00 - 1.80 %    Immature Granulocytes 0.50 0.00 - 0.90 %    Nucleated RBC 0.00 0.00 - 0.20 /100 WBC    Neutrophils (Absolute) 4.31 1.82 - 7.42 K/uL    Lymphs (Absolute) 1.55 1.00 - 4.80 K/uL    Monos (Absolute) 0.49 0.00 - 0.85 K/uL    Eos (Absolute) 0.02 0.00 - 0.51 K/uL    Baso (Absolute) 0.05 0.00 - 0.12 K/uL    Immature Granulocytes (abs) 0.03 0.00 - 0.11 K/uL    NRBC (Absolute) 0.00 K/uL   COMP METABOLIC PANEL   Result Value Ref Range    Sodium 142 135 - 145 mmol/L    Potassium 3.8 3.6 - 5.5 mmol/L    Chloride 111 96 - 112 mmol/L    Co2 17 (L) 20 - 33 mmol/L    Anion Gap 14.0 7.0 - 16.0    Glucose 98 65 - 99 mg/dL    Bun 7 (L) 8 - 22 mg/dL    Creatinine 1.07 0.50 - 1.40 mg/dL    Calcium 9.0 8.5 - 10.5 mg/dL    Correct Calcium 9.0 8.5 - 10.5 mg/dL    AST(SGOT) 22 12 - 45 U/L     ALT(SGPT) 21 2 - 50 U/L    Alkaline Phosphatase 66 30 - 99 U/L    Total Bilirubin 0.3 0.1 - 1.5 mg/dL    Albumin 4.0 3.2 - 4.9 g/dL    Total Protein 6.8 6.0 - 8.2 g/dL    Globulin 2.8 1.9 - 3.5 g/dL    A-G Ratio 1.4 g/dL   LIPASE   Result Value Ref Range    Lipase 23 11 - 82 U/L   ESTIMATED GFR   Result Value Ref Range    GFR (CKD-EPI) 88 >60 mL/min/1.73 m 2   EKG   Result Value Ref Range    Report       [facility] Emergency Dept.    Test Date:  2024  Pt Name:    PATI MERCEDES           Department: ER  MRN:        [id number]                      Room:  Gender:     Male                         Technician: [id number]  :        1980                   Requested By:ER TRIAGE PROTOCOL  Order #:    [id number]                    Reading MD: TIFFANIE FAUSTIN D.O.    Measurements  Intervals                                Axis  Rate:       99                           P:          55  IL:         147                          QRS:        -12  QRSD:       82                           T:          101  QT:         403  QTc:        518    Interpretive Statements  Sinus rhythm    Prolonged QT interval  Compared to ECG 2024 12:03:37    Prolonged QT interval now present  ST (T wave) deviation no longer present  Electronically Signed On 2024 17:37:47 PDT by TIFFANIE FAUSTIN D.O.        EKG:   I have independently interpreted the above EKG.    COURSE & MEDICAL DECISION MAKING     COURSE AND PLAN  4:58 PM - Patient seen and examined at bedside. Discussed plan of care, including labs. Patient agrees to the plan of care. The patient will be resuscitated with 1L NS IV and medicated with GI cocktail, 4 mg Zofran, and Dilaudid injection. Ordered for EKG, UA, CBC with diff, and CMP to evaluate his symptoms.     5:40 PM - Patient was reevaluated at bedside. Discussed lab results with the patient. The patient had the opportunity to ask any questions. The plan for hospitalization was discussed with  the patient given their current presentation and diagnostic study results. The patient is understanding and agreeable to the plan for hospitalization.      6:19 PM - I discussed the patient's case and the above findings with Dr. Tian (Hospitalist) who agrees to evaluate the patient for hospitalization.     ED Summary: Patient is having severe midepigastric abdominal pain.  He has a history of bleeding ulcers, he was just admitted and discharged 4 days ago for bleeding ulcer that he had cauterized.  He says his pain is similar to when he had the bleeding.  The patient is on iron tablets so a guaiac of stool would be pointless.    His hemoglobin is better than it was before.  But I am unable to determine if he has any active bleeding he has not been vomiting.  With this I spoke with the [facility]U hospitalist for admission for observation and serial hemoglobin.      HYDRATION: Based on the patient's presentation of Tachycardia the patient was given IV fluids. IV Hydration was used because oral hydration was not adequate alone. Upon recheck following hydration, the patient was feeling improved.      DISPOSITION AND DISCUSSIONS  I have discussed management of the patient with the following physicians and CARTER's: Dr. Tian (Hospitalist)    Discussion of management with other [facility] or appropriate source(s): None    Barriers to care at this time, including but not limited to: None     DISPOSITION:  Patient will be hospitalized by Dr. Tian in guarded condition.     FINAL DIAGNOSIS  1. Abdominal pain, acute, epigastric    2. Melena        Alisa MORENO (Stuartibanali), am scribing for, and in the presence of, Jose De Jesus Duff D.O..    Electronically signed by: Alisa Palmer (Ladi), 5/22/2024    Jose De Jesus MORENO D.O. personally performed the services described in this documentation, as scribed by Alisa Palmer in my presence, and it is both accurate and complete.     The note accurately reflects work and decisions made by  me.  Jose De Jesus Duff D.O.  5/22/2024  8:58 PM

## 2024-05-22 NOTE — ED TRIAGE NOTES
".  Chief Complaint   Patient presents with    Abdominal Pain     'Mid abd pain X2 hours. Radiates across entire stomach'.   +nausea.      Hospitalized last week d/t stomach ulcers. Cauterized ulcers last week during hospitalization. Per pt ' this pain is very similar to duodenal ulcer pain, if not worse\"  HX of GI bleeds. Pt states he feels like looks pale. Denies bleeding at this time.   Pt appears to be in a lot of pain in triage.   Pt aox4, gcs15 and speaking in complete sentences. Protocol ordered.   Denies taking blood thinners.     "

## 2024-05-23 ENCOUNTER — APPOINTMENT (OUTPATIENT)
Dept: RADIOLOGY | Facility: MEDICAL CENTER | Age: 44
End: 2024-05-23
Payer: COMMERCIAL

## 2024-05-23 ENCOUNTER — APPOINTMENT (OUTPATIENT)
Dept: RADIOLOGY | Facility: MEDICAL CENTER | Age: 44
End: 2024-05-23
Attending: INTERNAL MEDICINE
Payer: COMMERCIAL

## 2024-05-23 PROBLEM — K92.2 UPPER GI BLEED: Status: ACTIVE | Noted: 2024-05-23

## 2024-05-23 LAB
ABO GROUP BLD: NORMAL
ANION GAP SERPL CALC-SCNC: 10 MMOL/L (ref 7–16)
APPEARANCE UR: CLEAR
BILIRUB UR QL STRIP.AUTO: NEGATIVE
BLD GP AB SCN SERPL QL: NORMAL
BUN SERPL-MCNC: 11 MG/DL (ref 8–22)
CALCIUM SERPL-MCNC: 8.6 MG/DL (ref 8.5–10.5)
CHLORIDE SERPL-SCNC: 110 MMOL/L (ref 96–112)
CO2 SERPL-SCNC: 22 MMOL/L (ref 20–33)
COLOR UR: YELLOW
CREAT SERPL-MCNC: 1.11 MG/DL (ref 0.5–1.4)
ERYTHROCYTE [DISTWIDTH] IN BLOOD BY AUTOMATED COUNT: 67.6 FL (ref 35.9–50)
GFR SERPLBLD CREATININE-BSD FMLA CKD-EPI: 85 ML/MIN/1.73 M 2
GLUCOSE SERPL-MCNC: 95 MG/DL (ref 65–99)
GLUCOSE UR STRIP.AUTO-MCNC: NEGATIVE MG/DL
HCT VFR BLD AUTO: 33.1 % (ref 42–52)
HGB BLD-MCNC: 10.2 G/DL (ref 14–18)
HGB BLD-MCNC: 10.4 G/DL (ref 14–18)
HGB BLD-MCNC: 10.6 G/DL (ref 14–18)
HGB RETIC QN AUTO: 25.7 PG/CELL (ref 29–35)
IMM RETICS NFR: 31.1 % (ref 2.6–16.1)
IRON SATN MFR SERPL: 26 % (ref 15–55)
IRON SERPL-MCNC: 94 UG/DL (ref 50–180)
KETONES UR STRIP.AUTO-MCNC: NEGATIVE MG/DL
LEUKOCYTE ESTERASE UR QL STRIP.AUTO: NEGATIVE
MCH RBC QN AUTO: 23.2 PG (ref 27–33)
MCHC RBC AUTO-ENTMCNC: 30.8 G/DL (ref 32.3–36.5)
MCV RBC AUTO: 75.4 FL (ref 81.4–97.8)
MICRO URNS: NORMAL
NITRITE UR QL STRIP.AUTO: NEGATIVE
PH UR STRIP.AUTO: 6 [PH] (ref 5–8)
PLATELET # BLD AUTO: 362 K/UL (ref 164–446)
PMV BLD AUTO: 8.5 FL (ref 9–12.9)
POTASSIUM SERPL-SCNC: 4 MMOL/L (ref 3.6–5.5)
PROT UR QL STRIP: NEGATIVE MG/DL
RBC # BLD AUTO: 4.39 M/UL (ref 4.7–6.1)
RBC UR QL AUTO: NEGATIVE
RETICS # AUTO: 0.11 M/UL (ref 0.04–0.12)
RETICS/RBC NFR: 2.4 % (ref 0.8–2.6)
RH BLD: NORMAL
SODIUM SERPL-SCNC: 142 MMOL/L (ref 135–145)
SP GR UR STRIP.AUTO: >=1.03
TIBC SERPL-MCNC: 355 UG/DL (ref 250–450)
UIBC SERPL-MCNC: 261 UG/DL (ref 110–370)
UROBILINOGEN UR STRIP.AUTO-MCNC: 0.2 MG/DL
WBC # BLD AUTO: 4.6 K/UL (ref 4.8–10.8)

## 2024-05-23 PROCEDURE — 36415 COLL VENOUS BLD VENIPUNCTURE: CPT

## 2024-05-23 PROCEDURE — 86901 BLOOD TYPING SEROLOGIC RH(D): CPT

## 2024-05-23 PROCEDURE — 86900 BLOOD TYPING SEROLOGIC ABO: CPT

## 2024-05-23 PROCEDURE — 81003 URINALYSIS AUTO W/O SCOPE: CPT

## 2024-05-23 PROCEDURE — 96376 TX/PRO/DX INJ SAME DRUG ADON: CPT

## 2024-05-23 PROCEDURE — 99233 SBSQ HOSP IP/OBS HIGH 50: CPT | Performed by: INTERNAL MEDICINE

## 2024-05-23 PROCEDURE — C9113 INJ PANTOPRAZOLE SODIUM, VIA: HCPCS | Performed by: INTERNAL MEDICINE

## 2024-05-23 PROCEDURE — 83540 ASSAY OF IRON: CPT

## 2024-05-23 PROCEDURE — 85046 RETICYTE/HGB CONCENTRATE: CPT

## 2024-05-23 PROCEDURE — 99222 1ST HOSP IP/OBS MODERATE 55: CPT | Performed by: INTERNAL MEDICINE

## 2024-05-23 PROCEDURE — 700111 HCHG RX REV CODE 636 W/ 250 OVERRIDE (IP): Performed by: INTERNAL MEDICINE

## 2024-05-23 PROCEDURE — 700102 HCHG RX REV CODE 250 W/ 637 OVERRIDE(OP): Performed by: NURSE PRACTITIONER

## 2024-05-23 PROCEDURE — 80048 BASIC METABOLIC PNL TOTAL CA: CPT

## 2024-05-23 PROCEDURE — 86850 RBC ANTIBODY SCREEN: CPT

## 2024-05-23 PROCEDURE — 85027 COMPLETE CBC AUTOMATED: CPT

## 2024-05-23 PROCEDURE — 700105 HCHG RX REV CODE 258: Performed by: INTERNAL MEDICINE

## 2024-05-23 PROCEDURE — A9270 NON-COVERED ITEM OR SERVICE: HCPCS | Performed by: NURSE PRACTITIONER

## 2024-05-23 PROCEDURE — 83550 IRON BINDING TEST: CPT

## 2024-05-23 PROCEDURE — 74018 RADEX ABDOMEN 1 VIEW: CPT

## 2024-05-23 PROCEDURE — 700111 HCHG RX REV CODE 636 W/ 250 OVERRIDE (IP): Performed by: NURSE PRACTITIONER

## 2024-05-23 PROCEDURE — 770006 HCHG ROOM/CARE - MED/SURG/GYN SEMI*

## 2024-05-23 PROCEDURE — C9113 INJ PANTOPRAZOLE SODIUM, VIA: HCPCS | Performed by: NURSE PRACTITIONER

## 2024-05-23 PROCEDURE — 85018 HEMOGLOBIN: CPT

## 2024-05-23 RX ORDER — METOCLOPRAMIDE HYDROCHLORIDE 5 MG/ML
10 INJECTION INTRAMUSCULAR; INTRAVENOUS ONCE
Status: ACTIVE | OUTPATIENT
Start: 2024-05-23 | End: 2024-05-24

## 2024-05-23 RX ORDER — HYDROMORPHONE HYDROCHLORIDE 1 MG/ML
1 INJECTION, SOLUTION INTRAMUSCULAR; INTRAVENOUS; SUBCUTANEOUS
Status: DISCONTINUED | OUTPATIENT
Start: 2024-05-23 | End: 2024-05-29 | Stop reason: HOSPADM

## 2024-05-23 RX ORDER — SODIUM CHLORIDE, SODIUM LACTATE, POTASSIUM CHLORIDE, CALCIUM CHLORIDE 600; 310; 30; 20 MG/100ML; MG/100ML; MG/100ML; MG/100ML
INJECTION, SOLUTION INTRAVENOUS CONTINUOUS
Status: DISCONTINUED | OUTPATIENT
Start: 2024-05-23 | End: 2024-05-29 | Stop reason: HOSPADM

## 2024-05-23 RX ADMIN — OXYCODONE HYDROCHLORIDE 10 MG: 10 TABLET ORAL at 15:01

## 2024-05-23 RX ADMIN — OXYCODONE HYDROCHLORIDE 10 MG: 10 TABLET ORAL at 19:50

## 2024-05-23 RX ADMIN — GABAPENTIN 300 MG: 300 CAPSULE ORAL at 15:01

## 2024-05-23 RX ADMIN — GABAPENTIN 300 MG: 300 CAPSULE ORAL at 08:41

## 2024-05-23 RX ADMIN — SODIUM CHLORIDE, POTASSIUM CHLORIDE, SODIUM LACTATE AND CALCIUM CHLORIDE: 600; 310; 30; 20 INJECTION, SOLUTION INTRAVENOUS at 23:07

## 2024-05-23 RX ADMIN — HYDROMORPHONE HYDROCHLORIDE 1 MG: 1 INJECTION, SOLUTION INTRAMUSCULAR; INTRAVENOUS; SUBCUTANEOUS at 16:30

## 2024-05-23 RX ADMIN — SODIUM CHLORIDE, POTASSIUM CHLORIDE, SODIUM LACTATE AND CALCIUM CHLORIDE: 600; 310; 30; 20 INJECTION, SOLUTION INTRAVENOUS at 15:18

## 2024-05-23 RX ADMIN — METOPROLOL TARTRATE 25 MG: 25 TABLET, FILM COATED ORAL at 16:33

## 2024-05-23 RX ADMIN — ASPIRIN 81 MG: 81 TABLET, COATED ORAL at 05:04

## 2024-05-23 RX ADMIN — HYDROMORPHONE HYDROCHLORIDE 1 MG: 1 INJECTION, SOLUTION INTRAMUSCULAR; INTRAVENOUS; SUBCUTANEOUS at 20:51

## 2024-05-23 RX ADMIN — AMOXICILLIN AND CLAVULANATE POTASSIUM 1 TABLET: 875; 125 TABLET, FILM COATED ORAL at 08:41

## 2024-05-23 RX ADMIN — LOSARTAN POTASSIUM 50 MG: 50 TABLET, FILM COATED ORAL at 05:04

## 2024-05-23 RX ADMIN — SUCRALFATE 1 G: 1 TABLET ORAL at 16:33

## 2024-05-23 RX ADMIN — SODIUM CHLORIDE, POTASSIUM CHLORIDE, SODIUM LACTATE AND CALCIUM CHLORIDE: 600; 310; 30; 20 INJECTION, SOLUTION INTRAVENOUS at 14:43

## 2024-05-23 RX ADMIN — BACLOFEN 10 MG: 10 TABLET ORAL at 08:41

## 2024-05-23 RX ADMIN — FERROUS SULFATE TAB 325 MG (65 MG ELEMENTAL FE) 325 MG: 325 (65 FE) TAB at 05:04

## 2024-05-23 RX ADMIN — OXYCODONE HYDROCHLORIDE 10 MG: 10 TABLET ORAL at 23:23

## 2024-05-23 RX ADMIN — PANTOPRAZOLE SODIUM 8 MG/HR: 40 INJECTION, POWDER, FOR SOLUTION INTRAVENOUS at 14:42

## 2024-05-23 RX ADMIN — HYDROMORPHONE HYDROCHLORIDE 1 MG: 1 INJECTION, SOLUTION INTRAMUSCULAR; INTRAVENOUS; SUBCUTANEOUS at 13:24

## 2024-05-23 RX ADMIN — BACLOFEN 10 MG: 10 TABLET ORAL at 20:56

## 2024-05-23 RX ADMIN — PANTOPRAZOLE SODIUM 40 MG: 40 INJECTION, POWDER, FOR SOLUTION INTRAVENOUS at 08:41

## 2024-05-23 RX ADMIN — OXYCODONE HYDROCHLORIDE 10 MG: 10 TABLET ORAL at 06:14

## 2024-05-23 RX ADMIN — SUCRALFATE 1 G: 1 TABLET ORAL at 20:52

## 2024-05-23 RX ADMIN — GABAPENTIN 300 MG: 300 CAPSULE ORAL at 20:56

## 2024-05-23 RX ADMIN — SUCRALFATE 1 G: 1 TABLET ORAL at 06:15

## 2024-05-23 RX ADMIN — AMOXICILLIN AND CLAVULANATE POTASSIUM 1 TABLET: 875; 125 TABLET, FILM COATED ORAL at 19:51

## 2024-05-23 RX ADMIN — PANTOPRAZOLE SODIUM 8 MG/HR: 40 INJECTION, POWDER, FOR SOLUTION INTRAVENOUS at 15:20

## 2024-05-23 RX ADMIN — OXYCODONE HYDROCHLORIDE 10 MG: 10 TABLET ORAL at 11:45

## 2024-05-23 RX ADMIN — SUCRALFATE 1 G: 1 TABLET ORAL at 13:24

## 2024-05-23 RX ADMIN — METOPROLOL TARTRATE 25 MG: 25 TABLET, FILM COATED ORAL at 05:04

## 2024-05-23 RX ADMIN — BACLOFEN 10 MG: 10 TABLET ORAL at 15:01

## 2024-05-23 ASSESSMENT — PATIENT HEALTH QUESTIONNAIRE - PHQ9
1. LITTLE INTEREST OR PLEASURE IN DOING THINGS: NOT AT ALL
2. FEELING DOWN, DEPRESSED, IRRITABLE, OR HOPELESS: NOT AT ALL
SUM OF ALL RESPONSES TO PHQ9 QUESTIONS 1 AND 2: 0

## 2024-05-23 ASSESSMENT — COGNITIVE AND FUNCTIONAL STATUS - GENERAL
SUGGESTED CMS G CODE MODIFIER MOBILITY: CH
SUGGESTED CMS G CODE MODIFIER DAILY ACTIVITY: CH
MOBILITY SCORE: 24
DAILY ACTIVITIY SCORE: 24

## 2024-05-23 ASSESSMENT — PAIN DESCRIPTION - PAIN TYPE
TYPE: ACUTE PAIN
TYPE: ACUTE PAIN
TYPE: CHRONIC PAIN
TYPE: ACUTE PAIN
TYPE: CHRONIC PAIN
TYPE: ACUTE PAIN
TYPE: CHRONIC PAIN
TYPE: ACUTE PAIN
TYPE: CHRONIC PAIN
TYPE: CHRONIC PAIN

## 2024-05-23 ASSESSMENT — ENCOUNTER SYMPTOMS
CARDIOVASCULAR NEGATIVE: 1
EYES NEGATIVE: 1
CONSTITUTIONAL NEGATIVE: 1
RESPIRATORY NEGATIVE: 1
MUSCULOSKELETAL NEGATIVE: 1
ABDOMINAL PAIN: 1
NEUROLOGICAL NEGATIVE: 1
NAUSEA: 1

## 2024-05-23 NOTE — DISCHARGE INSTRUCTIONS
Discharge Instructions    Discharged to home by car with relative. Discharged via wheelchair, hospital escort: Yes.  Special equipment needed: Not Applicable    Be sure to schedule a follow-up appointment with your primary care doctor or any specialists as instructed.     Discharge Plan:        I understand that a diet low in cholesterol, fat, and sodium is recommended for good health. Unless I have been given specific instructions below for another diet, I accept this instruction as my diet prescription.   Other diet: Diet as tolerated    Special Instructions: None    -Is this patient being discharged with medication to prevent blood clots?  No    Is patient discharged on Warfarin / Coumadin?   No

## 2024-05-23 NOTE — PROGRESS NOTES
Pt arrived to the floor via gurney with transport.  Pt A&Ox4, ambulated to bed with standby assist.  IVMF and Protonix resumed.  Pt oriented to room, call light, and when to call.  Bed locked and low with call-light in reach.

## 2024-05-23 NOTE — CARE PLAN
The patient is Stable - Low risk of patient condition declining or worsening    Shift Goals  Clinical Goals: monitor H&H, GI cocktail  Patient Goals: rest, pain control  Family Goals: not at bedside    Progress made toward(s) clinical / shift goals:    Problem: Pain - Standard  Goal: Alleviation of pain or a reduction in pain to the patient’s comfort goal  Description: Target End Date:  Prior to discharge or change in level of care    Document on Vitals flowsheet    1.  Document pain using the appropriate pain scale per order or unit policy  2.  Educate and implement non-pharmacologic comfort measures (i.e. relaxation, distraction, massage, cold/heat therapy, etc.)  3.  Pain management medications as ordered  4.  Reassess pain after pain med administration per policy  5.  If opiods administered assess patient's response to pain medication is appropriate per POSS sedation scale  6.  Follow pain management plan developed in collaboration with patient and interdisciplinary team (including palliative care or pain specialists if applicable)  Outcome: Progressing     Problem: Knowledge Deficit - Standard  Goal: Patient and family/care givers will demonstrate understanding of plan of care, disease process/condition, diagnostic tests and medications  Description: Target End Date:  1-3 days or as soon as patient condition allows    Document in Patient Education    1.  Patient and family/caregiver oriented to unit, equipment, visitation policy and means for communicating concern  2.  Complete/review Learning Assessment  3.  Assess knowledge level of disease process/condition, treatment plan, diagnostic tests and medications  4.  Explain disease process/condition, treatment plan, diagnostic tests and medications  Outcome: Progressing

## 2024-05-23 NOTE — ASSESSMENT & PLAN NOTE
Follows with pain management at [facility], [place].  Patient reports his chronic pain is secondary to heart surgery and that they are currently reducing his medications from oxy 20 mg 5 times a day to 10 mg 5 times a day.  He has not seen his pain management specialist in 2 weeks.  - continue home meds, including oxy, gabapentin, baclofen

## 2024-05-23 NOTE — CARE PLAN
The patient is Stable - Low risk of patient condition declining or worsening    Shift Goals  Clinical Goals: Monitor H&H, pain management, collect labs  Patient Goals: pain control, updates on POC  Family Goals: none present    Progress made toward(s) clinical / shift goals:    Problem: Pain - Standard  Goal: Alleviation of pain or a reduction in pain to the patient’s comfort goal  Outcome: Progressing     Problem: Knowledge Deficit - Standard  Goal: Patient and family/care givers will demonstrate understanding of plan of care, disease process/condition, diagnostic tests and medications  Outcome: Progressing       Patient is not progressing towards the following goals:

## 2024-05-23 NOTE — ED NOTES
Med rec complete per pt.     Aspirin was last taken on 5/21/24.  Amoxicillin was prescribed for 4 days- has one day left and last dose was on 5/21/24

## 2024-05-23 NOTE — H&P
Hospital Medicine History & Physical Note    Date of Service  5/22/2024    Primary Care Physician  Sal Schafer D.O.      Code Status  Full Code    Chief Complaint  Chief Complaint   Patient presents with    Abdominal Pain     'Mid abd pain X2 hours. Radiates across entire stomach'.   +nausea.        History of Presenting Illness  Roberto Braswell is a 43 y.o. male with a past medical history of mitral valve regurgitation s/p MVR, duodenal ulcer status post GDA coiling embolization and chronic pain for which he follows with outpatient pain management post cardiac surgery who presented to the ED on 5/22/2024 with a 1 day history of worsening epigastric pain.   The patient was just discharged on 5/19/2024 after being being admitted for abdominal pain. He underwent an EGD which found to eosinophilic esophagitis, clean-based ulcers in the antrum, food in the stomach, narrowing due to ulcer at pylorus, oozing and multiple ulcers of the duodenum. Bleeding was treated and area was biopsied. Gastrin found to be normal. Stool for H. pylori was negative. The patient was discharged with twice daily PPI for 3 months, carafate QID. It was also recommended to have a repeat EGD in 3 months to assess for healing. Patient was also prescribed oral iron for microcytic anemia.   The patient says that his epigastric pain is worse than it was on his recent admission. He also reports nausea secondary to pain. He reports recent diarrhea, denies noting blood in the stool. He denies vomiting.  He says he has not otherwise been ill. He reports taking his GI medications as prescribed.   Patient tells me he has chronic pain secondary to his heart surgery. He follows with outpatient pain management at [facility], and tells me they have been tapering down his oxycodone from 20 mg 5 times a day to 10 mg 5 times a day. He says he has not seen pain management in 2 weeks. He has not yet had follow-up with his primary care provider or  gastroenterology.    Labs obtained in the ED show hemoglobin 11.6, which is quite improved from hemoglobin at discharge on 5/19.  CMP is unremarkable.  Hospitalist service was consulted for overnight admission to do serial H&H's to determine if patient has a possible GI bleed.    I discussed the plan of care with patient and ERP .    Review of Systems  Review of Systems   Constitutional:  Negative for chills, diaphoresis, fever, malaise/fatigue and weight loss.   HENT:  Negative for congestion, sinus pain and sore throat.    Respiratory:  Negative for cough, shortness of breath and wheezing.    Cardiovascular:  Negative for chest pain, palpitations, orthopnea and leg swelling.   Gastrointestinal:  Positive for abdominal pain, diarrhea and nausea.   Genitourinary:  Negative for dysuria, flank pain, frequency, hematuria and urgency.   Musculoskeletal:  Negative for myalgias.   Skin:  Negative for itching and rash.   Neurological:  Negative for dizziness and headaches.   Psychiatric/Behavioral:  Negative for depression. The patient is not nervous/anxious.        Past Medical History   has a past medical history of Arthritis, Breath shortness (11/03/2023), Drug-seeking behavior (07/30/2023), Duodenal ulcer (07/30/2023), Gastric ulcer, Heart valve disease, Hemorrhagic disorder (HCC), Hypertension, Pain, and Seizure (HCC).    Surgical History   has a past surgical history that includes other orthopedic surgery; other abdominal surgery; ankle arthroscopy (Right, 5/21/2018); biopsy ortho (Right, 5/21/2018); pr upper gi endoscopy,diagnosis (3/28/2022); pr upper gi endoscopy,biopsy (3/28/2022); pr upper gi endoscopy,biopsy (N/A, 7/30/2023); pr upper gi endoscopy,diagnosis (N/A, 9/14/2023); pr upper gi endoscopy,biopsy (N/A, 9/14/2023); pr replacement of mitral valve (1/25/2024); echocardiogram, transesophageal, intraoperative (1/25/2024); pr upper gi endoscopy,diagnosis (N/A, 5/18/2024); pr upper gi endoscopy,biopsy (N/A,  5/18/2024); and gastroscopy with endostat (N/A, 5/18/2024).     Family History  family history includes Heart Disease in his mother; No Known Problems in his father.   Family history reviewed with patient. There is no family history that is pertinent to the chief complaint.     Social History   reports that he has never smoked. He has never used smokeless tobacco. He reports that he does not currently use alcohol. He reports that he does not use drugs.    Allergies  Allergies   Allergen Reactions    Morphine Vomiting    Tramadol Unspecified     Seizure  ROE=[id number]       Medications  Prior to Admission Medications   Prescriptions Last Dose Informant Patient Reported? Taking?   acetaminophen (TYLENOL) 500 MG Tab unk at k Patient Yes No   Sig: Take 500-1,000 mg by mouth every 8 hours as needed. Indications: Pain   amoxicillin-clavulanate (AUGMENTIN) 875-125 MG Tab 5/21/2024 at k  No Yes   Sig: Take 1 Tablet by mouth 2 times a day for 4 days.   aspirin 81 MG EC tablet 5/21/2024 at am Patient Yes Yes   Sig: Take 81 mg by mouth every day.   baclofen (LIORESAL) 10 MG Tab 5/21/2024 at hs Patient Yes Yes   Sig: Take 10 mg by mouth 3 times a day. Indications: Muscle Spasm   ferrous sulfate 325 (65 Fe) MG tablet 5/21/2024 at k  No Yes   Sig: Take 1 Tablet by mouth every day for 30 days.   gabapentin (NEURONTIN) 300 MG Cap 5/21/2024 at [facility] Patient Yes Yes   Sig: Take 300 mg by mouth 3 times a day.   losartan (COZAAR) 50 MG Tab Not Taking Patient No No   Sig: Take 1 Tablet by mouth every day.   Patient not taking: Reported on 5/22/2024   metoprolol tartrate (LOPRESSOR) 25 MG Tab Not Taking Patient No No   Sig: Take 1 Tablet by mouth 2 times a day.   Patient not taking: Reported on 5/22/2024   omeprazole (PRILOSEC) 20 MG delayed-release capsule Not Taking  No No   Sig: Take 1 Capsule by mouth 2 times a day for 30 days.   Patient not taking: Reported on 5/22/2024   oxyCODONE immediate release (ROXICODONE) 10 MG immediate  release tablet 5/21/2024 at k  Yes Yes   Sig: Take 10 mg by mouth 5 Times a Day.   pantoprazole (PROTONIX) 40 MG Tablet Delayed Response 5/21/2024 at   Yes Yes   Sig: Take 40 mg by mouth in the morning, at noon, and at bedtime.   sucralfate (CARAFATE) 1 GM Tab 5/21/2024 at unk Patient Yes Yes   Sig: Take 1 g by mouth 4 Times a Day,Before Meals and at Bedtime.      Facility-Administered Medications: None       Physical Exam  Temp:  [36.7 °C (98.1 °F)-36.9 °C (98.5 °F)] 36.9 °C (98.5 °F)  Pulse:  [] 106  Resp:  [20] 20  BP: (142-165)/() 159/86  SpO2:  [94 %-99 %] 94 %  Blood Pressure: (!) 142/96   Temperature: 36.7 °C (98.1 °F)   Pulse: (!) 102   Respiration: 20   Pulse Oximetry: 99 %       Physical Exam  Vitals and nursing note reviewed.   Constitutional:       General: He is not in acute distress.     Appearance: He is not ill-appearing, toxic-appearing or diaphoretic.   HENT:      Head: Normocephalic and atraumatic.      Nose: Nose normal. No congestion or rhinorrhea.      Mouth/Throat:      Mouth: Mucous membranes are dry.      Pharynx: Oropharynx is clear.   Eyes:      Extraocular Movements: Extraocular movements intact.      Pupils: Pupils are equal, round, and reactive to light.   Cardiovascular:      Rate and Rhythm: Regular rhythm. Tachycardia present.      Pulses: Normal pulses.      Heart sounds: Normal heart sounds.   Pulmonary:      Effort: Pulmonary effort is normal. No respiratory distress.      Breath sounds: Normal breath sounds. No wheezing or rhonchi.   Abdominal:      General: Bowel sounds are normal.      Palpations: Abdomen is soft.      Tenderness: There is abdominal tenderness (epigastric).   Musculoskeletal:         General: Normal range of motion.      Cervical back: Normal range of motion.      Right lower leg: No edema.      Left lower leg: No edema.   Skin:     General: Skin is warm and dry.      Capillary Refill: Capillary refill takes less than 2 seconds.      Findings:  No lesion or rash.   Neurological:      General: No focal deficit present.      Mental Status: He is oriented to person, place, and time.   Psychiatric:         Mood and Affect: Mood normal.         Behavior: Behavior normal.         Laboratory:  Recent Labs     05/22/24  1519   WBC 6.5   RBC 5.01   HEMOGLOBIN 11.6*   HEMATOCRIT 36.5*   MCV 72.9*   MCH 23.2*   MCHC 31.8*   RDW 64.2*   PLATELETCT 436   MPV 8.6*     Recent Labs     05/22/24  1519   SODIUM 142   POTASSIUM 3.8   CHLORIDE 111   CO2 17*   GLUCOSE 98   BUN 7*   CREATININE 1.07   CALCIUM 9.0     Recent Labs     05/22/24  1519   ALTSGPT 21   ASTSGOT 22   ALKPHOSPHAT 66   TBILIRUBIN 0.3   LIPASE 23   GLUCOSE 98       Imaging:  No orders to display       EKG 5/22/24  Measurements   Intervals                               Axis   Rate:       99                           P:          55   WV:         147                          QRS:    -12   QRSD:     82                           T:          101   QT:         403   QTc:        518   Interpretive Statements   Sinus rhythm   Prolonged QT interval   Compared to ECG 03/01/2024 12:03:37     Prolonged QT interval now present   ST (T wave) deviation no longer present     ASSESSMENT/PLAN:  * Acute abdominal pain- (present on admission)  Assessment & Plan  Unknown etiology.  Patient recently admitted for gastric ulcers.  Hemoglobin appears stable. Hx med seeking bx  - H&H every 6 hours  - Guaiac stool  - Continue home dose oxy  - GI cocktail  - continue carafate, PPI    Duodenal ulcer- (present on admission)  Assessment & Plan  - continue PPI IV TID  - continue carafate    Iron deficiency anemia due to chronic blood loss- (present on admission)  Assessment & Plan  - continue iron    Chronic pain syndrome- (present on admission)  Assessment & Plan  Follows with pain management at [facility].  Patient reports his chronic pain is secondary to heart surgery and that they are currently reducing his medications from oxy 20 mg 5  times a day to 10 mg 5 times a day.  He has not seen his pain management specialist in 2 weeks.  - continue home meds, including oxy, gabapentin, baclofen    H/O mitral valve repair- (present on admission)  Assessment & Plan  Hx of radical valve repair in January, 2024.  He reports his chronic sternal and clavicular chest pain is related to the surgery.  Concern for drug-seeking behavior  - Continue aspirin daily if hgb stable    Primary hypertension- (present on admission)  Assessment & Plan  Reports taking his blood pressure medications only as needed.  Currently blood pressure is elevated  - continue losartan, metoprolol    Hx of drug seeking behavior  Will hold off on additional narcotics beyond those he reports taking at home. Will use PPI/carafate, give tylenol and gi cocktail.       Justification for Admission Status  I anticipate this patient is appropriate for observation status at this time because patient requires a serial hemoglobin and hematocrit to determine if he is suffering from GI bleed    Patient will need a Med/Surg bed on MEDICAL service .  The need is secondary to need for serial H&Hs for evaluation of possible GI bleed    VTE prophylaxis: SCDs/TEDs

## 2024-05-23 NOTE — PROGRESS NOTES
4 Eyes Skin Assessment Completed by KARSON Manjarrez and KARSON Degroot.    Head WDL  Ears WDL  Nose WDL  Mouth WDL  Neck WDL  Breast/Chest WDL  Shoulder Blades WDL  Spine WDL  (R) Arm/Elbow/Hand: dressing to RFA  (L) Arm/Elbow/Hand WDL  Abdomen WDL  Groin WDL  Scrotum/Coccyx/Buttocks WDL  (R) Leg WDL  (L) Leg WDL  (R) Heel/Foot/Toe WDL  (L) Heel/Foot/Toe WDL          Devices In Places Pulse Ox      Interventions In Place Pillows    Possible Skin Injury No    Pictures Uploaded Into Epic N/A  Wound Consult Placed N/A  RN Wound Prevention Protocol Ordered No

## 2024-05-23 NOTE — ASSESSMENT & PLAN NOTE
- continue iron  Recent Labs     05/26/24  0223 05/26/24  1528 05/27/24  0645 05/27/24  4882   HEMOGLOBIN 10.6* 10.4* 10.2* 9.7*   HEMATOCRIT 34.5* 34.1* 33.5* 32.6*   MCV 75.2*  --  76.1* 78.6*   MCH 23.1*  --  23.2* 23.4*   ANISOCYTOSIS 1+  --   --   --    PLATELETCT 355  --  347 319

## 2024-05-23 NOTE — CONSULTS
Gastroenterology Initial Consult Note               Author:  Janae Oliver M.D. Date & Time Created: 5/23/2024 3:39 PM       Patient ID:  Name:             Roberto Braswell    YOB: 1980  Age:                 43 y.o.  male  MRN:               [id number]      Referring Provider:  Fab Tian MD        Presenting Chief Complaint:  Abdominal pain and PUD      History of Present Illness:    This is a very pleasant 43 y.o. male with history of recent mitral valve repair, history of gastric and duodenal ulcers.  In September 2023 he had an EGD showing incompletely healed duodenal ulcer with some oozing at the duodenal sweep (gastric biopsies negative for H. Pylori and mild eosinophils.).  He had an EGD July 2023 with gastric ulcers by GI consultants (gastric biopsies negative for H. Pylori).  He had a GDA coil embolization pre cardiac surgery due to risk of bleeding from PUD.  States he takes his PPI and Carafate daily since then.  He has denied acid reflux or epigastric pain.      On the morning of May 18 he was awakened with abdominal pain and 4 episodes of melena.  He was taken off anticoagulation 3-4 weeks ago for mitral valve repair but was taking a baby aspirin daily.  He underwent EGD same day showing several clean-based ulcers in the antrum, prepyloric and pyloric region was ulcerated and narrow.   There was a large amount of food in the stomach.  He had oozing ulcerations in the duodenal bulb.  Both gastric and duodenal ulcers were treated with the Gold probe.  Also at EGD there was suspicion for EOE and biopsies were taken showing eosinophilic esophagitis with greater than >30 eosinophils in 1 high magnification field.  Again he has been on a PPI daily.  Serum gastrin 35    He presented to the ER yesterday with mid abdominal pain, nausea.  Hemoglobin 11.6 on presentation with MCV 72.9.  BUN 7, lipase 23.  The pain is more intense and was not bothersome several weeks ago.  He takes no  medications that would cause PUD.  He does not use recreational drugs and denies history of narcotic addiction.  Is currently working with  [facility] and is tapering off meds.              Review of Systems:  Review of Systems   Constitutional: Negative.    HENT: Negative.     Eyes: Negative.    Respiratory: Negative.     Cardiovascular: Negative.    Gastrointestinal:  Positive for abdominal pain.   Genitourinary: Negative.    Musculoskeletal: Negative.    Skin: Negative.    Neurological: Negative.              Past Medical History:  Past Medical History:   Diagnosis Date    Arthritis     Right ankle    Breath shortness 11/03/2023    With exertion.    Drug-seeking behavior 07/30/2023    Demanding narcotics for a duodenal ulcer July 2023. Refused antacids or sucralfate.     Duodenal ulcer 07/30/2023    Gastric ulcer     Heart valve disease     Mitral valve prolapse    Hemorrhagic disorder (HCC)     GI bleed    Hypertension     Pain     Resolved; Right ankle    Seizure (HCC)     while taking tramadol     Active Hospital Problems    Diagnosis     Upper GI bleed [K92.2]     Acute abdominal pain [R10.9]     Chronic pain syndrome [G89.4]     H/O mitral valve repair [Z98.890]     Duodenal ulcer [K26.9]     Primary hypertension [I10]     Iron deficiency anemia due to chronic blood loss [D50.0]          Past Surgical History:  Past Surgical History:   Procedure Laterality Date    LA UPPER GI ENDOSCOPY,DIAGNOSIS N/A 5/18/2024    Procedure: GASTROSCOPY;  Surgeon: Kaitlyn Ontiveros M.D.;  Location: [facility];  Service: Gastroenterology    LA UPPER GI ENDOSCOPY,BIOPSY N/A 5/18/2024    Procedure: GASTROSCOPY, WITH BIOPSY;  Surgeon: Kaitlyn Ontiveros M.D.;  Location: SURGERY [facility];  Service: Gastroenterology    GASTROSCOPY WITH ENDOSTAT N/A 5/18/2024    Procedure: EGD, WITH CAUTERIZATION;  Surgeon: Kaitlyn Ontiveros M.D.;  Location: SURGERY [facility];  Service: Gastroenterology    LA REPLACEMENT OF  MITRAL VALVE  1/25/2024    Procedure: MITRAL VALVE REPAIR, TRANSESOPHAGEAL ECHOCARDIOGRAM;  Surgeon: Bigg Santana M.D.;  Location: SURGERY [facility];  Service: Cardiothoracic    ECHOCARDIOGRAM, TRANSESOPHAGEAL, INTRAOPERATIVE  1/25/2024    Procedure: ECHOCARDIOGRAM, TRANSESOPHAGEAL, INTRAOPERATIVE;  Surgeon: Bigg Santana M.D.;  Location: SURGERY [facility];  Service: Cardiothoracic    [place] UPPER GI ENDOSCOPY,DIAGNOSIS N/A 9/14/2023    Procedure: GASTROSCOPY;  Surgeon: Johnny Dalton M.D.;  Location: SURGERY SAME DAY [facility];  Service: Gastroenterology    [place] UPPER GI ENDOSCOPY,BIOPSY N/A 9/14/2023    Procedure: GASTROSCOPY, WITH BIOPSY;  Surgeon: Johnny Dalton M.D.;  Location: SURGERY SAME DAY [facility];  Service: Gastroenterology    [place] UPPER GI ENDOSCOPY,BIOPSY N/A 7/30/2023    Procedure: GASTROSCOPY, WITH BIOPSY;  Surgeon: Kumar Hope M.D.;  Location: [facility];  Service: Gastroenterology    [place] UPPER GI ENDOSCOPY,DIAGNOSIS  3/28/2022    Procedure: GASTROSCOPY;  Surgeon: Paco Wiggins M.D.;  Location: SURGERY SAME DAY [facility];  Service: Gastroenterology    [place] UPPER GI ENDOSCOPY,BIOPSY  3/28/2022    Procedure: GASTROSCOPY, WITH BIOPSY;  Surgeon: Paco Wiggins M.D.;  Location: SURGERY SAME DAY [facility];  Service: Gastroenterology    ANKLE ARTHROSCOPY Right 5/21/2018    Procedure: ANKLE ARTHROSCOPY, LATERAL LIGAMENT RECONSTRUCTION;  Surgeon: Seth Cruz M.D.;  Location: SURGERY [facility];  Service: Orthopedics    BIOPSY ORTHO Right 5/21/2018    Procedure: BIOPSY ORTHO/ FOR CARTILAGE AND DENOVO PROCEDURE;  Surgeon: Seth Cruz M.D.;  Location: SURGERY [facility];  Service: Orthopedics    OTHER ABDOMINAL SURGERY      Cholecystectomy February 2017    OTHER ORTHOPEDIC SURGERY      2 previous ankle surgeries (2007, 2009)           Hospital Medications:  Current Facility-Administered Medications   Medication Dose Frequency Provider Last Rate Last Admin    metoclopramide (Reglan)  injection 10 mg  10 mg Once MERVIN ZayasNJenniferP.        pantoprazole (Protonix) 80 mg in  mL continuous infusion  8 mg/hr Continuous Fab Tian M.D. 10 mL/hr at 05/23/24 1520 8 mg/hr at 05/23/24 1520    MD Alert...Total Body Iron Replacement per Pharmacy   PHARMACY TO DOSE Fab Tian M.D.        HYDROmorphone (Dilaudid) injection 1 mg  1 mg Q3HRS PRN Fab Tian M.D.   1 mg at 05/23/24 1324    lactated ringers infusion   Continuous Fab Tian M.D. 125 mL/hr at 05/23/24 1518 New Bag at 05/23/24 1518    ondansetron (Zofran) syringe/vial injection 4 mg  4 mg Q4HRS PRN Deana Lepe, A.P.R.N.        ondansetron (Zofran ODT) dispertab 4 mg  4 mg Q4HRS PRN Deana Lepe, A.P.R.N.        promethazine (Phenergan) tablet 12.5-25 mg  12.5-25 mg Q4HRS PRN Deana Lepe, A.P.R.N.        promethazine (Phenergan) suppository 12.5-25 mg  12.5-25 mg Q4HRS PRN Deana Lepe, A.P.R.N.        prochlorperazine (Compazine) injection 5-10 mg  5-10 mg Q4HRS PRN Deana Lepe, A.P.R.N.        metoprolol tartrate (Lopressor) tablet 25 mg  25 mg BID Deana Lepe, A.P.R.N.   25 mg at 05/23/24 0504    sucralfate (Carafate) tablet 1 g  1 g 4X/DAY ACHS Deana Lepe, A.P.R.N.   1 g at 05/23/24 1324    baclofen (Lioresal) tablet 10 mg  10 mg TID Deana Lepe, A.P.R.N.   10 mg at 05/23/24 1501    gabapentin (Neurontin) capsule 300 mg  300 mg TID Deana Lepe, A.P.R.N.   300 mg at 05/23/24 1501    amoxicillin-clavulanate (Augmentin) 875-125 MG per tablet 1 Tablet  1 Tablet BID Deana Lepe, A.P.R.N.   1 Tablet at 05/23/24 0841    oxyCODONE immediate-release (Roxicodone) tablet 10 mg  10 mg 5X/DAY Deana Lepe A.P.R.N.   10 mg at 05/23/24 1501    losartan (Cozaar) tablet 50 mg  50 mg DAILY Deana Lepe, A.P.R.N.   50 mg at 05/23/24 0504   Last reviewed on 5/22/2024  7:30 PM by Josseline Tobias R.N.       Current Outpatient Medications:  Medications Prior to Admission   Medication Sig Dispense Refill Last  Dose    oxyCODONE immediate release (ROXICODONE) 10 MG immediate release tablet Take 10 mg by mouth 5 Times a Day.   5/21/2024 at k    pantoprazole (PROTONIX) 40 MG Tablet Delayed Response Take 40 mg by mouth in the morning, at noon, and at bedtime.   5/21/2024 at     ferrous sulfate 325 (65 Fe) MG tablet Take 1 Tablet by mouth every day for 30 days. 30 Tablet 0 5/21/2024 at k    amoxicillin-clavulanate (AUGMENTIN) 875-125 MG Tab Take 1 Tablet by mouth 2 times a day for 4 days. 8 Tablet 0 5/21/2024 at k    gabapentin (NEURONTIN) 300 MG Cap Take 300 mg by mouth 3 times a day.   5/21/2024 at k    aspirin 81 MG EC tablet Take 81 mg by mouth every day.   5/21/2024 at am    baclofen (LIORESAL) 10 MG Tab Take 10 mg by mouth 3 times a day. Indications: Muscle Spasm   5/21/2024 at     sucralfate (CARAFATE) 1 GM Tab Take 1 g by mouth 4 Times a Day,Before Meals and at Bedtime.   5/21/2024 at k    omeprazole (PRILOSEC) 20 MG delayed-release capsule Take 1 Capsule by mouth 2 times a day for 30 days. (Patient not taking: Reported on 5/22/2024) 60 Capsule 0 Not Taking    losartan (COZAAR) 50 MG Tab Take 1 Tablet by mouth every day. (Patient not taking: Reported on 5/22/2024) 100 Tablet 3 Not Taking    metoprolol tartrate (LOPRESSOR) 25 MG Tab Take 1 Tablet by mouth 2 times a day. (Patient not taking: Reported on 5/22/2024) 200 Tablet 3 Not Taking    acetaminophen (TYLENOL) 500 MG Tab Take 500-1,000 mg by mouth every 8 hours as needed. Indications: Pain   unk at unk         Medication Allergies:  Allergies   Allergen Reactions    Morphine Vomiting    Tramadol Unspecified     Seizure  [id number]         Family Medical History:  Family History   Problem Relation Age of Onset    Heart Disease Mother     No Known Problems Father     Clotting Disorder Neg Hx          Social History:  Social History     Socioeconomic History    Marital status:      Spouse name: Not on file    Number of children: Not on file    Years  of education: Not on file    Highest education level: Associate degree: occupational, technical, or vocational program   Occupational History    Not on file   Tobacco Use    Smoking status: Never    Smokeless tobacco: Never   Vaping Use    Vaping status: Never Used   Substance and Sexual Activity    Alcohol use: Not Currently    Drug use: Never    Sexual activity: Not on file   Other Topics Concern    Not on file   Social History Narrative    Not on file     Social Determinants of Health     Financial Resource Strain: Low Risk  (5/19/2024)    Overall Financial Resource Strain (CARDIA)     Difficulty of Paying Living Expenses: Not hard at all   Food Insecurity: No Food Insecurity (5/22/2024)    Hunger Vital Sign     Worried About Running Out of Food in the Last Year: Never true     Ran Out of Food in the Last Year: Never true   Transportation Needs: No Transportation Needs (5/22/2024)    PRAPARE - Transportation     Lack of Transportation (Medical): No     Lack of Transportation (Non-Medical): No   Physical Activity: Sufficiently Active (5/19/2024)    Exercise Vital Sign     Days of Exercise per Week: 6 days     Minutes of Exercise per Session: 120 min   Stress: Stress Concern Present (5/19/2024)    [employer] [place] [employer] - Occupational Stress Questionnaire     Feeling of Stress : To some extent   Social Connections: Socially Isolated (5/19/2024)    Social Connection and Isolation Panel [NHANES]     Frequency of Communication with Friends and Family: Once a week     Frequency of Social Gatherings with Friends and Family: Once a week     Attends Yazidi Services: Never     Active Member of Clubs or Organizations: No     Attends Club or Organization Meetings: Never     Marital Status:    Intimate Partner Violence: Not At Risk (5/22/2024)    Humiliation, Afraid, Rape, and Kick questionnaire     Fear of Current or Ex-Partner: No     Emotionally Abused: No     Physically Abused: No     Sexually  "Abused: No   Housing Stability: Low Risk  (5/22/2024)    Housing Stability Vital Sign     Unable to Pay for Housing in the Last Year: No     Number of Places Lived in the Last Year: 0     Unstable Housing in the Last Year: No         Vital signs:  Weight/BMI: Body mass index is 30.64 kg/m².  BP (!) 156/87   Pulse 60   Temp 36.8 °C (98.3 °F) (Temporal)   Resp 20   Ht 1.905 m (6' 3\")   Wt 111 kg (245 lb 2.4 oz)   SpO2 93%   Vitals:    05/23/24 1145 05/23/24 1324 05/23/24 1410 05/23/24 1501   BP:       Pulse:       Resp: 18 20 18 20   Temp:       TempSrc:       SpO2:       Weight:       Height:         Oxygen Therapy:  Pulse Oximetry: 93 %, O2 (LPM): 0, O2 Delivery Device: None - Room Air  No intake or output data in the 24 hours ending 05/23/24 1539      Physical Exam:  Physical Exam  Constitutional:       Appearance: Normal appearance.   HENT:      Head: Normocephalic and atraumatic.      Nose: Nose normal.      Mouth/Throat:      Mouth: Mucous membranes are moist.   Eyes:      General: No scleral icterus.     Pupils: Pupils are equal, round, and reactive to light.   Pulmonary:      Breath sounds: Normal breath sounds.   Abdominal:      General: Bowel sounds are normal.      Palpations: Abdomen is soft.      Comments: Tender at port site upper abdomen   Musculoskeletal:         General: Normal range of motion.   Skin:     General: Skin is warm and dry.   Neurological:      Mental Status: He is alert and oriented to person, place, and time.                 Labs:  Recent Labs     05/22/24  1519 05/23/24  0340   SODIUM 142 142   POTASSIUM 3.8 4.0   CHLORIDE 111 110   CO2 17* 22   BUN 7* 11   CREATININE 1.07 1.11   CALCIUM 9.0 8.6     Recent Labs     05/22/24  1519 05/23/24  0340   ALTSGPT 21  --    ASTSGOT 22  --    ALKPHOSPHAT 66  --    TBILIRUBIN 0.3  --    LIPASE 23  --    GLUCOSE 98 95     Recent Labs     05/22/24  1519 05/23/24  0340   WBC 6.5 4.6*   NEUTSPOLYS 66.80  --    LYMPHOCYTES 24.00  --    MONOCYTES " 7.60  --    EOSINOPHILS 0.30  --    BASOPHILS 0.80  --    ASTSGOT 22  --    ALTSGPT 21  --    ALKPHOSPHAT 66  --    TBILIRUBIN 0.3  --      Recent Labs     05/22/24  1519 05/23/24  0340 05/23/24  1257   RBC 5.01 4.39*  --    HEMOGLOBIN 11.6* 10.2* 10.6*   HEMATOCRIT 36.5* 33.1*  --    PLATELETCT 436 362  --    IRON  --   --  94   TOTIRONBC  --   --  355     Recent Results (from the past 24 hour(s))   Basic Metabolic Panel (BMP)    Collection Time: 05/23/24  3:40 AM   Result Value Ref Range    Sodium 142 135 - 145 mmol/L    Potassium 4.0 3.6 - 5.5 mmol/L    Chloride 110 96 - 112 mmol/L    Co2 22 20 - 33 mmol/L    Glucose 95 65 - 99 mg/dL    Bun 11 8 - 22 mg/dL    Creatinine 1.11 0.50 - 1.40 mg/dL    Calcium 8.6 8.5 - 10.5 mg/dL    Anion Gap 10.0 7.0 - 16.0   CBC without Differential    Collection Time: 05/23/24  3:40 AM   Result Value Ref Range    WBC 4.6 (L) 4.8 - 10.8 K/uL    RBC 4.39 (L) 4.70 - 6.10 M/uL    Hemoglobin 10.2 (L) 14.0 - 18.0 g/dL    Hematocrit 33.1 (L) 42.0 - 52.0 %    MCV 75.4 (L) 81.4 - 97.8 fL    MCH 23.2 (L) 27.0 - 33.0 pg    MCHC 30.8 (L) 32.3 - 36.5 g/dL    RDW 67.6 (H) 35.9 - 50.0 fL    Platelet Count 362 164 - 446 K/uL    MPV 8.5 (L) 9.0 - 12.9 fL   ESTIMATED GFR    Collection Time: 05/23/24  3:40 AM   Result Value Ref Range    GFR (CKD-EPI) 85 >60 mL/min/1.73 m 2   URINALYSIS    Collection Time: 05/23/24  8:46 AM    Specimen: Urine   Result Value Ref Range    Color Yellow     Character Clear     Specific Gravity >=1.030 <1.035    Ph 6.0 5.0 - 8.0    Glucose Negative Negative mg/dL    Ketones Negative Negative mg/dL    Protein Negative Negative mg/dL    Bilirubin Negative Negative    Urobilinogen, Urine 0.2 Negative    Nitrite Negative Negative    Leukocyte Esterase Negative Negative    Occult Blood Negative Negative    Micro Urine Req see below    HGB (Hemoglobin) for 48 hours    Collection Time: 05/23/24 12:57 PM   Result Value Ref Range    Hemoglobin 10.6 (L) 14.0 - 18.0 g/dL    RETICULOCYTES COUNT    Collection Time: 05/23/24 12:57 PM   Result Value Ref Range    Reticulocyte Count 2.4 0.8 - 2.6 %    Retic, Absolute 0.11 0.04 - 0.12 M/uL    Imm. Reticulocyte Fraction 31.1 (H) 2.6 - 16.1 %    Retic Hgb Equivalent 25.7 (L) 29.0 - 35.0 pg/cell   IRON/TOTAL IRON BIND    Collection Time: 05/23/24 12:57 PM   Result Value Ref Range    Iron 94 50 - 180 ug/dL    Total Iron Binding 355 250 - 450 ug/dL    Unsat Iron Binding 261 110 - 370 ug/dL    % Saturation 26 15 - 55 %   COD (Adult)    Collection Time: 05/23/24 12:59 PM   Result Value Ref Range    ABO Grouping Only A     Rh Grouping Only POS     Antibody Screen-Cod NEG          Radiology Review:  [id number] VIEW   Final Result         1.  Nonspecific bowel gas pattern.            MDM (Data Review):   -Records reviewed and summarized in current documentation  -I personally reviewed and interpreted the laboratory results  -I personally reviewed the radiology images    Assessment/Recommendations:    Impression:   History of gastric and duodenal ulcers.  Hx of GDA coiling in Nov 2023.  H pylori negative.  Gastrin normal.  May be related to aspirin.   Acute on chronic blood loss anemia.  Hgb 9.3 on discharge last week and with hydration is 10.2 today.  BUN nl.   Severe epigastric pain   Iron deficiency.  Was on PO iron   Chronic pain syndrome followed by [facility]   Hx of mitral valve repair    Recs:  --No need for EGD at this time  --One concern is perforation, possibly contained, related to previous coil embolization and chronic ulceration with recent cautery.  Another possibility is myofascial pain (Carnett's sign) at the port site but his pain seems out of proportion for anterior cutaneous nerve entrapment (ACNE)  --Will check CT abd/pelvis  On PPI drip  On Carafate  ASA held        Janae Oliver M.D.          Core Quality Measures   Reviewed items:  Labs, Medications and Radiology reports reviewed

## 2024-05-23 NOTE — ASSESSMENT & PLAN NOTE
Hx of radical valve repair in January, 2024.  He reports his chronic sternal and clavicular chest pain is related to the surgery.   Hold aspirin

## 2024-05-23 NOTE — ASSESSMENT & PLAN NOTE
Reports taking his blood pressure medications only as needed.  Currently blood pressure is elevated  - continue losartan, metoprolol  Vitals:    05/28/24 1602   BP: 122/76   Pulse: 68   Resp: 17   Temp: 36.2 °C (97.2 °F)   SpO2: 98%     Stable

## 2024-05-23 NOTE — ASSESSMENT & PLAN NOTE
Likely due to peptic ulcer disease vs. Mesenteric ischemia. Patient just had EGD on 5/18 showing appearance of eosinophilic esophagitis, clean based ulcers in the antrum, food in stomach , narrowing due to ulcer at pylorus, oozing and multiple ulcers of duodenum. Treated with gold probe. Bleeding was treated and area was biopsied. Gastrin found to be normal. Stool for H. pylori was negative.   CT abdomen unremarkable  MRA abdomen negative  - IV protonix drip  - IVF  -GI was consulted, Status post EGD 5/25 which showed Antral gastritis and small clean based gastric ulcer. Duodenitis D1-D2, Shallow duodenal ulcer distal bulb without stigmata of recent bleeding; Spontaneous, scant volume, oozing when duodenal bulb mucosa touched.

## 2024-05-23 NOTE — ASSESSMENT & PLAN NOTE
Likely due to peptic ulcer disease vs. Mesenteric ischemia. Patient just had EGD on 5/18 showing appearance of eosinophilic esophagitis, clean based ulcers in the antrum, food in stomach , narrowing due to ulcer at pylorus, oozing and multiple ulcers of duodenum. Treated with gold probe. Bleeding was treated and area was biopsied. Gastrin found to be normal. Stool for H. pylori was negative.   CT abdomen unremarkable for perforation  S/p EGD 5/25 which showed Antral gastritis and small clean based gastric ulcer. Duodenitis D1-D2, Shallow duodenal ulcer distal bulb without stigmata of recent bleeding; Spontaneous, scant volume, oozing when duodenal bulb mucosa touched.   S/p colonoscopy 5/26    MRA negative  Continue PPI drip  MRA abdomen negative  Capsule endoscopy TODAY

## 2024-05-23 NOTE — PROGRESS NOTES
[facility] Medicine Daily Progress Note    Date of Service  5/23/2024    Chief Complaint  Roberto Braswell is a 43 y.o. male admitted 5/22/2024 with epigastric abdominal pain and dark stools    Hospital Course  43 y.o. male with a past medical history of mitral valve regurgitation s/p MVR, duodenal ulcer status post GDA coiling embolization and chronic pain for which he follows with outpatient pain management who presented to the ED on 5/22/2024 with a 1 day history of worsening epigastric pain.   The patient was just discharged on 5/19/2024 after being being admitted for abdominal pain. He underwent an EGD which found eosinophilic esophagitis, clean-based ulcers in the in the stomach, narrowing due to ulcer at pylorus, oozing and multiple ulcers of the duodenum, treated with gold probe. Bleeding was treated and area was biopsied. Gastrin found to be normal. Stool for H. pylori was negative. The patient was discharged with twice daily PPI for 3 months, carafate QID. It was also recommended to have a repeat EGD in 3 months to assess for healing. Patient was also prescribed oral iron for microcytic anemia.   The patient says that his epigastric pain is worse than it was on his recent admission. He also reports nausea secondary to pain. He reports dark stools.        Interval Problem Update  Patient reports severe epigastric abdominal pain.  Patient appears pale and his hemoglobin has down trended from 11.6> 10.2.  I have started  the patient on Protonix drip.  I have discussed the case with GI Dr. Oliver who will evaluate the patient.   Patient remains NPO.  I will start the patient on IV fluid hydration with LR.  Will order IV iron replacement.  I have started IV Dilaudid for pain control, monitor respiratory status closely    I have discussed this patient's plan of care and discharge plan at IDT rounds today with Case Management, Nursing, Nursing leadership, and other members of the IDT  team.    Consultants/Specialty  GI    Code Status  Full Code    Disposition  The patient is not medically cleared for discharge to home or a post-acute facility.      I have placed the appropriate orders for post-discharge needs.    Review of Systems  Review of Systems   Gastrointestinal:  Positive for abdominal pain and nausea.        Physical Exam  Temp:  [36.4 °C (97.5 °F)-36.9 °C (98.5 °F)] 36.8 °C (98.3 °F)  Pulse:  [] 60  Resp:  [18-20] 18  BP: (129-165)/() 156/87  SpO2:  [93 %-100 %] 93 %    Physical Exam  Vitals and nursing note reviewed.   Constitutional:       General: He is not in acute distress.  HENT:      Head: Normocephalic.      Mouth/Throat:      Mouth: Mucous membranes are moist.   Eyes:      Pupils: Pupils are equal, round, and reactive to light.   Cardiovascular:      Rate and Rhythm: Normal rate and regular rhythm.      Pulses: Normal pulses.      Heart sounds: Normal heart sounds.   Pulmonary:      Effort: Pulmonary effort is normal.      Breath sounds: Normal breath sounds.   Abdominal:      Palpations: Abdomen is soft.      Tenderness: There is no abdominal tenderness.   Musculoskeletal:         General: No swelling.      Cervical back: Neck supple.   Skin:     General: Skin is warm.      Coloration: Skin is pale. Skin is not jaundiced.   Neurological:      General: No focal deficit present.      Mental Status: He is alert and oriented to person, place, and time.   Psychiatric:         Mood and Affect: Mood normal.         Behavior: Behavior normal.         Fluids  No intake or output data in the 24 hours ending 05/23/24 1152    Laboratory  Recent Labs     05/22/24  1519 05/23/24  0340   WBC 6.5 4.6*   RBC 5.01 4.39*   HEMOGLOBIN 11.6* 10.2*   HEMATOCRIT 36.5* 33.1*   MCV 72.9* 75.4*   MCH 23.2* 23.2*   MCHC 31.8* 30.8*   RDW 64.2* 67.6*   PLATELETCT 436 362   MPV 8.6* 8.5*     Recent Labs     05/22/24  1519 05/23/24  0340   SODIUM 142 142   POTASSIUM 3.8 4.0   CHLORIDE 111 110    CO2 17* 22   GLUCOSE 98 95   BUN 7* 11   CREATININE 1.07 1.11   CALCIUM 9.0 8.6                   Imaging  [id number] VIEW   Final Result         1.  Nonspecific bowel gas pattern.           Assessment/Plan  * Upper GI bleed- (present on admission)  Assessment & Plan  Due to stomach and duodenal ulcers  Started on IV Protonix drip  Continue Carafate  N.p.o.  IV fluid hydration with LR  GI has been consulted  Serial H&H, transfuse for Hb <7    Chronic pain syndrome- (present on admission)  Assessment & Plan  Follows with pain management at [facility].  Patient reports his chronic pain is secondary to heart surgery and that they are currently reducing his medications from oxy 20 mg 5 times a day to 10 mg 5 times a day.  He has not seen his pain management specialist in 2 weeks.  - continue home meds, including oxy, gabapentin, baclofen    Acute abdominal pain- (present on admission)  Assessment & Plan  Likely due to peptic ulcer disease  - IV protonix  - Continue home dose oxy  - GI cocktail  - continue carafate, PPI  - IV dilaudid, monitor respiratory status closeley     H/O mitral valve repair- (present on admission)  Assessment & Plan  Hx of radical valve repair in January, 2024.  He reports his chronic sternal and clavicular chest pain is related to the surgery.  Concern for drug-seeking behavior  - Continue aspirin daily if hgb stable    Primary hypertension- (present on admission)  Assessment & Plan  Reports taking his blood pressure medications only as needed.  Currently blood pressure is elevated  - continue losartan, metoprolol    Duodenal ulcer- (present on admission)  Assessment & Plan  - continue PPI IV TID  - continue carafate    Iron deficiency anemia due to chronic blood loss- (present on admission)  Assessment & Plan  - continue iron         VTE prophylaxis: SCD    I have performed a physical exam and reviewed and updated ROS and Plan today (5/23/2024). In review of yesterday's note (5/22/2024), there  are no changes except as documented above.    I spent 55 minutes, reviewing the chart, obtaining and/or reviewing separately obtained history. Performing a medically appropriate examination and evaluation.  Counseling and educating the patient. Ordering and reviewing medications, tests, or procedures.  Discussing the case with GI.  Documenting clinical information in EPIC. Independently interpreting results and communicating results to patient. Discussing future disposition of care with patient, RN and case management.

## 2024-05-24 ENCOUNTER — APPOINTMENT (OUTPATIENT)
Dept: RADIOLOGY | Facility: MEDICAL CENTER | Age: 44
End: 2024-05-24
Attending: INTERNAL MEDICINE
Payer: COMMERCIAL

## 2024-05-24 LAB
HGB BLD-MCNC: 10.4 G/DL (ref 14–18)
HGB BLD-MCNC: 10.9 G/DL (ref 14–18)
HGB BLD-MCNC: 11.1 G/DL (ref 14–18)

## 2024-05-24 PROCEDURE — 700102 HCHG RX REV CODE 250 W/ 637 OVERRIDE(OP): Performed by: NURSE PRACTITIONER

## 2024-05-24 PROCEDURE — A9270 NON-COVERED ITEM OR SERVICE: HCPCS | Performed by: NURSE PRACTITIONER

## 2024-05-24 PROCEDURE — 74177 CT ABD & PELVIS W/CONTRAST: CPT

## 2024-05-24 PROCEDURE — 770006 HCHG ROOM/CARE - MED/SURG/GYN SEMI*

## 2024-05-24 PROCEDURE — 99233 SBSQ HOSP IP/OBS HIGH 50: CPT | Performed by: STUDENT IN AN ORGANIZED HEALTH CARE EDUCATION/TRAINING PROGRAM

## 2024-05-24 PROCEDURE — 36415 COLL VENOUS BLD VENIPUNCTURE: CPT

## 2024-05-24 PROCEDURE — 97602 WOUND(S) CARE NON-SELECTIVE: CPT

## 2024-05-24 PROCEDURE — A9270 NON-COVERED ITEM OR SERVICE: HCPCS | Performed by: STUDENT IN AN ORGANIZED HEALTH CARE EDUCATION/TRAINING PROGRAM

## 2024-05-24 PROCEDURE — 700117 HCHG RX CONTRAST REV CODE 255: Performed by: INTERNAL MEDICINE

## 2024-05-24 PROCEDURE — 700102 HCHG RX REV CODE 250 W/ 637 OVERRIDE(OP): Performed by: STUDENT IN AN ORGANIZED HEALTH CARE EDUCATION/TRAINING PROGRAM

## 2024-05-24 PROCEDURE — 85018 HEMOGLOBIN: CPT | Mod: 91

## 2024-05-24 PROCEDURE — 700105 HCHG RX REV CODE 258: Performed by: INTERNAL MEDICINE

## 2024-05-24 PROCEDURE — 700111 HCHG RX REV CODE 636 W/ 250 OVERRIDE (IP): Performed by: INTERNAL MEDICINE

## 2024-05-24 PROCEDURE — C9113 INJ PANTOPRAZOLE SODIUM, VIA: HCPCS | Performed by: INTERNAL MEDICINE

## 2024-05-24 PROCEDURE — 99232 SBSQ HOSP IP/OBS MODERATE 35: CPT | Performed by: NURSE PRACTITIONER

## 2024-05-24 RX ORDER — FERROUS SULFATE 325(65) MG
325 TABLET ORAL
Status: DISCONTINUED | OUTPATIENT
Start: 2024-05-24 | End: 2024-05-25

## 2024-05-24 RX ADMIN — METOPROLOL TARTRATE 25 MG: 25 TABLET, FILM COATED ORAL at 06:07

## 2024-05-24 RX ADMIN — SODIUM CHLORIDE, POTASSIUM CHLORIDE, SODIUM LACTATE AND CALCIUM CHLORIDE: 600; 310; 30; 20 INJECTION, SOLUTION INTRAVENOUS at 23:57

## 2024-05-24 RX ADMIN — AMOXICILLIN AND CLAVULANATE POTASSIUM 1 TABLET: 875; 125 TABLET, FILM COATED ORAL at 07:48

## 2024-05-24 RX ADMIN — BACLOFEN 10 MG: 10 TABLET ORAL at 20:31

## 2024-05-24 RX ADMIN — OXYCODONE HYDROCHLORIDE 10 MG: 10 TABLET ORAL at 18:21

## 2024-05-24 RX ADMIN — GABAPENTIN 300 MG: 300 CAPSULE ORAL at 20:31

## 2024-05-24 RX ADMIN — SODIUM CHLORIDE, POTASSIUM CHLORIDE, SODIUM LACTATE AND CALCIUM CHLORIDE: 600; 310; 30; 20 INJECTION, SOLUTION INTRAVENOUS at 15:51

## 2024-05-24 RX ADMIN — GABAPENTIN 300 MG: 300 CAPSULE ORAL at 07:48

## 2024-05-24 RX ADMIN — HYDROMORPHONE HYDROCHLORIDE 1 MG: 1 INJECTION, SOLUTION INTRAMUSCULAR; INTRAVENOUS; SUBCUTANEOUS at 22:26

## 2024-05-24 RX ADMIN — HYDROMORPHONE HYDROCHLORIDE 1 MG: 1 INJECTION, SOLUTION INTRAMUSCULAR; INTRAVENOUS; SUBCUTANEOUS at 12:17

## 2024-05-24 RX ADMIN — HYDROMORPHONE HYDROCHLORIDE 1 MG: 1 INJECTION, SOLUTION INTRAMUSCULAR; INTRAVENOUS; SUBCUTANEOUS at 07:47

## 2024-05-24 RX ADMIN — OXYCODONE HYDROCHLORIDE 10 MG: 10 TABLET ORAL at 06:06

## 2024-05-24 RX ADMIN — OXYCODONE HYDROCHLORIDE 10 MG: 10 TABLET ORAL at 10:38

## 2024-05-24 RX ADMIN — OXYCODONE HYDROCHLORIDE 10 MG: 10 TABLET ORAL at 23:24

## 2024-05-24 RX ADMIN — LOSARTAN POTASSIUM 50 MG: 50 TABLET, FILM COATED ORAL at 06:06

## 2024-05-24 RX ADMIN — SUCRALFATE 1 G: 1 TABLET ORAL at 06:04

## 2024-05-24 RX ADMIN — IOHEXOL 100 ML: 350 INJECTION, SOLUTION INTRAVENOUS at 00:10

## 2024-05-24 RX ADMIN — IOHEXOL 25 ML: 240 INJECTION, SOLUTION INTRATHECAL; INTRAVASCULAR; INTRAVENOUS; ORAL at 00:12

## 2024-05-24 RX ADMIN — BACLOFEN 10 MG: 10 TABLET ORAL at 07:48

## 2024-05-24 RX ADMIN — SUCRALFATE 1 G: 1 TABLET ORAL at 18:21

## 2024-05-24 RX ADMIN — HYDROMORPHONE HYDROCHLORIDE 1 MG: 1 INJECTION, SOLUTION INTRAMUSCULAR; INTRAVENOUS; SUBCUTANEOUS at 15:45

## 2024-05-24 RX ADMIN — HYDROMORPHONE HYDROCHLORIDE 1 MG: 1 INJECTION, SOLUTION INTRAMUSCULAR; INTRAVENOUS; SUBCUTANEOUS at 03:45

## 2024-05-24 RX ADMIN — BACLOFEN 10 MG: 10 TABLET ORAL at 15:00

## 2024-05-24 RX ADMIN — SODIUM CHLORIDE, POTASSIUM CHLORIDE, SODIUM LACTATE AND CALCIUM CHLORIDE: 600; 310; 30; 20 INJECTION, SOLUTION INTRAVENOUS at 07:55

## 2024-05-24 RX ADMIN — METOPROLOL TARTRATE 25 MG: 25 TABLET, FILM COATED ORAL at 18:21

## 2024-05-24 RX ADMIN — SUCRALFATE 1 G: 1 TABLET ORAL at 11:00

## 2024-05-24 RX ADMIN — OXYCODONE HYDROCHLORIDE 10 MG: 10 TABLET ORAL at 14:59

## 2024-05-24 RX ADMIN — GABAPENTIN 300 MG: 300 CAPSULE ORAL at 14:59

## 2024-05-24 RX ADMIN — FERROUS SULFATE TAB 325 MG (65 MG ELEMENTAL FE) 325 MG: 325 (65 FE) TAB at 10:15

## 2024-05-24 RX ADMIN — SUCRALFATE 1 G: 1 TABLET ORAL at 20:30

## 2024-05-24 RX ADMIN — PANTOPRAZOLE SODIUM 8 MG/HR: 40 INJECTION, POWDER, FOR SOLUTION INTRAVENOUS at 01:56

## 2024-05-24 RX ADMIN — HYDROMORPHONE HYDROCHLORIDE 1 MG: 1 INJECTION, SOLUTION INTRAMUSCULAR; INTRAVENOUS; SUBCUTANEOUS at 00:31

## 2024-05-24 RX ADMIN — PANTOPRAZOLE SODIUM 8 MG/HR: 40 INJECTION, POWDER, FOR SOLUTION INTRAVENOUS at 20:30

## 2024-05-24 RX ADMIN — HYDROMORPHONE HYDROCHLORIDE 1 MG: 1 INJECTION, SOLUTION INTRAMUSCULAR; INTRAVENOUS; SUBCUTANEOUS at 19:05

## 2024-05-24 ASSESSMENT — PAIN DESCRIPTION - PAIN TYPE
TYPE: ACUTE PAIN
TYPE: ACUTE PAIN;CHRONIC PAIN
TYPE: ACUTE PAIN;CHRONIC PAIN
TYPE: CHRONIC PAIN;ACUTE PAIN
TYPE: ACUTE PAIN
TYPE: ACUTE PAIN;CHRONIC PAIN
TYPE: ACUTE PAIN
TYPE: ACUTE PAIN;CHRONIC PAIN
TYPE: ACUTE PAIN
TYPE: ACUTE PAIN;CHRONIC PAIN
TYPE: ACUTE PAIN;CHRONIC PAIN

## 2024-05-24 ASSESSMENT — ENCOUNTER SYMPTOMS
BACK PAIN: 0
NAUSEA: 0
COUGH: 0
DEPRESSION: 0
ABDOMINAL PAIN: 1
BLURRED VISION: 0
SHORTNESS OF BREATH: 0
NAUSEA: 1
DIARRHEA: 0
FEVER: 0
CHILLS: 0
BLOOD IN STOOL: 0
CONSTIPATION: 0
WEAKNESS: 0
HEARTBURN: 1
VOMITING: 0
DIZZINESS: 0

## 2024-05-24 ASSESSMENT — PATIENT HEALTH QUESTIONNAIRE - PHQ9
2. FEELING DOWN, DEPRESSED, IRRITABLE, OR HOPELESS: NOT AT ALL
1. LITTLE INTEREST OR PLEASURE IN DOING THINGS: NOT AT ALL
SUM OF ALL RESPONSES TO PHQ9 QUESTIONS 1 AND 2: 0

## 2024-05-24 NOTE — PROGRESS NOTES
4 Eyes Skin Assessment Completed by Dao RN and Clementina RN.    Head WDL  Ears WDL  Nose WDL  Mouth WDL  Neck WDL  Breast/Chest WDL  Shoulder Blades WDL  Spine WDL  (R) Arm/Elbow/Hand Abscess with recent I&D  (L) Arm/Elbow/Hand WDL  Abdomen WDL  Groin WDL  Scrotum/Coccyx/Buttocks WDL  (R) Leg WDL  (L) Leg WDL  (R) Heel/Foot/Toe WDL  (L) Heel/Foot/Toe WDL          Devices In Places NA      Interventions In Place Pillows and Low Air Loss Mattress    Possible Skin Injury Yes    Pictures Uploaded Into Epic Yes  Wound Consult Placed Yes  RN Wound Prevention Protocol Ordered Yes (dressing change, no offloading needed)

## 2024-05-24 NOTE — WOUND TEAM
[facility]  Inpatient Services  Initial Wound and Skin Care Evaluation    Admission Date: 5/22/2024     Last order of IP CONSULT TO WOUND CARE was found on 5/24/2024 from Hospital Encounter on 5/22/2024     HPI, PMH, SH: Reviewed    Past Surgical History:   Procedure Laterality Date    IN UPPER GI ENDOSCOPY,DIAGNOSIS N/A 5/18/2024    Procedure: GASTROSCOPY;  Surgeon: Kaitlyn Ontiveros M.D.;  Location: [facility];  Service: Gastroenterology    IN UPPER GI ENDOSCOPY,BIOPSY N/A 5/18/2024    Procedure: GASTROSCOPY, WITH BIOPSY;  Surgeon: Kaitlyn Ontiveros M.D.;  Location: SURGERY [facility];  Service: Gastroenterology    GASTROSCOPY WITH ENDOSTAT N/A 5/18/2024    Procedure: EGD, WITH CAUTERIZATION;  Surgeon: Kaitlyn Ontiveros M.D.;  Location: [facility];  Service: Gastroenterology    IN REPLACEMENT OF MITRAL VALVE  1/25/2024    Procedure: MITRAL VALVE REPAIR, TRANSESOPHAGEAL ECHOCARDIOGRAM;  Surgeon: Bigg Santana M.D.;  Location: [facility];  Service: Cardiothoracic    ECHOCARDIOGRAM, TRANSESOPHAGEAL, INTRAOPERATIVE  1/25/2024    Procedure: ECHOCARDIOGRAM, TRANSESOPHAGEAL, INTRAOPERATIVE;  Surgeon: Bigg Santana M.D.;  Location: [facility];  Service: Cardiothoracic    IN UPPER GI ENDOSCOPY,DIAGNOSIS N/A 9/14/2023    Procedure: GASTROSCOPY;  Surgeon: Johnny Dalton M.D.;  Location: SURGERY SAME DAY [facility];  Service: Gastroenterology    IN UPPER GI ENDOSCOPY,BIOPSY N/A 9/14/2023    Procedure: GASTROSCOPY, WITH BIOPSY;  Surgeon: Johnny Dalton M.D.;  Location: SURGERY SAME DAY [facility];  Service: Gastroenterology    IN UPPER GI ENDOSCOPY,BIOPSY N/A 7/30/2023    Procedure: GASTROSCOPY, WITH BIOPSY;  Surgeon: Kumar Hope M.D.;  Location: SURGERY [facility];  Service: Gastroenterology    IN UPPER GI ENDOSCOPY,DIAGNOSIS  3/28/2022    Procedure: GASTROSCOPY;  Surgeon: Paco Wiggins M.D.;  Location: SURGERY SAME DAY [facility];  Service: Gastroenterology    IN  UPPER GI ENDOSCOPY,BIOPSY  3/28/2022    Procedure: GASTROSCOPY, WITH BIOPSY;  Surgeon: Paco Wiggins M.D.;  Location: SURGERY SAME DAY [facility];  Service: Gastroenterology    ANKLE ARTHROSCOPY Right 5/21/2018    Procedure: ANKLE ARTHROSCOPY, LATERAL LIGAMENT RECONSTRUCTION;  Surgeon: Seth Cruz M.D.;  Location: SURGERY [facility];  Service: Orthopedics    BIOPSY ORTHO Right 5/21/2018    Procedure: BIOPSY ORTHO/ FOR CARTILAGE AND DENOVO PROCEDURE;  Surgeon: Seth Cruz M.D.;  Location: SURGERY [facility];  Service: Orthopedics    OTHER ABDOMINAL SURGERY      Cholecystectomy February 2017    OTHER ORTHOPEDIC SURGERY      2 previous ankle surgeries (2007, 2009)     Social History     Tobacco Use    Smoking status: Never    Smokeless tobacco: Never   Substance Use Topics    Alcohol use: Not Currently     Chief Complaint   Patient presents with    Abdominal Pain     'Mid abd pain X2 hours. Radiates across entire stomach'.   +nausea.      Diagnosis: Acute abdominal pain [R10.9]  Upper GI bleed [K92.2]    Unit where seen by Wound Team: S620/01     WOUND CONSULT RELATED TO:  HOMER alfaro    WOUND TEAM PLAN OF CARE - Frequency of Follow-up:   Nursing to follow dressing orders written for wound care. Contact wound team if area fails to progress, deteriorates or with any questions/concerns if something comes up before next scheduled follow up (See below as to whether wound is following and frequency of wound follow up)   Not following, consult as needed  -      WOUND HISTORY:   Patient admitted for epigastric pain.  Patient reportedly had Open heart surgery a few months ago and is having frequent abdominal pain.  R. Forearm with wound from an I&D of an abscess patient reports that it was drained by outside provider and patient's wife has been assisting patient with daily packing.         WOUND ASSESSMENT/LDA  Wound 05/22/24 Full Thickness Wound Arm Lower Right (Active)   Date First Assessed/Time First  Assessed: 05/22/24 1725   Wound Approximate Age at First Assessment (Weeks): 1 weeks  Hand Hygiene Completed: Yes  Primary Wound Type: Full Thickness Wound  Location: Arm  Wound Orientation: Lower  Laterality: Right      Assessments 5/24/2024 12:00 PM   Site Assessment Red   Periwound Assessment Pink   Margins Defined edges;Unattached edges   Closure Secondary intention   Drainage Amount Scant   Drainage Description Serosanguineous   Treatments Cleansed;Nonselective debridement;Site care   Wound Cleansing Normal Saline Irrigation   Periwound Protectant No-sting Skin Prep   Dressing Status Clean;Dry;Intact   Dressing Changed Changed   Dressing Cleansing/Solutions Not Applicable   Dressing Options Plain Strip Packing   Dressing Change/Treatment Frequency Daily, and As Needed   NEXT Dressing Change/Treatment Date 05/25/24   NEXT Weekly Photo (Inpatient Only) 05/31/24   Wound Team Following Not following        Vascular:    MICHAEL:   No results found.    Lab Values:    Lab Results   Component Value Date/Time    WBC 4.6 (L) 05/23/2024 03:40 AM    RBC 4.39 (L) 05/23/2024 03:40 AM    HEMOGLOBIN 10.9 (L) 05/24/2024 11:57 AM    HEMATOCRIT 33.1 (L) 05/23/2024 03:40 AM    HBA1C 5.4 01/23/2024 09:04 AM         Culture Results show:  No results found for this or any previous visit (from the past 720 hour(s)).    Pain Level/Medicated:  None, Tolerated without pain medication       INTERVENTIONS BY WOUND TEAM:  Chart and images reviewed. Discussed with bedside RN. All areas of concern (based on picture review, LDA review and discussion with bedside RN) have been thoroughly assessed. Documentation of areas based on significant findings. This RN in to assess patient. Performed standard wound care which includes appropriate positioning, dressing removal and non-selective debridement. Pictures and measurements obtained weekly if/when required.    Wound:  R. forearm  Cleansed/Non-selectively Debrided with:  Wound cleanser and Gauze  Deb  wound: Cleansed with Wound cleanser and Gauze, Prepped with No Sting  Primary Dressin/ plain strip packing tucked into wound bed  Secondary (Outer) Dressing: secured wsith silicone adhesive foam     Advanced Wound Care Discharge Planning  Number of Clinicians necessary to complete wound care: 1  Is patient requiring IV pain medications for dressing changes:  No   Length of time for dressing change 35 min. (This does not include chart review, pre-medication time, set up, clean up or time spent charting.)    Interdisciplinary consultation: Patient, Bedside RN , N/A.  Pressure injury and staging reviewed with N/A.    EVALUATION / RATIONALE FOR TREATMENT:     Date:  24  Wound Status:  Initial evaluation    R. Forearm with full thickness wound with red granular tissue and no signs of infection.  Continue POC with plain strip packing.  Patient's heels are intact.  Patient denied sacral assessment.           Goals: Steady decrease in wound area and depth weekly.    NURSING PLAN OF CARE ORDERS:  Dressing changes: See Dressing Care orders  Skin care: See Skin Care orders  RN Prevention Protocol    NUTRITION RECOMMENDATIONS   Wound Team Recommendations:  Dietary consult    DIET ORDERS (From admission to next 24h)       Start     Ordered    24  Diet NPO Restrict to: Sips with Medications  ALL MEALS        Question:  Diet NPO Restrict to:  Answer:  Sips with Medications    24                    PREVENTATIVE INTERVENTIONS:    Q shift [place] - performed per nursing policy  Q shift pressure point assessments - performed per nursing policy    Surface/Positioning  Standard/trauma mattress - Currently in Place    Mobilization      Ambulate      Anticipated discharge plans:  TBD        Vac Discharge Needs:  Vac Discharge plan is purely a recommendation from wound team and not a requirement for discharge unless otherwise stated by physician.  Not Applicable Pt not on a wound vac

## 2024-05-24 NOTE — PROGRESS NOTES
[facility] Medicine Daily Progress Note    Date of Service  5/24/2024    Chief Complaint  Roberto Braswell is a 43 y.o. male admitted 5/22/2024 with epigastric abdominal pain and dark stools    Hospital Course  43 y.o. male with a past medical history of mitral valve regurgitation s/p MVR, duodenal ulcer status post GDA coiling embolization and chronic pain for which he follows with outpatient pain management who presented to the ED on 5/22/2024 with a 1 day history of worsening epigastric pain.   The patient was just discharged on 5/19/2024 after being being admitted for abdominal pain. He underwent an EGD which found eosinophilic esophagitis, clean-based ulcers in the in the stomach, narrowing due to ulcer at pylorus, oozing and multiple ulcers of the duodenum, treated with gold probe. Bleeding was treated and area was biopsied. Gastrin found to be normal. Stool for H. pylori was negative. The patient was discharged with twice daily PPI for 3 months, carafate QID. It was also recommended to have a repeat EGD in 3 months to assess for healing. Patient was also prescribed oral iron for microcytic anemia.   The patient says that his epigastric pain is worse than it was on his recent admission. He also reports nausea secondary to pain.  Associated with dark tarry stool.  GI was consulted.  The CT abdomen no acute abnormalities.  On PPI drip    Interval Problem Update  Seen patient at bedside.  Patient continues reporting severe epigastric abdominal pain  noted to have dark tarry stool.  Hemoglobin 10.9  I have discussed with GI, continue PPI drip.  Planning EGD tomorrow.?  Mesenteric ischemia given history of MVR  Continue IV fluid  Resume diet now, NPO MN  I have reviewed the CT abdomen independently    I have discussed this patient's plan of care and discharge plan at IDT rounds today with Case Management, Nursing, Nursing leadership, and other members of the IDT team.    Consultants/Specialty  GI    Code  Status  Full Code    Disposition  The patient is not medically cleared for discharge to home or a post-acute facility.      I have placed the appropriate orders for post-discharge needs.    Review of Systems  Review of Systems   Gastrointestinal:  Positive for abdominal pain and nausea.        Physical Exam  Temp:  [36.1 °C (97 °F)-37.1 °C (98.7 °F)] 36.1 °C (97 °F)  Pulse:  [57-80] 59  Resp:  [16-20] 17  BP: (129-155)/(82-96) 134/93  SpO2:  [95 %-99 %] 95 %    Physical Exam  Vitals and nursing note reviewed.   Constitutional:       General: He is not in acute distress.  HENT:      Head: Normocephalic.      Mouth/Throat:      Mouth: Mucous membranes are moist.   Eyes:      Pupils: Pupils are equal, round, and reactive to light.   Cardiovascular:      Rate and Rhythm: Normal rate and regular rhythm.      Pulses: Normal pulses.      Heart sounds: Normal heart sounds.   Pulmonary:      Effort: Pulmonary effort is normal.      Breath sounds: Normal breath sounds.   Abdominal:      Palpations: Abdomen is soft.      Tenderness: There is no abdominal tenderness.   Musculoskeletal:         General: No swelling.      Cervical back: Neck supple.   Skin:     General: Skin is warm.      Coloration: Skin is pale. Skin is not jaundiced.   Neurological:      General: No focal deficit present.      Mental Status: He is alert and oriented to person, place, and time.   Psychiatric:         Mood and Affect: Mood normal.         Behavior: Behavior normal.         Fluids  No intake or output data in the 24 hours ending 05/24/24 1423    Laboratory  Recent Labs     05/22/24  1519 05/23/24  0340 05/23/24  1257 05/23/24  1957 05/24/24  0402 05/24/24  1157   WBC 6.5 4.6*  --   --   --   --    RBC 5.01 4.39*  --   --   --   --    HEMOGLOBIN 11.6* 10.2*   < > 10.4* 10.4* 10.9*   HEMATOCRIT 36.5* 33.1*  --   --   --   --    MCV 72.9* 75.4*  --   --   --   --    MCH 23.2* 23.2*  --   --   --   --    MCHC 31.8* 30.8*  --   --   --   --    RDW  64.2* 67.6*  --   --   --   --    PLATELETCT 436 362  --   --   --   --    MPV 8.6* 8.5*  --   --   --   --     < > = values in this interval not displayed.     Recent Labs     05/22/24  1519 05/23/24  0340   SODIUM 142 142   POTASSIUM 3.8 4.0   CHLORIDE 111 110   CO2 17* 22   GLUCOSE 98 95   BUN 7* 11   CREATININE 1.07 1.11   CALCIUM 9.0 8.6                   Imaging  CT-ABDOMEN-PELVIS WITH   Final Result         1.  Mild hepatomegaly   2.  Small fat-containing left inguinal hernia      [id number] VIEW   Final Result         1.  Nonspecific bowel gas pattern.      MR-MRA ABDOMEN-WITH & W/O    (Results Pending)        Assessment/Plan  * Upper GI bleed- (present on admission)  Assessment & Plan  Likely due to peptic ulcer disease vs. Mesenteric ischemia. Patient just had EGD on 5/18 showing appearance of eosinophilic esophagitis, clean based ulcers in the antrum, food in stomach , narrowing due to ulcer at pylorus, oozing and multiple ulcers of duodenum. Treated with gold probe. Bleeding was treated and area was biopsied. Gastrin found to be normal. Stool for H. pylori was negative.   CT abdomen unremarkable  - IV protonix drip  - IVF  -GI was consulted, planning to repeat EGD and ordered MRA abdomen     Chronic pain syndrome- (present on admission)  Assessment & Plan  Follows with [facility] management at [email or web address]  Patient reports his chronic pain is secondary to heart surgery and that they are currently reducing his medications from oxy 20 mg 5 times a day to 10 mg 5 times a day.  He has not seen his pain management specialist in 2 weeks.  - continue home meds, including oxy, gabapentin, baclofen    Acute abdominal pain- (present on admission)  Assessment & Plan  Likely due to peptic ulcer disease vs. Mesenteric ischemia. Patient just had EGD on 5/18 showing appearance of eosinophilic esophagitis, clean based ulcers in the antrum, food in stomach , narrowing due to ulcer at pylorus, oozing and multiple ulcers of  duodenum. Treated with gold probe. Bleeding was treated and area was biopsied. Gastrin found to be normal. Stool for H. pylori was negative.   CT abdomen unremarkable  - IV protonix drip  - IVF  -GI was consulted, planning to repeat EGD and ordered MRA abdomen     H/O mitral valve repair- (present on admission)  Assessment & Plan  Hx of radical valve repair in January, 2024.  He reports his chronic sternal and clavicular chest pain is related to the surgery.   Hold aspirin     Primary hypertension- (present on admission)  Assessment & Plan  Reports taking his blood pressure medications only as needed.  Currently blood pressure is elevated  - continue losartan, metoprolol    Duodenal ulcer- (present on admission)  Assessment & Plan  - continue PPI IV TID  - continue carafate    Iron deficiency anemia due to chronic blood loss- (present on admission)  Assessment & Plan  - continue iron         VTE prophylaxis: SCD    I have performed a physical exam and reviewed and updated ROS and Plan today (5/24/2024). In review of yesterday's note (5/23/2024), there are no changes except as documented above.    I spent 53 minutes, reviewing the chart, obtaining and/or reviewing separately obtained history. Performing a medically appropriate examination and evaluation.  Counseling and educating the patient. Ordering and reviewing medications, tests, or procedures.  Discussing the case with GI.  Documenting clinical information in EPIC. Independently interpreting results and communicating results to patient. Discussing future disposition of care with patient, RN and case management.

## 2024-05-24 NOTE — PROGRESS NOTES
Off unit for CT-Pelvis via wheelchair with transporter. Oral contrast was administered and consent signed.

## 2024-05-24 NOTE — PROGRESS NOTES
..Gastroenterology Progress Note               Author:  Kourtney Peterson, RAO,  APRN Date & Time Created: 5/24/2024 8:34 AM       Patient ID:  Name:             Roberto Braswell    YOB: 1980  Age:                 43 y.o.  male  MRN:               [id number]        Medical Decision Making, by Problem:  Active Hospital Problems    Diagnosis     Upper GI bleed [K92.2]     Acute abdominal pain [R10.9]     Chronic pain syndrome [G89.4]     H/O mitral valve repair [Z98.890]     Duodenal ulcer [K26.9]     Primary hypertension [I10]     Iron deficiency anemia due to chronic blood loss [D50.0]            Presenting Chief Complaint:  Abdominal pain and PUD        History of Present Illness:    This is a very pleasant 43 y.o. male with history of recent mitral valve repair, history of gastric and duodenal ulcers.  In September 2023 he had an EGD showing incompletely healed duodenal ulcer with some oozing at the duodenal sweep (gastric biopsies negative for H. Pylori and mild eosinophils.).  He had an EGD July 2023 with gastric ulcers by GI consultants (gastric biopsies negative for H. Pylori).  He had a GDA coil embolization pre cardiac surgery due to risk of bleeding from PUD.  States he takes his PPI and Carafate daily since then.  He has denied acid reflux or epigastric pain.      On the morning of May 18 he was awakened with abdominal pain and 4 episodes of melena.  He was taken off anticoagulation 3-4 weeks ago for mitral valve repair but was taking a baby aspirin daily.  He underwent EGD same day showing several clean-based ulcers in the antrum, prepyloric and pyloric region was ulcerated and narrow.   There was a large amount of food in the stomach.  He had oozing ulcerations in the duodenal bulb.  Both gastric and duodenal ulcers were treated with the Gold probe.  Also at EGD there was suspicion for EOE and biopsies were taken showing eosinophilic esophagitis with greater than >30 eosinophils in 1  high magnification field.  Again he has been on a PPI daily.  Serum gastrin 35     He presented to the ER yesterday with mid abdominal pain, nausea.  Hemoglobin 11.6 on presentation with MCV 72.9.  BUN 7, lipase 23.  The pain is more intense and was not bothersome several weeks ago.  He takes no medications that would cause PUD.  He does not use recreational drugs and denies history of narcotic addiction.  Is currently working with [facility] and is tapering off meds.    Interval History:  5/24/2024: Patient seen and examined.  Began having melena today per patient and RN.  Also complaining of midepigastric pain.  He has been n.p.o.  Hemoglobin 10.9    Hospital Medications:  Current Facility-Administered Medications   Medication Dose Frequency Provider Last Rate Last Admin    ferrous sulfate tablet 325 mg  325 mg QDAY with Breakfast Tiffanie Joya M.D.        pantoprazole (Protonix) 80 mg in  mL continuous infusion  8 mg/hr Continuous Fab Tian M.D. 10 mL/hr at 05/24/24 0156 8 mg/hr at 05/24/24 0156    HYDROmorphone (Dilaudid) injection 1 mg  1 mg Q3HRS PRN Fab Tian M.D.   1 mg at 05/24/24 0747    lactated ringers infusion   Continuous Fab Tian M.D. 125 mL/hr at 05/24/24 0755 New Bag at 05/24/24 0755    ondansetron (Zofran) syringe/vial injection 4 mg  4 mg Q4HRS PRN Deana Lepe A.P.R.N.        ondansetron (Zofran ODT) dispertab 4 mg  4 mg Q4HRS PRN Deana Lepe, A.P.R.N.        promethazine (Phenergan) tablet 12.5-25 mg  12.5-25 mg Q4HRS PRN Deana Lepe, A.P.R.N.        promethazine (Phenergan) suppository 12.5-25 mg  12.5-25 mg Q4HRS PRN Deana Lepe, A.P.R.N.        prochlorperazine (Compazine) injection 5-10 mg  5-10 mg Q4HRS PRN Deana Lepe, A.P.R.N.        metoprolol tartrate (Lopressor) tablet 25 mg  25 mg BID Deana Lepe, A.P.R.N.   25 mg at 05/24/24 0607    sucralfate (Carafate) tablet 1 g  1 g 4X/DAY ACHS Deana Lepe, A.P.R.N.   1 g at 05/24/24 0604     "baclofen (Lioresal) tablet 10 mg  10 mg TID Deana Lepe, A.P.R.N.   10 mg at 05/24/24 0748    gabapentin (Neurontin) capsule 300 mg  300 mg TID Deana Lepe, A.P.R.N.   300 mg at 05/24/24 0748    oxyCODONE immediate-release (Roxicodone) tablet 10 mg  10 mg 5X/DAY Deana Lepe, A.P.R.N.   10 mg at 05/24/24 0606    losartan (Cozaar) tablet 50 mg  50 mg DAILY Deana Lepe, A.P.R.N.   50 mg at 05/24/24 0606   Last reviewed on 5/22/2024  7:30 PM by Josseline Tobias R.N.       Review of Systems:  Review of Systems   Constitutional:  Negative for chills, fever and malaise/fatigue.   HENT:  Negative for hearing loss.    Eyes:  Negative for blurred vision.   Respiratory:  Negative for cough and shortness of breath.    Cardiovascular:  Negative for chest pain and leg swelling.   Gastrointestinal:  Positive for abdominal pain, heartburn and melena. Negative for blood in stool, constipation, diarrhea, nausea and vomiting.   Genitourinary:  Negative for dysuria.   Musculoskeletal:  Negative for back pain.   Skin:  Negative for rash.   Neurological:  Negative for dizziness and weakness.   Psychiatric/Behavioral:  Negative for depression.    All other systems reviewed and are negative.        Vital signs:  Weight/BMI: Body mass index is 30.64 kg/m².  BP (!) 134/93   Pulse (!) 59   Temp 36.1 °C (97 °F) (Temporal)   Resp 17   Ht 1.905 m (6' 3\")   Wt 111 kg (245 lb 2.4 oz)   SpO2 95%   Vitals:    05/24/24 0328 05/24/24 0345 05/24/24 0606 05/24/24 0800   BP: (!) 155/96   (!) 134/93   Pulse: 63   (!) 59   Resp: 17 18 18 17   Temp: 36.8 °C (98.2 °F)   36.1 °C (97 °F)   TempSrc: Temporal   Temporal   SpO2: 99%   95%   Weight:       Height:         Oxygen Therapy:  Pulse Oximetry: 95 %, O2 (LPM): 0, O2 Delivery Device: None - Room Air  No intake or output data in the 24 hours ending 05/24/24 0834      Physical Exam:  Physical Exam  Vitals and nursing note reviewed.   Constitutional:       General: He is not in acute " distress.     Appearance: Normal appearance. He is not ill-appearing.   HENT:      Head: Normocephalic and atraumatic.      Right Ear: External ear normal.      Left Ear: External ear normal.      Nose: Nose normal.      Mouth/Throat:      Mouth: Mucous membranes are moist.      Pharynx: Oropharynx is clear.   Eyes:      General: No scleral icterus.  Cardiovascular:      Rate and Rhythm: Normal rate and regular rhythm.      Pulses: Normal pulses.      Heart sounds: Normal heart sounds.   Pulmonary:      Effort: Pulmonary effort is normal.      Breath sounds: Normal breath sounds.   Abdominal:      General: Abdomen is flat. Bowel sounds are normal. There is no distension.      Palpations: Abdomen is soft.   Musculoskeletal:         General: Normal range of motion.      Cervical back: Normal range of motion and neck supple.   Skin:     General: Skin is warm.      Capillary Refill: Capillary refill takes less than 2 seconds.   Neurological:      Mental Status: He is alert and oriented to person, place, and time.   Psychiatric:         Mood and Affect: Mood normal.         Behavior: Behavior normal.             Labs:  Recent Labs     05/22/24  1519 05/23/24  0340   SODIUM 142 142   POTASSIUM 3.8 4.0   CHLORIDE 111 110   CO2 17* 22   BUN 7* 11   CREATININE 1.07 1.11   CALCIUM 9.0 8.6     Recent Labs     05/22/24  1519 05/23/24  0340   ALTSGPT 21  --    ASTSGOT 22  --    ALKPHOSPHAT 66  --    TBILIRUBIN 0.3  --    LIPASE 23  --    GLUCOSE 98 95     Recent Labs     05/22/24  1519 05/23/24  0340   WBC 6.5 4.6*   NEUTSPOLYS 66.80  --    LYMPHOCYTES 24.00  --    MONOCYTES 7.60  --    EOSINOPHILS 0.30  --    BASOPHILS 0.80  --    ASTSGOT 22  --    ALTSGPT 21  --    ALKPHOSPHAT 66  --    TBILIRUBIN 0.3  --      Recent Labs     05/22/24  1519 05/23/24  0340 05/23/24  1257 05/23/24  1957 05/24/24  0402   RBC 5.01 4.39*  --   --   --    HEMOGLOBIN 11.6* 10.2* 10.6* 10.4* 10.4*   HEMATOCRIT 36.5* 33.1*  --   --   --    PLATELETCT  436 362  --   --   --    IRON  --   --  94  --   --    TOTIRONBC  --   --  355  --   --      Recent Results (from the past 24 hour(s))   URINALYSIS    Collection Time: 05/23/24  8:46 AM    Specimen: Urine   Result Value Ref Range    Color Yellow     Character Clear     Specific Gravity >=1.030 <1.035    Ph 6.0 5.0 - 8.0    Glucose Negative Negative mg/dL    Ketones Negative Negative mg/dL    Protein Negative Negative mg/dL    Bilirubin Negative Negative    Urobilinogen, Urine 0.2 Negative    Nitrite Negative Negative    Leukocyte Esterase Negative Negative    Occult Blood Negative Negative    Micro Urine Req see below    HGB (Hemoglobin) for 48 hours    Collection Time: 05/23/24 12:57 PM   Result Value Ref Range    Hemoglobin 10.6 (L) 14.0 - 18.0 g/dL   RETICULOCYTES COUNT    Collection Time: 05/23/24 12:57 PM   Result Value Ref Range    Reticulocyte Count 2.4 0.8 - 2.6 %    Retic, Absolute 0.11 0.04 - 0.12 M/uL    Imm. Reticulocyte Fraction 31.1 (H) 2.6 - 16.1 %    Retic Hgb Equivalent 25.7 (L) 29.0 - 35.0 pg/cell   IRON/TOTAL IRON BIND    Collection Time: 05/23/24 12:57 PM   Result Value Ref Range    Iron 94 50 - 180 ug/dL    Total Iron Binding 355 250 - 450 ug/dL    Unsat Iron Binding 261 110 - 370 ug/dL    % Saturation 26 15 - 55 %   COD (Adult)    Collection Time: 05/23/24 12:59 PM   Result Value Ref Range    ABO Grouping Only A     Rh Grouping Only POS     Antibody Screen-Cod NEG    HGB (Hemoglobin) for 48 hours    Collection Time: 05/23/24  7:57 PM   Result Value Ref Range    Hemoglobin 10.4 (L) 14.0 - 18.0 g/dL   HGB (Hemoglobin) for 48 hours    Collection Time: 05/24/24  4:02 AM   Result Value Ref Range    Hemoglobin 10.4 (L) 14.0 - 18.0 g/dL       Radiology Review:  CT-ABDOMEN-PELVIS WITH   Final Result         1.  Mild hepatomegaly   2.  Small fat-containing left inguinal hernia      [id number] VIEW   Final Result         1.  Nonspecific bowel gas pattern.            [facility] (Data Review):   -Records  reviewed and summarized in current documentation  -I personally reviewed and interpreted the laboratory results  -I personally reviewed the radiology images    Assessment/Recommendations:  History of gastric and duodenal ulcers.  Hx of GDA coiling in Nov 2023.  H pylori negative.  Gastrin normal.  May be related to aspirin.  Acute on chronic blood loss anemia.  Hgb 9.3 on discharge last week and with hydration is 10.2 today.  BUN nl.  Severe epigastric pain  Iron deficiency.  Was on PO iron  Chronic pain syndrome followed by [facility]  Hx of mitral valve repair  Mild hepatomegaly on CT  Melena  Eosinophilic esophagitis and eosinophils identified on prior gastric biopsy ? EOE gastroenteritis  ?  Component of ischemia due to considerable cardiac history and mitral valve repair    Recommendations:  --Will do another EGD with repeated biopsies  --- Will obtain MRA abdomen with and without contrast to assess mesenteric vasculature and vessels to rule out stricture causing ischemia  --One concern is perforation, possibly contained, related to previous coil embolization and chronic ulceration with recent cautery.  Another possibility is myofascial pain (Carnett's sign) at the port site but his pain seems out of proportion for anterior cutaneous nerve entrapment (ACNE).  CT abdomen pelvis negative  On PPI drip  On Carafate  ASA held  May have diet today  N.p.o. at midnight    Plan discussed with patient, RN, Dr. Joya, Dr. Dalton    ..Kourtney Peterson, DNP,  APRN    Core Quality Measures   Reviewed items::  Labs, Medications and Radiology reports reviewed

## 2024-05-24 NOTE — CARE PLAN
The patient is Stable - Low risk of patient condition declining or worsening    Shift Goals  Clinical Goals: Pain control at comfort level less than 5/10 within 12 hour shift  Patient Goals: Pain control, CT to be done  Family Goals: JJ    Progress made toward(s) clinical / shift goals:  Complaints of moderate to severe abdominal pain, medicated per MAR with minimal relief verbalized. Able to make needs known, upself. Call bell placed within easy reach.     Patient is not progressing towards the following goals:      Problem: Pain - Standard  Goal: Alleviation of pain or a reduction in pain to the patient’s comfort goal  Description: Target End Date:  Prior to discharge or change in level of care    Document on Vitals flowsheet    1.  Document pain using the appropriate pain scale per order or unit policy  2.  Educate and implement non-pharmacologic comfort measures (i.e. relaxation, distraction, massage, cold/heat therapy, etc.)  3.  Pain management medications as ordered  4.  Reassess pain after pain med administration per policy  5.  If opiods administered assess patient's response to pain medication is appropriate per POSS sedation scale  6.  Follow pain management plan developed in collaboration with patient and interdisciplinary team (including palliative care or pain specialists if applicable)  Outcome: Not Progressing     Problem: Skin Integrity  Goal: Risk for impaired skin integrity will decrease  Outcome: Not Progressing  Note: Dressing on R forearm wound CDI.

## 2024-05-24 NOTE — CARE PLAN
The patient is Watcher - Medium risk of patient condition declining or worsening    Shift Goals  Clinical Goals: Pts pain will be maintained to a 6/10 or lower throughout shift  Patient Goals: Pain control  Family Goals: JJ    Progress made toward(s) clinical / shift goals:  Pts pain has been maintained throughout shift by pharmacologic (see MAR) and nonpharmacologic (rest, ambulating, distraction, communication) interventions.    Patient is not progressing towards the following goals: Pt is have regular bowel movement, but stool is still black and tarry.      Problem: Bowel Elimination  Goal: Establish and maintain regular bowel function  5/24/2024 1658 by Dariana Joseph, R.N.  Outcome: Not Progressing  5/24/2024 1656 by Dariana Joseph, R.N.  Outcome: Progressing

## 2024-05-25 ENCOUNTER — ANESTHESIA (OUTPATIENT)
Dept: SURGERY | Facility: MEDICAL CENTER | Age: 44
End: 2024-05-25
Payer: COMMERCIAL

## 2024-05-25 ENCOUNTER — ANESTHESIA EVENT (OUTPATIENT)
Dept: SURGERY | Facility: MEDICAL CENTER | Age: 44
End: 2024-05-25
Payer: COMMERCIAL

## 2024-05-25 LAB
ANION GAP SERPL CALC-SCNC: 11 MMOL/L (ref 7–16)
BUN SERPL-MCNC: 15 MG/DL (ref 8–22)
CALCIUM SERPL-MCNC: 8.5 MG/DL (ref 8.5–10.5)
CHLORIDE SERPL-SCNC: 106 MMOL/L (ref 96–112)
CO2 SERPL-SCNC: 24 MMOL/L (ref 20–33)
CREAT SERPL-MCNC: 1.1 MG/DL (ref 0.5–1.4)
ERYTHROCYTE [DISTWIDTH] IN BLOOD BY AUTOMATED COUNT: 66.9 FL (ref 35.9–50)
GFR SERPLBLD CREATININE-BSD FMLA CKD-EPI: 85 ML/MIN/1.73 M 2
GLUCOSE SERPL-MCNC: 110 MG/DL (ref 65–99)
HCT VFR BLD AUTO: 35.9 % (ref 42–52)
HCT VFR BLD AUTO: 37 % (ref 42–52)
HGB BLD-MCNC: 10.9 G/DL (ref 14–18)
HGB BLD-MCNC: 11.2 G/DL (ref 14–18)
MAGNESIUM SERPL-MCNC: 2.1 MG/DL (ref 1.5–2.5)
MCH RBC QN AUTO: 23.2 PG (ref 27–33)
MCHC RBC AUTO-ENTMCNC: 30.4 G/DL (ref 32.3–36.5)
MCV RBC AUTO: 76.5 FL (ref 81.4–97.8)
PHOSPHATE SERPL-MCNC: 4.4 MG/DL (ref 2.5–4.5)
PLATELET # BLD AUTO: 395 K/UL (ref 164–446)
PMV BLD AUTO: 8.8 FL (ref 9–12.9)
POTASSIUM SERPL-SCNC: 4 MMOL/L (ref 3.6–5.5)
RBC # BLD AUTO: 4.69 M/UL (ref 4.7–6.1)
SODIUM SERPL-SCNC: 141 MMOL/L (ref 135–145)
WBC # BLD AUTO: 5.8 K/UL (ref 4.8–10.8)

## 2024-05-25 PROCEDURE — 36415 COLL VENOUS BLD VENIPUNCTURE: CPT

## 2024-05-25 PROCEDURE — 88305 TISSUE EXAM BY PATHOLOGIST: CPT | Mod: 59

## 2024-05-25 PROCEDURE — A9270 NON-COVERED ITEM OR SERVICE: HCPCS | Performed by: ANESTHESIOLOGY

## 2024-05-25 PROCEDURE — 80048 BASIC METABOLIC PNL TOTAL CA: CPT

## 2024-05-25 PROCEDURE — 700111 HCHG RX REV CODE 636 W/ 250 OVERRIDE (IP): Performed by: INTERNAL MEDICINE

## 2024-05-25 PROCEDURE — 85018 HEMOGLOBIN: CPT

## 2024-05-25 PROCEDURE — 43239 EGD BIOPSY SINGLE/MULTIPLE: CPT | Performed by: INTERNAL MEDICINE

## 2024-05-25 PROCEDURE — 700102 HCHG RX REV CODE 250 W/ 637 OVERRIDE(OP): Performed by: ANESTHESIOLOGY

## 2024-05-25 PROCEDURE — 99233 SBSQ HOSP IP/OBS HIGH 50: CPT | Performed by: STUDENT IN AN ORGANIZED HEALTH CARE EDUCATION/TRAINING PROGRAM

## 2024-05-25 PROCEDURE — 0DJ08ZZ INSPECTION OF UPPER INTESTINAL TRACT, VIA NATURAL OR ARTIFICIAL OPENING ENDOSCOPIC: ICD-10-PCS | Performed by: INTERNAL MEDICINE

## 2024-05-25 PROCEDURE — 83735 ASSAY OF MAGNESIUM: CPT

## 2024-05-25 PROCEDURE — 160208 HCHG ENDO MINUTES - EA ADDL 1 MIN LEVEL 4: Performed by: INTERNAL MEDICINE

## 2024-05-25 PROCEDURE — 700102 HCHG RX REV CODE 250 W/ 637 OVERRIDE(OP): Performed by: NURSE PRACTITIONER

## 2024-05-25 PROCEDURE — 160035 HCHG PACU - 1ST 60 MINS PHASE I: Performed by: INTERNAL MEDICINE

## 2024-05-25 PROCEDURE — 85027 COMPLETE CBC AUTOMATED: CPT

## 2024-05-25 PROCEDURE — 770006 HCHG ROOM/CARE - MED/SURG/GYN SEMI*

## 2024-05-25 PROCEDURE — 700105 HCHG RX REV CODE 258: Performed by: INTERNAL MEDICINE

## 2024-05-25 PROCEDURE — A9270 NON-COVERED ITEM OR SERVICE: HCPCS | Performed by: NURSE PRACTITIONER

## 2024-05-25 PROCEDURE — 700111 HCHG RX REV CODE 636 W/ 250 OVERRIDE (IP): Performed by: ANESTHESIOLOGY

## 2024-05-25 PROCEDURE — C9113 INJ PANTOPRAZOLE SODIUM, VIA: HCPCS | Performed by: INTERNAL MEDICINE

## 2024-05-25 PROCEDURE — 88312 SPECIAL STAINS GROUP 1: CPT

## 2024-05-25 PROCEDURE — 85014 HEMATOCRIT: CPT

## 2024-05-25 PROCEDURE — 160002 HCHG RECOVERY MINUTES (STAT): Performed by: INTERNAL MEDICINE

## 2024-05-25 PROCEDURE — 160048 HCHG OR STATISTICAL LEVEL 1-5: Performed by: INTERNAL MEDICINE

## 2024-05-25 PROCEDURE — 160009 HCHG ANES TIME/MIN: Performed by: INTERNAL MEDICINE

## 2024-05-25 PROCEDURE — 0DJD8ZZ INSPECTION OF LOWER INTESTINAL TRACT, VIA NATURAL OR ARTIFICIAL OPENING ENDOSCOPIC: ICD-10-PCS | Performed by: INTERNAL MEDICINE

## 2024-05-25 PROCEDURE — 160203 HCHG ENDO MINUTES - 1ST 30 MINS LEVEL 4: Performed by: INTERNAL MEDICINE

## 2024-05-25 PROCEDURE — 84100 ASSAY OF PHOSPHORUS: CPT

## 2024-05-25 RX ORDER — MEPERIDINE HYDROCHLORIDE 25 MG/ML
12.5 INJECTION INTRAMUSCULAR; INTRAVENOUS; SUBCUTANEOUS
Status: DISCONTINUED | OUTPATIENT
Start: 2024-05-25 | End: 2024-05-25 | Stop reason: HOSPADM

## 2024-05-25 RX ORDER — OXYCODONE HCL 5 MG/5 ML
10 SOLUTION, ORAL ORAL
Status: COMPLETED | OUTPATIENT
Start: 2024-05-25 | End: 2024-05-25

## 2024-05-25 RX ORDER — GLYCOPYRROLATE 0.2 MG/ML
INJECTION INTRAMUSCULAR; INTRAVENOUS PRN
Status: DISCONTINUED | OUTPATIENT
Start: 2024-05-25 | End: 2024-05-25 | Stop reason: SURG

## 2024-05-25 RX ORDER — HALOPERIDOL 5 MG/ML
1 INJECTION INTRAMUSCULAR
Status: DISCONTINUED | OUTPATIENT
Start: 2024-05-25 | End: 2024-05-25 | Stop reason: HOSPADM

## 2024-05-25 RX ORDER — OXYCODONE HCL 5 MG/5 ML
5 SOLUTION, ORAL ORAL
Status: COMPLETED | OUTPATIENT
Start: 2024-05-25 | End: 2024-05-25

## 2024-05-25 RX ORDER — ONDANSETRON 2 MG/ML
4 INJECTION INTRAMUSCULAR; INTRAVENOUS
Status: DISCONTINUED | OUTPATIENT
Start: 2024-05-25 | End: 2024-05-25 | Stop reason: HOSPADM

## 2024-05-25 RX ORDER — IPRATROPIUM BROMIDE AND ALBUTEROL SULFATE 2.5; .5 MG/3ML; MG/3ML
3 SOLUTION RESPIRATORY (INHALATION)
Status: DISCONTINUED | OUTPATIENT
Start: 2024-05-25 | End: 2024-05-25 | Stop reason: HOSPADM

## 2024-05-25 RX ORDER — HYDROMORPHONE HYDROCHLORIDE 1 MG/ML
0.2 INJECTION, SOLUTION INTRAMUSCULAR; INTRAVENOUS; SUBCUTANEOUS
Status: DISCONTINUED | OUTPATIENT
Start: 2024-05-25 | End: 2024-05-25 | Stop reason: HOSPADM

## 2024-05-25 RX ORDER — HYDROMORPHONE HYDROCHLORIDE 1 MG/ML
1 INJECTION, SOLUTION INTRAMUSCULAR; INTRAVENOUS; SUBCUTANEOUS
Status: DISCONTINUED | OUTPATIENT
Start: 2024-05-25 | End: 2024-05-25 | Stop reason: HOSPADM

## 2024-05-25 RX ORDER — MIDAZOLAM HYDROCHLORIDE 1 MG/ML
1 INJECTION INTRAMUSCULAR; INTRAVENOUS
Status: DISCONTINUED | OUTPATIENT
Start: 2024-05-25 | End: 2024-05-25 | Stop reason: HOSPADM

## 2024-05-25 RX ORDER — LIDOCAINE HYDROCHLORIDE 20 MG/ML
INJECTION, SOLUTION EPIDURAL; INFILTRATION; INTRACAUDAL; PERINEURAL PRN
Status: DISCONTINUED | OUTPATIENT
Start: 2024-05-25 | End: 2024-05-25 | Stop reason: SURG

## 2024-05-25 RX ORDER — FERROUS SULFATE 325(65) MG
325 TABLET ORAL
Status: DISCONTINUED | OUTPATIENT
Start: 2024-05-26 | End: 2024-05-27

## 2024-05-25 RX ORDER — DIPHENHYDRAMINE HYDROCHLORIDE 50 MG/ML
12.5 INJECTION INTRAMUSCULAR; INTRAVENOUS
Status: DISCONTINUED | OUTPATIENT
Start: 2024-05-25 | End: 2024-05-25 | Stop reason: HOSPADM

## 2024-05-25 RX ORDER — HYDROMORPHONE HYDROCHLORIDE 1 MG/ML
0.4 INJECTION, SOLUTION INTRAMUSCULAR; INTRAVENOUS; SUBCUTANEOUS
Status: DISCONTINUED | OUTPATIENT
Start: 2024-05-25 | End: 2024-05-25 | Stop reason: HOSPADM

## 2024-05-25 RX ORDER — SODIUM CHLORIDE, SODIUM GLUCONATE, SODIUM ACETATE, POTASSIUM CHLORIDE AND MAGNESIUM CHLORIDE 526; 502; 368; 37; 30 MG/100ML; MG/100ML; MG/100ML; MG/100ML; MG/100ML
500 INJECTION, SOLUTION INTRAVENOUS CONTINUOUS
Status: DISCONTINUED | OUTPATIENT
Start: 2024-05-25 | End: 2024-05-25 | Stop reason: HOSPADM

## 2024-05-25 RX ADMIN — SODIUM CHLORIDE, POTASSIUM CHLORIDE, SODIUM LACTATE AND CALCIUM CHLORIDE: 600; 310; 30; 20 INJECTION, SOLUTION INTRAVENOUS at 23:12

## 2024-05-25 RX ADMIN — OXYCODONE HYDROCHLORIDE 10 MG: 10 TABLET ORAL at 19:17

## 2024-05-25 RX ADMIN — OXYCODONE HYDROCHLORIDE 10 MG: 10 TABLET ORAL at 15:10

## 2024-05-25 RX ADMIN — SUCRALFATE 1 G: 1 TABLET ORAL at 16:50

## 2024-05-25 RX ADMIN — SUCRALFATE 1 G: 1 TABLET ORAL at 20:10

## 2024-05-25 RX ADMIN — PROPOFOL 100 MG: 10 INJECTION, EMULSION INTRAVENOUS at 09:07

## 2024-05-25 RX ADMIN — PROPOFOL 50 MG: 10 INJECTION, EMULSION INTRAVENOUS at 09:20

## 2024-05-25 RX ADMIN — HYDROMORPHONE HYDROCHLORIDE 1 MG: 1 INJECTION, SOLUTION INTRAMUSCULAR; INTRAVENOUS; SUBCUTANEOUS at 16:51

## 2024-05-25 RX ADMIN — GLYCOPYRROLATE 0.2 MG: 0.2 INJECTION INTRAMUSCULAR; INTRAVENOUS at 09:09

## 2024-05-25 RX ADMIN — SODIUM CHLORIDE, POTASSIUM CHLORIDE, SODIUM LACTATE AND CALCIUM CHLORIDE: 600; 310; 30; 20 INJECTION, SOLUTION INTRAVENOUS at 13:29

## 2024-05-25 RX ADMIN — SUCRALFATE 1 G: 1 TABLET ORAL at 12:22

## 2024-05-25 RX ADMIN — FENTANYL CITRATE 50 MCG: 50 INJECTION, SOLUTION INTRAMUSCULAR; INTRAVENOUS at 09:45

## 2024-05-25 RX ADMIN — LOSARTAN POTASSIUM 50 MG: 50 TABLET, FILM COATED ORAL at 06:21

## 2024-05-25 RX ADMIN — PANTOPRAZOLE SODIUM 8 MG/HR: 40 INJECTION, POWDER, FOR SOLUTION INTRAVENOUS at 06:38

## 2024-05-25 RX ADMIN — METOPROLOL TARTRATE 25 MG: 25 TABLET, FILM COATED ORAL at 06:22

## 2024-05-25 RX ADMIN — BACLOFEN 10 MG: 10 TABLET ORAL at 15:10

## 2024-05-25 RX ADMIN — METOPROLOL TARTRATE 25 MG: 25 TABLET, FILM COATED ORAL at 16:50

## 2024-05-25 RX ADMIN — HYDROMORPHONE HYDROCHLORIDE 1 MG: 1 INJECTION, SOLUTION INTRAMUSCULAR; INTRAVENOUS; SUBCUTANEOUS at 20:09

## 2024-05-25 RX ADMIN — FENTANYL CITRATE 50 MCG: 50 INJECTION, SOLUTION INTRAMUSCULAR; INTRAVENOUS at 09:50

## 2024-05-25 RX ADMIN — PROPOFOL 50 MG: 10 INJECTION, EMULSION INTRAVENOUS at 09:16

## 2024-05-25 RX ADMIN — PROPOFOL 50 MG: 10 INJECTION, EMULSION INTRAVENOUS at 09:24

## 2024-05-25 RX ADMIN — PROPOFOL 50 MG: 10 INJECTION, EMULSION INTRAVENOUS at 09:11

## 2024-05-25 RX ADMIN — HYDROMORPHONE HYDROCHLORIDE 1 MG: 1 INJECTION, SOLUTION INTRAMUSCULAR; INTRAVENOUS; SUBCUTANEOUS at 08:02

## 2024-05-25 RX ADMIN — HYDROMORPHONE HYDROCHLORIDE 1 MG: 1 INJECTION, SOLUTION INTRAMUSCULAR; INTRAVENOUS; SUBCUTANEOUS at 23:12

## 2024-05-25 RX ADMIN — HYDROMORPHONE HYDROCHLORIDE 1 MG: 1 INJECTION, SOLUTION INTRAMUSCULAR; INTRAVENOUS; SUBCUTANEOUS at 13:23

## 2024-05-25 RX ADMIN — SUCRALFATE 1 G: 1 TABLET ORAL at 06:19

## 2024-05-25 RX ADMIN — OXYCODONE HYDROCHLORIDE 10 MG: 10 TABLET ORAL at 22:27

## 2024-05-25 RX ADMIN — HYDROMORPHONE HYDROCHLORIDE 1 MG: 1 INJECTION, SOLUTION INTRAMUSCULAR; INTRAVENOUS; SUBCUTANEOUS at 01:39

## 2024-05-25 RX ADMIN — OXYCODONE HYDROCHLORIDE 10 MG: 10 TABLET ORAL at 12:21

## 2024-05-25 RX ADMIN — PROPOFOL 40 MG: 10 INJECTION, EMULSION INTRAVENOUS at 09:28

## 2024-05-25 RX ADMIN — BACLOFEN 10 MG: 10 TABLET ORAL at 22:06

## 2024-05-25 RX ADMIN — LIDOCAINE HYDROCHLORIDE 50 MG: 20 INJECTION, SOLUTION EPIDURAL; INFILTRATION; INTRACAUDAL at 09:07

## 2024-05-25 RX ADMIN — GABAPENTIN 300 MG: 300 CAPSULE ORAL at 22:06

## 2024-05-25 RX ADMIN — OXYCODONE HYDROCHLORIDE 10 MG: 10 TABLET ORAL at 06:21

## 2024-05-25 RX ADMIN — OXYCODONE HYDROCHLORIDE 10 MG: 5 SOLUTION ORAL at 09:45

## 2024-05-25 RX ADMIN — PANTOPRAZOLE SODIUM 8 MG/HR: 40 INJECTION, POWDER, FOR SOLUTION INTRAVENOUS at 22:24

## 2024-05-25 RX ADMIN — HYDROMORPHONE HYDROCHLORIDE 1 MG: 1 INJECTION, SOLUTION INTRAMUSCULAR; INTRAVENOUS; SUBCUTANEOUS at 04:45

## 2024-05-25 RX ADMIN — HYDROMORPHONE HYDROCHLORIDE 0.4 MG: 1 INJECTION, SOLUTION INTRAMUSCULAR; INTRAVENOUS; SUBCUTANEOUS at 09:55

## 2024-05-25 RX ADMIN — GABAPENTIN 300 MG: 300 CAPSULE ORAL at 15:10

## 2024-05-25 ASSESSMENT — PAIN DESCRIPTION - PAIN TYPE
TYPE: ACUTE PAIN
TYPE: ACUTE PAIN;CHRONIC PAIN
TYPE: ACUTE PAIN
TYPE: ACUTE PAIN;CHRONIC PAIN
TYPE: ACUTE PAIN
TYPE: ACUTE PAIN;CHRONIC PAIN
TYPE: ACUTE PAIN
TYPE: ACUTE PAIN
TYPE: ACUTE PAIN;CHRONIC PAIN
TYPE: ACUTE PAIN
TYPE: ACUTE PAIN;CHRONIC PAIN
TYPE: ACUTE PAIN
TYPE: ACUTE PAIN;CHRONIC PAIN
TYPE: ACUTE PAIN
TYPE: ACUTE PAIN;CHRONIC PAIN

## 2024-05-25 ASSESSMENT — ENCOUNTER SYMPTOMS
ABDOMINAL PAIN: 1
NAUSEA: 1

## 2024-05-25 ASSESSMENT — PAIN SCALES - GENERAL: PAIN_LEVEL: 5

## 2024-05-25 ASSESSMENT — FIBROSIS 4 INDEX: FIB4 SCORE: 0.51

## 2024-05-25 NOTE — CARE PLAN
The patient is Stable - Low risk of patient condition declining or worsening    Shift Goals  Clinical Goals: Pain control at comfort level 5/10 within 12 hour shift  Patient Goals: Pain control, EGD tomorrow  Family Goals: JJ    Progress made toward(s) clinical / shift goals: Complaints of moderate to severe abdominal pain, medicated per MAR with minimal relief verbalized. Reports black tarry stools. NPO since midnight pending MRA and procedure. Able to make needs known, upself. Call bell placed within easy reach.      Patient is not progressing towards the following goals:      Problem: Pain - Standard  Goal: Alleviation of pain or a reduction in pain to the patient’s comfort goal  Description: Target End Date:  Prior to discharge or change in level of care    Document on Vitals flowsheet    1.  Document pain using the appropriate pain scale per order or unit policy  2.  Educate and implement non-pharmacologic comfort measures (i.e. relaxation, distraction, massage, cold/heat therapy, etc.)  3.  Pain management medications as ordered  4.  Reassess pain after pain med administration per policy  5.  If opiods administered assess patient's response to pain medication is appropriate per POSS sedation scale  6.  Follow pain management plan developed in collaboration with patient and interdisciplinary team (including palliative care or pain specialists if applicable)  Outcome: Not Progressing     Problem: Skin Integrity  Goal: Risk for impaired skin integrity will decrease  Outcome: Not Progressing     Problem: Bowel Elimination  Goal: Establish and maintain regular bowel function  Description: Target End Date:  Prior to discharge or change in level of care    1.   Note date of last BM  2.   Educate about diet, fluid intake, medication and activity to promote bowel function  3.   Educate signs and symptoms of constipation and interventions to implement  4.   Pharmacologic bowel management per provider order  5.    Regular toileting schedule  6.   Upright position for toileting  Outcome: Not Progressing

## 2024-05-25 NOTE — ANESTHESIA POSTPROCEDURE EVALUATION
Patient: Roberto Braswell    Procedure Summary       Date: 05/25/24 Room / Location: [facility] 06 / SURGERY [facility]    Anesthesia Start: 0900 Anesthesia Stop: 0939    Procedures:       GASTROSCOPY (Esophagus)      GASTROSCOPY, WITH BIOPSY (Esophagus) Diagnosis: (Gastric ulcer, duodenal ulcer, gastritis, duodenitis)    Surgeons: Janae Oliver M.D. Responsible Provider: Torito Sandoval III, M.D.    Anesthesia Type: general ASA Status: 3            Final Anesthesia Type: general  Last vitals  BP   Blood Pressure: 117/89    Temp   36.1 °C (97 °F)    Pulse   66   Resp   18    SpO2   98 %      Anesthesia Post Evaluation    Patient location during evaluation: PACU  Patient participation: complete - patient participated  Level of consciousness: awake and alert  Pain score: 5    Airway patency: patent  Anesthetic complications: no  Cardiovascular status: hemodynamically stable  Respiratory status: acceptable  Hydration status: euvolemic    PONV: none          No notable events documented.     Nurse Pain Score: 5 (NPRS)

## 2024-05-25 NOTE — PROGRESS NOTES
[facility] Medicine Daily Progress Note    Date of Service  5/25/2024    Chief Complaint  Roberto Braswell is a 43 y.o. male admitted 5/22/2024 with epigastric abdominal pain and dark stools    Hospital Course  43 y.o. male with a past medical history of mitral valve regurgitation s/p MVR, duodenal ulcer status post GDA coiling embolization and chronic pain for which he follows with outpatient pain management who presented to the ED on 5/22/2024 with a 1 day history of worsening epigastric pain.   The patient was just discharged on 5/19/2024 after being being admitted for abdominal pain. He underwent an EGD which found eosinophilic esophagitis, clean-based ulcers in the in the stomach, narrowing due to ulcer at pylorus, oozing and multiple ulcers of the duodenum, treated with gold probe. Bleeding was treated and area was biopsied. Gastrin found to be normal. Stool for H. pylori was negative. The patient was discharged with twice daily PPI for 3 months, carafate QID. It was also recommended to have a repeat EGD in 3 months to assess for healing. Patient was also prescribed oral iron for microcytic anemia.   The patient says that his epigastric pain is worse than it was on his recent admission. He also reports nausea secondary to pain.  Associated with dark tarry stool.  GI was consulted.  The CT abdomen no acute abnormalities.  On PPI drip.  Status post EGD 5/25 which showed Antral gastritis and small clean based gastric ulcer. Duodenitis D1-D2, Shallow duodenal ulcer distal bulb without stigmata of recent bleeding; Spontaneous, scant volume, oozing when duodenal bulb mucosa touched.     Interval Problem Update  Seen patient at bedside.  Patient continues reporting epigastric abdominal pain  He had EGD this am. Findings noted  Continue PPI drip  Discussed with GI, planning colonoscopy and capsule endoscopy  MRA pending  Reviewed CT abdomen independently    I have discussed this patient's plan of care and discharge  plan at IDT rounds today with Case Management, Nursing, Nursing leadership, and other members of the IDT team.    Consultants/Specialty  GI    Code Status  Full Code    Disposition  Medically Cleared  I have placed the appropriate orders for post-discharge needs.    Review of Systems  Review of Systems   Gastrointestinal:  Positive for abdominal pain and nausea.        Physical Exam  Temp:  [35.9 °C (96.7 °F)-36.6 °C (97.9 °F)] 36.2 °C (97.2 °F)  Pulse:  [62-84] 62  Resp:  [10-20] 18  BP: ()/(52-89) 125/85  SpO2:  [93 %-99 %] 98 %    Physical Exam  Vitals and nursing note reviewed.   Constitutional:       General: He is not in acute distress.  HENT:      Head: Normocephalic.      Mouth/Throat:      Mouth: Mucous membranes are moist.   Eyes:      Pupils: Pupils are equal, round, and reactive to light.   Cardiovascular:      Rate and Rhythm: Normal rate and regular rhythm.      Pulses: Normal pulses.      Heart sounds: Normal heart sounds.   Pulmonary:      Effort: Pulmonary effort is normal.      Breath sounds: Normal breath sounds.   Abdominal:      Palpations: Abdomen is soft.      Tenderness: There is no abdominal tenderness.   Musculoskeletal:         General: No swelling.      Cervical back: Neck supple.   Skin:     General: Skin is warm.      Coloration: Skin is pale. Skin is not jaundiced.   Neurological:      General: No focal deficit present.      Mental Status: He is alert and oriented to person, place, and time.   Psychiatric:         Mood and Affect: Mood normal.         Behavior: Behavior normal.         Fluids    Intake/Output Summary (Last 24 hours) at 5/25/2024 1404  Last data filed at 5/25/2024 1140  Gross per 24 hour   Intake 3480 ml   Output 5 ml   Net 3475 ml       Laboratory  Recent Labs     05/22/24  1519 05/23/24  0340 05/23/24  1257 05/24/24  1157 05/24/24 2006 05/25/24  0254   WBC 6.5 4.6*  --   --   --  5.8   RBC 5.01 4.39*  --   --   --  4.69*   HEMOGLOBIN 11.6* 10.2*   < > 10.9*  11.1* 10.9*   HEMATOCRIT 36.5* 33.1*  --   --   --  35.9*   MCV 72.9* 75.4*  --   --   --  76.5*   MCH 23.2* 23.2*  --   --   --  23.2*   MCHC 31.8* 30.8*  --   --   --  30.4*   RDW 64.2* 67.6*  --   --   --  66.9*   PLATELETCT 436 362  --   --   --  395   MPV 8.6* 8.5*  --   --   --  8.8*    < > = values in this interval not displayed.     Recent Labs     05/22/24  1519 05/23/24  0340 05/25/24  0254   SODIUM 142 142 141   POTASSIUM 3.8 4.0 4.0   CHLORIDE 111 110 106   CO2 17* 22 24   GLUCOSE 98 95 110*   BUN 7* 11 15   CREATININE 1.07 1.11 1.10   CALCIUM 9.0 8.6 8.5                   Imaging  CT-ABDOMEN-PELVIS WITH   Final Result         1.  Mild hepatomegaly   2.  Small fat-containing left inguinal hernia      [id number] VIEW   Final Result         1.  Nonspecific bowel gas pattern.      MR-MRA ABDOMEN-WITH & W/O    (Results Pending)        Assessment/Plan  * Upper GI bleed- (present on admission)  Assessment & Plan  Likely due to peptic ulcer disease vs. Mesenteric ischemia. Patient just had EGD on 5/18 showing appearance of eosinophilic esophagitis, clean based ulcers in the antrum, food in stomach , narrowing due to ulcer at pylorus, oozing and multiple ulcers of duodenum. Treated with gold probe. Bleeding was treated and area was biopsied. Gastrin found to be normal. Stool for H. pylori was negative.   CT abdomen unremarkable for perforation  S/p EGD 5/25 which showed Antral gastritis and small clean based gastric ulcer. Duodenitis D1-D2, Shallow duodenal ulcer distal bulb without stigmata of recent bleeding; Spontaneous, scant volume, oozing when duodenal bulb mucosa touched.     MRA pending  Continue PPI drip  Colonoscopy and capsule endoscopy    Chronic pain syndrome- (present on admission)  Assessment & Plan  Follows with pain management at [facility].  Patient reports his chronic pain is secondary to heart surgery and that they are currently reducing his medications from oxy 20 mg 5 times a day to 10 mg 5  times a day.  He has not seen his pain management specialist in 2 weeks.  - continue home meds, including oxy, gabapentin, baclofen    Acute abdominal pain- (present on admission)  Assessment & Plan  Likely due to peptic ulcer disease vs. Mesenteric ischemia. Patient just had EGD on 5/18 showing appearance of eosinophilic esophagitis, clean based ulcers in the antrum, food in stomach , narrowing due to ulcer at pylorus, oozing and multiple ulcers of duodenum. Treated with gold probe. Bleeding was treated and area was biopsied. Gastrin found to be normal. Stool for H. pylori was negative.   CT abdomen unremarkable  - IV protonix drip  - IVF  -GI was consulted, planning to repeat EGD and ordered MRA abdomen     H/O mitral valve repair- (present on admission)  Assessment & Plan  Hx of radical valve repair in January, 2024.  He reports his chronic sternal and clavicular chest pain is related to the surgery.   Hold aspirin     Primary hypertension- (present on admission)  Assessment & Plan  Reports taking his blood pressure medications only as needed.  Currently blood pressure is elevated  - continue losartan, metoprolol    Duodenal ulcer- (present on admission)  Assessment & Plan  - continue PPI IV TID  - continue carafate    Iron deficiency anemia due to chronic blood loss- (present on admission)  Assessment & Plan  - continue iron         VTE prophylaxis: SCD    I have performed a physical exam and reviewed and updated ROS and Plan today (5/25/2024). In review of yesterday's note (5/24/2024), there are no changes except as documented above.    I spent 55 minutes, reviewing the chart, obtaining and/or reviewing separately obtained history. Performing a medically appropriate examination and evaluation.  Counseling and educating the patient. Ordering and reviewing medications, tests, or procedures.  Discussing the case with GI.  Documenting clinical information in EPIC. Independently interpreting results and  communicating results to patient. Discussing future disposition of care with patient, RN and case management.

## 2024-05-25 NOTE — OR NURSING
0936-Pt arrived from OR, resting calmly with even, unlabored breathing, vss, pt states pain, no nausea.    0952-Pt requesting no contact to wife as she is resting.    1021-RN called report to KARSON Westbrook    1029-Pt transferred in bed with transport tech to floor.  Vss, pain tolerable, no nausea.

## 2024-05-25 NOTE — ANESTHESIA TIME REPORT
Anesthesia Start and Stop Event Times       Date Time Event    5/25/2024 0850 Ready for Procedure     0900 Anesthesia Start     0939 Anesthesia Stop          Responsible Staff  05/25/24      Name Role Begin End    Torito Sandoval III, M.D. Anesth 0900 0939          Overtime Reason:  no overtime (within assigned shift)    Comments:

## 2024-05-25 NOTE — ANESTHESIA PREPROCEDURE EVALUATION
Case: [id number] Date/Time: 05/25/24 0935    Procedure: GASTROSCOPY (Esophagus)    Anesthesia type: MAC    Pre-op diagnosis: Abdominal Pain    Location: [facility] OR 07 / SURGERY [facility]    Surgeons: Janae Oliver M.D.            Relevant Problems   CARDIAC   (positive) Essential hypertension   (positive) Primary hypertension      GI   (positive) Duodenal ulcer       Physical Exam    Airway   Mallampati: III  TM distance: >3 FB  Neck ROM: limited       Cardiovascular - normal exam  Rhythm: regular  Rate: normal  (-) murmur     Dental - normal exam           Pulmonary - normal exam  Breath sounds clear to auscultation     Abdominal   (+) obese     Neurological - normal exam                   Anesthesia Plan    ASA 3       Plan - general       Airway plan will be natural airway    (Propofol TIVA  )      Induction: intravenous    Postoperative Plan: Postoperative administration of opioids is intended.    Pertinent diagnostic labs and testing reviewed    Informed Consent:    Anesthetic plan and risks discussed with patient.    Use of blood products discussed with: patient whom consented to blood products.

## 2024-05-25 NOTE — PROCEDURES
OPERATIVE REPORT    PATIENT:   Roberto Braswell   1980       PREOPERATIVE DIAGNOSES/INDICATIONS: chronic gastric and duodenal ulcers with negative H pylori, normal gastrin and no history of NSAIDs.  Had gastroduodenal artery embolization Nov. 2023 pre cardiac surgery.  Admitted for significant epigastric pain post EGD 1 week ago in which BiCAP was used for duodenal ulcer bleeding.  CT abdomen negative for perforation.  Recently found to have EoE and has been noted to have eosinophils on gastric biopsies last year.    POSTOPERATIVE DIAGNOSIS:    Normal esophagus with no endoscopic suspicion for EoE.  Biopsies proximally and distally taken.   Normal fundus.  No blood in lumen.  Biopsies taken for eosinophils   Antral gastritis and small clean based gastric ulcer.  Biopsies taken for eosinophils.   Duodenitis D1-D2.  Biopsies taken for ischemic injury and eosinophils.   Shallow duodenal ulcer distal bulb without stigmata of recent bleeding.   Spontaneous, scant volume, oozing when duodenal bulb mucosa touched.    PROCEDURE:  ESOPHAGOGASTRODUODENOSCOPY    PHYSICIAN:  Janae Oliver MD    ANESTHESIA:  Per anesthesiologist.    LOCATION: [facility]    CONSENT:  OBTAINED. The risks, benefits and alternatives of the procedure were discussed in details. The risks include and are not limited to bleeding, infection, perforation, missed lesions, and sedations risks (cardiopulmonary compromise and allergic reaction to medications).    DESCRIPTION: The patient presented to the procedure room.  After routine checkup was performed, patient was brought into the endoscopy suite.  Patient was placed on his left lateral decubitus position. A bite block was placed in patient's mouth. Patient was sedated by anesthesia.  Vital signs were monitored throughout the procedure.  Oxygenation support was provided throughout the procedure. Upper endoscope was inserted into patient's mouth and advanced to the second portion of the duodenum  under direct visualization.      Once the site was reached and examined, the upper endoscope was withdrawn.  Retroflexion was made within the stomach.  The stomach was decompressed, scope was withdrawn and the procedure was terminated.     The patient tolerated the procedure well.  There were no immediate complications.    OPERATIVE FINDINGS:    As above  Patient states he has had melena.  BUN normal.  Hgb down some but overall stable.    RECOMMENDATIONS:  Colonoscopy  Capsule endoscopy  Full liq diet today and clear liq tomorrow.  Stopped PO iron--confusing when stools dark and will interfere with upcoming endoscopies

## 2024-05-26 ENCOUNTER — APPOINTMENT (OUTPATIENT)
Dept: RADIOLOGY | Facility: MEDICAL CENTER | Age: 44
End: 2024-05-26
Attending: STUDENT IN AN ORGANIZED HEALTH CARE EDUCATION/TRAINING PROGRAM
Payer: COMMERCIAL

## 2024-05-26 LAB
ANION GAP SERPL CALC-SCNC: 11 MMOL/L (ref 7–16)
ANISOCYTOSIS BLD QL SMEAR: ABNORMAL
BASOPHILS # BLD AUTO: 0.9 % (ref 0–1.8)
BASOPHILS # BLD: 0.04 K/UL (ref 0–0.12)
BUN SERPL-MCNC: 9 MG/DL (ref 8–22)
CALCIUM SERPL-MCNC: 9 MG/DL (ref 8.5–10.5)
CHLORIDE SERPL-SCNC: 108 MMOL/L (ref 96–112)
CO2 SERPL-SCNC: 22 MMOL/L (ref 20–33)
CREAT SERPL-MCNC: 1.06 MG/DL (ref 0.5–1.4)
EOSINOPHIL # BLD AUTO: 0.12 K/UL (ref 0–0.51)
EOSINOPHIL NFR BLD: 2.6 % (ref 0–6.9)
ERYTHROCYTE [DISTWIDTH] IN BLOOD BY AUTOMATED COUNT: 65.8 FL (ref 35.9–50)
GFR SERPLBLD CREATININE-BSD FMLA CKD-EPI: 89 ML/MIN/1.73 M 2
GLUCOSE SERPL-MCNC: 92 MG/DL (ref 65–99)
HCT VFR BLD AUTO: 34.1 % (ref 42–52)
HCT VFR BLD AUTO: 34.5 % (ref 42–52)
HGB BLD-MCNC: 10.4 G/DL (ref 14–18)
HGB BLD-MCNC: 10.6 G/DL (ref 14–18)
HYPOCHROMIA BLD QL SMEAR: ABNORMAL
LYMPHOCYTES # BLD AUTO: 1.31 K/UL (ref 1–4.8)
LYMPHOCYTES NFR BLD: 27.8 % (ref 22–41)
MAGNESIUM SERPL-MCNC: 2.2 MG/DL (ref 1.5–2.5)
MANUAL DIFF BLD: NORMAL
MCH RBC QN AUTO: 23.1 PG (ref 27–33)
MCHC RBC AUTO-ENTMCNC: 30.7 G/DL (ref 32.3–36.5)
MCV RBC AUTO: 75.2 FL (ref 81.4–97.8)
MICROCYTES BLD QL SMEAR: ABNORMAL
MONOCYTES # BLD AUTO: 0.29 K/UL (ref 0–0.85)
MONOCYTES NFR BLD AUTO: 6.1 % (ref 0–13.4)
NEUTROPHILS # BLD AUTO: 2.94 K/UL (ref 1.82–7.42)
NEUTROPHILS NFR BLD: 62.6 % (ref 44–72)
OVALOCYTES BLD QL SMEAR: ABNORMAL
PHOSPHATE SERPL-MCNC: 3.8 MG/DL (ref 2.5–4.5)
PLATELET # BLD AUTO: 355 K/UL (ref 164–446)
PLATELET BLD QL SMEAR: NORMAL
PMV BLD AUTO: 8.7 FL (ref 9–12.9)
POIKILOCYTOSIS BLD QL SMEAR: ABNORMAL
POTASSIUM SERPL-SCNC: 4.3 MMOL/L (ref 3.6–5.5)
RBC # BLD AUTO: 4.59 M/UL (ref 4.7–6.1)
RBC BLD AUTO: PRESENT
SODIUM SERPL-SCNC: 141 MMOL/L (ref 135–145)
WBC # BLD AUTO: 4.7 K/UL (ref 4.8–10.8)

## 2024-05-26 PROCEDURE — 85027 COMPLETE CBC AUTOMATED: CPT

## 2024-05-26 PROCEDURE — 85018 HEMOGLOBIN: CPT

## 2024-05-26 PROCEDURE — 99233 SBSQ HOSP IP/OBS HIGH 50: CPT | Performed by: STUDENT IN AN ORGANIZED HEALTH CARE EDUCATION/TRAINING PROGRAM

## 2024-05-26 PROCEDURE — 85014 HEMATOCRIT: CPT

## 2024-05-26 PROCEDURE — 700111 HCHG RX REV CODE 636 W/ 250 OVERRIDE (IP): Performed by: INTERNAL MEDICINE

## 2024-05-26 PROCEDURE — 700111 HCHG RX REV CODE 636 W/ 250 OVERRIDE (IP): Mod: JZ | Performed by: NURSE PRACTITIONER

## 2024-05-26 PROCEDURE — 84100 ASSAY OF PHOSPHORUS: CPT

## 2024-05-26 PROCEDURE — 700105 HCHG RX REV CODE 258: Performed by: INTERNAL MEDICINE

## 2024-05-26 PROCEDURE — C8902 MRA W/O FOL W/CONT, ABD: HCPCS

## 2024-05-26 PROCEDURE — 83735 ASSAY OF MAGNESIUM: CPT

## 2024-05-26 PROCEDURE — 36415 COLL VENOUS BLD VENIPUNCTURE: CPT

## 2024-05-26 PROCEDURE — 700111 HCHG RX REV CODE 636 W/ 250 OVERRIDE (IP)

## 2024-05-26 PROCEDURE — 85007 BL SMEAR W/DIFF WBC COUNT: CPT

## 2024-05-26 PROCEDURE — 700101 HCHG RX REV CODE 250: Performed by: NURSE PRACTITIONER

## 2024-05-26 PROCEDURE — 770006 HCHG ROOM/CARE - MED/SURG/GYN SEMI*

## 2024-05-26 PROCEDURE — A9579 GAD-BASE MR CONTRAST NOS,1ML: HCPCS | Performed by: STUDENT IN AN ORGANIZED HEALTH CARE EDUCATION/TRAINING PROGRAM

## 2024-05-26 PROCEDURE — 700117 HCHG RX CONTRAST REV CODE 255: Performed by: STUDENT IN AN ORGANIZED HEALTH CARE EDUCATION/TRAINING PROGRAM

## 2024-05-26 PROCEDURE — 80048 BASIC METABOLIC PNL TOTAL CA: CPT

## 2024-05-26 PROCEDURE — A9270 NON-COVERED ITEM OR SERVICE: HCPCS | Performed by: NURSE PRACTITIONER

## 2024-05-26 PROCEDURE — A9270 NON-COVERED ITEM OR SERVICE: HCPCS | Performed by: STUDENT IN AN ORGANIZED HEALTH CARE EDUCATION/TRAINING PROGRAM

## 2024-05-26 PROCEDURE — 700102 HCHG RX REV CODE 250 W/ 637 OVERRIDE(OP): Performed by: NURSE PRACTITIONER

## 2024-05-26 PROCEDURE — 700102 HCHG RX REV CODE 250 W/ 637 OVERRIDE(OP): Performed by: STUDENT IN AN ORGANIZED HEALTH CARE EDUCATION/TRAINING PROGRAM

## 2024-05-26 PROCEDURE — C9113 INJ PANTOPRAZOLE SODIUM, VIA: HCPCS | Performed by: INTERNAL MEDICINE

## 2024-05-26 RX ORDER — HYDROMORPHONE HYDROCHLORIDE 1 MG/ML
0.5 INJECTION, SOLUTION INTRAMUSCULAR; INTRAVENOUS; SUBCUTANEOUS ONCE
Status: COMPLETED | OUTPATIENT
Start: 2024-05-26 | End: 2024-05-26

## 2024-05-26 RX ORDER — ONDANSETRON 2 MG/ML
4 INJECTION INTRAMUSCULAR; INTRAVENOUS
Status: DISCONTINUED | OUTPATIENT
Start: 2024-05-26 | End: 2024-05-26

## 2024-05-26 RX ADMIN — PROCHLORPERAZINE EDISYLATE 10 MG: 5 INJECTION INTRAMUSCULAR; INTRAVENOUS at 09:10

## 2024-05-26 RX ADMIN — HYDROMORPHONE HYDROCHLORIDE 1 MG: 1 INJECTION, SOLUTION INTRAMUSCULAR; INTRAVENOUS; SUBCUTANEOUS at 09:05

## 2024-05-26 RX ADMIN — SUCRALFATE 1 G: 1 TABLET ORAL at 17:12

## 2024-05-26 RX ADMIN — SUCRALFATE 1 G: 1 TABLET ORAL at 11:24

## 2024-05-26 RX ADMIN — HYDROMORPHONE HYDROCHLORIDE 0.5 MG: 1 INJECTION, SOLUTION INTRAMUSCULAR; INTRAVENOUS; SUBCUTANEOUS at 23:06

## 2024-05-26 RX ADMIN — OXYCODONE HYDROCHLORIDE 10 MG: 10 TABLET ORAL at 14:30

## 2024-05-26 RX ADMIN — SODIUM CHLORIDE, POTASSIUM CHLORIDE, SODIUM LACTATE AND CALCIUM CHLORIDE: 600; 310; 30; 20 INJECTION, SOLUTION INTRAVENOUS at 17:21

## 2024-05-26 RX ADMIN — POLYETHYLENE GLYCOL-3350 AND ELECTROLYTES 2 L: 236; 6.74; 5.86; 2.97; 22.74 POWDER, FOR SOLUTION ORAL at 17:12

## 2024-05-26 RX ADMIN — METOPROLOL TARTRATE 25 MG: 25 TABLET, FILM COATED ORAL at 06:04

## 2024-05-26 RX ADMIN — GADOTERIDOL 20 ML: 279.3 INJECTION, SOLUTION INTRAVENOUS at 10:00

## 2024-05-26 RX ADMIN — OXYCODONE HYDROCHLORIDE 10 MG: 10 TABLET ORAL at 23:28

## 2024-05-26 RX ADMIN — SODIUM CHLORIDE, POTASSIUM CHLORIDE, SODIUM LACTATE AND CALCIUM CHLORIDE: 600; 310; 30; 20 INJECTION, SOLUTION INTRAVENOUS at 08:00

## 2024-05-26 RX ADMIN — BACLOFEN 10 MG: 10 TABLET ORAL at 09:05

## 2024-05-26 RX ADMIN — HYDROMORPHONE HYDROCHLORIDE 1 MG: 1 INJECTION, SOLUTION INTRAMUSCULAR; INTRAVENOUS; SUBCUTANEOUS at 18:18

## 2024-05-26 RX ADMIN — OXYCODONE HYDROCHLORIDE 10 MG: 10 TABLET ORAL at 11:23

## 2024-05-26 RX ADMIN — SUCRALFATE 1 G: 1 TABLET ORAL at 20:36

## 2024-05-26 RX ADMIN — SUCRALFATE 1 G: 1 TABLET ORAL at 06:02

## 2024-05-26 RX ADMIN — HYDROMORPHONE HYDROCHLORIDE 1 MG: 1 INJECTION, SOLUTION INTRAMUSCULAR; INTRAVENOUS; SUBCUTANEOUS at 12:08

## 2024-05-26 RX ADMIN — GABAPENTIN 300 MG: 300 CAPSULE ORAL at 14:31

## 2024-05-26 RX ADMIN — HYDROMORPHONE HYDROCHLORIDE 1 MG: 1 INJECTION, SOLUTION INTRAMUSCULAR; INTRAVENOUS; SUBCUTANEOUS at 02:57

## 2024-05-26 RX ADMIN — GABAPENTIN 300 MG: 300 CAPSULE ORAL at 09:05

## 2024-05-26 RX ADMIN — GABAPENTIN 300 MG: 300 CAPSULE ORAL at 20:36

## 2024-05-26 RX ADMIN — PANTOPRAZOLE SODIUM 8 MG/HR: 40 INJECTION, POWDER, FOR SOLUTION INTRAVENOUS at 07:57

## 2024-05-26 RX ADMIN — BACLOFEN 10 MG: 10 TABLET ORAL at 14:31

## 2024-05-26 RX ADMIN — OXYCODONE HYDROCHLORIDE 10 MG: 10 TABLET ORAL at 19:18

## 2024-05-26 RX ADMIN — BACLOFEN 10 MG: 10 TABLET ORAL at 20:36

## 2024-05-26 RX ADMIN — OXYCODONE HYDROCHLORIDE 10 MG: 10 TABLET ORAL at 07:11

## 2024-05-26 RX ADMIN — HYDROMORPHONE HYDROCHLORIDE 1 MG: 1 INJECTION, SOLUTION INTRAMUSCULAR; INTRAVENOUS; SUBCUTANEOUS at 15:14

## 2024-05-26 RX ADMIN — LOSARTAN POTASSIUM 50 MG: 50 TABLET, FILM COATED ORAL at 06:04

## 2024-05-26 RX ADMIN — HYDROMORPHONE HYDROCHLORIDE 1 MG: 1 INJECTION, SOLUTION INTRAMUSCULAR; INTRAVENOUS; SUBCUTANEOUS at 21:21

## 2024-05-26 RX ADMIN — HYDROMORPHONE HYDROCHLORIDE 1 MG: 1 INJECTION, SOLUTION INTRAMUSCULAR; INTRAVENOUS; SUBCUTANEOUS at 06:00

## 2024-05-26 RX ADMIN — METOPROLOL TARTRATE 25 MG: 25 TABLET, FILM COATED ORAL at 17:11

## 2024-05-26 RX ADMIN — ONDANSETRON 4 MG: 2 INJECTION INTRAMUSCULAR; INTRAVENOUS at 22:28

## 2024-05-26 RX ADMIN — PANTOPRAZOLE SODIUM 8 MG/HR: 40 INJECTION, POWDER, FOR SOLUTION INTRAVENOUS at 20:45

## 2024-05-26 RX ADMIN — FERROUS SULFATE TAB 325 MG (65 MG ELEMENTAL FE) 325 MG: 325 (65 FE) TAB at 07:11

## 2024-05-26 ASSESSMENT — PAIN DESCRIPTION - PAIN TYPE
TYPE: ACUTE PAIN;CHRONIC PAIN
TYPE: ACUTE PAIN;CHRONIC PAIN
TYPE: ACUTE PAIN
TYPE: ACUTE PAIN
TYPE: ACUTE PAIN;CHRONIC PAIN
TYPE: ACUTE PAIN
TYPE: CHRONIC PAIN;ACUTE PAIN
TYPE: ACUTE PAIN
TYPE: ACUTE PAIN
TYPE: ACUTE PAIN;CHRONIC PAIN

## 2024-05-26 ASSESSMENT — ENCOUNTER SYMPTOMS
DIARRHEA: 0
FEVER: 0
NAUSEA: 0
WEAKNESS: 0
BLURRED VISION: 0
BACK PAIN: 0
SHORTNESS OF BREATH: 0
VOMITING: 0
CONSTIPATION: 0
ABDOMINAL PAIN: 1
DEPRESSION: 0
COUGH: 0
CHILLS: 0
DIZZINESS: 0
HEARTBURN: 1
BLOOD IN STOOL: 0
NAUSEA: 1

## 2024-05-26 NOTE — PROGRESS NOTES
..Gastroenterology Progress Note               Author:  Kourtney Peterson, RAO,  APRN Date & Time Created: 5/26/2024 9:49 AM       Patient ID:  Name:             Roberto Braswell    YOB: 1980  Age:                 43 y.o.  male  MRN:               [id number]        Medical Decision Making, by Problem:  Active Hospital Problems    Diagnosis     Upper GI bleed [K92.2]     Acute abdominal pain [R10.9]     Chronic pain syndrome [G89.4]     H/O mitral valve repair [Z98.890]     Duodenal ulcer [K26.9]     Primary hypertension [I10]     Iron deficiency anemia due to chronic blood loss [D50.0]      Presenting Chief Complaint:  Abdominal pain and PUD      History of Present Illness:    This is a very pleasant 43 y.o. male with history of recent mitral valve repair, history of gastric and duodenal ulcers.  In September 2023 he had an EGD showing incompletely healed duodenal ulcer with some oozing at the duodenal sweep (gastric biopsies negative for H. Pylori and mild eosinophils.).  He had an EGD July 2023 with gastric ulcers by GI consultants (gastric biopsies negative for H. Pylori).  He had a GDA coil embolization pre cardiac surgery due to risk of bleeding from PUD.  States he takes his PPI and Carafate daily since then.  He has denied acid reflux or epigastric pain.      On the morning of May 18 he was awakened with abdominal pain and 4 episodes of melena.  He was taken off anticoagulation 3-4 weeks ago for mitral valve repair but was taking a baby aspirin daily.  He underwent EGD same day showing several clean-based ulcers in the antrum, prepyloric and pyloric region was ulcerated and narrow.   There was a large amount of food in the stomach.  He had oozing ulcerations in the duodenal bulb.  Both gastric and duodenal ulcers were treated with the Gold probe.  Also at EGD there was suspicion for EOE and biopsies were taken showing eosinophilic esophagitis with greater than >30 eosinophils in 1 high  magnification field.  Again he has been on a PPI daily.  Serum gastrin 35     He presented to the ER yesterday with mid abdominal pain, nausea.  Hemoglobin 11.6 on presentation with MCV 72.9.  BUN 7, lipase 23.  The pain is more intense and was not bothersome several weeks ago.  He takes no medications that would cause PUD.  He does not use recreational drugs and denies history of narcotic addiction.  Is currently working with [facility] and is tapering off meds.    5/25/2024: EGD    Normal esophagus with no endoscopic suspicion for EoE.  Biopsies proximally and distally taken.   Normal fundus.  No blood in lumen.  Biopsies taken for eosinophils   Antral gastritis and small clean based gastric ulcer.  Biopsies taken for eosinophils.   Duodenitis D1-D2.  Biopsies taken for ischemic injury and eosinophils.   Shallow duodenal ulcer distal bulb without stigmata of recent bleeding.   Spontaneous, scant volume, oozing when duodenal bulb mucosa touched.    Interval History:  5/24/2024: Patient seen and examined.  Began having melena today per patient and RN.  Also complaining of midepigastric pain.  He has been n.p.o.  Hemoglobin 10.9    5/26/2024: Patient seen, hungry and wants to eat. No other complaints. No BM. Labs reviewed, red blood cell line decreased, ordered manual smear.    Hospital Medications:  Current Facility-Administered Medications   Medication Dose Frequency Provider Last Rate Last Admin    ferrous sulfate tablet 325 mg  325 mg QDAY with Breakfast Tiffanie Joya M.D.   325 mg at 05/26/24 0711    pantoprazole (Protonix) 80 mg in  mL continuous infusion  8 mg/hr Continuous Fab Tian M.D. 10 mL/hr at 05/26/24 0757 8 mg/hr at 05/26/24 0757    HYDROmorphone (Dilaudid) injection 1 mg  1 mg Q3HRS PRN Fab iTan M.D.   1 mg at 05/26/24 0905    lactated ringers infusion   Continuous Fab Tian M.D. 125 mL/hr at 05/26/24 0800 New Bag at 05/26/24 0800    ondansetron (Zofran) syringe/vial  injection 4 mg  4 mg Q4HRS PRN Deana BEY Sherley, A.P.R.N.        ondansetron (Zofran ODT) dispertab 4 mg  4 mg Q4HRS PRN Deana LJennifer Sherley, A.P.R.N.        promethazine (Phenergan) tablet 12.5-25 mg  12.5-25 mg Q4HRS PRN Deana Hayest, A.P.R.N.        promethazine (Phenergan) suppository 12.5-25 mg  12.5-25 mg Q4HRS PRN Deana Hayest, A.P.R.N.        prochlorperazine (Compazine) injection 5-10 mg  5-10 mg Q4HRS PRN Deana Hayest, A.P.R.N.   10 mg at 05/26/24 0910    metoprolol tartrate (Lopressor) tablet 25 mg  25 mg BID Deana Lepe, A.P.R.N.   25 mg at 05/26/24 0604    sucralfate (Carafate) tablet 1 g  1 g 4X/DAY ACHS Deana Hayest, A.P.R.N.   1 g at 05/26/24 0602    baclofen (Lioresal) tablet 10 mg  10 mg TID Deana Hayest, A.P.R.N.   10 mg at 05/26/24 0905    gabapentin (Neurontin) capsule 300 mg  300 mg TID Deana Lepe, A.P.R.N.   300 mg at 05/26/24 0905    oxyCODONE immediate-release (Roxicodone) tablet 10 mg  10 mg 5X/DAY Deana Lepe, A.P.R.N.   10 mg at 05/26/24 0711    losartan (Cozaar) tablet 50 mg  50 mg DAILY Deana Lepe, A.P.R.N.   50 mg at 05/26/24 0604   Last reviewed on 5/22/2024  7:30 PM by Josseline Tobias R.N.       Review of Systems:  Review of Systems   Constitutional:  Negative for chills, fever and malaise/fatigue.   HENT:  Negative for hearing loss.    Eyes:  Negative for blurred vision.   Respiratory:  Negative for cough and shortness of breath.    Cardiovascular:  Negative for chest pain and leg swelling.   Gastrointestinal:  Positive for abdominal pain, heartburn and melena. Negative for blood in stool, constipation, diarrhea, nausea and vomiting.   Genitourinary:  Negative for dysuria.   Musculoskeletal:  Negative for back pain.   Skin:  Negative for rash.   Neurological:  Negative for dizziness and weakness.   Psychiatric/Behavioral:  Negative for depression.    All other systems reviewed and are negative.        Vital signs:  Weight/BMI: Body mass index is 31.85  "kg/m².  /77   Pulse 61   Temp 36.4 °C (97.5 °F) (Temporal)   Resp 17   Ht 1.905 m (6' 3\")   Wt 116 kg (254 lb 13.6 oz)   SpO2 99%   Vitals:    05/26/24 0429 05/26/24 0600 05/26/24 0711 05/26/24 0754   BP: 126/87   126/77   Pulse: 64   61   Resp: 16 17 17 17   Temp: 36.2 °C (97.2 °F)   36.4 °C (97.5 °F)   TempSrc: Temporal   Temporal   SpO2: 96%   99%   Weight:       Height:         Oxygen Therapy:  Pulse Oximetry: 99 %, O2 Delivery Device: None - Room Air    Intake/Output Summary (Last 24 hours) at 5/26/2024 0949  Last data filed at 5/26/2024 0700  Gross per 24 hour   Intake 3680 ml   Output 5 ml   Net 3675 ml         Physical Exam:  Physical Exam  Vitals and nursing note reviewed.   Constitutional:       General: He is not in acute distress.     Appearance: Normal appearance. He is not ill-appearing.   HENT:      Head: Normocephalic and atraumatic.      Right Ear: External ear normal.      Left Ear: External ear normal.      Nose: Nose normal.      Mouth/Throat:      Mouth: Mucous membranes are moist.      Pharynx: Oropharynx is clear.   Eyes:      General: No scleral icterus.  Cardiovascular:      Rate and Rhythm: Normal rate and regular rhythm.      Pulses: Normal pulses.      Heart sounds: Normal heart sounds.   Pulmonary:      Effort: Pulmonary effort is normal.      Breath sounds: Normal breath sounds.   Abdominal:      General: Abdomen is flat. Bowel sounds are normal. There is no distension.      Palpations: Abdomen is soft.   Musculoskeletal:         General: Normal range of motion.      Cervical back: Normal range of motion and neck supple.   Skin:     General: Skin is warm.      Capillary Refill: Capillary refill takes less than 2 seconds.   Neurological:      Mental Status: He is alert and oriented to person, place, and time.   Psychiatric:         Mood and Affect: Mood normal.         Behavior: Behavior normal.             Labs:  Recent Labs     05/25/24  0254 05/26/24  0223   SODIUM 141 141 "   POTASSIUM 4.0 4.3   CHLORIDE 106 108   CO2 24 22   BUN 15 9   CREATININE 1.10 1.06   MAGNESIUM 2.1 2.2   PHOSPHORUS 4.4 3.8   CALCIUM 8.5 9.0     Recent Labs     05/25/24 0254 05/26/24 0223   GLUCOSE 110* 92     Recent Labs     05/25/24 0254 05/26/24 0223   WBC 5.8 4.7*     Recent Labs     05/23/24  1257 05/23/24  1957 05/25/24 0254 05/25/24  1533 05/26/24 0223   RBC  --   --  4.69*  --  4.59*   HEMOGLOBIN 10.6*   < > 10.9* 11.2* 10.6*   HEMATOCRIT  --   --  35.9* 37.0* 34.5*   PLATELETCT  --   --  395  --  355   IRON 94  --   --   --   --    TOTIRONBC 355  --   --   --   --     < > = values in this interval not displayed.     Recent Results (from the past 24 hour(s))   HEMOGLOBIN AND HEMATOCRIT    Collection Time: 05/25/24  3:33 PM   Result Value Ref Range    Hemoglobin 11.2 (L) 14.0 - 18.0 g/dL    Hematocrit 37.0 (L) 42.0 - 52.0 %   CBC WITHOUT DIFFERENTIAL    Collection Time: 05/26/24  2:23 AM   Result Value Ref Range    WBC 4.7 (L) 4.8 - 10.8 K/uL    RBC 4.59 (L) 4.70 - 6.10 M/uL    Hemoglobin 10.6 (L) 14.0 - 18.0 g/dL    Hematocrit 34.5 (L) 42.0 - 52.0 %    MCV 75.2 (L) 81.4 - 97.8 fL    MCH 23.1 (L) 27.0 - 33.0 pg    MCHC 30.7 (L) 32.3 - 36.5 g/dL    RDW 65.8 (H) 35.9 - 50.0 fL    Platelet Count 355 164 - 446 K/uL    MPV 8.7 (L) 9.0 - 12.9 fL   Basic Metabolic Panel    Collection Time: 05/26/24  2:23 AM   Result Value Ref Range    Sodium 141 135 - 145 mmol/L    Potassium 4.3 3.6 - 5.5 mmol/L    Chloride 108 96 - 112 mmol/L    Co2 22 20 - 33 mmol/L    Glucose 92 65 - 99 mg/dL    Bun 9 8 - 22 mg/dL    Creatinine 1.06 0.50 - 1.40 mg/dL    Calcium 9.0 8.5 - 10.5 mg/dL    Anion Gap 11.0 7.0 - 16.0   MAGNESIUM    Collection Time: 05/26/24  2:23 AM   Result Value Ref Range    Magnesium 2.2 1.5 - 2.5 mg/dL   PHOSPHORUS    Collection Time: 05/26/24  2:23 AM   Result Value Ref Range    Phosphorus 3.8 2.5 - 4.5 mg/dL   ESTIMATED GFR    Collection Time: 05/26/24  2:23 AM   Result Value Ref Range    GFR (CKD-EPI)  89 >60 mL/min/1.73 m 2       Radiology Review:  CT-ABDOMEN-PELVIS WITH   Final Result         1.  Mild hepatomegaly   2.  Small fat-containing left inguinal hernia      [id number] VIEW   Final Result         1.  Nonspecific bowel gas pattern.      MR-MRA ABDOMEN-WITH & W/O    (Results Pending)         MDM (Data Review):   -Records reviewed and summarized in current documentation  -I personally reviewed and interpreted the laboratory results  -I personally reviewed the radiology images    Assessment/Recommendations:  History of gastric and duodenal ulcers.  Hx of GDA coiling in Nov 2023.  H pylori negative.  Gastrin normal.  May be related to aspirin.  Acute on chronic blood loss anemia.  Hgb 9.3 on discharge last week and with hydration is 10.2 today.  BUN nl.  Severe epigastric pain  Iron deficiency.  Was on PO iron  Chronic pain syndrome followed by [facility]  Hx of mitral valve repair  Mild hepatomegaly on CT  Melena  Eosinophilic esophagitis and eosinophils identified on prior gastric biopsy ? EOE gastroenteritis  ?  Component of ischemia due to considerable cardiac history and mitral valve repair    Recommendations:  ---Will obtain MRA abdomen with and without contrast to assess mesenteric vasculature and vessels to rule out stricture causing ischemia, still pending  --One concern is perforation, possibly contained, related to previous coil embolization and chronic ulceration with recent cautery.  Another possibility is myofascial pain (Carnett's sign) at the port site but his pain seems out of proportion for anterior cutaneous nerve entrapment (ACNE).  CT abdomen pelvis negative  On PPI drip  On Carafate  ASA held  Plan for colonoscopy tomorrow and capsule endoscopy on Tuesday  Clear liquids today, no reds  GoLytely split dose prep this afternoon  NPO at midnight    Plan discussed with patient, RN, Dr. Joya, Dr. Oliver    ..Kourtney Peterson, DNP,  APRN    Core Quality Measures   Reviewed items::  Labs,  Medications and Radiology reports reviewed

## 2024-05-26 NOTE — CARE PLAN
The patient is Watcher - Medium risk of patient condition declining or worsening    Shift Goals  Clinical Goals: patient will report adequate pain control by the end of shift  Patient Goals: feel better  Family Goals: updated on poc    Progress made toward(s) clinical / shift goals:    Problem: Pain - Standard  Goal: Alleviation of pain or a reduction in pain to the patient’s comfort goal  Outcome: Progressing  Note: Pain well controlled with prn meds. Achieveing pain goal of 5/10.      Problem: Skin Integrity  Goal: Risk for impaired skin integrity will decrease  Outcome: Progressing  Note: Rue wound improving with daily wound care.      Problem: Bowel Elimination  Goal: Establish and maintain regular bowel function  Outcome: Progressing  Note: Minimal nausea - treated well with compazine. Bowel prep initiated. .        Patient is not progressing towards the following goals:

## 2024-05-26 NOTE — CARE PLAN
The patient is Watcher - Medium risk of patient condition declining or worsening       Shift Goals  Clinical Goals: Pts pain will be maintained to a 6/10 or lower throughout shift  Patient Goals: Pain control  Family Goals: JJ     Progress made toward(s) clinical / shift goals:  Pts pain has been maintained throughout shift by pharmacologic (see MAR) and nonpharmacologic (rest, ambulating, distraction, communication) interventions.     Patient is not progressing towards the following goals: Pt is have regular bowel movement, but stool is still black and tarry            Problem: Bowel Elimination  Goal: Establish and maintain regular bowel function  5/25/2024 1805 by Dariana Joseph, R.N.  Outcome: Not Progressing  5/25/2024 1805 by Dariana Joseph, R.N.  Outcome: Progressing

## 2024-05-26 NOTE — CARE PLAN
The patient is Stable - Low risk of patient condition declining or worsening    Shift Goals  Clinical Goals: pain control at comfort level 5/10 within 12 hour shift  Patient Goals: Pain control, MRA to be done  Family Goals: JJ    Progress made toward(s) clinical / shift goals:  Complained of constant, moderate to severe abdominal pain, medicated per MAR with minimal relief verbalized. Tolerated full liquid diet. Post op VSS. Able to make needs known, upself. Call bell placed within easy reach.         Patient is not progressing towards the following goals:      Problem: Pain - Standard  Goal: Alleviation of pain or a reduction in pain to the patient’s comfort goal  Description: Target End Date:  Prior to discharge or change in level of care    Document on Vitals flowsheet    1.  Document pain using the appropriate pain scale per order or unit policy  2.  Educate and implement non-pharmacologic comfort measures (i.e. relaxation, distraction, massage, cold/heat therapy, etc.)  3.  Pain management medications as ordered  4.  Reassess pain after pain med administration per policy  5.  If opiods administered assess patient's response to pain medication is appropriate per POSS sedation scale  6.  Follow pain management plan developed in collaboration with patient and interdisciplinary team (including palliative care or pain specialists if applicable)  Outcome: Not Progressing     Problem: Bowel Elimination  Goal: Establish and maintain regular bowel function  Description: Target End Date:  Prior to discharge or change in level of care    1.   Note date of last BM  2.   Educate about diet, fluid intake, medication and activity to promote bowel function  3.   Encourage hydration  Outcome: Not Progressing  Note: Reported black tarry stools. Continue to monitor for any signs of bleeding.

## 2024-05-26 NOTE — PROGRESS NOTES
[facility] Medicine Daily Progress Note    Date of Service  5/26/2024    Chief Complaint  Roberto Braswell is a 43 y.o. male admitted 5/22/2024 with epigastric abdominal pain and dark stools    Hospital Course  43 y.o. male with a past medical history of mitral valve regurgitation s/p MVR, duodenal ulcer status post GDA coiling embolization and chronic pain for which he follows with outpatient pain management who presented to the ED on 5/22/2024 with a 1 day history of worsening epigastric pain.   The patient was just discharged on 5/19/2024 after being being admitted for abdominal pain. He underwent an EGD which found eosinophilic esophagitis, clean-based ulcers in the in the stomach, narrowing due to ulcer at pylorus, oozing and multiple ulcers of the duodenum, treated with gold probe. Bleeding was treated and area was biopsied. Gastrin found to be normal. Stool for H. pylori was negative. The patient was discharged with twice daily PPI for 3 months, carafate QID. It was also recommended to have a repeat EGD in 3 months to assess for healing. Patient was also prescribed oral iron for microcytic anemia.   The patient says that his epigastric pain is worse than it was on his recent admission. He also reports nausea secondary to pain.  Associated with dark tarry stool.  GI was consulted.  The CT abdomen no acute abnormalities.  On PPI drip.  Status post EGD 5/25 which showed Antral gastritis and small clean based gastric ulcer. Duodenitis D1-D2, Shallow duodenal ulcer distal bulb without stigmata of recent bleeding; Spontaneous, scant volume, oozing when duodenal bulb mucosa touched.     Interval Problem Update  Seen patient at bedside.  Patient continues reporting epigastric cramping pain  No bowel movements yet  MRA pending  Continue PPI drip  Discussed with GI, planning colonoscopy 5/27 and capsule ENDOSCOPY on 5/28  Clear liquid today.  Bowel prep this afternoon  NPO MN    I have discussed this patient's plan of  care and discharge plan at IDT rounds today with Case Management, Nursing, Nursing leadership, and other members of the IDT team.    Consultants/Specialty  GI    Code Status  Full Code    Disposition  The patient is not medically cleared for discharge to home or a post-acute facility.      I have placed the appropriate orders for post-discharge needs.    Review of Systems  Review of Systems   Gastrointestinal:  Positive for abdominal pain and nausea.        Physical Exam  Temp:  [36 °C (96.8 °F)-36.9 °C (98.4 °F)] 36 °C (96.8 °F)  Pulse:  [59-93] 93  Resp:  [16-18] 18  BP: (119-143)/(71-93) 129/78  SpO2:  [93 %-99 %] 93 %    Physical Exam  Vitals and nursing note reviewed.   Constitutional:       General: He is not in acute distress.  HENT:      Head: Normocephalic.      Mouth/Throat:      Mouth: Mucous membranes are moist.   Eyes:      Pupils: Pupils are equal, round, and reactive to light.   Cardiovascular:      Rate and Rhythm: Normal rate and regular rhythm.      Pulses: Normal pulses.      Heart sounds: Normal heart sounds.   Pulmonary:      Effort: Pulmonary effort is normal.      Breath sounds: Normal breath sounds.   Abdominal:      Palpations: Abdomen is soft.      Tenderness: There is no abdominal tenderness.   Musculoskeletal:         General: No swelling.      Cervical back: Neck supple.   Skin:     General: Skin is warm.      Coloration: Skin is pale. Skin is not jaundiced.   Neurological:      General: No focal deficit present.      Mental Status: He is alert and oriented to person, place, and time.   Psychiatric:         Mood and Affect: Mood normal.         Behavior: Behavior normal.         Fluids    Intake/Output Summary (Last 24 hours) at 5/26/2024 1348  Last data filed at 5/26/2024 1000  Gross per 24 hour   Intake 2100 ml   Output --   Net 2100 ml       Laboratory  Recent Labs     05/25/24  0254 05/25/24  1533 05/26/24  0223   WBC 5.8  --  4.7*   RBC 4.69*  --  4.59*   HEMOGLOBIN 10.9* 11.2*  10.6*   HEMATOCRIT 35.9* 37.0* 34.5*   MCV 76.5*  --  75.2*   MCH 23.2*  --  23.1*   MCHC 30.4*  --  30.7*   RDW 66.9*  --  65.8*   PLATELETCT 395  --  355   MPV 8.8*  --  8.7*     Recent Labs     05/25/24  0254 05/26/24  0223   SODIUM 141 141   POTASSIUM 4.0 4.3   CHLORIDE 106 108   CO2 24 22   GLUCOSE 110* 92   BUN 15 9   CREATININE 1.10 1.06   CALCIUM 8.5 9.0                   Imaging  CT-ABDOMEN-PELVIS WITH   Final Result         1.  Mild hepatomegaly   2.  Small fat-containing left inguinal hernia      [id number] VIEW   Final Result         1.  Nonspecific bowel gas pattern.      MR-MRA ABDOMEN-WITH & W/O    (Results Pending)        Assessment/Plan  * Upper GI bleed- (present on admission)  Assessment & Plan  Likely due to peptic ulcer disease vs. Mesenteric ischemia. Patient just had EGD on 5/18 showing appearance of eosinophilic esophagitis, clean based ulcers in the antrum, food in stomach , narrowing due to ulcer at pylorus, oozing and multiple ulcers of duodenum. Treated with gold probe. Bleeding was treated and area was biopsied. Gastrin found to be normal. Stool for H. pylori was negative.   CT abdomen unremarkable for perforation  S/p EGD 5/25 which showed Antral gastritis and small clean based gastric ulcer. Duodenitis D1-D2, Shallow duodenal ulcer distal bulb without stigmata of recent bleeding; Spontaneous, scant volume, oozing when duodenal bulb mucosa touched.     MRA pending  Continue PPI drip  Colonoscopy and capsule endoscopy    Chronic pain syndrome- (present on admission)  Assessment & Plan  Follows with [facility] management at [facility]  Patient reports his chronic pain is secondary to heart surgery and that they are currently reducing his medications from oxy 20 mg 5 times a day to 10 mg 5 times a day.  He has not seen his pain management specialist in 2 weeks.  - continue home meds, including oxy, gabapentin, baclofen    Acute abdominal pain- (present on admission)  Assessment & Plan  Likely due  to peptic ulcer disease vs. Mesenteric ischemia. Patient just had EGD on 5/18 showing appearance of eosinophilic esophagitis, clean based ulcers in the antrum, food in stomach , narrowing due to ulcer at pylorus, oozing and multiple ulcers of duodenum. Treated with gold probe. Bleeding was treated and area was biopsied. Gastrin found to be normal. Stool for H. pylori was negative.   CT abdomen unremarkable  - IV protonix drip  - IVF  -GI was consulted, Status post EGD 5/25 which showed Antral gastritis and small clean based gastric ulcer. Duodenitis D1-D2, Shallow duodenal ulcer distal bulb without stigmata of recent bleeding; Spontaneous, scant volume, oozing when duodenal bulb mucosa touched.     H/O mitral valve repair- (present on admission)  Assessment & Plan  Hx of radical valve repair in January, 2024.  He reports his chronic sternal and clavicular chest pain is related to the surgery.   Hold aspirin     Primary hypertension- (present on admission)  Assessment & Plan  Reports taking his blood pressure medications only as needed.  Currently blood pressure is elevated  - continue losartan, metoprolol    Duodenal ulcer- (present on admission)  Assessment & Plan  - continue PPI IV TID  - continue carafate    Iron deficiency anemia due to chronic blood loss- (present on admission)  Assessment & Plan  - continue iron         VTE prophylaxis: SCD    I have performed a physical exam and reviewed and updated ROS and Plan today (5/26/2024). In review of yesterday's note (5/25/2024), there are no changes except as documented above.    I spent 52 minutes, reviewing the chart, obtaining and/or reviewing separately obtained history. Performing a medically appropriate examination and evaluation.  Counseling and educating the patient. Ordering and reviewing medications, tests, or procedures.  Discussing the case with GI.  Documenting clinical information in EPIC. Independently interpreting results and communicating results  to patient. Discussing future disposition of care with patient, RN and case management.

## 2024-05-27 ENCOUNTER — ANESTHESIA EVENT (OUTPATIENT)
Dept: SURGERY | Facility: MEDICAL CENTER | Age: 44
End: 2024-05-27
Payer: COMMERCIAL

## 2024-05-27 ENCOUNTER — ANESTHESIA (OUTPATIENT)
Dept: SURGERY | Facility: MEDICAL CENTER | Age: 44
End: 2024-05-27
Payer: COMMERCIAL

## 2024-05-27 ENCOUNTER — APPOINTMENT (OUTPATIENT)
Dept: RADIOLOGY | Facility: MEDICAL CENTER | Age: 44
End: 2024-05-27
Attending: STUDENT IN AN ORGANIZED HEALTH CARE EDUCATION/TRAINING PROGRAM
Payer: COMMERCIAL

## 2024-05-27 LAB
ANION GAP SERPL CALC-SCNC: 10 MMOL/L (ref 7–16)
BUN SERPL-MCNC: 7 MG/DL (ref 8–22)
CALCIUM SERPL-MCNC: 8.8 MG/DL (ref 8.5–10.5)
CHLORIDE SERPL-SCNC: 107 MMOL/L (ref 96–112)
CO2 SERPL-SCNC: 25 MMOL/L (ref 20–33)
CREAT SERPL-MCNC: 1.02 MG/DL (ref 0.5–1.4)
ERYTHROCYTE [DISTWIDTH] IN BLOOD BY AUTOMATED COUNT: 65.8 FL (ref 35.9–50)
GFR SERPLBLD CREATININE-BSD FMLA CKD-EPI: 94 ML/MIN/1.73 M 2
GLUCOSE SERPL-MCNC: 96 MG/DL (ref 65–99)
HCT VFR BLD AUTO: 33.5 % (ref 42–52)
HGB BLD-MCNC: 10.2 G/DL (ref 14–18)
MCH RBC QN AUTO: 23.2 PG (ref 27–33)
MCHC RBC AUTO-ENTMCNC: 30.4 G/DL (ref 32.3–36.5)
MCV RBC AUTO: 76.1 FL (ref 81.4–97.8)
PLATELET # BLD AUTO: 347 K/UL (ref 164–446)
PMV BLD AUTO: 8.5 FL (ref 9–12.9)
POTASSIUM SERPL-SCNC: 4.1 MMOL/L (ref 3.6–5.5)
RBC # BLD AUTO: 4.4 M/UL (ref 4.7–6.1)
SODIUM SERPL-SCNC: 142 MMOL/L (ref 135–145)
WBC # BLD AUTO: 3.6 K/UL (ref 4.8–10.8)

## 2024-05-27 PROCEDURE — 99233 SBSQ HOSP IP/OBS HIGH 50: CPT | Performed by: STUDENT IN AN ORGANIZED HEALTH CARE EDUCATION/TRAINING PROGRAM

## 2024-05-27 PROCEDURE — 160048 HCHG OR STATISTICAL LEVEL 1-5: Performed by: INTERNAL MEDICINE

## 2024-05-27 PROCEDURE — 700111 HCHG RX REV CODE 636 W/ 250 OVERRIDE (IP): Performed by: INTERNAL MEDICINE

## 2024-05-27 PROCEDURE — 700111 HCHG RX REV CODE 636 W/ 250 OVERRIDE (IP): Mod: JZ | Performed by: NURSE PRACTITIONER

## 2024-05-27 PROCEDURE — 45378 DIAGNOSTIC COLONOSCOPY: CPT | Performed by: INTERNAL MEDICINE

## 2024-05-27 PROCEDURE — 160009 HCHG ANES TIME/MIN: Performed by: INTERNAL MEDICINE

## 2024-05-27 PROCEDURE — 84484 ASSAY OF TROPONIN QUANT: CPT

## 2024-05-27 PROCEDURE — 160201 HCHG ENDO MINUTES - 1ST 30 MINS LEVEL 2: Performed by: INTERNAL MEDICINE

## 2024-05-27 PROCEDURE — 36415 COLL VENOUS BLD VENIPUNCTURE: CPT

## 2024-05-27 PROCEDURE — 770006 HCHG ROOM/CARE - MED/SURG/GYN SEMI*

## 2024-05-27 PROCEDURE — A9270 NON-COVERED ITEM OR SERVICE: HCPCS | Performed by: NURSE PRACTITIONER

## 2024-05-27 PROCEDURE — 700105 HCHG RX REV CODE 258: Performed by: INTERNAL MEDICINE

## 2024-05-27 PROCEDURE — 85027 COMPLETE CBC AUTOMATED: CPT

## 2024-05-27 PROCEDURE — 93005 ELECTROCARDIOGRAM TRACING: CPT

## 2024-05-27 PROCEDURE — 80048 BASIC METABOLIC PNL TOTAL CA: CPT

## 2024-05-27 PROCEDURE — 160035 HCHG PACU - 1ST 60 MINS PHASE I: Performed by: INTERNAL MEDICINE

## 2024-05-27 PROCEDURE — 700102 HCHG RX REV CODE 250 W/ 637 OVERRIDE(OP): Performed by: NURSE PRACTITIONER

## 2024-05-27 PROCEDURE — 71045 X-RAY EXAM CHEST 1 VIEW: CPT

## 2024-05-27 PROCEDURE — 160002 HCHG RECOVERY MINUTES (STAT): Performed by: INTERNAL MEDICINE

## 2024-05-27 RX ORDER — HALOPERIDOL 5 MG/ML
1 INJECTION INTRAMUSCULAR
Status: DISCONTINUED | OUTPATIENT
Start: 2024-05-27 | End: 2024-05-27 | Stop reason: HOSPADM

## 2024-05-27 RX ORDER — DIPHENHYDRAMINE HYDROCHLORIDE 50 MG/ML
12.5 INJECTION INTRAMUSCULAR; INTRAVENOUS
Status: DISCONTINUED | OUTPATIENT
Start: 2024-05-27 | End: 2024-05-27 | Stop reason: HOSPADM

## 2024-05-27 RX ORDER — SODIUM CHLORIDE, SODIUM LACTATE, POTASSIUM CHLORIDE, CALCIUM CHLORIDE 600; 310; 30; 20 MG/100ML; MG/100ML; MG/100ML; MG/100ML
INJECTION, SOLUTION INTRAVENOUS CONTINUOUS
Status: DISCONTINUED | OUTPATIENT
Start: 2024-05-27 | End: 2024-05-27 | Stop reason: HOSPADM

## 2024-05-27 RX ORDER — ONDANSETRON 2 MG/ML
4 INJECTION INTRAMUSCULAR; INTRAVENOUS
Status: DISCONTINUED | OUTPATIENT
Start: 2024-05-27 | End: 2024-05-27 | Stop reason: HOSPADM

## 2024-05-27 RX ORDER — SIMETHICONE 40MG/0.6ML
40 SUSPENSION, DROPS(FINAL DOSAGE FORM)(ML) ORAL ONCE
Status: CANCELLED | OUTPATIENT
Start: 2024-05-28 | End: 2024-05-28

## 2024-05-27 RX ADMIN — METOPROLOL TARTRATE 25 MG: 25 TABLET, FILM COATED ORAL at 05:31

## 2024-05-27 RX ADMIN — HYDROMORPHONE HYDROCHLORIDE 1 MG: 1 INJECTION, SOLUTION INTRAMUSCULAR; INTRAVENOUS; SUBCUTANEOUS at 00:30

## 2024-05-27 RX ADMIN — OXYCODONE HYDROCHLORIDE 10 MG: 10 TABLET ORAL at 23:20

## 2024-05-27 RX ADMIN — HYDROMORPHONE HYDROCHLORIDE 1 MG: 1 INJECTION, SOLUTION INTRAMUSCULAR; INTRAVENOUS; SUBCUTANEOUS at 09:57

## 2024-05-27 RX ADMIN — METOPROLOL TARTRATE 25 MG: 25 TABLET, FILM COATED ORAL at 16:35

## 2024-05-27 RX ADMIN — OXYCODONE HYDROCHLORIDE 10 MG: 10 TABLET ORAL at 11:45

## 2024-05-27 RX ADMIN — LOSARTAN POTASSIUM 50 MG: 50 TABLET, FILM COATED ORAL at 05:31

## 2024-05-27 RX ADMIN — GABAPENTIN 300 MG: 300 CAPSULE ORAL at 15:34

## 2024-05-27 RX ADMIN — SODIUM CHLORIDE, POTASSIUM CHLORIDE, SODIUM LACTATE AND CALCIUM CHLORIDE: 600; 310; 30; 20 INJECTION, SOLUTION INTRAVENOUS at 02:06

## 2024-05-27 RX ADMIN — HYDROMORPHONE HYDROCHLORIDE 1 MG: 1 INJECTION, SOLUTION INTRAMUSCULAR; INTRAVENOUS; SUBCUTANEOUS at 06:28

## 2024-05-27 RX ADMIN — HYDROMORPHONE HYDROCHLORIDE 1 MG: 1 INJECTION, SOLUTION INTRAMUSCULAR; INTRAVENOUS; SUBCUTANEOUS at 03:31

## 2024-05-27 RX ADMIN — OXYCODONE HYDROCHLORIDE 10 MG: 10 TABLET ORAL at 15:34

## 2024-05-27 RX ADMIN — SUCRALFATE 1 G: 1 TABLET ORAL at 16:35

## 2024-05-27 RX ADMIN — HYDROMORPHONE HYDROCHLORIDE 1 MG: 1 INJECTION, SOLUTION INTRAMUSCULAR; INTRAVENOUS; SUBCUTANEOUS at 20:26

## 2024-05-27 RX ADMIN — OXYCODONE HYDROCHLORIDE 10 MG: 10 TABLET ORAL at 19:56

## 2024-05-27 RX ADMIN — ONDANSETRON 4 MG: 2 INJECTION INTRAMUSCULAR; INTRAVENOUS at 19:57

## 2024-05-27 RX ADMIN — BACLOFEN 10 MG: 10 TABLET ORAL at 20:28

## 2024-05-27 RX ADMIN — HYDROMORPHONE HYDROCHLORIDE 1 MG: 1 INJECTION, SOLUTION INTRAMUSCULAR; INTRAVENOUS; SUBCUTANEOUS at 16:41

## 2024-05-27 RX ADMIN — SUCRALFATE 1 G: 1 TABLET ORAL at 20:28

## 2024-05-27 RX ADMIN — GABAPENTIN 300 MG: 300 CAPSULE ORAL at 20:28

## 2024-05-27 RX ADMIN — SODIUM CHLORIDE, POTASSIUM CHLORIDE, SODIUM LACTATE AND CALCIUM CHLORIDE: 600; 310; 30; 20 INJECTION, SOLUTION INTRAVENOUS at 20:23

## 2024-05-27 RX ADMIN — PROPOFOL 300 MG: 10 INJECTION, EMULSION INTRAVENOUS at 08:45

## 2024-05-27 RX ADMIN — HYDROMORPHONE HYDROCHLORIDE 1 MG: 1 INJECTION, SOLUTION INTRAMUSCULAR; INTRAVENOUS; SUBCUTANEOUS at 13:12

## 2024-05-27 RX ADMIN — SUCRALFATE 1 G: 1 TABLET ORAL at 11:45

## 2024-05-27 RX ADMIN — BACLOFEN 10 MG: 10 TABLET ORAL at 15:34

## 2024-05-27 RX ADMIN — HYDROMORPHONE HYDROCHLORIDE 1 MG: 1 INJECTION, SOLUTION INTRAMUSCULAR; INTRAVENOUS; SUBCUTANEOUS at 23:31

## 2024-05-27 ASSESSMENT — ENCOUNTER SYMPTOMS
NAUSEA: 1
ABDOMINAL PAIN: 1

## 2024-05-27 ASSESSMENT — PAIN DESCRIPTION - PAIN TYPE
TYPE: ACUTE PAIN

## 2024-05-27 ASSESSMENT — FIBROSIS 4 INDEX: FIB4 SCORE: 0.58

## 2024-05-27 NOTE — PROGRESS NOTES
Patient to floor with transport in stable condition. VSS. Surgical dressings clean dry intact. Aox4 and on RA. No further needs.

## 2024-05-27 NOTE — CARE PLAN
Pt AO x4.  Pt c/o pain in abdomen, medication intervention given per MAR.  Pt successfully completed colonoscopy, pending addition procedure tomorrow.  Call light and belongings within reach.  Bed locked and in lowest position.  Hourly rounding.  Needs currently met.           The patient is Stable - Low risk of patient condition declining or worsening    Shift Goals  Clinical Goals: pain management  Patient Goals: get this last procedure done tomorrow and see what is next  Family Goals: JJ    Progress made toward(s) clinical / shift goals:    Pt pain controlled per MAR throughout shift.  Pt reported pain no higher than 7/10.       Problem: Knowledge Deficit - Standard  Goal: Patient and family/care givers will demonstrate understanding of plan of care, disease process/condition, diagnostic tests and medications  Outcome: Progressing     Problem: Pain - Standard  Goal: Alleviation of pain or a reduction in pain to the patient’s comfort goal  Outcome: Progressing     Problem: Skin Integrity  Goal: Risk for impaired skin integrity will decrease  Outcome: Progressing

## 2024-05-27 NOTE — ANESTHESIA TIME REPORT
Anesthesia Start and Stop Event Times       Date Time Event    5/27/2024 0820 Ready for Procedure     0825 Anesthesia Start     0907 Anesthesia Stop          Responsible Staff  05/27/24      Name Role Begin End    Mick Florentino M.D. Anesth 0825 0907          Overtime Reason:  no overtime (within assigned shift)    Comments:

## 2024-05-27 NOTE — ANESTHESIA PREPROCEDURE EVALUATION
Case: [id number] Date/Time: 05/27/24 0758    Procedure: COLONOSCOPY (Anus)    Anesthesia type: MAC    Pre-op diagnosis: Abdominal pain and PUD    Location: [facility] OR 06 / SURGERY [facility]    Surgeons: Janae Oliver M.D.            Relevant Problems   CARDIAC   (positive) Essential hypertension   (positive) Primary hypertension      GI   (positive) Duodenal ulcer       Physical Exam    Airway   Mallampati: II  TM distance: >3 FB  Neck ROM: full       Cardiovascular - normal exam  Rhythm: regular  Rate: normal  (-) murmur     Dental - normal exam           Pulmonary - normal exam  Breath sounds clear to auscultation     Abdominal    Neurological - normal exam                   Anesthesia Plan    ASA 2       Plan - MAC               Induction: intravenous    Postoperative Plan: Postoperative administration of opioids is intended.    Pertinent diagnostic labs and testing reviewed    Informed Consent:    Anesthetic plan and risks discussed with patient.    Use of blood products discussed with: patient whom consented to blood products.

## 2024-05-27 NOTE — PROCEDURES
OPERATIVE REPORT        PATIENT: Roberto Braswell  1980      PREOPERATIVE DIAGNOSIS/INDICATION: acute on chronic blood loss anemia, epigastric pain, melena    POSTOPERATIVE DIAGNOSES: normal to ileum    PROCEDURE: COLONOSCOPY    PHYSICIAN: Janae Oliver MD    CONSENT:  OBTAINED. The risks, benefits and alternatives of the procedure were discussed in details. The risks include and are not limited to bleeding, infection, perforation, missed lesions, and sedations risks (cardiopulmonary compromise and allergic reaction to medications).    ANESTHESIA:  Per anesthesiologist.    LOCATION: [facility]    DESCRIPTION:  The patient presented to the procedure room.  After routine checkup was performed, patient was brought into endoscopy suite.  Patient was placed on his left lateral decubitus position.  Patient was sedated by anesthesia. Vital signs were monitored throughout procedure.  Oxygenation support was provided throughout procedure. Digital rectal examination was performed.  Then, a colonoscope was inserted into patient's anus, advanced to the cecum under direct visualization.  Once the site was reached and examined, the colonoscope was withdrawn and procedure was terminated. Withdrawal time was at least 6 minutes to ensure adequate examination.     The patient tolerated the procedure well.  There were no immediate complications.    The Buhl bowel preparation was 1 + 2 + 2      FINDINGS:  Ileum normal to 20 cm above ICV.  No melena or fresh blood noted  Appendiceal orifice seen  No fresh blood or melena in colon  No mucosal lesions  On retroflexion, grade II nonbleeding internal hemorrhoids    RECOMMENDATIONS:  Capsule endoscopy in am.  Endo RN will give capsule early am (7:00 am or before so as not to interfere with his going for echo)  Keep on cl liq diet  NPO at MN    NO PO IRON when evaluating patients for melena. I have discontinued.

## 2024-05-27 NOTE — PROGRESS NOTES
Hourly rounds performed. Patient is resting with even respirations. NPO at MN. Fall precautions continued and call light within reach. All other needs met at this time.

## 2024-05-27 NOTE — ANESTHESIA POSTPROCEDURE EVALUATION
Patient: Roberto Braswell    Procedure Summary       Date: 05/27/24 Room / Location: [facility] 06 / SURGERY [facility]    Anesthesia Start: 0825 Anesthesia Stop: 0907    Procedure: COLONOSCOPY (Anus) Diagnosis: (Hemorrhoids)    Surgeons: Janae Oliver M.D. Responsible Provider: Mick Florentino M.D.    Anesthesia Type: MAC ASA Status: 2            Final Anesthesia Type: MAC  Last vitals  BP   Blood Pressure: 92/52    Temp   36.5 °C (97.7 °F)    Pulse   (!) 55   Resp   18    SpO2   100 %      Anesthesia Post Evaluation    Patient location during evaluation: PACU  Patient participation: complete - patient participated  Level of consciousness: awake and alert    Airway patency: patent  Anesthetic complications: no  Cardiovascular status: hemodynamically stable  Respiratory status: acceptable  Hydration status: euvolemic    PONV: none          No notable events documented.     Nurse Pain Score: 0 (NPRS)

## 2024-05-27 NOTE — PROGRESS NOTES
Received bedside report from previous RN, patient is alert and oriented x4. With ongoing IVF LR at 125 ml/hr and Pantoprazole 80 mg in  ml at 10 ml/hr infusing well, no signs of infiltration or phlebitis at this time. On RA. Reports abdominal pain, ps 7/10, administered PRN medication and tolerated well. NPO at MN informed. Fall precautions in place and call light within reach. All other needs met at this time.

## 2024-05-27 NOTE — PROGRESS NOTES
[facility] Medicine Daily Progress Note    Date of Service  5/27/2024    Chief Complaint  Roberto Braswell is a 43 y.o. male admitted 5/22/2024 with epigastric abdominal pain and dark stools    Hospital Course  43 y.o. male with a past medical history of mitral valve regurgitation s/p MVR, duodenal ulcer status post GDA coiling embolization and chronic pain for which he follows with outpatient pain management who presented to the ED on 5/22/2024 with a 1 day history of worsening epigastric pain.   The patient was just discharged on 5/19/2024 after being being admitted for abdominal pain. He underwent an EGD which found eosinophilic esophagitis, clean-based ulcers in the in the stomach, narrowing due to ulcer at pylorus, oozing and multiple ulcers of the duodenum, treated with gold probe. Bleeding was treated and area was biopsied. Gastrin found to be normal. Stool for H. pylori was negative. The patient was discharged with twice daily PPI for 3 months, carafate QID. It was also recommended to have a repeat EGD in 3 months to assess for healing. Patient was also prescribed oral iron for microcytic anemia.   The patient says that his epigastric pain is worse than it was on his recent admission. He also reports nausea secondary to pain.  Associated with dark tarry stool.  GI was consulted.  The CT abdomen no acute abnormalities.  On PPI drip.  Status post EGD 5/25 which showed Antral gastritis and small clean based gastric ulcer. Duodenitis D1-D2, Shallow duodenal ulcer distal bulb without stigmata of recent bleeding; Spontaneous, scant volume, oozing when duodenal bulb mucosa touched.   S/p colonoscopy 5/27  MRA abdomen negative    Interval Problem Update  Seen patient at bedside.   Continue PPI drip  Patient had a bowel polyp last night.  Reports the first 3 bowel movements were dark tarry stool  Continue IV PPI drip  Colonoscopy today  Discussed with GI, capsule endoscopy tomorrow  NPO MN    I have discussed this  patient's plan of care and discharge plan at IDT rounds today with Case Management, Nursing, Nursing leadership, and other members of the IDT team.    Consultants/Specialty  GI    Code Status  Full Code    Disposition  The patient is not medically cleared for discharge to home or a post-acute facility.    I have placed the appropriate orders for post-discharge needs.    Review of Systems  Review of Systems   Gastrointestinal:  Positive for abdominal pain and nausea.        Physical Exam  Temp:  [36.1 °C (97 °F)-36.9 °C (98.5 °F)] 36.4 °C (97.6 °F)  Pulse:  [] 68  Resp:  [10-20] 18  BP: ()/(49-93) 137/91  SpO2:  [94 %-100 %] 95 %    Physical Exam  Vitals and nursing note reviewed.   Constitutional:       General: He is not in acute distress.  HENT:      Head: Normocephalic.      Mouth/Throat:      Mouth: Mucous membranes are moist.   Eyes:      Pupils: Pupils are equal, round, and reactive to light.   Cardiovascular:      Rate and Rhythm: Normal rate and regular rhythm.      Pulses: Normal pulses.      Heart sounds: Normal heart sounds.   Pulmonary:      Effort: Pulmonary effort is normal.      Breath sounds: Normal breath sounds.   Abdominal:      Palpations: Abdomen is soft.      Tenderness: There is no abdominal tenderness.   Musculoskeletal:         General: No swelling.      Cervical back: Neck supple.   Skin:     General: Skin is warm.      Coloration: Skin is pale. Skin is not jaundiced.   Neurological:      General: No focal deficit present.      Mental Status: He is alert and oriented to person, place, and time.   Psychiatric:         Mood and Affect: Mood normal.         Behavior: Behavior normal.       Fluids    Intake/Output Summary (Last 24 hours) at 5/27/2024 1414  Last data filed at 5/26/2024 2300  Gross per 24 hour   Intake 2800 ml   Output 250 ml   Net 2550 ml       Laboratory  Recent Labs     05/25/24  0254 05/25/24  1533 05/26/24  0223 05/26/24  1528 05/27/24  0645   WBC 5.8  --  4.7*   --  3.6*   RBC 4.69*  --  4.59*  --  4.40*   HEMOGLOBIN 10.9*   < > 10.6* 10.4* 10.2*   HEMATOCRIT 35.9*   < > 34.5* 34.1* 33.5*   MCV 76.5*  --  75.2*  --  76.1*   MCH 23.2*  --  23.1*  --  23.2*   MCHC 30.4*  --  30.7*  --  30.4*   RDW 66.9*  --  65.8*  --  65.8*   PLATELETCT 395  --  355  --  347   MPV 8.8*  --  8.7*  --  8.5*    < > = values in this interval not displayed.     Recent Labs     05/25/24  0254 05/26/24  0223 05/27/24  0645   SODIUM 141 141 142   POTASSIUM 4.0 4.3 4.1   CHLORIDE 106 108 107   CO2 24 22 25   GLUCOSE 110* 92 96   BUN 15 9 7*   CREATININE 1.10 1.06 1.02   CALCIUM 8.5 9.0 8.8                   Imaging  MR-MRA ABDOMEN-WITH & W/O   Final Result      1.  MRA thoracoabdominal aorta and visceral arteries shows no evidence of aneurysm or significant stenotic disease.      CT-ABDOMEN-PELVIS WITH   Final Result         1.  Mild hepatomegaly   2.  Small fat-containing left inguinal hernia      [id number] VIEW   Final Result         1.  Nonspecific bowel gas pattern.           Assessment/Plan  * Upper GI bleed- (present on admission)  Assessment & Plan  Likely due to peptic ulcer disease vs. Mesenteric ischemia. Patient just had EGD on 5/18 showing appearance of eosinophilic esophagitis, clean based ulcers in the antrum, food in stomach , narrowing due to ulcer at pylorus, oozing and multiple ulcers of duodenum. Treated with gold probe. Bleeding was treated and area was biopsied. Gastrin found to be normal. Stool for H. pylori was negative.   CT abdomen unremarkable for perforation  S/p EGD 5/25 which showed Antral gastritis and small clean based gastric ulcer. Duodenitis D1-D2, Shallow duodenal ulcer distal bulb without stigmata of recent bleeding; Spontaneous, scant volume, oozing when duodenal bulb mucosa touched.   S/p colonoscopy 5/26    MRA negative  Continue PPI drip  Colonoscopy and capsule endoscopy    Chronic pain syndrome- (present on admission)  Assessment & Plan  Follows with pain  management at [facility].  Patient reports his chronic pain is secondary to heart surgery and that they are currently reducing his medications from oxy 20 mg 5 times a day to 10 mg 5 times a day.  He has not seen his pain management specialist in 2 weeks.  - continue home meds, including oxy, gabapentin, baclofen    Acute abdominal pain- (present on admission)  Assessment & Plan  Likely due to peptic ulcer disease vs. Mesenteric ischemia. Patient just had EGD on 5/18 showing appearance of eosinophilic esophagitis, clean based ulcers in the antrum, food in stomach , narrowing due to ulcer at pylorus, oozing and multiple ulcers of duodenum. Treated with gold probe. Bleeding was treated and area was biopsied. Gastrin found to be normal. Stool for H. pylori was negative.   CT abdomen unremarkable  MRA abdomen negative  - IV protonix drip  - IVF  -GI was consulted, Status post EGD 5/25 which showed Antral gastritis and small clean based gastric ulcer. Duodenitis D1-D2, Shallow duodenal ulcer distal bulb without stigmata of recent bleeding; Spontaneous, scant volume, oozing when duodenal bulb mucosa touched.       H/O mitral valve repair- (present on admission)  Assessment & Plan  Hx of radical valve repair in January, 2024.  He reports his chronic sternal and clavicular chest pain is related to the surgery.   Hold aspirin     Primary hypertension- (present on admission)  Assessment & Plan  Reports taking his blood pressure medications only as needed.  Currently blood pressure is elevated  - continue losartan, metoprolol    Duodenal ulcer- (present on admission)  Assessment & Plan  - continue PPI IV TID  - continue carafate    Iron deficiency anemia due to chronic blood loss- (present on admission)  Assessment & Plan  - continue iron         VTE prophylaxis: SCD    I have performed a physical exam and reviewed and updated ROS and Plan today (5/27/2024). In review of yesterday's note (5/26/2024), there are no changes except as  documented above.    I spent 53 minutes, reviewing the chart, obtaining and/or reviewing separately obtained history. Performing a medically appropriate examination and evaluation.  Counseling and educating the patient. Ordering and reviewing medications, tests, or procedures.  Discussing the case with GI.  Documenting clinical information in EPIC. Independently interpreting results and communicating results to patient. Discussing future disposition of care with patient, RN and case management.

## 2024-05-28 ENCOUNTER — APPOINTMENT (OUTPATIENT)
Dept: CARDIOLOGY | Facility: MEDICAL CENTER | Age: 44
End: 2024-05-28
Attending: INTERNAL MEDICINE
Payer: COMMERCIAL

## 2024-05-28 LAB
ANION GAP SERPL CALC-SCNC: 10 MMOL/L (ref 7–16)
BUN SERPL-MCNC: 6 MG/DL (ref 8–22)
CALCIUM SERPL-MCNC: 8.9 MG/DL (ref 8.5–10.5)
CHLORIDE SERPL-SCNC: 105 MMOL/L (ref 96–112)
CO2 SERPL-SCNC: 24 MMOL/L (ref 20–33)
CREAT SERPL-MCNC: 1.13 MG/DL (ref 0.5–1.4)
EKG IMPRESSION: NORMAL
ERYTHROCYTE [DISTWIDTH] IN BLOOD BY AUTOMATED COUNT: 66.4 FL (ref 35.9–50)
GFR SERPLBLD CREATININE-BSD FMLA CKD-EPI: 83 ML/MIN/1.73 M 2
GLUCOSE SERPL-MCNC: 94 MG/DL (ref 65–99)
HCT VFR BLD AUTO: 32.6 % (ref 42–52)
HGB BLD-MCNC: 9.7 G/DL (ref 14–18)
LV EJECT FRACT  99904: 60
LV EJECT FRACT MOD 2C 99903: 47.63
LV EJECT FRACT MOD 4C 99902: 57.51
LV EJECT FRACT MOD BP 99901: 53.23
MCH RBC QN AUTO: 23.4 PG (ref 27–33)
MCHC RBC AUTO-ENTMCNC: 29.8 G/DL (ref 32.3–36.5)
MCV RBC AUTO: 78.6 FL (ref 81.4–97.8)
PATHOLOGY CONSULT NOTE: NORMAL
PLATELET # BLD AUTO: 319 K/UL (ref 164–446)
PMV BLD AUTO: 8.8 FL (ref 9–12.9)
POTASSIUM SERPL-SCNC: 4 MMOL/L (ref 3.6–5.5)
RBC # BLD AUTO: 4.15 M/UL (ref 4.7–6.1)
SODIUM SERPL-SCNC: 139 MMOL/L (ref 135–145)
TROPONIN T SERPL-MCNC: 8 NG/L (ref 6–19)
TROPONIN T SERPL-MCNC: <6 NG/L (ref 6–19)
WBC # BLD AUTO: 4.5 K/UL (ref 4.8–10.8)

## 2024-05-28 PROCEDURE — 93306 TTE W/DOPPLER COMPLETE: CPT | Mod: 26 | Performed by: INTERNAL MEDICINE

## 2024-05-28 PROCEDURE — 160048 HCHG OR STATISTICAL LEVEL 1-5: Performed by: INTERNAL MEDICINE

## 2024-05-28 PROCEDURE — A9270 NON-COVERED ITEM OR SERVICE: HCPCS | Performed by: NURSE PRACTITIONER

## 2024-05-28 PROCEDURE — 700102 HCHG RX REV CODE 250 W/ 637 OVERRIDE(OP): Performed by: NURSE PRACTITIONER

## 2024-05-28 PROCEDURE — 93010 ELECTROCARDIOGRAM REPORT: CPT | Performed by: INTERNAL MEDICINE

## 2024-05-28 PROCEDURE — 700111 HCHG RX REV CODE 636 W/ 250 OVERRIDE (IP): Performed by: INTERNAL MEDICINE

## 2024-05-28 PROCEDURE — 770006 HCHG ROOM/CARE - MED/SURG/GYN SEMI*

## 2024-05-28 PROCEDURE — 91110 GI TRC IMG INTRAL ESOPH-ILE: CPT | Performed by: INTERNAL MEDICINE

## 2024-05-28 PROCEDURE — 99233 SBSQ HOSP IP/OBS HIGH 50: CPT | Performed by: INTERNAL MEDICINE

## 2024-05-28 PROCEDURE — 700105 HCHG RX REV CODE 258: Performed by: INTERNAL MEDICINE

## 2024-05-28 PROCEDURE — 93306 TTE W/DOPPLER COMPLETE: CPT

## 2024-05-28 PROCEDURE — 36415 COLL VENOUS BLD VENIPUNCTURE: CPT

## 2024-05-28 PROCEDURE — 84484 ASSAY OF TROPONIN QUANT: CPT

## 2024-05-28 PROCEDURE — C9113 INJ PANTOPRAZOLE SODIUM, VIA: HCPCS | Performed by: INTERNAL MEDICINE

## 2024-05-28 RX ADMIN — HYDROMORPHONE HYDROCHLORIDE 1 MG: 1 INJECTION, SOLUTION INTRAMUSCULAR; INTRAVENOUS; SUBCUTANEOUS at 19:55

## 2024-05-28 RX ADMIN — PANTOPRAZOLE SODIUM 8 MG/HR: 40 INJECTION, POWDER, FOR SOLUTION INTRAVENOUS at 23:37

## 2024-05-28 RX ADMIN — OXYCODONE HYDROCHLORIDE 10 MG: 10 TABLET ORAL at 09:27

## 2024-05-28 RX ADMIN — BACLOFEN 10 MG: 10 TABLET ORAL at 20:50

## 2024-05-28 RX ADMIN — BACLOFEN 10 MG: 10 TABLET ORAL at 09:28

## 2024-05-28 RX ADMIN — METOPROLOL TARTRATE 25 MG: 25 TABLET, FILM COATED ORAL at 16:53

## 2024-05-28 RX ADMIN — GABAPENTIN 300 MG: 300 CAPSULE ORAL at 09:27

## 2024-05-28 RX ADMIN — OXYCODONE HYDROCHLORIDE 10 MG: 10 TABLET ORAL at 20:50

## 2024-05-28 RX ADMIN — HYDROMORPHONE HYDROCHLORIDE 1 MG: 1 INJECTION, SOLUTION INTRAMUSCULAR; INTRAVENOUS; SUBCUTANEOUS at 03:03

## 2024-05-28 RX ADMIN — SUCRALFATE 1 G: 1 TABLET ORAL at 11:36

## 2024-05-28 RX ADMIN — HYDROMORPHONE HYDROCHLORIDE 1 MG: 1 INJECTION, SOLUTION INTRAMUSCULAR; INTRAVENOUS; SUBCUTANEOUS at 07:27

## 2024-05-28 RX ADMIN — SUCRALFATE 1 G: 1 TABLET ORAL at 16:18

## 2024-05-28 RX ADMIN — SODIUM CHLORIDE, POTASSIUM CHLORIDE, SODIUM LACTATE AND CALCIUM CHLORIDE: 600; 310; 30; 20 INJECTION, SOLUTION INTRAVENOUS at 04:43

## 2024-05-28 RX ADMIN — GABAPENTIN 300 MG: 300 CAPSULE ORAL at 20:50

## 2024-05-28 RX ADMIN — HYDROMORPHONE HYDROCHLORIDE 1 MG: 1 INJECTION, SOLUTION INTRAMUSCULAR; INTRAVENOUS; SUBCUTANEOUS at 16:53

## 2024-05-28 RX ADMIN — HYDROMORPHONE HYDROCHLORIDE 1 MG: 1 INJECTION, SOLUTION INTRAMUSCULAR; INTRAVENOUS; SUBCUTANEOUS at 13:33

## 2024-05-28 RX ADMIN — OXYCODONE HYDROCHLORIDE 10 MG: 10 TABLET ORAL at 11:36

## 2024-05-28 RX ADMIN — HYDROMORPHONE HYDROCHLORIDE 1 MG: 1 INJECTION, SOLUTION INTRAMUSCULAR; INTRAVENOUS; SUBCUTANEOUS at 10:27

## 2024-05-28 RX ADMIN — METOPROLOL TARTRATE 25 MG: 25 TABLET, FILM COATED ORAL at 04:49

## 2024-05-28 RX ADMIN — HYDROMORPHONE HYDROCHLORIDE 1 MG: 1 INJECTION, SOLUTION INTRAMUSCULAR; INTRAVENOUS; SUBCUTANEOUS at 23:59

## 2024-05-28 RX ADMIN — BACLOFEN 10 MG: 10 TABLET ORAL at 16:18

## 2024-05-28 RX ADMIN — SODIUM CHLORIDE, POTASSIUM CHLORIDE, SODIUM LACTATE AND CALCIUM CHLORIDE: 600; 310; 30; 20 INJECTION, SOLUTION INTRAVENOUS at 20:51

## 2024-05-28 RX ADMIN — OXYCODONE HYDROCHLORIDE 10 MG: 10 TABLET ORAL at 23:14

## 2024-05-28 RX ADMIN — PANTOPRAZOLE SODIUM 8 MG/HR: 40 INJECTION, POWDER, FOR SOLUTION INTRAVENOUS at 01:30

## 2024-05-28 RX ADMIN — SUCRALFATE 1 G: 1 TABLET ORAL at 20:50

## 2024-05-28 RX ADMIN — OXYCODONE HYDROCHLORIDE 10 MG: 10 TABLET ORAL at 16:18

## 2024-05-28 RX ADMIN — GABAPENTIN 300 MG: 300 CAPSULE ORAL at 16:19

## 2024-05-28 RX ADMIN — SUCRALFATE 1 G: 1 TABLET ORAL at 09:28

## 2024-05-28 RX ADMIN — LOSARTAN POTASSIUM 50 MG: 50 TABLET, FILM COATED ORAL at 04:49

## 2024-05-28 ASSESSMENT — PAIN DESCRIPTION - PAIN TYPE
TYPE: ACUTE PAIN

## 2024-05-28 ASSESSMENT — ENCOUNTER SYMPTOMS
ABDOMINAL PAIN: 1
NAUSEA: 1

## 2024-05-28 NOTE — CARE PLAN
Pt AO x4.  Pt c/o abdominal pain, medication intervention given per MAR.  Call light and belongings within reach.  Bed locked and in lowest position.  Hourly rounding.  Needs currently met.           The patient is Stable - Low risk of patient condition declining or worsening    Shift Goals  Clinical Goals: pain manage;complete procedure  Patient Goals: stay on top of my pain meds  Family Goals: JJ    Progress made toward(s) clinical / shift goals:    Pt successfully completed capsule camera endoscopy.  Pt pain managed per MAR during entirety of shift.       Problem: Knowledge Deficit - Standard  Goal: Patient and family/care givers will demonstrate understanding of plan of care, disease process/condition, diagnostic tests and medications  Outcome: Progressing     Problem: Pain - Standard  Goal: Alleviation of pain or a reduction in pain to the patient’s comfort goal  Outcome: Progressing

## 2024-05-28 NOTE — OR NURSING
Endoscopy capsule procedure  Swallowed 0645    Educated pt on procedure, consents signed, encouraged ambulation throughout the day. Pt to be NPO for 2 hours after pill swallowed, can start clear liquids 2 hours after (845) and then advance to full liquids 4 hours after (1045). Pt took pill without difficulty with small sip of water and Simethicone. Pt verbalized understanding and agreement of procedure. Report given to bedside RN and told to notify endo team if any issues arise.

## 2024-05-28 NOTE — DIETARY
"Nutrition services: Day 5 of admit.  Roberto Braswell is a 43 y.o. male with admitting DX of Acute abdominal pain [R10.9]  Upper GI bleed [K92.2]    Pt is NPO/clear liquids diet day 5. PO intake on clear liquids diet has been primarily %. Per chart notes, plan for today is for capsule endoscopy: \"pt to be NPO for 2 hours after pill swallowed, can start clear liquids 2 hours after (845) and then advance to full liquids 4 hours after (1045).\"    Assessment:  Height: 190.5 cm (6' 3\")  Weight: 115 kg (254 lb)  Body mass index is 31.75 kg/m²., BMI classification: Obesity class I    Malnutrition Risk: at risk w/ x5 days NPO/clear liquids diet    Recommendations/Plan:  Initiate Ensure clear apple (no reds) TID w/ meals to bolster nutritional intake. Advance supplement w/ diet order advancement.  Encourage intake of meals and supplements >50%.  Document intake of all PO as % taken in ADL's to provide interdisciplinary communication across all shifts.   Monitor weight.  Nutrition rep will continue to see patient for ongoing meal and snack preferences.     RD following.  "

## 2024-05-28 NOTE — DISCHARGE PLANNING
Case Management Discharge Planning    Admission Date: 5/22/2024  GMLOS: 2.6  ALOS: 5    6-Clicks ADL Score: 24  6-Clicks Mobility Score: 24      Anticipated Discharge Dispo: Discharge Disposition: Discharged to home/self care (01)    DME Needed: No    Action(s) Taken: OTHER    This RN CM completed chart review and was updated that patient is scheduled for a endoscopy today and will also need ECHO.       Escalations Completed: None    Medically Clear: Yes    Next Steps: Pending medical clearance and procedure results    Barriers to Discharge: Medical clearance and Pending Procedures    Is the patient up for discharge tomorrow: No

## 2024-05-28 NOTE — CARE PLAN
The patient is Watcher - Medium risk of patient condition declining or worsening    Shift Goals  Clinical Goals: By 1am, patient will report tolerable level of pain 4/10.  Patient Goals: pain control  Family Goals: JJ    Progress made toward(s) clinical / shift goals: Oxycodone 10mg tab. PO amd Dilaudid 1mg IVP administered (see MAR), provided comfort measures. At 1am, patient seen comfortably sleeping without any signs of pain/discomfort.    Patient is not progressing towards the following goals: N/A

## 2024-05-28 NOTE — PROGRESS NOTES
[facility] Medicine Daily Progress Note    Date of Service  5/28/2024    Chief Complaint  Roberto Braswell is a 43 y.o. male admitted 5/22/2024 with epigastric abdominal pain and dark stools    Hospital Course  43 y.o. male with a past medical history of mitral valve regurgitation s/p MVR, duodenal ulcer status post GDA coiling embolization and chronic pain for which he follows with outpatient pain management who presented to the ED on 5/22/2024 with a 1 day history of worsening epigastric pain.   The patient was just discharged on 5/19/2024 after being being admitted for abdominal pain. He underwent an EGD which found eosinophilic esophagitis, clean-based ulcers in the in the stomach, narrowing due to ulcer at pylorus, oozing and multiple ulcers of the duodenum, treated with gold probe. Bleeding was treated and area was biopsied. Gastrin found to be normal. Stool for H. pylori was negative. The patient was discharged with twice daily PPI for 3 months, carafate QID. It was also recommended to have a repeat EGD in 3 months to assess for healing. Patient was also prescribed oral iron for microcytic anemia.   The patient says that his epigastric pain is worse than it was on his recent admission. He also reports nausea secondary to pain.  Associated with dark tarry stool.  GI was consulted.  The CT abdomen no acute abnormalities.  On PPI drip.  Status post EGD 5/25 which showed Antral gastritis and small clean based gastric ulcer. Duodenitis D1-D2, Shallow duodenal ulcer distal bulb without stigmata of recent bleeding; Spontaneous, scant volume, oozing when duodenal bulb mucosa touched.   S/p colonoscopy 5/27  MRA abdomen negative    Interval Problem Update  Patient seen and examine at bedside  I reviewed the chart along with vitals, labs, imaging, test (both pending and resulted) and recommendations from specialists and interdisciplinary team.  42yo obese M w/ h/o mitral valve regurgitation s/p MVR, chronic pain  followed in Pain Clinic, recently hospitalized for duodenal ulcer status post GDA coiling embolization (neg H. Pylori, normal gastrin levels) and presented 5/22/2024 with worsening epigastric pain.   At the ED, afebrile, slightly hypertensive.  Hb 11.6.  Managing for abdominal pain, GI bleeding, hypertension. GI consulted. Aspirin held. Capsule endoscopy planned TODAY  Ordered scheduled echo monitor MVR    Monitor for capsule endoscopy placed  Currently no BRBPR.     I have discussed this patient's plan of care and discharge plan at IDT rounds today with Case Management, Nursing, Nursing leadership, and other members of the IDT team.    Consultants/Specialty  GI    Code Status  Full Code    Disposition  Medically Cleared  I have placed the appropriate orders for post-discharge needs.    Review of Systems  Review of Systems   Gastrointestinal:  Positive for abdominal pain and nausea.        Physical Exam  Temp:  [36.2 °C (97.2 °F)-36.5 °C (97.7 °F)] 36.2 °C (97.2 °F)  Pulse:  [60-69] 68  Resp:  [15-19] 17  BP: (110-129)/(52-77) 122/76  SpO2:  [95 %-100 %] 98 %    Physical Exam  Vitals and nursing note reviewed.   Constitutional:       General: He is not in acute distress.     Appearance: He is obese.   HENT:      Head: Normocephalic.      Mouth/Throat:      Mouth: Mucous membranes are moist.   Eyes:      Pupils: Pupils are equal, round, and reactive to light.   Cardiovascular:      Rate and Rhythm: Normal rate and regular rhythm.      Pulses: Normal pulses.      Heart sounds: Normal heart sounds.   Pulmonary:      Effort: Pulmonary effort is normal.      Breath sounds: Normal breath sounds.   Abdominal:      Palpations: Abdomen is soft.      Tenderness: There is no abdominal tenderness.   Musculoskeletal:         General: No swelling.      Cervical back: Neck supple.   Skin:     General: Skin is warm.      Coloration: Skin is pale. Skin is not jaundiced.   Neurological:      General: No focal deficit present.       Mental Status: He is alert and oriented to person, place, and time.   Psychiatric:         Mood and Affect: Mood normal.         Behavior: Behavior normal.         Fluids    Intake/Output Summary (Last 24 hours) at 5/28/2024 1650  Last data filed at 5/28/2024 1348  Gross per 24 hour   Intake 700 ml   Output --   Net 700 ml       Laboratory  Recent Labs     05/26/24  0223 05/26/24  1528 05/27/24  0645 05/27/24  2352   WBC 4.7*  --  3.6* 4.5*   RBC 4.59*  --  4.40* 4.15*   HEMOGLOBIN 10.6* 10.4* 10.2* 9.7*   HEMATOCRIT 34.5* 34.1* 33.5* 32.6*   MCV 75.2*  --  76.1* 78.6*   MCH 23.1*  --  23.2* 23.4*   MCHC 30.7*  --  30.4* 29.8*   RDW 65.8*  --  65.8* 66.4*   PLATELETCT 355  --  347 319   MPV 8.7*  --  8.5* 8.8*     Recent Labs     05/26/24  0223 05/27/24  0645 05/27/24  2352   SODIUM 141 142 139   POTASSIUM 4.3 4.1 4.0   CHLORIDE 108 107 105   CO2 22 25 24   GLUCOSE 92 96 94   BUN 9 7* 6*   CREATININE 1.06 1.02 1.13   CALCIUM 9.0 8.8 8.9                   Imaging  EC-ECHOCARDIOGRAM COMPLETE W/O CONT   Final Result      DX-CHEST-PORTABLE (1 VIEW)   Final Result         1.  No acute cardiopulmonary disease.      MR-MRA ABDOMEN-WITH & W/O   Final Result      1.  MRA thoracoabdominal aorta and visceral arteries shows no evidence of aneurysm or significant stenotic disease.      CT-ABDOMEN-PELVIS WITH   Final Result         1.  Mild hepatomegaly   2.  Small fat-containing left inguinal hernia      [id number] VIEW   Final Result         1.  Nonspecific bowel gas pattern.           Assessment/Plan  * Upper GI bleed- (present on admission)  Assessment & Plan  Likely due to peptic ulcer disease vs. Mesenteric ischemia. Patient just had EGD on 5/18 showing appearance of eosinophilic esophagitis, clean based ulcers in the antrum, food in stomach , narrowing due to ulcer at pylorus, oozing and multiple ulcers of duodenum. Treated with gold probe. Bleeding was treated and area was biopsied. Gastrin found to be normal. Stool for  H. pylori was negative.   CT abdomen unremarkable for perforation  S/p EGD 5/25 which showed Antral gastritis and small clean based gastric ulcer. Duodenitis D1-D2, Shallow duodenal ulcer distal bulb without stigmata of recent bleeding; Spontaneous, scant volume, oozing when duodenal bulb mucosa touched.   S/p colonoscopy 5/26    MRA negative  Continue PPI drip  MRA abdomen negative  Capsule endoscopy TODAY    Chronic pain syndrome- (present on admission)  Assessment & Plan  Follows with pain management at [facility].  Patient reports his chronic pain is secondary to heart surgery and that they are currently reducing his medications from oxy 20 mg 5 times a day to 10 mg 5 times a day.  He has not seen his pain management specialist in 2 weeks.  - continue home meds, including oxy, gabapentin, baclofen    Acute abdominal pain- (present on admission)  Assessment & Plan  Likely due to peptic ulcer disease vs. Mesenteric ischemia. Patient just had EGD on 5/18 showing appearance of eosinophilic esophagitis, clean based ulcers in the antrum, food in stomach , narrowing due to ulcer at pylorus, oozing and multiple ulcers of duodenum. Treated with gold probe. Bleeding was treated and area was biopsied. Gastrin found to be normal. Stool for H. pylori was negative.   CT abdomen unremarkable  MRA abdomen negative  - IV protonix drip  - IVF  -GI was consulted, Status post EGD 5/25 which showed Antral gastritis and small clean based gastric ulcer. Duodenitis D1-D2, Shallow duodenal ulcer distal bulb without stigmata of recent bleeding; Spontaneous, scant volume, oozing when duodenal bulb mucosa touched.       H/O mitral valve repair- (present on admission)  Assessment & Plan  Hx of radical valve repair in January, 2024.  He reports his chronic sternal and clavicular chest pain is related to the surgery.   Hold aspirin     Primary hypertension- (present on admission)  Assessment & Plan  Reports taking his blood pressure medications  only as needed.  Currently blood pressure is elevated  - continue losartan, metoprolol  Vitals:    05/28/24 1602   BP: 122/76   Pulse: 68   Resp: 17   Temp: 36.2 °C (97.2 °F)   SpO2: 98%     Stable    Duodenal ulcer- (present on admission)  Assessment & Plan  - continue PPI IV TID  - continue carafate    Iron deficiency anemia due to chronic blood loss- (present on admission)  Assessment & Plan  - continue iron  Recent Labs     05/26/24  0223 05/26/24  1528 05/27/24  0645 05/27/24  2352   HEMOGLOBIN 10.6* 10.4* 10.2* 9.7*   HEMATOCRIT 34.5* 34.1* 33.5* 32.6*   MCV 75.2*  --  76.1* 78.6*   MCH 23.1*  --  23.2* 23.4*   ANISOCYTOSIS 1+  --   --   --    PLATELETCT 355  --  347 319              VTE prophylaxis: SCD    I have performed a physical exam and reviewed and updated ROS and Plan today (5/28/2024). In review of yesterday's note (5/27/2024), there are no changes except as documented above.

## 2024-05-29 ENCOUNTER — PHARMACY VISIT (OUTPATIENT)
Dept: PHARMACY | Facility: MEDICAL CENTER | Age: 44
End: 2024-05-29
Payer: MEDICARE

## 2024-05-29 ENCOUNTER — APPOINTMENT (OUTPATIENT)
Dept: RADIOLOGY | Facility: MEDICAL CENTER | Age: 44
End: 2024-05-29
Attending: INTERNAL MEDICINE
Payer: COMMERCIAL

## 2024-05-29 VITALS
BODY MASS INDEX: 31.58 KG/M2 | WEIGHT: 254 LBS | SYSTOLIC BLOOD PRESSURE: 110 MMHG | HEART RATE: 67 BPM | TEMPERATURE: 96.5 F | DIASTOLIC BLOOD PRESSURE: 72 MMHG | OXYGEN SATURATION: 97 % | HEIGHT: 75 IN | RESPIRATION RATE: 17 BRPM

## 2024-05-29 DIAGNOSIS — K92.2 GASTROINTESTINAL HEMORRHAGE, UNSPECIFIED GASTROINTESTINAL HEMORRHAGE TYPE: ICD-10-CM

## 2024-05-29 LAB
ALBUMIN SERPL BCP-MCNC: 3.6 G/DL (ref 3.2–4.9)
BUN SERPL-MCNC: 8 MG/DL (ref 8–22)
CALCIUM ALBUM COR SERPL-MCNC: 9.4 MG/DL (ref 8.5–10.5)
CALCIUM SERPL-MCNC: 9.1 MG/DL (ref 8.5–10.5)
CHLORIDE SERPL-SCNC: 106 MMOL/L (ref 96–112)
CO2 SERPL-SCNC: 27 MMOL/L (ref 20–33)
CREAT SERPL-MCNC: 1.07 MG/DL (ref 0.5–1.4)
ERYTHROCYTE [DISTWIDTH] IN BLOOD BY AUTOMATED COUNT: 65.5 FL (ref 35.9–50)
GFR SERPLBLD CREATININE-BSD FMLA CKD-EPI: 88 ML/MIN/1.73 M 2
GLUCOSE SERPL-MCNC: 87 MG/DL (ref 65–99)
HCT VFR BLD AUTO: 35.1 % (ref 42–52)
HGB BLD-MCNC: 10.3 G/DL (ref 14–18)
MCH RBC QN AUTO: 23 PG (ref 27–33)
MCHC RBC AUTO-ENTMCNC: 29.3 G/DL (ref 32.3–36.5)
MCV RBC AUTO: 78.5 FL (ref 81.4–97.8)
PHOSPHATE SERPL-MCNC: 4.6 MG/DL (ref 2.5–4.5)
PLATELET # BLD AUTO: 352 K/UL (ref 164–446)
PMV BLD AUTO: 9 FL (ref 9–12.9)
POTASSIUM SERPL-SCNC: 4.4 MMOL/L (ref 3.6–5.5)
RBC # BLD AUTO: 4.47 M/UL (ref 4.7–6.1)
SODIUM SERPL-SCNC: 142 MMOL/L (ref 135–145)
WBC # BLD AUTO: 3.6 K/UL (ref 4.8–10.8)

## 2024-05-29 PROCEDURE — RXMED WILLOW AMBULATORY MEDICATION CHARGE: Performed by: INTERNAL MEDICINE

## 2024-05-29 PROCEDURE — 85027 COMPLETE CBC AUTOMATED: CPT

## 2024-05-29 PROCEDURE — 99239 HOSP IP/OBS DSCHRG MGMT >30: CPT | Performed by: INTERNAL MEDICINE

## 2024-05-29 PROCEDURE — 93970 EXTREMITY STUDY: CPT | Mod: 26 | Performed by: INTERNAL MEDICINE

## 2024-05-29 PROCEDURE — 93970 EXTREMITY STUDY: CPT

## 2024-05-29 PROCEDURE — 700111 HCHG RX REV CODE 636 W/ 250 OVERRIDE (IP): Performed by: INTERNAL MEDICINE

## 2024-05-29 PROCEDURE — 700105 HCHG RX REV CODE 258: Performed by: INTERNAL MEDICINE

## 2024-05-29 PROCEDURE — 36415 COLL VENOUS BLD VENIPUNCTURE: CPT

## 2024-05-29 PROCEDURE — 91110 GI TRC IMG INTRAL ESOPH-ILE: CPT | Performed by: INTERNAL MEDICINE

## 2024-05-29 PROCEDURE — A9270 NON-COVERED ITEM OR SERVICE: HCPCS | Performed by: NURSE PRACTITIONER

## 2024-05-29 PROCEDURE — A9270 NON-COVERED ITEM OR SERVICE: HCPCS | Performed by: INTERNAL MEDICINE

## 2024-05-29 PROCEDURE — 700102 HCHG RX REV CODE 250 W/ 637 OVERRIDE(OP): Performed by: NURSE PRACTITIONER

## 2024-05-29 PROCEDURE — 74018 RADEX ABDOMEN 1 VIEW: CPT

## 2024-05-29 PROCEDURE — 700111 HCHG RX REV CODE 636 W/ 250 OVERRIDE (IP): Mod: JZ | Performed by: NURSE PRACTITIONER

## 2024-05-29 PROCEDURE — 80069 RENAL FUNCTION PANEL: CPT

## 2024-05-29 PROCEDURE — 700102 HCHG RX REV CODE 250 W/ 637 OVERRIDE(OP): Performed by: INTERNAL MEDICINE

## 2024-05-29 RX ORDER — FUROSEMIDE 20 MG
20 TABLET ORAL ONCE
Status: COMPLETED | OUTPATIENT
Start: 2024-05-29 | End: 2024-05-29

## 2024-05-29 RX ORDER — ONDANSETRON 4 MG/1
4 TABLET, ORALLY DISINTEGRATING ORAL EVERY 4 HOURS PRN
Qty: 10 TABLET | Refills: 0 | Status: SHIPPED | OUTPATIENT
Start: 2024-05-29 | End: 2024-06-07

## 2024-05-29 RX ADMIN — BACLOFEN 10 MG: 10 TABLET ORAL at 09:04

## 2024-05-29 RX ADMIN — OXYCODONE HYDROCHLORIDE 10 MG: 10 TABLET ORAL at 11:52

## 2024-05-29 RX ADMIN — LOSARTAN POTASSIUM 50 MG: 50 TABLET, FILM COATED ORAL at 04:54

## 2024-05-29 RX ADMIN — METOPROLOL TARTRATE 25 MG: 25 TABLET, FILM COATED ORAL at 04:54

## 2024-05-29 RX ADMIN — GABAPENTIN 300 MG: 300 CAPSULE ORAL at 09:04

## 2024-05-29 RX ADMIN — GABAPENTIN 300 MG: 300 CAPSULE ORAL at 15:34

## 2024-05-29 RX ADMIN — SUCRALFATE 1 G: 1 TABLET ORAL at 16:19

## 2024-05-29 RX ADMIN — OXYCODONE HYDROCHLORIDE 10 MG: 10 TABLET ORAL at 15:34

## 2024-05-29 RX ADMIN — HYDROMORPHONE HYDROCHLORIDE 1 MG: 1 INJECTION, SOLUTION INTRAMUSCULAR; INTRAVENOUS; SUBCUTANEOUS at 09:04

## 2024-05-29 RX ADMIN — HYDROMORPHONE HYDROCHLORIDE 1 MG: 1 INJECTION, SOLUTION INTRAMUSCULAR; INTRAVENOUS; SUBCUTANEOUS at 13:09

## 2024-05-29 RX ADMIN — OXYCODONE HYDROCHLORIDE 10 MG: 10 TABLET ORAL at 06:07

## 2024-05-29 RX ADMIN — HYDROMORPHONE HYDROCHLORIDE 1 MG: 1 INJECTION, SOLUTION INTRAMUSCULAR; INTRAVENOUS; SUBCUTANEOUS at 16:19

## 2024-05-29 RX ADMIN — SODIUM CHLORIDE, POTASSIUM CHLORIDE, SODIUM LACTATE AND CALCIUM CHLORIDE: 600; 310; 30; 20 INJECTION, SOLUTION INTRAVENOUS at 04:56

## 2024-05-29 RX ADMIN — BACLOFEN 10 MG: 10 TABLET ORAL at 15:35

## 2024-05-29 RX ADMIN — FUROSEMIDE 20 MG: 20 TABLET ORAL at 11:52

## 2024-05-29 RX ADMIN — SUCRALFATE 1 G: 1 TABLET ORAL at 06:08

## 2024-05-29 RX ADMIN — SUCRALFATE 1 G: 1 TABLET ORAL at 11:53

## 2024-05-29 RX ADMIN — ONDANSETRON 4 MG: 2 INJECTION INTRAMUSCULAR; INTRAVENOUS at 09:08

## 2024-05-29 RX ADMIN — HYDROMORPHONE HYDROCHLORIDE 1 MG: 1 INJECTION, SOLUTION INTRAMUSCULAR; INTRAVENOUS; SUBCUTANEOUS at 03:05

## 2024-05-29 RX ADMIN — SODIUM CHLORIDE, POTASSIUM CHLORIDE, SODIUM LACTATE AND CALCIUM CHLORIDE: 600; 310; 30; 20 INJECTION, SOLUTION INTRAVENOUS at 13:42

## 2024-05-29 ASSESSMENT — PAIN DESCRIPTION - PAIN TYPE: TYPE: ACUTE PAIN

## 2024-05-29 NOTE — PROGRESS NOTES
GI APRN interim note    KUB shows capsule in right lower quadrant.  This is likely to pass.      We will order x-ray in 1 week to reassess for passage of capsule.  GI to follow KUB

## 2024-05-29 NOTE — DISCHARGE SUMMARY
Discharge Summary    CHIEF COMPLAINT ON ADMISSION  Chief Complaint   Patient presents with    Abdominal Pain     'Mid abd pain X2 hours. Radiates across entire stomach'.   +nausea.        Reason for Admission  ABd Pain     Admission Date  5/22/2024    CODE STATUS  Full Code    HPI & HOSPITAL COURSE  This is a 43 y.o. male here with Abdominal Pain ('Mid abd pain X2 hours. Radiates across entire stomach'. /+nausea. )  Please review Dr. Fab Tian M.D. notes for further details of history of present illness, past medical/social/family histories, allergies and medications. Please review Dr. Oliver GI consultation notes.  42yo obese M w/ h/o mitral valve regurgitation s/p MVR, chronic pain followed in Pain Clinic, recently hospitalized for duodenal ulcer status post GDA coiling embolization (neg H. Pylori, normal gastrin levels) and presented 5/22/2024 with worsening epigastric pain.   At the ED, afebrile, slightly hypertensive.  Hb 11.6.  Managing for abdominal pain, GI bleeding, hypertension. GI consulted. Aspirin held.   Gastroenterology consulted. S/p EGD 5/25 by Dr. Oliver which showed Antral gastritis and small clean based gastric ulcer. Duodenitis D1-D2, Shallow duodenal ulcer distal bulb without stigmata of recent bleeding; Spontaneous, scant volume, oozing when duodenal bulb mucosa touched.   S/p colonoscopy 5/27 by Dr. Oliver normal ileum, grade 2 nonbleeding internal hemorrhoids  MRA abdomen negative  S/p capsule endoscopy 5/29 by Dr. Oliver which showed no active bleeding, KUB showed passage of capsule and GI cleared patient for discharge. Ordered PPI and can resume baby aspirin.     At discharge date, Roberto Braswell afebrile and hemodynamically stable.  Roberto Braswell wanted to be discharged today.      Imaging  US-EXTREMITY VENOUS LOWER BILAT         [id number] VIEW   Final Result      1.  Ovoid radiopacity in the right lower quadrant.      2.  Mild constipation.      EC-ECHOCARDIOGRAM  COMPLETE W/O CONT   Final Result      DX-CHEST-PORTABLE (1 VIEW)   Final Result         1.  No acute cardiopulmonary disease.      MR-MRA ABDOMEN-WITH & W/O   Final Result      1.  MRA thoracoabdominal aorta and visceral arteries shows no evidence of aneurysm or significant stenotic disease.      CT-ABDOMEN-PELVIS WITH   Final Result         1.  Mild hepatomegaly   2.  Small fat-containing left inguinal hernia      [id number] VIEW   Final Result         1.  Nonspecific bowel gas pattern.                Therefore, he is discharged in fair and stable condition to home with close outpatient follow-up.    The patient met 2-midnight criteria for an inpatient stay at the time of discharge.    Discharge Date  5/29/2024    FOLLOW UP ITEMS POST DISCHARGE      DISCHARGE DIAGNOSES  Principal Problem:    Upper GI bleed (POA: Yes)  Active Problems:    Iron deficiency anemia due to chronic blood loss (POA: Yes)      Overview: Secondary to GI etiology. Has had two transfusions in the past. Has had       multiple egds. He did have iron infusion during his recent       hospitalizations             10/10/23      -hgb now 12.5 improving from 8.6 3 weeks ago    Duodenal ulcer (POA: Yes)    Primary hypertension (POA: Yes)    H/O mitral valve repair (POA: Yes)    Acute abdominal pain (POA: Yes)    Chronic pain syndrome (POA: Yes)    Follow up with Sal Schafer D.O. in 1 week  Follow up with your cardiologist for valve disease in 1-2 weeks  Follow up with gastroenterology in 1 week for postprocedure visit. If he develops bleeding again should refer to a tertiary center for double balloon eneteroscopy  NURSING provide resources/pamphlets for  Postprocedure instructions from GI, GI diet (small but frequent meals, chew food very well avoid big chunks or eating too fast, avoid dense food, avoid greasy or fatty foods, eat healthy and plenty of vegetables), hypertension (Check and record blood pressures at home at least twice a day for  primary provider to review), valve repair, mild volume overload You are on diuretics. Checking weight, blood pressures and volume state (symptoms of swelling, shortness of breath and dizziness) is advisable. Have primary provider  to check your kidney  function at your visit), narc use (Avoid swimming, driving or operating machinery. Treat constipation with prescribed bowel regimen), obesity (Recommended weight loss advised, 5% through reduced calorie, low carb diet and 150 mins of exercise a week once better  Recommend bariatric surgery evaluation if morbidly obese  Educated on the increase of morbidity and mortality associated with excess weight including DM, Heart Disease, HTN, stroke, and sleep apnea. Recommended outpatient monitoring  of blood sugars, lipid panel, sleep study evaluation for metabolic syndrome.), narcotic use (Avoid swimming, driving or operating machinery. Treat constipation with prescribed bowel regimen)    FOLLOW UP  Future Appointments   Date Time Provider Department Center   6/5/2024  2:45 PM SHAJI Carpenter [facility] None     Sal Schafer D.O.  [id number] [id number] [phone]    Call        MEDICATIONS ON DISCHARGE     Medication List        START taking these medications        Instructions   losartan 50 MG Tabs  Commonly known as: Cozaar   Take 1 Tablet by mouth every day.  Dose: 50 mg     metoprolol tartrate 25 MG Tabs  Commonly known as: Lopressor   Take 1 Tablet by mouth 2 times a day.  Dose: 25 mg     ondansetron 4 MG Tbdp  Commonly known as: Zofran ODT   Take 1 Tablet by mouth every four hours as needed for Nausea/Vomiting (give PO if no IV route available).  Dose: 4 mg            CONTINUE taking these medications        Instructions   acetaminophen 500 MG Tabs  Commonly known as: Tylenol   Take 500-1,000 mg by mouth every 8 hours as needed. Indications: Pain  Dose: 500-1,000 mg     aspirin 81 MG EC tablet   Take 81 mg by mouth every  day.  Dose: 81 mg     baclofen 10 MG Tabs  Commonly known as: Lioresal   Take 10 mg by mouth 3 times a day. Indications: Muscle Spasm  Dose: 10 mg     ferrous sulfate 325 (65 Fe) MG tablet   Take 1 Tablet by mouth every day for 30 days.  Dose: 325 mg     gabapentin 300 MG Caps  Commonly known as: Neurontin   Take 300 mg by mouth 3 times a day.  Dose: 300 mg     oxyCODONE immediate release 10 MG immediate release tablet  Commonly known as: Roxicodone   Take 10 mg by mouth 5 Times a Day.  Dose: 10 mg     pantoprazole 40 MG Tbec  Commonly known as: Protonix   Take 40 mg by mouth in the morning, at noon, and at bedtime.  Dose: 40 mg     sucralfate 1 GM Tabs  Commonly known as: Carafate   Take 1 g by mouth 4 Times a Day,Before Meals and at Bedtime.  Dose: 1 g            STOP taking these medications      amoxicillin-clavulanate 875-125 MG Tabs  Commonly known as: Augmentin     omeprazole 20 MG delayed-release capsule  Commonly known as: PriLOSEC              Allergies  Allergies   Allergen Reactions    Morphine Vomiting    Tramadol Unspecified     Seizure  [id number]       DIET  Orders Placed This Encounter   Procedures    Diet Order Diet: Full Liquid     Standing Status:   Standing     Number of Occurrences:   1     Order Specific Question:   Diet:     Answer:   Full Liquid [11]       ACTIVITY  Avoid heavy lifting or strenuous activity      CONSULTATIONS  Gastroenterology    PROCEDURES  Please see Dr. Hathaway's PROC NOTES    LABORATORY  Lab Results   Component Value Date    SODIUM 142 05/29/2024    POTASSIUM 4.4 05/29/2024    CHLORIDE 106 05/29/2024    CO2 27 05/29/2024    GLUCOSE 87 05/29/2024    BUN 8 05/29/2024    CREATININE 1.07 05/29/2024        Lab Results   Component Value Date    WBC 3.6 (L) 05/29/2024    HEMOGLOBIN 10.3 (L) 05/29/2024    HEMATOCRIT 35.1 (L) 05/29/2024    PLATELETCT 352 05/29/2024        Total time of the discharge process exceeds 40 minutes.

## 2024-05-29 NOTE — PROCEDURES
"Pill Capsule Report    Procedure performed 5/28/24  Procedure read 5/29/24    Reason for Referral  43 year old male with \"severe\" epigastric pain, anemia and chronic gastric and duodenal ulcers with negative  biopsies for H pylori x 2 and normal gastrin. Denies any NSAIDs. Most recent EGD with small gastric ulcer and  clean based duodenal ulcer in healing phase. Colonoscopy to ileum normal.    Patient Data  Gastric passage time: 3h 2m    Procedure Information and Findings  1. No active bleeding in small bowel. Effluent in distal small bowel had a reddish tint but no blood noted.  2. No mucosal lesions in small bowel.  3. Delayed passage of capsule through stomach--3 hours.  4. Final image is capsule in bowel.    Summary and Recommendations  1. Consider [place] gastric emptying scan. ? Delayed emptying causing pain.  2. KUB to document passage of capsule.  3. Continue acid suppression.  4. If rebleeds, consider sending to tertiary facility for EGD and double balloon enteroscop  "

## 2024-05-29 NOTE — CARE PLAN
The patient is Watcher - Medium risk of patient condition declining or worsening    Shift Goals  Clinical Goals: By 1am, patient will report tolerable level of pain  Patient Goals: pain control  Family Goals: updates given    Progress made toward(s) clinical / shift goals: Patient is complaining of epigastric pain 7-8/10, Oxycodone 10mg tab. PO and Dilaudid 1mg IVP given (see MAR). Provided comfort measures. At 1am, patient seen comfortably resting.    Patient is not progressing towards the following goals: N/A

## 2024-05-30 ENCOUNTER — TELEPHONE (OUTPATIENT)
Dept: HEALTH INFORMATION MANAGEMENT | Facility: OTHER | Age: 44
End: 2024-05-30

## 2024-06-03 NOTE — TELEPHONE ENCOUNTER
Pt has not read Moe Delo message regarding KUB.   LVM for pt to refer to Moe Delo message for KUB instructions, and he can get KUB done on 6/5 after his other [facility] appts that day.

## 2024-06-05 ENCOUNTER — APPOINTMENT (OUTPATIENT)
Dept: CARDIOLOGY | Facility: MEDICAL CENTER | Age: 44
End: 2024-06-05
Payer: COMMERCIAL

## 2024-06-05 ENCOUNTER — OFFICE VISIT (OUTPATIENT)
Dept: MEDICAL GROUP | Facility: LAB | Age: 44
End: 2024-06-05

## 2024-06-05 VITALS
BODY MASS INDEX: 30.46 KG/M2 | RESPIRATION RATE: 16 BRPM | TEMPERATURE: 96.9 F | DIASTOLIC BLOOD PRESSURE: 82 MMHG | OXYGEN SATURATION: 95 % | SYSTOLIC BLOOD PRESSURE: 120 MMHG | HEART RATE: 90 BPM | WEIGHT: 245 LBS | HEIGHT: 75 IN

## 2024-06-05 DIAGNOSIS — K92.2 UPPER GI BLEED: ICD-10-CM

## 2024-06-05 DIAGNOSIS — Z09 HOSPITAL DISCHARGE FOLLOW-UP: ICD-10-CM

## 2024-06-05 DIAGNOSIS — K92.2 GASTROINTESTINAL HEMORRHAGE, UNSPECIFIED GASTROINTESTINAL HEMORRHAGE TYPE: ICD-10-CM

## 2024-06-05 DIAGNOSIS — D50.0 IRON DEFICIENCY ANEMIA DUE TO CHRONIC BLOOD LOSS: ICD-10-CM

## 2024-06-05 DIAGNOSIS — K26.9 DUODENAL ULCER: ICD-10-CM

## 2024-06-05 DIAGNOSIS — Z98.890 H/O MITRAL VALVE REPAIR: ICD-10-CM

## 2024-06-05 PROBLEM — L02.419 ABSCESS OF FOREARM: Status: RESOLVED | Noted: 2024-05-18 | Resolved: 2024-06-05

## 2024-06-05 PROBLEM — R10.9 ACUTE ABDOMINAL PAIN: Status: RESOLVED | Noted: 2024-05-22 | Resolved: 2024-06-05

## 2024-06-05 PROCEDURE — 3074F SYST BP LT 130 MM HG: CPT | Performed by: NURSE PRACTITIONER

## 2024-06-05 PROCEDURE — 3079F DIAST BP 80-89 MM HG: CPT | Performed by: NURSE PRACTITIONER

## 2024-06-05 PROCEDURE — 99214 OFFICE O/P EST MOD 30 MIN: CPT | Performed by: NURSE PRACTITIONER

## 2024-06-05 ASSESSMENT — FIBROSIS 4 INDEX: FIB4 SCORE: 0.57

## 2024-06-05 NOTE — PROGRESS NOTES
No chief complaint on file.      Subjective     Roberto Braswell is a 43 y.o. male who presents today for follow-up.  He has a history of hypertension, recent GI bleed - duodenal ulcer coiling 5 weeks ago and severe mitral regurgitation status post mitral valve repair on 1/25/2024 with Dr. Santana.      Seen by myself on 2/27/2024 he has been having some residual chest wall pain, he has been going on frequent walks with his dogs.  He reports that his wife has been dealing with significant back issues and has also been in the hospital.       Past Medical History:   Diagnosis Date    Arthritis     Right ankle    Breath shortness 11/03/2023    With exertion.    Drug-seeking behavior 07/30/2023    Demanding narcotics for a duodenal ulcer July 2023. Refused antacids or sucralfate.     Duodenal ulcer 07/30/2023    Gastric ulcer     Heart valve disease     Mitral valve prolapse    Hemorrhagic disorder (HCC)     GI bleed    Hypertension     Pain     Resolved; Right ankle    Seizure (HCC)     while taking tramadol     Past Surgical History:   Procedure Laterality Date    CAPSULE ENDOSCOPY ADMINISTRATION N/A 5/28/2024    Procedure: ADMINISTRATION, CAPSULE, FOR CAPSULE ENDOSCOPY;  Surgeon: Janae Oliver M.D.;  Location: SURGERY SAME DAY [facility];  Service: Gastroenterology    CAPSULE ENDOSCOPY RETRIEVAL  5/28/2024    Procedure: RETRIEVAL, ENDOSCOPY CAPSULE;  Surgeon: Janae Oliver M.D.;  Location: SURGERY SAME DAY [facility];  Service: Gastroenterology    MD COLONOSCOPY,DIAGNOSTIC  5/27/2024    Procedure: COLONOSCOPY;  Surgeon: Janae Oliver M.D.;  Location: [facility];  Service: Gastroenterology    MD UPPER GI ENDOSCOPY,DIAGNOSIS N/A 5/25/2024    Procedure: GASTROSCOPY;  Surgeon: Janae Oliver M.D.;  Location: [facility];  Service: Gastroenterology    MD UPPER GI ENDOSCOPY,BIOPSY N/A 5/25/2024    Procedure: GASTROSCOPY, WITH BIOPSY;  Surgeon: Janae Oliver M.D.;  Location:  SURGERY [facility];  Service: Gastroenterology    HI UPPER GI ENDOSCOPY,DIAGNOSIS N/A 5/18/2024    Procedure: GASTROSCOPY;  Surgeon: Kaitlyn Ontiveros M.D.;  Location: SURGERY [facility];  Service: Gastroenterology    HI UPPER GI ENDOSCOPY,BIOPSY N/A 5/18/2024    Procedure: GASTROSCOPY, WITH BIOPSY;  Surgeon: Kaitlyn Ontiveros M.D.;  Location: SURGERY [facility];  Service: Gastroenterology    GASTROSCOPY WITH ENDOSTAT N/A 5/18/2024    Procedure: EGD, WITH CAUTERIZATION;  Surgeon: Kaitlyn Ontiveros M.D.;  Location: SURGERY [facility];  Service: Gastroenterology    HI REPLACEMENT OF MITRAL VALVE  1/25/2024    Procedure: MITRAL VALVE REPAIR, TRANSESOPHAGEAL ECHOCARDIOGRAM;  Surgeon: Bigg Santana M.D.;  Location: [facility];  Service: Cardiothoracic    ECHOCARDIOGRAM, TRANSESOPHAGEAL, INTRAOPERATIVE  1/25/2024    Procedure: ECHOCARDIOGRAM, TRANSESOPHAGEAL, INTRAOPERATIVE;  Surgeon: Bigg Santana M.D.;  Location: SURGERY [facility];  Service: Cardiothoracic    HI UPPER GI ENDOSCOPY,DIAGNOSIS N/A 9/14/2023    Procedure: GASTROSCOPY;  Surgeon: Johnny Dalton M.D.;  Location: SURGERY SAME DAY [facility];  Service: Gastroenterology    HI UPPER GI ENDOSCOPY,BIOPSY N/A 9/14/2023    Procedure: GASTROSCOPY, WITH BIOPSY;  Surgeon: Johnny Dalton M.D.;  Location: SURGERY SAME DAY [facility];  Service: Gastroenterology    HI UPPER GI ENDOSCOPY,BIOPSY N/A 7/30/2023    Procedure: GASTROSCOPY, WITH BIOPSY;  Surgeon: Kumar Hope M.D.;  Location: SURGERY [facility];  Service: Gastroenterology    HI UPPER GI ENDOSCOPY,DIAGNOSIS  3/28/2022    Procedure: GASTROSCOPY;  Surgeon: Paco Wiggins M.D.;  Location: SURGERY SAME DAY [facility];  Service: Gastroenterology    HI UPPER GI ENDOSCOPY,BIOPSY  3/28/2022    Procedure: GASTROSCOPY, WITH BIOPSY;  Surgeon: Paco Wiggins M.D.;  Location: SURGERY SAME DAY [facility];  Service: Gastroenterology    ANKLE ARTHROSCOPY Right 5/21/2018    Procedure: ANKLE ARTHROSCOPY, LATERAL  LIGAMENT RECONSTRUCTION;  Surgeon: Seth Cruz M.D.;  Location: SURGERY [facility];  Service: Orthopedics    BIOPSY ORTHO Right 5/21/2018    Procedure: BIOPSY ORTHO/ FOR CARTILAGE AND DENOVO PROCEDURE;  Surgeon: Seth Cruz M.D.;  Location: SURGERY [facility];  Service: Orthopedics    OTHER ABDOMINAL SURGERY      Cholecystectomy February 2017    OTHER ORTHOPEDIC SURGERY      2 previous ankle surgeries (2007, 2009)     Family History   Problem Relation Age of Onset    Heart Disease Mother     No Known Problems Father     Clotting Disorder Neg Hx      Social History     Socioeconomic History    Marital status:      Spouse name: Not on file    Number of children: Not on file    Years of education: Not on file    Highest education level: Associate degree: occupational, technical, or vocational program   Occupational History    Not on file   Tobacco Use    Smoking status: Never    Smokeless tobacco: Never   Vaping Use    Vaping status: Never Used   Substance and Sexual Activity    Alcohol use: Not Currently    Drug use: Never    Sexual activity: Not on file   Other Topics Concern    Not on file   Social History Narrative    Not on file     Social Determinants of Health     Financial Resource Strain: Low Risk  (5/19/2024)    Overall Financial Resource Strain (CARDIA)     Difficulty of Paying Living Expenses: Not hard at all   Food Insecurity: No Food Insecurity (5/22/2024)    Hunger Vital Sign     Worried About Running Out of Food in the Last Year: Never true     Ran Out of Food in the Last Year: Never true   Transportation Needs: No Transportation Needs (5/22/2024)    PRAPARE - Transportation     Lack of Transportation (Medical): No     Lack of Transportation (Non-Medical): No   Physical Activity: Sufficiently Active (5/19/2024)    Exercise Vital Sign     Days of Exercise per Week: 6 days     Minutes of Exercise per Session: 120 min   Stress: Stress Concern Present (5/19/2024)    [employer] - Occupational Stress Questionnaire     Feeling of Stress : To some extent   Social Connections: Socially Isolated (5/19/2024)    Social Connection and Isolation Panel [NHANES]     Frequency of Communication with Friends and Family: Once a week     Frequency of Social Gatherings with Friends and Family: Once a week     Attends Religion Services: Never     Active Member of Clubs or Organizations: No     Attends Club or Organization Meetings: Never     Marital Status:    Intimate Partner Violence: Not At Risk (5/22/2024)    Humiliation, Afraid, Rape, and Kick questionnaire     Fear of Current or Ex-Partner: No     Emotionally Abused: No     Physically Abused: No     Sexually Abused: No   Housing Stability: Low Risk  (5/22/2024)    Housing Stability Vital Sign     Unable to Pay for Housing in the Last Year: No     Number of Places Lived in the Last Year: 0     Unstable Housing in the Last Year: No     Allergies   Allergen Reactions    Morphine Vomiting    Tramadol Unspecified     Seizure  [id number]     Outpatient Encounter Medications as of 6/5/2024   Medication Sig Dispense Refill    ondansetron (ZOFRAN ODT) 4 MG TABLET DISPERSIBLE Take 1 Tablet by mouth every four hours as needed for Nausea/Vomiting (give by mouth if no IV route available). 10 Tablet 0    oxyCODONE immediate release (ROXICODONE) 10 MG immediate release tablet Take 10 mg by mouth 5 Times a Day.      pantoprazole (PROTONIX) 40 MG Tablet Delayed Response Take 40 mg by mouth in the morning, at noon, and at bedtime.      ferrous sulfate 325 (65 Fe) MG tablet Take 1 Tablet by mouth every day for 30 days. 30 Tablet 0    gabapentin (NEURONTIN) 300 MG Cap Take 300 mg by mouth 3 times a day.      aspirin 81 MG EC tablet Take 81 mg by mouth every day.      losartan (COZAAR) 50 MG Tab Take 1 Tablet by mouth every day. 100 Tablet 3    metoprolol tartrate (LOPRESSOR) 25 MG Tab Take 1 Tablet by mouth 2 times a day. 200 Tablet  3    baclofen (LIORESAL) 10 MG Tab Take 10 mg by mouth 3 times a day. Indications: Muscle Spasm      acetaminophen (TYLENOL) 500 MG Tab Take 500-1,000 mg by mouth every 8 hours as needed. Indications: Pain      sucralfate (CARAFATE) 1 GM Tab Take 1 g by mouth 4 Times a Day,Before Meals and at Bedtime.       No facility-administered encounter medications on file as of 6/5/2024.     ROS           Objective     There were no vitals taken for this visit.    Physical Exam       Lab Results   Component Value Date/Time    CHOLSTRLTOT 183 10/09/2023 09:33 AM     (H) 10/09/2023 09:33 AM    HDL 59 10/09/2023 09:33 AM    TRIGLYCERIDE 69 10/09/2023 09:33 AM       Lab Results   Component Value Date/Time    SODIUM 142 05/29/2024 03:06 AM    POTASSIUM 4.4 05/29/2024 03:06 AM    CHLORIDE 106 05/29/2024 03:06 AM    CO2 27 05/29/2024 03:06 AM    GLUCOSE 87 05/29/2024 03:06 AM    BUN 8 05/29/2024 03:06 AM    CREATININE 1.07 05/29/2024 03:06 AM     Lab Results   Component Value Date/Time    ALKPHOSPHAT 66 05/22/2024 03:19 PM    ASTSGOT 22 05/22/2024 03:19 PM    ALTSGPT 21 05/22/2024 03:19 PM    TBILIRUBIN 0.3 05/22/2024 03:19 PM      Echocardiogram 5/28/2024  CONCLUSIONS  Compared to the images of the prior study 9/15/2023, there has been   interval repair of the mitral valve (previously with severe MR in the   setting of mitral valve collapse)  Normal LV size, thickness and systolic function with normal LVEF 60%  Dilated RV with preserved systolic function  Status post mitral valve repair in 1/2024 with appropriate   transvalvular gradients and trace MR.  Trace AI  Normal IVC size  Estimated RVSP 35-40 mmHg (mildly elevated)  No pericardial effusion    Assessment & Plan     No diagnosis found.    Medical Decision Making: Today's Assessment/Status/Plan:        Status post Mitral valve repair on 1/25/2024  -Doing well postop  -continue coumadin, followed by anticoagulation clinic, restart baby aspirin when coumadin therapy  completed  -Continue BB: Metoprolol 25 mg twice daily  -echo showed well-functioning mitral valve repair with dilated RV and mildly elevated RVSP ***  - referral to cardiac rehab      Hypertension  - good control  - continue current regimen  - goal < 130/80     Hyperlipidemia  -Most recent   -Continue dietary measures to lower cholesterol  -Goal of less than 100  -Check lipid panel annually     Follow up with Caroline MCBRIDE in 3 months with echo     This note was dictated using Dragon speech recognition software.

## 2024-06-05 NOTE — PROGRESS NOTES
Verbal consent was acquired by the patient to use "[employer] ambient listening note generation during this visit Yes      Subjective   Roberto Braswell is a 43 y.o. male who presents for hospital f/u:   History of Present Illness  The patient is a 43-year-old established male of Dr. Schafer here for hospital follow-up. He was admitted to the hospital on 05/22/2024 with abdominal pain diagnosed with a gastric and duodenal ulcer. He has a history of a duodenal ulcer status post GDA coiling embolization 12/2023.  Also has a history of a mitral valve repair last January.  Does not smoke, drink alcohol and states he does have a fairly stressful job but he has had ulcers far longer than he is at his current job.  He eats bland, avoiding spicy/fried foods and he avoids NSAIDs.  He does not have a family history of any similar illnesses.  While hospitalized he had a endoscopy on 05/25/2023 that showed gastritis, gastric and duodenal ulcer. He also had a colonoscopy on 05/27/2023 which showed internal nonbleeding hemorrhoids, otherwise negative and MRI of the abdomen was negative. He had a capsule endoscopy on 05/29/2023 which did not show any active bleeding. He was told to start a PPI and could resume a baby aspirin. He is accompanied by his wife via telephone -she is an RN.  Ultimately over the past few years, the cause of his GI bleeds remains undetermined.  He has not made an appointment to follow-up with GI yet.  He is now feeling fine, denies any residual abdominal pain, black/bloody stool, nausea, vomiting or diarrhea.  Currently, he is on a regimen of omeprazole and pantoprazole, taken twice daily along with Carafate 4 times daily. His appetite is normal. He has lost 10 pounds during his hospital stay although was n.p.o. for numerous days in a row.   He works as a  at [employer] for 2 years.    Review of Systems  Negative except for HPI  Objective   /82 (BP Location: Right arm, Patient  "Position: Sitting, BP Cuff Size: Large adult)   Pulse 90   Temp 36.1 °C (96.9 °F)   Resp 16   Ht 1.905 m (6' 3\")   Wt 111 kg (245 lb)   SpO2 95%   Physical Exam  Gen. appears healthy in no distress   Skin appropriate for sex and age   Neck trachea is midline  Lungs unlabored breathing  Heart regular rate  Abdomen soft and nontender  Neuro gait and station normal   Psych appropriate, calm, interactive, well-groomed    Results  Laboratory Studies  Hemoglobin was 10 open last blood work done 5/28    I    Assessment & Plan  1. Hospital follow-up/GI bleed/gastric and duodenal ulcer:  Fortunately he is feeling much better.  The patient's blood pressure readings are within the normal range today. His hemoglobin level was recorded at 10 upon discharge from the hospital, which has improved from 9.7 on 05/27/2023. A reevaluation of his blood count is planned for approximately 06/12/2024 to 06/14/2024.  He will continue pantoprazole 40 mg twice daily, may stop omeprazole which we discussed he does not need dual PPI therapy.  Continue sucralfate 4 times daily.  He knows to avoid NSAIDs with the exception of his baby aspirin.  He is avoiding spicy/fried foods, large meals and lying down after eating.  He does not smoke or drink alcohol.  He plans to follow-up with GI outpatient.  Feeling much better.  Discussed return to ER precautions.  Reviewed all imaging, labs, discharge summary and specialty consult notes with him.    GI did recommend possible gastric emptying study.  He has x-ray today to make sure capsule has completely passed.  If he bleeds again GI recommended sending him to a tertiary facility such as [place] for EGD and double-balloon enteroscope.         He has a follow-up today with his cardiologist as well.      Please note that this dictation was created using voice recognition software. I have made every reasonable attempt to correct obvious errors, but I expect that there are errors of grammar and possibly " content that I did not discover before finalizing the note.

## 2024-06-07 ENCOUNTER — APPOINTMENT (OUTPATIENT)
Dept: RADIOLOGY | Facility: MEDICAL CENTER | Age: 44
End: 2024-06-07
Attending: STUDENT IN AN ORGANIZED HEALTH CARE EDUCATION/TRAINING PROGRAM
Payer: COMMERCIAL

## 2024-06-07 ENCOUNTER — HOSPITAL ENCOUNTER (EMERGENCY)
Facility: MEDICAL CENTER | Age: 44
End: 2024-06-07
Attending: STUDENT IN AN ORGANIZED HEALTH CARE EDUCATION/TRAINING PROGRAM
Payer: COMMERCIAL

## 2024-06-07 VITALS
BODY MASS INDEX: 31.08 KG/M2 | WEIGHT: 250 LBS | HEIGHT: 75 IN | HEART RATE: 85 BPM | RESPIRATION RATE: 15 BRPM | SYSTOLIC BLOOD PRESSURE: 150 MMHG | DIASTOLIC BLOOD PRESSURE: 95 MMHG | OXYGEN SATURATION: 96 % | TEMPERATURE: 97.2 F

## 2024-06-07 DIAGNOSIS — M79.605 PAIN IN BOTH LOWER EXTREMITIES: ICD-10-CM

## 2024-06-07 DIAGNOSIS — R11.2 NAUSEA AND VOMITING, UNSPECIFIED VOMITING TYPE: ICD-10-CM

## 2024-06-07 DIAGNOSIS — M79.604 PAIN IN BOTH LOWER EXTREMITIES: ICD-10-CM

## 2024-06-07 DIAGNOSIS — M79.89 LEFT LEG SWELLING: ICD-10-CM

## 2024-06-07 LAB
ALBUMIN SERPL BCP-MCNC: 3.9 G/DL (ref 3.2–4.9)
ALBUMIN/GLOB SERPL: 1.6 G/DL
ALP SERPL-CCNC: 68 U/L (ref 30–99)
ALT SERPL-CCNC: 15 U/L (ref 2–50)
ANION GAP SERPL CALC-SCNC: 12 MMOL/L (ref 7–16)
ANISOCYTOSIS BLD QL SMEAR: ABNORMAL
AST SERPL-CCNC: 17 U/L (ref 12–45)
BASOPHILS # BLD AUTO: 1.6 % (ref 0–1.8)
BASOPHILS # BLD: 0.07 K/UL (ref 0–0.12)
BILIRUB SERPL-MCNC: 0.2 MG/DL (ref 0.1–1.5)
BUN SERPL-MCNC: 17 MG/DL (ref 8–22)
CALCIUM ALBUM COR SERPL-MCNC: 9.3 MG/DL (ref 8.5–10.5)
CALCIUM SERPL-MCNC: 9.2 MG/DL (ref 8.5–10.5)
CHLORIDE SERPL-SCNC: 107 MMOL/L (ref 96–112)
CO2 SERPL-SCNC: 22 MMOL/L (ref 20–33)
COMMENT 1642: NORMAL
CREAT SERPL-MCNC: 1.22 MG/DL (ref 0.5–1.4)
EOSINOPHIL # BLD AUTO: 0.28 K/UL (ref 0–0.51)
EOSINOPHIL NFR BLD: 6.5 % (ref 0–6.9)
ERYTHROCYTE [DISTWIDTH] IN BLOOD BY AUTOMATED COUNT: 63.8 FL (ref 35.9–50)
GFR SERPLBLD CREATININE-BSD FMLA CKD-EPI: 75 ML/MIN/1.73 M 2
GLOBULIN SER CALC-MCNC: 2.4 G/DL (ref 1.9–3.5)
GLUCOSE SERPL-MCNC: 106 MG/DL (ref 65–99)
HCT VFR BLD AUTO: 34.1 % (ref 42–52)
HGB BLD-MCNC: 10.1 G/DL (ref 14–18)
HYPOCHROMIA BLD QL SMEAR: ABNORMAL
IMM GRANULOCYTES # BLD AUTO: 0.01 K/UL (ref 0–0.11)
IMM GRANULOCYTES NFR BLD AUTO: 0.2 % (ref 0–0.9)
LYMPHOCYTES # BLD AUTO: 1.64 K/UL (ref 1–4.8)
LYMPHOCYTES NFR BLD: 38.2 % (ref 22–41)
MCH RBC QN AUTO: 23.3 PG (ref 27–33)
MCHC RBC AUTO-ENTMCNC: 29.6 G/DL (ref 32.3–36.5)
MCV RBC AUTO: 78.6 FL (ref 81.4–97.8)
MICROCYTES BLD QL SMEAR: ABNORMAL
MONOCYTES # BLD AUTO: 0.45 K/UL (ref 0–0.85)
MONOCYTES NFR BLD AUTO: 10.5 % (ref 0–13.4)
MORPHOLOGY BLD-IMP: NORMAL
NEUTROPHILS # BLD AUTO: 1.84 K/UL (ref 1.82–7.42)
NEUTROPHILS NFR BLD: 43 % (ref 44–72)
NRBC # BLD AUTO: 0 K/UL
NRBC BLD-RTO: 0 /100 WBC (ref 0–0.2)
NT-PROBNP SERPL IA-MCNC: 45 PG/ML (ref 0–125)
OVALOCYTES BLD QL SMEAR: NORMAL
PLATELET # BLD AUTO: 372 K/UL (ref 164–446)
PLATELET BLD QL SMEAR: NORMAL
PMV BLD AUTO: 8.3 FL (ref 9–12.9)
POIKILOCYTOSIS BLD QL SMEAR: NORMAL
POTASSIUM SERPL-SCNC: 4.7 MMOL/L (ref 3.6–5.5)
PROT SERPL-MCNC: 6.3 G/DL (ref 6–8.2)
RBC # BLD AUTO: 4.34 M/UL (ref 4.7–6.1)
RBC BLD AUTO: PRESENT
SODIUM SERPL-SCNC: 141 MMOL/L (ref 135–145)
WBC # BLD AUTO: 4.3 K/UL (ref 4.8–10.8)

## 2024-06-07 PROCEDURE — 700111 HCHG RX REV CODE 636 W/ 250 OVERRIDE (IP): Performed by: STUDENT IN AN ORGANIZED HEALTH CARE EDUCATION/TRAINING PROGRAM

## 2024-06-07 PROCEDURE — 85025 COMPLETE CBC W/AUTO DIFF WBC: CPT

## 2024-06-07 PROCEDURE — 93971 EXTREMITY STUDY: CPT | Mod: LT

## 2024-06-07 PROCEDURE — 96374 THER/PROPH/DIAG INJ IV PUSH: CPT

## 2024-06-07 PROCEDURE — 36415 COLL VENOUS BLD VENIPUNCTURE: CPT

## 2024-06-07 PROCEDURE — 71045 X-RAY EXAM CHEST 1 VIEW: CPT

## 2024-06-07 PROCEDURE — 80053 COMPREHEN METABOLIC PANEL: CPT

## 2024-06-07 PROCEDURE — 96375 TX/PRO/DX INJ NEW DRUG ADDON: CPT

## 2024-06-07 PROCEDURE — 83880 ASSAY OF NATRIURETIC PEPTIDE: CPT

## 2024-06-07 PROCEDURE — 99284 EMERGENCY DEPT VISIT MOD MDM: CPT

## 2024-06-07 RX ORDER — ONDANSETRON 2 MG/ML
4 INJECTION INTRAMUSCULAR; INTRAVENOUS ONCE
Status: COMPLETED | OUTPATIENT
Start: 2024-06-07 | End: 2024-06-07

## 2024-06-07 RX ORDER — ONDANSETRON 4 MG/1
4 TABLET, ORALLY DISINTEGRATING ORAL EVERY 6 HOURS PRN
Qty: 10 TABLET | Refills: 0 | Status: SHIPPED | OUTPATIENT
Start: 2024-06-07 | End: 2024-10-25

## 2024-06-07 RX ORDER — FUROSEMIDE 10 MG/ML
40 INJECTION INTRAMUSCULAR; INTRAVENOUS ONCE
Status: COMPLETED | OUTPATIENT
Start: 2024-06-07 | End: 2024-06-07

## 2024-06-07 RX ORDER — HYDROMORPHONE HYDROCHLORIDE 1 MG/ML
0.5 INJECTION, SOLUTION INTRAMUSCULAR; INTRAVENOUS; SUBCUTANEOUS ONCE
Status: COMPLETED | OUTPATIENT
Start: 2024-06-07 | End: 2024-06-07

## 2024-06-07 RX ADMIN — ONDANSETRON 4 MG: 2 INJECTION INTRAMUSCULAR; INTRAVENOUS at 22:43

## 2024-06-07 RX ADMIN — FUROSEMIDE 40 MG: 10 INJECTION INTRAMUSCULAR; INTRAVENOUS at 22:43

## 2024-06-07 RX ADMIN — HYDROMORPHONE HYDROCHLORIDE 0.5 MG: 1 INJECTION, SOLUTION INTRAMUSCULAR; INTRAVENOUS; SUBCUTANEOUS at 22:43

## 2024-06-07 ASSESSMENT — PAIN DESCRIPTION - PAIN TYPE: TYPE: ACUTE PAIN

## 2024-06-07 ASSESSMENT — FIBROSIS 4 INDEX: FIB4 SCORE: 0.57

## 2024-06-08 NOTE — ED TRIAGE NOTES
Chief Complaint   Patient presents with    Foot Swelling     Pt reports bilateral foot swelling x 3 days. Pt had open heart surgery 1/25/24. Pt was taking lasix as prescribed with no relief of swelling.        Pt to triage with slow/steady gait for above complaint. Presents with swelling to bilateral feet and lower legs. Pt reports L worse than R.    Pt back to lobby, educated on triage process and encourage to alert staff of any changes.     Vitals:    06/07/24 2122   BP: (!) 156/95   Pulse: 89   Resp: 18   Temp: 36.2 °C (97.2 °F)   SpO2: 95%

## 2024-06-08 NOTE — ED PROVIDER NOTES
CHIEF COMPLAINT  Chief Complaint   Patient presents with    Foot Swelling     Pt reports bilateral foot swelling x 3 days. Pt had open heart surgery 1/25/24. Pt was taking lasix as prescribed with no relief of swelling.        LIMITATION TO HISTORY   Select:     JOSE DANIEL Braswell is a 43 y.o. male who presents to the Emergency Department for evaluation of left greater than right leg swelling worsening over the last 3 days patient also reports gradually worsening pain worse in his left greater than right leg patient reports a history of chronic opiate use after open heart surgery reports has been taking his opiates as prescribed also reports has been having some painless nausea and vomiting without hematemesis or abdominal pain for the past several days reports he started taking his previously prescribed torsemide without improvement of his leg swelling or leg pain    OUTSIDE HISTORIAN(S):  Select:    EXTERNAL RECORDS REVIEWED  Select:       PAST MEDICAL HISTORY  Past Medical History:   Diagnosis Date    Arthritis     Right ankle    Breath shortness 11/03/2023    With exertion.    Drug-seeking behavior 07/30/2023    Demanding narcotics for a duodenal ulcer July 2023. Refused antacids or sucralfate.     Duodenal ulcer 07/30/2023    Gastric ulcer     Heart valve disease     Mitral valve prolapse    Hemorrhagic disorder (HCC)     GI bleed    Hypertension     Pain     Resolved; Right ankle    Seizure (HCC)     while taking tramadol     .    SURGICAL HISTORY  Past Surgical History:   Procedure Laterality Date    CAPSULE ENDOSCOPY ADMINISTRATION N/A 5/28/2024    Procedure: ADMINISTRATION, CAPSULE, FOR CAPSULE ENDOSCOPY;  Surgeon: Janae Oliver M.D.;  Location: SURGERY SAME DAY [facility];  Service: Gastroenterology    CAPSULE ENDOSCOPY RETRIEVAL  5/28/2024    Procedure: RETRIEVAL, ENDOSCOPY CAPSULE;  Surgeon: Janae Oliver M.D.;  Location: SURGERY SAME DAY [facility];  Service: Gastroenterology    SD  COLONOSCOPY,DIAGNOSTIC  5/27/2024    Procedure: COLONOSCOPY;  Surgeon: Janae Oliver M.D.;  Location: SURGERY [facility];  Service: Gastroenterology    DE UPPER GI ENDOSCOPY,DIAGNOSIS N/A 5/25/2024    Procedure: GASTROSCOPY;  Surgeon: Janae Oliver M.D.;  Location: [facility];  Service: Gastroenterology    DE UPPER GI ENDOSCOPY,BIOPSY N/A 5/25/2024    Procedure: GASTROSCOPY, WITH BIOPSY;  Surgeon: Janae Oliver M.D.;  Location: SURGERY [facility];  Service: Gastroenterology    DE UPPER GI ENDOSCOPY,DIAGNOSIS N/A 5/18/2024    Procedure: GASTROSCOPY;  Surgeon: Kaitlyn Ontiveros M.D.;  Location: [facility];  Service: Gastroenterology    DE UPPER GI ENDOSCOPY,BIOPSY N/A 5/18/2024    Procedure: GASTROSCOPY, WITH BIOPSY;  Surgeon: Kaitlyn Ontiveros M.D.;  Location: [facility];  Service: Gastroenterology    GASTROSCOPY WITH ENDOSTAT N/A 5/18/2024    Procedure: EGD, WITH CAUTERIZATION;  Surgeon: Kaitlyn Ontiveros M.D.;  Location: SURGERY [facility];  Service: Gastroenterology    DE REPLACEMENT OF MITRAL VALVE  1/25/2024    Procedure: MITRAL VALVE REPAIR, TRANSESOPHAGEAL ECHOCARDIOGRAM;  Surgeon: Bigg Santana M.D.;  Location: [facility];  Service: Cardiothoracic    ECHOCARDIOGRAM, TRANSESOPHAGEAL, INTRAOPERATIVE  1/25/2024    Procedure: ECHOCARDIOGRAM, TRANSESOPHAGEAL, INTRAOPERATIVE;  Surgeon: Bigg Santana M.D.;  Location: [facility];  Service: Cardiothoracic    DE UPPER GI ENDOSCOPY,DIAGNOSIS N/A 9/14/2023    Procedure: GASTROSCOPY;  Surgeon: Johnny Dalton M.D.;  Location: SURGERY SAME DAY [facility];  Service: Gastroenterology    DE UPPER GI ENDOSCOPY,BIOPSY N/A 9/14/2023    Procedure: GASTROSCOPY, WITH BIOPSY;  Surgeon: Johnny Dalton M.D.;  Location: SURGERY SAME DAY [facility];  Service: Gastroenterology    DE UPPER GI ENDOSCOPY,BIOPSY N/A 7/30/2023    Procedure: GASTROSCOPY, WITH BIOPSY;  Surgeon: Kumar Hope M.D.;  [place] [facility];  Service: Gastroenterology    ID UPPER GI ENDOSCOPY,DIAGNOSIS  3/28/2022    Procedure: GASTROSCOPY;  Surgeon: Paco Wiggins M.D.;  Location: SURGERY SAME DAY [facility];  Service: Gastroenterology    ID UPPER GI ENDOSCOPY,BIOPSY  3/28/2022    Procedure: GASTROSCOPY, WITH BIOPSY;  Surgeon: Paco Wiggins M.D.;  Location: SURGERY SAME DAY [facility];  Service: Gastroenterology    ANKLE ARTHROSCOPY Right 5/21/2018    Procedure: ANKLE ARTHROSCOPY, LATERAL LIGAMENT RECONSTRUCTION;  Surgeon: Seth Cruz M.D.;  Location: SURGERY [facility];  Service: Orthopedics    BIOPSY ORTHO Right 5/21/2018    Procedure: BIOPSY ORTHO/ FOR CARTILAGE AND DENOVO PROCEDURE;  Surgeon: Seth Cruz M.D.;  Location: SURGERY [facility];  Service: Orthopedics    OTHER ABDOMINAL SURGERY      Cholecystectomy February 2017    OTHER ORTHOPEDIC SURGERY      2 previous ankle surgeries (2007, 2009)         FAMILY HISTORY  Family History   Problem Relation Age of Onset    Heart Disease Mother     No Known Problems Father     Clotting Disorder Neg Hx           SOCIAL HISTORY  Social History     Socioeconomic History    Marital status:      Spouse name: Not on file    Number of children: Not on file    Years of education: Not on file    Highest education level: Associate degree: occupational, technical, or vocational program   Occupational History    Not on file   Tobacco Use    Smoking status: Never    Smokeless tobacco: Never   Vaping Use    Vaping status: Never Used   Substance and Sexual Activity    Alcohol use: Not Currently    Drug use: Never    Sexual activity: Not on file   Other Topics Concern    Not on file   Social History Narrative    Not on file     Social Determinants of Health     Financial Resource Strain: Low Risk  (5/19/2024)    Overall Financial Resource Strain (CARDIA)     Difficulty of Paying Living Expenses: Not hard at all   Food Insecurity: No Food Insecurity (5/22/2024)    Hunger Vital Sign      Worried About Running Out of Food in the Last Year: Never true     Ran Out of Food in the Last Year: Never true   Transportation Needs: No Transportation Needs (5/22/2024)    PRAPARE - Transportation     Lack of Transportation (Medical): No     Lack of Transportation (Non-Medical): No   Physical Activity: Sufficiently Active (5/19/2024)    Exercise Vital Sign     Days of Exercise per Week: 6 days     Minutes of Exercise per Session: 120 min   Stress: Stress Concern Present (5/19/2024)    [employer] - Occupational Stress Questionnaire     Feeling of Stress : To some extent   Social Connections: Socially Isolated (5/19/2024)    Social Connection and Isolation Panel [NHANES]     Frequency of Communication with Friends and Family: Once a week     Frequency of Social Gatherings with Friends and Family: Once a week     Attends [employer] Services: Never     Active Member of Clubs or Organizations: No     Attends Club or Organization Meetings: Never     Marital Status:    Intimate Partner Violence: Not At Risk (5/22/2024)    Humiliation, Afraid, Rape, and Kick questionnaire     Fear of Current or Ex-Partner: No     Emotionally Abused: No     Physically Abused: No     Sexually Abused: No   Housing Stability: Low Risk  (5/22/2024)    Housing Stability Vital Sign     Unable to Pay for Housing in the Last Year: No     Number of Places Lived in the Last Year: 0     Unstable Housing in the Last Year: No         CURRENT MEDICATIONS  No current facility-administered medications on file prior to encounter.     Current Outpatient Medications on File Prior to Encounter   Medication Sig Dispense Refill    oxyCODONE immediate release (ROXICODONE) 10 MG immediate release tablet Take 10 mg by mouth 5 Times a Day.      pantoprazole (PROTONIX) 40 MG Tablet Delayed Response Take 40 mg by mouth in the morning, at noon, and at bedtime.      ferrous sulfate 325 (65 Fe) MG tablet Take 1 Tablet by mouth every  "day for 30 days. 30 Tablet 0    gabapentin (NEURONTIN) 300 MG Cap Take 300 mg by mouth 3 times a day.      aspirin 81 MG EC tablet Take 81 mg by mouth every day.      losartan (COZAAR) 50 MG Tab Take 1 Tablet by mouth every day. 100 Tablet 3    metoprolol tartrate (LOPRESSOR) 25 MG Tab Take 1 Tablet by mouth 2 times a day. 200 Tablet 3    baclofen (LIORESAL) 10 MG Tab Take 10 mg by mouth 3 times a day. Indications: Muscle Spasm      acetaminophen (TYLENOL) 500 MG Tab Take 500-1,000 mg by mouth every 8 hours as needed. Indications: Pain      sucralfate (CARAFATE) 1 GM Tab Take 1 g by mouth 4 Times a Day,Before Meals and at Bedtime.             ALLERGIES  Allergies   Allergen Reactions    Morphine Vomiting    Tramadol Unspecified     Seizure  CMN=[id number]       PHYSICAL EXAM  VITAL SIGNS:BP (!) 145/80   Pulse 79   Temp 36.2 °C (97.2 °F) (Temporal)   Resp 14   Ht 1.905 m (6' 3\")   Wt 113 kg (250 lb)   SpO2 94%   BMI 31.25 kg/m²       GENERAL: Awake and alert  HEAD: Normocephalic and atraumatic  NECK: Normal range of motion, without meningismus  EYES: Pupils Equal, Round, Reactive to Light, extraocular movements intact, conjunctiva white  ENT: Mucous membranes moist, oropharynx clear  PULMONARY: Normal effort, clear to auscultation  CARDIOVASCULAR: No murmurs, clicks or rubs, dorsal pedal pulses 2+  ABDOMINAL: Soft, non-tender, no guarding or rigidity present, no pulsatile masses  BACK: no midline tenderness, no costovertebral tenderness  NEUROLOGICAL: Grossly non-focal neurological examination, speech normal, gait normal  EXTREMITIES: 2+ edema of the left lower extremity pretibial 1+ edema of the right lower extremity pretibial    SKIN: Warm and dry.  PSYCHIATRIC: Affect is appropriate    DIAGNOSTIC STUDIES / PROCEDURES  EKG          LABS  Labs Reviewed   CBC WITH DIFFERENTIAL - Abnormal; Notable for the following components:       Result Value    WBC 4.3 (*)     RBC 4.34 (*)     Hemoglobin 10.1 (*)     Hematocrit " 34.1 (*)     MCV 78.6 (*)     MCH 23.3 (*)     MCHC 29.6 (*)     RDW 63.8 (*)     MPV 8.3 (*)     Neutrophils-Polys 43.00 (*)     Microcytosis 2+ (*)     All other components within normal limits   COMP METABOLIC PANEL - Abnormal; Notable for the following components:    Glucose 106 (*)     All other components within normal limits   PROBRAIN NATRIURETIC PEPTIDE, NT   ESTIMATED GFR   PLATELET ESTIMATE   MORPHOLOGY   PERIPHERAL SMEAR REVIEW   DIFFERENTIAL COMMENT         RADIOLOGY  I have independently interpreted the diagnostic imaging associated with this visit and am waiting the final reading from the radiologist.   My preliminary interpretation is as follows: No pneumonia    COURSE & MEDICAL DECISION MAKING    ED COURSE:        INTERVENTIONS BY ME:  Medications   furosemide (Lasix) injection 40 mg (40 mg Intravenous Given 6/7/24 2243)   HYDROmorphone (Dilaudid) injection 0.5 mg (0.5 mg Intravenous Given 6/7/24 2243)   ondansetron (Zofran) syringe/vial injection 4 mg (4 mg Intravenous Given 6/7/24 2243)       Response on recheck:  11:30 PM re-evaluation: Patient has recieved paraenteral narcotics requiring continous monitoring with pulse oximeter, cardiac monitoring, in addition to serial re-assmsents to evaluate for respiratory depression patient reports his pain is improved discussed workup and test results so far discussed absence of emergency medical condition stressed return precautions for worsening symptoms    INITIAL ASSESSMENT, COURSE AND PLAN  Care Narrative:   Patient presenting with left greater than right lower extremity pain and swelling patient initially was in significant pain prompting intravenous narcotic use his pain improved after this he also had some nausea which resolved after Zofran given his lower extremity swelling had concern for deep venous thrombosis given his pain ultrasound obtained without evidence of blood clot he had good pulses in his extremities do not see signs of acute limb  ischemia additionally do not see signs of infectious process.  Patient well-appearing without pain out of proportion to exam compartment is not firm do not suspect atraumatic compartment syndrome           ADDITIONAL PROBLEM LIST    DISPOSITION AND DISCUSSIONS  Discussion of management with other QHP or appropriate source(s): None         Decision tools and prescription drugs considered including, but not limited to: Prescribed ondansetron    FINAL DIAGNOSIS  1. Left leg swelling    2. Pain in both lower extremities    3. Nausea and vomiting, unspecified vomiting type             Electronically signed by: Cole Bolton DO ,11:36 PM 06/07/24

## 2024-06-12 ENCOUNTER — APPOINTMENT (OUTPATIENT)
Dept: RADIOLOGY | Facility: MEDICAL CENTER | Age: 44
End: 2024-06-12
Attending: EMERGENCY MEDICINE
Payer: COMMERCIAL

## 2024-06-12 ENCOUNTER — HOSPITAL ENCOUNTER (EMERGENCY)
Facility: MEDICAL CENTER | Age: 44
End: 2024-06-12
Attending: EMERGENCY MEDICINE
Payer: COMMERCIAL

## 2024-06-12 VITALS
OXYGEN SATURATION: 98 % | HEIGHT: 75 IN | SYSTOLIC BLOOD PRESSURE: 145 MMHG | TEMPERATURE: 97 F | HEART RATE: 77 BPM | DIASTOLIC BLOOD PRESSURE: 89 MMHG | BODY MASS INDEX: 31.08 KG/M2 | RESPIRATION RATE: 13 BRPM | WEIGHT: 250 LBS

## 2024-06-12 DIAGNOSIS — S09.90XA CLOSED HEAD INJURY, INITIAL ENCOUNTER: ICD-10-CM

## 2024-06-12 DIAGNOSIS — R55 SYNCOPE, UNSPECIFIED SYNCOPE TYPE: ICD-10-CM

## 2024-06-12 DIAGNOSIS — R41.0 CONFUSION: ICD-10-CM

## 2024-06-12 LAB
ALBUMIN SERPL BCP-MCNC: 4.2 G/DL (ref 3.2–4.9)
ALBUMIN/GLOB SERPL: 1.6 G/DL
ALP SERPL-CCNC: 63 U/L (ref 30–99)
ALT SERPL-CCNC: 17 U/L (ref 2–50)
AMMONIA PLAS-SCNC: 19 UMOL/L (ref 11–45)
AMPHET UR QL SCN: NEGATIVE
ANION GAP SERPL CALC-SCNC: 13 MMOL/L (ref 7–16)
ANISOCYTOSIS BLD QL SMEAR: ABNORMAL
APPEARANCE UR: CLEAR
AST SERPL-CCNC: 25 U/L (ref 12–45)
BARBITURATES UR QL SCN: NEGATIVE
BASOPHILS # BLD AUTO: 1.6 % (ref 0–1.8)
BASOPHILS # BLD: 0.1 K/UL (ref 0–0.12)
BENZODIAZ UR QL SCN: NEGATIVE
BILIRUB SERPL-MCNC: 0.3 MG/DL (ref 0.1–1.5)
BILIRUB UR QL STRIP.AUTO: NEGATIVE
BUN SERPL-MCNC: 12 MG/DL (ref 8–22)
BZE UR QL SCN: NEGATIVE
CALCIUM ALBUM COR SERPL-MCNC: 9 MG/DL (ref 8.5–10.5)
CALCIUM SERPL-MCNC: 9.2 MG/DL (ref 8.5–10.5)
CANNABINOIDS UR QL SCN: NEGATIVE
CHLORIDE SERPL-SCNC: 115 MMOL/L (ref 96–112)
CO2 SERPL-SCNC: 16 MMOL/L (ref 20–33)
COHGB MFR BLD: 2.6 % (ref 0–4.9)
COLOR UR: YELLOW
COMMENT 1642: NORMAL
CREAT SERPL-MCNC: 1.03 MG/DL (ref 0.5–1.4)
EKG IMPRESSION: NORMAL
EOSINOPHIL # BLD AUTO: 0.04 K/UL (ref 0–0.51)
EOSINOPHIL NFR BLD: 0.7 % (ref 0–6.9)
ERYTHROCYTE [DISTWIDTH] IN BLOOD BY AUTOMATED COUNT: 59.3 FL (ref 35.9–50)
ETHANOL BLD-MCNC: <10.1 MG/DL
FENTANYL UR QL: NEGATIVE
GFR SERPLBLD CREATININE-BSD FMLA CKD-EPI: 92 ML/MIN/1.73 M 2
GLOBULIN SER CALC-MCNC: 2.7 G/DL (ref 1.9–3.5)
GLUCOSE SERPL-MCNC: 84 MG/DL (ref 65–99)
GLUCOSE UR STRIP.AUTO-MCNC: NEGATIVE MG/DL
HCT VFR BLD AUTO: 39.7 % (ref 42–52)
HGB BLD-MCNC: 11.8 G/DL (ref 14–18)
IMM GRANULOCYTES # BLD AUTO: 0.01 K/UL (ref 0–0.11)
IMM GRANULOCYTES NFR BLD AUTO: 0.2 % (ref 0–0.9)
KETONES UR STRIP.AUTO-MCNC: NEGATIVE MG/DL
LACTATE SERPL-SCNC: 1.2 MMOL/L (ref 0.5–2)
LEUKOCYTE ESTERASE UR QL STRIP.AUTO: NEGATIVE
LYMPHOCYTES # BLD AUTO: 2.07 K/UL (ref 1–4.8)
LYMPHOCYTES NFR BLD: 34 % (ref 22–41)
MCH RBC QN AUTO: 23.3 PG (ref 27–33)
MCHC RBC AUTO-ENTMCNC: 29.7 G/DL (ref 32.3–36.5)
MCV RBC AUTO: 78.5 FL (ref 81.4–97.8)
METHADONE UR QL SCN: NEGATIVE
MICRO URNS: NORMAL
MICROCYTES BLD QL SMEAR: ABNORMAL
MONOCYTES # BLD AUTO: 0.72 K/UL (ref 0–0.85)
MONOCYTES NFR BLD AUTO: 11.8 % (ref 0–13.4)
MORPHOLOGY BLD-IMP: NORMAL
NEUTROPHILS # BLD AUTO: 3.14 K/UL (ref 1.82–7.42)
NEUTROPHILS NFR BLD: 51.7 % (ref 44–72)
NITRITE UR QL STRIP.AUTO: NEGATIVE
NRBC # BLD AUTO: 0 K/UL
NRBC BLD-RTO: 0 /100 WBC (ref 0–0.2)
NT-PROBNP SERPL IA-MCNC: 59 PG/ML (ref 0–125)
OPIATES UR QL SCN: NEGATIVE
OVALOCYTES BLD QL SMEAR: NORMAL
OXYCODONE UR QL SCN: NEGATIVE
PCP UR QL SCN: NEGATIVE
PH UR STRIP.AUTO: 5.5 [PH] (ref 5–8)
PLATELET # BLD AUTO: 501 K/UL (ref 164–446)
PLATELET BLD QL SMEAR: NORMAL
PMV BLD AUTO: 9.2 FL (ref 9–12.9)
POIKILOCYTOSIS BLD QL SMEAR: NORMAL
POTASSIUM SERPL-SCNC: 4.4 MMOL/L (ref 3.6–5.5)
PROPOXYPH UR QL SCN: NEGATIVE
PROT SERPL-MCNC: 6.9 G/DL (ref 6–8.2)
PROT UR QL STRIP: NEGATIVE MG/DL
RBC # BLD AUTO: 5.06 M/UL (ref 4.7–6.1)
RBC BLD AUTO: PRESENT
RBC UR QL AUTO: NEGATIVE
SODIUM SERPL-SCNC: 144 MMOL/L (ref 135–145)
SP GR UR STRIP.AUTO: >=1.03
TROPONIN T SERPL-MCNC: 7 NG/L (ref 6–19)
TROPONIN T SERPL-MCNC: 7 NG/L (ref 6–19)
UROBILINOGEN UR STRIP.AUTO-MCNC: 0.2 MG/DL
WBC # BLD AUTO: 6.1 K/UL (ref 4.8–10.8)

## 2024-06-12 PROCEDURE — 83605 ASSAY OF LACTIC ACID: CPT

## 2024-06-12 PROCEDURE — 700102 HCHG RX REV CODE 250 W/ 637 OVERRIDE(OP): Performed by: EMERGENCY MEDICINE

## 2024-06-12 PROCEDURE — 83880 ASSAY OF NATRIURETIC PEPTIDE: CPT

## 2024-06-12 PROCEDURE — 93005 ELECTROCARDIOGRAM TRACING: CPT

## 2024-06-12 PROCEDURE — 80307 DRUG TEST PRSMV CHEM ANLYZR: CPT

## 2024-06-12 PROCEDURE — 82140 ASSAY OF AMMONIA: CPT

## 2024-06-12 PROCEDURE — 82077 ASSAY SPEC XCP UR&BREATH IA: CPT

## 2024-06-12 PROCEDURE — 36415 COLL VENOUS BLD VENIPUNCTURE: CPT

## 2024-06-12 PROCEDURE — 85025 COMPLETE CBC W/AUTO DIFF WBC: CPT

## 2024-06-12 PROCEDURE — 81003 URINALYSIS AUTO W/O SCOPE: CPT

## 2024-06-12 PROCEDURE — A9270 NON-COVERED ITEM OR SERVICE: HCPCS | Performed by: EMERGENCY MEDICINE

## 2024-06-12 PROCEDURE — 82375 ASSAY CARBOXYHB QUANT: CPT

## 2024-06-12 PROCEDURE — 84484 ASSAY OF TROPONIN QUANT: CPT | Mod: 91

## 2024-06-12 PROCEDURE — 73030 X-RAY EXAM OF SHOULDER: CPT | Mod: LT

## 2024-06-12 PROCEDURE — 99285 EMERGENCY DEPT VISIT HI MDM: CPT

## 2024-06-12 PROCEDURE — 700117 HCHG RX CONTRAST REV CODE 255: Performed by: EMERGENCY MEDICINE

## 2024-06-12 PROCEDURE — 93005 ELECTROCARDIOGRAM TRACING: CPT | Performed by: EMERGENCY MEDICINE

## 2024-06-12 PROCEDURE — 71045 X-RAY EXAM CHEST 1 VIEW: CPT

## 2024-06-12 PROCEDURE — 80053 COMPREHEN METABOLIC PANEL: CPT

## 2024-06-12 PROCEDURE — 71275 CT ANGIOGRAPHY CHEST: CPT

## 2024-06-12 PROCEDURE — 70450 CT HEAD/BRAIN W/O DYE: CPT

## 2024-06-12 RX ORDER — ACETAMINOPHEN 325 MG/1
650 TABLET ORAL ONCE
Status: COMPLETED | OUTPATIENT
Start: 2024-06-12 | End: 2024-06-12

## 2024-06-12 RX ADMIN — ACETAMINOPHEN 650 MG: 325 TABLET, FILM COATED ORAL at 10:05

## 2024-06-12 RX ADMIN — IOHEXOL 55 ML: 350 INJECTION, SOLUTION INTRAVENOUS at 11:04

## 2024-06-12 ASSESSMENT — LIFESTYLE VARIABLES: DO YOU DRINK ALCOHOL: NO

## 2024-06-12 ASSESSMENT — FIBROSIS 4 INDEX: FIB4 SCORE: 0.5

## 2024-06-12 NOTE — ED PROVIDER NOTES
Emergency Physician Note    Chief Concern:  Chief Complaint   Patient presents with    Dizziness    Syncope    Shoulder Pain     LEFT          External Records Reviewed:  Hospital medicine physician discharge summary reviewed from 5/29/2024.  Patient presented to the emergency department for evaluation of abdominal pain.  He has a past medical history significant for mitral valve regurgitation status post mitral valve repair, chronic pain, as well as recent hospitalization for duodenal ulcer status post GDA coiling embolization.  He was admitted for management of abdominal pain.  He underwent EGD 5/25, this was performed by Dr. Oliver which showed antral gastritis and a small clean-based gastric ulcer.  Duodenal nidus identified as well.  He underwent colonoscopy on 5/27 that showed normal ileum, and grade 2 nonbleeding internal hemorrhoids.  He underwent capsule endoscopy 5/29 which showed no active bleeding.  He was cleared for discharge, hemodynamically stable, PPI and baby aspirin were resumed.     HPI/ROS     Limitation to History:  Altered mental status     Outside Historians:   Partner at bedside provides additional history     HPI:  Roberto Braswell is a 43 y.o. male who presents to the emergency department today for evaluation of altered mental status as well as confusion.  He is uncertain as to when his symptoms began.  Initially stated that his symptoms began yesterday morning, however partner at bedside states that she noticed that the patient was confused very early this morning, and had not noticed anything unusual yesterday.  Patient then stated that due to his confusion he is not sure as to when his symptoms started.  He states he feels very confused, and has trouble remembering things.  Partner states that he is been slow to answer questions, and has not been as alert as usual.  He has a significant past medical history, he has had a mitral valve repair, as well as history of GI bleeding.  He is  currently on 81 mg of aspirin daily, no anticoagulation.  He reports no history of DVT nor pulmonary embolism.  He has had no associated chest pain, however on review of systems he states that for the past week he has been short of breath when lying flat.  He reports no abnormal leg pain or leg swelling, states he did have some leg pain about a week ago, but was evaluated for that and had a negative DVT at that time.  He has no associated abdominal pain, no chest pain, no severe headaches.  He reports no recent fevers.    This morning while urinating he had a syncopal episode, partner heard him fall, but not witnessed the event.  He does have some left-sided shoulder pain following the fall, and is having difficulty ranging the shoulder fully.    PAST MEDICAL HISTORY  Past Medical History:   Diagnosis Date    Arthritis     Right ankle    Breath shortness 11/03/2023    With exertion.    Drug-seeking behavior 07/30/2023    Demanding narcotics for a duodenal ulcer July 2023. Refused antacids or sucralfate.     Duodenal ulcer 07/30/2023    Gastric ulcer     Heart valve disease     Mitral valve prolapse    Hemorrhagic disorder (HCC)     GI bleed    Hypertension     Pain     Resolved; Right ankle    Seizure (HCC)     while taking tramadol       SURGICAL HISTORY  Past Surgical History:   Procedure Laterality Date    CAPSULE ENDOSCOPY ADMINISTRATION N/A 5/28/2024    Procedure: ADMINISTRATION, CAPSULE, FOR CAPSULE ENDOSCOPY;  Surgeon: Janae Oliver M.D.;  Location: SURGERY SAME DAY [facility];  Service: Gastroenterology    CAPSULE ENDOSCOPY RETRIEVAL  5/28/2024    Procedure: RETRIEVAL, ENDOSCOPY CAPSULE;  Surgeon: Janae Oliver M.D.;  Location: SURGERY SAME DAY [facility];  Service: Gastroenterology    SC COLONOSCOPY,DIAGNOSTIC  5/27/2024    Procedure: COLONOSCOPY;  Surgeon: Janae Oliver M.D.;  Location: SURGERY [facility];  Service: Gastroenterology    SC UPPER GI ENDOSCOPY,DIAGNOSIS N/A 5/25/2024     Procedure: GASTROSCOPY;  Surgeon: Janae Oliver M.D.;  Location: SURGERY [facility];  Service: Gastroenterology    ID UPPER GI ENDOSCOPY,BIOPSY N/A 5/25/2024    Procedure: GASTROSCOPY, WITH BIOPSY;  Surgeon: Janae Oliver M.D.;  Location: SURGERY [facility];  Service: Gastroenterology    ID UPPER GI ENDOSCOPY,DIAGNOSIS N/A 5/18/2024    Procedure: GASTROSCOPY;  Surgeon: Kaitlyn Ontiveros M.D.;  Location: [facility];  Service: Gastroenterology    ID UPPER GI ENDOSCOPY,BIOPSY N/A 5/18/2024    Procedure: GASTROSCOPY, WITH BIOPSY;  Surgeon: Kaitlyn Ontiveros M.D.;  Location: [facility];  Service: Gastroenterology    GASTROSCOPY WITH ENDOSTAT N/A 5/18/2024    Procedure: EGD, WITH CAUTERIZATION;  Surgeon: Kaitlyn Ontiveros M.D.;  Location: [facility];  Service: Gastroenterology    ID REPLACEMENT OF MITRAL VALVE  1/25/2024    Procedure: MITRAL VALVE REPAIR, TRANSESOPHAGEAL ECHOCARDIOGRAM;  Surgeon: Bigg Santana M.D.;  Location: SURGERY [facility];  Service: Cardiothoracic    ECHOCARDIOGRAM, TRANSESOPHAGEAL, INTRAOPERATIVE  1/25/2024    Procedure: ECHOCARDIOGRAM, TRANSESOPHAGEAL, INTRAOPERATIVE;  Surgeon: Bigg Santana M.D.;  Location: [facility];  Service: Cardiothoracic    ID UPPER GI ENDOSCOPY,DIAGNOSIS N/A 9/14/2023    Procedure: GASTROSCOPY;  Surgeon: Johnny Dalton M.D.;  Location: SURGERY SAME DAY [facility];  Service: Gastroenterology    ID UPPER GI ENDOSCOPY,BIOPSY N/A 9/14/2023    Procedure: GASTROSCOPY, WITH BIOPSY;  Surgeon: Johnny Dalton M.D.;  Location: SURGERY SAME DAY [facility];  Service: Gastroenterology    ID UPPER GI ENDOSCOPY,BIOPSY N/A 7/30/2023    Procedure: GASTROSCOPY, WITH BIOPSY;  Surgeon: Kumar Hope M.D.;  Location: [facility];  Service: Gastroenterology    ID UPPER GI ENDOSCOPY,DIAGNOSIS  3/28/2022    Procedure: GASTROSCOPY;  Surgeon: Paco Wiggins M.D.;  Location: SURGERY SAME DAY [facility];  Service: Gastroenterology    ID  UPPER GI ENDOSCOPY,BIOPSY  3/28/2022    Procedure: GASTROSCOPY, WITH BIOPSY;  Surgeon: Paco Wiggins M.D.;  Location: SURGERY SAME DAY [facility];  Service: Gastroenterology    ANKLE ARTHROSCOPY Right 5/21/2018    Procedure: ANKLE ARTHROSCOPY, LATERAL LIGAMENT RECONSTRUCTION;  Surgeon: Seth Cruz M.D.;  Location: SURGERY [facility];  Service: Orthopedics    BIOPSY ORTHO Right 5/21/2018    Procedure: BIOPSY ORTHO/ FOR CARTILAGE AND DENOVO PROCEDURE;  Surgeon: Seth Cruz M.D.;  Location: SURGERY [facility];  Service: Orthopedics    OTHER ABDOMINAL SURGERY      Cholecystectomy February 2017    OTHER ORTHOPEDIC SURGERY      2 previous ankle surgeries (2007, 2009)       FAMILY HISTORY  Family History   Problem Relation Age of Onset    Heart Disease Mother     No Known Problems Father     Clotting Disorder Neg Hx        SOCIAL HISTORY   reports that he has never smoked. He has never used smokeless tobacco. He reports that he does not currently use alcohol. He reports that he does not use drugs.    CURRENT MEDICATIONS  Discharge Medication List as of 6/12/2024 12:09 PM        CONTINUE these medications which have NOT CHANGED    Details   ondansetron (ZOFRAN ODT) 4 MG TABLET DISPERSIBLE Take 1 Tablet by mouth every 6 hours as needed for Nausea/Vomiting for up to 12 doses., Disp-10 Tablet, R-0, Normal      oxyCODONE immediate release (ROXICODONE) 10 MG immediate release tablet Take 10 mg by mouth 5 Times a Day., Historical Med      pantoprazole (PROTONIX) 40 MG Tablet Delayed Response Take 40 mg by mouth in the morning, at noon, and at bedtime., Historical Med      ferrous sulfate 325 (65 Fe) MG tablet Take 1 Tablet by mouth every day for 30 days., Disp-30 Tablet, R-0, Normal      gabapentin (NEURONTIN) 300 MG Cap Take 300 mg by mouth 3 times a day., Historical Med      aspirin 81 MG EC tablet Take 81 mg by mouth every day., Historical Med      losartan (COZAAR) 50 MG Tab Take 1 Tablet by mouth every  "day., Disp-100 Tablet, R-3, Normal      metoprolol tartrate (LOPRESSOR) 25 MG Tab Take 1 Tablet by mouth 2 times a day., Disp-200 Tablet, R-3, Normal      baclofen (LIORESAL) 10 MG Tab Take 10 mg by mouth 3 times a day. Indications: Muscle Spasm, Historical Med      acetaminophen (TYLENOL) 500 MG Tab Take 500-1,000 mg by mouth every 8 hours as needed. Indications: Pain, Historical Med      sucralfate (CARAFATE) 1 GM Tab Take 1 g by mouth 4 Times a Day,Before Meals and at Bedtime., Historical Med             ALLERGIES  Morphine and Tramadol    PHYSICAL EXAM  Vital Signs: BP (!) 145/89   Pulse 77   Temp 36.1 °C (97 °F) (Temporal)   Resp 13   Ht 1.905 m (6' 3\")   Wt 113 kg (250 lb)   SpO2 98%   BMI 31.25 kg/m²   Constitutional: Awake, alert, afebrile  HENT: Normocephalic, atraumatic.  Cardiovascular: Tachycardic rate to 95, regular rhythm  Pulmonary: No respiratory distress, normal work of breathing, breath sounds clear and equal bilaterally  Abdomen: Soft, non tender, no peritoneal signs.  Skin: Warm, dry, no rashes or lesions  Musculoskeletal: Normal range of motion in all extremities, no swelling or deformity noted, no unilateral lower extremity edema.  He does have some tenderness to palpation over the left clavicle.  Neurologic: Alert, oriented, normal motor function, no speech deficits, no facial droop, pupils are 3 mm, equal, round and reactive.  No gaze deficit.  5 out of 5 bilateral upper and lower extremity strength.  Psychiatric: Flat affect    Diagnostic Studies & Procedures    Labs:  All labs reviewed by me as noted below.    EK Lead EKG interpreted by me as noted below  Results for orders placed or performed during the hospital encounter of 24   EKG   Result Value Ref Range    Report       [facility] Emergency Dept.    Test Date:  2024  Pt Name:    PATI MERCEDES           Department: ER  MRN:        [id number]                      Room:       [facility]  Gender:   "   Male                         Technician:  :        1980                   Requested By:ER TRIAGE PROTOCOL  Order #:    [id number]                    Reading MD: ISRRAEL NATH MD    Measurements  Intervals                                Axis  Rate:       94                           P:          58  MN:         168                          QRS:        -17  QRSD:       84                           T:          55  QT:         351  QTc:        439    Interpretive Statements  Rate 94, sinus tachycardia, no ST elevation or depression, no pathologic T  wave inversions, no ectopy.  Electronically Signed On 2024 09:24:21 PDT by ISRRAEL NATH MD           Radiology:  The attending Emergency Physician has independently interpreted the following imaging:  I independently interpreted the CT of the chest, no large central pulmonary embolism appreciated.    CT-CTA CHEST PULMONARY ARTERY W/ RECONS   Final Result         1. No evidence of pulmonary embolism.   2. Atherosclerosis.   3. Tiny area of groundglass infiltrate in the left lower lobe.            CT-HEAD W/O   Final Result      There is no definite acute intracranial abnormality.         DX-CHEST-PORTABLE (1 VIEW)   Final Result      No acute cardiac or pulmonary abnormalities are identified.      DX-SHOULDER 2+ LEFT   Final Result      No radiographic evidence of acute traumatic injury.          Course and Medical Decision Making    Initial Assessment and Plan:  Mr. Braswell presents to the emergency department today out of concern for confusion.  He and his partner are having some difficulty determining the exact time of onset, he states he felt somewhat confused yesterday, partner initially noted it today.  On arrival to the emergency department he is answering questions appropriately, has no facial droop, no neurologic deficit on physical examination, no unilateral weakness.  Though he states he still feels somewhat confused, and though he is not  thinking as quickly as he usually does.  Do not believe stroke activation is necessary at this time due to NIH stroke scale of 0.  Pupils were not pinpoint, no clear evidence of opiate overdose, though given history of chronic use this is a consideration on arrival.  Additionally he reported a syncopal episode earlier today as well.    On laboratory evaluation CMP shows no significant abnormality, CO2 is 16, with a normal anion gap.  High-sensitivity troponin is negative.  proBNP is negative no evidence of fluid overload or myocardial injury.  He has a normal lactic acid, normal white blood count, this is less concerning for SIRS or sepsis.  He is afebrile on arrival, also less concerning for infectious etiology.  Hemoglobin today is 11.8, which is consistent with prior baseline values, no evidence of recent acute blood loss.  Platelet count is mildly elevated to 501.  Diagnostic alcohol level is negative, ammonia level and carbon monoxide levels are negative.  Urine drug screen is entirely negative.    Due to syncopal episode CT of the chest was obtained, this shows no evidence of pulmonary embolism.  Small groundglass opacity noted in the left lower lobe.  CT head was ordered to evaluate for intracranial injury, this resulted negative.  Additionally plain film of the left shoulder was ordered due to shoulder pain, he did have some tenderness over the clavicle, no clavicle fracture nor shoulder fracture identified on plain film imaging.    His partner continued to request opiate pain medications for him while he was hospitalized.  He states that he is taking his chronic opiate medications as prescribed, and that his most recent dose was last night, however his urine drug screen today is negative.    Both he and his partner agree that his symptoms continue to improve.  At this time etiology of his confusion earlier today is uncertain.  I did offer admission for further observation and MR imaging, however this was  declined by the patient and partner, as they feel as though he is doing much better, does not require admission at this time.  I believe this is reasonable, and they agree to return if any of his symptoms worsen.  They will follow-up with primary care on an outpatient basis. Return precautions were discussed with the patient, and provided in written form with the patient's discharge instructions.     Additional Problems and Disposition    Escalation of care considered, and ultimately not performed:   1.  Hospitalization was considered and discussed with the patient and his partner, however he is feeling much better, symptoms continue to improve, given resolving symptoms and reassuring labs and imaging today, shared decision-making was utilized.  I believe it is reasonable for discharge home with close outpatient follow-up and good return precautions.    Decision tools utilized including but not limited to:  1.  NIHSS 0    Disposition:  Discharged in stable condition    FINAL IMPRESSION   1. Confusion    2. Syncope, unspecified syncope type    3. Closed head injury, initial encounter        FOLLOW UP:  Sal Schafer D.O.  [id number] [id number]  [facility] [address] [phone]    Call in 1 day      [facility], Emergency Dept  [address] [address] [phone]  Go to   If symptoms worsen

## 2024-06-12 NOTE — ED NOTES
Chief Complaint   Patient presents with    Dizziness    Syncope    Shoulder Pain     LEFT      Pt bib ems from home with above complaints. Dizziness >24hours pt also feels like he is confused but was able to answer a/o questions.   Pt had syncopal episode this morning while urinating woke up sitting up shower area. Denies hitting head, no head or face pain.   Fsbs 96  Complaint of left shoulder to collar bone after falling.

## 2024-06-12 NOTE — DISCHARGE INSTRUCTIONS
As long as your symptoms continue to improve, you may follow-up with primary care within 24 hours.  Return to the emergency department if you develop any new or worsening symptoms including lightheadedness, sensation that you may pass out, chest pain, shortness of breath, or any further concerns.

## 2024-06-12 NOTE — ED NOTES
Medicated with tylenol for pain. Pt's wife said that he takes Oxycodone 20mg x5 a day for pain management. Will informed erp.

## 2024-06-12 NOTE — ED NOTES
Pt discharge home. Pt given discharge instructions  Pt verbalized understanding, all questions answered ,vss upon d/c. Pt steady on feet upon discharge  Wife will be driving pt home

## 2024-06-12 NOTE — ED NOTES
Informed erp regarding pt needing his pain medication, she would like to hold off on medications that can cause further confusion.   Explained to wife and patient verbalized understanding.   Nad.

## 2024-06-13 ENCOUNTER — OFFICE VISIT (OUTPATIENT)
Dept: CARDIOLOGY | Facility: MEDICAL CENTER | Age: 44
End: 2024-06-13
Payer: COMMERCIAL

## 2024-06-13 ENCOUNTER — TELEPHONE (OUTPATIENT)
Dept: CARDIOLOGY | Facility: MEDICAL CENTER | Age: 44
End: 2024-06-13

## 2024-06-13 VITALS
DIASTOLIC BLOOD PRESSURE: 60 MMHG | BODY MASS INDEX: 29.84 KG/M2 | SYSTOLIC BLOOD PRESSURE: 140 MMHG | WEIGHT: 240 LBS | OXYGEN SATURATION: 98 % | RESPIRATION RATE: 16 BRPM | HEART RATE: 81 BPM | HEIGHT: 75 IN

## 2024-06-13 DIAGNOSIS — R06.83 SNORING: ICD-10-CM

## 2024-06-13 DIAGNOSIS — I10 ESSENTIAL HYPERTENSION: ICD-10-CM

## 2024-06-13 DIAGNOSIS — Z98.890 H/O MITRAL VALVE REPAIR: ICD-10-CM

## 2024-06-13 DIAGNOSIS — E78.00 PURE HYPERCHOLESTEROLEMIA: ICD-10-CM

## 2024-06-13 PROCEDURE — 3078F DIAST BP <80 MM HG: CPT

## 2024-06-13 PROCEDURE — 99214 OFFICE O/P EST MOD 30 MIN: CPT

## 2024-06-13 PROCEDURE — 99213 OFFICE O/P EST LOW 20 MIN: CPT

## 2024-06-13 PROCEDURE — 3077F SYST BP >= 140 MM HG: CPT

## 2024-06-13 RX ORDER — HYDROCHLOROTHIAZIDE 25 MG/1
25 TABLET ORAL DAILY
Qty: 30 TABLET | Refills: 11 | Status: ON HOLD | OUTPATIENT
Start: 2024-06-13 | End: 2024-06-23

## 2024-06-13 ASSESSMENT — ENCOUNTER SYMPTOMS
GASTROINTESTINAL NEGATIVE: 1
NEUROLOGICAL NEGATIVE: 1
ORTHOPNEA: 0
EYES NEGATIVE: 1
SHORTNESS OF BREATH: 0
PND: 0
CONSTITUTIONAL NEGATIVE: 1
PALPITATIONS: 0
NERVOUS/ANXIOUS: 0
DEPRESSION: 0
MUSCULOSKELETAL NEGATIVE: 1

## 2024-06-13 ASSESSMENT — FIBROSIS 4 INDEX: FIB4 SCORE: 0.52

## 2024-06-13 NOTE — TELEPHONE ENCOUNTER
OPO order was completed and faxed to:  [facility]:  Phone: [phone] Fax: [phone]    Confirmation fax has been sent to [employer].

## 2024-06-13 NOTE — PROGRESS NOTES
"Chief Complaint   Patient presents with    Other     S/P mitral valve repair      Follow-Up     3 month follow up       Subjective     Roberto Braswell is a 44 y.o. male who presents today for follow-up.  He has a history of hypertension, recent GI bleed - duodenal ulcer coiling 5 weeks ago and severe mitral regurgitation.      He underwent mitral valve repair on 1/25/2024 with Dr. Santana.  Postoperative course without significant complication.     Seen by myself on 2/27/2024 he has been having some residual chest wall pain, he has been going on frequent walks with his dogs.  He reports that his wife has been dealing with significant back issues and has also been in the hospital.    Since her last visit he has been to the ER multiple times for abdominal complaints \"hospitalization for duodenal ulcer status post GDA coiling embolization.  He was admitted for management of abdominal pain.  He underwent EGD 5/25, this was performed by Dr. Oliver which showed antral gastritis and a small clean-based gastric ulcer.  Duodenal nidus identified as well.  He underwent colonoscopy on 5/27 that showed normal ileum, and grade 2 nonbleeding internal hemorrhoids.  He underwent capsule endoscopy 5/29 which showed no active bleeding.\"    Yesterday he had significant confusion and presented to the ER (unfortunately the ER note is incomplete and unsigned at the time of this visit). He was apparently walking into walls, seeing double. CT head and chest both negative. Recommendation was apparently to stay the night but he elected to go home    Today 6/13/2024 apparently this was the first time that the above episode has happed. He reports that leading up to his confusion episode that he had not slept for several days.  He reports a myriad of other complaints including a cyst on his right arm.  He has also had intermittent bilateral lower extremity edema       Past Medical History:   Diagnosis Date    Arthritis     Right ankle    " Breath shortness 11/03/2023    With exertion.    Drug-seeking behavior 07/30/2023    Demanding narcotics for a duodenal ulcer July 2023. Refused antacids or sucralfate.     Duodenal ulcer 07/30/2023    Gastric ulcer     Heart valve disease     Mitral valve prolapse    Hemorrhagic disorder (HCC)     GI bleed    Hypertension     Pain     Resolved; Right ankle    Seizure (HCC)     while taking tramadol     Past Surgical History:   Procedure Laterality Date    CAPSULE ENDOSCOPY ADMINISTRATION N/A 5/28/2024    Procedure: ADMINISTRATION, CAPSULE, FOR CAPSULE ENDOSCOPY;  Surgeon: Janae Oliver M.D.;  Location: SURGERY SAME DAY [facility];  Service: Gastroenterology    CAPSULE ENDOSCOPY RETRIEVAL  5/28/2024    Procedure: RETRIEVAL, ENDOSCOPY CAPSULE;  Surgeon: Janae Oliver M.D.;  Location: SURGERY SAME DAY [facility];  Service: Gastroenterology    FL COLONOSCOPY,DIAGNOSTIC  5/27/2024    Procedure: COLONOSCOPY;  Surgeon: Janae Oliver M.D.;  Location: [facility];  Service: Gastroenterology    FL UPPER GI ENDOSCOPY,DIAGNOSIS N/A 5/25/2024    Procedure: GASTROSCOPY;  Surgeon: Janae Oliver M.D.;  Location: [facility];  Service: Gastroenterology    FL UPPER GI ENDOSCOPY,BIOPSY N/A 5/25/2024    Procedure: GASTROSCOPY, WITH BIOPSY;  Surgeon: Janae Oliver M.D.;  Location: [facility];  Service: Gastroenterology    FL UPPER GI ENDOSCOPY,DIAGNOSIS N/A 5/18/2024    Procedure: GASTROSCOPY;  Surgeon: Kaitlyn Ontiveros M.D.;  Location: [facility];  Service: Gastroenterology    FL UPPER GI ENDOSCOPY,BIOPSY N/A 5/18/2024    Procedure: GASTROSCOPY, WITH BIOPSY;  Surgeon: Kaitlyn Ontiveros M.D.;  Location: [facility];  Service: Gastroenterology    GASTROSCOPY WITH ENDOSTAT N/A 5/18/2024    Procedure: EGD, WITH CAUTERIZATION;  Surgeon: Kaitlyn Ontiveros M.D.;  Location: [facility];  Service: Gastroenterology    FL REPLACEMENT OF MITRAL VALVE  1/25/2024     Procedure: MITRAL VALVE REPAIR, TRANSESOPHAGEAL ECHOCARDIOGRAM;  Surgeon: Bigg Santana M.D.;  Location: SURGERY [facility];  Service: Cardiothoracic    ECHOCARDIOGRAM, TRANSESOPHAGEAL, INTRAOPERATIVE  1/25/2024    Procedure: ECHOCARDIOGRAM, TRANSESOPHAGEAL, INTRAOPERATIVE;  Surgeon: Bigg Santana M.D.;  Location: SURGERY [facility];  Service: Cardiothoracic    MA UPPER GI ENDOSCOPY,DIAGNOSIS N/A 9/14/2023    Procedure: GASTROSCOPY;  Surgeon: Johnny Dalton M.D.;  Location: SURGERY SAME DAY [facility];  Service: Gastroenterology    MA UPPER GI ENDOSCOPY,BIOPSY N/A 9/14/2023    Procedure: GASTROSCOPY, WITH BIOPSY;  Surgeon: Johnny Dalton M.D.;  Location: SURGERY SAME DAY [facility];  Service: Gastroenterology    MA UPPER GI ENDOSCOPY,BIOPSY N/A 7/30/2023    Procedure: GASTROSCOPY, WITH BIOPSY;  Surgeon: Kumar Hope M.D.;  Location: [facility];  Service: Gastroenterology    MA UPPER GI ENDOSCOPY,DIAGNOSIS  3/28/2022    Procedure: GASTROSCOPY;  Surgeon: Paco Wiggins M.D.;  Location: SURGERY SAME DAY [facility];  Service: Gastroenterology    MA UPPER GI ENDOSCOPY,BIOPSY  3/28/2022    Procedure: GASTROSCOPY, WITH BIOPSY;  Surgeon: Paco Wiggins M.D.;  Location: SURGERY SAME DAY [facility];  Service: Gastroenterology    ANKLE ARTHROSCOPY Right 5/21/2018    Procedure: ANKLE ARTHROSCOPY, LATERAL LIGAMENT RECONSTRUCTION;  Surgeon: Seth Cruz M.D.;  Location: SURGERY [facility];  Service: Orthopedics    BIOPSY ORTHO Right 5/21/2018    Procedure: BIOPSY ORTHO/ FOR CARTILAGE AND DENOVO PROCEDURE;  Surgeon: Seth Cruz M.D.;  Location: SURGERY [facility];  Service: Orthopedics    OTHER ABDOMINAL SURGERY      Cholecystectomy February 2017    OTHER ORTHOPEDIC SURGERY      2 previous ankle surgeries (2007, 2009)     Family History   Problem Relation Age of Onset    Heart Disease Mother     No Known Problems Father     Clotting Disorder Neg Hx      Social History     Socioeconomic History     Marital status:      Spouse name: Not on file    Number of children: Not on file    Years of education: Not on file    Highest education level: Associate degree: occupational, technical, or vocational program   Occupational History    Not on file   Tobacco Use    Smoking status: Never    Smokeless tobacco: Never   Vaping Use    Vaping status: Never Used   Substance and Sexual Activity    Alcohol use: Not Currently    Drug use: Never    Sexual activity: Not on file   Other Topics Concern    Not on file   Social History Narrative    Not on file     Social Determinants of Health     Financial Resource Strain: Low Risk  (5/19/2024)    Overall Financial Resource Strain (CARDIA)     Difficulty of Paying Living Expenses: Not hard at all   Food Insecurity: No Food Insecurity (5/22/2024)    Hunger Vital Sign     Worried About Running Out of Food in the Last Year: Never true     Ran Out of Food in the Last Year: Never true   Transportation Needs: No Transportation Needs (5/22/2024)    PRAPARE - Transportation     Lack of Transportation (Medical): No     Lack of Transportation (Non-Medical): No   Physical Activity: Sufficiently Active (5/19/2024)    Exercise Vital Sign     Days of Exercise per Week: 6 days     Minutes of Exercise per Session: 120 min   Stress: Stress Concern Present (5/19/2024)    [employer] - Occupational Stress Questionnaire     Feeling of Stress : To some extent   Social Connections: Socially Isolated (5/19/2024)    Social Connection and Isolation Panel [NHANES]     Frequency of Communication with Friends and Family: Once a week     Frequency of Social Gatherings with Friends and Family: Once a week     Attends Synagogue Services: Never     Active Member of Clubs or Organizations: No     Attends Club or Organization Meetings: Never     Marital Status:    Intimate Partner Violence: Not At Risk (5/22/2024)    Humiliation, Afraid, Rape, and Kick questionnaire      Fear of Current or Ex-Partner: No     Emotionally Abused: No     Physically Abused: No     Sexually Abused: No   Housing Stability: Low Risk  (5/22/2024)    Housing Stability Vital Sign     Unable to Pay for Housing in the Last Year: No     Number of Places Lived in the Last Year: 0     Unstable Housing in the Last Year: No     Allergies   Allergen Reactions    Morphine Vomiting    Tramadol Unspecified     Seizure  LNP=3085     Outpatient Encounter Medications as of 6/13/2024   Medication Sig Dispense Refill    hydroCHLOROthiazide 25 MG Tab Take 1 Tablet by mouth every day. 30 Tablet 11    ondansetron (ZOFRAN ODT) 4 MG TABLET DISPERSIBLE Take 1 Tablet by mouth every 6 hours as needed for Nausea/Vomiting for up to 12 doses. 10 Tablet 0    oxyCODONE immediate release (ROXICODONE) 10 MG immediate release tablet Take 10 mg by mouth 5 Times a Day.      pantoprazole (PROTONIX) 40 MG Tablet Delayed Response Take 40 mg by mouth in the morning, at noon, and at bedtime.      ferrous sulfate 325 (65 Fe) MG tablet Take 1 Tablet by mouth every day for 30 days. 30 Tablet 0    gabapentin (NEURONTIN) 300 MG Cap Take 300 mg by mouth 3 times a day.      aspirin 81 MG EC tablet Take 81 mg by mouth every day.      losartan (COZAAR) 50 MG Tab Take 1 Tablet by mouth every day. 100 Tablet 3    metoprolol tartrate (LOPRESSOR) 25 MG Tab Take 1 Tablet by mouth 2 times a day. 200 Tablet 3    baclofen (LIORESAL) 10 MG Tab Take 10 mg by mouth 3 times a day. Indications: Muscle Spasm      acetaminophen (TYLENOL) 500 MG Tab Take 500-1,000 mg by mouth every 8 hours as needed. Indications: Pain      sucralfate (CARAFATE) 1 GM Tab Take 1 g by mouth 4 Times a Day,Before Meals and at Bedtime.       No facility-administered encounter medications on file as of 6/13/2024.     Review of Systems   Constitutional: Negative.    HENT: Negative.     Eyes: Negative.    Respiratory:  Negative for shortness of breath.    Cardiovascular:  Positive for leg  "swelling. Negative for chest pain, palpitations, orthopnea and PND.   Gastrointestinal: Negative.         Multiple GI complaints see subjective   Genitourinary: Negative.    Musculoskeletal: Negative.    Skin: Negative.    Neurological: Negative.    Endo/Heme/Allergies: Negative.    Psychiatric/Behavioral:  Negative for depression. The patient is not nervous/anxious.               Objective     BP (!) 140/60 (BP Location: Left arm, Patient Position: Sitting, BP Cuff Size: Adult)   Pulse 81   Resp 16   Ht 1.905 m (6' 3\")   Wt 109 kg (240 lb)   SpO2 98%   BMI 30.00 kg/m²     Physical Exam  Constitutional:       Appearance: Normal appearance.   HENT:      Head: Normocephalic.   Neck:      Vascular: No JVD.   Cardiovascular:      Rate and Rhythm: Normal rate and regular rhythm.      Pulses: Normal pulses.      Heart sounds: Normal heart sounds. No murmur heard.     No friction rub.   Pulmonary:      Effort: Pulmonary effort is normal.      Breath sounds: Normal breath sounds.   Abdominal:      Palpations: Abdomen is soft.   Musculoskeletal:         General: Normal range of motion.      Right lower leg: No edema.      Left lower leg: No edema.   Skin:     General: Skin is warm and dry.   Neurological:      General: No focal deficit present.      Mental Status: He is alert and oriented to person, place, and time.   Psychiatric:         Mood and Affect: Mood normal.         Behavior: Behavior normal.            Lab Results   Component Value Date/Time    CHOLSTRLTOT 183 10/09/2023 09:33 AM     (H) 10/09/2023 09:33 AM    HDL 59 10/09/2023 09:33 AM    TRIGLYCERIDE 69 10/09/2023 09:33 AM       Lab Results   Component Value Date/Time    SODIUM 144 06/12/2024 09:14 AM    POTASSIUM 4.4 06/12/2024 09:14 AM    CHLORIDE 115 (H) 06/12/2024 09:14 AM    CO2 16 (L) 06/12/2024 09:14 AM    GLUCOSE 84 06/12/2024 09:14 AM    BUN 12 06/12/2024 09:14 AM    CREATININE 1.03 06/12/2024 09:14 AM     Lab Results   Component Value " Date/Time    ALKPHOSPHAT 63 06/12/2024 09:14 AM    ASTSGOT 25 06/12/2024 09:14 AM    ALTSGPT 17 06/12/2024 09:14 AM    TBILIRUBIN 0.3 06/12/2024 09:14 AM      Echocardiogram 5/28/2024  CONCLUSIONS  Compared to the images of the prior study 9/15/2023, there has been   interval repair of the mitral valve (previously with severe MR in the   setting of mitral valve collapse)  Normal LV size, thickness and systolic function with normal LVEF 60%  Dilated RV with preserved systolic function  Status post mitral valve repair in 1/2024 with appropriate   transvalvular gradients and trace MR.  Trace AI  Normal IVC size  Estimated RVSP 35-40 mmHg (mildly elevated)  No pericardial effusion    Assessment & Plan     1. H/O mitral valve repair        2. Essential hypertension  hydroCHLOROthiazide 25 MG Tab    Basic Metabolic Panel      3. Pure hypercholesterolemia            Medical Decision Making: Today's Assessment/Status/Plan:        Status post Mitral valve repair on 1/25/2024  -Doing well postop  -aspirin 81 mg  -Continue BB: Metoprolol 25 mg twice daily  -echo on 5/28/2024 showed normal valve function    Hypertension  -Elevated in the office today, reportedly similar at home  - continue current regimen, add HCTZ 25 mg daily  - goal < 130/80  -If he has recurrence of lower extremity edema consider stopping hydrochlorothiazide and restarting him on torsemide  -Check in in 2 weeks     Hyperlipidemia  -Most recent   -Continue dietary measures to lower cholesterol  -Goal of less than 100  -Check lipid panel annually    After a long conversation with him and his wife it sounds like the confusion could have been related to not sleeping for several days as his ER evaluation was negative.  He has ongoing evaluation with GI regarding the multiple GI bleeds.  He has had some bilateral lower extremity edema but not presently, consider torsemide in the future if recurs.     Follow up with Caroline MCBRIDE in 3 months     This note  was dictated using Dragon speech recognition software.

## 2024-06-20 ENCOUNTER — APPOINTMENT (OUTPATIENT)
Dept: RADIOLOGY | Facility: MEDICAL CENTER | Age: 44
End: 2024-06-20
Attending: EMERGENCY MEDICINE

## 2024-06-20 ENCOUNTER — TELEPHONE (OUTPATIENT)
Dept: CARDIOLOGY | Facility: MEDICAL CENTER | Age: 44
End: 2024-06-20

## 2024-06-20 ENCOUNTER — HOSPITAL ENCOUNTER (OUTPATIENT)
Facility: MEDICAL CENTER | Age: 44
End: 2024-06-23
Attending: EMERGENCY MEDICINE | Admitting: INTERNAL MEDICINE

## 2024-06-20 ENCOUNTER — TELEPHONE (OUTPATIENT)
Dept: GASTROENTEROLOGY | Facility: MEDICAL CENTER | Age: 44
End: 2024-06-20

## 2024-06-20 DIAGNOSIS — N17.9 AKI (ACUTE KIDNEY INJURY) (HCC): ICD-10-CM

## 2024-06-20 DIAGNOSIS — I10 ESSENTIAL HYPERTENSION: ICD-10-CM

## 2024-06-20 DIAGNOSIS — R55 SYNCOPE, UNSPECIFIED SYNCOPE TYPE: ICD-10-CM

## 2024-06-20 DIAGNOSIS — M54.2 ACUTE NECK PAIN: ICD-10-CM

## 2024-06-20 DIAGNOSIS — E86.0 DEHYDRATION: ICD-10-CM

## 2024-06-20 PROBLEM — N17.0 ACUTE KIDNEY INJURY (AKI) WITH ACUTE TUBULAR NECROSIS (ATN) (HCC): Status: ACTIVE | Noted: 2024-06-20

## 2024-06-20 LAB
ALBUMIN SERPL BCP-MCNC: 4.2 G/DL (ref 3.2–4.9)
ALBUMIN/GLOB SERPL: 1.4 G/DL
ALP SERPL-CCNC: 64 U/L (ref 30–99)
ALT SERPL-CCNC: 17 U/L (ref 2–50)
AMPHET UR QL SCN: NEGATIVE
ANION GAP SERPL CALC-SCNC: 17 MMOL/L (ref 7–16)
ANISOCYTOSIS BLD QL SMEAR: ABNORMAL
APPEARANCE UR: CLEAR
AST SERPL-CCNC: 18 U/L (ref 12–45)
BARBITURATES UR QL SCN: NEGATIVE
BASOPHILS # BLD AUTO: 1.7 % (ref 0–1.8)
BASOPHILS # BLD: 0.13 K/UL (ref 0–0.12)
BENZODIAZ UR QL SCN: NEGATIVE
BILIRUB SERPL-MCNC: 0.2 MG/DL (ref 0.1–1.5)
BILIRUB UR QL STRIP.AUTO: NEGATIVE
BUN SERPL-MCNC: 19 MG/DL (ref 8–22)
BZE UR QL SCN: NEGATIVE
CALCIUM ALBUM COR SERPL-MCNC: 9.4 MG/DL (ref 8.5–10.5)
CALCIUM SERPL-MCNC: 9.6 MG/DL (ref 8.5–10.5)
CANNABINOIDS UR QL SCN: NEGATIVE
CHLORIDE SERPL-SCNC: 104 MMOL/L (ref 96–112)
CO2 SERPL-SCNC: 19 MMOL/L (ref 20–33)
COLOR UR: YELLOW
CREAT SERPL-MCNC: 1.89 MG/DL (ref 0.5–1.4)
EKG IMPRESSION: NORMAL
EOSINOPHIL # BLD AUTO: 0.14 K/UL (ref 0–0.51)
EOSINOPHIL NFR BLD: 1.8 % (ref 0–6.9)
ERYTHROCYTE [DISTWIDTH] IN BLOOD BY AUTOMATED COUNT: 57.1 FL (ref 35.9–50)
FENTANYL UR QL: NEGATIVE
GFR SERPLBLD CREATININE-BSD FMLA CKD-EPI: 45 ML/MIN/1.73 M 2
GLOBULIN SER CALC-MCNC: 3 G/DL (ref 1.9–3.5)
GLUCOSE SERPL-MCNC: 99 MG/DL (ref 65–99)
GLUCOSE UR STRIP.AUTO-MCNC: NEGATIVE MG/DL
HCT VFR BLD AUTO: 46.3 % (ref 42–52)
HGB BLD-MCNC: 13.7 G/DL (ref 14–18)
HYPOCHROMIA BLD QL SMEAR: ABNORMAL
KETONES UR STRIP.AUTO-MCNC: NEGATIVE MG/DL
LEUKOCYTE ESTERASE UR QL STRIP.AUTO: NEGATIVE
LYMPHOCYTES # BLD AUTO: 2.4 K/UL (ref 1–4.8)
LYMPHOCYTES NFR BLD: 30.4 % (ref 22–41)
MAGNESIUM SERPL-MCNC: 2.1 MG/DL (ref 1.5–2.5)
MANUAL DIFF BLD: NORMAL
MCH RBC QN AUTO: 23 PG (ref 27–33)
MCHC RBC AUTO-ENTMCNC: 29.6 G/DL (ref 32.3–36.5)
MCV RBC AUTO: 77.8 FL (ref 81.4–97.8)
METHADONE UR QL SCN: NEGATIVE
MICRO URNS: NORMAL
MICROCYTES BLD QL SMEAR: ABNORMAL
MONOCYTES # BLD AUTO: 0.41 K/UL (ref 0–0.85)
MONOCYTES NFR BLD AUTO: 5.2 % (ref 0–13.4)
MORPHOLOGY BLD-IMP: NORMAL
NEUTROPHILS # BLD AUTO: 4.81 K/UL (ref 1.82–7.42)
NEUTROPHILS NFR BLD: 60.9 % (ref 44–72)
NITRITE UR QL STRIP.AUTO: NEGATIVE
NRBC # BLD AUTO: 0 K/UL
NRBC BLD-RTO: 0 /100 WBC (ref 0–0.2)
OPIATES UR QL SCN: NEGATIVE
OVALOCYTES BLD QL SMEAR: NORMAL
OXYCODONE UR QL SCN: POSITIVE
PCP UR QL SCN: NEGATIVE
PH UR STRIP.AUTO: 5 [PH] (ref 5–8)
PHOSPHATE SERPL-MCNC: 4.4 MG/DL (ref 2.5–4.5)
PLATELET # BLD AUTO: 552 K/UL (ref 164–446)
PLATELET BLD QL SMEAR: NORMAL
PMV BLD AUTO: 9.1 FL (ref 9–12.9)
POIKILOCYTOSIS BLD QL SMEAR: NORMAL
POTASSIUM SERPL-SCNC: 4.1 MMOL/L (ref 3.6–5.5)
PROPOXYPH UR QL SCN: NEGATIVE
PROT SERPL-MCNC: 7.2 G/DL (ref 6–8.2)
PROT UR QL STRIP: NEGATIVE MG/DL
RBC # BLD AUTO: 5.95 M/UL (ref 4.7–6.1)
RBC BLD AUTO: PRESENT
RBC UR QL AUTO: NEGATIVE
SCHISTOCYTES BLD QL SMEAR: NORMAL
SODIUM SERPL-SCNC: 140 MMOL/L (ref 135–145)
SP GR UR STRIP.AUTO: 1.02
TROPONIN T SERPL-MCNC: 12 NG/L (ref 6–19)
TROPONIN T SERPL-MCNC: 9 NG/L (ref 6–19)
UROBILINOGEN UR STRIP.AUTO-MCNC: 0.2 MG/DL
WBC # BLD AUTO: 7.9 K/UL (ref 4.8–10.8)

## 2024-06-20 PROCEDURE — 80307 DRUG TEST PRSMV CHEM ANLYZR: CPT

## 2024-06-20 PROCEDURE — 93005 ELECTROCARDIOGRAM TRACING: CPT | Performed by: EMERGENCY MEDICINE

## 2024-06-20 PROCEDURE — 81003 URINALYSIS AUTO W/O SCOPE: CPT

## 2024-06-20 PROCEDURE — 84100 ASSAY OF PHOSPHORUS: CPT

## 2024-06-20 PROCEDURE — 700102 HCHG RX REV CODE 250 W/ 637 OVERRIDE(OP): Performed by: EMERGENCY MEDICINE

## 2024-06-20 PROCEDURE — 700102 HCHG RX REV CODE 250 W/ 637 OVERRIDE(OP): Performed by: NURSE PRACTITIONER

## 2024-06-20 PROCEDURE — 85007 BL SMEAR W/DIFF WBC COUNT: CPT

## 2024-06-20 PROCEDURE — G0378 HOSPITAL OBSERVATION PER HR: HCPCS

## 2024-06-20 PROCEDURE — 85027 COMPLETE CBC AUTOMATED: CPT

## 2024-06-20 PROCEDURE — 36415 COLL VENOUS BLD VENIPUNCTURE: CPT

## 2024-06-20 PROCEDURE — 700105 HCHG RX REV CODE 258: Performed by: NURSE PRACTITIONER

## 2024-06-20 PROCEDURE — 83735 ASSAY OF MAGNESIUM: CPT

## 2024-06-20 PROCEDURE — 93005 ELECTROCARDIOGRAM TRACING: CPT | Performed by: NURSE PRACTITIONER

## 2024-06-20 PROCEDURE — A9270 NON-COVERED ITEM OR SERVICE: HCPCS | Performed by: NURSE PRACTITIONER

## 2024-06-20 PROCEDURE — 84484 ASSAY OF TROPONIN QUANT: CPT | Mod: 91

## 2024-06-20 PROCEDURE — 700105 HCHG RX REV CODE 258: Performed by: EMERGENCY MEDICINE

## 2024-06-20 PROCEDURE — 80053 COMPREHEN METABOLIC PANEL: CPT

## 2024-06-20 PROCEDURE — 700101 HCHG RX REV CODE 250: Performed by: NURSE PRACTITIONER

## 2024-06-20 PROCEDURE — 99222 1ST HOSP IP/OBS MODERATE 55: CPT | Performed by: NURSE PRACTITIONER

## 2024-06-20 PROCEDURE — 99285 EMERGENCY DEPT VISIT HI MDM: CPT

## 2024-06-20 PROCEDURE — 72125 CT NECK SPINE W/O DYE: CPT

## 2024-06-20 PROCEDURE — A9270 NON-COVERED ITEM OR SERVICE: HCPCS | Performed by: EMERGENCY MEDICINE

## 2024-06-20 PROCEDURE — 70450 CT HEAD/BRAIN W/O DYE: CPT

## 2024-06-20 PROCEDURE — 71045 X-RAY EXAM CHEST 1 VIEW: CPT

## 2024-06-20 RX ORDER — NITROGLYCERIN 0.4 MG/1
0.4 TABLET SUBLINGUAL
Status: DISCONTINUED | OUTPATIENT
Start: 2024-06-20 | End: 2024-06-20

## 2024-06-20 RX ORDER — SODIUM CHLORIDE, SODIUM LACTATE, POTASSIUM CHLORIDE, CALCIUM CHLORIDE 600; 310; 30; 20 MG/100ML; MG/100ML; MG/100ML; MG/100ML
500 INJECTION, SOLUTION INTRAVENOUS ONCE
Status: COMPLETED | OUTPATIENT
Start: 2024-06-20 | End: 2024-06-20

## 2024-06-20 RX ORDER — BACLOFEN 10 MG/1
10 TABLET ORAL 3 TIMES DAILY
Status: DISCONTINUED | OUTPATIENT
Start: 2024-06-20 | End: 2024-06-23 | Stop reason: HOSPADM

## 2024-06-20 RX ORDER — LIDOCAINE 4 G/G
1 PATCH TOPICAL EVERY 24 HOURS
Status: DISCONTINUED | OUTPATIENT
Start: 2024-06-20 | End: 2024-06-23 | Stop reason: HOSPADM

## 2024-06-20 RX ORDER — SODIUM CHLORIDE, SODIUM LACTATE, POTASSIUM CHLORIDE, CALCIUM CHLORIDE 600; 310; 30; 20 MG/100ML; MG/100ML; MG/100ML; MG/100ML
INJECTION, SOLUTION INTRAVENOUS CONTINUOUS
Status: DISCONTINUED | OUTPATIENT
Start: 2024-06-20 | End: 2024-06-22

## 2024-06-20 RX ORDER — BACLOFEN 10 MG/1
10 TABLET ORAL ONCE
Status: COMPLETED | OUTPATIENT
Start: 2024-06-20 | End: 2024-06-20

## 2024-06-20 RX ORDER — ACETAMINOPHEN 325 MG/1
650 TABLET ORAL EVERY 6 HOURS PRN
Status: DISCONTINUED | OUTPATIENT
Start: 2024-06-20 | End: 2024-06-23

## 2024-06-20 RX ORDER — BUTYROSPERMUM PARKII(SHEA BUTTER), SIMMONDSIA CHINENSIS (JOJOBA) SEED OIL, ALOE BARBADENSIS LEAF EXTRACT .01; 1; 3.5 G/100G; G/100G; G/100G
10000 LIQUID TOPICAL DAILY
COMMUNITY
End: 2024-10-25

## 2024-06-20 RX ORDER — GABAPENTIN 300 MG/1
300 CAPSULE ORAL 3 TIMES DAILY
Status: DISCONTINUED | OUTPATIENT
Start: 2024-06-20 | End: 2024-06-23 | Stop reason: HOSPADM

## 2024-06-20 RX ORDER — ONDANSETRON 4 MG/1
4 TABLET, ORALLY DISINTEGRATING ORAL EVERY 6 HOURS PRN
Status: DISCONTINUED | OUTPATIENT
Start: 2024-06-20 | End: 2024-06-23 | Stop reason: HOSPADM

## 2024-06-20 RX ORDER — NITROGLYCERIN 0.4 MG/1
0.4 TABLET SUBLINGUAL ONCE
Status: COMPLETED | OUTPATIENT
Start: 2024-06-20 | End: 2024-06-20

## 2024-06-20 RX ORDER — HYDROCODONE BITARTRATE AND ACETAMINOPHEN 5; 325 MG/1; MG/1
1 TABLET ORAL ONCE
Status: DISPENSED | OUTPATIENT
Start: 2024-06-20 | End: 2024-06-21

## 2024-06-20 RX ORDER — HEPARIN SODIUM 5000 [USP'U]/ML
5000 INJECTION, SOLUTION INTRAVENOUS; SUBCUTANEOUS EVERY 8 HOURS
Status: DISCONTINUED | OUTPATIENT
Start: 2024-06-20 | End: 2024-06-21

## 2024-06-20 RX ORDER — SUCRALFATE 1 G/1
1 TABLET ORAL
Status: DISCONTINUED | OUTPATIENT
Start: 2024-06-20 | End: 2024-06-23 | Stop reason: HOSPADM

## 2024-06-20 RX ORDER — PANTOPRAZOLE SODIUM 40 MG/1
40 TABLET, DELAYED RELEASE ORAL 3 TIMES DAILY
Status: DISCONTINUED | OUTPATIENT
Start: 2024-06-20 | End: 2024-06-20

## 2024-06-20 RX ORDER — OXYCODONE HYDROCHLORIDE 10 MG/1
10 TABLET ORAL EVERY 4 HOURS PRN
Status: DISCONTINUED | OUTPATIENT
Start: 2024-06-20 | End: 2024-06-23

## 2024-06-20 RX ORDER — ASPIRIN 81 MG/1
81 TABLET ORAL DAILY
Status: DISCONTINUED | OUTPATIENT
Start: 2024-06-21 | End: 2024-06-21

## 2024-06-20 RX ADMIN — SODIUM CHLORIDE, POTASSIUM CHLORIDE, SODIUM LACTATE AND CALCIUM CHLORIDE 500 ML: 600; 310; 30; 20 INJECTION, SOLUTION INTRAVENOUS at 15:36

## 2024-06-20 RX ADMIN — NITROGLYCERIN 0.4 MG: 0.4 TABLET, ORALLY DISINTEGRATING SUBLINGUAL at 22:05

## 2024-06-20 RX ADMIN — OXYCODONE HYDROCHLORIDE 10 MG: 10 TABLET ORAL at 22:17

## 2024-06-20 RX ADMIN — LIDOCAINE 1 PATCH: 4 PATCH TOPICAL at 19:47

## 2024-06-20 RX ADMIN — BACLOFEN 10 MG: 10 TABLET ORAL at 18:18

## 2024-06-20 RX ADMIN — GABAPENTIN 300 MG: 300 CAPSULE ORAL at 18:17

## 2024-06-20 RX ADMIN — SODIUM CHLORIDE, POTASSIUM CHLORIDE, SODIUM LACTATE AND CALCIUM CHLORIDE 500 ML: 600; 310; 30; 20 INJECTION, SOLUTION INTRAVENOUS at 14:43

## 2024-06-20 RX ADMIN — SUCRALFATE 1 G: 1 TABLET ORAL at 21:39

## 2024-06-20 RX ADMIN — OXYCODONE HYDROCHLORIDE 10 MG: 10 TABLET ORAL at 17:37

## 2024-06-20 RX ADMIN — SODIUM CHLORIDE, POTASSIUM CHLORIDE, SODIUM LACTATE AND CALCIUM CHLORIDE 600 ML: 600; 310; 30; 20 INJECTION, SOLUTION INTRAVENOUS at 19:02

## 2024-06-20 RX ADMIN — BACLOFEN 10 MG: 10 TABLET ORAL at 22:34

## 2024-06-20 ASSESSMENT — SOCIAL DETERMINANTS OF HEALTH (SDOH)

## 2024-06-20 ASSESSMENT — ENCOUNTER SYMPTOMS
WEIGHT LOSS: 0
DIAPHORESIS: 0
SINUS PAIN: 0
SORE THROAT: 0
FLANK PAIN: 0
CHILLS: 0
BLOOD IN STOOL: 0
PHOTOPHOBIA: 0
ORTHOPNEA: 0
PALPITATIONS: 0
BLURRED VISION: 0
CONSTIPATION: 0
DIARRHEA: 0
FEVER: 0
NECK PAIN: 1
EYE PAIN: 0
HEADACHES: 1
ABDOMINAL PAIN: 0
DIZZINESS: 0
WHEEZING: 0
DEPRESSION: 0
DOUBLE VISION: 0
NERVOUS/ANXIOUS: 0
SHORTNESS OF BREATH: 0

## 2024-06-20 ASSESSMENT — PATIENT HEALTH QUESTIONNAIRE - PHQ9
2. FEELING DOWN, DEPRESSED, IRRITABLE, OR HOPELESS: NOT AT ALL
SUM OF ALL RESPONSES TO PHQ9 QUESTIONS 1 AND 2: 0
1. LITTLE INTEREST OR PLEASURE IN DOING THINGS: NOT AT ALL

## 2024-06-20 ASSESSMENT — LIFESTYLE VARIABLES
TOTAL SCORE: 0
ON A TYPICAL DAY WHEN YOU DRINK ALCOHOL HOW MANY DRINKS DO YOU HAVE: 0
EVER FELT BAD OR GUILTY ABOUT YOUR DRINKING: NO
ALCOHOL_USE: NO
TOTAL SCORE: 0
HAVE PEOPLE ANNOYED YOU BY CRITICIZING YOUR DRINKING: NO
AVERAGE NUMBER OF DAYS PER WEEK YOU HAVE A DRINK CONTAINING ALCOHOL: 0
TOTAL SCORE: 0
HOW MANY TIMES IN THE PAST YEAR HAVE YOU HAD 5 OR MORE DRINKS IN A DAY: 0
DOES PATIENT WANT TO STOP DRINKING: NO
EVER HAD A DRINK FIRST THING IN THE MORNING TO STEADY YOUR NERVES TO GET RID OF A HANGOVER: NO
HAVE YOU EVER FELT YOU SHOULD CUT DOWN ON YOUR DRINKING: NO
CONSUMPTION TOTAL: NEGATIVE

## 2024-06-20 ASSESSMENT — FIBROSIS 4 INDEX
FIB4 SCORE: 0.52
FIB4 SCORE: 0.34

## 2024-06-20 ASSESSMENT — PAIN DESCRIPTION - PAIN TYPE
TYPE: ACUTE PAIN

## 2024-06-20 NOTE — TELEPHONE ENCOUNTER
Pt discharged from hospital on 5/29.   Pt needed a KUB in one week, around June 5th, to ensure passage pf pillcam.   5/29 sent pt mychart message regarding need for KUB  5/30 called and left VM for pt regarding need for KUB  6/6 sent pt mychart message regarding need for KUB  Certified letter mailed and delivered on 6/14 with information regarding need for KUB    As of 6/20, KUB has not been completed.

## 2024-06-20 NOTE — ED NOTES
Medication history reviewed with patient and patients wife at bedside.   Med rec is complete  Allergies reviewed.     Patient was prescribed a 4 day course of Augmentin 875/125mg bid on 5/19/24- this was completed per patient. any outpatient antibiotics in the last 30 days.   Anticoagulants: No    Juli Pena

## 2024-06-20 NOTE — ED TRIAGE NOTES
Chief Complaint   Patient presents with    Syncope     Pt had episode of witnessed syncope today.  +LOC for 30 seconds and pt believes + head strike.  Found to be hypotensive 80/50 upon first assessment of patient.       Pt arrives 104/75, no IVF given by EMS.  HE is OX$, c/o HA pain and neck pain 8/10.  Pt does not currently take blood thinners but did have an open heart procedure for valve repair on 1/24/24.  Recently started on HCTX for BP last week.

## 2024-06-20 NOTE — TELEPHONE ENCOUNTER
Caller: Destin(pt's wife)      Topic/issue: Patient's wife was calling because he's in the ER and she was told his BP is around 80/56 and they said that he was having difficulty breathing. She asked for a call back      Callback Number: [phone]      Thank you    -Kolton RENAE

## 2024-06-20 NOTE — ED PROVIDER NOTES
ED PHYSICIAN NOTE    CHIEF COMPLAINT  Chief Complaint   Patient presents with    Syncope     Pt had episode of witnessed syncope today.  +LOC for 30 seconds and pt believes + head strike.  Found to be hypotensive 80/50 upon first assessment of patient.         EXTERNAL RECORDS REVIEWED  6/13 cardiology appointment.  Patient's blood pressure was elevated HCTZ 25 mg added to his medication regimen    ED encounter 6/12 with dizziness.  Had a syncopal episode while urinating this morning.  CTA chest was negative at that time.  Tiny area ground glass infiltrate left lower lobe.    5/28 echocardiogram :Compared to the images of the prior study 9/15/2023, there has been interval repair of the mitral valve (previously with severe MR in the setting of mitral valve collapse)  Normal LV size, thickness and systolic function with normal LVEF 60%  Dilated RV with preserved systolic function  Status post mitral valve repair in 1/2024 with appropriate   transvalvular gradients and trace MR.  Trace AI  Normal IVC size  Estimated RVSP 35-40 mmHg (mildly elevated)  No pericardial effusion    Admitted to hospital 5/22 with abdominal pain.  Has history of duodenal ulcer post GDA coiling and embolization    HPI/ROS    OUTSIDE HISTORIAN(S):  Reports patient has chronic pain and takes oxycodone    Roberto Braswell is a 44 y.o. male who presents after passing out.  Pain gone about his day without event.  He ate breakfast had been drinking liquids.  He was sitting in bed watching TV.  He got up from bed walked about 15 feet to the door.  He started to feel dizzy grabbed onto the doorway and collapsed.  Per wife the patient fell back and hit the back of his head.  He has head pain and neck pain.  He denies ever having any chest pain, shortness of breath palpitations.  Denies abdominal pain.  No black or bloody stool.  No new leg swelling leg pain.  No extremity injury.No focal weakness numbness.  No seizure-like no focal weakness numbness.   No seizure activity incontinence or tongue biting.    PAST MEDICAL HISTORY  Past Medical History:   Diagnosis Date    Arthritis     Right ankle    Breath shortness 11/03/2023    With exertion.    Drug-seeking behavior 07/30/2023    Demanding narcotics for a duodenal ulcer July 2023. Refused antacids or sucralfate.     Duodenal ulcer 07/30/2023    Gastric ulcer     Heart valve disease     Mitral valve prolapse    Hemorrhagic disorder (HCC)     GI bleed    Hypertension     Pain     Resolved; Right ankle    Seizure (HCC)     while taking tramadol       SOCIAL HISTORY  Social History     Tobacco Use    Smoking status: Never    Smokeless tobacco: Never   Vaping Use    Vaping status: Never Used   Substance Use Topics    Alcohol use: Not Currently    Drug use: Never       CURRENT MEDICATIONS  Home Medications    **Home medications have not yet been reviewed for this encounter**       Audit from Redirected Encounters    **Home medications have not yet been reviewed for this encounter**         ALLERGIES  Allergies   Allergen Reactions    Morphine Vomiting    Tramadol Unspecified     Seizure  CTN=[id number]       PHYSICAL EXAM  VITAL SIGNS: /75   Pulse 96   Resp (!) 23   Wt 107 kg (235 lb)   SpO2 97%   BMI 29.37 kg/m²    Constitutional: Awake and alert  HENT: Normal inspection  Eyes: Normal inspection  Neck: Grossly normal range of motion.  Cardiovascular: Normal heart rate, Normal rhythm.  Symmetric peripheral pulses.   Thorax & Lungs: No respiratory distress, No wheezing, No rales, No rhonchi, No chest tenderness.   Abdomen: Bowel sounds normal, soft, non-distended, nontender, no mass  Skin: No obvious rash.  Back: No tenderness, No CVA tenderness.   Extremities: No clubbing, cyanosis, edema, no Homans or cords.  Neurologic: Grossly normal   Psychiatric: Normal for situation     DIAGNOSTIC STUDIES / PROCEDURES  LABS/EKG  Results for orders placed or performed during the hospital encounter of 06/20/24   CBC with  Differential   Result Value Ref Range    WBC 7.9 4.8 - 10.8 K/uL    RBC 5.95 4.70 - 6.10 M/uL    Hemoglobin 13.7 (L) 14.0 - 18.0 g/dL    Hematocrit 46.3 42.0 - 52.0 %    MCV 77.8 (L) 81.4 - 97.8 fL    MCH 23.0 (L) 27.0 - 33.0 pg    MCHC 29.6 (L) 32.3 - 36.5 g/dL    RDW 57.1 (H) 35.9 - 50.0 fL    Platelet Count 552 (H) 164 - 446 K/uL    MPV 9.1 9.0 - 12.9 fL    Neutrophils-Polys 60.90 44.00 - 72.00 %    Lymphocytes 30.40 22.00 - 41.00 %    Monocytes 5.20 0.00 - 13.40 %    Eosinophils 1.80 0.00 - 6.90 %    Basophils 1.70 0.00 - 1.80 %    Nucleated RBC 0.00 0.00 - 0.20 /100 WBC    Neutrophils (Absolute) 4.81 1.82 - 7.42 K/uL    Lymphs (Absolute) 2.40 1.00 - 4.80 K/uL    Monos (Absolute) 0.41 0.00 - 0.85 K/uL    Eos (Absolute) 0.14 0.00 - 0.51 K/uL    Baso (Absolute) 0.13 (H) 0.00 - 0.12 K/uL    NRBC (Absolute) 0.00 K/uL    Hypochromia 1+     Anisocytosis 1+     Microcytosis 2+ (A)    Complete Metabolic Panel (CMP)   Result Value Ref Range    Sodium 140 135 - 145 mmol/L    Potassium 4.1 3.6 - 5.5 mmol/L    Chloride 104 96 - 112 mmol/L    Co2 19 (L) 20 - 33 mmol/L    Anion Gap 17.0 (H) 7.0 - 16.0    Glucose 99 65 - 99 mg/dL    Bun 19 8 - 22 mg/dL    Creatinine 1.89 (H) 0.50 - 1.40 mg/dL    Calcium 9.6 8.5 - 10.5 mg/dL    Correct Calcium 9.4 8.5 - 10.5 mg/dL    AST(SGOT) 18 12 - 45 U/L    ALT(SGPT) 17 2 - 50 U/L    Alkaline Phosphatase 64 30 - 99 U/L    Total Bilirubin 0.2 0.1 - 1.5 mg/dL    Albumin 4.2 3.2 - 4.9 g/dL    Total Protein 7.2 6.0 - 8.2 g/dL    Globulin 3.0 1.9 - 3.5 g/dL    A-G Ratio 1.4 g/dL   Troponins NOW   Result Value Ref Range    Troponin T 12 6 - 19 ng/L   Troponins in two (2) hours   Result Value Ref Range    Troponin T 9 6 - 19 ng/L   MAGNESIUM   Result Value Ref Range    Magnesium 2.1 1.5 - 2.5 mg/dL   PHOSPHORUS   Result Value Ref Range    Phosphorus 4.4 2.5 - 4.5 mg/dL   ESTIMATED GFR   Result Value Ref Range    GFR (CKD-EPI) 45 (A) >60 mL/min/1.73 m 2   DIFFERENTIAL MANUAL   Result Value Ref  Range    Manual Diff Status PERFORMED    PERIPHERAL SMEAR REVIEW   Result Value Ref Range    Peripheral Smear Review see below    PLATELET ESTIMATE   Result Value Ref Range    Plt Estimation Increased    MORPHOLOGY   Result Value Ref Range    RBC Morphology Present     Poikilocytosis 1+     Ovalocytes 1+     Schistocytes 1+    EKG   Result Value Ref Range    Report       [facility] of [facility] Emergency Dept.    Test Date:  2024  Pt Name:    PATI MERCEDES           Department: ER  MRN:        [id number]                      Room:        06  Gender:     Male                         Technician: [id number]  :        1980                   Requested By:ER TRIAGE PROTOCOL  Order #:    [id number]                    Reading MD:    Measurements  Intervals                                Axis  Rate:       103                          P:          65  UT:         156                          QRS:        -17  QRSD:       86                           T:          72  QT:         338  QTc:        443    Interpretive Statements  Sinus tachycardia  Probable left atrial enlargement  Inferior infarct, old  Compared to ECG 2024 09:11:04  Myocardial infarct finding now present  ST (T wave) deviation no longer present        I have independently interpreted this EKG as documented above    Rhythm strip interpretation-sinus rhythm    RADIOLOGY  I have independently interpreted the diagnostic imaging associated with this visit and am waiting the final reading from the radiologist.   My preliminary interpretation is as follows: Chest x-ray negative; CT head and CT cervical spine negative    Radiologist interpretation:   DX-CHEST-PORTABLE (1 VIEW)    (Results Pending)   CT-HEAD W/O    (Results Pending)   CT-CSPINE WITHOUT PLUS RECONS    (Results Pending)         COURSE & MEDICAL DECISION MAKING    INITIAL ASSESSMENT, COURSE AND PLAN  Care Narrative: Patient presents after syncopal episode.  Sounds orthostatic.   Patient was recently started on hydrochlorothiazide.  Ordered IV fluids.  Obtain laboratory data.  He describes head injury and has severe neck pain.  Neuro intact.  Ordered CT imaging.  Offered nonnarcotic patient declines.  Request pain medication    Laboratory data returned showing acute kidney injury creatinine of 1.89.  He has mild metabolic acidosis.  No leukocytosis.  Patient's EKG does not show preexcitation.  His troponin is normal.  Other electrolytes unremarkable.    Patient was given oxycodone as there was delay for CT scan.  CT scans returned negative.    I consulted hospitalist for admission to treat acute kidney injury, dehydration and further evaluation of syncope as indicated.      DISPOSITION AND DISCUSSIONS  I have discussed management of the patient with the following physicians and CARTER's: Dr. Tian    FINAL IMPRESSION  1.  Syncope  2.  Acute kidney injury  3.  Dehydration likely related to hydrochlorothiazide  4.  Closed head injury  5.  Cervical strain  6.  Chronic pain    This dictation was created using voice recognition software. The accuracy of the dictation is limited to the abilities of the software. I expect there may be some errors of grammar and possibly content. The nursing notes were reviewed and certain aspects of this information were incorporated into this note.    Electronically signed by: Sherwin Mendoza M.D., 6/20/2024

## 2024-06-20 NOTE — TELEPHONE ENCOUNTER
Caller: Destin (Wife)    Topic/issue: Pt is at the main [facility] in [place] and the Dr is Dr Mendoza and she would like  to call and request he be kept over night for observation. Please advise     Callback Number: [phone]    Thank You   Olga KIMBALL

## 2024-06-20 NOTE — TELEPHONE ENCOUNTER
LH: BRIAN , patient in ER    MyChart sent to patient notifying to contact us once patient is discharged from hospital

## 2024-06-21 PROBLEM — K62.5 BRIGHT RED BLOOD PER RECTUM: Status: ACTIVE | Noted: 2023-09-13

## 2024-06-21 LAB
ANION GAP SERPL CALC-SCNC: 12 MMOL/L (ref 7–16)
ANION GAP SERPL CALC-SCNC: 15 MMOL/L (ref 7–16)
BUN SERPL-MCNC: 19 MG/DL (ref 8–22)
BUN SERPL-MCNC: 19 MG/DL (ref 8–22)
CALCIUM SERPL-MCNC: 8.8 MG/DL (ref 8.5–10.5)
CALCIUM SERPL-MCNC: 9.2 MG/DL (ref 8.5–10.5)
CHLORIDE SERPL-SCNC: 102 MMOL/L (ref 96–112)
CHLORIDE SERPL-SCNC: 105 MMOL/L (ref 96–112)
CO2 SERPL-SCNC: 22 MMOL/L (ref 20–33)
CO2 SERPL-SCNC: 23 MMOL/L (ref 20–33)
CREAT SERPL-MCNC: 1.09 MG/DL (ref 0.5–1.4)
CREAT SERPL-MCNC: 1.43 MG/DL (ref 0.5–1.4)
D DIMER PPP IA.FEU-MCNC: <0.27 UG/ML (FEU) (ref 0–0.5)
EKG IMPRESSION: NORMAL
ERYTHROCYTE [DISTWIDTH] IN BLOOD BY AUTOMATED COUNT: 54.1 FL (ref 35.9–50)
ERYTHROCYTE [DISTWIDTH] IN BLOOD BY AUTOMATED COUNT: 54.9 FL (ref 35.9–50)
GFR SERPLBLD CREATININE-BSD FMLA CKD-EPI: 62 ML/MIN/1.73 M 2
GFR SERPLBLD CREATININE-BSD FMLA CKD-EPI: 86 ML/MIN/1.73 M 2
GLUCOSE SERPL-MCNC: 108 MG/DL (ref 65–99)
GLUCOSE SERPL-MCNC: 98 MG/DL (ref 65–99)
HCT VFR BLD AUTO: 39.3 % (ref 42–52)
HCT VFR BLD AUTO: 41.4 % (ref 42–52)
HGB BLD-MCNC: 11.9 G/DL (ref 14–18)
HGB BLD-MCNC: 12.8 G/DL (ref 14–18)
MCH RBC QN AUTO: 23.1 PG (ref 27–33)
MCH RBC QN AUTO: 23.3 PG (ref 27–33)
MCHC RBC AUTO-ENTMCNC: 30.3 G/DL (ref 32.3–36.5)
MCHC RBC AUTO-ENTMCNC: 30.9 G/DL (ref 32.3–36.5)
MCV RBC AUTO: 75.3 FL (ref 81.4–97.8)
MCV RBC AUTO: 76.3 FL (ref 81.4–97.8)
PLATELET # BLD AUTO: 416 K/UL (ref 164–446)
PLATELET # BLD AUTO: 476 K/UL (ref 164–446)
PMV BLD AUTO: 8.8 FL (ref 9–12.9)
PMV BLD AUTO: 9.1 FL (ref 9–12.9)
POTASSIUM SERPL-SCNC: 3.4 MMOL/L (ref 3.6–5.5)
POTASSIUM SERPL-SCNC: 4.1 MMOL/L (ref 3.6–5.5)
RBC # BLD AUTO: 5.15 M/UL (ref 4.7–6.1)
RBC # BLD AUTO: 5.5 M/UL (ref 4.7–6.1)
SODIUM SERPL-SCNC: 139 MMOL/L (ref 135–145)
SODIUM SERPL-SCNC: 140 MMOL/L (ref 135–145)
TROPONIN T SERPL-MCNC: 7 NG/L (ref 6–19)
TROPONIN T SERPL-MCNC: <6 NG/L (ref 6–19)
WBC # BLD AUTO: 5.5 K/UL (ref 4.8–10.8)
WBC # BLD AUTO: 7 K/UL (ref 4.8–10.8)

## 2024-06-21 PROCEDURE — 84484 ASSAY OF TROPONIN QUANT: CPT

## 2024-06-21 PROCEDURE — 700102 HCHG RX REV CODE 250 W/ 637 OVERRIDE(OP): Performed by: NURSE PRACTITIONER

## 2024-06-21 PROCEDURE — A9270 NON-COVERED ITEM OR SERVICE: HCPCS | Performed by: EMERGENCY MEDICINE

## 2024-06-21 PROCEDURE — G0378 HOSPITAL OBSERVATION PER HR: HCPCS

## 2024-06-21 PROCEDURE — 85027 COMPLETE CBC AUTOMATED: CPT

## 2024-06-21 PROCEDURE — 700111 HCHG RX REV CODE 636 W/ 250 OVERRIDE (IP): Performed by: INTERNAL MEDICINE

## 2024-06-21 PROCEDURE — 80048 BASIC METABOLIC PNL TOTAL CA: CPT

## 2024-06-21 PROCEDURE — 700101 HCHG RX REV CODE 250: Performed by: NURSE PRACTITIONER

## 2024-06-21 PROCEDURE — 36415 COLL VENOUS BLD VENIPUNCTURE: CPT

## 2024-06-21 PROCEDURE — 96376 TX/PRO/DX INJ SAME DRUG ADON: CPT

## 2024-06-21 PROCEDURE — 85379 FIBRIN DEGRADATION QUANT: CPT

## 2024-06-21 PROCEDURE — 700102 HCHG RX REV CODE 250 W/ 637 OVERRIDE(OP): Performed by: EMERGENCY MEDICINE

## 2024-06-21 PROCEDURE — 700102 HCHG RX REV CODE 250 W/ 637 OVERRIDE(OP): Performed by: INTERNAL MEDICINE

## 2024-06-21 PROCEDURE — A9270 NON-COVERED ITEM OR SERVICE: HCPCS | Performed by: NURSE PRACTITIONER

## 2024-06-21 PROCEDURE — 93010 ELECTROCARDIOGRAM REPORT: CPT | Performed by: INTERNAL MEDICINE

## 2024-06-21 PROCEDURE — 96374 THER/PROPH/DIAG INJ IV PUSH: CPT

## 2024-06-21 PROCEDURE — 99233 SBSQ HOSP IP/OBS HIGH 50: CPT | Performed by: INTERNAL MEDICINE

## 2024-06-21 PROCEDURE — A9270 NON-COVERED ITEM OR SERVICE: HCPCS | Performed by: INTERNAL MEDICINE

## 2024-06-21 PROCEDURE — 700105 HCHG RX REV CODE 258: Performed by: NURSE PRACTITIONER

## 2024-06-21 RX ORDER — POTASSIUM CHLORIDE 1500 MG/1
40 TABLET, EXTENDED RELEASE ORAL ONCE
Status: COMPLETED | OUTPATIENT
Start: 2024-06-21 | End: 2024-06-21

## 2024-06-21 RX ORDER — MECLIZINE HYDROCHLORIDE 25 MG/1
25 TABLET ORAL 3 TIMES DAILY PRN
Status: DISCONTINUED | OUTPATIENT
Start: 2024-06-21 | End: 2024-06-23 | Stop reason: HOSPADM

## 2024-06-21 RX ORDER — POLYETHYLENE GLYCOL 3350 17 G/17G
1 POWDER, FOR SOLUTION ORAL
Status: DISCONTINUED | OUTPATIENT
Start: 2024-06-21 | End: 2024-06-23 | Stop reason: HOSPADM

## 2024-06-21 RX ORDER — HYDROMORPHONE HYDROCHLORIDE 1 MG/ML
1 INJECTION, SOLUTION INTRAMUSCULAR; INTRAVENOUS; SUBCUTANEOUS
Status: DISCONTINUED | OUTPATIENT
Start: 2024-06-21 | End: 2024-06-21

## 2024-06-21 RX ORDER — HYDROMORPHONE HYDROCHLORIDE 1 MG/ML
0.5 INJECTION, SOLUTION INTRAMUSCULAR; INTRAVENOUS; SUBCUTANEOUS
Status: DISCONTINUED | OUTPATIENT
Start: 2024-06-21 | End: 2024-06-21

## 2024-06-21 RX ORDER — AMOXICILLIN 250 MG
2 CAPSULE ORAL EVERY EVENING
Status: DISCONTINUED | OUTPATIENT
Start: 2024-06-21 | End: 2024-06-23 | Stop reason: HOSPADM

## 2024-06-21 RX ADMIN — SUCRALFATE 1 G: 1 TABLET ORAL at 10:36

## 2024-06-21 RX ADMIN — GABAPENTIN 300 MG: 300 CAPSULE ORAL at 10:36

## 2024-06-21 RX ADMIN — OXYCODONE HYDROCHLORIDE 10 MG: 10 TABLET ORAL at 10:36

## 2024-06-21 RX ADMIN — SODIUM CHLORIDE, POTASSIUM CHLORIDE, SODIUM LACTATE AND CALCIUM CHLORIDE: 600; 310; 30; 20 INJECTION, SOLUTION INTRAVENOUS at 20:22

## 2024-06-21 RX ADMIN — ASPIRIN 81 MG: 81 TABLET, COATED ORAL at 06:24

## 2024-06-21 RX ADMIN — HYDROMORPHONE HYDROCHLORIDE 0.5 MG: 1 INJECTION, SOLUTION INTRAMUSCULAR; INTRAVENOUS; SUBCUTANEOUS at 11:22

## 2024-06-21 RX ADMIN — SUCRALFATE 1 G: 1 TABLET ORAL at 20:18

## 2024-06-21 RX ADMIN — HYDROMORPHONE HYDROCHLORIDE 1 MG: 1 INJECTION, SOLUTION INTRAMUSCULAR; INTRAVENOUS; SUBCUTANEOUS at 14:45

## 2024-06-21 RX ADMIN — BACLOFEN 10 MG: 10 TABLET ORAL at 10:37

## 2024-06-21 RX ADMIN — OMEPRAZOLE 20 MG: 20 CAPSULE, DELAYED RELEASE ORAL at 17:25

## 2024-06-21 RX ADMIN — MECLIZINE HYDROCHLORIDE 25 MG: 25 TABLET ORAL at 10:36

## 2024-06-21 RX ADMIN — DOCUSATE SODIUM 50 MG AND SENNOSIDES 8.6 MG 2 TABLET: 8.6; 5 TABLET, FILM COATED ORAL at 17:25

## 2024-06-21 RX ADMIN — ACETAMINOPHEN 650 MG: 325 TABLET, FILM COATED ORAL at 00:06

## 2024-06-21 RX ADMIN — LIDOCAINE HYDROCHLORIDE 30 ML: 20 SOLUTION ORAL; TOPICAL at 14:29

## 2024-06-21 RX ADMIN — OMEPRAZOLE 20 MG: 20 CAPSULE, DELAYED RELEASE ORAL at 06:24

## 2024-06-21 RX ADMIN — POTASSIUM CHLORIDE 40 MEQ: 1500 TABLET, EXTENDED RELEASE ORAL at 08:39

## 2024-06-21 RX ADMIN — BACLOFEN 10 MG: 10 TABLET ORAL at 17:25

## 2024-06-21 RX ADMIN — OXYCODONE HYDROCHLORIDE 10 MG: 10 TABLET ORAL at 16:10

## 2024-06-21 RX ADMIN — BACLOFEN 10 MG: 10 TABLET ORAL at 01:33

## 2024-06-21 RX ADMIN — LIDOCAINE 1 PATCH: 4 PATCH TOPICAL at 20:17

## 2024-06-21 RX ADMIN — ACETAMINOPHEN 650 MG: 325 TABLET, FILM COATED ORAL at 10:37

## 2024-06-21 RX ADMIN — SUCRALFATE 1 G: 1 TABLET ORAL at 06:24

## 2024-06-21 RX ADMIN — SUCRALFATE 1 G: 1 TABLET ORAL at 16:10

## 2024-06-21 RX ADMIN — GABAPENTIN 300 MG: 300 CAPSULE ORAL at 01:33

## 2024-06-21 RX ADMIN — OXYCODONE HYDROCHLORIDE 10 MG: 10 TABLET ORAL at 20:18

## 2024-06-21 RX ADMIN — GABAPENTIN 300 MG: 300 CAPSULE ORAL at 17:25

## 2024-06-21 RX ADMIN — OXYCODONE HYDROCHLORIDE 10 MG: 10 TABLET ORAL at 02:25

## 2024-06-21 RX ADMIN — HYDROMORPHONE HYDROCHLORIDE 1 MG: 1 INJECTION, SOLUTION INTRAMUSCULAR; INTRAVENOUS; SUBCUTANEOUS at 17:52

## 2024-06-21 RX ADMIN — OXYCODONE HYDROCHLORIDE 10 MG: 10 TABLET ORAL at 06:25

## 2024-06-21 ASSESSMENT — COGNITIVE AND FUNCTIONAL STATUS - GENERAL
MOBILITY SCORE: 24
SUGGESTED CMS G CODE MODIFIER MOBILITY: CH
DAILY ACTIVITIY SCORE: 24
SUGGESTED CMS G CODE MODIFIER DAILY ACTIVITY: CH

## 2024-06-21 ASSESSMENT — PAIN DESCRIPTION - PAIN TYPE
TYPE: CHRONIC PAIN
TYPE: CHRONIC PAIN
TYPE: ACUTE PAIN
TYPE: CHRONIC PAIN
TYPE: CHRONIC PAIN

## 2024-06-21 ASSESSMENT — ENCOUNTER SYMPTOMS
BLOOD IN STOOL: 1
DIZZINESS: 1

## 2024-06-21 ASSESSMENT — FIBROSIS 4 INDEX: FIB4 SCORE: 0.39

## 2024-06-21 NOTE — PROGRESS NOTES
4 Eyes Skin Assessment Completed by KARSON Schumacher and KARSON Schwarz.    Head WDL  Ears WDL  Nose WDL  Mouth WDL  Neck WDL  Breast/Chest WDL  Shoulder Blades WDL  Spine WDL  (R) Arm/Elbow/Hand WDL  (L) Arm/Elbow/Hand WDL  Abdomen WDL  Groin WDL  Scrotum/Coccyx/Buttocks WDL  (R) Leg WDL  (L) Leg WDL  (R) Heel/Foot/Toe WDL  (L) Heel/Foot/Toe WDL          Devices In Places Tele Box and Pulse Ox      Interventions In Place N/A    Possible Skin Injury No    Pictures Uploaded Into Epic N/A  Wound Consult Placed N/A  RN Wound Prevention Protocol Ordered No

## 2024-06-21 NOTE — ASSESSMENT & PLAN NOTE
Likely secondary to dehydration with subsequent hypotension. Syncope while walking to the bathroom at home. BP per EMS 80/50. Given 2 L IV fluids in ED. BP improved to 112/73. Recently started on HCTZ.   Recent echocardiogram (5/28/24) shows normal LVEF 60%, s/p mitral valve repair with appropriate transvalvular gradients and trace MR (previously severe).   - telemonitor  - continue gentle IVF hydration  - Orthostatic BP

## 2024-06-21 NOTE — ASSESSMENT & PLAN NOTE
Likely hemorrhoidal bleeding  Monitor H&H  Hold aspirin and heparin  Stool softeners  If patient has significant heavy bleeding with downtrending hemoglobin we will consider GI consultation    6/22 Hgb rising, no further bleeding, restart aspirin

## 2024-06-21 NOTE — CARE PLAN
The patient is Stable - Low risk of patient condition declining or worsening    Shift Goals  Clinical Goals: Pain control, urine sample, IVF  Patient Goals: Pain control  Family Goals: not present    Progress made toward(s) clinical / shift goals:        Problem: Knowledge Deficit - Standard  Goal: Patient and family/care givers will demonstrate understanding of plan of care, disease process/condition, diagnostic tests and medications  Outcome: Progressing     Problem: Fall Risk  Goal: Patient will remain free from falls  Outcome: Progressing       Patient is not progressing towards the following goals:      Problem: Pain - Standard  Goal: Alleviation of pain or a reduction in pain to the patient’s comfort goal  Outcome: Not Progressing

## 2024-06-21 NOTE — PROGRESS NOTES
Hospital Medicine Daily Progress Note    Date of Service  6/21/2024    Chief Complaint  Roberto Braswell is a 44 y.o. male admitted 6/20/2024 with syncope    Hospital Course  44-year-old male with a past medical history of severe mitral valve regurgitation status post MVR, chronic pain, chronic opiate dependence, duodenal ulcer status post GDA coiling embolization who presented with a syncopal episode.  Patient states when he stood up he developed dizziness and collapsed falling backwards hitting the back of his head.  Per EMS patient's blood pressure was noted to be low at 80/50.    CT head and CT cervical spine were negative for acute findings. CXR was negative. CBC shows hgb 13.7, MCV 77.8, plt 552. CMP with CO2 19, Agap 17, Cr 1.89, eGFR 45, otherwise unremarkable. Troponin x 2 normal. EKG shows sinus tach, 103 bpm, no acute STD changes. Given IV fluids in the ED with improvement of his blood pressure to 112/73.     Patient was recently admitted on 5/22 for GI bleeding and underwent EGD by Dr. Oliver which showed antral gastritis and small clean-based gastric ulcer, duodenitis and shallow duodenal ulcer distal bulb without stigmata of recent bleeding.  Patient also underwent colonoscopy that revealed grade 2 nonbleeding internal hemorrhoids.  Patient then underwent capsule endoscopy that showed no active bleeding small bowel.  Patient was recommended PPI and if he rebleeds consider sending to tertiary facility for EGD and double-balloon enteroscopy.    Interval Problem Update  Patient continues to report dizziness that is worse on changing position or standing up.  Continue IV fluids and check orthostatics.  His ELISABET has improved with IV fluid hydration.  Continue to hold HCTZ.  Patient does report sharp epigastric pain which is worse with taking a deep breath.  Check D-dimer, if elevated will order CTA chest with IV contrast to rule out PE.  Trend troponins with continuous cardiac monitoring.  Ordered GI  cocktail. Patient noted some millicent blood last night with his bowel movement.  Suspect it could be from hemorrhoidal bleeding.  Hold aspirin and heparin.  Encourage use of stool softeners.  Monitor hemoglobin    I have discussed this patient's plan of care and discharge plan at IDT rounds today with Case Management, Nursing, Nursing leadership, and other members of the IDT team.    Consultants/Specialty  None    Code Status  Full Code    Disposition  The patient is not medically cleared for discharge to home or a post-acute facility.    I have placed the appropriate orders for post-discharge needs.    Review of Systems  Review of Systems   Cardiovascular:  Positive for chest pain.   Gastrointestinal:  Positive for blood in stool.   Neurological:  Positive for dizziness.        Physical Exam  Temp:  [36.1 °C (97 °F)-36.7 °C (98.1 °F)] 36.7 °C (98 °F)  Pulse:  [] 101  Resp:  [14-23] 18  BP: ()/(59-98) 134/78  SpO2:  [88 %-98 %] 94 %    Physical Exam  Vitals and nursing note reviewed.   Constitutional:       General: He is not in acute distress.  HENT:      Head: Normocephalic.      Mouth/Throat:      Mouth: Mucous membranes are moist.   Eyes:      Pupils: Pupils are equal, round, and reactive to light.   Cardiovascular:      Rate and Rhythm: Normal rate and regular rhythm.      Pulses: Normal pulses.      Heart sounds: Normal heart sounds.   Pulmonary:      Effort: Pulmonary effort is normal.      Breath sounds: Normal breath sounds.   Abdominal:      Palpations: Abdomen is soft.      Tenderness: There is no abdominal tenderness.   Musculoskeletal:         General: No swelling.      Cervical back: Neck supple.   Skin:     General: Skin is warm.      Coloration: Skin is not jaundiced.   Neurological:      General: No focal deficit present.      Mental Status: He is alert and oriented to person, place, and time.   Psychiatric:         Mood and Affect: Mood normal.         Behavior: Behavior normal.        Fluids    Intake/Output Summary (Last 24 hours) at 6/21/2024 1053  Last data filed at 6/21/2024 0854  Gross per 24 hour   Intake 1000 ml   Output 890 ml   Net 110 ml       Laboratory  Recent Labs     06/20/24  1402 06/21/24  0041   WBC 7.9 7.0   RBC 5.95 5.15   HEMOGLOBIN 13.7* 11.9*   HEMATOCRIT 46.3 39.3*   MCV 77.8* 76.3*   MCH 23.0* 23.1*   MCHC 29.6* 30.3*   RDW 57.1* 54.9*   PLATELETCT 552* 416   MPV 9.1 8.8*     Recent Labs     06/20/24  1402 06/21/24  0041 06/21/24  0909   SODIUM 140 139 140   POTASSIUM 4.1 3.4* 4.1   CHLORIDE 104 102 105   CO2 19* 22 23   GLUCOSE 99 98 108*   BUN 19 19 19   CREATININE 1.89* 1.43* 1.09   CALCIUM 9.6 8.8 9.2                   Imaging  CT-CSPINE WITHOUT PLUS RECONS   Final Result      No fracture detected.      CT-HEAD W/O   Final Result         NO ACUTE ABNORMALITIES ARE NOTED ON CT SCAN OF THE HEAD.                     DX-CHEST-PORTABLE (1 VIEW)   Final Result      No acute cardiopulmonary abnormality identified.           Assessment/Plan  * Acute kidney injury (ELISABET) with acute tubular necrosis (ATN) (HCC)- (present on admission)  Assessment & Plan  Secondary to dehydration, recently started on HCTZ, which may be contributing. BP 80/50 per EMS. Given 2 L IV fluids in ED  - avoid nephrotoxic agents  - hold HCTZ  - IVF hydration  - BMP in am    Acute neck pain- (present on admission)  Assessment & Plan  Reports neck pain from falling today. CT cervical spine negative for acute findings. Will likely be sore for a couple days, but will avoid additional narcotics.  - home dose analgesics, muscle relaxer  - ice packs ok    Syncope and collapse- (present on admission)  Assessment & Plan  Likely secondary to dehydration with subsequent hypotension. Syncope while walking to the bathroom at home. BP per EMS 80/50. Given 2 L IV fluids in ED. BP improved to 112/73. Recently started on HCTZ.   Recent echocardiogram (5/28/24) shows normal LVEF 60%, s/p mitral valve repair with  appropriate transvalvular gradients and trace MR (previously severe).   - telemonitor  - continue gentle IVF hydration  - Orthostatic BP    Chronic pain syndrome- (present on admission)  Assessment & Plan  - continue home dose baclofen, gabapentin and oxy  - avoid additional narcotics    H/O mitral valve repair- (present on admission)  Assessment & Plan  Repair on 1/25/2024 with Dr. Santana. Echo on 5/28 shows mild MR (previously severe). Recent cardiology appointment on 6/13/24, no concerns.   - resume ASA once bleeding resolves  - resume metoprolol in am    Duodenal ulcer- (present on admission)  Assessment & Plan  Discharged 5/29/24 after evaluation for duodenal ulcer. S/p EGD 5/25 by Dr. Oliver, which showed Antral gastritis and small clean based gastric ulcer. Duodenitis D1-D2, shallow duodenal ulcer distal bulb without stigmata of recent bleeding; spontaneous, scant volume, oozing when duodenal bulb mucosa touched. S/p capsule endoscopy 5/29 by Dr. Oliver which showed no active bleeding  - continue omeprazole and carafate  - GI cocktail     Essential hypertension- (present on admission)  Assessment & Plan  EMS on scene reports BP 80/50. BP improved with IV hydration. Recently started on HCTZ  - hold BP meds until review in am    Bright red blood per rectum- (present on admission)  Assessment & Plan  Likely hemorrhoidal bleeding  Monitor H&H  Hold aspirin and heparin  Stool softeners  If patient has significant heavy bleeding with downtrending hemoglobin we will consider GI consultation         VTE prophylaxis: SCD    I have performed a physical exam and reviewed and updated ROS and Plan today (6/21/2024). In review of yesterday's note (6/20/2024), there are no changes except as documented above.    I spent 52 minutes, reviewing the chart, obtaining and/or reviewing separately obtained history. Performing a medically appropriate examination and evaluation.  Counseling and educating the patient. Ordering  and reviewing medications, tests, or procedures. Documenting clinical information in EPIC. Independently interpreting results and communicating results to patient. Discussing future disposition of care with patient, RN and case management.

## 2024-06-21 NOTE — ASSESSMENT & PLAN NOTE
EMS on scene reports BP 80/50. BP improved with IV hydration. Recently started on HCTZ  - hold BP meds until review in am    6/22 Restart metoprolol, add labetalol prn, pain control

## 2024-06-21 NOTE — ASSESSMENT & PLAN NOTE
Repair on 1/25/2024 with Dr. Santana. Echo on 5/28 shows mild MR (previously severe). Recent cardiology appointment on 6/13/24, no concerns.   - continue ASA  - resume metoprolol in am

## 2024-06-21 NOTE — ASSESSMENT & PLAN NOTE
Reports neck pain from falling today. CT cervical spine negative for acute findings. Will likely be sore for a couple days, but will avoid additional narcotics.  - home dose analgesics, muscle relaxer  - ice packs ok

## 2024-06-21 NOTE — ED NOTES
Pt transported to CDU in [facility] on monitor.  Belongings sent with pt and his wife. Pt remains on RA, aware falls precautions.

## 2024-06-21 NOTE — PROGRESS NOTES
Monitor summary per monitor tech report:    Sinus Rhythm, rate 72-88  Ectopy: PVCs, rPACs  .19/.07/.34

## 2024-06-21 NOTE — H&P
Hospital Medicine History & Physical Note    Date of Service  6/20/2024    Primary Care Physician  Sal Schafer D.O.      Code Status  Full Code    Chief Complaint  Chief Complaint   Patient presents with    Syncope     Pt had episode of witnessed syncope today.  +LOC for 30 seconds and pt believes + head strike.  Found to be hypotensive 80/50 upon first assessment of patient.         History of Presenting Illness  Roberto Braswell is a 44 y.o. male with a pmh of HTN, HLD, severe mitral valve regurgitation s/p repair(1/25/24), TALON, chronic pain syndrome and hx of GI bleed who presented to the ED on 6/20/2024 with complaints of passing out. Patient reports he was sitting on his bed, stood up to use the bathroom, which was about 15 feet away. When he got to the door he felt dizzy, grabbed the doorway and collapsed falling backwards and hitting the back of his head. He denies chest pain, palpitations, shortness of breath, nausea prior to or after this event. He has a history of duodenal ulcer, but denies blood in stool or dark stools. He denies recent cold or flu like symptoms. He says he has been eating and drinking normally.   Per EMS, patient was noted to have BP 80/50.   Patient has history of severe mitral valve regurgitation and underwent mitral valve repair in January, and recent echo on 5/28 shows significant improvement of regurgitation, as well as normal systolic function. He recently had a cardiology visit on 6/13/24 and was started on hydrochlorothiazide.    CT head and CT cervical spine are negative for acute findings. CXR is negative. CBC shows hgb 13.7, MCV 77.8, plt 552. CMP with CO2 19, Agap 17, Cr 1.89, eGFR 45, otherwise unremarkable. Troponin x 2 normal. EKG shows sinus tach, 103 bpm, no acute STD changes. Given IV fluids in the ED with improvement of his blood pressure to 112/73.      I discussed the plan of care with patient and ERP .    Review of Systems  Review of Systems   Constitutional:   Negative for chills, diaphoresis, fever, malaise/fatigue and weight loss.   HENT:  Negative for congestion, ear pain, sinus pain, sore throat and tinnitus.    Eyes:  Negative for blurred vision, double vision, photophobia and pain.   Respiratory:  Negative for shortness of breath and wheezing.    Cardiovascular:  Negative for chest pain, palpitations, orthopnea and leg swelling.   Gastrointestinal:  Negative for abdominal pain, blood in stool, constipation, diarrhea and melena.   Genitourinary:  Negative for dysuria, flank pain, frequency, hematuria and urgency.   Musculoskeletal:  Positive for neck pain.   Skin:  Negative for itching and rash.   Neurological:  Positive for headaches. Negative for dizziness.   Psychiatric/Behavioral:  Negative for depression. The patient is not nervous/anxious.        Past Medical History   has a past medical history of Arthritis, Breath shortness (11/03/2023), Drug-seeking behavior (07/30/2023), Duodenal ulcer (07/30/2023), Gastric ulcer, Heart valve disease, Hemorrhagic disorder (HCC), Hypertension, Pain, and Seizure (HCC).    Surgical History   has a past surgical history that includes other orthopedic surgery; other abdominal surgery; ankle arthroscopy (Right, 5/21/2018); biopsy ortho (Right, 5/21/2018); pr upper gi endoscopy,diagnosis (3/28/2022); pr upper gi endoscopy,biopsy (3/28/2022); pr upper gi endoscopy,biopsy (N/A, 7/30/2023); pr upper gi endoscopy,diagnosis (N/A, 9/14/2023); pr upper gi endoscopy,biopsy (N/A, 9/14/2023); pr replacement of mitral valve (1/25/2024); echocardiogram, transesophageal, intraoperative (1/25/2024); pr upper gi endoscopy,diagnosis (N/A, 5/18/2024); pr upper gi endoscopy,biopsy (N/A, 5/18/2024); gastroscopy with endostat (N/A, 5/18/2024); pr upper gi endoscopy,diagnosis (N/A, 5/25/2024); pr upper gi endoscopy,biopsy (N/A, 5/25/2024); pr colonoscopy,diagnostic (5/27/2024); capsule endoscopy administration (N/A, 5/28/2024); and capsule endoscopy  retrieval (5/28/2024).     Family History  family history includes Heart Disease in his mother; No Known Problems in his father.   Family history reviewed with patient. There is no family history that is pertinent to the chief complaint.     Social History   reports that he has never smoked. He has never used smokeless tobacco. He reports that he does not currently use alcohol. He reports that he does not use drugs.    Allergies  Allergies   Allergen Reactions    Morphine Vomiting    Tramadol Unspecified     Seizure  [id number]       Medications  Prior to Admission Medications   Prescriptions Last Dose Informant Patient Reported? Taking?   acetaminophen (TYLENOL) 500 MG Tab 6/20/2024 at am Patient, Family Member Yes Yes   Sig: Take 500-1,000 mg by mouth every 8 hours as needed for Mild Pain. Indications: Pain   amoxicillin-clavulanate (AUGMENTIN) 875-125 MG Tab 5/23/2024 at finished Patient, Family Member Yes Yes   Sig: Take 1 Tablet by mouth 2 times a day.   aspirin 81 MG EC tablet 6/20/2024 at 0800 Patient, Family Member Yes Yes   Sig: Take 81 mg by mouth every day.   baclofen (LIORESAL) 10 MG Tab 6/20/2024 at 0800 Patient, Family Member Yes Yes   Sig: Take 10 mg by mouth 3 times a day. Indications: Muscle Spasm   cholecalciferol (D3 5000) 5000 UNIT Cap 6/20/2024 at 0800 Patient, Family Member Yes Yes   Sig: Take 10,000 Units by mouth every day. 2 capsules= [id number]   gabapentin (NEURONTIN) 300 MG Cap 6/20/2024 at 0800 Patient, Family Member Yes Yes   Sig: Take 300 mg by mouth 3 times a day.   hydroCHLOROthiazide 25 MG Tab 6/20/2024 at 0800 Patient, Family Member No Yes   Sig: Take 1 Tablet by mouth every day.   losartan (COZAAR) 50 MG Tab 1 week at hold Patient, Family Member No No   Sig: Take 1 Tablet by mouth every day.   metoprolol tartrate (LOPRESSOR) 25 MG Tab 1 week at hold Patient, Family Member No No   Sig: Take 1 Tablet by mouth 2 times a day.   omeprazole (PRILOSEC) 20 MG delayed-release capsule 6/20/2024  at 0800 Patient, Family Member Yes Yes   Sig: Take 20 mg by mouth every day.   ondansetron (ZOFRAN ODT) 4 MG TABLET DISPERSIBLE prn at unk Patient, Family Member No No   Sig: Take 1 Tablet by mouth every 6 hours as needed for Nausea/Vomiting for up to 12 doses.   oxyCODONE immediate release (ROXICODONE) 10 MG immediate release tablet 6/20/2024 at 0800 Patient, Family Member Yes Yes   Sig: Take 10 mg by mouth every four hours as needed for Moderate Pain or Severe Pain.   pantoprazole (PROTONIX) 40 MG Tablet Delayed Response 6/20/2024 at 0800 Patient, Family Member Yes Yes   Sig: Take 40 mg by mouth in the morning, at noon, and at bedtime.   sucralfate (CARAFATE) 1 GM Tab 6/20/2024 at am Patient, Family Member Yes Yes   Sig: Take 1 g by mouth 4 Times a Day,Before Meals and at Bedtime.      Facility-Administered Medications: None       Physical Exam  Temp:  [36.4 °C (97.6 °F)-36.7 °C (98.1 °F)] 36.7 °C (98.1 °F)  Pulse:  [] 93  Resp:  [14-23] 18  BP: ()/(59-78) 127/78  SpO2:  [88 %-98 %] 94 %  Blood Pressure: 99/63   Temperature: 36.4 °C (97.6 °F)   Pulse: 86   Respiration: 20   Pulse Oximetry: 97 %       Physical Exam  Vitals and nursing note reviewed.   Constitutional:       General: He is not in acute distress.     Appearance: Normal appearance. He is not ill-appearing or diaphoretic.   HENT:      Head: Normocephalic and atraumatic.      Nose: Nose normal. No congestion or rhinorrhea.      Mouth/Throat:      Mouth: Mucous membranes are dry.      Pharynx: Oropharynx is clear.   Eyes:      Extraocular Movements: Extraocular movements intact.      Pupils: Pupils are equal, round, and reactive to light.   Cardiovascular:      Rate and Rhythm: Normal rate and regular rhythm.      Heart sounds: Murmur heard.   Pulmonary:      Effort: Pulmonary effort is normal. No respiratory distress.      Breath sounds: Normal breath sounds. No wheezing or rales.   Abdominal:      General: Bowel sounds are normal. There is  "no distension.      Palpations: Abdomen is soft.      Tenderness: There is no abdominal tenderness.   Musculoskeletal:         General: Normal range of motion.      Cervical back: Normal range of motion. Tenderness present.      Right lower leg: No edema.      Left lower leg: No edema.   Skin:     General: Skin is warm and dry.      Capillary Refill: Capillary refill takes less than 2 seconds.      Findings: No lesion or rash.   Neurological:      Mental Status: He is alert and oriented to person, place, and time.   Psychiatric:         Mood and Affect: Mood normal.         Behavior: Behavior normal.         Laboratory:  Recent Labs     06/20/24  1402   WBC 7.9   RBC 5.95   HEMOGLOBIN 13.7*   HEMATOCRIT 46.3   MCV 77.8*   MCH 23.0*   MCHC 29.6*   RDW 57.1*   PLATELETCT 552*   MPV 9.1     Recent Labs     06/20/24  1402   SODIUM 140   POTASSIUM 4.1   CHLORIDE 104   CO2 19*   GLUCOSE 99   BUN 19   CREATININE 1.89*   CALCIUM 9.6     Recent Labs     06/20/24  1402   ALTSGPT 17   ASTSGOT 18   ALKPHOSPHAT 64   TBILIRUBIN 0.2   GLUCOSE 99         No results for input(s): \"NTPROBNP\" in the last 72 hours.      Recent Labs     06/20/24  1402 06/20/24  1624   TROPONINT 12 9       Imaging:  CT-CSPINE WITHOUT PLUS RECONS   Final Result      No fracture detected.      CT-HEAD W/O   Final Result         NO ACUTE ABNORMALITIES ARE NOTED ON CT SCAN OF THE HEAD.                     DX-CHEST-PORTABLE (1 VIEW)   Final Result      No acute cardiopulmonary abnormality identified.          EKG 6/20/24  Measurements   Intervals                               Axis   Rate:      103                          P:          65   MO:         156                          QRS:   -17   QRSD:      86                          T:          72   QT:          338   QTc:         443   Interpretive Statements   Sinus tachycardia   Probable left atrial enlargement   Inferior infarct, old   Compared to ECG 06/12/2024 09:11:04   Myocardial infarct finding now " present   ST (T wave) deviation no longer present       ASSESSMENT/PLAN:  * Acute kidney injury (ELISABET) with acute tubular necrosis (ATN) (HCC)- (present on admission)  Assessment & Plan  Secondary to dehydration, recently started on HCTZ, which may be contributing. BP 80/50 per EMS. Given 2 L IV fluids in ED  - avoid nephrotoxic agents  - hold HCTZ  - gentle IV hydration  - BMP in am    Syncope and collapse- (present on admission)  Assessment & Plan  Likely secondary to dehydration with subsequent hypotension. Syncope while walking to the bathroom at home. BP per EMS 80/50. Given 2 L IV fluids in ED. BP improved to 112/73. Recently started on HCTZ.   Recent echocardiogram (5/28/24) shows normal LVEF 60%, s/p mitral valve repair with appropriate transvalvular gradients and trace MR (previously severe). Will not repreat  - telemonitor  - continue gentle IV hydration  - Orthostatic BP    Duodenal ulcer- (present on admission)  Assessment & Plan  Discharged 5/29/24 after evaluation for duodenal ulcer. S/p EGD 5/25 by Dr. Oliver, which showed Antral gastritis and small clean based gastric ulcer. Duodenitis D1-D2, shallow duodenal ulcer distal bulb without stigmata of recent bleeding; spontaneous, scant volume, oozing when duodenal bulb mucosa touched. S/p capsule endoscopy 5/29 by Dr. Oliver which showed no active bleeding  - continue omeprazole, protonix and carafate    Acute neck pain- (present on admission)  Assessment & Plan  Reports neck pain from falling today. CT cervical spine negative for acute findings. Will likely be sore for a couple days, but will avoid additional narcotics.  - home dose analgesics, muscle relaxer  - ice packs ok    Essential hypertension- (present on admission)  Assessment & Plan  EMS on scene reports BP 80/50. BP improved with IV hydration. Recently started on HCTZ  - hold BP meds until review in am    Chronic pain syndrome- (present on admission)  Assessment & Plan  - continue home  dose baclofen, gabapentin and oxy  - avoid additional narcotics    H/O mitral valve repair- (present on admission)  Assessment & Plan  Repair on 1/25/2024 with Dr. Santana. Echo on 5/28 shows mild MR (previously severe). Recent cardiology appointment on 6/13/24, no concerns.   - continue ASA  - resume metoprolol in am      Justification for Admission Status  I anticipate this patient is appropriate for observation status at this time because patient requires additional IV fluids for dehydration resulting in ELISABET and follow up labs to assure improvement.    Patient will need a Telemetry bed on MEDICAL service. The need is secondary to determine if arrhythmia contributed to syncope and collapse    VTE prophylaxis: SCDs/TEDs and heparin ppx

## 2024-06-21 NOTE — ASSESSMENT & PLAN NOTE
Discharged 5/29/24 after evaluation for duodenal ulcer. S/p EGD 5/25 by Dr. Oliver, which showed Antral gastritis and small clean based gastric ulcer. Duodenitis D1-D2, shallow duodenal ulcer distal bulb without stigmata of recent bleeding; spontaneous, scant volume, oozing when duodenal bulb mucosa touched. S/p capsule endoscopy 5/29 by Dr. Oliver which showed no active bleeding  - continue omeprazole and carafate  - GI cocktail

## 2024-06-21 NOTE — ASSESSMENT & PLAN NOTE
Secondary to dehydration, recently started on HCTZ, which may be contributing. BP 80/50 per EMS. Given 2 L IV fluids in ED  - avoid nephrotoxic agents  - hold HCTZ  - IVF hydration  - BMP in am

## 2024-06-21 NOTE — PROGRESS NOTES
Pt reports 9/10 sharp epigastric pain that feels similar to when he had a GI bleed in the past. MD aware. Repeat H&H ordered.

## 2024-06-22 PROCEDURE — 700102 HCHG RX REV CODE 250 W/ 637 OVERRIDE(OP): Performed by: INTERNAL MEDICINE

## 2024-06-22 PROCEDURE — 700102 HCHG RX REV CODE 250 W/ 637 OVERRIDE(OP): Performed by: EMERGENCY MEDICINE

## 2024-06-22 PROCEDURE — G0378 HOSPITAL OBSERVATION PER HR: HCPCS

## 2024-06-22 PROCEDURE — 96376 TX/PRO/DX INJ SAME DRUG ADON: CPT

## 2024-06-22 PROCEDURE — 700111 HCHG RX REV CODE 636 W/ 250 OVERRIDE (IP): Performed by: STUDENT IN AN ORGANIZED HEALTH CARE EDUCATION/TRAINING PROGRAM

## 2024-06-22 PROCEDURE — A9270 NON-COVERED ITEM OR SERVICE: HCPCS | Performed by: INTERNAL MEDICINE

## 2024-06-22 PROCEDURE — A9270 NON-COVERED ITEM OR SERVICE: HCPCS | Performed by: EMERGENCY MEDICINE

## 2024-06-22 PROCEDURE — 700101 HCHG RX REV CODE 250: Performed by: NURSE PRACTITIONER

## 2024-06-22 PROCEDURE — A9270 NON-COVERED ITEM OR SERVICE: HCPCS | Performed by: NURSE PRACTITIONER

## 2024-06-22 PROCEDURE — 700102 HCHG RX REV CODE 250 W/ 637 OVERRIDE(OP): Performed by: STUDENT IN AN ORGANIZED HEALTH CARE EDUCATION/TRAINING PROGRAM

## 2024-06-22 PROCEDURE — 700102 HCHG RX REV CODE 250 W/ 637 OVERRIDE(OP): Performed by: NURSE PRACTITIONER

## 2024-06-22 PROCEDURE — A9270 NON-COVERED ITEM OR SERVICE: HCPCS | Performed by: STUDENT IN AN ORGANIZED HEALTH CARE EDUCATION/TRAINING PROGRAM

## 2024-06-22 PROCEDURE — 700105 HCHG RX REV CODE 258: Performed by: NURSE PRACTITIONER

## 2024-06-22 PROCEDURE — 99233 SBSQ HOSP IP/OBS HIGH 50: CPT | Performed by: STUDENT IN AN ORGANIZED HEALTH CARE EDUCATION/TRAINING PROGRAM

## 2024-06-22 RX ORDER — LABETALOL HYDROCHLORIDE 5 MG/ML
10 INJECTION, SOLUTION INTRAVENOUS EVERY 4 HOURS PRN
Status: DISCONTINUED | OUTPATIENT
Start: 2024-06-22 | End: 2024-06-23 | Stop reason: HOSPADM

## 2024-06-22 RX ORDER — HYDROMORPHONE HYDROCHLORIDE 1 MG/ML
1 INJECTION, SOLUTION INTRAMUSCULAR; INTRAVENOUS; SUBCUTANEOUS EVERY 4 HOURS PRN
Status: DISCONTINUED | OUTPATIENT
Start: 2024-06-22 | End: 2024-06-23

## 2024-06-22 RX ORDER — METOPROLOL TARTRATE 25 MG/1
25 TABLET, FILM COATED ORAL 2 TIMES DAILY
Status: DISCONTINUED | OUTPATIENT
Start: 2024-06-22 | End: 2024-06-23 | Stop reason: HOSPADM

## 2024-06-22 RX ORDER — HYDROMORPHONE HYDROCHLORIDE 1 MG/ML
0.5 INJECTION, SOLUTION INTRAMUSCULAR; INTRAVENOUS; SUBCUTANEOUS EVERY 4 HOURS PRN
Status: DISCONTINUED | OUTPATIENT
Start: 2024-06-22 | End: 2024-06-22

## 2024-06-22 RX ORDER — KETOROLAC TROMETHAMINE 15 MG/ML
15 INJECTION, SOLUTION INTRAMUSCULAR; INTRAVENOUS EVERY 6 HOURS PRN
Status: DISCONTINUED | OUTPATIENT
Start: 2024-06-22 | End: 2024-06-23

## 2024-06-22 RX ORDER — ASPIRIN 81 MG/1
81 TABLET ORAL DAILY
Status: DISCONTINUED | OUTPATIENT
Start: 2024-06-23 | End: 2024-06-23 | Stop reason: HOSPADM

## 2024-06-22 RX ADMIN — SUCRALFATE 1 G: 1 TABLET ORAL at 21:04

## 2024-06-22 RX ADMIN — DOCUSATE SODIUM 50 MG AND SENNOSIDES 8.6 MG 2 TABLET: 8.6; 5 TABLET, FILM COATED ORAL at 16:59

## 2024-06-22 RX ADMIN — SUCRALFATE 1 G: 1 TABLET ORAL at 10:37

## 2024-06-22 RX ADMIN — METOPROLOL TARTRATE 25 MG: 25 TABLET, FILM COATED ORAL at 09:18

## 2024-06-22 RX ADMIN — SODIUM CHLORIDE, POTASSIUM CHLORIDE, SODIUM LACTATE AND CALCIUM CHLORIDE: 600; 310; 30; 20 INJECTION, SOLUTION INTRAVENOUS at 09:25

## 2024-06-22 RX ADMIN — HYDROMORPHONE HYDROCHLORIDE 1 MG: 1 INJECTION, SOLUTION INTRAMUSCULAR; INTRAVENOUS; SUBCUTANEOUS at 16:59

## 2024-06-22 RX ADMIN — BACLOFEN 10 MG: 10 TABLET ORAL at 01:42

## 2024-06-22 RX ADMIN — OXYCODONE HYDROCHLORIDE 10 MG: 10 TABLET ORAL at 00:21

## 2024-06-22 RX ADMIN — GABAPENTIN 300 MG: 300 CAPSULE ORAL at 01:42

## 2024-06-22 RX ADMIN — HYDROMORPHONE HYDROCHLORIDE 0.5 MG: 1 INJECTION, SOLUTION INTRAMUSCULAR; INTRAVENOUS; SUBCUTANEOUS at 12:39

## 2024-06-22 RX ADMIN — GABAPENTIN 300 MG: 300 CAPSULE ORAL at 10:37

## 2024-06-22 RX ADMIN — HYDROMORPHONE HYDROCHLORIDE 1 MG: 1 INJECTION, SOLUTION INTRAMUSCULAR; INTRAVENOUS; SUBCUTANEOUS at 21:05

## 2024-06-22 RX ADMIN — OXYCODONE HYDROCHLORIDE 10 MG: 10 TABLET ORAL at 18:04

## 2024-06-22 RX ADMIN — OXYCODONE HYDROCHLORIDE 10 MG: 10 TABLET ORAL at 14:09

## 2024-06-22 RX ADMIN — OMEPRAZOLE 20 MG: 20 CAPSULE, DELAYED RELEASE ORAL at 17:00

## 2024-06-22 RX ADMIN — OMEPRAZOLE 20 MG: 20 CAPSULE, DELAYED RELEASE ORAL at 04:51

## 2024-06-22 RX ADMIN — ACETAMINOPHEN 650 MG: 325 TABLET, FILM COATED ORAL at 14:08

## 2024-06-22 RX ADMIN — OXYCODONE HYDROCHLORIDE 10 MG: 10 TABLET ORAL at 22:04

## 2024-06-22 RX ADMIN — OXYCODONE HYDROCHLORIDE 10 MG: 10 TABLET ORAL at 04:51

## 2024-06-22 RX ADMIN — BACLOFEN 10 MG: 10 TABLET ORAL at 17:00

## 2024-06-22 RX ADMIN — LIDOCAINE 1 PATCH: 4 PATCH TOPICAL at 19:45

## 2024-06-22 RX ADMIN — GABAPENTIN 300 MG: 300 CAPSULE ORAL at 17:00

## 2024-06-22 RX ADMIN — SUCRALFATE 1 G: 1 TABLET ORAL at 17:00

## 2024-06-22 RX ADMIN — SUCRALFATE 1 G: 1 TABLET ORAL at 09:18

## 2024-06-22 RX ADMIN — METOPROLOL TARTRATE 25 MG: 25 TABLET, FILM COATED ORAL at 16:59

## 2024-06-22 RX ADMIN — OXYCODONE HYDROCHLORIDE 10 MG: 10 TABLET ORAL at 09:19

## 2024-06-22 RX ADMIN — BACLOFEN 10 MG: 10 TABLET ORAL at 10:37

## 2024-06-22 ASSESSMENT — PAIN DESCRIPTION - PAIN TYPE
TYPE: ACUTE PAIN
TYPE: CHRONIC PAIN
TYPE: ACUTE PAIN
TYPE: CHRONIC PAIN

## 2024-06-22 ASSESSMENT — FIBROSIS 4 INDEX: FIB4 SCORE: 0.39

## 2024-06-22 ASSESSMENT — ENCOUNTER SYMPTOMS
BLOOD IN STOOL: 1
DIZZINESS: 1

## 2024-06-22 NOTE — PROGRESS NOTES
4 Eyes Skin Assessment Completed by KARSON Yi and KARSON Jain.    Head WDL  Ears WDL  Nose WDL  Mouth WDL  Neck WDL  Breast/Chest Scars  Shoulder Blades WDL  Spine WDL  (R) Arm/Elbow/Hand Redness and Blanching  (L) Arm/Elbow/Hand Redness and Blanching  Abdomen Scar  Groin WDL  Scrotum/Coccyx/Buttocks WDL  (R) Leg WDL  (L) Leg WDL  (R) Heel/Foot/Toe Redness and Blanching  (L) Heel/Foot/Toe Redness and Blanching          Devices In Places Tele Box, Blood Pressure Cuff, and Pulse Ox      Interventions In Place Pressure Redistribution Mattress    Possible Skin Injury No    Pictures Uploaded Into Epic N/A  Wound Consult Placed N/A  RN Wound Prevention Protocol Ordered No

## 2024-06-22 NOTE — PROGRESS NOTES
Hospital Medicine Daily Progress Note    Date of Service  6/22/2024    Chief Complaint  Roberto Braswell is a 44 y.o. male admitted 6/20/2024 with syncope    Hospital Course  44-year-old male with a past medical history of severe mitral valve regurgitation status post MVR, chronic pain, chronic opiate dependence, duodenal ulcer status post GDA coiling embolization who presented with a syncopal episode.  Patient states when he stood up he developed dizziness and collapsed falling backwards hitting the back of his head.  Per EMS patient's blood pressure was noted to be low at 80/50.    CT head and CT cervical spine were negative for acute findings. CXR was negative. CBC shows hgb 13.7, MCV 77.8, plt 552. CMP with CO2 19, Agap 17, Cr 1.89, eGFR 45, otherwise unremarkable. Troponin x 2 normal. EKG shows sinus tach, 103 bpm, no acute STD changes. Given IV fluids in the ED with improvement of his blood pressure to 112/73.     Patient was recently admitted on 5/22 for GI bleeding and underwent EGD by Dr. Oliver which showed antral gastritis and small clean-based gastric ulcer, duodenitis and shallow duodenal ulcer distal bulb without stigmata of recent bleeding.  Patient also underwent colonoscopy that revealed grade 2 nonbleeding internal hemorrhoids.  Patient then underwent capsule endoscopy that showed no active bleeding small bowel.  Patient was recommended PPI and if he rebleeds consider sending to tertiary facility for EGD and double-balloon enteroscopy.    Interval Problem Update  Patient continues to report dizziness that is worse on changing position or standing up.  Continue IV fluids and check orthostatics.  His ELISABET has improved with IV fluid hydration.  Continue to hold HCTZ.  Patient does report sharp epigastric pain which is worse with taking a deep breath.  Check D-dimer, if elevated will order CTA chest with IV contrast to rule out PE.  Trend troponins with continuous cardiac monitoring.  Ordered GI  cocktail. Patient noted some millicent blood last night with his bowel movement.  Suspect it could be from hemorrhoidal bleeding.  Hold aspirin and heparin.  Encourage use of stool softeners.  Monitor hemoglobin    Taking over care  6/22  Examined inpatient.  Hypertensive today up to 170's, restarted his metoprolol.   Still complaining of severe back/neck pain following his fall, will add back on dilaudid today, encourage use of heat/ice, muscle relaxer. Already on baseline opioids hesitant to go up too much for continued MSK pain.  Added toradol for first line use.  No bleeding, improving hgb, restart aspirin.  If pain controlled and BP improves likely DC in AM    I have discussed this patient's plan of care and discharge plan at IDT rounds today with Case Management, Nursing, Nursing leadership, and other members of the IDT team.    Consultants/Specialty  None    Code Status  Full Code    Disposition  Medically Cleared  I have placed the appropriate orders for post-discharge needs.    Review of Systems  Review of Systems   Cardiovascular:  Positive for chest pain.   Gastrointestinal:  Positive for blood in stool.   Neurological:  Positive for dizziness.        Physical Exam  Temp:  [36.3 °C (97.3 °F)-36.7 °C (98.1 °F)] 36.5 °C (97.7 °F)  Pulse:  [70-88] 71  Resp:  [16-18] 18  BP: (131-179)/() 143/83  SpO2:  [97 %-99 %] 98 %    Physical Exam  Vitals and nursing note reviewed.   Constitutional:       General: He is in acute distress (2/2 neck/back pain).      Appearance: He is not toxic-appearing.   HENT:      Head: Normocephalic.      Mouth/Throat:      Mouth: Mucous membranes are moist.      Pharynx: Oropharynx is clear.   Eyes:      Extraocular Movements: Extraocular movements intact.      Conjunctiva/sclera: Conjunctivae normal.   Cardiovascular:      Rate and Rhythm: Normal rate and regular rhythm.      Pulses: Normal pulses.   Pulmonary:      Effort: Pulmonary effort is normal. No respiratory distress.    Abdominal:      Palpations: Abdomen is soft.      Tenderness: There is no abdominal tenderness.   Musculoskeletal:         General: No swelling.      Cervical back: Normal range of motion. No rigidity.      Right lower leg: No edema.      Left lower leg: No edema.   Skin:     General: Skin is warm.      Coloration: Skin is not jaundiced.   Neurological:      General: No focal deficit present.      Mental Status: He is alert and oriented to person, place, and time.   Psychiatric:         Mood and Affect: Mood normal.         Behavior: Behavior normal.         Thought Content: Thought content normal.         Judgment: Judgment normal.         Fluids    Intake/Output Summary (Last 24 hours) at 6/22/2024 1553  Last data filed at 6/22/2024 1242  Gross per 24 hour   Intake 4822.24 ml   Output 1475 ml   Net 3347.24 ml       Laboratory  Recent Labs     06/20/24  1402 06/21/24  0041 06/21/24  1110   WBC 7.9 7.0 5.5   RBC 5.95 5.15 5.50   HEMOGLOBIN 13.7* 11.9* 12.8*   HEMATOCRIT 46.3 39.3* 41.4*   MCV 77.8* 76.3* 75.3*   MCH 23.0* 23.1* 23.3*   MCHC 29.6* 30.3* 30.9*   RDW 57.1* 54.9* 54.1*   PLATELETCT 552* 416 476*   MPV 9.1 8.8* 9.1     Recent Labs     06/20/24  1402 06/21/24  0041 06/21/24  0909   SODIUM 140 139 140   POTASSIUM 4.1 3.4* 4.1   CHLORIDE 104 102 105   CO2 19* 22 23   GLUCOSE 99 98 108*   BUN 19 19 19   CREATININE 1.89* 1.43* 1.09   CALCIUM 9.6 8.8 9.2                   Imaging  CT-CSPINE WITHOUT PLUS RECONS   Final Result      No fracture detected.      CT-HEAD W/O   Final Result         NO ACUTE ABNORMALITIES ARE NOTED ON CT SCAN OF THE HEAD.                     DX-CHEST-PORTABLE (1 VIEW)   Final Result      No acute cardiopulmonary abnormality identified.           Assessment/Plan  * Acute kidney injury (ELISABET) with acute tubular necrosis (ATN) ([facility])- (present on admission)  Assessment & Plan  Secondary to dehydration, recently started on HCTZ, which may be contributing. BP 80/50 per EMS. Given 2 L IV  fluids in ED  - avoid nephrotoxic agents  - hold HCTZ  - IVF hydration  - BMP in am    Acute neck pain- (present on admission)  Assessment & Plan  Reports neck pain from falling today. CT cervical spine negative for acute findings. Will likely be sore for a couple days, but will avoid additional narcotics.  - home dose analgesics, muscle relaxer  - ice packs ok    Syncope and collapse- (present on admission)  Assessment & Plan  Likely secondary to dehydration with subsequent hypotension. Syncope while walking to the bathroom at home. BP per EMS 80/50. Given 2 L IV fluids in ED. BP improved to 112/73. Recently started on HCTZ.   Recent echocardiogram (5/28/24) shows normal LVEF 60%, s/p mitral valve repair with appropriate transvalvular gradients and trace MR (previously severe).   - telemonitor  - continue gentle IVF hydration  - Orthostatic BP    Chronic pain syndrome- (present on admission)  Assessment & Plan  - continue home dose baclofen, gabapentin and oxy  - avoid additional narcotics    H/O mitral valve repair- (present on admission)  Assessment & Plan  Repair on 1/25/2024 with Dr. Santana. Echo on 5/28 shows mild MR (previously severe). Recent cardiology appointment on 6/13/24, no concerns.   - continue ASA  - resume metoprolol in am    Duodenal ulcer- (present on admission)  Assessment & Plan  Discharged 5/29/24 after evaluation for duodenal ulcer. S/p EGD 5/25 by Dr. Oliver, which showed Antral gastritis and small clean based gastric ulcer. Duodenitis D1-D2, shallow duodenal ulcer distal bulb without stigmata of recent bleeding; spontaneous, scant volume, oozing when duodenal bulb mucosa touched. S/p capsule endoscopy 5/29 by Dr. Oliver which showed no active bleeding  - continue omeprazole and carafate  - GI cocktail     Essential hypertension- (present on admission)  Assessment & Plan  EMS on scene reports BP 80/50. BP improved with IV hydration. Recently started on HCTZ  - hold BP meds until review  in am    6/22 Restart metoprolol, add labetalol prn, pain control    Bright red blood per rectum- (present on admission)  Assessment & Plan  Likely hemorrhoidal bleeding  Monitor H&H  Hold aspirin and heparin  Stool softeners  If patient has significant heavy bleeding with downtrending hemoglobin we will consider GI consultation    6/22 Hgb rising, no further bleeding, restart aspirin           VTE prophylaxis: SCD    I have performed a physical exam and reviewed and updated ROS and Plan today (6/22/2024). In review of yesterday's note (6/21/2024), there are no changes except as documented above.    I spent 53 minutes, reviewing the chart, obtaining and/or reviewing separately obtained history. Performing a medically appropriate examination and evaluation.  Counseling and educating the patient. Ordering and reviewing medications, tests, or procedures. Documenting clinical information in EPIC. Independently interpreting results and communicating results to patient. Discussing future disposition of care with patient, RN and case management.

## 2024-06-22 NOTE — PROGRESS NOTES
Bedside report received from off going RN/tech: Kassidy, assumed care of patient.     Fall Risk Score: LOW RISK  Fall risk interventions in place: Provide patient/family education based on risk assessment, Educate patient/family to call staff for assistance when getting out of bed, Place fall precaution signage outside patient door, and Place patient in room close to nursing station  Bed type: Regular (Corwin Score less than 17 interventions in place)  Patient on cardiac monitor: Yes  IVF/IV medications: Infusion per MAR (List Med(s)) LR  Oxygen: Room Air  Bedside sitter: Not Applicable   Isolation: Not applicable

## 2024-06-22 NOTE — PROGRESS NOTES
Monitor Summary     Rhythm: SR  Heart Rate: 77-92  Ectopy: R PAC, R PVC  Measurement: .14/.09/.34

## 2024-06-22 NOTE — PROGRESS NOTES
Pt arrived from [facility]. All needs met at this time. Call light in place.

## 2024-06-22 NOTE — PROGRESS NOTES
Bedside report received from off going RN/tech: Tasha, assumed care of patient.     Fall Risk Score: MODERATE RISK  Fall risk interventions in place: Provide patient/family education based on risk assessment, Educate patient/family to call staff for assistance when getting out of bed, Place fall precaution signage outside patient door, Place patient in room close to nursing station, Utilize bed/chair fall alarm, and Bed alarm connected correctly  Bed type: Regular (Corwin Score less than 17 interventions in place)  Patient on cardiac monitor: Yes  IVF/IV medications: Infusion per MAR (List Med(s)) LR @ 83mL/hr  Oxygen: Room Air  Bedside sitter: Not Applicable   Isolation: Not applicable

## 2024-06-22 NOTE — PROGRESS NOTES
Report called to [facility] 4th floor. Pt sent down with transport with all belongings. Telebox removed, pt is medical, and all questions answered.

## 2024-06-22 NOTE — CARE PLAN
The patient is Stable - Low risk of patient condition declining or worsening    Shift Goals  Clinical Goals: monitor labs, IV fluids, VSS  Patient Goals: pain control  Family Goals: Updates, POC    Progress made toward(s) clinical / shift goals:    Problem: Pain - Standard  Goal: Alleviation of pain or a reduction in pain to the patient’s comfort goal  Description: Target End Date:  Prior to discharge or change in level of care    Document on Vitals flowsheet    1.  Document pain using the appropriate pain scale per order or unit policy  2.  Educate and implement non-pharmacologic comfort measures (i.e. relaxation, distraction, massage, cold/heat therapy, etc.)  3.  Pain management medications as ordered  4.  Reassess pain after pain med administration per policy  5.  If opiods administered assess patient's response to pain medication is appropriate per POSS sedation scale  6.  Follow pain management plan developed in collaboration with patient and interdisciplinary team (including palliative care or pain specialists if applicable)  Outcome: Progressing  Note: Pt complaints of moderate to sever pain in upper abdomen and cervical spine. Pt receiving PRN oxycodone 10 mg q 4hr PRN and scheduled lidocaine patch for cervical spine. On call hospitalist discontinued PRN dilaudid at this time.     Problem: Knowledge Deficit - Standard  Goal: Patient and family/care givers will demonstrate understanding of plan of care, disease process/condition, diagnostic tests and medications  Description: Target End Date:  1-3 days or as soon as patient condition allows    Document in Patient Education    1.  Patient and family/caregiver oriented to unit, equipment, visitation policy and means for communicating concern  2.  Complete/review Learning Assessment  3.  Assess knowledge level of disease process/condition, treatment plan, diagnostic tests and medications  4.  Explain disease process/condition, treatment plan, diagnostic tests  and medications  Outcome: Progressing  Note: Pt and family updated on POC, all questions answered at this time.

## 2024-06-23 ENCOUNTER — PHARMACY VISIT (OUTPATIENT)
Dept: PHARMACY | Facility: MEDICAL CENTER | Age: 44
End: 2024-06-23
Payer: COMMERCIAL

## 2024-06-23 VITALS
TEMPERATURE: 98.1 F | WEIGHT: 240.08 LBS | HEIGHT: 75 IN | RESPIRATION RATE: 18 BRPM | SYSTOLIC BLOOD PRESSURE: 147 MMHG | OXYGEN SATURATION: 97 % | BODY MASS INDEX: 29.85 KG/M2 | DIASTOLIC BLOOD PRESSURE: 86 MMHG | HEART RATE: 65 BPM

## 2024-06-23 PROCEDURE — 99239 HOSP IP/OBS DSCHRG MGMT >30: CPT | Performed by: STUDENT IN AN ORGANIZED HEALTH CARE EDUCATION/TRAINING PROGRAM

## 2024-06-23 PROCEDURE — A9270 NON-COVERED ITEM OR SERVICE: HCPCS | Performed by: EMERGENCY MEDICINE

## 2024-06-23 PROCEDURE — 700102 HCHG RX REV CODE 250 W/ 637 OVERRIDE(OP): Performed by: STUDENT IN AN ORGANIZED HEALTH CARE EDUCATION/TRAINING PROGRAM

## 2024-06-23 PROCEDURE — G0378 HOSPITAL OBSERVATION PER HR: HCPCS

## 2024-06-23 PROCEDURE — RXMED WILLOW AMBULATORY MEDICATION CHARGE: Performed by: STUDENT IN AN ORGANIZED HEALTH CARE EDUCATION/TRAINING PROGRAM

## 2024-06-23 PROCEDURE — A9270 NON-COVERED ITEM OR SERVICE: HCPCS | Performed by: NURSE PRACTITIONER

## 2024-06-23 PROCEDURE — A9270 NON-COVERED ITEM OR SERVICE: HCPCS | Performed by: INTERNAL MEDICINE

## 2024-06-23 PROCEDURE — 96376 TX/PRO/DX INJ SAME DRUG ADON: CPT

## 2024-06-23 PROCEDURE — 700102 HCHG RX REV CODE 250 W/ 637 OVERRIDE(OP): Performed by: INTERNAL MEDICINE

## 2024-06-23 PROCEDURE — A9270 NON-COVERED ITEM OR SERVICE: HCPCS | Performed by: STUDENT IN AN ORGANIZED HEALTH CARE EDUCATION/TRAINING PROGRAM

## 2024-06-23 PROCEDURE — 700111 HCHG RX REV CODE 636 W/ 250 OVERRIDE (IP): Performed by: STUDENT IN AN ORGANIZED HEALTH CARE EDUCATION/TRAINING PROGRAM

## 2024-06-23 PROCEDURE — 700102 HCHG RX REV CODE 250 W/ 637 OVERRIDE(OP): Performed by: EMERGENCY MEDICINE

## 2024-06-23 PROCEDURE — 700102 HCHG RX REV CODE 250 W/ 637 OVERRIDE(OP): Performed by: NURSE PRACTITIONER

## 2024-06-23 RX ORDER — LOSARTAN POTASSIUM 50 MG/1
50 TABLET ORAL DAILY
Status: DISCONTINUED | OUTPATIENT
Start: 2024-06-23 | End: 2024-06-23 | Stop reason: HOSPADM

## 2024-06-23 RX ORDER — HYDRALAZINE HYDROCHLORIDE 10 MG/1
10 TABLET, FILM COATED ORAL 3 TIMES DAILY PRN
Qty: 30 TABLET | Refills: 0 | Status: SHIPPED | OUTPATIENT
Start: 2024-06-23 | End: 2024-09-09

## 2024-06-23 RX ORDER — OXYCODONE HYDROCHLORIDE 15 MG/1
15 TABLET ORAL EVERY 6 HOURS PRN
Status: DISCONTINUED | OUTPATIENT
Start: 2024-06-23 | End: 2024-06-23 | Stop reason: HOSPADM

## 2024-06-23 RX ORDER — ACETAMINOPHEN 500 MG
1000 TABLET ORAL 3 TIMES DAILY
Status: DISCONTINUED | OUTPATIENT
Start: 2024-06-23 | End: 2024-06-23 | Stop reason: HOSPADM

## 2024-06-23 RX ORDER — OXYCODONE HYDROCHLORIDE 15 MG/1
15 TABLET ORAL EVERY 4 HOURS PRN
Status: DISCONTINUED | OUTPATIENT
Start: 2024-06-23 | End: 2024-06-23

## 2024-06-23 RX ORDER — HYDROMORPHONE HYDROCHLORIDE 1 MG/ML
1 INJECTION, SOLUTION INTRAMUSCULAR; INTRAVENOUS; SUBCUTANEOUS EVERY 8 HOURS PRN
Status: DISCONTINUED | OUTPATIENT
Start: 2024-06-23 | End: 2024-06-23

## 2024-06-23 RX ORDER — OXYCODONE HYDROCHLORIDE 15 MG/1
15 TABLET ORAL EVERY 6 HOURS PRN
Qty: 12 TABLET | Refills: 0 | Status: SHIPPED | OUTPATIENT
Start: 2024-06-23 | End: 2024-06-26

## 2024-06-23 RX ADMIN — METOPROLOL TARTRATE 25 MG: 25 TABLET, FILM COATED ORAL at 05:07

## 2024-06-23 RX ADMIN — GABAPENTIN 300 MG: 300 CAPSULE ORAL at 02:04

## 2024-06-23 RX ADMIN — OXYCODONE HYDROCHLORIDE 10 MG: 10 TABLET ORAL at 02:04

## 2024-06-23 RX ADMIN — OXYCODONE HYDROCHLORIDE 15 MG: 15 TABLET ORAL at 10:09

## 2024-06-23 RX ADMIN — BACLOFEN 10 MG: 10 TABLET ORAL at 02:04

## 2024-06-23 RX ADMIN — ASPIRIN 81 MG: 81 TABLET, COATED ORAL at 05:07

## 2024-06-23 RX ADMIN — HYDROMORPHONE HYDROCHLORIDE 1 MG: 1 INJECTION, SOLUTION INTRAMUSCULAR; INTRAVENOUS; SUBCUTANEOUS at 05:07

## 2024-06-23 RX ADMIN — GABAPENTIN 300 MG: 300 CAPSULE ORAL at 10:09

## 2024-06-23 RX ADMIN — BACLOFEN 10 MG: 10 TABLET ORAL at 10:09

## 2024-06-23 RX ADMIN — OMEPRAZOLE 20 MG: 20 CAPSULE, DELAYED RELEASE ORAL at 05:07

## 2024-06-23 RX ADMIN — OXYCODONE HYDROCHLORIDE 10 MG: 10 TABLET ORAL at 06:10

## 2024-06-23 RX ADMIN — SUCRALFATE 1 G: 1 TABLET ORAL at 10:09

## 2024-06-23 RX ADMIN — ACETAMINOPHEN 1000 MG: 500 TABLET, FILM COATED ORAL at 08:51

## 2024-06-23 RX ADMIN — HYDROMORPHONE HYDROCHLORIDE 1 MG: 1 INJECTION, SOLUTION INTRAMUSCULAR; INTRAVENOUS; SUBCUTANEOUS at 01:05

## 2024-06-23 RX ADMIN — SUCRALFATE 1 G: 1 TABLET ORAL at 06:09

## 2024-06-23 NOTE — PROGRESS NOTES
"Pt left the floor without notifying staff to  his prescriptions despite this RN educating patient that a staff member would be picking them up for him. CNA then notified this RN that the patient and his wife were downstairs. CNA asked the patient and his wife to come back up to his room to have his IV removed and for discharge paperwork and consent for opiates to be signed. Pt did not come back upstairs. Pt's wife came to bedside and handed this RN the patient's IV in a paper towel and offered to sign his paperwork for him. This RN educated her that the patient themself would need to return to the floor to sign his own paperwork. She then stated, \"we are leaving then\" and left the building.   "

## 2024-06-23 NOTE — PROGRESS NOTES
Received report from previous shift RN  Assessment complete.  A&O x 4. Patient calls appropriately.  Patient ambulates with standby assist. Patient has 8/10 pain. Pain managed with prescribed medications.  Denies N&V. Tolerating diet.  Skin per flowsheets.  + void, + flatus, + BM.  Patient denies SOB.  SCD's refused.  Patient pleasant and cooperative with plan of care.  Review plan with of care with patient. Call light and personal belongings with in reach. Hourly rounding in place. All needs met at this time.

## 2024-06-23 NOTE — DISCHARGE SUMMARY
Discharge Summary    CHIEF COMPLAINT ON ADMISSION  Chief Complaint   Patient presents with    Syncope     Pt had episode of witnessed syncope today.  +LOC for 30 seconds and pt believes + head strike.  Found to be hypotensive 80/50 upon first assessment of patient.         Reason for Admission  EMS     Admission Date  6/20/2024    CODE STATUS  Full Code    HPI & HOSPITAL COURSE  44-year-old male with a past medical history of severe mitral valve regurgitation status post MVR, chronic pain, chronic opiate dependence, duodenal ulcer status post GDA coiling embolization who was admitted on 6/20/2024 after syncopal episode secondary to orthostatic hypotension secondary to hydrochlorothiazide.  Noted to be hypotensive in the emergency department which resolved after IV hydration.  Patient also admitted with acute renal failure secondary to hypotension.  He was continued on IV hydration for which his ELISABET ultimately resolved.  He was continued on his PPI.  He had reported episode of hematochezia which was suspected to be secondary to history of hemorrhoids.  Hemoglobin remained stable throughout hospital stay and no further episodes noted.    Head and neck CT on admission with no acute abnormalities.  His neck pain improved throughout hospital stay.  Hydrochlorothiazide was discontinued and he was restarted on home metoprolol and losartan.  Patient tolerating meals and ambulating without difficulties.  Stable patient with in chronic condition was discharged home on as needed hydralazine for hypertension and instructed to follow-up with his primary care provider and cardiologist in the outpatient setting.      All results and plan of action discussed with the patient for she voiced understanding and agreement with the primary care team.  Patient was instructed to return to emergency department symptoms were to worsen.    Therefore, he is discharged in good and stable condition to home with close outpatient follow-up.    The  patient met 2-midnight criteria for an inpatient stay at the time of discharge.    Discharge Date  6/23/2024    FOLLOW UP ITEMS POST DISCHARGE  Primary care provider follow posthospital discharge care and review patient's blood pressure log and as needed hydralazine requirements to adjust antihypertensives    DISCHARGE DIAGNOSES  Principal Problem:    Acute kidney injury (ELISABET) with acute tubular necrosis (ATN) (HCC) (POA: Yes)  Active Problems:    Bright red blood per rectum (POA: Yes)    Iron deficiency anemia due to chronic blood loss (POA: Yes)      Overview: Secondary to GI etiology. Has had two transfusions in the past. Has had       multiple egds. He did have iron infusion during his recent       hospitalizations             10/10/23      -hgb now 12.5 improving from 8.6 3 weeks ago    Essential hypertension (POA: Yes)      Overview: BP elevated at two office appointments     Duodenal ulcer (POA: Yes)    H/O mitral valve repair (POA: Yes)    Chronic pain syndrome (POA: Yes)    Syncope and collapse (POA: Yes)    Acute neck pain (POA: Yes)  Resolved Problems:    * No resolved hospital problems. *      FOLLOW UP  Future Appointments   Date Time Provider Department Center   9/13/2024 11:15 AM SHAJI Carpenter [facility] None     No follow-up provider specified.    MEDICATIONS ON DISCHARGE     Medication List        START taking these medications        Instructions   hydrALAZINE 10 MG Tabs  Commonly known as: Apresoline   Take 1 Tablet by mouth 3 times a day as needed (SBP > 160).  Dose: 10 mg            CHANGE how you take these medications        Instructions   oxycodone 15 MG immediate release tablet  What changed:   medication strength  how much to take  when to take this  Commonly known as: Oxy-IR   Take 1 Tablet by mouth every 6 hours as needed for Severe Pain or Moderate Pain for up to 3 days.  Dose: 15 mg            CONTINUE taking these medications        Instructions   acetaminophen 500 MG  Tabs  Commonly known as: Tylenol   Take 500-1,000 mg by mouth every 8 hours as needed for Mild Pain. Indications: Pain  Dose: 500-1,000 mg     aspirin 81 MG EC tablet   Take 81 mg by mouth every day.  Dose: 81 mg     baclofen 10 MG Tabs  Commonly known as: Lioresal   Take 10 mg by mouth 3 times a day. Indications: Muscle Spasm  Dose: 10 mg     D3 5000 125 MCG (5000 UT) Caps  Generic drug: cholecalciferol   Take 10,000 Units by mouth every day. 2 capsules= [id number]  Dose: 10,000 Units     gabapentin 300 MG Caps  Commonly known as: Neurontin   Take 300 mg by mouth 3 times a day.  Dose: 300 mg     losartan 50 MG Tabs  Commonly known as: Cozaar   Take 1 Tablet by mouth every day.  Dose: 50 mg     metoprolol tartrate 25 MG Tabs  Commonly known as: Lopressor   Take 1 Tablet by mouth 2 times a day.  Dose: 25 mg     omeprazole 20 MG delayed-release capsule  Commonly known as: PriLOSEC   Take 20 mg by mouth every day.  Dose: 20 mg     ondansetron 4 MG Tbdp  Commonly known as: Zofran ODT   Take 1 Tablet by mouth every 6 hours as needed for Nausea/Vomiting for up to 12 doses.  Dose: 4 mg     sucralfate 1 GM Tabs  Commonly known as: Carafate   Take 1 g by mouth 4 Times a Day,Before Meals and at Bedtime.  Dose: 1 g            STOP taking these medications      amoxicillin-clavulanate 875-125 MG Tabs  Commonly known as: Augmentin     pantoprazole 40 MG Tbec  Commonly known as: Protonix              Allergies  Allergies   Allergen Reactions    Morphine Vomiting    Tramadol Unspecified     Seizure  [id number]       DIET  Orders Placed This Encounter   Procedures    Diet Order Diet: Cardiac     Standing Status:   Standing     Number of Occurrences:   1     Order Specific Question:   Diet:     Answer:   Cardiac [6]       ACTIVITY  As tolerated.  Weight bearing as tolerated    CONSULTATIONS  None    PROCEDURES  None    LABORATORY  Lab Results   Component Value Date    SODIUM 140 06/21/2024    POTASSIUM 4.1 06/21/2024    CHLORIDE 105  06/21/2024    CO2 23 06/21/2024    GLUCOSE 108 (H) 06/21/2024    BUN 19 06/21/2024    CREATININE 1.09 06/21/2024        Lab Results   Component Value Date    WBC 5.5 06/21/2024    HEMOGLOBIN 12.8 (L) 06/21/2024    HEMATOCRIT 41.4 (L) 06/21/2024    PLATELETCT 476 (H) 06/21/2024        Total time of the discharge process exceeds 35 minutes.

## 2024-06-23 NOTE — PROGRESS NOTES
Patient A&OX4. Patient stated pain. Meds administered per MAR as well as pain med.  Urinal at the bedside.  Water and call light within reach.

## 2024-06-26 ENCOUNTER — TELEPHONE (OUTPATIENT)
Dept: CARDIOLOGY | Facility: MEDICAL CENTER | Age: 44
End: 2024-06-26

## 2024-06-26 ENCOUNTER — PATIENT MESSAGE (OUTPATIENT)
Dept: CARDIOLOGY | Facility: MEDICAL CENTER | Age: 44
End: 2024-06-26

## 2024-06-26 NOTE — PATIENT COMMUNICATION
To LH: Please advise, pt requesting return to work note on 07/07/24. Ok to draft letter? Thank you.

## 2024-06-26 NOTE — TELEPHONE ENCOUNTER
If he is no longer having syncope or hypotensive episodes then he is okay to return to work.  Okay to draft letter

## 2024-07-09 ENCOUNTER — PATIENT MESSAGE (OUTPATIENT)
Dept: CARDIOLOGY | Facility: MEDICAL CENTER | Age: 44
End: 2024-07-09

## 2024-07-10 ENCOUNTER — PATIENT MESSAGE (OUTPATIENT)
Dept: CARDIOLOGY | Facility: MEDICAL CENTER | Age: 44
End: 2024-07-10

## 2024-07-11 ENCOUNTER — TELEPHONE (OUTPATIENT)
Dept: CARDIOLOGY | Facility: MEDICAL CENTER | Age: 44
End: 2024-07-11

## 2024-07-23 ENCOUNTER — TELEPHONE (OUTPATIENT)
Dept: CARDIOLOGY | Facility: MEDICAL CENTER | Age: 44
End: 2024-07-23

## 2024-08-07 ENCOUNTER — TELEPHONE (OUTPATIENT)
Dept: CARDIOLOGY | Facility: MEDICAL CENTER | Age: 44
End: 2024-08-07

## 2024-08-07 DIAGNOSIS — R79.81 ABNORMAL PULSE OXIMETRY: ICD-10-CM

## 2024-08-07 NOTE — TELEPHONE ENCOUNTER
----- Message from Ayleen Reagan R.N. sent at 8/7/2024  9:09 AM PDT -----  Abnormal OPO, followed protocol.

## 2024-08-29 ENCOUNTER — APPOINTMENT (OUTPATIENT)
Dept: MEDICAL GROUP | Facility: LAB | Age: 44
End: 2024-08-29

## 2024-09-01 ENCOUNTER — HOSPITAL ENCOUNTER (EMERGENCY)
Facility: MEDICAL CENTER | Age: 44
End: 2024-09-01
Attending: EMERGENCY MEDICINE
Payer: COMMERCIAL

## 2024-09-01 ENCOUNTER — APPOINTMENT (OUTPATIENT)
Dept: RADIOLOGY | Facility: MEDICAL CENTER | Age: 44
End: 2024-09-01
Payer: COMMERCIAL

## 2024-09-01 ENCOUNTER — APPOINTMENT (OUTPATIENT)
Dept: RADIOLOGY | Facility: MEDICAL CENTER | Age: 44
End: 2024-09-01
Attending: EMERGENCY MEDICINE
Payer: COMMERCIAL

## 2024-09-01 VITALS
DIASTOLIC BLOOD PRESSURE: 55 MMHG | RESPIRATION RATE: 18 BRPM | HEART RATE: 63 BPM | TEMPERATURE: 98.6 F | BODY MASS INDEX: 29.72 KG/M2 | OXYGEN SATURATION: 92 % | HEIGHT: 75 IN | WEIGHT: 239 LBS | SYSTOLIC BLOOD PRESSURE: 114 MMHG

## 2024-09-01 DIAGNOSIS — R07.9 CHEST PAIN, UNSPECIFIED TYPE: ICD-10-CM

## 2024-09-01 LAB
ALBUMIN SERPL BCP-MCNC: 4.2 G/DL (ref 3.2–4.9)
ALBUMIN/GLOB SERPL: 1.5 G/DL
ALP SERPL-CCNC: 63 U/L (ref 30–99)
ALT SERPL-CCNC: 31 U/L (ref 2–50)
ANION GAP SERPL CALC-SCNC: 14 MMOL/L (ref 7–16)
APTT PPP: 26.5 SEC (ref 24.7–36)
AST SERPL-CCNC: 28 U/L (ref 12–45)
BASOPHILS # BLD AUTO: 1.1 % (ref 0–1.8)
BASOPHILS # BLD: 0.06 K/UL (ref 0–0.12)
BILIRUB SERPL-MCNC: 0.6 MG/DL (ref 0.1–1.5)
BUN SERPL-MCNC: 20 MG/DL (ref 8–22)
CALCIUM ALBUM COR SERPL-MCNC: 9.4 MG/DL (ref 8.5–10.5)
CALCIUM SERPL-MCNC: 9.6 MG/DL (ref 8.5–10.5)
CHLORIDE SERPL-SCNC: 104 MMOL/L (ref 96–112)
CO2 SERPL-SCNC: 21 MMOL/L (ref 20–33)
CREAT SERPL-MCNC: 1.64 MG/DL (ref 0.5–1.4)
EKG IMPRESSION: NORMAL
EOSINOPHIL # BLD AUTO: 0.08 K/UL (ref 0–0.51)
EOSINOPHIL NFR BLD: 1.5 % (ref 0–6.9)
ERYTHROCYTE [DISTWIDTH] IN BLOOD BY AUTOMATED COUNT: 47.1 FL (ref 35.9–50)
GFR SERPLBLD CREATININE-BSD FMLA CKD-EPI: 53 ML/MIN/1.73 M 2
GLOBULIN SER CALC-MCNC: 2.8 G/DL (ref 1.9–3.5)
GLUCOSE SERPL-MCNC: 101 MG/DL (ref 65–99)
HCT VFR BLD AUTO: 38.8 % (ref 42–52)
HGB BLD-MCNC: 12.1 G/DL (ref 14–18)
IMM GRANULOCYTES # BLD AUTO: 0.03 K/UL (ref 0–0.11)
IMM GRANULOCYTES NFR BLD AUTO: 0.6 % (ref 0–0.9)
INR PPP: 1.05 (ref 0.87–1.13)
LYMPHOCYTES # BLD AUTO: 1.77 K/UL (ref 1–4.8)
LYMPHOCYTES NFR BLD: 33.8 % (ref 22–41)
MCH RBC QN AUTO: 22.8 PG (ref 27–33)
MCHC RBC AUTO-ENTMCNC: 31.2 G/DL (ref 32.3–36.5)
MCV RBC AUTO: 73.2 FL (ref 81.4–97.8)
MONOCYTES # BLD AUTO: 0.68 K/UL (ref 0–0.85)
MONOCYTES NFR BLD AUTO: 13 % (ref 0–13.4)
NEUTROPHILS # BLD AUTO: 2.62 K/UL (ref 1.82–7.42)
NEUTROPHILS NFR BLD: 50 % (ref 44–72)
NRBC # BLD AUTO: 0 K/UL
NRBC BLD-RTO: 0 /100 WBC (ref 0–0.2)
NT-PROBNP SERPL IA-MCNC: 60 PG/ML (ref 0–125)
PLATELET # BLD AUTO: 334 K/UL (ref 164–446)
PMV BLD AUTO: 8.8 FL (ref 9–12.9)
POTASSIUM SERPL-SCNC: 4.1 MMOL/L (ref 3.6–5.5)
PROT SERPL-MCNC: 7 G/DL (ref 6–8.2)
PROTHROMBIN TIME: 13.8 SEC (ref 12–14.6)
RBC # BLD AUTO: 5.3 M/UL (ref 4.7–6.1)
SODIUM SERPL-SCNC: 139 MMOL/L (ref 135–145)
TROPONIN T SERPL-MCNC: 10 NG/L (ref 6–19)
TROPONIN T SERPL-MCNC: 6 NG/L (ref 6–19)
WBC # BLD AUTO: 5.2 K/UL (ref 4.8–10.8)

## 2024-09-01 PROCEDURE — 83880 ASSAY OF NATRIURETIC PEPTIDE: CPT

## 2024-09-01 PROCEDURE — 96374 THER/PROPH/DIAG INJ IV PUSH: CPT

## 2024-09-01 PROCEDURE — 96375 TX/PRO/DX INJ NEW DRUG ADDON: CPT

## 2024-09-01 PROCEDURE — 93005 ELECTROCARDIOGRAM TRACING: CPT | Performed by: EMERGENCY MEDICINE

## 2024-09-01 PROCEDURE — 80053 COMPREHEN METABOLIC PANEL: CPT

## 2024-09-01 PROCEDURE — 85730 THROMBOPLASTIN TIME PARTIAL: CPT

## 2024-09-01 PROCEDURE — 700111 HCHG RX REV CODE 636 W/ 250 OVERRIDE (IP): Performed by: EMERGENCY MEDICINE

## 2024-09-01 PROCEDURE — 700102 HCHG RX REV CODE 250 W/ 637 OVERRIDE(OP): Performed by: EMERGENCY MEDICINE

## 2024-09-01 PROCEDURE — 85025 COMPLETE CBC W/AUTO DIFF WBC: CPT

## 2024-09-01 PROCEDURE — 84484 ASSAY OF TROPONIN QUANT: CPT | Mod: 91

## 2024-09-01 PROCEDURE — 36415 COLL VENOUS BLD VENIPUNCTURE: CPT

## 2024-09-01 PROCEDURE — 99285 EMERGENCY DEPT VISIT HI MDM: CPT

## 2024-09-01 PROCEDURE — 700117 HCHG RX CONTRAST REV CODE 255: Performed by: EMERGENCY MEDICINE

## 2024-09-01 PROCEDURE — 85610 PROTHROMBIN TIME: CPT

## 2024-09-01 PROCEDURE — 74175 CTA ABDOMEN W/CONTRAST: CPT

## 2024-09-01 PROCEDURE — A9270 NON-COVERED ITEM OR SERVICE: HCPCS | Performed by: EMERGENCY MEDICINE

## 2024-09-01 RX ORDER — HYDROMORPHONE HYDROCHLORIDE 1 MG/ML
1 INJECTION, SOLUTION INTRAMUSCULAR; INTRAVENOUS; SUBCUTANEOUS ONCE
Status: COMPLETED | OUTPATIENT
Start: 2024-09-01 | End: 2024-09-01

## 2024-09-01 RX ORDER — OXYCODONE HYDROCHLORIDE 5 MG/1
5 TABLET ORAL ONCE
Status: COMPLETED | OUTPATIENT
Start: 2024-09-01 | End: 2024-09-01

## 2024-09-01 RX ORDER — KETOROLAC TROMETHAMINE 15 MG/ML
15 INJECTION, SOLUTION INTRAMUSCULAR; INTRAVENOUS ONCE
Status: COMPLETED | OUTPATIENT
Start: 2024-09-01 | End: 2024-09-01

## 2024-09-01 RX ADMIN — HYDROMORPHONE HYDROCHLORIDE 1 MG: 1 INJECTION, SOLUTION INTRAMUSCULAR; INTRAVENOUS; SUBCUTANEOUS at 02:59

## 2024-09-01 RX ADMIN — OXYCODONE HYDROCHLORIDE 5 MG: 5 TABLET ORAL at 05:26

## 2024-09-01 RX ADMIN — KETOROLAC TROMETHAMINE 15 MG: 15 INJECTION, SOLUTION INTRAMUSCULAR; INTRAVENOUS at 05:26

## 2024-09-01 RX ADMIN — IOHEXOL 100 ML: 350 INJECTION, SOLUTION INTRAVENOUS at 03:12

## 2024-09-01 ASSESSMENT — FIBROSIS 4 INDEX: FIB4 SCORE: 0.39

## 2024-09-01 NOTE — ED NOTES
Rounded on patient. Patient resting in bed. Denies further needs at this time. Call light within reach.

## 2024-09-01 NOTE — ED PROVIDER NOTES
ED Provider Note    Scribed for Dr. Guajardo by Dejah Amador. 9/1/2024,  2:36 AM.      CHIEF COMPLAINT  Chief Complaint   Patient presents with    Chest Pain     [facility] for sudden onset chest pain from home.  Pt states he was sitting watching tv when symptoms started.   Pt had valve repair in January 25th. Pt reports sharp pain radiating midsternal to left side.  Pt received 100mcg fent enroute, 325 aspirin and nitro with no relief of sx       EXTERNAL RECORDS REVIEWED  Inpatient Notes patient Hospitalized 6/20/2024 for syncope, acute kidney injury    HPI  LIMITATION TO HISTORY   Select: : None  OUTSIDE HISTORIAN(S):  None    Roberto Braswell is a 44 y.o. male who presents to the Emergency Department for chest pain onset prior to arrival. Patient explains he was sitting watching TV when his symptoms suddenly started. He reports endorsing a sharp stabbing pain that radiates mid sternal to his left side. He also endorses shortness of breath. He states his pain is 9/10 at this time. He adds bending down exacerbates the pain. Patient explains he had nitro valve repair done in January. He also has history of GI bleed, noting he has had to have blood transfusions because of it. Patient does not use blood thinners.     REVIEW OF SYSTEMS  See HPI for further details. All other systems are negative.     PAST MEDICAL HISTORY     Past Medical History:   Diagnosis Date    Arthritis     Right ankle    Breath shortness 11/03/2023    With exertion.    Drug-seeking behavior 07/30/2023    Demanding narcotics for a duodenal ulcer July 2023. Refused antacids or sucralfate.     Duodenal ulcer 07/30/2023    Gastric ulcer     Heart valve disease     Mitral valve prolapse    Hemorrhagic disorder (HCC)     GI bleed    Hypertension     Pain     Resolved; Right ankle    Seizure (HCC)     while taking tramadol       SURGICAL HISTORY  Past Surgical History:   Procedure Laterality Date    CAPSULE ENDOSCOPY ADMINISTRATION N/A  5/28/2024    Procedure: ADMINISTRATION, CAPSULE, FOR CAPSULE ENDOSCOPY;  Surgeon: Janae Oliver M.D.;  Location: SURGERY SAME DAY [facility];  Service: Gastroenterology    CAPSULE ENDOSCOPY RETRIEVAL  5/28/2024    Procedure: RETRIEVAL, ENDOSCOPY CAPSULE;  Surgeon: Janae Oliver M.D.;  Location: SURGERY SAME DAY [facility];  Service: Gastroenterology    NJ COLONOSCOPY,DIAGNOSTIC  5/27/2024    Procedure: COLONOSCOPY;  Surgeon: Janae Oliver M.D.;  Location: SURGERY [facility];  Service: Gastroenterology    NJ UPPER GI ENDOSCOPY,DIAGNOSIS N/A 5/25/2024    Procedure: GASTROSCOPY;  Surgeon: Janae Oliver M.D.;  Location: SURGERY [facility];  Service: Gastroenterology    NJ UPPER GI ENDOSCOPY,BIOPSY N/A 5/25/2024    Procedure: GASTROSCOPY, WITH BIOPSY;  Surgeon: Janae Oliver M.D.;  Location: SURGERY [facility];  Service: Gastroenterology    NJ UPPER GI ENDOSCOPY,DIAGNOSIS N/A 5/18/2024    Procedure: GASTROSCOPY;  Surgeon: Kaitlyn Ontiveros M.D.;  Location: SURGERY [facility];  Service: Gastroenterology    NJ UPPER GI ENDOSCOPY,BIOPSY N/A 5/18/2024    Procedure: GASTROSCOPY, WITH BIOPSY;  Surgeon: Kaitlyn Ontiveros M.D.;  Location: [facility];  Service: Gastroenterology    GASTROSCOPY WITH ENDOSTAT N/A 5/18/2024    Procedure: EGD, WITH CAUTERIZATION;  Surgeon: Kaitlyn Ontiveros M.D.;  Location: SURGERY [facility];  Service: Gastroenterology    NJ REPLACEMENT OF MITRAL VALVE  1/25/2024    Procedure: MITRAL VALVE REPAIR, TRANSESOPHAGEAL ECHOCARDIOGRAM;  Surgeon: Bigg Santana M.D.;  Location: [facility];  Service: Cardiothoracic    ECHOCARDIOGRAM, TRANSESOPHAGEAL, INTRAOPERATIVE  1/25/2024    Procedure: ECHOCARDIOGRAM, TRANSESOPHAGEAL, INTRAOPERATIVE;  Surgeon: Bigg Santana M.D.;  Location: [facility];  Service: Cardiothoracic    NJ UPPER GI ENDOSCOPY,DIAGNOSIS N/A 9/14/2023    Procedure: GASTROSCOPY;  Surgeon: Johnny Dalton M.D.;  Location: SURGERY SAME DAY  [facility];  Service: Gastroenterology    [place] UPPER GI ENDOSCOPY,BIOPSY N/A 9/14/2023    Procedure: GASTROSCOPY, WITH BIOPSY;  Surgeon: Johnny Dalton M.D.;  Location: SURGERY SAME DAY [facility];  Service: Gastroenterology    [place] UPPER GI ENDOSCOPY,BIOPSY N/A 7/30/2023    Procedure: GASTROSCOPY, WITH BIOPSY;  Surgeon: Kumar Hope M.D.;  Location: SURGERY [facility];  Service: Gastroenterology    [place] UPPER GI ENDOSCOPY,DIAGNOSIS  3/28/2022    Procedure: GASTROSCOPY;  Surgeon: Paco Wiggins M.D.;  Location: SURGERY SAME DAY [facility];  Service: Gastroenterology    [place] UPPER GI ENDOSCOPY,BIOPSY  3/28/2022    Procedure: GASTROSCOPY, WITH BIOPSY;  Surgeon: Paco Wiggins M.D.;  Location: SURGERY SAME DAY [facility];  Service: Gastroenterology    ANKLE ARTHROSCOPY Right 5/21/2018    Procedure: ANKLE ARTHROSCOPY, LATERAL LIGAMENT RECONSTRUCTION;  Surgeon: Seth Cruz M.D.;  Location: SURGERY [facility];  Service: Orthopedics    BIOPSY ORTHO Right 5/21/2018    Procedure: BIOPSY ORTHO/ FOR CARTILAGE AND DENOVO PROCEDURE;  Surgeon: Seth Cruz M.D.;  Location: SURGERY [facility];  Service: Orthopedics    OTHER ABDOMINAL SURGERY      Cholecystectomy February 2017    OTHER ORTHOPEDIC SURGERY      2 previous ankle surgeries (2007, 2009)       FAMILY HISTORY  Family History   Problem Relation Age of Onset    Heart Disease Mother     No Known Problems Father     Clotting Disorder Neg Hx        SOCIAL HISTORY    reports that he has never smoked. He has never used smokeless tobacco. He reports that he does not currently use alcohol. He reports that he does not use drugs.    CURRENT MEDICATIONS  Home Medications       Reviewed by Radha Salazar R.N. (Registered Nurse) on 09/01/24 at 0224  Med List Status: Partial     Medication Last Dose Status   acetaminophen (TYLENOL) 500 MG Tab  Active   aspirin 81 MG EC tablet  Active   baclofen (LIORESAL) 10 MG Tab  Active   cholecalciferol (D3 5000) 5000 UNIT Cap  Active  "  gabapentin (NEURONTIN) 300 MG Cap  Active   hydrALAZINE (APRESOLINE) 10 MG Tab  Active   losartan (COZAAR) 50 MG Tab  Active   metoprolol tartrate (LOPRESSOR) 25 MG Tab  Active   omeprazole (PRILOSEC) 20 MG delayed-release capsule  Active   ondansetron (ZOFRAN ODT) 4 MG TABLET DISPERSIBLE  Active   sucralfate (CARAFATE) 1 GM Tab  Active                    ALLERGIES  Allergies   Allergen Reactions    Morphine Vomiting    Tramadol Unspecified     Seizure  [id number]       PHYSICAL EXAM  VITAL SIGNS: /75   Pulse 75   Temp 37 °C (98.6 °F) (Temporal)   Resp 12   Ht 1.905 m (6' 3\")   Wt 108 kg (239 lb)   SpO2 92%   BMI 29.87 kg/m²   Con: Comfortable  HENT: Normocephalic, atraumatic, Oropharynx within normal limits  Eyes: Normal conjuctiva  Resp: Clear to auscultation, no wheezes, rales or crackles  CV: Regular Rate and Rhythm, Normal first and second heart sounds, no murmurs, rubs or gallups. Equal radial pulses. No pedal edema.  Sternotomy scar present  Abd: Soft, Nontender, Nondistended, no rebound or guarding.  : No costovertebral angle tenderness  MSK: No deformities. No calf tenderness  Skin: No rash, Warm and dry, No petechiae  Neuro: Speech fluent  Psych: Normal mood/mentation    DIAGNOSTIC STUDIES / PROCEDURES  EKG  I independently interpreted this EKG.  Results for orders placed or performed during the hospital encounter of 24   EKG   Result Value Ref Range    Report       [facility] Emergency Dept.    Test Date:  2024  Pt Name:    PATI MERCEDES           Department: ER  MRN:        [id number]                      Room:       RD 09  Gender:     Male                         Technician: [id number]  :        1980                   Requested By:ER TRIAGE PROTOCOL  Order #:    [id number]                    Reading MD: Kermit Guajardo    Measurements  Intervals                                Axis  Rate:       72                           P:          57  WA:         164     "                      QRS:        -15  QRSD:       98                           T:          46  QT:         405  QTc:        444    Interpretive Statements  Sinus rhythm  Borderline left axis deviation  Low voltage, precordial leads  Abnormal inferior Q waves  Baseline wander in lead(s) II,III,aVF  Compared to ECG 06/20/2024 22:03:39  Low QRS voltage now present  Myocardial infarct finding no longer present  Electronically Signed On 09- 02:54:49 PDT by Kermit harrell         LABS  Labs Reviewed   CBC WITH DIFFERENTIAL - Abnormal; Notable for the following components:       Result Value    Hemoglobin 12.1 (*)     Hematocrit 38.8 (*)     MCV 73.2 (*)     MCH 22.8 (*)     MCHC 31.2 (*)     MPV 8.8 (*)     All other components within normal limits   COMP METABOLIC PANEL - Abnormal; Notable for the following components:    Glucose 101 (*)     Creatinine 1.64 (*)     All other components within normal limits   ESTIMATED GFR - Abnormal; Notable for the following components:    GFR (CKD-EPI) 53 (*)     All other components within normal limits   TROPONIN   TROPONIN   APTT   PROTHROMBIN TIME   PROBRAIN NATRIURETIC PEPTIDE, NT         RADIOLOGY  I have independently interpreted the diagnostic imaging associated with this visit.  My preliminary interpretation is as follows: CTA chest: No dissection  Radiologist interpretation:    CT-CTA COMPLETE THORACOABDOMINAL AORTA   Final Result      No aortic aneurysm or dissection identified.                            COURSE & MEDICAL DECISION MAKING  Pertinent Labs & Imaging studies were reviewed. (See chart for details)    2:36 AM Patient seen and examined at bedside. They present to the ED for chest pain. I discussed plan of care. Patient verbalizes understanding and agreement to this plan of care.     3:26 AM - I reassessed the patient at this time.     6:00 AM - Patient was reevaluated at bedside. Discussed lab and radiology results with the patient and informed them they were  reassuring. Patient had the opportunity to ask any questions. The plan for discharge was discussed with them and they were told to return for any new or worsening symptoms. He was also informed of the plans for follow up. Patient is understanding and agreeable to the plan for discharge.    INITIAL ASSESSMENT AND PLAN  Medical Decision Making: Patient presented with sudden onset of left-sided chest pain.  He did have relatively recent cardiac surgery.  CTA performed demonstrates no pericardial effusion, no aortic dissection, no large pulmonary embolus.  EKG nonischemic.  Troponin negative.  Repeat troponin also negative.  This point time, no evidence of ACS, pulmonary embolism, dissection, pericardial effusion, any other dangerous condition.  On reassessment, patient is feeling improved.  Unclear etiology for the patient's chest pain but does not appear to represent a dangerous condition.    ADDITIONAL PROBLEM LIST AND DISPOSITION    I have discussed management of the patient with the following medical professionals:  None    Escalation of care considered, and ultimately not performed: acute inpatient care management, however at this time, the patient is most appropriate for outpatient management.     Barriers to care at this time, including but not limited to:  None .     Decision tools and prescription drugs considered including, but not limited to:  Heart score:1 .    The patient will return for new or worsening symptoms and is stable at the time of discharge.    The patient is referred to a primary physician for blood pressure management, diabetic screening, and for all other preventative health concerns.    DISPOSITION:  Patient will be discharged home in stable condition.    FOLLOW UP:  Sal Schafer D.O.  [address] [address]  [phone]    Schedule an appointment as soon as possible for a visit       [facility], Emergency Dept  [facility] [facility]  [address]  [phone]    If symptoms worsen        FINAL IMPRESSION  1. Chest pain, unspecified type        I, Dejah Amador (Scribe), am scribing for, and in the presence of, Kermit Guajardo M.D..    Electronically signed by: Dejah Amador (Scribaanli), 9/1/2024    IKermit M.D. personally performed the services described in this documentation, as scribed by Dejah Amador in my presence, and it is both accurate and complete.    The note accurately reflects work and decisions made by me.  Kermit Guajardo M.D.  9/1/2024  8:13 AM      This dictation was created using voice recognition software. The accuracy of the dictation is limited to the abilities of the software. I expect there may be some errors of grammar and possibly content. The nursing notes were reviewed and certain aspects of this information were incorporated into this note.

## 2024-09-01 NOTE — Clinical Note
Roberto Braswell was seen and treated in our emergency department on 9/1/2024.  He may return to work on 09/02/2024.       If you have any questions or concerns, please don't hesitate to call.      Kermit Guajardo M.D.

## 2024-09-01 NOTE — ED TRIAGE NOTES
Chief Complaint   Patient presents with    Chest Pain     [facility] for sudden onset chest pain from home.  Pt states he was sitting watching tv when symptoms started.   Pt had valve repair in January 25th. Pt reports sharp pain radiating midsternal to left side.  Pt received 100mcg fent enroute, 325 aspirin and nitro with no relief of sx

## 2024-09-01 NOTE — ED NOTES
Discharge instructions reviewed with patient and signed. IV was removed from arm.  He verbalized understanding of follow up instructions. All belongings with patient. Pt ambulates with a steady gait

## 2024-09-03 ENCOUNTER — APPOINTMENT (OUTPATIENT)
Dept: MEDICAL GROUP | Facility: LAB | Age: 44
End: 2024-09-03

## 2024-09-03 VITALS
SYSTOLIC BLOOD PRESSURE: 132 MMHG | TEMPERATURE: 97.2 F | WEIGHT: 241.84 LBS | HEART RATE: 82 BPM | DIASTOLIC BLOOD PRESSURE: 88 MMHG | HEIGHT: 75 IN | OXYGEN SATURATION: 98 % | RESPIRATION RATE: 18 BRPM | BODY MASS INDEX: 30.07 KG/M2

## 2024-09-03 DIAGNOSIS — F51.01 PRIMARY INSOMNIA: ICD-10-CM

## 2024-09-03 DIAGNOSIS — N52.2 DRUG-INDUCED ERECTILE DYSFUNCTION: ICD-10-CM

## 2024-09-03 PROBLEM — K62.5 BRIGHT RED BLOOD PER RECTUM: Status: RESOLVED | Noted: 2023-09-13 | Resolved: 2024-09-03

## 2024-09-03 PROBLEM — N17.0 ACUTE KIDNEY INJURY (AKI) WITH ACUTE TUBULAR NECROSIS (ATN) (HCC): Status: RESOLVED | Noted: 2024-06-20 | Resolved: 2024-09-03

## 2024-09-03 PROBLEM — K92.0 HEMATEMESIS: Status: RESOLVED | Noted: 2023-11-17 | Resolved: 2024-09-03

## 2024-09-03 PROBLEM — R55 SYNCOPE AND COLLAPSE: Status: RESOLVED | Noted: 2024-06-20 | Resolved: 2024-09-03

## 2024-09-03 PROBLEM — M54.2 ACUTE NECK PAIN: Status: RESOLVED | Noted: 2024-06-20 | Resolved: 2024-09-03

## 2024-09-03 PROBLEM — K26.9 DUODENAL ULCER: Status: RESOLVED | Noted: 2023-11-18 | Resolved: 2024-09-03

## 2024-09-03 PROBLEM — R10.9 ABDOMINAL PAIN: Status: RESOLVED | Noted: 2023-11-18 | Resolved: 2024-09-03

## 2024-09-03 PROBLEM — K92.2 UPPER GI BLEED: Status: RESOLVED | Noted: 2024-05-23 | Resolved: 2024-09-03

## 2024-09-03 PROCEDURE — 3079F DIAST BP 80-89 MM HG: CPT | Performed by: STUDENT IN AN ORGANIZED HEALTH CARE EDUCATION/TRAINING PROGRAM

## 2024-09-03 PROCEDURE — 99214 OFFICE O/P EST MOD 30 MIN: CPT | Performed by: STUDENT IN AN ORGANIZED HEALTH CARE EDUCATION/TRAINING PROGRAM

## 2024-09-03 PROCEDURE — 3075F SYST BP GE 130 - 139MM HG: CPT | Performed by: STUDENT IN AN ORGANIZED HEALTH CARE EDUCATION/TRAINING PROGRAM

## 2024-09-03 RX ORDER — ZOLPIDEM TARTRATE 10 MG/1
10 TABLET ORAL NIGHTLY PRN
Qty: 30 TABLET | Refills: 2 | Status: SHIPPED | OUTPATIENT
Start: 2024-09-03 | End: 2024-12-02

## 2024-09-03 RX ORDER — TADALAFIL 10 MG/1
10 TABLET ORAL
Qty: 20 TABLET | Refills: 3 | Status: SHIPPED | OUTPATIENT
Start: 2024-09-03

## 2024-09-03 ASSESSMENT — ENCOUNTER SYMPTOMS: INSOMNIA: 1

## 2024-09-03 ASSESSMENT — FIBROSIS 4 INDEX: FIB4 SCORE: 0.65

## 2024-09-03 NOTE — PROGRESS NOTES
Subjective:     CC:   Chief Complaint   Patient presents with    Sleep Disruption    Hernia        HPI:   History of Present Illness  The patient is a 44-year-old male who presents here to discuss multiple issues.    He has been experiencing pain from a hernia on his left side, which was not problematic before. The hernia was identified through CT scans in the inguinal region and not where he feels pain.    He underwent successful open heart surgery in 01/2024, which required him to be on blood thinners for three months.He has an upcoming appointment with his cardiologist. He was previously on losartan and metoprolol, but these were discontinued and are now taken as needed. He continues to take gabapentin and baclofen daily. The mitral valve repair was successful, with minimal leakage reported.    He reports a decrease in sexual drive and difficulty initiating erections, although maintaining them was a challenge even prior to the surgery.    He has been struggling with sleep, finding it difficult to both fall asleep and stay asleep. He typically sleeps on his side, but this has been challenging due to his recent surgery. Over-the-counter sleep aids like ZzzQuil and melatonin have not been effective. Trazodone, which he has tried in the past, left him feeling unwell upon waking. However, Ambien has been beneficial for him in the past.       Problem   Drug-Induced Erectile Dysfunction    Likely secondary to gabapentin      Primary Insomnia    Difficulty staying asleep and falling asleep      Acute Kidney Injury ([place]) With Acute Tubular Necrosis ([place]) (Hcc) (Resolved)   Syncope and Collapse (Resolved)   Acute Neck Pain (Resolved)   Upper GI Bleed (Resolved)   Duodenal Ulcer (Resolved)   Abdominal Pain (Resolved)   Hematemesis (Resolved)   Bright Red Blood Per Rectum (Resolved)       Current Outpatient Medications Ordered in Epic   Medication Sig Dispense Refill    tadalafil (CIALIS) 10 MG tablet Take 1 Tablet by mouth  "1 time a day as needed for Erectile Dysfunction. 20 Tablet 3    zolpidem (AMBIEN) 10 MG Tab Take 1 Tablet by mouth at bedtime as needed for Sleep for up to 90 days. 30 Tablet 2    hydrALAZINE (APRESOLINE) 10 MG Tab Take 1 Tablet by mouth 3 times a day as needed (SBP > 160). 30 Tablet 0    omeprazole (PRILOSEC) 20 MG delayed-release capsule Take 20 mg by mouth every day.      cholecalciferol (D3 5000) 5000 UNIT Cap Take 10,000 Units by mouth every day. 2 capsules= [id number]      ondansetron (ZOFRAN ODT) 4 MG TABLET DISPERSIBLE Take 1 Tablet by mouth every 6 hours as needed for Nausea/Vomiting for up to 12 doses. 10 Tablet 0    gabapentin (NEURONTIN) 300 MG Cap Take 300 mg by mouth 3 times a day.      aspirin 81 MG EC tablet Take 81 mg by mouth every day.      baclofen (LIORESAL) 10 MG Tab Take 10 mg by mouth 3 times a day. Indications: Muscle Spasm      acetaminophen (TYLENOL) 500 MG Tab Take 500-1,000 mg by mouth every 8 hours as needed for Mild Pain. Indications: Pain      sucralfate (CARAFATE) 1 GM Tab Take 1 g by mouth 4 Times a Day,Before Meals and at Bedtime.       No current Epic-ordered facility-administered medications on file.           ROS:  Review of Systems   Psychiatric/Behavioral:  The patient has insomnia.        Objective:     Exam:  /88 (BP Location: Right arm, Patient Position: Sitting, BP Cuff Size: Adult)   Pulse 82   Temp 36.2 °C (97.2 °F) (Temporal)   Resp 18   Ht 1.905 m (6' 3\")   Wt 110 kg (241 lb 13.5 oz)   SpO2 98%   BMI 30.23 kg/m²  Body mass index is 30.23 kg/m².    Physical Exam              Assessment & Plan:     Problem List Items Addressed This Visit       Drug-induced erectile dysfunction     chronic         Relevant Medications    tadalafil (CIALIS) 10 MG tablet    Primary insomnia     chronic  -cs signed, pdmp checked         Relevant Medications    zolpidem (AMBIEN) 10 MG Tab    Other Relevant Orders    Controlled Substance Treatment Agreement     Assessment & Plan  1. " Erectile Dysfunction.  The erectile dysfunction is likely a side effect of gabapentin. The potential impact of Cialis or Viagra on heart function was discussed, considering his heart surgery. Per my literature review sexual activity can typically be resumed 2 to 8 weeks post-surgery, and it has been approximately 9 months since his operation. The differences between Cialis and Viagra were explained. A prescription for Cialis 10 mg was provided, with instructions to take it an hour before sexual activity.. If the 10 mg dose proves ineffective, he may increase the dose to 20 mg.    2. Left Inguinal Hernia.  A CT scan from May 2024 revealed a small left inguinal hernia in the groin area. The pain experienced may be referred pain due to recent chest surgery. The plan is to monitor the situation closely.    3. Insomnia.  He struggles with both initiating and maintaining sleep. The use of Ambien was discussed, including its potential to induce a sleep state without providing quality sleep, and the difficulty in discontinuing its use after a prolonged period. The possibility of trying trazodone was also discussed. A prescription for Ambien 10 mg was provided, with a maximum duration of 4 months. He was instructed to sign a controlled substance agreement.            Verbal consent was acquired by the patient to use [employer] ambient listening note generation during this visit Yes          Please note that this dictation was created using voice recognition software. I have made every reasonable attempt to correct obvious errors, but I expect that there are errors of grammar and possibly content that I did not discover before finalizing the note.

## 2024-09-09 ENCOUNTER — OFFICE VISIT (OUTPATIENT)
Dept: CARDIOLOGY | Facility: MEDICAL CENTER | Age: 44
End: 2024-09-09
Attending: STUDENT IN AN ORGANIZED HEALTH CARE EDUCATION/TRAINING PROGRAM
Payer: COMMERCIAL

## 2024-09-09 VITALS
SYSTOLIC BLOOD PRESSURE: 102 MMHG | BODY MASS INDEX: 29.59 KG/M2 | HEART RATE: 91 BPM | HEIGHT: 75 IN | WEIGHT: 238 LBS | OXYGEN SATURATION: 97 % | RESPIRATION RATE: 16 BRPM | DIASTOLIC BLOOD PRESSURE: 64 MMHG

## 2024-09-09 DIAGNOSIS — Z98.890 H/O MITRAL VALVE REPAIR: ICD-10-CM

## 2024-09-09 DIAGNOSIS — I10 ESSENTIAL HYPERTENSION: ICD-10-CM

## 2024-09-09 DIAGNOSIS — E78.00 PURE HYPERCHOLESTEROLEMIA: ICD-10-CM

## 2024-09-09 PROCEDURE — 99212 OFFICE O/P EST SF 10 MIN: CPT

## 2024-09-09 PROCEDURE — 99214 OFFICE O/P EST MOD 30 MIN: CPT

## 2024-09-09 PROCEDURE — 3074F SYST BP LT 130 MM HG: CPT

## 2024-09-09 PROCEDURE — 3078F DIAST BP <80 MM HG: CPT

## 2024-09-09 ASSESSMENT — ENCOUNTER SYMPTOMS
CONSTITUTIONAL NEGATIVE: 1
EYES NEGATIVE: 1
SHORTNESS OF BREATH: 0
DEPRESSION: 0
PND: 0
ORTHOPNEA: 0
NERVOUS/ANXIOUS: 0
MUSCULOSKELETAL NEGATIVE: 1
NEUROLOGICAL NEGATIVE: 1
GASTROINTESTINAL NEGATIVE: 1
PALPITATIONS: 0

## 2024-09-09 ASSESSMENT — FIBROSIS 4 INDEX: FIB4 SCORE: 0.65

## 2024-09-09 NOTE — PROGRESS NOTES
No chief complaint on file.      Subjective     Roberto Braswell is a 44 y.o. male who presents today for follow-up.  He has a history of hypertension, recent GI bleed - duodenal ulcer coiling, and severe mitral regurgitation s/p MVr on 1/25/2024 with Dr. Santana.     Seen by myself on 2/27/2024 he has been having some residual chest wall pain, he has been going on frequent walks with his dogs.  He reports that his wife has been dealing with significant back issues and has also been in the hospital.     Seen by myself on 6/13/2024 for ER follow-up of confusion.  Apparently this was the first occurrence. He reports that leading up to his confusion episode that he had not slept for several days.  He reports a myriad of other complaints including a cyst on his right arm.  He has also had intermittent bilateral lower extremity edema    He had an admission on 6/20/2024 for dehydration and hypotension, resolved with IV fluids.  He was evaluated again in the ER on 9/1/2024 for acute severe 9 out of 10 chest pain, workup was negative for ACS or other acute process, unclear etiology.     Today 9/9/2024 he is back to work now. No recurrence of chest pain since last week. No edema or shortness of breath.  Overall doing well       Past Medical History:   Diagnosis Date    Arthritis     Right ankle    Breath shortness 11/03/2023    With exertion.    Drug-seeking behavior 07/30/2023    Demanding narcotics for a duodenal ulcer July 2023. Refused antacids or sucralfate.     Duodenal ulcer 07/30/2023    Gastric ulcer     Heart valve disease     Mitral valve prolapse    Hemorrhagic disorder (HCC)     GI bleed    Hypertension     Pain     Resolved; Right ankle    Seizure (HCC)     while taking tramadol     Past Surgical History:   Procedure Laterality Date    CAPSULE ENDOSCOPY ADMINISTRATION N/A 5/28/2024    Procedure: ADMINISTRATION, CAPSULE, FOR CAPSULE ENDOSCOPY;  Surgeon: Janae Oliver M.D.;  Location: SURGERY SAME DAY  [facility];  Service: Gastroenterology    CAPSULE ENDOSCOPY RETRIEVAL  5/28/2024    Procedure: RETRIEVAL, ENDOSCOPY CAPSULE;  Surgeon: Janae Oliver M.D.;  Location: SURGERY SAME DAY [facility];  Service: Gastroenterology    NH COLONOSCOPY,DIAGNOSTIC  5/27/2024    Procedure: COLONOSCOPY;  Surgeon: Janae Oliver M.D.;  Location: [facility];  Service: Gastroenterology    NH UPPER GI ENDOSCOPY,DIAGNOSIS N/A 5/25/2024    Procedure: GASTROSCOPY;  Surgeon: Janae Oliver M.D.;  Location: [facility];  Service: Gastroenterology    NH UPPER GI ENDOSCOPY,BIOPSY N/A 5/25/2024    Procedure: GASTROSCOPY, WITH BIOPSY;  Surgeon: Jaane Oliver M.D.;  Location: [facility];  Service: Gastroenterology    NH UPPER GI ENDOSCOPY,DIAGNOSIS N/A 5/18/2024    Procedure: GASTROSCOPY;  Surgeon: Kaitlyn Ontiveros M.D.;  Location: [facility];  Service: Gastroenterology    NH UPPER GI ENDOSCOPY,BIOPSY N/A 5/18/2024    Procedure: GASTROSCOPY, WITH BIOPSY;  Surgeon: Kaitlyn Ontiveros M.D.;  Location: SURGERY [facility];  Service: Gastroenterology    GASTROSCOPY WITH ENDOSTAT N/A 5/18/2024    Procedure: EGD, WITH CAUTERIZATION;  Surgeon: Kaitlyn Ontiveros M.D.;  Location: [facility];  Service: Gastroenterology    NH REPLACEMENT OF MITRAL VALVE  1/25/2024    Procedure: MITRAL VALVE REPAIR, TRANSESOPHAGEAL ECHOCARDIOGRAM;  Surgeon: Bigg Santana M.D.;  Location: [facility];  Service: Cardiothoracic    ECHOCARDIOGRAM, TRANSESOPHAGEAL, INTRAOPERATIVE  1/25/2024    Procedure: ECHOCARDIOGRAM, TRANSESOPHAGEAL, INTRAOPERATIVE;  Surgeon: Bigg Santana M.D.;  Location: [facility];  Service: Cardiothoracic    NH UPPER GI ENDOSCOPY,DIAGNOSIS N/A 9/14/2023    Procedure: GASTROSCOPY;  Surgeon: Johnny Dalton M.D.;  Location: SURGERY SAME DAY [facility];  Service: Gastroenterology    NH UPPER GI ENDOSCOPY,BIOPSY N/A 9/14/2023    Procedure: GASTROSCOPY, WITH BIOPSY;  Surgeon:  Johnny Dalton M.D.;  Location: SURGERY SAME DAY [facility];  Service: Gastroenterology    AK UPPER GI ENDOSCOPY,BIOPSY N/A 7/30/2023    Procedure: GASTROSCOPY, WITH BIOPSY;  Surgeon: Kumar Hope M.D.;  Location: SURGERY [facility];  Service: Gastroenterology    AK UPPER GI ENDOSCOPY,DIAGNOSIS  3/28/2022    Procedure: GASTROSCOPY;  Surgeon: Paco Wiggins M.D.;  Location: SURGERY SAME DAY [facility];  Service: Gastroenterology    AK UPPER GI ENDOSCOPY,BIOPSY  3/28/2022    Procedure: GASTROSCOPY, WITH BIOPSY;  Surgeon: Paco Wiggins M.D.;  Location: SURGERY SAME DAY [facility];  Service: Gastroenterology    ANKLE ARTHROSCOPY Right 5/21/2018    Procedure: ANKLE ARTHROSCOPY, LATERAL LIGAMENT RECONSTRUCTION;  Surgeon: Seth Cruz M.D.;  Location: SURGERY [facility];  Service: Orthopedics    BIOPSY ORTHO Right 5/21/2018    Procedure: BIOPSY ORTHO/ FOR CARTILAGE AND DENOVO PROCEDURE;  Surgeon: Seth Cruz M.D.;  Location: SURGERY [facility];  Service: Orthopedics    OTHER ABDOMINAL SURGERY      Cholecystectomy February 2017    OTHER ORTHOPEDIC SURGERY      2 previous ankle surgeries (2007, 2009)     Family History   Problem Relation Age of Onset    Heart Disease Mother     No Known Problems Father     Clotting Disorder Neg Hx      Social History     Socioeconomic History    Marital status:      Spouse name: Not on file    Number of children: Not on file    Years of education: Not on file    Highest education level: Associate degree: occupational, technical, or vocational program   Occupational History    Not on file   Tobacco Use    Smoking status: Never    Smokeless tobacco: Never   Vaping Use    Vaping status: Never Used   Substance and Sexual Activity    Alcohol use: Not Currently    Drug use: Never    Sexual activity: Not on file   Other Topics Concern    Not on file   Social History Narrative    Not on file     Social Determinants of Health     Financial Resource Strain: Low Risk   (5/19/2024)    Overall Financial Resource Strain (CARDIA)     Difficulty of Paying Living Expenses: Not hard at all   Food Insecurity: No Food Insecurity (6/20/2024)    Hunger Vital Sign     Worried About Running Out of Food in the Last Year: Never true     Ran Out of Food in the Last Year: Never true   Transportation Needs: No Transportation Needs (6/20/2024)    PRAPARE - Transportation     Lack of Transportation (Medical): No     Lack of Transportation (Non-Medical): No   Physical Activity: Sufficiently Active (5/19/2024)    Exercise Vital Sign     Days of Exercise per Week: 6 days     Minutes of Exercise per Session: 120 min   Stress: Stress Concern Present (5/19/2024)    [employer] - Occupational Stress Questionnaire     Feeling of Stress : To some extent   Social Connections: Socially Isolated (5/19/2024)    Social Connection and Isolation Panel [NHANES]     Frequency of Communication with Friends and Family: Once a week     Frequency of Social Gatherings with Friends and Family: Once a week     Attends Yarsani Services: Never     Active Member of Clubs or Organizations: No     Attends Club or Organization Meetings: Never     Marital Status:    Intimate Partner Violence: Not At Risk (6/20/2024)    Humiliation, Afraid, Rape, and Kick questionnaire     Fear of Current or Ex-Partner: No     Emotionally Abused: No     Physically Abused: No     Sexually Abused: No   Housing Stability: Low Risk  (6/20/2024)    Housing Stability Vital Sign     Unable to Pay for Housing in the Last Year: No     Number of Places Lived in the Last Year: 1     Unstable Housing in the Last Year: No     Allergies   Allergen Reactions    Morphine Vomiting    Tramadol Unspecified     Seizure  VCC=[id number]     Outpatient Encounter Medications as of 9/9/2024   Medication Sig Dispense Refill    tadalafil (CIALIS) 10 MG tablet Take 1 Tablet by mouth 1 time a day as needed for Erectile Dysfunction. 20 Tablet 3     "zolpidem (AMBIEN) 10 MG Tab Take 1 Tablet by mouth at bedtime as needed for Sleep for up to 90 days. 30 Tablet 2    hydrALAZINE (APRESOLINE) 10 MG Tab Take 1 Tablet by mouth 3 times a day as needed (SBP > 160). 30 Tablet 0    omeprazole (PRILOSEC) 20 MG delayed-release capsule Take 20 mg by mouth every day.      cholecalciferol (D3 5000) 5000 UNIT Cap Take 10,000 Units by mouth every day. 2 capsules= [id number]      ondansetron (ZOFRAN ODT) 4 MG TABLET DISPERSIBLE Take 1 Tablet by mouth every 6 hours as needed for Nausea/Vomiting for up to 12 doses. 10 Tablet 0    gabapentin (NEURONTIN) 300 MG Cap Take 300 mg by mouth 3 times a day.      aspirin 81 MG EC tablet Take 81 mg by mouth every day.      baclofen (LIORESAL) 10 MG Tab Take 10 mg by mouth 3 times a day. Indications: Muscle Spasm      acetaminophen (TYLENOL) 500 MG Tab Take 500-1,000 mg by mouth every 8 hours as needed for Mild Pain. Indications: Pain      sucralfate (CARAFATE) 1 GM Tab Take 1 g by mouth 4 Times a Day,Before Meals and at Bedtime.       No facility-administered encounter medications on file as of 9/9/2024.     Review of Systems   Constitutional: Negative.    HENT: Negative.     Eyes: Negative.    Respiratory:  Negative for shortness of breath.    Cardiovascular:  Negative for chest pain, palpitations, orthopnea, leg swelling and PND.   Gastrointestinal: Negative.    Genitourinary: Negative.    Musculoskeletal: Negative.    Skin: Negative.    Neurological: Negative.    Endo/Heme/Allergies: Negative.    Psychiatric/Behavioral:  Negative for depression. The patient is not nervous/anxious.               Objective     /64 (BP Location: Left arm, Patient Position: Sitting, BP Cuff Size: Adult)   Pulse 91   Resp 16   Ht 1.905 m (6' 3\")   Wt 108 kg (238 lb)   SpO2 97%   BMI 29.75 kg/m²     Physical Exam  Constitutional:       Appearance: Normal appearance.   HENT:      Head: Normocephalic.   Neck:      Vascular: No JVD.   Cardiovascular:      " Rate and Rhythm: Normal rate and regular rhythm.      Pulses: Normal pulses.      Heart sounds: Normal heart sounds. No murmur heard.     No friction rub.   Pulmonary:      Effort: Pulmonary effort is normal.      Breath sounds: Normal breath sounds.   Abdominal:      Palpations: Abdomen is soft.   Musculoskeletal:         General: Normal range of motion.      Right lower leg: No edema.      Left lower leg: No edema.   Skin:     General: Skin is warm and dry.   Neurological:      General: No focal deficit present.      Mental Status: He is alert and oriented to person, place, and time.   Psychiatric:         Mood and Affect: Mood normal.         Behavior: Behavior normal.            Lab Results   Component Value Date/Time    WBC 5.2 09/01/2024 02:20 AM    RBC 5.30 09/01/2024 02:20 AM    HEMOGLOBIN 12.1 (L) 09/01/2024 02:20 AM    HEMATOCRIT 38.8 (L) 09/01/2024 02:20 AM    MCV 73.2 (L) 09/01/2024 02:20 AM    MCH 22.8 (L) 09/01/2024 02:20 AM    MCHC 31.2 (L) 09/01/2024 02:20 AM    MPV 8.8 (L) 09/01/2024 02:20 AM    NEUTSPOLYS 50.00 09/01/2024 02:20 AM    LYMPHOCYTES 33.80 09/01/2024 02:20 AM    MONOCYTES 13.00 09/01/2024 02:20 AM    EOSINOPHILS 1.50 09/01/2024 02:20 AM    BASOPHILS 1.10 09/01/2024 02:20 AM    HYPOCHROMIA 1+ 06/20/2024 02:02 PM    ANISOCYTOSIS 1+ 06/20/2024 02:02 PM      Lab Results   Component Value Date/Time    CHOLSTRLTOT 183 10/09/2023 09:33 AM     (H) 10/09/2023 09:33 AM    HDL 59 10/09/2023 09:33 AM    TRIGLYCERIDE 69 10/09/2023 09:33 AM       Lab Results   Component Value Date/Time    SODIUM 139 09/01/2024 02:20 AM    POTASSIUM 4.1 09/01/2024 02:20 AM    CHLORIDE 104 09/01/2024 02:20 AM    CO2 21 09/01/2024 02:20 AM    GLUCOSE 101 (H) 09/01/2024 02:20 AM    BUN 20 09/01/2024 02:20 AM    CREATININE 1.64 (H) 09/01/2024 02:20 AM     Lab Results   Component Value Date/Time    ALKPHOSPHAT 63 09/01/2024 02:20 AM    ASTSGOT 28 09/01/2024 02:20 AM    ALTSGPT 31 09/01/2024 02:20 AM    TBILIRUBIN  0.6 09/01/2024 02:20 AM      Echocardiogram 5/28/2024  CONCLUSIONS  Compared to the images of the prior study 9/15/2023, there has been   interval repair of the mitral valve (previously with severe MR in the   setting of mitral valve collapse)  Normal LV size, thickness and systolic function with normal LVEF 60%  Dilated RV with preserved systolic function  Status post mitral valve repair in 1/2024 with appropriate   transvalvular gradients and trace MR.  Trace AI  Normal IVC size  Estimated RVSP 35-40 mmHg (mildly elevated)  No pericardial effusion    Assessment & Plan     1. H/O mitral valve repair        2. Essential hypertension        3. Pure hypercholesterolemia            Medical Decision Making: Today's Assessment/Status/Plan:        Status post Mitral valve repair on 1/25/2024  -Doing well postop  -aspirin 81 mg  -echo on 5/28/2024 showed normal valve function     Hypertension  -good control  - on no medication today with good control, monitor at home and let us know if his blood pressure is not at goal  - goal < 130/80  -Check in in 2 weeks     Hyperlipidemia  -Most recent   -Continue dietary measures to lower cholesterol  -Goal of less than 100  -Check lipid panel annually       Follow up with Caroline MCBRIDE in 4 months     This note was dictated using Dragon speech recognition software.

## 2024-09-16 ENCOUNTER — APPOINTMENT (OUTPATIENT)
Dept: RADIOLOGY | Facility: MEDICAL CENTER | Age: 44
End: 2024-09-16
Attending: EMERGENCY MEDICINE
Payer: COMMERCIAL

## 2024-09-16 ENCOUNTER — HOSPITAL ENCOUNTER (OUTPATIENT)
Facility: MEDICAL CENTER | Age: 44
End: 2024-09-19
Attending: EMERGENCY MEDICINE | Admitting: STUDENT IN AN ORGANIZED HEALTH CARE EDUCATION/TRAINING PROGRAM
Payer: COMMERCIAL

## 2024-09-16 DIAGNOSIS — K92.2 UPPER GI BLEED: ICD-10-CM

## 2024-09-16 DIAGNOSIS — K29.70 GASTRITIS WITHOUT BLEEDING, UNSPECIFIED CHRONICITY, UNSPECIFIED GASTRITIS TYPE: ICD-10-CM

## 2024-09-16 DIAGNOSIS — K20.90 ESOPHAGITIS: ICD-10-CM

## 2024-09-16 DIAGNOSIS — R10.13 EPIGASTRIC PAIN: ICD-10-CM

## 2024-09-16 LAB
ABO GROUP BLD: NORMAL
ALBUMIN SERPL BCP-MCNC: 3.9 G/DL (ref 3.2–4.9)
ALBUMIN/GLOB SERPL: 1.6 G/DL
ALP SERPL-CCNC: 51 U/L (ref 30–99)
ALT SERPL-CCNC: 11 U/L (ref 2–50)
ANION GAP SERPL CALC-SCNC: 14 MMOL/L (ref 7–16)
APPEARANCE UR: ABNORMAL
AST SERPL-CCNC: 16 U/L (ref 12–45)
BACTERIA #/AREA URNS HPF: NEGATIVE /HPF
BASOPHILS # BLD AUTO: 0.9 % (ref 0–1.8)
BASOPHILS # BLD: 0.06 K/UL (ref 0–0.12)
BILIRUB SERPL-MCNC: 0.5 MG/DL (ref 0.1–1.5)
BILIRUB UR QL STRIP.AUTO: NEGATIVE
BLD GP AB SCN SERPL QL: NORMAL
BUN SERPL-MCNC: 10 MG/DL (ref 8–22)
CALCIUM ALBUM COR SERPL-MCNC: 8.4 MG/DL (ref 8.5–10.5)
CALCIUM SERPL-MCNC: 8.3 MG/DL (ref 8.5–10.5)
CHLORIDE SERPL-SCNC: 111 MMOL/L (ref 96–112)
CO2 SERPL-SCNC: 19 MMOL/L (ref 20–33)
COLOR UR: YELLOW
CREAT SERPL-MCNC: 1.15 MG/DL (ref 0.5–1.4)
EKG IMPRESSION: NORMAL
EOSINOPHIL # BLD AUTO: 0.02 K/UL (ref 0–0.51)
EOSINOPHIL NFR BLD: 0.3 % (ref 0–6.9)
EPI CELLS #/AREA URNS HPF: NEGATIVE /HPF
ERYTHROCYTE [DISTWIDTH] IN BLOOD BY AUTOMATED COUNT: 49.3 FL (ref 35.9–50)
GFR SERPLBLD CREATININE-BSD FMLA CKD-EPI: 81 ML/MIN/1.73 M 2
GLOBULIN SER CALC-MCNC: 2.5 G/DL (ref 1.9–3.5)
GLUCOSE SERPL-MCNC: 98 MG/DL (ref 65–99)
GLUCOSE UR STRIP.AUTO-MCNC: NEGATIVE MG/DL
HCT VFR BLD AUTO: 42.2 % (ref 42–52)
HCT VFR BLD AUTO: 43 % (ref 42–52)
HGB BLD-MCNC: 13.3 G/DL (ref 14–18)
HGB BLD-MCNC: 13.6 G/DL (ref 14–18)
HYALINE CASTS #/AREA URNS LPF: ABNORMAL /LPF
IMM GRANULOCYTES # BLD AUTO: 0.01 K/UL (ref 0–0.11)
IMM GRANULOCYTES NFR BLD AUTO: 0.2 % (ref 0–0.9)
KETONES UR STRIP.AUTO-MCNC: ABNORMAL MG/DL
LEUKOCYTE ESTERASE UR QL STRIP.AUTO: NEGATIVE
LIPASE SERPL-CCNC: 29 U/L (ref 11–82)
LYMPHOCYTES # BLD AUTO: 1.94 K/UL (ref 1–4.8)
LYMPHOCYTES NFR BLD: 29.8 % (ref 22–41)
MCH RBC QN AUTO: 23.7 PG (ref 27–33)
MCHC RBC AUTO-ENTMCNC: 32.2 G/DL (ref 32.3–36.5)
MCV RBC AUTO: 73.4 FL (ref 81.4–97.8)
MICRO URNS: ABNORMAL
MONOCYTES # BLD AUTO: 0.62 K/UL (ref 0–0.85)
MONOCYTES NFR BLD AUTO: 9.5 % (ref 0–13.4)
NEUTROPHILS # BLD AUTO: 3.86 K/UL (ref 1.82–7.42)
NEUTROPHILS NFR BLD: 59.3 % (ref 44–72)
NITRITE UR QL STRIP.AUTO: NEGATIVE
NRBC # BLD AUTO: 0 K/UL
NRBC BLD-RTO: 0 /100 WBC (ref 0–0.2)
PH UR STRIP.AUTO: 5.5 [PH] (ref 5–8)
PLATELET # BLD AUTO: 406 K/UL (ref 164–446)
PMV BLD AUTO: 9.1 FL (ref 9–12.9)
POTASSIUM SERPL-SCNC: 3.8 MMOL/L (ref 3.6–5.5)
PROT SERPL-MCNC: 6.4 G/DL (ref 6–8.2)
PROT UR QL STRIP: NEGATIVE MG/DL
RBC # BLD AUTO: 5.75 M/UL (ref 4.7–6.1)
RBC # URNS HPF: ABNORMAL /HPF
RBC UR QL AUTO: NEGATIVE
RH BLD: NORMAL
SODIUM SERPL-SCNC: 144 MMOL/L (ref 135–145)
SP GR UR STRIP.AUTO: 1.03
TROPONIN T SERPL-MCNC: <6 NG/L (ref 6–19)
UROBILINOGEN UR STRIP.AUTO-MCNC: 1 MG/DL
WBC # BLD AUTO: 6.5 K/UL (ref 4.8–10.8)
WBC #/AREA URNS HPF: ABNORMAL /HPF

## 2024-09-16 PROCEDURE — A9270 NON-COVERED ITEM OR SERVICE: HCPCS | Performed by: NURSE PRACTITIONER

## 2024-09-16 PROCEDURE — 700102 HCHG RX REV CODE 250 W/ 637 OVERRIDE(OP): Performed by: NURSE PRACTITIONER

## 2024-09-16 PROCEDURE — 700102 HCHG RX REV CODE 250 W/ 637 OVERRIDE(OP)

## 2024-09-16 PROCEDURE — G0378 HOSPITAL OBSERVATION PER HR: HCPCS

## 2024-09-16 PROCEDURE — 84484 ASSAY OF TROPONIN QUANT: CPT

## 2024-09-16 PROCEDURE — A9270 NON-COVERED ITEM OR SERVICE: HCPCS

## 2024-09-16 PROCEDURE — 80053 COMPREHEN METABOLIC PANEL: CPT

## 2024-09-16 PROCEDURE — 86900 BLOOD TYPING SEROLOGIC ABO: CPT

## 2024-09-16 PROCEDURE — 700111 HCHG RX REV CODE 636 W/ 250 OVERRIDE (IP): Performed by: NURSE PRACTITIONER

## 2024-09-16 PROCEDURE — 99285 EMERGENCY DEPT VISIT HI MDM: CPT

## 2024-09-16 PROCEDURE — 96376 TX/PRO/DX INJ SAME DRUG ADON: CPT

## 2024-09-16 PROCEDURE — 700117 HCHG RX CONTRAST REV CODE 255: Performed by: EMERGENCY MEDICINE

## 2024-09-16 PROCEDURE — 700111 HCHG RX REV CODE 636 W/ 250 OVERRIDE (IP): Mod: JZ

## 2024-09-16 PROCEDURE — 36415 COLL VENOUS BLD VENIPUNCTURE: CPT

## 2024-09-16 PROCEDURE — 85014 HEMATOCRIT: CPT

## 2024-09-16 PROCEDURE — 85018 HEMOGLOBIN: CPT

## 2024-09-16 PROCEDURE — 93005 ELECTROCARDIOGRAM TRACING: CPT | Performed by: EMERGENCY MEDICINE

## 2024-09-16 PROCEDURE — 700102 HCHG RX REV CODE 250 W/ 637 OVERRIDE(OP): Performed by: EMERGENCY MEDICINE

## 2024-09-16 PROCEDURE — A9270 NON-COVERED ITEM OR SERVICE: HCPCS | Performed by: EMERGENCY MEDICINE

## 2024-09-16 PROCEDURE — 81001 URINALYSIS AUTO W/SCOPE: CPT

## 2024-09-16 PROCEDURE — 85025 COMPLETE CBC W/AUTO DIFF WBC: CPT

## 2024-09-16 PROCEDURE — 71045 X-RAY EXAM CHEST 1 VIEW: CPT

## 2024-09-16 PROCEDURE — 96375 TX/PRO/DX INJ NEW DRUG ADDON: CPT

## 2024-09-16 PROCEDURE — 86901 BLOOD TYPING SEROLOGIC RH(D): CPT

## 2024-09-16 PROCEDURE — 96374 THER/PROPH/DIAG INJ IV PUSH: CPT

## 2024-09-16 PROCEDURE — 700105 HCHG RX REV CODE 258

## 2024-09-16 PROCEDURE — 86850 RBC ANTIBODY SCREEN: CPT

## 2024-09-16 PROCEDURE — 700111 HCHG RX REV CODE 636 W/ 250 OVERRIDE (IP): Performed by: EMERGENCY MEDICINE

## 2024-09-16 PROCEDURE — 83690 ASSAY OF LIPASE: CPT

## 2024-09-16 PROCEDURE — 700105 HCHG RX REV CODE 258: Performed by: EMERGENCY MEDICINE

## 2024-09-16 PROCEDURE — 74177 CT ABD & PELVIS W/CONTRAST: CPT

## 2024-09-16 PROCEDURE — 99223 1ST HOSP IP/OBS HIGH 75: CPT | Performed by: STUDENT IN AN ORGANIZED HEALTH CARE EDUCATION/TRAINING PROGRAM

## 2024-09-16 PROCEDURE — 93005 ELECTROCARDIOGRAM TRACING: CPT

## 2024-09-16 RX ORDER — OXYCODONE HYDROCHLORIDE 10 MG/1
10 TABLET ORAL
Status: DISCONTINUED | OUTPATIENT
Start: 2024-09-16 | End: 2024-09-19 | Stop reason: HOSPADM

## 2024-09-16 RX ORDER — BACLOFEN 10 MG/1
10 TABLET ORAL 3 TIMES DAILY
Status: DISCONTINUED | OUTPATIENT
Start: 2024-09-16 | End: 2024-09-19 | Stop reason: HOSPADM

## 2024-09-16 RX ORDER — OXYCODONE HYDROCHLORIDE 10 MG/1
10 TABLET ORAL EVERY 4 HOURS PRN
COMMUNITY
Start: 2024-08-11

## 2024-09-16 RX ORDER — ZOLPIDEM TARTRATE 5 MG/1
10 TABLET ORAL NIGHTLY PRN
Status: DISCONTINUED | OUTPATIENT
Start: 2024-09-16 | End: 2024-09-19 | Stop reason: HOSPADM

## 2024-09-16 RX ORDER — PANTOPRAZOLE SODIUM 40 MG/10ML
40 INJECTION, POWDER, LYOPHILIZED, FOR SOLUTION INTRAVENOUS 2 TIMES DAILY
Status: DISCONTINUED | OUTPATIENT
Start: 2024-09-16 | End: 2024-09-19 | Stop reason: HOSPADM

## 2024-09-16 RX ORDER — HYDROMORPHONE HYDROCHLORIDE 1 MG/ML
0.5 INJECTION, SOLUTION INTRAMUSCULAR; INTRAVENOUS; SUBCUTANEOUS ONCE
Status: COMPLETED | OUTPATIENT
Start: 2024-09-16 | End: 2024-09-16

## 2024-09-16 RX ORDER — SODIUM CHLORIDE, SODIUM LACTATE, POTASSIUM CHLORIDE, CALCIUM CHLORIDE 600; 310; 30; 20 MG/100ML; MG/100ML; MG/100ML; MG/100ML
1000 INJECTION, SOLUTION INTRAVENOUS ONCE
Status: COMPLETED | OUTPATIENT
Start: 2024-09-16 | End: 2024-09-16

## 2024-09-16 RX ORDER — HYDROMORPHONE HYDROCHLORIDE 1 MG/ML
1 INJECTION, SOLUTION INTRAMUSCULAR; INTRAVENOUS; SUBCUTANEOUS ONCE
Status: COMPLETED | OUTPATIENT
Start: 2024-09-16 | End: 2024-09-16

## 2024-09-16 RX ORDER — SODIUM CHLORIDE, SODIUM LACTATE, POTASSIUM CHLORIDE, CALCIUM CHLORIDE 600; 310; 30; 20 MG/100ML; MG/100ML; MG/100ML; MG/100ML
INJECTION, SOLUTION INTRAVENOUS CONTINUOUS
Status: ACTIVE | OUTPATIENT
Start: 2024-09-16 | End: 2024-09-16

## 2024-09-16 RX ORDER — OXYCODONE HYDROCHLORIDE 10 MG/1
10 TABLET ORAL EVERY 6 HOURS PRN
Status: DISCONTINUED | OUTPATIENT
Start: 2024-09-16 | End: 2024-09-16

## 2024-09-16 RX ORDER — PANTOPRAZOLE SODIUM 40 MG/10ML
40 INJECTION, POWDER, LYOPHILIZED, FOR SOLUTION INTRAVENOUS ONCE
Status: COMPLETED | OUTPATIENT
Start: 2024-09-16 | End: 2024-09-16

## 2024-09-16 RX ORDER — SUCRALFATE 1 G/1
1 TABLET ORAL
Status: DISCONTINUED | OUTPATIENT
Start: 2024-09-16 | End: 2024-09-17

## 2024-09-16 RX ORDER — HYDROMORPHONE HYDROCHLORIDE 2 MG/ML
2 INJECTION, SOLUTION INTRAMUSCULAR; INTRAVENOUS; SUBCUTANEOUS ONCE
Status: COMPLETED | OUTPATIENT
Start: 2024-09-16 | End: 2024-09-16

## 2024-09-16 RX ORDER — GABAPENTIN 300 MG/1
300 CAPSULE ORAL 3 TIMES DAILY
Status: DISCONTINUED | OUTPATIENT
Start: 2024-09-16 | End: 2024-09-19 | Stop reason: HOSPADM

## 2024-09-16 RX ORDER — ALUMINA, MAGNESIA, AND SIMETHICONE 2400; 2400; 240 MG/30ML; MG/30ML; MG/30ML
20 SUSPENSION ORAL ONCE
Status: COMPLETED | OUTPATIENT
Start: 2024-09-16 | End: 2024-09-16

## 2024-09-16 RX ADMIN — HYDROMORPHONE HYDROCHLORIDE 2 MG: 2 INJECTION, SOLUTION INTRAMUSCULAR; INTRAVENOUS; SUBCUTANEOUS at 05:00

## 2024-09-16 RX ADMIN — OXYCODONE HYDROCHLORIDE 10 MG: 10 TABLET ORAL at 11:07

## 2024-09-16 RX ADMIN — OXYCODONE HYDROCHLORIDE 10 MG: 10 TABLET ORAL at 16:08

## 2024-09-16 RX ADMIN — GABAPENTIN 300 MG: 300 CAPSULE ORAL at 11:07

## 2024-09-16 RX ADMIN — HYDROMORPHONE HYDROCHLORIDE 0.5 MG: 1 INJECTION, SOLUTION INTRAMUSCULAR; INTRAVENOUS; SUBCUTANEOUS at 10:19

## 2024-09-16 RX ADMIN — ZOLPIDEM TARTRATE 10 MG: 5 TABLET ORAL at 23:02

## 2024-09-16 RX ADMIN — SODIUM CHLORIDE, POTASSIUM CHLORIDE, SODIUM LACTATE AND CALCIUM CHLORIDE 1000 ML: 600; 310; 30; 20 INJECTION, SOLUTION INTRAVENOUS at 04:16

## 2024-09-16 RX ADMIN — PANTOPRAZOLE SODIUM 40 MG: 40 INJECTION, POWDER, FOR SOLUTION INTRAVENOUS at 03:07

## 2024-09-16 RX ADMIN — OXYCODONE HYDROCHLORIDE 10 MG: 10 TABLET ORAL at 20:51

## 2024-09-16 RX ADMIN — BACLOFEN 10 MG: 10 TABLET ORAL at 16:10

## 2024-09-16 RX ADMIN — SUCRALFATE 1 G: 1 TABLET ORAL at 20:49

## 2024-09-16 RX ADMIN — LIDOCAINE HYDROCHLORIDE 15 ML: 20 SOLUTION OROPHARYNGEAL at 16:09

## 2024-09-16 RX ADMIN — GABAPENTIN 300 MG: 300 CAPSULE ORAL at 16:08

## 2024-09-16 RX ADMIN — GABAPENTIN 300 MG: 300 CAPSULE ORAL at 08:45

## 2024-09-16 RX ADMIN — SODIUM CHLORIDE, POTASSIUM CHLORIDE, SODIUM LACTATE AND CALCIUM CHLORIDE: 600; 310; 30; 20 INJECTION, SOLUTION INTRAVENOUS at 08:49

## 2024-09-16 RX ADMIN — SUCRALFATE 1 G: 1 TABLET ORAL at 16:09

## 2024-09-16 RX ADMIN — BACLOFEN 10 MG: 10 TABLET ORAL at 10:22

## 2024-09-16 RX ADMIN — IOHEXOL 100 ML: 350 INJECTION, SOLUTION INTRAVENOUS at 04:15

## 2024-09-16 RX ADMIN — HYDROMORPHONE HYDROCHLORIDE 1 MG: 1 INJECTION, SOLUTION INTRAMUSCULAR; INTRAVENOUS; SUBCUTANEOUS at 03:07

## 2024-09-16 RX ADMIN — ALUMINUM HYDROXIDE, MAGNESIUM HYDROXIDE,SIMETHICONE 20 ML: 400; 400; 40 LIQUID ORAL at 03:07

## 2024-09-16 RX ADMIN — PANTOPRAZOLE SODIUM 40 MG: 40 INJECTION, POWDER, FOR SOLUTION INTRAVENOUS at 16:10

## 2024-09-16 ASSESSMENT — LIFESTYLE VARIABLES
ALCOHOL_USE: YES
HOW MANY TIMES IN THE PAST YEAR HAVE YOU HAD 5 OR MORE DRINKS IN A DAY: 0
AVERAGE NUMBER OF DAYS PER WEEK YOU HAVE A DRINK CONTAINING ALCOHOL: 0
HAVE PEOPLE ANNOYED YOU BY CRITICIZING YOUR DRINKING: NO
HAVE YOU EVER FELT YOU SHOULD CUT DOWN ON YOUR DRINKING: NO
TOTAL SCORE: 0
TOTAL SCORE: 0
EVER FELT BAD OR GUILTY ABOUT YOUR DRINKING: NO
EVER HAD A DRINK FIRST THING IN THE MORNING TO STEADY YOUR NERVES TO GET RID OF A HANGOVER: NO
CONSUMPTION TOTAL: NEGATIVE
DOES PATIENT WANT TO STOP DRINKING: NO
ON A TYPICAL DAY WHEN YOU DRINK ALCOHOL HOW MANY DRINKS DO YOU HAVE: 1
TOTAL SCORE: 0

## 2024-09-16 ASSESSMENT — SOCIAL DETERMINANTS OF HEALTH (SDOH)
WITHIN THE LAST YEAR, HAVE YOU BEEN HUMILIATED OR EMOTIONALLY ABUSED IN OTHER WAYS BY YOUR PARTNER OR EX-PARTNER?: NO
IN THE PAST 12 MONTHS, HAS THE ELECTRIC, GAS, OIL, OR WATER COMPANY THREATENED TO SHUT OFF SERVICE IN YOUR HOME?: NO
WITHIN THE LAST YEAR, HAVE YOU BEEN KICKED, HIT, SLAPPED, OR OTHERWISE PHYSICALLY HURT BY YOUR PARTNER OR EX-PARTNER?: NO
WITHIN THE LAST YEAR, HAVE TO BEEN RAPED OR FORCED TO HAVE ANY KIND OF SEXUAL ACTIVITY BY YOUR PARTNER OR EX-PARTNER?: NO
WITHIN THE LAST YEAR, HAVE YOU BEEN KICKED, HIT, SLAPPED, OR OTHERWISE PHYSICALLY HURT BY YOUR PARTNER OR EX-PARTNER?: NO
WITHIN THE LAST YEAR, HAVE YOU BEEN HUMILIATED OR EMOTIONALLY ABUSED IN OTHER WAYS BY YOUR PARTNER OR EX-PARTNER?: NO
WITHIN THE LAST YEAR, HAVE YOU BEEN AFRAID OF YOUR PARTNER OR EX-PARTNER?: NO
WITHIN THE PAST 12 MONTHS, THE FOOD YOU BOUGHT JUST DIDN'T LAST AND YOU DIDN'T HAVE MONEY TO GET MORE: NEVER TRUE
WITHIN THE LAST YEAR, HAVE TO BEEN RAPED OR FORCED TO HAVE ANY KIND OF SEXUAL ACTIVITY BY YOUR PARTNER OR EX-PARTNER?: NO
WITHIN THE PAST 12 MONTHS, YOU WORRIED THAT YOUR FOOD WOULD RUN OUT BEFORE YOU GOT THE MONEY TO BUY MORE: NEVER TRUE
WITHIN THE LAST YEAR, HAVE YOU BEEN AFRAID OF YOUR PARTNER OR EX-PARTNER?: NO

## 2024-09-16 ASSESSMENT — COGNITIVE AND FUNCTIONAL STATUS - GENERAL
SUGGESTED CMS G CODE MODIFIER MOBILITY: CH
DAILY ACTIVITIY SCORE: 24
SUGGESTED CMS G CODE MODIFIER DAILY ACTIVITY: CH
MOBILITY SCORE: 24

## 2024-09-16 ASSESSMENT — PAIN DESCRIPTION - PAIN TYPE
TYPE: ACUTE PAIN

## 2024-09-16 ASSESSMENT — FIBROSIS 4 INDEX
FIB4 SCORE: 0.51
FIB4 SCORE: 0.65

## 2024-09-16 ASSESSMENT — ENCOUNTER SYMPTOMS
FEVER: 0
SHORTNESS OF BREATH: 0
SPUTUM PRODUCTION: 0
COUGH: 0
MYALGIAS: 0
DIZZINESS: 0
CHILLS: 0
DIAPHORESIS: 0

## 2024-09-16 NOTE — PROGRESS NOTES
Pt arrived to unit via wheelchair at 0512. Pt reports 7/10 sharp, stabbing epigastric pain. Wife at bedside. Pt oriented to room, unit, and plan of care. Wife at bedside. All questions answered at this time. Call light within reach; fall precautions in place.

## 2024-09-16 NOTE — PROGRESS NOTES
Report received from Noc RN. Assume care. Pt is AAOx4.  Assessment completed. VSS. C/o abd pain,  fully ambulatory, Pt was update for the care for the day. White board updated, All question answered. Pt has call light within reach,  bed is in the lowest position. Pt has no other needs at this time.   We need pain meds verification from pain mng MD. Wife is calling to clinic.

## 2024-09-16 NOTE — ED PROVIDER NOTES
ED Provider Note        CHIEF COMPLAINT  Chief Complaint   Patient presents with    Blood in Vomit    Abdominal Pain         HPI    OUTSIDE HISTORIAN(S):  Significant other reports that she is a nurse, reported there is blood in the vomit    Roberto Braswell is a 44 y.o. male who presents to the Emergency Department with epigastric abdominal pain for the past 3 days and vomiting blood x 3.  He reports there was some food mixed with the blood.  He states that it feels similar to his prior gastric ulcers.  He did try taking some pantoprazole but shortly after vomited.  He denies any melena.    REVIEW OF SYSTEMS  See HPI for further details. All other systems are negative.     PAST MEDICAL HISTORY     Past Medical History:   Diagnosis Date    Arthritis     Right ankle    Breath shortness 11/03/2023    With exertion.    Drug-seeking behavior 07/30/2023    Demanding narcotics for a duodenal ulcer July 2023. Refused antacids or sucralfate.     Duodenal ulcer 07/30/2023    Gastric ulcer     Heart valve disease     Mitral valve prolapse    Hemorrhagic disorder (HCC)     GI bleed    Hypertension     Pain     Resolved; Right ankle    Seizure (HCC)     while taking tramadol       SURGICAL HISTORY  Past Surgical History:   Procedure Laterality Date    CAPSULE ENDOSCOPY ADMINISTRATION N/A 5/28/2024    Procedure: ADMINISTRATION, CAPSULE, FOR CAPSULE ENDOSCOPY;  Surgeon: Janae Oliver M.D.;  Location: SURGERY SAME DAY [facility];  Service: Gastroenterology    CAPSULE ENDOSCOPY RETRIEVAL  5/28/2024    Procedure: RETRIEVAL, ENDOSCOPY CAPSULE;  Surgeon: Janae Oliver M.D.;  Location: SURGERY SAME DAY [facility];  Service: Gastroenterology    SD COLONOSCOPY,DIAGNOSTIC  5/27/2024    Procedure: COLONOSCOPY;  Surgeon: Janae Oliver M.D.;  Location: SURGERY [facility];  Service: Gastroenterology    SD UPPER GI ENDOSCOPY,DIAGNOSIS N/A 5/25/2024    Procedure: GASTROSCOPY;  Surgeon: Janae Oliver M.D.;  Location:  SURGERY [facility];  Service: Gastroenterology    TX UPPER GI ENDOSCOPY,BIOPSY N/A 5/25/2024    Procedure: GASTROSCOPY, WITH BIOPSY;  Surgeon: Janae Oliver M.D.;  Location: SURGERY [facility];  Service: Gastroenterology    TX UPPER GI ENDOSCOPY,DIAGNOSIS N/A 5/18/2024    Procedure: GASTROSCOPY;  Surgeon: Kaitlyn Ontiveros M.D.;  Location: [facility];  Service: Gastroenterology    TX UPPER GI ENDOSCOPY,BIOPSY N/A 5/18/2024    Procedure: GASTROSCOPY, WITH BIOPSY;  Surgeon: Kaitlyn Ontiveros M.D.;  Location: SURGERY [facility];  Service: Gastroenterology    GASTROSCOPY WITH ENDOSTAT N/A 5/18/2024    Procedure: EGD, WITH CAUTERIZATION;  Surgeon: Kaitlyn Ontiveros M.D.;  Location: [facility];  Service: Gastroenterology    TX REPLACEMENT OF MITRAL VALVE  1/25/2024    Procedure: MITRAL VALVE REPAIR, TRANSESOPHAGEAL ECHOCARDIOGRAM;  Surgeon: Bigg Santana M.D.;  Location: [facility];  Service: Cardiothoracic    ECHOCARDIOGRAM, TRANSESOPHAGEAL, INTRAOPERATIVE  1/25/2024    Procedure: ECHOCARDIOGRAM, TRANSESOPHAGEAL, INTRAOPERATIVE;  Surgeon: Bigg Santana M.D.;  Location: [facility];  Service: Cardiothoracic    TX UPPER GI ENDOSCOPY,DIAGNOSIS N/A 9/14/2023    Procedure: GASTROSCOPY;  Surgeon: Johnny Dalton M.D.;  Location: SURGERY SAME DAY [facility];  Service: Gastroenterology    TX UPPER GI ENDOSCOPY,BIOPSY N/A 9/14/2023    Procedure: GASTROSCOPY, WITH BIOPSY;  Surgeon: Johnny Dalton M.D.;  Location: SURGERY SAME DAY [facility];  Service: Gastroenterology    TX UPPER GI ENDOSCOPY,BIOPSY N/A 7/30/2023    Procedure: GASTROSCOPY, WITH BIOPSY;  Surgeon: Kumar Hope M.D.;  Location: [facility];  Service: Gastroenterology    TX UPPER GI ENDOSCOPY,DIAGNOSIS  3/28/2022    Procedure: GASTROSCOPY;  Surgeon: Paco Wiggins M.D.;  Location: SURGERY SAME DAY [facility];  Service: Gastroenterology    TX UPPER GI ENDOSCOPY,BIOPSY  3/28/2022    Procedure: GASTROSCOPY, WITH  BIOPSY;  Surgeon: Paco Wiggins M.D.;  Location: SURGERY SAME DAY [facility];  Service: Gastroenterology    ANKLE ARTHROSCOPY Right 5/21/2018    Procedure: ANKLE ARTHROSCOPY, LATERAL LIGAMENT RECONSTRUCTION;  Surgeon: Seth Cruz M.D.;  Location: SURGERY [facility];  Service: Orthopedics    BIOPSY ORTHO Right 5/21/2018    Procedure: BIOPSY ORTHO/ FOR CARTILAGE AND DENOVO PROCEDURE;  Surgeon: Seth Cruz M.D.;  Location: SURGERY [facility];  Service: Orthopedics    OTHER ABDOMINAL SURGERY      Cholecystectomy February 2017    OTHER ORTHOPEDIC SURGERY      2 previous ankle surgeries (2007, 2009)       FAMILY HISTORY  Family History   Problem Relation Age of Onset    Heart Disease Mother     No Known Problems Father     Clotting Disorder Neg Hx        SOCIAL HISTORY    reports that he has never smoked. He has never used smokeless tobacco. He reports that he does not currently use alcohol. He reports that he does not use drugs.    CURRENT MEDICATIONS  Home Medications       Reviewed by Tod Elliott R.N. (Registered Nurse) on 09/16/24 at 0240  Med List Status: Partial     Medication Last Dose Status   acetaminophen (TYLENOL) 500 MG Tab  Active   aspirin 81 MG EC tablet  Active   baclofen (LIORESAL) 10 MG Tab  Active   cholecalciferol (D3 5000) 5000 UNIT Cap  Active   gabapentin (NEURONTIN) 300 MG Cap  Active   omeprazole (PRILOSEC) 20 MG delayed-release capsule  Active   ondansetron (ZOFRAN ODT) 4 MG TABLET DISPERSIBLE  Active   sucralfate (CARAFATE) 1 GM Tab  Active   tadalafil (CIALIS) 10 MG tablet  Active   zolpidem (AMBIEN) 10 MG Tab  Active                  Audit from Redirected Encounters    **Home medications have not yet been reviewed for this encounter**         ALLERGIES  Allergies   Allergen Reactions    Morphine Vomiting    Tramadol Unspecified     Seizure  AIC=1230       PHYSICAL EXAM  VITAL SIGNS: /70   Pulse 62   Temp 36.4 °C (97.6 °F) (Temporal)   Resp 18   Ht 1.905 m (6'  "3\")   Wt 108 kg (238 lb)   SpO2 98%   BMI 29.75 kg/m²   Gen: Alert, uncomfortable appearing, pale  HEENT: ATNC  Eyes: PERRL, EOMI, normal conjunctiva  Neck: trachea midline  Resp: no respiratory distress  CV: No JVD, regular rate and rhythm  Abd: non-distended, soft, minimal tenderness to palpation in epigastrium.  Otherwise soft and nontender  Ext: No deformities  Psych: normal mood  Neuro: speech fluent    DIAGNOSTIC STUDIES / PROCEDURES  EKG  Results for orders placed or performed during the hospital encounter of 24   EKG   Result Value Ref Range    Report       [facility] Emergency Dept.    Test Date:  2024  Pt Name:    PATI MERCEDES           Department: ER  MRN:        [id number]                      Room:        07  Gender:     Male                         Technician: [id number]  :        1980                   Requested By:ER TRIAGE PROTOCOL  Order #:    [id number]                    Reading MD: Kermit Guajardo    Measurements  Intervals                                Axis  Rate:       64                           P:          60  CA:         147                          QRS:        -19  QRSD:       101                          T:          63  QT:         417  QTc:        431    Interpretive Statements  Sinus rhythm  Borderline left axis deviation  Abnormal inferior Q waves  Compared to ECG 2024 02:31:35  No significant changes  Electronically Signed On 2024 02:51:05 PDT by Kermit Guajardo       I independently interpreted this EKG    LABS  Labs Reviewed   CBC WITH DIFFERENTIAL - Abnormal; Notable for the following components:       Result Value    Hemoglobin 13.6 (*)     MCV 73.4 (*)     MCH 23.7 (*)     MCHC 32.2 (*)     All other components within normal limits   COMP METABOLIC PANEL - Abnormal; Notable for the following components:    Co2 19 (*)     Calcium 8.3 (*)     Correct Calcium 8.4 (*)     All other components within normal limits   LIPASE   TROPONIN "   COD (ADULT)   ESTIMATED GFR   URINALYSIS         RADIOLOGY  I have independently interpreted the diagnostic imaging associated with this visit.  My preliminary interpretation is as follows: Chest x-ray: No free intra-abdominal air  Radiologist interpretation:    CT-ABDOMEN-PELVIS WITH   Final Result      No evidence of acute inflammatory process in the abdomen or pelvis. Upper gastrointestinal ulcers are not excluded by CT.      DX-CHEST-PORTABLE (1 VIEW)   Final Result      No acute cardiac or pulmonary abnormalities are identified.          COURSE & MEDICAL DECISION MAKING  Pertinent Labs & Imaging studies were reviewed. (See chart for details)      EXTERNAL RECORDS REVIEWED  Inpatient Notes patient had EGD performed earlier this year with gastric ulcer and duodenitis      INITIAL ASSESSMENT AND PLAN  Care Narrative: Patient presents with 3 days of abdominal pain no report of multiple rounds of emesis with blood.  They did not take a photo of it.  When talking with the nurse, the EMS crew did not report seeing the blood themselves either.  I did see the patient earlier this month with reported severe chest pain with a negative workup.  His pain appears to be out of proportion to his presentation.  He has a known peritonitic abdomen, low suspicion for perforated ulcer, however will perform CAT scan to ensure there is no perforating ulcer.  Patient given empiric pantoprazole, Maalox.    Patient's labs demonstrate no anemia at this time, however if the bleeding started today this may be too early to demonstrate a change.    No significant electrolyte abnormalities, troponin undetectably low, no evidence for ACS.    My review of the CAT scan demonstrates no obvious findings.  Given the patient's clear history of upper GI bleeds and report of GI bleed by both the patient and his significant other, will plan for hospitalization for observation.    The patient's pain appears to be out of proportion to this  presentation, low suspicion for ischemic gut.  Based on the absence of identifiable pathology, there may be a drug-seeking component to the request for multiple opioid pain medications.    ADDITIONAL PROBLEM LIST AND DISPOSITION      I have discussed management of the patient with the following physicians and CARTER's: See below      Hydration: Patient received IV fluids for dehydration,. Oral hydration alone was not sufficient. After IV fluids patient is improved.    Heart score: 1    FINAL IMPRESSION  1. Upper GI bleed    2. Epigastric pain             Case discussed with [facility] internal medicine resident, who will evaluate the patient for hospitalization. Patient will be hospitalized in guarded condition.    This dictation was created using voice recognition software. The accuracy of the dictation is limited to the abilities of the software. I expect there may be some errors of grammar and possibly content. The nursing notes were reviewed and certain aspects of this information were incorporated into this note.

## 2024-09-16 NOTE — ASSESSMENT & PLAN NOTE
- Suspect secondary to gastritis/duodenitis/PUD exacerbated by alcohol. Lipase negative.  Liver panel was normal. CT abdomen showed nothing acute.  Lactate is normal.  -Hemoglobin stable, however pain consistent with previous episodes of PUD.   -GI on board, plan for EGD today.  -Continue IV Protonix twice daily and Carafate slurry QID.  -May have component of chronic pain with opioid dependence.  Reportedly, is being tapered off oxycodone.  Continue home oxycodone regimen 10 mg every 5 hours.  To bridge through acute pain, continue IV Dilaudid 0.5 mg every 3 hours for now.  Monitor closely for narcotic side effects.  Continue Levsin.  -Continue home baclofen and gabapentin

## 2024-09-16 NOTE — ED TRIAGE NOTES
"Chief Complaint   Patient presents with    Blood in Vomit    Abdominal Pain   /73   Pulse 66   Temp 36.3 °C (97.4 °F) (Temporal)   Resp (!) 22   Ht 1.905 m (6' 3\")   Wt 108 kg (238 lb)   SpO2 98%   BMI 29.75 kg/m²     BIBA from home for evaluation of epigastric abd pain and dark red blood in vomit x3 hours. Pt reports some epigastric discomfort for ~3 days with the pain worsening in the last couple hours and is associated with dark red blood.     Pt reports a Hx of upper GI bleed. Pt also reports recent mitral valve repait but denies any current thinner use.       EMS gave 1L NS, 100 mcg fentanyl, and 4mg zofran PTA with pt reporting pain as 9/10.    Pt is AAOx4 GCS 15, able to speak in full sentences. Pt appears pale, is uncomfortable, and is holding epigastric area.         "

## 2024-09-16 NOTE — PROGRESS NOTES
2 RN Skin Assessment Completed by Shahida Castillo , RN and Karishma Isaacs RN.     Head: WDL  Ears: WDL  Nose: WDL  Mouth: WDL  Neck: WDL  Breasts/Chest: healed surgical incision midline chest  Shoulder Blades: WDL  Spine: WDL  (R) Arm/Elbow/hand: WDL  (L) Arm/Elbow/hand: WDL  Abdomen:WDL  Groin: WDL  Sacrum/Coccyx/Buttocks: blanching  (R) Leg: WDL  (L) Leg: WDL  (R) Heel/Foot/Toe: blanching  (L) Heel/Foot/Toe: blanching        Devices in place: BP Cuff and Pulse Ox     Interventions in place: Pillows     Possible skin injury found: No     Pictures uploaded into Epic: N/A  Wound Consult Placed: N/A

## 2024-09-16 NOTE — ASSESSMENT & PLAN NOTE
- Hemoglobin has been relatively stable.  -Continue to monitor hemoglobin closely.  Restrictive transfusion strategy.  -Iron panel is within normal limits.

## 2024-09-16 NOTE — ASSESSMENT & PLAN NOTE
- Likely contributing significantly to current presentation.  -Being tapered off by outpatient pain management. Down from 60mg oxycodone a day to 50 mg oxycodone a day (5x10mg oxy - (75 MME) .  -Management as above.

## 2024-09-16 NOTE — H&P
[facility] History & Physical Note    Date of Service  9/16/2024    Attending: Oscar Hill M.d.  Senior Resident: Dr. Braga  Contact Number: [phone]    Primary Care Physician  Sal Schafer D.O.    Consultants  Gi in AM     Code Status  Full Code    Chief Complaint  Chief Complaint   Patient presents with    Blood in Vomit    Abdominal Pain       History of Presenting Illness (HPI):   Roberto Braswell is a 44 y.o. male with history of severe mitral valve regurgitation status post MVR (01/2024) with subsequent chronic pain - chronic opiate dependence, history of recurrent GIB due to duodenal ulcers s/p multiple GDA coiling embolization procedures, last in may/2024. He presents with 2-3 days of epigastric stabbing-like abdominal pain that has been worsening to a 10/10 severity and this morning shortly after midnight he had 3 episodes of coffee-ground emesis. States no melena or bloody stools, no dizziness/lightheadedness. No inciting events for the pain onset, however, he did have some shots of spirits last night few hours before the emetic episode.   States this pain is identical to the one he has experienced in the past when his duodenal ulcers bleed. This pain was not being well controlled by his chronic opioid regimen of Oxycodone 10mg 5 times a day (down from 6 times a day).     In the ED afebrile and other vitals wnl, on room air. Cmp remarkable for bicarb 19, normal lipase, Hb 13.6 (mycrocitic). CT abdomen without acute inflammatory process or other abnormalities. CXR unremarkable.     I discussed the plan of care with patient, family, and bedside RN.    Review of Systems  Review of Systems   Constitutional:  Negative for chills, diaphoresis, fever and malaise/fatigue.   HENT:  Negative for congestion.    Respiratory:  Negative for cough, sputum production and shortness of breath.    Cardiovascular:  Positive for chest pain (chronic). Negative for leg swelling.   Genitourinary:  Negative  for dysuria.   Musculoskeletal:  Negative for myalgias.   Neurological:  Negative for dizziness.       Past Medical History   has a past medical history of Arthritis, Breath shortness (11/03/2023), Drug-seeking behavior (07/30/2023), Duodenal ulcer (07/30/2023), Gastric ulcer, Heart valve disease, Hemorrhagic disorder (HCC), Hypertension, Pain, and Seizure (HCC).    Surgical History   has a past surgical history that includes other orthopedic surgery; other abdominal surgery; ankle arthroscopy (Right, 5/21/2018); biopsy ortho (Right, 5/21/2018); pr upper gi endoscopy,diagnosis (3/28/2022); pr upper gi endoscopy,biopsy (3/28/2022); pr upper gi endoscopy,biopsy (N/A, 7/30/2023); pr upper gi endoscopy,diagnosis (N/A, 9/14/2023); pr upper gi endoscopy,biopsy (N/A, 9/14/2023); pr replacement of mitral valve (1/25/2024); echocardiogram, transesophageal, intraoperative (1/25/2024); pr upper gi endoscopy,diagnosis (N/A, 5/18/2024); pr upper gi endoscopy,biopsy (N/A, 5/18/2024); gastroscopy with endostat (N/A, 5/18/2024); pr upper gi endoscopy,diagnosis (N/A, 5/25/2024); pr upper gi endoscopy,biopsy (N/A, 5/25/2024); pr colonoscopy,diagnostic (5/27/2024); capsule endoscopy administration (N/A, 5/28/2024); and capsule endoscopy retrieval (5/28/2024).     Family History  family history includes Heart Disease in his mother; No Known Problems in his father.   Family history reviewed with patient.     Social History  Tobacco: Denies  Alcohol: Occasional less than monthly - shots las night as above  Recreational drugs (illegal or prescription): Denies    Allergies  Allergies   Allergen Reactions    Morphine Vomiting    Tramadol Unspecified     Seizure  OXM=0226       Medications  Prior to Admission Medications   Prescriptions Last Dose Informant Patient Reported? Taking?   acetaminophen (TYLENOL) 500 MG Tab  Patient, Family Member Yes No   Sig: Take 500-1,000 mg by mouth every 8 hours as needed for Mild Pain. Indications: Pain    aspirin 81 MG EC tablet  Patient, Family Member Yes No   Sig: Take 81 mg by mouth every day.   baclofen (LIORESAL) 10 MG Tab  Patient, Family Member Yes No   Sig: Take 10 mg by mouth 3 times a day. Indications: Muscle Spasm   cholecalciferol (D3 5000) 5000 UNIT Cap  Patient, Family Member Yes No   Sig: Take 10,000 Units by mouth every day. 2 capsules= [id number]   gabapentin (NEURONTIN) 300 MG Cap  Patient, Family Member Yes No   Sig: Take 300 mg by mouth 3 times a day.   omeprazole (PRILOSEC) 20 MG delayed-release capsule  Patient, Family Member Yes No   Sig: Take 20 mg by mouth every day.   ondansetron (ZOFRAN ODT) 4 MG TABLET DISPERSIBLE  Patient, Family Member No No   Sig: Take 1 Tablet by mouth every 6 hours as needed for Nausea/Vomiting for up to 12 doses.   sucralfate (CARAFATE) 1 GM Tab  Patient, Family Member Yes No   Sig: Take 1 g by mouth 4 Times a Day,Before Meals and at Bedtime.   tadalafil (CIALIS) 10 MG tablet   No No   Sig: Take 1 Tablet by mouth 1 time a day as needed for Erectile Dysfunction.   zolpidem (AMBIEN) 10 MG Tab   No No   Sig: Take 1 Tablet by mouth at bedtime as needed for Sleep for up to 90 days.      Facility-Administered Medications: None       Physical Exam  Temp:  [36.3 °C (97.4 °F)-36.5 °C (97.7 °F)] 36.5 °C (97.7 °F)  Pulse:  [62-66] 65  Resp:  [16-22] 16  BP: (103-116)/(66-73) 103/66  SpO2:  [93 %-98 %] 97 %  Blood Pressure: 116/70   Temperature: 36.4 °C (97.6 °F)   Pulse: 62   Respiration: 18   Pulse Oximetry: 98 %       Physical Exam  Vitals and nursing note reviewed.   Constitutional:       General: He is in acute distress.      Appearance: He is diaphoretic.   HENT:      Head: Normocephalic and atraumatic.      Nose: Nose normal.      Mouth/Throat:      Mouth: Mucous membranes are dry.   Eyes:      Extraocular Movements: Extraocular movements intact.      Conjunctiva/sclera: Conjunctivae normal.      Pupils: Pupils are equal, round, and reactive to light.   Cardiovascular:      " Rate and Rhythm: Normal rate and regular rhythm.      Pulses: Normal pulses.      Heart sounds: Normal heart sounds.   Pulmonary:      Effort: Pulmonary effort is normal.      Breath sounds: Normal breath sounds.   Musculoskeletal:         General: Normal range of motion.      Cervical back: Normal range of motion.   Skin:     General: Skin is warm.      Capillary Refill: Capillary refill takes less than 2 seconds.   Neurological:      General: No focal deficit present.      Mental Status: He is alert and oriented to person, place, and time. Mental status is at baseline.         Laboratory:  Recent Labs     09/16/24  0250   WBC 6.5   RBC 5.75   HEMOGLOBIN 13.6*   HEMATOCRIT 42.2   MCV 73.4*   MCH 23.7*   MCHC 32.2*   RDW 49.3   PLATELETCT 406   MPV 9.1     Recent Labs     09/16/24 0250   SODIUM 144   POTASSIUM 3.8   CHLORIDE 111   CO2 19*   GLUCOSE 98   BUN 10   CREATININE 1.15   CALCIUM 8.3*     Recent Labs     09/16/24 0250   ALTSGPT 11   ASTSGOT 16   ALKPHOSPHAT 51   TBILIRUBIN 0.5   LIPASE 29   GLUCOSE 98         No results for input(s): \"NTPROBNP\" in the last 72 hours.      Recent Labs     09/16/24  0250   TROPONINT <6       Imaging:  CT-ABDOMEN-PELVIS WITH   Final Result      No evidence of acute inflammatory process in the abdomen or pelvis. Upper gastrointestinal ulcers are not excluded by CT.      DX-CHEST-PORTABLE (1 VIEW)   Final Result      No acute cardiac or pulmonary abnormalities are identified.        Assessment/Plan:  I anticipate this patient is appropriate for observation status at this time because intractable abdominal pain, upper Gi bleed     Patient will need a Med/Surg bed on MEDICAL service.  The need is secondary to intractable abdominal pain, upper Gi bleed.    * Upper GI bleed- (present on admission)  Assessment & Plan  History of non healing/recurrent duodenal ulcers s/p multiple embolization procedures   Presents with 3 episodes of coffee ground emesis and acute abdominal " pain  Imaging ruled out perforation. Hemodynamically stable, no acute anemia (Hb 13 actually improved from prior).   - IV PPI BID  - IVF   - NPO for now   - Continue sucralfate   - Monitor H&H  - Consider GI consult in AM     Intractable abdominal pain- (present on admission)  Assessment & Plan  Suspect secondary to gastritis/duodenitis/PUD exacerbated by alcohol   Lipase negative, liver panel wnl, CT abdomen without acute findings/neg for perf   - Resume home regimen after confirming with pharmacy (I do not see this in pt's medrec)   - Dilaudid one time doses for breakthrough   - Resume home baclofen and gabapentin  - Monitor     Chronic pain syndrome- (present on admission)  Assessment & Plan  Ongoing after hospital admissions for abdominal pain and heart surgery  Patient states down from 60mg oxy a day to 50 mg oxy a day (5x10mg oxy - (75 MME)   - See plan for intractable pain     Iron deficiency anemia due to chronic blood loss- (present on admission)  Assessment & Plan  Secondary to the above - UGIB  Improved compared to prior, not acutely dropping  - Continue iron when PO again   - Monitoring H&H        VTE prophylaxis: pharmacologic prophylaxis contraindicated due to ongoing bleeding

## 2024-09-16 NOTE — ASSESSMENT & PLAN NOTE
- Reported hematemesis.  States stool is dark.   - Suspect secondary to gastritis/duodenitis/PUD exacerbated by alcohol.   -Hemoglobin stable, however pain consistent with previous episodes of PUD.  GI on board, plan for EGD today.    -Continue IV Protonix twice daily and Carafate slurry.  -Hemoglobin so far stable.  Monitor hemoglobin closely.  Restrictive transfusion strategy.

## 2024-09-16 NOTE — PROGRESS NOTES
4 Eyes Skin Assessment Completed by KARSON Sharif and KARSON Mayo.    Head WDL  Ears WDL  Nose WDL  Mouth WDL  Neck WDL  Breast/Chest WDL  Shoulder Blades WDL  Spine WDL  (R) Arm/Elbow/Hand WDL  (L) Arm/Elbow/Hand WDL  Abdomen WDL, scar   Groin WDL  Scrotum/Coccyx/Buttocks WDL  (R) Leg WDL  (L) Leg WDL  (R) Heel/Foot/Toe WDL  (L) Heel/Foot/Toe WDL          Devices In Places PIV      Interventions In Place N/A    Possible Skin Injury No    Pictures Uploaded Into Epic N/A  Wound Consult Placed N/A  RN Wound Prevention Protocol Ordered No

## 2024-09-16 NOTE — PROGRESS NOTES
Hospital medicine progress note after midnight:     Mr. Roberto Braswell is a 44 y.o. male with history of severe mitral valve regurgitation status post MVR (01/2024) with subsequent chronic pain - chronic opiate dependence, history of recurrent GIB due to duodenal ulcers s/p multiple GDA coiling embolization procedures, last in may/2024. He presents on 9/16/2024with 2-3 days of epigastric stabbing-like abdominal pain that has been worsening to a 10/10 severity and this morning shortly after midnight he had 3 episodes of coffee-ground emesis.     States no melena or bloody stools, no dizziness/lightheadedness. No inciting events for the pain onset, however, he did have some shots of spirits last night few hours before the emetic episode.  States this pain is identical to the one he has experienced in the past when his duodenal ulcers bleed. This pain was not being well controlled by his chronic opioid regimen of Oxycodone 10mg 5 times a day (down from 6 times a day).      In the ED afebrile and other vitals wnl, on room air. Cmp remarkable for bicarb 19, normal lipase, Hb 13.6 (mycrocitic). CT abdomen without acute inflammatory process or other abnormalities. CXR unremarkable.  Patient admitted to hospital medicine for management of care.    During this hospitalization, I had spoken to patient's pain management provider Marta KIMBALL at [facility].  According to pain management, patient currently on oxycodone 6 times daily and recently dropped down to 5 times daily to start weaning patient off of oxycodone.  After extensive chart review, there is no clear indication on source of pain as patient's significant history of GI bleed has been embolized.  Patient currently on specific medication to help with significant history of GI ulcers.    At this time, patient being admitted for pain management.  Reviewed CT imaging which is negative for any acute pathologies.  Discussed case with patient and patient's wife was at bedside.   Will reestablish home medications and continue to adhere to pain management regimen for pain management during this admission.    Plan of care: Continue pain management; restarted home medication dosing; adhere to regimen established by outpatient pain management team; no clear indication in consulting GI team at this time -CT imaging negative for any source of acute pain    Disposition: Patient anticipated to stay overnight until pain has been more managed    Problem list:   Chronic pain syndrome  Epigastric pain  Iron deficiency anemia due to blood loss  Possible upper GI bleed  Duodenal ulcers  Drug-seeking behavior      Please note that this dictation was created using voice recognition software. I have made every reasonable attempt to correct obvious errors, but there may be errors of grammar and possibly content that I did not discover before finalizing the note.    Electronically signed by:  Dr. MAURI Carr, DNP, APRN, FNP-C  Hospitalist Services  [facility]  [phone]  [email or web address]  09/16/24                [id number]

## 2024-09-17 PROBLEM — R10.13 EPIGASTRIC PAIN: Status: RESOLVED | Noted: 2024-09-16 | Resolved: 2024-09-17

## 2024-09-17 LAB
ANION GAP SERPL CALC-SCNC: 14 MMOL/L (ref 7–16)
BUN SERPL-MCNC: 10 MG/DL (ref 8–22)
CALCIUM SERPL-MCNC: 8.4 MG/DL (ref 8.5–10.5)
CHLORIDE SERPL-SCNC: 110 MMOL/L (ref 96–112)
CO2 SERPL-SCNC: 18 MMOL/L (ref 20–33)
CREAT SERPL-MCNC: 1.1 MG/DL (ref 0.5–1.4)
ERYTHROCYTE [DISTWIDTH] IN BLOOD BY AUTOMATED COUNT: 51.9 FL (ref 35.9–50)
FERRITIN SERPL-MCNC: 36.3 NG/ML (ref 22–322)
GFR SERPLBLD CREATININE-BSD FMLA CKD-EPI: 85 ML/MIN/1.73 M 2
GLUCOSE SERPL-MCNC: 98 MG/DL (ref 65–99)
HCT VFR BLD AUTO: 43.8 % (ref 42–52)
HGB BLD-MCNC: 13.1 G/DL (ref 14–18)
IRON SATN MFR SERPL: 41 % (ref 15–55)
IRON SERPL-MCNC: 145 UG/DL (ref 50–180)
LACTATE SERPL-SCNC: 1.9 MMOL/L (ref 0.5–2)
MCH RBC QN AUTO: 22.8 PG (ref 27–33)
MCHC RBC AUTO-ENTMCNC: 29.9 G/DL (ref 32.3–36.5)
MCV RBC AUTO: 76.2 FL (ref 81.4–97.8)
PLATELET # BLD AUTO: 343 K/UL (ref 164–446)
PMV BLD AUTO: 9 FL (ref 9–12.9)
POTASSIUM SERPL-SCNC: 4.1 MMOL/L (ref 3.6–5.5)
RBC # BLD AUTO: 5.75 M/UL (ref 4.7–6.1)
SODIUM SERPL-SCNC: 142 MMOL/L (ref 135–145)
TIBC SERPL-MCNC: 357 UG/DL (ref 250–450)
UIBC SERPL-MCNC: 212 UG/DL (ref 110–370)
WBC # BLD AUTO: 4.8 K/UL (ref 4.8–10.8)

## 2024-09-17 PROCEDURE — A9270 NON-COVERED ITEM OR SERVICE: HCPCS | Performed by: NURSE PRACTITIONER

## 2024-09-17 PROCEDURE — 83540 ASSAY OF IRON: CPT

## 2024-09-17 PROCEDURE — 99233 SBSQ HOSP IP/OBS HIGH 50: CPT | Performed by: INTERNAL MEDICINE

## 2024-09-17 PROCEDURE — 700102 HCHG RX REV CODE 250 W/ 637 OVERRIDE(OP): Performed by: INTERNAL MEDICINE

## 2024-09-17 PROCEDURE — G0378 HOSPITAL OBSERVATION PER HR: HCPCS

## 2024-09-17 PROCEDURE — 82728 ASSAY OF FERRITIN: CPT

## 2024-09-17 PROCEDURE — 85027 COMPLETE CBC AUTOMATED: CPT

## 2024-09-17 PROCEDURE — 700111 HCHG RX REV CODE 636 W/ 250 OVERRIDE (IP)

## 2024-09-17 PROCEDURE — 700102 HCHG RX REV CODE 250 W/ 637 OVERRIDE(OP): Performed by: NURSE PRACTITIONER

## 2024-09-17 PROCEDURE — 83550 IRON BINDING TEST: CPT

## 2024-09-17 PROCEDURE — 96376 TX/PRO/DX INJ SAME DRUG ADON: CPT

## 2024-09-17 PROCEDURE — A9270 NON-COVERED ITEM OR SERVICE: HCPCS

## 2024-09-17 PROCEDURE — 83605 ASSAY OF LACTIC ACID: CPT

## 2024-09-17 PROCEDURE — A9270 NON-COVERED ITEM OR SERVICE: HCPCS | Performed by: INTERNAL MEDICINE

## 2024-09-17 PROCEDURE — 700111 HCHG RX REV CODE 636 W/ 250 OVERRIDE (IP): Mod: JZ | Performed by: INTERNAL MEDICINE

## 2024-09-17 PROCEDURE — 700102 HCHG RX REV CODE 250 W/ 637 OVERRIDE(OP)

## 2024-09-17 PROCEDURE — 80048 BASIC METABOLIC PNL TOTAL CA: CPT

## 2024-09-17 PROCEDURE — 99222 1ST HOSP IP/OBS MODERATE 55: CPT | Performed by: NURSE PRACTITIONER

## 2024-09-17 PROCEDURE — 36415 COLL VENOUS BLD VENIPUNCTURE: CPT

## 2024-09-17 RX ORDER — SUCRALFATE ORAL 1 G/10ML
1 SUSPENSION ORAL EVERY 6 HOURS
Status: DISCONTINUED | OUTPATIENT
Start: 2024-09-17 | End: 2024-09-19 | Stop reason: HOSPADM

## 2024-09-17 RX ORDER — HYDROMORPHONE HYDROCHLORIDE 1 MG/ML
0.5 INJECTION, SOLUTION INTRAMUSCULAR; INTRAVENOUS; SUBCUTANEOUS ONCE
Status: COMPLETED | OUTPATIENT
Start: 2024-09-17 | End: 2024-09-17

## 2024-09-17 RX ORDER — HYDROMORPHONE HYDROCHLORIDE 1 MG/ML
0.5 INJECTION, SOLUTION INTRAMUSCULAR; INTRAVENOUS; SUBCUTANEOUS
Status: DISCONTINUED | OUTPATIENT
Start: 2024-09-17 | End: 2024-09-18

## 2024-09-17 RX ADMIN — SUCRALFATE 1 G: 1 SUSPENSION ORAL at 23:26

## 2024-09-17 RX ADMIN — HYDROMORPHONE HYDROCHLORIDE 0.5 MG: 1 INJECTION, SOLUTION INTRAMUSCULAR; INTRAVENOUS; SUBCUTANEOUS at 14:55

## 2024-09-17 RX ADMIN — PANTOPRAZOLE SODIUM 40 MG: 40 INJECTION, POWDER, FOR SOLUTION INTRAVENOUS at 16:41

## 2024-09-17 RX ADMIN — SUCRALFATE 1 G: 1 SUSPENSION ORAL at 14:54

## 2024-09-17 RX ADMIN — HYOSCYAMINE SULFATE 0.12 MG: 0.12 TABLET SUBLINGUAL at 20:59

## 2024-09-17 RX ADMIN — BACLOFEN 10 MG: 10 TABLET ORAL at 11:23

## 2024-09-17 RX ADMIN — ZOLPIDEM TARTRATE 10 MG: 5 TABLET ORAL at 21:56

## 2024-09-17 RX ADMIN — SUCRALFATE 1 G: 1 TABLET ORAL at 11:24

## 2024-09-17 RX ADMIN — OXYCODONE HYDROCHLORIDE 10 MG: 10 TABLET ORAL at 23:26

## 2024-09-17 RX ADMIN — HYDROMORPHONE HYDROCHLORIDE 0.5 MG: 1 INJECTION, SOLUTION INTRAMUSCULAR; INTRAVENOUS; SUBCUTANEOUS at 21:01

## 2024-09-17 RX ADMIN — SUCRALFATE 1 G: 1 SUSPENSION ORAL at 16:41

## 2024-09-17 RX ADMIN — BACLOFEN 10 MG: 10 TABLET ORAL at 05:21

## 2024-09-17 RX ADMIN — GABAPENTIN 300 MG: 300 CAPSULE ORAL at 11:23

## 2024-09-17 RX ADMIN — HYDROMORPHONE HYDROCHLORIDE 0.5 MG: 1 INJECTION, SOLUTION INTRAMUSCULAR; INTRAVENOUS; SUBCUTANEOUS at 08:20

## 2024-09-17 RX ADMIN — HYDROMORPHONE HYDROCHLORIDE 0.5 MG: 1 INJECTION, SOLUTION INTRAMUSCULAR; INTRAVENOUS; SUBCUTANEOUS at 18:00

## 2024-09-17 RX ADMIN — PANTOPRAZOLE SODIUM 40 MG: 40 INJECTION, POWDER, FOR SOLUTION INTRAVENOUS at 05:21

## 2024-09-17 RX ADMIN — SUCRALFATE 1 G: 1 TABLET ORAL at 07:35

## 2024-09-17 RX ADMIN — GABAPENTIN 300 MG: 300 CAPSULE ORAL at 05:21

## 2024-09-17 RX ADMIN — OXYCODONE HYDROCHLORIDE 10 MG: 10 TABLET ORAL at 07:34

## 2024-09-17 RX ADMIN — OXYCODONE HYDROCHLORIDE 10 MG: 10 TABLET ORAL at 12:55

## 2024-09-17 RX ADMIN — OXYCODONE HYDROCHLORIDE 10 MG: 10 TABLET ORAL at 18:25

## 2024-09-17 RX ADMIN — HYOSCYAMINE SULFATE 0.12 MG: 0.12 TABLET SUBLINGUAL at 16:41

## 2024-09-17 RX ADMIN — HYDROMORPHONE HYDROCHLORIDE 0.5 MG: 1 INJECTION, SOLUTION INTRAMUSCULAR; INTRAVENOUS; SUBCUTANEOUS at 11:24

## 2024-09-17 RX ADMIN — HYOSCYAMINE SULFATE 0.12 MG: 0.12 TABLET SUBLINGUAL at 15:18

## 2024-09-17 RX ADMIN — BACLOFEN 10 MG: 10 TABLET ORAL at 16:41

## 2024-09-17 RX ADMIN — OXYCODONE HYDROCHLORIDE 10 MG: 10 TABLET ORAL at 01:51

## 2024-09-17 RX ADMIN — GABAPENTIN 300 MG: 300 CAPSULE ORAL at 16:41

## 2024-09-17 ASSESSMENT — ENCOUNTER SYMPTOMS
WEAKNESS: 0
VOMITING: 1
HEARTBURN: 1
SHORTNESS OF BREATH: 0
SORE THROAT: 0
NAUSEA: 1
CONSTIPATION: 0
ABDOMINAL PAIN: 1
CHILLS: 0
BLOOD IN STOOL: 0
HEADACHES: 0
FEVER: 0
COUGH: 0
FLANK PAIN: 0
NERVOUS/ANXIOUS: 0
DIARRHEA: 1
MYALGIAS: 0

## 2024-09-17 ASSESSMENT — PAIN DESCRIPTION - PAIN TYPE
TYPE: ACUTE PAIN;CHRONIC PAIN
TYPE: ACUTE PAIN;CHRONIC PAIN
TYPE: ACUTE PAIN
TYPE: ACUTE PAIN;CHRONIC PAIN
TYPE: ACUTE PAIN
TYPE: ACUTE PAIN;CHRONIC PAIN
TYPE: ACUTE PAIN;CHRONIC PAIN
TYPE: ACUTE PAIN

## 2024-09-17 ASSESSMENT — LIFESTYLE VARIABLES: SUBSTANCE_ABUSE: 0

## 2024-09-17 NOTE — CARE PLAN
The patient is Stable - Low risk of patient condition declining or worsening    Shift Goals  Clinical Goals: patient pain will decrease this shift, will get new orders for PRN and ask about GI consult  Patient Goals: pain control  Family Goals: JJ    Progress made toward(s) clinical / shift goals:      Problem: Pain - Standard  Goal: Alleviation of pain or a reduction in pain to the patient’s comfort goal  Outcome: Progressing       Patient is not progressing towards the following goals:

## 2024-09-17 NOTE — PROGRESS NOTES
Hospital Medicine Daily Progress Note    Date of Service  9/17/2024    Chief Complaint  Hematemesis, acute on chronic pain    Hospital Course  Roberto Braswell is a 44 y.o. 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  He had no melena or bloody stools.  He denies any dizziness or lightheadedness.  He admits to having some shots of spirits tonight prior before the vomiting episode.  On evaluation, vital signs were stable.  Bicarbonate was 19.  Lipase was normal.  Hemoglobin 13.6. CT abdomen showed no acute inflammatory process or other abnormalities.  Chest x-ray was unremarkable.  Patient's outpatient pain management was contacted who shared that patient is currently on oxycodone 6 times daily and recently dropped down to 5 times daily to start weaning patient off of oxycodone.  After extensive chart review, there is no clear indication on source of pain as patient's significant history of GI bleed has been embolized, with no clear indication and GI consult at this time.  Patient was continued on IV Protonix and Carafate.      Interval Problem Update  9/17/2024 - I reviewed the patient's chart. There were no significant overnight events. Remains hemodynamically stable and afebrile. Stable on RA.  Hemoglobin stable at 13.1.  WBC count remain normal.  Electrolytes and renal function remain normal.  Urinalysis was clean.    > I have personally seen and examined the patient today.  Still complaining of epigastric abdominal pain.  Has nausea.  No further hematemesis.  No vomiting.  States bowel movement has been dark.  No chest pain or shortness of  breath.  > I have personally reached out and consulted GI for further recommendations.    I personally reviewed all lab results mentioned above. Prior medical records from this institution and outside facilities were independently reviewed as noted. I also personally reviewed all ER physician and consultant recommendations and plans as documented above. History was independently obtained by myself. I have discussed this patient's plan of care and discharge plan at IDT rounds today with Case Management, Nursing, Nursing leadership, and other members of the IDT team.    Consultants/Specialty  None    Code Status  Full Code    Disposition  The patient is not medically cleared for discharge to home or a post-acute facility.      Anticipate discharge to home once medically cleared.  I have placed the appropriate orders for post-discharge needs.    Review of Systems  ROS     Pertinent positives/negatives as mentioned above.     A complete review of systems was personally done by me. All other systems were negative.       Physical Exam  Temp:  [36.2 °C (97.1 °F)-37.1 °C (98.7 °F)] 36.6 °C (97.8 °F)  Pulse:  [75-83] 78  Resp:  [16-19] 16  BP: (120-131)/(60-80) 130/78  SpO2:  [99 %-100 %] 100 %    Physical Exam  Vitals reviewed.   Constitutional:       General: He is not in acute distress.     Appearance: Normal appearance. He is not toxic-appearing or diaphoretic.   HENT:      Head: Normocephalic and atraumatic.      Right Ear: External ear normal.      Left Ear: External ear normal.      Mouth/Throat:      Mouth: Mucous membranes are moist.      Pharynx: No oropharyngeal exudate.   Eyes:      General: No scleral icterus.     Extraocular Movements: Extraocular movements intact.      Conjunctiva/sclera: Conjunctivae normal.      Pupils: Pupils are equal, round, and reactive to light.   Cardiovascular:      Rate and Rhythm: Normal rate and regular rhythm.      Heart sounds: Normal heart sounds. No murmur heard.     No gallop.    Pulmonary:      Effort: Pulmonary effort is normal. No respiratory distress.      Breath sounds: Normal breath sounds. No stridor. No wheezing, rhonchi or rales.   Chest:      Chest wall: No tenderness.   Abdominal:      General: Bowel sounds are normal. There is no distension.      Palpations: Abdomen is soft. There is no mass.      Tenderness: There is abdominal tenderness (epigastrium). There is no guarding or rebound.   Musculoskeletal:         General: No swelling. Normal range of motion.      Cervical back: Normal range of motion and neck supple.      Right lower leg: No edema.      Left lower leg: No edema.   Lymphadenopathy:      Cervical: No cervical adenopathy.   Skin:     General: Skin is warm and dry.      Coloration: Skin is not jaundiced.      Findings: No rash.   Neurological:      General: No focal deficit present.      Mental Status: He is alert and oriented to person, place, and time.      Cranial Nerves: No cranial nerve deficit.   Psychiatric:         Mood and Affect: Mood normal.         Behavior: Behavior normal.         Thought Content: Thought content normal.         Judgment: Judgment normal.         Fluids    Intake/Output Summary (Last 24 hours) at 9/17/2024 1414  Last data filed at 9/16/2024 1937  Gross per 24 hour   Intake 360 ml   Output --   Net 360 ml        Laboratory  Recent Labs     09/16/24  0250 09/16/24  0806 09/17/24  0332   WBC 6.5  --  4.8   RBC 5.75  --  5.75   HEMOGLOBIN 13.6* 13.3* 13.1*   HEMATOCRIT 42.2 43.0 43.8   MCV 73.4*  --  76.2*   MCH 23.7*  --  22.8*   MCHC 32.2*  --  29.9*   RDW 49.3  --  51.9*   PLATELETCT 406  --  343   MPV 9.1  --  9.0     Recent Labs     09/16/24  0250 09/17/24  0332   SODIUM 144 142   POTASSIUM 3.8 4.1   CHLORIDE 111 110   CO2 19* 18*   GLUCOSE 98 98   BUN 10 10   CREATININE 1.15 1.10   CALCIUM 8.3* 8.4*                   Imaging  CT-ABDOMEN-PELVIS WITH   Final Result      No evidence of acute inflammatory process in the abdomen or  pelvis. Upper gastrointestinal ulcers are not excluded by CT.      DX-CHEST-PORTABLE (1 VIEW)   Final Result      No acute cardiac or pulmonary abnormalities are identified.           Assessment/Plan  * Upper GI bleed- (present on admission)  Assessment & Plan  - Reported hematemesis.  States stool is dark.   - Suspect secondary to gastritis/duodenitis/PUD exacerbated by alcohol.   -Hemoglobin stable, however pain consistent with previous episodes of PUD.  I have personally contacted and consulted GI for further recommendations.  -Continue IV Protonix twice daily.  Change Carafate to slurry.  -Hemoglobin so far stable.  Monitor hemoglobin closely.  Restrictive transfusion strategy.        Intractable abdominal pain- (present on admission)  Assessment & Plan  - Suspect secondary to gastritis/duodenitis/PUD exacerbated by alcohol. Lipase negative.  Liver panel was normal. CT abdomen showed nothing acute.    -Hemoglobin stable, however pain consistent with previous episodes of PUD.  I have personally contacted and consulted GI for further recommendations.  -Continue IV Protonix twice daily.  Change Carafate to slurry.  -For completion, check lactic acid.  -May have component of chronic pain with opioid dependence.  Reportedly, is being tapered off oxycodone.  Continue home oxycodone regimen 10 mg every 5 hours.  To bridge through acute pain, will place on IV Dilaudid 0.5 mg every 3 hours for now.  Monitor closely for narcotic side effects.  Start Levsin.  -Continue home baclofen and gabapentin    Chronic pain syndrome- (present on admission)  Assessment & Plan  - Likely contributing significantly to current presentation.  -Being tapered off by outpatient pain management. Down from 60mg oxycodone a day to 50 mg oxycodone a day (5x10mg oxy - (75 MME) .  -Management as above.    Iron deficiency anemia due to chronic blood loss- (present on admission)  Assessment & Plan  - Hemoglobin has been stable.  -Continue to monitor  hemoglobin closely.  Restrictive transfusion strategy.  -Recheck iron panel with iron/TIBC/ferritin and replace if low.         VTE prophylaxis: SCD

## 2024-09-17 NOTE — CARE PLAN
The patient is Stable - Low risk of patient condition declining or worsening    Shift Goals  Clinical Goals: pain management  Patient Goals: rest  Family Goals: eloise    Progress made toward(s) clinical / shift goals:  updated on POC and verbalized understanding. Medicated per MAR and tolerated well. Fall precautions in place. Needs attended.     Patient is not progressing towards the following goals:  Problem: Pain - Standard  Goal: Alleviation of pain or a reduction in pain to the patient’s comfort goal  Description: Target End Date:  Prior to discharge or change in level of care    Document on Vitals flowsheet    1.  Document pain using the appropriate pain scale per order or unit policy  2.  Educate and implement non-pharmacologic comfort measures (i.e. relaxation, distraction, massage, cold/heat therapy, etc.)  3.  Pain management medications as ordered  4.  Reassess pain after pain med administration per policy  5.  If opiods administered assess patient's response to pain medication is appropriate per POSS sedation scale  6.  Follow pain management plan developed in collaboration with patient and interdisciplinary team (including palliative care or pain specialists if applicable)  Outcome: Not Progressing     Problem: Knowledge Deficit - Standard  Goal: Patient and family/care givers will demonstrate understanding of plan of care, disease process/condition, diagnostic tests and medications  Description: Target End Date:  1-3 days or as soon as patient condition allows    Document in Patient Education    1.  Patient and family/caregiver oriented to unit, equipment, visitation policy and means for communicating concern  2.  Complete/review Learning Assessment  3.  Assess knowledge level of disease process/condition, treatment plan, diagnostic tests and medications  4.  Explain disease process/condition, treatment plan, diagnostic tests and medications  Outcome: Not Progressing

## 2024-09-17 NOTE — CONSULTS
Date of Consultation:  9/17/2024    Patient: : Roberto Braswell  MRN: [id number]    Referring Physician:  Russel Macias MD     GI:SHAJI Salter     Reason for Consultation: Epigastric pain, coffee-ground hematemesis    History of Present Illness:     This is a 44-year-old male with a past medical history including gastric and duodenal ulcers s/p multiple GDA coiling embolizations, severe mitral valve regurgitation s/p mitral valve repair (January 2024 by Dr. Dale), chronic pain (managed by Marta at [facility]), chronic opiate dependence, who presented to [facility] on 9/16/2024 with epigastric pain and hematemesis.  Patient reports to every 3 days of burning/stabbing epigastric pain.  Pain was nonradiating, no relieving or exacerbating factors.  He states that he takes pantoprazole, Carafate, GI cocktail as needed for his gastric/duodenal ulcers but this does not relieve the pain.  He denied any alcohol use to consulting provider but did report to ER physician and admitting providers that he had some shots of spirits the night prior to admission before hematemesis started.  He reports hematemesis as coffee-ground in appearance in addition to recent heartburn.  Denies melena, hematochezia changes in appetite, dysphagia, diarrhea or constipation.  Hemoglobin on arrival 13.6.  Remained stable today at 13.1.  MCV 76.2.  No leukocytosis.  Platelets 343.  CHEM panel generally unremarkable with sodium 144, potassium 3.8.  BUN 10, creatinine 1.15.  LFTs normal.  Lipase 29.  Lactic acid 1.9.  Troponin is normal.  CT abdomen pelvis was obtained without evidence of acute laboratory process of the abdomen or pelvis.  Multiple endovascular coils were noted in the GDA.  On chart review, patient had capsule endoscopy 5/29/2024.  However, he did not have follow-up KUB as instructed per multiple notifications that certified mail.  On review of CT abdomen pelvis, no capsule endoscopy appears to be  present.    Tobacco use: Denies  Alcohol use: Denies to consulted provider but per reports, patient had few shots of spirits prior to onset of hematemesis  Illicit drug use: Denies      Endoscopy history:  EGD 3/28/2022 by Dr. Wiggins:   - Esophagus: endoscopically unremarkable.   - Stomach: erosive antral gastritis of mild severity, biopsied.  - Duodenum: bulbar erythematous duodenitis.      Pathology negative for H. pylori      EGD 7/30/2023 by Dr. Hope:  Gastric ulcers and duodenal bulb ulcers. No stigmata     Pathology negative for H. pylori      EGD 9/13/2024 by Dr. Dalton:  Duodenal ulcer, not completely healed, some oozing at post side of duodenal sweep. We used transparent cap to find and investigate it. S/p biopsy. The location of this 1cm ulcer could possibly give the patient some partial obstruction symptoms. No hemostatic intervention needed.    Gastric ulcers from previous scopes healed. S/p biopsy to recheck HP.   Esophagus was grossly normal.     Pathology negative for H. pylori    EGD 5/18/2024 by Dr. Ontiveros:  appearance of eosinophilic esophagitis, clean based ulcers in the antrum, food in stomach , narrowing due to ulcer at pylorus, oozing and multiple ulcers of duodenum     A. Esophageal biopsy:          Squamous epithelium with reactive epithelial changes and patchy           increased eosinophils consistent with eosinophilic esophagitis           (focally greater than 30 eosinophils in one high-magnification           field are identified).           EGD 5/25/2024 by Dr. Oliver  Normal esophagus with no endoscopic suspicion for EoE.  Biopsies proximally and distally taken.   Normal fundus.  No blood in lumen.  Biopsies taken for eosinophils   Antral gastritis and small clean based gastric ulcer.  Biopsies taken for eosinophils.   Duodenitis D1-D2.  Biopsies taken for ischemic injury and eosinophils.   Shallow duodenal ulcer distal bulb without stigmata of recent bleeding.   Spontaneous, scant  volume, oozing when duodenal bulb mucosa touched.      Colonoscopy 5/27/2024 by Dr. Oliver:  Normal to ileum    Capsule endoscopy 5/29/2024 Dr. Oliver:  1. No active bleeding in small bowel. Effluent in distal small bowel had a reddish tint but no blood noted.  2. No mucosal lesions in small bowel.  3. Delayed passage of capsule through stomach--3 hours.  4. Final image is capsule in bowel.    FINAL DIAGNOSIS:     A. Duodenum, biopsy:          Duodenal mucosa with focal erosion, focal gastric foveolar           metaplasia, reactive mucin loss, and focal, minimal           eosinophilia, negative for dysplasia (see comment).          Morphologic features of celiac disease are not seen.          Overt features of eosinophilic duodenitis are not seen.     B. Gastric biopsy:          Erosive gastritis with reactive epithelial changes and mild           eosinophilia (see comment).          Overt features of eosinophilic gastritis are not seen.          Negative for H. pylori organisms by Warthin-Starry special stain           (with appropriately staining controls).     C. Gastric fundus, biopsy:          Gastric fundic mucosa with occasional eosinophils.          Overt features of eosinophilic gastritis are not seen.          Negative for H. pylori organisms by Warthin-Starry special stain           (with appropriately staining controls).     D. Distal esophagus, biopsy:          Squamous epithelium with mild reactive epithelial changes and           patchy increased intraepithelial eosinophils (up to           approximately 30 intraepithelial eosinophils in one           high-magnification field) (see comment).     E. Proximal esophagus, biopsy:          Squamous epithelium with scattered intraepithelial eosinophils           (up to approximately 10 intraepithelial eosinophils in one           high-magnification field) (see comment).         NSAID/ASA use: 1 baby aspirin per day due to open heart  surgery  Anticoagulation use: Denies.  He states that he was removed off of anticoagulation in March or April of this year.      Past Medical History:   Diagnosis Date    Breath shortness 11/03/2023    With exertion.    Drug-seeking behavior 07/30/2023    Demanding narcotics for a duodenal ulcer July 2023. Refused antacids or sucralfate.     Duodenal ulcer 07/30/2023    Arthritis     Right ankle    Gastric ulcer     Heart valve disease     Mitral valve prolapse    Hemorrhagic disorder (HCC)     GI bleed    Hypertension     Pain     Resolved; Right ankle    Seizure (HCC)     while taking tramadol         Past Surgical History:   Procedure Laterality Date    CAPSULE ENDOSCOPY ADMINISTRATION N/A 5/28/2024    Procedure: ADMINISTRATION, CAPSULE, FOR CAPSULE ENDOSCOPY;  Surgeon: Janae Oliver M.D.;  Location: SURGERY SAME DAY [facility];  Service: Gastroenterology    CAPSULE ENDOSCOPY RETRIEVAL  5/28/2024    Procedure: RETRIEVAL, ENDOSCOPY CAPSULE;  Surgeon: Janae Oliver M.D.;  Location: SURGERY SAME DAY [facility];  Service: Gastroenterology    AK COLONOSCOPY,DIAGNOSTIC  5/27/2024    Procedure: COLONOSCOPY;  Surgeon: Janae Oliver M.D.;  Location: [facility];  Service: Gastroenterology    AK UPPER GI ENDOSCOPY,DIAGNOSIS N/A 5/25/2024    Procedure: GASTROSCOPY;  Surgeon: Janae Oliver M.D.;  Location: [facility];  Service: Gastroenterology    AK UPPER GI ENDOSCOPY,BIOPSY N/A 5/25/2024    Procedure: GASTROSCOPY, WITH BIOPSY;  Surgeon: Janae Oliver M.D.;  Location: [facility];  Service: Gastroenterology    AK UPPER GI ENDOSCOPY,DIAGNOSIS N/A 5/18/2024    Procedure: GASTROSCOPY;  Surgeon: Kaitlyn Ontiveros M.D.;  Location: [facility];  Service: Gastroenterology    AK UPPER GI ENDOSCOPY,BIOPSY N/A 5/18/2024    Procedure: GASTROSCOPY, WITH BIOPSY;  Surgeon: Kaitlyn Ontiveros M.D.;  Location: [facility];  Service: Gastroenterology    GASTROSCOPY WITH  ENDOSTAT N/A 5/18/2024    Procedure: EGD, WITH CAUTERIZATION;  Surgeon: Kaitlyn Ontiveros M.D.;  Location: SURGERY [facility];  Service: Gastroenterology    IN REPLACEMENT OF MITRAL VALVE  1/25/2024    Procedure: MITRAL VALVE REPAIR, TRANSESOPHAGEAL ECHOCARDIOGRAM;  Surgeon: Bigg Santana M.D.;  Location: SURGERY [facility];  Service: Cardiothoracic    ECHOCARDIOGRAM, TRANSESOPHAGEAL, INTRAOPERATIVE  1/25/2024    Procedure: ECHOCARDIOGRAM, TRANSESOPHAGEAL, INTRAOPERATIVE;  Surgeon: Bigg Santana M.D.;  Location: [facility];  Service: Cardiothoracic    IN UPPER GI ENDOSCOPY,DIAGNOSIS N/A 9/14/2023    Procedure: GASTROSCOPY;  Surgeon: Johnny Dalton M.D.;  Location: SURGERY [facility];  Service: Gastroenterology    IN UPPER GI ENDOSCOPY,BIOPSY N/A 9/14/2023    Procedure: GASTROSCOPY, WITH BIOPSY;  Surgeon: Johnny Dalton M.D.;  Location: SURGERY SAME [facility];  Service: Gastroenterology    IN UPPER GI ENDOSCOPY,BIOPSY N/A 7/30/2023    Procedure: GASTROSCOPY, WITH BIOPSY;  Surgeon: Kumar Hope M.D.;  Location: SURGERY [facility];  Service: Gastroenterology    IN UPPER GI ENDOSCOPY,DIAGNOSIS  3/28/2022    Procedure: GASTROSCOPY;  Surgeon: Paco Wiggins M.D.;  Location: SURGERY SAME [facility];  Service: Gastroenterology    IN UPPER GI ENDOSCOPY,BIOPSY  3/28/2022    Procedure: GASTROSCOPY, WITH BIOPSY;  Surgeon: Paco Wiggins M.D.;  Location: SURGERY [facility];  Service: Gastroenterology    ANKLE ARTHROSCOPY Right 5/21/2018    Procedure: ANKLE ARTHROSCOPY, LATERAL LIGAMENT RECONSTRUCTION;  Surgeon: Seth Cruz M.D.;  Location: SURGERY [facility];  Service: Orthopedics    BIOPSY ORTHO Right 5/21/2018    Procedure: BIOPSY ORTHO/ FOR CARTILAGE AND DENOVO PROCEDURE;  Surgeon: Seth Cruz M.D.;  Location: [facility];  Service: Orthopedics    OTHER ABDOMINAL SURGERY      Cholecystectomy February 2017    OTHER ORTHOPEDIC SURGERY      2 previous ankle  surgeries (2007, 2009)       Family History   Problem Relation Age of Onset    Heart Disease Mother     No Known Problems Father     Clotting Disorder Neg Hx        Social History     Socioeconomic History    Marital status:     Highest education level: GED or equivalent   Tobacco Use    Smoking status: Never    Smokeless tobacco: Never   Vaping Use    Vaping status: Never Used   Substance and Sexual Activity    Alcohol use: Not Currently    Drug use: Never     Social Determinants of Health     Financial Resource Strain: Medium Risk (9/16/2024)    Overall Financial Resource Strain (CARDIA)     Difficulty of Paying Living Expenses: Somewhat hard   Food Insecurity: No Food Insecurity (9/16/2024)    Hunger Vital Sign     Worried About Running Out of Food in the Last Year: Never true     Ran Out of Food in the Last Year: Never true   Transportation Needs: No Transportation Needs (9/16/2024)    PRAPARE - Transportation     Lack of Transportation (Medical): No     Lack of Transportation (Non-Medical): No   Physical Activity: Inactive (9/16/2024)    Exercise Vital Sign     Days of Exercise per Week: 0 days     Minutes of Exercise per Session: 0 min   Stress: Stress Concern Present (9/16/2024)    [employer] - Occupational Stress Questionnaire     Feeling of Stress : To some extent   Social Connections: Socially Isolated (9/16/2024)    Social Connection and Isolation Panel [NHANES]     Frequency of Communication with Friends and Family: Never     Frequency of Social Gatherings with Friends and Family: Never     Attends Jain Services: Never     Active Member of Clubs or Organizations: No     Attends Club or Organization Meetings: Never     Marital Status:    Intimate Partner Violence: Not At Risk (9/16/2024)    Humiliation, Afraid, Rape, and Kick questionnaire     Fear of Current or Ex-Partner: No     Emotionally Abused: No     Physically Abused: No     Sexually Abused: No    Housing Stability: Low Risk  (9/16/2024)    Housing Stability Vital Sign     Unable to Pay for Housing in the Last Year: No     Number of Times Moved in the Last Year: 1     Homeless in the Last Year: No       Review of systems:  Review of Systems   Constitutional:  Negative for chills, fever and malaise/fatigue.   HENT:  Negative for congestion and sore throat.    Respiratory:  Negative for cough and shortness of breath.    Cardiovascular:  Negative for chest pain and leg swelling.   Gastrointestinal:  Positive for abdominal pain (Epigastric), diarrhea, heartburn, nausea and vomiting (Coffee-ground hematemesis). Negative for blood in stool, constipation and melena.   Genitourinary:  Negative for dysuria and flank pain.   Musculoskeletal:  Negative for joint pain and myalgias.   Neurological:  Negative for weakness and headaches.   Psychiatric/Behavioral:  Negative for substance abuse. The patient is not nervous/anxious.    All other systems reviewed and are negative.        Physical Exam:  Vitals:    09/17/24 0820 09/17/24 0930 09/17/24 1124 09/17/24 1211   BP:       Pulse:       Resp: 18 18 16 16   Temp:       TempSrc:       SpO2:       Weight:       Height:           Physical Exam  Vitals and nursing note reviewed.   Constitutional:       General: He is awake. He is not in acute distress.     Appearance: Normal appearance. He is well-developed and overweight. He is not ill-appearing or toxic-appearing.   HENT:      Head: Normocephalic and atraumatic.      Nose: Nose normal. No congestion.      Mouth/Throat:      Mouth: Mucous membranes are moist.      Pharynx: Oropharynx is clear. No oropharyngeal exudate.   Eyes:      General: No scleral icterus.     Extraocular Movements: Extraocular movements intact.      Conjunctiva/sclera: Conjunctivae normal.   Cardiovascular:      Rate and Rhythm: Normal rate and regular rhythm.      Pulses: Normal pulses.      Heart sounds: Normal heart sounds. No murmur  heard.  Pulmonary:      Effort: Pulmonary effort is normal. No respiratory distress.      Breath sounds: Normal breath sounds.   Abdominal:      General: Abdomen is flat. Bowel sounds are normal. There is no distension.      Palpations: Abdomen is soft.      Tenderness: There is abdominal tenderness (Epigastric pain with palpation). There is no guarding.   Musculoskeletal:      Right lower leg: No edema.      Left lower leg: No edema.   Skin:     General: Skin is warm and dry.      Capillary Refill: Capillary refill takes less than 2 seconds.      Coloration: Skin is not jaundiced.      Comments: Midline sternal surgical incision well-healed   Neurological:      General: No focal deficit present.      Mental Status: He is alert and oriented to person, place, and time. Mental status is at baseline.   Psychiatric:         Mood and Affect: Mood normal.         Behavior: Behavior normal. Behavior is cooperative.           Labs:  Recent Labs     09/16/24  0250 09/16/24  0806 09/17/24  0332   WBC 6.5  --  4.8   RBC 5.75  --  5.75   HEMOGLOBIN 13.6* 13.3* 13.1*   HEMATOCRIT 42.2 43.0 43.8   MCV 73.4*  --  76.2*   MCH 23.7*  --  22.8*   MCHC 32.2*  --  29.9*   RDW 49.3  --  51.9*   PLATELETCT 406  --  343   MPV 9.1  --  9.0     Recent Labs     09/16/24  0250 09/17/24  0332   SODIUM 144 142   POTASSIUM 3.8 4.1   CHLORIDE 111 110   CO2 19* 18*   GLUCOSE 98 98   BUN 10 10           Recent Labs     09/16/24  0250   ASTSGOT 16   ALTSGPT 11   TBILIRUBIN 0.5   ALKPHOSPHAT 51   GLOBULIN 2.5         Imaging:  CT-ABDOMEN-PELVIS WITH  Narrative: 9/16/2024 3:40 AM    HISTORY/REASON FOR EXAM:  Epigastric Pain; IV contrast only. Vomiting blood, severe epigastric pain. Eval for perforating ulcer.    TECHNIQUE/EXAM DESCRIPTION:   CT scan of the abdomen and pelvis with contrast.    Contrast-enhanced helical scanning was obtained from the diaphragmatic domes through the pubic symphysis following the bolus administration of nonionic contrast  without complication.    100 mL of Omnipaque 350 nonionic contrast was administered without complication.    Low dose optimization technique was utilized for this CT exam including automated exposure control and adjustment of the mA and/or kV according to patient size.    COMPARISON: 9/1/2024    FINDINGS:  Lower Chest: Unremarkable    Liver: Normal    Gallbladder: Surgically absent    Biliary: Nondilated    Pancreas: Unremarkable    Spleen: Unremarkable    Adrenal glands: Normal    Kidneys: Unremarkable without hydronephrosis    Bowel: No obstruction or acute inflammation. Appendix not visualized.    Lymph nodes: No adenopathy    Vasculature: There are endovascular coils in the gastroduodenal artery.    Peritoneum: Unremarkable without ascites    Musculoskeletal: No acute or destructive process    Pelvis: No adenopathy or free fluid  Impression: No evidence of acute inflammatory process in the abdomen or pelvis. Upper gastrointestinal ulcers are not excluded by CT.  DX-CHEST-PORTABLE (1 VIEW)  Narrative: 9/16/2024 2:56 AM    HISTORY/REASON FOR EXAM:  Chest Pain    TECHNIQUE/EXAM DESCRIPTION AND NUMBER OF VIEWS:  Single portable view of the chest.    COMPARISON: 6/20/2024    FINDINGS:    HEART: Stable size. There has been previous sternotomy.  LUNGS: No areas of air space disease are demonstrated.  PLEURA: No effusion or pneumothorax.  Impression: No acute cardiac or pulmonary abnormalities are identified.            Impressions:  Hematemesis-coffee-ground  Epigastric pain  Chronic pain-follows by pain management.  On oxycodone 10 mg 5 times per day.  Iron deficiency anemia-repeat iron studies are pending  S/p mitral valve repair January 2024      Recommendations:  Monitor H/H and for signs of rebleeding  Continue PPI twice daily  If repeat iron studies reflective of iron deficiency, recommend replacement via IV as oral iron replacement can turn stool black and make it difficult to discern from true melena  Pain  management deferred to primary team  Diet per primary team; n.p.o. midnight  Plan for EGD tomorrow      Discussed with patient, wife at bedside, nursing, Dr. Macias, Dr. Oliver.      This note was generated using voice recognition software which has a small chance of producing errors of grammar and possibly content. I have made every reasonable attempt to find and correct any obvious errors, but expect that some may not be found prior to finalization of this note.

## 2024-09-17 NOTE — PROGRESS NOTES
Received bedside report from previous RN, pt is alert and oriented x4. Fall precautions in place and call light within reach. All other needs met at this time.

## 2024-09-18 ENCOUNTER — ANESTHESIA EVENT (OUTPATIENT)
Dept: SURGERY | Facility: MEDICAL CENTER | Age: 44
End: 2024-09-18
Payer: COMMERCIAL

## 2024-09-18 ENCOUNTER — ANESTHESIA (OUTPATIENT)
Dept: SURGERY | Facility: MEDICAL CENTER | Age: 44
End: 2024-09-18
Payer: COMMERCIAL

## 2024-09-18 LAB
ANION GAP SERPL CALC-SCNC: 10 MMOL/L (ref 7–16)
BUN SERPL-MCNC: 12 MG/DL (ref 8–22)
CALCIUM SERPL-MCNC: 8.3 MG/DL (ref 8.5–10.5)
CHLORIDE SERPL-SCNC: 108 MMOL/L (ref 96–112)
CO2 SERPL-SCNC: 23 MMOL/L (ref 20–33)
CREAT SERPL-MCNC: 1.12 MG/DL (ref 0.5–1.4)
ERYTHROCYTE [DISTWIDTH] IN BLOOD BY AUTOMATED COUNT: 51.4 FL (ref 35.9–50)
GFR SERPLBLD CREATININE-BSD FMLA CKD-EPI: 83 ML/MIN/1.73 M 2
GLUCOSE SERPL-MCNC: 100 MG/DL (ref 65–99)
HCT VFR BLD AUTO: 41.3 % (ref 42–52)
HGB BLD-MCNC: 12.7 G/DL (ref 14–18)
MCH RBC QN AUTO: 23 PG (ref 27–33)
MCHC RBC AUTO-ENTMCNC: 30.8 G/DL (ref 32.3–36.5)
MCV RBC AUTO: 74.8 FL (ref 81.4–97.8)
PLATELET # BLD AUTO: 320 K/UL (ref 164–446)
PMV BLD AUTO: 8.7 FL (ref 9–12.9)
POTASSIUM SERPL-SCNC: 3.8 MMOL/L (ref 3.6–5.5)
RBC # BLD AUTO: 5.52 M/UL (ref 4.7–6.1)
SODIUM SERPL-SCNC: 141 MMOL/L (ref 135–145)
WBC # BLD AUTO: 5.3 K/UL (ref 4.8–10.8)

## 2024-09-18 PROCEDURE — A9270 NON-COVERED ITEM OR SERVICE: HCPCS | Performed by: INTERNAL MEDICINE

## 2024-09-18 PROCEDURE — 80048 BASIC METABOLIC PNL TOTAL CA: CPT

## 2024-09-18 PROCEDURE — 700102 HCHG RX REV CODE 250 W/ 637 OVERRIDE(OP): Performed by: NURSE PRACTITIONER

## 2024-09-18 PROCEDURE — 700111 HCHG RX REV CODE 636 W/ 250 OVERRIDE (IP): Mod: JZ | Performed by: INTERNAL MEDICINE

## 2024-09-18 PROCEDURE — 700105 HCHG RX REV CODE 258: Performed by: STUDENT IN AN ORGANIZED HEALTH CARE EDUCATION/TRAINING PROGRAM

## 2024-09-18 PROCEDURE — 700101 HCHG RX REV CODE 250: Performed by: STUDENT IN AN ORGANIZED HEALTH CARE EDUCATION/TRAINING PROGRAM

## 2024-09-18 PROCEDURE — 700111 HCHG RX REV CODE 636 W/ 250 OVERRIDE (IP)

## 2024-09-18 PROCEDURE — 160009 HCHG ANES TIME/MIN: Performed by: INTERNAL MEDICINE

## 2024-09-18 PROCEDURE — 160048 HCHG OR STATISTICAL LEVEL 1-5: Performed by: INTERNAL MEDICINE

## 2024-09-18 PROCEDURE — 700102 HCHG RX REV CODE 250 W/ 637 OVERRIDE(OP)

## 2024-09-18 PROCEDURE — 85027 COMPLETE CBC AUTOMATED: CPT

## 2024-09-18 PROCEDURE — 700102 HCHG RX REV CODE 250 W/ 637 OVERRIDE(OP): Performed by: INTERNAL MEDICINE

## 2024-09-18 PROCEDURE — 43235 EGD DIAGNOSTIC BRUSH WASH: CPT | Performed by: INTERNAL MEDICINE

## 2024-09-18 PROCEDURE — 700111 HCHG RX REV CODE 636 W/ 250 OVERRIDE (IP): Performed by: STUDENT IN AN ORGANIZED HEALTH CARE EDUCATION/TRAINING PROGRAM

## 2024-09-18 PROCEDURE — 96376 TX/PRO/DX INJ SAME DRUG ADON: CPT

## 2024-09-18 PROCEDURE — 160202 HCHG ENDO MINUTES - 1ST 30 MINS LEVEL 3: Performed by: INTERNAL MEDICINE

## 2024-09-18 PROCEDURE — A9270 NON-COVERED ITEM OR SERVICE: HCPCS

## 2024-09-18 PROCEDURE — G0378 HOSPITAL OBSERVATION PER HR: HCPCS

## 2024-09-18 PROCEDURE — A9270 NON-COVERED ITEM OR SERVICE: HCPCS | Performed by: NURSE PRACTITIONER

## 2024-09-18 PROCEDURE — 160035 HCHG PACU - 1ST 60 MINS PHASE I: Performed by: INTERNAL MEDICINE

## 2024-09-18 PROCEDURE — 160002 HCHG RECOVERY MINUTES (STAT): Performed by: INTERNAL MEDICINE

## 2024-09-18 PROCEDURE — 99233 SBSQ HOSP IP/OBS HIGH 50: CPT | Performed by: INTERNAL MEDICINE

## 2024-09-18 RX ORDER — HYDROMORPHONE HYDROCHLORIDE 1 MG/ML
1 INJECTION, SOLUTION INTRAMUSCULAR; INTRAVENOUS; SUBCUTANEOUS
Status: DISCONTINUED | OUTPATIENT
Start: 2024-09-18 | End: 2024-09-19 | Stop reason: HOSPADM

## 2024-09-18 RX ORDER — HALOPERIDOL 5 MG/ML
1 INJECTION INTRAMUSCULAR
Status: DISCONTINUED | OUTPATIENT
Start: 2024-09-18 | End: 2024-09-18 | Stop reason: HOSPADM

## 2024-09-18 RX ORDER — SODIUM CHLORIDE, SODIUM LACTATE, POTASSIUM CHLORIDE, CALCIUM CHLORIDE 600; 310; 30; 20 MG/100ML; MG/100ML; MG/100ML; MG/100ML
INJECTION, SOLUTION INTRAVENOUS
Status: DISCONTINUED | OUTPATIENT
Start: 2024-09-18 | End: 2024-09-18 | Stop reason: SURG

## 2024-09-18 RX ORDER — ONDANSETRON 2 MG/ML
4 INJECTION INTRAMUSCULAR; INTRAVENOUS
Status: DISCONTINUED | OUTPATIENT
Start: 2024-09-18 | End: 2024-09-18 | Stop reason: HOSPADM

## 2024-09-18 RX ORDER — DIPHENHYDRAMINE HYDROCHLORIDE 50 MG/ML
12.5 INJECTION INTRAMUSCULAR; INTRAVENOUS
Status: DISCONTINUED | OUTPATIENT
Start: 2024-09-18 | End: 2024-09-18 | Stop reason: HOSPADM

## 2024-09-18 RX ORDER — LIDOCAINE HYDROCHLORIDE 20 MG/ML
INJECTION, SOLUTION EPIDURAL; INFILTRATION; INTRACAUDAL; PERINEURAL PRN
Status: DISCONTINUED | OUTPATIENT
Start: 2024-09-18 | End: 2024-09-18 | Stop reason: SURG

## 2024-09-18 RX ADMIN — PROPOFOL 70 MG: 10 INJECTION, EMULSION INTRAVENOUS at 10:49

## 2024-09-18 RX ADMIN — FENTANYL CITRATE 100 MCG: 50 INJECTION, SOLUTION INTRAMUSCULAR; INTRAVENOUS at 10:48

## 2024-09-18 RX ADMIN — OXYCODONE HYDROCHLORIDE 10 MG: 10 TABLET ORAL at 17:48

## 2024-09-18 RX ADMIN — HYDROMORPHONE HYDROCHLORIDE 0.5 MG: 1 INJECTION, SOLUTION INTRAMUSCULAR; INTRAVENOUS; SUBCUTANEOUS at 11:32

## 2024-09-18 RX ADMIN — PROPOFOL 30 MG: 10 INJECTION, EMULSION INTRAVENOUS at 10:51

## 2024-09-18 RX ADMIN — HYOSCYAMINE SULFATE 0.12 MG: 0.12 TABLET SUBLINGUAL at 12:09

## 2024-09-18 RX ADMIN — LIDOCAINE HYDROCHLORIDE 100 MG: 20 INJECTION, SOLUTION EPIDURAL; INFILTRATION; INTRACAUDAL at 10:49

## 2024-09-18 RX ADMIN — GABAPENTIN 300 MG: 300 CAPSULE ORAL at 12:13

## 2024-09-18 RX ADMIN — BACLOFEN 10 MG: 10 TABLET ORAL at 12:09

## 2024-09-18 RX ADMIN — HYDROMORPHONE HYDROCHLORIDE 1 MG: 1 INJECTION, SOLUTION INTRAMUSCULAR; INTRAVENOUS; SUBCUTANEOUS at 14:36

## 2024-09-18 RX ADMIN — GABAPENTIN 300 MG: 300 CAPSULE ORAL at 04:30

## 2024-09-18 RX ADMIN — HYDROMORPHONE HYDROCHLORIDE 1 MG: 1 INJECTION, SOLUTION INTRAMUSCULAR; INTRAVENOUS; SUBCUTANEOUS at 19:14

## 2024-09-18 RX ADMIN — PANTOPRAZOLE SODIUM 40 MG: 40 INJECTION, POWDER, FOR SOLUTION INTRAVENOUS at 04:30

## 2024-09-18 RX ADMIN — BACLOFEN 10 MG: 10 TABLET ORAL at 04:30

## 2024-09-18 RX ADMIN — HYDROMORPHONE HYDROCHLORIDE 0.5 MG: 1 INJECTION, SOLUTION INTRAMUSCULAR; INTRAVENOUS; SUBCUTANEOUS at 00:11

## 2024-09-18 RX ADMIN — HYDROMORPHONE HYDROCHLORIDE 0.5 MG: 1 INJECTION, SOLUTION INTRAMUSCULAR; INTRAVENOUS; SUBCUTANEOUS at 03:07

## 2024-09-18 RX ADMIN — SUCRALFATE 1 G: 1 SUSPENSION ORAL at 04:30

## 2024-09-18 RX ADMIN — ZOLPIDEM TARTRATE 10 MG: 5 TABLET ORAL at 22:27

## 2024-09-18 RX ADMIN — HYDROMORPHONE HYDROCHLORIDE 1 MG: 1 INJECTION, SOLUTION INTRAMUSCULAR; INTRAVENOUS; SUBCUTANEOUS at 22:28

## 2024-09-18 RX ADMIN — PROPOFOL 30 MG: 10 INJECTION, EMULSION INTRAVENOUS at 10:56

## 2024-09-18 RX ADMIN — PANTOPRAZOLE SODIUM 40 MG: 40 INJECTION, POWDER, FOR SOLUTION INTRAVENOUS at 17:48

## 2024-09-18 RX ADMIN — HYOSCYAMINE SULFATE 0.12 MG: 0.12 TABLET SUBLINGUAL at 22:27

## 2024-09-18 RX ADMIN — SUCRALFATE 1 G: 1 SUSPENSION ORAL at 22:50

## 2024-09-18 RX ADMIN — BACLOFEN 10 MG: 10 TABLET ORAL at 17:48

## 2024-09-18 RX ADMIN — SUCRALFATE 1 G: 1 SUSPENSION ORAL at 12:09

## 2024-09-18 RX ADMIN — OXYCODONE HYDROCHLORIDE 10 MG: 10 TABLET ORAL at 12:10

## 2024-09-18 RX ADMIN — GABAPENTIN 300 MG: 300 CAPSULE ORAL at 17:48

## 2024-09-18 RX ADMIN — OXYCODONE HYDROCHLORIDE 10 MG: 10 TABLET ORAL at 04:30

## 2024-09-18 RX ADMIN — PROPOFOL 30 MG: 10 INJECTION, EMULSION INTRAVENOUS at 10:54

## 2024-09-18 RX ADMIN — HYDROMORPHONE HYDROCHLORIDE 0.5 MG: 1 INJECTION, SOLUTION INTRAMUSCULAR; INTRAVENOUS; SUBCUTANEOUS at 06:13

## 2024-09-18 RX ADMIN — SODIUM CHLORIDE, POTASSIUM CHLORIDE, SODIUM LACTATE AND CALCIUM CHLORIDE: 600; 310; 30; 20 INJECTION, SOLUTION INTRAVENOUS at 10:44

## 2024-09-18 RX ADMIN — SUCRALFATE 1 G: 1 SUSPENSION ORAL at 17:48

## 2024-09-18 RX ADMIN — HYOSCYAMINE SULFATE 0.12 MG: 0.12 TABLET SUBLINGUAL at 17:48

## 2024-09-18 ASSESSMENT — PAIN DESCRIPTION - PAIN TYPE
TYPE: SURGICAL PAIN
TYPE: ACUTE PAIN

## 2024-09-18 ASSESSMENT — PAIN SCALES - GENERAL: PAIN_LEVEL: 4

## 2024-09-18 NOTE — CARE PLAN
The patient is Stable - Low risk of patient condition declining or worsening    Shift Goals  Clinical Goals: patient will report a reduction in pain from 9/10 to less than 5 at the end of the shift. NPO at MN  Patient Goals: pain management  Family Goals: eloise    Progress made toward(s) clinical / shift goals:  updated on POC and verbalized understanding. NPO at MN. Medicated per MAR and tolerated well. Fall precautions in place. Needs attended.    Patient is not progressing towards the following goals:      Problem: Pain - Standard  Goal: Alleviation of pain or a reduction in pain to the patient’s comfort goal  Description: Target End Date:  Prior to discharge or change in level of care    Document on Vitals flowsheet    1.  Document pain using the appropriate pain scale per order or unit policy  2.  Educate and implement non-pharmacologic comfort measures (i.e. relaxation, distraction, massage, cold/heat therapy, etc.)  3.  Pain management medications as ordered  4.  Reassess pain after pain med administration per policy  5.  If opiods administered assess patient's response to pain medication is appropriate per POSS sedation scale  6.  Follow pain management plan developed in collaboration with patient and interdisciplinary team (including palliative care or pain specialists if applicable)  Outcome: Not Progressing

## 2024-09-18 NOTE — PROGRESS NOTES
Hospital Medicine Daily Progress Note    Date of Service  9/18/2024    Chief Complaint  Hematemesis, acute on chronic pain    Hospital Course  Roberto Braswell is a 44 y.o. 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  He had no melena or bloody stools.  He denies any dizziness or lightheadedness.  He admits to having some shots of spirits tonight prior before the vomiting episode.  On evaluation, vital signs were stable.  Bicarbonate was 19.  Lipase was normal.  Hemoglobin 13.6. CT abdomen showed no acute inflammatory process or other abnormalities.  Chest x-ray was unremarkable.  Patient's outpatient pain management was contacted who shared that patient is currently on oxycodone 6 times daily and recently dropped down to 5 times daily to start weaning patient off of oxycodone.  Given patient's extensive GI history, he was continued on IV Protonix and Carafate.  GI was consulted who recommended EGD.    Interval Problem Update  9/18/2024 - I reviewed the patient's chart today. Uneventful night. VSS. Afebrile. Saturating well on RA.  Hemoglobin 12.7.  Electrolytes and renal function are normal..  Iron 145.  Ferritin 36.3.  Lactic acid is normal.  He is scheduled for EGD today.    > I have personally seen and examined the patient today.  Still with epigastric abdominal pain.  Has some nausea.  No chest pain or shortness of breath.  No diarrhea.  No fevers or chills.      I personally reviewed all lab results mentioned above. Prior medical records from this institution and outside facilities were independently reviewed as noted. I also personally  reviewed all ER physician and consultant recommendations and plans as documented above. History was independently obtained by myself. I have discussed this patient's plan of care and discharge plan at IDT rounds today with Case Management, Nursing, Nursing leadership, and other members of the IDT team.    Consultants/Specialty  GI    Code Status  Full Code    Disposition  The patient is not medically cleared for discharge to home or a post-acute facility.      Anticipate discharge to home once medically cleared.  I have placed the appropriate orders for post-discharge needs.    Review of Systems  ROS     Pertinent positives/negatives as mentioned above.     A complete review of systems was personally done by me. All other systems were negative.       Physical Exam  Temp:  [36.2 °C (97.2 °F)-36.5 °C (97.7 °F)] 36.5 °C (97.7 °F)  Pulse:  [66-86] 78  Resp:  [16-20] 18  BP: (119-153)/(72-85) 153/85  SpO2:  [98 %-100 %] 100 %    Physical Exam  Vitals reviewed.   Constitutional:       General: He is not in acute distress.     Appearance: Normal appearance. He is not toxic-appearing or diaphoretic.   HENT:      Head: Normocephalic and atraumatic.      Right Ear: External ear normal.      Left Ear: External ear normal.      Mouth/Throat:      Mouth: Mucous membranes are moist.      Pharynx: No oropharyngeal exudate.   Eyes:      General: No scleral icterus.     Extraocular Movements: Extraocular movements intact.      Conjunctiva/sclera: Conjunctivae normal.      Pupils: Pupils are equal, round, and reactive to light.   Cardiovascular:      Rate and Rhythm: Normal rate and regular rhythm.      Heart sounds: Normal heart sounds. No murmur heard.     No gallop.   Pulmonary:      Effort: Pulmonary effort is normal. No respiratory distress.      Breath sounds: Normal breath sounds. No stridor. No wheezing, rhonchi or rales.   Chest:      Chest wall: No tenderness.   Abdominal:      General: Bowel sounds are normal. There is no  distension.      Palpations: Abdomen is soft. There is no mass.      Tenderness: There is abdominal tenderness (epigastrium). There is no guarding or rebound.   Musculoskeletal:         General: No swelling. Normal range of motion.      Cervical back: Normal range of motion and neck supple.      Right lower leg: No edema.      Left lower leg: No edema.   Lymphadenopathy:      Cervical: No cervical adenopathy.   Skin:     General: Skin is warm and dry.      Coloration: Skin is not jaundiced.      Findings: No rash.   Neurological:      General: No focal deficit present.      Mental Status: He is alert and oriented to person, place, and time.      Cranial Nerves: No cranial nerve deficit.   Psychiatric:         Mood and Affect: Mood normal.         Behavior: Behavior normal.         Thought Content: Thought content normal.         Judgment: Judgment normal.       I have performed the physical examination today 9/18/2024.  In review of yesterday's note, there are no new changes except as documented above.      Fluids    Intake/Output Summary (Last 24 hours) at 9/18/2024 1057  Last data filed at 9/17/2024 1400  Gross per 24 hour   Intake 240 ml   Output --   Net 240 ml        Laboratory  Recent Labs     09/16/24  0250 09/16/24  0806 09/17/24  0332 09/18/24  0818   WBC 6.5  --  4.8 5.3   RBC 5.75  --  5.75 5.52   HEMOGLOBIN 13.6* 13.3* 13.1* 12.7*   HEMATOCRIT 42.2 43.0 43.8 41.3*   MCV 73.4*  --  76.2* 74.8*   MCH 23.7*  --  22.8* 23.0*   MCHC 32.2*  --  29.9* 30.8*   RDW 49.3  --  51.9* 51.4*   PLATELETCT 406  --  343 320   MPV 9.1  --  9.0 8.7*     Recent Labs     09/16/24  0250 09/17/24  0332 09/18/24  0818   SODIUM 144 142 141   POTASSIUM 3.8 4.1 3.8   CHLORIDE 111 110 108   CO2 19* 18* 23   GLUCOSE 98 98 100*   BUN 10 10 12   CREATININE 1.15 1.10 1.12   CALCIUM 8.3* 8.4* 8.3*                   Imaging  CT-ABDOMEN-PELVIS WITH   Final Result      No evidence of acute inflammatory process in the abdomen or pelvis.  Upper gastrointestinal ulcers are not excluded by CT.      DX-CHEST-PORTABLE (1 VIEW)   Final Result      No acute cardiac or pulmonary abnormalities are identified.           Assessment/Plan  * Upper GI bleed- (present on admission)  Assessment & Plan  - Reported hematemesis.  States stool is dark.   - Suspect secondary to gastritis/duodenitis/PUD exacerbated by alcohol.   -Hemoglobin stable, however pain consistent with previous episodes of PUD.  GI on board, plan for EGD today.    -Continue IV Protonix twice daily and Carafate slurry.  -Hemoglobin so far stable.  Monitor hemoglobin closely.  Restrictive transfusion strategy.        Intractable abdominal pain- (present on admission)  Assessment & Plan  - Suspect secondary to gastritis/duodenitis/PUD exacerbated by alcohol. Lipase negative.  Liver panel was normal. CT abdomen showed nothing acute.  Lactate is normal.  -Hemoglobin stable, however pain consistent with previous episodes of PUD.   -GI on board, plan for EGD today.  -Continue IV Protonix twice daily and Carafate slurry QID.  -May have component of chronic pain with opioid dependence.  Reportedly, is being tapered off oxycodone.  Continue home oxycodone regimen 10 mg every 5 hours.  To bridge through acute pain, continue IV Dilaudid 0.5 mg every 3 hours for now.  Monitor closely for narcotic side effects.  Continue Levsin.  -Continue home baclofen and gabapentin    Chronic pain syndrome- (present on admission)  Assessment & Plan  - Likely contributing significantly to current presentation.  -Being tapered off by outpatient pain management. Down from 60mg oxycodone a day to 50 mg oxycodone a day (5x10mg oxy - (75 MME) .  -Management as above.    Iron deficiency anemia due to chronic blood loss- (present on admission)  Assessment & Plan  - Hemoglobin has been relatively stable.  -Continue to monitor hemoglobin closely.  Restrictive transfusion strategy.  -Iron panel is within normal limits.         VTE  prophylaxis: SCD        My total time spent caring for the patient on the day of the encounter was 51 minutes. This does not include time spent on separately billable procedures/tests.

## 2024-09-18 NOTE — ANESTHESIA PREPROCEDURE EVALUATION
Case: [id number] Anesthesia Start Date/Time: 09/18/24 1044    Procedure: GASTROSCOPY (Esophagus)    Anesthesia type: MAC    Pre-op diagnosis: coffee ground emesis, epigastric pain    Location: [facility] ROOM 26 / SURGERY SAME DAY [facility]    Surgeons: Janae Oliver M.D.            Relevant Problems   CARDIAC   (positive) Essential hypertension       Physical Exam    Airway   Mallampati: II  TM distance: >3 FB  Neck ROM: full       Cardiovascular - normal exam  Rhythm: regular  Rate: normal  (-) murmur     Dental - normal exam           Pulmonary - normal exam  Breath sounds clear to auscultation     Abdominal    Neurological - normal exam                   Anesthesia Plan    ASA 2       Plan - MAC               Induction: intravenous    Postoperative Plan: Postoperative administration of opioids is intended.    Pertinent diagnostic labs and testing reviewed    Informed Consent:    Anesthetic plan and risks discussed with patient.    Use of blood products discussed with: patient whom consented to blood products.

## 2024-09-18 NOTE — PROGRESS NOTES
Received bedside report from previous RN, pt is alert and oriented x4. NPO at MN. Fall precautions in place and call light within reach. All other needs met at this time.

## 2024-09-18 NOTE — PROGRESS NOTES
Bedside report received, assessment completed    A&O x  4, pt calls appropriately  Mobility: Up self, Assistive Devices: none  6 Clicks:  Mobility/ PT not indicated   ADL/ OT not indicated    Weight Bearing Restrictions: no restrictions   Fall Risk Assessment: no risk  Fall Precautions Include:  + Personal Items at bedside, + Bed in low position, + Door Notifications in place   Pain Assessment / Reassessment completed, medication provided per MAR  Diet: Low fiber GI Soft   - Tolerating  LDA:   IV Access: 18g RFA, CDI/ flushed/ SL/ Infusing per MAR     GI/: restroom void, + flatus, 9/17 / PTA BM    - Bowel protocols in place: n/a  DVT Prophylaxis: , SCD on while in bed    Corwin Score: 22, Interventions per flow sheet   Skin Assessment: CDI  Procedures:    - 9/18 Gastroscopy  D/C Plan:    - Per GI: continue PPI, carafate x2wk, start diet   - Pt started on Low Fiber Diet today, pending tolerance      Reviewed plan of care with patient, bed in lowest position and locked, pt resting comfortably now, call light within reach, all needs met at this time. Interventions will be executed per plan of care

## 2024-09-18 NOTE — ANESTHESIA TIME REPORT
Anesthesia Start and Stop Event Times       Date Time Event    9/18/2024 1040 Ready for Procedure     1044 Anesthesia Start     1109 Anesthesia Stop          Responsible Staff  09/18/24      Name Role Begin End    Irving Larsen M.D. Anesth 1044 1109          Overtime Reason:  no overtime (within assigned shift)    Comments:

## 2024-09-18 NOTE — ANESTHESIA POSTPROCEDURE EVALUATION
Patient: Roberto Braswell    Procedure Summary       Date: 09/18/24 Room / Location: [facility] ROOM 26 / SURGERY SAME DAY [facility]    Anesthesia Start: 1044 Anesthesia Stop: 1109    Procedure: GASTROSCOPY (Esophagus) Diagnosis: (gastritis, esophagitis)    Surgeons: Janae Oliver M.D. Responsible Provider: Irving Larsen M.D.    Anesthesia Type: MAC ASA Status: 2            Final Anesthesia Type: MAC  Last vitals  BP   Blood Pressure: 136/88    Temp   36.1 °C (97 °F)    Pulse   66   Resp   13    SpO2   96 %      Anesthesia Post Evaluation    Patient location during evaluation: PACU  Patient participation: complete - patient participated  Level of consciousness: awake and alert  Pain score: 4    Airway patency: patent  Anesthetic complications: no  Cardiovascular status: hemodynamically stable  Respiratory status: acceptable  Hydration status: euvolemic    PONV: none          No notable events documented.     Nurse Pain Score: 8 (NPRS)

## 2024-09-18 NOTE — OR NURSING
1104- pt arrives from OR to PACU 4, report received from RN and anesthesia. Pt place on monitor. VSS,  NAD noted. O2 6L via mask.      1110- pt c/o 10/10 epigastric pain, same as having prior to procedure, no pain meds orders- MD notified     1117-Dr Oliver at bedside.   Ice chips given    1137 Report given to Juana TY.     1142-pt transferred back to room, all belongings accounted for

## 2024-09-18 NOTE — OP REPORT
OPERATIVE REPORT    PATIENT:   Roberto Braswell   1980       PREOPERATIVE DIAGNOSES/INDICATIONS: history of gastric and duodenal ulcers    POSTOPERATIVE DIAGNOSIS: LA class B erosive esophagitis, Schatzki's ring, hiatal hernia, nonerosive gastritis previously biopsied, minimal stricturing in duodenum    PROCEDURE:  ESOPHAGOGASTRODUODENOSCOPY cap assisted    PHYSICIAN:  Janae Oliver MD    ANESTHESIA:  Per anesthesiologist.    LOCATION: [facility]    CONSENT:  OBTAINED. The risks, benefits and alternatives of the procedure were discussed in details. The risks include and are not limited to bleeding, infection, perforation, missed lesions, and sedations risks (cardiopulmonary compromise and allergic reaction to medications).    DESCRIPTION: The patient presented to the procedure room.  After routine checkup was performed, patient was brought into the endoscopy suite.  Patient was placed on his left lateral decubitus position. A bite block was placed in patient's mouth. Patient was sedated by anesthesia.  Vital signs were monitored throughout the procedure.  Oxygenation support was provided throughout the procedure. Upper endoscope was inserted into patient's mouth and advanced to the second portion of the duodenum under direct visualization.      Once the site was reached and examined, the upper endoscope was withdrawn.  Retroflexion was made within the stomach.  The stomach was decompressed, scope was withdrawn and the procedure was terminated.     The patient tolerated the procedure well.  There were no immediate complications.    OPERATIVE FINDINGS:    1. Esophagus: single, shallow ulcer distal esophagus. NO blood in lumen. Schatzki's ring with lumen diameter >12mm    2. Stomach: 39-42 cm hiatal hernia.  NO blood in lumen.  Erythema in antrum.  No erosions or ulcers.    3. Duodenum: With cap, external diameter slightly larger and scant amount of blood on folds possibly due to mild stricturing but easily  advanced towards D3.  NO blood in lumen.    COMPLICATIONS:  none  EBL: 1 ml    RECOMMENDATIONS:  Continue with BID PPI until outpatient GI evaluation  Continue with Carafate x 2 weeks  Resume diet  Needs GI follow up for ongoing abdominal pain but unable to attribute findings on EGD today to his significant abdominal pain  Will check fasting gastrin for completeness.    GI signing off.

## 2024-09-19 ENCOUNTER — PHARMACY VISIT (OUTPATIENT)
Dept: PHARMACY | Facility: MEDICAL CENTER | Age: 44
End: 2024-09-19
Payer: COMMERCIAL

## 2024-09-19 VITALS
HEIGHT: 75 IN | BODY MASS INDEX: 29.25 KG/M2 | RESPIRATION RATE: 18 BRPM | OXYGEN SATURATION: 100 % | TEMPERATURE: 97.8 F | SYSTOLIC BLOOD PRESSURE: 134 MMHG | HEART RATE: 72 BPM | WEIGHT: 235.23 LBS | DIASTOLIC BLOOD PRESSURE: 84 MMHG

## 2024-09-19 PROBLEM — K29.70 GASTRITIS: Status: ACTIVE | Noted: 2024-09-19

## 2024-09-19 PROBLEM — K20.90 ESOPHAGITIS: Status: ACTIVE | Noted: 2024-09-19

## 2024-09-19 PROCEDURE — A9270 NON-COVERED ITEM OR SERVICE: HCPCS

## 2024-09-19 PROCEDURE — G0378 HOSPITAL OBSERVATION PER HR: HCPCS

## 2024-09-19 PROCEDURE — 700111 HCHG RX REV CODE 636 W/ 250 OVERRIDE (IP): Mod: JZ | Performed by: INTERNAL MEDICINE

## 2024-09-19 PROCEDURE — 96375 TX/PRO/DX INJ NEW DRUG ADDON: CPT

## 2024-09-19 PROCEDURE — 700102 HCHG RX REV CODE 250 W/ 637 OVERRIDE(OP): Performed by: INTERNAL MEDICINE

## 2024-09-19 PROCEDURE — A9270 NON-COVERED ITEM OR SERVICE: HCPCS | Performed by: INTERNAL MEDICINE

## 2024-09-19 PROCEDURE — 99239 HOSP IP/OBS DSCHRG MGMT >30: CPT | Performed by: INTERNAL MEDICINE

## 2024-09-19 PROCEDURE — 700102 HCHG RX REV CODE 250 W/ 637 OVERRIDE(OP): Performed by: NURSE PRACTITIONER

## 2024-09-19 PROCEDURE — 700111 HCHG RX REV CODE 636 W/ 250 OVERRIDE (IP): Mod: JZ

## 2024-09-19 PROCEDURE — RXMED WILLOW AMBULATORY MEDICATION CHARGE: Performed by: INTERNAL MEDICINE

## 2024-09-19 PROCEDURE — A9270 NON-COVERED ITEM OR SERVICE: HCPCS | Performed by: NURSE PRACTITIONER

## 2024-09-19 PROCEDURE — 700102 HCHG RX REV CODE 250 W/ 637 OVERRIDE(OP)

## 2024-09-19 PROCEDURE — 82941 ASSAY OF GASTRIN: CPT

## 2024-09-19 PROCEDURE — 96376 TX/PRO/DX INJ SAME DRUG ADON: CPT

## 2024-09-19 PROCEDURE — 700111 HCHG RX REV CODE 636 W/ 250 OVERRIDE (IP)

## 2024-09-19 RX ORDER — ONDANSETRON 2 MG/ML
4 INJECTION INTRAMUSCULAR; INTRAVENOUS EVERY 4 HOURS PRN
Status: DISCONTINUED | OUTPATIENT
Start: 2024-09-19 | End: 2024-09-19 | Stop reason: HOSPADM

## 2024-09-19 RX ORDER — SUCRALFATE 1 G/1
1 TABLET ORAL
Qty: 56 TABLET | Refills: 0 | Status: SHIPPED | OUTPATIENT
Start: 2024-09-19 | End: 2024-10-03

## 2024-09-19 RX ORDER — PANTOPRAZOLE SODIUM 40 MG/1
40 TABLET, DELAYED RELEASE ORAL 2 TIMES DAILY
Qty: 60 TABLET | Refills: 0 | Status: SHIPPED | OUTPATIENT
Start: 2024-09-19

## 2024-09-19 RX ADMIN — HYDROMORPHONE HYDROCHLORIDE 1 MG: 1 INJECTION, SOLUTION INTRAMUSCULAR; INTRAVENOUS; SUBCUTANEOUS at 08:44

## 2024-09-19 RX ADMIN — HYDROMORPHONE HYDROCHLORIDE 1 MG: 1 INJECTION, SOLUTION INTRAMUSCULAR; INTRAVENOUS; SUBCUTANEOUS at 04:30

## 2024-09-19 RX ADMIN — OXYCODONE HYDROCHLORIDE 10 MG: 10 TABLET ORAL at 07:37

## 2024-09-19 RX ADMIN — BACLOFEN 10 MG: 10 TABLET ORAL at 04:30

## 2024-09-19 RX ADMIN — GABAPENTIN 300 MG: 300 CAPSULE ORAL at 04:30

## 2024-09-19 RX ADMIN — SUCRALFATE 1 G: 1 SUSPENSION ORAL at 04:30

## 2024-09-19 RX ADMIN — HYOSCYAMINE SULFATE 0.12 MG: 0.12 TABLET SUBLINGUAL at 08:45

## 2024-09-19 RX ADMIN — OXYCODONE HYDROCHLORIDE 10 MG: 10 TABLET ORAL at 00:05

## 2024-09-19 RX ADMIN — PANTOPRAZOLE SODIUM 40 MG: 40 INJECTION, POWDER, FOR SOLUTION INTRAVENOUS at 04:30

## 2024-09-19 RX ADMIN — ONDANSETRON 4 MG: 2 INJECTION INTRAMUSCULAR; INTRAVENOUS at 06:12

## 2024-09-19 RX ADMIN — HYDROMORPHONE HYDROCHLORIDE 1 MG: 1 INJECTION, SOLUTION INTRAMUSCULAR; INTRAVENOUS; SUBCUTANEOUS at 01:31

## 2024-09-19 ASSESSMENT — PAIN DESCRIPTION - PAIN TYPE
TYPE: ACUTE PAIN

## 2024-09-19 NOTE — PROGRESS NOTES
Patient being discharged via [facility]. Pt educated on discharge instructions and new prescriptions, verbalized understanding. Follow up appointment to be made with PCP. PIV removed here in DCL. Patient going home via car with family.

## 2024-09-19 NOTE — DISCHARGE SUMMARY
Discharge Summary    CHIEF COMPLAINT ON ADMISSION  Chief Complaint   Patient presents with    Blood in Vomit    Abdominal Pain       Reason for Admission  EMS     Admission Date  9/16/2024    CODE STATUS  Full Code    HPI & HOSPITAL COURSE  Roberto Braswell is a 44 y.o. 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  He had no melena or bloody stools.  He denies any dizziness or lightheadedness.  He admits to having some shots of spirits tonight prior before the vomiting episode.  On evaluation, vital signs were stable.  Bicarbonate was 19.  Lipase was normal.  Hemoglobin 13.6.  Iron panel was within normal limits.  Lactate was normal.  CT abdomen showed no acute inflammatory process or other abnormalities.  Chest x-ray was unremarkable.  Patient's outpatient pain management was contacted who shared that patient is currently on oxycodone 6 times daily and recently dropped down to 5 times daily to start weaning patient off of oxycodone.  Given patient's extensive GI history, he was continued on IV Protonix and Carafate.  GI was consulted who recommended EGD. EGD was done which showed single shallow ulcer in the distal esophagus with no blood in the lumen, with Schatzi ring with lumen diameter greater than 12 mm, stomach with 39 to 42 cm hiatal hernia with no blood in the lumen, erythema in the antrum with no erosions or ulcers, with duodenum with cap, external diameter slightly larger and scant amount of blood on folds possibly due to mild stricturing but easily advanced towards D3 with no blood in the lumen.  Fasting gastrin was sent, and GI recommended  twice daily PPI until outpatient GI evaluation and to continue Carafate for 2 weeks.    His pain improved.  He was able to tolerate oral diet.  He remained hemodynamically stable and afebrile.  Hemoglobin remained stable.  Electrolytes and renal function remained normal.    I have personally seen and examined the patient on the day of discharge. With his clinical improvement, he was deemed ready to discharge from the hospital as he did not have any further hospitalization needs. Patient felt comfortable going home. The discharge plan was discussed with the patient, with which he was agreeable to.     Therefore, he is discharged in good and stable condition to home with close outpatient follow-up.      Discharge Date  9/19/2024      FOLLOW UP ITEMS POST DISCHARGE  -He will continue on twice a day Protonix along with Carafate.  -Follow-up with outpatient GI ([facility]).  Follow-up gastrin results.  -Follow-up with PCP.  - counseled to seek immediate medical attention, or return to the ED for recurrent or worsening symptoms.      DISCHARGE DIAGNOSES  Principal Problem:    Upper GI bleed (POA: Yes)  Active Problems:    Intractable abdominal pain (POA: Yes)    Gastritis (POA: Yes)    Esophagitis with esophageal ulcer (POA: Yes)    Iron deficiency anemia due to chronic blood loss (POA: Yes)      Overview: Secondary to GI etiology. Has had two transfusions in the past. Has had       multiple egds. He did have iron infusion during his recent       hospitalizations             10/10/23      -hgb now 12.5 improving from 8.6 3 weeks ago    Chronic pain syndrome (POA: Yes)  Resolved Problems:    Epigastric pain (POA: Yes)      FOLLOW UP  Future Appointments   Date Time Provider Department Center   12/5/2024  8:40 AM Sal Schafer D.O. [facility]     Sal Schafer D.O.  [address] [address]  [phone]    Schedule an appointment as soon as possible for a visit  Hospital  follow-up    [facility]  [id number]  [place] [address]  [phone]  Schedule an appointment as soon as possible for a visit  Hospital follow-up      MEDICATIONS ON DISCHARGE     Medication List        START taking these medications        Instructions   pantoprazole 40 MG Tbec  Commonly known as: Protonix   Take 1 Tablet by mouth 2 times a day.  Dose: 40 mg            CONTINUE taking these medications        Instructions   acetaminophen 500 MG Tabs  Commonly known as: Tylenol   Take 500-1,000 mg by mouth every 8 hours as needed for Mild Pain. Indications: Pain  Dose: 500-1,000 mg     aspirin 81 MG EC tablet   Take 81 mg by mouth every day.  Dose: 81 mg     baclofen 10 MG Tabs  Commonly known as: Lioresal   Take 10 mg by mouth 3 times a day. Indications: Muscle Spasm  Dose: 10 mg     D3 5000 125 MCG (5000 UT) Caps  Generic drug: cholecalciferol   Take 10,000 Units by mouth every day. 2 capsules= [id number]  Dose: 10,000 Units     gabapentin 300 MG Caps  Commonly known as: Neurontin   Take 300 mg by mouth 3 times a day.  Dose: 300 mg     ondansetron 4 MG Tbdp  Commonly known as: Zofran ODT   Take 1 Tablet by mouth every 6 hours as needed for Nausea/Vomiting for up to 12 doses.  Dose: 4 mg     oxyCODONE immediate release 10 MG immediate release tablet  Commonly known as: Roxicodone   Take 10 mg by mouth every four hours as needed.  Dose: 10 mg     sucralfate 1 GM Tabs  Commonly known as: Carafate   Take 1 Tablet by mouth 4 Times a Day,Before Meals and at Bedtime for 14 days.  Dose: 1 g     tadalafil 10 MG tablet  Commonly known as: Cialis   Take 1 Tablet by mouth 1 time a day as needed for Erectile Dysfunction.  Dose: 10 mg     zolpidem 10 MG Tabs  Commonly known as: Ambien   Take 1 Tablet by mouth at bedtime as needed for Sleep for up to 90 days.  Dose: 10 mg            STOP taking these medications      omeprazole 20 MG delayed-release capsule  Commonly known as: PriLOSEC               Allergies  Allergies   Allergen Reactions    Morphine Vomiting    Tramadol Unspecified     Seizure  [id number]       DIET  Orders Placed This Encounter   Procedures    Diet Order Diet: Low Fiber(GI Soft)     Standing Status:   Standing     Number of Occurrences:   1     Order Specific Question:   Diet:     Answer:   Low Fiber(GI Soft) [2]       ACTIVITY  As tolerated.  Weight bearing as tolerated    CONSULTATIONS  GI    PROCEDURES  As above    LABORATORY  Lab Results   Component Value Date    SODIUM 141 09/18/2024    POTASSIUM 3.8 09/18/2024    CHLORIDE 108 09/18/2024    CO2 23 09/18/2024    GLUCOSE 100 (H) 09/18/2024    BUN 12 09/18/2024    CREATININE 1.12 09/18/2024        Lab Results   Component Value Date    WBC 5.3 09/18/2024    HEMOGLOBIN 12.7 (L) 09/18/2024    HEMATOCRIT 41.3 (L) 09/18/2024    PLATELETCT 320 09/18/2024        Total time of the discharge process = 38 minutes.

## 2024-09-19 NOTE — CARE PLAN
The patient is Stable - Low risk of patient condition declining or worsening    Shift Goals  Clinical Goals: Pain control. change PIV  Patient Goals: pain control, sleep  Family Goals: eloise    Problem: Knowledge Deficit - Standard  Goal: Patient and family/care givers will demonstrate understanding of plan of care, disease process/condition, diagnostic tests and medications  Outcome: Progressing     Problem: Pain - Standard  Goal: Alleviation of pain or a reduction in pain to the patient’s comfort goal  Outcome: Progressing     Progress made toward(s) clinical / shift goals:  Patient is alert and oriented, needs met at this time. Medicated for pain. Hourly rounding in place. Call light within reach.     Patient is not progressing towards the following goals:

## 2024-09-21 LAB — GASTRIN SERPL-MCNC: 100 PG/ML (ref 0–100)

## 2024-09-25 ENCOUNTER — APPOINTMENT (OUTPATIENT)
Dept: MEDICAL GROUP | Facility: LAB | Age: 44
End: 2024-09-25
Payer: COMMERCIAL

## 2024-10-03 ENCOUNTER — APPOINTMENT (OUTPATIENT)
Dept: MEDICAL GROUP | Facility: LAB | Age: 44
End: 2024-10-03

## 2024-10-08 ENCOUNTER — TELEPHONE (OUTPATIENT)
Dept: CARDIOLOGY | Facility: MEDICAL CENTER | Age: 44
End: 2024-10-08
Payer: COMMERCIAL

## 2024-10-08 ENCOUNTER — PHARMACY VISIT (OUTPATIENT)
Dept: PHARMACY | Facility: MEDICAL CENTER | Age: 44
End: 2024-10-08
Payer: MEDICARE

## 2024-10-08 ENCOUNTER — HOSPITAL ENCOUNTER (EMERGENCY)
Facility: MEDICAL CENTER | Age: 44
End: 2024-10-08
Attending: EMERGENCY MEDICINE
Payer: COMMERCIAL

## 2024-10-08 ENCOUNTER — APPOINTMENT (OUTPATIENT)
Dept: RADIOLOGY | Facility: MEDICAL CENTER | Age: 44
End: 2024-10-08
Attending: EMERGENCY MEDICINE
Payer: COMMERCIAL

## 2024-10-08 VITALS
BODY MASS INDEX: 29.84 KG/M2 | RESPIRATION RATE: 20 BRPM | HEART RATE: 72 BPM | WEIGHT: 240 LBS | HEIGHT: 75 IN | TEMPERATURE: 97.8 F | SYSTOLIC BLOOD PRESSURE: 133 MMHG | OXYGEN SATURATION: 98 % | DIASTOLIC BLOOD PRESSURE: 83 MMHG

## 2024-10-08 DIAGNOSIS — R07.81 PLEURITIC CHEST PAIN: ICD-10-CM

## 2024-10-08 LAB
ALBUMIN SERPL BCP-MCNC: 4 G/DL (ref 3.2–4.9)
ALBUMIN/GLOB SERPL: 1.4 G/DL
ALP SERPL-CCNC: 77 U/L (ref 30–99)
ALT SERPL-CCNC: 19 U/L (ref 2–50)
ANION GAP SERPL CALC-SCNC: 15 MMOL/L (ref 7–16)
AST SERPL-CCNC: 18 U/L (ref 12–45)
BASOPHILS # BLD AUTO: 1.1 % (ref 0–1.8)
BASOPHILS # BLD: 0.07 K/UL (ref 0–0.12)
BILIRUB SERPL-MCNC: 0.8 MG/DL (ref 0.1–1.5)
BUN SERPL-MCNC: 11 MG/DL (ref 8–22)
CALCIUM ALBUM COR SERPL-MCNC: 9.6 MG/DL (ref 8.5–10.5)
CALCIUM SERPL-MCNC: 9.6 MG/DL (ref 8.5–10.5)
CHLORIDE SERPL-SCNC: 108 MMOL/L (ref 96–112)
CO2 SERPL-SCNC: 18 MMOL/L (ref 20–33)
CREAT SERPL-MCNC: 1.05 MG/DL (ref 0.5–1.4)
EKG IMPRESSION: NORMAL
EOSINOPHIL # BLD AUTO: 0.07 K/UL (ref 0–0.51)
EOSINOPHIL NFR BLD: 1.1 % (ref 0–6.9)
ERYTHROCYTE [DISTWIDTH] IN BLOOD BY AUTOMATED COUNT: 55.5 FL (ref 35.9–50)
GFR SERPLBLD CREATININE-BSD FMLA CKD-EPI: 90 ML/MIN/1.73 M 2
GLOBULIN SER CALC-MCNC: 2.8 G/DL (ref 1.9–3.5)
GLUCOSE SERPL-MCNC: 104 MG/DL (ref 65–99)
HCT VFR BLD AUTO: 42.1 % (ref 42–52)
HGB BLD-MCNC: 13.4 G/DL (ref 14–18)
IMM GRANULOCYTES # BLD AUTO: 0.02 K/UL (ref 0–0.11)
IMM GRANULOCYTES NFR BLD AUTO: 0.3 % (ref 0–0.9)
LYMPHOCYTES # BLD AUTO: 1.86 K/UL (ref 1–4.8)
LYMPHOCYTES NFR BLD: 29.7 % (ref 22–41)
MCH RBC QN AUTO: 23.5 PG (ref 27–33)
MCHC RBC AUTO-ENTMCNC: 31.8 G/DL (ref 32.3–36.5)
MCV RBC AUTO: 73.7 FL (ref 81.4–97.8)
MONOCYTES # BLD AUTO: 0.4 K/UL (ref 0–0.85)
MONOCYTES NFR BLD AUTO: 6.4 % (ref 0–13.4)
NEUTROPHILS # BLD AUTO: 3.85 K/UL (ref 1.82–7.42)
NEUTROPHILS NFR BLD: 61.4 % (ref 44–72)
NRBC # BLD AUTO: 0 K/UL
NRBC BLD-RTO: 0 /100 WBC (ref 0–0.2)
NT-PROBNP SERPL IA-MCNC: 520 PG/ML (ref 0–125)
PLATELET # BLD AUTO: 344 K/UL (ref 164–446)
PMV BLD AUTO: 9.5 FL (ref 9–12.9)
POTASSIUM SERPL-SCNC: 4.1 MMOL/L (ref 3.6–5.5)
PROT SERPL-MCNC: 6.8 G/DL (ref 6–8.2)
RBC # BLD AUTO: 5.71 M/UL (ref 4.7–6.1)
SODIUM SERPL-SCNC: 141 MMOL/L (ref 135–145)
TROPONIN T SERPL-MCNC: <6 NG/L (ref 6–19)
TROPONIN T SERPL-MCNC: <6 NG/L (ref 6–19)
WBC # BLD AUTO: 6.3 K/UL (ref 4.8–10.8)

## 2024-10-08 PROCEDURE — 83880 ASSAY OF NATRIURETIC PEPTIDE: CPT

## 2024-10-08 PROCEDURE — 700111 HCHG RX REV CODE 636 W/ 250 OVERRIDE (IP): Performed by: EMERGENCY MEDICINE

## 2024-10-08 PROCEDURE — 93005 ELECTROCARDIOGRAM TRACING: CPT | Performed by: EMERGENCY MEDICINE

## 2024-10-08 PROCEDURE — 96374 THER/PROPH/DIAG INJ IV PUSH: CPT

## 2024-10-08 PROCEDURE — 71045 X-RAY EXAM CHEST 1 VIEW: CPT

## 2024-10-08 PROCEDURE — 80053 COMPREHEN METABOLIC PANEL: CPT

## 2024-10-08 PROCEDURE — 96376 TX/PRO/DX INJ SAME DRUG ADON: CPT

## 2024-10-08 PROCEDURE — RXMED WILLOW AMBULATORY MEDICATION CHARGE: Performed by: EMERGENCY MEDICINE

## 2024-10-08 PROCEDURE — 96375 TX/PRO/DX INJ NEW DRUG ADDON: CPT

## 2024-10-08 PROCEDURE — 700101 HCHG RX REV CODE 250: Performed by: EMERGENCY MEDICINE

## 2024-10-08 PROCEDURE — 85025 COMPLETE CBC W/AUTO DIFF WBC: CPT

## 2024-10-08 PROCEDURE — 99285 EMERGENCY DEPT VISIT HI MDM: CPT

## 2024-10-08 PROCEDURE — 93005 ELECTROCARDIOGRAM TRACING: CPT

## 2024-10-08 PROCEDURE — 84484 ASSAY OF TROPONIN QUANT: CPT | Mod: 91

## 2024-10-08 PROCEDURE — 36415 COLL VENOUS BLD VENIPUNCTURE: CPT

## 2024-10-08 PROCEDURE — 700105 HCHG RX REV CODE 258: Performed by: EMERGENCY MEDICINE

## 2024-10-08 RX ORDER — DEXAMETHASONE SODIUM PHOSPHATE 4 MG/ML
10 INJECTION, SOLUTION INTRA-ARTICULAR; INTRALESIONAL; INTRAMUSCULAR; INTRAVENOUS; SOFT TISSUE ONCE
Status: COMPLETED | OUTPATIENT
Start: 2024-10-08 | End: 2024-10-08

## 2024-10-08 RX ORDER — KETOROLAC TROMETHAMINE 15 MG/ML
15 INJECTION, SOLUTION INTRAMUSCULAR; INTRAVENOUS ONCE
Status: COMPLETED | OUTPATIENT
Start: 2024-10-08 | End: 2024-10-08

## 2024-10-08 RX ORDER — MIDAZOLAM HYDROCHLORIDE 1 MG/ML
1 INJECTION INTRAMUSCULAR; INTRAVENOUS ONCE
Status: COMPLETED | OUTPATIENT
Start: 2024-10-08 | End: 2024-10-08

## 2024-10-08 RX ORDER — GABAPENTIN 100 MG/1
200 CAPSULE ORAL 3 TIMES DAILY
Qty: 60 CAPSULE | Refills: 0 | Status: SHIPPED | OUTPATIENT
Start: 2024-10-08 | End: 2024-10-18

## 2024-10-08 RX ORDER — METHYLPREDNISOLONE 4 MG/1
TABLET ORAL
Qty: 21 EACH | Refills: 0 | Status: SHIPPED | OUTPATIENT
Start: 2024-10-08 | End: 2024-10-25

## 2024-10-08 RX ADMIN — DEXAMETHASONE SODIUM PHOSPHATE 10 MG: 4 INJECTION INTRA-ARTICULAR; INTRALESIONAL; INTRAMUSCULAR; INTRAVENOUS; SOFT TISSUE at 20:03

## 2024-10-08 RX ADMIN — MIDAZOLAM HYDROCHLORIDE 1 MG: 1 INJECTION, SOLUTION INTRAMUSCULAR; INTRAVENOUS at 18:11

## 2024-10-08 RX ADMIN — KETAMINE HYDROCHLORIDE 25 MG: 10 INJECTION, SOLUTION INTRAMUSCULAR; INTRAVENOUS at 20:17

## 2024-10-08 RX ADMIN — KETOROLAC TROMETHAMINE 15 MG: 15 INJECTION, SOLUTION INTRAMUSCULAR; INTRAVENOUS at 18:08

## 2024-10-08 RX ADMIN — MIDAZOLAM HYDROCHLORIDE 1 MG: 1 INJECTION, SOLUTION INTRAMUSCULAR; INTRAVENOUS at 19:59

## 2024-10-08 RX ADMIN — KETAMINE HYDROCHLORIDE 25 MG: 10 INJECTION, SOLUTION INTRAMUSCULAR; INTRAVENOUS at 18:15

## 2024-10-08 ASSESSMENT — FIBROSIS 4 INDEX: FIB4 SCORE: 0.65

## 2024-10-18 ENCOUNTER — TELEPHONE (OUTPATIENT)
Dept: CARDIOLOGY | Facility: MEDICAL CENTER | Age: 44
End: 2024-10-18
Payer: COMMERCIAL

## 2024-10-25 ENCOUNTER — APPOINTMENT (OUTPATIENT)
Dept: RADIOLOGY | Facility: MEDICAL CENTER | Age: 44
End: 2024-10-25
Attending: EMERGENCY MEDICINE
Payer: COMMERCIAL

## 2024-10-25 ENCOUNTER — HOSPITAL ENCOUNTER (INPATIENT)
Facility: MEDICAL CENTER | Age: 44
LOS: 3 days | End: 2024-10-28
Attending: EMERGENCY MEDICINE | Admitting: HOSPITALIST
Payer: COMMERCIAL

## 2024-10-25 ENCOUNTER — APPOINTMENT (OUTPATIENT)
Dept: RADIOLOGY | Facility: MEDICAL CENTER | Age: 44
End: 2024-10-25
Attending: PSYCHIATRY & NEUROLOGY
Payer: COMMERCIAL

## 2024-10-25 DIAGNOSIS — R40.4 TRANSIENT ALTERATION OF AWARENESS: ICD-10-CM

## 2024-10-25 DIAGNOSIS — F11.20 NARCOTIC DEPENDENCY, CONTINUOUS (HCC): ICD-10-CM

## 2024-10-25 DIAGNOSIS — R47.01 APHASIA: ICD-10-CM

## 2024-10-25 DIAGNOSIS — D50.0 IRON DEFICIENCY ANEMIA DUE TO CHRONIC BLOOD LOSS: ICD-10-CM

## 2024-10-25 LAB
ABO GROUP BLD: NORMAL
ALBUMIN SERPL BCP-MCNC: 4.4 G/DL (ref 3.2–4.9)
ALBUMIN/GLOB SERPL: 1.5 G/DL
ALP SERPL-CCNC: 70 U/L (ref 30–99)
ALT SERPL-CCNC: 18 U/L (ref 2–50)
AMMONIA PLAS-SCNC: 12 UMOL/L (ref 11–45)
AMPHET UR QL SCN: NEGATIVE
ANION GAP SERPL CALC-SCNC: 10 MMOL/L (ref 7–16)
APTT PPP: 26.7 SEC (ref 24.7–36)
AST SERPL-CCNC: 19 U/L (ref 12–45)
BARBITURATES UR QL SCN: NEGATIVE
BASOPHILS # BLD AUTO: 0.6 % (ref 0–1.8)
BASOPHILS # BLD: 0.05 K/UL (ref 0–0.12)
BENZODIAZ UR QL SCN: NEGATIVE
BILIRUB SERPL-MCNC: 0.4 MG/DL (ref 0.1–1.5)
BLD GP AB SCN SERPL QL: NORMAL
BUN SERPL-MCNC: 17 MG/DL (ref 8–22)
BZE UR QL SCN: NEGATIVE
CALCIUM ALBUM COR SERPL-MCNC: 9.5 MG/DL (ref 8.5–10.5)
CALCIUM SERPL-MCNC: 9.8 MG/DL (ref 8.5–10.5)
CANNABINOIDS UR QL SCN: NEGATIVE
CHLORIDE SERPL-SCNC: 112 MMOL/L (ref 96–112)
CO2 SERPL-SCNC: 20 MMOL/L (ref 20–33)
CREAT SERPL-MCNC: 1.18 MG/DL (ref 0.5–1.4)
EKG IMPRESSION: NORMAL
EOSINOPHIL # BLD AUTO: 0.03 K/UL (ref 0–0.51)
EOSINOPHIL NFR BLD: 0.4 % (ref 0–6.9)
ERYTHROCYTE [DISTWIDTH] IN BLOOD BY AUTOMATED COUNT: 61.4 FL (ref 35.9–50)
EST. AVERAGE GLUCOSE BLD GHB EST-MCNC: 114 MG/DL
FENTANYL UR QL: NEGATIVE
GFR SERPLBLD CREATININE-BSD FMLA CKD-EPI: 78 ML/MIN/1.73 M 2
GLOBULIN SER CALC-MCNC: 2.9 G/DL (ref 1.9–3.5)
GLUCOSE SERPL-MCNC: 116 MG/DL (ref 65–99)
HBA1C MFR BLD: 5.6 % (ref 4–5.6)
HCT VFR BLD AUTO: 44.5 % (ref 42–52)
HGB BLD-MCNC: 14.2 G/DL (ref 14–18)
HOLDING TUBE BB 8507: NORMAL
IMM GRANULOCYTES # BLD AUTO: 0.05 K/UL (ref 0–0.11)
IMM GRANULOCYTES NFR BLD AUTO: 0.6 % (ref 0–0.9)
INR PPP: 1 (ref 0.87–1.13)
LYMPHOCYTES # BLD AUTO: 2.02 K/UL (ref 1–4.8)
LYMPHOCYTES NFR BLD: 24.5 % (ref 22–41)
MCH RBC QN AUTO: 24.6 PG (ref 27–33)
MCHC RBC AUTO-ENTMCNC: 31.9 G/DL (ref 32.3–36.5)
MCV RBC AUTO: 77.1 FL (ref 81.4–97.8)
METHADONE UR QL SCN: NEGATIVE
MONOCYTES # BLD AUTO: 0.71 K/UL (ref 0–0.85)
MONOCYTES NFR BLD AUTO: 8.6 % (ref 0–13.4)
NEUTROPHILS # BLD AUTO: 5.4 K/UL (ref 1.82–7.42)
NEUTROPHILS NFR BLD: 65.3 % (ref 44–72)
NRBC # BLD AUTO: 0 K/UL
NRBC BLD-RTO: 0 /100 WBC (ref 0–0.2)
OPIATES UR QL SCN: NEGATIVE
OXYCODONE UR QL SCN: NEGATIVE
PCP UR QL SCN: NEGATIVE
PLATELET # BLD AUTO: 405 K/UL (ref 164–446)
PMV BLD AUTO: 9.1 FL (ref 9–12.9)
POTASSIUM SERPL-SCNC: 5.4 MMOL/L (ref 3.6–5.5)
PROPOXYPH UR QL SCN: NEGATIVE
PROT SERPL-MCNC: 7.3 G/DL (ref 6–8.2)
PROTHROMBIN TIME: 13.2 SEC (ref 12–14.6)
RBC # BLD AUTO: 5.77 M/UL (ref 4.7–6.1)
RH BLD: NORMAL
SODIUM SERPL-SCNC: 142 MMOL/L (ref 135–145)
TROPONIN T SERPL-MCNC: <6 NG/L (ref 6–19)
WBC # BLD AUTO: 8.3 K/UL (ref 4.8–10.8)

## 2024-10-25 PROCEDURE — 770001 HCHG ROOM/CARE - MED/SURG/GYN PRIV*

## 2024-10-25 PROCEDURE — 96105 ASSESSMENT OF APHASIA: CPT

## 2024-10-25 PROCEDURE — 85730 THROMBOPLASTIN TIME PARTIAL: CPT

## 2024-10-25 PROCEDURE — 85025 COMPLETE CBC W/AUTO DIFF WBC: CPT

## 2024-10-25 PROCEDURE — 70496 CT ANGIOGRAPHY HEAD: CPT

## 2024-10-25 PROCEDURE — 82140 ASSAY OF AMMONIA: CPT

## 2024-10-25 PROCEDURE — 99223 1ST HOSP IP/OBS HIGH 75: CPT | Performed by: HOSPITALIST

## 2024-10-25 PROCEDURE — 83036 HEMOGLOBIN GLYCOSYLATED A1C: CPT

## 2024-10-25 PROCEDURE — 700102 HCHG RX REV CODE 250 W/ 637 OVERRIDE(OP): Performed by: EMERGENCY MEDICINE

## 2024-10-25 PROCEDURE — 85610 PROTHROMBIN TIME: CPT

## 2024-10-25 PROCEDURE — 36415 COLL VENOUS BLD VENIPUNCTURE: CPT

## 2024-10-25 PROCEDURE — 700117 HCHG RX CONTRAST REV CODE 255: Performed by: EMERGENCY MEDICINE

## 2024-10-25 PROCEDURE — 84484 ASSAY OF TROPONIN QUANT: CPT

## 2024-10-25 PROCEDURE — 80307 DRUG TEST PRSMV CHEM ANLYZR: CPT

## 2024-10-25 PROCEDURE — 86900 BLOOD TYPING SEROLOGIC ABO: CPT

## 2024-10-25 PROCEDURE — 86901 BLOOD TYPING SEROLOGIC RH(D): CPT

## 2024-10-25 PROCEDURE — 700111 HCHG RX REV CODE 636 W/ 250 OVERRIDE (IP): Mod: JZ | Performed by: HOSPITALIST

## 2024-10-25 PROCEDURE — 0042T CT-CEREBRAL PERFUSION ANALYSIS: CPT

## 2024-10-25 PROCEDURE — 70551 MRI BRAIN STEM W/O DYE: CPT

## 2024-10-25 PROCEDURE — 99285 EMERGENCY DEPT VISIT HI MDM: CPT

## 2024-10-25 PROCEDURE — A9270 NON-COVERED ITEM OR SERVICE: HCPCS | Performed by: EMERGENCY MEDICINE

## 2024-10-25 PROCEDURE — 71045 X-RAY EXAM CHEST 1 VIEW: CPT

## 2024-10-25 PROCEDURE — 70498 CT ANGIOGRAPHY NECK: CPT

## 2024-10-25 PROCEDURE — 80053 COMPREHEN METABOLIC PANEL: CPT

## 2024-10-25 PROCEDURE — 93005 ELECTROCARDIOGRAM TRACING: CPT | Performed by: EMERGENCY MEDICINE

## 2024-10-25 PROCEDURE — 700102 HCHG RX REV CODE 250 W/ 637 OVERRIDE(OP): Performed by: HOSPITALIST

## 2024-10-25 PROCEDURE — A9270 NON-COVERED ITEM OR SERVICE: HCPCS | Performed by: HOSPITALIST

## 2024-10-25 PROCEDURE — 94760 N-INVAS EAR/PLS OXIMETRY 1: CPT

## 2024-10-25 PROCEDURE — 86850 RBC ANTIBODY SCREEN: CPT

## 2024-10-25 RX ORDER — ASPIRIN 300 MG/1
300 SUPPOSITORY RECTAL ONCE
Status: DISCONTINUED | OUTPATIENT
Start: 2024-10-25 | End: 2024-10-25

## 2024-10-25 RX ORDER — PANTOPRAZOLE SODIUM 40 MG/1
40 TABLET, DELAYED RELEASE ORAL 2 TIMES DAILY
Status: DISCONTINUED | OUTPATIENT
Start: 2024-10-25 | End: 2024-10-25

## 2024-10-25 RX ORDER — ASPIRIN 300 MG/1
300 SUPPOSITORY RECTAL DAILY
Status: DISCONTINUED | OUTPATIENT
Start: 2024-10-26 | End: 2024-10-28 | Stop reason: HOSPADM

## 2024-10-25 RX ORDER — MORPHINE SULFATE 4 MG/ML
4 INJECTION INTRAVENOUS
Status: DISCONTINUED | OUTPATIENT
Start: 2024-10-25 | End: 2024-10-25

## 2024-10-25 RX ORDER — ACETAMINOPHEN 325 MG/1
650 TABLET ORAL EVERY 6 HOURS PRN
Status: DISCONTINUED | OUTPATIENT
Start: 2024-10-25 | End: 2024-10-28 | Stop reason: HOSPADM

## 2024-10-25 RX ORDER — OXYCODONE HYDROCHLORIDE 5 MG/1
5 TABLET ORAL
Status: DISCONTINUED | OUTPATIENT
Start: 2024-10-25 | End: 2024-10-28 | Stop reason: HOSPADM

## 2024-10-25 RX ORDER — PROCHLORPERAZINE EDISYLATE 5 MG/ML
5-10 INJECTION INTRAMUSCULAR; INTRAVENOUS EVERY 4 HOURS PRN
Status: DISCONTINUED | OUTPATIENT
Start: 2024-10-25 | End: 2024-10-28 | Stop reason: HOSPADM

## 2024-10-25 RX ORDER — ACETAMINOPHEN 500 MG
1000 TABLET ORAL EVERY 6 HOURS PRN
COMMUNITY

## 2024-10-25 RX ORDER — ZOLPIDEM TARTRATE 10 MG/1
10 TABLET ORAL
Status: ON HOLD | COMMUNITY
End: 2024-10-28

## 2024-10-25 RX ORDER — ASPIRIN 325 MG
325 TABLET ORAL DAILY
Status: DISCONTINUED | OUTPATIENT
Start: 2024-10-25 | End: 2024-10-25

## 2024-10-25 RX ORDER — ENOXAPARIN SODIUM 100 MG/ML
40 INJECTION SUBCUTANEOUS DAILY
Status: DISCONTINUED | OUTPATIENT
Start: 2024-10-25 | End: 2024-10-28 | Stop reason: HOSPADM

## 2024-10-25 RX ORDER — OXYCODONE HYDROCHLORIDE 10 MG/1
10 TABLET ORAL
Status: DISCONTINUED | OUTPATIENT
Start: 2024-10-25 | End: 2024-10-28 | Stop reason: HOSPADM

## 2024-10-25 RX ORDER — ASPIRIN 81 MG/1
81 TABLET ORAL DAILY
COMMUNITY

## 2024-10-25 RX ORDER — PROMETHAZINE HYDROCHLORIDE 25 MG/1
12.5-25 SUPPOSITORY RECTAL EVERY 4 HOURS PRN
Status: DISCONTINUED | OUTPATIENT
Start: 2024-10-25 | End: 2024-10-28 | Stop reason: HOSPADM

## 2024-10-25 RX ORDER — ONDANSETRON 4 MG/1
4 TABLET, ORALLY DISINTEGRATING ORAL EVERY 6 HOURS PRN
COMMUNITY
End: 2024-11-28

## 2024-10-25 RX ORDER — ONDANSETRON 4 MG/1
4 TABLET, ORALLY DISINTEGRATING ORAL EVERY 4 HOURS PRN
Status: DISCONTINUED | OUTPATIENT
Start: 2024-10-25 | End: 2024-10-28 | Stop reason: HOSPADM

## 2024-10-25 RX ORDER — OXYCODONE HYDROCHLORIDE 10 MG/1
10 TABLET ORAL
COMMUNITY

## 2024-10-25 RX ORDER — ASPIRIN 81 MG/1
81 TABLET, CHEWABLE ORAL DAILY
Status: DISCONTINUED | OUTPATIENT
Start: 2024-10-26 | End: 2024-10-28 | Stop reason: HOSPADM

## 2024-10-25 RX ORDER — ONDANSETRON 2 MG/ML
4 INJECTION INTRAMUSCULAR; INTRAVENOUS EVERY 4 HOURS PRN
Status: DISCONTINUED | OUTPATIENT
Start: 2024-10-25 | End: 2024-10-28 | Stop reason: HOSPADM

## 2024-10-25 RX ORDER — BACLOFEN 10 MG/1
10 TABLET ORAL 3 TIMES DAILY
Status: DISCONTINUED | OUTPATIENT
Start: 2024-10-25 | End: 2024-10-28 | Stop reason: HOSPADM

## 2024-10-25 RX ORDER — ATORVASTATIN CALCIUM 40 MG/1
40 TABLET, FILM COATED ORAL EVERY EVENING
Status: DISCONTINUED | OUTPATIENT
Start: 2024-10-25 | End: 2024-10-28 | Stop reason: HOSPADM

## 2024-10-25 RX ORDER — PROMETHAZINE HYDROCHLORIDE 25 MG/1
12.5-25 TABLET ORAL EVERY 4 HOURS PRN
Status: DISCONTINUED | OUTPATIENT
Start: 2024-10-25 | End: 2024-10-28 | Stop reason: HOSPADM

## 2024-10-25 RX ORDER — SUCRALFATE 1 G/1
1 TABLET ORAL
COMMUNITY

## 2024-10-25 RX ADMIN — BACLOFEN 10 MG: 10 TABLET ORAL at 14:14

## 2024-10-25 RX ADMIN — ENOXAPARIN SODIUM 40 MG: 100 INJECTION SUBCUTANEOUS at 17:12

## 2024-10-25 RX ADMIN — IOHEXOL 80 ML: 350 INJECTION, SOLUTION INTRAVENOUS at 10:45

## 2024-10-25 RX ADMIN — BACLOFEN 10 MG: 10 TABLET ORAL at 19:42

## 2024-10-25 RX ADMIN — ASPIRIN 325 MG: 325 TABLET ORAL at 10:52

## 2024-10-25 RX ADMIN — ATORVASTATIN CALCIUM 40 MG: 40 TABLET, FILM COATED ORAL at 17:12

## 2024-10-25 RX ADMIN — OMEPRAZOLE 20 MG: 20 CAPSULE, DELAYED RELEASE ORAL at 19:42

## 2024-10-25 RX ADMIN — OMEPRAZOLE 20 MG: 20 CAPSULE, DELAYED RELEASE ORAL at 14:14

## 2024-10-25 RX ADMIN — OXYCODONE HYDROCHLORIDE 10 MG: 10 TABLET ORAL at 17:18

## 2024-10-25 RX ADMIN — IOHEXOL 40 ML: 350 INJECTION, SOLUTION INTRAVENOUS at 10:45

## 2024-10-25 RX ADMIN — OXYCODONE HYDROCHLORIDE 10 MG: 10 TABLET ORAL at 22:04

## 2024-10-25 ASSESSMENT — PAIN DESCRIPTION - PAIN TYPE
TYPE: ACUTE PAIN
TYPE: ACUTE PAIN
TYPE: ACUTE PAIN;CHRONIC PAIN

## 2024-10-25 ASSESSMENT — FIBROSIS 4 INDEX: FIB4 SCORE: 0.52

## 2024-10-26 ENCOUNTER — APPOINTMENT (OUTPATIENT)
Dept: RADIOLOGY | Facility: MEDICAL CENTER | Age: 44
End: 2024-10-26
Attending: STUDENT IN AN ORGANIZED HEALTH CARE EDUCATION/TRAINING PROGRAM
Payer: COMMERCIAL

## 2024-10-26 PROBLEM — R07.9 CHEST PAIN: Status: ACTIVE | Noted: 2024-10-26

## 2024-10-26 PROBLEM — E78.5 DYSLIPIDEMIA (HIGH LDL; LOW HDL): Status: ACTIVE | Noted: 2024-10-26

## 2024-10-26 LAB
ANION GAP SERPL CALC-SCNC: 13 MMOL/L (ref 7–16)
BUN SERPL-MCNC: 13 MG/DL (ref 8–22)
CALCIUM SERPL-MCNC: 9 MG/DL (ref 8.5–10.5)
CHLORIDE SERPL-SCNC: 110 MMOL/L (ref 96–112)
CHOLEST SERPL-MCNC: 198 MG/DL (ref 100–199)
CO2 SERPL-SCNC: 17 MMOL/L (ref 20–33)
CREAT SERPL-MCNC: 0.95 MG/DL (ref 0.5–1.4)
GFR SERPLBLD CREATININE-BSD FMLA CKD-EPI: 102 ML/MIN/1.73 M 2
GLUCOSE SERPL-MCNC: 96 MG/DL (ref 65–99)
HDLC SERPL-MCNC: 50 MG/DL
LDLC SERPL CALC-MCNC: 117 MG/DL
POTASSIUM SERPL-SCNC: 4 MMOL/L (ref 3.6–5.5)
SODIUM SERPL-SCNC: 140 MMOL/L (ref 135–145)
TRIGL SERPL-MCNC: 156 MG/DL (ref 0–149)
TROPONIN T SERPL-MCNC: 6 NG/L (ref 6–19)
TROPONIN T SERPL-MCNC: 7 NG/L (ref 6–19)

## 2024-10-26 PROCEDURE — 700102 HCHG RX REV CODE 250 W/ 637 OVERRIDE(OP): Performed by: HOSPITALIST

## 2024-10-26 PROCEDURE — 700111 HCHG RX REV CODE 636 W/ 250 OVERRIDE (IP): Performed by: STUDENT IN AN ORGANIZED HEALTH CARE EDUCATION/TRAINING PROGRAM

## 2024-10-26 PROCEDURE — A9579 GAD-BASE MR CONTRAST NOS,1ML: HCPCS | Mod: JZ | Performed by: STUDENT IN AN ORGANIZED HEALTH CARE EDUCATION/TRAINING PROGRAM

## 2024-10-26 PROCEDURE — 70553 MRI BRAIN STEM W/O & W/DYE: CPT

## 2024-10-26 PROCEDURE — 700111 HCHG RX REV CODE 636 W/ 250 OVERRIDE (IP): Mod: JZ | Performed by: HOSPITALIST

## 2024-10-26 PROCEDURE — 700102 HCHG RX REV CODE 250 W/ 637 OVERRIDE(OP): Performed by: STUDENT IN AN ORGANIZED HEALTH CARE EDUCATION/TRAINING PROGRAM

## 2024-10-26 PROCEDURE — 700101 HCHG RX REV CODE 250: Performed by: STUDENT IN AN ORGANIZED HEALTH CARE EDUCATION/TRAINING PROGRAM

## 2024-10-26 PROCEDURE — 99233 SBSQ HOSP IP/OBS HIGH 50: CPT | Performed by: STUDENT IN AN ORGANIZED HEALTH CARE EDUCATION/TRAINING PROGRAM

## 2024-10-26 PROCEDURE — 80048 BASIC METABOLIC PNL TOTAL CA: CPT

## 2024-10-26 PROCEDURE — A9270 NON-COVERED ITEM OR SERVICE: HCPCS | Performed by: STUDENT IN AN ORGANIZED HEALTH CARE EDUCATION/TRAINING PROGRAM

## 2024-10-26 PROCEDURE — 36415 COLL VENOUS BLD VENIPUNCTURE: CPT

## 2024-10-26 PROCEDURE — A9270 NON-COVERED ITEM OR SERVICE: HCPCS | Performed by: HOSPITALIST

## 2024-10-26 PROCEDURE — 770001 HCHG ROOM/CARE - MED/SURG/GYN PRIV*

## 2024-10-26 PROCEDURE — 700117 HCHG RX CONTRAST REV CODE 255: Mod: JZ | Performed by: STUDENT IN AN ORGANIZED HEALTH CARE EDUCATION/TRAINING PROGRAM

## 2024-10-26 PROCEDURE — 80061 LIPID PANEL: CPT

## 2024-10-26 PROCEDURE — 700111 HCHG RX REV CODE 636 W/ 250 OVERRIDE (IP)

## 2024-10-26 PROCEDURE — 84484 ASSAY OF TROPONIN QUANT: CPT

## 2024-10-26 RX ORDER — HYDROMORPHONE HYDROCHLORIDE 1 MG/ML
0.5 INJECTION, SOLUTION INTRAMUSCULAR; INTRAVENOUS; SUBCUTANEOUS ONCE
Status: COMPLETED | OUTPATIENT
Start: 2024-10-26 | End: 2024-10-26

## 2024-10-26 RX ORDER — HYDROMORPHONE HYDROCHLORIDE 1 MG/ML
0.5 INJECTION, SOLUTION INTRAMUSCULAR; INTRAVENOUS; SUBCUTANEOUS EVERY 4 HOURS PRN
Status: DISCONTINUED | OUTPATIENT
Start: 2024-10-26 | End: 2024-10-28 | Stop reason: HOSPADM

## 2024-10-26 RX ORDER — LORAZEPAM 1 MG/1
1 TABLET ORAL
Status: COMPLETED | OUTPATIENT
Start: 2024-10-26 | End: 2024-10-26

## 2024-10-26 RX ADMIN — BACLOFEN 10 MG: 10 TABLET ORAL at 16:22

## 2024-10-26 RX ADMIN — HYDROMORPHONE HYDROCHLORIDE 0.5 MG: 1 INJECTION, SOLUTION INTRAMUSCULAR; INTRAVENOUS; SUBCUTANEOUS at 20:51

## 2024-10-26 RX ADMIN — OXYCODONE HYDROCHLORIDE 10 MG: 10 TABLET ORAL at 16:20

## 2024-10-26 RX ADMIN — OXYCODONE HYDROCHLORIDE 10 MG: 10 TABLET ORAL at 19:30

## 2024-10-26 RX ADMIN — GADOTERIDOL 20 ML: 279.3 INJECTION, SOLUTION INTRAVENOUS at 14:19

## 2024-10-26 RX ADMIN — OMEPRAZOLE 20 MG: 20 CAPSULE, DELAYED RELEASE ORAL at 16:21

## 2024-10-26 RX ADMIN — OXYCODONE HYDROCHLORIDE 10 MG: 10 TABLET ORAL at 12:58

## 2024-10-26 RX ADMIN — OXYCODONE HYDROCHLORIDE 10 MG: 10 TABLET ORAL at 22:33

## 2024-10-26 RX ADMIN — OXYCODONE HYDROCHLORIDE 10 MG: 10 TABLET ORAL at 03:30

## 2024-10-26 RX ADMIN — OXYCODONE HYDROCHLORIDE 10 MG: 10 TABLET ORAL at 09:46

## 2024-10-26 RX ADMIN — HYDROMORPHONE HYDROCHLORIDE 0.5 MG: 1 INJECTION, SOLUTION INTRAMUSCULAR; INTRAVENOUS; SUBCUTANEOUS at 00:37

## 2024-10-26 RX ADMIN — ASPIRIN 81 MG: 81 TABLET, CHEWABLE ORAL at 05:23

## 2024-10-26 RX ADMIN — LORAZEPAM 1 MG: 1 TABLET ORAL at 13:46

## 2024-10-26 RX ADMIN — OXYCODONE HYDROCHLORIDE 10 MG: 10 TABLET ORAL at 06:31

## 2024-10-26 RX ADMIN — DICLOFENAC SODIUM 2 G: 10 GEL TOPICAL at 20:51

## 2024-10-26 RX ADMIN — DICLOFENAC SODIUM 2 G: 10 GEL TOPICAL at 14:49

## 2024-10-26 RX ADMIN — HYDROMORPHONE HYDROCHLORIDE 0.5 MG: 1 INJECTION, SOLUTION INTRAMUSCULAR; INTRAVENOUS; SUBCUTANEOUS at 11:06

## 2024-10-26 RX ADMIN — BACLOFEN 10 MG: 10 TABLET ORAL at 05:23

## 2024-10-26 RX ADMIN — BACLOFEN 10 MG: 10 TABLET ORAL at 11:07

## 2024-10-26 RX ADMIN — HYDROMORPHONE HYDROCHLORIDE 0.5 MG: 1 INJECTION, SOLUTION INTRAMUSCULAR; INTRAVENOUS; SUBCUTANEOUS at 15:02

## 2024-10-26 RX ADMIN — ENOXAPARIN SODIUM 40 MG: 100 INJECTION SUBCUTANEOUS at 17:03

## 2024-10-26 RX ADMIN — OMEPRAZOLE 20 MG: 20 CAPSULE, DELAYED RELEASE ORAL at 05:23

## 2024-10-26 RX ADMIN — ATORVASTATIN CALCIUM 40 MG: 40 TABLET, FILM COATED ORAL at 16:21

## 2024-10-26 ASSESSMENT — ENCOUNTER SYMPTOMS
SPEECH CHANGE: 1
VOMITING: 0
MYALGIAS: 0
DIZZINESS: 0
FEVER: 0
NAUSEA: 0
COUGH: 0
PALPITATIONS: 0
SHORTNESS OF BREATH: 0
ABDOMINAL PAIN: 0
CHILLS: 0
HEADACHES: 0

## 2024-10-26 ASSESSMENT — ACTIVITIES OF DAILY LIVING (ADL): TOILETING: INDEPENDENT

## 2024-10-26 ASSESSMENT — PAIN DESCRIPTION - PAIN TYPE
TYPE: ACUTE PAIN

## 2024-10-27 ENCOUNTER — APPOINTMENT (OUTPATIENT)
Dept: CARDIOLOGY | Facility: MEDICAL CENTER | Age: 44
End: 2024-10-27
Attending: STUDENT IN AN ORGANIZED HEALTH CARE EDUCATION/TRAINING PROGRAM
Payer: COMMERCIAL

## 2024-10-27 PROBLEM — M94.0 ACUTE COSTOCHONDRITIS: Status: ACTIVE | Noted: 2024-10-27

## 2024-10-27 PROBLEM — F29 PSYCHOSIS (HCC): Status: ACTIVE | Noted: 2024-10-27

## 2024-10-27 LAB
LV EJECT FRACT  99904: 60
LV EJECT FRACT MOD 2C 99903: 73.01
LV EJECT FRACT MOD 4C 99902: 43.47
LV EJECT FRACT MOD BP 99901: 54.1

## 2024-10-27 PROCEDURE — 700111 HCHG RX REV CODE 636 W/ 250 OVERRIDE (IP): Mod: JZ | Performed by: HOSPITALIST

## 2024-10-27 PROCEDURE — 700111 HCHG RX REV CODE 636 W/ 250 OVERRIDE (IP): Performed by: STUDENT IN AN ORGANIZED HEALTH CARE EDUCATION/TRAINING PROGRAM

## 2024-10-27 PROCEDURE — 700102 HCHG RX REV CODE 250 W/ 637 OVERRIDE(OP): Performed by: HOSPITALIST

## 2024-10-27 PROCEDURE — 770001 HCHG ROOM/CARE - MED/SURG/GYN PRIV*

## 2024-10-27 PROCEDURE — A9270 NON-COVERED ITEM OR SERVICE: HCPCS | Performed by: HOSPITALIST

## 2024-10-27 PROCEDURE — 99222 1ST HOSP IP/OBS MODERATE 55: CPT

## 2024-10-27 PROCEDURE — 93306 TTE W/DOPPLER COMPLETE: CPT

## 2024-10-27 PROCEDURE — 93306 TTE W/DOPPLER COMPLETE: CPT | Mod: 26 | Performed by: INTERNAL MEDICINE

## 2024-10-27 PROCEDURE — 97165 OT EVAL LOW COMPLEX 30 MIN: CPT

## 2024-10-27 PROCEDURE — 99233 SBSQ HOSP IP/OBS HIGH 50: CPT | Performed by: STUDENT IN AN ORGANIZED HEALTH CARE EDUCATION/TRAINING PROGRAM

## 2024-10-27 RX ADMIN — ENOXAPARIN SODIUM 40 MG: 100 INJECTION SUBCUTANEOUS at 16:15

## 2024-10-27 RX ADMIN — BACLOFEN 10 MG: 10 TABLET ORAL at 12:13

## 2024-10-27 RX ADMIN — OXYCODONE HYDROCHLORIDE 10 MG: 10 TABLET ORAL at 22:47

## 2024-10-27 RX ADMIN — OXYCODONE HYDROCHLORIDE 10 MG: 10 TABLET ORAL at 16:16

## 2024-10-27 RX ADMIN — HYDROMORPHONE HYDROCHLORIDE 0.5 MG: 1 INJECTION, SOLUTION INTRAMUSCULAR; INTRAVENOUS; SUBCUTANEOUS at 18:39

## 2024-10-27 RX ADMIN — DICLOFENAC SODIUM 2 G: 10 GEL TOPICAL at 19:44

## 2024-10-27 RX ADMIN — HYDROMORPHONE HYDROCHLORIDE 0.5 MG: 1 INJECTION, SOLUTION INTRAMUSCULAR; INTRAVENOUS; SUBCUTANEOUS at 14:11

## 2024-10-27 RX ADMIN — OXYCODONE HYDROCHLORIDE 10 MG: 10 TABLET ORAL at 01:44

## 2024-10-27 RX ADMIN — ATORVASTATIN CALCIUM 40 MG: 40 TABLET, FILM COATED ORAL at 16:15

## 2024-10-27 RX ADMIN — BACLOFEN 10 MG: 10 TABLET ORAL at 16:16

## 2024-10-27 RX ADMIN — DICLOFENAC SODIUM 2 G: 10 GEL TOPICAL at 16:15

## 2024-10-27 RX ADMIN — OMEPRAZOLE 20 MG: 20 CAPSULE, DELAYED RELEASE ORAL at 04:03

## 2024-10-27 RX ADMIN — ASPIRIN 81 MG: 81 TABLET, CHEWABLE ORAL at 04:03

## 2024-10-27 RX ADMIN — OXYCODONE HYDROCHLORIDE 10 MG: 10 TABLET ORAL at 19:44

## 2024-10-27 RX ADMIN — OXYCODONE HYDROCHLORIDE 10 MG: 10 TABLET ORAL at 09:07

## 2024-10-27 RX ADMIN — HYDROMORPHONE HYDROCHLORIDE 0.5 MG: 1 INJECTION, SOLUTION INTRAMUSCULAR; INTRAVENOUS; SUBCUTANEOUS at 00:53

## 2024-10-27 RX ADMIN — OXYCODONE HYDROCHLORIDE 10 MG: 10 TABLET ORAL at 12:14

## 2024-10-27 RX ADMIN — OMEPRAZOLE 20 MG: 20 CAPSULE, DELAYED RELEASE ORAL at 16:16

## 2024-10-27 RX ADMIN — DICLOFENAC SODIUM 2 G: 10 GEL TOPICAL at 09:09

## 2024-10-27 RX ADMIN — HYDROMORPHONE HYDROCHLORIDE 0.5 MG: 1 INJECTION, SOLUTION INTRAMUSCULAR; INTRAVENOUS; SUBCUTANEOUS at 10:01

## 2024-10-27 RX ADMIN — OXYCODONE HYDROCHLORIDE 10 MG: 10 TABLET ORAL at 06:08

## 2024-10-27 RX ADMIN — BACLOFEN 10 MG: 10 TABLET ORAL at 04:03

## 2024-10-27 RX ADMIN — HYDROMORPHONE HYDROCHLORIDE 0.5 MG: 1 INJECTION, SOLUTION INTRAMUSCULAR; INTRAVENOUS; SUBCUTANEOUS at 05:37

## 2024-10-27 ASSESSMENT — PAIN DESCRIPTION - PAIN TYPE
TYPE: ACUTE PAIN

## 2024-10-27 ASSESSMENT — COGNITIVE AND FUNCTIONAL STATUS - GENERAL
SUGGESTED CMS G CODE MODIFIER DAILY ACTIVITY: CH
DAILY ACTIVITIY SCORE: 24

## 2024-10-27 ASSESSMENT — ENCOUNTER SYMPTOMS
NAUSEA: 0
PALPITATIONS: 0
SHORTNESS OF BREATH: 0
FEVER: 0
ABDOMINAL PAIN: 0
SPEECH CHANGE: 1
COUGH: 0
HEADACHES: 0
DIZZINESS: 0
MYALGIAS: 0
VOMITING: 0
CHILLS: 0

## 2024-10-27 ASSESSMENT — ACTIVITIES OF DAILY LIVING (ADL): TOILETING: INDEPENDENT

## 2024-10-28 ENCOUNTER — PATIENT MESSAGE (OUTPATIENT)
Dept: MEDICAL GROUP | Facility: LAB | Age: 44
End: 2024-10-28
Payer: COMMERCIAL

## 2024-10-28 VITALS
TEMPERATURE: 96.8 F | SYSTOLIC BLOOD PRESSURE: 127 MMHG | HEART RATE: 79 BPM | DIASTOLIC BLOOD PRESSURE: 85 MMHG | BODY MASS INDEX: 29.84 KG/M2 | OXYGEN SATURATION: 96 % | HEIGHT: 75 IN | RESPIRATION RATE: 17 BRPM | WEIGHT: 240 LBS

## 2024-10-28 DIAGNOSIS — F51.01 PRIMARY INSOMNIA: ICD-10-CM

## 2024-10-28 PROBLEM — F13.20 HYPNOTIC DEPENDENCE WITH CURRENT USE (HCC): Status: ACTIVE | Noted: 2024-10-28

## 2024-10-28 PROBLEM — F11.20 NARCOTIC DEPENDENCY, CONTINUOUS (HCC): Status: ACTIVE | Noted: 2024-10-28

## 2024-10-28 PROBLEM — G25.81 RESTLESS LEG SYNDROME: Status: ACTIVE | Noted: 2024-10-28

## 2024-10-28 PROBLEM — D50.9 RESTLESS LEG SYNDROME DUE TO IRON DEFICIENCY ANEMIA: Status: ACTIVE | Noted: 2024-10-28

## 2024-10-28 PROCEDURE — 95816 EEG AWAKE AND DROWSY: CPT | Mod: 26 | Performed by: STUDENT IN AN ORGANIZED HEALTH CARE EDUCATION/TRAINING PROGRAM

## 2024-10-28 PROCEDURE — A9270 NON-COVERED ITEM OR SERVICE: HCPCS | Performed by: HOSPITALIST

## 2024-10-28 PROCEDURE — 700111 HCHG RX REV CODE 636 W/ 250 OVERRIDE (IP)

## 2024-10-28 PROCEDURE — 700102 HCHG RX REV CODE 250 W/ 637 OVERRIDE(OP): Performed by: HOSPITALIST

## 2024-10-28 PROCEDURE — 95816 EEG AWAKE AND DROWSY: CPT | Performed by: STUDENT IN AN ORGANIZED HEALTH CARE EDUCATION/TRAINING PROGRAM

## 2024-10-28 PROCEDURE — 99239 HOSP IP/OBS DSCHRG MGMT >30: CPT | Performed by: STUDENT IN AN ORGANIZED HEALTH CARE EDUCATION/TRAINING PROGRAM

## 2024-10-28 RX ORDER — DULOXETIN HYDROCHLORIDE 30 MG/1
CAPSULE, DELAYED RELEASE ORAL
Qty: 54 CAPSULE | Refills: 1 | Status: SHIPPED | OUTPATIENT
Start: 2024-10-28 | End: 2024-11-27

## 2024-10-28 RX ORDER — ZOLPIDEM TARTRATE 10 MG/1
10 TABLET ORAL NIGHTLY PRN
Qty: 30 TABLET | Refills: 2 | Status: SHIPPED | OUTPATIENT
Start: 2024-10-28 | End: 2025-01-26

## 2024-10-28 RX ORDER — FERROUS SULFATE 325(65) MG
325 TABLET ORAL
Qty: 30 TABLET | Refills: 2 | Status: SHIPPED | OUTPATIENT
Start: 2024-10-28

## 2024-10-28 RX ADMIN — ASPIRIN 81 MG: 81 TABLET, CHEWABLE ORAL at 04:54

## 2024-10-28 RX ADMIN — OMEPRAZOLE 20 MG: 20 CAPSULE, DELAYED RELEASE ORAL at 04:54

## 2024-10-28 RX ADMIN — OXYCODONE HYDROCHLORIDE 10 MG: 10 TABLET ORAL at 01:48

## 2024-10-28 RX ADMIN — HYDROMORPHONE HYDROCHLORIDE 0.5 MG: 1 INJECTION, SOLUTION INTRAMUSCULAR; INTRAVENOUS; SUBCUTANEOUS at 08:00

## 2024-10-28 RX ADMIN — BACLOFEN 10 MG: 10 TABLET ORAL at 04:54

## 2024-10-28 RX ADMIN — HYDROMORPHONE HYDROCHLORIDE 0.5 MG: 1 INJECTION, SOLUTION INTRAMUSCULAR; INTRAVENOUS; SUBCUTANEOUS at 00:23

## 2024-10-28 RX ADMIN — OXYCODONE HYDROCHLORIDE 10 MG: 10 TABLET ORAL at 04:54

## 2024-10-28 RX ADMIN — BACLOFEN 10 MG: 10 TABLET ORAL at 12:45

## 2024-10-28 RX ADMIN — HYDROMORPHONE HYDROCHLORIDE 0.5 MG: 1 INJECTION, SOLUTION INTRAMUSCULAR; INTRAVENOUS; SUBCUTANEOUS at 02:56

## 2024-10-28 RX ADMIN — OXYCODONE HYDROCHLORIDE 10 MG: 10 TABLET ORAL at 13:23

## 2024-10-28 RX ADMIN — OXYCODONE HYDROCHLORIDE 10 MG: 10 TABLET ORAL at 10:17

## 2024-10-28 ASSESSMENT — PAIN DESCRIPTION - PAIN TYPE
TYPE: ACUTE PAIN

## 2024-11-03 ENCOUNTER — HOSPITAL ENCOUNTER (EMERGENCY)
Facility: MEDICAL CENTER | Age: 44
End: 2024-11-03
Attending: EMERGENCY MEDICINE
Payer: COMMERCIAL

## 2024-11-03 ENCOUNTER — APPOINTMENT (OUTPATIENT)
Dept: RADIOLOGY | Facility: MEDICAL CENTER | Age: 44
End: 2024-11-03
Attending: EMERGENCY MEDICINE
Payer: COMMERCIAL

## 2024-11-03 VITALS
HEIGHT: 75 IN | OXYGEN SATURATION: 96 % | SYSTOLIC BLOOD PRESSURE: 150 MMHG | DIASTOLIC BLOOD PRESSURE: 86 MMHG | WEIGHT: 240 LBS | BODY MASS INDEX: 29.84 KG/M2 | RESPIRATION RATE: 15 BRPM | TEMPERATURE: 96.9 F | HEART RATE: 52 BPM

## 2024-11-03 DIAGNOSIS — R55 SYNCOPE, UNSPECIFIED SYNCOPE TYPE: ICD-10-CM

## 2024-11-03 DIAGNOSIS — S20.219A CONTUSION OF CHEST WALL, UNSPECIFIED LATERALITY, INITIAL ENCOUNTER: ICD-10-CM

## 2024-11-03 LAB
ALBUMIN SERPL BCP-MCNC: 4.1 G/DL (ref 3.2–4.9)
ALBUMIN/GLOB SERPL: 1.4 G/DL
ALP SERPL-CCNC: 69 U/L (ref 30–99)
ALT SERPL-CCNC: 23 U/L (ref 2–50)
ANION GAP SERPL CALC-SCNC: 15 MMOL/L (ref 7–16)
AST SERPL-CCNC: 20 U/L (ref 12–45)
BASOPHILS # BLD AUTO: 0.9 % (ref 0–1.8)
BASOPHILS # BLD: 0.05 K/UL (ref 0–0.12)
BILIRUB SERPL-MCNC: 0.5 MG/DL (ref 0.1–1.5)
BUN SERPL-MCNC: 18 MG/DL (ref 8–22)
CALCIUM ALBUM COR SERPL-MCNC: 8.7 MG/DL (ref 8.5–10.5)
CALCIUM SERPL-MCNC: 8.8 MG/DL (ref 8.5–10.5)
CHLORIDE SERPL-SCNC: 108 MMOL/L (ref 96–112)
CO2 SERPL-SCNC: 18 MMOL/L (ref 20–33)
CREAT SERPL-MCNC: 1.12 MG/DL (ref 0.5–1.4)
EKG IMPRESSION: NORMAL
EOSINOPHIL # BLD AUTO: 0.01 K/UL (ref 0–0.51)
EOSINOPHIL NFR BLD: 0.2 % (ref 0–6.9)
ERYTHROCYTE [DISTWIDTH] IN BLOOD BY AUTOMATED COUNT: 59.2 FL (ref 35.9–50)
GFR SERPLBLD CREATININE-BSD FMLA CKD-EPI: 83 ML/MIN/1.73 M 2
GLOBULIN SER CALC-MCNC: 2.9 G/DL (ref 1.9–3.5)
GLUCOSE SERPL-MCNC: 117 MG/DL (ref 65–99)
HCT VFR BLD AUTO: 46.4 % (ref 42–52)
HGB BLD-MCNC: 14.9 G/DL (ref 14–18)
IMM GRANULOCYTES # BLD AUTO: 0.01 K/UL (ref 0–0.11)
IMM GRANULOCYTES NFR BLD AUTO: 0.2 % (ref 0–0.9)
LYMPHOCYTES # BLD AUTO: 1.78 K/UL (ref 1–4.8)
LYMPHOCYTES NFR BLD: 32.6 % (ref 22–41)
MCH RBC QN AUTO: 24.8 PG (ref 27–33)
MCHC RBC AUTO-ENTMCNC: 32.1 G/DL (ref 32.3–36.5)
MCV RBC AUTO: 77.3 FL (ref 81.4–97.8)
MONOCYTES # BLD AUTO: 0.28 K/UL (ref 0–0.85)
MONOCYTES NFR BLD AUTO: 5.1 % (ref 0–13.4)
NEUTROPHILS # BLD AUTO: 3.33 K/UL (ref 1.82–7.42)
NEUTROPHILS NFR BLD: 61 % (ref 44–72)
NRBC # BLD AUTO: 0 K/UL
NRBC BLD-RTO: 0 /100 WBC (ref 0–0.2)
NT-PROBNP SERPL IA-MCNC: 96 PG/ML (ref 0–125)
PLATELET # BLD AUTO: 421 K/UL (ref 164–446)
PMV BLD AUTO: 8.8 FL (ref 9–12.9)
POTASSIUM SERPL-SCNC: 4.3 MMOL/L (ref 3.6–5.5)
PROT SERPL-MCNC: 7 G/DL (ref 6–8.2)
RBC # BLD AUTO: 6 M/UL (ref 4.7–6.1)
SODIUM SERPL-SCNC: 141 MMOL/L (ref 135–145)
TROPONIN T SERPL-MCNC: <6 NG/L (ref 6–19)
WBC # BLD AUTO: 5.5 K/UL (ref 4.8–10.8)

## 2024-11-03 PROCEDURE — A9270 NON-COVERED ITEM OR SERVICE: HCPCS | Performed by: EMERGENCY MEDICINE

## 2024-11-03 PROCEDURE — 99285 EMERGENCY DEPT VISIT HI MDM: CPT

## 2024-11-03 PROCEDURE — 93005 ELECTROCARDIOGRAM TRACING: CPT | Performed by: EMERGENCY MEDICINE

## 2024-11-03 PROCEDURE — 93005 ELECTROCARDIOGRAM TRACING: CPT

## 2024-11-03 PROCEDURE — 36415 COLL VENOUS BLD VENIPUNCTURE: CPT

## 2024-11-03 PROCEDURE — 83880 ASSAY OF NATRIURETIC PEPTIDE: CPT

## 2024-11-03 PROCEDURE — 85025 COMPLETE CBC W/AUTO DIFF WBC: CPT

## 2024-11-03 PROCEDURE — 71250 CT THORAX DX C-: CPT

## 2024-11-03 PROCEDURE — 700102 HCHG RX REV CODE 250 W/ 637 OVERRIDE(OP): Performed by: EMERGENCY MEDICINE

## 2024-11-03 PROCEDURE — 71045 X-RAY EXAM CHEST 1 VIEW: CPT

## 2024-11-03 PROCEDURE — 80053 COMPREHEN METABOLIC PANEL: CPT

## 2024-11-03 PROCEDURE — 96374 THER/PROPH/DIAG INJ IV PUSH: CPT

## 2024-11-03 PROCEDURE — 84484 ASSAY OF TROPONIN QUANT: CPT

## 2024-11-03 PROCEDURE — 700111 HCHG RX REV CODE 636 W/ 250 OVERRIDE (IP): Performed by: EMERGENCY MEDICINE

## 2024-11-03 RX ORDER — HYDROMORPHONE HYDROCHLORIDE 1 MG/ML
1 INJECTION, SOLUTION INTRAMUSCULAR; INTRAVENOUS; SUBCUTANEOUS ONCE
Status: COMPLETED | OUTPATIENT
Start: 2024-11-03 | End: 2024-11-03

## 2024-11-03 RX ORDER — OXYCODONE HYDROCHLORIDE 10 MG/1
10 TABLET ORAL ONCE
Status: COMPLETED | OUTPATIENT
Start: 2024-11-03 | End: 2024-11-03

## 2024-11-03 RX ADMIN — HYDROMORPHONE HYDROCHLORIDE 1 MG: 1 INJECTION, SOLUTION INTRAMUSCULAR; INTRAVENOUS; SUBCUTANEOUS at 17:04

## 2024-11-03 RX ADMIN — OXYCODONE HYDROCHLORIDE 10 MG: 10 TABLET ORAL at 19:44

## 2024-11-03 ASSESSMENT — FIBROSIS 4 INDEX: FIB4 SCORE: 0.48

## 2024-11-03 NOTE — ED TRIAGE NOTES
"Chief Complaint   Patient presents with    Syncope     Pt had syncopal episode last night and today. Today he fell onto a bar and is complaining of mid-sternal chest pain.      /89   Pulse (!) 54   Temp 36.1 °C (96.9 °F) (Temporal)   Resp 16   Ht 1.905 m (6' 3\")   Wt 109 kg (240 lb)   SpO2 97%   BMI 30.00 kg/m²     Pt brought in by [facility] from home to BL 15. Pt in a gown and on monitor. Chart flagged for ERP to see.  "

## 2024-11-04 NOTE — ED NOTES
Bedside report received from off going RN/tech: Clarissa TY, assumed care of patient.  POC discussed with patient. Call light within reach, all needs addressed at this time.       Fall risk interventions in place: Place socks on patient, Give patient urinal if applicable, Keep floor surfaces clean and dry, and Accompanied to restroom (all applicable per [facility] Fall risk assessment)   Continuous monitoring: Cardiac Leads, Pulse Ox, or Blood Pressure  IVF/IV medications: Not Applicable   Oxygen: Room Air  Bedside sitter: Not Applicable   Isolation: Not Applicable

## 2024-11-04 NOTE — ED NOTES
Assist RN:  Discharge instructions given to pt. Prescriptions unchanged. Pt educated, verbalizes understanding. All belongings accounted for. Pt to ambulate out of ED with family at side. PIV removed and dressing applied.

## 2024-11-04 NOTE — ED PROVIDER NOTES
ED Provider Note    CHIEF COMPLAINT  Chief Complaint   Patient presents with    Syncope     Pt had syncopal episode last night and today. Today he fell onto a bar and is complaining of mid-sternal chest pain.        EXTERNAL RECORDS REVIEWED  Discharge summary 10/28/2024: Aphasia, chest pain and costochondritis, psychosis.  Concern for medication reaction with Ambien    Additional historian: Wife    JOSE DANIEL/TORIN Braswell is a 44 y.o. male who presents for evaluation of a chest wall injury after a syncopal episode.  His wife at the bedside reports she witnessed him have a syncopal episode today, he was sitting down to use the bathroom.  When he stood up he got lightheaded and ended up passing out falling forward and striking the center of his chest against a corner of a counter.  He has severe pain in the center of his chest.  With admitted to hospital couple days ago with abnormal behavior.  He does have a history of a mitral valve repair, during this recent hospitalization he had an echocardiogram performed which revealed no acute abnormalities.  No abdominal pain or vomiting.  He has had syncopal episodes in the past that were attributed to various blood pressure medications, they have all been discontinued.  The patient offers no other complaints other than the severe pain after his chest injury.    PAST MEDICAL HISTORY   has a past medical history of Arthritis, Breath shortness (11/03/2023), Drug-seeking behavior (07/30/2023), Duodenal ulcer (07/30/2023), Gastric ulcer, Heart valve disease, Hemorrhagic disorder (HCC), Hypertension, Pain, and Seizure (HCC).    SURGICAL HISTORY   has a past surgical history that includes other orthopedic surgery; other abdominal surgery; ankle arthroscopy (Right, 5/21/2018); biopsy ortho (Right, 5/21/2018); upper gi endoscopy,diagnosis (3/28/2022); upper gi endoscopy,biopsy (3/28/2022); upper gi endoscopy,biopsy (N/A, 7/30/2023); upper gi endoscopy,diagnosis (N/A,  9/14/2023); upper gi endoscopy,biopsy (N/A, 9/14/2023); replacement of mitral valve (1/25/2024); echocardiogram, transesophageal, intraoperative (1/25/2024); upper gi endoscopy,diagnosis (N/A, 5/18/2024); upper gi endoscopy,biopsy (N/A, 5/18/2024); gastroscopy with endostat (N/A, 5/18/2024); upper gi endoscopy,diagnosis (N/A, 5/25/2024); upper gi endoscopy,biopsy (N/A, 5/25/2024); colonoscopy,diagnostic (5/27/2024); capsule endoscopy administration (N/A, 5/28/2024); capsule endoscopy retrieval (5/28/2024); and upper gi endoscopy,diagnosis (N/A, 9/18/2024).    FAMILY HISTORY  Family History   Problem Relation Age of Onset    Heart Disease Mother     No Known Problems Father     Clotting Disorder Neg Hx        SOCIAL HISTORY  Social History     Tobacco Use    Smoking status: Never    Smokeless tobacco: Never   Vaping Use    Vaping status: Never Used   Substance and Sexual Activity    Alcohol use: Not Currently    Drug use: Never    Sexual activity: Not on file       CURRENT MEDICATIONS  Home Medications       Reviewed by Clarissa Singh R.N. (Registered Nurse) on 11/03/24 at 1553  Med List Status: Not Addressed     Medication Last Dose Status   acetaminophen (TYLENOL) 500 MG Tab  Active   aspirin 81 MG EC tablet  Active   baclofen (LIORESAL) 10 MG Tab  Active   DULoxetine (CYMBALTA) 30 MG Cap DR Particles  Active   ferrous sulfate 325 (65 Fe) MG tablet  Active   ondansetron (ZOFRAN ODT) 4 MG TABLET DISPERSIBLE  Active   oxyCODONE immediate release (ROXICODONE) 10 MG immediate release tablet  Active   pantoprazole (PROTONIX) 40 MG Tablet Delayed Response  Active   sucralfate (CARAFATE) 1 GM Tab  Active   zolpidem (AMBIEN) 10 MG Tab  Active                  Audit from Redirected Encounters    **Home medications have not yet been reviewed for this encounter**         ALLERGIES  Allergies   Allergen Reactions    Morphine Vomiting    Tramadol Unspecified     Seizure  [id number]       PHYSICAL EXAM  VITAL SIGNS: /89   " Pulse (!) 54   Temp 36.1 °C (96.9 °F) (Temporal)   Resp 16   Ht 1.905 m (6' 3\")   Wt 109 kg (240 lb)   SpO2 97%   BMI 30.00 kg/m²    Constitutional: In distress with pain, holding his chest.    HENT: Normocephalic, no obvious evidence of acute trauma.  Eyes: No scleral icterus. Normal conjunctiva   Thorax & Lungs: Normal nonlabored respirations.  Significant tenderness on light palpation of his anterior chest wall.  I appreciate no wheezing, rhonchi or rales. There is normal air movement.  Upon cardiac ascultation I appreciate a regular heart rhythm and a normal rate.   Abdomen: The abdomen is not visibly distended. Upon palpation, I find it to be without tenderness.  No mass appreciated.  Skin: The exposed portions of skin reveal no obvious rash or other abnormalities.  Extremities/Musculoskeletal: No obvious sign of acute trauma. No asymmetric calf tenderness or edema.   Neurologic: Alert & oriented. No focal deficits observed.      EKG/LABS  Results for orders placed or performed during the hospital encounter of 11/03/24   CBC with Differential    Collection Time: 11/03/24  3:54 PM   Result Value Ref Range    WBC 5.5 4.8 - 10.8 K/uL    RBC 6.00 4.70 - 6.10 M/uL    Hemoglobin 14.9 14.0 - 18.0 g/dL    Hematocrit 46.4 42.0 - 52.0 %    MCV 77.3 (L) 81.4 - 97.8 fL    MCH 24.8 (L) 27.0 - 33.0 pg    MCHC 32.1 (L) 32.3 - 36.5 g/dL    RDW 59.2 (H) 35.9 - 50.0 fL    Platelet Count 421 164 - 446 K/uL    MPV 8.8 (L) 9.0 - 12.9 fL    Neutrophils-Polys 61.00 44.00 - 72.00 %    Lymphocytes 32.60 22.00 - 41.00 %    Monocytes 5.10 0.00 - 13.40 %    Eosinophils 0.20 0.00 - 6.90 %    Basophils 0.90 0.00 - 1.80 %    Immature Granulocytes 0.20 0.00 - 0.90 %    Nucleated RBC 0.00 0.00 - 0.20 /100 WBC    Neutrophils (Absolute) 3.33 1.82 - 7.42 K/uL    Lymphs (Absolute) 1.78 1.00 - 4.80 K/uL    Monos (Absolute) 0.28 0.00 - 0.85 K/uL    Eos (Absolute) 0.01 0.00 - 0.51 K/uL    Baso (Absolute) 0.05 0.00 - 0.12 K/uL    Immature " Granulocytes (abs) 0.01 0.00 - 0.11 K/uL    NRBC (Absolute) 0.00 K/uL   Complete Metabolic Panel (CMP)    Collection Time: 24  3:54 PM   Result Value Ref Range    Sodium 141 135 - 145 mmol/L    Potassium 4.3 3.6 - 5.5 mmol/L    Chloride 108 96 - 112 mmol/L    Co2 18 (L) 20 - 33 mmol/L    Anion Gap 15.0 7.0 - 16.0    Glucose 117 (H) 65 - 99 mg/dL    Bun 18 8 - 22 mg/dL    Creatinine 1.12 0.50 - 1.40 mg/dL    Calcium 8.8 8.5 - 10.5 mg/dL    Correct Calcium 8.7 8.5 - 10.5 mg/dL    AST(SGOT) 20 12 - 45 U/L    ALT(SGPT) 23 2 - 50 U/L    Alkaline Phosphatase 69 30 - 99 U/L    Total Bilirubin 0.5 0.1 - 1.5 mg/dL    Albumin 4.1 3.2 - 4.9 g/dL    Total Protein 7.0 6.0 - 8.2 g/dL    Globulin 2.9 1.9 - 3.5 g/dL    A-G Ratio 1.4 g/dL   proBrain Natriuretic Peptide, NT (BNP)    Collection Time: 24  3:54 PM   Result Value Ref Range    NT-proBNP 96 0 - 125 pg/mL   Troponins NOW    Collection Time: 24  3:54 PM   Result Value Ref Range    Troponin T <6 6 - 19 ng/L   ESTIMATED GFR    Collection Time: 24  3:54 PM   Result Value Ref Range    GFR (CKD-EPI) 83 >60 mL/min/1.73 m 2   EKG    Collection Time: 24  4:09 PM   Result Value Ref Range    Report       [facility] Emergency Dept.    Test Date:  2024  Pt Name:    PATI MERCEDES           Department: ER  MRN:        [id number]                      Room:        15  Gender:     Male                         Technician: [id number]  :        1980                   Requested By:ER TRIAGE PROTOCOL  Order #:    [id number]                    Reading MD: HAN MACEDO MD    Measurements  Intervals                                Axis  Rate:       55                           P:          46  WI:         170                          QRS:        -18  QRSD:       89                           T:          35  QT:         429  QTc:        411    Interpretive Statements  Sinus bradycardia with a rate of 55, normal WI head and QRS interval,  T waves  are upright.  No ST segment deviation.  My impression of this EKG: No acute  ischemia or arrhythmia  Electronically Signed On 11- 16:37:24 PST by HAN MACEDO MD        I have independently interpreted this EKG    RADIOLOGY/PROCEDURES   I have independently interpreted the diagnostic imaging associated with this visit and am waiting the final reading from the radiologist.   My preliminary interpretation is as follows: No sternal fracture    Radiologist interpretation:  CT-CHEST (THORAX) W/O   Final Result      1.  Grossly stable appearance of median sternotomy. No definite new sternal fracture is seen.   2.  No acute traumatic abnormality within the chest.      Fleischner Society pulmonary nodule recommendations:   Not Applicable         DX-CHEST-PORTABLE (1 VIEW)   Final Result      No acute cardiopulmonary abnormality.          COURSE & MEDICAL DECISION MAKING    ASSESSMENT, COURSE AND PLAN  Care Narrative: 44-year-old male with recent admission for abnormal behavior, comes in after 2 syncopal episodes, during the second syncopal episode he struck his chest on the corner of a counter injuring his chest wall.  That is why he is here, severe chest wall pain.  It sounds as though these syncopal episodes orthostatic in nature, the second 1 he had stood up from using the toilet and then got lightheaded and fell.  Recent thorough workup while he is in the hospital, at that time he had an echocardiogram and MRI imaging and ultimately was discharged, the suspicion being he is having medication reaction to Ambien and other sorts of pain medicine like baclofen.  He currently is hypertensive with a blood pressure 150s over the exam.  Not tachycardic.  Breath sounds are clear.  Chest x-ray reveals no obvious pneumothorax or other acute abnormalities.  His EKG is unremarkable his blood work reveals no concerning findings, undetectable troponin, normal GFR, LFTs and electrolytes.  Normal WBC and hemoglobin.   Given the amount of pain he is any to receive a dose of Dilaudid and a CT of his chest and he will be reevaluated      CT scan of the chest is unremarkable, no acute findings.  At this point the patient is stable and appropriate for discharge.  He already underwent extensive workup recently including cardiac monitoring, EKGs and echocardiogram, there really is no sense and admitting him to the hospital as all the workup has been done that could possibly be done regarding this reoccurring syncope.  Discharged in stable condition to follow-up with his outpatient providers.    DISPOSITION AND DISCUSSIONS    Escalation of care considered, and ultimately not performed: I did consider escalating care to include inpatient management but he was already admitted to the hospital last week, there is no further inpatient evaluation that would be helpful at this time in addition to what he had already had performed      Decision tools and prescription drugs considered including, but not limited to: I did consider prescription of opiate analgesia but the patient has a pain contract.    FINAL DIAGNOSIS  1. Contusion of chest wall, unspecified laterality, initial encounter    2. Syncope, unspecified syncope type         Electronically signed by: Ryan Weber M.D., 11/3/2024 4:37 PM

## 2024-11-07 ENCOUNTER — TELEPHONE (OUTPATIENT)
Dept: HEALTH INFORMATION MANAGEMENT | Facility: OTHER | Age: 44
End: 2024-11-07
Payer: COMMERCIAL

## 2024-11-12 NOTE — PROGRESS NOTES
Follow Up Office Visit      Patient Name: Tim Figueroa  : 1961   MRN: [id number]     Chief Complaint:    Chief Complaint   Patient presents with    Follow-up     ALMAZ       History of Present Illness: Tim Figueroa is a 63 y.o. male who is here today to follow up with neurology for ALMAZ. He was last seen in clinic  ([facility]). Currently on autopap 10-20 cm H20, average P95 pressure 11.7cm, compliance 30/30 days for 100% compliance with an AHI 1.8/hr. He is using full face mask, regular tubing and [employer] [facility]. He reports improved fatigue.     Subjective      Review of Systems:   Review of Systems   Constitutional: Negative.    HENT: Negative.     Eyes: Negative.    Respiratory: Negative.     Cardiovascular: Negative.    Gastrointestinal: Negative.    Endocrine: Negative.    Genitourinary: Negative.    Musculoskeletal: Negative.    Skin: Negative.    Allergic/Immunologic: Negative.    Hematological: Negative.    Psychiatric/Behavioral:  Positive for sleep disturbance.    All other systems reviewed and are negative.      I have reviewed and the following portions of the patient's history were updated as appropriate: past family history, past medical history, past social history, past surgical history and problem list.    Medications:     Current Outpatient Medications:     amLODIPine (NORVASC) 10 MG tablet, Take 1 tablet by mouth Daily., Disp: , Rfl:     carvedilol (COREG) 3.125 MG tablet, Take 3.12 mg by mouth 2 (Two) Times a Day., Disp: , Rfl:     celecoxib (CeleBREX) 100 MG capsule, Take 1 capsule by mouth 2 (Two) Times a Day., Disp: , Rfl:     chlorthalidone (HYGROTON) 25 MG tablet, Take 1 tablet by mouth Daily., Disp: , Rfl:     cholecalciferol 25 MCG tablet, , Disp: , Rfl:     famotidine (PEPCID) 40 MG tablet, Take 1 tablet by mouth Daily., Disp: , Rfl:     loratadine (CLARITIN) 10 MG tablet, Take 1 tablet by mouth Daily., Disp: , Rfl:     losartan (COZAAR) 100 MG tablet, Take  Report received and assumed patient care. Pt A&O x 4 and on room air. No signs of distress noted at this time. Pt has tele box in place. Pt updated on plan of care for the day and has call light within reach. Fall precautions are in place.      "1 tablet by mouth Daily., Disp: , Rfl:     potassium chloride (K-DUR,KLOR-CON) 20 MEQ CR tablet, , Disp: , Rfl:     potassium chloride (KLOR-CON) 20 MEQ packet, Take 20 mEq by mouth Daily., Disp: , Rfl:     pravastatin (PRAVACHOL) 40 MG tablet, Take 1 tablet by mouth Daily., Disp: , Rfl:     tamsulosin (FLOMAX) 0.4 MG capsule 24 hr capsule, Take 1 capsule by mouth Daily., Disp: , Rfl:     tiZANidine (ZANAFLEX) 4 MG tablet, Take 1 tablet by mouth 2 (Two) Times a Day., Disp: , Rfl:     vitamin D (ERGOCALCIFEROL) 1.25 MG (05833 UT) capsule capsule, Take 1,250 Units by mouth 1 (One) Time Per Week., Disp: , Rfl:     Allergies:   No Known Allergies    Objective     Physical Exam:  Vital Signs:   Vitals:    11/12/24 0828   BP: 146/72   BP Location: Left arm   Patient Position: Sitting   Cuff Size: Adult   Pulse: 70   Resp: 14   Temp: 98 °F (36.7 °C)   TempSrc: Infrared   SpO2: 96%   Height: 182.9 cm (72.01\")   PainSc: 0-No pain     Body mass index is 45.56 kg/m².    Physical Exam  Vitals and nursing note reviewed.   Constitutional:       General: He is not in acute distress.     Appearance: Normal appearance.   HENT:      Head: Normocephalic.      Nose: Nose normal.      Mouth/Throat:      Mouth: Mucous membranes are moist.      Pharynx: Oropharynx is clear.   Eyes:      Extraocular Movements: Extraocular movements intact.      Conjunctiva/sclera: Conjunctivae normal.   Musculoskeletal:      Cervical back: Normal range of motion and neck supple.   Skin:     General: Skin is warm and dry.      Capillary Refill: Capillary refill takes less than 2 seconds.   Neurological:      General: No focal deficit present.      Mental Status: He is alert and oriented to person, place, and time.   Psychiatric:         Mood and Affect: Mood normal.         Behavior: Behavior normal.         Neurological Exam  Mental Status  Alert. Oriented to person, place, and time.    Cranial Nerves  CN III, IV, VI: Extraocular movements intact " bilaterally.       Assessment / Plan      Assessment/Plan:   Diagnoses and all orders for this visit:    1. Obstructive sleep apnea (Primary)    2. BMI 45.0-49.9, adult         Follow Up:   No follow-ups on file.    Anticipatory Guidance and Safety Reviewed  Patient Education Provided  AL[facility] Compliance Report    RTC PRN Or within 1 year or sooner with issues     Paola Rizvi, JAMI, APRN, FNP-C  [facility]

## 2024-11-27 ENCOUNTER — APPOINTMENT (OUTPATIENT)
Dept: RADIOLOGY | Facility: MEDICAL CENTER | Age: 44
End: 2024-11-27
Attending: STUDENT IN AN ORGANIZED HEALTH CARE EDUCATION/TRAINING PROGRAM
Payer: COMMERCIAL

## 2024-11-27 ENCOUNTER — APPOINTMENT (OUTPATIENT)
Dept: TELEHEALTH | Facility: TELEMEDICINE | Age: 44
End: 2024-11-27
Payer: COMMERCIAL

## 2024-11-27 ENCOUNTER — HOSPITAL ENCOUNTER (EMERGENCY)
Facility: MEDICAL CENTER | Age: 44
End: 2024-11-28
Attending: STUDENT IN AN ORGANIZED HEALTH CARE EDUCATION/TRAINING PROGRAM
Payer: COMMERCIAL

## 2024-11-27 DIAGNOSIS — J01.00 ACUTE NON-RECURRENT MAXILLARY SINUSITIS: ICD-10-CM

## 2024-11-27 LAB
ALBUMIN SERPL BCP-MCNC: 4.4 G/DL (ref 3.2–4.9)
ALBUMIN/GLOB SERPL: 1.5 G/DL
ALP SERPL-CCNC: 65 U/L (ref 30–99)
ALT SERPL-CCNC: 17 U/L (ref 2–50)
ANION GAP SERPL CALC-SCNC: 10 MMOL/L (ref 7–16)
AST SERPL-CCNC: 17 U/L (ref 12–45)
BASOPHILS # BLD AUTO: 0.9 % (ref 0–1.8)
BASOPHILS # BLD: 0.08 K/UL (ref 0–0.12)
BILIRUB SERPL-MCNC: 0.3 MG/DL (ref 0.1–1.5)
BUN SERPL-MCNC: 17 MG/DL (ref 8–22)
CALCIUM ALBUM COR SERPL-MCNC: 9 MG/DL (ref 8.5–10.5)
CALCIUM SERPL-MCNC: 9.3 MG/DL (ref 8.5–10.5)
CHLORIDE SERPL-SCNC: 110 MMOL/L (ref 96–112)
CO2 SERPL-SCNC: 20 MMOL/L (ref 20–33)
CREAT SERPL-MCNC: 1.27 MG/DL (ref 0.5–1.4)
EOSINOPHIL # BLD AUTO: 0.08 K/UL (ref 0–0.51)
EOSINOPHIL NFR BLD: 0.9 % (ref 0–6.9)
ERYTHROCYTE [DISTWIDTH] IN BLOOD BY AUTOMATED COUNT: 60.2 FL (ref 35.9–50)
GFR SERPLBLD CREATININE-BSD FMLA CKD-EPI: 72 ML/MIN/1.73 M 2
GLOBULIN SER CALC-MCNC: 2.9 G/DL (ref 1.9–3.5)
GLUCOSE SERPL-MCNC: 101 MG/DL (ref 65–99)
HCT VFR BLD AUTO: 45.7 % (ref 42–52)
HGB BLD-MCNC: 14.3 G/DL (ref 14–18)
IMM GRANULOCYTES # BLD AUTO: 0.05 K/UL (ref 0–0.11)
IMM GRANULOCYTES NFR BLD AUTO: 0.6 % (ref 0–0.9)
INR PPP: 0.96 (ref 0.87–1.13)
LYMPHOCYTES # BLD AUTO: 2.01 K/UL (ref 1–4.8)
LYMPHOCYTES NFR BLD: 23.7 % (ref 22–41)
MCH RBC QN AUTO: 25.5 PG (ref 27–33)
MCHC RBC AUTO-ENTMCNC: 31.3 G/DL (ref 32.3–36.5)
MCV RBC AUTO: 81.5 FL (ref 81.4–97.8)
MONOCYTES # BLD AUTO: 0.52 K/UL (ref 0–0.85)
MONOCYTES NFR BLD AUTO: 6.1 % (ref 0–13.4)
NEUTROPHILS # BLD AUTO: 5.74 K/UL (ref 1.82–7.42)
NEUTROPHILS NFR BLD: 67.8 % (ref 44–72)
NRBC # BLD AUTO: 0 K/UL
NRBC BLD-RTO: 0 /100 WBC (ref 0–0.2)
PLATELET # BLD AUTO: 410 K/UL (ref 164–446)
PMV BLD AUTO: 9 FL (ref 9–12.9)
POTASSIUM SERPL-SCNC: 4.3 MMOL/L (ref 3.6–5.5)
PROT SERPL-MCNC: 7.3 G/DL (ref 6–8.2)
PROTHROMBIN TIME: 12.7 SEC (ref 12–14.6)
RBC # BLD AUTO: 5.61 M/UL (ref 4.7–6.1)
SODIUM SERPL-SCNC: 140 MMOL/L (ref 135–145)
WBC # BLD AUTO: 8.5 K/UL (ref 4.8–10.8)

## 2024-11-27 PROCEDURE — 86335 IMMUNFIX E-PHORSIS/URINE/CSF: CPT

## 2024-11-27 PROCEDURE — A9270 NON-COVERED ITEM OR SERVICE: HCPCS | Performed by: STUDENT IN AN ORGANIZED HEALTH CARE EDUCATION/TRAINING PROGRAM

## 2024-11-27 PROCEDURE — 99284 EMERGENCY DEPT VISIT MOD MDM: CPT

## 2024-11-27 PROCEDURE — 700111 HCHG RX REV CODE 636 W/ 250 OVERRIDE (IP): Mod: JZ | Performed by: STUDENT IN AN ORGANIZED HEALTH CARE EDUCATION/TRAINING PROGRAM

## 2024-11-27 PROCEDURE — 85025 COMPLETE CBC W/AUTO DIFF WBC: CPT

## 2024-11-27 PROCEDURE — 85610 PROTHROMBIN TIME: CPT

## 2024-11-27 PROCEDURE — 70450 CT HEAD/BRAIN W/O DYE: CPT

## 2024-11-27 PROCEDURE — 700102 HCHG RX REV CODE 250 W/ 637 OVERRIDE(OP): Performed by: STUDENT IN AN ORGANIZED HEALTH CARE EDUCATION/TRAINING PROGRAM

## 2024-11-27 PROCEDURE — 96375 TX/PRO/DX INJ NEW DRUG ADDON: CPT

## 2024-11-27 PROCEDURE — 96374 THER/PROPH/DIAG INJ IV PUSH: CPT

## 2024-11-27 PROCEDURE — 80053 COMPREHEN METABOLIC PANEL: CPT

## 2024-11-27 PROCEDURE — 36415 COLL VENOUS BLD VENIPUNCTURE: CPT

## 2024-11-27 RX ORDER — DIPHENHYDRAMINE HYDROCHLORIDE 50 MG/ML
25 INJECTION INTRAMUSCULAR; INTRAVENOUS ONCE
Status: COMPLETED | OUTPATIENT
Start: 2024-11-27 | End: 2024-11-27

## 2024-11-27 RX ORDER — PROCHLORPERAZINE EDISYLATE 5 MG/ML
10 INJECTION INTRAMUSCULAR; INTRAVENOUS ONCE
Status: COMPLETED | OUTPATIENT
Start: 2024-11-27 | End: 2024-11-27

## 2024-11-27 RX ORDER — OXYCODONE HYDROCHLORIDE 5 MG/1
10 TABLET ORAL ONCE
Status: COMPLETED | OUTPATIENT
Start: 2024-11-27 | End: 2024-11-27

## 2024-11-27 RX ADMIN — DIPHENHYDRAMINE HYDROCHLORIDE 25 MG: 50 INJECTION, SOLUTION INTRAMUSCULAR; INTRAVENOUS at 22:46

## 2024-11-27 RX ADMIN — OXYCODONE 10 MG: 5 TABLET ORAL at 22:46

## 2024-11-27 RX ADMIN — PROCHLORPERAZINE EDISYLATE 10 MG: 5 INJECTION INTRAMUSCULAR; INTRAVENOUS at 22:46

## 2024-11-27 ASSESSMENT — FIBROSIS 4 INDEX: FIB4 SCORE: 0.43

## 2024-11-27 ASSESSMENT — PAIN DESCRIPTION - PAIN TYPE
TYPE: ACUTE PAIN
TYPE: ACUTE PAIN

## 2024-11-28 ENCOUNTER — PHARMACY VISIT (OUTPATIENT)
Dept: PHARMACY | Facility: MEDICAL CENTER | Age: 44
End: 2024-11-28
Payer: MEDICARE

## 2024-11-28 VITALS
BODY MASS INDEX: 29.93 KG/M2 | WEIGHT: 240.74 LBS | DIASTOLIC BLOOD PRESSURE: 92 MMHG | TEMPERATURE: 98.4 F | RESPIRATION RATE: 18 BRPM | HEIGHT: 75 IN | OXYGEN SATURATION: 97 % | HEART RATE: 83 BPM | SYSTOLIC BLOOD PRESSURE: 134 MMHG

## 2024-11-28 PROCEDURE — 96375 TX/PRO/DX INJ NEW DRUG ADDON: CPT

## 2024-11-28 PROCEDURE — RXMED WILLOW AMBULATORY MEDICATION CHARGE: Performed by: STUDENT IN AN ORGANIZED HEALTH CARE EDUCATION/TRAINING PROGRAM

## 2024-11-28 PROCEDURE — 700111 HCHG RX REV CODE 636 W/ 250 OVERRIDE (IP): Mod: JZ | Performed by: STUDENT IN AN ORGANIZED HEALTH CARE EDUCATION/TRAINING PROGRAM

## 2024-11-28 RX ORDER — AMOXICILLIN 500 MG/1
2000 CAPSULE ORAL ONCE
Status: SHIPPED | COMMUNITY
End: 2024-11-28

## 2024-11-28 RX ORDER — METOPROLOL TARTRATE 25 MG/1
25 TABLET, FILM COATED ORAL 2 TIMES DAILY
COMMUNITY
Start: 2024-09-08

## 2024-11-28 RX ORDER — METHOCARBAMOL 500 MG/1
500-1000 TABLET, FILM COATED ORAL 3 TIMES DAILY
COMMUNITY

## 2024-11-28 RX ORDER — KETOROLAC TROMETHAMINE 15 MG/ML
15 INJECTION, SOLUTION INTRAMUSCULAR; INTRAVENOUS ONCE
Status: COMPLETED | OUTPATIENT
Start: 2024-11-28 | End: 2024-11-28

## 2024-11-28 RX ADMIN — KETOROLAC TROMETHAMINE 15 MG: 15 INJECTION, SOLUTION INTRAMUSCULAR; INTRAVENOUS at 00:33

## 2024-11-28 ASSESSMENT — PAIN DESCRIPTION - PAIN TYPE: TYPE: ACUTE PAIN

## 2024-11-28 NOTE — ED NOTES
Med Rec complete per pt's S/O at bedside   Allergies reviewed  Antibiotics in the past 30 days:yes  Anticoagulant in past 14 days:no  Pharmacy patient utilizes:[facility] on [address]    Significant other states pt does not take Duloxetine at all

## 2024-11-28 NOTE — ED PROVIDER NOTES
"  ER Provider Note    Scribed for Santhosh Darling M.D. by Dejah Amador. 11/27/2024   10:06 PM    Primary Care Provider: Sal Schafer D.O.    CHIEF COMPLAINT  Chief Complaint   Patient presents with    Mouth Pain     Root canal x1 week ago, pt states any time he bends over a steady stream of clear fluid drains from left nare any time he bends over. Pt also reports a significant Headache + left jaw pain. Non relieving w/ tylenol, ibuprofen or oxy.   Pt had tele health appt today and was told to come in asap for concern for spinal fluid drainage.      EXTERNAL RECORDS REVIEWED  External ED Note Patient had a Crown done about one week ago.     HPI/ROS  LIMITATION TO HISTORY   Select: : None  OUTSIDE HISTORIAN(S):  Significant other present at bedside that provided additional history.      Roberto Braswell is a 44 y.o. male who presents to the ED for evaluation of mouth pain onset one week ago. The patient explains he had a root canal done about one week ago. He reports since then his mouth pain has not gone away. He reports fluid leaking from his left nare every time he bends over. He describes \"its like a faucet turns and puddle forms.\" He also notes endorsing excruciating headaches and left jaw pain. He reports his pain is primarily in his left upper jaw. He denies pain medications such as Tylenol or Ibuprofen alleviating his symptoms. Patient has prescribed Oxycodone that he also reports no alleviation from. Patient had a Tele health appointment today where he was told to go the ER for further evaluation.     PAST MEDICAL HISTORY  Past Medical History:   Diagnosis Date    Arthritis     Right ankle    Breath shortness 11/03/2023    With exertion.    Drug-seeking behavior 07/30/2023    Demanding narcotics for a duodenal ulcer July 2023. Refused antacids or sucralfate.     Duodenal ulcer 07/30/2023    Gastric ulcer     Heart valve disease     Mitral valve prolapse    Hemorrhagic disorder (HCC)     GI bleed    " Hypertension     Pain     Resolved; Right ankle    Seizure (HCC)     while taking tramadol       SURGICAL HISTORY  Past Surgical History:   Procedure Laterality Date    KY UPPER GI ENDOSCOPY,DIAGNOSIS N/A 9/18/2024    Procedure: GASTROSCOPY;  Surgeon: Janae Oliver M.D.;  Location: SURGERY SAME DAY [facility];  Service: Gastroenterology    CAPSULE ENDOSCOPY ADMINISTRATION N/A 5/28/2024    Procedure: ADMINISTRATION, CAPSULE, FOR CAPSULE ENDOSCOPY;  Surgeon: Janae Oliver M.D.;  Location: SURGERY SAME DAY [facility];  Service: Gastroenterology    CAPSULE ENDOSCOPY RETRIEVAL  5/28/2024    Procedure: RETRIEVAL, ENDOSCOPY CAPSULE;  Surgeon: Janae Oliver M.D.;  Location: SURGERY SAME DAY [facility];  Service: Gastroenterology    KY COLONOSCOPY,DIAGNOSTIC  5/27/2024    Procedure: COLONOSCOPY;  Surgeon: Janae Oliver M.D.;  Location: [facility];  Service: Gastroenterology    KY UPPER GI ENDOSCOPY,DIAGNOSIS N/A 5/25/2024    Procedure: GASTROSCOPY;  Surgeon: Janae Oliver M.D.;  Location: [facility];  Service: Gastroenterology    KY UPPER GI ENDOSCOPY,BIOPSY N/A 5/25/2024    Procedure: GASTROSCOPY, WITH BIOPSY;  Surgeon: Janae Oliver M.D.;  Location: [facility];  Service: Gastroenterology    KY UPPER GI ENDOSCOPY,DIAGNOSIS N/A 5/18/2024    Procedure: GASTROSCOPY;  Surgeon: Kaitlyn Ontiveros M.D.;  Location: [facility];  Service: Gastroenterology    KY UPPER GI ENDOSCOPY,BIOPSY N/A 5/18/2024    Procedure: GASTROSCOPY, WITH BIOPSY;  Surgeon: Kaitlyn Ontiveros M.D.;  Location: [facility];  Service: Gastroenterology    GASTROSCOPY WITH ENDOSTAT N/A 5/18/2024    Procedure: EGD, WITH CAUTERIZATION;  Surgeon: Kaitlyn Ontiveros M.D.;  Location: [facility];  Service: Gastroenterology    KY REPLACEMENT OF MITRAL VALVE  1/25/2024    Procedure: MITRAL VALVE REPAIR, TRANSESOPHAGEAL ECHOCARDIOGRAM;  Surgeon: Bigg Santana M.D.;  Location: [facility]  [place];  Service: Cardiothoracic    ECHOCARDIOGRAM, TRANSESOPHAGEAL, INTRAOPERATIVE  1/25/2024    Procedure: ECHOCARDIOGRAM, TRANSESOPHAGEAL, INTRAOPERATIVE;  Surgeon: Bigg Santana M.D.;  Location: SURGERY [facility];  Service: Cardiothoracic    MD UPPER GI ENDOSCOPY,DIAGNOSIS N/A 9/14/2023    Procedure: GASTROSCOPY;  Surgeon: Johnny Dalton M.D.;  Location: SURGERY SAME DAY [facility];  Service: Gastroenterology    MD UPPER GI ENDOSCOPY,BIOPSY N/A 9/14/2023    Procedure: GASTROSCOPY, WITH BIOPSY;  Surgeon: Johnny Dalton M.D.;  Location: SURGERY SAME DAY [facility];  Service: Gastroenterology    MD UPPER GI ENDOSCOPY,BIOPSY N/A 7/30/2023    Procedure: GASTROSCOPY, WITH BIOPSY;  Surgeon: Kumar Hope M.D.;  Location: SURGERY [facility];  Service: Gastroenterology    MD UPPER GI ENDOSCOPY,DIAGNOSIS  3/28/2022    Procedure: GASTROSCOPY;  Surgeon: Paco Wiggins M.D.;  Location: SURGERY SAME DAY [facility];  Service: Gastroenterology    MD UPPER GI ENDOSCOPY,BIOPSY  3/28/2022    Procedure: GASTROSCOPY, WITH BIOPSY;  Surgeon: Paco Wiggins M.D.;  Location: SURGERY SAME DAY [facility];  Service: Gastroenterology    ANKLE ARTHROSCOPY Right 5/21/2018    Procedure: ANKLE ARTHROSCOPY, LATERAL LIGAMENT RECONSTRUCTION;  Surgeon: Seth Cruz M.D.;  Location: [facility];  Service: Orthopedics    BIOPSY ORTHO Right 5/21/2018    Procedure: BIOPSY ORTHO/ FOR CARTILAGE AND DENOVO PROCEDURE;  Surgeon: Seth Cruz M.D.;  Location: SURGERY [facility];  Service: Orthopedics    OTHER ABDOMINAL SURGERY      Cholecystectomy February 2017    OTHER ORTHOPEDIC SURGERY      2 previous ankle surgeries (2007, 2009)       FAMILY HISTORY  Family History   Problem Relation Age of Onset    Heart Disease Mother     No Known Problems Father     Clotting Disorder Neg Hx        SOCIAL HISTORY   reports that he has never smoked. He has never used smokeless tobacco. He reports that he does not currently use alcohol. He reports  "that he does not use drugs.    CURRENT MEDICATIONS  Previous Medications    ACETAMINOPHEN (TYLENOL) 500 MG TAB    Take 1,000 mg by mouth every 6 hours as needed for Mild Pain. 1,000 mg = 2 tabs  Indications: Pain    AMOXICILLIN (AMOXIL) 500 MG CAP    Take 2,000 mg by mouth Once. 2,000 mg = 4 caps  Dental surgery  PRESCRIPTION COURSE WAS COMPLETED    ASPIRIN 81 MG EC TABLET    Take 81 mg by mouth every day.    FERROUS SULFATE 325 (65 FE) MG TABLET    Take 1 Tablet by mouth every 48 hours. For iron deficiency    METHOCARBAMOL (ROBAXIN) 500 MG TAB    Take 1,000 mg by mouth 3 times a day. 1,000 mg = 2 tabs    METOPROLOL TARTRATE (LOPRESSOR) 25 MG TAB    Take 25 mg by mouth 2 times a day.    OXYCODONE IMMEDIATE RELEASE (ROXICODONE) 10 MG IMMEDIATE RELEASE TABLET    Take 10 mg by mouth 5 Times a Day.    PANTOPRAZOLE (PROTONIX) 40 MG TABLET DELAYED RESPONSE    Take 1 Tablet by mouth 2 times a day.    SUCRALFATE (CARAFATE) 1 GM TAB    Take 1 g by mouth 4 Times a Day,Before Meals and at Bedtime.    ZOLPIDEM (AMBIEN) 10 MG TAB    Take 1 Tablet by mouth at bedtime as needed for Sleep for up to 90 days.       ALLERGIES  Allergies   Allergen Reactions    Morphine Vomiting    Tramadol Unspecified     Seizure  JOC=[id number]        PHYSICAL EXAM  BP (!) 161/97   Pulse 89   Temp 36.9 °C (98.4 °F) (Temporal)   Resp 18   Ht 1.905 m (6' 3\")   Wt 109 kg (240 lb 11.9 oz)   SpO2 98%   BMI 30.09 kg/m²    General: Uncomfortable, ill-appearing  Head: Normocephalic atraumatic,   HEENT: Crown on left upper molar, no surrounding erythema, no bleeding from Gingiva, no periapical abscess.  Patient does have nasal discharge, that is clearish yellowish.  Eyes: Extraocular motion intact  Neck: Supple, no rigidity no nuchal rigidity.   Cardiovascular: Regular rate and rhythm no murmurs rubs or gallops  Respiratory: Clear to auscultation bilaterally, equal chest rise and fall, no increased work of breathing  Abdomen: Soft nontender no " guarding  Musculoskeletal: Warm and well perfused, no peripheral edema  Neuro: Alert, no focal deficits Cranial nerves II through XII intact.  5 out of 5 strength with elbow flexion/extension, wrist flexion/extension,  bilaterally.  5 out of 5 strength with hip flexion/extension, knee flexion/extension, ankle flexion/plantarflexion, flexion/extension of the toes bilaterally.  Sensation intact to light touch x4.  Gait intact.  Alert and oriented x3.  2+ DTRs bilaterally.   No pronator drift.  Integumentary: No wounds or rashes    DIAGNOSTIC STUDIES    Labs:   Labs Reviewed   CBC WITH DIFFERENTIAL - Abnormal; Notable for the following components:       Result Value    MCH 25.5 (*)     MCHC 31.3 (*)     RDW 60.2 (*)     All other components within normal limits   COMP METABOLIC PANEL - Abnormal; Notable for the following components:    Glucose 101 (*)     All other components within normal limits   PROTHROMBIN TIME   ESTIMATED GFR   REFERENCE MISC.FROZEN       Radiology:       Radiologist interpretation:   CT-HEAD W/O   Final Result         1. No definite acute intracranial abnormality.   2. There is a small amount of fluid in left maxillary sinus.                    INITIAL ASSESSMENT COURSE AND PLAN  Care Narrative     10:06 PM - Patient was evaluated at bedside. They present to the ED for mouth pain after having a root canal done one week ago. Patient has fluid leaking out of left nostril when bending. He endorses headaches. Upon physical exam, patient had a crown on left upper molar. Patient verbalizes understanding and support with my plan of care.  Differential diagnoses include but not limited to: Cerebrospinal fluid leak Vs. Post-Operative pain, considered meningitis but low suspicion.      11:25 PM - I paged for ENT at this time.     11:36 PM- I consulted Dr. Epps (ENT) who does not believe this is a Cerebrospinal fluid leak. However, he is concerned that patient's sinus was violated during the procedure  and recommends discussing case with Oral Surger to discuss any potential interventions necessary.y     11:44 PM - I paged fro Oral surgery at this time.     11:47 PM- I consulted Dr. Rivera (Oral Surgery) at this time regarding patient's case.  He recommends sending the patient to his clinic.    12:17 AM - I reassessed the patient at this time. I informed them of my discussion with Dr. Epps and Dr. Rivera. I informed them to follow up with Dr. Rivera and to take antibiotics as prescribed. Reviewed patient's laboratory, imaging results at the bedside, patient is agreeable to discharge, we discussed strict return precautions, and outpatient follow-up, patient was discharged in no acute distress.  All questions were answered prior to discharge.    DISPOSITION AND DISCUSSIONS    I have discussed management of the patient with the following physicians and CARTER's:   Dr. Rivera (Oral Surgery), Dr. Epps (ENT)    Discussion of management with other [facility] or appropriate source(s): None     Escalation of care considered, and ultimately not performed: acute inpatient care management, however at this time, the patient is most appropriate for outpatient management.    Barriers to care at this time, including but not limited to:  None .     Decision tools and prescription drugs considered including, but not limited to: Antibiotics amox-clav  .    The patient will return for new or worsening symptoms and is stable at the time of discharge.    The patient is referred to a primary physician for blood pressure management, diabetic screening, and for all other preventative health concerns.    DISPOSITION:  Patient will be discharged home in stable condition.    FOLLOW UP:  Lawrence Rivera D.M.D.  [id number]  [facility] [id number] [phone]    Call  Please call my clinic to establish follow up.    OUTPATIENT MEDICATIONS:  New Prescriptions    No medications on file     FINAL DIAGNOSIS  1. Acute non-recurrent  maxillary sinusitis      Dejah MORENO (Scribe), am scribing for, and in the presence of, Pancho Darling M.D..    Electronically signed by: Dejah Amador (Ladi), 11/27/2024    Pancho MORENO M.D. personally performed the services described in this documentation, as scribed by Dejah Amador in my presence, and it is both accurate and complete.      The note accurately reflects work and decisions made by me.  Pancho Darling M.D.  11/28/2024  1:54 AM

## 2024-11-28 NOTE — ED NOTES
Pt ambulated with normal steady gait from ER lobby to pt room. Pt asked to dress in gown. BP cuff and pulse ox attached to cardiac monitor. PIV established, labs collected and sent. Family at bedside. Chart up for ERP.

## 2024-11-28 NOTE — ED TRIAGE NOTES
"Chief Complaint   Patient presents with    Mouth Pain     Root canal x1 week ago, pt states any time he bends over a steady stream of clear fluid drains from left nare any time he bends over. Pt also reports a significant Headache + left jaw pain. Non relieving w/ tylenol, ibuprofen or oxy.   Pt had tele health appt today and was told to come in asap for concern for spinal fluid drainage.    [facility] for above.   Pt ambulatory with steady gait  triage room.      Pt is GCS 15, speaking in full sentences, follows commands and responds appropriately to questions. Resp are even and unlabored.       Pt educated on triage process, updated/ agreeable to plan of care. Informed to let staff know of any changes in condition.     BP (!) 149/98   Pulse 96   Temp 36.9 °C (98.4 °F) (Temporal)   Resp 20   Ht 1.905 m (6' 3\")   Wt 109 kg (240 lb 11.9 oz)   SpO2 98%   BMI 30.09 kg/m²       "

## 2024-11-28 NOTE — DISCHARGE INSTRUCTIONS
Follow-up with Dr. Rivera, take antibiotics as prescribed.  Return for new concerning symptoms.    It is absolutely imperative that you or your primary care doctor follow-up the beta-2 transferrin test.  If it is positive, that means that the fluid coming out of your nose is cerebrospinal fluid.  If you develop fever, inability move your neck, you should return to emergency department immediately.

## 2024-11-28 NOTE — ED NOTES
Pt medicated per MAR for pain. PIV removed for dc. Written and verbal instructions provided to pt. Pt instructed to follow up with PCP in 2 days and  medications from pharmacy. Pt instructed to return to emergency department for new or worsening symptoms. Pt verbalized understanding of discharge instructions. Pt ambulatory upon discharge with all belongings.

## 2024-11-30 LAB — MISCELLANEOUS LAB RESULT MISCLAB: NORMAL

## 2024-12-01 ENCOUNTER — APPOINTMENT (OUTPATIENT)
Dept: RADIOLOGY | Facility: MEDICAL CENTER | Age: 44
DRG: 093 | End: 2024-12-01
Payer: COMMERCIAL

## 2024-12-01 ENCOUNTER — APPOINTMENT (OUTPATIENT)
Dept: RADIOLOGY | Facility: MEDICAL CENTER | Age: 44
DRG: 093 | End: 2024-12-01
Attending: EMERGENCY MEDICINE
Payer: COMMERCIAL

## 2024-12-01 ENCOUNTER — HOSPITAL ENCOUNTER (INPATIENT)
Facility: MEDICAL CENTER | Age: 44
LOS: 3 days | DRG: 093 | End: 2024-12-04
Attending: EMERGENCY MEDICINE
Payer: COMMERCIAL

## 2024-12-01 DIAGNOSIS — Z48.814: ICD-10-CM

## 2024-12-01 DIAGNOSIS — R68.84 JAW PAIN: ICD-10-CM

## 2024-12-01 DIAGNOSIS — J01.00 ACUTE NON-RECURRENT MAXILLARY SINUSITIS: ICD-10-CM

## 2024-12-01 DIAGNOSIS — G44.89 OTHER HEADACHE SYNDROME: ICD-10-CM

## 2024-12-01 PROBLEM — M43.6 NECK STIFFNESS: Status: ACTIVE | Noted: 2024-12-01

## 2024-12-01 PROBLEM — R51.9 HEADACHE: Status: ACTIVE | Noted: 2024-12-01

## 2024-12-01 PROBLEM — G96.01 CSF LEAK FROM NOSE: Status: ACTIVE | Noted: 2024-12-01

## 2024-12-01 LAB
ALBUMIN SERPL BCP-MCNC: 4.6 G/DL (ref 3.2–4.9)
ALBUMIN/GLOB SERPL: 1.8 G/DL
ALP SERPL-CCNC: 64 U/L (ref 30–99)
ALT SERPL-CCNC: 13 U/L (ref 2–50)
ANION GAP SERPL CALC-SCNC: 13 MMOL/L (ref 7–16)
APTT PPP: 25.6 SEC (ref 24.7–36)
AST SERPL-CCNC: 11 U/L (ref 12–45)
BASOPHILS # BLD AUTO: 0.8 % (ref 0–1.8)
BASOPHILS # BLD: 0.06 K/UL (ref 0–0.12)
BILIRUB SERPL-MCNC: <0.2 MG/DL (ref 0.1–1.5)
BUN SERPL-MCNC: 19 MG/DL (ref 8–22)
CALCIUM ALBUM COR SERPL-MCNC: 8.7 MG/DL (ref 8.5–10.5)
CALCIUM SERPL-MCNC: 9.2 MG/DL (ref 8.5–10.5)
CHLORIDE SERPL-SCNC: 111 MMOL/L (ref 96–112)
CO2 SERPL-SCNC: 19 MMOL/L (ref 20–33)
CREAT SERPL-MCNC: 1.08 MG/DL (ref 0.5–1.4)
EOSINOPHIL # BLD AUTO: 0.14 K/UL (ref 0–0.51)
EOSINOPHIL NFR BLD: 2 % (ref 0–6.9)
ERYTHROCYTE [DISTWIDTH] IN BLOOD BY AUTOMATED COUNT: 60.3 FL (ref 35.9–50)
GFR SERPLBLD CREATININE-BSD FMLA CKD-EPI: 87 ML/MIN/1.73 M 2
GLOBULIN SER CALC-MCNC: 2.6 G/DL (ref 1.9–3.5)
GLUCOSE SERPL-MCNC: 87 MG/DL (ref 65–99)
GRAM STN SPEC: NORMAL
HCT VFR BLD AUTO: 43.7 % (ref 42–52)
HGB BLD-MCNC: 14.1 G/DL (ref 14–18)
IMM GRANULOCYTES # BLD AUTO: 0.03 K/UL (ref 0–0.11)
IMM GRANULOCYTES NFR BLD AUTO: 0.4 % (ref 0–0.9)
INR PPP: 1.02 (ref 0.87–1.13)
LYMPHOCYTES # BLD AUTO: 1.94 K/UL (ref 1–4.8)
LYMPHOCYTES NFR BLD: 27.3 % (ref 22–41)
MCH RBC QN AUTO: 26.3 PG (ref 27–33)
MCHC RBC AUTO-ENTMCNC: 32.3 G/DL (ref 32.3–36.5)
MCV RBC AUTO: 81.5 FL (ref 81.4–97.8)
MONOCYTES # BLD AUTO: 0.64 K/UL (ref 0–0.85)
MONOCYTES NFR BLD AUTO: 9 % (ref 0–13.4)
NEUTROPHILS # BLD AUTO: 4.29 K/UL (ref 1.82–7.42)
NEUTROPHILS NFR BLD: 60.5 % (ref 44–72)
NRBC # BLD AUTO: 0 K/UL
NRBC BLD-RTO: 0 /100 WBC (ref 0–0.2)
PLATELET # BLD AUTO: 372 K/UL (ref 164–446)
PMV BLD AUTO: 8.8 FL (ref 9–12.9)
POTASSIUM SERPL-SCNC: 4.2 MMOL/L (ref 3.6–5.5)
PROT SERPL-MCNC: 7.2 G/DL (ref 6–8.2)
PROTHROMBIN TIME: 13.4 SEC (ref 12–14.6)
RBC # BLD AUTO: 5.36 M/UL (ref 4.7–6.1)
SCCMEC + MECA PNL NOSE NAA+PROBE: NEGATIVE
SIGNIFICANT IND 70042: NORMAL
SITE SITE: NORMAL
SODIUM SERPL-SCNC: 143 MMOL/L (ref 135–145)
SOURCE SOURCE: NORMAL
WBC # BLD AUTO: 7.1 K/UL (ref 4.8–10.8)

## 2024-12-01 PROCEDURE — 700102 HCHG RX REV CODE 250 W/ 637 OVERRIDE(OP)

## 2024-12-01 PROCEDURE — 96366 THER/PROPH/DIAG IV INF ADDON: CPT

## 2024-12-01 PROCEDURE — 86335 IMMUNFIX E-PHORSIS/URINE/CSF: CPT

## 2024-12-01 PROCEDURE — 700117 HCHG RX CONTRAST REV CODE 255: Mod: JZ

## 2024-12-01 PROCEDURE — 87641 MR-STAPH DNA AMP PROBE: CPT

## 2024-12-01 PROCEDURE — 700111 HCHG RX REV CODE 636 W/ 250 OVERRIDE (IP): Performed by: EMERGENCY MEDICINE

## 2024-12-01 PROCEDURE — 87040 BLOOD CULTURE FOR BACTERIA: CPT

## 2024-12-01 PROCEDURE — 87076 CULTURE ANAEROBE IDENT EACH: CPT

## 2024-12-01 PROCEDURE — 70553 MRI BRAIN STEM W/O & W/DYE: CPT

## 2024-12-01 PROCEDURE — A9579 GAD-BASE MR CONTRAST NOS,1ML: HCPCS | Mod: JZ

## 2024-12-01 PROCEDURE — 85730 THROMBOPLASTIN TIME PARTIAL: CPT

## 2024-12-01 PROCEDURE — 700111 HCHG RX REV CODE 636 W/ 250 OVERRIDE (IP)

## 2024-12-01 PROCEDURE — 87070 CULTURE OTHR SPECIMN AEROBIC: CPT

## 2024-12-01 PROCEDURE — 85025 COMPLETE CBC W/AUTO DIFF WBC: CPT

## 2024-12-01 PROCEDURE — 80053 COMPREHEN METABOLIC PANEL: CPT

## 2024-12-01 PROCEDURE — 96375 TX/PRO/DX INJ NEW DRUG ADDON: CPT

## 2024-12-01 PROCEDURE — 87186 SC STD MICRODIL/AGAR DIL: CPT

## 2024-12-01 PROCEDURE — 96365 THER/PROPH/DIAG IV INF INIT: CPT

## 2024-12-01 PROCEDURE — 700105 HCHG RX REV CODE 258: Performed by: EMERGENCY MEDICINE

## 2024-12-01 PROCEDURE — 87205 SMEAR GRAM STAIN: CPT

## 2024-12-01 PROCEDURE — 85610 PROTHROMBIN TIME: CPT

## 2024-12-01 PROCEDURE — A9270 NON-COVERED ITEM OR SERVICE: HCPCS

## 2024-12-01 PROCEDURE — 99223 1ST HOSP IP/OBS HIGH 75: CPT

## 2024-12-01 PROCEDURE — 70487 CT MAXILLOFACIAL W/DYE: CPT

## 2024-12-01 PROCEDURE — 700117 HCHG RX CONTRAST REV CODE 255: Performed by: EMERGENCY MEDICINE

## 2024-12-01 PROCEDURE — 700105 HCHG RX REV CODE 258

## 2024-12-01 PROCEDURE — 99285 EMERGENCY DEPT VISIT HI MDM: CPT

## 2024-12-01 PROCEDURE — 87077 CULTURE AEROBIC IDENTIFY: CPT

## 2024-12-01 PROCEDURE — 36415 COLL VENOUS BLD VENIPUNCTURE: CPT

## 2024-12-01 PROCEDURE — 770001 HCHG ROOM/CARE - MED/SURG/GYN PRIV*

## 2024-12-01 RX ORDER — ONDANSETRON 2 MG/ML
4 INJECTION INTRAMUSCULAR; INTRAVENOUS ONCE
Status: COMPLETED | OUTPATIENT
Start: 2024-12-01 | End: 2024-12-01

## 2024-12-01 RX ORDER — HYDROMORPHONE HYDROCHLORIDE 1 MG/ML
0.5 INJECTION, SOLUTION INTRAMUSCULAR; INTRAVENOUS; SUBCUTANEOUS ONCE
Status: DISCONTINUED | OUTPATIENT
Start: 2024-12-01 | End: 2024-12-01

## 2024-12-01 RX ORDER — ACETAMINOPHEN 500 MG
1000 TABLET ORAL 3 TIMES DAILY
Status: DISCONTINUED | OUTPATIENT
Start: 2024-12-01 | End: 2024-12-01

## 2024-12-01 RX ORDER — AMOXICILLIN 500 MG/1
2000 CAPSULE ORAL ONCE
Status: ON HOLD | COMMUNITY
End: 2024-12-04

## 2024-12-01 RX ORDER — OXYCODONE HYDROCHLORIDE 5 MG/1
5 TABLET ORAL
Status: DISCONTINUED | OUTPATIENT
Start: 2024-12-01 | End: 2024-12-01

## 2024-12-01 RX ORDER — OXYCODONE HYDROCHLORIDE 10 MG/1
20 TABLET ORAL
Status: DISCONTINUED | OUTPATIENT
Start: 2024-12-01 | End: 2024-12-04 | Stop reason: HOSPADM

## 2024-12-01 RX ORDER — ACETAMINOPHEN 500 MG
1000 TABLET ORAL 3 TIMES DAILY PRN
Status: DISCONTINUED | OUTPATIENT
Start: 2024-12-06 | End: 2024-12-01

## 2024-12-01 RX ORDER — ZOLPIDEM TARTRATE 5 MG/1
10 TABLET ORAL NIGHTLY PRN
Status: DISCONTINUED | OUTPATIENT
Start: 2024-12-01 | End: 2024-12-04 | Stop reason: HOSPADM

## 2024-12-01 RX ORDER — HYDROMORPHONE HYDROCHLORIDE 1 MG/ML
1 INJECTION, SOLUTION INTRAMUSCULAR; INTRAVENOUS; SUBCUTANEOUS
Status: DISCONTINUED | OUTPATIENT
Start: 2024-12-01 | End: 2024-12-04 | Stop reason: HOSPADM

## 2024-12-01 RX ORDER — ENOXAPARIN SODIUM 100 MG/ML
40 INJECTION SUBCUTANEOUS DAILY
Status: DISCONTINUED | OUTPATIENT
Start: 2024-12-01 | End: 2024-12-04 | Stop reason: HOSPADM

## 2024-12-01 RX ORDER — HYDROMORPHONE HYDROCHLORIDE 1 MG/ML
1 INJECTION, SOLUTION INTRAMUSCULAR; INTRAVENOUS; SUBCUTANEOUS ONCE
Status: COMPLETED | OUTPATIENT
Start: 2024-12-01 | End: 2024-12-01

## 2024-12-01 RX ORDER — METOPROLOL TARTRATE 25 MG/1
25 TABLET, FILM COATED ORAL 2 TIMES DAILY
Status: DISCONTINUED | OUTPATIENT
Start: 2024-12-01 | End: 2024-12-04 | Stop reason: HOSPADM

## 2024-12-01 RX ORDER — HYDROMORPHONE HYDROCHLORIDE 1 MG/ML
1 INJECTION, SOLUTION INTRAMUSCULAR; INTRAVENOUS; SUBCUTANEOUS
Status: DISCONTINUED | OUTPATIENT
Start: 2024-12-01 | End: 2024-12-01

## 2024-12-01 RX ORDER — SUCRALFATE 1 G/1
1 TABLET ORAL
Status: DISCONTINUED | OUTPATIENT
Start: 2024-12-01 | End: 2024-12-03

## 2024-12-01 RX ORDER — OXYCODONE HYDROCHLORIDE 10 MG/1
10 TABLET ORAL
Status: DISCONTINUED | OUTPATIENT
Start: 2024-12-01 | End: 2024-12-01

## 2024-12-01 RX ORDER — CEFTRIAXONE 2 G/1
2000 INJECTION, POWDER, FOR SOLUTION INTRAMUSCULAR; INTRAVENOUS ONCE
Status: DISCONTINUED | OUTPATIENT
Start: 2024-12-01 | End: 2024-12-01

## 2024-12-01 RX ORDER — OXYCODONE HYDROCHLORIDE 5 MG/1
5 TABLET ORAL
Status: DISCONTINUED | OUTPATIENT
Start: 2024-12-01 | End: 2024-12-02

## 2024-12-01 RX ORDER — HYDROMORPHONE HYDROCHLORIDE 1 MG/ML
0.5 INJECTION, SOLUTION INTRAMUSCULAR; INTRAVENOUS; SUBCUTANEOUS
Status: DISCONTINUED | OUTPATIENT
Start: 2024-12-01 | End: 2024-12-01

## 2024-12-01 RX ORDER — ACETAMINOPHEN 500 MG
1000 TABLET ORAL 3 TIMES DAILY PRN
Status: DISCONTINUED | OUTPATIENT
Start: 2024-12-06 | End: 2024-12-04 | Stop reason: HOSPADM

## 2024-12-01 RX ORDER — ASPIRIN 81 MG/1
81 TABLET ORAL DAILY
Status: DISCONTINUED | OUTPATIENT
Start: 2024-12-01 | End: 2024-12-04 | Stop reason: HOSPADM

## 2024-12-01 RX ORDER — CEFEPIME HYDROCHLORIDE 2 G/1
2 INJECTION, POWDER, FOR SOLUTION INTRAVENOUS ONCE
Status: COMPLETED | OUTPATIENT
Start: 2024-12-01 | End: 2024-12-01

## 2024-12-01 RX ORDER — PANTOPRAZOLE SODIUM 40 MG/1
40 TABLET, DELAYED RELEASE ORAL 2 TIMES DAILY
Status: DISCONTINUED | OUTPATIENT
Start: 2024-12-01 | End: 2024-12-01

## 2024-12-01 RX ORDER — OXYCODONE HYDROCHLORIDE 10 MG/1
10 TABLET ORAL
Status: DISCONTINUED | OUTPATIENT
Start: 2024-12-01 | End: 2024-12-04 | Stop reason: HOSPADM

## 2024-12-01 RX ORDER — ACETAMINOPHEN 325 MG/1
650 TABLET ORAL EVERY 6 HOURS PRN
Status: DISCONTINUED | OUTPATIENT
Start: 2024-12-01 | End: 2024-12-01

## 2024-12-01 RX ORDER — ACETAMINOPHEN 500 MG
1000 TABLET ORAL 3 TIMES DAILY
Status: DISCONTINUED | OUTPATIENT
Start: 2024-12-01 | End: 2024-12-04 | Stop reason: HOSPADM

## 2024-12-01 RX ORDER — AMOXICILLIN 250 MG
2 CAPSULE ORAL EVERY EVENING
Status: DISCONTINUED | OUTPATIENT
Start: 2024-12-01 | End: 2024-12-04 | Stop reason: HOSPADM

## 2024-12-01 RX ORDER — POLYETHYLENE GLYCOL 3350 17 G/17G
1 POWDER, FOR SOLUTION ORAL
Status: DISCONTINUED | OUTPATIENT
Start: 2024-12-01 | End: 2024-12-04 | Stop reason: HOSPADM

## 2024-12-01 RX ADMIN — HYDROMORPHONE HYDROCHLORIDE 1 MG: 1 INJECTION, SOLUTION INTRAMUSCULAR; INTRAVENOUS; SUBCUTANEOUS at 21:23

## 2024-12-01 RX ADMIN — OXYCODONE HYDROCHLORIDE 20 MG: 10 TABLET ORAL at 12:36

## 2024-12-01 RX ADMIN — METOPROLOL TARTRATE 25 MG: 25 TABLET, FILM COATED ORAL at 17:58

## 2024-12-01 RX ADMIN — SUCRALFATE 1 G: 1 TABLET ORAL at 10:45

## 2024-12-01 RX ADMIN — SUCRALFATE 1 G: 1 TABLET ORAL at 07:53

## 2024-12-01 RX ADMIN — HYDROMORPHONE HYDROCHLORIDE 1 MG: 1 INJECTION, SOLUTION INTRAMUSCULAR; INTRAVENOUS; SUBCUTANEOUS at 02:17

## 2024-12-01 RX ADMIN — OMEPRAZOLE 20 MG: 20 CAPSULE, DELAYED RELEASE ORAL at 17:58

## 2024-12-01 RX ADMIN — VANCOMYCIN HYDROCHLORIDE 1250 MG: 5 INJECTION, POWDER, LYOPHILIZED, FOR SOLUTION INTRAVENOUS at 18:04

## 2024-12-01 RX ADMIN — ONDANSETRON 4 MG: 2 INJECTION INTRAMUSCULAR; INTRAVENOUS at 02:17

## 2024-12-01 RX ADMIN — ENOXAPARIN SODIUM 40 MG: 100 INJECTION SUBCUTANEOUS at 06:12

## 2024-12-01 RX ADMIN — SUCRALFATE 1 G: 1 TABLET ORAL at 19:47

## 2024-12-01 RX ADMIN — OXYCODONE HYDROCHLORIDE 20 MG: 10 TABLET ORAL at 07:51

## 2024-12-01 RX ADMIN — ACETAMINOPHEN 1000 MG: 500 TABLET ORAL at 15:09

## 2024-12-01 RX ADMIN — IOHEXOL 80 ML: 350 INJECTION, SOLUTION INTRAVENOUS at 02:30

## 2024-12-01 RX ADMIN — ACETAMINOPHEN 1000 MG: 500 TABLET ORAL at 09:33

## 2024-12-01 RX ADMIN — HYDROMORPHONE HYDROCHLORIDE 1 MG: 1 INJECTION, SOLUTION INTRAMUSCULAR; INTRAVENOUS; SUBCUTANEOUS at 09:32

## 2024-12-01 RX ADMIN — VANCOMYCIN HYDROCHLORIDE 2750 MG: 5 INJECTION, POWDER, LYOPHILIZED, FOR SOLUTION INTRAVENOUS at 02:41

## 2024-12-01 RX ADMIN — OXYCODONE HYDROCHLORIDE 20 MG: 10 TABLET ORAL at 19:46

## 2024-12-01 RX ADMIN — GADOTERIDOL 20 ML: 279.3 INJECTION, SOLUTION INTRAVENOUS at 09:32

## 2024-12-01 RX ADMIN — HYDROMORPHONE HYDROCHLORIDE 1 MG: 1 INJECTION, SOLUTION INTRAMUSCULAR; INTRAVENOUS; SUBCUTANEOUS at 13:37

## 2024-12-01 RX ADMIN — HYDROMORPHONE HYDROCHLORIDE 1 MG: 1 INJECTION, SOLUTION INTRAMUSCULAR; INTRAVENOUS; SUBCUTANEOUS at 17:04

## 2024-12-01 RX ADMIN — OXYCODONE HYDROCHLORIDE 20 MG: 10 TABLET ORAL at 22:48

## 2024-12-01 RX ADMIN — HYDROMORPHONE HYDROCHLORIDE 0.5 MG: 1 INJECTION, SOLUTION INTRAMUSCULAR; INTRAVENOUS; SUBCUTANEOUS at 05:56

## 2024-12-01 RX ADMIN — CEFEPIME 2 G: 2 INJECTION, POWDER, FOR SOLUTION INTRAVENOUS at 02:16

## 2024-12-01 RX ADMIN — ASPIRIN 81 MG: 81 TABLET, COATED ORAL at 05:59

## 2024-12-01 RX ADMIN — VANCOMYCIN HYDROCHLORIDE 1250 MG: 5 INJECTION, POWDER, LYOPHILIZED, FOR SOLUTION INTRAVENOUS at 10:45

## 2024-12-01 RX ADMIN — SENNOSIDES AND DOCUSATE SODIUM 2 TABLET: 50; 8.6 TABLET ORAL at 17:58

## 2024-12-01 RX ADMIN — METOPROLOL TARTRATE 25 MG: 25 TABLET, FILM COATED ORAL at 05:59

## 2024-12-01 RX ADMIN — OMEPRAZOLE 20 MG: 20 CAPSULE, DELAYED RELEASE ORAL at 05:59

## 2024-12-01 RX ADMIN — CEFTRIAXONE SODIUM 2000 MG: 10 INJECTION, POWDER, FOR SOLUTION INTRAVENOUS at 17:58

## 2024-12-01 RX ADMIN — OXYCODONE HYDROCHLORIDE 20 MG: 10 TABLET ORAL at 15:39

## 2024-12-01 RX ADMIN — SUCRALFATE 1 G: 1 TABLET ORAL at 17:58

## 2024-12-01 RX ADMIN — ACETAMINOPHEN 1000 MG: 500 TABLET ORAL at 19:47

## 2024-12-01 RX ADMIN — CEFTRIAXONE SODIUM 2000 MG: 10 INJECTION, POWDER, FOR SOLUTION INTRAVENOUS at 06:12

## 2024-12-01 ASSESSMENT — SOCIAL DETERMINANTS OF HEALTH (SDOH)
WITHIN THE LAST YEAR, HAVE TO BEEN RAPED OR FORCED TO HAVE ANY KIND OF SEXUAL ACTIVITY BY YOUR PARTNER OR EX-PARTNER?: NO
WITHIN THE PAST 12 MONTHS, YOU WORRIED THAT YOUR FOOD WOULD RUN OUT BEFORE YOU GOT THE MONEY TO BUY MORE: NEVER TRUE
WITHIN THE LAST YEAR, HAVE YOU BEEN AFRAID OF YOUR PARTNER OR EX-PARTNER?: NO
WITHIN THE LAST YEAR, HAVE YOU BEEN HUMILIATED OR EMOTIONALLY ABUSED IN OTHER WAYS BY YOUR PARTNER OR EX-PARTNER?: NO
WITHIN THE LAST YEAR, HAVE YOU BEEN KICKED, HIT, SLAPPED, OR OTHERWISE PHYSICALLY HURT BY YOUR PARTNER OR EX-PARTNER?: NO
IN THE PAST 12 MONTHS, HAS THE ELECTRIC, GAS, OIL, OR WATER COMPANY THREATENED TO SHUT OFF SERVICE IN YOUR HOME?: NO
WITHIN THE PAST 12 MONTHS, THE FOOD YOU BOUGHT JUST DIDN'T LAST AND YOU DIDN'T HAVE MONEY TO GET MORE: NEVER TRUE

## 2024-12-01 ASSESSMENT — LIFESTYLE VARIABLES
AVERAGE NUMBER OF DAYS PER WEEK YOU HAVE A DRINK CONTAINING ALCOHOL: 0
EVER FELT BAD OR GUILTY ABOUT YOUR DRINKING: NO
HOW MANY TIMES IN THE PAST YEAR HAVE YOU HAD 5 OR MORE DRINKS IN A DAY: 0
CONSUMPTION TOTAL: INCOMPLETE
CONSUMPTION TOTAL: NEGATIVE
DOES PATIENT WANT TO STOP DRINKING: NO
HAVE PEOPLE ANNOYED YOU BY CRITICIZING YOUR DRINKING: NO
TOTAL SCORE: 0
TOTAL SCORE: 0
EVER HAD A DRINK FIRST THING IN THE MORNING TO STEADY YOUR NERVES TO GET RID OF A HANGOVER: NO
TOTAL SCORE: 0
HAVE YOU EVER FELT YOU SHOULD CUT DOWN ON YOUR DRINKING: NO
HAVE PEOPLE ANNOYED YOU BY CRITICIZING YOUR DRINKING: NO
TOTAL SCORE: 0
HAVE YOU EVER FELT YOU SHOULD CUT DOWN ON YOUR DRINKING: NO
ON A TYPICAL DAY WHEN YOU DRINK ALCOHOL HOW MANY DRINKS DO YOU HAVE: 0
EVER HAD A DRINK FIRST THING IN THE MORNING TO STEADY YOUR NERVES TO GET RID OF A HANGOVER: NO
TOTAL SCORE: 0
DOES PATIENT WANT TO STOP DRINKING: NO
TOTAL SCORE: 0
ALCOHOL_USE: NO
EVER FELT BAD OR GUILTY ABOUT YOUR DRINKING: NO
ALCOHOL_USE: NO

## 2024-12-01 ASSESSMENT — ENCOUNTER SYMPTOMS
COUGH: 0
PALPITATIONS: 0
WEAKNESS: 0
NAUSEA: 0
CHILLS: 0
ABDOMINAL PAIN: 0
HEARTBURN: 0
DIARRHEA: 0
VOMITING: 0
DIZZINESS: 0
CONSTIPATION: 0
FEVER: 0
DOUBLE VISION: 0
SENSORY CHANGE: 0
HEADACHES: 1
LOSS OF CONSCIOUSNESS: 0
SHORTNESS OF BREATH: 0
SORE THROAT: 0
WHEEZING: 0

## 2024-12-01 ASSESSMENT — COGNITIVE AND FUNCTIONAL STATUS - GENERAL
MOBILITY SCORE: 24
SUGGESTED CMS G CODE MODIFIER DAILY ACTIVITY: CH
DAILY ACTIVITIY SCORE: 24
SUGGESTED CMS G CODE MODIFIER MOBILITY: CH

## 2024-12-01 ASSESSMENT — PAIN DESCRIPTION - PAIN TYPE
TYPE: ACUTE PAIN

## 2024-12-01 ASSESSMENT — FIBROSIS 4 INDEX: FIB4 SCORE: 0.43

## 2024-12-01 NOTE — PROGRESS NOTES
Pharmacy Vancomycin Kinetics Note for 12/1/2024     44 y.o. male on Vancomycin day # 1     Vancomycin Indication (Trough based Dosing): CNS infection (goal of 18-22)    Provider specified end date: 12/05/24    Active Antibiotics (From admission, onward)      Ordered     Ordering Provider       Sun Dec 1, 2024  5:50 AM    12/01/24 0550  vancomycin (Vancocin) 1,250 mg in  mL IVPB  (vancomycin (VANCOCIN) IV (LD + Maintenance))  EVERY 8 HOURS         WILLA Curry Dec 1, 2024  5:26 AM    12/01/24 0526  cefTRIAXone (Rocephin) syringe 2,000 mg  EVERY 12 HOURS         Jhonny Sanchez D.O.    12/01/24 0526  MD Alert...Vancomycin per Pharmacy  (MD Alert...Vancomycin per Pharmacy)  PHARMACY TO DOSE        Question:  Indication(s) for vancomycin?  Answer:  Central nervous system infection    WILLA Curry Dec 1, 2024  1:45 AM    12/01/24 0145  vancomycin (Vancocin) 2,750 mg in  mL IVPB  (vancomycin (VANCOCIN) IV (LD + Maintenance))  ONCE         Kita Thomas D.O.            Dosing Weight: 109 kg (240 lb 4.8 oz)      Admission History: Admitted on 12/1/2024 for CSF leak from nose [G96.01]  Pertinent history: CSF leak with positive result, organism of result was not listed in MD notes or local files.    Allergies:     Morphine and Tramadol     Pertinent cultures to date:     Results       Procedure Component Value Units Date/Time    BLOOD CULTURE [[id number]] Collected: 12/01/24 0201    Order Status: Completed Specimen: Blood from Peripheral Updated: 12/01/24 0408     Significant Indicator NEG     Source BLD     Site PERIPHERAL     Culture Result No Growth  Note: Blood cultures are incubated for 5 days and  are monitored continuously.Positive blood cultures  are called to the RN and reported as soon as  they are identified.      BLOOD CULTURE [[id number]] Collected: 12/01/24 0308    Order Status: Sent Specimen: Blood from Peripheral Updated: 12/01/24 0326    MRSA By PCR  "(Amp) [[id number]] Collected: 24    Order Status: Sent Specimen: Respirate from Nares Updated: 24            Labs:     Estimated Creatinine Clearance: 117.6 mL/min (by C-G formula based on SCr of 1.08 mg/dL).  Recent Labs     24   WBC 7.1   NEUTSPOLYS 60.50     Recent Labs     24   BUN 19   CREATININE 1.08   ALBUMIN 4.6     No intake or output data in the 24 hours ending 24 0551   BP (!) 162/101   Pulse 96   Temp 36.6 °C (97.9 °F) (Temporal)   Resp 18   Ht 1.905 m (6' 3\")   Wt 109 kg (239 lb 13.8 oz)   SpO2 95%  Temp (24hrs), Av.7 °C (98.1 °F), Min:36.6 °C (97.9 °F), Max:36.8 °C (98.3 °F)      List concerns for Vancomycin clearance:     Obesity    Pharmacokinetics:       A/P:     -  Vancomycin dose: 2750 mg load, 1250 mg every 8 hours maintenance     -  Next vancomycin level(s):    None ordered at this time. Likely obtain levels after the 4th maintenance dose unless clinical status or renal function changes.     -  Comments: Patient had positive result for CSF infection, organism was not listed in local files or MD note, adjust therapy once organism of concern is known.     Davon Dawson, PharmD    "

## 2024-12-01 NOTE — ED PROVIDER NOTES
ER Provider Note    Scribed for Dr. Kita Thomas D.O. by Aniya Brown. 12/1/2024  1:25 AM    Primary Care Provider: Sal Schafer D.O.    CHIEF COMPLAINT  Chief Complaint   Patient presents with    Jaw Pain       EXTERNAL RECORDS REVIEWED  Inpatient Notes The patient was admitted in October of this year for acute aphasia. He has history of dyslipidemia, iron deficiency anemia with previous GI bleed, mitral valve repair. Repeat MRI of the brain with and without contrast was completed with anxiolysis, which showed no acute abnormalities.     HPI/ROS  LIMITATION TO HISTORY   Select: : None    OUTSIDE HISTORIAN(S):  Significant other Wife at bedside to confirm sequence of events and collateral information provided. See HPI below.     Roberto Braswell is a 44 y.o. male who presents to the ED for evaluation of left sided jaw pain onset 2 weeks ago. He describes that he had a root canal done 2 weeks ago and since then, clear fluid has been draining from his left nostril, as well as worsening left sided jaw pain. The patient was seen on 11/28 and was prescribed antibiotics. He was also CSF tested. The patient was instructed to come to the emergency room immediately if it came out positive. The patient states he received the positive result about an hour ago. The patient denies any fever or chills. He is not on blood thinning medication. His wife mentions that the patient has history of a mitral valve replacement, TIA and GI bleeds. The wife also reports that the patient was seen on 11/17, had a CT-Head and was found with a fluid collection on the left side of the maxillary sinus. He notes that he is allergic to morphine.     PAST MEDICAL HISTORY  Past Medical History:   Diagnosis Date    Arthritis     Right ankle    Breath shortness 11/03/2023    With exertion.    Drug-seeking behavior 07/30/2023    Demanding narcotics for a duodenal ulcer July 2023. Refused antacids or sucralfate.     Duodenal ulcer  07/30/2023    Gastric ulcer     Heart valve disease     Mitral valve prolapse    Hemorrhagic disorder (HCC)     GI bleed    Hypertension     Pain     Resolved; Right ankle    Seizure (HCC)     while taking tramadol       SURGICAL HISTORY  Past Surgical History:   Procedure Laterality Date    WY UPPER GI ENDOSCOPY,DIAGNOSIS N/A 9/18/2024    Procedure: GASTROSCOPY;  Surgeon: Janae Oliver M.D.;  Location: SURGERY SAME DAY [facility];  Service: Gastroenterology    CAPSULE ENDOSCOPY ADMINISTRATION N/A 5/28/2024    Procedure: ADMINISTRATION, CAPSULE, FOR CAPSULE ENDOSCOPY;  Surgeon: Janae Oliver M.D.;  Location: SURGERY SAME DAY [facility];  Service: Gastroenterology    CAPSULE ENDOSCOPY RETRIEVAL  5/28/2024    Procedure: RETRIEVAL, ENDOSCOPY CAPSULE;  Surgeon: Janae Oliver M.D.;  Location: SURGERY SAME DAY [facility];  Service: Gastroenterology    WY COLONOSCOPY,DIAGNOSTIC  5/27/2024    Procedure: COLONOSCOPY;  Surgeon: Janae Oliver M.D.;  Location: [facility];  Service: Gastroenterology    W[place] UPPER GI ENDOSCOPY,DIAGNOSIS N/A 5/25/2024    Procedure: GASTROSCOPY;  Surgeon: Janae Oliver M.D.;  Location: [facility];  Service: Gastroenterology    W[place] UPPER GI ENDOSCOPY,BIOPSY N/A 5/25/2024    Procedure: GASTROSCOPY, WITH BIOPSY;  Surgeon: Janae Oliver M.D.;  Location: [facility];  Service: Gastroenterology    WY UPPER GI ENDOSCOPY,DIAGNOSIS N/A 5/18/2024    Procedure: GASTROSCOPY;  Surgeon: Kaitlyn Ontiveros M.D.;  Location: [facility];  Service: Gastroenterology    WY UPPER GI ENDOSCOPY,BIOPSY N/A 5/18/2024    Procedure: GASTROSCOPY, WITH BIOPSY;  Surgeon: Kaitlyn Ontiveros M.D.;  Location: SURGERY [facility];  Service: Gastroenterology    GASTROSCOPY WITH ENDOSTAT N/A 5/18/2024    Procedure: EGD, WITH CAUTERIZATION;  Surgeon: Kaitlyn Ontiveros M.D.;  Location: [facility];  Service: Gastroenterology    WY REPLACEMENT OF MITRAL VALVE  1/25/2024     Procedure: MITRAL VALVE REPAIR, TRANSESOPHAGEAL ECHOCARDIOGRAM;  Surgeon: Bigg Santana M.D.;  Location: SURGERY [facility];  Service: Cardiothoracic    ECHOCARDIOGRAM, TRANSESOPHAGEAL, INTRAOPERATIVE  1/25/2024    Procedure: ECHOCARDIOGRAM, TRANSESOPHAGEAL, INTRAOPERATIVE;  Surgeon: Bigg Santana M.D.;  Location: SURGERY [facility];  Service: Cardiothoracic    PA UPPER GI ENDOSCOPY,DIAGNOSIS N/A 9/14/2023    Procedure: GASTROSCOPY;  Surgeon: Johnny Dalton M.D.;  Location: SURGERY SAME DAY [facility];  Service: Gastroenterology    PA UPPER GI ENDOSCOPY,BIOPSY N/A 9/14/2023    Procedure: GASTROSCOPY, WITH BIOPSY;  Surgeon: Johnny Dalton M.D.;  Location: SURGERY SAME DAY [facility];  Service: Gastroenterology    PA UPPER GI ENDOSCOPY,BIOPSY N/A 7/30/2023    Procedure: GASTROSCOPY, WITH BIOPSY;  Surgeon: Kumar Hope M.D.;  Location: [facility];  Service: Gastroenterology    PA UPPER GI ENDOSCOPY,DIAGNOSIS  3/28/2022    Procedure: GASTROSCOPY;  Surgeon: Paco Wiggins M.D.;  Location: SURGERY SAME DAY [facility];  Service: Gastroenterology    PA UPPER GI ENDOSCOPY,BIOPSY  3/28/2022    Procedure: GASTROSCOPY, WITH BIOPSY;  Surgeon: Paco Wiggins M.D.;  Location: SURGERY SAME DAY [facility];  Service: Gastroenterology    ANKLE ARTHROSCOPY Right 5/21/2018    Procedure: ANKLE ARTHROSCOPY, LATERAL LIGAMENT RECONSTRUCTION;  Surgeon: Seth Cruz M.D.;  Location: SURGERY [facility];  Service: Orthopedics    BIOPSY ORTHO Right 5/21/2018    Procedure: BIOPSY ORTHO/ FOR CARTILAGE AND DENOVO PROCEDURE;  Surgeon: Seth Cruz M.D.;  Location: SURGERY [facility];  Service: Orthopedics    OTHER ABDOMINAL SURGERY      Cholecystectomy February 2017    OTHER ORTHOPEDIC SURGERY      2 previous ankle surgeries (2007, 2009)       FAMILY HISTORY  Family History   Problem Relation Age of Onset    Heart Disease Mother     No Known Problems Father     Clotting Disorder Neg Hx        SOCIAL HISTORY   reports  "that he has never smoked. He has never used smokeless tobacco. He reports that he does not currently use alcohol. He reports that he does not use drugs.      CURRENT MEDICATIONS  Current Discharge Medication List        CONTINUE these medications which have NOT CHANGED    Details   methocarbamol (ROBAXIN) 500 MG Tab Take 1,000 mg by mouth 3 times a day. 1,000 mg = 2 tabs      metoprolol tartrate (LOPRESSOR) 25 MG Tab Take 25 mg by mouth 2 times a day.      amoxicillin-clavulanate (AUGMENTIN) 875-125 MG Tab Take 1 Tablet by mouth 2 times a day for 10 days.  Qty: 20 Tablet, Refills: 0    Associated Diagnoses: Acute non-recurrent maxillary sinusitis      zolpidem (AMBIEN) 10 MG Tab Take 1 Tablet by mouth at bedtime as needed for Sleep for up to 90 days.  Qty: 30 Tablet, Refills: 2    Associated Diagnoses: Primary insomnia      ferrous sulfate 325 (65 Fe) MG tablet Take 1 Tablet by mouth every 48 hours. For iron deficiency  Qty: 30 Tablet, Refills: 2    Associated Diagnoses: Iron deficiency anemia due to chronic blood loss      acetaminophen (TYLENOL) 500 MG Tab Take 1,000 mg by mouth every 6 hours as needed for Mild Pain. 1,000 mg = 2 tabs  Indications: Pain      aspirin 81 MG EC tablet Take 81 mg by mouth every day.      oxyCODONE immediate release (ROXICODONE) 10 MG immediate release tablet Take 10 mg by mouth 5 Times a Day.      sucralfate (CARAFATE) 1 GM Tab Take 1 g by mouth 4 Times a Day,Before Meals and at Bedtime.      pantoprazole (PROTONIX) 40 MG Tablet Delayed Response Take 1 Tablet by mouth 2 times a day.  Qty: 60 Tablet, Refills: 0    Associated Diagnoses: Esophagitis; Gastritis without bleeding, unspecified chronicity, unspecified gastritis type             ALLERGIES  Morphine and Tramadol      PHYSICAL EXAM  BP (!) 160/92   Pulse 99   Temp 36.8 °C (98.3 °F) (Oral)   Resp 14   Ht 1.905 m (6' 3\")   Wt 109 kg (239 lb 13.8 oz)   SpO2 98%   BMI 29.98 kg/m²     Constitutional: Patient is well developed, " well nourished. Non-toxic appearing.  Mild distress.  HENT: Normocephalic,  Nose normal with no mucosal edema or drainage. Oropharynx moist without erythema or exudates.   Cardiovascular: Normal heart rate and Regular rhythm. No murmur  Thorax & Lungs: Clear and equal breath sounds with good excursion. No respiratory distress, no rhonchi, wheezing or rales  Abdomen: Bowel sounds normal in all four quadrants. Soft,nontender, no rebound , guarding, palpable masses.   Skin: Warm, Dry, No erythema, No rashes.    Extremities: Peripheral pulses 4/4 No edema, No tenderness   Neurologic: Alert & oriented x 3, Normal motor function, Normal sensory function  Psychiatric: Affect normal, Judgment normal, Mood normal.       DIAGNOSTIC STUDIES & PROCEDURES    Labs:   Results for orders placed or performed during the hospital encounter of 12/01/24   CBC WITH DIFFERENTIAL    Collection Time: 12/01/24  2:01 AM   Result Value Ref Range    WBC 7.1 4.8 - 10.8 K/uL    RBC 5.36 4.70 - 6.10 M/uL    Hemoglobin 14.1 14.0 - 18.0 g/dL    Hematocrit 43.7 42.0 - 52.0 %    MCV 81.5 81.4 - 97.8 fL    MCH 26.3 (L) 27.0 - 33.0 pg    MCHC 32.3 32.3 - 36.5 g/dL    RDW 60.3 (H) 35.9 - 50.0 fL    Platelet Count 372 164 - 446 K/uL    MPV 8.8 (L) 9.0 - 12.9 fL    Neutrophils-Polys 60.50 44.00 - 72.00 %    Lymphocytes 27.30 22.00 - 41.00 %    Monocytes 9.00 0.00 - 13.40 %    Eosinophils 2.00 0.00 - 6.90 %    Basophils 0.80 0.00 - 1.80 %    Immature Granulocytes 0.40 0.00 - 0.90 %    Nucleated RBC 0.00 0.00 - 0.20 /100 WBC    Neutrophils (Absolute) 4.29 1.82 - 7.42 K/uL    Lymphs (Absolute) 1.94 1.00 - 4.80 K/uL    Monos (Absolute) 0.64 0.00 - 0.85 K/uL    Eos (Absolute) 0.14 0.00 - 0.51 K/uL    Baso (Absolute) 0.06 0.00 - 0.12 K/uL    Immature Granulocytes (abs) 0.03 0.00 - 0.11 K/uL    NRBC (Absolute) 0.00 K/uL   COMP METABOLIC PANEL    Collection Time: 12/01/24  2:01 AM   Result Value Ref Range    Sodium 143 135 - 145 mmol/L    Potassium 4.2 3.6 - 5.5  mmol/L    Chloride 111 96 - 112 mmol/L    Co2 19 (L) 20 - 33 mmol/L    Anion Gap 13.0 7.0 - 16.0    Glucose 87 65 - 99 mg/dL    Bun 19 8 - 22 mg/dL    Creatinine 1.08 0.50 - 1.40 mg/dL    Calcium 9.2 8.5 - 10.5 mg/dL    Correct Calcium 8.7 8.5 - 10.5 mg/dL    AST(SGOT) 11 (L) 12 - 45 U/L    ALT(SGPT) 13 2 - 50 U/L    Alkaline Phosphatase 64 30 - 99 U/L    Total Bilirubin <0.2 0.1 - 1.5 mg/dL    Albumin 4.6 3.2 - 4.9 g/dL    Total Protein 7.2 6.0 - 8.2 g/dL    Globulin 2.6 1.9 - 3.5 g/dL    A-G Ratio 1.8 g/dL   BLOOD CULTURE    Collection Time: 12/01/24  2:01 AM    Specimen: Peripheral; Blood   Result Value Ref Range    Significant Indicator NEG     Source BLD     Site PERIPHERAL     Culture Result       No Growth  Note: Blood cultures are incubated for 5 days and  are monitored continuously.Positive blood cultures  are called to the RN and reported as soon as  they are identified.     APTT    Collection Time: 12/01/24  2:01 AM   Result Value Ref Range    APTT 25.6 24.7 - 36.0 sec   PROTHROMBIN TIME (INR)    Collection Time: 12/01/24  2:01 AM   Result Value Ref Range    PT 13.4 12.0 - 14.6 sec    INR 1.02 0.87 - 1.13   ESTIMATED GFR    Collection Time: 12/01/24  2:01 AM   Result Value Ref Range    GFR (CKD-EPI) 87 >60 mL/min/1.73 m 2   BLOOD CULTURE    Collection Time: 12/01/24  3:08 AM    Specimen: Peripheral; Blood   Result Value Ref Range    Significant Indicator NEG     Source BLD     Site PERIPHERAL     Culture Result       No Growth  Note: Blood cultures are incubated for 5 days and  are monitored continuously.Positive blood cultures  are called to the RN and reported as soon as  they are identified.        All labs reviewed by me.      Radiology:   The attending Emergency Physician has independently interpreted the diagnostic imaging associated with this visit and is awaiting the final reading from the radiologist, which will be displayed below.    Preliminary interpretation is a follows:  Unremarkable  Radiologist interpretation:    CT-MAXILLOFACIAL WITH PLUS RECONS   Final Result      No extra anatomic soft tissue fluid collection is seen           COURSE & MEDICAL DECISION MAKING       INITIAL ASSESSMENT AND PLAN  Care Narrative:       1:25 AM - Patient seen and evaluated at bedside. This is a 44 year old man who comes in for evaluation of left sided jaw pain that started after he had a root canal done 2 weeks ago. Since the procedure, the patient reports that he has had clear drainage coming from his left nostril. The patient was seen on 11/28 and was prescribed antibiotics. He also had CSF testing done, which came back positive about an hour ago. Discussed plan of care, including performing lab work and imaging. Patient agrees to plan of care. Patient will be treated with Zofran 4 mg, Dilaudid 1 mg, Maxipime 2 g and Vancocin 2,750 mg in  mL for his symptoms. Ordered Prothrombin Time, APTT, Blood Culture x2, CMP, CBC w/ Diff., MRSA by PCR, and CT-Maxillofacial w/ to evaluate. Differential diagnoses include but are not limited to: meningitis, encephalitis    1:40 AM - Spoke with Pharmacy about Zosyn administration. They recommended vancomycin and Rocephin for antibiotics.   Labs were all unremarkable.  I did check with them regarding the CSF results and showed him that it did not indicate any positive studies at this time but the patient had no trouble whatsoever giving us a repeat sample of the drainage from his left nostril and this was sent to lab for further evaluation.    2:47 AM - The patient was reevaluated at bedside.  He has improved with the pain medications.  We discussed all of his laboratory results on the CT scan but the need for admission into the hospital until this is further worked up.  Spoke with hospitalist and patient will be admitted.    HYDRATION: Based on the patient's presentation of Abnormal Fluid Loss the patient was given IV fluids. IV Hydration was used because  oral hydration was not as rapid as required. Upon recheck following hydration, the patient was stable.                   DISPOSITION AND DISCUSSIONS  I have discussed management of the patient with the following physicians and CARTER's: Dr. Sanchez    Discussion of management with other [facility] or appropriate source(s): [facility]      Barriers to care at this time, including but not limited to:  None known .     Decision tools and prescription drugs considered including, but not limited to: Admission into the hospital..    DISPOSITION:  Patient will be hospitalized by Dr. Sanchez in guarded condition.     FINAL IMPRESSION   1. Jaw pain    2. Other headache syndrome    3. Encounter for surgical aftercare following surgery on the teeth or oral cavity    4.  Possible CSF leak  IAniya (Scribe), am scribing for, and in the presence of, Kita Thomas D.O..    Electronically signed by: Aniya Brown (Scribe), 12/1/2024    IKita D.O. personally performed the services described in this documentation, as scribed by Aniya Brown in my presence, and it is both accurate and complete.    The note accurately reflects work and decisions made by me.  Kita Thomas D.O.  12/1/2024  6:17 AM

## 2024-12-01 NOTE — ED NOTES
Rounded on patient. Patient resting on gurney in room. Respirations even and unlabored. Patient denies additional needs at this time.

## 2024-12-01 NOTE — ASSESSMENT & PLAN NOTE
Possible CSF leak.  New onset neck stiffness with severe headache  Continue ceftriaxone and vancomycin  Patient to undergo LP with anesthesia on 12/4  N.p.o. at midnight

## 2024-12-01 NOTE — ASSESSMENT & PLAN NOTE
Ruling out spinal headache from CSF leak  Beta-2 transferrin pending  Multimodal pain management available  Close physiologic monitoring while on narcotics    Patient was recommended against taking narcotic medications outside of the schedule set forth by the ordering physician.  The patient was informed that the nursing staff was concerned about him taking medications inappropriately.  The patient was informed that this concern has been escalated to a nursing leadership.  The patient will be resumed on his outpatient medication regimen.  Will consider the addition of medications for breakthrough pain as clinically indicated.

## 2024-12-01 NOTE — PROGRESS NOTES
4 Eyes Skin Assessment Completed by KARSON Camilo and KARSON Van.    Head WDL  Ears WDL  Nose nasal drainage  Mouth WDL  Neck WDL  Breast/Chest Previous healed MLI  Shoulder Blades WDL  Spine WDL  (R) Arm/Elbow/Hand Redness and Blanching  (L) Arm/Elbow/Hand Redness and Blanching  Abdomen WDL  Groin WDL  Scrotum/Coccyx/Buttocks WDL  (R) Leg WDL  (L) Leg WDL  (R) Heel/Foot/Toe Redness and Blanching  (L) Heel/Foot/Toe Redness and Blanching          Devices In Places Blood Pressure Cuff and Pulse Ox      Interventions In Place Pillows    Possible Skin Injury No    Pictures Uploaded Into Epic N/A  Wound Consult Placed N/A  RN Wound Prevention Protocol Ordered No

## 2024-12-01 NOTE — ED NOTES
Rounded on patient.  Alert and oriented. Vital signs stable.   Patient resting on gurney in room.  Respirations even and unlabored.  Patient endorses no changes and denies any needs at this time.

## 2024-12-01 NOTE — ASSESSMENT & PLAN NOTE
MRI brain showed acute left sinusitis  Patient with drainage after root canal procedure from his left nare with associated rhinorrhea that profusely drains, initially clear now straw-colored  Outpatient beta-2 transferrin was negative, noted to be insufficient sample  New beta-2 transferrin level is pending, fluid culture is also pending  Dr. Epps ENT reviewed the MRI: unlikely this is a CSF leak from a root canal and more likely acute sinusitis.  Can call back if beta-2 transferrin levels positive.  Continue ceftriaxone and vancomycin for now

## 2024-12-01 NOTE — HOSPITAL COURSE
Patient is a 44-year-old male with past medical history of left upper root canal 2 weeks ago complicated by left-sided jaw pain, rhinorrhea, and headaches, and now neck stiffness. Patient states he received a root canal on the left upper set of teeth, however since then has had this watery drainage from his left nostril, intractable, and somewhat positional dependent. When draining from his left nose, does have intractable headache. Patient was seen on 11/27 with collection of fluid for potential CSF leak, and provided Augmentin for empiric antibiotics. However, patient has continued to progress, initially read the test as positive and prompting ER visit today, however on review appears sample was not sufficient and nondetectable. Has been having worsening headaches, denies any fever or chills, however does have neck stiffness that started yesterday. Given the above findings, there is concern for potential CSF leak related to potential oral surgery versus spontaneous. CT of the sinuses unremarkable, ordered MRI with and without contrast to evaluate for potential leak.

## 2024-12-01 NOTE — ED TRIAGE NOTES
"Chief Complaint   Patient presents with    Jaw Pain     Pt arrives with complaint of left sided jaw pain x 2 weeks. Pt had a root canal 2 weeks ago and has been draining clear fluid from left nostril since with increasing pain. Pt was seen 11/28, prescribed antibiotics and CSF test completed. Pt was told if results were positive to come in immediately. Pt received positive result an hr ago. Pt currently 10/10 pain to left jaw, aox4, airway patent, rr even and unlabored, denies incontinence, denies leg or arm weakness/tingling, denies dizziness, denies N/V, speaking clear sentences, ambulatory in triage.     BP (!) 160/92   Pulse 99   Temp 36.8 °C (98.3 °F) (Oral)   Resp 14   Ht 1.905 m (6' 3\")   Wt 109 kg (239 lb 13.8 oz)   SpO2 98%   BMI 29.98 kg/m²     Pt updated on rooming process and to notify staff for worsening condition. Pt sent back to lobby until a room is available.    "

## 2024-12-01 NOTE — H&P
Hospital Medicine History & Physical Note    Date of Service  12/1/2024    Primary Care Physician  Sal Schafer D.O.      Code Status  Full Code    Chief Complaint  Chief Complaint   Patient presents with    Jaw Pain       History of Presenting Illness  Patient is a 44-year-old male with past medical history of left upper root canal 2 weeks ago complicated by left-sided jaw pain, rhinorrhea, and headaches, and now neck stiffness.  Patient states he received a root canal on the left upper set of teeth, however since then has had this watery drainage from his left nostril, intractable, and somewhat positional dependent.  When draining from his left nose, does have intractable headache.  Patient was seen on 11/27 with collection of fluid for potential CSF leak, and provided Augmentin for empiric antibiotics.  However, patient is continued to progress, initially read the test as positive and prompting ER visit today, however on review appears sample was not sufficient and nondetectable.  Has been having worsening headaches, denies any fever or chills, however does have neck stiffness that started yesterday.  Given the above findings, there is concern for potential CSF leak related to potential oral surgery versus spontaneous.  CT of the sinuses unremarkable, ordered MRI with and without contrast to evaluate for potential leak.    I discussed the plan of care with patient.    Review of Systems  Review of Systems   Constitutional:  Negative for chills and fever.   HENT:  Positive for congestion. Negative for ear pain, hearing loss and sore throat.    Eyes:  Negative for double vision.   Respiratory:  Negative for cough, shortness of breath and wheezing.    Cardiovascular:  Negative for chest pain, palpitations and leg swelling.   Gastrointestinal:  Negative for abdominal pain, constipation, diarrhea, nausea and vomiting.   Neurological:  Positive for headaches. Negative for dizziness, sensory change, loss of  consciousness and weakness.       Past Medical History   has a past medical history of Arthritis, Breath shortness (11/03/2023), Drug-seeking behavior (07/30/2023), Duodenal ulcer (07/30/2023), Gastric ulcer, Heart valve disease, Hemorrhagic disorder (HCC), Hypertension, Pain, and Seizure (HCC).    Surgical History   has a past surgical history that includes other orthopedic surgery; other abdominal surgery; ankle arthroscopy (Right, 5/21/2018); biopsy ortho (Right, 5/21/2018); pr upper gi endoscopy,diagnosis (3/28/2022); pr upper gi endoscopy,biopsy (3/28/2022); pr upper gi endoscopy,biopsy (N/A, 7/30/2023); pr upper gi endoscopy,diagnosis (N/A, 9/14/2023); pr upper gi endoscopy,biopsy (N/A, 9/14/2023); pr replacement of mitral valve (1/25/2024); echocardiogram, transesophageal, intraoperative (1/25/2024); pr upper gi endoscopy,diagnosis (N/A, 5/18/2024); pr upper gi endoscopy,biopsy (N/A, 5/18/2024); gastroscopy with endostat (N/A, 5/18/2024); pr upper gi endoscopy,diagnosis (N/A, 5/25/2024); pr upper gi endoscopy,biopsy (N/A, 5/25/2024); pr colonoscopy,diagnostic (5/27/2024); capsule endoscopy administration (N/A, 5/28/2024); capsule endoscopy retrieval (5/28/2024); and pr upper gi endoscopy,diagnosis (N/A, 9/18/2024).     Family History  family history includes Heart Disease in his mother; No Known Problems in his father.   Family history reviewed with patient. There is no family history that is pertinent to the chief complaint.     Social History   reports that he has never smoked. He has never used smokeless tobacco. He reports that he does not currently use alcohol. He reports that he does not use drugs.    Allergies  Allergies   Allergen Reactions    Morphine Vomiting    Tramadol Unspecified     Seizure  ARO=[id number]       Medications  Prior to Admission Medications   Prescriptions Last Dose Informant Patient Reported? Taking?   acetaminophen (TYLENOL) 500 MG Tab  Significant Other Yes No   Sig: Take 1,000 mg by  mouth every 6 hours as needed for Mild Pain. 1,000 mg = 2 tabs  Indications: Pain   amoxicillin-clavulanate (AUGMENTIN) 875-125 MG Tab   No No   Sig: Take 1 Tablet by mouth 2 times a day for 10 days.   aspirin 81 MG EC tablet  Significant Other Yes No   Sig: Take 81 mg by mouth every day.   ferrous sulfate 325 (65 Fe) MG tablet  Significant Other No No   Sig: Take 1 Tablet by mouth every 48 hours. For iron deficiency   Patient not taking: Reported on 11/28/2024   methocarbamol (ROBAXIN) 500 MG Tab  Significant Other Yes No   Sig: Take 1,000 mg by mouth 3 times a day. 1,000 mg = 2 tabs   metoprolol tartrate (LOPRESSOR) 25 MG Tab  Significant Other Yes No   Sig: Take 25 mg by mouth 2 times a day.   oxyCODONE immediate release (ROXICODONE) 10 MG immediate release tablet  Significant Other Yes No   Sig: Take 10 mg by mouth 5 Times a Day.   pantoprazole (PROTONIX) 40 MG Tablet Delayed Response  Significant Other No No   Sig: Take 1 Tablet by mouth 2 times a day.   sucralfate (CARAFATE) 1 GM Tab  Significant Other Yes No   Sig: Take 1 g by mouth 4 Times a Day,Before Meals and at Bedtime.   zolpidem (AMBIEN) 10 MG Tab  Significant Other No No   Sig: Take 1 Tablet by mouth at bedtime as needed for Sleep for up to 90 days.      Facility-Administered Medications: None       Physical Exam  Temp:  [36.2 °C (97.2 °F)-36.8 °C (98.3 °F)] 36.2 °C (97.2 °F)  Pulse:  [65-99] 65  Resp:  [14-18] 17  BP: (148-162)/() 150/95  SpO2:  [95 %-98 %] 95 %  Blood Pressure: (!) 150/95   Temperature: 36.2 °C (97.2 °F)   Pulse: 65   Respiration: 17   Pulse Oximetry: 95 %       Physical Exam  Vitals and nursing note reviewed.   Constitutional:       General: He is not in acute distress.  HENT:      Head: Normocephalic and atraumatic.      Mouth/Throat:      Mouth: Mucous membranes are moist.   Eyes:      General: No scleral icterus.     Extraocular Movements: Extraocular movements intact.   Cardiovascular:      Rate and Rhythm: Normal rate  "and regular rhythm.      Heart sounds: No murmur heard.     No gallop.   Pulmonary:      Effort: Pulmonary effort is normal. No respiratory distress.      Breath sounds: No wheezing, rhonchi or rales.   Abdominal:      General: Abdomen is flat. Bowel sounds are normal. There is no distension.      Palpations: Abdomen is soft.      Tenderness: There is no abdominal tenderness.   Musculoskeletal:         General: No swelling.      Cervical back: Rigidity and tenderness present.   Skin:     General: Skin is warm.   Neurological:      General: No focal deficit present.      Mental Status: He is alert.   Psychiatric:         Mood and Affect: Mood normal.         Laboratory:  Recent Labs     12/01/24 0201   WBC 7.1   RBC 5.36   HEMOGLOBIN 14.1   HEMATOCRIT 43.7   MCV 81.5   MCH 26.3*   MCHC 32.3   RDW 60.3*   PLATELETCT 372   MPV 8.8*     Recent Labs     12/01/24 0201   SODIUM 143   POTASSIUM 4.2   CHLORIDE 111   CO2 19*   GLUCOSE 87   BUN 19   CREATININE 1.08   CALCIUM 9.2     Recent Labs     12/01/24 0201   ALTSGPT 13   ASTSGOT 11*   ALKPHOSPHAT 64   TBILIRUBIN <0.2   GLUCOSE 87     Recent Labs     12/01/24 0201   APTT 25.6   INR 1.02     No results for input(s): \"NTPROBNP\" in the last 72 hours.      No results for input(s): \"TROPONINT\" in the last 72 hours.    Imaging:  CT-MAXILLOFACIAL WITH PLUS RECONS   Final Result      No extra anatomic soft tissue fluid collection is seen      MR-BRAIN-WITH & W/O    (Results Pending)       no X-Ray or EKG requiring interpretation    Assessment/Plan:  Justification for Admission Status  I anticipate this patient will require at least two midnights for appropriate medical management, necessitating inpatient admission because rhinorrhea with  concern for potential CSF leak    Patient will need a Med/Surg bed on NEUROLOGY service .  The need is secondary to rhinorrhea with concern for CSF.    * CSF leak from nose- (present on admission)  Assessment & Plan  Patient with drainage " after root canal procedure from his left nare with associated rhinorrhea that profusely drains, initially clear now straw-colored  - Given recent procedure and symptomatology, concern for CSF leak  - Patient had insufficient sample from prior beta-2 transferrin levels  - Reordered beta-2 transfer levels  - CT sinuses unremarkable  - Ordered MRI with and without contrast to evaluate skull base  - May need lumbar puncture, neurology consult in a.m.    Headache  Assessment & Plan  When fluid draining from nose, patient has intractable headache suggesting potential spinal headache  - Multimodal pain control, including IV Dilaudid prn    Neck stiffness  Assessment & Plan  Patient with above, see CSF leak from nose  - Now new onset neck stiffness, denies any fevers  - Given potential CSF leak, will empirically cover with IV vancomycin and ceftriaxone  - May benefit from lumbar puncture        VTE prophylaxis: enoxaparin ppx

## 2024-12-01 NOTE — ED NOTES
Pt brought to room RED 03 from triage.  Patient ambulated under own power without difficulty.  V/S obtained and monitor applied.  ERP to see.

## 2024-12-01 NOTE — PROGRESS NOTES
Patient arrived to 183-2 with no assistive devices required and a steady gate. Patient oriented to unit, safety education discussed. Hourly rounding initiated.

## 2024-12-01 NOTE — CARE PLAN
The patient is Stable - Low risk of patient condition declining or worsening    Shift Goals  Clinical Goals: pain control, imaging  Patient Goals: pain control  Family Goals: updates    Progress made toward(s) clinical / shift goals:  patient continues to verbalize needs. Patient notifies RN regarding pain. Patient aware of plan of care. No other questions or concerns at this time and RN will continue with purposeful rounding.     Patient is not progressing towards the following goals:

## 2024-12-01 NOTE — PROGRESS NOTES
[facility] Medicine Daily Progress Note    Date of Service  12/1/2024    Chief Complaint  Roberto Braswell is a 44 y.o. male admitted 12/1/2024 with concern for CSF leak after root canal two weeks ago.     Hospital Course  Patient is a 44-year-old male with past medical history of left upper root canal 2 weeks ago complicated by left-sided jaw pain, rhinorrhea, and headaches, and now neck stiffness. Patient states he received a root canal on the left upper set of teeth, however since then has had this watery drainage from his left nostril, intractable, and somewhat positional dependent. When draining from his left nose, does have intractable headache. Patient was seen on 11/27 with collection of fluid for potential CSF leak, and provided Augmentin for empiric antibiotics. However, patient has continued to progress, initially read the test as positive and prompting ER visit today, however on review appears sample was not sufficient and nondetectable. Has been having worsening headaches, denies any fever or chills, however does have neck stiffness that started yesterday. Given the above findings, there is concern for potential CSF leak related to potential oral surgery versus spontaneous. CT of the sinuses unremarkable, ordered MRI with and without contrast to evaluate for potential leak.     Interval Problem Update  12/1 afebrile, vitals stable, on room air.  Patient complaining of headache, adjusted pain medications, continue scheduled tylenol. Discussed with neuro who recommended a [facility] consult.  Discussed with [facility] who would like a confirmation that this is a CSF leak before formal consultation. Patient is attempting to collect the fluid at this time. [facility] felt CSF leak was unlikely (based on CT) and nasal drainage is more likely coming from fluid in left maxillary sinuses. [facility] felt ENT consult may be more appropriate.  Will await final MRI results.    I have discussed this patient's plan of care and discharge  plan at IDT rounds today with Case Management, Nursing, Nursing leadership, and other members of the IDT team.    Consultants/Specialty  None at this time    Code Status  Full Code    Disposition  The patient is not medically cleared for discharge to home or a post-acute facility.  Anticipate discharge to: home with close outpatient follow-up    I have placed the appropriate orders for post-discharge needs.    Review of Systems  Review of Systems   Constitutional:  Negative for chills, fever and malaise/fatigue.   HENT:          Left nostril discharge   Respiratory:  Negative for cough and shortness of breath.    Cardiovascular:  Negative for chest pain, palpitations and leg swelling.   Gastrointestinal:  Negative for abdominal pain, diarrhea, heartburn, nausea and vomiting.   Genitourinary:  Negative for dysuria, frequency and urgency.   Neurological:  Positive for headaches. Negative for dizziness.   All other systems reviewed and are negative.       Physical Exam  Temp:  [36.2 °C (97.2 °F)-36.8 °C (98.3 °F)] 36.2 °C (97.2 °F)  Pulse:  [65-99] 65  Resp:  [14-18] 17  BP: (148-162)/() 150/95  SpO2:  [95 %-98 %] 95 %    Physical Exam  Vitals and nursing note reviewed.   Constitutional:       Appearance: Normal appearance. He is not ill-appearing.   HENT:      Head: Normocephalic and atraumatic.      Jaw: There is normal jaw occlusion.      Right Ear: Hearing normal.      Left Ear: Hearing normal.      Nose: Rhinorrhea (clear fluid from left nostril only) present.      Mouth/Throat:      Lips: Pink.      Mouth: Mucous membranes are moist.   Eyes:      Extraocular Movements: Extraocular movements intact.      Conjunctiva/sclera: Conjunctivae normal.      Pupils: Pupils are equal, round, and reactive to light.   Neck:      Vascular: No carotid bruit.   Cardiovascular:      Rate and Rhythm: Normal rate and regular rhythm.      Pulses: Normal pulses.      Heart sounds: Normal heart sounds, S1 normal and S2 normal.    Pulmonary:      Effort: Pulmonary effort is normal.      Breath sounds: Normal breath sounds and air entry. No stridor.   Musculoskeletal:      Cervical back: Normal range of motion and neck supple.      Right lower leg: No edema.      Left lower leg: No edema.   Skin:     General: Skin is warm and dry.      Capillary Refill: Capillary refill takes less than 2 seconds.   Neurological:      General: No focal deficit present.      Mental Status: He is alert and oriented to person, place, and time. Mental status is at baseline.      Sensory: Sensation is intact.      Motor: Motor function is intact.   Psychiatric:         Attention and Perception: Attention and perception normal.         Mood and Affect: Mood and affect normal.         Speech: Speech normal.         Behavior: Behavior normal. Behavior is cooperative.         Fluids    Intake/Output Summary (Last 24 hours) at 12/1/2024 1047  Last data filed at 12/1/2024 1000  Gross per 24 hour   Intake 240 ml   Output --   Net 240 ml        Laboratory  Recent Labs     12/01/24  0201   WBC 7.1   RBC 5.36   HEMOGLOBIN 14.1   HEMATOCRIT 43.7   MCV 81.5   MCH 26.3*   MCHC 32.3   RDW 60.3*   PLATELETCT 372   MPV 8.8*     Recent Labs     12/01/24  0201   SODIUM 143   POTASSIUM 4.2   CHLORIDE 111   CO2 19*   GLUCOSE 87   BUN 19   CREATININE 1.08   CALCIUM 9.2     Recent Labs     12/01/24  0201   APTT 25.6   INR 1.02               Imaging  MR-BRAIN-WITH & W/O   Final Result      1.  Unremarkable MRI of the brain with and without contrast enhancement.   2.  Left maxillary sinus mucosal thickening with a small dependent fluid level likely on the basis of acute sinusitis.      CT-MAXILLOFACIAL WITH PLUS RECONS   Final Result      No extra anatomic soft tissue fluid collection is seen           Assessment/Plan  * CSF leak from nose- (present on admission)  Assessment & Plan  Patient with drainage after root canal procedure from his left nare with associated rhinorrhea that  profusely drains, initially clear now straw-colored  - Given recent procedure and symptomatology, concern for CSF leak  - Patient had insufficient sample from prior beta-2 transferrin levels  - Reordered beta-2 transfer levels  - CT sinuses unremarkable  - Ordered MRI with and without contrast to evaluate skull base  - May need lumbar puncture  - discussed with Neuro, who thought [facility] would be better suited for this consult  - Discussed with [facility], Dr Montanez who would like a confirmation that this is a CSF leak before formal confirmation at this time.  Patient is attempted to collect fluid from nostril at this time.     Neck stiffness  Assessment & Plan  Patient with above, see CSF leak from nose  - Now new onset neck stiffness, denies any fevers  - Given potential CSF leak, will empirically cover with IV vancomycin and ceftriaxone  - May benefit from lumbar puncture    Headache  Assessment & Plan  When fluid draining from nose, patient has intractable headache suggesting potential spinal headache  - Multimodal pain control, including IV Dilaudid prn         VTE prophylaxis:   SCDs/TEDs   enoxaparin ppx      I have performed a physical exam and reviewed and updated ROS and Plan today (12/1/2024). In review of yesterday's note (11/30/2024), there are no changes except as documented above.

## 2024-12-01 NOTE — PROGRESS NOTES
Medication history reviewed with patient and spouse at bedside.   Med rec is complete  Allergies reviewed.     Patient was prescribed a 10 day course of Augmentin 875/125mg bid on 11/28/24- last dose 11/30/24 pm.   Patient also took 4 caps of Amoxicillin 500mg on 11/22/24 for a dental procedure.  Anticoagulants: No    Juli Pena

## 2024-12-02 ENCOUNTER — APPOINTMENT (OUTPATIENT)
Dept: RADIOLOGY | Facility: MEDICAL CENTER | Age: 44
DRG: 093 | End: 2024-12-02
Attending: INTERNAL MEDICINE
Payer: COMMERCIAL

## 2024-12-02 LAB
ALBUMIN SERPL BCP-MCNC: 3.6 G/DL (ref 3.2–4.9)
ALBUMIN/GLOB SERPL: 1.4 G/DL
ALP SERPL-CCNC: 54 U/L (ref 30–99)
ALT SERPL-CCNC: 12 U/L (ref 2–50)
ANION GAP SERPL CALC-SCNC: 8 MMOL/L (ref 7–16)
APTT PPP: 26.3 SEC (ref 24.7–36)
AST SERPL-CCNC: 13 U/L (ref 12–45)
BASOPHILS # BLD AUTO: 1.1 % (ref 0–1.8)
BASOPHILS # BLD: 0.06 K/UL (ref 0–0.12)
BILIRUB SERPL-MCNC: <0.2 MG/DL (ref 0.1–1.5)
BUN SERPL-MCNC: 14 MG/DL (ref 8–22)
CALCIUM ALBUM COR SERPL-MCNC: 9 MG/DL (ref 8.5–10.5)
CALCIUM SERPL-MCNC: 8.7 MG/DL (ref 8.5–10.5)
CFT BLD TEG: 7 MIN (ref 4.6–9.1)
CFT P HPASE BLD TEG: 6 MIN (ref 4.3–8.3)
CHLORIDE SERPL-SCNC: 110 MMOL/L (ref 96–112)
CLOT ANGLE BLD TEG: 73.7 DEGREES (ref 63–78)
CO2 SERPL-SCNC: 22 MMOL/L (ref 20–33)
CREAT SERPL-MCNC: 0.98 MG/DL (ref 0.5–1.4)
CT.EXTRINSIC BLD ROTEM: 1.2 MIN (ref 0.8–2.1)
EOSINOPHIL # BLD AUTO: 0.22 K/UL (ref 0–0.51)
EOSINOPHIL NFR BLD: 4 % (ref 0–6.9)
ERYTHROCYTE [DISTWIDTH] IN BLOOD BY AUTOMATED COUNT: 60.9 FL (ref 35.9–50)
GFR SERPLBLD CREATININE-BSD FMLA CKD-EPI: 98 ML/MIN/1.73 M 2
GLOBULIN SER CALC-MCNC: 2.6 G/DL (ref 1.9–3.5)
GLUCOSE SERPL-MCNC: 102 MG/DL (ref 65–99)
HCT VFR BLD AUTO: 40.7 % (ref 42–52)
HGB BLD-MCNC: 12.6 G/DL (ref 14–18)
IMM GRANULOCYTES # BLD AUTO: 0.02 K/UL (ref 0–0.11)
IMM GRANULOCYTES NFR BLD AUTO: 0.4 % (ref 0–0.9)
INR PPP: 0.99 (ref 0.87–1.13)
LYMPHOCYTES # BLD AUTO: 1.98 K/UL (ref 1–4.8)
LYMPHOCYTES NFR BLD: 36.1 % (ref 22–41)
MAGNESIUM SERPL-MCNC: 2.1 MG/DL (ref 1.5–2.5)
MCF BLD TEG: 62.6 MM (ref 52–69)
MCF.PLATELET INHIB BLD ROTEM: 18.9 MM (ref 15–32)
MCH RBC QN AUTO: 25.4 PG (ref 27–33)
MCHC RBC AUTO-ENTMCNC: 31 G/DL (ref 32.3–36.5)
MCV RBC AUTO: 82.1 FL (ref 81.4–97.8)
MONOCYTES # BLD AUTO: 0.6 K/UL (ref 0–0.85)
MONOCYTES NFR BLD AUTO: 10.9 % (ref 0–13.4)
NEUTROPHILS # BLD AUTO: 2.61 K/UL (ref 1.82–7.42)
NEUTROPHILS NFR BLD: 47.5 % (ref 44–72)
NRBC # BLD AUTO: 0 K/UL
NRBC BLD-RTO: 0 /100 WBC (ref 0–0.2)
PA AA BLD-ACNC: 7.4 % (ref 0–11)
PA ADP BLD-ACNC: 34.4 % (ref 0–17)
PLATELET # BLD AUTO: 349 K/UL (ref 164–446)
PMV BLD AUTO: 9.1 FL (ref 9–12.9)
POTASSIUM SERPL-SCNC: 4.6 MMOL/L (ref 3.6–5.5)
PROT SERPL-MCNC: 6.2 G/DL (ref 6–8.2)
PROTHROMBIN TIME: 13.1 SEC (ref 12–14.6)
RBC # BLD AUTO: 4.96 M/UL (ref 4.7–6.1)
SODIUM SERPL-SCNC: 140 MMOL/L (ref 135–145)
TEG ALGORITHM TGALG: ABNORMAL
WBC # BLD AUTO: 5.5 K/UL (ref 4.8–10.8)

## 2024-12-02 PROCEDURE — 85576 BLOOD PLATELET AGGREGATION: CPT | Mod: 91

## 2024-12-02 PROCEDURE — 36415 COLL VENOUS BLD VENIPUNCTURE: CPT

## 2024-12-02 PROCEDURE — 700105 HCHG RX REV CODE 258

## 2024-12-02 PROCEDURE — 770001 HCHG ROOM/CARE - MED/SURG/GYN PRIV*

## 2024-12-02 PROCEDURE — 80053 COMPREHEN METABOLIC PANEL: CPT

## 2024-12-02 PROCEDURE — 83735 ASSAY OF MAGNESIUM: CPT

## 2024-12-02 PROCEDURE — 99233 SBSQ HOSP IP/OBS HIGH 50: CPT | Performed by: INTERNAL MEDICINE

## 2024-12-02 PROCEDURE — 85347 COAGULATION TIME ACTIVATED: CPT

## 2024-12-02 PROCEDURE — 700111 HCHG RX REV CODE 636 W/ 250 OVERRIDE (IP)

## 2024-12-02 PROCEDURE — A9270 NON-COVERED ITEM OR SERVICE: HCPCS

## 2024-12-02 PROCEDURE — 85384 FIBRINOGEN ACTIVITY: CPT | Mod: 91

## 2024-12-02 PROCEDURE — 85025 COMPLETE CBC W/AUTO DIFF WBC: CPT

## 2024-12-02 PROCEDURE — 85730 THROMBOPLASTIN TIME PARTIAL: CPT

## 2024-12-02 PROCEDURE — 700111 HCHG RX REV CODE 636 W/ 250 OVERRIDE (IP): Performed by: INTERNAL MEDICINE

## 2024-12-02 PROCEDURE — 700102 HCHG RX REV CODE 250 W/ 637 OVERRIDE(OP)

## 2024-12-02 PROCEDURE — 85610 PROTHROMBIN TIME: CPT

## 2024-12-02 RX ORDER — CYCLOBENZAPRINE HCL 10 MG
10 TABLET ORAL 3 TIMES DAILY PRN
Status: DISCONTINUED | OUTPATIENT
Start: 2024-12-02 | End: 2024-12-04 | Stop reason: HOSPADM

## 2024-12-02 RX ORDER — HYDROMORPHONE HYDROCHLORIDE 1 MG/ML
1 INJECTION, SOLUTION INTRAMUSCULAR; INTRAVENOUS; SUBCUTANEOUS ONCE
Status: COMPLETED | OUTPATIENT
Start: 2024-12-02 | End: 2024-12-02

## 2024-12-02 RX ORDER — HYDROMORPHONE HYDROCHLORIDE 1 MG/ML
0.5 INJECTION, SOLUTION INTRAMUSCULAR; INTRAVENOUS; SUBCUTANEOUS ONCE
Status: DISCONTINUED | OUTPATIENT
Start: 2024-12-02 | End: 2024-12-02

## 2024-12-02 RX ADMIN — OMEPRAZOLE 20 MG: 20 CAPSULE, DELAYED RELEASE ORAL at 16:18

## 2024-12-02 RX ADMIN — HYDROMORPHONE HYDROCHLORIDE 1 MG: 1 INJECTION, SOLUTION INTRAMUSCULAR; INTRAVENOUS; SUBCUTANEOUS at 19:22

## 2024-12-02 RX ADMIN — HYDROMORPHONE HYDROCHLORIDE 1 MG: 1 INJECTION, SOLUTION INTRAMUSCULAR; INTRAVENOUS; SUBCUTANEOUS at 22:28

## 2024-12-02 RX ADMIN — OXYCODONE HYDROCHLORIDE 20 MG: 10 TABLET ORAL at 21:25

## 2024-12-02 RX ADMIN — SUCRALFATE 1 G: 1 TABLET ORAL at 09:52

## 2024-12-02 RX ADMIN — ZOLPIDEM TARTRATE 10 MG: 5 TABLET ORAL at 01:50

## 2024-12-02 RX ADMIN — SENNOSIDES AND DOCUSATE SODIUM 2 TABLET: 50; 8.6 TABLET ORAL at 16:18

## 2024-12-02 RX ADMIN — HYDROMORPHONE HYDROCHLORIDE 1 MG: 1 INJECTION, SOLUTION INTRAMUSCULAR; INTRAVENOUS; SUBCUTANEOUS at 03:32

## 2024-12-02 RX ADMIN — HYDROMORPHONE HYDROCHLORIDE 1 MG: 1 INJECTION, SOLUTION INTRAMUSCULAR; INTRAVENOUS; SUBCUTANEOUS at 13:29

## 2024-12-02 RX ADMIN — OXYCODONE HYDROCHLORIDE 20 MG: 10 TABLET ORAL at 15:15

## 2024-12-02 RX ADMIN — SUCRALFATE 1 G: 1 TABLET ORAL at 16:18

## 2024-12-02 RX ADMIN — OXYCODONE HYDROCHLORIDE 20 MG: 10 TABLET ORAL at 10:37

## 2024-12-02 RX ADMIN — OXYCODONE HYDROCHLORIDE 20 MG: 10 TABLET ORAL at 18:16

## 2024-12-02 RX ADMIN — ZOLPIDEM TARTRATE 10 MG: 5 TABLET ORAL at 22:28

## 2024-12-02 RX ADMIN — HYDROMORPHONE HYDROCHLORIDE 1 MG: 1 INJECTION, SOLUTION INTRAMUSCULAR; INTRAVENOUS; SUBCUTANEOUS at 07:11

## 2024-12-02 RX ADMIN — OMEPRAZOLE 20 MG: 20 CAPSULE, DELAYED RELEASE ORAL at 05:20

## 2024-12-02 RX ADMIN — ACETAMINOPHEN 1000 MG: 500 TABLET ORAL at 15:16

## 2024-12-02 RX ADMIN — HYDROMORPHONE HYDROCHLORIDE 1 MG: 1 INJECTION, SOLUTION INTRAMUSCULAR; INTRAVENOUS; SUBCUTANEOUS at 16:16

## 2024-12-02 RX ADMIN — VANCOMYCIN HYDROCHLORIDE 1250 MG: 5 INJECTION, POWDER, LYOPHILIZED, FOR SOLUTION INTRAVENOUS at 03:11

## 2024-12-02 RX ADMIN — METOPROLOL TARTRATE 25 MG: 25 TABLET, FILM COATED ORAL at 16:18

## 2024-12-02 RX ADMIN — OXYCODONE HYDROCHLORIDE 20 MG: 10 TABLET ORAL at 01:50

## 2024-12-02 RX ADMIN — CEFTRIAXONE SODIUM 2000 MG: 10 INJECTION, POWDER, FOR SOLUTION INTRAVENOUS at 16:20

## 2024-12-02 RX ADMIN — SUCRALFATE 1 G: 1 TABLET ORAL at 22:00

## 2024-12-02 RX ADMIN — VANCOMYCIN HYDROCHLORIDE 1250 MG: 5 INJECTION, POWDER, LYOPHILIZED, FOR SOLUTION INTRAVENOUS at 11:40

## 2024-12-02 RX ADMIN — HYDROMORPHONE HYDROCHLORIDE 1 MG: 1 INJECTION, SOLUTION INTRAMUSCULAR; INTRAVENOUS; SUBCUTANEOUS at 09:51

## 2024-12-02 RX ADMIN — METOPROLOL TARTRATE 25 MG: 25 TABLET, FILM COATED ORAL at 05:20

## 2024-12-02 RX ADMIN — ASPIRIN 81 MG: 81 TABLET, COATED ORAL at 05:20

## 2024-12-02 RX ADMIN — ACETAMINOPHEN 1000 MG: 500 TABLET ORAL at 21:25

## 2024-12-02 RX ADMIN — CEFTRIAXONE SODIUM 2000 MG: 10 INJECTION, POWDER, FOR SOLUTION INTRAVENOUS at 05:47

## 2024-12-02 RX ADMIN — HYDROMORPHONE HYDROCHLORIDE 1 MG: 1 INJECTION, SOLUTION INTRAMUSCULAR; INTRAVENOUS; SUBCUTANEOUS at 00:24

## 2024-12-02 RX ADMIN — VANCOMYCIN HYDROCHLORIDE 1250 MG: 5 INJECTION, POWDER, LYOPHILIZED, FOR SOLUTION INTRAVENOUS at 19:32

## 2024-12-02 RX ADMIN — OXYCODONE HYDROCHLORIDE 20 MG: 10 TABLET ORAL at 05:20

## 2024-12-02 RX ADMIN — SUCRALFATE 1 G: 1 TABLET ORAL at 13:29

## 2024-12-02 ASSESSMENT — ENCOUNTER SYMPTOMS
NAUSEA: 1
BLURRED VISION: 0
ABDOMINAL PAIN: 0
SHORTNESS OF BREATH: 0
NERVOUS/ANXIOUS: 1
SINUS PAIN: 1
HEADACHES: 1
NECK PAIN: 1
PHOTOPHOBIA: 1
DOUBLE VISION: 0

## 2024-12-02 ASSESSMENT — PAIN DESCRIPTION - PAIN TYPE
TYPE: ACUTE PAIN

## 2024-12-02 NOTE — PROCEDURES
Procedure: Lumbar Puncture    Patient was explained procedure and need of the procedure. He agree and consented.  Time out was taken. Patient was in the sitting position. The L4-L5 area was identified and cleaned with three chloraprep rinses. I was with sterile gloves. The area was than anesthetized with 1% lidocaine injection. A LP needle was introduced, patient had pain also unable to stay sitting due to head pain and procedure was aborted. Needle was withdraw and bandage was applied.

## 2024-12-02 NOTE — PROGRESS NOTES
06/05/24 1126   Discharge Planning   Living Arrangements Spouse/significant other   Support Systems Spouse/significant other   Type of Residence Private residence   Home or Post Acute Services Post acute facilities (Rehab/SNF/etc)   Type of Post Acute Facility Services Rehab;Skilled nursing   Patient expects to be discharged to: AR vs SNF   Does the patient need discharge transport arranged? Yes   RoundTrip coordination needed? Yes     Met with patient at bedside. Admitted for right radius fracture after a fall. Pt was recently in hospital from 5/14/24 through 5/22/24 after lumbar fusion. Pt was discharged to [facility] until 6/3/24 when she was discharged home. Pt had a fall and was re-admitted the next day. Pt lives with spouse and uses a walker. Arm sling now in place. Ortho consult and therapy evals pending. Pt does not want to return to [facility]. Requested referrals be sent to [facility] and SNF 1. [facility] 2. [facility]. If accepted patient will need precert.   [facility] Medicine Daily Progress Note    Date of Service  12/2/2024    Chief Complaint  Roberto Braswell is a 44 y.o. male admitted 12/1/2024 with HA    Hospital Course  43 yo man with history of GI bleed, HLD, mitral valve repair who had left upper root canal 2 weeks ago complicated by left jaw pain, rhinorrhea, headache, neck stiffness.  He is continue to have watery drainage from his left nostril associated with severe headache.  He was seen 11/28 for antibiotics and sent to the ER for possible CSF leak.  CT maxillofacial was negative for any soft tissue fluid collection.  Beta-2 transferrin lab from 11/30 was not detected but sample volume was insufficient.    Interval Problem Update  Hemoglobin decreased  Patient complains of severe left facial pain radiating to his head and neck, his neck hurts with rotation.  Whenever he bends over, such as to sit down on the toilet, fluid comes from his left nare and he has severe headache.  He became more worried when the fluid started to become yellow and cloudy.  He has photophobia, denies vision changes  Beta-2 transferrin lab from yesterday is collected and pending  MRI brain with showed left maxillary sinus mucosal thickening with small dependent fluid, possible acute sinusitis  I spoke with Dr. Epps ENT who reviewed the MRI, unlikely this is a CSF leak from a root canal and more likely acute sinusitis.  Can do LP to rule out meningitis.    I have discussed this patient's plan of care and discharge plan at IDT rounds today with Case Management, Nursing, Nursing leadership, and other members of the IDT team.    Consultants/Specialty  none    Code Status  Full Code    Disposition  The patient is not medically cleared for discharge to home or a post-acute facility.  Anticipate discharge to: home with close outpatient follow-up    I have placed the appropriate orders for post-discharge needs.    Review of Systems  Review of Systems   Constitutional:  Positive for  malaise/fatigue.   HENT:  Positive for congestion and sinus pain.    Eyes:  Positive for photophobia. Negative for blurred vision and double vision.   Respiratory:  Negative for shortness of breath.    Cardiovascular:  Negative for chest pain.   Gastrointestinal:  Positive for nausea. Negative for abdominal pain.   Musculoskeletal:  Positive for neck pain.   Neurological:  Positive for headaches.   Psychiatric/Behavioral:  The patient is nervous/anxious.         Physical Exam  Temp:  [36.4 °C (97.5 °F)-36.5 °C (97.7 °F)] 36.5 °C (97.7 °F)  Pulse:  [60-68] 62  Resp:  [16-17] 17  BP: (129-155)/(73-91) 145/87  SpO2:  [95 %-96 %] 96 %    Physical Exam  Vitals and nursing note reviewed.   Constitutional:       Appearance: He is ill-appearing.   HENT:      Head: Normocephalic.      Mouth/Throat:      Mouth: Mucous membranes are moist.   Eyes:      General:         Right eye: No discharge.         Left eye: No discharge.      Extraocular Movements: Extraocular movements intact.   Neck:      Comments: Pain with flexion of neck  Cardiovascular:      Rate and Rhythm: Normal rate and regular rhythm.   Pulmonary:      Effort: Pulmonary effort is normal. No respiratory distress.      Breath sounds: No wheezing or rales.   Abdominal:      Palpations: Abdomen is soft.      Tenderness: There is no abdominal tenderness.   Musculoskeletal:         General: No swelling.   Skin:     General: Skin is warm and dry.   Neurological:      Mental Status: He is alert and oriented to person, place, and time.      Comments: Equal strength upper and lower extremities except weak right plantarflexion, chronic per patient         Fluids    Intake/Output Summary (Last 24 hours) at 12/2/2024 1513  Last data filed at 12/2/2024 1000  Gross per 24 hour   Intake 240 ml   Output --   Net 240 ml        Laboratory  Recent Labs     12/01/24  0201 12/02/24  0357   WBC 7.1 5.5   RBC 5.36 4.96   HEMOGLOBIN 14.1 12.6*   HEMATOCRIT 43.7 40.7*   MCV 81.5 82.1    MCH 26.3* 25.4*   MCHC 32.3 31.0*   RDW 60.3* 60.9*   PLATELETCT 372 349   MPV 8.8* 9.1     Recent Labs     12/01/24  0201 12/02/24  0357   SODIUM 143 140   POTASSIUM 4.2 4.6   CHLORIDE 111 110   CO2 19* 22   GLUCOSE 87 102*   BUN 19 14   CREATININE 1.08 0.98   CALCIUM 9.2 8.7     Recent Labs     12/01/24  0201 12/02/24  0941   APTT 25.6 26.3   INR 1.02 0.99               Imaging  MR-BRAIN-WITH & W/O   Final Result      1.  Unremarkable MRI of the brain with and without contrast enhancement.   2.  Left maxillary sinus mucosal thickening with a small dependent fluid level likely on the basis of acute sinusitis.      CT-MAXILLOFACIAL WITH PLUS RECONS   Final Result      No extra anatomic soft tissue fluid collection is seen      DX-LUMBAR PUNCTURE FOR DIAGNOSIS    (Results Pending)        Assessment/Plan  * CSF leak from nose- (present on admission)  Assessment & Plan  Patient with drainage after root canal procedure from his left nare with associated rhinorrhea that profusely drains, initially clear now straw-colored  Outpatient beta-2 transferrin was negative, noted to be insufficient sample  New beta-2 transferrin level is pending, fluid culture is also pending  MRI brain showed acute left sinusitis  I spoke with Dr. Epps ENT who reviewed the MRI, unlikely this is a CSF leak from a root canal and more likely acute sinusitis.  Can call back if beta-2 transferrin levels positive.  Continue ceftriaxone and vancomycin for now    Headache  Assessment & Plan  When fluid draining from nose, patient has intractable headache   Ruling out spinal headache from CSF leak  - Multimodal pain control, Tylenol, Flexeril, oxycodone, IV Dilaudid    Neck stiffness  Assessment & Plan  Possible CSF leak.  New onset neck stiffness with severe headache  I attempted lumbar puncture but patient had too much pain, asked for LP with anesthesia, ordered for tomorrow.  N.p.o. at midnight  Continue ceftriaxone and vancomycin         VTE  prophylaxis:   SCDs/TEDs      I have performed a physical exam and reviewed and updated ROS and Plan today (12/2/2024). In review of yesterday's note (12/1/2024), there are no changes except as documented above.

## 2024-12-02 NOTE — CONSULTS
Date of Service:  12/2/24    PCP: Sal Schafer D.O.    CC:  L nasal drainage     HPI: This is a 44 y.o. male who underwent a left maxillary molar root canal about 2 weeks ago immediately thereafter he started having large-volume left clear nasal drainage.  He had a headache and facial pain.  He came to the emergency room on Wednesday where a beta-2 transferrin was sent off.  It came back nondiagnostic because of insufficient sample.  He now for the last 2 days has had purulent drainage.  He has a stiff neck.  He represented after interpreting the previous result as positive.  No nasal surgery previously.  I reviewed his CT head, CT maxillofacial, MRI with Dr. Conn.  There is T2 bright fluid in the left maxillary the skull base appears intact on all the imaging studies.  On the CT maxillofacial there is amalgam from the dental procedure in the left maxillary with left maxillary thickening and dependent fluid.  Neurology and NSU did not want to see the patient.  Repeat Beta 2 is pending     ROS: As above.     PMHx:  Active Ambulatory Problems     Diagnosis Date Noted    Iron deficiency anemia due to chronic blood loss 09/14/2023    Intractable abdominal pain 09/16/2023    Essential hypertension 10/10/2023    H/O mitral valve repair 02/27/2024    Chronic pain syndrome 05/22/2024    Pure hypercholesterolemia 06/13/2024    Drug-induced erectile dysfunction 09/03/2024    Primary insomnia 09/03/2024    Upper GI bleed 09/16/2024    Gastritis 09/19/2024    Esophagitis with esophageal ulcer 09/19/2024    Aphasia 10/25/2024    Chest pain 10/26/2024    Dyslipidemia (high LDL; low HDL) 10/26/2024    Acute costochondritis 10/27/2024    Psychosis (HCC) 10/27/2024    Restless leg syndrome due to iron deficiency anemia 10/28/2024    Narcotic dependency, continuous (HCC) 10/28/2024    Hypnotic dependence with current use (HCC) 10/28/2024     Resolved Ambulatory Problems     Diagnosis Date Noted    Upper GI bleed 03/26/2022     Physiologic disturbance of temperature regulation 03/26/2022    Epigastric pain 03/26/2022    ELISABET (acute kidney injury) ([facility]) 03/27/2022    GI bleed 07/30/2023    Acute blood loss anemia 07/30/2023    Dehydration 07/30/2023    Drug-seeking behavior 07/30/2023    Duodenal ulcer with hemorrhage 07/30/2023    Bright red blood per rectum 09/13/2023    Mitral regurgitation 09/15/2023    Hematemesis 11/17/2023    Duodenal ulcer 11/18/2023    Primary hypertension 11/18/2023    Normal anion gap metabolic acidosis 11/18/2023    Abdominal pain 11/18/2023    Left arm swelling 11/20/2023    Superficial thrombophlebitis of right upper extremity 12/05/2023    Encounter for weaning from ventilator ([facility]) 01/25/2024    Abscess of forearm 05/18/2024    Acute abdominal pain 05/22/2024    Upper GI bleed 05/23/2024    Acute kidney injury (ELISABET) with acute tubular necrosis (ATN) ([facility]) 06/20/2024    Syncope and collapse 06/20/2024    Acute neck pain 06/20/2024    Epigastric pain 09/16/2024     Past Medical History:   Diagnosis Date    Arthritis     Breath shortness 11/03/2023    Gastric ulcer     Heart valve disease     Hemorrhagic disorder ([facility])     Hypertension     Pain     Seizure ([facility])        SHx:  Social History     Socioeconomic History    Marital status:      Spouse name: Not on file    Number of children: Not on file    Years of education: Not on file    Highest education level: GED or equivalent   Occupational History    Not on file   Tobacco Use    Smoking status: Never    Smokeless tobacco: Never   Vaping Use    Vaping status: Never Used   Substance and Sexual Activity    Alcohol use: Not Currently    Drug use: Never    Sexual activity: Not on file   Other Topics Concern    Not on file   Social History Narrative    Not on file     Social Drivers of Health     Financial Resource Strain: Medium Risk (10/26/2024)    Overall Financial Resource Strain (CARDIA)     Difficulty of Paying Living Expenses: Somewhat hard   Food  Insecurity: No Food Insecurity (12/1/2024)    Hunger Vital Sign     Worried About Running Out of Food in the Last Year: Never true     Ran Out of Food in the Last Year: Never true   Recent Concern: Food Insecurity - Food Insecurity Present (10/26/2024)    Hunger Vital Sign     Worried About Running Out of Food in the Last Year: Sometimes true     Ran Out of Food in the Last Year: Never true   Transportation Needs: No Transportation Needs (12/1/2024)    PRAPARE - Transportation     Lack of Transportation (Medical): No     Lack of Transportation (Non-Medical): No   Physical Activity: Inactive (10/26/2024)    Exercise Vital Sign     Days of Exercise per Week: 0 days     Minutes of Exercise per Session: 0 min   Stress: Stress Concern Present (10/26/2024)    [employer] [place] [employer] - Occupational Stress Questionnaire     Feeling of Stress : To some extent   Social Connections: Socially Isolated (10/26/2024)    Social Connection and Isolation Panel [NHANES]     Frequency of Communication with Friends and Family: Never     Frequency of Social Gatherings with Friends and Family: Never     Attends Hinduism Services: Never     Active Member of Clubs or Organizations: No     Attends Club or Organization Meetings: Never     Marital Status:    Intimate Partner Violence: Not At Risk (12/1/2024)    Humiliation, Afraid, Rape, and Kick questionnaire     Fear of Current or Ex-Partner: No     Emotionally Abused: No     Physically Abused: No     Sexually Abused: No   Housing Stability: Low Risk  (12/1/2024)    Housing Stability Vital Sign     Unable to Pay for Housing in the Last Year: No     Number of Times Moved in the Last Year: 0     Homeless in the Last Year: No   Recent Concern: Housing Stability - High Risk (10/26/2024)    Housing Stability Vital Sign     Unable to Pay for Housing in the Last Year: Yes     Number of Times Moved in the Last Year: 0     Homeless in the Last Year: No       FHx:  family  history includes Heart Disease in his mother; No Known Problems in his father.    Allergies:  Allergies   Allergen Reactions    Morphine Vomiting    Tramadol Unspecified     Seizure  HON=[id number]       Medications:  No current facility-administered medications on file prior to encounter.     Current Outpatient Medications on File Prior to Encounter   Medication Sig Dispense Refill    amoxicillin (AMOXIL) 500 MG Cap Take 2,000 mg by mouth one time. 1 hour prior to dental procedure      methocarbamol (ROBAXIN) 500 MG Tab Take 500-1,000 mg by mouth 3 times a day. 1,000 mg = 2 tabs      metoprolol tartrate (LOPRESSOR) 25 MG Tab Take 25 mg by mouth 2 times a day.      amoxicillin-clavulanate (AUGMENTIN) 875-125 MG Tab Take 1 Tablet by mouth 2 times a day for 10 days. 20 Tablet 0    zolpidem (AMBIEN) 10 MG Tab Take 1 Tablet by mouth at bedtime as needed for Sleep for up to 90 days. 30 Tablet 2    acetaminophen (TYLENOL) 500 MG Tab Take 1,000 mg by mouth every 6 hours as needed for Mild Pain. 1,000 mg = 2 tabs  Indications: Pain      oxyCODONE immediate release (ROXICODONE) 10 MG immediate release tablet Take 10 mg by mouth 5 Times a Day.      pantoprazole (PROTONIX) 40 MG Tablet Delayed Response Take 1 Tablet by mouth 2 times a day. 60 Tablet 0    ferrous sulfate 325 (65 Fe) MG tablet Take 1 Tablet by mouth every 48 hours. For iron deficiency 30 Tablet 2       Objective Exam:  Vitals:    12/02/24 0520 12/02/24 0620 12/02/24 0711 12/02/24 0900   BP:    (!) 145/87   Pulse:    62   Resp: 16 16 16 17   Temp:       TempSrc:    Temporal   SpO2:    96%   Weight:       Height:           General: nad, conversant, breathing nml   Limited neck ROM        Laboratory--reviewed personally and are as follows:  Lab Results   Component Value Date/Time    WBC 5.5 12/02/2024 03:57 AM    RBC 4.96 12/02/2024 03:57 AM    HEMOGLOBIN 12.6 (L) 12/02/2024 03:57 AM    HEMATOCRIT 40.7 (L) 12/02/2024 03:57 AM    MCV 82.1 12/02/2024 03:57 AM    MCH 25.4  (L) 12/02/2024 03:57 AM    MCHC 31.0 (L) 12/02/2024 03:57 AM    MPV 9.1 12/02/2024 03:57 AM    NEUTSPOLYS 47.50 12/02/2024 03:57 AM    LYMPHOCYTES 36.10 12/02/2024 03:57 AM    MONOCYTES 10.90 12/02/2024 03:57 AM    EOSINOPHILS 4.00 12/02/2024 03:57 AM    BASOPHILS 1.10 12/02/2024 03:57 AM    HYPOCHROMIA 1+ 06/20/2024 02:02 PM    ANISOCYTOSIS 1+ 06/20/2024 02:02 PM      Lab Results   Component Value Date/Time    SODIUM 140 12/02/2024 03:57 AM    POTASSIUM 4.6 12/02/2024 03:57 AM    CHLORIDE 110 12/02/2024 03:57 AM    CO2 22 12/02/2024 03:57 AM    GLUCOSE 102 (H) 12/02/2024 03:57 AM    BUN 14 12/02/2024 03:57 AM    CREATININE 0.98 12/02/2024 03:57 AM      Lab Results   Component Value Date/Time    PROTHROMBTM 13.4 12/01/2024 02:01 AM    INR 1.02 12/01/2024 02:01 AM        Radiology  Reviewed above     MDM:  44 y.o. male here with L maxillary sinusitis from previous dental procedure.  While he had clear drainage previously, I can not explain mechanistically how this would cause a CSF leak.  Imaging shows intact skull base.  Beta 2 transferrin is pending.  I would cover him w abx for oral giuseppe that likely has caused his L sinusitis.  I would recommend lumbar puncture to rule out meningitis.  Will sign off, reconsult if Beta 2 is positive.       I spent 45 minutes of time reviewing the 3 imaging studies, discussing with Dr. Bassett and Dr. Conn

## 2024-12-02 NOTE — CARE PLAN
The patient is Stable - Low risk of patient condition declining or worsening    Shift Goals  Clinical Goals: pain control  Patient Goals: pain control  Family Goals: updates, pain control    Progress made toward(s) clinical / shift goals:  Patient complaining of severe pain on left side of body. Patient medicated per MAR.     Problem: Pain - Standard  Goal: Alleviation of pain or a reduction in pain to the patient’s comfort goal  Outcome: Progressing     Problem: Knowledge Deficit - Standard  Goal: Patient and family/care givers will demonstrate understanding of plan of care, disease process/condition, diagnostic tests and medications  Outcome: Progressing       Patient is not progressing towards the following goals:

## 2024-12-02 NOTE — CARE PLAN
The patient is Stable - Low risk of patient condition declining or worsening    Shift Goals  Clinical Goals: Pain control, stable neuro status, rest  Patient Goals: Pain control  Family Goals: Updates, pain control    Progress made toward(s) clinical / shift goals:    Problem: Pain - Standard  Goal: Alleviation of pain or a reduction in pain to the patient’s comfort goal  Outcome: Progressing       Patient is not progressing towards the following goals:

## 2024-12-03 ENCOUNTER — APPOINTMENT (OUTPATIENT)
Dept: RADIOLOGY | Facility: MEDICAL CENTER | Age: 44
DRG: 093 | End: 2024-12-03
Attending: INTERNAL MEDICINE
Payer: COMMERCIAL

## 2024-12-03 PROBLEM — F41.9 ANXIETY: Status: ACTIVE | Noted: 2024-12-03

## 2024-12-03 LAB
ALBUMIN SERPL BCP-MCNC: 3.9 G/DL (ref 3.2–4.9)
BASOPHILS # BLD AUTO: 0.9 % (ref 0–1.8)
BASOPHILS # BLD: 0.06 K/UL (ref 0–0.12)
BUN SERPL-MCNC: 14 MG/DL (ref 8–22)
CALCIUM ALBUM COR SERPL-MCNC: 8.9 MG/DL (ref 8.5–10.5)
CALCIUM SERPL-MCNC: 8.8 MG/DL (ref 8.5–10.5)
CHLORIDE SERPL-SCNC: 108 MMOL/L (ref 96–112)
CO2 SERPL-SCNC: 22 MMOL/L (ref 20–33)
CREAT SERPL-MCNC: 0.82 MG/DL (ref 0.5–1.4)
EOSINOPHIL # BLD AUTO: 0.2 K/UL (ref 0–0.51)
EOSINOPHIL NFR BLD: 3 % (ref 0–6.9)
ERYTHROCYTE [DISTWIDTH] IN BLOOD BY AUTOMATED COUNT: 57.7 FL (ref 35.9–50)
GFR SERPLBLD CREATININE-BSD FMLA CKD-EPI: 111 ML/MIN/1.73 M 2
GLUCOSE SERPL-MCNC: 107 MG/DL (ref 65–99)
HCT VFR BLD AUTO: 44.6 % (ref 42–52)
HGB BLD-MCNC: 13.9 G/DL (ref 14–18)
IMM GRANULOCYTES # BLD AUTO: 0.02 K/UL (ref 0–0.11)
IMM GRANULOCYTES NFR BLD AUTO: 0.3 % (ref 0–0.9)
LYMPHOCYTES # BLD AUTO: 1.99 K/UL (ref 1–4.8)
LYMPHOCYTES NFR BLD: 29.8 % (ref 22–41)
MCH RBC QN AUTO: 25.3 PG (ref 27–33)
MCHC RBC AUTO-ENTMCNC: 31.2 G/DL (ref 32.3–36.5)
MCV RBC AUTO: 81.1 FL (ref 81.4–97.8)
MISCELLANEOUS LAB RESULT MISCLAB: NORMAL
MONOCYTES # BLD AUTO: 0.54 K/UL (ref 0–0.85)
MONOCYTES NFR BLD AUTO: 8.1 % (ref 0–13.4)
NEUTROPHILS # BLD AUTO: 3.87 K/UL (ref 1.82–7.42)
NEUTROPHILS NFR BLD: 57.9 % (ref 44–72)
NRBC # BLD AUTO: 0 K/UL
NRBC BLD-RTO: 0 /100 WBC (ref 0–0.2)
PHOSPHATE SERPL-MCNC: 2.4 MG/DL (ref 2.5–4.5)
PLATELET # BLD AUTO: 374 K/UL (ref 164–446)
PMV BLD AUTO: 8.6 FL (ref 9–12.9)
POTASSIUM SERPL-SCNC: 4.5 MMOL/L (ref 3.6–5.5)
RBC # BLD AUTO: 5.5 M/UL (ref 4.7–6.1)
SODIUM SERPL-SCNC: 139 MMOL/L (ref 135–145)
VANCOMYCIN TROUGH SERPL-MCNC: 13.9 UG/ML (ref 10–20)
WBC # BLD AUTO: 6.7 K/UL (ref 4.8–10.8)

## 2024-12-03 PROCEDURE — 700102 HCHG RX REV CODE 250 W/ 637 OVERRIDE(OP)

## 2024-12-03 PROCEDURE — 36415 COLL VENOUS BLD VENIPUNCTURE: CPT

## 2024-12-03 PROCEDURE — 770006 HCHG ROOM/CARE - MED/SURG/GYN SEMI*

## 2024-12-03 PROCEDURE — 700102 HCHG RX REV CODE 250 W/ 637 OVERRIDE(OP): Performed by: STUDENT IN AN ORGANIZED HEALTH CARE EDUCATION/TRAINING PROGRAM

## 2024-12-03 PROCEDURE — 85025 COMPLETE CBC W/AUTO DIFF WBC: CPT

## 2024-12-03 PROCEDURE — 80069 RENAL FUNCTION PANEL: CPT

## 2024-12-03 PROCEDURE — 700105 HCHG RX REV CODE 258

## 2024-12-03 PROCEDURE — 700111 HCHG RX REV CODE 636 W/ 250 OVERRIDE (IP)

## 2024-12-03 PROCEDURE — A9270 NON-COVERED ITEM OR SERVICE: HCPCS | Performed by: STUDENT IN AN ORGANIZED HEALTH CARE EDUCATION/TRAINING PROGRAM

## 2024-12-03 PROCEDURE — 80202 ASSAY OF VANCOMYCIN: CPT

## 2024-12-03 PROCEDURE — A9270 NON-COVERED ITEM OR SERVICE: HCPCS

## 2024-12-03 PROCEDURE — 700111 HCHG RX REV CODE 636 W/ 250 OVERRIDE (IP): Performed by: STUDENT IN AN ORGANIZED HEALTH CARE EDUCATION/TRAINING PROGRAM

## 2024-12-03 PROCEDURE — 99233 SBSQ HOSP IP/OBS HIGH 50: CPT | Performed by: STUDENT IN AN ORGANIZED HEALTH CARE EDUCATION/TRAINING PROGRAM

## 2024-12-03 RX ORDER — OXYCODONE HYDROCHLORIDE 10 MG/1
10 TABLET ORAL
Status: DISCONTINUED | OUTPATIENT
Start: 2024-12-03 | End: 2024-12-04 | Stop reason: HOSPADM

## 2024-12-03 RX ORDER — METHOCARBAMOL 500 MG/1
500 TABLET, FILM COATED ORAL 3 TIMES DAILY
Status: DISCONTINUED | OUTPATIENT
Start: 2024-12-03 | End: 2024-12-04 | Stop reason: HOSPADM

## 2024-12-03 RX ORDER — HYDROXYZINE HYDROCHLORIDE 25 MG/1
25 TABLET, FILM COATED ORAL 3 TIMES DAILY PRN
Status: DISCONTINUED | OUTPATIENT
Start: 2024-12-03 | End: 2024-12-04 | Stop reason: HOSPADM

## 2024-12-03 RX ORDER — VANCOMYCIN 1.5 G/300ML
1500 INJECTION, SOLUTION INTRAVENOUS EVERY 8 HOURS
Status: DISCONTINUED | OUTPATIENT
Start: 2024-12-03 | End: 2024-12-04

## 2024-12-03 RX ADMIN — OMEPRAZOLE 20 MG: 20 CAPSULE, DELAYED RELEASE ORAL at 17:57

## 2024-12-03 RX ADMIN — METHOCARBAMOL TABLETS 500 MG: 500 TABLET, COATED ORAL at 17:57

## 2024-12-03 RX ADMIN — OXYCODONE HYDROCHLORIDE 10 MG: 10 TABLET ORAL at 15:41

## 2024-12-03 RX ADMIN — METOPROLOL TARTRATE 25 MG: 25 TABLET, FILM COATED ORAL at 04:45

## 2024-12-03 RX ADMIN — OXYCODONE HYDROCHLORIDE 20 MG: 10 TABLET ORAL at 00:40

## 2024-12-03 RX ADMIN — OXYCODONE HYDROCHLORIDE 20 MG: 10 TABLET ORAL at 03:43

## 2024-12-03 RX ADMIN — CEFTRIAXONE SODIUM 2000 MG: 10 INJECTION, POWDER, FOR SOLUTION INTRAVENOUS at 04:46

## 2024-12-03 RX ADMIN — ACETAMINOPHEN 1000 MG: 500 TABLET ORAL at 15:41

## 2024-12-03 RX ADMIN — OMEPRAZOLE 20 MG: 20 CAPSULE, DELAYED RELEASE ORAL at 04:45

## 2024-12-03 RX ADMIN — SUCRALFATE 1 G: 1 TABLET ORAL at 11:12

## 2024-12-03 RX ADMIN — DIBASIC SODIUM PHOSPHATE, MONOBASIC POTASSIUM PHOSPHATE AND MONOBASIC SODIUM PHOSPHATE 250 MG: 852; 155; 130 TABLET ORAL at 11:12

## 2024-12-03 RX ADMIN — OXYCODONE HYDROCHLORIDE 10 MG: 10 TABLET ORAL at 20:55

## 2024-12-03 RX ADMIN — OXYCODONE HYDROCHLORIDE 10 MG: 10 TABLET ORAL at 23:39

## 2024-12-03 RX ADMIN — METHOCARBAMOL TABLETS 500 MG: 500 TABLET, COATED ORAL at 10:19

## 2024-12-03 RX ADMIN — HYDROMORPHONE HYDROCHLORIDE 1 MG: 1 INJECTION, SOLUTION INTRAMUSCULAR; INTRAVENOUS; SUBCUTANEOUS at 08:05

## 2024-12-03 RX ADMIN — SUCRALFATE 1 G: 1 TABLET ORAL at 08:05

## 2024-12-03 RX ADMIN — HYDROMORPHONE HYDROCHLORIDE 1 MG: 1 INJECTION, SOLUTION INTRAMUSCULAR; INTRAVENOUS; SUBCUTANEOUS at 01:42

## 2024-12-03 RX ADMIN — DIBASIC SODIUM PHOSPHATE, MONOBASIC POTASSIUM PHOSPHATE AND MONOBASIC SODIUM PHOSPHATE 250 MG: 852; 155; 130 TABLET ORAL at 08:05

## 2024-12-03 RX ADMIN — VANCOMYCIN 1500 MG: 1.5 INJECTION, SOLUTION INTRAVENOUS at 11:22

## 2024-12-03 RX ADMIN — ACETAMINOPHEN 1000 MG: 500 TABLET ORAL at 10:19

## 2024-12-03 RX ADMIN — METOPROLOL TARTRATE 25 MG: 25 TABLET, FILM COATED ORAL at 17:57

## 2024-12-03 RX ADMIN — OXYCODONE HYDROCHLORIDE 20 MG: 10 TABLET ORAL at 06:43

## 2024-12-03 RX ADMIN — ZOLPIDEM TARTRATE 10 MG: 5 TABLET ORAL at 20:55

## 2024-12-03 RX ADMIN — OXYCODONE HYDROCHLORIDE 10 MG: 10 TABLET ORAL at 11:12

## 2024-12-03 RX ADMIN — VANCOMYCIN HYDROCHLORIDE 1250 MG: 5 INJECTION, POWDER, LYOPHILIZED, FOR SOLUTION INTRAVENOUS at 04:49

## 2024-12-03 RX ADMIN — CEFTRIAXONE SODIUM 2000 MG: 10 INJECTION, POWDER, FOR SOLUTION INTRAVENOUS at 17:57

## 2024-12-03 RX ADMIN — VANCOMYCIN 1500 MG: 1.5 INJECTION, SOLUTION INTRAVENOUS at 21:01

## 2024-12-03 RX ADMIN — HYDROMORPHONE HYDROCHLORIDE 1 MG: 1 INJECTION, SOLUTION INTRAMUSCULAR; INTRAVENOUS; SUBCUTANEOUS at 04:45

## 2024-12-03 RX ADMIN — ACETAMINOPHEN 1000 MG: 500 TABLET ORAL at 20:55

## 2024-12-03 ASSESSMENT — PAIN DESCRIPTION - PAIN TYPE
TYPE: ACUTE PAIN
TYPE: CHRONIC PAIN
TYPE: ACUTE PAIN
TYPE: CHRONIC PAIN
TYPE: ACUTE PAIN

## 2024-12-03 ASSESSMENT — ENCOUNTER SYMPTOMS
NERVOUS/ANXIOUS: 1
NECK PAIN: 1

## 2024-12-03 NOTE — PROGRESS NOTES
This RN was giving the patient his scheduled oxy 10 mg PO, the patient put the pill in his mouth and then this RN saw the patient take the pill out of his mouth and put it on the right side of the bed next to the patient's wife, the patient was trying to signal to wife. When this RN noticed this she asked for the pill back and the patient quickly put the pill back in his mouth. This RN called the  but she was still in rounds so this RN called another RN to help escalate and that RN explained the importance of taking narcotics when given and checked the patient's mouth to ensure it was taken appropriately. The patient told the other RN that he was holding the oxycodone to take closer to his LP because it hurt so bad when he tried it yesterday. Charge nurse,  and MD notified.

## 2024-12-03 NOTE — CARE PLAN
"The patient is Stable - Low risk of patient condition declining or worsening    Shift Goals  Clinical Goals: Pain control and monitor neuro status  Patient Goals: Pain control  Family Goals: POC updates and pain control    Progress made toward(s) clinical / shift goals:      Patient is not progressing towards the following goals:    Problem: Pain - Standard  Goal: Alleviation of pain or a reduction in pain to the patient’s comfort goal  Outcome: Not Progressing  Note: Patient reported a pain level of at least 7 out of 10 throughout this shift. Patient was medicated per MAR for pain and breakthrough pain. Patient was reassessed per policy. Patient reported that they were \"able to get a few hours of sleep in between the flair ups of pain.\"     "

## 2024-12-04 ENCOUNTER — ANESTHESIA (OUTPATIENT)
Dept: RADIOLOGY | Facility: MEDICAL CENTER | Age: 44
DRG: 093 | End: 2024-12-04
Payer: COMMERCIAL

## 2024-12-04 ENCOUNTER — ANESTHESIA EVENT (OUTPATIENT)
Dept: RADIOLOGY | Facility: MEDICAL CENTER | Age: 44
DRG: 093 | End: 2024-12-04
Payer: COMMERCIAL

## 2024-12-04 ENCOUNTER — APPOINTMENT (OUTPATIENT)
Dept: RADIOLOGY | Facility: MEDICAL CENTER | Age: 44
DRG: 093 | End: 2024-12-04
Attending: INTERNAL MEDICINE
Payer: COMMERCIAL

## 2024-12-04 VITALS
SYSTOLIC BLOOD PRESSURE: 116 MMHG | BODY MASS INDEX: 29.82 KG/M2 | RESPIRATION RATE: 12 BRPM | HEIGHT: 75 IN | TEMPERATURE: 97.2 F | OXYGEN SATURATION: 97 % | WEIGHT: 239.86 LBS | DIASTOLIC BLOOD PRESSURE: 76 MMHG | HEART RATE: 56 BPM

## 2024-12-04 PROBLEM — J01.90 ACUTE NON-RECURRENT SINUSITIS: Status: ACTIVE | Noted: 2024-12-04

## 2024-12-04 LAB
ANION GAP SERPL CALC-SCNC: 16 MMOL/L (ref 7–16)
BUN SERPL-MCNC: 12 MG/DL (ref 8–22)
BURR CELLS/RBC NFR CSF MANUAL: 0 %
C GATTII+NEOFOR DNA CSF QL NAA+NON-PROBE: NOT DETECTED
CALCIUM SERPL-MCNC: 8.7 MG/DL (ref 8.5–10.5)
CHLORIDE SERPL-SCNC: 109 MMOL/L (ref 96–112)
CLARITY CSF: CLEAR
CMV DNA CSF QL NAA+NON-PROBE: NOT DETECTED
CO2 SERPL-SCNC: 17 MMOL/L (ref 20–33)
COLOR CSF: COLORLESS
COLOR SPUN CSF: COLORLESS
CREAT SERPL-MCNC: 0.8 MG/DL (ref 0.5–1.4)
CSF COMMENTS 1658: NORMAL
E COLI K1 DNA CSF QL NAA+NON-PROBE: NOT DETECTED
ERYTHROCYTE [DISTWIDTH] IN BLOOD BY AUTOMATED COUNT: 57.1 FL (ref 35.9–50)
EV RNA CSF QL NAA+NON-PROBE: NOT DETECTED
GFR SERPLBLD CREATININE-BSD FMLA CKD-EPI: 112 ML/MIN/1.73 M 2
GLUCOSE CSF-MCNC: 53 MG/DL (ref 40–80)
GLUCOSE SERPL-MCNC: 89 MG/DL (ref 65–99)
GP B STREP DNA CSF QL NAA+NON-PROBE: NOT DETECTED
GRAM STN SPEC: NORMAL
HAEM INFLU DNA CSF QL NAA+NON-PROBE: NOT DETECTED
HCT VFR BLD AUTO: 42.1 % (ref 42–52)
HGB BLD-MCNC: 13.6 G/DL (ref 14–18)
HHV6 DNA CSF QL NAA+NON-PROBE: NOT DETECTED
HSV1 DNA CSF QL NAA+NON-PROBE: NOT DETECTED
HSV2 DNA CSF QL NAA+NON-PROBE: NOT DETECTED
L MONOCYTOG DNA CSF QL NAA+NON-PROBE: NOT DETECTED
LYMPHOCYTES NFR CSF: 80 %
MCH RBC QN AUTO: 26 PG (ref 27–33)
MCHC RBC AUTO-ENTMCNC: 32.3 G/DL (ref 32.3–36.5)
MCV RBC AUTO: 80.3 FL (ref 81.4–97.8)
MONOS+MACROS NFR CSF MANUAL: 20 %
N MEN DNA CSF QL NAA+NON-PROBE: NOT DETECTED
NEUTROPHILS NFR CSF: 0 %
NUC CELL # CSF: <1 CELLS/UL (ref 0–10)
PARECHOVIRUS A RNA CSF QL NAA+NON-PROBE: NOT DETECTED
PLATELET # BLD AUTO: 354 K/UL (ref 164–446)
PMV BLD AUTO: 8.7 FL (ref 9–12.9)
POTASSIUM SERPL-SCNC: 3.9 MMOL/L (ref 3.6–5.5)
PROT CSF-MCNC: 38 MG/DL (ref 15–45)
RBC # BLD AUTO: 5.24 M/UL (ref 4.7–6.1)
RBC # CSF: 148 CELLS/UL
S PNEUM DNA CSF QL NAA+NON-PROBE: NOT DETECTED
SIGNIFICANT IND 70042: NORMAL
SITE SITE: NORMAL
SODIUM SERPL-SCNC: 142 MMOL/L (ref 135–145)
SOURCE SOURCE: NORMAL
SPECIMEN VOL CSF: 10 ML
TUBE # CSF: 3
TUBE # CSF: NORMAL
VZV DNA CSF QL NAA+NON-PROBE: NOT DETECTED
WBC # BLD AUTO: 6.9 K/UL (ref 4.8–10.8)

## 2024-12-04 PROCEDURE — A9270 NON-COVERED ITEM OR SERVICE: HCPCS | Performed by: STUDENT IN AN ORGANIZED HEALTH CARE EDUCATION/TRAINING PROGRAM

## 2024-12-04 PROCEDURE — 87483 CNS DNA AMP PROBE TYPE 12-25: CPT

## 2024-12-04 PROCEDURE — 85027 COMPLETE CBC AUTOMATED: CPT

## 2024-12-04 PROCEDURE — 87205 SMEAR GRAM STAIN: CPT

## 2024-12-04 PROCEDURE — 82945 GLUCOSE OTHER FLUID: CPT

## 2024-12-04 PROCEDURE — 80048 BASIC METABOLIC PNL TOTAL CA: CPT

## 2024-12-04 PROCEDURE — 87070 CULTURE OTHR SPECIMN AEROBIC: CPT

## 2024-12-04 PROCEDURE — 700102 HCHG RX REV CODE 250 W/ 637 OVERRIDE(OP): Performed by: ANESTHESIOLOGY

## 2024-12-04 PROCEDURE — 700111 HCHG RX REV CODE 636 W/ 250 OVERRIDE (IP): Performed by: STUDENT IN AN ORGANIZED HEALTH CARE EDUCATION/TRAINING PROGRAM

## 2024-12-04 PROCEDURE — 99223 1ST HOSP IP/OBS HIGH 75: CPT | Performed by: INTERNAL MEDICINE

## 2024-12-04 PROCEDURE — 89051 BODY FLUID CELL COUNT: CPT

## 2024-12-04 PROCEDURE — 160002 HCHG RECOVERY MINUTES (STAT)

## 2024-12-04 PROCEDURE — 160036 HCHG PACU - EA ADDL 30 MINS PHASE I

## 2024-12-04 PROCEDURE — A9270 NON-COVERED ITEM OR SERVICE: HCPCS

## 2024-12-04 PROCEDURE — 700105 HCHG RX REV CODE 258

## 2024-12-04 PROCEDURE — 700111 HCHG RX REV CODE 636 W/ 250 OVERRIDE (IP)

## 2024-12-04 PROCEDURE — A9270 NON-COVERED ITEM OR SERVICE: HCPCS | Performed by: ANESTHESIOLOGY

## 2024-12-04 PROCEDURE — 009U3ZX DRAINAGE OF SPINAL CANAL, PERCUTANEOUS APPROACH, DIAGNOSTIC: ICD-10-PCS | Performed by: INTERNAL MEDICINE

## 2024-12-04 PROCEDURE — 700102 HCHG RX REV CODE 250 W/ 637 OVERRIDE(OP): Performed by: STUDENT IN AN ORGANIZED HEALTH CARE EDUCATION/TRAINING PROGRAM

## 2024-12-04 PROCEDURE — 62328 DX LMBR SPI PNXR W/FLUOR/CT: CPT

## 2024-12-04 PROCEDURE — 700111 HCHG RX REV CODE 636 W/ 250 OVERRIDE (IP): Performed by: ANESTHESIOLOGY

## 2024-12-04 PROCEDURE — 84157 ASSAY OF PROTEIN OTHER: CPT

## 2024-12-04 PROCEDURE — 700102 HCHG RX REV CODE 250 W/ 637 OVERRIDE(OP)

## 2024-12-04 PROCEDURE — 160035 HCHG PACU - 1ST 60 MINS PHASE I

## 2024-12-04 PROCEDURE — 99239 HOSP IP/OBS DSCHRG MGMT >30: CPT | Performed by: STUDENT IN AN ORGANIZED HEALTH CARE EDUCATION/TRAINING PROGRAM

## 2024-12-04 RX ORDER — HYDROMORPHONE HYDROCHLORIDE 1 MG/ML
0.1 INJECTION, SOLUTION INTRAMUSCULAR; INTRAVENOUS; SUBCUTANEOUS
Status: DISCONTINUED | OUTPATIENT
Start: 2024-12-04 | End: 2024-12-04 | Stop reason: HOSPADM

## 2024-12-04 RX ORDER — OXYCODONE HCL 5 MG/5 ML
5 SOLUTION, ORAL ORAL
Status: COMPLETED | OUTPATIENT
Start: 2024-12-04 | End: 2024-12-04

## 2024-12-04 RX ORDER — HYDROMORPHONE HYDROCHLORIDE 1 MG/ML
0.2 INJECTION, SOLUTION INTRAMUSCULAR; INTRAVENOUS; SUBCUTANEOUS
Status: DISCONTINUED | OUTPATIENT
Start: 2024-12-04 | End: 2024-12-04 | Stop reason: HOSPADM

## 2024-12-04 RX ORDER — HYDROMORPHONE HYDROCHLORIDE 1 MG/ML
0.4 INJECTION, SOLUTION INTRAMUSCULAR; INTRAVENOUS; SUBCUTANEOUS
Status: DISCONTINUED | OUTPATIENT
Start: 2024-12-04 | End: 2024-12-04 | Stop reason: HOSPADM

## 2024-12-04 RX ORDER — ONDANSETRON 2 MG/ML
4 INJECTION INTRAMUSCULAR; INTRAVENOUS
Status: DISCONTINUED | OUTPATIENT
Start: 2024-12-04 | End: 2024-12-04 | Stop reason: HOSPADM

## 2024-12-04 RX ORDER — HYDROMORPHONE HYDROCHLORIDE 1 MG/ML
INJECTION, SOLUTION INTRAMUSCULAR; INTRAVENOUS; SUBCUTANEOUS
Status: COMPLETED
Start: 2024-12-04 | End: 2024-12-04

## 2024-12-04 RX ORDER — METRONIDAZOLE 500 MG/1
500 TABLET ORAL EVERY 12 HOURS
Status: DISCONTINUED | OUTPATIENT
Start: 2024-12-04 | End: 2024-12-04

## 2024-12-04 RX ORDER — HALOPERIDOL 5 MG/ML
1 INJECTION INTRAMUSCULAR
Status: DISCONTINUED | OUTPATIENT
Start: 2024-12-04 | End: 2024-12-04 | Stop reason: HOSPADM

## 2024-12-04 RX ORDER — DIPHENHYDRAMINE HYDROCHLORIDE 50 MG/ML
12.5 INJECTION INTRAMUSCULAR; INTRAVENOUS
Status: DISCONTINUED | OUTPATIENT
Start: 2024-12-04 | End: 2024-12-04 | Stop reason: HOSPADM

## 2024-12-04 RX ORDER — KETOROLAC TROMETHAMINE 15 MG/ML
15 INJECTION, SOLUTION INTRAMUSCULAR; INTRAVENOUS ONCE
Status: DISCONTINUED | OUTPATIENT
Start: 2024-12-04 | End: 2024-12-04 | Stop reason: HOSPADM

## 2024-12-04 RX ORDER — OXYCODONE HCL 5 MG/5 ML
10 SOLUTION, ORAL ORAL
Status: COMPLETED | OUTPATIENT
Start: 2024-12-04 | End: 2024-12-04

## 2024-12-04 RX ORDER — KETOROLAC TROMETHAMINE 15 MG/ML
15 INJECTION, SOLUTION INTRAMUSCULAR; INTRAVENOUS ONCE
Status: COMPLETED | OUTPATIENT
Start: 2024-12-04 | End: 2024-12-04

## 2024-12-04 RX ADMIN — CEFTRIAXONE SODIUM 2000 MG: 10 INJECTION, POWDER, FOR SOLUTION INTRAVENOUS at 04:27

## 2024-12-04 RX ADMIN — VANCOMYCIN 1500 MG: 1.5 INJECTION, SOLUTION INTRAVENOUS at 04:36

## 2024-12-04 RX ADMIN — METHOCARBAMOL TABLETS 500 MG: 500 TABLET, COATED ORAL at 03:25

## 2024-12-04 RX ADMIN — OXYCODONE HYDROCHLORIDE 10 MG: 10 TABLET ORAL at 08:12

## 2024-12-04 RX ADMIN — METOPROLOL TARTRATE 25 MG: 25 TABLET, FILM COATED ORAL at 04:28

## 2024-12-04 RX ADMIN — HYDROMORPHONE HYDROCHLORIDE 0.4 MG: 1 INJECTION, SOLUTION INTRAMUSCULAR; INTRAVENOUS; SUBCUTANEOUS at 13:35

## 2024-12-04 RX ADMIN — HYDROMORPHONE HYDROCHLORIDE 0.4 MG: 1 INJECTION, SOLUTION INTRAMUSCULAR; INTRAVENOUS; SUBCUTANEOUS at 13:50

## 2024-12-04 RX ADMIN — HYDROMORPHONE HYDROCHLORIDE 0.4 MG: 1 INJECTION, SOLUTION INTRAMUSCULAR; INTRAVENOUS; SUBCUTANEOUS at 13:40

## 2024-12-04 RX ADMIN — HYDROMORPHONE HYDROCHLORIDE 0.4 MG: 1 INJECTION, SOLUTION INTRAMUSCULAR; INTRAVENOUS; SUBCUTANEOUS at 13:45

## 2024-12-04 RX ADMIN — ACETAMINOPHEN 1000 MG: 500 TABLET ORAL at 08:10

## 2024-12-04 RX ADMIN — KETOROLAC TROMETHAMINE 15 MG: 15 INJECTION, SOLUTION INTRAMUSCULAR; INTRAVENOUS at 13:39

## 2024-12-04 RX ADMIN — DIPHENHYDRAMINE HYDROCHLORIDE 12.5 MG: 50 INJECTION, SOLUTION INTRAMUSCULAR; INTRAVENOUS at 13:14

## 2024-12-04 RX ADMIN — PROPOFOL 750 MG: 10 INJECTION, EMULSION INTRAVENOUS at 12:40

## 2024-12-04 RX ADMIN — OXYCODONE HYDROCHLORIDE 10 MG: 5 SOLUTION ORAL at 13:38

## 2024-12-04 RX ADMIN — HALOPERIDOL LACTATE 1 MG: 5 INJECTION, SOLUTION INTRAMUSCULAR at 13:07

## 2024-12-04 RX ADMIN — OMEPRAZOLE 20 MG: 20 CAPSULE, DELAYED RELEASE ORAL at 04:27

## 2024-12-04 ASSESSMENT — PAIN DESCRIPTION - PAIN TYPE
TYPE: CHRONIC PAIN
TYPE: CHRONIC PAIN

## 2024-12-04 NOTE — CARE PLAN
The patient is Stable - Low risk of patient condition declining or worsening    Shift Goals  Clinical Goals: LP with sedation  Patient Goals: pain control  Family Goals: POC updates and pain control    Progress made toward(s) clinical / shift goals:    Problem: Pain - Standard  Goal: Alleviation of pain or a reduction in pain to the patient’s comfort goal  Outcome: Not Progressing  Note: Patients pain has remained between 7-8 throughout this shift on the left side of his jaw. Patient has been medicated per MAR.        Patient is not progressing towards the following goals:      Problem: Pain - Standard  Goal: Alleviation of pain or a reduction in pain to the patient’s comfort goal  Outcome: Not Progressing  Note: Patients pain has remained between 7-8 throughout this shift on the left side of his jaw. Patient has been medicated per MAR.

## 2024-12-04 NOTE — PROGRESS NOTES
Beta 2 transferrin was negative  Collected: 12/01/24 1241   Result status: Final   Resulting lab: [facility]   Value: SEE NOTE   Comment: Test name                     Result Flag Units  RefIntvl  -------------------------------------------------------------  Beta-2 Transferrin      Not Detected  INTERPRETIVE INFORMATION: Beta-2 Transferrin  Detection of a beta-2 transferrin band by immunofixation is  indicative of the presence of cerebrospinal fluid in the specimen.  This test was developed and its performance characteristics  determined by [facility]. It has not been cleared or  approved by the US Food and Drug Administration. This test was  performed in a CLIA certified laboratory and is intended for  clinical purposes.  Performed By: [facility]  [address] [place] [id number]  : Korey Yuan MD, PhD  CLIA Number: [id number]     Plan  Still has not had lumbar puncture.  No radiographic or laboratory evidence of CSF leak.    Has L odontogenic sinusitis from dental procedure.  Will need coverage for oral giuseppe - unasyn or augmentin as out pt once meningitis has been ruled out.  Will need follow up with his dentist who performed the procedure.    Will sign off, reconsult w questions.

## 2024-12-04 NOTE — DISCHARGE PLANNING
Met with pt and wife was at bedside.   They reported that pt is independent with ADLs and IADLs. Does not use any DMEs. Goal is to go home.    Uses [facility].   PCP question (unanswered)  Wife will take pt home upon dc.       Care Transition Team Assessment    Information Source  Orientation Level: Oriented X4  Information Given By: Patient  Who is responsible for making decisions for patient? : Patient    Readmission Evaluation  Is this a readmission?: No    Elopement Risk  Legal Hold: No  Ambulatory or Self Mobile in Wheelchair: Yes  Disoriented: No  Psychiatric Symptoms: None  History of Wandering: No  Elopement this Admit: No  Vocalizing Wanting to Leave: No  Displays Behaviors, Body Language Wanting to Leave: No-Not at Risk for Elopement  Elopement Risk: Not at Risk for Elopement    Interdisciplinary Discharge Planning  Does Admitting Nurse Feel This Could be a Complex Discharge?: No  Lives with - Patient's Self Care Capacity: Spouse  Patient or legal guardian wants to designate a caregiver: No  Support Systems: Family Member(s)  Housing / Facility: 2 Story House  Do You Take your Prescribed Medications Regularly: Yes  Able to Return to Previous ADL's: No  Mobility Issues: No  Prior Services: Home-Independent  Patient Prefers to be Discharged to:: Home  Assistance Needed: Yes    Discharge Preparedness  What is your plan after discharge?: Home with help  What are your discharge supports?: Spouse  Prior Functional Level: Ambulatory, Drives Self, Independent with Activities of Daily Living, Independent with Medication Management  Difficulity with ADLs: None  Difficulity with IADLs: None    Functional Assesment  Prior Functional Level: Ambulatory, Drives Self, Independent with Activities of Daily Living, Independent with Medication Management    Finances  Financial Barriers to Discharge: No  Prescription Coverage: Yes    Vision / Hearing Impairment  Vision Impairment : No  Hearing Impairment :  No    Values / Beliefs / Concerns  Values / Beliefs Concerns : Yes    Advance Directive  Advance Directive?: None    Domestic Abuse  Have you ever been the victim of abuse or violence?: No  Possible Abuse/Neglect Reported to:: Not Applicable         Discharge Risks or Barriers  Discharge risks or barriers?: No  Patient risk factors: Multiple ED visits    Anticipated Discharge Information  Discharge Disposition: Discharged to home/self care (01)

## 2024-12-04 NOTE — CARE PLAN
The patient is Stable - Low risk of patient condition declining or worsening    Shift Goals  Clinical Goals: Pain control  Patient Goals: Discharge  Family Goals: discharge    Progress made toward(s) clinical / shift goals:  progressing    Patient is not progressing towards the following goals:      Problem: Pain - Standard  Goal: Alleviation of pain or a reduction in pain to the patient’s comfort goal  Outcome: Not Progressing

## 2024-12-04 NOTE — CONSULTS
INFECTIOUS DISEASES INPATIENT CONSULT NOTE     Date of Service: 12/4/2024    Consult Requested By: Jordyn Love M.D.    Reason for Consultation: Maxillary sinusitis, rule out meningitis    History of Present Illness:   Roberto Braswell is a 44 y.o. man with a history of mitral valve prolapse status post mitral valve repair in January 2024 and recent left upper root canal admitted 12/1/2024 secondary to jaw pain, rhinorrhea, headaches and neck stiffness.  Extensive review of emergency physician notes, hospital medicine notes and consultant notes performed.  Since his left upper root canal, he had been complaining of watery drainage from his left nostril.  He has also been complaining of a headache and neck stiffness.  He was initially seen in the ED on 11/27/2024.  There was concern for possible CSF leak versus postoperative pain.  ENT was contacted at that time and did not believe there was a CSF leak.  Oral surgery was also contacted and recommended outpatient follow-up.  The patient was discharged home on Augmentin with outpatient follow-up with ENT and oral surgery.  Unfortunately, he continued to have intractable rhinorrhea and worsening headaches with neck stiffness..  No report of any fevers or chills.  CT maxillofacial showed fluid and mucosal thickening in the left maxillary sinus.  MRI of the brain was unremarkable.  Due to concerns for possible meningitis given his symptoms and possible CSF leak, an LP was attempted on 12/2 but was unsuccessful.  Wound cultures from left nares is positive for cutibacterium acnes, Staphylococcus aureus and Staphylococcus epidermidis.  He has been evaluated by ENT.  Beta-2 transferrin was negative.  The patient is currently on vancomycin and ceftriaxone.      All other review of systems reviewed and negative except those documented above in the HPI.     PMH:   Past Medical History:   Diagnosis Date    Arthritis     Right ankle    Breath shortness 11/03/2023    With  exertion.    Drug-seeking behavior 07/30/2023    Demanding narcotics for a duodenal ulcer July 2023. Refused antacids or sucralfate.     Duodenal ulcer 07/30/2023    Gastric ulcer     Heart valve disease     Mitral valve prolapse    Hemorrhagic disorder (HCC)     GI bleed    Hypertension     Pain     Resolved; Right ankle    Seizure (HCC)     while taking tramadol       PSH:  Past Surgical History:   Procedure Laterality Date    IL UPPER GI ENDOSCOPY,DIAGNOSIS N/A 9/18/2024    Procedure: GASTROSCOPY;  Surgeon: Janae Oliver M.D.;  Location: SURGERY SAME DAY [facility];  Service: Gastroenterology    CAPSULE ENDOSCOPY ADMINISTRATION N/A 5/28/2024    Procedure: ADMINISTRATION, CAPSULE, FOR CAPSULE ENDOSCOPY;  Surgeon: Janae Oliver M.D.;  Location: SURGERY SAME DAY [facility];  Service: Gastroenterology    CAPSULE ENDOSCOPY RETRIEVAL  5/28/2024    Procedure: RETRIEVAL, ENDOSCOPY CAPSULE;  Surgeon: Janae Oliver M.D.;  Location: SURGERY SAME DAY [facility];  Service: Gastroenterology    IL COLONOSCOPY,DIAGNOSTIC  5/27/2024    Procedure: COLONOSCOPY;  Surgeon: Janae Oliver M.D.;  Location: [facility];  Service: Gastroenterology    IL UPPER GI ENDOSCOPY,DIAGNOSIS N/A 5/25/2024    Procedure: GASTROSCOPY;  Surgeon: Janae Oliver M.D.;  Location: [facility];  Service: Gastroenterology    IL UPPER GI ENDOSCOPY,BIOPSY N/A 5/25/2024    Procedure: GASTROSCOPY, WITH BIOPSY;  Surgeon: Janae Oliver M.D.;  Location: [facility];  Service: Gastroenterology    IL UPPER GI ENDOSCOPY,DIAGNOSIS N/A 5/18/2024    Procedure: GASTROSCOPY;  Surgeon: Kaitlyn Ontiveros M.D.;  Location: [facility];  Service: Gastroenterology    IL UPPER GI ENDOSCOPY,BIOPSY N/A 5/18/2024    Procedure: GASTROSCOPY, WITH BIOPSY;  Surgeon: Kaitlyn Ontiveros M.D.;  Location: [facility];  Service: Gastroenterology    GASTROSCOPY WITH ENDOSTAT N/A 5/18/2024    Procedure: EGD, WITH  CAUTERIZATION;  Surgeon: Kaitlyn Ontiveros M.D.;  Location: SURGERY [facility];  Service: Gastroenterology    FL REPLACEMENT OF MITRAL VALVE  1/25/2024    Procedure: MITRAL VALVE REPAIR, TRANSESOPHAGEAL ECHOCARDIOGRAM;  Surgeon: Bigg Santana M.D.;  Location: SURGERY [facility];  Service: Cardiothoracic    ECHOCARDIOGRAM, TRANSESOPHAGEAL, INTRAOPERATIVE  1/25/2024    Procedure: ECHOCARDIOGRAM, TRANSESOPHAGEAL, INTRAOPERATIVE;  Surgeon: Bigg Santana M.D.;  Location: SURGERY [facility];  Service: Cardiothoracic    FL UPPER GI ENDOSCOPY,DIAGNOSIS N/A 9/14/2023    Procedure: GASTROSCOPY;  Surgeon: Johnny Dalton M.D.;  Location: SURGERY SAME DAY [facility];  Service: Gastroenterology    FL UPPER GI ENDOSCOPY,BIOPSY N/A 9/14/2023    Procedure: GASTROSCOPY, WITH BIOPSY;  Surgeon: Johnny Dalton M.D.;  Location: SURGERY SAME DAY [facility];  Service: Gastroenterology    FL UPPER GI ENDOSCOPY,BIOPSY N/A 7/30/2023    Procedure: GASTROSCOPY, WITH BIOPSY;  Surgeon: Kumar Hope M.D.;  Location: SURGERY [facility];  Service: Gastroenterology    FL UPPER GI ENDOSCOPY,DIAGNOSIS  3/28/2022    Procedure: GASTROSCOPY;  Surgeon: Paco Wiggins M.D.;  Location: SURGERY SAME DAY [facility];  Service: Gastroenterology    FL UPPER GI ENDOSCOPY,BIOPSY  3/28/2022    Procedure: GASTROSCOPY, WITH BIOPSY;  Surgeon: Paco Wiggins M.D.;  Location: SURGERY SAME DAY [facility];  Service: Gastroenterology    ANKLE ARTHROSCOPY Right 5/21/2018    Procedure: ANKLE ARTHROSCOPY, LATERAL LIGAMENT RECONSTRUCTION;  Surgeon: Seth Cruz M.D.;  Location: SURGERY [facility];  Service: Orthopedics    BIOPSY ORTHO Right 5/21/2018    Procedure: BIOPSY ORTHO/ FOR CARTILAGE AND DENOVO PROCEDURE;  Surgeon: Seth Cruz M.D.;  Location: SURGERY [facility];  Service: Orthopedics    OTHER ABDOMINAL SURGERY      Cholecystectomy February 2017    OTHER ORTHOPEDIC SURGERY      2 previous ankle surgeries (2007, 2009)       FAMILY HX:  Family  History   Problem Relation Age of Onset    Heart Disease Mother     No Known Problems Father     Clotting Disorder Neg Hx        SOCIAL HX:  Social History     Socioeconomic History    Marital status:      Spouse name: Not on file    Number of children: Not on file    Years of education: Not on file    Highest education level: GED or equivalent   Occupational History    Not on file   Tobacco Use    Smoking status: Never    Smokeless tobacco: Never   Vaping Use    Vaping status: Never Used   Substance and Sexual Activity    Alcohol use: Not Currently    Drug use: Never    Sexual activity: Not on file   Other Topics Concern    Not on file   Social History Narrative    Not on file     Social Drivers of Health     Financial Resource Strain: Medium Risk (10/26/2024)    Overall Financial Resource Strain (CARDIA)     Difficulty of Paying Living Expenses: Somewhat hard   Food Insecurity: No Food Insecurity (12/1/2024)    Hunger Vital Sign     Worried About Running Out of Food in the Last Year: Never true     Ran Out of Food in the Last Year: Never true   Recent Concern: Food Insecurity - Food Insecurity Present (10/26/2024)    Hunger Vital Sign     Worried About Running Out of Food in the Last Year: Sometimes true     Ran Out of Food in the Last Year: Never true   Transportation Needs: No Transportation Needs (12/1/2024)    PRAPARE - Transportation     Lack of Transportation (Medical): No     Lack of Transportation (Non-Medical): No   Physical Activity: Inactive (10/26/2024)    Exercise Vital Sign     Days of Exercise per Week: 0 days     Minutes of Exercise per Session: 0 min   Stress: Stress Concern Present (10/26/2024)    [employer] [place] [employer] - Occupational Stress Questionnaire     Feeling of Stress : To some extent   Social Connections: Socially Isolated (10/26/2024)    Social Connection and Isolation Panel [NHANES]     Frequency of Communication with Friends and Family: Never     Frequency of  Social Gatherings with Friends and Family: Never     Attends Mandaen Services: Never     Active Member of Clubs or Organizations: No     Attends Club or Organization Meetings: Never     Marital Status:    Intimate Partner Violence: Not At Risk (12/1/2024)    Humiliation, Afraid, Rape, and Kick questionnaire     Fear of Current or Ex-Partner: No     Emotionally Abused: No     Physically Abused: No     Sexually Abused: No   Housing Stability: Low Risk  (12/1/2024)    Housing Stability Vital Sign     Unable to Pay for Housing in the Last Year: No     Number of Times Moved in the Last Year: 0     Homeless in the Last Year: No   Recent Concern: Housing Stability - High Risk (10/26/2024)    Housing Stability Vital Sign     Unable to Pay for Housing in the Last Year: Yes     Number of Times Moved in the Last Year: 0     Homeless in the Last Year: No     Social History     Tobacco Use   Smoking Status Never   Smokeless Tobacco Never     Social History     Substance and Sexual Activity   Alcohol Use Not Currently       Allergies/Intolerances:  Allergies   Allergen Reactions    Morphine Vomiting    Tramadol Unspecified     Seizure  [id number]       History reviewed with the patient     Other Current Medications:    Current Facility-Administered Medications:     vancomycin 1500 mg in  mL ivpb premix, 1,500 mg, Intravenous, Q8HR, Jordyn Love M.D., Stopped at 12/04/24 0636    methocarbamol (Robaxin) tablet 500 mg, 500 mg, Oral, TID, Jordyn Love M.D., 500 mg at 12/04/24 0325    oxyCODONE immediate release (Roxicodone) tablet 10 mg, 10 mg, Oral, 5X/DAY, Jordyn Love M.D., 10 mg at 12/04/24 0812    hydrOXYzine HCl (Atarax) tablet 25 mg, 25 mg, Oral, TID PRN, Jordyn Love M.D.    [Held by provider] cyclobenzaprine (Flexeril) tablet 10 mg, 10 mg, Oral, TID PRN, Cornelius Bassett M.D.    [Held by provider] enoxaparin (Lovenox) inj 40 mg, 40 mg, Subcutaneous, DAILY AT 1800, Jhonny Sanchez D.OJennifer, 40 mg at  "24 0612    senna-docusate (Pericolace Or Senokot S) 8.6-50 MG per tablet 2 Tablet, 2 Tablet, Oral, Q EVENING, 2 Tablet at 24 1618 **AND** polyethylene glycol/lytes (Miralax) Packet 1 Packet, 1 Packet, Oral, QDAY PRN, Jhonny Sanchez D.O.    MD Alert...Vancomycin per Pharmacy, , Other, PHARMACY TO DOSE, Jhonny Sanchez D.O.    cefTRIAXone (Rocephin) syringe 2,000 mg, 2,000 mg, Intravenous, Q12HRS, Jhonny Sanchez D.O., 2,000 mg at 24    [Held by provider] aspirin EC tablet 81 mg, 81 mg, Oral, DAILY, Jhonny Sanchez D.O., 81 mg at 24 0520    metoprolol tartrate (Lopressor) tablet 25 mg, 25 mg, Oral, BID, MERVIN CurryOJennifer, 25 mg at 24 0428    zolpidem (Ambien) tablet 10 mg, 10 mg, Oral, HS PRN, MREVIN CurryOJennifer, 10 mg at 24    omeprazole (PriLOSEC) capsule 20 mg, 20 mg, Oral, BID, MERVIN CurryOJennifer, 20 mg at 24    Pharmacy Consult Request ...Pain Management Review 1 Each, 1 Each, Other, PHARMACY TO DOSE, Jhonny Sanchez D.O.    acetaminophen (Tylenol) tablet 1,000 mg, 1,000 mg, Oral, TID, 1,000 mg at 24 0810 **FOLLOWED BY** [START ON 2024] acetaminophen (Tylenol) tablet 1,000 mg, 1,000 mg, Oral, TID PRN, Jhonny Sanchez D.O.    [Held by provider] oxyCODONE immediate release (Roxicodone) tablet 10 mg, 10 mg, Oral, Q3HRS PRN, 10 mg at 24 1112 **OR** [Held by provider] oxyCODONE immediate release (Roxicodone) tablet 20 mg, 20 mg, Oral, Q3HRS PRN, 20 mg at 24 0643 **OR** [Held by provider] HYDROmorphone (Dilaudid) injection 1 mg, 1 mg, Intravenous, Q3HRS PRN, Jhonny Sacnhez D.O., 1 mg at 24 0805  [unfilled]    Most Recent Vital Signs:  BP (!) 172/113 Comment: Rn notified   Pulse 95   Temp 36.4 °C (97.5 °F) (Temporal)   Resp 16   Ht 1.905 m (6' 3\")   Wt 109 kg (239 lb 13.8 oz)   SpO2 96%   BMI 29.98 kg/m²   Temp  Av.6 °C (97.8 °F)  Min: 36.2 °C (97.2 °F)  Max: 36.8 °C (98.3 " °F)    Physical Exam:  General: well nourished, no diaphoresis, well-appearing, no acute distress  HEENT: sclera anicteric, PERRL, extraocular muscles intact, mucous membranes moist, oropharynx clear and moist, no oral lesions or exudate  Neck: Neck stiffness and neck tenderness to palpation posteriorly  Chest: CTAB, no rales, rhonchi or wheezes, normal work of breathing.  Cardiac: regular rate and rhythm, normal S1 S2, no murmurs, rubs or gallops  Abdomen: + bowel sounds, soft, non-tender, non-distended, no hepatosplenomegaly  Extremities: WWP, no edema, 2+ pedal pulses  Skin: warm and dry, no rashes or worrisome lesions  Neuro: Alert and oriented times 3, non-focal exam, speech fluent, full range of motion to bilateral upper and lower extremities  Psych: normal mood and behavior, pleasant; memory intact, normal judgement    Pertinent Lab Results:  Recent Labs     12/02/24  0357 12/03/24  0414 12/04/24  0245   WBC 5.5 6.7 6.9      Recent Labs     12/02/24  0357 12/03/24  0414 12/04/24  0245   HEMOGLOBIN 12.6* 13.9* 13.6*   HEMATOCRIT 40.7* 44.6 42.1   MCV 82.1 81.1* 80.3*   MCH 25.4* 25.3* 26.0*   PLATELETCT 349 374 354         Recent Labs     12/02/24  0357 12/03/24  0414 12/04/24  0245   SODIUM 140 139 142   POTASSIUM 4.6 4.5 3.9   CHLORIDE 110 108 109   CO2 22 22 17*   CREATININE 0.98 0.82 0.80        Recent Labs     12/02/24  0357 12/03/24  0414   ALBUMIN 3.6 3.9        Pertinent Micro:  Results       Procedure Component Value Units Date/Time    CULTURE WOUND W/ GRAM STAIN [[id number]]  (Abnormal) Collected: 12/01/24 1241    Order Status: Completed Specimen: Wound Updated: 12/04/24 0736     Significant Indicator POS     Source WND     Site potential csf leak from rhinorr     Culture Result Growth noted after further incubation, see below for  organism identification.       Gram Stain Result Many WBCs.  Few Gram positive cocci.  Slide made from concentrated specimen.       Culture Result Cutibacterium  "(Propionibacterium) acnes  Rare growth        Staphylococcus aureus  Rare growth        Staphylococcus epidermidis  Rare growth      CSF CULTURE [[id number]]     Order Status: No result Specimen: CSF from Tap     GRAM STAIN [[id number]] Collected: 12/01/24 1241    Order Status: Completed Specimen: Wound Updated: 12/01/24 1403     Significant Indicator .     Source WND     Site potential csf leak from rhinorr     Gram Stain Result Many WBCs.  Few Gram positive cocci.  Slide made from concentrated specimen.      FLUID CULTURE [[id number]] Collected: 12/01/24 1241    Order Status: Canceled Specimen: Other Body Fluid     MRSA By PCR (Amp) [[id number]] Collected: 12/01/24 0230    Order Status: Completed Specimen: Respirate from Nares Updated: 12/01/24 1221     MRSA by PCR Negative    BLOOD CULTURE [[id number]] Collected: 12/01/24 0308    Order Status: Completed Specimen: Blood from Peripheral Updated: 12/01/24 0608     Significant Indicator NEG     Source BLD     Site PERIPHERAL     Culture Result No Growth  Note: Blood cultures are incubated for 5 days and  are monitored continuously.Positive blood cultures  are called to the RN and reported as soon as  they are identified.      BLOOD CULTURE [[id number]] Collected: 12/01/24 0201    Order Status: Completed Specimen: Blood from Peripheral Updated: 12/01/24 0408     Significant Indicator NEG     Source BLD     Site PERIPHERAL     Culture Result No Growth  Note: Blood cultures are incubated for 5 days and  are monitored continuously.Positive blood cultures  are called to the RN and reported as soon as  they are identified.            No results found for: \"BLOODCULTU\", \"BLDCULT\", \"BCHOLD\"     Studies:  MR-BRAIN-WITH & W/O    Result Date: 12/1/2024 12/1/2024 8:41 AM HISTORY/REASON FOR EXAM:  potential CSF leak on left through nares. TECHNIQUE/EXAM DESCRIPTION: MRI of the brain without and with contrast, with diffusion. The study was performed on a Modulus Video 1.5 Ivana MRI " scanner. Spoiled-GRASS sagittal, thin-section T2 axial, T1 coronal, T1 postcontrast coronal, T1 postcontrast axial, FLAIR coronal and diffusion-weighted axial images were obtained of the whole brain. 20 mL ProHance contrast were administered intravenously. COMPARISON:  10/26/2024. FINDINGS: The cortical sulci and gyri are within normal limits. The ventricular system is within normal limits. The bony calvaria are intact. There is no evidence of extra-axial fluid collection or intracranial mass effect. There is normal signal intensity in the brain parenchyma. There is no evidence of abnormal diffusion-weighted signal intensity in the brain. There is no evidence of abnormal enhancement in the brain parenchyma. There are normal flow voids in the cavernous carotid arteries and M1 segments. There are normal flow voids in the distal vertebral basilar system. There are normal flow voids in the dural venous sinuses. There is left maxillary sinus mucosal thickening with a small dependent fluid level. Remaining paranasal sinuses and mastoid air cells are well-aerated.     1.  Unremarkable MRI of the brain with and without contrast enhancement. 2.  Left maxillary sinus mucosal thickening with a small dependent fluid level likely on the basis of acute sinusitis.    CT-MAXILLOFACIAL WITH PLUS RECONS    Result Date: 12/1/2024 12/1/2024 2:06 AM HISTORY/REASON FOR EXAM:  Recent dental surgery with bony perforation, rule out abscess. TECHNIQUE/EXAM DESCRIPTION AND NUMBER OF VIEWS:  CT scan of the maxillofacial with contrast, with reconstructions. Thin-section helical imaging was obtained of the maxillofacial structures from the orbital roofs through the mandible. Coronal and sagittal multiplanar volume reformat images were generated from the axial data., 80 mL of Omnipaque 350 nonionic contrast was injected intravenously. Low dose optimization technique was utilized for this CT exam including automated exposure control and  adjustment of the mA and/or kV according to patient size. COMPARISON:  None. FINDINGS: Tissues are somewhat obscured by artifact from patient's extensive dental amalgam. No fracture is seen. No bony erosion is evident. There is fluid and mucosal thickening in the LEFT maxillary sinus. Submandibular and parotid glands are unremarkable. No abnormally enlarged lymph nodes. Orbits unremarkable. Visualized intracranial contents unremarkable.     No extra anatomic soft tissue fluid collection is seen    CT-HEAD W/O    Result Date: 11/27/2024 11/27/2024 10:18 PM HISTORY/REASON FOR EXAM:  MOUTH PAIN; Ghost protocol. Possible CSF leak TECHNIQUE/EXAM DESCRIPTION AND NUMBER OF VIEWS: CT of the head without contrast. The study was performed on a helical multidetector CT scanner. Contiguous axial sections were obtained from the skull base through the vertex. Up to date radiation dose reduction adjustments have been utilized to meet ALARA standards for radiation dose reduction. COMPARISON:  None available FINDINGS: There is no fracture or abnormal extra-axial fluid collection. There is no evidence for intracranial hemorrhage or acute infarction. Ventricles are within normal limits and the basilar cisterns are patent. There is no mass effect or midline shift. There is a small amount of fluid in the left maxillary sinus. There is also a small polyp or mucous retention cyst in the left ethmoid. Mastoids in the field of view are unremarkable.     1. No definite acute intracranial abnormality. 2. There is a small amount of fluid in left maxillary sinus.       IMPRESSION:   Headache and neck stiffness, concerning for meningitis  Left maxillary sinusitis  Recent left upper root canal 2 weeks prior to admission  History of mitral valve prolapse status post mitral valve repair    ASSESSMENT/PLAN:   Roberto Braswell is a 44 y.o. male with a history of mitral valve prolapse status post mitral valve repair in January 2024 and recent left  upper root canal 2 weeks prior to admission admitted on 12/1/2024 secondary to persistent rhinorrhea, headaches and neck stiffness despite outpatient Augmentin.  MRI of the brain unremarkable but shows acute sinusitis of the maxillary sinus.  There was concern for possible CSF leak however beta-2 transferrin was negative.  Despite antibiotics, he continues to have headaches and neck stiffness and thus there is concern for meningitis.    -Recommend lumbar puncture.  Please send CSF for routine cell count, protein, glucose, meningitis/encephalitis panel PCR and CSF cultures  -Add Flagyl 500 mg twice daily for anaerobic coverage for sinusitis  -Continue IV vancomycin and ceftriaxone 2 g every 12 for now  -Monitor renal function and Vanco trough  -Wound culture from left nares+ cutibacterium acnes, Staphylococcus aureus and Staphylococcus epidermidis.  Follow susceptibilities  -Further recommendations pending lumbar puncture results and CSF analysis    Disposition: TBD    Need for midline: TBD      Plan of care discussed with hospitalist Jordyn Love M.D. and wife at bedside.  Will continue to follow    Bonny Correa M.D.      Please note that this dictation was created using voice recognition software. I have worked with technical experts from [facility]  [facility] to optimize the interface.  I have made every reasonable attempt to correct obvious errors, but there may be errors of grammar and possibly content that I did not discover before finalizing the note.    Addendum:  CSF fluid analysis not consistent with meningitis.  Meningitis/encephalitis panel PCR also negative  Will discontinue IV vancomycin, ceftriaxone and Flagyl  Transition to IV Unasyn  At discharge, okay to transition to Augmentin 875 mg twice daily to complete a 10-day course for sinusitis    Updated plan of care discussed with hospitalist, Dr. Love.  ID signing off   n/a

## 2024-12-04 NOTE — ANESTHESIA PREPROCEDURE EVALUATION
Date/Time: 12/04/24 1200    Procedure: DX-LUMBAR PUNCTURE FOR DIAGNOSIS    Diagnosis:       CSF leak from nose [G96.01]      CSF leak from nose [G96.01]    Indications:       Other - Please Comment      pending workup for nasal CSF leak, r/o meningitis    Location: [facility] - [facility]            Relevant Problems   NEURO   (positive) Headache      CARDIAC   (positive) Essential hypertension       Physical Exam    Airway   Mallampati: II  TM distance: >3 FB  Neck ROM: full       Cardiovascular - normal exam  Rhythm: regular  Rate: normal  (-) murmur     Dental - normal exam           Pulmonary - normal exam  Breath sounds clear to auscultation     Abdominal    Neurological - normal exam                   Anesthesia Plan    ASA 3       Plan - MAC               Induction: intravenous    Postoperative Plan: Postoperative administration of opioids is intended.    Pertinent diagnostic labs and testing reviewed    Informed Consent:    Anesthetic plan and risks discussed with patient.    Use of blood products discussed with: patient whom consented to blood products.

## 2024-12-04 NOTE — PROGRESS NOTES
Patient was due for oxycodone 10 mg PO. Due to circumstances with the previous administration, a second nurse was taken into the room and the patient's mouth was checked after. The patient understood why this was happening and was ok with following through with this.

## 2024-12-04 NOTE — CARE PLAN
The patient is Stable - Low risk of patient condition declining or worsening    Shift Goals  Clinical Goals: Pain control  Patient Goals: Discharge  Family Goals: Discharge    Progress made toward(s) clinical / shift goals:  Patient is A&Ox4. Medicated patient per MAR for pain. Medication administration visualized by this RN and checking for pocketing. Patient is ambulatory. IV antibiotics in place. Bed is low and locked. Call light within reach. Hourly rounding continues.      Problem: Pain - Standard  Goal: Alleviation of pain or a reduction in pain to the patient’s comfort goal  12/4/2024 0318 by Lucila Craig R.N.  Outcome: Progressing  12/4/2024 0317 by Lucila Craig R.N.  Outcome: Progressing     Problem: Knowledge Deficit - Standard  Goal: Patient and family/care givers will demonstrate understanding of plan of care, disease process/condition, diagnostic tests and medications  12/4/2024 0318 by Lucila Craig R.N.  Outcome: Progressing  12/4/2024 0317 by Lucila Craig R.N.  Outcome: Progressing       Patient is not progressing towards the following goals:

## 2024-12-04 NOTE — PROGRESS NOTES
Hospital Medicine Daily Progress Note    Date of Service  12/3/2024    Chief Complaint  Roberto Braswell is a 44 y.o. male admitted 12/1/2024 with headache with concern for CSF leak following root canal procedure 2 weeks ago; rule out meningitis.    Hospital Course  43 yo man with history of GI bleed, HLD, mitral valve repair who had left upper root canal 2 weeks ago complicated by left jaw pain, rhinorrhea, headache, neck stiffness.  He is continue to have watery drainage from his left nostril associated with severe headache.  He was seen 11/28 for antibiotics and sent to the ER for possible CSF leak.  CT maxillofacial was negative for any soft tissue fluid collection.  Beta-2 transferrin lab from 11/30 was not detected but sample volume was insufficient.     Interval Problem Update  Pertinent labs and imaging reviewed  Afebrile, hemodynamically stable, no oxygen requirements  Voiding, stooling, tolerating oral intake well  Previous bowel movement 12/3  LP with anesthesia pending  Beta 2 transferrin pending     I have discussed this patient's plan of care and discharge plan at IDT rounds today with Case Management, Nursing, Nursing leadership, and other members of the IDT team.    Consultants/Specialty  None    Code Status  Full Code    Disposition  The patient is not medically cleared for discharge to home or a post-acute facility.  Anticipate discharge to: home with close outpatient follow-up    I have placed the appropriate orders for post-discharge needs.    Review of Systems  Review of Systems   HENT:  Positive for congestion.    Musculoskeletal:  Positive for neck pain.   Psychiatric/Behavioral:  The patient is nervous/anxious.    All other systems reviewed and are negative.       Physical Exam  Temp:  [36.2 °C (97.2 °F)-36.8 °C (98.2 °F)] 36.8 °C (98.2 °F)  Pulse:  [54-95] 83  Resp:  [14-17] 16  BP: (141-161)/() 161/104  SpO2:  [92 %-98 %] 98 %    Physical Exam  Constitutional:       General: He is  not in acute distress.     Appearance: Normal appearance. He is normal weight.   HENT:      Head: Normocephalic and atraumatic.      Nose: Nose normal.   Eyes:      Extraocular Movements: Extraocular movements intact.   Pulmonary:      Effort: Pulmonary effort is normal. No respiratory distress.   Musculoskeletal:      Cervical back: Normal range of motion.      Right lower leg: No edema.      Left lower leg: No edema.   Skin:     General: Skin is dry.   Neurological:      General: No focal deficit present.      Mental Status: He is alert and oriented to person, place, and time.   Psychiatric:         Mood and Affect: Mood normal.         Behavior: Behavior normal.         Thought Content: Thought content normal.       Fluids    Intake/Output Summary (Last 24 hours) at 12/3/2024 1618  Last data filed at 12/3/2024 1200  Gross per 24 hour   Intake 1260 ml   Output 0 ml   Net 1260 ml        Laboratory  Recent Labs     12/01/24  0201 12/02/24  0357 12/03/24  0414   WBC 7.1 5.5 6.7   RBC 5.36 4.96 5.50   HEMOGLOBIN 14.1 12.6* 13.9*   HEMATOCRIT 43.7 40.7* 44.6   MCV 81.5 82.1 81.1*   MCH 26.3* 25.4* 25.3*   MCHC 32.3 31.0* 31.2*   RDW 60.3* 60.9* 57.7*   PLATELETCT 372 349 374   MPV 8.8* 9.1 8.6*     Recent Labs     12/01/24  0201 12/02/24  0357 12/03/24  0414   SODIUM 143 140 139   POTASSIUM 4.2 4.6 4.5   CHLORIDE 111 110 108   CO2 19* 22 22   GLUCOSE 87 102* 107*   BUN 19 14 14   CREATININE 1.08 0.98 0.82   CALCIUM 9.2 8.7 8.8     Recent Labs     12/01/24  0201 12/02/24  0941   APTT 25.6 26.3   INR 1.02 0.99               Imaging  MR-BRAIN-WITH & W/O   Final Result      1.  Unremarkable MRI of the brain with and without contrast enhancement.   2.  Left maxillary sinus mucosal thickening with a small dependent fluid level likely on the basis of acute sinusitis.      CT-MAXILLOFACIAL WITH PLUS RECONS   Final Result      No extra anatomic soft tissue fluid collection is seen      DX-LUMBAR PUNCTURE FOR DIAGNOSIS     (Results Pending)        Assessment/Plan  Roberto Braswell is a 44 y.o. male admitted 12/1/2024 with headache with concern for CSF leak following root canal procedure 2 weeks ago; rule out meningitis.    * CSF leak from nose- (present on admission)  Assessment & Plan  MRI brain showed acute left sinusitis  Patient with drainage after root canal procedure from his left nare with associated rhinorrhea that profusely drains, initially clear now straw-colored  Outpatient beta-2 transferrin was negative, noted to be insufficient sample  New beta-2 transferrin level is pending, fluid culture is also pending  Dr. Epps ENT reviewed the MRI: unlikely this is a CSF leak from a root canal and more likely acute sinusitis.  Can call back if beta-2 transferrin levels positive.  Continue ceftriaxone and vancomycin for now    Anxiety  Assessment & Plan  Patient reported anxiety as it relates to lumbar puncture  As needed Atarax available    Headache  Assessment & Plan  Ruling out spinal headache from CSF leak  Beta-2 transferrin pending  Multimodal pain management available  Close physiologic monitoring while on narcotics    Patient was recommended against taking narcotic medications outside of the schedule set forth by the ordering physician.  The patient was informed that the nursing staff was concerned about him taking medications inappropriately.  The patient was informed that this concern has been escalated to a nursing leadership.  The patient will be resumed on his outpatient medication regimen.  Will consider the addition of medications for breakthrough pain as clinically indicated.    Neck stiffness  Assessment & Plan  Possible CSF leak.  New onset neck stiffness with severe headache  Continue ceftriaxone and vancomycin  Patient to undergo LP with anesthesia on 12/4  N.p.o. at midnight         VTE prophylaxis:    pharmacologic prophylaxis contraindicated due to Procedure      I have performed a physical exam and  reviewed and updated ROS and Plan today (12/3/2024). In review of yesterday's note (12/2/2024), there are no changes except as documented above.    I provided a total of 53 minutes addressing the patient's medical and discharge needs while in the acute care setting. This patient's care required the review of medical records and diagnostic studies, direct face-to-face time with the patient and/or family, documentation, and communication with my interdisciplinary team of RNs, pharmacists, and .

## 2024-12-04 NOTE — PROGRESS NOTES
Pharmacy Vancomycin Kinetics Note for 12/3/2024     44 y.o. male on Vancomycin day # 3     Vancomycin Indication (AUC Dosing):    Vancomycin Indication (Two level):    Vancomycin Indication (Trough based Dosing): CNS infection (goal of 18-22)    Provider specified end date: 12/05/24    Active Antibiotics (From admission, onward)      Ordered     Ordering Provider       Tue Dec 3, 2024  9:48 AM    12/03/24 0948  vancomycin 1500 mg in  mL ivpb premix  (vancomycin (VANCOCIN) IV (LD + Maintenance))  EVERY 8 HOURS         Jordyn Love M.D.       Sun Dec 1, 2024  5:26 AM    12/01/24 0526  cefTRIAXone (Rocephin) syringe 2,000 mg  EVERY 12 HOURS         Jhonny Sanchez D.O.    12/01/24 0526  MD Alert...Vancomycin per Pharmacy  (MD Alert...Vancomycin per Pharmacy)  PHARMACY TO DOSE        Question:  Indication(s) for vancomycin?  Answer:  Central nervous system infection    Jhonny Sanchez D.O.            Dosing Weight: 109 kg (240 lb 4.8 oz)      Admission History: Admitted on 12/1/2024 for CSF leak from nose [G96.01]  Pertinent history: 44-year-old male admitted with drainage after root canal procedure from his left nare with associated rhinorrhea that profusely drains straw-colored fluid. Brain MRI with acute L sinusitis. New onset neck stiffness and severe headache. Ceftriaxone and vancomycin initiated empirically on admission. LP pending.    Allergies:     Morphine and Tramadol     Pertinent cultures to date:     Results       Procedure Component Value Units Date/Time    CULTURE WOUND W/ GRAM STAIN [[id number]] Collected: 12/01/24 1241    Order Status: Completed Specimen: Wound Updated: 12/03/24 0850     Significant Indicator NEG     Source WND     Site potential csf leak from rhinorr     Culture Result No growth at 48 hours.     Gram Stain Result Many WBCs.  Few Gram positive cocci.  Slide made from concentrated specimen.      CSF CULTURE [[id number]]     Order Status: No result Specimen: CSF from Tap      GRAM STAIN [[id number]] Collected: 12/01/24 1241    Order Status: Completed Specimen: Wound Updated: 12/01/24 1403     Significant Indicator .     Source WND     Site potential csf leak from rhinorr     Gram Stain Result Many WBCs.  Few Gram positive cocci.  Slide made from concentrated specimen.      FLUID CULTURE [[id number]] Collected: 12/01/24 1241    Order Status: Canceled Specimen: Other Body Fluid     MRSA By PCR (Amp) [[id number]] Collected: 12/01/24 0230    Order Status: Completed Specimen: Respirate from Nares Updated: 12/01/24 1221     MRSA by PCR Negative    BLOOD CULTURE [[id number]] Collected: 12/01/24 0308    Order Status: Completed Specimen: Blood from Peripheral Updated: 12/01/24 0608     Significant Indicator NEG     Source BLD     Site PERIPHERAL     Culture Result No Growth  Note: Blood cultures are incubated for 5 days and  are monitored continuously.Positive blood cultures  are called to the RN and reported as soon as  they are identified.      BLOOD CULTURE [[id number]] Collected: 12/01/24 0201    Order Status: Completed Specimen: Blood from Peripheral Updated: 12/01/24 0408     Significant Indicator NEG     Source BLD     Site PERIPHERAL     Culture Result No Growth  Note: Blood cultures are incubated for 5 days and  are monitored continuously.Positive blood cultures  are called to the RN and reported as soon as  they are identified.              Labs:     Estimated Creatinine Clearance: 154.9 mL/min (by C-G formula based on SCr of 0.82 mg/dL).  Recent Labs     12/01/24  0201 12/02/24  0357 12/03/24 0414   WBC 7.1 5.5 6.7   NEUTSPOLYS 60.50 47.50 57.90     Recent Labs     12/01/24  0201 12/02/24  0357 12/03/24  0414   BUN 19 14 14   CREATININE 1.08 0.98 0.82   ALBUMIN 4.6 3.6 3.9       Intake/Output Summary (Last 24 hours) at 12/3/2024 1649  Last data filed at 12/3/2024 1600  Gross per 24 hour   Intake 1660 ml   Output 0 ml   Net 1660 ml      BP (!) 161/104   Pulse 83   Temp 36.8 °C (98.2 °F)  "(Temporal)   Resp 16   Ht 1.905 m (6' 3\")   Wt 109 kg (239 lb 13.8 oz)   SpO2 98%  Temp (24hrs), Av.6 °C (97.9 °F), Min:36.2 °C (97.2 °F), Max:36.8 °C (98.2 °F)      List concerns for Vancomycin clearance:     Obesity    Pharmacokinetics:     Trough kinetics:   Recent Labs     24  0414   VANCOTROUGH 13.9       A/P:     Vancomycin trough obtained after 5th maintenance dose. Trough came back sub-therapeutic at 13.9 (goal 18-22).   Will increase vancomycin dose from 1250 mg every 8 hours to 1500 mg every 8 hours.   LP pending - follow up for results for potential de escalation when/if appropriate.   Ordered repeat level prior to fourth (new) maintenance dose on  @1130    Yisel Yanes, PharmD  [id number]  "

## 2024-12-05 ENCOUNTER — OFFICE VISIT (OUTPATIENT)
Dept: MEDICAL GROUP | Facility: LAB | Age: 44
End: 2024-12-05
Payer: COMMERCIAL

## 2024-12-05 VITALS
WEIGHT: 244.4 LBS | SYSTOLIC BLOOD PRESSURE: 120 MMHG | HEIGHT: 75 IN | HEART RATE: 83 BPM | BODY MASS INDEX: 30.39 KG/M2 | OXYGEN SATURATION: 98 % | DIASTOLIC BLOOD PRESSURE: 70 MMHG | RESPIRATION RATE: 16 BRPM | TEMPERATURE: 97.8 F

## 2024-12-05 DIAGNOSIS — F51.01 PRIMARY INSOMNIA: ICD-10-CM

## 2024-12-05 PROCEDURE — 3074F SYST BP LT 130 MM HG: CPT | Performed by: STUDENT IN AN ORGANIZED HEALTH CARE EDUCATION/TRAINING PROGRAM

## 2024-12-05 PROCEDURE — 99214 OFFICE O/P EST MOD 30 MIN: CPT | Performed by: STUDENT IN AN ORGANIZED HEALTH CARE EDUCATION/TRAINING PROGRAM

## 2024-12-05 PROCEDURE — 3078F DIAST BP <80 MM HG: CPT | Performed by: STUDENT IN AN ORGANIZED HEALTH CARE EDUCATION/TRAINING PROGRAM

## 2024-12-05 ASSESSMENT — ENCOUNTER SYMPTOMS
FEVER: 0
HEADACHES: 1
CHILLS: 0
SHORTNESS OF BREATH: 0

## 2024-12-05 ASSESSMENT — FIBROSIS 4 INDEX: FIB4 SCORE: 0.46

## 2024-12-05 NOTE — PROGRESS NOTES
Discharge orders received.  Patient arrived to the discharge lounge.  PIV removed by discharge RN. Meds to beds medications not ordered, medication sent to patient's home pharmacy.  Instructions given, medications reviewed and general discharge education provided to patient.  Follow up appointments discussed.  Patient verbalized understanding of dc instructions and prescriptions.  Patient signed discharge instructions.  Patient verbalized he had all belongings with him, Denied having any home medications locked in our inpatient pharmacy that  he needs back. Patient ambulated with steady gait from discharge lounge to private vehicle. Patient left via car with spouse to home in stable condition.

## 2024-12-05 NOTE — ANESTHESIA POSTPROCEDURE EVALUATION
Patient: Roberto Braswell    Procedure Summary       Date: 12/04/24 Room / Location: [facility]    Anesthesia Start: 1155 Anesthesia Stop: 1220    Procedure: DX-LUMBAR PUNCTURE FOR DIAGNOSIS Diagnosis:       CSF leak from nose      CSF leak from nose      (Other - Please Comment)      (pending workup for nasal CSF leak, r/o meningitis)    Scheduled Providers:  Responsible Provider: Mick Florentino M.D.    Anesthesia Type: MAC ASA Status: 3            Final Anesthesia Type: MAC  Last vitals  BP   Blood Pressure: 116/76    Temp   36.2 °C (97.2 °F)    Pulse   (!) 56   Resp   12    SpO2   97 %      Anesthesia Post Evaluation    Patient location during evaluation: PACU  Patient participation: complete - patient participated  Level of consciousness: awake and alert    Airway patency: patent  Anesthetic complications: no  Cardiovascular status: hemodynamically stable  Respiratory status: acceptable  Hydration status: euvolemic    PONV: none          No notable events documented.     Nurse Pain Score: 6 (NPRS)

## 2024-12-05 NOTE — DISCHARGE SUMMARY
Discharge Summary    CHIEF COMPLAINT ON ADMISSION  Chief Complaint   Patient presents with    Jaw Pain       Reason for Admission  Other     Admission Date  12/1/2024    CODE STATUS  Prior    HPI & HOSPITAL COURSE    The patient underwent a root canal approximately 2 weeks prior to arrival to the emergency department immediately following a root canal, the patient drainage in his left nose that was initially clear and then transition to a yellowish there was a concern for CSF leak.  Patient underwent beta-2 transferrin testing which was negative, however with an inadequate sample.  As such, this testing was repeated and ultimately again patient underwent imaging to assess for CSF leak.  These images reviewed by hospitalist and ENT.  ENT held a low concern for CSF leak based off of imaging and laboratory evaluation.      The patient complained of ongoing headache and neck pain in the setting of possible CSF leak.  As such, the patient was evaluated for meningitis.  He underwent a lumbar puncture at the bedside and which was ultimately unsuccessful as result of pain.  He subsequently underwent an lumbar puncture under anesthesia thereafter.  This case was discussed with infectious disease who recommended continued empiric antibiotics until the results of the lumbar puncture returned.  The lumbar puncture results were reviewed by myself and infectious disease.  Infectious disease recommended the discontinuation of all empiric antibiotics.  The patient is to be discharged with 10 days of Augmentin for sinusitis.    Therefore, he is discharged in good and stable condition to home with close outpatient follow-up.    The patient met 2-midnight criteria for an inpatient stay at the time of discharge.    Discharge Date  12/4/2024    FOLLOW UP ITEMS POST DISCHARGE  The patient is to follow-up with his dentist in regards to his root canal as soon as his schedule allows.    The patient is to follow-up with his primary care  physician      DISCHARGE DIAGNOSES  Principal Problem:    CSF leak from nose (POA: Yes)  Active Problems:    Neck stiffness (POA: Unknown)    Headache (POA: Unknown)    Anxiety (POA: Unknown)    Acute non-recurrent sinusitis (POA: Unknown)  Resolved Problems:    * No resolved hospital problems. *      FOLLOW UP  Future Appointments   Date Time Provider Department Center   12/5/2024  8:40 AM [facility]     Sal Schafer D.O.  [address] [address] [address]  [phone]    Follow up      Follow up with Dentist 1 week    Follow up        MEDICATIONS ON DISCHARGE     Medication List        CONTINUE taking these medications        Instructions   acetaminophen 500 MG Tabs  Commonly known as: Tylenol   Take 1,000 mg by mouth every 6 hours as needed for Mild Pain. 1,000 mg = 2 tabs  Indications: Pain  Dose: 1,000 mg     amoxicillin-clavulanate 875-125 MG Tabs  Commonly known as: Augmentin   Take 1 Tablet by mouth 2 times a day for 10 days.  Dose: 1 Tablet     ferrous sulfate 325 (65 Fe) MG tablet   Take 1 Tablet by mouth every 48 hours. For iron deficiency  Dose: 325 mg     methocarbamol 500 MG Tabs  Commonly known as: Robaxin   Take 500-1,000 mg by mouth 3 times a day. 1,000 mg = 2 tabs  Dose: 500-1,000 mg     metoprolol tartrate 25 MG Tabs  Commonly known as: Lopressor   Take 25 mg by mouth 2 times a day.  Dose: 25 mg     oxyCODONE immediate release 10 MG immediate release tablet  Commonly known as: Roxicodone   Take 10 mg by mouth 5 Times a Day.  Dose: 10 mg     pantoprazole 40 MG Tbec  Commonly known as: Protonix   Take 1 Tablet by mouth 2 times a day.  Dose: 40 mg     zolpidem 10 MG Tabs  Commonly known as: Ambien   Take 1 Tablet by mouth at bedtime as needed for Sleep for up to 90 days.  Dose: 10 mg            STOP taking these medications      amoxicillin 500 MG Caps  Commonly known as: Amoxil              Allergies  Allergies   Allergen Reactions    Morphine Vomiting     Tramadol Unspecified     Seizure  [id number]       DIET  No orders of the defined types were placed in this encounter.      ACTIVITY  As tolerated.  Weight bearing as tolerated    CONSULTATIONS  ENT  Infectious disease    PROCEDURES  Lumbar puncture at bedside  Lumbar puncture under anesthesia    LABORATORY  Lab Results   Component Value Date    SODIUM 142 12/04/2024    POTASSIUM 3.9 12/04/2024    CHLORIDE 109 12/04/2024    CO2 17 (L) 12/04/2024    GLUCOSE 89 12/04/2024    BUN 12 12/04/2024    CREATININE 0.80 12/04/2024        Lab Results   Component Value Date    WBC 6.9 12/04/2024    HEMOGLOBIN 13.6 (L) 12/04/2024    HEMATOCRIT 42.1 12/04/2024    PLATELETCT 354 12/04/2024      DX-LUMBAR PUNCTURE FOR DIAGNOSIS   Final Result      Fluoroscopic-guided lumbar puncture as described above.      MR-BRAIN-WITH & W/O   Final Result      1.  Unremarkable MRI of the brain with and without contrast enhancement.   2.  Left maxillary sinus mucosal thickening with a small dependent fluid level likely on the basis of acute sinusitis.      CT-MAXILLOFACIAL WITH PLUS RECONS   Final Result      No extra anatomic soft tissue fluid collection is seen            Total time of the discharge process exceeds 34 minutes.

## 2024-12-05 NOTE — DOCUMENTATION QUERY
Formerly [facility], [facility]                                                                       Query Response Note      PATIENT:               PATI MERCEDES  ACCT #:                  [id number]  MRN:                     [id number]  :                      1980  ADMIT DATE:       2024 12:38 AM  DISCH DATE:        2024 4:55 PM  RESPONDING  PROVIDER #:        [id number]           QUERY TEXT:    Please clarify in documentation the relationship, if any, between CSF leak and s/p root canal 2 weeks ago:    *Note:  If you agree with a diagnosis listed, please remember to include it in your daily concurrent documentation and onto the Discharge Summary    The patient's Clinical Indicators include:  Findings:  --Pt admitted for CSF leak from nose  --Per ED Provider Notes , ''He describes that he had a root canal done 2 weeks ago and since then, clear fluid has been      draining from his left nostril as well as worsening left sided jaw pain'' documented  --Per H&P, ''CSF leak from nose, pt with drainage after root canal procedure from his left nare with associated rhinorrhea that      profusely drains, initially clear now straw-colored'' documented  --Maxillofacial CT on :  No extra anatomic soft tissue fluid collection is seen  --Brain MRI from :  Unremarkable, left maxillary sinus mucosal thickening with a small dependent fluid level likely on the      basis of acute sinusitis    Treatment:  --Left nare fluid cx pending  --Possible Neurology consult  --IV Rocephin 2,000mg q12 hr  --Vancocin 1250mg in NS 250ml IVPB q8hrs  --Maxipime IV 2g x 1 dose given    Risk Factors:  --S/p root canal procedure done on     Thank you,  Olga PEREZN, RN  Clinical   Connect via Lemon  Options provided:   -- CSF leak is due to or associated with root canal procedure done 2 weeks ago   -- CSF leak is not  due to or associated with root canal procedure done 2 weeks ago   -- Other explanation, please specify_____   -- Unable to determine      Query created by: Olga Ziegler on 12/2/2024 7:42 AM    RESPONSE TEXT:    CSF leak is not due to or associated with root canal procedure done 2 weeks ago          Electronically signed by:  SILVINA ARVIZU MD 12/4/2024 7:47 PM

## 2024-12-05 NOTE — PROGRESS NOTES
Subjective:     CC:   Chief Complaint   Patient presents with    Medication Refill        HPI:   Patient was recently in the hospital for possible CSF leak after root canal. Eventually it was deemed that he did not have a CSF leak or meningitis and was diagnosed with sinusitis. He was discharged yesterday night. He is still having drainage and headaches but to a lesser degree. He reports neck stiffness has also resolved.     Since stopping gabapentin his ED has improved.     Problem   Primary Insomnia    Difficulty staying asleep and falling asleep. He has benefited from ambien          Current Outpatient Medications Ordered in Epic   Medication Sig Dispense Refill    amoxicillin-clavulanate (AUGMENTIN) 875-125 MG Tab Take 1 Tablet by mouth 2 times a day for 10 days. 20 Tablet 0    methocarbamol (ROBAXIN) 500 MG Tab Take 500-1,000 mg by mouth 3 times a day. 1,000 mg = 2 tabs      metoprolol tartrate (LOPRESSOR) 25 MG Tab Take 25 mg by mouth 2 times a day.      zolpidem (AMBIEN) 10 MG Tab Take 1 Tablet by mouth at bedtime as needed for Sleep for up to 90 days. 30 Tablet 2    ferrous sulfate 325 (65 Fe) MG tablet Take 1 Tablet by mouth every 48 hours. For iron deficiency 30 Tablet 2    acetaminophen (TYLENOL) 500 MG Tab Take 1,000 mg by mouth every 6 hours as needed for Mild Pain. 1,000 mg = 2 tabs  Indications: Pain      oxyCODONE immediate release (ROXICODONE) 10 MG immediate release tablet Take 10 mg by mouth 5 Times a Day.      pantoprazole (PROTONIX) 40 MG Tablet Delayed Response Take 1 Tablet by mouth 2 times a day. 60 Tablet 0     Current Facility-Administered Medications Ordered in Epic   Medication Dose Route Frequency Provider Last Rate Last Admin    propofol (DIPRIVAN) injection   Intravenous PRN Mick Florentino M.D.   750 mg at 12/04/24 1240           ROS:  Review of Systems   Constitutional:  Negative for chills and fever.   HENT:  Positive for congestion.    Respiratory:  Negative for shortness of breath.   "  Cardiovascular:  Negative for chest pain.   Neurological:  Positive for headaches.       Objective:     Exam:  /70 (BP Location: Right arm, Patient Position: Sitting, BP Cuff Size: Adult)   Pulse 83   Temp 36.6 °C (97.8 °F) (Temporal)   Resp 16   Ht 1.905 m (6' 3\")   Wt 111 kg (244 lb 6.4 oz)   SpO2 98%   BMI 30.55 kg/m²  Body mass index is 30.55 kg/m².    Physical Exam  Constitutional:       General: He is not in acute distress.     Appearance: He is not ill-appearing.   Pulmonary:      Effort: Pulmonary effort is normal.   Neurological:      Mental Status: He is alert.   Psychiatric:         Mood and Affect: Mood normal.         Behavior: Behavior normal.         Thought Content: Thought content normal.         Judgment: Judgment normal.                   Assessment & Plan:     Problem List Items Addressed This Visit       Primary insomnia     Chronic  -continue ambien 10mg   -cs signed             Reviewed notes, labs and imaging     Annual in March    Please note that this dictation was created using voice recognition software. I have made every reasonable attempt to correct obvious errors, but I expect that there are errors of grammar and possibly content that I did not discover before finalizing the note.   "

## 2024-12-05 NOTE — ANESTHESIA TIME REPORT
Anesthesia Start and Stop Event Times       Date Time Event    12/4/2024 1131 Ready for Procedure     1155 Anesthesia Start     1220 Anesthesia Stop          Responsible Staff  12/04/24      Name Role Begin End    Mick Florentino M.D. Anesth 1155 1220          Overtime Reason:  no overtime (within assigned shift)    Comments:

## 2024-12-07 LAB
BACTERIA CSF CULT: NORMAL
BACTERIA WND AEROBE CULT: ABNORMAL
GRAM STN SPEC: ABNORMAL
GRAM STN SPEC: NORMAL
SIGNIFICANT IND 70042: ABNORMAL
SIGNIFICANT IND 70042: NORMAL
SITE SITE: ABNORMAL
SITE SITE: NORMAL
SOURCE SOURCE: ABNORMAL
SOURCE SOURCE: NORMAL

## 2025-01-15 DIAGNOSIS — F51.01 PRIMARY INSOMNIA: ICD-10-CM

## 2025-01-16 RX ORDER — ZOLPIDEM TARTRATE 10 MG/1
10 TABLET ORAL NIGHTLY PRN
Qty: 30 TABLET | Refills: 2 | Status: SHIPPED | OUTPATIENT
Start: 2025-01-16 | End: 2025-04-16

## 2025-03-05 NOTE — PROGRESS NOTES
"Assessment/Plan   Diagnoses and all orders for this visit:  Resolved Pyogenic granuloma of left conjunctiva    History of eyelid bump under left upper eyelid (AYLIN) similar to a pyogenic granuloma resulting from a previous chalazion, first noted at 01/29/2025 visit. Patient started on 10 day course of FML BID OS. Patient reported today that \"bumps\" were now under both eyelids, appreciated via lid eversion by the patient, and did not seem to improve with FML. Upon slit lamp examination, no pyogenic granulomas or bumps noted. Discussed that bumps that patient was noticing in the mirror are 2' normal eyelid anatomy (tarsal plate edges). Provided reassurance that eyes are healthy and instructed patient and dad to cancel appt scheduled with oculoplastics. Patient can continue with CL wear. Monitor in July 2025 as scheduled with follow up and IOP check or sooner if problems arise.  " Report received from ER LAKEISHA Brown. Assumed care of patient. Patient A/Ox4, significant other at bedside. Pt independent in room. Oriented to unit and call bell. Patient reports 8/10 abdominal pain with PRN medications effectively able to relieve pain to an acceptable 5/10. Pt denies having any watery and tarry stools since arriving to [facility]. Pt remains NPO. Call bell in reach. Will continue with plan of care and notify MD of any changes.

## 2025-03-06 ENCOUNTER — APPOINTMENT (OUTPATIENT)
Dept: MEDICAL GROUP | Facility: LAB | Age: 45
End: 2025-03-06
Payer: COMMERCIAL

## 2025-03-07 ENCOUNTER — TELEPHONE (OUTPATIENT)
Dept: CARDIOLOGY | Facility: MEDICAL CENTER | Age: 45
End: 2025-03-07
Payer: COMMERCIAL

## 2025-03-07 NOTE — LETTER
PROCEDURE/SURGERY CLEARANCE FORM      Encounter Date: 3/7/2025    Patient: Roberto Braswell  YOB: 1980    CARDIOLOGIST:  Shawnee MCBRIDE    REFERRING DOCTOR:  No ref. provider found      The following procedure/surgery: RT Epididymectomy, Possible RT Orchiectomy      PROCEDURE/SURGERY CLEARANCE FORM    Date: 3/11/2025   Patient Name: Roberto Brsawell    Dear Surgeon or Proceduralist,      Thank you for your request for cardiac stratification of our mutual patient Roberto Braswell 1980. We have reviewed their [facility] records; and to the best of our understanding this patient has not had stenting, ablation, watchman, cardiothoracic surgery or hospitalization for cardiovascular reasons in the past 6 months.  Roberto Braswell has been seen within the past 15 months and is considered to have non-modifiable cardiac risk for this low-risk procedure/surgery. They may proceed from a cardiovascular standpoint and may hold their antiplatelet/anticoagulation as briefly as possible. Please have patient resume this medication when hemodynamically stable to do so.     Aspirin or Prasugrel   - hold 7 days prior to procedure/surgery, resume when hemodynamically stable      Clopidrogrel or Ticagrelor  - hold 7 days for all neurological procedures, hold 5 days prior to all other procedure/surgery,  resume when hemodynamically stable     Warfarin - hold 7 days for all neurological procedures, hold 5 days prior to all other procedure/surgery and coordinate with [facility] ([phone]) INR testing and dose management.      Pradaxa/Xarelto/Eliquis/Savesya - hold 1 day prior to procedure for low bleeding risk procedure, 2 days for high bleeding risk procedure, or consider holding 3 days or longer for patients with reduced kidney function (CrCl <30mL/min) or spinal/cranial surgeries/procedures.      If they have a mechanical heart valve, please coordinate with [facility] ([phone]) the proper management of their anticoagulant in the periprocedural or perioperative period.      Some patients have higher risk for cardiovascular complications or holding medication. If our patient has had prior complications of holding antiplatelet or anticoagulants in the past and we have seen them after these events, we have addressed these concerns with the patient. They are at an unknown degree of increased risk for recurrent complication.  You may hold anticoagulation/antiplatelets for the procedure or surgery if the benefits of the procedure or surgery outweigh this nonmodifiable risk.      If Roberto Braswell 1980 has new symptoms of heart failure decompensation, unstable arrythmia, or angina please reach out and we will assess the patient.      If you have other patient-specific concerns, please feel free to reach out to the patient's cardiologist directly at [phone].     Thank you,       [facility]                                              Electronically Signed       MD Signature   Shawnee MCBRIDE

## 2025-03-10 NOTE — TELEPHONE ENCOUNTER
Caller: [facility]    Topic/issue: Calling for status of clearance - need it back stat. Patient scheduled for next Monday 3/17.     Callback Number: [phone]    Thank you,  Keerthi RENAE

## 2025-03-11 NOTE — TELEPHONE ENCOUNTER
Last OV: 9/9/24  Proposed Surgery: RT Epididymectomy, Possible RT Orchiectomy   Surgery Date: 3/17/25  Requesting Office Name: Brittney Roth  Fax Number: [phone]  Preference of Location (default is surgery center unless specified by Cardiologist or [facility])  Prior Clearance Addressed: No    Is this a general clearance? YES   Anticoags/Antiplatelets: Other NA  Outstanding Cardiac Imaging : No  Ablation, Cardioversion, Stent, Cardiac Devices, Catheterization, Watchman: Yes  Date : 11/21/23   TAVR/Valve, Mitral Clip, Watchman (including open heart),: N/A   Recent Cardiac Hospitalization: No            When: N/A  History (cardiac history):   Past Medical History:   Diagnosis Date    Arthritis     Right ankle    Breath shortness 11/03/2023    With exertion.    Drug-seeking behavior 07/30/2023    Demanding narcotics for a duodenal ulcer July 2023. Refused antacids or sucralfate.     Duodenal ulcer 07/30/2023    Gastric ulcer     Heart valve disease     Mitral valve prolapse    Hemorrhagic disorder (HCC)     GI bleed    Hypertension     Pain     Resolved; Right ankle    Seizure (HCC)     while taking tramadol           Is this a dental clearance? NO  Ablation, Cardioversion, Watchman, Stents, Cath, Devices within the last 3 months? No   If yes- Send dental wait letter, do not forward to provider for review.     TAVR / Valve, Mitral clip within the last 6 months? No  If yes- Send dental wait letter, do not forward to provider for review.     If completing a general clearance, continue per protocol.           Surgical Clearance Letter Sent: YES   **Scan clearance request letter into [facility].**

## 2025-03-24 ENCOUNTER — OFFICE VISIT (OUTPATIENT)
Dept: MEDICAL GROUP | Facility: LAB | Age: 45
End: 2025-03-24
Payer: COMMERCIAL

## 2025-03-24 VITALS
WEIGHT: 261.69 LBS | SYSTOLIC BLOOD PRESSURE: 122 MMHG | TEMPERATURE: 97.6 F | BODY MASS INDEX: 32.54 KG/M2 | RESPIRATION RATE: 16 BRPM | OXYGEN SATURATION: 95 % | HEART RATE: 85 BPM | DIASTOLIC BLOOD PRESSURE: 90 MMHG | HEIGHT: 75 IN

## 2025-03-24 DIAGNOSIS — Z00.00 ANNUAL WELLNESS VISIT: ICD-10-CM

## 2025-03-24 DIAGNOSIS — F51.01 PRIMARY INSOMNIA: ICD-10-CM

## 2025-03-24 DIAGNOSIS — N52.2 DRUG-INDUCED ERECTILE DYSFUNCTION: ICD-10-CM

## 2025-03-24 DIAGNOSIS — Z87.19 HISTORY OF GI BLEED: ICD-10-CM

## 2025-03-24 PROBLEM — M43.6 NECK STIFFNESS: Status: RESOLVED | Noted: 2024-12-01 | Resolved: 2025-03-24

## 2025-03-24 PROBLEM — R51.9 HEADACHE: Status: RESOLVED | Noted: 2024-12-01 | Resolved: 2025-03-24

## 2025-03-24 PROBLEM — R47.01 APHASIA: Status: RESOLVED | Noted: 2024-10-25 | Resolved: 2025-03-24

## 2025-03-24 PROBLEM — R07.9 CHEST PAIN: Status: RESOLVED | Noted: 2024-10-26 | Resolved: 2025-03-24

## 2025-03-24 PROBLEM — F41.9 ANXIETY: Status: RESOLVED | Noted: 2024-12-03 | Resolved: 2025-03-24

## 2025-03-24 PROBLEM — F29 PSYCHOSIS (HCC): Status: RESOLVED | Noted: 2024-10-27 | Resolved: 2025-03-24

## 2025-03-24 PROBLEM — G96.01 CSF LEAK FROM NOSE: Status: RESOLVED | Noted: 2024-12-01 | Resolved: 2025-03-24

## 2025-03-24 PROBLEM — R10.9 INTRACTABLE ABDOMINAL PAIN: Status: RESOLVED | Noted: 2023-09-16 | Resolved: 2025-03-24

## 2025-03-24 PROBLEM — M94.0 ACUTE COSTOCHONDRITIS: Status: RESOLVED | Noted: 2024-10-27 | Resolved: 2025-03-24

## 2025-03-24 PROBLEM — J01.90 ACUTE NON-RECURRENT SINUSITIS: Status: RESOLVED | Noted: 2024-12-04 | Resolved: 2025-03-24

## 2025-03-24 PROBLEM — K92.2 UPPER GI BLEED: Status: RESOLVED | Noted: 2024-09-16 | Resolved: 2025-03-24

## 2025-03-24 PROBLEM — K29.70 GASTRITIS: Status: RESOLVED | Noted: 2024-09-19 | Resolved: 2025-03-24

## 2025-03-24 PROCEDURE — 3080F DIAST BP >= 90 MM HG: CPT | Performed by: STUDENT IN AN ORGANIZED HEALTH CARE EDUCATION/TRAINING PROGRAM

## 2025-03-24 PROCEDURE — 99396 PREV VISIT EST AGE 40-64: CPT | Performed by: STUDENT IN AN ORGANIZED HEALTH CARE EDUCATION/TRAINING PROGRAM

## 2025-03-24 PROCEDURE — 3074F SYST BP LT 130 MM HG: CPT | Performed by: STUDENT IN AN ORGANIZED HEALTH CARE EDUCATION/TRAINING PROGRAM

## 2025-03-24 RX ORDER — ZOLPIDEM TARTRATE 10 MG/1
10 TABLET ORAL NIGHTLY PRN
Qty: 30 TABLET | Refills: 2 | Status: SHIPPED | OUTPATIENT
Start: 2025-04-16 | End: 2025-07-15

## 2025-03-24 RX ORDER — TADALAFIL 10 MG/1
10 TABLET ORAL
Qty: 20 TABLET | Refills: 3 | Status: SHIPPED | OUTPATIENT
Start: 2025-03-24 | End: 2025-03-24

## 2025-03-24 RX ORDER — TADALAFIL 10 MG/1
10 TABLET ORAL
Qty: 20 TABLET | Refills: 3 | Status: SHIPPED | OUTPATIENT
Start: 2025-03-24

## 2025-03-24 RX ORDER — ZOLPIDEM TARTRATE 10 MG/1
10 TABLET ORAL NIGHTLY PRN
Qty: 30 TABLET | Refills: 2 | Status: SHIPPED | OUTPATIENT
Start: 2025-04-16 | End: 2025-03-24

## 2025-03-24 ASSESSMENT — ENCOUNTER SYMPTOMS
FEVER: 0
CHILLS: 0
SHORTNESS OF BREATH: 0

## 2025-03-24 ASSESSMENT — FIBROSIS 4 INDEX: FIB4 SCORE: 0.46

## 2025-03-24 NOTE — PROGRESS NOTES
Subjective:     Subjective:     CC:   Chief Complaint   Patient presents with    Annual Exam       HPI:   Roberto Braswell is a 44 y.o. male who presents for annual exam    Last Colorectal Cancer Screening: NA  Last Tdap: patient refused   Received HPV series: Aged out  Hx STDs: No    Exercise: no regular exercise, sedentary  Diet: average      He  has a past medical history of Arthritis, Breath shortness (11/03/2023), Drug-seeking behavior (07/30/2023), Duodenal ulcer (07/30/2023), Gastric ulcer, Heart valve disease, Hemorrhagic disorder (HCC), Hypertension, Pain, and Seizure (HCC).  He  has a past surgical history that includes other orthopedic surgery; other abdominal surgery; ankle arthroscopy (Right, 5/21/2018); biopsy ortho (Right, 5/21/2018); pr upper gi endoscopy,diagnosis (3/28/2022); pr upper gi endoscopy,biopsy (3/28/2022); pr upper gi endoscopy,biopsy (N/A, 7/30/2023); pr upper gi endoscopy,diagnosis (N/A, 9/14/2023); pr upper gi endoscopy,biopsy (N/A, 9/14/2023); pr replacement of mitral valve (1/25/2024); echocardiogram, transesophageal, intraoperative (1/25/2024); pr upper gi endoscopy,diagnosis (N/A, 5/18/2024); pr upper gi endoscopy,biopsy (N/A, 5/18/2024); gastroscopy with endostat (N/A, 5/18/2024); pr upper gi endoscopy,diagnosis (N/A, 5/25/2024); pr upper gi endoscopy,biopsy (N/A, 5/25/2024); pr colonoscopy-flexible (5/27/2024); capsule endoscopy administration (N/A, 5/28/2024); capsule endoscopy retrieval (5/28/2024); and pr upper gi endoscopy,diagnosis (N/A, 9/18/2024).    Family History   Problem Relation Age of Onset    Heart Disease Mother     No Known Problems Father     Clotting Disorder Neg Hx      Social History     Tobacco Use    Smoking status: Never    Smokeless tobacco: Never   Vaping Use    Vaping status: Never Used   Substance Use Topics    Alcohol use: Not Currently    Drug use: Never     He  has no history on file for sexual activity.    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[facility]) 10/28/2024    Dyslipidemia (high LDL; low HDL) 10/26/2024    Esophagitis with esophageal ulcer 09/19/2024    Drug-induced erectile dysfunction 09/03/2024    Primary insomnia 09/03/2024    Pure hypercholesterolemia 06/13/2024    Chronic pain syndrome 05/22/2024    H/O mitral valve repair 02/27/2024    Essential hypertension 10/10/2023    Iron deficiency anemia due to chronic blood loss 09/14/2023     Current Outpatient Medications   Medication Sig Dispense Refill    [START ON 4/16/2025] zolpidem (AMBIEN) 10 MG Tab Take 1 Tablet by mouth at bedtime as needed for Sleep for up to 90 days. 30 Tablet 2    tadalafil (CIALIS) 10 MG tablet Take 1 Tablet by mouth 1 time a day as needed for Erectile Dysfunction. 20 Tablet 3    methocarbamol (ROBAXIN) 500 MG Tab Take 500-1,000 mg by mouth 3 times a day. 1,000 mg = 2 tabs      metoprolol tartrate (LOPRESSOR) 25 MG Tab Take 25 mg by mouth 2 times a day.      ferrous sulfate 325 (65 Fe) MG tablet Take 1 Tablet by mouth every 48 hours. For iron deficiency 30 Tablet 2    acetaminophen (TYLENOL) 500 MG Tab Take 1,000 mg by mouth every 6 hours as needed for Mild Pain. 1,000 mg = 2 tabs  Indications: Pain      oxyCODONE immediate release (ROXICODONE) 10 MG immediate release tablet Take 10 mg by mouth 5 Times a Day.      pantoprazole (PROTONIX) 40 MG Tablet Delayed Response Take 1 Tablet by mouth 2 times a day. 60 Tablet 0     No current facility-administered medications for this visit.     Allergies   Allergen Reactions    Morphine Vomiting    Tramadol Unspecified     Seizure  [id number]=[id number]       Review of Systems   Review of Systems   Constitutional:  Negative for chills and fever.   Respiratory:  Negative for shortness of breath.    Cardiovascular:  Negative for chest pain.     "    Objective:   BP (!) 122/90 (BP Location: Right arm, Patient Position: Sitting, BP Cuff Size: Large adult)   Pulse 85   Temp 36.4 °C (97.6 °F) (Temporal)   Resp 16   Ht 1.905 m (6' 3\")   Wt 119 kg (261 lb 11 oz)   SpO2 95%   BMI 32.71 kg/m²      Wt Readings from Last 4 Encounters:   03/24/25 119 kg (261 lb 11 oz)   12/05/24 111 kg (244 lb 6.4 oz)   12/01/24 109 kg (239 lb 13.8 oz)   11/27/24 109 kg (240 lb 11.9 oz)           Physical Exam:  Physical Exam  Constitutional:       Appearance: Normal appearance.   HENT:      Head: Normocephalic and atraumatic.      Right Ear: Tympanic membrane and ear canal normal.      Left Ear: Tympanic membrane and ear canal normal.      Mouth/Throat:      Mouth: Mucous membranes are moist.      Pharynx: Oropharynx is clear.   Eyes:      Extraocular Movements: Extraocular movements intact.      Pupils: Pupils are equal, round, and reactive to light.   Neck:      Thyroid: No thyromegaly.   Cardiovascular:      Rate and Rhythm: Normal rate and regular rhythm.      Heart sounds: Normal heart sounds.   Pulmonary:      Effort: Pulmonary effort is normal.      Breath sounds: Normal breath sounds.   Abdominal:      General: Abdomen is flat. Bowel sounds are normal.      Palpations: Abdomen is soft.   Musculoskeletal:      Cervical back: Normal range of motion and neck supple.      Right lower leg: No edema.      Left lower leg: No edema.   Lymphadenopathy:      Cervical: No cervical adenopathy.   Skin:     General: Skin is warm and dry.   Neurological:      General: No focal deficit present.      Mental Status: He is alert.             Assessment and Plan:     Problem List Items Addressed This Visit       Drug-induced erectile dysfunction    Relevant Medications    tadalafil (CIALIS) 10 MG tablet    History of GI bleed    Primary insomnia    Relevant Medications    zolpidem (AMBIEN) 10 MG Tab (Start on 4/16/2025)     Other Visit Diagnoses         Annual wellness visit        " Relevant Orders    HEMOGLOBIN A1C    CBC WITH DIFFERENTIAL    Comp Metabolic Panel    TSH WITH REFLEX TO FT4    Lipid Profile    VITAMIN D,25 HYDROXY (DEFICIENCY)           Patient's  DBP elevated but patient had recent testicular surgery to remove benign mass.     Health maintenance:  patient is not interested in vaccines   Labs per orders  Immunizations per orders  Age-appropriate guidance discussed including diet and exercise  Discussed with patient to start exercising to prevent future medical problems     Follow-up: 6 month labs appointment

## 2025-04-03 ENCOUNTER — HOSPITAL ENCOUNTER (INPATIENT)
Facility: MEDICAL CENTER | Age: 45
LOS: 18 days | DRG: 208 | End: 2025-04-21
Attending: EMERGENCY MEDICINE | Admitting: EMERGENCY MEDICINE
Payer: COMMERCIAL

## 2025-04-03 ENCOUNTER — APPOINTMENT (OUTPATIENT)
Dept: RADIOLOGY | Facility: MEDICAL CENTER | Age: 45
DRG: 208 | End: 2025-04-03
Attending: EMERGENCY MEDICINE
Payer: COMMERCIAL

## 2025-04-03 ENCOUNTER — APPOINTMENT (OUTPATIENT)
Dept: RADIOLOGY | Facility: MEDICAL CENTER | Age: 45
DRG: 208 | End: 2025-04-03
Attending: STUDENT IN AN ORGANIZED HEALTH CARE EDUCATION/TRAINING PROGRAM
Payer: COMMERCIAL

## 2025-04-03 ENCOUNTER — APPOINTMENT (OUTPATIENT)
Dept: CARDIOLOGY | Facility: MEDICAL CENTER | Age: 45
DRG: 208 | End: 2025-04-03
Attending: EMERGENCY MEDICINE
Payer: COMMERCIAL

## 2025-04-03 DIAGNOSIS — G93.40 ENCEPHALOPATHY ACUTE: ICD-10-CM

## 2025-04-03 DIAGNOSIS — F11.20 NARCOTIC DEPENDENCY, CONTINUOUS (HCC): ICD-10-CM

## 2025-04-03 DIAGNOSIS — D50.0 IRON DEFICIENCY ANEMIA DUE TO CHRONIC BLOOD LOSS: ICD-10-CM

## 2025-04-03 DIAGNOSIS — J96.01 ACUTE RESPIRATORY FAILURE WITH HYPOXIA (HCC): ICD-10-CM

## 2025-04-03 DIAGNOSIS — F13.20 HYPNOTIC DEPENDENCE WITH CURRENT USE (HCC): ICD-10-CM

## 2025-04-03 DIAGNOSIS — I10 ESSENTIAL HYPERTENSION: ICD-10-CM

## 2025-04-03 DIAGNOSIS — E78.5 DYSLIPIDEMIA (HIGH LDL; LOW HDL): ICD-10-CM

## 2025-04-03 DIAGNOSIS — E78.00 PURE HYPERCHOLESTEROLEMIA: ICD-10-CM

## 2025-04-03 DIAGNOSIS — K20.90 ESOPHAGITIS: ICD-10-CM

## 2025-04-03 DIAGNOSIS — G93.1 ANOXIC BRAIN INJURY (HCC): ICD-10-CM

## 2025-04-03 DIAGNOSIS — G89.4 CHRONIC PAIN SYNDROME: ICD-10-CM

## 2025-04-03 DIAGNOSIS — N52.2 DRUG-INDUCED ERECTILE DYSFUNCTION: ICD-10-CM

## 2025-04-03 DIAGNOSIS — F44.7 FUNCTIONAL NEUROLOGICAL SYMPTOM DISORDER WITH MIXED SYMPTOMS: ICD-10-CM

## 2025-04-03 DIAGNOSIS — F51.01 PRIMARY INSOMNIA: ICD-10-CM

## 2025-04-03 DIAGNOSIS — I26.99 PULMONARY EMBOLISM, OTHER, UNSPECIFIED CHRONICITY, UNSPECIFIED WHETHER ACUTE COR PULMONALE PRESENT (HCC): ICD-10-CM

## 2025-04-03 DIAGNOSIS — Z95.2 HISTORY OF MVR WITH CARDIOPULMONARY BYPASS: ICD-10-CM

## 2025-04-03 DIAGNOSIS — G25.81 RESTLESS LEG SYNDROME DUE TO IRON DEFICIENCY ANEMIA: ICD-10-CM

## 2025-04-03 DIAGNOSIS — D50.9 RESTLESS LEG SYNDROME DUE TO IRON DEFICIENCY ANEMIA: ICD-10-CM

## 2025-04-03 DIAGNOSIS — I49.01 VENTRICULAR FIBRILLATION, PAROXYSMAL (HCC): ICD-10-CM

## 2025-04-03 DIAGNOSIS — I49.01 VENTRICULAR FIBRILLATION (HCC): ICD-10-CM

## 2025-04-03 DIAGNOSIS — I46.9 CARDIAC ARREST (HCC): ICD-10-CM

## 2025-04-03 DIAGNOSIS — R56.9 SEIZURE-LIKE ACTIVITY (HCC): ICD-10-CM

## 2025-04-03 DIAGNOSIS — Z98.890 H/O MITRAL VALVE REPAIR: ICD-10-CM

## 2025-04-03 DIAGNOSIS — Z87.19 HISTORY OF GI BLEED: ICD-10-CM

## 2025-04-03 PROBLEM — G89.29 PAIN, CHRONIC: Status: ACTIVE | Noted: 2025-04-03

## 2025-04-03 PROBLEM — T17.908A ASPIRATION INTO AIRWAY: Status: ACTIVE | Noted: 2025-04-03

## 2025-04-03 LAB
ALBUMIN SERPL BCP-MCNC: 3.4 G/DL (ref 3.2–4.9)
ALBUMIN SERPL BCP-MCNC: 3.9 G/DL (ref 3.2–4.9)
ALBUMIN/GLOB SERPL: 1.4 G/DL
ALBUMIN/GLOB SERPL: 1.4 G/DL
ALP SERPL-CCNC: 55 U/L (ref 30–99)
ALP SERPL-CCNC: 72 U/L (ref 30–99)
ALT SERPL-CCNC: 132 U/L (ref 2–50)
ALT SERPL-CCNC: 168 U/L (ref 2–50)
AMORPHOUS CRYSTALS 1764: PRESENT /HPF
AMPHET UR QL SCN: NEGATIVE
ANION GAP SERPL CALC-SCNC: 10 MMOL/L (ref 7–16)
ANION GAP SERPL CALC-SCNC: 13 MMOL/L (ref 7–16)
ANISOCYTOSIS BLD QL SMEAR: ABNORMAL
APAP SERPL-MCNC: <5 UG/ML (ref 10–30)
APPEARANCE UR: ABNORMAL
APTT PPP: 21 SEC (ref 24.7–36)
APTT PPP: 23.8 SEC (ref 24.7–36)
ARTERIAL PATENCY WRIST A: ABNORMAL
ARTERIAL PATENCY WRIST A: ABNORMAL
AST SERPL-CCNC: 128 U/L (ref 12–45)
AST SERPL-CCNC: 170 U/L (ref 12–45)
BACTERIA #/AREA URNS HPF: ABNORMAL /HPF
BARBITURATES UR QL SCN: NEGATIVE
BASE EXCESS BLDA CALC-SCNC: -11 MMOL/L (ref -4–3)
BASE EXCESS BLDA CALC-SCNC: -11 MMOL/L (ref -4–3)
BASOPHILS # BLD AUTO: 0 % (ref 0–1.8)
BASOPHILS # BLD AUTO: 0.8 % (ref 0–1.8)
BASOPHILS # BLD: 0 K/UL (ref 0–0.12)
BASOPHILS # BLD: 0.04 K/UL (ref 0–0.12)
BENZODIAZ UR QL SCN: POSITIVE
BILIRUB SERPL-MCNC: 0.3 MG/DL (ref 0.1–1.5)
BILIRUB SERPL-MCNC: 0.3 MG/DL (ref 0.1–1.5)
BILIRUB UR QL STRIP.AUTO: NEGATIVE
BODY TEMPERATURE: ABNORMAL DEGREES
BODY TEMPERATURE: ABNORMAL DEGREES
BREATHS SETTING VENT: 20
BREATHS SETTING VENT: 24
BUN SERPL-MCNC: 17 MG/DL (ref 8–22)
BUN SERPL-MCNC: 18 MG/DL (ref 8–22)
BZE UR QL SCN: NEGATIVE
CALCIUM ALBUM COR SERPL-MCNC: 8.2 MG/DL (ref 8.5–10.5)
CALCIUM ALBUM COR SERPL-MCNC: 8.3 MG/DL (ref 8.5–10.5)
CALCIUM SERPL-MCNC: 7.7 MG/DL (ref 8.5–10.5)
CALCIUM SERPL-MCNC: 8.2 MG/DL (ref 8.5–10.5)
CANNABINOIDS UR QL SCN: NEGATIVE
CASTS URNS QL MICRO: ABNORMAL /LPF (ref 0–2)
CHLORIDE SERPL-SCNC: 115 MMOL/L (ref 96–112)
CHLORIDE SERPL-SCNC: 116 MMOL/L (ref 96–112)
CK SERPL-CCNC: 401 U/L (ref 0–154)
CO2 BLDA-SCNC: 14 MMOL/L (ref 32–48)
CO2 BLDA-SCNC: 20 MMOL/L (ref 32–48)
CO2 SERPL-SCNC: 17 MMOL/L (ref 20–33)
CO2 SERPL-SCNC: 17 MMOL/L (ref 20–33)
COLOR UR: YELLOW
CREAT SERPL-MCNC: 0.94 MG/DL (ref 0.5–1.4)
CREAT SERPL-MCNC: 1.21 MG/DL (ref 0.5–1.4)
DELSYS IDSYS: ABNORMAL
DELSYS IDSYS: ABNORMAL
EKG IMPRESSION: NORMAL
EKG IMPRESSION: NORMAL
EOSINOPHIL # BLD AUTO: 0 K/UL (ref 0–0.51)
EOSINOPHIL # BLD AUTO: 0.12 K/UL (ref 0–0.51)
EOSINOPHIL NFR BLD: 0 % (ref 0–6.9)
EOSINOPHIL NFR BLD: 2.5 % (ref 0–6.9)
EPITHELIAL CELLS 1715: ABNORMAL /HPF (ref 0–5)
ERYTHROCYTE [DISTWIDTH] IN BLOOD BY AUTOMATED COUNT: 52.6 FL (ref 35.9–50)
ERYTHROCYTE [DISTWIDTH] IN BLOOD BY AUTOMATED COUNT: 52.9 FL (ref 35.9–50)
EST. AVERAGE GLUCOSE BLD GHB EST-MCNC: 126 MG/DL
ETHANOL BLD-MCNC: <10.1 MG/DL
FENTANYL UR QL: NEGATIVE
GFR SERPLBLD CREATININE-BSD FMLA CKD-EPI: 103 ML/MIN/1.73 M 2
GFR SERPLBLD CREATININE-BSD FMLA CKD-EPI: 76 ML/MIN/1.73 M 2
GLOBULIN SER CALC-MCNC: 2.4 G/DL (ref 1.9–3.5)
GLOBULIN SER CALC-MCNC: 2.8 G/DL (ref 1.9–3.5)
GLUCOSE BLD STRIP.AUTO-MCNC: 81 MG/DL (ref 65–99)
GLUCOSE BLD STRIP.AUTO-MCNC: 98 MG/DL (ref 65–99)
GLUCOSE SERPL-MCNC: 110 MG/DL (ref 65–99)
GLUCOSE SERPL-MCNC: 122 MG/DL (ref 65–99)
GLUCOSE UR STRIP.AUTO-MCNC: 100 MG/DL
GRANULAR CASTS  1716G: PRESENT /LPF
HBA1C MFR BLD: 6 % (ref 4–5.6)
HCO3 BLDA-SCNC: 13.1 MMOL/L (ref 21–28)
HCO3 BLDA-SCNC: 18.5 MMOL/L (ref 21–28)
HCT VFR BLD AUTO: 44.6 % (ref 42–52)
HCT VFR BLD AUTO: 46.2 % (ref 42–52)
HGB BLD-MCNC: 14.6 G/DL (ref 14–18)
HGB BLD-MCNC: 14.9 G/DL (ref 14–18)
HOROWITZ INDEX BLDA+IHG-RTO: 166 MM[HG]
HOROWITZ INDEX BLDA+IHG-RTO: 47 MM[HG]
HYALINE CAST   1831: PRESENT /LPF
IMM GRANULOCYTES # BLD AUTO: 0.02 K/UL (ref 0–0.11)
IMM GRANULOCYTES NFR BLD AUTO: 0.4 % (ref 0–0.9)
INR PPP: 1.07 (ref 0.87–1.13)
INR PPP: 1.16 (ref 0.87–1.13)
KETONES UR STRIP.AUTO-MCNC: NEGATIVE MG/DL
LACTATE BLD-SCNC: 2.8 MMOL/L (ref 0.5–2)
LACTATE BLD-SCNC: 4.2 MMOL/L (ref 0.5–2)
LACTATE SERPL-SCNC: 2.8 MMOL/L (ref 0.5–2)
LACTATE SERPL-SCNC: 3.1 MMOL/L (ref 0.5–2)
LEUKOCYTE ESTERASE UR QL STRIP.AUTO: NEGATIVE
LYMPHOCYTES # BLD AUTO: 1.02 K/UL (ref 1–4.8)
LYMPHOCYTES # BLD AUTO: 1.87 K/UL (ref 1–4.8)
LYMPHOCYTES NFR BLD: 16.1 % (ref 22–41)
LYMPHOCYTES NFR BLD: 21.4 % (ref 22–41)
MAGNESIUM SERPL-MCNC: 1.6 MG/DL (ref 1.5–2.5)
MAGNESIUM SERPL-MCNC: 2.1 MG/DL (ref 1.5–2.5)
MANUAL DIFF BLD: NORMAL
MCH RBC QN AUTO: 26.8 PG (ref 27–33)
MCH RBC QN AUTO: 27.3 PG (ref 27–33)
MCHC RBC AUTO-ENTMCNC: 32.3 G/DL (ref 32.3–36.5)
MCHC RBC AUTO-ENTMCNC: 32.7 G/DL (ref 32.3–36.5)
MCV RBC AUTO: 83.2 FL (ref 81.4–97.8)
MCV RBC AUTO: 83.5 FL (ref 81.4–97.8)
METAMYELOCYTES NFR BLD MANUAL: 1.8 %
METHADONE UR QL SCN: NEGATIVE
MICRO URNS: ABNORMAL
MICROCYTES BLD QL SMEAR: ABNORMAL
MODE IMODE: ABNORMAL
MODE IMODE: ABNORMAL
MONOCYTES # BLD AUTO: 0.39 K/UL (ref 0–0.85)
MONOCYTES # BLD AUTO: 0.82 K/UL (ref 0–0.85)
MONOCYTES NFR BLD AUTO: 7.1 % (ref 0–13.4)
MONOCYTES NFR BLD AUTO: 8.2 % (ref 0–13.4)
MORPHOLOGY BLD-IMP: NORMAL
MYELOCYTES NFR BLD MANUAL: 0.9 %
NEUTROPHILS # BLD AUTO: 3.17 K/UL (ref 1.82–7.42)
NEUTROPHILS # BLD AUTO: 8.6 K/UL (ref 1.82–7.42)
NEUTROPHILS NFR BLD: 66.7 % (ref 44–72)
NEUTROPHILS NFR BLD: 70.5 % (ref 44–72)
NEUTS BAND NFR BLD MANUAL: 3.6 % (ref 0–10)
NITRITE UR QL STRIP.AUTO: NEGATIVE
NRBC # BLD AUTO: 0 K/UL
NRBC # BLD AUTO: 0 K/UL
NRBC BLD-RTO: 0 /100 WBC (ref 0–0.2)
NRBC BLD-RTO: 0 /100 WBC (ref 0–0.2)
NT-PROBNP SERPL IA-MCNC: 78 PG/ML (ref 0–125)
O2/TOTAL GAS SETTING VFR VENT: 100 %
O2/TOTAL GAS SETTING VFR VENT: 70 %
OPIATES UR QL SCN: NEGATIVE
OVALOCYTES BLD QL SMEAR: NORMAL
OXYCODONE UR QL SCN: NEGATIVE
PCO2 BLDA: 26.1 MMHG (ref 32–48)
PCO2 BLDA: 52.7 MMHG (ref 32–48)
PCO2 TEMP ADJ BLDA: 26.8 MMHG (ref 32–48)
PCO2 TEMP ADJ BLDA: 50.9 MMHG (ref 32–48)
PCP UR QL SCN: NEGATIVE
PEEP END EXPIRATORY PRESSURE IPEEP: 10 CMH20
PEEP END EXPIRATORY PRESSURE IPEEP: 8 CMH20
PH BLDA: 7.15 [PH] (ref 7.35–7.45)
PH BLDA: 7.31 [PH] (ref 7.35–7.45)
PH TEMP ADJ BLDA: 7.16 [PH] (ref 7.35–7.45)
PH TEMP ADJ BLDA: 7.3 [PH] (ref 7.35–7.45)
PH UR STRIP.AUTO: 5.5 [PH] (ref 5–8)
PLATELET # BLD AUTO: 319 K/UL (ref 164–446)
PLATELET # BLD AUTO: 415 K/UL (ref 164–446)
PLATELET BLD QL SMEAR: NORMAL
PMV BLD AUTO: 9 FL (ref 9–12.9)
PMV BLD AUTO: 9.2 FL (ref 9–12.9)
PO2 BLDA: 116 MMHG (ref 83–108)
PO2 BLDA: 47 MMHG (ref 83–108)
PO2 TEMP ADJ BLDA: 120 MMHG (ref 83–108)
PO2 TEMP ADJ BLDA: 44 MMHG (ref 83–108)
POIKILOCYTOSIS BLD QL SMEAR: NORMAL
POTASSIUM SERPL-SCNC: 4.1 MMOL/L (ref 3.6–5.5)
POTASSIUM SERPL-SCNC: 4.1 MMOL/L (ref 3.6–5.5)
PROPOXYPH UR QL SCN: NEGATIVE
PROT SERPL-MCNC: 5.8 G/DL (ref 6–8.2)
PROT SERPL-MCNC: 6.7 G/DL (ref 6–8.2)
PROT UR QL STRIP: 300 MG/DL
PROTHROMBIN TIME: 13.9 SEC (ref 12–14.6)
PROTHROMBIN TIME: 14.8 SEC (ref 12–14.6)
RBC # BLD AUTO: 5.34 M/UL (ref 4.7–6.1)
RBC # BLD AUTO: 5.55 M/UL (ref 4.7–6.1)
RBC # URNS HPF: ABNORMAL /HPF (ref 0–2)
RBC BLD AUTO: PRESENT
RBC UR QL AUTO: ABNORMAL
SAO2 % BLDA: 70 % (ref 93–99)
SAO2 % BLDA: 98 % (ref 93–99)
SODIUM SERPL-SCNC: 142 MMOL/L (ref 135–145)
SODIUM SERPL-SCNC: 146 MMOL/L (ref 135–145)
SP GR UR STRIP.AUTO: 1.03
SPECIMEN DRAWN FROM PATIENT: ABNORMAL
SPECIMEN DRAWN FROM PATIENT: ABNORMAL
TIDAL VOLUME IVT: 480 ML
TIDAL VOLUME IVT: 490 ML
TRIGL SERPL-MCNC: 139 MG/DL (ref 0–149)
TRIGL SERPL-MCNC: 164 MG/DL (ref 0–149)
TROPONIN T SERPL-MCNC: 284 NG/L (ref 6–19)
TROPONIN T SERPL-MCNC: 80 NG/L (ref 6–19)
UFH PPP CHRO-ACNC: <0.1 IU/ML
UROBILINOGEN UR STRIP.AUTO-MCNC: 1 EU/DL
WBC # BLD AUTO: 11.6 K/UL (ref 4.8–10.8)
WBC # BLD AUTO: 4.8 K/UL (ref 4.8–10.8)
WBC #/AREA URNS HPF: ABNORMAL /HPF

## 2025-04-03 PROCEDURE — 700102 HCHG RX REV CODE 250 W/ 637 OVERRIDE(OP): Performed by: EMERGENCY MEDICINE

## 2025-04-03 PROCEDURE — 770022 HCHG ROOM/CARE - ICU (200)

## 2025-04-03 PROCEDURE — 71045 X-RAY EXAM CHEST 1 VIEW: CPT

## 2025-04-03 PROCEDURE — 31500 INSERT EMERGENCY AIRWAY: CPT

## 2025-04-03 PROCEDURE — 94002 VENT MGMT INPAT INIT DAY: CPT

## 2025-04-03 PROCEDURE — 82077 ASSAY SPEC XCP UR&BREATH IA: CPT

## 2025-04-03 PROCEDURE — 85730 THROMBOPLASTIN TIME PARTIAL: CPT

## 2025-04-03 PROCEDURE — 85007 BL SMEAR W/DIFF WBC COUNT: CPT

## 2025-04-03 PROCEDURE — 0BH17EZ INSERTION OF ENDOTRACHEAL AIRWAY INTO TRACHEA, VIA NATURAL OR ARTIFICIAL OPENING: ICD-10-PCS | Performed by: EMERGENCY MEDICINE

## 2025-04-03 PROCEDURE — 02HV33Z INSERTION OF INFUSION DEVICE INTO SUPERIOR VENA CAVA, PERCUTANEOUS APPROACH: ICD-10-PCS | Performed by: EMERGENCY MEDICINE

## 2025-04-03 PROCEDURE — 80143 DRUG ASSAY ACETAMINOPHEN: CPT

## 2025-04-03 PROCEDURE — 84478 ASSAY OF TRIGLYCERIDES: CPT | Mod: 91

## 2025-04-03 PROCEDURE — 302214 INTUBATION BOX: Performed by: EMERGENCY MEDICINE

## 2025-04-03 PROCEDURE — 96366 THER/PROPH/DIAG IV INF ADDON: CPT

## 2025-04-03 PROCEDURE — 85520 HEPARIN ASSAY: CPT

## 2025-04-03 PROCEDURE — 99292 CRITICAL CARE ADDL 30 MIN: CPT | Performed by: EMERGENCY MEDICINE

## 2025-04-03 PROCEDURE — 74177 CT ABD & PELVIS W/CONTRAST: CPT

## 2025-04-03 PROCEDURE — 700105 HCHG RX REV CODE 258: Performed by: EMERGENCY MEDICINE

## 2025-04-03 PROCEDURE — 36600 WITHDRAWAL OF ARTERIAL BLOOD: CPT

## 2025-04-03 PROCEDURE — 85027 COMPLETE CBC AUTOMATED: CPT

## 2025-04-03 PROCEDURE — 96365 THER/PROPH/DIAG IV INF INIT: CPT

## 2025-04-03 PROCEDURE — A9270 NON-COVERED ITEM OR SERVICE: HCPCS | Performed by: EMERGENCY MEDICINE

## 2025-04-03 PROCEDURE — 87040 BLOOD CULTURE FOR BACTERIA: CPT

## 2025-04-03 PROCEDURE — 700111 HCHG RX REV CODE 636 W/ 250 OVERRIDE (IP): Mod: JZ | Performed by: EMERGENCY MEDICINE

## 2025-04-03 PROCEDURE — 85610 PROTHROMBIN TIME: CPT | Mod: 91

## 2025-04-03 PROCEDURE — 51702 INSERT TEMP BLADDER CATH: CPT

## 2025-04-03 PROCEDURE — 5A1945Z RESPIRATORY VENTILATION, 24-96 CONSECUTIVE HOURS: ICD-10-PCS | Performed by: EMERGENCY MEDICINE

## 2025-04-03 PROCEDURE — 83036 HEMOGLOBIN GLYCOSYLATED A1C: CPT

## 2025-04-03 PROCEDURE — 83605 ASSAY OF LACTIC ACID: CPT

## 2025-04-03 PROCEDURE — 94003 VENT MGMT INPAT SUBQ DAY: CPT

## 2025-04-03 PROCEDURE — 83735 ASSAY OF MAGNESIUM: CPT

## 2025-04-03 PROCEDURE — 82550 ASSAY OF CK (CPK): CPT

## 2025-04-03 PROCEDURE — 303105 HCHG CATHETER EXTRA

## 2025-04-03 PROCEDURE — 96376 TX/PRO/DX INJ SAME DRUG ADON: CPT

## 2025-04-03 PROCEDURE — 36415 COLL VENOUS BLD VENIPUNCTURE: CPT

## 2025-04-03 PROCEDURE — 70450 CT HEAD/BRAIN W/O DYE: CPT

## 2025-04-03 PROCEDURE — 93005 ELECTROCARDIOGRAM TRACING: CPT | Mod: TC | Performed by: EMERGENCY MEDICINE

## 2025-04-03 PROCEDURE — 700117 HCHG RX CONTRAST REV CODE 255: Performed by: EMERGENCY MEDICINE

## 2025-04-03 PROCEDURE — 80307 DRUG TEST PRSMV CHEM ANLYZR: CPT

## 2025-04-03 PROCEDURE — 36556 INSERT NON-TUNNEL CV CATH: CPT

## 2025-04-03 PROCEDURE — 94799 UNLISTED PULMONARY SVC/PX: CPT

## 2025-04-03 PROCEDURE — 81001 URINALYSIS AUTO W/SCOPE: CPT

## 2025-04-03 PROCEDURE — 99291 CRITICAL CARE FIRST HOUR: CPT

## 2025-04-03 PROCEDURE — 84484 ASSAY OF TROPONIN QUANT: CPT | Mod: 91

## 2025-04-03 PROCEDURE — 96368 THER/DIAG CONCURRENT INF: CPT

## 2025-04-03 PROCEDURE — 80053 COMPREHEN METABOLIC PANEL: CPT

## 2025-04-03 PROCEDURE — 82803 BLOOD GASES ANY COMBINATION: CPT

## 2025-04-03 PROCEDURE — C1751 CATH, INF, PER/CENT/MIDLINE: HCPCS

## 2025-04-03 PROCEDURE — 82962 GLUCOSE BLOOD TEST: CPT

## 2025-04-03 PROCEDURE — 83880 ASSAY OF NATRIURETIC PEPTIDE: CPT

## 2025-04-03 PROCEDURE — 700111 HCHG RX REV CODE 636 W/ 250 OVERRIDE (IP): Mod: JZ

## 2025-04-03 PROCEDURE — 700111 HCHG RX REV CODE 636 W/ 250 OVERRIDE (IP): Performed by: EMERGENCY MEDICINE

## 2025-04-03 PROCEDURE — 99291 CRITICAL CARE FIRST HOUR: CPT | Performed by: EMERGENCY MEDICINE

## 2025-04-03 PROCEDURE — 93306 TTE W/DOPPLER COMPLETE: CPT

## 2025-04-03 PROCEDURE — 85025 COMPLETE CBC W/AUTO DIFF WBC: CPT

## 2025-04-03 PROCEDURE — 92950 HEART/LUNG RESUSCITATION CPR: CPT

## 2025-04-03 PROCEDURE — 96375 TX/PRO/DX INJ NEW DRUG ADDON: CPT

## 2025-04-03 PROCEDURE — 71275 CT ANGIOGRAPHY CHEST: CPT

## 2025-04-03 RX ORDER — FAMOTIDINE 20 MG/1
20 TABLET, FILM COATED ORAL EVERY 12 HOURS
Status: DISCONTINUED | OUTPATIENT
Start: 2025-04-04 | End: 2025-04-05

## 2025-04-03 RX ORDER — SODIUM CHLORIDE, SODIUM LACTATE, POTASSIUM CHLORIDE, AND CALCIUM CHLORIDE .6; .31; .03; .02 G/100ML; G/100ML; G/100ML; G/100ML
1000 INJECTION, SOLUTION INTRAVENOUS ONCE
Status: COMPLETED | OUTPATIENT
Start: 2025-04-03 | End: 2025-04-03

## 2025-04-03 RX ORDER — DEXTROSE MONOHYDRATE 25 G/50ML
25 INJECTION, SOLUTION INTRAVENOUS
Status: DISCONTINUED | OUTPATIENT
Start: 2025-04-03 | End: 2025-04-07

## 2025-04-03 RX ORDER — AMOXICILLIN 250 MG
2 CAPSULE ORAL 2 TIMES DAILY
Status: DISCONTINUED | OUTPATIENT
Start: 2025-04-03 | End: 2025-04-03

## 2025-04-03 RX ORDER — FAMOTIDINE 20 MG/1
20 TABLET, FILM COATED ORAL EVERY 12 HOURS
Status: DISCONTINUED | OUTPATIENT
Start: 2025-04-03 | End: 2025-04-03

## 2025-04-03 RX ORDER — POLYETHYLENE GLYCOL 3350 17 G/17G
1 POWDER, FOR SOLUTION ORAL
Status: DISCONTINUED | OUTPATIENT
Start: 2025-04-03 | End: 2025-04-03

## 2025-04-03 RX ORDER — BUSPIRONE HYDROCHLORIDE 10 MG/1
15 TABLET ORAL 2 TIMES DAILY
Status: DISCONTINUED | OUTPATIENT
Start: 2025-04-04 | End: 2025-04-05

## 2025-04-03 RX ORDER — SODIUM CHLORIDE 9 MG/ML
1000 INJECTION, SOLUTION INTRAVENOUS ONCE
Status: COMPLETED | OUTPATIENT
Start: 2025-04-03 | End: 2025-04-03

## 2025-04-03 RX ORDER — MELOXICAM 15 MG/1
15 TABLET ORAL DAILY
Status: ON HOLD | COMMUNITY
Start: 2025-03-17 | End: 2025-04-16

## 2025-04-03 RX ORDER — BISACODYL 10 MG
10 SUPPOSITORY, RECTAL RECTAL
Status: DISCONTINUED | OUTPATIENT
Start: 2025-04-03 | End: 2025-04-03

## 2025-04-03 RX ORDER — AMOXICILLIN 250 MG
2 CAPSULE ORAL EVERY EVENING
Status: DISCONTINUED | OUTPATIENT
Start: 2025-04-04 | End: 2025-04-06

## 2025-04-03 RX ORDER — FAMOTIDINE 20 MG/1
20 TABLET, FILM COATED ORAL EVERY 12 HOURS
Status: DISCONTINUED | OUTPATIENT
Start: 2025-04-04 | End: 2025-04-03

## 2025-04-03 RX ORDER — VECURONIUM BROMIDE 1 MG/ML
0.1 INJECTION, POWDER, LYOPHILIZED, FOR SOLUTION INTRAVENOUS ONCE
Status: DISCONTINUED | OUTPATIENT
Start: 2025-04-03 | End: 2025-04-04

## 2025-04-03 RX ORDER — HYDROMORPHONE HYDROCHLORIDE 1 MG/ML
1-2 INJECTION, SOLUTION INTRAMUSCULAR; INTRAVENOUS; SUBCUTANEOUS
Status: DISCONTINUED | OUTPATIENT
Start: 2025-04-03 | End: 2025-04-05

## 2025-04-03 RX ORDER — MIDAZOLAM HYDROCHLORIDE 1 MG/ML
5 INJECTION INTRAMUSCULAR; INTRAVENOUS ONCE
Status: COMPLETED | OUTPATIENT
Start: 2025-04-03 | End: 2025-04-03

## 2025-04-03 RX ORDER — ACETAMINOPHEN 650 MG/1
1000 SUPPOSITORY RECTAL EVERY 6 HOURS
Status: DISCONTINUED | OUTPATIENT
Start: 2025-04-04 | End: 2025-04-05

## 2025-04-03 RX ORDER — HEPARIN SODIUM 5000 [USP'U]/100ML
0-30 INJECTION, SOLUTION INTRAVENOUS CONTINUOUS
Status: DISCONTINUED | OUTPATIENT
Start: 2025-04-03 | End: 2025-04-06

## 2025-04-03 RX ORDER — SODIUM CHLORIDE, SODIUM LACTATE, POTASSIUM CHLORIDE, AND CALCIUM CHLORIDE .6; .31; .03; .02 G/100ML; G/100ML; G/100ML; G/100ML
500 INJECTION, SOLUTION INTRAVENOUS ONCE
Status: COMPLETED | OUTPATIENT
Start: 2025-04-04 | End: 2025-04-04

## 2025-04-03 RX ORDER — THIAMINE HYDROCHLORIDE 100 MG/ML
200 INJECTION, SOLUTION INTRAMUSCULAR; INTRAVENOUS 2 TIMES DAILY
Status: COMPLETED | OUTPATIENT
Start: 2025-04-03 | End: 2025-04-06

## 2025-04-03 RX ORDER — AMOXICILLIN 250 MG
2 CAPSULE ORAL EVERY EVENING
Status: DISCONTINUED | OUTPATIENT
Start: 2025-04-03 | End: 2025-04-03

## 2025-04-03 RX ORDER — BUSPIRONE HYDROCHLORIDE 10 MG/1
15 TABLET ORAL 2 TIMES DAILY
Status: DISCONTINUED | OUTPATIENT
Start: 2025-04-03 | End: 2025-04-03

## 2025-04-03 RX ORDER — HEPARIN SODIUM 1000 [USP'U]/ML
40 INJECTION, SOLUTION INTRAVENOUS; SUBCUTANEOUS PRN
Status: DISCONTINUED | OUTPATIENT
Start: 2025-04-03 | End: 2025-04-06

## 2025-04-03 RX ORDER — ACETAMINOPHEN 650 MG/1
1000 SUPPOSITORY RECTAL EVERY 6 HOURS
Status: DISCONTINUED | OUTPATIENT
Start: 2025-04-03 | End: 2025-04-03

## 2025-04-03 RX ORDER — POLYETHYLENE GLYCOL 3350 17 G/17G
1 POWDER, FOR SOLUTION ORAL
Status: DISCONTINUED | OUTPATIENT
Start: 2025-04-03 | End: 2025-04-06

## 2025-04-03 RX ORDER — ACETAMINOPHEN 500 MG
1000 TABLET ORAL EVERY 6 HOURS
Status: DISCONTINUED | OUTPATIENT
Start: 2025-04-04 | End: 2025-04-05

## 2025-04-03 RX ORDER — MIDAZOLAM HYDROCHLORIDE 1 MG/ML
2-4 INJECTION INTRAMUSCULAR; INTRAVENOUS
Status: DISCONTINUED | OUTPATIENT
Start: 2025-04-03 | End: 2025-04-05

## 2025-04-03 RX ORDER — MAGNESIUM SULFATE HEPTAHYDRATE 40 MG/ML
.5-2 INJECTION, SOLUTION INTRAVENOUS CONTINUOUS
Status: DISCONTINUED | OUTPATIENT
Start: 2025-04-03 | End: 2025-04-05

## 2025-04-03 RX ORDER — MEPERIDINE HYDROCHLORIDE 50 MG/ML
25 INJECTION INTRAMUSCULAR; INTRAVENOUS; SUBCUTANEOUS
Status: DISCONTINUED | OUTPATIENT
Start: 2025-04-03 | End: 2025-04-03

## 2025-04-03 RX ORDER — HEPARIN SODIUM 1000 [USP'U]/ML
80 INJECTION, SOLUTION INTRAVENOUS; SUBCUTANEOUS ONCE
Status: COMPLETED | OUTPATIENT
Start: 2025-04-03 | End: 2025-04-03

## 2025-04-03 RX ORDER — INSULIN LISPRO 100 [IU]/ML
2-9 INJECTION, SOLUTION INTRAVENOUS; SUBCUTANEOUS EVERY 6 HOURS
Status: DISCONTINUED | OUTPATIENT
Start: 2025-04-03 | End: 2025-04-07

## 2025-04-03 RX ORDER — HYDROMORPHONE HYDROCHLORIDE 2 MG/ML
2 INJECTION, SOLUTION INTRAMUSCULAR; INTRAVENOUS; SUBCUTANEOUS ONCE
Status: COMPLETED | OUTPATIENT
Start: 2025-04-03 | End: 2025-04-03

## 2025-04-03 RX ORDER — ACETAMINOPHEN 500 MG
1000 TABLET ORAL EVERY 6 HOURS
Status: DISCONTINUED | OUTPATIENT
Start: 2025-04-03 | End: 2025-04-03

## 2025-04-03 RX ORDER — OXYCODONE HYDROCHLORIDE 10 MG/1
10 TABLET ORAL EVERY 6 HOURS
Refills: 0 | Status: DISCONTINUED | OUTPATIENT
Start: 2025-04-04 | End: 2025-04-04

## 2025-04-03 RX ORDER — MIDAZOLAM HYDROCHLORIDE 1 MG/ML
INJECTION INTRAMUSCULAR; INTRAVENOUS
Status: ACTIVE
Start: 2025-04-03 | End: 2025-04-04

## 2025-04-03 RX ADMIN — FENTANYL CITRATE 100 MCG: 50 INJECTION, SOLUTION INTRAMUSCULAR; INTRAVENOUS at 18:49

## 2025-04-03 RX ADMIN — MIDAZOLAM HYDROCHLORIDE 5 MG: 1 INJECTION, SOLUTION INTRAMUSCULAR; INTRAVENOUS at 21:08

## 2025-04-03 RX ADMIN — HYDROMORPHONE HYDROCHLORIDE 2 MG: 2 INJECTION INTRAMUSCULAR; INTRAVENOUS; SUBCUTANEOUS at 20:34

## 2025-04-03 RX ADMIN — HEPARIN SODIUM 7800 UNITS: 1000 INJECTION, SOLUTION INTRAVENOUS; SUBCUTANEOUS at 23:10

## 2025-04-03 RX ADMIN — MIDAZOLAM HYDROCHLORIDE 4 MG: 1 INJECTION, SOLUTION INTRAMUSCULAR; INTRAVENOUS at 23:26

## 2025-04-03 RX ADMIN — SODIUM CHLORIDE, POTASSIUM CHLORIDE, SODIUM LACTATE AND CALCIUM CHLORIDE 1000 ML: 600; 310; 30; 20 INJECTION, SOLUTION INTRAVENOUS at 19:57

## 2025-04-03 RX ADMIN — SODIUM CHLORIDE 1000 ML: 9 INJECTION, SOLUTION INTRAVENOUS at 17:56

## 2025-04-03 RX ADMIN — PROPOFOL 80 MCG/KG/MIN: 10 INJECTION, EMULSION INTRAVENOUS at 23:14

## 2025-04-03 RX ADMIN — PROPOFOL 5 MCG/KG/MIN: 10 INJECTION, EMULSION INTRAVENOUS at 16:33

## 2025-04-03 RX ADMIN — AMPICILLIN AND SULBACTAM 3 G: 1; 2 INJECTION, POWDER, FOR SOLUTION INTRAMUSCULAR; INTRAVENOUS at 23:30

## 2025-04-03 RX ADMIN — IOHEXOL 100 ML: 350 INJECTION, SOLUTION INTRAVENOUS at 21:15

## 2025-04-03 RX ADMIN — HUMAN ALBUMIN MICROSPHERES AND PERFLUTREN 3 ML: 10; .22 INJECTION, SOLUTION INTRAVENOUS at 22:30

## 2025-04-03 RX ADMIN — HEPARIN SODIUM 18 UNITS/KG/HR: 5000 INJECTION, SOLUTION INTRAVENOUS at 23:09

## 2025-04-03 RX ADMIN — ACETAMINOPHEN 1000 MG: 500 TABLET ORAL at 21:33

## 2025-04-03 RX ADMIN — SENNOSIDES AND DOCUSATE SODIUM 2 TABLET: 50; 8.6 TABLET ORAL at 21:33

## 2025-04-03 RX ADMIN — AMIODARONE HYDROCHLORIDE 1 MG/MIN: 1.8 INJECTION, SOLUTION INTRAVENOUS at 19:56

## 2025-04-03 RX ADMIN — FENTANYL CITRATE 100 MCG: 50 INJECTION, SOLUTION INTRAMUSCULAR; INTRAVENOUS at 17:57

## 2025-04-03 ASSESSMENT — COGNITIVE AND FUNCTIONAL STATUS - GENERAL
SUGGESTED CMS G CODE MODIFIER MOBILITY: CH
MOBILITY SCORE: 24
SUGGESTED CMS G CODE MODIFIER DAILY ACTIVITY: CH
DAILY ACTIVITIY SCORE: 24

## 2025-04-03 ASSESSMENT — FIBROSIS 4 INDEX: FIB4 SCORE: 1.36

## 2025-04-03 NOTE — ED NOTES
Pharmacy Medication Reconciliation      ~Medication reconciliation updated and complete per patient home pharmacy   ~No oral ABX within the last 30 days  ~Is dispense history available in EPIC: yes   ~Patient home pharmacy :  [facility]      ~Anticoagulants (rivaroxaban, apixaban, edoxaban, dabigatran, warfarin, enoxaparin) taken in the last 14 days? No

## 2025-04-03 NOTE — ED NOTES
[facility] and [facility] [address] from [place] for witnessed arrest. Pt was eating lunch and collapsed in front of his wife, CPR started by wife. On EMS arrival pt was in vfib. EMS administered 3 shocks, 3 rounds of EPI, 450mg amiodarone, 5mg Versed, 4mg Narcan and 40mg etomidate through IO. +ROSC.  In route, Care Flight administered an additional 5mg Versed. FSBG 159. Pt arrives tachycardic in the 120s, 130/85, 99% IGEL in place.     Per wife, pt takes oxy 10mg x5 a day.

## 2025-04-03 NOTE — ED TRIAGE NOTES
Chief Complaint   Patient presents with    Cardiac Arrest     [facility] and [facility] from [place] for witnessed arrest. Pt was eating lunch and collapsed in front of his wife, CPR started by wife. On EMS arrival pt was in vfib. EMS administered 3 shocks, 3 rounds of EPI, 450mg amiodarone, 5mg Versed, 4mg Narcan and 40mg etomidate through IO. +ROSC.  In route, Care Flight administered an additional 5mg Versed. FSBG 159. Pt arrives tachycardic in the 120s, 130/85, 99% IGEL in place.

## 2025-04-03 NOTE — ED NOTES
1613 Pt arrival, IGEL in place,ST in the 110s.   1616 18g R UA  1619 UTO BP, , 99%BVM  1621 50mg Propofol  1622 50mg Rocuronium   1622 bilateral soft rist restraints w/ verbal order  1623 ET tube 7.5/ 25 @ teeth. +color change, bilateral breath sounds  1630 16F walker   1639 18g OG   1640 XRAY at bedside to confirm placement   1643 Urine collected

## 2025-04-04 ENCOUNTER — APPOINTMENT (OUTPATIENT)
Dept: RADIOLOGY | Facility: MEDICAL CENTER | Age: 45
DRG: 208 | End: 2025-04-04
Attending: EMERGENCY MEDICINE
Payer: COMMERCIAL

## 2025-04-04 PROBLEM — J69.0 ASPIRATION PNEUMONIA OF BOTH LUNGS (HCC): Status: ACTIVE | Noted: 2025-04-04

## 2025-04-04 PROBLEM — K74.60 CIRRHOSIS OF LIVER (HCC): Status: ACTIVE | Noted: 2025-04-04

## 2025-04-04 LAB
ALBUMIN SERPL BCP-MCNC: 3.2 G/DL (ref 3.2–4.9)
ALBUMIN/GLOB SERPL: 1.3 G/DL
ALP SERPL-CCNC: 57 U/L (ref 30–99)
ALT SERPL-CCNC: 113 U/L (ref 2–50)
ANION GAP SERPL CALC-SCNC: 12 MMOL/L (ref 7–16)
ANION GAP SERPL CALC-SCNC: 8 MMOL/L (ref 7–16)
AST SERPL-CCNC: 82 U/L (ref 12–45)
BASE EXCESS BLDA CALC-SCNC: -4 MMOL/L (ref -4–3)
BASE EXCESS BLDA CALC-SCNC: -8 MMOL/L (ref -4–3)
BASE EXCESS BLDA CALC-SCNC: -9 MMOL/L (ref -4–3)
BASOPHILS # BLD AUTO: 0.7 % (ref 0–1.8)
BASOPHILS # BLD: 0.05 K/UL (ref 0–0.12)
BILIRUB SERPL-MCNC: 0.3 MG/DL (ref 0.1–1.5)
BODY TEMPERATURE: ABNORMAL DEGREES
BREATHS SETTING VENT: 20
BREATHS SETTING VENT: 20
BREATHS SETTING VENT: 25
BUN SERPL-MCNC: 10 MG/DL (ref 8–22)
BUN SERPL-MCNC: 14 MG/DL (ref 8–22)
CALCIUM ALBUM COR SERPL-MCNC: 8.3 MG/DL (ref 8.5–10.5)
CALCIUM SERPL-MCNC: 7.3 MG/DL (ref 8.5–10.5)
CALCIUM SERPL-MCNC: 7.7 MG/DL (ref 8.5–10.5)
CHLORIDE SERPL-SCNC: 111 MMOL/L (ref 96–112)
CHLORIDE SERPL-SCNC: 112 MMOL/L (ref 96–112)
CHOLEST SERPL-MCNC: 117 MG/DL (ref 100–199)
CO2 BLDA-SCNC: 15 MMOL/L (ref 32–48)
CO2 BLDA-SCNC: 20 MMOL/L (ref 32–48)
CO2 BLDA-SCNC: 21 MMOL/L (ref 32–48)
CO2 SERPL-SCNC: 15 MMOL/L (ref 20–33)
CO2 SERPL-SCNC: 18 MMOL/L (ref 20–33)
CREAT SERPL-MCNC: 0.76 MG/DL (ref 0.5–1.4)
CREAT SERPL-MCNC: 0.89 MG/DL (ref 0.5–1.4)
DELSYS IDSYS: ABNORMAL
END TIDAL CARBON DIOXIDE IECO2: 28 MMHG
END TIDAL CARBON DIOXIDE IECO2: 38 MMHG
EOSINOPHIL # BLD AUTO: 0.27 K/UL (ref 0–0.51)
EOSINOPHIL NFR BLD: 3.8 % (ref 0–6.9)
ERYTHROCYTE [DISTWIDTH] IN BLOOD BY AUTOMATED COUNT: 53.7 FL (ref 35.9–50)
GFR SERPLBLD CREATININE-BSD FMLA CKD-EPI: 108 ML/MIN/1.73 M 2
GFR SERPLBLD CREATININE-BSD FMLA CKD-EPI: 114 ML/MIN/1.73 M 2
GLOBULIN SER CALC-MCNC: 2.5 G/DL (ref 1.9–3.5)
GLUCOSE BLD STRIP.AUTO-MCNC: 102 MG/DL (ref 65–99)
GLUCOSE BLD STRIP.AUTO-MCNC: 105 MG/DL (ref 65–99)
GLUCOSE BLD STRIP.AUTO-MCNC: 68 MG/DL (ref 65–99)
GLUCOSE BLD STRIP.AUTO-MCNC: 92 MG/DL (ref 65–99)
GLUCOSE BLD STRIP.AUTO-MCNC: 93 MG/DL (ref 65–99)
GLUCOSE SERPL-MCNC: 107 MG/DL (ref 65–99)
GLUCOSE SERPL-MCNC: 109 MG/DL (ref 65–99)
GRAM STN SPEC: NORMAL
HAV IGM SERPL QL IA: NORMAL
HBV CORE IGM SER QL: NORMAL
HBV SURFACE AG SER QL: NORMAL
HCO3 BLDA-SCNC: 14.7 MMOL/L (ref 21–28)
HCO3 BLDA-SCNC: 18.8 MMOL/L (ref 21–28)
HCO3 BLDA-SCNC: 19.8 MMOL/L (ref 21–28)
HCT VFR BLD AUTO: 45 % (ref 42–52)
HCV AB SER QL: NORMAL
HDLC SERPL-MCNC: 50 MG/DL
HGB BLD-MCNC: 14.6 G/DL (ref 14–18)
HOROWITZ INDEX BLDA+IHG-RTO: 170 MM[HG]
HOROWITZ INDEX BLDA+IHG-RTO: 206 MM[HG]
HOROWITZ INDEX BLDA+IHG-RTO: 254 MM[HG]
IMM GRANULOCYTES # BLD AUTO: 0.03 K/UL (ref 0–0.11)
IMM GRANULOCYTES NFR BLD AUTO: 0.4 % (ref 0–0.9)
LACTATE BLD-SCNC: 2.1 MMOL/L (ref 0.5–2)
LACTATE BLD-SCNC: 3.1 MMOL/L (ref 0.5–2)
LACTATE BLD-SCNC: 3.1 MMOL/L (ref 0.5–2)
LACTATE SERPL-SCNC: 1.9 MMOL/L (ref 0.5–2)
LACTATE SERPL-SCNC: 2.2 MMOL/L (ref 0.5–2)
LACTATE SERPL-SCNC: 2.4 MMOL/L (ref 0.5–2)
LACTATE SERPL-SCNC: 3.1 MMOL/L (ref 0.5–2)
LACTATE SERPL-SCNC: 3.4 MMOL/L (ref 0.5–2)
LDLC SERPL CALC-MCNC: 42 MG/DL
LV EJECT FRACT MOD 2C 99903: 37.4
LV EJECT FRACT MOD 4C 99902: 37.69
LV EJECT FRACT MOD BP 99901: 38.42
LYMPHOCYTES # BLD AUTO: 1.02 K/UL (ref 1–4.8)
LYMPHOCYTES NFR BLD: 14.4 % (ref 22–41)
MAGNESIUM SERPL-MCNC: 2.4 MG/DL (ref 1.5–2.5)
MAGNESIUM SERPL-MCNC: 3 MG/DL (ref 1.5–2.5)
MAGNESIUM SERPL-MCNC: 3.3 MG/DL (ref 1.5–2.5)
MAGNESIUM SERPL-MCNC: 3.5 MG/DL (ref 1.5–2.5)
MCH RBC QN AUTO: 27.4 PG (ref 27–33)
MCHC RBC AUTO-ENTMCNC: 32.4 G/DL (ref 32.3–36.5)
MCV RBC AUTO: 84.4 FL (ref 81.4–97.8)
MODE IMODE: ABNORMAL
MONOCYTES # BLD AUTO: 0.6 K/UL (ref 0–0.85)
MONOCYTES NFR BLD AUTO: 8.5 % (ref 0–13.4)
NEUTROPHILS # BLD AUTO: 5.09 K/UL (ref 1.82–7.42)
NEUTROPHILS NFR BLD: 72.2 % (ref 44–72)
NRBC # BLD AUTO: 0 K/UL
NRBC BLD-RTO: 0 /100 WBC (ref 0–0.2)
O2/TOTAL GAS SETTING VFR VENT: 50 %
PCO2 BLDA: 25.3 MMHG (ref 32–48)
PCO2 BLDA: 33.3 MMHG (ref 32–48)
PCO2 BLDA: 44.6 MMHG (ref 32–48)
PCO2 TEMP ADJ BLDA: 25.3 MMHG (ref 32–48)
PCO2 TEMP ADJ BLDA: 33.4 MMHG (ref 32–48)
PCO2 TEMP ADJ BLDA: 43.8 MMHG (ref 32–48)
PEEP END EXPIRATORY PRESSURE IPEEP: 10 CMH20
PH BLDA: 7.23 [PH] (ref 7.35–7.45)
PH BLDA: 7.37 [PH] (ref 7.35–7.45)
PH BLDA: 7.38 [PH] (ref 7.35–7.45)
PH TEMP ADJ BLDA: 7.24 [PH] (ref 7.35–7.45)
PH TEMP ADJ BLDA: 7.37 [PH] (ref 7.35–7.45)
PH TEMP ADJ BLDA: 7.38 [PH] (ref 7.35–7.45)
PHOSPHATE SERPL-MCNC: 3.1 MG/DL (ref 2.5–4.5)
PLATELET # BLD AUTO: 290 K/UL (ref 164–446)
PMV BLD AUTO: 8.8 FL (ref 9–12.9)
PO2 BLDA: 103 MMHG (ref 83–108)
PO2 BLDA: 127 MMHG (ref 83–108)
PO2 BLDA: 85 MMHG (ref 83–108)
PO2 TEMP ADJ BLDA: 101 MMHG (ref 83–108)
PO2 TEMP ADJ BLDA: 127 MMHG (ref 83–108)
PO2 TEMP ADJ BLDA: 85 MMHG (ref 83–108)
POTASSIUM SERPL-SCNC: 4.6 MMOL/L (ref 3.6–5.5)
POTASSIUM SERPL-SCNC: 4.8 MMOL/L (ref 3.6–5.5)
PROT SERPL-MCNC: 5.7 G/DL (ref 6–8.2)
RBC # BLD AUTO: 5.33 M/UL (ref 4.7–6.1)
SAO2 % BLDA: 96 % (ref 93–99)
SAO2 % BLDA: 97 % (ref 93–99)
SAO2 % BLDA: 99 % (ref 93–99)
SIGNIFICANT IND 70042: NORMAL
SITE SITE: NORMAL
SODIUM SERPL-SCNC: 138 MMOL/L (ref 135–145)
SODIUM SERPL-SCNC: 138 MMOL/L (ref 135–145)
SOURCE SOURCE: NORMAL
SPECIMEN DRAWN FROM PATIENT: ABNORMAL
TIDAL VOLUME IVT: 510 ML
TRIGL SERPL-MCNC: 126 MG/DL (ref 0–149)
UFH PPP CHRO-ACNC: 0.37 IU/ML
UFH PPP CHRO-ACNC: 0.73 IU/ML
UFH PPP CHRO-ACNC: 0.9 IU/ML
WBC # BLD AUTO: 7.1 K/UL (ref 4.8–10.8)

## 2025-04-04 PROCEDURE — 82962 GLUCOSE BLOOD TEST: CPT | Mod: 91

## 2025-04-04 PROCEDURE — 87077 CULTURE AEROBIC IDENTIFY: CPT

## 2025-04-04 PROCEDURE — 700111 HCHG RX REV CODE 636 W/ 250 OVERRIDE (IP): Mod: JZ

## 2025-04-04 PROCEDURE — 99291 CRITICAL CARE FIRST HOUR: CPT | Mod: 25 | Performed by: STUDENT IN AN ORGANIZED HEALTH CARE EDUCATION/TRAINING PROGRAM

## 2025-04-04 PROCEDURE — 94003 VENT MGMT INPAT SUBQ DAY: CPT

## 2025-04-04 PROCEDURE — 82803 BLOOD GASES ANY COMBINATION: CPT

## 2025-04-04 PROCEDURE — 94150 VITAL CAPACITY TEST: CPT

## 2025-04-04 PROCEDURE — 99291 CRITICAL CARE FIRST HOUR: CPT | Performed by: INTERNAL MEDICINE

## 2025-04-04 PROCEDURE — 87186 SC STD MICRODIL/AGAR DIL: CPT

## 2025-04-04 PROCEDURE — 87205 SMEAR GRAM STAIN: CPT

## 2025-04-04 PROCEDURE — 95700 EEG CONT REC W/VID EEG TECH: CPT | Performed by: STUDENT IN AN ORGANIZED HEALTH CARE EDUCATION/TRAINING PROGRAM

## 2025-04-04 PROCEDURE — 85520 HEPARIN ASSAY: CPT

## 2025-04-04 PROCEDURE — 36600 WITHDRAWAL OF ARTERIAL BLOOD: CPT

## 2025-04-04 PROCEDURE — A9270 NON-COVERED ITEM OR SERVICE: HCPCS | Performed by: EMERGENCY MEDICINE

## 2025-04-04 PROCEDURE — 83605 ASSAY OF LACTIC ACID: CPT | Mod: 91

## 2025-04-04 PROCEDURE — 37799 UNLISTED PX VASCULAR SURGERY: CPT

## 2025-04-04 PROCEDURE — 93306 TTE W/DOPPLER COMPLETE: CPT | Mod: 26 | Performed by: INTERNAL MEDICINE

## 2025-04-04 PROCEDURE — 36620 INSERTION CATHETER ARTERY: CPT | Mod: GC | Performed by: STUDENT IN AN ORGANIZED HEALTH CARE EDUCATION/TRAINING PROGRAM

## 2025-04-04 PROCEDURE — 80074 ACUTE HEPATITIS PANEL: CPT

## 2025-04-04 PROCEDURE — 70496 CT ANGIOGRAPHY HEAD: CPT

## 2025-04-04 PROCEDURE — 770022 HCHG ROOM/CARE - ICU (200)

## 2025-04-04 PROCEDURE — 80048 BASIC METABOLIC PNL TOTAL CA: CPT

## 2025-04-04 PROCEDURE — 86316 IMMUNOASSAY TUMOR OTHER: CPT

## 2025-04-04 PROCEDURE — 03HY32Z INSERTION OF MONITORING DEVICE INTO UPPER ARTERY, PERCUTANEOUS APPROACH: ICD-10-PCS | Performed by: STUDENT IN AN ORGANIZED HEALTH CARE EDUCATION/TRAINING PROGRAM

## 2025-04-04 PROCEDURE — 93970 EXTREMITY STUDY: CPT | Mod: 26 | Performed by: INTERNAL MEDICINE

## 2025-04-04 PROCEDURE — 95720 EEG PHY/QHP EA INCR W/VEEG: CPT | Performed by: STUDENT IN AN ORGANIZED HEALTH CARE EDUCATION/TRAINING PROGRAM

## 2025-04-04 PROCEDURE — 84100 ASSAY OF PHOSPHORUS: CPT

## 2025-04-04 PROCEDURE — 87070 CULTURE OTHR SPECIMN AEROBIC: CPT

## 2025-04-04 PROCEDURE — 4A10X4Z MONITORING OF CENTRAL NERVOUS ELECTRICAL ACTIVITY, EXTERNAL APPROACH: ICD-10-PCS | Performed by: STUDENT IN AN ORGANIZED HEALTH CARE EDUCATION/TRAINING PROGRAM

## 2025-04-04 PROCEDURE — 94799 UNLISTED PULMONARY SVC/PX: CPT

## 2025-04-04 PROCEDURE — 70498 CT ANGIOGRAPHY NECK: CPT

## 2025-04-04 PROCEDURE — 93970 EXTREMITY STUDY: CPT

## 2025-04-04 PROCEDURE — 700111 HCHG RX REV CODE 636 W/ 250 OVERRIDE (IP): Mod: JZ | Performed by: EMERGENCY MEDICINE

## 2025-04-04 PROCEDURE — 700102 HCHG RX REV CODE 250 W/ 637 OVERRIDE(OP): Performed by: EMERGENCY MEDICINE

## 2025-04-04 PROCEDURE — 87147 CULTURE TYPE IMMUNOLOGIC: CPT

## 2025-04-04 PROCEDURE — 700101 HCHG RX REV CODE 250: Performed by: EMERGENCY MEDICINE

## 2025-04-04 PROCEDURE — 83735 ASSAY OF MAGNESIUM: CPT | Mod: 91

## 2025-04-04 PROCEDURE — 700111 HCHG RX REV CODE 636 W/ 250 OVERRIDE (IP): Performed by: STUDENT IN AN ORGANIZED HEALTH CARE EDUCATION/TRAINING PROGRAM

## 2025-04-04 PROCEDURE — 700105 HCHG RX REV CODE 258: Performed by: EMERGENCY MEDICINE

## 2025-04-04 PROCEDURE — 80053 COMPREHEN METABOLIC PANEL: CPT

## 2025-04-04 PROCEDURE — 700111 HCHG RX REV CODE 636 W/ 250 OVERRIDE (IP): Mod: JZ | Performed by: STUDENT IN AN ORGANIZED HEALTH CARE EDUCATION/TRAINING PROGRAM

## 2025-04-04 PROCEDURE — 85025 COMPLETE CBC W/AUTO DIFF WBC: CPT

## 2025-04-04 PROCEDURE — 80061 LIPID PANEL: CPT

## 2025-04-04 PROCEDURE — 36620 INSERTION CATHETER ARTERY: CPT

## 2025-04-04 PROCEDURE — 700105 HCHG RX REV CODE 258: Performed by: STUDENT IN AN ORGANIZED HEALTH CARE EDUCATION/TRAINING PROGRAM

## 2025-04-04 PROCEDURE — 700117 HCHG RX CONTRAST REV CODE 255: Performed by: EMERGENCY MEDICINE

## 2025-04-04 PROCEDURE — 95714 VEEG EA 12-26 HR UNMNTR: CPT | Performed by: STUDENT IN AN ORGANIZED HEALTH CARE EDUCATION/TRAINING PROGRAM

## 2025-04-04 RX ORDER — INDOMETHACIN 25 MG/1
50 CAPSULE ORAL ONCE
Status: COMPLETED | OUTPATIENT
Start: 2025-04-04 | End: 2025-04-04

## 2025-04-04 RX ORDER — VECURONIUM BROMIDE 1 MG/ML
0.1 INJECTION, POWDER, LYOPHILIZED, FOR SOLUTION INTRAVENOUS ONCE
Status: DISCONTINUED | OUTPATIENT
Start: 2025-04-04 | End: 2025-04-05

## 2025-04-04 RX ORDER — FUROSEMIDE 10 MG/ML
40 INJECTION INTRAMUSCULAR; INTRAVENOUS ONCE
Status: COMPLETED | OUTPATIENT
Start: 2025-04-04 | End: 2025-04-04

## 2025-04-04 RX ORDER — NOREPINEPHRINE BITARTRATE 0.03 MG/ML
0-1 INJECTION, SOLUTION INTRAVENOUS CONTINUOUS
Status: DISCONTINUED | OUTPATIENT
Start: 2025-04-04 | End: 2025-04-05

## 2025-04-04 RX ORDER — DEXMEDETOMIDINE HYDROCHLORIDE 4 UG/ML
.1-1.5 INJECTION, SOLUTION INTRAVENOUS CONTINUOUS
Status: DISCONTINUED | OUTPATIENT
Start: 2025-04-04 | End: 2025-04-05

## 2025-04-04 RX ADMIN — OXYCODONE HYDROCHLORIDE 10 MG: 10 TABLET ORAL at 00:15

## 2025-04-04 RX ADMIN — FUROSEMIDE 40 MG: 10 INJECTION, SOLUTION INTRAVENOUS at 17:02

## 2025-04-04 RX ADMIN — PROPOFOL 80 MCG/KG/MIN: 10 INJECTION, EMULSION INTRAVENOUS at 09:56

## 2025-04-04 RX ADMIN — IOHEXOL 80 ML: 350 INJECTION, SOLUTION INTRAVENOUS at 01:45

## 2025-04-04 RX ADMIN — SODIUM BICARBONATE 50 MEQ: 84 INJECTION INTRAVENOUS at 14:19

## 2025-04-04 RX ADMIN — SENNOSIDES AND DOCUSATE SODIUM 2 TABLET: 50; 8.6 TABLET ORAL at 18:14

## 2025-04-04 RX ADMIN — THIAMINE HYDROCHLORIDE 200 MG: 100 INJECTION, SOLUTION INTRAMUSCULAR; INTRAVENOUS at 05:14

## 2025-04-04 RX ADMIN — PROPOFOL 80 MCG/KG/MIN: 10 INJECTION, EMULSION INTRAVENOUS at 05:22

## 2025-04-04 RX ADMIN — DEXTROSE MONOHYDRATE 25 G: 25 INJECTION, SOLUTION INTRAVENOUS at 12:43

## 2025-04-04 RX ADMIN — AMPICILLIN AND SULBACTAM 3 G: 1; 2 INJECTION, POWDER, FOR SOLUTION INTRAMUSCULAR; INTRAVENOUS at 17:54

## 2025-04-04 RX ADMIN — PROPOFOL 65 MCG/KG/MIN: 10 INJECTION, EMULSION INTRAVENOUS at 21:08

## 2025-04-04 RX ADMIN — HYDROMORPHONE HYDROCHLORIDE 2 MG: 1 INJECTION, SOLUTION INTRAMUSCULAR; INTRAVENOUS; SUBCUTANEOUS at 00:45

## 2025-04-04 RX ADMIN — FENTANYL CITRATE 200 MCG/HR: 50 INJECTION, SOLUTION INTRAMUSCULAR; INTRAVENOUS at 10:59

## 2025-04-04 RX ADMIN — HYDROMORPHONE HYDROCHLORIDE 1 MG: 1 INJECTION, SOLUTION INTRAMUSCULAR; INTRAVENOUS; SUBCUTANEOUS at 22:34

## 2025-04-04 RX ADMIN — FAMOTIDINE 20 MG: 20 TABLET, FILM COATED ORAL at 05:15

## 2025-04-04 RX ADMIN — SODIUM CHLORIDE, POTASSIUM CHLORIDE, SODIUM LACTATE AND CALCIUM CHLORIDE 500 ML: 600; 310; 30; 20 INJECTION, SOLUTION INTRAVENOUS at 00:15

## 2025-04-04 RX ADMIN — PROPOFOL 80 MCG/KG/MIN: 10 INJECTION, EMULSION INTRAVENOUS at 12:19

## 2025-04-04 RX ADMIN — VECURONIUM BROMIDE 1 MCG/KG/MIN: 1 INJECTION, POWDER, LYOPHILIZED, FOR SOLUTION INTRAVENOUS at 05:06

## 2025-04-04 RX ADMIN — ACETAMINOPHEN 1000 MG: 500 TABLET ORAL at 12:25

## 2025-04-04 RX ADMIN — THIAMINE HYDROCHLORIDE 200 MG: 100 INJECTION, SOLUTION INTRAMUSCULAR; INTRAVENOUS at 17:55

## 2025-04-04 RX ADMIN — PROPOFOL 75 MCG/KG/MIN: 10 INJECTION, EMULSION INTRAVENOUS at 17:03

## 2025-04-04 RX ADMIN — MAGNESIUM SULFATE IN WATER 0.5 G/HR: 20 INJECTION, SOLUTION INTRAVENOUS at 00:45

## 2025-04-04 RX ADMIN — PROPOFOL 80 MCG/KG/MIN: 10 INJECTION, EMULSION INTRAVENOUS at 07:54

## 2025-04-04 RX ADMIN — MIDAZOLAM HYDROCHLORIDE 4 MG: 1 INJECTION, SOLUTION INTRAMUSCULAR; INTRAVENOUS at 00:32

## 2025-04-04 RX ADMIN — FENTANYL CITRATE 50 MCG/HR: 50 INJECTION, SOLUTION INTRAMUSCULAR; INTRAVENOUS at 01:56

## 2025-04-04 RX ADMIN — AMIODARONE HYDROCHLORIDE 0.5 MG/MIN: 1.8 INJECTION, SOLUTION INTRAVENOUS at 01:01

## 2025-04-04 RX ADMIN — AMIODARONE HYDROCHLORIDE 0.5 MG/MIN: 1.8 INJECTION, SOLUTION INTRAVENOUS at 12:06

## 2025-04-04 RX ADMIN — PROPOFOL 80 MCG/KG/MIN: 10 INJECTION, EMULSION INTRAVENOUS at 03:09

## 2025-04-04 RX ADMIN — ACETAMINOPHEN 1000 MG: 500 TABLET ORAL at 05:15

## 2025-04-04 RX ADMIN — HEPARIN SODIUM 14 UNITS/KG/HR: 5000 INJECTION, SOLUTION INTRAVENOUS at 14:42

## 2025-04-04 RX ADMIN — AMPICILLIN AND SULBACTAM 3 G: 1; 2 INJECTION, POWDER, FOR SOLUTION INTRAMUSCULAR; INTRAVENOUS at 12:24

## 2025-04-04 RX ADMIN — BUSPIRONE HYDROCHLORIDE 15 MG: 10 TABLET ORAL at 17:55

## 2025-04-04 RX ADMIN — PROPOFOL 80 MCG/KG/MIN: 10 INJECTION, EMULSION INTRAVENOUS at 14:43

## 2025-04-04 RX ADMIN — FAMOTIDINE 20 MG: 20 TABLET, FILM COATED ORAL at 17:55

## 2025-04-04 RX ADMIN — ACETAMINOPHEN 1000 MG: 500 TABLET ORAL at 17:55

## 2025-04-04 RX ADMIN — AMIODARONE HYDROCHLORIDE 0.5 MG/MIN: 1.8 INJECTION, SOLUTION INTRAVENOUS at 22:27

## 2025-04-04 RX ADMIN — THIAMINE HYDROCHLORIDE 200 MG: 100 INJECTION, SOLUTION INTRAMUSCULAR; INTRAVENOUS at 00:15

## 2025-04-04 RX ADMIN — DEXMEDETOMIDINE HYDROCHLORIDE 0.2 MCG/KG/HR: 100 INJECTION, SOLUTION INTRAVENOUS at 22:26

## 2025-04-04 RX ADMIN — OXYCODONE HYDROCHLORIDE 10 MG: 10 TABLET ORAL at 05:14

## 2025-04-04 RX ADMIN — PROPOFOL 80 MCG/KG/MIN: 10 INJECTION, EMULSION INTRAVENOUS at 00:37

## 2025-04-04 RX ADMIN — AMPICILLIN AND SULBACTAM 3 G: 1; 2 INJECTION, POWDER, FOR SOLUTION INTRAMUSCULAR; INTRAVENOUS at 05:13

## 2025-04-04 ASSESSMENT — PAIN DESCRIPTION - PAIN TYPE
TYPE: ACUTE PAIN

## 2025-04-04 ASSESSMENT — PULMONARY FUNCTION TESTS: FVC: 1.01

## 2025-04-04 NOTE — PROCEDURES
Procedure Note    Date: 4/4/2025  Time: 1030    Procedure: Arterial Line placement  Site: L radial artery    Indication: Shock requiring continuous BP monitoring, frequent ABG monitoring  Consent: Informed consent obtained from patient or designated decision maker after explaining the benefits/risks of the procedure including but not limited to bleeding, infection, nerve or other deep structure injury, or failure of placement. Patient or surrogate expressed understanding and agreement and signed consent which can be found in the patient's chart.    Procedure: After obtaining consent, a time-out was performed. An Kanu's test was performed to test for adequate collateral circulation. Patient positioned, prepped, and draped in sterile fashion.  Patient sedated on propofol and fentanyl drips.  Artery was located using realtime US. A 20 gauge catheter was placed using Seldinger technique. Appropriate arterial blood visualized from the catheter and the arterial waveform confirmed on the monitor. Line secured and dressed in place. Patient tolerated procedure well without any difficulties and left in care of bedside nurse.     EBL: minimal  Complications: Procedure first attempted on right radial artery with successful access of the vessel by needle but resistance was present on attempt to pass the wire requiring readjustment of needle position.  Was able to successfully advance wire without resistance and catheter was threaded but no backflow was visualized on catheter insertion.  Procedure aborted, pressure held over the access site, and went on to attempt on the left radial artery as above.

## 2025-04-04 NOTE — PROGRESS NOTES
4 Eyes Skin Assessment Completed by Radha ZAVALA RN and KARSON Fierro.    Head WDL  Ears Redness and Blanching  Nose WDL  Mouth WDL  Neck WDL, right IJ  Breast/Chest Scar  Shoulder Blades Redness and Blanching  Spine Redness and Blanching  (R) Arm/Elbow/Hand WDL  (L) Arm/Elbow/Hand WDL  Abdomen Scar  Groin Redness on pannus  Scrotum/Coccyx/Buttocks scab on scrotum  (R) Leg WDL  (L) Leg WDL, IO site  (R) Heel/Foot/Toe Redness and Blanching  (L) Heel/Foot/Toe Redness and Blanching          Devices In Places ECG, Tele Box, Blood Pressure Cuff, Pulse Ox, Bai, SCD's, ET Tube, OG/NG, and Central Line      Interventions In Place Heel Mepilex, Sacral Mepilex, Heel Float Boots, TAP System, Pillows, Q2 Turns, Low Air Loss Mattress, ZFlo Pillow, Dri-Gio Pads, and Heels Loaded W/Pillows    Possible Skin Injury No    Pictures Uploaded Into Epic N/A  Wound Consult Placed N/A  RN Wound Prevention Protocol Ordered Yes.4

## 2025-04-04 NOTE — ASSESSMENT & PLAN NOTE
Mostly chronic chest pain which began following CPB for his MVR in 1/2024  Has been opioid dependent since  - oxy prn

## 2025-04-04 NOTE — ASSESSMENT & PLAN NOTE
Incidentally noted on CTPA done post arrest  Clinical picture and VF arrest not entirely consistent with PE  Unclear if arrest caused by PE or not  No evidence of ongoing shock or hemodynamic compromise     Intermediate risk pulmonary embolism with myocardial injury and right heart-strain as evidenced by a troponin of 80, BNP of 126, and RV/LV ratio 1.4 on CT     Felt to be at elevated risk of decompensation based on:  Syncope with subsequent VF  Clot burden on CT mostly distal, small right main clot extending into RLL segments  Lactate 3.5  Shock index initially (HR/SBP) >1 but HR imroved with volume resus and opiods  Need for Vasopressors none  Clot in transit not seen     The treatment options were discussed with patient, family, pharmacy including: CDT, Suction thrombectomy, half-dose tPA + heparin, and heparin alone     Does not warrant advanced therapies at this time, will treat with AC alone and consider rescue therapies if worsens hemodynamically     Heparin infusion  Cardiac monitoring   RT/O2 Protocols  Titrate supplemental FiO2 to maintain SpO2 >88%  BLE Dopplers negative for DVT  TTE with EF reduced to 38%, difficult to assess wall motion abnormalities but possibly consistent with Takotsubo cardiomyopathy.

## 2025-04-04 NOTE — PROCEDURES
CONTINUOUS VIDEO ELECTROENCEPHALOGRAM REPORT    REFERRING PROVIDER: Barbara Gutierrez M.d.   DOS: 4/4/2025  ROOM: [facility]/   TOTAL RECORDING TIME: 25 hours and 15 minutes of total recording time  START TIME:  2:45 AM on 04/04/25  END TIME:  4:00 AM on 04/05/25       INDICATION:  Roberto Braswell 44 y.o. male presenting with seizure(s) and altered mental status    CURRENT ANTI-SEIZURE MEDICATIONS AND/or OTHER RELEVANT TREATMENTS:   sodium phosphate 15 mmol in dextrose 5% 250 mL ivpb, 15 mmol, Once  oxyCODONE immediate-release, 10 mg, 5X/DAY  MD Alert...Adult ICU Electrolyte Replacement per Pharmacy, , PHARMACY TO DOSE  insulin lispro, 2-9 Units, Q6HRS  ampicillin-sulbactam (UNASYN) IV, 3 g, Q6HRS  thiamine, 200 mg, BID  famotidine, 20 mg, Q12HRS   Or  famotidine, 20 mg, Q12HRS  senna-docusate, 2 Tablet, Q EVENING      IV MEDS  fentaNYL, Last Rate: 150 mcg/hr (04/05/25 0902)  NORepinephrine, Last Rate: Stopped (04/04/25 1000)  dexmedetomidine (Precedex) infusion, Last Rate: 0.8 mcg/kg/hr (04/05/25 0957)  heparin, Last Rate: 16 Units/kg/hr (04/05/25 0923)    Propofol drip stopped around 11 PM on 4/4/25    TECHNIQUE:   CVEEG was set up by a Neurodiagnostic technologist who performed education to the patient and staff. A minimum of 23 electrodes and 23 channel recording was setup and performed by Neurodiagnostic technologist, in accordance with the international 10-20 system. Impedence, electrode integrity, and technical impressions were documented a minimum of every 2-24 hour period by a Neurodiagnostic Technologist and reviewed by Interpreting Physician. The study was reviewed in bipolar and referential montages. The recording examined the patient in the awake,drowsy/sleep and/or comatose state(s).     DESCRIPTION OF THE RECORD:  For the first 3 hours, The background was discontinuous, attenuated/suppressed at times, intermittently asymmetrical, and was absent of a posterior dominant rhythm, with a mixture of medium  to low voltage/attenauted theta and delta activity occurring in brief bursts. Reactivity was minimal/absent. Spontaneous variability was minimally present. State changes  were minimal/absent.  N2 sleep architecture was not seen.    After 5:30 AM on 04/04/25, there was slight, gradual improvement of the background due to: greater continuity of the background, the emergence of sustained and reactive faster theta and delta frequencies, and arousals associated with a poorly-formed and fragmented 6 Hz posterior dominant rhythm, and the presence of poorly formed sleep architecture.    There were occasional runs of rhythmic or periodic  generalized, sharply contoured delta activity, at times with a triphasic morphology. These were often stimulus-induced with bedside nursing care and went away when stimulation stopped.    ICTAL AND INTERICTAL FINDINGS:   No focal or generalized epileptiform activity noted.     No persistent regional slowing was seen during this routine study.      No electroclinical or electrographic seizures were reported or recorded during the study.     ACTIVATION PROCEDURES:   Intermittent Photic stimulation was performed in a stepwise fashion from 1 to 30 Hz and did not elicited any abnormalities on EEG.     EKG: Sampling of the EKG recording showed sinus rhythm    EVENTS:    No clinical events were captured or reported.    INTERPRETATION:  Abnormal, technically-limited continuous video EEG recording in the drowsy/sleep and/or comatose state(s):  For the initial 3 hours of the study, there is severe slowing, attenuation, and discontinuity of the background with occasional bursts of low voltage generalized delta/theta activity. Around 5:30 AM, the background improves to moderately to severe slowing due greater continuity of the background, the emergence of sustained and reactive faster theta and delta frequencies, arousals associated with a poorly-formed and fragmented 6 Hz posterior dominant rhythm,  and the presence of poorly formed sleep architecture. Rare to occasionally, there was runs of stimulus-induced generalized rhythmic or periodic 1.5-2.5 Hz sharp delta activity, at times with a triphasic morphology. Taken together, these findings are suggestive of diffuse/multifocal cerebral dysfunction and consistent with a superimposed encephalopathy.  The effects of sedation may be contributing.  No epileptiform discharges or persistent focal asymmetries of the background  No seizures  No clinical events (i.e. the reported shaking, myoclonic movements) were seen on video review.      Findings discussed with Barbara Gutierrez M.d.       Tye Pelayo MD  Department of Neurology at [facility]  General Neurologist and Epileptologist  Director of [facility]'s Level III Comprehensive Epilepsy Program  Professor of Clinical Neurology, [facility].   Phone: [phone]  Fax: [phone]  E-mail: [email or web address]

## 2025-04-04 NOTE — CARE PLAN
The patient is Watcher - Medium risk of patient condition declining or worsening    Shift Goals  Clinical Goals: TTM, hemodynamic stability  Patient Goals: JJ  Family Goals: updates    Progress made toward(s) clinical / shift goals:    Problem: Pain - Standard  Goal: Alleviation of pain or a reduction in pain to the patient’s comfort goal  Description: Target End Date:  Prior to discharge or change in level of careDocument on Vitals flowsheet1.  Document pain using the appropriate pain scale per order or unit policy2.  Educate and implement non-pharmacologic comfort measures (i.e. relaxation, distraction, massage, cold/heat therapy, etc.)3.  Pain management medications as ordered4.  Reassess pain after pain med administration per policy5.  If opiods administered assess patient's response to pain medication is appropriate per POSS sedation scale6.  Follow pain management plan developed in collaboration with patient and interdisciplinary team (including palliative care or pain specialists if applicable)  Outcome: Progressing     Problem: Pain Management  Goal: Pain level will decrease to patient's comfort goal  Outcome: Progressing     Problem: Safety - Medical Restraint  Goal: Remains free of injury from restraints (Restraint for Interference with Medical Device)  Description: INTERVENTIONS:1. Determine that other, less restrictive measures have been tried or would not be effective before applying the restraint2. Evaluate the patient's condition at the time of restraint application3. Educate patient/family regarding the reason for restraint4. Q2H: Monitor safety, psychosocial status, comfort, circulation, respiratory status, LOC, nutrition and hydration  Outcome: Progressing       Patient is not progressing towards the following goals:

## 2025-04-04 NOTE — PROGRESS NOTES
[facility] ICU Progress Note      Admit Date: 4/3/2025    Resident(s): Judd Edge M.D.   Attending:  BARBIE ALATORRE/ Dr. Gutierrez    Patient ID:    Name:  Roberto Braswell     YOB: 1980  Age:  44 y.o.  male   MRN:  [id number]    Hospital Course (carried forward and updated):  Roberto Braswell is a 44 y.o. male with past medical history of a GDA bleed in 2023 status post IR embolization 5/24, severe degenerative mitral valve prolapse status post mitral valve repair 1/2024, and chronic pain presenting with out-of-hospital cardiac arrest on 4/3.  His wife is an RN, provides history.  She reports that he started up out of bed, told her he felt dizzy, he lost consciousness, and was nonresponsive but still breathing.  She immediately called EMS and upon reassessment found him not to be breathing, immediately starting CPR.  EMS arrived shortly after and he was placed on mechanical CPR device with first rhythm noted as V-fib.  Underwent defibrillation x 3 and 3 rounds of ACLS and achieved ROSC en route to ED.  Overall low flow time ~30 minutes with CPR, minimal no flow time.  On arrival to ED, EKG with sinus tachycardia without STEMI.  He had received 10 mg Versed and etomidate for LMA placement but he was intubated in the ED and placed on a propofol drip for tachypnea and vent compliance.  Reassuring head CT and chest x-ray.    Consultants:  Critical Care  Cardiology  Neurology     Interval Events:  CTA with large R PE, treating with heparin drip for now unless vitals worsen  EEG obtained this morning given myoclonic activity, no seizures observed  Continue postcardiac arrest care    NEURO:   RASS -5, PERRL  CARDIOVASC:  Sinus rhythm/sinus tach 90s-130s, pressure 110s-140s/60s-70s  RESPIRATORY:  RR 20, , PEEP 10, FiO2 50%  GI/NUTRITION:  NPO  RENAL/FLUID/LYTES: Creatinine 0.89, 1225 urine output overnight  HEME/ONC:   Hemoglobin 14.6  INFECTIOUS D:  White blood cell count 7.1, on targeted temperature  management  ENDOCRINE:   Glucose range 81-1 22    Vitals Range last 24h:  Vitals:    04/04/25 1000   BP: 94/52   Pulse:    Resp:    Temp:    SpO2: 100%          Intake/Output Summary (Last 24 hours) at 4/4/2025 1059  Last data filed at 4/4/2025 1000  Gross per 24 hour   Intake 3691.09 ml   Output 1685 ml   Net 2006.09 ml        Review of Systems   Reason unable to perform ROS: Acuity of patient condition.       PHYSICAL EXAM:  Vitals:    04/04/25 0800 04/04/25 0900 04/04/25 0910 04/04/25 1000   BP: 133/74   94/52   Pulse: 86 80 82    Resp: 20 (!) 7 20    Temp:       TempSrc:       SpO2: 100% 99% 100% 100%   Weight:       Height:        Body mass index is 32.13 kg/m².    O2 therapy: Pulse Oximetry: 100 %, O2 Delivery Device: Ventilator    Date 04/04/25 0700 - 04/05/25 0659   Shift 4470-1673 5733-9752 5606-4843 24 Hour Total   INTAKE   P.O. 0   0     P.O. 0   0   I.V. 251.7   251.7     Magnesium Sulfate Volume 25   25     Amiodarone Volume 66.6   66.6     Propofol Volume 160.1   160.1   Shift Total 251.7   251.7   OUTPUT   Urine 400   400     Output (mL) (Urethral Catheter Temperature probe 16 Fr.) 400   400   Shift Total 400   400   NET -148.3   -148.3        Physical Exam  Vitals and nursing note reviewed.   Constitutional:       General: He is not in acute distress.     Appearance: He is not toxic-appearing.   HENT:      Head: Normocephalic and atraumatic.      Right Ear: External ear normal.      Left Ear: External ear normal.      Nose: No congestion or rhinorrhea.      Mouth/Throat:      Mouth: Mucous membranes are moist.   Eyes:      General: No scleral icterus.     Extraocular Movements: Extraocular movements intact.      Pupils: Pupils are equal, round, and reactive to light.   Cardiovascular:      Rate and Rhythm: Normal rate and regular rhythm.      Heart sounds: No murmur heard.     No friction rub. No gallop.   Pulmonary:      Effort: No respiratory distress.      Breath sounds: No wheezing, rhonchi or  rales.   Abdominal:      General: There is no distension.      Tenderness: There is no guarding or rebound.   Musculoskeletal:         General: No swelling.      Cervical back: No rigidity.   Skin:     Capillary Refill: Capillary refill takes less than 2 seconds.      Coloration: Skin is not jaundiced.         Recent Labs     04/03/25 1657 04/03/25 2006 04/04/25  0350   ISTATAPH 7.153* 7.310* 7.371   ISTATAPCO2 52.7* 26.1* 25.3*   ISTATAPO2 47* 116* 85   ISTATATCO2 20* 14* 15*   [id number] 70* 98 96   ISTATARTHCO3 18.5* 13.1* 14.7*   ISTATARTBE -11* -11* -9*   ISTATTEMP 36.2 C 37.6 C 37.0 C   ISTATFIO2 100 70 50   ISTATSPEC Arterial Arterial Arterial   ISTATAPHTC 7.164* 7.302* 7.371   [id number] 44* 120* 85     Recent Labs     04/03/25 1730 04/03/25 2150 04/04/25 0415 04/04/25  0852   SODIUM 146* 142 138  --    POTASSIUM 4.1 4.1 4.6  --    CHLORIDE 116* 115* 111  --    CO2 17* 17* 15*  --    BUN 18 17 14  --    CREATININE 1.21 0.94 0.89  --    MAGNESIUM 2.1 1.6 2.4 3.0*   PHOSPHORUS  --   --  3.1  --    CALCIUM 8.2* 7.7* 7.7*  --      Recent Labs     04/03/25 1730 04/03/25 2150 04/04/25 0415   ALTSGPT 168* 132* 113*   ASTSGOT 170* 128* 82*   ALKPHOSPHAT 72 55 57   TBILIRUBIN 0.3 0.3 0.3   GLUCOSE 122* 110* 109*     Recent Labs     04/03/25 1730 04/03/25 2150 04/03/25 2230 04/04/25 0415   RBC 5.55 5.34  --  5.33   HEMOGLOBIN 14.9 14.6  --  14.6   HEMATOCRIT 46.2 44.6  --  45.0   PLATELETCT 415 319  --  290   PROTHROMBTM 13.9  --  14.8*  --    APTT 21.0*  --  23.8*  --    INR 1.07  --  1.16*  --      Recent Labs     04/03/25  1730 04/03/25  2150 04/04/25  0415   WBC 11.6* 4.8 7.1   NEUTSPOLYS 70.50 66.70 72.20*   LYMPHOCYTES 16.10* 21.40* 14.40*   MONOCYTES 7.10 8.20 8.50   EOSINOPHILS 0.00 2.50 3.80   BASOPHILS 0.00 0.80 0.70   ASTSGOT 170* 128* 82*   ALTSGPT 168* 132* 113*   ALKPHOSPHAT 72 55 57   TBILIRUBIN 0.3 0.3 0.3       Meds:   fentaNYL  100 mcg      fentaNYL   mcg/hr 200 mcg/hr (04/04/25  1059)    vecuronium  0.1 mg/kg (Ideal)      Followed by    vecuronium (Norcuron) 60 mg in dextrose 5% 500 mL Infusion  1-2 mcg/kg/min (Ideal) 2 mcg/kg/min (04/04/25 0703)    NORepinephrine  0-1 mcg/kg/min (Ideal)      amiodarone infusion  0.5 mg/min 0.5 mg/min (04/04/25 0101)    Respiratory Therapy Consult        MD Alert...Adult ICU Electrolyte Replacement per Pharmacy        lidocaine  2 mL      insulin lispro  2-9 Units      And    dextrose bolus  25 g      magnesium sulfate  0.5-2 g/hr 0.5 g/hr (04/04/25 0045)    heparin  0-30 Units/kg/hr (Adjusted) 16 Units/kg/hr (04/04/25 0659)    heparin  40 Units/kg (Adjusted)      ampicillin-sulbactam (UNASYN) IV  3 g Stopped (04/04/25 0543)    HYDROmorphone  1-2 mg      propofol  0-80 mcg/kg/min (Ideal) 80 mcg/kg/min (04/04/25 0956)    midazolam  2-4 mg      thiamine  200 mg      acetaminophen  1,000 mg      Or    acetaminophen (TYLENOL) suppository  975 mg      busPIRone  15 mg      famotidine  20 mg      Or    famotidine  20 mg      senna-docusate  2 Tablet      And    polyethylene glycol/lytes  1 Packet          Procedures:  CVC placement  Arterial line placed    Imaging:  CT-CTA NECK WITH & W/O-POST PROCESSING   Final Result         1.  CT angiogram of the neck within normal limits.   2.  Patchy bilateral upper lobe infiltrates         CT-CTA HEAD WITH & W/O-POST PROCESS   Final Result         1.  No large vessel occlusion or aneurysm identified   2.  Bilateral sinusitis changes      US-EXTREMITY VENOUS LOWER BILAT         EC-ECHOCARDIOGRAM COMPLETE W/ CONT   Final Result      CT-ABDOMEN-PELVIS WITH   Final Result         1.  Fluid-filled prominence of proximal small bowel with distal small bowel decompression, appearance suggests ileus and/or enteritis versus evolving obstructive changes. Recommend radiographic follow-up to resolution as clinically appropriate.   2.  Irregular hepatic contour favoring changes of cirrhosis   3.  Fat-containing bilateral inguinal  hernias      CT-CTA CHEST PULMONARY ARTERY W/ RECONS   Final Result         1.  Distal right main pulmonary embolus extending into right upper lobe and lower lobe segmental and subsegmental branches. 1.4 right to left ventricular ratio suggesting component of right heart strain.   2.  Evaluation of the subsegmental branches is essentially nondiagnostic due to motion artifacts. Additional imaging would be required for definitive evaluation of small distal pulmonary emboli.   3.  Scattered patchy bilateral pulmonary infiltrates.   4.  Bilateral dependent consolidations, appearance suggests atelectasis, component of infiltrate not excluded.      These findings were discussed with the patient's clinician, Misbah Cardoza, on 4/3/2025 9:27 PM.      CT-HEAD W/O   Final Result      No acute intracranial findings.               DX-CHEST-PORTABLE (1 VIEW)   Final Result      Interval placement of a right IJ central line without evidence of complication.      DX-CHEST-PORTABLE (1 VIEW)   Final Result      1.  Support devices present.      2.  Cardiomegaly.      3.  Bibasilar atelectasis.          ASSESSEMENT and PLAN:    * Cardiac arrest (HCC)- (present on admission)  Assessment & Plan  Witnessed out-of-hospital cardiac arrest, with immediate CPR, zero no flow time, initial rhythm VF, early application of mechanical CPR, underwent defib x3 with 2 rounds of EPI and ACLS reportedly, EKG without STEMI, POCUS hyperdynamic LV with RV dilation and mildly depressed function though not well visualized     Post cardiac arrest global care:     -Ventilation: Goal PCO2 40-45, PaO2 ~100, LPV     -Hemodynamic: Goal SBP >90 mmHg, MAP >65 mmHg; fluid rescucitation and vasoactives as guided by bedside exam, ultrasound and hemodynamics     -Cardiac:   EKG without STEMI, initial trop 80, initial POCUS with hyperdynamic function, no obvious RWMA  Cardiology following to determine timing for angiography     -Neurologic:   Initial NCHCT normal,  intact brainstem function at arrival, withdraws in all four  Will order cEEG and TTM for now until we can desedate and get better neuro assessment, appears to be responding to pain but not following  STAT cEEG for clinical seizure like activity     Serial neurologic exams, targeted temperature management, sedation is required to control shivering or ventilator dyssynchrony     -Metabolic: Serial lactate, goal blood glucose <180, maintain euvolemia, monitor urine output, maintain potassium greater than 3.5, maintain magnesium greater than 2     Neuro prognostication in 48-72 hours, will consider neurology consult and MRI at that time.     Cardiology consult  Cardiac ischemia evaluation when indicated    Aspiration pneumonia of both lungs (HCC)  Assessment & Plan  Scattered, patchy bilateral infiltrates seen on CT, possibly secondary to aspiration pneumonia given presentation in the context of cardiac arrest.  - Start Unasyn    Pulmonary embolus (HCC)  Assessment & Plan  Incidentally noted on CTPA done post arrest  Clinical picture and VF arrest not entirely consistent with PE  Unclear if arrest caused by PE or not  No evidence of ongoing shock or hemodynamic compromise     Intermediate risk pulmonary embolism with myocardial injury and right heart-strain as evidenced by a troponin of 80, BNP of 126, and RV/LV ratio 1.4 on CT     Felt to be at elevated risk of decompensation based on:  Syncope with subsequent VF  Clot burden on CT mostly distal, small right main clot extending into RLL segments  Lactate 3.5  Shock index initially (HR/SBP) >1 but HR imroved with volume resus and opiods  Need for Vasopressors none  Clot in transit not seen     The treatment options were discussed with patient, family, pharmacy including: CDT, Suction thrombectomy, half-dose tPA + heparin, and heparin alone     Does not warrant advanced therapies at this time, will treat with AC alone and consider rescue therapies if worsens  hemodynamically     Heparin infusion  Cardiac monitoring   RT/O2 Protocols  Titrate supplemental FiO2 to maintain SpO2 >88%  BLE Dopplers negative for DVT  TTE with EF reduced to 38%, difficult to assess wall motion abnormalities but possibly consistent with Takotsubo cardiomyopathy.    Seizure-like activity (HCC)  Assessment & Plan  At time of event had what is described as convulsive activity followed by loss of conciousness prior to arrest  Has ha some seizure like activity in the past and negative short EEG without neuro follow up     At arrival to ICU had change in neuro exam, upward gaze and rigidity in all four, no overt convulsions but no longer responding to pain in extremities temporarily  Given 5 of versed during this event  with improvement     cEEG  Will hold off on ASM for now and wait for EEG data  Brain MRI when able    Ventricular fibrillation (HCC)  Assessment & Plan  Presented following OHCA, initial rhythm VF  Unclear etiology or mechanism     [facility] in 2023 in preparation for MVR with clean coronaries  Initial EKG without STEMI, minimal trop elevation     Cycle trop  Amiodarone drip for 24 hours per cardiology  Will discuss with cardiology timing for and/or indications for angiography    History of MVR with cardiopulmonary bypass  Assessment & Plan  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 s/p mitral valve repair (Maggie, P2 quadrangular resection, cord reimplantation, 38 mm Syed flexible annuloplasty band 1/2024)     TTE with intact repair, normal valvular function    Chronic pain syndrome- (present on admission)  Assessment & Plan  Mostly chronic chest pain which began following CPB for his MVR in 1/2024  Has been opioid dependent since     Takes oxycodone (without APAP) 10mg approximately 5x per day and recently has been trying to auto-wean, no opioids for two days per wife  Also takes methocarbamol still     Possibly experiencing some degree of withdrawal here with adrenergic state  Multi modal  analgesia  Will use short acting opioids for now to facilitate neuro assessments, but will likely need higher doses than is typical        DISPO: ICU    CODE STATUS: Full code    Quality Measures:  Feeding: NPO  Analgesia: Fentanyl gtt  Sedation: Propofol  Thromboprophylaxis: Heparin gtt  Head of bed: >30 degrees  Ulcer prophylaxis: Famotidine  Glycemic control: Correctional: SSI/ Basal: None  Bowel care: bowel regimen  Indwelling lines: Arterial line, CVC  Deescalation of antibiotics: Continue Unasyn for pneumonia      Judd Edge M.D.

## 2025-04-04 NOTE — ED NOTES
Patient intubated; GCS3, on propofol drip at 80mg/min, on soft restraints bilateral arms; walker and NGT in SITU; noted increased work of breathing; assumed patient care; will continue to monitor

## 2025-04-04 NOTE — PROGRESS NOTES
44 year old male with witnessed arrested, CPR started by wife, who is a RN. After 10 minutes or so EMS arrived and found him in Vfib, shocked, resuscitated with ROSC.  Evaluation showed large Right main PE.  EKG sinus tachycardia, inferior Q waves, unchanged from prior.  Troponin is not consistent with acute MI.  Echo- EF 40% global hypokinesis, likely from arrest and stunning.  Cath 11/23- Normal coronaries.    Rec- IV heparin, supportive care.  Repeat echo in 1-2 days. IF low EF persists, consider ischemia evaluation based on clinical course.  Can stop Amio now.

## 2025-04-04 NOTE — EEG PROGRESS NOTE
PRELIMINARY EEG REPORT - UPDATED AS OF 10:35 AM     Preliminary read of the first 6 hours of this STAT continuous video EEG shows an abnormal study in the drowsy/sedated/asleep/comatose states:  -For the initial 3 hours of the study, there is severe slowing, attenuation, and discontinuity of the background with occasional bursts of low voltage generalized delta/theta activity. Around 5:30 AM, the background improves to moderately slow due greater continuity of the background, the emergence of sustained and reactive faster theta and delta frequencies, and the presence of poorly formed sleep architecture. These findings are suggestive of diffuse/multifocal cerebral dysfunction and/or underlying structural abnormality. The effects of sedation may be contributing.  -No epileptiform discharges or persistent focal asymmetries of the background  -No seizures  -No clinical events (i.e. the reported shaking, myoclonic movements) were seen on video review.    Note: This is a prelimary report only. Full detailed report to follow.      Tye Pelayo MD  Epilepsy and General Neurology  Department of Neurology  Instructor of Clinical Neurology [facility].   Contact: [phone]

## 2025-04-04 NOTE — ASSESSMENT & PLAN NOTE
At time of evnet had what is described as convulsive activity followed by loss of conciousness prior to arrest  Has has some seizure like activity in the past and negative short EEG without neuro follow up  cEEG - negative for seizures   Currently delirious

## 2025-04-04 NOTE — ASSESSMENT & PLAN NOTE
Cirrhotic morphology noted on abdominal CT. Resolving low level elevations in liver enzymes consistent with congestive hepatopathy.  -Acute hepatitis panel negative

## 2025-04-04 NOTE — PROGRESS NOTES
Critical Care Progress Note    Date of admission  4/3/2025    Chief Complaint  44 y.o. male admitted 4/3/2025 with [facility]    Hospital Course  44 Y old male presented on 4/3/25 with [facility] V fib rhythm , underwent defrillation x3, 4 rounds of ACLS, reported low flow time about 30 min from unknown cause. He has medical history significant for recurrent GIB, s/p embolization of GDA, chronic pain, severe MR with repair 1/24 ([place], P2 quadrangular resection, cord reimplantation, 38 mm Syed flexible annuloplasty band 1/2024). He was thought to have seizure like activity , started on sedation , TTM management and on cEEG.     Interval Problem Update  24 Hr Updates               - Neuro: sedated and paralyzed               - CV: not on pressors, on amio gtt for arrhythmia               - Resp: VD 2 and mode ACVC              - GI:  NPO, NG tube in place              - Renal /UOP: 1285 ml in 24 hrs              - Endo: blood glucose at goal               - ID : was febrile last night, on TTM , normal WBC              - Heme/onco : stable Hb and platelets              - Lines: CVC /PIV /Bai               - PPx: GI pepcid  DVT heparin gtt              - SAT/ SBT:  not appropriate today               - new  imaging: CT chest , abdomen , pelvis reviewed, CT brain reviewed                     Review of Systems  Review of Systems   Unable to perform ROS: Intubated        Vital Signs for last 24 hours   Temp:  [36.2 °C (97.2 °F)-38.5 °C (101.3 °F)] 37.2 °C (99 °F)  Pulse:  [] 82  Resp:  [7-44] 20  BP: ()/() 94/52  SpO2:  [90 %-100 %] 100 %    Hemodynamic parameters for last 24 hours  CVP:  [8 MM HG-10 MM HG] 8 MM HG    Respiratory Information for the last 24 hours  Vent Mode: APVCMV  Rate (breaths/min): 20  Vt Target (mL): 510  PEEP/CPAP: 10  MAP: 14  Control VTE (exp VT): 511    Physical Exam   Physical Exam  Vitals reviewed.   Constitutional:       Appearance: He is ill-appearing.   HENT:      Head:  Normocephalic and atraumatic.   Eyes:      General: No scleral icterus.     Conjunctiva/sclera: Conjunctivae normal.   Cardiovascular:      Rate and Rhythm: Normal rate and regular rhythm.      Heart sounds: No murmur heard.  Pulmonary:      Effort: Pulmonary effort is normal. No respiratory distress.      Breath sounds: No wheezing.   Abdominal:      General: There is no distension.      Palpations: Abdomen is soft.   Musculoskeletal:      Right lower leg: No edema.      Left lower leg: No edema.   Skin:     General: Skin is warm and dry.      Coloration: Skin is not jaundiced.   Neurological:      Comments: Sedated with propofol/fentanyl/ paralyzed with rocuronoim         Medications  Current Facility-Administered Medications   Medication Dose Route Frequency Provider Last Rate Last Admin    fentaNYL (Sublimaze) injection 100 mcg  100 mcg Intravenous Q HOUR PRN [place]over[place], D.O.        fentanyl 50 mcg/mL infusion   mcg/hr Intravenous Continuous Roverto [place], D.O. 4 mL/hr at 04/04/25 1059 200 mcg/hr at 04/04/25 1059    vecuronium (Norcuron) injection 8.45 mg  0.1 mg/kg (Ideal) Intravenous Once Roverto [place], D.O.        Followed by    vecuronium (Norcuron) 60 mg in dextrose 5% 500 mL Infusion  1-2 mcg/kg/min (Ideal) Intravenous Continuous Roverto [place], D.O. 84.5 mL/hr at 04/04/25 0703 2 mcg/kg/min at 04/04/25 0703    norepinephrine (Levophed) 8 mg in 250 mL NS infusion (premix)  0-1 mcg/kg/min (Ideal) Intravenous Continuous Judd Edge M.D.   Dose not Required at 04/04/25 1000    amiodarone (Nexterone) 360 mg/200 mL infusion  0.5 mg/min Intravenous Continuous Judd Edge M.D. 16.7 mL/hr at 04/04/25 0101 0.5 mg/min at 04/04/25 0101    Respiratory Therapy Consult   Nebulization Continuous RT Misbah Cardoza M.D.        MD Alert...ICU Electrolyte Replacement per Pharmacy   Other PHARMACY TO DOSE Misbah Cardoza M.D.        lidocaine (Xylocaine) 1 % injection 2 mL  2 mL Tracheal Tube Q30 MIN PRN Misbah ZAVALA  ASHLYN Cardoza        insulin lispro (HumaLOG,AdmeLOG) subcutaneous injection  2-9 Units Subcutaneous Q6HRS Misbah Cardoza M.D.        And    dextrose 50 % (D50W) injection 25 g  25 g Intravenous Q15 MIN PRN Misbah Cardoza M.D.        magnesium sulfate 20 g/500mL infusion  0.5-2 g/hr Intravenous Continuous Misbah Cardoza M.D. 13 mL/hr at 04/04/25 0045 0.5 g/hr at 04/04/25 0045    heparin infusion 25,000 units in 500 mL 0.45% NACL  0-30 Units/kg/hr (Adjusted) Intravenous Continuous Misbah Cardoza M.D. 31.2 mL/hr at 04/04/25 0659 16 Units/kg/hr at 04/04/25 0659    heparin injection 3,900 Units  40 Units/kg (Adjusted) Intravenous PRN Misbah Cardoza M.D.        ampicillin/sulbactam (Unasyn) 3 g in  mL IVPB  3 g Intravenous Q6HRS Misbah Cardoza M.D.   Stopped at 04/04/25 0543    HYDROmorphone (Dilaudid) injection 1-2 mg  1-2 mg Intravenous Q2HRS PRN Misbah Cardoza M.D.   2 mg at 04/04/25 0045    propofol (DIPRIVAN) injection  0-80 mcg/kg/min (Ideal) Intravenous Continuous Misbah Cardoza M.D. 40.6 mL/hr at 04/04/25 0956 80 mcg/kg/min at 04/04/25 0956    midazolam (Versed) injection 2-4 mg  2-4 mg Intravenous Q HOUR PRN Misbah Cardoza M.D.   4 mg at 04/04/25 0032    thiamine (B-1) injection 200 mg  200 mg Intravenous BID Misbah Cardoza M.D.   200 mg at 04/04/25 0514    acetaminophen (Tylenol) tablet 1,000 mg  1,000 mg Enteral Tube Q6HRS Misbah Cardoza M.D.   1,000 mg at 04/04/25 0515    Or    acetaminophen (Tylenol) suppository 975 mg  975 mg Rectal Q6HRS Misbah Cardoza M.D.        busPIRone (Buspar) tablet 15 mg  15 mg Enteral Tube BID Misbah Cardoza M.D.        famotidine (Pepcid) tablet 20 mg  20 mg Enteral Tube Q12HRS Misbah Cardoza M.D.   20 mg at 04/04/25 0515    Or    famotidine (Pepcid) injection 20 mg  20 mg Intravenous Q12HRS Misbah Cardoza M.D.        senna-docusate (Pericolace Or Senokot S) 8.6-50 MG per tablet 2 Tablet  2 Tablet Enteral Tube Q  EVENING Misbah Cardoza M.D.        And    polyethylene glycol/lytes (Miralax) Packet 1 Packet  1 Packet Enteral Tube QDAY PRN Misbah Cardoza M.D.           Fluids    Intake/Output Summary (Last 24 hours) at 4/4/2025 1105  Last data filed at 4/4/2025 1000  Gross per 24 hour   Intake 3691.09 ml   Output 1685 ml   Net 2006.09 ml       Laboratory  Recent Labs     04/03/25  1657 04/03/25 2006 04/04/25  0350   ISTATAPH 7.153* 7.310* 7.371   ISTATAPCO2 52.7* 26.1* 25.3*   ISTATAPO2 47* 116* 85   ISTATATCO2 20* 14* 15*   HZYGPFQ3YCQ 70* 98 96   ISTATARTHCO3 18.5* 13.1* 14.7*   ISTATARTBE -11* -11* -9*   ISTATTEMP 36.2 C 37.6 C 37.0 C   ISTATFIO2 100 70 50   ISTATSPEC Arterial Arterial Arterial   ISTATAPHTC 7.164* 7.302* 7.371   [id number] 44* 120* 85     Recent Labs     04/03/25 2230   CPKTOTAL 401*     Recent Labs     04/03/25 1730 04/03/25 2150 04/04/25 0415 04/04/25  0852   SODIUM 146* 142 138  --    POTASSIUM 4.1 4.1 4.6  --    CHLORIDE 116* 115* 111  --    CO2 17* 17* 15*  --    BUN 18 17 14  --    CREATININE 1.21 0.94 0.89  --    MAGNESIUM 2.1 1.6 2.4 3.0*   PHOSPHORUS  --   --  3.1  --    CALCIUM 8.2* 7.7* 7.7*  --      Recent Labs     04/03/25 1730 04/03/25 2150 04/04/25 0415   ALTSGPT 168* 132* 113*   ASTSGOT 170* 128* 82*   ALKPHOSPHAT 72 55 57   TBILIRUBIN 0.3 0.3 0.3   GLUCOSE 122* 110* 109*     Recent Labs     04/03/25 1730 04/03/25 2150 04/04/25 0415   WBC 11.6* 4.8 7.1   NEUTSPOLYS 70.50 66.70 72.20*   LYMPHOCYTES 16.10* 21.40* 14.40*   MONOCYTES 7.10 8.20 8.50   EOSINOPHILS 0.00 2.50 3.80   BASOPHILS 0.00 0.80 0.70   ASTSGOT 170* 128* 82*   ALTSGPT 168* 132* 113*   ALKPHOSPHAT 72 55 57   TBILIRUBIN 0.3 0.3 0.3     Recent Labs     04/03/25  1730 04/03/25  2150 04/03/25  2230 04/04/25  0415   RBC 5.55 5.34  --  5.33   HEMOGLOBIN 14.9 14.6  --  14.6   HEMATOCRIT 46.2 44.6  --  45.0   PLATELETCT 415 319  --  290   PROTHROMBTM 13.9  --  14.8*  --    APTT 21.0*  --  23.8*  --    INR 1.07  --   1.16*  --        Imaging  CT:    Reviewed    Assessment/Plan  * Cardiac arrest (HCC)- (present on admission)  Assessment & Plan  Witnessed out-of-hospital cardiac arrest, with immediate CPR, zero no flow time, initial rhythm VF, early application of mechanical CPR, underwent defib x3 with 2 rounds of EPI and ACLS reportedly, EKG without STEMI, POCUS hyperdynamic LV with RV dilation and mildly depressed function though not well visualized    Post cardiac arrest global care:    -Ventilation: Goal PCO2 40-45, PaO2 ~100, LPV    -Hemodynamic: Goal SBP >90 mmHg, MAP >65 mmHg; fluid rescucitation and vasoactives as guided by bedside exam, ultrasound and hemodynamics    -Cardiac:   EKG without STEMI, initial trop 80,   TTE read pending   Cardiology following to determine timing for angiography    -Neurologic:   Initial NCHCT normal, intact brainstem function at arrival, withdraws in all four  Will order cEEG and TTM for now until we can desedate and get better neuro assessment   cEEG for clinical seizure like activity in progress     Serial neurologic exams, targeted temperature management, sedation is required to control shivering or ventilator dyssynchrony    -Metabolic: Serial lactate, goal blood glucose <180, maintain euvolemia, monitor urine output, maintain potassium greater than 3.5, maintain magnesium greater than 2    Neuro prognostication in 48-72 hours, will consider neurology consult and MRI at that time.    Cardiology following  Cardiac ischemia evaluation when indicated      Aspiration into airway  Assessment & Plan  Infiltrate noted on CT chest, reported suctioning of aspirated content in ED post intubation    Given cardiac arrest prudent to prophylax against pneumonia here (ANTHARTIC AND PROHY-VAP trials)  Unasyn x 3 days empirically, can extend course if develops clinical signs of pneumonia  Trach aspirate pending     Pulmonary embolus (HCC)  Assessment & Plan  Incidentally noted on CTPA done post  arrest  Clinical picture and VF arrest not entirely consistent with PE  Unclear if arrest caused by PE or not  No evidence of ongoing shock or hemodynamic compromise    Intermediate risk pulmonary embolism with myocardial injury and right heart-strain as evidenced by a troponin of 80, BNP of 126, and RV/LV ratio 1.4 on CT    Felt to be at elevated risk of decompensation based on:  Syncope with subsequent VF  Clot burden on CT mostly distal, small right main clot extending into RLL segments  Lactate 3.5  Shock index initially (HR/SBP) >1 but HR imroved with volume resus and opiods  Need for Vasopressors none  Clot in transit not seen      Continue heparin infusion  Cardiac monitoring   RT/O2 Protocols  Titrate supplemental FiO2 to maintain SpO2 >88%  BLE Dopplers -negative for DVT  TTE formal read pending     Seizure-like activity (HCC)  Assessment & Plan  At time of evnet had what is described as convulsive activity followed by loss of conciousness prior to arrest  Has has some seizure like activity in the past and negative short EEG without neuro follow up    At arrival to ICU had change in neuro exam, upward gaze and rigidity in all four, no overt convulsions but no longer responding to pain in extremities temporarily  Given 5 of versed during this event  with improvement    cEEG  Brain MRI when able    Ventricular fibrillation (HCC)  Assessment & Plan  Presented following [facility], initial rhythm VF  Unclear etiology or mechanism    [facility] in 2023 in preparation for MVR with clean coronaries  Initial EKG without STEMI, minimal trop elevation    Amiodarone gtt   Heparin gtt  Cycle trop  Cardiology following       Pain, chronic  Assessment & Plan  Mostly chronic chest pain which began following CPB for his MVR in 1/2024  Has been opioid dependent since    Takes oxycodone (without APAP) 10mg approximately 5x per day and recently has been trying to auto-wean, no opioids for two days per wife  Also takes methocarbamol  still    We will wean fentanyl gtt, propofol for neuro prognostication       History of MVR with cardiopulmonary bypass  Assessment & Plan  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 s/p mitral valve repair ([place], P2 quadrangular resection, cord reimplantation, 38 mm Syed flexible annuloplasty band 1/2024)    Follow up formal TTE to assess valve function         VTE:  Heparin  Ulcer: H2 Antagonist  Lines: Central Line  Ongoing indication addressed, Arterial Line  Ongoing indication addressed, and Bai Catheter  Ongoing indication addressed    I have performed a physical exam and reviewed and updated ROS and Plan today (4/4/2025). In review of yesterday's note (4/3/2025), there are no changes except as documented above.     Discussed patient condition and risk of morbidity and/or mortality with Family, RN, RT, Pharmacy, , and Charge nurse / hot rounds  The patient remains critically ill.  Critical care time = 65 minutes in directly providing and coordinating critical care and extensive data review.  No time overlap and excludes procedures.

## 2025-04-04 NOTE — ASSESSMENT & PLAN NOTE
Scattered, patchy bilateral infiltrates seen on CT, possibly secondary to aspiration pneumonia given presentation in the context of cardiac arrest.  On Unasyn  -checking procal

## 2025-04-04 NOTE — ED PROVIDER NOTES
ED Provider Note    CHIEF COMPLAINT  Chief Complaint   Patient presents with    Cardiac Arrest     BIB [facility] and [facility] from [place] for witnessed arrest. Pt was eating lunch and collapsed in front of his wife, CPR started by wife. On EMS arrival pt was in vfib. EMS administered 3 shocks, 3 rounds of EPI, 450mg amiodarone, 5mg Versed, 4mg Narcan and 40mg etomidate through IO. +ROSC.  In route, [facility] administered an additional 5mg Versed. FSBG 159. Pt arrives tachycardic in the 120s, 130/85, 99% IGEL in place.       EXTERNAL RECORDS REVIEWED  Reviewed outpatient clinic visits medication list and previous cardiology notes    HPI/ROS  LIMITATION TO HISTORY   Patient is obtunded upon arrival  OUTSIDE HISTORIAN(S):  [facility] and family provided additional history upon arrival    Roberto Braswell is a 44 y.o. male who presents for evaluation of apparent out-of-hospital cardiac arrest.  The patient is a 44 years old.  His wife is apparently a nurse.  She specifically described when she arrived at the patient was at home, felt oddly vomited and may have had some tonic-clonic seizure-like activity  and then immediately became obtunded.  She is trained in CPR and started high-quality CPR right away.  She immediately activated 911 and they were there within less than 5 minutes.  On arrival EMS started their protocols and noted he was in fine V-fib.  He underwent 3 defibrillations and 4 rounds of ACLS with 4 doses of epinephrine and after around 12 minutes had return of spontaneous circulation with a narrow perfusing rhythm.  Airway was initially managed with an Igel.  Due to the close transport he was flown here within 20 minutes.  There is no report of any major trauma to the head or neck.  Patient does have history of previous mitral valve replacement but is not on blood thinners.  There is question of whether or not he has a seizure disorder.  He was initially evaluated and consulted  with neurology back in October for similar but non-life-threatening event.  Based on her records it is unclear whether or not he actually followed up with neurology.  He did have a normal video EEG performed in October 2024    PAST MEDICAL HISTORY   has a past medical history of Arthritis, Breath shortness (11/03/2023), Drug-seeking behavior (07/30/2023), Duodenal ulcer (07/30/2023), Gastric ulcer, Heart valve disease, Hemorrhagic disorder (HCC), Hypertension, Pain, and Seizure (HCC).    SURGICAL HISTORY   has a past surgical history that includes other orthopedic surgery; other abdominal surgery; ankle arthroscopy (Right, 5/21/2018); biopsy ortho (Right, 5/21/2018); upper gi endoscopy,diagnosis (3/28/2022); upper gi endoscopy,biopsy (3/28/2022); upper gi endoscopy,biopsy (N/A, 7/30/2023); upper gi endoscopy,diagnosis (N/A, 9/14/2023); upper gi endoscopy,biopsy (N/A, 9/14/2023); replacement of mitral valve (1/25/2024); echocardiogram, transesophageal, intraoperative (1/25/2024); upper gi endoscopy,diagnosis (N/A, 5/18/2024); upper gi endoscopy,biopsy (N/A, 5/18/2024); gastroscopy with endostat (N/A, 5/18/2024); upper gi endoscopy,diagnosis (N/A, 5/25/2024); upper gi endoscopy,biopsy (N/A, 5/25/2024); colonoscopy-flexible (5/27/2024); capsule endoscopy administration (N/A, 5/28/2024); capsule endoscopy retrieval (5/28/2024); and upper gi endoscopy,diagnosis (N/A, 9/18/2024).    FAMILY HISTORY  Family History   Problem Relation Age of Onset    Heart Disease Mother     No Known Problems Father     Clotting Disorder Neg Hx        SOCIAL HISTORY  Social History     Tobacco Use    Smoking status: Never    Smokeless tobacco: Never   Vaping Use    Vaping status: Never Used   Substance and Sexual Activity    Alcohol use: Not Currently    Drug use: Never    Sexual activity: Not on file       CURRENT MEDICATIONS  Home Medications       Reviewed by Ana Gallegos R.N. (Registered Nurse) on 04/03/25 at 2015  Med List Status:  "Partial     Medication Last Dose Status   meloxicam (MOBIC) 15 MG tablet Unknown Active   methocarbamol (ROBAXIN) 500 MG Tab Unknown Active   oxyCODONE immediate release (ROXICODONE) 10 MG immediate release tablet Unknown Active   tadalafil (CIALIS) 10 MG tablet Unknown Active   zolpidem (AMBIEN) 10 MG Tab Unknown Active                  Audit from Redirected Encounters    **Home medications have not yet been reviewed for this encounter**         ALLERGIES  Allergies   Allergen Reactions    Morphine Vomiting    Tramadol Unspecified     Seizure  [id number]       PHYSICAL EXAM  VITAL SIGNS: BP (!) 142/93   Pulse (!) 128   Temp 36.2 °C (97.2 °F) (Temporal)   Resp (!) 41   Ht 1.905 m (6' 3\")   Wt 119 kg (262 lb 5.6 oz)   SpO2 98%   BMI 32.79 kg/m²    Pulse ox interpretation: I interpret this pulse ox as normal.  Constitutional: Obtunded intubated  HEENT: Atraumatic normocephalic, i-gel in the oropharynx pupils are equal round reactive to light extraocular movements are intact. The nares is clear, external ears are normal, mouth shows moist mucous membranes normal dentition for age  Neck: Supple, no JVD no tracheal deviation  Cardiovascular: Tachycardic no murmur rub or gallop 2+ pulses peripherally x4  Thorax & Lungs: No respiratory distress, no wheezes rales or rhonchi, No chest tenderness.   GI: Soft nontender nondistended positive bowel sounds, no peritoneal signs  Skin: Warm dry no acute rash or lesion  Musculoskeletal: Moving all extremities with full range and 5 of 5 strength no acute  deformity no pedal edema  Neurologic: GCS 3 T      EKG/LABS  Results for orders placed or performed during the hospital encounter of 04/03/25   EKG    Collection Time: 04/03/25  4:45 PM   Result Value Ref Range    Report       [facility]    Test Date:  2025-04-03  Pt Name:    PATI MERCEDES           Department: ER  MRN:        [id number]                      Room:       RD 08  Gender:     " Male                         Technician: [id number]  :        1980                   Requested By:JANIYA LOPEZ  Order #:    [id number]                    Reading MD:    Measurements  Intervals                                Axis  Rate:       134                          P:          52  IA:         172                          QRS:        18  QRSD:       88                           T:          69  QT:         300  QTc:        448    Interpretive Statements  Sinus tachycardia  Probable left atrial enlargement  Probable anteroseptal infarct, old  Compared to ECG 2024 16:09:32  Myocardial infarct finding now present  Sinus bradycardia no longer present  Possible ischemia no longer present     URINALYSIS    Collection Time: 25  4:46 PM    Specimen: Urine   Result Value Ref Range    Color Yellow     Character Cloudy (A)     Specific Gravity 1.029 <1.035    Ph 5.5 5.0 - 8.0    Glucose 100 (A) Negative mg/dL    Ketones Negative Negative mg/dL    Protein 300 (A) Negative mg/dL    Bilirubin Negative Negative    Urobilinogen, Urine 1.0 <=1.0 EU/dL    Nitrite Negative Negative    Leukocyte Esterase Negative Negative    Occult Blood Trace (A) Negative    Micro Urine Req Microscopic    URINE DRUG SCREEN    Collection Time: 25  4:46 PM   Result Value Ref Range    Amphetamines Urine Negative Negative    Barbiturates Negative Negative    Benzodiazepines Positive (A) Negative    Cocaine Metabolite Negative Negative    Fentanyl, Urine Negative Negative    Methadone Negative Negative    Opiates Negative Negative    Oxycodone Negative Negative    Phencyclidine -Pcp Negative Negative    Propoxyphene Negative Negative    Cannabinoid Metab Negative Negative   URINE MICROSCOPIC (W/UA)    Collection Time: 25  4:46 PM   Result Value Ref Range    WBC 6-10 (A) /hpf    RBC 11-20 (A) 0 - 2 /hpf    Bacteria Many (A) None /hpf    Epithelial Cells 6-10 (A) 0 - 5 /hpf    Amorphous Crystal Present (A) Absent /hpf    Urine  Casts 3-5 (A) 0 - 2 /lpf    Hyaline Cast Present /lpf    Granular Casts Present (A) Absent /lpf   POCT arterial blood gas device results    Collection Time: 04/03/25  4:57 PM   Result Value Ref Range    Ph 7.153 (LL) 7.350 - 7.450    Pco2 52.7 (HH) 32.0 - 48.0 mmHg    Po2 47 (LL) 83 - 108 mmHg    Tco2 20 (L) 32 - 48 mmol/L    S02 70 (L) 93 - 99 %    Hco3 18.5 (L) 21.0 - 28.0 mmol/L    BE -11 (L) -4 - 3 mmol/L    Body Temp 36.2 C degrees    O2 Therapy 100 %    iPF Ratio 47     Ph Temp Cedric 7.164 (LL) 7.350 - 7.450    Pco2 Temp Co 50.9 (H) 32.0 - 48.0 mmHg    Po2 Temp Cor 44 (LL) 83 - 108 mmHg    Specimen Arterial     Kanu Test Pass     DelSys Vent     Tidal Volume 490 mL    Peep End Expiratory Pressure 8 cmh20    Set Rate 20     Mode APV-CMV    POCT lactate device results    Collection Time: 04/03/25  4:57 PM   Result Value Ref Range    iStat Lactate 4.2 (HH) 0.5 - 2.0 mmol/L   MAGNESIUM    Collection Time: 04/03/25  5:30 PM   Result Value Ref Range    Magnesium 2.1 1.5 - 2.5 mg/dL   CBC WITH DIFFERENTIAL    Collection Time: 04/03/25  5:30 PM   Result Value Ref Range    WBC 11.6 (H) 4.8 - 10.8 K/uL    RBC 5.55 4.70 - 6.10 M/uL    Hemoglobin 14.9 14.0 - 18.0 g/dL    Hematocrit 46.2 42.0 - 52.0 %    MCV 83.2 81.4 - 97.8 fL    MCH 26.8 (L) 27.0 - 33.0 pg    MCHC 32.3 32.3 - 36.5 g/dL    RDW 52.6 (H) 35.9 - 50.0 fL    Platelet Count 415 164 - 446 K/uL    MPV 9.2 9.0 - 12.9 fL    Neutrophils-Polys 70.50 44.00 - 72.00 %    Lymphocytes 16.10 (L) 22.00 - 41.00 %    Monocytes 7.10 0.00 - 13.40 %    Eosinophils 0.00 0.00 - 6.90 %    Basophils 0.00 0.00 - 1.80 %    Nucleated RBC 0.00 0.00 - 0.20 /100 WBC    Neutrophils (Absolute) 8.60 (H) 1.82 - 7.42 K/uL    Lymphs (Absolute) 1.87 1.00 - 4.80 K/uL    Monos (Absolute) 0.82 0.00 - 0.85 K/uL    Eos (Absolute) 0.00 0.00 - 0.51 K/uL    Baso (Absolute) 0.00 0.00 - 0.12 K/uL    NRBC (Absolute) 0.00 K/uL    Anisocytosis 1+     Microcytosis 1+    Comp Metabolic Panel    Collection  Time: 04/03/25  5:30 PM   Result Value Ref Range    Sodium 146 (H) 135 - 145 mmol/L    Potassium 4.1 3.6 - 5.5 mmol/L    Chloride 116 (H) 96 - 112 mmol/L    Co2 17 (L) 20 - 33 mmol/L    Anion Gap 13.0 7.0 - 16.0    Glucose 122 (H) 65 - 99 mg/dL    Bun 18 8 - 22 mg/dL    Creatinine 1.21 0.50 - 1.40 mg/dL    Calcium 8.2 (L) 8.5 - 10.5 mg/dL    Correct Calcium 8.3 (L) 8.5 - 10.5 mg/dL    AST(SGOT) 170 (H) 12 - 45 U/L    ALT(SGPT) 168 (H) 2 - 50 U/L    Alkaline Phosphatase 72 30 - 99 U/L    Total Bilirubin 0.3 0.1 - 1.5 mg/dL    Albumin 3.9 3.2 - 4.9 g/dL    Total Protein 6.7 6.0 - 8.2 g/dL    Globulin 2.8 1.9 - 3.5 g/dL    A-G Ratio 1.4 g/dL   TROPONIN    Collection Time: 04/03/25  5:30 PM   Result Value Ref Range    Troponin T 80 (H) 6 - 19 ng/L   Prothrombin Time    Collection Time: 04/03/25  5:30 PM   Result Value Ref Range    PT 13.9 12.0 - 14.6 sec    INR 1.07 0.87 - 1.13   APTT    Collection Time: 04/03/25  5:30 PM   Result Value Ref Range    APTT 21.0 (L) 24.7 - 36.0 sec   DIAGNOSTIC ALCOHOL    Collection Time: 04/03/25  5:30 PM   Result Value Ref Range    Diagnostic Alcohol <10.1 <10.1 mg/dL   Triglyceride    Collection Time: 04/03/25  5:30 PM   Result Value Ref Range    Triglycerides 139 0 - 149 mg/dL   ESTIMATED GFR    Collection Time: 04/03/25  5:30 PM   Result Value Ref Range    GFR (CKD-EPI) 76 >60 mL/min/1.73 m 2   DIFFERENTIAL MANUAL    Collection Time: 04/03/25  5:30 PM   Result Value Ref Range    Bands-Stabs 3.60 0.00 - 10.00 %    Metamyelocytes 1.80 %    Myelocytes 0.90 %    Manual Diff Status PERFORMED    PERIPHERAL SMEAR REVIEW    Collection Time: 04/03/25  5:30 PM   Result Value Ref Range    Peripheral Smear Review see below    PLATELET ESTIMATE    Collection Time: 04/03/25  5:30 PM   Result Value Ref Range    Plt Estimation Normal    MORPHOLOGY    Collection Time: 04/03/25  5:30 PM   Result Value Ref Range    RBC Morphology Present     Poikilocytosis 1+     Ovalocytes 1+    proBrain Natriuretic  Peptide, NT    Collection Time: 04/03/25  5:30 PM   Result Value Ref Range    NT-proBNP 78 0 - 125 pg/mL   HEMOGLOBIN A1C    Collection Time: 04/03/25  5:30 PM   Result Value Ref Range    Glycohemoglobin 6.0 (H) 4.0 - 5.6 %    Est Avg Glucose 126 mg/dL   LACTIC ACID    Collection Time: 04/03/25  8:01 PM   Result Value Ref Range    Lactic Acid 3.1 (H) 0.5 - 2.0 mmol/L   POCT arterial blood gas device results    Collection Time: 04/03/25  8:06 PM   Result Value Ref Range    Ph 7.310 (L) 7.350 - 7.450    Pco2 26.1 (L) 32.0 - 48.0 mmHg    Po2 116 (H) 83 - 108 mmHg    Tco2 14 (L) 32 - 48 mmol/L    S02 98 93 - 99 %    Hco3 13.1 (L) 21.0 - 28.0 mmol/L    BE -11 (L) -4 - 3 mmol/L    Body Temp 37.6 C degrees    O2 Therapy 70 %    iPF Ratio 166     Ph Temp Cedric 7.302 (L) 7.350 - 7.450    Pco2 Temp Co 26.8 (L) 32.0 - 48.0 mmHg    Po2 Temp Cor 120 (H) 83 - 108 mmHg    Specimen Arterial     Kanu Test Pass     DelSys Vent     Tidal Volume 480 mL    Peep End Expiratory Pressure 10 cmh20    Set Rate 24     Mode APV-CMV    POCT lactate device results    Collection Time: 04/03/25  8:06 PM   Result Value Ref Range    iStat Lactate 2.8 (H) 0.5 - 2.0 mmol/L       I have independently interpreted this EKG    RADIOLOGY/PROCEDURES     Physician procedure: Endotracheal intubation: Indication critically ill patient with temporary airway.  Emergent consent was obtained.  The patient was placed on continuous pulse oximetry and cardiac monitoring.  He was preoxygenated with 100% FiO2.  Patient was given IV propofol and rocuronium per pharmacy's dosing.  Please see their notes for details.  A curved #4 glide scope blade was inserted into the oropharynx.  The endotracheal cords were easily visualized and a 7.0 cuffed tube was easily passed through the cords to 24 cm.  The cuff was inflated.  Good CO2 return and breath sounds were auscultated.  Chest x-ray confirmed placement no complications      Physician procedure: Quick look bedside stat echo  demonstrating hyperdynamic myocardium with no large pericardial effusion:        Physician procedure: Right internal jugular triple-lumen lumen catheter.  Emergent consent was obtained.  Clinical indication critically ill patient with poor peripheral IV access.  The right anterior neck was prepped with chlorhexidine x 3.  Sterile gown and gloves and universal precautions were utilized.  Please note teaching service was utilized with Dr. Zepeda in the performance of this procedure.  The right internal jugular vessel was visualized with real-time ultrasound.  5 cc of lidocaine without epinephrine was infiltrated into the skin deeper tissues for anesthesia.  Finder needle was gently inserted and dark nonpulsatile blood was aspirated.  The Seldinger wire was advanced without resistance.  The tract was dilated and catheter was placed over the wire.  All ports were flushed.  Sterile dressing applied and the line was sewn into place.  Chest x-ray confirmed placement no complications blood loss less than 1 cc      I have independently interpreted the diagnostic imaging associated with this visit and am waiting the final reading from the radiologist.   My preliminary interpretation is as follows: No evidence of intracranial hemorrhage    Radiologist interpretation:  CT-HEAD W/O   Final Result      No acute intracranial findings.               DX-CHEST-PORTABLE (1 VIEW)   Final Result      Interval placement of a right IJ central line without evidence of complication.      DX-CHEST-PORTABLE (1 VIEW)   Final Result      1.  Support devices present.      2.  Cardiomegaly.      3.  Bibasilar atelectasis.      CT-CTA CHEST PULMONARY ARTERY W/ RECONS    (Results Pending)   DX-CHEST-PORTABLE (1 VIEW)    (Results Pending)   CT-ABDOMEN-PELVIS WITH    (Results Pending)   EC-ECHOCARDIOGRAM COMPLETE W/ CONT    (Results Pending)       COURSE & MEDICAL DECISION MAKING    ASSESSMENT, COURSE AND PLAN  Care Narrative:     This is a  critically ill 44-year-old gentleman is brought in by care flight after a witnessed out-of-hospital cardiac arrest.  By report from his wife and EMS presenting rhythm was ventricular fibrillation and you went ACLS protocol including defibrillation x 3 and was loaded with IV amiodarone.  On arrival a peripheral IV was established by myself as there is no IV access and subsequent triple-lumen IJ catheter was placed.  I did perform a quick look bedside ultrasound which demonstrated no large pericardial effusion and a hyperdynamic myocardium.  Patient was sedated with propofol and subsequent CT scan of the head did not demonstrate any intracranial hemorrhage or any other process.  Emergent consultation with Dr. BANDA with cardiology as well as Dr. ENCINAS with the intensivist service was obtained.  Patient will get a stat echocardiogram to assess for wall motion abnormality and possible right heart strain out of concern for possible pulmonary embolism.  I considered but did not feel and he did not require vasopressors at this time..  His EKG here did not suggest STEMI morphology although ischemia is very likely given his age, risk factors and out-of-hospital V-fib arrest.  Cardiology will weigh in as to whether emergent catheterization is indicated.  No clear indication for hypothermia as the patient appears to be making spontaneous movements when sedation was briefly turned off.  Patient is critically ill and will be admitted to the intensivist service with cardiology consultation.  I also ordered a drip of amiodarone per protocol and the patient will be sent to CT scan for additional imaging including CT angiogram of the chest and CT scan of the abdomen pelvis to be followed up by the intensivist.    Hydration: Based on the patient's presentation of Inability to take oral fluids the patient was given IV fluids. IV Hydration was used because oral hydration was not adequate alone. Upon recheck following hydration, the patient was  improving.          ADDITIONAL PROBLEMS MANAGED      DISPOSITION AND DISCUSSIONS  I have discussed management of the patient with the following physicians and CARTER's: Discussed plan of care with hospitalist intensivist    Discussion of management with other Q or appropriate source(s): Discussed therapeutics with respiratory therapist as well as ER pharmacist    Escalation of care considered, and ultimately not performed: Considered hypothermia protocol    Barriers to care at this time, including but not limited to: None.     Decision tools and prescription drugs considered including, but not limited to: None.    FINAL DIAGNOSIS  1. Cardiac arrest (HCC)    2. Ventricular fibrillation, paroxysmal (HCC)       CRITICAL CARE  The very real possibilty of a deterioration of this patient's condition required the highest level of my preparedness for sudden, emergent intervention.  I provided critical care services, which included medication orders, frequent reevaluations of the patient's condition and response to treatment, ordering and reviewing test results, and discussing the case with various consultants.  The critical care time associated with the care of the patient was 60 minutes. Review chart for interventions. This time is exclusive of any other billable procedures.     Electronically signed by: Kumar Johnson M.D., 4/3/2025 6:11 PM

## 2025-04-04 NOTE — PROGRESS NOTES
Dr Cardoza updated of discoloration on bilateral hands.  Left > Right.  Artline in left hand.  Decreased to absent pulse ox waveform on distal left hand.  Dr Cardoza assessed hand vasculature with ultrasound and noted blood flow distal to the arterial line on the radial artery.

## 2025-04-04 NOTE — CONSULTS
Reason for Consult:  Asked by Dr Misbah Cardoza M.D. to see this patient with VF arrest with.NEXRESP    Patient's PCP: Sal Schafer D.O.    CC:   Chief Complaint   Patient presents with    Cardiac Arrest     BIB [facility] and [facility] from [place] for witnessed arrest. Pt was eating lunch and collapsed in front of his wife, CPR started by wife. On EMS arrival pt was in vfib. EMS administered 3 shocks, 3 rounds of EPI, 450mg amiodarone, 5mg Versed, 4mg Narcan and 40mg etomidate through IO. +ROSC.  In route, [facility] administered an additional 5mg Versed. FSBG 159. Pt arrives tachycardic in the 120s, 130/85, 99% IGEL in place.       HPI: This is a 44-year-old gentleman with a known history of mitral regurgitation status post repair in 2024 with a history of duodenal ulcer bleeding who follows with us in clinic and unfortunately had collapse but immediate CPR by his wife.  He was witnessed arrest initial presenting rhythm was V-fib by ambulance he had 3 defibrillations and obtained ROSC he was found to have pulmonary embolism in the ER and has been admitted to the ICU and echocardiogram shows reduced RV function and enlargement of the RV    He is unable to contribute to history given his intubated and on sedation his wife is at the bedside who is a nurse    Medications / Drug list prior to admission:  No current facility-administered medications on file prior to encounter.     Current Outpatient Medications on File Prior to Encounter   Medication Sig Dispense Refill    meloxicam (MOBIC) 15 MG tablet Take 15 mg by mouth every day.      [START ON 4/16/2025] zolpidem (AMBIEN) 10 MG Tab Take 1 Tablet by mouth at bedtime as needed for Sleep for up to 90 days. 30 Tablet 2    tadalafil (CIALIS) 10 MG tablet Take 1 Tablet by mouth 1 time a day as needed for Erectile Dysfunction. 20 Tablet 3    methocarbamol (ROBAXIN) 500 MG Tab Take 500 mg by mouth 3 times a day. 1,000 mg = 2 tabs       oxyCODONE immediate release (ROXICODONE) 10 MG immediate release tablet Take 10 mg by mouth 5 Times a Day.         Current list of administered Medications:    Current Facility-Administered Medications:     fentaNYL (Sublimaze) injection 100 mcg, 100 mcg, Intravenous, Q HOUR PRN, Roverto Lit, D.O.    fentanyl 50 mcg/mL infusion,  mcg/hr, Intravenous, Continuous, Roverto Rutland, D.O., Last Rate: 4 mL/hr at 04/04/25 0659, 200 mcg/hr at 04/04/25 0659    vecuronium (Norcuron) injection 8.45 mg, 0.1 mg/kg (Ideal), Intravenous, Once **FOLLOWED BY** vecuronium (Norcuron) 60 mg in dextrose 5% 500 mL Infusion, 1-2 mcg/kg/min (Ideal), Intravenous, Continuous, Roverto Lit, D.O., Last Rate: 84.5 mL/hr at 04/04/25 0703, 2 mcg/kg/min at 04/04/25 0703    [COMPLETED] amiodarone (Nexterone) 360 mg/200 mL infusion, 1 mg/min, Intravenous, Once, Stopped at 04/04/25 0059 **FOLLOWED BY** amiodarone (Nexterone) 360 mg/200 mL infusion, 0.5 mg/min, Intravenous, Continuous, Kumar Johnson M.D., Last Rate: 16.7 mL/hr at 04/04/25 0101, 0.5 mg/min at 04/04/25 0101    Respiratory Therapy Consult, , Nebulization, Continuous RT, Misbah Cardoza M.D.    MD Alert...ICU Electrolyte Replacement per Pharmacy, , Other, PHARMACY TO DOSE, Misbah Cardoza M.D.    lidocaine (Xylocaine) 1 % injection 2 mL, 2 mL, Tracheal Tube, Q30 MIN PRN, Misbah Cardoza M.D.    insulin lispro (HumaLOG,AdmeLOG) subcutaneous injection, 2-9 Units, Subcutaneous, Q6HRS **AND** POC blood glucose manual result, , , Q6H **AND** NOTIFY MD and PharmD, , , Once **AND** Administer 20 grams of glucose (approximately 8 ounces of fruit juice) every 15 minutes PRN FSBG less than 70 mg/dL, , , PRN **AND** dextrose 50 % (D50W) injection 25 g, 25 g, Intravenous, Q15 MIN PRN, Misbah Cardoza M.D.    midazolam (Versed) injection, , , ,     magnesium sulfate 20 g/500mL infusion, 0.5-2 g/hr, Intravenous, Continuous, Misbah Cardoza M.D., Last Rate: 13 mL/hr at 04/04/25  0045, 0.5 g/hr at 04/04/25 0045    heparin infusion 25,000 units in 500 mL 0.45% NACL, 0-30 Units/kg/hr (Adjusted), Intravenous, Continuous, Misbah Cardoza M.D., Last Rate: 31.2 mL/hr at 04/04/25 0659, 16 Units/kg/hr at 04/04/25 0659    heparin injection 3,900 Units, 40 Units/kg (Adjusted), Intravenous, PRN, Misbah Cardoza M.D.    ampicillin/sulbactam (Unasyn) 3 g in  mL IVPB, 3 g, Intravenous, Q6HRS, Misbah Cardoza M.D., Stopped at 04/04/25 0543    HYDROmorphone (Dilaudid) injection 1-2 mg, 1-2 mg, Intravenous, Q2HRS PRN, Misbah Cardoza M.D., 2 mg at 04/04/25 0045    propofol (DIPRIVAN) injection, 0-80 mcg/kg/min (Ideal), Intravenous, Continuous, Last Rate: 40.6 mL/hr at 04/04/25 0522, 80 mcg/kg/min at 04/04/25 0522 **AND** Triglycerides Starting now and then Every 3 Days, , , Every 3 Days (0300), Misbah Cardoza M.D.    midazolam (Versed) injection 2-4 mg, 2-4 mg, Intravenous, Q HOUR PRN, Misbah Cardoza M.D., 4 mg at 04/04/25 0032    thiamine (B-1) injection 200 mg, 200 mg, Intravenous, BID, Misbah Cardoza M.D., 200 mg at 04/04/25 0514    acetaminophen (Tylenol) tablet 1,000 mg, 1,000 mg, Enteral Tube, Q6HRS, 1,000 mg at 04/04/25 0515 **OR** acetaminophen (Tylenol) suppository 975 mg, 975 mg, Rectal, Q6HRS, Misbah Cardoza M.D.    busPIRone (Buspar) tablet 15 mg, 15 mg, Enteral Tube, BID, Misbah Cardoza M.D.    famotidine (Pepcid) tablet 20 mg, 20 mg, Enteral Tube, Q12HRS, 20 mg at 04/04/25 0515 **OR** famotidine (Pepcid) injection 20 mg, 20 mg, Intravenous, Q12HRS, Misbah Cardoza M.D.    senna-docusate (Pericolace Or Senokot S) 8.6-50 MG per tablet 2 Tablet, 2 Tablet, Enteral Tube, Q EVENING **AND** polyethylene glycol/lytes (Miralax) Packet 1 Packet, 1 Packet, Enteral Tube, QDAY PRN, Misbah Cardoza M.D.    oxyCODONE immediate release (Roxicodone) tablet 10 mg, 10 mg, Enteral Tube, Q6HRS, Misbah Cardoza M.D., 10 mg at 04/04/25 0514    Past Medical History:    Diagnosis Date    Arthritis     Right ankle    Breath shortness 11/03/2023    With exertion.    Drug-seeking behavior 07/30/2023    Demanding narcotics for a duodenal ulcer July 2023. Refused antacids or sucralfate.     Duodenal ulcer 07/30/2023    Gastric ulcer     Heart valve disease     Mitral valve prolapse    Hemorrhagic disorder (HCC)     GI bleed    Hypertension     Pain     Resolved; Right ankle    Seizure (HCC)     while taking tramadol       Past Surgical History:   Procedure Laterality Date    NM UPPER GI ENDOSCOPY,DIAGNOSIS N/A 9/18/2024    Procedure: GASTROSCOPY;  Surgeon: Janae Oliver M.D.;  Location: SURGERY SAME DAY [facility];  Service: Gastroenterology    CAPSULE ENDOSCOPY ADMINISTRATION N/A 5/28/2024    Procedure: ADMINISTRATION, CAPSULE, FOR CAPSULE ENDOSCOPY;  Surgeon: Janae Oliver M.D.;  Location: SURGERY SAME DAY [facility];  Service: Gastroenterology    CAPSULE ENDOSCOPY RETRIEVAL  5/28/2024    Procedure: RETRIEVAL, ENDOSCOPY CAPSULE;  Surgeon: Janae Oliver M.D.;  Location: SURGERY SAME DAY [facility];  Service: Gastroenterology    NM COLONOSCOPY-FLEXIBLE  5/27/2024    Procedure: COLONOSCOPY;  Surgeon: Janae Oliver M.D.;  Location: [facility];  Service: Gastroenterology    NM UPPER GI ENDOSCOPY,DIAGNOSIS N/A 5/25/2024    Procedure: GASTROSCOPY;  Surgeon: Janae Oliver M.D.;  Location: [facility];  Service: Gastroenterology    NM UPPER GI ENDOSCOPY,BIOPSY N/A 5/25/2024    Procedure: GASTROSCOPY, WITH BIOPSY;  Surgeon: Janae Oliver M.D.;  Location: [facility];  Service: Gastroenterology    NM UPPER GI ENDOSCOPY,DIAGNOSIS N/A 5/18/2024    Procedure: GASTROSCOPY;  Surgeon: Kaitlyn Ontiveros M.D.;  Location: [facility];  Service: Gastroenterology    NM UPPER GI ENDOSCOPY,BIOPSY N/A 5/18/2024    Procedure: GASTROSCOPY, WITH BIOPSY;  Surgeon: Kaitlyn Ontiveros M.D.;  Location: [facility];  Service: Gastroenterology     GASTROSCOPY WITH ENDOSTAT N/A 5/18/2024    Procedure: EGD, WITH CAUTERIZATION;  Surgeon: Kaitlyn Ontiveros M.D.;  Location: SURGERY [facility];  Service: Gastroenterology    IN REPLACEMENT OF MITRAL VALVE  1/25/2024    Procedure: MITRAL VALVE REPAIR, TRANSESOPHAGEAL ECHOCARDIOGRAM;  Surgeon: Bigg Santana M.D.;  Location: [facility];  Service: Cardiothoracic    ECHOCARDIOGRAM, TRANSESOPHAGEAL, INTRAOPERATIVE  1/25/2024    Procedure: ECHOCARDIOGRAM, TRANSESOPHAGEAL, INTRAOPERATIVE;  Surgeon: Bigg Santana M.D.;  Location: [facility];  Service: Cardiothoracic    IN UPPER GI ENDOSCOPY,DIAGNOSIS N/A 9/14/2023    Procedure: GASTROSCOPY;  Surgeon: Johnny Dalton M.D.;  Location: SURGERY SAME DAY [facility];  Service: Gastroenterology    IN UPPER GI ENDOSCOPY,BIOPSY N/A 9/14/2023    Procedure: GASTROSCOPY, WITH BIOPSY;  Surgeon: Johnny Dalton M.D.;  Location: SURGERY SAME DAY [facility];  Service: Gastroenterology    IN UPPER GI ENDOSCOPY,BIOPSY N/A 7/30/2023    Procedure: GASTROSCOPY, WITH BIOPSY;  Surgeon: Kumar Hope M.D.;  Location: [facility];  Service: Gastroenterology    IN UPPER GI ENDOSCOPY,DIAGNOSIS  3/28/2022    Procedure: GASTROSCOPY;  Surgeon: Paco Wiggins M.D.;  Location: SURGERY SAME DAY [facility];  Service: Gastroenterology    IN UPPER GI ENDOSCOPY,BIOPSY  3/28/2022    Procedure: GASTROSCOPY, WITH BIOPSY;  Surgeon: Paco Wiggins M.D.;  Location: SURGERY SAME DAY [facility];  Service: Gastroenterology    ANKLE ARTHROSCOPY Right 5/21/2018    Procedure: ANKLE ARTHROSCOPY, LATERAL LIGAMENT RECONSTRUCTION;  Surgeon: Seth Cruz M.D.;  Location: SURGERY [facility];  Service: Orthopedics    BIOPSY ORTHO Right 5/21/2018    Procedure: BIOPSY ORTHO/ FOR CARTILAGE AND DENOVO PROCEDURE;  Surgeon: Seth Cruz M.D.;  Location: [facility];  Service: Orthopedics    OTHER ABDOMINAL SURGERY      Cholecystectomy February 2017    OTHER ORTHOPEDIC SURGERY      2  previous ankle surgeries (2007, 2009)       Family History   Problem Relation Age of Onset    Heart Disease Mother     No Known Problems Father     Clotting Disorder Neg Hx      Patient family history was personally reviewed, no pertinent family history to current presentation    Social History     Tobacco Use    Smoking status: Never    Smokeless tobacco: Never   Vaping Use    Vaping status: Never Used   Substance Use Topics    Alcohol use: Not Currently    Drug use: Never       ALLERGIES:  Allergies   Allergen Reactions    Morphine Vomiting    Tramadol Unspecified     Seizure  [id number]       Review of systems:  A presentation focused review of symptoms was reviewed with patient. This is reviewed in H&P and PMH. ALL OTHERS reviewed and negative    Physical exam:  Patient Vitals for the past 24 hrs:   BP Temp Temp src Pulse Resp SpO2 Height Weight   04/04/25 0600 (!) 146/79 37.2 °C (99 °F) Bladder 97 16 100 % -- --   04/04/25 0500 112/72 -- -- 97 14 100 % -- --   04/04/25 0400 125/69 37.9 °C (100.2 °F) Bladder 94 16 100 % -- --   04/04/25 0324 -- -- -- 96 19 100 % -- --   04/04/25 0300 113/79 37.9 °C (100.2 °F) Bladder 97 (!) 36 100 % -- --   04/04/25 0248 -- -- -- 95 18 100 % -- --   04/04/25 0200 107/64 37.9 °C (100.2 °F) Bladder 94 20 100 % -- --   04/04/25 0126 -- -- -- 93 (!) 26 100 % -- --   04/04/25 0115 -- -- -- 100 18 99 % -- --   04/04/25 0100 102/64 37.9 °C (100.2 °F) Bladder 100 17 99 % -- --   04/04/25 0000 128/74 (!) 38.2 °C (100.8 °F) Bladder (!) 105 (!) 23 92 % -- --   04/03/25 2300 131/80 -- -- (!) 107 (!) 22 90 % -- --   04/03/25 2240 -- (!) 38.5 °C (101.3 °F) Bladder (!) 108 (!) 24 93 % -- --   04/03/25 2200 -- (!) 38.2 °C (100.8 °F) -- (!) 109 (!) 25 93 % -- --   04/03/25 2108 -- -- -- (!) 114 (!) 24 94 % -- --   04/03/25 2100 -- 37.2 °C (99 °F) Bladder (!) 116 (!) 33 92 % -- 117 kg (257 lb 0.9 oz)   04/03/25 2030 (!) 142/93 -- -- (!) 128 (!) 41 98 % -- --   04/03/25 2025 (!) 145/87 -- -- (!) 127  (!) 41 94 % -- --   04/03/25 2020 139/85 -- -- (!) 125 (!) 44 100 % -- --   04/03/25 2015 (!) 131/90 -- -- (!) 124 (!) 35 100 % -- --   04/03/25 2010 125/85 -- -- (!) 124 (!) 39 100 % -- --   04/03/25 2006 134/88 -- -- (!) 124 (!) 33 99 % -- --   04/03/25 2004 134/77 -- -- (!) 125 (!) 33 100 % -- --   04/03/25 2002 (!) 140/83 -- -- (!) 124 (!) 35 98 % -- --   04/03/25 2000 139/79 -- -- (!) 125 (!) 36 100 % -- --   04/03/25 1950 118/83 -- -- (!) 127 (!) 35 98 % -- --   04/03/25 1940 121/67 -- -- (!) 127 (!) 37 97 % -- --   04/03/25 1930 115/73 -- -- (!) 125 (!) 33 97 % -- --   04/03/25 1920 111/75 -- -- (!) 121 (!) 31 98 % -- --   04/03/25 1918 114/76 -- -- (!) 121 (!) 32 97 % -- --   04/03/25 1916 120/77 -- -- (!) 121 (!) 31 97 % -- --   04/03/25 1914 121/81 -- -- (!) 119 (!) 31 95 % -- --   04/03/25 1911 139/83 -- -- (!) 118 (!) 31 97 % -- --   04/03/25 1854 132/67 -- -- (!) 119 (!) 29 97 % -- --   04/03/25 1853 129/74 -- -- (!) 120 (!) 27 97 % -- --   04/03/25 1850 (!) 147/65 -- -- (!) 128 (!) 36 98 % -- --   04/03/25 1849 (!) 145/61 -- -- (!) 128 (!) 37 99 % -- --   04/03/25 1847 (!) 156/73 -- -- (!) 128 (!) 36 99 % -- --   04/03/25 1845 (!) 150/81 -- -- (!) 127 (!) 39 99 % -- --   04/03/25 1842 128/77 -- -- (!) 125 -- 97 % -- --   04/03/25 1836 125/79 -- -- (!) 125 (!) 35 96 % -- --   04/03/25 1834 123/76 -- -- (!) 124 (!) 32 96 % -- --   04/03/25 1832 114/80 -- -- (!) 124 (!) 34 97 % -- --   04/03/25 1830 112/84 -- -- (!) 124 (!) 32 100 % -- --   04/03/25 1828 121/81 -- -- (!) 124 (!) 33 96 % -- --   04/03/25 1824 100/83 -- -- (!) 124 -- 97 % -- --   04/03/25 1823 (!) 132/99 -- -- (!) 124 -- 95 % -- --   04/03/25 1820 (!) 141/58 -- -- (!) 124 (!) 33 96 % -- --   04/03/25 1818 (!) 163/80 -- -- (!) 124 (!) 33 96 % -- --   04/03/25 1817 127/88 -- -- (!) 124 (!) 34 97 % -- --   04/03/25 1814 126/85 -- -- (!) 124 (!) 32 96 % -- --   04/03/25 1810 127/88 -- -- (!) 124 (!) 33 96 % -- --   04/03/25 1808 123/81 -- --  "(!) 124 (!) 31 96 % -- --   04/03/25 1806 125/78 -- -- (!) 123 (!) 31 96 % -- --   04/03/25 1804 128/77 -- -- (!) 124 (!) 30 96 % -- --   04/03/25 1758 (!) 159/103 -- -- (!) 133 (!) 39 98 % -- --   04/03/25 1756 (!) 154/96 -- -- (!) 131 (!) 34 98 % -- --   04/03/25 1754 (!) 173/103 -- -- (!) 134 (!) 34 98 % -- --   04/03/25 1753 -- -- -- (!) 133 (!) 33 98 % -- --   04/03/25 1752 (!) 166/107 -- -- (!) 133 (!) 37 98 % -- --   04/03/25 1744 (!) 133/96 -- -- (!) 125 (!) 40 97 % -- --   04/03/25 1741 (!) 161/87 -- -- (!) 120 -- 93 % -- --   04/03/25 1721 (!) 130/90 -- -- (!) 120 -- 96 % -- --   04/03/25 1716 (!) 134/91 -- -- (!) 123 (!) 38 98 % -- --   04/03/25 1711 (!) 153/84 -- -- (!) 122 (!) 35 98 % -- --   04/03/25 1706 (!) 135/97 -- -- (!) 124 (!) 35 99 % -- --   04/03/25 1701 (!) 164/103 -- -- (!) 126 (!) 30 100 % -- --   04/03/25 1656 (!) 194/120 -- -- (!) 129 (!) 23 100 % -- --   04/03/25 1651 (!) 186/123 -- -- (!) 129 (!) 24 100 % -- --   04/03/25 1650 (!) 195/121 -- -- (!) 129 (!) 23 99 % -- --   04/03/25 1648 (!) 193/118 -- -- (!) 133 (!) 31 100 % -- --   04/03/25 1646 (!) 189/126 -- -- (!) 134 20 99 % -- --   04/03/25 1644 (!) 181/123 -- -- (!) 135 -- 98 % -- --   04/03/25 1631 -- 36.2 °C (97.2 °F) Temporal (!) 132 (!) 21 95 % -- --   04/03/25 1630 -- -- -- (!) 127 20 96 % -- --   04/03/25 1629 -- -- -- (!) 127 20 96 % -- --   04/03/25 1624 114/66 -- -- -- -- -- -- --   04/03/25 1612 (!) 124/91 -- -- (!) 117 (!) 41 99 % -- --   04/03/25 1600 -- -- -- -- -- -- 1.905 m (6' 3\") --     General: Comatose with EEG monitoring on sedation  EYES: no jaundice  HEENT: ET tube  Neck: Central access  CVS:  [  RRR.   Resp: Vent sounds  Abdomen: ND,  Skin: Grossly nothing acute no obvious rashes  Neurological: Comatose  Extremities:   [  no edema. No cyanosis.       Data:  Laboratory studies personally reviewed by me:  Recent Results (from the past 24 hours)   EKG    Collection Time: 04/03/25  4:45 PM   Result Value Ref " Range    Report       [facility]    Test Date:  2025  Pt Name:    PATI MERCEDES           Department: ER  MRN:        [id number]                      Room:       RD 08  Gender:     Male                         Technician: [id number]  :        1980                   Requested By:JANIYA LOPEZ  Order #:    [id number]                    Reading MD:    Measurements  Intervals                                Axis  Rate:       134                          P:          52  PA:         172                          QRS:        18  QRSD:       88                           T:          69  QT:         300  QTc:        448    Interpretive Statements  Sinus tachycardia  Probable left atrial enlargement  Probable anteroseptal infarct, old  Compared to ECG 2024 16:09:32  Myocardial infarct finding now present  Sinus bradycardia no longer present  Possible ischemia no longer present     URINALYSIS    Collection Time: 25  4:46 PM    Specimen: Urine   Result Value Ref Range    Color Yellow     Character Cloudy (A)     Specific Gravity 1.029 <1.035    Ph 5.5 5.0 - 8.0    Glucose 100 (A) Negative mg/dL    Ketones Negative Negative mg/dL    Protein 300 (A) Negative mg/dL    Bilirubin Negative Negative    Urobilinogen, Urine 1.0 <=1.0 EU/dL    Nitrite Negative Negative    Leukocyte Esterase Negative Negative    Occult Blood Trace (A) Negative    Micro Urine Req Microscopic    URINE DRUG SCREEN    Collection Time: 25  4:46 PM   Result Value Ref Range    Amphetamines Urine Negative Negative    Barbiturates Negative Negative    Benzodiazepines Positive (A) Negative    Cocaine Metabolite Negative Negative    Fentanyl, Urine Negative Negative    Methadone Negative Negative    Opiates Negative Negative    Oxycodone Negative Negative    Phencyclidine -Pcp Negative Negative    Propoxyphene Negative Negative    Cannabinoid Metab Negative Negative   URINE MICROSCOPIC (W/UA)     Collection Time: 04/03/25  4:46 PM   Result Value Ref Range    WBC 6-10 (A) /hpf    RBC 11-20 (A) 0 - 2 /hpf    Bacteria Many (A) None /hpf    Epithelial Cells 6-10 (A) 0 - 5 /hpf    Amorphous Crystal Present (A) Absent /hpf    Urine Casts 3-5 (A) 0 - 2 /lpf    Hyaline Cast Present /lpf    Granular Casts Present (A) Absent /lpf   POCT arterial blood gas device results    Collection Time: 04/03/25  4:57 PM   Result Value Ref Range    Ph 7.153 (LL) 7.350 - 7.450    Pco2 52.7 (HH) 32.0 - 48.0 mmHg    Po2 47 (LL) 83 - 108 mmHg    Tco2 20 (L) 32 - 48 mmol/L    S02 70 (L) 93 - 99 %    Hco3 18.5 (L) 21.0 - 28.0 mmol/L    BE -11 (L) -4 - 3 mmol/L    Body Temp 36.2 C degrees    O2 Therapy 100 %    iPF Ratio 47     Ph Temp Cedric 7.164 (LL) 7.350 - 7.450    Pco2 Temp Co 50.9 (H) 32.0 - 48.0 mmHg    Po2 Temp Cor 44 (LL) 83 - 108 mmHg    Specimen Arterial     Kanu Test Pass     DelSys Vent     Tidal Volume 490 mL    Peep End Expiratory Pressure 8 cmh20    Set Rate 20     Mode APV-CMV    POCT lactate device results    Collection Time: 04/03/25  4:57 PM   Result Value Ref Range    iStat Lactate 4.2 (HH) 0.5 - 2.0 mmol/L   MAGNESIUM    Collection Time: 04/03/25  5:30 PM   Result Value Ref Range    Magnesium 2.1 1.5 - 2.5 mg/dL   CBC WITH DIFFERENTIAL    Collection Time: 04/03/25  5:30 PM   Result Value Ref Range    WBC 11.6 (H) 4.8 - 10.8 K/uL    RBC 5.55 4.70 - 6.10 M/uL    Hemoglobin 14.9 14.0 - 18.0 g/dL    Hematocrit 46.2 42.0 - 52.0 %    MCV 83.2 81.4 - 97.8 fL    MCH 26.8 (L) 27.0 - 33.0 pg    MCHC 32.3 32.3 - 36.5 g/dL    RDW 52.6 (H) 35.9 - 50.0 fL    Platelet Count 415 164 - 446 K/uL    MPV 9.2 9.0 - 12.9 fL    Neutrophils-Polys 70.50 44.00 - 72.00 %    Lymphocytes 16.10 (L) 22.00 - 41.00 %    Monocytes 7.10 0.00 - 13.40 %    Eosinophils 0.00 0.00 - 6.90 %    Basophils 0.00 0.00 - 1.80 %    Nucleated RBC 0.00 0.00 - 0.20 /100 WBC    Neutrophils (Absolute) 8.60 (H) 1.82 - 7.42 K/uL    Lymphs (Absolute) 1.87 1.00 - 4.80 K/uL     Monos (Absolute) 0.82 0.00 - 0.85 K/uL    Eos (Absolute) 0.00 0.00 - 0.51 K/uL    Baso (Absolute) 0.00 0.00 - 0.12 K/uL    NRBC (Absolute) 0.00 K/uL    Anisocytosis 1+     Microcytosis 1+    Comp Metabolic Panel    Collection Time: 04/03/25  5:30 PM   Result Value Ref Range    Sodium 146 (H) 135 - 145 mmol/L    Potassium 4.1 3.6 - 5.5 mmol/L    Chloride 116 (H) 96 - 112 mmol/L    Co2 17 (L) 20 - 33 mmol/L    Anion Gap 13.0 7.0 - 16.0    Glucose 122 (H) 65 - 99 mg/dL    Bun 18 8 - 22 mg/dL    Creatinine 1.21 0.50 - 1.40 mg/dL    Calcium 8.2 (L) 8.5 - 10.5 mg/dL    Correct Calcium 8.3 (L) 8.5 - 10.5 mg/dL    AST(SGOT) 170 (H) 12 - 45 U/L    ALT(SGPT) 168 (H) 2 - 50 U/L    Alkaline Phosphatase 72 30 - 99 U/L    Total Bilirubin 0.3 0.1 - 1.5 mg/dL    Albumin 3.9 3.2 - 4.9 g/dL    Total Protein 6.7 6.0 - 8.2 g/dL    Globulin 2.8 1.9 - 3.5 g/dL    A-G Ratio 1.4 g/dL   TROPONIN    Collection Time: 04/03/25  5:30 PM   Result Value Ref Range    Troponin T 80 (H) 6 - 19 ng/L   Prothrombin Time    Collection Time: 04/03/25  5:30 PM   Result Value Ref Range    PT 13.9 12.0 - 14.6 sec    INR 1.07 0.87 - 1.13   APTT    Collection Time: 04/03/25  5:30 PM   Result Value Ref Range    APTT 21.0 (L) 24.7 - 36.0 sec   DIAGNOSTIC ALCOHOL    Collection Time: 04/03/25  5:30 PM   Result Value Ref Range    Diagnostic Alcohol <10.1 <10.1 mg/dL   Triglyceride    Collection Time: 04/03/25  5:30 PM   Result Value Ref Range    Triglycerides 139 0 - 149 mg/dL   ESTIMATED GFR    Collection Time: 04/03/25  5:30 PM   Result Value Ref Range    GFR (CKD-EPI) 76 >60 mL/min/1.73 m 2   DIFFERENTIAL MANUAL    Collection Time: 04/03/25  5:30 PM   Result Value Ref Range    Bands-Stabs 3.60 0.00 - 10.00 %    Metamyelocytes 1.80 %    Myelocytes 0.90 %    Manual Diff Status PERFORMED    PERIPHERAL SMEAR REVIEW    Collection Time: 04/03/25  5:30 PM   Result Value Ref Range    Peripheral Smear Review see below    PLATELET ESTIMATE    Collection Time: 04/03/25   5:30 PM   Result Value Ref Range    Plt Estimation Normal    MORPHOLOGY    Collection Time: 04/03/25  5:30 PM   Result Value Ref Range    RBC Morphology Present     Poikilocytosis 1+     Ovalocytes 1+    proBrain Natriuretic Peptide, NT    Collection Time: 04/03/25  5:30 PM   Result Value Ref Range    NT-proBNP 78 0 - 125 pg/mL   HEMOGLOBIN A1C    Collection Time: 04/03/25  5:30 PM   Result Value Ref Range    Glycohemoglobin 6.0 (H) 4.0 - 5.6 %    Est Avg Glucose 126 mg/dL   LACTIC ACID    Collection Time: 04/03/25  8:01 PM   Result Value Ref Range    Lactic Acid 3.1 (H) 0.5 - 2.0 mmol/L   POCT arterial blood gas device results    Collection Time: 04/03/25  8:06 PM   Result Value Ref Range    Ph 7.310 (L) 7.350 - 7.450    Pco2 26.1 (L) 32.0 - 48.0 mmHg    Po2 116 (H) 83 - 108 mmHg    Tco2 14 (L) 32 - 48 mmol/L    S02 98 93 - 99 %    Hco3 13.1 (L) 21.0 - 28.0 mmol/L    BE -11 (L) -4 - 3 mmol/L    Body Temp 37.6 C degrees    O2 Therapy 70 %    iPF Ratio 166     Ph Temp Cedric 7.302 (L) 7.350 - 7.450    Pco2 Temp Co 26.8 (L) 32.0 - 48.0 mmHg    Po2 Temp Cor 120 (H) 83 - 108 mmHg    Specimen Arterial     Kanu Test Pass     DelSys Vent     Tidal Volume 480 mL    Peep End Expiratory Pressure 10 cmh20    Set Rate 24     Mode APV-CMV    POCT lactate device results    Collection Time: 04/03/25  8:06 PM   Result Value Ref Range    iStat Lactate 2.8 (H) 0.5 - 2.0 mmol/L   ACETAMINOPHEN    Collection Time: 04/03/25  9:50 PM   Result Value Ref Range    Acetaminophen -Tylenol <5.0 (L) 10.0 - 30.0 ug/mL   Comp Metabolic Panel    Collection Time: 04/03/25  9:50 PM   Result Value Ref Range    Sodium 142 135 - 145 mmol/L    Potassium 4.1 3.6 - 5.5 mmol/L    Chloride 115 (H) 96 - 112 mmol/L    Co2 17 (L) 20 - 33 mmol/L    Anion Gap 10.0 7.0 - 16.0    Glucose 110 (H) 65 - 99 mg/dL    Bun 17 8 - 22 mg/dL    Creatinine 0.94 0.50 - 1.40 mg/dL    Calcium 7.7 (L) 8.5 - 10.5 mg/dL    Correct Calcium 8.2 (L) 8.5 - 10.5 mg/dL    AST(SGOT) 128  (H) 12 - 45 U/L    ALT(SGPT) 132 (H) 2 - 50 U/L    Alkaline Phosphatase 55 30 - 99 U/L    Total Bilirubin 0.3 0.1 - 1.5 mg/dL    Albumin 3.4 3.2 - 4.9 g/dL    Total Protein 5.8 (L) 6.0 - 8.2 g/dL    Globulin 2.4 1.9 - 3.5 g/dL    A-G Ratio 1.4 g/dL   CBC WITH DIFFERENTIAL    Collection Time: 04/03/25  9:50 PM   Result Value Ref Range    WBC 4.8 4.8 - 10.8 K/uL    RBC 5.34 4.70 - 6.10 M/uL    Hemoglobin 14.6 14.0 - 18.0 g/dL    Hematocrit 44.6 42.0 - 52.0 %    MCV 83.5 81.4 - 97.8 fL    MCH 27.3 27.0 - 33.0 pg    MCHC 32.7 32.3 - 36.5 g/dL    RDW 52.9 (H) 35.9 - 50.0 fL    Platelet Count 319 164 - 446 K/uL    MPV 9.0 9.0 - 12.9 fL    Neutrophils-Polys 66.70 44.00 - 72.00 %    Lymphocytes 21.40 (L) 22.00 - 41.00 %    Monocytes 8.20 0.00 - 13.40 %    Eosinophils 2.50 0.00 - 6.90 %    Basophils 0.80 0.00 - 1.80 %    Immature Granulocytes 0.40 0.00 - 0.90 %    Nucleated RBC 0.00 0.00 - 0.20 /100 WBC    Neutrophils (Absolute) 3.17 1.82 - 7.42 K/uL    Lymphs (Absolute) 1.02 1.00 - 4.80 K/uL    Monos (Absolute) 0.39 0.00 - 0.85 K/uL    Eos (Absolute) 0.12 0.00 - 0.51 K/uL    Baso (Absolute) 0.04 0.00 - 0.12 K/uL    Immature Granulocytes (abs) 0.02 0.00 - 0.11 K/uL    NRBC (Absolute) 0.00 K/uL   LACTIC ACID    Collection Time: 04/03/25  9:50 PM   Result Value Ref Range    Lactic Acid 2.8 (H) 0.5 - 2.0 mmol/L   MAGNESIUM    Collection Time: 04/03/25  9:50 PM   Result Value Ref Range    Magnesium 1.6 1.5 - 2.5 mg/dL   TROPONIN    Collection Time: 04/03/25  9:50 PM   Result Value Ref Range    Troponin T 284 (H) 6 - 19 ng/L   ESTIMATED GFR    Collection Time: 04/03/25  9:50 PM   Result Value Ref Range    GFR (CKD-EPI) 103 >60 mL/min/1.73 m 2   POCT glucose device results    Collection Time: 04/03/25  9:55 PM   Result Value Ref Range    POC Glucose, Blood 98 65 - 99 mg/dL   aPTT    Collection Time: 04/03/25 10:30 PM   Result Value Ref Range    APTT 23.8 (L) 24.7 - 36.0 sec   Prothrombin Time    Collection Time: 04/03/25 10:30 PM    Result Value Ref Range    PT 14.8 (H) 12.0 - 14.6 sec    INR 1.16 (H) 0.87 - 1.13   Heparin Xa (Unfractionated)    Collection Time: 25 10:30 PM   Result Value Ref Range    Heparin Xa (UFH) <0.10 IU/mL   Triglycerides Starting now and then Every 3 Days    Collection Time: 25 10:30 PM   Result Value Ref Range    Triglycerides 164 (H) 0 - 149 mg/dL   CREATINE KINASE    Collection Time: 25 10:30 PM   Result Value Ref Range    CPK Total 401 (H) 0 - 154 U/L   BLOOD CULTURE    Collection Time: 25 11:10 PM    Specimen: Peripheral; Blood   Result Value Ref Range    Significant Indicator NEG     Source BLD     Site PERIPHERAL     Culture Result       No Growth  Note: Blood cultures are incubated for 5 days and  are monitored continuously.Positive blood cultures  are called to the RN and reported as soon as  they are identified.     EKG    Collection Time: 25 11:11 PM   Result Value Ref Range    Report       [facility]    Test Date:  2025  Pt Name:    PATI MERCEDES           Department: 161  MRN:        [id number]                      Room:       [facility]  Gender:     Male                         Technician: VON  :        1980                   Requested By:PAT HERNANDEZ  Order #:    [id number]                    Reading MD: Yinka Mitchell MD    Measurements  Intervals                                Axis  Rate:       108                          P:          63  MO:         159                          QRS:        -25  QRSD:       97                           T:          34  QT:         330  QTc:        443    Interpretive Statements  Sinus tachycardia  Inferior infarct, old    Electronically Signed On 2025 23:11:46 PDT by Yinka Mitchell MD     BLOOD CULTURE    Collection Time: 25 11:30 PM    Specimen: Peripheral; Blood   Result Value Ref Range    Significant Indicator NEG     Source BLD     Site PERIPHERAL     Culture Result       No Growth  Note:  Blood cultures are incubated for 5 days and  are monitored continuously.Positive blood cultures  are called to the RN and reported as soon as  they are identified.     POCT glucose device results    Collection Time: 04/03/25 11:35 PM   Result Value Ref Range    POC Glucose, Blood 81 65 - 99 mg/dL   POCT arterial blood gas device results    Collection Time: 04/04/25  3:50 AM   Result Value Ref Range    Ph 7.371 7.350 - 7.450    Pco2 25.3 (L) 32.0 - 48.0 mmHg    Po2 85 83 - 108 mmHg    Tco2 15 (L) 32 - 48 mmol/L    S02 96 93 - 99 %    Hco3 14.7 (L) 21.0 - 28.0 mmol/L    BE -9 (L) -4 - 3 mmol/L    Body Temp 37.0 C degrees    O2 Therapy 50 %    iPF Ratio 170     Ph Temp Cedric 7.371 7.350 - 7.450    Pco2 Temp Co 25.3 (L) 32.0 - 48.0 mmHg    Po2 Temp Cor 85 83 - 108 mmHg    Specimen Arterial     DelSys Vent     Tidal Volume 510 mL    Peep End Expiratory Pressure 10 cmh20    Set Rate 20     Mode APV-CMV    POCT lactate device results    Collection Time: 04/04/25  3:50 AM   Result Value Ref Range    iStat Lactate 3.1 (H) 0.5 - 2.0 mmol/L   LACTIC ACID    Collection Time: 04/04/25  4:15 AM   Result Value Ref Range    Lactic Acid 3.1 (H) 0.5 - 2.0 mmol/L   CBC with Differential    Collection Time: 04/04/25  4:15 AM   Result Value Ref Range    WBC 7.1 4.8 - 10.8 K/uL    RBC 5.33 4.70 - 6.10 M/uL    Hemoglobin 14.6 14.0 - 18.0 g/dL    Hematocrit 45.0 42.0 - 52.0 %    MCV 84.4 81.4 - 97.8 fL    MCH 27.4 27.0 - 33.0 pg    MCHC 32.4 32.3 - 36.5 g/dL    RDW 53.7 (H) 35.9 - 50.0 fL    Platelet Count 290 164 - 446 K/uL    MPV 8.8 (L) 9.0 - 12.9 fL    Neutrophils-Polys 72.20 (H) 44.00 - 72.00 %    Lymphocytes 14.40 (L) 22.00 - 41.00 %    Monocytes 8.50 0.00 - 13.40 %    Eosinophils 3.80 0.00 - 6.90 %    Basophils 0.70 0.00 - 1.80 %    Immature Granulocytes 0.40 0.00 - 0.90 %    Nucleated RBC 0.00 0.00 - 0.20 /100 WBC    Neutrophils (Absolute) 5.09 1.82 - 7.42 K/uL    Lymphs (Absolute) 1.02 1.00 - 4.80 K/uL    Monos (Absolute) 0.60 0.00  - 0.85 K/uL    Eos (Absolute) 0.27 0.00 - 0.51 K/uL    Baso (Absolute) 0.05 0.00 - 0.12 K/uL    Immature Granulocytes (abs) 0.03 0.00 - 0.11 K/uL    NRBC (Absolute) 0.00 K/uL   Magnesium    Collection Time: 04/04/25  4:15 AM   Result Value Ref Range    Magnesium 2.4 1.5 - 2.5 mg/dL   Phosphorus    Collection Time: 04/04/25  4:15 AM   Result Value Ref Range    Phosphorus 3.1 2.5 - 4.5 mg/dL   Comp Metabolic Panel    Collection Time: 04/04/25  4:15 AM   Result Value Ref Range    Sodium 138 135 - 145 mmol/L    Potassium 4.6 3.6 - 5.5 mmol/L    Chloride 111 96 - 112 mmol/L    Co2 15 (L) 20 - 33 mmol/L    Anion Gap 12.0 7.0 - 16.0    Glucose 109 (H) 65 - 99 mg/dL    Bun 14 8 - 22 mg/dL    Creatinine 0.89 0.50 - 1.40 mg/dL    Calcium 7.7 (L) 8.5 - 10.5 mg/dL    Correct Calcium 8.3 (L) 8.5 - 10.5 mg/dL    AST(SGOT) 82 (H) 12 - 45 U/L    ALT(SGPT) 113 (H) 2 - 50 U/L    Alkaline Phosphatase 57 30 - 99 U/L    Total Bilirubin 0.3 0.1 - 1.5 mg/dL    Albumin 3.2 3.2 - 4.9 g/dL    Total Protein 5.7 (L) 6.0 - 8.2 g/dL    Globulin 2.5 1.9 - 3.5 g/dL    A-G Ratio 1.3 g/dL   Heparin Anti-Xa    Collection Time: 04/04/25  4:15 AM   Result Value Ref Range    Heparin Xa (UFH) 0.90 IU/mL   Lipid Profile    Collection Time: 04/04/25  4:15 AM   Result Value Ref Range    Cholesterol,Tot 117 100 - 199 mg/dL    Triglycerides 126 0 - 149 mg/dL    HDL 50 >=40 mg/dL    LDL 42 <100 mg/dL   ESTIMATED GFR    Collection Time: 04/04/25  4:15 AM   Result Value Ref Range    GFR (CKD-EPI) 108 >60 mL/min/1.73 m 2   POCT glucose device results    Collection Time: 04/04/25  4:23 AM   Result Value Ref Range    POC Glucose, Blood 102 (H) 65 - 99 mg/dL       Imaging:  CT-CTA NECK WITH & W/O-POST PROCESSING   Final Result         1.  CT angiogram of the neck within normal limits.   2.  Patchy bilateral upper lobe infiltrates         CT-CTA HEAD WITH & W/O-POST PROCESS   Final Result         1.  No large vessel occlusion or aneurysm identified   2.  Bilateral  sinusitis changes      US-EXTREMITY VENOUS LOWER BILAT         EC-ECHOCARDIOGRAM COMPLETE W/ CONT         CT-ABDOMEN-PELVIS WITH   Final Result         1.  Fluid-filled prominence of proximal small bowel with distal small bowel decompression, appearance suggests ileus and/or enteritis versus evolving obstructive changes. Recommend radiographic follow-up to resolution as clinically appropriate.   2.  Irregular hepatic contour favoring changes of cirrhosis   3.  Fat-containing bilateral inguinal hernias      CT-CTA CHEST PULMONARY ARTERY W/ RECONS   Final Result         1.  Distal right main pulmonary embolus extending into right upper lobe and lower lobe segmental and subsegmental branches. 1.4 right to left ventricular ratio suggesting component of right heart strain.   2.  Evaluation of the subsegmental branches is essentially nondiagnostic due to motion artifacts. Additional imaging would be required for definitive evaluation of small distal pulmonary emboli.   3.  Scattered patchy bilateral pulmonary infiltrates.   4.  Bilateral dependent consolidations, appearance suggests atelectasis, component of infiltrate not excluded.      These findings were discussed with the patient's clinician, Misbah Cardoza, on 4/3/2025 9:27 PM.      CT-HEAD W/O   Final Result      No acute intracranial findings.               DX-CHEST-PORTABLE (1 VIEW)   Final Result      Interval placement of a right IJ central line without evidence of complication.      DX-CHEST-PORTABLE (1 VIEW)   Final Result      1.  Support devices present.      2.  Cardiomegaly.      3.  Bibasilar atelectasis.              EKG tracings personally reviewed by me sinus tach Axis intervals are the same as November 2024    Echocardiogram images personally reviewed by me show reduced ejection fraction EF 35 to 40% with RV enlargement and reduced RV function compared to echocardiogram from October 2024    All pertinent features of laboratory and imaging reviewed  including primary images where applicable      Principal Problem:    Cardiac arrest (HCC) (POA: Yes)  Active Problems:    Chronic pain syndrome (POA: Yes)    History of MVR with cardiopulmonary bypass (POA: Unknown)    Pain, chronic (POA: Unknown)    Ventricular fibrillation (HCC) (POA: Unknown)    Seizure-like activity (HCC) (POA: Unknown)    Pulmonary embolus (HCC) (POA: Unknown)    Aspiration into airway (POA: Unknown)    Aspiration pneumonia of both lungs (HCC) (POA: Unknown)    Cirrhosis of liver (HCC) (POA: Unknown)  Resolved Problems:    * No resolved hospital problems. *      Assessment / Plan:  Cardiac arrest likely from pulmonary embolism his blood pressure is stable on no inotropes currently  Amiodarone IV is reasonable for 24 hours  Reduced ejection fraction is likely due to stunning from cardiac arrest  He had normal coronary arteries prior to mitral valve repair with good result of his mitral valve regurgitation and so he does not need invasive angiogram    I defer the management of his pulmonary embolism to the intensive team    I would check an echo prior to discharge and see the recovery of LV function if it remains low or he has full neurologic recovery a LifeVest is reasonable please reconsult prior to discharge    Cardiology will sign off but please reconsult prior to discharge, he should follow-up with Dr. Yinka Ryena or Ms. Robledo within a couple weeks of discharge      Future Appointments   Date Time Provider Department Center   9/25/2025  8:20 AM Sal Schafer D.O. [facility] [place]       It is my pleasure to participate in the care of Mr. Braswell.  Please do not hesitate to contact me with questions or concerns.    Roberto Pantoja MD PhD [facility]  Cardiologist [facility]    4/4/2025    Please note that this dictation was created using voice recognition software. There may be errors I did not discover before finalizing the note.      Roberto  [facility] is critically ill and he is at high risk for clinical deterioration, worsening vital organ dysfunction, and death without the above critical care interventions.  I have provided my full attention for 40 minutes due to ventricular fibrillation arrest which requires intravenous amiodarone which is medically necessary to improve their critical care illness; this time includes my personal bedside assessment with my FULL ATTENTION, complex management plan decision making and discussion with the patient and care team. Procedures were not included in this time. This time was spent at the bedside with the patient in continuous monitoring, evaluating the patient's chart, their labs, imaging, physical exam and discussion with the patient's wife as he is unable to participate in conversation due to comatose state and formulating an assessment and plan.

## 2025-04-04 NOTE — ASSESSMENT & PLAN NOTE
Mostly chronic chest pain which began following CPB for his MVR in 1/2024  Has been opioid dependent since     Takes oxycodone (without APAP) 10mg approximately 5x per day and recently has been trying to auto-wean, no opioids for two days per wife  Also takes methocarbamol still   -Resume home oxycodone, wean off fentanyl

## 2025-04-04 NOTE — ASSESSMENT & PLAN NOTE
Witnessed out-of-hospital cardiac arrest, with immediate CPR, zero no flow time, initial rhythm VF, early application of mechanical CPR, underwent defib x3 with 2 rounds of EPI and ACLS reportedly, EKG without STEMI.    cEEG negative for seziures  CT brain negative for intracranial abnormality   TTE post cardiac arrest  EF-38%, LV/RV apical hypokinesis , reduced RVSF, RVSP 35, MV repair with no paravalvular leak.    Cardiology re consulted 4/5 as per Dr. Pantoja no plans for cardiac cath this admission  Vest therapy at discharge and GDMT

## 2025-04-04 NOTE — HOSPITAL COURSE
44 Y old male presented on 4/3/25 with OHCA V fib rhythm , underwent defrillation x3, 4 rounds of ACLS, reported low flow time about 30 min from unknown cause. He has medical history significant for recurrent GIB, s/p embolization of GDA, chronic pain, severe MR with repair 1/24 (Maggie, P2 quadrangular resection, cord reimplantation, 38 mm Syed flexible annuloplasty band 1/2024). He was thought to have seizure like activity , started on sedation , TTM management and on cEEG.     4/4: patient off paralytics and sedation, on low dose precedex trying to follow commands. TTE showed EF-38%, LV/RV apical hypokinesis , reduced RVSF, RVSP 35, MV repair with no paravalvular leak.  4/5: as per cardiology Dr. Pantoja No plans for cardiac interventions, patient tolerated SAT/SBT and was extubated late evening. Was agitated and delirious overnight started on precedex gtt.

## 2025-04-04 NOTE — ASSESSMENT & PLAN NOTE
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 s/p mitral valve repair (Maggie, P2 quadrangular resection, cord reimplantation, 38 mm Syed flexible annuloplasty band 1/2024)  TTE -EF-38%, LV/RV apical hypokinesis , reduced RVSF, RVSP 35, MV repair with no paravalvular leak.

## 2025-04-04 NOTE — ASSESSMENT & PLAN NOTE
Presented following OHCA, initial rhythm VF  Unclear etiology or mechanism     [facility] in 2023 in preparation for MVR with clean coronaries  Initial EKG without STEMI, minimal trop elevation   S/p amiodarone   Cardiology following

## 2025-04-04 NOTE — ASSESSMENT & PLAN NOTE
Witnessed out-of-hospital cardiac arrest, with immediate CPR, zero no flow time, initial rhythm VF, early application of mechanical CPR, underwent defib x3 with 2 rounds of EPI and ACLS reportedly, EKG without STEMI, POCUS hyperdynamic LV with RV dilation and mildly depressed function though not well visualized     Post cardiac arrest global care:     -Ventilation: Goal PCO2 40-45, PaO2 ~100, LPV     -Hemodynamic: Goal SBP >90 mmHg, MAP >65 mmHg; fluid rescucitation and vasoactives as guided by bedside exam, ultrasound and hemodynamics     -Cardiac:   EKG without STEMI, initial trop 80, initial POCUS with hyperdynamic function, no obvious RWMA  Cardiology following to determine timing for angiography     -Neurologic:   Initial NCHCT normal, intact brainstem function at arrival, withdraws in all four  cEEG without seizure like activity  Pt improving neurologically  -Weaning off sedation, d/c ttm       -Metabolic: Serial lactate, goal blood glucose <180, maintain euvolemia, monitor urine output, maintain potassium greater than 3.5, maintain magnesium greater than 2     Pending further cardiology recs

## 2025-04-04 NOTE — ASSESSMENT & PLAN NOTE
Infiltrate noted on CT chest, reported suctioning of aspirated content in ED post intubation  Procal elevated ,with fever spike last night   S/p unasyn for 3 days and abx changed to zosyn yesterday   - will continue to monitor culture and if negative can stop after total 7 days of abx

## 2025-04-04 NOTE — ASSESSMENT & PLAN NOTE
Incidentally noted on CTPA done post arrest  Clinical picture and VF arrest not entirely consistent with PE  Unclear if arrest caused by PE or not  No evidence of ongoing shock or hemodynamic compromise  Intermediate risk pulmonary embolism with myocardial injury and right heart-strain as evidenced by a troponin of 80, BNP of 126, and RV/LV ratio 1.4 on CT  Continue heparin infusion, plan to transition to PO eliquis  Cardiac monitoring   RT/O2 Protocols  Titrate supplemental FiO2 to maintain SpO2 >88%  BLE Dopplers -negative for DVT  TTE  EF-38%, LV/RV apical hypokinesis , reduced RVSF, RVSP 35, MV repair with no paravalvular leak.

## 2025-04-04 NOTE — CONSULTS
Critical Care Consultation    Date of consult: 4/3/2025    Referring Physician  Elizabeth    Reason for Consultation  VF arrest    History of Presenting Illness  44 y.o. male who presented 4/3/2025 with past medical history of dyslipidemia, iron deficiency, history of a GDA bleed and 2023 status post IR embolization (5/24), as well as severe degenerative MR and MVP s/p mitral valve repair ([place], P2 quadrangular resection, cord reimplantation, 38 mm Syed flexible annuloplasty band 1/2024), chronic pain who presents with out-of-hospital cardiac arrest.    Patient was in his normal state health today, throughout the day there was no preceding symptoms or events.  His wife is an RN and provides a history, she was with him, he got up out of the bed stood up, told her that he felt dizzy and then he lost consciousness in front of him and has started to have a little bit of convulsing activity temporarily and was nonresponsive but was still breathing.  She immediately called EMS and moments later when she reassessed him he was not breathing and she immediately started CPR.  Medics arrived shortly thereafter, they placed him on mechanical CPR device and first rhythm was ventricular fibrillation.  He underwent defibrillation x 3 and 3 rounds of ACLS with ongoing mechanical CPR, and achieved ROSC on route to ED.  Overall low flow time ~ 30 minutes, essentially zero no flow time.    Of note the patient takes ~50 mg of oxycodone per day in divided dose, and has not taken any for two days per wife in effort to wean himself off.    At arrival to ED, patient's EKG is sinus tachycardia without STEMI.  H he has received 10 mg of Versed in the field and received etomidate for placement of an LMA, at arrival he was intubated and placed on propofol for tachypnea and is now on high dose propofol and still breathing over the vent.    He had a noncontrast head CT which was negative, chest x-ray generally unremarkable.    POCUS:  Suboptimal images, left ventricular function appears hyperdynamic, flow acceleration out the LVOT without over evidence of obstruction, right ventricle difficult to see but appears hyperkinetic at the base free wall not well-visualized, mildly dilated with probably mildly reduced function, IVC is very thin and collapsible low filling pressure, no clinically relevant effusion.      Code Status  Full Code    Review of Systems  Review of Systems   Unable to perform ROS: Critical illness       Past Medical History   has a past medical history of Arthritis, Breath shortness (11/03/2023), Drug-seeking behavior (07/30/2023), Duodenal ulcer (07/30/2023), Gastric ulcer, Heart valve disease, Hemorrhagic disorder (HCC), Hypertension, Pain, and Seizure (HCC).    Surgical History   has a past surgical history that includes other orthopedic surgery; other abdominal surgery; ankle arthroscopy (Right, 5/21/2018); biopsy ortho (Right, 5/21/2018); pr upper gi endoscopy,diagnosis (3/28/2022); pr upper gi endoscopy,biopsy (3/28/2022); pr upper gi endoscopy,biopsy (N/A, 7/30/2023); pr upper gi endoscopy,diagnosis (N/A, 9/14/2023); pr upper gi endoscopy,biopsy (N/A, 9/14/2023); pr replacement of mitral valve (1/25/2024); echocardiogram, transesophageal, intraoperative (1/25/2024); pr upper gi endoscopy,diagnosis (N/A, 5/18/2024); pr upper gi endoscopy,biopsy (N/A, 5/18/2024); gastroscopy with endostat (N/A, 5/18/2024); pr upper gi endoscopy,diagnosis (N/A, 5/25/2024); pr upper gi endoscopy,biopsy (N/A, 5/25/2024); pr colonoscopy-flexible (5/27/2024); capsule endoscopy administration (N/A, 5/28/2024); capsule endoscopy retrieval (5/28/2024); and pr upper gi endoscopy,diagnosis (N/A, 9/18/2024).    Family History  family history includes Heart Disease in his mother; No Known Problems in his father.    Social History   reports that he has never smoked. He has never used smokeless tobacco. He reports that he does not currently use alcohol.  He reports that he does not use drugs.    Medications  Home Medications       Reviewed by Ana Gallegos R.N. (Registered Nurse) on 04/03/25 at 2015  Med List Status: Partial     Medication Last Dose Status   meloxicam (MOBIC) 15 MG tablet Unknown Active   methocarbamol (ROBAXIN) 500 MG Tab Unknown Active   oxyCODONE immediate release (ROXICODONE) 10 MG immediate release tablet Unknown Active   tadalafil (CIALIS) 10 MG tablet Unknown Active   zolpidem (AMBIEN) 10 MG Tab Unknown Active                  Audit from Redirected Encounters    **Home medications have not yet been reviewed for this encounter**       Current Facility-Administered Medications   Medication Dose Route Frequency Provider Last Rate Last Admin    amiodarone (Nexterone) 360 mg/200 mL infusion  1 mg/min Intravenous Once Kumar Johnson M.D. 33.3 mL/hr at 04/03/25 1956 1 mg/min at 04/03/25 1956    Followed by    [START ON 4/4/2025] amiodarone (Nexterone) 360 mg/200 mL infusion  0.5 mg/min Intravenous Continuous Kumar Johnson M.D.        Respiratory Therapy Consult   Nebulization Continuous RT Misbah Cardoza M.D.        MD Alert...ICU Electrolyte Replacement per Pharmacy   Other PHARMACY TO DOSE Misbah Cardoza M.D.        lidocaine (Xylocaine) 1 % injection 2 mL  2 mL Tracheal Tube Q30 MIN PRN Misbah Cardoza M.D.        insulin lispro (HumaLOG,AdmeLOG) subcutaneous injection  2-9 Units Subcutaneous Q6HRS Misbah Cardoza M.D.        And    dextrose 50 % (D50W) injection 25 g  25 g Intravenous Q15 MIN PRN Misbah Cardoza M.D.        senna-docusate (Pericolace Or Senokot S) 8.6-50 MG per tablet 2 Tablet  2 Tablet Oral Q EVENING Misbah Cardzoa M.D.   2 Tablet at 04/03/25 2133    And    polyethylene glycol/lytes (Miralax) Packet 1 Packet  1 Packet Oral QDAY PRN Misbah Cardoza M.D.        [START ON 4/4/2025] famotidine (Pepcid) tablet 20 mg  20 mg Oral Q12HRS Misbah Cardoza M.D.        Or    [START ON 4/4/2025] famotidine  (Pepcid) injection 20 mg  20 mg Intravenous Q12HRS Misbah Cardoza M.D.        midazolam (Versed) injection             acetaminophen (Tylenol) tablet 1,000 mg  1,000 mg Oral Q6HRS Misbah Cardoza M.D.   1,000 mg at 04/03/25 2133    Or    acetaminophen (Tylenol) suppository 975 mg  975 mg Rectal Q6HRS Misbah Cardoza M.D.        busPIRone (Buspar) tablet 15 mg  15 mg Oral BID Misbah Cardoza M.D.        magnesium sulfate 20 g/500mL infusion  0.5-2 g/hr Intravenous Continuous Misbah Cardoza M.D.   Dose not Required at 04/03/25 2130    meperidine (Demerol) injection 25 mg  25 mg Intravenous Q2HRS PRN Misbah Cardoza M.D.        fentaNYL (Sublimaze) injection 100 mcg  100 mcg Intravenous Q HOUR PRN Misbah Cardoza M.D.        fentanyl 50 mcg/mL infusion   mcg/hr Intravenous Continuous Misbah Cardoza M.D.   Dose not Required at 04/03/25 2130    propofol (DIPRIVAN) injection  0-80 mcg/kg/min (Ideal) Intravenous Continuous Misbah Cardoza M.D.   Dose not Required at 04/03/25 2130    vecuronium (Norcuron) injection 8.45 mg  0.1 mg/kg (Ideal) Intravenous Once Misbah Cardoza M.D.        Followed by    vecuronium (Norcuron) 60 mg in dextrose 5% 500 mL Infusion  1-2 mcg/kg/min (Ideal) Intravenous Continuous Misbah Cardoza M.D.   Dose not Required at 04/03/25 2130    heparin infusion 25,000 units in 500 mL 0.45% NACL  0-30 Units/kg/hr (Adjusted) Intravenous Continuous Misbah Cardoza M.D.        heparin injection 7,800 Units  80 Units/kg (Adjusted) Intravenous Once Misbah Cardoza M.D.        heparin injection 3,900 Units  40 Units/kg (Adjusted) Intravenous PRN Misbah Cardoza M.D.           Allergies  Allergies   Allergen Reactions    Morphine Vomiting    Tramadol Unspecified     Seizure  [id number]       Vital Signs last 24 hours  Temp:  [36.2 °C (97.2 °F)] 36.2 °C (97.2 °F)  Pulse:  [114-135] 114  Resp:  [20-44] 24  BP: (100-195)/() 142/93  SpO2:  [93 %-100 %] 94  %    Physical Exam  Physical Exam  Constitutional:       Appearance: He is ill-appearing.   HENT:      Head: Normocephalic.      Nose: No congestion.      Mouth/Throat:      Mouth: Mucous membranes are moist.   Eyes:      Pupils: Pupils are equal, round, and reactive to light.   Cardiovascular:      Rate and Rhythm: Regular rhythm. Tachycardia present.   Pulmonary:      Effort: Pulmonary effort is normal. No respiratory distress.   Abdominal:      General: Abdomen is flat.      Palpations: Abdomen is soft.   Musculoskeletal:      Right lower leg: No edema.      Left lower leg: No edema.   Skin:     General: Skin is warm.      Capillary Refill: Capillary refill takes less than 2 seconds.   Neurological:      Comments: Patient is on 80 of prop at the time of exam    He does not open eyes to stim or voice  Pupils are equal and respond  +corneal, gag and cough    He is withdrawing to pain in all four  No clonus or rigidity         Fluids    Intake/Output Summary (Last 24 hours) at 4/3/2025 2235  Last data filed at 4/3/2025 1649  Gross per 24 hour   Intake --   Output 60 ml   Net -60 ml       Laboratory  Recent Results (from the past 48 hours)   EKG    Collection Time: 25  4:45 PM   Result Value Ref Range    Report       [facility] Emergency Dept[facility]    Test Date:  2025  Pt Name:    PATI MERCEDES           Department: ER  MRN:        [id number]                      Room:        08  Gender:     Male                         Technician: [id number]  :        1980                   Requested By:JANIYA LOPEZ  Order #:    [id number]                    Reading MD:    Measurements  Intervals                                Axis  Rate:       134                          P:          52  MS:         172                          QRS:        18  QRSD:       88                           T:          69  QT:         300  QTc:        448    Interpretive Statements  Sinus tachycardia  Probable left  atrial enlargement  Probable anteroseptal infarct, old  Compared to ECG 11/03/2024 16:09:32  Myocardial infarct finding now present  Sinus bradycardia no longer present  Possible ischemia no longer present     URINALYSIS    Collection Time: 04/03/25  4:46 PM    Specimen: Urine   Result Value Ref Range    Color Yellow     Character Cloudy (A)     Specific Gravity 1.029 <1.035    Ph 5.5 5.0 - 8.0    Glucose 100 (A) Negative mg/dL    Ketones Negative Negative mg/dL    Protein 300 (A) Negative mg/dL    Bilirubin Negative Negative    Urobilinogen, Urine 1.0 <=1.0 EU/dL    Nitrite Negative Negative    Leukocyte Esterase Negative Negative    Occult Blood Trace (A) Negative    Micro Urine Req Microscopic    URINE DRUG SCREEN    Collection Time: 04/03/25  4:46 PM   Result Value Ref Range    Amphetamines Urine Negative Negative    Barbiturates Negative Negative    Benzodiazepines Positive (A) Negative    Cocaine Metabolite Negative Negative    Fentanyl, Urine Negative Negative    Methadone Negative Negative    Opiates Negative Negative    Oxycodone Negative Negative    Phencyclidine -Pcp Negative Negative    Propoxyphene Negative Negative    Cannabinoid Metab Negative Negative   URINE MICROSCOPIC (W/UA)    Collection Time: 04/03/25  4:46 PM   Result Value Ref Range    WBC 6-10 (A) /hpf    RBC 11-20 (A) 0 - 2 /hpf    Bacteria Many (A) None /hpf    Epithelial Cells 6-10 (A) 0 - 5 /hpf    Amorphous Crystal Present (A) Absent /hpf    Urine Casts 3-5 (A) 0 - 2 /lpf    Hyaline Cast Present /lpf    Granular Casts Present (A) Absent /lpf   POCT arterial blood gas device results    Collection Time: 04/03/25  4:57 PM   Result Value Ref Range    Ph 7.153 (LL) 7.350 - 7.450    Pco2 52.7 (HH) 32.0 - 48.0 mmHg    Po2 47 (LL) 83 - 108 mmHg    Tco2 20 (L) 32 - 48 mmol/L    S02 70 (L) 93 - 99 %    Hco3 18.5 (L) 21.0 - 28.0 mmol/L    BE -11 (L) -4 - 3 mmol/L    Body Temp 36.2 C degrees    O2 Therapy 100 %    iPF Ratio 47     Ph Temp Cedric 7.164  (LL) 7.350 - 7.450    Pco2 Temp Co 50.9 (H) 32.0 - 48.0 mmHg    Po2 Temp Cor 44 (LL) 83 - 108 mmHg    Specimen Arterial     Kanu Test Pass     DelSys Vent     Tidal Volume 490 mL    Peep End Expiratory Pressure 8 cmh20    Set Rate 20     Mode APV-CMV    POCT lactate device results    Collection Time: 04/03/25  4:57 PM   Result Value Ref Range    iStat Lactate 4.2 (HH) 0.5 - 2.0 mmol/L   MAGNESIUM    Collection Time: 04/03/25  5:30 PM   Result Value Ref Range    Magnesium 2.1 1.5 - 2.5 mg/dL   CBC WITH DIFFERENTIAL    Collection Time: 04/03/25  5:30 PM   Result Value Ref Range    WBC 11.6 (H) 4.8 - 10.8 K/uL    RBC 5.55 4.70 - 6.10 M/uL    Hemoglobin 14.9 14.0 - 18.0 g/dL    Hematocrit 46.2 42.0 - 52.0 %    MCV 83.2 81.4 - 97.8 fL    MCH 26.8 (L) 27.0 - 33.0 pg    MCHC 32.3 32.3 - 36.5 g/dL    RDW 52.6 (H) 35.9 - 50.0 fL    Platelet Count 415 164 - 446 K/uL    MPV 9.2 9.0 - 12.9 fL    Neutrophils-Polys 70.50 44.00 - 72.00 %    Lymphocytes 16.10 (L) 22.00 - 41.00 %    Monocytes 7.10 0.00 - 13.40 %    Eosinophils 0.00 0.00 - 6.90 %    Basophils 0.00 0.00 - 1.80 %    Nucleated RBC 0.00 0.00 - 0.20 /100 WBC    Neutrophils (Absolute) 8.60 (H) 1.82 - 7.42 K/uL    Lymphs (Absolute) 1.87 1.00 - 4.80 K/uL    Monos (Absolute) 0.82 0.00 - 0.85 K/uL    Eos (Absolute) 0.00 0.00 - 0.51 K/uL    Baso (Absolute) 0.00 0.00 - 0.12 K/uL    NRBC (Absolute) 0.00 K/uL    Anisocytosis 1+     Microcytosis 1+    Comp Metabolic Panel    Collection Time: 04/03/25  5:30 PM   Result Value Ref Range    Sodium 146 (H) 135 - 145 mmol/L    Potassium 4.1 3.6 - 5.5 mmol/L    Chloride 116 (H) 96 - 112 mmol/L    Co2 17 (L) 20 - 33 mmol/L    Anion Gap 13.0 7.0 - 16.0    Glucose 122 (H) 65 - 99 mg/dL    Bun 18 8 - 22 mg/dL    Creatinine 1.21 0.50 - 1.40 mg/dL    Calcium 8.2 (L) 8.5 - 10.5 mg/dL    Correct Calcium 8.3 (L) 8.5 - 10.5 mg/dL    AST(SGOT) 170 (H) 12 - 45 U/L    ALT(SGPT) 168 (H) 2 - 50 U/L    Alkaline Phosphatase 72 30 - 99 U/L    Total  Bilirubin 0.3 0.1 - 1.5 mg/dL    Albumin 3.9 3.2 - 4.9 g/dL    Total Protein 6.7 6.0 - 8.2 g/dL    Globulin 2.8 1.9 - 3.5 g/dL    A-G Ratio 1.4 g/dL   TROPONIN    Collection Time: 04/03/25  5:30 PM   Result Value Ref Range    Troponin T 80 (H) 6 - 19 ng/L   Prothrombin Time    Collection Time: 04/03/25  5:30 PM   Result Value Ref Range    PT 13.9 12.0 - 14.6 sec    INR 1.07 0.87 - 1.13   APTT    Collection Time: 04/03/25  5:30 PM   Result Value Ref Range    APTT 21.0 (L) 24.7 - 36.0 sec   DIAGNOSTIC ALCOHOL    Collection Time: 04/03/25  5:30 PM   Result Value Ref Range    Diagnostic Alcohol <10.1 <10.1 mg/dL   Triglyceride    Collection Time: 04/03/25  5:30 PM   Result Value Ref Range    Triglycerides 139 0 - 149 mg/dL   ESTIMATED GFR    Collection Time: 04/03/25  5:30 PM   Result Value Ref Range    GFR (CKD-EPI) 76 >60 mL/min/1.73 m 2   DIFFERENTIAL MANUAL    Collection Time: 04/03/25  5:30 PM   Result Value Ref Range    Bands-Stabs 3.60 0.00 - 10.00 %    Metamyelocytes 1.80 %    Myelocytes 0.90 %    Manual Diff Status PERFORMED    PERIPHERAL SMEAR REVIEW    Collection Time: 04/03/25  5:30 PM   Result Value Ref Range    Peripheral Smear Review see below    PLATELET ESTIMATE    Collection Time: 04/03/25  5:30 PM   Result Value Ref Range    Plt Estimation Normal    MORPHOLOGY    Collection Time: 04/03/25  5:30 PM   Result Value Ref Range    RBC Morphology Present     Poikilocytosis 1+     Ovalocytes 1+    proBrain Natriuretic Peptide, NT    Collection Time: 04/03/25  5:30 PM   Result Value Ref Range    NT-proBNP 78 0 - 125 pg/mL   HEMOGLOBIN A1C    Collection Time: 04/03/25  5:30 PM   Result Value Ref Range    Glycohemoglobin 6.0 (H) 4.0 - 5.6 %    Est Avg Glucose 126 mg/dL   LACTIC ACID    Collection Time: 04/03/25  8:01 PM   Result Value Ref Range    Lactic Acid 3.1 (H) 0.5 - 2.0 mmol/L   POCT arterial blood gas device results    Collection Time: 04/03/25  8:06 PM   Result Value Ref Range    Ph 7.310 (L) 7.350 -  7.450    Pco2 26.1 (L) 32.0 - 48.0 mmHg    Po2 116 (H) 83 - 108 mmHg    Tco2 14 (L) 32 - 48 mmol/L    S02 98 93 - 99 %    Hco3 13.1 (L) 21.0 - 28.0 mmol/L    BE -11 (L) -4 - 3 mmol/L    Body Temp 37.6 C degrees    O2 Therapy 70 %    iPF Ratio 166     Ph Temp Cedric 7.302 (L) 7.350 - 7.450    Pco2 Temp Co 26.8 (L) 32.0 - 48.0 mmHg    Po2 Temp Cor 120 (H) 83 - 108 mmHg    Specimen Arterial     Kanu Test Pass     DelSys Vent     Tidal Volume 480 mL    Peep End Expiratory Pressure 10 cmh20    Set Rate 24     Mode APV-CMV    POCT lactate device results    Collection Time: 25  8:06 PM   Result Value Ref Range    iStat Lactate 2.8 (H) 0.5 - 2.0 mmol/L   CBC WITH DIFFERENTIAL    Collection Time: 25  9:50 PM   Result Value Ref Range    WBC 4.8 4.8 - 10.8 K/uL    RBC 5.34 4.70 - 6.10 M/uL    Hemoglobin 14.6 14.0 - 18.0 g/dL    Hematocrit 44.6 42.0 - 52.0 %    MCV 83.5 81.4 - 97.8 fL    MCH 27.3 27.0 - 33.0 pg    MCHC 32.7 32.3 - 36.5 g/dL    RDW 52.9 (H) 35.9 - 50.0 fL    Platelet Count 319 164 - 446 K/uL    MPV 9.0 9.0 - 12.9 fL    Neutrophils-Polys 66.70 44.00 - 72.00 %    Lymphocytes 21.40 (L) 22.00 - 41.00 %    Monocytes 8.20 0.00 - 13.40 %    Eosinophils 2.50 0.00 - 6.90 %    Basophils 0.80 0.00 - 1.80 %    Immature Granulocytes 0.40 0.00 - 0.90 %    Nucleated RBC 0.00 0.00 - 0.20 /100 WBC    Neutrophils (Absolute) 3.17 1.82 - 7.42 K/uL    Lymphs (Absolute) 1.02 1.00 - 4.80 K/uL    Monos (Absolute) 0.39 0.00 - 0.85 K/uL    Eos (Absolute) 0.12 0.00 - 0.51 K/uL    Baso (Absolute) 0.04 0.00 - 0.12 K/uL    Immature Granulocytes (abs) 0.02 0.00 - 0.11 K/uL    NRBC (Absolute) 0.00 K/uL   EKG    Collection Time: 25 10:03 PM   Result Value Ref Range    Report       [facility]    Test Date:  2025  Pt Name:    PATI MERCEDES           Department: 161  MRN:        [id number]                      [facility]  Gender:     Male                         Technician: VON  :        1980                    Requested By:MISBAH CARDOZA  Order #:    [id number]                    Reading MD:    Measurements  Intervals                                Axis  Rate:       108                          P:          63  NJ:         159                          QRS:        -25  QRSD:       97                           T:          34  QT:         330  QTc:        443    Interpretive Statements  Sinus tachycardia  Inferior infarct, old  Baseline wander in lead(s) V6  Compared to ECG 04/03/2025 16:45:13  No significant changes         Imaging  EC-ECHOCARDIOGRAM COMPLETE W/ CONT         CT-ABDOMEN-PELVIS WITH   Final Result         1.  Fluid-filled prominence of proximal small bowel with distal small bowel decompression, appearance suggests ileus and/or enteritis versus evolving obstructive changes. Recommend radiographic follow-up to resolution as clinically appropriate.   2.  Irregular hepatic contour favoring changes of cirrhosis   3.  Fat-containing bilateral inguinal hernias      CT-CTA CHEST PULMONARY ARTERY W/ RECONS   Final Result         1.  Distal right main pulmonary embolus extending into right upper lobe and lower lobe segmental and subsegmental branches. 1.4 right to left ventricular ratio suggesting component of right heart strain.   2.  Evaluation of the subsegmental branches is essentially nondiagnostic due to motion artifacts. Additional imaging would be required for definitive evaluation of small distal pulmonary emboli.   3.  Scattered patchy bilateral pulmonary infiltrates.   4.  Bilateral dependent consolidations, appearance suggests atelectasis, component of infiltrate not excluded.      These findings were discussed with the patient's clinician, Misabh Cardoza, on 4/3/2025 9:27 PM.      CT-HEAD W/O   Final Result      No acute intracranial findings.               DX-CHEST-PORTABLE (1 VIEW)   Final Result      Interval placement of a right IJ central line without evidence of complication.       DX-CHEST-PORTABLE (1 VIEW)   Final Result      1.  Support devices present.      2.  Cardiomegaly.      3.  Bibasilar atelectasis.      DX-CHEST-PORTABLE (1 VIEW)    (Results Pending)   US-EXTREMITY VENOUS LOWER BILAT    (Results Pending)       Assessment/Plan  * Cardiac arrest (HCC)- (present on admission)  Assessment & Plan  Witnessed out-of-hospital cardiac arrest, with immediate CPR, zero no flow time, initial rhythm VF, early application of mechanical CPR, underwent defib x3 with 2 rounds of EPI and ACLS reportedly, EKG without STEMI, POCUS hyperdynamic LV with RV dilation and mildly depressed function though not well visualized    Post cardiac arrest global care:    -Ventilation: Goal PCO2 40-45, PaO2 ~100, LPV    -Hemodynamic: Goal SBP >90 mmHg, MAP >65 mmHg; fluid rescucitation and vasoactives as guided by bedside exam, ultrasound and hemodynamics    -Cardiac:   EKG without STEMI, initial trop 80, initial POCUS with hyperdynamic function, no obvious RWMA  Cardiology following to determine timing for angiography    -Neurologic:   Initial NCHCT normal, intact brainstem function at arrival, withdraws in all four  Will order cEEG and TTM for now until we can desedate and get better neuro assessment, appears to be responding to pain but not following  STAT cEEG for clinical seizure like activity    Serial neurologic exams, targeted temperature management, sedation is required to control shivering or ventilator dyssynchrony    -Metabolic: Serial lactate, goal blood glucose <180, maintain euvolemia, monitor urine output, maintain potassium greater than 3.5, maintain magnesium greater than 2    Neuro prognostication in 48-72 hours, will consider neurology consult and MRI at that time.    Cardiology consult  Cardiac ischemia evaluation when indicated      Pulmonary embolus (HCC)  Assessment & Plan  Incidentally noted on CTPA done post arrest  Clinical picture and VF arrest not entirely consistent with PE  Unclear  if arrest caused by PE or not  No evidence of ongoing shock or hemodynamic compromise    Intermediate risk pulmonary embolism with myocardial injury and right heart-strain as evidenced by a troponin of 80, BNP of 126, and RV/LV ratio 1.4 on CT    Felt to be at elevated risk of decompensation based on:  Syncope with subsequent VF  Clot burden on CT mostly distal, small right main clot extending into RLL segments  Lactate 3.5  Shock index initially (HR/SBP) >1 but HR imroved with volume resus and opiods  Need for Vasopressors none  Clot in transit not seen    The treatment options were discussed with patient, family, pharmacy including: CDT, Suction thrombectomy, half-dose TPA + heparin, and heparin alone    I dont think he warrants advanced therapies at this time, will treat with AC alone and consider rescue therapies if worsens hemodynamically    Start heparin infusion  Cardiac monitoring   RT/O2 Protocols  Titrate supplemental FiO2 to maintain SpO2 >88%  BLE Dopplers  TTE    Seizure-like activity (HCC)  Assessment & Plan  At time of evnet had what is described as convulsive activity followed by loss of conciousness prior to arrest  Has ha some seizure like activity in the past and negative short EEG without neuro follow up    At arrival to ICU had change in neuro exam, upward gaze and rigidity in all four, no overt convulsions but no longer responding to pain in extremities temporarily  Given 5 of versed during this event  with improvement    cEEG  Will hold off on ASM for now and wait for EEG data  Brain MRI when able    Ventricular fibrillation (HCC)  Assessment & Plan  Presented following [facility], initial rhythm VF  Unclear etiology or mechanism    [facility] 2023 in preparation for MVR with clean coronaries  Initial EKG without STEMI, minimal trop elevation    Cycle trop  Will discuss with cardiology timing for and/or indications for angiography      Pain, chronic  Assessment & Plan  Mostly chronic chest pain which  began following CPB for his MVR in 1/2024  Has been opioid dependent since    Takes oxycodone (without APAP) 10mg approximately 5x per day and recently has been trying to auto-wean, no opioids for two days per wife  Also takes methocarbamol still    Possibly experiencing some degree of withdrawal here with adrenergic state  Multi modal analgesia  Will use short acting opioids for now to facilitate neuro assessments, but will likely need higher doses than is typical      History of MVR with cardiopulmonary bypass  Assessment & Plan  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 s/p mitral valve repair (Maggie, P2 quadrangular resection, cord reimplantation, 38 mm Syed flexible annuloplasty band 1/2024)    Follow up formal TTE to assess valve function        Discussed patient condition and risk of morbidity and/or mortality with Family, RN, RT, Therapies, Pharmacy, and cardiology.    The patient remains critically ill.  Critical care time = 110 minutes in directly providing and coordinating critical care and extensive data review.  No time overlap and excludes procedures.

## 2025-04-04 NOTE — PROGRESS NOTES
CCM progress:    Given the uncertainty regarding the overall clinical picture and the presentation with some potential seizure-like activity versus myoclonic syncope and no clear unifying picture here I am going to get STAT EEG and also going to get a CTA of his head to exclude basilar artery thrombosis and occlusion.  He is making urine, CVP ~8mmHg      Given his neuro changes and absence of any meaningful return of neuro exam, it is possible he is having NCSE versus longer low flow time and cerebral anoxia than previously thought  Formal TTE shows BiV dysfunction, no focal WMA  Repeat EKG without STEMI  D/W cardiology, plan for angiography tomorrow for now unless something changes    Continues to be HD stable and well perfused and normotensive    Have discussed all of this and updated his wife.

## 2025-04-04 NOTE — ASSESSMENT & PLAN NOTE
Presented following OHCA, initial rhythm VF  Unclear etiology or mechanism    [facility] in 2023 in preparation for MVR with clean coronaries  Initial EKG without STEMI, trop elevation  Finished amio gtt for 24 hrs  TTE post cardiac arrest  EF-38%, LV/RV apical hypokinesis , reduced RVSF, RVSP 35, MV repair with no paravalvular leak.  Cardiology re consulted 4/5 as per Dr. Pantoja no plans for cardiac cath this admission Vest therapy at discharge and GDMT   Continue heparin gtt , if passes swallow can attempt to switch to DOAC

## 2025-04-04 NOTE — ASSESSMENT & PLAN NOTE
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 s/p mitral valve repair ([place], P2 quadrangular resection, cord reimplantation, 38 mm Syed flexible annuloplasty band 1/2024)     TTE with intact repair, normal valvular function

## 2025-04-04 NOTE — ASSESSMENT & PLAN NOTE
At time of event had what is described as convulsive activity followed by loss of conciousness prior to arrest  Has ha some seizure like activity in the past and negative short EEG without neuro follow up     At arrival to ICU had change in neuro exam, upward gaze and rigidity in all four, no overt convulsions but no longer responding to pain in extremities temporarily  Given 5 of versed during this event  with improvement     cEEG

## 2025-04-04 NOTE — ED NOTES
To floor via gurney with RN and RT; patient stable VS; on propofol and amiodarone drip via pump; LR bolus on flow; GCS3.

## 2025-04-05 ENCOUNTER — APPOINTMENT (OUTPATIENT)
Dept: RADIOLOGY | Facility: MEDICAL CENTER | Age: 45
DRG: 208 | End: 2025-04-05
Attending: EMERGENCY MEDICINE
Payer: COMMERCIAL

## 2025-04-05 ENCOUNTER — APPOINTMENT (OUTPATIENT)
Dept: RADIOLOGY | Facility: MEDICAL CENTER | Age: 45
DRG: 208 | End: 2025-04-05
Attending: STUDENT IN AN ORGANIZED HEALTH CARE EDUCATION/TRAINING PROGRAM
Payer: COMMERCIAL

## 2025-04-05 PROBLEM — J69.0 ASPIRATION PNEUMONIA OF BOTH LUNGS (HCC): Status: RESOLVED | Noted: 2025-04-04 | Resolved: 2025-04-05

## 2025-04-05 LAB
ALBUMIN SERPL BCP-MCNC: 3.2 G/DL (ref 3.2–4.9)
ALBUMIN/GLOB SERPL: 1.2 G/DL
ALP SERPL-CCNC: 68 U/L (ref 30–99)
ALT SERPL-CCNC: 77 U/L (ref 2–50)
ANION GAP SERPL CALC-SCNC: 10 MMOL/L (ref 7–16)
AST SERPL-CCNC: 38 U/L (ref 12–45)
BASE EXCESS BLDA CALC-SCNC: -4 MMOL/L (ref -4–3)
BASE EXCESS BLDA CALC-SCNC: -7 MMOL/L (ref -4–3)
BASOPHILS # BLD AUTO: 0.8 % (ref 0–1.8)
BASOPHILS # BLD: 0.09 K/UL (ref 0–0.12)
BILIRUB SERPL-MCNC: 0.4 MG/DL (ref 0.1–1.5)
BODY TEMPERATURE: ABNORMAL DEGREES
BODY TEMPERATURE: ABNORMAL DEGREES
BREATHS SETTING VENT: 25
BUN SERPL-MCNC: 14 MG/DL (ref 8–22)
CALCIUM ALBUM COR SERPL-MCNC: 8.4 MG/DL (ref 8.5–10.5)
CALCIUM SERPL-MCNC: 7.8 MG/DL (ref 8.5–10.5)
CHLORIDE SERPL-SCNC: 107 MMOL/L (ref 96–112)
CK SERPL-CCNC: 542 U/L (ref 0–154)
CO2 BLDA-SCNC: 20 MMOL/L (ref 32–48)
CO2 BLDA-SCNC: 22 MMOL/L (ref 32–48)
CO2 SERPL-SCNC: 18 MMOL/L (ref 20–33)
CREAT SERPL-MCNC: 0.97 MG/DL (ref 0.5–1.4)
DELSYS IDSYS: ABNORMAL
DELSYS IDSYS: ABNORMAL
END TIDAL CARBON DIOXIDE IECO2: 29 MMHG
EOSINOPHIL # BLD AUTO: 0.22 K/UL (ref 0–0.51)
EOSINOPHIL NFR BLD: 1.9 % (ref 0–6.9)
ERYTHROCYTE [DISTWIDTH] IN BLOOD BY AUTOMATED COUNT: 56 FL (ref 35.9–50)
GFR SERPLBLD CREATININE-BSD FMLA CKD-EPI: 99 ML/MIN/1.73 M 2
GLOBULIN SER CALC-MCNC: 2.7 G/DL (ref 1.9–3.5)
GLUCOSE BLD STRIP.AUTO-MCNC: 115 MG/DL (ref 65–99)
GLUCOSE BLD STRIP.AUTO-MCNC: 130 MG/DL (ref 65–99)
GLUCOSE BLD STRIP.AUTO-MCNC: 143 MG/DL (ref 65–99)
GLUCOSE BLD STRIP.AUTO-MCNC: 145 MG/DL (ref 65–99)
GLUCOSE SERPL-MCNC: 132 MG/DL (ref 65–99)
HCO3 BLDA-SCNC: 19 MMOL/L (ref 21–28)
HCO3 BLDA-SCNC: 20.5 MMOL/L (ref 21–28)
HCT VFR BLD AUTO: 42.9 % (ref 42–52)
HGB BLD-MCNC: 13.7 G/DL (ref 14–18)
HOROWITZ INDEX BLDA+IHG-RTO: 246 MM[HG]
IMM GRANULOCYTES # BLD AUTO: 0.08 K/UL (ref 0–0.11)
IMM GRANULOCYTES NFR BLD AUTO: 0.7 % (ref 0–0.9)
LACTATE BLD-SCNC: 1.3 MMOL/L (ref 0.5–2)
LACTATE SERPL-SCNC: 1.6 MMOL/L (ref 0.5–2)
LPM ILPM: 4 LPM
LYMPHOCYTES # BLD AUTO: 1.11 K/UL (ref 1–4.8)
LYMPHOCYTES NFR BLD: 9.6 % (ref 22–41)
MAGNESIUM SERPL-MCNC: 4.3 MG/DL (ref 1.5–2.5)
MCH RBC QN AUTO: 27.1 PG (ref 27–33)
MCHC RBC AUTO-ENTMCNC: 31.9 G/DL (ref 32.3–36.5)
MCV RBC AUTO: 85 FL (ref 81.4–97.8)
MODE IMODE: ABNORMAL
MONOCYTES # BLD AUTO: 0.76 K/UL (ref 0–0.85)
MONOCYTES NFR BLD AUTO: 6.6 % (ref 0–13.4)
NEUTROPHILS # BLD AUTO: 9.31 K/UL (ref 1.82–7.42)
NEUTROPHILS NFR BLD: 80.4 % (ref 44–72)
NRBC # BLD AUTO: 0 K/UL
NRBC BLD-RTO: 0 /100 WBC (ref 0–0.2)
O2/TOTAL GAS SETTING VFR VENT: 50 %
PCO2 BLDA: 34.4 MMHG (ref 32–48)
PCO2 BLDA: 38 MMHG (ref 32–48)
PCO2 TEMP ADJ BLDA: 35.2 MMHG (ref 32–48)
PCO2 TEMP ADJ BLDA: 37.9 MMHG (ref 32–48)
PEEP END EXPIRATORY PRESSURE IPEEP: 10 CMH20
PH BLDA: 7.31 [PH] (ref 7.35–7.45)
PH BLDA: 7.38 [PH] (ref 7.35–7.45)
PH TEMP ADJ BLDA: 7.31 [PH] (ref 7.35–7.45)
PH TEMP ADJ BLDA: 7.38 [PH] (ref 7.35–7.45)
PHOSPHATE SERPL-MCNC: 2.2 MG/DL (ref 2.5–4.5)
PLATELET # BLD AUTO: 294 K/UL (ref 164–446)
PMV BLD AUTO: 9.1 FL (ref 9–12.9)
PO2 BLDA: 123 MMHG (ref 83–108)
PO2 BLDA: 69 MMHG (ref 83–108)
PO2 TEMP ADJ BLDA: 122 MMHG (ref 83–108)
PO2 TEMP ADJ BLDA: 71 MMHG (ref 83–108)
POTASSIUM SERPL-SCNC: 4.6 MMOL/L (ref 3.6–5.5)
PROCALCITONIN SERPL-MCNC: 4.47 NG/ML
PROT SERPL-MCNC: 5.9 G/DL (ref 6–8.2)
RBC # BLD AUTO: 5.05 M/UL (ref 4.7–6.1)
SAO2 % BLDA: 93 % (ref 93–99)
SAO2 % BLDA: 98 % (ref 93–99)
SODIUM SERPL-SCNC: 135 MMOL/L (ref 135–145)
SPECIMEN DRAWN FROM PATIENT: ABNORMAL
SPECIMEN DRAWN FROM PATIENT: ABNORMAL
T4 FREE SERPL-MCNC: 1.06 NG/DL (ref 0.93–1.7)
TIDAL VOLUME IVT: 510 ML
TRIGL SERPL-MCNC: 282 MG/DL (ref 0–149)
TSH SERPL DL<=0.005 MIU/L-ACNC: 6.49 UIU/ML (ref 0.38–5.33)
UFH PPP CHRO-ACNC: 0.24 IU/ML
UFH PPP CHRO-ACNC: 0.25 IU/ML
UFH PPP CHRO-ACNC: 0.32 IU/ML
WBC # BLD AUTO: 11.6 K/UL (ref 4.8–10.8)

## 2025-04-05 PROCEDURE — A9270 NON-COVERED ITEM OR SERVICE: HCPCS | Performed by: STUDENT IN AN ORGANIZED HEALTH CARE EDUCATION/TRAINING PROGRAM

## 2025-04-05 PROCEDURE — 74018 RADEX ABDOMEN 1 VIEW: CPT

## 2025-04-05 PROCEDURE — 94799 UNLISTED PULMONARY SVC/PX: CPT

## 2025-04-05 PROCEDURE — 84478 ASSAY OF TRIGLYCERIDES: CPT

## 2025-04-05 PROCEDURE — 83605 ASSAY OF LACTIC ACID: CPT | Mod: 91

## 2025-04-05 PROCEDURE — 770022 HCHG ROOM/CARE - ICU (200)

## 2025-04-05 PROCEDURE — 80053 COMPREHEN METABOLIC PANEL: CPT

## 2025-04-05 PROCEDURE — 99291 CRITICAL CARE FIRST HOUR: CPT | Performed by: STUDENT IN AN ORGANIZED HEALTH CARE EDUCATION/TRAINING PROGRAM

## 2025-04-05 PROCEDURE — 99292 CRITICAL CARE ADDL 30 MIN: CPT | Performed by: STUDENT IN AN ORGANIZED HEALTH CARE EDUCATION/TRAINING PROGRAM

## 2025-04-05 PROCEDURE — 94150 VITAL CAPACITY TEST: CPT

## 2025-04-05 PROCEDURE — A9270 NON-COVERED ITEM OR SERVICE: HCPCS | Performed by: EMERGENCY MEDICINE

## 2025-04-05 PROCEDURE — 83735 ASSAY OF MAGNESIUM: CPT

## 2025-04-05 PROCEDURE — 93005 ELECTROCARDIOGRAM TRACING: CPT | Mod: TC

## 2025-04-05 PROCEDURE — 85025 COMPLETE CBC W/AUTO DIFF WBC: CPT

## 2025-04-05 PROCEDURE — 700102 HCHG RX REV CODE 250 W/ 637 OVERRIDE(OP): Performed by: EMERGENCY MEDICINE

## 2025-04-05 PROCEDURE — 700105 HCHG RX REV CODE 258: Performed by: STUDENT IN AN ORGANIZED HEALTH CARE EDUCATION/TRAINING PROGRAM

## 2025-04-05 PROCEDURE — 84100 ASSAY OF PHOSPHORUS: CPT

## 2025-04-05 PROCEDURE — 85520 HEPARIN ASSAY: CPT | Mod: 91

## 2025-04-05 PROCEDURE — 95711 VEEG 2-12 HR UNMONITORED: CPT | Performed by: STUDENT IN AN ORGANIZED HEALTH CARE EDUCATION/TRAINING PROGRAM

## 2025-04-05 PROCEDURE — 700111 HCHG RX REV CODE 636 W/ 250 OVERRIDE (IP): Performed by: STUDENT IN AN ORGANIZED HEALTH CARE EDUCATION/TRAINING PROGRAM

## 2025-04-05 PROCEDURE — 700111 HCHG RX REV CODE 636 W/ 250 OVERRIDE (IP): Mod: JZ | Performed by: EMERGENCY MEDICINE

## 2025-04-05 PROCEDURE — 700101 HCHG RX REV CODE 250: Performed by: STUDENT IN AN ORGANIZED HEALTH CARE EDUCATION/TRAINING PROGRAM

## 2025-04-05 PROCEDURE — A9270 NON-COVERED ITEM OR SERVICE: HCPCS

## 2025-04-05 PROCEDURE — 700105 HCHG RX REV CODE 258: Performed by: EMERGENCY MEDICINE

## 2025-04-05 PROCEDURE — 700101 HCHG RX REV CODE 250: Performed by: EMERGENCY MEDICINE

## 2025-04-05 PROCEDURE — 84443 ASSAY THYROID STIM HORMONE: CPT

## 2025-04-05 PROCEDURE — 700111 HCHG RX REV CODE 636 W/ 250 OVERRIDE (IP): Mod: JZ

## 2025-04-05 PROCEDURE — 84145 PROCALCITONIN (PCT): CPT

## 2025-04-05 PROCEDURE — 84439 ASSAY OF FREE THYROXINE: CPT

## 2025-04-05 PROCEDURE — 700102 HCHG RX REV CODE 250 W/ 637 OVERRIDE(OP): Performed by: STUDENT IN AN ORGANIZED HEALTH CARE EDUCATION/TRAINING PROGRAM

## 2025-04-05 PROCEDURE — 71045 X-RAY EXAM CHEST 1 VIEW: CPT

## 2025-04-05 PROCEDURE — 700111 HCHG RX REV CODE 636 W/ 250 OVERRIDE (IP): Mod: JZ | Performed by: STUDENT IN AN ORGANIZED HEALTH CARE EDUCATION/TRAINING PROGRAM

## 2025-04-05 PROCEDURE — 82550 ASSAY OF CK (CPK): CPT

## 2025-04-05 PROCEDURE — 82803 BLOOD GASES ANY COMBINATION: CPT | Mod: 91

## 2025-04-05 PROCEDURE — 82962 GLUCOSE BLOOD TEST: CPT | Mod: 91

## 2025-04-05 PROCEDURE — 37799 UNLISTED PX VASCULAR SURGERY: CPT

## 2025-04-05 PROCEDURE — 700102 HCHG RX REV CODE 250 W/ 637 OVERRIDE(OP)

## 2025-04-05 PROCEDURE — 94003 VENT MGMT INPAT SUBQ DAY: CPT

## 2025-04-05 PROCEDURE — 700105 HCHG RX REV CODE 258

## 2025-04-05 RX ORDER — SODIUM CHLORIDE 9 MG/ML
INJECTION, SOLUTION INTRAVENOUS CONTINUOUS
Status: DISCONTINUED | OUTPATIENT
Start: 2025-04-05 | End: 2025-04-09

## 2025-04-05 RX ORDER — LABETALOL HYDROCHLORIDE 5 MG/ML
20 INJECTION, SOLUTION INTRAVENOUS EVERY 4 HOURS PRN
Status: DISCONTINUED | OUTPATIENT
Start: 2025-04-05 | End: 2025-04-21 | Stop reason: HOSPADM

## 2025-04-05 RX ORDER — MAGNESIUM SULFATE HEPTAHYDRATE 40 MG/ML
2 INJECTION, SOLUTION INTRAVENOUS ONCE
Status: COMPLETED | OUTPATIENT
Start: 2025-04-05 | End: 2025-04-05

## 2025-04-05 RX ORDER — OXYCODONE HYDROCHLORIDE 10 MG/1
10 TABLET ORAL
Refills: 0 | Status: DISCONTINUED | OUTPATIENT
Start: 2025-04-05 | End: 2025-04-05

## 2025-04-05 RX ORDER — ONDANSETRON 2 MG/ML
4 INJECTION INTRAMUSCULAR; INTRAVENOUS EVERY 6 HOURS PRN
Status: DISCONTINUED | OUTPATIENT
Start: 2025-04-05 | End: 2025-04-05

## 2025-04-05 RX ORDER — LORAZEPAM 2 MG/ML
0.5 INJECTION INTRAMUSCULAR ONCE
Status: DISCONTINUED | OUTPATIENT
Start: 2025-04-05 | End: 2025-04-06

## 2025-04-05 RX ORDER — OXYCODONE HYDROCHLORIDE 10 MG/1
10 TABLET ORAL EVERY 6 HOURS PRN
Refills: 0 | Status: DISCONTINUED | OUTPATIENT
Start: 2025-04-05 | End: 2025-04-06

## 2025-04-05 RX ORDER — FUROSEMIDE 10 MG/ML
40 INJECTION INTRAMUSCULAR; INTRAVENOUS ONCE
Status: COMPLETED | OUTPATIENT
Start: 2025-04-05 | End: 2025-04-05

## 2025-04-05 RX ORDER — HYDRALAZINE HYDROCHLORIDE 20 MG/ML
20 INJECTION INTRAMUSCULAR; INTRAVENOUS ONCE
Status: ACTIVE | OUTPATIENT
Start: 2025-04-05 | End: 2025-04-06

## 2025-04-05 RX ORDER — HYDROMORPHONE HYDROCHLORIDE 1 MG/ML
0.5 INJECTION, SOLUTION INTRAMUSCULAR; INTRAVENOUS; SUBCUTANEOUS EVERY 4 HOURS PRN
Status: DISCONTINUED | OUTPATIENT
Start: 2025-04-05 | End: 2025-04-06

## 2025-04-05 RX ORDER — ACETAMINOPHEN 650 MG/1
650 SUPPOSITORY RECTAL EVERY 4 HOURS PRN
Status: DISCONTINUED | OUTPATIENT
Start: 2025-04-05 | End: 2025-04-21

## 2025-04-05 RX ORDER — DIPHENHYDRAMINE HYDROCHLORIDE 50 MG/ML
12.5 INJECTION, SOLUTION INTRAMUSCULAR; INTRAVENOUS ONCE
Status: DISCONTINUED | OUTPATIENT
Start: 2025-04-05 | End: 2025-04-06

## 2025-04-05 RX ADMIN — DEXMEDETOMIDINE HYDROCHLORIDE 0.8 MCG/KG/HR: 100 INJECTION, SOLUTION INTRAVENOUS at 09:57

## 2025-04-05 RX ADMIN — BUSPIRONE HYDROCHLORIDE 15 MG: 10 TABLET ORAL at 05:22

## 2025-04-05 RX ADMIN — AMPICILLIN AND SULBACTAM 3 G: 1; 2 INJECTION, POWDER, FOR SOLUTION INTRAMUSCULAR; INTRAVENOUS at 05:35

## 2025-04-05 RX ADMIN — HEPARIN SODIUM 3900 UNITS: 1000 INJECTION, SOLUTION INTRAVENOUS; SUBCUTANEOUS at 11:27

## 2025-04-05 RX ADMIN — SODIUM CHLORIDE: 9 INJECTION, SOLUTION INTRAVENOUS at 22:32

## 2025-04-05 RX ADMIN — ACETAMINOPHEN 1000 MG: 500 TABLET ORAL at 00:15

## 2025-04-05 RX ADMIN — ONDANSETRON 4 MG: 2 INJECTION INTRAMUSCULAR; INTRAVENOUS at 15:24

## 2025-04-05 RX ADMIN — HEPARIN SODIUM 18 UNITS/KG/HR: 5000 INJECTION, SOLUTION INTRAVENOUS at 22:30

## 2025-04-05 RX ADMIN — LABETALOL HYDROCHLORIDE 20 MG: 5 INJECTION, SOLUTION INTRAVENOUS at 23:13

## 2025-04-05 RX ADMIN — AMPICILLIN AND SULBACTAM 3 G: 1; 2 INJECTION, POWDER, FOR SOLUTION INTRAMUSCULAR; INTRAVENOUS at 00:18

## 2025-04-05 RX ADMIN — DEXMEDETOMIDINE HYDROCHLORIDE 0.8 MCG/KG/HR: 100 INJECTION, SOLUTION INTRAVENOUS at 04:15

## 2025-04-05 RX ADMIN — HYDROMORPHONE HYDROCHLORIDE 0.5 MG: 1 INJECTION, SOLUTION INTRAMUSCULAR; INTRAVENOUS; SUBCUTANEOUS at 18:18

## 2025-04-05 RX ADMIN — OXYCODONE HYDROCHLORIDE 10 MG: 10 TABLET ORAL at 10:40

## 2025-04-05 RX ADMIN — SODIUM PHOSPHATE, MONOBASIC, MONOHYDRATE AND SODIUM PHOSPHATE, DIBASIC, ANHYDROUS 15 MMOL: 276; 142 INJECTION, SOLUTION INTRAVENOUS at 08:43

## 2025-04-05 RX ADMIN — FAMOTIDINE 20 MG: 20 TABLET, FILM COATED ORAL at 05:22

## 2025-04-05 RX ADMIN — HYDROMORPHONE HYDROCHLORIDE 1 MG: 1 INJECTION, SOLUTION INTRAMUSCULAR; INTRAVENOUS; SUBCUTANEOUS at 00:14

## 2025-04-05 RX ADMIN — FUROSEMIDE 40 MG: 10 INJECTION, SOLUTION INTRAVENOUS at 09:03

## 2025-04-05 RX ADMIN — ACETAMINOPHEN 650 MG: 650 SUPPOSITORY RECTAL at 22:55

## 2025-04-05 RX ADMIN — FENTANYL CITRATE 50 MCG: 50 INJECTION, SOLUTION INTRAMUSCULAR; INTRAVENOUS at 10:04

## 2025-04-05 RX ADMIN — FENTANYL CITRATE 200 MCG/HR: 50 INJECTION, SOLUTION INTRAMUSCULAR; INTRAVENOUS at 01:31

## 2025-04-05 RX ADMIN — HEPARIN SODIUM 3900 UNITS: 1000 INJECTION, SOLUTION INTRAVENOUS; SUBCUTANEOUS at 04:17

## 2025-04-05 RX ADMIN — ACETAMINOPHEN 1000 MG: 500 TABLET ORAL at 05:22

## 2025-04-05 RX ADMIN — FENTANYL CITRATE 150 MCG/HR: 50 INJECTION, SOLUTION INTRAMUSCULAR; INTRAVENOUS at 09:02

## 2025-04-05 RX ADMIN — HEPARIN SODIUM 16 UNITS/KG/HR: 5000 INJECTION, SOLUTION INTRAVENOUS at 09:23

## 2025-04-05 RX ADMIN — AMPICILLIN AND SULBACTAM 3 G: 1; 2 INJECTION, POWDER, FOR SOLUTION INTRAMUSCULAR; INTRAVENOUS at 11:34

## 2025-04-05 RX ADMIN — AMPICILLIN AND SULBACTAM 3 G: 1; 2 INJECTION, POWDER, FOR SOLUTION INTRAMUSCULAR; INTRAVENOUS at 17:32

## 2025-04-05 RX ADMIN — THIAMINE HYDROCHLORIDE 200 MG: 100 INJECTION, SOLUTION INTRAMUSCULAR; INTRAVENOUS at 17:33

## 2025-04-05 RX ADMIN — LABETALOL HYDROCHLORIDE 20 MG: 5 INJECTION, SOLUTION INTRAVENOUS at 15:53

## 2025-04-05 RX ADMIN — MAGNESIUM SULFATE HEPTAHYDRATE 2 G: 2 INJECTION, SOLUTION INTRAVENOUS at 20:42

## 2025-04-05 RX ADMIN — THIAMINE HYDROCHLORIDE 200 MG: 100 INJECTION, SOLUTION INTRAMUSCULAR; INTRAVENOUS at 05:22

## 2025-04-05 ASSESSMENT — PAIN DESCRIPTION - PAIN TYPE
TYPE: ACUTE PAIN

## 2025-04-05 ASSESSMENT — PULMONARY FUNCTION TESTS: FVC: 1.7

## 2025-04-05 ASSESSMENT — COPD QUESTIONNAIRES
DURING THE PAST 4 WEEKS HOW MUCH DID YOU FEEL SHORT OF BREATH: NONE/LITTLE OF THE TIME
DO YOU EVER COUGH UP ANY MUCUS OR PHLEGM?: NO/ONLY WITH OCCASIONAL COLDS OR INFECTIONS
COPD SCREENING SCORE: 0
HAVE YOU SMOKED AT LEAST 100 CIGARETTES IN YOUR ENTIRE LIFE: NO/DON'T KNOW

## 2025-04-05 ASSESSMENT — FIBROSIS 4 INDEX: FIB4 SCORE: 0.63

## 2025-04-05 NOTE — PROGRESS NOTES
Attempted to titrate propofol down, however patient now shivering with a BSAS of 1-3, and pressure limiting on vent. Propofol order is for myoclonus. Discussed with Dr. Cardoza, orders to start precedex and wean propofol, give 1mg of PRN dilaudid, and give an additional if needed.

## 2025-04-05 NOTE — CARE PLAN
The patient is Watcher - Medium risk of patient condition declining or worsening    Shift Goals  Clinical Goals: TTM, Hemodyanmic stability  Patient Goals: JJ  Family Goals: Updates    Progress made toward(s) clinical / shift goals:  Hemodynamic stability, no pressors needed.  Family updated on plan of care throughout shift    Patient is not progressing towards the following goals:        Problem: Pain - Standard  Goal: Alleviation of pain or a reduction in pain to the patient’s comfort goal  Note: Q2 CPOT completed to assess for any changes.  CPOT 0 throughout shift     Problem: Safety - Medical Restraint  Goal: Remains free of injury from restraints (Restraint for Interference with Medical Device)  Note: Restraints checked and reassessed every two hours. No injuries found

## 2025-04-05 NOTE — PROGRESS NOTES
[facility] ICU Progress Note      Admit Date: 4/3/2025    Resident(s): Cesar Joya D.O.   Attending:  BARBIE ALATORRE/ Dr. Gutierrez    Patient ID:    Name:  Roberto Braswell     YOB: 1980  Age:  44 y.o.  male   MRN:  [id number]    Hospital Course (carried forward and updated):  Roberto Braswell is a 44 y.o. male with past medical history of a GDA bleed in 2023 status post IR embolization 5/24, severe degenerative mitral valve prolapse status post mitral valve repair 1/2024, and chronic pain presenting with out-of-hospital cardiac arrest on 4/3.  His wife is an RN, provides history.  She reports that he started up out of bed, told her he felt dizzy, he lost consciousness, and was nonresponsive but still breathing.  She immediately called EMS and upon reassessment found him not to be breathing, immediately starting CPR.  EMS arrived shortly after and he was placed on mechanical CPR device with first rhythm noted as V-fib.  Underwent defibrillation x 3 and 3 rounds of ACLS and achieved ROSC en route to ED.  Overall low flow time ~30 minutes with CPR, minimal no flow time.  On arrival to ED, EKG with sinus tachycardia without STEMI.  He had received 10 mg Versed and etomidate for LMA placement but he was intubated in the ED and placed on a propofol drip for tachypnea and vent compliance.  Reassuring head CT and chest x-ray. CTA notable for distal right main PE with evidence of right heart strain. Heparin gtt was initiated. Cardiology consulted who suspect likely from PE as pt had normal coronaries prior to MVR and is not requiring inotropes currently. No indication for cath at this time.      Consultants:  Critical Care  Cardiology  Neurology     Interval Events:  Pt appears to be improving neurologically, opens eyes and perform semi purposeful movements. He did have an episode of vomiting earlier. Repeat CXR with continued congestion  Urine output 3L  -D/C TTM   -Wean off sedation  -Trial diuresis  -Check  Procal, if normal will d/c abx  -Follow up on cardiology recs     Vitals Range last 24h:  Vitals:    04/05/25 0800   BP:    Pulse:    Resp:    Temp: 36.7 °C (98.1 °F)   SpO2:           Intake/Output Summary (Last 24 hours) at 4/5/2025 0913  Last data filed at 4/5/2025 0822  Gross per 24 hour   Intake 2716.43 ml   Output 2810 ml   Net -93.57 ml        Review of Systems   Reason unable to perform ROS: Acuity of patient condition.       PHYSICAL EXAM:  Vitals:    04/05/25 0600 04/05/25 0601 04/05/25 0700 04/05/25 0800   BP: 124/58 124/58 101/57    Pulse: 67 67 (!) 53    Resp: (!) 24 (!) 22 12    Temp:    36.7 °C (98.1 °F)   TempSrc:       SpO2: 100% 100% 97%    Weight:       Height:        Body mass index is 33.31 kg/m².    O2 therapy: Pulse Oximetry: 97 %, O2 Delivery Device: Ventilator    Date 04/05/25 0700 - 04/06/25 0659   Shift 5307-6851 4061-1209 6084-0448 24 Hour Total   INTAKE   I.V. 40.1   40.1     Fentanyl Volume 0   0     Precedex Volume 40.1   40.1     Heparin Volume 0   0   Shift Total 40.1   40.1   OUTPUT   Shift Total       NET 40.1   40.1        Physical Exam  Vitals and nursing note reviewed.   Constitutional:       General: He is not in acute distress.  HENT:      Head: Normocephalic and atraumatic.      Mouth/Throat:      Mouth: Mucous membranes are moist.   Eyes:      Pupils: Pupils are equal, round, and reactive to light.   Cardiovascular:      Rate and Rhythm: Normal rate and regular rhythm.   Pulmonary:      Comments: Intubated  Abdominal:      General: There is no distension.   Musculoskeletal:         General: No swelling.   Skin:     Capillary Refill: Capillary refill takes less than 2 seconds.         Recent Labs     04/04/25  1336 04/04/25  1609 04/05/25  0413   ISTATAPH 7.234* 7.383 7.308*   ISTATAPCO2 44.6 33.3 38.0   ISTATAPO2 103 127* 123*   ISTATATCO2 20* 21* 20*   ROKXONN9NDQ 97 99 98   ISTATARTHCO3 18.8* 19.8* 19.0*   ISTATARTBE -8* -4 -7*   ISTATTEMP 36.6 C 37.1 C 36.9 C   ISTATFIO2  50 50 50   ISTATSPEC Arterial Arterial Arterial   ISTATAPHTC 7.239* 7.382 7.309*   [id number] 101 127* 122*     Recent Labs     04/04/25 0415 04/04/25  0852 04/04/25 1407 04/04/25 2236 04/05/25  0302   SODIUM 138  --  138  --  135   POTASSIUM 4.6  --  4.8  --  4.6   CHLORIDE 111  --  112  --  107   CO2 15*  --  18*  --  18*   BUN 14  --  10  --  14   CREATININE 0.89  --  0.76  --  0.97   MAGNESIUM 2.4   < > 3.3* 3.5* 4.3*   PHOSPHORUS 3.1  --   --   --  2.2*   CALCIUM 7.7*  --  7.3*  --  7.8*    < > = values in this interval not displayed.     Recent Labs     04/03/25 2150 04/04/25 0415 04/04/25 1407 04/05/25  0302   ALTSGPT 132* 113*  --  77*   ASTSGOT 128* 82*  --  38   ALKPHOSPHAT 55 57  --  68   TBILIRUBIN 0.3 0.3  --  0.4   GLUCOSE 110* 109* 107* 132*     Recent Labs     04/03/25 1730 04/03/25 2150 04/03/25 2230 04/04/25 0415 04/05/25  0302   RBC 5.55 5.34  --  5.33 5.05   HEMOGLOBIN 14.9 14.6  --  14.6 13.7*   HEMATOCRIT 46.2 44.6  --  45.0 42.9   PLATELETCT 415 319  --  290 294   PROTHROMBTM 13.9  --  14.8*  --   --    APTT 21.0*  --  23.8*  --   --    INR 1.07  --  1.16*  --   --      Recent Labs     04/03/25 2150 04/04/25 0415 04/05/25  0302   WBC 4.8 7.1 11.6*   NEUTSPOLYS 66.70 72.20* 80.40*   LYMPHOCYTES 21.40* 14.40* 9.60*   MONOCYTES 8.20 8.50 6.60   EOSINOPHILS 2.50 3.80 1.90   BASOPHILS 0.80 0.70 0.80   ASTSGOT 128* 82* 38   ALTSGPT 132* 113* 77*   ALKPHOSPHAT 55 57 68   TBILIRUBIN 0.3 0.3 0.4       Meds:   sodium phosphate 15 mmol in dextrose 5% 250 mL ivpb  15 mmol 15 mmol (04/05/25 0843)    fentaNYL  50 mcg      Or    fentaNYL  100 mcg      fentaNYL   150 mcg/hr (04/05/25 0902)    oxyCODONE immediate-release  10 mg      NORepinephrine  0-1 mcg/kg/min (Ideal) Stopped (04/04/25 1000)    dexmedetomidine (Precedex) infusion  0.1-1.5 mcg/kg/hr (Ideal) 0.8 mcg/kg/hr (04/05/25 0822)    Respiratory Therapy Consult        MD Alert...Adult ICU Electrolyte Replacement per Pharmacy         lidocaine  2 mL      insulin lispro  2-9 Units      And    dextrose bolus  25 g      heparin  0-30 Units/kg/hr (Adjusted) 16 Units/kg/hr (04/05/25 0714)    heparin  40 Units/kg (Adjusted)      ampicillin-sulbactam (UNASYN) IV  3 g Stopped (04/05/25 0605)    midazolam  2-4 mg      thiamine  200 mg      famotidine  20 mg      Or    famotidine  20 mg      senna-docusate  2 Tablet      And    polyethylene glycol/lytes  1 Packet          Procedures:  CVC placement  Arterial line placed    Imaging:  [id number] VIEW   Final Result         1.  Nonspecific bowel gas pattern in the upper abdomen.      DX-CHEST-PORTABLE (1 VIEW)   Final Result         1.  Pulmonary edema and/or infiltrates are identified, which are stable since the prior exam.   2.  Cardiomegaly      DX-CHEST-PORTABLE (1 VIEW)   Final Result         1.  Pulmonary edema and/or infiltrates are identified, which appear somewhat increased since the prior exam.   2.  Cardiomegaly      CT-CTA NECK WITH & W/O-POST PROCESSING   Final Result         1.  CT angiogram of the neck within normal limits.   2.  Patchy bilateral upper lobe infiltrates         CT-CTA HEAD WITH & W/O-POST PROCESS   Final Result         1.  No large vessel occlusion or aneurysm identified   2.  Bilateral sinusitis changes      US-EXTREMITY VENOUS LOWER BILAT   Final Result      EC-ECHOCARDIOGRAM COMPLETE W/ CONT   Final Result      CT-ABDOMEN-PELVIS WITH   Final Result         1.  Fluid-filled prominence of proximal small bowel with distal small bowel decompression, appearance suggests ileus and/or enteritis versus evolving obstructive changes. Recommend radiographic follow-up to resolution as clinically appropriate.   2.  Irregular hepatic contour favoring changes of cirrhosis   3.  Fat-containing bilateral inguinal hernias      CT-CTA CHEST PULMONARY ARTERY W/ RECONS   Final Result         1.  Distal right main pulmonary embolus extending into right upper lobe and lower lobe segmental and  subsegmental branches. 1.4 right to left ventricular ratio suggesting component of right heart strain.   2.  Evaluation of the subsegmental branches is essentially nondiagnostic due to motion artifacts. Additional imaging would be required for definitive evaluation of small distal pulmonary emboli.   3.  Scattered patchy bilateral pulmonary infiltrates.   4.  Bilateral dependent consolidations, appearance suggests atelectasis, component of infiltrate not excluded.      These findings were discussed with the patient's clinician, Misbah Cardoza, on 4/3/2025 9:27 PM.      CT-HEAD W/O   Final Result      No acute intracranial findings.               DX-CHEST-PORTABLE (1 VIEW)   Final Result      Interval placement of a right IJ central line without evidence of complication.      DX-CHEST-PORTABLE (1 VIEW)   Final Result      1.  Support devices present.      2.  Cardiomegaly.      3.  Bibasilar atelectasis.          ASSESSEMENT and PLAN:    * Cardiac arrest (HCC)- (present on admission)  Assessment & Plan  Witnessed out-of-hospital cardiac arrest, with immediate CPR, zero no flow time, initial rhythm VF, early application of mechanical CPR, underwent defib x3 with 2 rounds of EPI and ACLS reportedly, EKG without STEMI, POCUS hyperdynamic LV with RV dilation and mildly depressed function though not well visualized     Post cardiac arrest global care:     -Ventilation: Goal PCO2 40-45, PaO2 ~100, LPV     -Hemodynamic: Goal SBP >90 mmHg, MAP >65 mmHg; fluid rescucitation and vasoactives as guided by bedside exam, ultrasound and hemodynamics     -Cardiac:   EKG without STEMI, initial trop 80, initial POCUS with hyperdynamic function, no obvious RWMA  Cardiology following to determine timing for angiography     -Neurologic:   Initial NCHCT normal, intact brainstem function at arrival, withdraws in all four  cEEG without seizure like activity  Pt improving neurologically  -Weaning off sedation, d/c ttm       -Metabolic:  Serial lactate, goal blood glucose <180, maintain euvolemia, monitor urine output, maintain potassium greater than 3.5, maintain magnesium greater than 2     Pending further cardiology recs     Cirrhosis of liver (HCC)  Assessment & Plan  Cirrhotic morphology noted on abdominal CT. Resolving low level elevations in liver enzymes consistent with congestive hepatopathy.  -Acute hepatitis panel negative    Aspiration into airway  Assessment & Plan  Scattered, patchy bilateral infiltrates seen on CT, possibly secondary to aspiration pneumonia given presentation in the context of cardiac arrest.  On Unasyn  -suspect likely aspiration pneumonitis, checking procal    Pulmonary embolus (HCC)  Assessment & Plan  Incidentally noted on CTPA done post arrest  Clinical picture and VF arrest not entirely consistent with PE  Unclear if arrest caused by PE or not  No evidence of ongoing shock or hemodynamic compromise     Intermediate risk pulmonary embolism with myocardial injury and right heart-strain as evidenced by a troponin of 80, BNP of 126, and RV/LV ratio 1.4 on CT     Felt to be at elevated risk of decompensation based on:  Syncope with subsequent VF  Clot burden on CT mostly distal, small right main clot extending into RLL segments  Lactate 3.5  Shock index initially (HR/SBP) >1 but HR imroved with volume resus and opiods  Need for Vasopressors none  Clot in transit not seen     The treatment options were discussed with patient, family, pharmacy including: CDT, Suction thrombectomy, half-dose tPA + heparin, and heparin alone     Does not warrant advanced therapies at this time, will treat with AC alone and consider rescue therapies if worsens hemodynamically     Heparin infusion  Cardiac monitoring   RT/O2 Protocols  Titrate supplemental FiO2 to maintain SpO2 >88%  BLE Dopplers negative for DVT  TTE with EF reduced to 38%, difficult to assess wall motion abnormalities but possibly consistent with Takotsubo  cardiomyopathy.    Seizure-like activity (HCC)  Assessment & Plan  At time of event had what is described as convulsive activity followed by loss of conciousness prior to arrest  Has ha some seizure like activity in the past and negative short EEG without neuro follow up     At arrival to ICU had change in neuro exam, upward gaze and rigidity in all four, no overt convulsions but no longer responding to pain in extremities temporarily  Given 5 of versed during this event  with improvement     cEEG      Ventricular fibrillation (HCC)  Assessment & Plan  Presented following OHCA, initial rhythm VF  Unclear etiology or mechanism     [facility] in 2023 in preparation for MVR with clean coronaries  Initial EKG without STEMI, minimal trop elevation   S/p amiodarone   Cardiology following    History of MVR with cardiopulmonary bypass  Assessment & Plan  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 s/p mitral valve repair ([place], P2 quadrangular resection, cord reimplantation, 38 mm Syed flexible annuloplasty band 1/2024)     TTE with intact repair, normal valvular function    Chronic pain syndrome- (present on admission)  Assessment & Plan  Mostly chronic chest pain which began following CPB for his MVR in 1/2024  Has been opioid dependent since     Takes oxycodone (without APAP) 10mg approximately 5x per day and recently has been trying to auto-wean, no opioids for two days per wife  Also takes methocarbamol still   -Resume home oxycodone, wean off fentanyl        DISPO: ICU    CODE STATUS: Full code    Quality Measures:  Feeding: NPO  Analgesia: Fentanyl gtt  Sedation: Propofol  Thromboprophylaxis: Heparin gtt  Head of bed: >30 degrees  Ulcer prophylaxis: Famotidine  Glycemic control: Correctional: SSI/ Basal: None  Bowel care: bowel regimen  Indwelling lines: Arterial line, CVC  Deescalation of antibiotics: Unasyn

## 2025-04-05 NOTE — PROGRESS NOTES
4 Eyes Skin Assessment Completed by KARSON Mayorga and Danielito/KARSON Iyer.    Head WDL  Ears Redness and Blanching  Nose WDL  Mouth WDL  Neck WDL  Breast/Chest Scar  Shoulder Blades WDL  Spine WDL  (R) Arm/Elbow/Hand Redness and Blanching  (L) Arm/Elbow/Hand Redness and Blanching  Abdomen WDL  Groin WDL  Scrotum/Coccyx/Buttocks Scar and Scab  (R) Leg Bruising and Swelling  (L) Leg Redness and Swelling  (R) Heel/Foot/Toe Redness and Blanching  (L) Heel/Foot/Toe Redness and Blanching          Devices In Places ECG, Blood Pressure Cuff, Pulse Ox, Bai, Arterial Line, SCD's, Central Line, and Nasal Cannula      Interventions In Place Gray Ear Foams, NC W/Ear Foams, Heel Mepilex, Sacral Mepilex, TAP System, Pillows, Q2 Turns, Low Air Loss Mattress, and ZFlo Pillow    Possible Skin Injury Yes    Pictures Uploaded Into Epic Yes  Wound Consult Placed Yes  RN Wound Prevention Protocol Ordered No

## 2025-04-05 NOTE — PROGRESS NOTES
Critical Care Progress Note    Date of admission  4/3/2025    Chief Complaint  44 y.o. male admitted 4/3/2025 with Cardiac arrest     Hospital Course  44 Y old male presented on 4/3/25 with OHCA V fib rhythm , underwent defrillation x3, 4 rounds of ACLS, reported low flow time about 30 min from unknown cause. He has medical history significant for recurrent GIB, s/p embolization of GDA, chronic pain, severe MR with repair 1/24 ([place], P2 quadrangular resection, cord reimplantation, 38 mm Syed flexible annuloplasty band 1/2024). He was thought to have seizure like activity , started on sedation , TTM management and on cEEG.     4/4: patient off paralytics and sedation, on low dose precedex trying to follow commands. TTE showed EF-38%, LV/RV apical hypokinesis , reduced RVSF, RVSP 35, MV repair with no paravalvular leak.    Interval Problem Update  24 Hr Updates               - Neuro: off sedation but still on fentanyl gtt , opens eyes , attempting  to follow commands, lower extremities more weak than upper               - CV: no requiring pressors and in NSR              - Resp: VD 3 and mode ACVC               - GI: NPO, NG tube to suction              - Renal /UOP: 3010 ml in 24 hrs               - Endo: one episode of hypoglycemia yesterday               - ID: no fever, mildly elevated WBC              - Heme/onco  : stable hb , platelets              - Lines: CVC/ [place]/ PIV /Bai /ETT              - PPx: GI  pepcid DVT heparin gtt              - SAT/ SBT: tolerated SAT., SBT too weak               - new  imaging: CXR reviewed                     Review of Systems  Review of Systems   Unable to perform ROS: Intubated        Vital Signs for last 24 hours   Temp:  [36.3 °C (97.3 °F)-37.6 °C (99.7 °F)] 36.7 °C (98.1 °F)  Pulse:  [53-92] 53  Resp:  [12-28] 12  BP: ()/(49-82) 101/57  SpO2:  [92 %-100 %] 97 %        Respiratory Information for the last 24 hours  Vent Mode: APVCMV  Rate (breaths/min):  26  Vt Target (mL): 510  PEEP/CPAP: 8  MAP: 13  Control VTE (exp VT): 511    Physical Exam   Physical Exam  Vitals reviewed.   Constitutional:       General: He is not in acute distress.     Appearance: He is ill-appearing. He is not toxic-appearing.   Eyes:      General: No scleral icterus.     Conjunctiva/sclera: Conjunctivae normal.   Cardiovascular:      Rate and Rhythm: Normal rate and regular rhythm.      Heart sounds: No murmur heard.  Pulmonary:      Effort: Pulmonary effort is normal. No respiratory distress.      Breath sounds: No wheezing.   Abdominal:      General: There is no distension.      Palpations: Abdomen is soft. There is no mass.   Musculoskeletal:         General: No swelling. Normal range of motion.      Right lower leg: No edema.      Left lower leg: No edema.   Skin:     General: Skin is warm and dry.   Neurological:      Comments: On precedex gtt          Medications  Current Facility-Administered Medications   Medication Dose Route Frequency Provider Last Rate Last Admin    sodium phosphate 15 mmol in dextrose 5% 250 mL ivpb  15 mmol Intravenous Once Barbara Gutierrez M.D. 62.5 mL/hr at 04/05/25 0843 15 mmol at 04/05/25 0843    fentaNYL (Sublimaze) injection 50 mcg  50 mcg Intravenous Q HOUR PRN Barbara Gutierrez M.D.        Or    fentaNYL (Sublimaze) injection 100 mcg  100 mcg Intravenous Q HOUR PRN Barbara Gutierrez M.D.        fentaNYL (SUBLIMAZE) 50 mcg/mL in 50mL   Intravenous Continuous Barbara Gutierrez M.D. 3 mL/hr at 04/05/25 0902 150 mcg/hr at 04/05/25 0902    oxyCODONE immediate release (Roxicodone) tablet 10 mg  10 mg Enteral Tube 5X/DAY Barbara Gutierrez M.D.        norepinephrine (Levophed) 8 mg in 250 mL NS infusion (premix)  0-1 mcg/kg/min (Ideal) Intravenous Continuous Judd Edge M.D.   Dose not Required at 04/04/25 1000    dexmedetomidine (Precedex) 400 mcg/100mL infusion  0.1-1.5 mcg/kg/hr (Ideal) Intravenous Continuous Misbah Cardoza M.D. 16.9 mL/hr at  04/05/25 0822 0.8 mcg/kg/hr at 04/05/25 0822    Respiratory Therapy Consult   Nebulization Continuous RT Misbah Cardoza M.D. MD Alert...ICU Electrolyte Replacement per Pharmacy   Other PHARMACY TO DOSE Misbah Cardoza M.D.        lidocaine (Xylocaine) 1 % injection 2 mL  2 mL Tracheal Tube Q30 MIN PRN Misbah Cardoza M.D.        insulin lispro (HumaLOG,AdmeLOG) subcutaneous injection  2-9 Units Subcutaneous Q6HRS Misbah Cardoza M.D.        And    dextrose 50 % (D50W) injection 25 g  25 g Intravenous Q15 MIN PRN Misbah Cardoza M.D.   25 g at 04/04/25 1243    heparin infusion 25,000 units in 500 mL 0.45% NACL  0-30 Units/kg/hr (Adjusted) Intravenous Continuous Misbah Cardoza M.D. 31.2 mL/hr at 04/05/25 0923 16 Units/kg/hr at 04/05/25 0923    heparin injection 3,900 Units  40 Units/kg (Adjusted) Intravenous PRN Misbah Cardoza M.D.   3,900 Units at 04/05/25 0417    ampicillin/sulbactam (Unasyn) 3 g in  mL IVPB  3 g Intravenous Q6HRS Misbah Cardoza M.D.   Stopped at 04/05/25 0605    midazolam (Versed) injection 2-4 mg  2-4 mg Intravenous Q HOUR PRN Misbah Cardoza M.D.   4 mg at 04/04/25 0032    thiamine (B-1) injection 200 mg  200 mg Intravenous BID Misbah Cardoza M.D.   200 mg at 04/05/25 0522    famotidine (Pepcid) tablet 20 mg  20 mg Enteral Tube Q12HRS Misbah Cardoza M.D.   20 mg at 04/05/25 0522    Or    famotidine (Pepcid) injection 20 mg  20 mg Intravenous Q12HRS Misbah Cardoza M.D.        senna-docusate (Pericolace Or Senokot S) 8.6-50 MG per tablet 2 Tablet  2 Tablet Enteral Tube Q EVENING Misbah Cardoza M.D.   2 Tablet at 04/04/25 1814    And    polyethylene glycol/lytes (Miralax) Packet 1 Packet  1 Packet Enteral Tube QDAY PRN Misbah Cardoza M.D.           Fluids    Intake/Output Summary (Last 24 hours) at 4/5/2025 0927  Last data filed at 4/5/2025 0822  Gross per 24 hour   Intake 2716.43 ml   Output 2810 ml   Net -93.57 ml        Laboratory  Recent Labs     04/04/25  1336 04/04/25  1609 04/05/25  0413   ISTATAPH 7.234* 7.383 7.308*   ISTATAPCO2 44.6 33.3 38.0   ISTATAPO2 103 127* 123*   ISTATATCO2 20* 21* 20*   BDWPPEQ1MSH 97 99 98   ISTATARTHCO3 18.8* 19.8* 19.0*   ISTATARTBE -8* -4 -7*   ISTATTEMP 36.6 C 37.1 C 36.9 C   ISTATFIO2 50 50 50   ISTATSPEC Arterial Arterial Arterial   ISTATAPHTC 7.239* 7.382 7.309*   QRPBZTHG9IB 101 127* 122*     Recent Labs     04/03/25 2230 04/05/25  0302   CPKTOTAL 401* 542*     Recent Labs     04/04/25 0415 04/04/25  0852 04/04/25  1407 04/04/25 2236 04/05/25  0302   SODIUM 138  --  138  --  135   POTASSIUM 4.6  --  4.8  --  4.6   CHLORIDE 111  --  112  --  107   CO2 15*  --  18*  --  18*   BUN 14  --  10  --  14   CREATININE 0.89  --  0.76  --  0.97   MAGNESIUM 2.4   < > 3.3* 3.5* 4.3*   PHOSPHORUS 3.1  --   --   --  2.2*   CALCIUM 7.7*  --  7.3*  --  7.8*    < > = values in this interval not displayed.     Recent Labs     04/03/25 2150 04/04/25 0415 04/04/25 1407 04/05/25  0302   ALTSGPT 132* 113*  --  77*   ASTSGOT 128* 82*  --  38   ALKPHOSPHAT 55 57  --  68   TBILIRUBIN 0.3 0.3  --  0.4   GLUCOSE 110* 109* 107* 132*     Recent Labs     04/03/25 2150 04/04/25 0415 04/05/25  0302   WBC 4.8 7.1 11.6*   NEUTSPOLYS 66.70 72.20* 80.40*   LYMPHOCYTES 21.40* 14.40* 9.60*   MONOCYTES 8.20 8.50 6.60   EOSINOPHILS 2.50 3.80 1.90   BASOPHILS 0.80 0.70 0.80   ASTSGOT 128* 82* 38   ALTSGPT 132* 113* 77*   ALKPHOSPHAT 55 57 68   TBILIRUBIN 0.3 0.3 0.4     Recent Labs     04/03/25  1730 04/03/25  2150 04/03/25  2230 04/04/25  0415 04/05/25  0302   RBC 5.55 5.34  --  5.33 5.05   HEMOGLOBIN 14.9 14.6  --  14.6 13.7*   HEMATOCRIT 46.2 44.6  --  45.0 42.9   PLATELETCT 415 319  --  290 294   PROTHROMBTM 13.9  --  14.8*  --   --    APTT 21.0*  --  23.8*  --   --    INR 1.07  --  1.16*  --   --        Imaging  X-Ray:  I have personally reviewed the images and compared with prior images.    Assessment/Plan  *  Cardiac arrest (HCC)- (present on admission)  Assessment & Plan  Witnessed out-of-hospital cardiac arrest, with immediate CPR, zero no flow time, initial rhythm VF, early application of mechanical CPR, underwent defib x3 with 2 rounds of EPI and ACLS reportedly, EKG without STEMI, POCUS hyperdynamic LV with RV dilation and mildly depressed function though not well visualized    Post cardiac arrest global care:    -Ventilation: Goal PCO2 40-45, PaO2 ~100, LPV    -Hemodynamic: Goal SBP >90 mmHg, MAP >65 mmHg; fluid rescucitation and vasoactives as guided by bedside exam, ultrasound and hemodynamics    -Cardiac:   EKG without STEMI, troponin trending up   TTE EF-38%, LV/RV apical hypokinesis , reduced RVSF, RVSP 35, MV repair with no paravalvular leak.  Cardiology  to determine timing for angiography    -Neurologic:   Initial NCHCT normal, intact brainstem function at arrival, withdraws in all four on arrival   Off TTM    cEEG showed no seizure activity     Serial neurologic exams,  precedex as needed , we will wean fentanyl and reassess full neuro exam today     -Metabolic: Serial lactate, goal blood glucose <180, maintain euvolemia, monitor urine output, maintain potassium greater than 3.5, maintain magnesium greater than 2    Neuro prognostication in 48-72 hours, will consider neurology consult and MRI at that time.    Cardiology re consulted to see if they would like to do any ICD or LHC      Cirrhosis of liver (HCC)  Assessment & Plan  Incidental finding on CT abdomen   No ascites   Synthetic function preserved     Aspiration into airway  Assessment & Plan  Infiltrate noted on CT chest, reported suctioning of aspirated content in ED post intubation    Given cardiac arrest prudent to prophylax against pneumonia here (ANTHARTIC AND PROHY-VAP trials)  Unasyn x 3 days empirically  Trach aspirate GPC   Send procal if low will stop unasyn after today     Pulmonary embolus (HCC)  Assessment & Plan  Incidentally noted on  CTPA done post arrest  Clinical picture and VF arrest not entirely consistent with PE  Unclear if arrest caused by PE or not  No evidence of ongoing shock or hemodynamic compromise    Intermediate risk pulmonary embolism with myocardial injury and right heart-strain as evidenced by a troponin of 80, BNP of 126, and RV/LV ratio 1.4 on CT    Felt to be at elevated risk of decompensation based on:  Syncope with subsequent VF  Clot burden on CT mostly distal, small right main clot extending into RLL segments  Lactate 3.5  Shock index initially (HR/SBP) >1 but HR imroved with volume resus and opiods  Need for Vasopressors none  Clot in transit not seen      Continue heparin infusion  Cardiac monitoring   RT/O2 Protocols  Titrate supplemental FiO2 to maintain SpO2 >88%  BLE Dopplers -negative for DVT  TTE  EF-38%, LV/RV apical hypokinesis , reduced RVSF, RVSP 35, MV repair with no paravalvular leak.    Seizure-like activity (HCC)  Assessment & Plan  At time of evnet had what is described as convulsive activity followed by loss of conciousness prior to arrest  Has has some seizure like activity in the past and negative short EEG without neuro follow up    At arrival to ICU had change in neuro exam, upward gaze and rigidity in all four, no overt convulsions but no longer responding to pain in extremities temporarily      cEEG - negative for seizures   Brain MRI when able    Ventricular fibrillation (HCC)  Assessment & Plan  Presented following OHCA, initial rhythm VF  Unclear etiology or mechanism    [facility]23 in preparation for MVR with clean coronaries  Initial EKG without STEMI, trop elevation    Finished amio gtt for 24 hrs  Heparin gtt  Cycle trop  Cardiology re consulted to see if they would like to do any ICD or LHC      Pain, chronic  Assessment & Plan  Mostly chronic chest pain which began following CPB for his MVR in 1/2024  Has been opioid dependent since  - restart oxycodone PO and wean fentanyl as tolerated        History of MVR with cardiopulmonary bypass  Assessment & Plan  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 s/p mitral valve repair ([place], P2 quadrangular resection, cord reimplantation, 38 mm Syed flexible annuloplasty band 1/2024)  TTE -EF-38%, LV/RV apical hypokinesis , reduced RVSF, RVSP 35, MV repair with no paravalvular leak.         VTE:  Heparin  Ulcer: H2 Antagonist  Lines: Central Line  Ongoing indication addressed, Arterial Line  Ongoing indication addressed, and Bai Catheter  Ongoing indication addressed    I have performed a physical exam and reviewed and updated ROS and Plan today (4/5/2025). In review of yesterday's note (4/4/2025), there are no changes except as documented above.     Discussed patient condition and risk of morbidity and/or mortality with Family, RN, RT, Therapies, and cardiology  The patient remains critically ill.  Critical care time = 75 minutes in directly providing and coordinating critical care and extensive data review.  No time overlap and excludes procedures.

## 2025-04-05 NOTE — FLOWSHEET NOTE
04/04/25 1700   Weaning Parameters   RR (bpm) 14   $ FVC / Vital Capacity (liters)  1.01   NIF (cm H2O)  -10   Rapid Shallow Breathing Index (RR/VT) 27   Spontaneous VE 6.9   Spontaneous      Cuff leak present, weak NIF, Manual done also

## 2025-04-05 NOTE — ASSESSMENT & PLAN NOTE
Scattered, patchy bilateral infiltrates seen on CT, possibly secondary to aspiration pneumonia given presentation in the context of cardiac arrest.  On Unasyn  -suspect likely aspiration pneumonitis, checking procal

## 2025-04-05 NOTE — PROGRESS NOTES
4 Eyes Skin Assessment Completed by Fredy RN and KARSON Orozco.    Head WDL  Ears Redness and Blanching  Nose Redness and Blanching  Mouth WDL  Neck WDL  Breast/Chest Old midline incision   Shoulder Blades Redness from temperature of arctic sun pads  Spine Redness from temperature of artic sun pads  (R) Arm/Elbow/Hand Edema  (L) Arm/Elbow/Hand Edema  Abdomen Scar  Groin Redness  Scrotum/Coccyx/Buttocks Redness and Blanching  (R) Leg Edema  (L) Leg Edema  (R) Heel/Foot/Toe Redness and Blanching, slow to godfrey  (L) Heel/Foot/Toe Redness and Blanching, slow to godfrey          Devices In Places ECG, Tele Box, Blood Pressure Cuff, Pulse Ox, Bai, Arterial Line, SCD's, EEG, ET Tube, and OG/NG      Interventions In Place Heel Mepilex, Sacral Mepilex, Heel Float Boots, TAP System, Pillows, Elbow Mepilex, Q2 Turns, Low Air Loss Mattress, ZFlo Pillow, Dri-Gio Pads, and Pressure Redistribution Mattress    Possible Skin Injury No    Pictures Uploaded Into Epic N/A  Wound Consult Placed N/A  RN Wound Prevention Protocol Ordered Yes

## 2025-04-05 NOTE — CARE PLAN
The patient is Watcher - Medium risk of patient condition declining or worsening    Shift Goals  Clinical Goals: TTM, Hemodynamic stability, wean sedation  Patient Goals: JJ  Family Goals: Updates    Progress made toward(s) clinical / shift goals:    Problem: Hemodynamics  Goal: Patient's hemodynamics, fluid balance and neurologic status will be stable or improve  Description: Target End Date:  Prior to discharge or change in level of careDocument on Assessment and I/O flowsheet templates1.  Monitor vital signs, pulse oximetry and cardiac monitor per provider order and/or policy2.  Maintain blood pressure per provider order3.  Hemodynamic monitoring per provider order4.  Manage IV fluids and IV infusions5.  Monitor intake and output6.  Daily weights per unit policy or provider order7.  Assess peripheral pulses and capillary refill8.  Assess color and body temperature9.  Position patient for maximum circulation/cardiac [id number]. Monitor for signs/symptoms of excessive [id number]. Assess mental status, restlessness and changes in level of [id number]. Monitor temperature and report fever or hypothermia to provider immediately. Consideration of targeted temperature management.  Outcome: Progressing  Note: SR, MAP >65, good UOP,      Problem: Pain Management  Goal: Pain level will decrease to patient's comfort goal  Outcome: Progressing  Note: Sedation adjusted for BSAS, PRNS given per MAR CPOT negative     Problem: Safety - Medical Restraint  Goal: Remains free of injury from restraints (Restraint for Interference with Medical Device)  Description: INTERVENTIONS:1. Determine that other, less restrictive measures have been tried or would not be effective before applying the restraint2. Evaluate the patient's condition at the time of restraint application3. Educate patient/family regarding the reason for restraint4. Q2H: Monitor safety, psychosocial status, comfort, circulation, respiratory status, LOC, nutrition  and hydration  Outcome: Progressing  Flowsheets (Taken 4/5/2025 0450)  Addressed this shift: Remains free of injury from restraints (restraint for interference with medical device):   Determine that other, less restrictive measures have been tried or would not be effective before applying the restraint   Evaluate the patient's condition at the time of restraint application   Inform patient/family regarding the reason for restraint   Every 2 hours: Monitor safety, psychosocial status, comfort, nutrition and hydration  Goal: Free from restraint(s) (Restraint for Interference with Medical Device)  Description: INTERVENTIONS:1.  ONCE/SHIFT or MINIMUM Q12H: Assess and document the continuing need for restraints2.  Q24H: Continued use of restraint requires LIP to perform face to face examination and written order3.  Identify and implement measures to help patient regain control4.  Educate patient/family on discontinuation criteria 5.  Assess patient's understanding and retention of education provided6.  Assess readiness for release & initiate progressive release per protocol7.  Identify and document criteria for restraints  Flowsheets (Taken 4/5/2025 0450)  Addressed this shift: Free from restraint(s) (restraint for interference with medical device):   ONCE/SHIFT or MINIMUM Every 12 hours: Assess and document the continuing need for restraints   Every 24 hours: Continued use of restraint requires Licensed Independent Practitioner to perform face to face examination and written order   Identify and implement measures to help patient regain control

## 2025-04-06 PROBLEM — G93.40 ENCEPHALOPATHY ACUTE: Status: ACTIVE | Noted: 2025-04-06

## 2025-04-06 PROBLEM — J96.01 ACUTE RESPIRATORY FAILURE WITH HYPOXIA (HCC): Status: ACTIVE | Noted: 2025-04-06

## 2025-04-06 LAB
ALBUMIN SERPL BCP-MCNC: 2.9 G/DL (ref 3.2–4.9)
ALBUMIN/GLOB SERPL: 1.2 G/DL
ALP SERPL-CCNC: 68 U/L (ref 30–99)
ALT SERPL-CCNC: 46 U/L (ref 2–50)
ANION GAP SERPL CALC-SCNC: 8 MMOL/L (ref 7–16)
AST SERPL-CCNC: 28 U/L (ref 12–45)
BACTERIA SPEC RESP CULT: ABNORMAL
BACTERIA SPEC RESP CULT: ABNORMAL
BASOPHILS # BLD AUTO: 0.3 % (ref 0–1.8)
BASOPHILS # BLD: 0.04 K/UL (ref 0–0.12)
BILIRUB SERPL-MCNC: 0.6 MG/DL (ref 0.1–1.5)
BUN SERPL-MCNC: 14 MG/DL (ref 8–22)
CALCIUM ALBUM COR SERPL-MCNC: 8.4 MG/DL (ref 8.5–10.5)
CALCIUM SERPL-MCNC: 7.5 MG/DL (ref 8.5–10.5)
CHLORIDE SERPL-SCNC: 108 MMOL/L (ref 96–112)
CK SERPL-CCNC: 650 U/L (ref 0–154)
CO2 SERPL-SCNC: 23 MMOL/L (ref 20–33)
CREAT SERPL-MCNC: 0.93 MG/DL (ref 0.5–1.4)
EKG IMPRESSION: NORMAL
EKG IMPRESSION: NORMAL
EOSINOPHIL # BLD AUTO: 0.03 K/UL (ref 0–0.51)
EOSINOPHIL NFR BLD: 0.2 % (ref 0–6.9)
ERYTHROCYTE [DISTWIDTH] IN BLOOD BY AUTOMATED COUNT: 54.5 FL (ref 35.9–50)
GFR SERPLBLD CREATININE-BSD FMLA CKD-EPI: 104 ML/MIN/1.73 M 2
GLOBULIN SER CALC-MCNC: 2.4 G/DL (ref 1.9–3.5)
GLUCOSE BLD STRIP.AUTO-MCNC: 116 MG/DL (ref 65–99)
GLUCOSE BLD STRIP.AUTO-MCNC: 117 MG/DL (ref 65–99)
GLUCOSE BLD STRIP.AUTO-MCNC: 128 MG/DL (ref 65–99)
GLUCOSE BLD STRIP.AUTO-MCNC: 81 MG/DL (ref 65–99)
GLUCOSE SERPL-MCNC: 126 MG/DL (ref 65–99)
GRAM STN SPEC: ABNORMAL
HCT VFR BLD AUTO: 32.8 % (ref 42–52)
HGB BLD-MCNC: 10.6 G/DL (ref 14–18)
IMM GRANULOCYTES # BLD AUTO: 0.14 K/UL (ref 0–0.11)
IMM GRANULOCYTES NFR BLD AUTO: 1.2 % (ref 0–0.9)
LYMPHOCYTES # BLD AUTO: 0.82 K/UL (ref 1–4.8)
LYMPHOCYTES NFR BLD: 6.8 % (ref 22–41)
MAGNESIUM SERPL-MCNC: 2.4 MG/DL (ref 1.5–2.5)
MCH RBC QN AUTO: 27 PG (ref 27–33)
MCHC RBC AUTO-ENTMCNC: 32.3 G/DL (ref 32.3–36.5)
MCV RBC AUTO: 83.7 FL (ref 81.4–97.8)
MONOCYTES # BLD AUTO: 0.7 K/UL (ref 0–0.85)
MONOCYTES NFR BLD AUTO: 5.8 % (ref 0–13.4)
NEUTROPHILS # BLD AUTO: 10.4 K/UL (ref 1.82–7.42)
NEUTROPHILS NFR BLD: 85.7 % (ref 44–72)
NRBC # BLD AUTO: 0 K/UL
NRBC BLD-RTO: 0 /100 WBC (ref 0–0.2)
NSE SERPL IA-MCNC: 31 NG/ML
PHOSPHATE SERPL-MCNC: 1.4 MG/DL (ref 2.5–4.5)
PLATELET # BLD AUTO: 259 K/UL (ref 164–446)
PMV BLD AUTO: 9 FL (ref 9–12.9)
POTASSIUM SERPL-SCNC: 3.8 MMOL/L (ref 3.6–5.5)
PROT SERPL-MCNC: 5.3 G/DL (ref 6–8.2)
RBC # BLD AUTO: 3.92 M/UL (ref 4.7–6.1)
SIGNIFICANT IND 70042: ABNORMAL
SITE SITE: ABNORMAL
SODIUM SERPL-SCNC: 139 MMOL/L (ref 135–145)
SOURCE SOURCE: ABNORMAL
UFH PPP CHRO-ACNC: 0.15 IU/ML
UFH PPP CHRO-ACNC: 0.25 IU/ML
WBC # BLD AUTO: 12.1 K/UL (ref 4.8–10.8)

## 2025-04-06 PROCEDURE — 83735 ASSAY OF MAGNESIUM: CPT

## 2025-04-06 PROCEDURE — 700101 HCHG RX REV CODE 250: Performed by: STUDENT IN AN ORGANIZED HEALTH CARE EDUCATION/TRAINING PROGRAM

## 2025-04-06 PROCEDURE — 82550 ASSAY OF CK (CPK): CPT

## 2025-04-06 PROCEDURE — 85520 HEPARIN ASSAY: CPT

## 2025-04-06 PROCEDURE — 84100 ASSAY OF PHOSPHORUS: CPT

## 2025-04-06 PROCEDURE — 85025 COMPLETE CBC W/AUTO DIFF WBC: CPT

## 2025-04-06 PROCEDURE — 99222 1ST HOSP IP/OBS MODERATE 55: CPT | Performed by: HOSPITALIST

## 2025-04-06 PROCEDURE — 92610 EVALUATE SWALLOWING FUNCTION: CPT

## 2025-04-06 PROCEDURE — 87040 BLOOD CULTURE FOR BACTERIA: CPT

## 2025-04-06 PROCEDURE — 80053 COMPREHEN METABOLIC PANEL: CPT

## 2025-04-06 PROCEDURE — 700102 HCHG RX REV CODE 250 W/ 637 OVERRIDE(OP): Performed by: STUDENT IN AN ORGANIZED HEALTH CARE EDUCATION/TRAINING PROGRAM

## 2025-04-06 PROCEDURE — 700105 HCHG RX REV CODE 258

## 2025-04-06 PROCEDURE — 700105 HCHG RX REV CODE 258: Performed by: STUDENT IN AN ORGANIZED HEALTH CARE EDUCATION/TRAINING PROGRAM

## 2025-04-06 PROCEDURE — 87077 CULTURE AEROBIC IDENTIFY: CPT

## 2025-04-06 PROCEDURE — 700111 HCHG RX REV CODE 636 W/ 250 OVERRIDE (IP): Mod: JZ

## 2025-04-06 PROCEDURE — 700101 HCHG RX REV CODE 250

## 2025-04-06 PROCEDURE — 700111 HCHG RX REV CODE 636 W/ 250 OVERRIDE (IP): Performed by: EMERGENCY MEDICINE

## 2025-04-06 PROCEDURE — A9270 NON-COVERED ITEM OR SERVICE: HCPCS | Performed by: STUDENT IN AN ORGANIZED HEALTH CARE EDUCATION/TRAINING PROGRAM

## 2025-04-06 PROCEDURE — 82962 GLUCOSE BLOOD TEST: CPT

## 2025-04-06 PROCEDURE — 99291 CRITICAL CARE FIRST HOUR: CPT | Performed by: STUDENT IN AN ORGANIZED HEALTH CARE EDUCATION/TRAINING PROGRAM

## 2025-04-06 PROCEDURE — 700111 HCHG RX REV CODE 636 W/ 250 OVERRIDE (IP): Performed by: STUDENT IN AN ORGANIZED HEALTH CARE EDUCATION/TRAINING PROGRAM

## 2025-04-06 PROCEDURE — 770000 HCHG ROOM/CARE - INTERMEDIATE ICU *

## 2025-04-06 RX ORDER — FUROSEMIDE 10 MG/ML
40 INJECTION INTRAMUSCULAR; INTRAVENOUS ONCE
Status: COMPLETED | OUTPATIENT
Start: 2025-04-06 | End: 2025-04-06

## 2025-04-06 RX ORDER — ACETAMINOPHEN 325 MG/1
650 TABLET ORAL EVERY 4 HOURS PRN
Status: DISCONTINUED | OUTPATIENT
Start: 2025-04-06 | End: 2025-04-21 | Stop reason: HOSPADM

## 2025-04-06 RX ORDER — AMOXICILLIN 250 MG
2 CAPSULE ORAL EVERY EVENING
Status: DISCONTINUED | OUTPATIENT
Start: 2025-04-06 | End: 2025-04-21 | Stop reason: HOSPADM

## 2025-04-06 RX ORDER — DEXMEDETOMIDINE HYDROCHLORIDE 4 UG/ML
.1-1.5 INJECTION, SOLUTION INTRAVENOUS CONTINUOUS
Status: DISCONTINUED | OUTPATIENT
Start: 2025-04-06 | End: 2025-04-08

## 2025-04-06 RX ORDER — POLYETHYLENE GLYCOL 3350 17 G/17G
1 POWDER, FOR SOLUTION ORAL
Status: DISCONTINUED | OUTPATIENT
Start: 2025-04-06 | End: 2025-04-21 | Stop reason: HOSPADM

## 2025-04-06 RX ORDER — OXYCODONE HYDROCHLORIDE 10 MG/1
10 TABLET ORAL EVERY 6 HOURS PRN
Refills: 0 | Status: DISCONTINUED | OUTPATIENT
Start: 2025-04-06 | End: 2025-04-09

## 2025-04-06 RX ORDER — METOPROLOL TARTRATE 25 MG/1
12.5 TABLET, FILM COATED ORAL 2 TIMES DAILY
Status: DISCONTINUED | OUTPATIENT
Start: 2025-04-06 | End: 2025-04-09

## 2025-04-06 RX ADMIN — METOPROLOL TARTRATE 12.5 MG: 25 TABLET, FILM COATED ORAL at 09:29

## 2025-04-06 RX ADMIN — DEXMEDETOMIDINE HYDROCHLORIDE 0.7 MCG/KG/HR: 100 INJECTION, SOLUTION INTRAVENOUS at 14:43

## 2025-04-06 RX ADMIN — APIXABAN 10 MG: 5 TABLET, FILM COATED ORAL at 09:29

## 2025-04-06 RX ADMIN — SODIUM CHLORIDE: 9 INJECTION, SOLUTION INTRAVENOUS at 20:26

## 2025-04-06 RX ADMIN — PIPERACILLIN AND TAZOBACTAM 3.38 G: 3; .375 INJECTION, POWDER, FOR SOLUTION INTRAVENOUS at 13:12

## 2025-04-06 RX ADMIN — PIPERACILLIN AND TAZOBACTAM 3.38 G: 3; .375 INJECTION, POWDER, FOR SOLUTION INTRAVENOUS at 20:27

## 2025-04-06 RX ADMIN — DEXMEDETOMIDINE HYDROCHLORIDE 1.1 MCG/KG/HR: 100 INJECTION, SOLUTION INTRAVENOUS at 10:29

## 2025-04-06 RX ADMIN — HEPARIN SODIUM 3900 UNITS: 1000 INJECTION, SOLUTION INTRAVENOUS; SUBCUTANEOUS at 00:34

## 2025-04-06 RX ADMIN — OXYCODONE HYDROCHLORIDE 10 MG: 10 TABLET ORAL at 21:31

## 2025-04-06 RX ADMIN — METOPROLOL TARTRATE 12.5 MG: 25 TABLET, FILM COATED ORAL at 17:12

## 2025-04-06 RX ADMIN — POTASSIUM PHOSPHATE, MONOBASIC AND POTASSIUM PHOSPHATE, DIBASIC 30 MMOL: 224; 236 INJECTION, SOLUTION, CONCENTRATE INTRAVENOUS at 09:37

## 2025-04-06 RX ADMIN — OXYCODONE HYDROCHLORIDE 10 MG: 10 TABLET ORAL at 13:20

## 2025-04-06 RX ADMIN — DEXMEDETOMIDINE HYDROCHLORIDE 1.3 MCG/KG/HR: 100 INJECTION, SOLUTION INTRAVENOUS at 05:00

## 2025-04-06 RX ADMIN — THIAMINE HYDROCHLORIDE 200 MG: 100 INJECTION, SOLUTION INTRAMUSCULAR; INTRAVENOUS at 06:09

## 2025-04-06 RX ADMIN — SENNOSIDES AND DOCUSATE SODIUM 2 TABLET: 50; 8.6 TABLET ORAL at 17:12

## 2025-04-06 RX ADMIN — PIPERACILLIN AND TAZOBACTAM 3.38 G: 3; .375 INJECTION, POWDER, FOR SOLUTION INTRAVENOUS at 04:21

## 2025-04-06 RX ADMIN — DEXMEDETOMIDINE HYDROCHLORIDE 1.5 MCG/KG/HR: 100 INJECTION, SOLUTION INTRAVENOUS at 20:17

## 2025-04-06 RX ADMIN — DEXMEDETOMIDINE HYDROCHLORIDE 0.2 MCG/KG/HR: 100 INJECTION, SOLUTION INTRAVENOUS at 02:34

## 2025-04-06 RX ADMIN — VALPROATE SODIUM 250 MG: 100 INJECTION, SOLUTION INTRAVENOUS at 18:44

## 2025-04-06 RX ADMIN — VALPROATE SODIUM 250 MG: 100 INJECTION, SOLUTION INTRAVENOUS at 09:40

## 2025-04-06 RX ADMIN — POTASSIUM PHOSPHATE, MONOBASIC AND POTASSIUM PHOSPHATE, DIBASIC 15 MMOL: 224; 236 INJECTION, SOLUTION, CONCENTRATE INTRAVENOUS at 16:25

## 2025-04-06 RX ADMIN — DEXMEDETOMIDINE HYDROCHLORIDE 1.5 MCG/KG/HR: 100 INJECTION, SOLUTION INTRAVENOUS at 23:21

## 2025-04-06 RX ADMIN — APIXABAN 10 MG: 5 TABLET, FILM COATED ORAL at 17:12

## 2025-04-06 RX ADMIN — HEPARIN SODIUM 3900 UNITS: 1000 INJECTION, SOLUTION INTRAVENOUS; SUBCUTANEOUS at 08:00

## 2025-04-06 RX ADMIN — PIPERACILLIN AND TAZOBACTAM 3.38 G: 3; .375 INJECTION, POWDER, FOR SOLUTION INTRAVENOUS at 02:14

## 2025-04-06 RX ADMIN — FUROSEMIDE 40 MG: 10 INJECTION, SOLUTION INTRAVENOUS at 09:29

## 2025-04-06 ASSESSMENT — PAIN DESCRIPTION - PAIN TYPE
TYPE: CHRONIC PAIN;ACUTE PAIN
TYPE: ACUTE PAIN

## 2025-04-06 ASSESSMENT — FIBROSIS 4 INDEX: FIB4 SCORE: 0.69

## 2025-04-06 NOTE — CARE PLAN
The patient is Watcher - Medium risk of patient condition declining or worsening    Shift Goals  Clinical Goals: Monitor neuro, rest, SBP<160  Patient Goals: JJ  Family Goals: Updates    Progress made toward(s) clinical / shift goals:  all goals met      Problem: Pain - Standard  Goal: Alleviation of pain or a reduction in pain to the patient’s comfort goal  Outcome: Progressing  Note: Patient denied pain, q2 turning, reorienting as needed, blanket and pillows for extra comfort     Problem: Safety  Goal: Will remain free from injury  Outcome: Progressing  Note: Educated patient on safety of falling, and use of call light. Oriented him as needed     Problem: Hemodynamics  Goal: Patient's hemodynamics, fluid balance and neurologic status will be stable or improve  Outcome: Progressing  Note: PRN labatolol, SBP< 160          Patient is not progressing towards the following goals:

## 2025-04-06 NOTE — ASSESSMENT & PLAN NOTE
With defibrillation x 3  Now in sinus  Per cardiology, no ischemic evaluation necessary given clear LHC prior to MV repair last year  Repeat echo now recovered to 65%  I discussed with Dr Louie, f/u outpatient

## 2025-04-06 NOTE — ASSESSMENT & PLAN NOTE
Requiring intubation on admission was extubated on 4/5/2025  Secondary to cardiac arrest  Now on room air

## 2025-04-06 NOTE — ASSESSMENT & PLAN NOTE
Per wife, He had weaned himself off narcotics for 3 days prior to this event  Of note, His UDS on admission was negative as have previous other UDS despite PDMP showing regular prescribing   Has been on chronic oxycodone 10 mg 5 x daily (last fill 3/24)   Prn oxy

## 2025-04-06 NOTE — PROGRESS NOTES
Patient is nauseated, made Dr. Lopez aware and she ordered Ativan and Benedryl. RN made her aware that patient mentation and O2 stat was declining. She told RN to hold meds and she would assess. RN did not give Ativan and Benedryl per her orders. RN put patient on an oxymask to help with O2.

## 2025-04-06 NOTE — ASSESSMENT & PLAN NOTE
Noted on CTA, distal segmental PE, not saddle   Status post IV heparin drip  He was transitioned to lovenox, now Eliquis  He has a history of multiple admissions for GI bleeds will need to be monitored closely. Had esophageal ulcer 9/2024. Order for omeprazole

## 2025-04-06 NOTE — PROGRESS NOTES
Patient became increasingly agitated and attempting to get out of bed. RN tried to reorient and educate on safety, but patient would not listen. RN let Dr. Murrieta know and he ordered Precedex gtt. Titrated to 1.0mcg, but patient remained agitated and still attempting to get out of bed. RN let Dr. Murrieta know of RASS +2/+3 and he ordered to titrate Precedex to 1.5mcg.

## 2025-04-06 NOTE — ASSESSMENT & PLAN NOTE
By Dr. Santana Jan 2024  Echo reviewed Known mitral valve repair that is functioning normally with appropriate transvalvular gradient. Reduced EF and hypokinesis present likely from cardiac stunning

## 2025-04-06 NOTE — PROGRESS NOTES
Critical Care Progress Note    Date of admission  4/3/2025    Chief Complaint  44 y.o. male admitted 4/3/2025 with Vfib cardiac arrest     Hospital Course  44 Y old male presented on 4/3/25 with OHCA V fib rhythm , underwent defrillation x3, 4 rounds of ACLS, reported low flow time about 30 min from unknown cause. He has medical history significant for recurrent GIB, s/p embolization of GDA, chronic pain, severe MR with repair 1/24 ([place], P2 quadrangular resection, cord reimplantation, 38 mm Syed flexible annuloplasty band 1/2024). He was thought to have seizure like activity , started on sedation , TTM management and on cEEG.     4/4: patient off paralytics and sedation, on low dose precedex trying to follow commands. TTE showed EF-38%, LV/RV apical hypokinesis , reduced RVSF, RVSP 35, MV repair with no paravalvular leak.  4/5: as per cardiology Dr. Pantoja No plans for cardiac interventions, patient tolerated SAT/SBT and was extubated late evening. Was agitated and delirious overnight started on precedex gtt.    Interval Problem Update  24 Hr Updates               - Neuro: agitated requiring precedex gtt               - CV: normal BP, NSR , no pressors              - Resp: s/p extubation 4/5, on oxymask 3L               - GI: NPO , post extubation               - Renal /UOP: 2000 ml since past 24 hrs              - Endo: blood glucose at goal               - ID: last night spiked fever, abx expanded to Zosyn IV              - Heme/onco : normal platelets downtrending Hb               - Lines: CVC /PIV /Bai - to be removed today               - PPx: GI none   DVT heparin               - SAT/ SBT: NA              - new  imaging: CXR                     Review of Systems  Review of Systems   Unable to perform ROS: Other    Delirium     Vital Signs for last 24 hours   Temp:  [36.4 °C (97.5 °F)-38.4 °C (101.1 °F)] 37.4 °C (99.3 °F)  Pulse:  [] 77  Resp:  [9-28] 25  BP: ()/(51-88) 133/67  SpO2:   [92 %-100 %] 98 %    H  Respiratory Information for the last 24 hours  Vent Mode: Spont  Rate (breaths/min): 26  PEEP/CPAP: 8  P Support (PS + PEEP): 13  MAP: 10  Length of Weaning Trial (Hours): .5  Control VTE (exp VT): 508    Physical Exam   Physical Exam  Vitals reviewed.   Constitutional:       General: He is not in acute distress.     Appearance: He is not toxic-appearing.   HENT:      Head: Normocephalic and atraumatic.   Eyes:      General: No scleral icterus.     Conjunctiva/sclera: Conjunctivae normal.   Cardiovascular:      Rate and Rhythm: Normal rate and regular rhythm.   Pulmonary:      Effort: Pulmonary effort is normal. No respiratory distress.      Breath sounds: Normal breath sounds.   Abdominal:      General: Bowel sounds are normal. There is no distension.      Palpations: There is no mass.      Tenderness: There is no abdominal tenderness.   Musculoskeletal:      Right lower leg: No edema.      Left lower leg: No edema.   Skin:     General: Skin is warm and dry.   Neurological:      Mental Status: He is alert.      Comments: delirious         Medications  Current Facility-Administered Medications   Medication Dose Route Frequency Provider Last Rate Last Admin    piperacillin-tazobactam (Zosyn) 3.375 g in  mL IVPB  3.375 g Intravenous Q8HRS Jessi Lopez M.D. 25 mL/hr at 04/06/25 0421 3.375 g at 04/06/25 0421    dexmedetomidine (Precedex) 400 mcg/100mL infusion  0.1-1.5 mcg/kg/hr (Ideal) Intravenous Continuous Jessi Lopez M.D. 19 mL/hr at 04/06/25 0608 0.9 mcg/kg/hr at 04/06/25 0608    oxyCODONE immediate release (Roxicodone) tablet 10 mg  10 mg Oral Q6HRS PRN Barbara Gutierrez M.D.        senna-docusate (Pericolace Or Senokot S) 8.6-50 MG per tablet 2 Tablet  2 Tablet Oral Q EVENING Barbara Gutierrez M.D.        And    polyethylene glycol/lytes (Miralax) Packet 1 Packet  1 Packet Oral QDAY PRN Barbara Gutierrez M.D.        labetalol (Normodyne/Trandate) injection 20 mg  20 mg  Intravenous Q4HRS PRN Barbara Gutierrze M.D.   20 mg at 04/05/25 2313    HYDROmorphone (Dilaudid) injection 0.5 mg  0.5 mg Intravenous Q4HRS PRN Jessi Lopez M.D.   0.5 mg at 04/05/25 1818    hydrALAZINE (Apresoline) injection 20 mg  20 mg Intravenous Once Jessi Lopez M.D.        LORazepam (Ativan) injection 0.5 mg  0.5 mg Intravenous Once Jessi Lopez M.D.        acetaminophen (Tylenol) suppository 650 mg  650 mg Rectal Q4HRS PRN Jessi Lopez M.D.   650 mg at 04/05/25 2255    NS infusion   Intravenous Continuous Jessi Lopez M.D. 30 mL/hr at 04/05/25 2232 New Bag at 04/05/25 2232    Respiratory Therapy Consult   Nebulization Continuous RT Misbah Cardoza M.D. MD Alert...ICU Electrolyte Replacement per Pharmacy   Other PHARMACY TO DOSE Misbah Cardoza M.D.        insulin lispro (HumaLOG,AdmeLOG) subcutaneous injection  2-9 Units Subcutaneous Q6HRS Misbah Cardoza M.D.        And    dextrose 50 % (D50W) injection 25 g  25 g Intravenous Q15 MIN PRN Misbah Cardoza M.D.   25 g at 04/04/25 1243    heparin infusion 25,000 units in 500 mL 0.45% NACL  0-30 Units/kg/hr (Adjusted) Intravenous Continuous Misbah Cardoza M.D. 39 mL/hr at 04/06/25 0706 20 Units/kg/hr at 04/06/25 0706    heparin injection 3,900 Units  40 Units/kg (Adjusted) Intravenous PRN Misbah Cardoza M.D.   3,900 Units at 04/06/25 0034       Fluids    Intake/Output Summary (Last 24 hours) at 4/6/2025 0713  Last data filed at 4/6/2025 0608  Gross per 24 hour   Intake 1736.62 ml   Output 2000 ml   Net -263.38 ml       Laboratory  Recent Labs     04/04/25  1336 04/04/25  1609 04/05/25  0413 04/05/25  1622   ISTATAPH 7.234* 7.383 7.308* 7.384   ISTATAPCO2 44.6 33.3 38.0 34.4   ISTATAPO2 103 127* 123* 69*   ISTATATCO2 20* 21* 20* 22*   [id number] 97 99 98 93   ISTATARTHCO3 18.8* 19.8* 19.0* 20.5*   ISTATARTBE -8* -4 -7* -4   ISTATTEMP 36.6 C 37.1 C 36.9 C 37.5 C   ISTATFIO2 50 50 50  --    ISTATSPEC Arterial Arterial  Arterial Arterial   ISTATAPHTC 7.239* 7.382 7.309* 7.377   [id number] 101 127* 122* 71*     Recent Labs     04/03/25 2230 04/05/25 0302 04/06/25 0415   CPKTOTAL 401* 542* 650*     Recent Labs     04/04/25 0415 04/04/25  0852 04/04/25  1407 04/04/25 2236 04/05/25 0302 04/06/25 0415   SODIUM 138  --  138  --  135 139   POTASSIUM 4.6  --  4.8  --  4.6 3.8   CHLORIDE 111  --  112  --  107 108   CO2 15*  --  18*  --  18* 23   BUN 14  --  10  --  14 14   CREATININE 0.89  --  0.76  --  0.97 0.93   MAGNESIUM 2.4   < > 3.3* 3.5* 4.3* 2.4   PHOSPHORUS 3.1  --   --   --  2.2* 1.4*   CALCIUM 7.7*  --  7.3*  --  7.8* 7.5*    < > = values in this interval not displayed.     Recent Labs     04/04/25 0415 04/04/25 1407 04/05/25 0302 04/06/25 0415   ALTSGPT 113*  --  77* 46   ASTSGOT 82*  --  38 28   ALKPHOSPHAT 57  --  68 68   TBILIRUBIN 0.3  --  0.4 0.6   GLUCOSE 109* 107* 132* 126*     Recent Labs     04/04/25 0415 04/05/25 0302 04/06/25 0415   WBC 7.1 11.6* 12.1*   NEUTSPOLYS 72.20* 80.40* 85.70*   LYMPHOCYTES 14.40* 9.60* 6.80*   MONOCYTES 8.50 6.60 5.80   EOSINOPHILS 3.80 1.90 0.20   BASOPHILS 0.70 0.80 0.30   ASTSGOT 82* 38 28   ALTSGPT 113* 77* 46   ALKPHOSPHAT 57 68 68   TBILIRUBIN 0.3 0.4 0.6     Recent Labs     04/03/25  1730 04/03/25  2150 04/03/25  2230 04/04/25  0415 04/05/25  0302 04/06/25  0415   RBC 5.55   < >  --  5.33 5.05 3.92*   HEMOGLOBIN 14.9   < >  --  14.6 13.7* 10.6*   HEMATOCRIT 46.2   < >  --  45.0 42.9 32.8*   PLATELETCT 415   < >  --  290 294 259   PROTHROMBTM 13.9  --  14.8*  --   --   --    APTT 21.0*  --  23.8*  --   --   --    INR 1.07  --  1.16*  --   --   --     < > = values in this interval not displayed.       Imaging  X-Ray:  I have personally reviewed the images and compared with prior images.    Assessment/Plan  * Cardiac arrest (HCC)- (present on admission)  Assessment & Plan  Witnessed out-of-hospital cardiac arrest, with immediate CPR, zero no flow time, initial rhythm VF,  early application of mechanical CPR, underwent defib x3 with 2 rounds of EPI and ACLS reportedly, EKG without STEMI suspected to be from PE at this time     cEEG negative for seziures  CT brain negative for intracranial abnormality   TTE post cardiac arrest  EF-38%, LV/RV apical hypokinesis , reduced RVSF, RVSP 35, MV repair with no paravalvular leak.    Cardiology re consulted 4/5 as per Dr. Pantoja no plans for cardiac cath this admission  Vest therapy at discharge and GDMT   Start metoprolol  BID today       Delirium   Requiring precedex gtt , will add valproic acid for mood stabilization and agitation   Once able to be more oriented we will stop valproic acid at discharge       Cirrhosis of liver (HCC)  Assessment & Plan  Incidental finding on CT abdomen   No ascites   Synthetic function preserved       Aspiration into airway  Assessment & Plan  Infiltrate noted on CT chest, reported suctioning of aspirated content in ED post intubation  Procal elevated ,with fever spike last night   S/p unasyn for 3 days and abx changed to zosyn yesterday   - if culture negative switch zosyn to Augmentin tomorrow    - will continue to monitor culture and if negative can stop after total 7 days of abx     Pulmonary embolus (HCC)  Assessment & Plan  Incidentally noted on CTPA done post arrest  Unclear if arrest caused by PE or not  No evidence of ongoing shock or hemodynamic compromise  Intermediate risk pulmonary embolism with myocardial injury and right heart-strain as evidenced by a troponin of 80, BNP of 126, and RV/LV ratio 1.4 on CT  BLE Dopplers -negative for DVT  TTE  EF-38%, LV/RV apical hypokinesis , reduced RVSF, RVSP 35, MV repair with no paravalvular leak.  Continue heparin infusion, plan to Switch to eliquis today   Cardiac monitoring   RT/O2 Protocols  Titrate supplemental FiO2 to maintain SpO2 >88%        Seizure-like activity (HCC)  Assessment & Plan  At time of evnet had what is described as convulsive activity  followed by loss of conciousness prior to arrest  Has has some seizure like activity in the past and negative short EEG without neuro follow up  cEEG - negative for seizures   Currently delirious requiring precedex gtt    Ventricular fibrillation (HCC)  Assessment & Plan  Presented following OHCA, initial rhythm VF  Unclear etiology or mechanism    [facility] in 2023 in preparation for MVR with clean coronaries  Initial EKG without STEMI, trop elevation  Finished amio gtt for 24 hrs  TTE post cardiac arrest  EF-38%, LV/RV apical hypokinesis , reduced RVSF, RVSP 35, MV repair with no paravalvular leak.  Cardiology re consulted 4/5 as per Dr. Pantoja no plans for cardiac cath this admission Vest therapy at discharge and GDMT   Passed swallow start eliquis today       Pain, chronic  Assessment & Plan  Mostly chronic chest pain which began following CPB for his MVR in 1/2024  Has been opioid dependent since  - oxy prn      History of MVR with cardiopulmonary bypass  Assessment & Plan  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 s/p mitral valve repair ([place], P2 quadrangular resection, cord reimplantation, 38 mm Syed flexible annuloplasty band 1/2024)  TTE -EF-38%, LV/RV apical hypokinesis , reduced RVSF, RVSP 35, MV repair with no paravalvular leak.         VTE:  Heparin  Ulcer: Not Indicated  Lines: Central Line  Ongoing indication addressed and Bai Catheter  Ongoing indication addressed    I have performed a physical exam and reviewed and updated ROS and Plan today (4/6/2025). In review of yesterday's note (4/5/2025), there are no changes except as documented above.     Discussed patient condition and risk of morbidity and/or mortality with Family, RN, RT, Pharmacy, and Charge nurse / hot rounds  The patient remains critically ill.  Critical care time = 60 minutes in directly providing and coordinating critical care and extensive data review.  No time overlap and excludes procedures.

## 2025-04-06 NOTE — ASSESSMENT & PLAN NOTE
Cardiac arrest with CPR, defibrillation x 3 and 3 rounds epinephrine  Loaded with amiodarone  Now in sinus rhythm  Cardiology consulted, thought to be secondary to PE  Stunned decreased ejection fraction 38%, now 65% on repeat TTE  4/16 - I discussed TTE with Dr. Louie, he recommends outpatient cardiology follow up

## 2025-04-06 NOTE — ASSESSMENT & PLAN NOTE
Acute metabolic encephalopathy in the setting of cardiac arrest  CT head negative  EEG negative for seizure activity  Off Precedex drip   Valproic acid has been initiated for mood stabilization and increase to 500 mg TID  MRI of the brain neg for any ischemia  Likely multifactorial, possible mild hypoxic injury, delirium and likely functional component as well. Discussed with neurology   Consulted  psychiatry to help agitation/restlessness, started on seroquel overnight., Repeat EEG showed mild background slwoing  Continues to improve  Pending SNF

## 2025-04-06 NOTE — THERAPY
Speech Language Pathology   Clinical Swallow Evaluation     Patient Name: Roberto Braswell  AGE:  44 y.o., SEX:  male  Medical Record #: [id number]  Date of Service: 4/6/2025      History of Present Illness  Roberto Braswell is a 44 y.o. male with past medical history of a GDA bleed in 2023 status post IR embolization 5/24, severe degenerative mitral valve prolapse status post mitral valve repair 1/2024, and chronic pain presenting with out-of-hospital cardiac arrest on 4/3. Intubated 4/3 - 4/5    CXR 4/5/25:   1.  Pulmonary edema and/or infiltrates are identified, which are stable since the prior exam.  2.  Cardiomegaly    Speech Pathology   Aphasia eval 10/25/24: Patient presents with a mild receptive and moderate expressive aphasia, most consistent with transcortical motor aphasia per WAB scoring criteria. However, there were some inconsistencies in his presentation and abilities across tasks on repeated attempts at the same task after a short period of time.       General Information:  Vitals  O2 (LPM): 3  O2 Delivery Device: Nasal Cannula  Level of Consciousness: Awake, Drowsy  Patient Behaviors: Agitated, Aggressive Verbally, Uncooperative  Follows Directives: Inconsistent      Prior Living Situation & Level of Function:  Communication: WFL  Swallowing: WFL       Oral Mechanism Evaluation:  Dentition: Good, Natural dentition   Facial Symmetry: Pt did not follow commands to assess  Facial Sensation: Pt did not follow commands to assess     Labial Observations: Pt did not follow commands to assess   Lingual Observations: Pt did not follow commands to assess  Motor Speech: WF       Laryngeal Function:  Secretion Management: Adequate  Voice Quality: Mod-sev hoarseness significantly improved following PO trials. Remained minimally hoarse.   Cough: Pt did not follow commands to assess (produced harsh/strong throat clearing responses). SO reported pt has been able to produce strong cough and clear secretions.        Subjective  Patient received with mother and SO at bedside. Patient was oriented to self only and minimally followed directives. Intermittently agitated and stating profanities throughout session in form of frustration.      Assessment  Current Method of Nutrition: NPO until cleared by speech pathology  Positioning: Lombardi's (60-90 degrees)  Bolus Administration: SLP (unable to raise arms or grasp cup)  O2 (LPM): 3 O2 Delivery Device: Nasal Cannula  Factor(s) Affecting Performance: Impaired endurance, Impaired mental status, Impaired command following  Tracheostomy : No      Swallowing Trials:  Swallowing Trials  Ice: WFL  Thin Liquid (TN0): WFL  Pureed (PU4): WFL    Comments: Patient initially requesting water and agreeable to 2-3 sips of water but then required mod-max encouragement to continue participation with PO trials. Adequate bolus acceptance and containment. Onset of pharyngeal swallow was timely. Patient had grimacing response concerning for odynophagia but was unable to quantify pain level. No overt s/sx of aspiration appreciated. Slow oral prep of purees; otherwise, functional. Did not test solids given limited participation and agitation/irritability with SLP prompting. Education provided to pt, mother, and SO regarding current status, risk of aspiration, and SLP recs.       Clinical Impressions  Mild hoarseness is concerning for laryngeal insufficiency s/p endotracheal intubation and is at an increased risk of aspiration given delirium and agitation. Highly suspect patient would not tolerate FEES at this time. However, pt appeared to have a functional oropharyngeal swallow with no overt s/sx of aspiration despite concerns for odynophagia. Recommend careful initiation of a full liquid diet with STRICT 1:1 feeding and adherence to standard swallow precautions (i.e., sit up 90*, feed when awake/alert only, slow feeding rate, small sips/bites). RN to HOLD PO with any concerns for aspiration and to  "notify SLP. Anticipate progression to regular diet in the following days as mentation and participation improve.       Recommendations  Diet Consistency: Full Liquid diet  Instrumentation: None indicated at this time  Medication: Whole with liquid, As tolerated  Supervision: Careful 1:1 hand feeding, 1:1 feeding with constant supervision  Positioning: Fully upright and midline during oral intake  Risk Management : Small bites/sips, Slow rate of intake  Oral Care: Q4h       SLP Treatment Plan  Treatment Plan: Dysphagia Treatment, Patient/Family/Caregiver Training  SLP Frequency: 3x Per Week  Estimated Duration: Until Therapy Goals Met      Anticipated Discharge Needs  Discharge Recommendations: Recommend post-acute placement for additional speech therapy services prior to discharge home   Therapy Recommendations Upon DC: Dysphagia Training, Patient / Family / Caregiver Education        Patient / Family Goals  Patient / Family Goal #1: \"water!\"  Short Term Goals  Short Term Goal # 1: Patient will consume a full liquid diet with no overt s/sx of aspiration given careful 1:1 feeding.      Ragini Guadalupe, SLP   "

## 2025-04-06 NOTE — PROGRESS NOTES
Pt is nauseated and had a couple of episodes of vomiting today. Received Zofran earlier in the afternoon. QTC is now 527, hence giving 0.5mg of Ativan and 12.5mg of Benadryl for nausea. Also ordered 2g IV MgSO4. Will recheck QTC after administration.   Discussed with Dr. Cardoza.       11:22PM: Fever spike 100.6F, tachycardic now HR up from 60s > low 100s. Repeating blood cultures, ordered tylenol, low threshold to broaden abx to Zosyn.

## 2025-04-06 NOTE — CARE PLAN
Problem: Knowledge Deficit - Standard  Goal: Patient and family/care givers will demonstrate understanding of plan of care, disease process/condition, diagnostic tests and medications  Outcome: Progressing     Problem: Fall Risk  Goal: Patient will remain free from falls  Outcome: Progressing     Problem: Pain - Standard  Goal: Alleviation of pain or a reduction in pain to the patient’s comfort goal  Outcome: Progressing     Problem: Pain Management  Goal: Pain level will decrease to patient's comfort goal  Outcome: Progressing  Note: CPOTs mostly 0. Improved with ordered PRNs      Problem: Skin Integrity  Goal: Risk for impaired skin integrity will decrease  Outcome: Progressing     Problem: Safety - Medical Restraint  Goal: Remains free of injury from restraints (Restraint for Interference with Medical Device)  Outcome: Progressing  Goal: Free from restraint(s) (Restraint for Interference with Medical Device)  Outcome: Progressing  Note: D/C'd today    The patient is Watcher - Medium risk of patient condition declining or worsening    Shift Goals  Clinical Goals: Hemodynamic stability, monitor neuro  Patient Goals: JJ  Family Goals: Updates    Progress made toward(s) clinical / shift goals:    Patient is not progressing towards the following goals:

## 2025-04-06 NOTE — CONSULTS
Hospital Medicine Consultation    Date of Service  4/6/2025    Referring Physician  Barbara Gutierrez M.D.    Consulting Physician  Ray Thibodeaux M.D.    Reason for Consultation  encephalopathy    History of Presenting Illness  44 y.o. male who presented 4/3/2025 with cardiac arrest.  Mr. Braswell has a past medical history of mitral valve repair 1/24/24 by Dr. Santana, multiple admissions for upper GI bleeds and an admission here in October though normal MRI and he was discharged with no neurological deficits.    He was in his usual state of health and on 3/17 had a urologic procedure on his epididymis. He was in the process of weaning off of narcotics for the previous 3 days.  On 4/3 he was watching TV with his wife, Destin. He got up and became dizzy. He sat back down and developed convulsions. His wife called 911. He stopped breathing and turned blue. She is a nurse and started CPR for 5 minutes until paramedics came. He was in Vfib and underwent defibrillation x 3 with 3 rounds of epi, IV amio and transferred to the ER where he was intubated.  In the emergency room, CT of the chest revealed distal right main pulmonary embolism and CT of the abdomen pelvis revealed ileus.  He was admitted to the intensive care unit on mechanical ventilation.  Ejection fraction was found to be 38% by echocardiogram and cardiology was consulted.  He was initiated on IV heparin drip due to the pulmonary emboli.  He was febrile and initiated on empiric IV Zosyn with apparent aspiration pneumonitis.  CT of the head was negative for acute processes.  EEG was negative for seizures.  He was successfully extubated on 4/5/2025.  After extubation he has required a Precedex drip due to agitation and remains encephalopathic.      Review of Systems  Review of Systems   Unable to perform ROS: Mental acuity       Past Medical History   has a past medical history of Arthritis, Breath shortness (11/03/2023), Drug-seeking behavior (07/30/2023),  Duodenal ulcer (07/30/2023), Gastric ulcer, Heart valve disease, Hemorrhagic disorder (HCC), Hypertension, Pain, and Seizure (HCC).    Surgical History   has a past surgical history that includes other orthopedic surgery; other abdominal surgery; ankle arthroscopy (Right, 5/21/2018); biopsy ortho (Right, 5/21/2018); pr upper gi endoscopy,diagnosis (3/28/2022); pr upper gi endoscopy,biopsy (3/28/2022); pr upper gi endoscopy,biopsy (N/A, 7/30/2023); pr upper gi endoscopy,diagnosis (N/A, 9/14/2023); pr upper gi endoscopy,biopsy (N/A, 9/14/2023); pr replacement of mitral valve (1/25/2024); echocardiogram, transesophageal, intraoperative (1/25/2024); pr upper gi endoscopy,diagnosis (N/A, 5/18/2024); pr upper gi endoscopy,biopsy (N/A, 5/18/2024); gastroscopy with endostat (N/A, 5/18/2024); pr upper gi endoscopy,diagnosis (N/A, 5/25/2024); pr upper gi endoscopy,biopsy (N/A, 5/25/2024); pr colonoscopy-flexible (5/27/2024); capsule endoscopy administration (N/A, 5/28/2024); capsule endoscopy retrieval (5/28/2024); and pr upper gi endoscopy,diagnosis (N/A, 9/18/2024).    Family History  family history includes Heart Disease in his mother; No Known Problems in his father.    Social History   reports that he has never smoked. He has never used smokeless tobacco. He reports that he does not currently use alcohol. He reports that he does not use drugs.    Medications  Prior to Admission Medications   Prescriptions Last Dose Informant Patient Reported? Taking?   meloxicam (MOBIC) 15 MG tablet Unknown Patient's Home Pharmacy Yes No   Sig: Take 15 mg by mouth every day.   methocarbamol (ROBAXIN) 500 MG Tab Unknown Patient's Home Pharmacy Yes No   Sig: Take 500 mg by mouth 3 times a day. 1,000 mg = 2 tabs   oxyCODONE immediate release (ROXICODONE) 10 MG immediate release tablet Unknown Patient's Home Pharmacy Yes No   Sig: Take 10 mg by mouth 5 Times a Day.   tadalafil (CIALIS) 10 MG tablet Unknown Patient's Home Pharmacy No No    Sig: Take 1 Tablet by mouth 1 time a day as needed for Erectile Dysfunction.   zolpidem (AMBIEN) 10 MG Tab Unknown Patient's Home Pharmacy No No   Sig: Take 1 Tablet by mouth at bedtime as needed for Sleep for up to 90 days.      Facility-Administered Medications: None       Allergies  Allergies   Allergen Reactions    Morphine Vomiting    Tramadol Unspecified     Seizure  [id number]       Physical Exam  Temp:  [36.4 °C (97.5 °F)-38.4 °C (101.1 °F)] 37.4 °C (99.3 °F)  Pulse:  [] 74  Resp:  [9-28] 18  BP: ()/(51-88) 133/67  SpO2:  [92 %-100 %] 93 %    Physical Exam  Vitals and nursing note reviewed.   Constitutional:       Appearance: He is ill-appearing.   Neck:      Comments: Right IJ  Cardiovascular:      Rate and Rhythm: Normal rate and regular rhythm.   Pulmonary:      Effort: Pulmonary effort is normal.      Breath sounds: Normal breath sounds.      Comments: Nasal cannula  Abdominal:      General: There is no distension.      Tenderness: There is no abdominal tenderness.   Musculoskeletal:      Right lower leg: No edema.      Left lower leg: No edema.   Neurological:      Mental Status: He is alert.      Comments: Non-historian          Fluids  Date 04/06/25 0700 - 04/07/25 0659   Shift 0319-0108 5082-5561 9365-4332 24 Hour Total   INTAKE   I.V. 25.6   25.6   Shift Total 25.6   25.6   OUTPUT   Shift Total       Weight (kg) 123.6 123.6 123.6 123.6       Laboratory  Recent Labs     04/04/25  0415 04/05/25  0302 04/06/25  0415   WBC 7.1 11.6* 12.1*   RBC 5.33 5.05 3.92*   HEMOGLOBIN 14.6 13.7* 10.6*   HEMATOCRIT 45.0 42.9 32.8*   MCV 84.4 85.0 83.7   MCH 27.4 27.1 27.0   MCHC 32.4 31.9* 32.3   RDW 53.7* 56.0* 54.5*   PLATELETCT 290 294 259   MPV 8.8* 9.1 9.0     Recent Labs     04/04/25  1407 04/05/25  0302 04/06/25 0415   SODIUM 138 135 139   POTASSIUM 4.8 4.6 3.8   CHLORIDE 112 107 108   CO2 18* 18* 23   GLUCOSE 107* 132* 126*   BUN 10 14 14   CREATININE 0.76 0.97 0.93   CALCIUM 7.3* 7.8* 7.5*      Recent Labs     04/03/25  1730 04/03/25  2230   APTT 21.0* 23.8*   INR 1.07 1.16*          Recent Labs     04/03/25  2230 04/04/25  0415 04/05/25  0302   TRIGLYCERIDE 164* 126 282*   HDL  --  50  --    LDL  --  42  --         Imaging  [id number] VIEW   Final Result         1.  Nonspecific bowel gas pattern in the upper abdomen.      DX-CHEST-PORTABLE (1 VIEW)   Final Result         1.  Pulmonary edema and/or infiltrates are identified, which are stable since the prior exam.   2.  Cardiomegaly      DX-CHEST-PORTABLE (1 VIEW)   Final Result         1.  Pulmonary edema and/or infiltrates are identified, which appear somewhat increased since the prior exam.   2.  Cardiomegaly      CT-CTA NECK WITH & W/O-POST PROCESSING   Final Result         1.  CT angiogram of the neck within normal limits.   2.  Patchy bilateral upper lobe infiltrates         CT-CTA HEAD WITH & W/O-POST PROCESS   Final Result         1.  No large vessel occlusion or aneurysm identified   2.  Bilateral sinusitis changes      US-EXTREMITY VENOUS LOWER BILAT   Final Result      EC-ECHOCARDIOGRAM COMPLETE W/ CONT   Final Result      CT-ABDOMEN-PELVIS WITH   Final Result         1.  Fluid-filled prominence of proximal small bowel with distal small bowel decompression, appearance suggests ileus and/or enteritis versus evolving obstructive changes. Recommend radiographic follow-up to resolution as clinically appropriate.   2.  Irregular hepatic contour favoring changes of cirrhosis   3.  Fat-containing bilateral inguinal hernias      CT-CTA CHEST PULMONARY ARTERY W/ RECONS   Final Result         1.  Distal right main pulmonary embolus extending into right upper lobe and lower lobe segmental and subsegmental branches. 1.4 right to left ventricular ratio suggesting component of right heart strain.   2.  Evaluation of the subsegmental branches is essentially nondiagnostic due to motion artifacts. Additional imaging would be required for definitive  evaluation of small distal pulmonary emboli.   3.  Scattered patchy bilateral pulmonary infiltrates.   4.  Bilateral dependent consolidations, appearance suggests atelectasis, component of infiltrate not excluded.      These findings were discussed with the patient's clinician, Misbah Cardoza, on 4/3/2025 9:27 PM.      CT-HEAD W/O   Final Result      No acute intracranial findings.               DX-CHEST-PORTABLE (1 VIEW)   Final Result      Interval placement of a right IJ central line without evidence of complication.      DX-CHEST-PORTABLE (1 VIEW)   Final Result      1.  Support devices present.      2.  Cardiomegaly.      3.  Bibasilar atelectasis.      IR-MIDLINE CATHETER INSERTION WO GUIDANCE > AGE 3    (Results Pending)       Assessment/Plan  * Cardiac arrest (HCC)- (present on admission)  Assessment & Plan  Cardiac arrest with CPR, defibrillation x 3 and 3 rounds epinephrine  Loaded with amiodarone  Now in sinus rhythm  Cardiology consulted  Decreased ejection fraction 38% likely transient given stunned myocardium  Continues telemetry monitoring  Optimize electrolytes specifically potassium and magnesium    Pulmonary embolus (HCC)- (present on admission)  Assessment & Plan  Noted on CTA  Status post IV heparin drip  Now on Eliquis loading dose  He has a history of multiple admissions for GI bleeds will need to be monitored closely    Acute respiratory failure with hypoxia (HCC)- (present on admission)  Assessment & Plan  Requiring intubation on admission was extubated on 4/5/2025  Secondary to cardiac arrest  He continues to require supplemental oxygen will have continuous pulse oximetry in the IMCU    Encephalopathy acute- (present on admission)  Assessment & Plan  Acute metabolic encephalopathy in the setting of cardiac arrest  CT head negative  EEG negative for seizure activity  Remains on Precedex drip for safety which will be utilized during the nights and weaned off during the days  Valproic acid  has been initiated for mood stabilization  He may require MRI of the brain though certainly not safe at this point in time but may help in neuro prognostication if his mentation does not improve.    Cirrhosis of liver (HCC)  Assessment & Plan  Noted on imaging  Outpatient follow-up    Aspiration into airway- (present on admission)  Assessment & Plan  Treated with Zosyn and if stable then consider de-escalation to Augmentin on 4/7/2025    Seizure-like activity (HCC)- (present on admission)  Assessment & Plan  Seizure type activity likely due to anoxia  EEG negative    Ventricular fibrillation (HCC)- (present on admission)  Assessment & Plan  With defibrillation x 3  Now in sinus  Continue telemetry monitoring    Pain, chronic- (present on admission)  Assessment & Plan  He had weaned himself off narcotics for 3 days prior to this event    History of MVR with cardiopulmonary bypass  Assessment & Plan  By Dr. Santana Jan 2024

## 2025-04-07 ENCOUNTER — APPOINTMENT (OUTPATIENT)
Dept: RADIOLOGY | Facility: MEDICAL CENTER | Age: 45
DRG: 208 | End: 2025-04-07
Attending: STUDENT IN AN ORGANIZED HEALTH CARE EDUCATION/TRAINING PROGRAM
Payer: COMMERCIAL

## 2025-04-07 LAB
ALBUMIN SERPL BCP-MCNC: 3 G/DL (ref 3.2–4.9)
ALBUMIN/GLOB SERPL: 1 G/DL
ALP SERPL-CCNC: 74 U/L (ref 30–99)
ALT SERPL-CCNC: 54 U/L (ref 2–50)
ANION GAP SERPL CALC-SCNC: 9 MMOL/L (ref 7–16)
AST SERPL-CCNC: 52 U/L (ref 12–45)
BASOPHILS # BLD AUTO: 0.7 % (ref 0–1.8)
BASOPHILS # BLD: 0.08 K/UL (ref 0–0.12)
BILIRUB SERPL-MCNC: 0.5 MG/DL (ref 0.1–1.5)
BUN SERPL-MCNC: 11 MG/DL (ref 8–22)
CALCIUM ALBUM COR SERPL-MCNC: 8.9 MG/DL (ref 8.5–10.5)
CALCIUM SERPL-MCNC: 8.1 MG/DL (ref 8.5–10.5)
CHLORIDE SERPL-SCNC: 105 MMOL/L (ref 96–112)
CK SERPL-CCNC: 785 U/L (ref 0–154)
CO2 SERPL-SCNC: 22 MMOL/L (ref 20–33)
CREAT SERPL-MCNC: 0.83 MG/DL (ref 0.5–1.4)
EOSINOPHIL # BLD AUTO: 0.12 K/UL (ref 0–0.51)
EOSINOPHIL NFR BLD: 1 % (ref 0–6.9)
ERYTHROCYTE [DISTWIDTH] IN BLOOD BY AUTOMATED COUNT: 50.2 FL (ref 35.9–50)
GFR SERPLBLD CREATININE-BSD FMLA CKD-EPI: 111 ML/MIN/1.73 M 2
GLOBULIN SER CALC-MCNC: 2.9 G/DL (ref 1.9–3.5)
GLUCOSE BLD STRIP.AUTO-MCNC: 91 MG/DL (ref 65–99)
GLUCOSE BLD STRIP.AUTO-MCNC: 97 MG/DL (ref 65–99)
GLUCOSE SERPL-MCNC: 111 MG/DL (ref 65–99)
HCT VFR BLD AUTO: 33.7 % (ref 42–52)
HGB BLD-MCNC: 11.2 G/DL (ref 14–18)
IMM GRANULOCYTES # BLD AUTO: 0.07 K/UL (ref 0–0.11)
IMM GRANULOCYTES NFR BLD AUTO: 0.6 % (ref 0–0.9)
LYMPHOCYTES # BLD AUTO: 0.86 K/UL (ref 1–4.8)
LYMPHOCYTES NFR BLD: 7.4 % (ref 22–41)
MCH RBC QN AUTO: 27.5 PG (ref 27–33)
MCHC RBC AUTO-ENTMCNC: 33.2 G/DL (ref 32.3–36.5)
MCV RBC AUTO: 82.8 FL (ref 81.4–97.8)
MONOCYTES # BLD AUTO: 0.73 K/UL (ref 0–0.85)
MONOCYTES NFR BLD AUTO: 6.3 % (ref 0–13.4)
NEUTROPHILS # BLD AUTO: 9.77 K/UL (ref 1.82–7.42)
NEUTROPHILS NFR BLD: 84 % (ref 44–72)
NRBC # BLD AUTO: 0 K/UL
NRBC BLD-RTO: 0 /100 WBC (ref 0–0.2)
PLATELET # BLD AUTO: 256 K/UL (ref 164–446)
PMV BLD AUTO: 9 FL (ref 9–12.9)
POTASSIUM SERPL-SCNC: 3.9 MMOL/L (ref 3.6–5.5)
PROT SERPL-MCNC: 5.9 G/DL (ref 6–8.2)
RBC # BLD AUTO: 4.07 M/UL (ref 4.7–6.1)
SODIUM SERPL-SCNC: 136 MMOL/L (ref 135–145)
WBC # BLD AUTO: 11.6 K/UL (ref 4.8–10.8)

## 2025-04-07 PROCEDURE — 700111 HCHG RX REV CODE 636 W/ 250 OVERRIDE (IP): Mod: JZ

## 2025-04-07 PROCEDURE — A9270 NON-COVERED ITEM OR SERVICE: HCPCS | Performed by: HOSPITALIST

## 2025-04-07 PROCEDURE — 700105 HCHG RX REV CODE 258: Performed by: HOSPITALIST

## 2025-04-07 PROCEDURE — 700102 HCHG RX REV CODE 250 W/ 637 OVERRIDE(OP): Performed by: STUDENT IN AN ORGANIZED HEALTH CARE EDUCATION/TRAINING PROGRAM

## 2025-04-07 PROCEDURE — 51798 US URINE CAPACITY MEASURE: CPT

## 2025-04-07 PROCEDURE — 700102 HCHG RX REV CODE 250 W/ 637 OVERRIDE(OP): Performed by: HOSPITALIST

## 2025-04-07 PROCEDURE — A9270 NON-COVERED ITEM OR SERVICE: HCPCS | Performed by: STUDENT IN AN ORGANIZED HEALTH CARE EDUCATION/TRAINING PROGRAM

## 2025-04-07 PROCEDURE — 700101 HCHG RX REV CODE 250

## 2025-04-07 PROCEDURE — 85025 COMPLETE CBC W/AUTO DIFF WBC: CPT

## 2025-04-07 PROCEDURE — 82962 GLUCOSE BLOOD TEST: CPT

## 2025-04-07 PROCEDURE — 770000 HCHG ROOM/CARE - INTERMEDIATE ICU *

## 2025-04-07 PROCEDURE — 700105 HCHG RX REV CODE 258: Performed by: STUDENT IN AN ORGANIZED HEALTH CARE EDUCATION/TRAINING PROGRAM

## 2025-04-07 PROCEDURE — 700111 HCHG RX REV CODE 636 W/ 250 OVERRIDE (IP): Performed by: HOSPITALIST

## 2025-04-07 PROCEDURE — 82550 ASSAY OF CK (CPK): CPT

## 2025-04-07 PROCEDURE — 700111 HCHG RX REV CODE 636 W/ 250 OVERRIDE (IP): Performed by: STUDENT IN AN ORGANIZED HEALTH CARE EDUCATION/TRAINING PROGRAM

## 2025-04-07 PROCEDURE — 99291 CRITICAL CARE FIRST HOUR: CPT | Performed by: HOSPITALIST

## 2025-04-07 PROCEDURE — 92526 ORAL FUNCTION THERAPY: CPT

## 2025-04-07 PROCEDURE — 80053 COMPREHEN METABOLIC PANEL: CPT

## 2025-04-07 PROCEDURE — 700105 HCHG RX REV CODE 258

## 2025-04-07 RX ORDER — ENOXAPARIN SODIUM 150 MG/ML
1 INJECTION SUBCUTANEOUS EVERY 12 HOURS
Status: DISCONTINUED | OUTPATIENT
Start: 2025-04-07 | End: 2025-04-16

## 2025-04-07 RX ORDER — VALPROIC ACID 250 MG/1
250 CAPSULE ORAL EVERY 8 HOURS
Status: DISCONTINUED | OUTPATIENT
Start: 2025-04-07 | End: 2025-04-10

## 2025-04-07 RX ORDER — POTASSIUM CHLORIDE 1500 MG/1
40 TABLET, EXTENDED RELEASE ORAL ONCE
Status: COMPLETED | OUTPATIENT
Start: 2025-04-07 | End: 2025-04-07

## 2025-04-07 RX ADMIN — OXYCODONE HYDROCHLORIDE 10 MG: 10 TABLET ORAL at 22:19

## 2025-04-07 RX ADMIN — VALPROATE SODIUM 250 MG: 100 INJECTION, SOLUTION INTRAVENOUS at 06:28

## 2025-04-07 RX ADMIN — DEXMEDETOMIDINE HYDROCHLORIDE 1.3 MCG/KG/HR: 100 INJECTION, SOLUTION INTRAVENOUS at 09:38

## 2025-04-07 RX ADMIN — DEXMEDETOMIDINE HYDROCHLORIDE 1.5 MCG/KG/HR: 100 INJECTION, SOLUTION INTRAVENOUS at 08:41

## 2025-04-07 RX ADMIN — DEXMEDETOMIDINE HYDROCHLORIDE 0.7 MCG/KG/HR: 100 INJECTION, SOLUTION INTRAVENOUS at 12:12

## 2025-04-07 RX ADMIN — CEFAZOLIN 2 G: 2 INJECTION, POWDER, FOR SOLUTION INTRAMUSCULAR; INTRAVENOUS at 17:53

## 2025-04-07 RX ADMIN — METOPROLOL TARTRATE 12.5 MG: 25 TABLET, FILM COATED ORAL at 17:49

## 2025-04-07 RX ADMIN — VALPROIC ACID 250 MG: 250 CAPSULE, LIQUID FILLED ORAL at 22:01

## 2025-04-07 RX ADMIN — DEXMEDETOMIDINE HYDROCHLORIDE 1.5 MCG/KG/HR: 100 INJECTION, SOLUTION INTRAVENOUS at 02:33

## 2025-04-07 RX ADMIN — DEXMEDETOMIDINE HYDROCHLORIDE 1.5 MCG/KG/HR: 100 INJECTION, SOLUTION INTRAVENOUS at 05:46

## 2025-04-07 RX ADMIN — ENOXAPARIN SODIUM 120 MG: 150 INJECTION SUBCUTANEOUS at 22:01

## 2025-04-07 RX ADMIN — VALPROIC ACID 250 MG: 250 CAPSULE, LIQUID FILLED ORAL at 14:52

## 2025-04-07 RX ADMIN — POTASSIUM CHLORIDE 40 MEQ: 1500 TABLET, EXTENDED RELEASE ORAL at 09:34

## 2025-04-07 RX ADMIN — CEFAZOLIN 2 G: 2 INJECTION, POWDER, FOR SOLUTION INTRAMUSCULAR; INTRAVENOUS at 22:03

## 2025-04-07 RX ADMIN — PIPERACILLIN AND TAZOBACTAM 3.38 G: 3; .375 INJECTION, POWDER, FOR SOLUTION INTRAVENOUS at 04:58

## 2025-04-07 RX ADMIN — APIXABAN 10 MG: 5 TABLET, FILM COATED ORAL at 06:23

## 2025-04-07 RX ADMIN — METOPROLOL TARTRATE 12.5 MG: 25 TABLET, FILM COATED ORAL at 06:20

## 2025-04-07 RX ADMIN — DEXMEDETOMIDINE HYDROCHLORIDE 1 MCG/KG/HR: 100 INJECTION, SOLUTION INTRAVENOUS at 11:24

## 2025-04-07 ASSESSMENT — PAIN DESCRIPTION - PAIN TYPE
TYPE: CHRONIC PAIN
TYPE: CHRONIC PAIN
TYPE: ACUTE PAIN

## 2025-04-07 ASSESSMENT — COGNITIVE AND FUNCTIONAL STATUS - GENERAL
HELP NEEDED FOR BATHING: TOTAL
MOBILITY SCORE: 12
MOVING TO AND FROM BED TO CHAIR: A LOT
PERSONAL GROOMING: A LOT
DAILY ACTIVITIY SCORE: 9
EATING MEALS: A LOT
TURNING FROM BACK TO SIDE WHILE IN FLAT BAD: A LOT
CLIMB 3 TO 5 STEPS WITH RAILING: A LOT
MOVING FROM LYING ON BACK TO SITTING ON SIDE OF FLAT BED: A LOT
SUGGESTED CMS G CODE MODIFIER MOBILITY: CL
WALKING IN HOSPITAL ROOM: A LOT
TOILETING: A LOT
SUGGESTED CMS G CODE MODIFIER DAILY ACTIVITY: CL
DRESSING REGULAR UPPER BODY CLOTHING: TOTAL
STANDING UP FROM CHAIR USING ARMS: A LOT
DRESSING REGULAR LOWER BODY CLOTHING: TOTAL

## 2025-04-07 ASSESSMENT — FIBROSIS 4 INDEX: FIB4 SCORE: 1.19

## 2025-04-07 NOTE — PROGRESS NOTES
Per Dr. Andino plan for precedex over night is to slightly increase to help with sleep wake cycle due to patients wife stating they are usually on night shift and asleep during the day.

## 2025-04-07 NOTE — CARE PLAN
The patient is Watcher - Medium risk of patient condition declining or worsening    Shift Goals  Clinical Goals: Improved neuro status, hemodynamic stability  Patient Goals: Leave  Family Goals: Updates    Problem: Pain - Standard  Goal: Alleviation of pain or a reduction in pain to the patient’s comfort goal  Outcome: Progressing  Note: 10/10 back pain relieved by oxy 10 mg PO.     Progress made toward(s) clinical / shift goals:  See above.

## 2025-04-07 NOTE — THERAPY
"Speech Language Pathology   Daily Treatment     Patient Name: Roberto Braswell  AGE:  44 y.o., SEX:  male  Medical Record #: [id number]  Date of Service: 4/7/2025      Precautions:  Precautions: Fall Risk, Swallow Precautions         Subjective  Pt saying \"no\" when offered all PO but pt more agreeable to PO trials from SO. SO endorsed that mentation is improving and it is baseline for pt to say \"no\" to things. Per SO, diet was advanced to regular by MD.       Assessment  No overt s/sx of aspiration noted with minimal trials of solids and thin liquids via straw sip. Pt's SO feeding pt and more agreeable to PO trials of her. Mastication suspected to be timely/functional. Vocal quality perceived to be clear.      Clinical Impressions  Pt is presenting with improved swallow function/participation from yesterday. Okay to continue a regular/thin liquid diet. Pt would benefit from discussion with nutrition rep to find foods that sound enticing as pt with minimal PO intake.      Recommendations  Treatment Completed: Dysphagia Treatment       Dysphagia Treatment  Diet Consistency: regular/thin liquids  Instrumentation: None indicated at this time  Medication: As tolerated  Supervision: Careful 1:1 hand feeding  Positioning: Fully upright and midline during oral intake  Risk Management : Small bites/sips, Slow rate of intake  Oral Care: Q4h                     SLP Treatment Plan  Treatment Plan: Dysphagia Treatment  SLP Frequency: 3x Per Week  Estimated Duration: Until Therapy Goals Met      Anticipated Discharge Needs  Discharge Recommendations: Recommend post-acute placement for additional speech therapy services prior to discharge home  Therapy Recommendations Upon DC: Dysphagia Training, Community Re-Integration, Patient / Family / Caregiver Education      Patient / Family Goals  Patient / Family Goal #1: \"water!\"  Goal #1 Outcome: Progressing as expected  Short Term Goals  Short Term Goal # 1: Patient will consume a full " liquid diet with no overt s/sx of aspiration given careful 1:1 feeding.  Goal Outcome # 1: Goal met  Short Term Goal # 2: Pt will consume a regular/thin liquid diet with no overt s/sx of aspiration      Ally Calixto, SLP

## 2025-04-07 NOTE — PROGRESS NOTES
[facility] Medicine Daily Progress Note    Date of Service  4/7/2025    Chief Complaint  Roberto Braswell is a 44 y.o. male admitted 4/3/2025 with cardiac arrest    Hospital Course  Mr. Braswell has a past medical history of mitral valve repair 1/24/24 by Dr. Santana, multiple admissions for upper GI bleeds and an admission here in October though normal MRI and he was discharged with no neurological deficits.    He was in his usual state of health and on 3/17 had a urologic procedure on his epididymis. He was in the process of weaning off of narcotics for the previous 3 days.  On 4/3 he was watching TV with his wife, Destin. He got up and became dizzy. He sat back down and developed convulsions. His wife called 911. He stopped breathing and turned blue. She is a nurse and started CPR for 5 minutes until paramedics came. He was in Vfib and underwent defibrillation x 3 with 3 rounds of epi, IV amio and transferred to the ER where he was intubated.  In the emergency room, CT of the chest revealed distal right main pulmonary embolism and CT of the abdomen pelvis revealed ileus.  He was admitted to the intensive care unit on mechanical ventilation.  Ejection fraction was found to be 38% by echocardiogram and cardiology was consulted.  He was initiated on IV heparin drip due to the pulmonary emboli.  He was febrile and initiated on empiric IV Zosyn with apparent aspiration pneumonitis.  CT of the head was negative for acute processes.  EEG was negative for seizures.  He was successfully extubated on 4/5/2025.  After extubation he has required a Precedex drip due to agitation and remains encephalopathic.    Interval Problem Update  4/7: Mr. Braswell was evaluated in the ICU. He has required an IV Precedex drip due to agitation. Due to a prolonged QTc of 553 he is not a candidate for antipsychotic meds. Depakote 250 mg IV BID was started and will be transitioned to oral now that he has passed the swallow eval. His wife is at  bedside. Advance diet.     I have discussed this patient's plan of care and discharge plan at IDT rounds today with Case Management, Nursing, Nursing leadership, and other members of the IDT team.    Consultants/Specialty  Critical care  Cardiology     Code Status  Full Code    Disposition  The patient is not medically cleared for discharge to home or a post-acute facility.      I have placed the appropriate orders for post-discharge needs.    Review of Systems  Review of Systems   Unable to perform ROS: Mental status change        Physical Exam  Temp:  [37.2 °C (98.9 °F)-38.2 °C (100.8 °F)] 37.2 °C (98.9 °F)  Pulse:  [65-77] 72  Resp:  [18-34] 24  BP: ()/() 131/72  SpO2:  [88 %-100 %] 95 %    Physical Exam  Vitals and nursing note reviewed.   Cardiovascular:      Rate and Rhythm: Normal rate and regular rhythm.   Pulmonary:      Effort: Pulmonary effort is normal.      Breath sounds: Normal breath sounds.      Comments: Nasal cannula oxygen  Abdominal:      General: There is no distension.      Tenderness: There is no abdominal tenderness.   Musculoskeletal:      Right lower leg: No edema.      Left lower leg: No edema.   Neurological:      Comments: Moves extremities equally   Psychiatric:      Comments: Agitated though compliant with exam         Fluids    Intake/Output Summary (Last 24 hours) at 4/7/2025 0757  Last data filed at 4/7/2025 0700  Gross per 24 hour   Intake 1830.94 ml   Output 1075 ml   Net 755.94 ml        Laboratory  Recent Labs     04/05/25  0302 04/06/25  0415 04/07/25  0456   WBC 11.6* 12.1* 11.6*   RBC 5.05 3.92* 4.07*   HEMOGLOBIN 13.7* 10.6* 11.2*   HEMATOCRIT 42.9 32.8* 33.7*   MCV 85.0 83.7 82.8   MCH 27.1 27.0 27.5   MCHC 31.9* 32.3 33.2   RDW 56.0* 54.5* 50.2*   PLATELETCT 294 259 256   MPV 9.1 9.0 9.0     Recent Labs     04/05/25  0302 04/06/25  0415 04/07/25  0456   SODIUM 135 139 136   POTASSIUM 4.6 3.8 3.9   CHLORIDE 107 108 105   CO2 18* 23 22   GLUCOSE 132* 126* 111*    BUN 14 14 11   CREATININE 0.97 0.93 0.83   CALCIUM 7.8* 7.5* 8.1*             Recent Labs     04/05/25  0302   TRIGLYCERIDE 282*       Imaging  [id number] VIEW   Final Result         1.  Nonspecific bowel gas pattern in the upper abdomen.      DX-CHEST-PORTABLE (1 VIEW)   Final Result         1.  Pulmonary edema and/or infiltrates are identified, which are stable since the prior exam.   2.  Cardiomegaly      DX-CHEST-PORTABLE (1 VIEW)   Final Result         1.  Pulmonary edema and/or infiltrates are identified, which appear somewhat increased since the prior exam.   2.  Cardiomegaly      CT-CTA NECK WITH & W/O-POST PROCESSING   Final Result         1.  CT angiogram of the neck within normal limits.   2.  Patchy bilateral upper lobe infiltrates         CT-CTA HEAD WITH & W/O-POST PROCESS   Final Result         1.  No large vessel occlusion or aneurysm identified   2.  Bilateral sinusitis changes      US-EXTREMITY VENOUS LOWER BILAT   Final Result      EC-ECHOCARDIOGRAM COMPLETE W/ CONT   Final Result      CT-ABDOMEN-PELVIS WITH   Final Result         1.  Fluid-filled prominence of proximal small bowel with distal small bowel decompression, appearance suggests ileus and/or enteritis versus evolving obstructive changes. Recommend radiographic follow-up to resolution as clinically appropriate.   2.  Irregular hepatic contour favoring changes of cirrhosis   3.  Fat-containing bilateral inguinal hernias      CT-CTA CHEST PULMONARY ARTERY W/ RECONS   Final Result         1.  Distal right main pulmonary embolus extending into right upper lobe and lower lobe segmental and subsegmental branches. 1.4 right to left ventricular ratio suggesting component of right heart strain.   2.  Evaluation of the subsegmental branches is essentially nondiagnostic due to motion artifacts. Additional imaging would be required for definitive evaluation of small distal pulmonary emboli.   3.  Scattered patchy bilateral pulmonary infiltrates.    4.  Bilateral dependent consolidations, appearance suggests atelectasis, component of infiltrate not excluded.      These findings were discussed with the patient's clinician, Misbah Cardoza, on 4/3/2025 9:27 PM.      CT-HEAD W/O   Final Result      No acute intracranial findings.               DX-CHEST-PORTABLE (1 VIEW)   Final Result      Interval placement of a right IJ central line without evidence of complication.      DX-CHEST-PORTABLE (1 VIEW)   Final Result      1.  Support devices present.      2.  Cardiomegaly.      3.  Bibasilar atelectasis.      IR-MIDLINE CATHETER INSERTION WO GUIDANCE > AGE 3    (Results Pending)        Assessment/Plan  * Cardiac arrest (HCC)- (present on admission)  Assessment & Plan  Cardiac arrest with CPR, defibrillation x 3 and 3 rounds epinephrine  Loaded with amiodarone  Now in sinus rhythm  Cardiology consulted  Decreased ejection fraction 38% likely transient given stunned myocardium  Continues telemetry monitoring  Optimize electrolytes specifically potassium and magnesium    Pulmonary embolus (HCC)- (present on admission)  Assessment & Plan  Noted on CTA  Status post IV heparin drip  He was transitioned to lovenox though given the possible drug-drug interaction with valproic acid, change to weight-based lovenox for now.  He has a history of multiple admissions for GI bleeds will need to be monitored closely    Acute respiratory failure with hypoxia (HCC)- (present on admission)  Assessment & Plan  Requiring intubation on admission was extubated on 4/5/2025  Secondary to cardiac arrest  He continues to require supplemental oxygen will have continuous pulse oximetry in the IMCU    Encephalopathy acute- (present on admission)  Assessment & Plan  Acute metabolic encephalopathy in the setting of cardiac arrest  CT head negative  EEG negative for seizure activity  Remains on Precedex drip for safety which will be utilized during the nights and weaned off during the  days  Valproic acid has been initiated for mood stabilization and increase to 250 mg TID (QTc is 550 thus not a candidate for antipsychotics).  He has passed the swallow eval  He may require MRI of the brain though certainly not safe at this point in time but may help in neuro prognostication if his mentation does not improve.    Cirrhosis of liver (HCC)  Assessment & Plan  Noted on imaging  Outpatient follow-up    Aspiration into airway- (present on admission)  Assessment & Plan  Treated with Zosyn and if stable then consider de-escalation to Augmentin on 4/7/2025    Seizure-like activity (HCC)- (present on admission)  Assessment & Plan  Seizure type activity likely due to anoxia  EEG negative    Ventricular fibrillation (HCC)- (present on admission)  Assessment & Plan  With defibrillation x 3  Now in sinus  Continuous telemetry monitoring    Pain, chronic- (present on admission)  Assessment & Plan  He had weaned himself off narcotics for 3 days prior to this event    History of MVR with cardiopulmonary bypass  Assessment & Plan  By Dr. Santana Jan 2024         VTE prophylaxis:    therapeutic anticoagulation with weight-based lovenox BID, 1 mg/kg      I have performed a physical exam and reviewed and updated ROS and Plan today (4/7/2025). In review of yesterday's note (4/6/2025), there are no changes except as documented above.    Mr. Braswell is critically ill. 34 minutes of critical care time spent with patient, nursing, pharmacy, wife, and in specific management of severe encephalopathy requiring active titration of an IV Precedex drip in the IMCU. Please see orders.

## 2025-04-07 NOTE — DOCUMENTATION QUERY
[facility]                                                                       Query Response Note      PATIENT:               PATI MERCEDES  ACCT #:                  [id number]  MRN:                     [id number]  :                      1980  ADMIT DATE:       4/3/2025 4:06 PM  DISCH DATE:          RESPONDING  PROVIDER #:        [id number]           QUERY TEXT:    Pulmonary embolism (PE) is documented in the Medical Record. Please specify type or etiology and whether with or without acute cor pulmonale      Note: If a query response diagnosis is provided, please include it in your daily notes & DC Summary      Sheryl Keenan  RN-CDIS  [address] [place] [address]  [id number]  [email or web address]  Connect via Voalte Messenger    The patient's Clinical Indicators include:  CONS 4/6: 1.4 right to left ventricular ratio suggesting component of right heart strain.  CONS 4/6: Acute respiratory failure with hypoxia - (present on admission)  PN 4/5 CTA notable for distal right main PE with evidence of right heart strain.    CTA 4/3: Distal right main pulmonary embolus extending into right upper lobe and lower lobe segmental and subsegmental branches.1.4 right to left ventricular ratio suggesting component of right heart strain.    RISK FACTORS: cardiac arrest, right heart strain, mitral valve repair    TX: heparin gtt, vent, oxy-mask, echo  Options provided:   -- Acute pulmonary embolism with acute cor pulmonale   -- Acute pulmonary embolism without acute cor pulmonale   -- other explanation:, please specify      Query created by: Sheryl Keenan on 2025 9:22 AM    RESPONSE TEXT:    Acute pulmonary embolism with acute cor pulmonale          Electronically signed by:  ORIANA PAEZ 2025 1:56 PM

## 2025-04-07 NOTE — PROGRESS NOTES
4 Eyes Skin Assessment Completed by KARSON hemphill and KARSON ortiz.    Head Blanching and Redness  Ears Redness and Blanching  Nose WDL  Mouth WDL  Neck Redness and Blanching  Breast/Chest WDL  Shoulder Blades WDL  Spine Redness, Blanching, and Bruising  (R) Arm/Elbow/Hand Redness, Blanching, and Bruising  (L) Arm/Elbow/Hand Redness, Blanching, and Bruising  Abdomen Redness, Blanching, and Bruising  Groin WDL  Scrotum/Coccyx/Buttocks Redness biopsy incision   (R) Leg Redness and Blanching  (L) Leg Redness and Blanching  (R) Heel/Foot/Toe Redness and Blanching  (L) Heel/Foot/Toe Redness and Blanching          Devices In Places ECG, Blood Pressure Cuff, Pulse Ox, and Nasal Cannula      Interventions In Place Gray Ear Foams, NC W/Ear Foams, Heel Mepilex, Sacral Mepilex, Heel Float Boots, Pillows, Q2 Turns, Low Air Loss Mattress, and ZFlo Pillow    Possible Skin Injury No    Pictures Uploaded Into Epic N/A  Wound Consult Placed N/A  RN Wound Prevention Protocol Ordered Yes

## 2025-04-07 NOTE — PROGRESS NOTES
4 Eyes Skin Assessment Completed by KARSON Paniagua and KARSON Villatoro.    Head WDL  Ears WDL  Nose WDL  Mouth WDL  Neck WDL  Breast/Chest Scar  Shoulder Blades Redness and Blanching  Spine Redness and Blanching  (R) Arm/Elbow/Hand Redness and Blanching, abrasions on fingers  (L) Arm/Elbow/Hand Redness, Blanching, and Bruising  Abdomen Abrasion R side, offloaded  Groin WDL  Scrotum/Coccyx/Buttocks Redness and Blanching  (R) Leg Bruising  (L) Leg Bruising  (R) Heel/Foot/Toe Redness and Blanching  (L) Heel/Foot/Toe Redness and Blanching    Devices In Places ECG, Blood Pressure Cuff, Pulse Ox, Bai, and Central Line    Interventions In Place NC W/Ear Foams, Sacral Mepilex, Heel Float Boots, TAP System, Pillows, Q2 Turns, Low Air Loss Mattress, and ZFlo Pillow    Possible Skin Injury No    Pictures Uploaded Into Epic N/A  Wound Consult Placed N/A  RN Wound Prevention Protocol Ordered Yes

## 2025-04-08 LAB
ALBUMIN SERPL BCP-MCNC: 3.3 G/DL (ref 3.2–4.9)
ALBUMIN/GLOB SERPL: 1.1 G/DL
ALP SERPL-CCNC: 100 U/L (ref 30–99)
ALT SERPL-CCNC: 71 U/L (ref 2–50)
ANION GAP SERPL CALC-SCNC: 15 MMOL/L (ref 7–16)
AST SERPL-CCNC: 50 U/L (ref 12–45)
BASOPHILS # BLD AUTO: 0.6 % (ref 0–1.8)
BASOPHILS # BLD: 0.08 K/UL (ref 0–0.12)
BILIRUB SERPL-MCNC: 0.4 MG/DL (ref 0.1–1.5)
BUN SERPL-MCNC: 11 MG/DL (ref 8–22)
CALCIUM ALBUM COR SERPL-MCNC: 9.1 MG/DL (ref 8.5–10.5)
CALCIUM SERPL-MCNC: 8.5 MG/DL (ref 8.5–10.5)
CHLORIDE SERPL-SCNC: 103 MMOL/L (ref 96–112)
CO2 SERPL-SCNC: 19 MMOL/L (ref 20–33)
CREAT SERPL-MCNC: 0.82 MG/DL (ref 0.5–1.4)
EOSINOPHIL # BLD AUTO: 0.04 K/UL (ref 0–0.51)
EOSINOPHIL NFR BLD: 0.3 % (ref 0–6.9)
ERYTHROCYTE [DISTWIDTH] IN BLOOD BY AUTOMATED COUNT: 49.5 FL (ref 35.9–50)
GFR SERPLBLD CREATININE-BSD FMLA CKD-EPI: 111 ML/MIN/1.73 M 2
GLOBULIN SER CALC-MCNC: 3 G/DL (ref 1.9–3.5)
GLUCOSE SERPL-MCNC: 94 MG/DL (ref 65–99)
HCT VFR BLD AUTO: 36.3 % (ref 42–52)
HGB BLD-MCNC: 12.2 G/DL (ref 14–18)
IMM GRANULOCYTES # BLD AUTO: 0.14 K/UL (ref 0–0.11)
IMM GRANULOCYTES NFR BLD AUTO: 1 % (ref 0–0.9)
LYMPHOCYTES # BLD AUTO: 1.41 K/UL (ref 1–4.8)
LYMPHOCYTES NFR BLD: 10.4 % (ref 22–41)
MCH RBC QN AUTO: 27.5 PG (ref 27–33)
MCHC RBC AUTO-ENTMCNC: 33.6 G/DL (ref 32.3–36.5)
MCV RBC AUTO: 81.8 FL (ref 81.4–97.8)
MONOCYTES # BLD AUTO: 1.33 K/UL (ref 0–0.85)
MONOCYTES NFR BLD AUTO: 9.8 % (ref 0–13.4)
NEUTROPHILS # BLD AUTO: 10.52 K/UL (ref 1.82–7.42)
NEUTROPHILS NFR BLD: 77.9 % (ref 44–72)
NRBC # BLD AUTO: 0 K/UL
NRBC BLD-RTO: 0 /100 WBC (ref 0–0.2)
PLATELET # BLD AUTO: 281 K/UL (ref 164–446)
PMV BLD AUTO: 8.6 FL (ref 9–12.9)
POTASSIUM SERPL-SCNC: 3.4 MMOL/L (ref 3.6–5.5)
PROT SERPL-MCNC: 6.3 G/DL (ref 6–8.2)
RBC # BLD AUTO: 4.44 M/UL (ref 4.7–6.1)
SODIUM SERPL-SCNC: 137 MMOL/L (ref 135–145)
WBC # BLD AUTO: 13.5 K/UL (ref 4.8–10.8)

## 2025-04-08 PROCEDURE — 99233 SBSQ HOSP IP/OBS HIGH 50: CPT | Performed by: HOSPITALIST

## 2025-04-08 PROCEDURE — A9270 NON-COVERED ITEM OR SERVICE: HCPCS | Performed by: HOSPITALIST

## 2025-04-08 PROCEDURE — A9270 NON-COVERED ITEM OR SERVICE: HCPCS | Performed by: STUDENT IN AN ORGANIZED HEALTH CARE EDUCATION/TRAINING PROGRAM

## 2025-04-08 PROCEDURE — 700111 HCHG RX REV CODE 636 W/ 250 OVERRIDE (IP): Performed by: HOSPITALIST

## 2025-04-08 PROCEDURE — 700105 HCHG RX REV CODE 258: Performed by: HOSPITALIST

## 2025-04-08 PROCEDURE — 80053 COMPREHEN METABOLIC PANEL: CPT

## 2025-04-08 PROCEDURE — 85025 COMPLETE CBC W/AUTO DIFF WBC: CPT

## 2025-04-08 PROCEDURE — 700102 HCHG RX REV CODE 250 W/ 637 OVERRIDE(OP): Performed by: HOSPITALIST

## 2025-04-08 PROCEDURE — 700102 HCHG RX REV CODE 250 W/ 637 OVERRIDE(OP): Performed by: STUDENT IN AN ORGANIZED HEALTH CARE EDUCATION/TRAINING PROGRAM

## 2025-04-08 PROCEDURE — 700111 HCHG RX REV CODE 636 W/ 250 OVERRIDE (IP): Performed by: STUDENT IN AN ORGANIZED HEALTH CARE EDUCATION/TRAINING PROGRAM

## 2025-04-08 PROCEDURE — 770000 HCHG ROOM/CARE - INTERMEDIATE ICU *

## 2025-04-08 RX ORDER — POTASSIUM CHLORIDE 1500 MG/1
60 TABLET, EXTENDED RELEASE ORAL ONCE
Status: COMPLETED | OUTPATIENT
Start: 2025-04-08 | End: 2025-04-08

## 2025-04-08 RX ADMIN — CEFAZOLIN 2 G: 2 INJECTION, POWDER, FOR SOLUTION INTRAMUSCULAR; INTRAVENOUS at 21:49

## 2025-04-08 RX ADMIN — OXYCODONE HYDROCHLORIDE 10 MG: 10 TABLET ORAL at 14:11

## 2025-04-08 RX ADMIN — LABETALOL HYDROCHLORIDE 20 MG: 5 INJECTION, SOLUTION INTRAVENOUS at 16:34

## 2025-04-08 RX ADMIN — METOPROLOL TARTRATE 12.5 MG: 25 TABLET, FILM COATED ORAL at 05:27

## 2025-04-08 RX ADMIN — ENOXAPARIN SODIUM 120 MG: 150 INJECTION SUBCUTANEOUS at 17:07

## 2025-04-08 RX ADMIN — METOPROLOL TARTRATE 12.5 MG: 25 TABLET, FILM COATED ORAL at 17:07

## 2025-04-08 RX ADMIN — CEFAZOLIN 2 G: 2 INJECTION, POWDER, FOR SOLUTION INTRAMUSCULAR; INTRAVENOUS at 05:28

## 2025-04-08 RX ADMIN — CEFAZOLIN 2 G: 2 INJECTION, POWDER, FOR SOLUTION INTRAMUSCULAR; INTRAVENOUS at 14:17

## 2025-04-08 RX ADMIN — VALPROIC ACID 250 MG: 250 CAPSULE, LIQUID FILLED ORAL at 14:11

## 2025-04-08 RX ADMIN — VALPROIC ACID 250 MG: 250 CAPSULE, LIQUID FILLED ORAL at 05:27

## 2025-04-08 RX ADMIN — ENOXAPARIN SODIUM 120 MG: 150 INJECTION SUBCUTANEOUS at 05:28

## 2025-04-08 RX ADMIN — OXYCODONE HYDROCHLORIDE 10 MG: 10 TABLET ORAL at 21:56

## 2025-04-08 RX ADMIN — VALPROIC ACID 250 MG: 250 CAPSULE, LIQUID FILLED ORAL at 21:44

## 2025-04-08 RX ADMIN — POTASSIUM CHLORIDE 60 MEQ: 1500 TABLET, EXTENDED RELEASE ORAL at 10:14

## 2025-04-08 SDOH — ECONOMIC STABILITY: FOOD INSECURITY: WITHIN THE PAST 12 MONTHS, YOU WORRIED THAT YOUR FOOD WOULD RUN OUT BEFORE YOU GOT THE MONEY TO BUY MORE.: NEVER TRUE

## 2025-04-08 SDOH — SOCIAL STABILITY: SOCIAL NETWORK: HOW OFTEN DO YOU GET TOGETHER WITH FRIENDS OR RELATIVES?: NEVER

## 2025-04-08 SDOH — ECONOMIC STABILITY: FOOD INSECURITY: WITHIN THE PAST 12 MONTHS, THE FOOD YOU BOUGHT JUST DIDN'T LAST AND YOU DIDN'T HAVE MONEY TO GET MORE.: NEVER TRUE

## 2025-04-08 SDOH — SOCIAL STABILITY: SOCIAL INSECURITY
WITHIN THE LAST YEAR, HAVE YOU BEEN RAPED OR FORCED TO HAVE ANY KIND OF SEXUAL ACTIVITY BY YOUR PARTNER OR EX-PARTNER?: YES

## 2025-04-08 SDOH — SOCIAL STABILITY: SOCIAL NETWORK: HOW OFTEN DO YOU ATTEND CHURCH OR RELIGIOUS SERVICES?: NEVER

## 2025-04-08 SDOH — ECONOMIC STABILITY: FOOD INSECURITY: HOW HARD IS IT FOR YOU TO PAY FOR THE VERY BASICS LIKE FOOD, HOUSING, MEDICAL CARE, AND HEATING?: HARD

## 2025-04-08 SDOH — HEALTH STABILITY: PHYSICAL HEALTH: ON AVERAGE, HOW MANY MINUTES DO YOU ENGAGE IN EXERCISE AT THIS LEVEL?: 30 MIN

## 2025-04-08 SDOH — SOCIAL STABILITY: SOCIAL NETWORK
DO YOU BELONG TO ANY CLUBS OR ORGANIZATIONS SUCH AS CHURCH GROUPS, UNIONS, FRATERNAL OR ATHLETIC GROUPS, OR SCHOOL GROUPS?: YES

## 2025-04-08 SDOH — SOCIAL STABILITY: SOCIAL INSECURITY: ARE YOU MARRIED, WIDOWED, DIVORCED, SEPARATED, NEVER MARRIED, OR LIVING WITH A PARTNER?: WIDOWED

## 2025-04-08 SDOH — ECONOMIC STABILITY: HOUSING INSECURITY: IN THE LAST 12 MONTHS, WAS THERE A TIME WHEN YOU WERE NOT ABLE TO PAY THE MORTGAGE OR RENT ON TIME?: YES

## 2025-04-08 SDOH — SOCIAL STABILITY: SOCIAL INSECURITY: WITHIN THE LAST YEAR, HAVE YOU BEEN AFRAID OF YOUR PARTNER OR EX-PARTNER?: PATIENT UNABLE TO ANSWER

## 2025-04-08 SDOH — ECONOMIC STABILITY: HOUSING INSECURITY: AT ANY TIME IN THE PAST 12 MONTHS, WERE YOU HOMELESS OR LIVING IN A SHELTER (INCLUDING NOW)?: YES

## 2025-04-08 SDOH — SOCIAL STABILITY: SOCIAL NETWORK: HOW OFTEN DO YOU ATTEND MEETINGS OF THE CLUBS OR ORGANIZATIONS YOU BELONG TO?: NEVER

## 2025-04-08 SDOH — SOCIAL STABILITY: SOCIAL INSECURITY: WITHIN THE LAST YEAR, HAVE YOU BEEN HUMILIATED OR EMOTIONALLY ABUSED IN OTHER WAYS BY YOUR PARTNER OR EX-PARTNER?: YES

## 2025-04-08 SDOH — ECONOMIC STABILITY: TRANSPORTATION INSECURITY: IN THE PAST 12 MONTHS, HAS LACK OF TRANSPORTATION KEPT YOU FROM MEDICAL APPOINTMENTS OR FROM GETTING MEDICATIONS?: YES

## 2025-04-08 SDOH — ECONOMIC STABILITY: HOUSING INSECURITY: IN THE PAST 12 MONTHS, HOW MANY TIMES HAVE YOU MOVED WHERE YOU WERE LIVING?: 6

## 2025-04-08 ASSESSMENT — PAIN DESCRIPTION - PAIN TYPE
TYPE: ACUTE PAIN
TYPE: ACUTE PAIN
TYPE: CHRONIC PAIN
TYPE: ACUTE PAIN
TYPE: CHRONIC PAIN

## 2025-04-08 ASSESSMENT — ACTIVITIES OF DAILY LIVING (ADL): LACK_OF_TRANSPORTATION: NO

## 2025-04-08 ASSESSMENT — FIBROSIS 4 INDEX: FIB4 SCORE: 1.19

## 2025-04-08 NOTE — CARE PLAN
The patient is Stable - Low risk of patient condition declining or worsening    Shift Goals  Clinical Goals: improve mentation, wean precedex  Patient Goals: increase mobility  Family Goals: updates, improve mobility    Progress made toward(s) clinical / shift goals:    Problem: Knowledge Deficit - Standard  Goal: Patient and family/care givers will demonstrate understanding of plan of care, disease process/condition, diagnostic tests and medications  Outcome: Progressing     Problem: Skin Integrity  Goal: Skin integrity is maintained or improved  Outcome: Progressing     Problem: Fall Risk  Goal: Patient will remain free from falls  Outcome: Progressing     Problem: Pain - Standard  Goal: Alleviation of pain or a reduction in pain to the patient’s comfort goal  Outcome: Progressing     Problem: Communication  Goal: The ability to communicate needs accurately and effectively will improve  Outcome: Progressing     Problem: Safety  Goal: Will remain free from injury  Outcome: Progressing  Goal: Will remain free from falls  Outcome: Progressing     Problem: Pain Management  Goal: Pain level will decrease to patient's comfort goal  Outcome: Progressing     Problem: Infection  Goal: Will remain free from infection  Outcome: Progressing     Problem: Venous Thromboembolism (VTW)/Deep Vein Thrombosis (DVT) Prevention:  Goal: Patient will participate in Venous Thrombosis (VTE)/Deep Vein Thrombosis (DVT)Prevention Measures  Outcome: Progressing     Problem: Psychosocial Needs:  Goal: Level of anxiety will decrease  Outcome: Progressing     Problem: Fluid Volume:  Goal: Will maintain balanced intake and output  Outcome: Progressing     Problem: Respiratory:  Goal: Respiratory status will improve  Outcome: Progressing     Problem: Bowel/Gastric:  Goal: Normal bowel function is maintained or improved  Outcome: Progressing  Goal: Will not experience complications related to bowel motility  Outcome: Progressing     Problem:  Urinary Elimination:  Goal: Ability to reestablish a normal urinary elimination pattern will improve  Outcome: Progressing     Problem: Skin Integrity  Goal: Risk for impaired skin integrity will decrease  Outcome: Progressing     Problem: Mobility  Goal: Risk for activity intolerance will decrease  Outcome: Progressing     Problem: Medication  Goal: Compliance with prescribed medication will improve  Outcome: Progressing     Problem: Knowledge Deficit  Goal: Knowledge of disease process/condition, treatment plan, diagnostic tests, and medications will improve  Outcome: Progressing  Goal: Knowledge of the prescribed therapeutic regimen will improve  Outcome: Progressing     Problem: Discharge Barriers/Planning  Goal: Patient's continuum of care needs will be met  Outcome: Progressing     Problem: Safety - Medical Restraint  Goal: Remains free of injury from restraints (Restraint for Interference with Medical Device)  Outcome: Progressing  Goal: Free from restraint(s) (Restraint for Interference with Medical Device)  Outcome: Progressing     Problem: Hemodynamics  Goal: Patient's hemodynamics, fluid balance and neurologic status will be stable or improve  Outcome: Progressing       Patient is not progressing towards the following goals:

## 2025-04-08 NOTE — CARE PLAN
The patient is Watcher - Medium risk of patient condition declining or worsening    Shift Goals  Clinical Goals: improve mentation  Patient Goals: increase mobility  Family Goals: updates, improve mobility    Progress made toward(s) clinical / shift goals:    Problem: Pain - Standard  Goal: Alleviation of pain or a reduction in pain to the patient’s comfort goal  Outcome: Progressing     Problem: Hemodynamics  Goal: Patient's hemodynamics, fluid balance and neurologic status will be stable or improve  Outcome: Progressing       Patient is not progressing towards the following goals:

## 2025-04-08 NOTE — WOUND TEAM
"Wound consult placed on 04/8/25 for BMS placement. Confirmed \"Insert rectal tube\" in place. Confirmed Rectal tube placed by super-user and appropriate LDA opened. This RN confirmed/placed \"rectal tube care\" order. Wound consult then completed and pt on appropriate follow up lists.     "

## 2025-04-08 NOTE — PROGRESS NOTES
[facility] Medicine Daily Progress Note    Date of Service  4/8/2025    Chief Complaint  Roberto Braswell is a 44 y.o. male admitted 4/3/2025 with cardiac arrest    Hospital Course  Mr. Braswell has a past medical history of mitral valve repair 1/24/24 by Dr. Santana, multiple admissions for upper GI bleeds and an admission here in October though normal MRI and he was discharged with no neurological deficits.    He was in his usual state of health and on 3/17 had a urologic procedure on his epididymis. He was in the process of weaning off of narcotics for the previous 3 days.  On 4/3 he was watching TV with his wife, Destin. He got up and became dizzy. He sat back down and developed convulsions. His wife called 911. He stopped breathing and turned blue. She is a nurse and started CPR for 5 minutes until paramedics came. He was in Vfib and underwent defibrillation x 3 with 3 rounds of epi, IV amio and transferred to the ER where he was intubated.  In the emergency room, CT of the chest revealed distal right main pulmonary embolism and CT of the abdomen pelvis revealed ileus.  He was admitted to the intensive care unit on mechanical ventilation.  Ejection fraction was found to be 38% by echocardiogram and cardiology was consulted.  He was initiated on IV heparin drip due to the pulmonary emboli.  He was febrile and initiated on empiric IV Zosyn with apparent aspiration pneumonitis.  CT of the head was negative for acute processes.  EEG was negative for seizures.  He was successfully extubated on 4/5/2025.  After extubation he has required a Precedex drip due to agitation and remains encephalopathic.    Interval Problem Update  Patient seen and examined today.  Data, Medication data reviewed.  Case discussed with nursing as available.  Plan of Care reviewed with patient and notified of changes.  4/7: Mr. Braswell was evaluated in the ICU. He has required an IV Precedex drip due to agitation. Due to a prolonged QTc of 553  he is not a candidate for antipsychotic meds. Depakote 250 mg IV BID was started and will be transitioned to oral now that he has passed the swallow eval. His wife is at bedside. Advance diet.   4/8 the patient is off Precedex, still very confused, confabulating, moving all 4 extremities, still with Bai and rectal tube, discussed current status with wife at bedside, the patient is afebrile, heart rate in the 70s, respiration unlabored, blood pressure 140s to 150s over 80s, the patient is saturating well on 3 L, WBC 13.5 hemoglobin 12.2 hematocrit 36.3 platelet count 281 sodium 137 potassium 3.4 chloride 103 bicarb 19 glucose 94 BUN 11 creatinine 0.82 AST 50 ALT 71 alk phos 100  I have discussed this patient's plan of care and discharge plan at IDT rounds today with Case Management, Nursing, Nursing leadership, and other members of the IDT team.    Consultants/Specialty  Critical care  Cardiology, needs to be reevaluated prior to discharge to consider a AICD    Code Status  Full Code    Disposition  The patient is not medically cleared for discharge to home or a post-acute facility.  Anticipate discharge to: home with close outpatient follow-up    I have placed the appropriate orders for post-discharge needs.    Review of Systems  Review of Systems   Unable to perform ROS: Mental status change   Patient is confused, denies current pain or verbalizing any complaints    Physical Exam  Temp:  [36.3 °C (97.3 °F)-36.8 °C (98.2 °F)] 36.8 °C (98.2 °F)  Pulse:  [] 77  Resp:  [18-40] 18  BP: (127-154)/(65-92) 153/86  SpO2:  [92 %-97 %] 93 %    Physical Exam  Vitals and nursing note reviewed.   Constitutional:       Appearance: He is well-developed. He is ill-appearing.      Comments: Pt seen and examined.   HENT:      Head: Normocephalic and atraumatic.   Eyes:      Pupils: Pupils are equal, round, and reactive to light.   Cardiovascular:      Rate and Rhythm: Normal rate and regular rhythm.      Heart sounds: Normal  heart sounds.   Pulmonary:      Effort: Pulmonary effort is normal.      Breath sounds: Normal breath sounds.      Comments: Nasal cannula oxygen  Abdominal:      General: Bowel sounds are normal. There is no distension.      Palpations: Abdomen is soft.      Tenderness: There is no abdominal tenderness.   Musculoskeletal:         General: Normal range of motion.      Cervical back: Normal range of motion and neck supple.      Right lower leg: No edema.      Left lower leg: No edema.   Skin:     General: Skin is warm and dry.   Neurological:      General: No focal deficit present.      Mental Status: He is alert.      Motor: Weakness present.      Comments: Moves extremities equally, confused   Psychiatric:      Comments: Restless,         Fluids    Intake/Output Summary (Last 24 hours) at 4/8/2025 0732  Last data filed at 4/8/2025 0700  Gross per 24 hour   Intake 552.22 ml   Output 1500 ml   Net -947.78 ml        Laboratory  Recent Labs     04/06/25  0415 04/07/25  0456 04/08/25  0230   WBC 12.1* 11.6* 13.5*   RBC 3.92* 4.07* 4.44*   HEMOGLOBIN 10.6* 11.2* 12.2*   HEMATOCRIT 32.8* 33.7* 36.3*   MCV 83.7 82.8 81.8   MCH 27.0 27.5 27.5   MCHC 32.3 33.2 33.6   RDW 54.5* 50.2* 49.5   PLATELETCT 259 256 281   MPV 9.0 9.0 8.6*     Recent Labs     04/06/25  0415 04/07/25  0456 04/08/25  0230   SODIUM 139 136 137   POTASSIUM 3.8 3.9 3.4*   CHLORIDE 108 105 103   CO2 23 22 19*   GLUCOSE 126* 111* 94   BUN 14 11 11   CREATININE 0.93 0.83 0.82   CALCIUM 7.5* 8.1* 8.5                     Imaging  [id number] VIEW   Final Result         1.  Nonspecific bowel gas pattern in the upper abdomen.      DX-CHEST-PORTABLE (1 VIEW)   Final Result         1.  Pulmonary edema and/or infiltrates are identified, which are stable since the prior exam.   2.  Cardiomegaly      DX-CHEST-PORTABLE (1 VIEW)   Final Result         1.  Pulmonary edema and/or infiltrates are identified, which appear somewhat increased since the prior exam.   2.   Cardiomegaly      CT-CTA NECK WITH & W/O-POST PROCESSING   Final Result         1.  CT angiogram of the neck within normal limits.   2.  Patchy bilateral upper lobe infiltrates         CT-CTA HEAD WITH & W/O-POST PROCESS   Final Result         1.  No large vessel occlusion or aneurysm identified   2.  Bilateral sinusitis changes      US-EXTREMITY VENOUS LOWER BILAT   Final Result      EC-ECHOCARDIOGRAM COMPLETE W/ CONT   Final Result      CT-ABDOMEN-PELVIS WITH   Final Result         1.  Fluid-filled prominence of proximal small bowel with distal small bowel decompression, appearance suggests ileus and/or enteritis versus evolving obstructive changes. Recommend radiographic follow-up to resolution as clinically appropriate.   2.  Irregular hepatic contour favoring changes of cirrhosis   3.  Fat-containing bilateral inguinal hernias      CT-CTA CHEST PULMONARY ARTERY W/ RECONS   Final Result         1.  Distal right main pulmonary embolus extending into right upper lobe and lower lobe segmental and subsegmental branches. 1.4 right to left ventricular ratio suggesting component of right heart strain.   2.  Evaluation of the subsegmental branches is essentially nondiagnostic due to motion artifacts. Additional imaging would be required for definitive evaluation of small distal pulmonary emboli.   3.  Scattered patchy bilateral pulmonary infiltrates.   4.  Bilateral dependent consolidations, appearance suggests atelectasis, component of infiltrate not excluded.      These findings were discussed with the patient's clinician, Misbah Cardoza, on 4/3/2025 9:27 PM.      CT-HEAD W/O   Final Result      No acute intracranial findings.               DX-CHEST-PORTABLE (1 VIEW)   Final Result      Interval placement of a right IJ central line without evidence of complication.      DX-CHEST-PORTABLE (1 VIEW)   Final Result      1.  Support devices present.      2.  Cardiomegaly.      3.  Bibasilar atelectasis.            Assessment/Plan  * Cardiac arrest (HCC)- (present on admission)  Assessment & Plan  Cardiac arrest with CPR, defibrillation x 3 and 3 rounds epinephrine  Loaded with amiodarone  Now in sinus rhythm  Cardiology consulted  Decreased ejection fraction 38% likely transient given stunned myocardium  Continues telemetry monitoring  Optimize electrolytes specifically potassium and magnesium    Acute respiratory failure with hypoxia (HCC)- (present on admission)  Assessment & Plan  Requiring intubation on admission was extubated on 4/5/2025  Secondary to cardiac arrest  He continues to require supplemental oxygen will have continuous pulse oximetry in the IMCU    Encephalopathy acute- (present on admission)  Assessment & Plan  Acute metabolic encephalopathy in the setting of cardiac arrest  CT head negative  EEG negative for seizure activity  Remains on Precedex drip for safety which will be utilized during the nights and weaned off during the days  Valproic acid has been initiated for mood stabilization and increase to 250 mg TID (QTc is 550 thus not a candidate for antipsychotics).  He has passed the swallow eval  He may require MRI of the brain though certainly not safe at this point in time but may help in neuro prognostication if his mentation does not improve.    Cirrhosis of liver (HCC)  Assessment & Plan  Noted on imaging  Outpatient follow-up    Aspiration into airway- (present on admission)  Assessment & Plan  Treated with Zosyn and if stable then consider de-escalation to Augmentin on 4/7/2025    Pulmonary embolus (HCC)- (present on admission)  Assessment & Plan  Noted on CTA  Status post IV heparin drip  He was transitioned to lovenox though given the possible drug-drug interaction with valproic acid, change to weight-based lovenox for now.  He has a history of multiple admissions for GI bleeds will need to be monitored closely    Seizure-like activity (HCC)- (present on admission)  Assessment &  Plan  Seizure type activity likely due to anoxia  EEG negative    Ventricular fibrillation (HCC)- (present on admission)  Assessment & Plan  With defibrillation x 3  Now in sinus  Continuous telemetry monitoring    Pain, chronic- (present on admission)  Assessment & Plan  He had weaned himself off narcotics for 3 days prior to this event    History of MVR with cardiopulmonary bypass  Assessment & Plan  By Dr. Santana Jan 2024    Plan  4/8  Continue with empiric antibiotics,  Full anticoagulation with Lovenox, monitor closely  Agree with beta-blockade, the patient will need to see cardiology prior to discharge to evaluate for possible AICD placement  Continue Depakote with low threshold to discontinue  At this time the patient is most likely still suffering from a degree of brain dysfunction from his resuscitation efforts, yet does not have profound anoxic injury  Ongoing PT OT mobilization, efforts to improve  Discussed with family at the bedside  AM labs  See orders  Patient is has a high medical complexity, complex decision making and is at high risk for complication, morbidity, and mortality.  I spent 56 minutes, reviewing the chart, obtaining and/or reviewing separately obtained history. Performing a medically appropriate examination and evaluation.  Counseling and educating the patient. Ordering and reviewing medications, tests, or procedures.   Documenting clinical information in EPIC. Independently interpreting results and communicating results to patient. Discussing future disposition of care with patient, RN and case management.      VTE prophylaxis: Full dose Lovenox    I have performed a physical exam and reviewed and updated ROS and Plan today (4/8/2025). In review of yesterday's note (4/7/2025), there are no changes except as documented above.      Please note that this dictation was created using voice recognition software. I have made every reasonable attempt to correct obvious errors, but I expect  that there are errors of grammar and possibly context that I did not discover before finalizing the note.

## 2025-04-08 NOTE — DISCHARGE PLANNING
Care Transition Team Assessment    NOK is patient's spouse, Destin, [phone]    RN CM met with pt and his spouse, Destin, at bedside for assessment. RN CM role explained. Information provided by spouse as patient not fully oriented. Patient lives with spouse and uncle in a 3 story home in [place] at [address]. He was independent with ADL's using no DME at baseline. Spouse is able to provide support, she is a RN. Uncle would not be available for support as they are support to him. He has not used SNF prior. He works at [employer]. Hope is for patient to be able to discharge home with home health if needed. Spouse can provide transportation.    Information Source  Orientation Level: Oriented to person, Oriented to place, Disoriented to time, Disoriented to situation  Information Given By: Spouse  Informant's Name: Destin  Who is responsible for making decisions for patient? : Spouse  Name(s) of Primary Decision Maker: Destin    Readmission Evaluation  Is this a readmission?: No    Elopement Risk  Legal Hold: No  Ambulatory or Self Mobile in Wheelchair: No-Not an Elopement Risk  Elopement Risk: Not at Risk for Elopement    Interdisciplinary Discharge Planning  Lives with - Patient's Self Care Capacity: Spouse  Support Systems: Spouse / Significant Other  Housing / Facility: 3 Story House  Mobility Issues: No  Prior Services: None  Assistance Needed: Yes    Discharge Preparedness  What is your plan after discharge?: Home health care  What are your discharge supports?: Spouse  Prior Functional Level: Ambulatory, Independent with Activities of Daily Living    Functional Assesment  Prior Functional Level: Ambulatory, Independent with Activities of Daily Living    Finances  Financial Barriers to Discharge: No  Prescription Coverage: Yes    Domestic Abuse  Have you ever been the victim of abuse or violence?: No    Discharge Risks or Barriers  Discharge risks or barriers?: Complex medical  needs    Anticipated Discharge Information  Discharge Disposition: D/T to home under A care in anticipation of covered skilled care (06)

## 2025-04-09 LAB
ALBUMIN SERPL BCP-MCNC: 3.2 G/DL (ref 3.2–4.9)
ALBUMIN/GLOB SERPL: 1 G/DL
ALP SERPL-CCNC: 84 U/L (ref 30–99)
ALT SERPL-CCNC: 49 U/L (ref 2–50)
AMMONIA PLAS-SCNC: 28 UMOL/L (ref 11–45)
ANION GAP SERPL CALC-SCNC: 13 MMOL/L (ref 7–16)
AST SERPL-CCNC: 32 U/L (ref 12–45)
BACTERIA BLD CULT: ABNORMAL
BACTERIA BLD CULT: ABNORMAL
BACTERIA BLD CULT: NORMAL
BACTERIA BLD CULT: NORMAL
BASOPHILS # BLD AUTO: 0.8 % (ref 0–1.8)
BASOPHILS # BLD: 0.08 K/UL (ref 0–0.12)
BILIRUB SERPL-MCNC: 0.3 MG/DL (ref 0.1–1.5)
BUN SERPL-MCNC: 13 MG/DL (ref 8–22)
CALCIUM ALBUM COR SERPL-MCNC: 9.1 MG/DL (ref 8.5–10.5)
CALCIUM SERPL-MCNC: 8.5 MG/DL (ref 8.5–10.5)
CHLORIDE SERPL-SCNC: 107 MMOL/L (ref 96–112)
CO2 SERPL-SCNC: 19 MMOL/L (ref 20–33)
CREAT SERPL-MCNC: 0.74 MG/DL (ref 0.5–1.4)
EKG IMPRESSION: NORMAL
EOSINOPHIL # BLD AUTO: 0.02 K/UL (ref 0–0.51)
EOSINOPHIL NFR BLD: 0.2 % (ref 0–6.9)
ERYTHROCYTE [DISTWIDTH] IN BLOOD BY AUTOMATED COUNT: 49.5 FL (ref 35.9–50)
GFR SERPLBLD CREATININE-BSD FMLA CKD-EPI: 115 ML/MIN/1.73 M 2
GLOBULIN SER CALC-MCNC: 3.1 G/DL (ref 1.9–3.5)
GLUCOSE SERPL-MCNC: 95 MG/DL (ref 65–99)
HCT VFR BLD AUTO: 37.8 % (ref 42–52)
HGB BLD-MCNC: 12.3 G/DL (ref 14–18)
IMM GRANULOCYTES # BLD AUTO: 0.22 K/UL (ref 0–0.11)
IMM GRANULOCYTES NFR BLD AUTO: 2.2 % (ref 0–0.9)
LYMPHOCYTES # BLD AUTO: 1.94 K/UL (ref 1–4.8)
LYMPHOCYTES NFR BLD: 19.8 % (ref 22–41)
MAGNESIUM SERPL-MCNC: 2.3 MG/DL (ref 1.5–2.5)
MCH RBC QN AUTO: 26.7 PG (ref 27–33)
MCHC RBC AUTO-ENTMCNC: 32.5 G/DL (ref 32.3–36.5)
MCV RBC AUTO: 82.2 FL (ref 81.4–97.8)
MONOCYTES # BLD AUTO: 1.21 K/UL (ref 0–0.85)
MONOCYTES NFR BLD AUTO: 12.4 % (ref 0–13.4)
NEUTROPHILS # BLD AUTO: 6.32 K/UL (ref 1.82–7.42)
NEUTROPHILS NFR BLD: 64.6 % (ref 44–72)
NRBC # BLD AUTO: 0.02 K/UL
NRBC BLD-RTO: 0.2 /100 WBC (ref 0–0.2)
NT-PROBNP SERPL IA-MCNC: 498 PG/ML (ref 0–125)
PHOSPHATE SERPL-MCNC: 2.7 MG/DL (ref 2.5–4.5)
PLATELET # BLD AUTO: 327 K/UL (ref 164–446)
PMV BLD AUTO: 8.9 FL (ref 9–12.9)
POTASSIUM SERPL-SCNC: 3.3 MMOL/L (ref 3.6–5.5)
PROT SERPL-MCNC: 6.3 G/DL (ref 6–8.2)
RBC # BLD AUTO: 4.6 M/UL (ref 4.7–6.1)
SIGNIFICANT IND 70042: ABNORMAL
SIGNIFICANT IND 70042: NORMAL
SIGNIFICANT IND 70042: NORMAL
SITE SITE: ABNORMAL
SITE SITE: NORMAL
SITE SITE: NORMAL
SODIUM SERPL-SCNC: 139 MMOL/L (ref 135–145)
SOURCE SOURCE: ABNORMAL
SOURCE SOURCE: NORMAL
SOURCE SOURCE: NORMAL
WBC # BLD AUTO: 9.8 K/UL (ref 4.8–10.8)

## 2025-04-09 PROCEDURE — A9270 NON-COVERED ITEM OR SERVICE: HCPCS | Performed by: HOSPITALIST

## 2025-04-09 PROCEDURE — 82140 ASSAY OF AMMONIA: CPT

## 2025-04-09 PROCEDURE — 700105 HCHG RX REV CODE 258: Performed by: HOSPITALIST

## 2025-04-09 PROCEDURE — 700111 HCHG RX REV CODE 636 W/ 250 OVERRIDE (IP): Performed by: HOSPITALIST

## 2025-04-09 PROCEDURE — 700102 HCHG RX REV CODE 250 W/ 637 OVERRIDE(OP): Performed by: STUDENT IN AN ORGANIZED HEALTH CARE EDUCATION/TRAINING PROGRAM

## 2025-04-09 PROCEDURE — 80053 COMPREHEN METABOLIC PANEL: CPT

## 2025-04-09 PROCEDURE — 770020 HCHG ROOM/CARE - TELE (206)

## 2025-04-09 PROCEDURE — 84100 ASSAY OF PHOSPHORUS: CPT

## 2025-04-09 PROCEDURE — 99223 1ST HOSP IP/OBS HIGH 75: CPT | Mod: GC | Performed by: PSYCHIATRY & NEUROLOGY

## 2025-04-09 PROCEDURE — 99233 SBSQ HOSP IP/OBS HIGH 50: CPT | Performed by: HOSPITALIST

## 2025-04-09 PROCEDURE — 93005 ELECTROCARDIOGRAM TRACING: CPT | Mod: TC | Performed by: HOSPITALIST

## 2025-04-09 PROCEDURE — A9270 NON-COVERED ITEM OR SERVICE: HCPCS | Performed by: STUDENT IN AN ORGANIZED HEALTH CARE EDUCATION/TRAINING PROGRAM

## 2025-04-09 PROCEDURE — 83735 ASSAY OF MAGNESIUM: CPT

## 2025-04-09 PROCEDURE — 99418 PROLNG IP/OBS E/M EA 15 MIN: CPT | Mod: GC | Performed by: PSYCHIATRY & NEUROLOGY

## 2025-04-09 PROCEDURE — 700111 HCHG RX REV CODE 636 W/ 250 OVERRIDE (IP): Mod: JZ | Performed by: HOSPITALIST

## 2025-04-09 PROCEDURE — 700102 HCHG RX REV CODE 250 W/ 637 OVERRIDE(OP): Performed by: HOSPITALIST

## 2025-04-09 PROCEDURE — 92526 ORAL FUNCTION THERAPY: CPT

## 2025-04-09 PROCEDURE — 302307 BAG FECAL MANAGEMENT TEMPORARY COLLECTION WITH FILTER - SEAL SIGNAL FMS: Performed by: HOSPITALIST

## 2025-04-09 PROCEDURE — 83880 ASSAY OF NATRIURETIC PEPTIDE: CPT

## 2025-04-09 PROCEDURE — 700111 HCHG RX REV CODE 636 W/ 250 OVERRIDE (IP): Mod: JZ | Performed by: STUDENT IN AN ORGANIZED HEALTH CARE EDUCATION/TRAINING PROGRAM

## 2025-04-09 PROCEDURE — 85025 COMPLETE CBC W/AUTO DIFF WBC: CPT

## 2025-04-09 RX ORDER — OXYCODONE HYDROCHLORIDE 5 MG/1
5 TABLET ORAL
Refills: 0 | Status: DISCONTINUED | OUTPATIENT
Start: 2025-04-09 | End: 2025-04-11

## 2025-04-09 RX ORDER — METOPROLOL TARTRATE 25 MG/1
37.5 TABLET, FILM COATED ORAL EVERY 8 HOURS
Status: DISCONTINUED | OUTPATIENT
Start: 2025-04-09 | End: 2025-04-21 | Stop reason: HOSPADM

## 2025-04-09 RX ORDER — HYDRALAZINE HYDROCHLORIDE 20 MG/ML
10 INJECTION INTRAMUSCULAR; INTRAVENOUS EVERY 4 HOURS PRN
Status: DISCONTINUED | OUTPATIENT
Start: 2025-04-09 | End: 2025-04-21 | Stop reason: HOSPADM

## 2025-04-09 RX ORDER — FUROSEMIDE 10 MG/ML
20 INJECTION INTRAMUSCULAR; INTRAVENOUS ONCE
Status: COMPLETED | OUTPATIENT
Start: 2025-04-09 | End: 2025-04-09

## 2025-04-09 RX ORDER — POTASSIUM CHLORIDE 1500 MG/1
40 TABLET, EXTENDED RELEASE ORAL EVERY 4 HOURS
Status: COMPLETED | OUTPATIENT
Start: 2025-04-09 | End: 2025-04-09

## 2025-04-09 RX ORDER — METOPROLOL TARTRATE 25 MG/1
25 TABLET, FILM COATED ORAL ONCE
Status: COMPLETED | OUTPATIENT
Start: 2025-04-09 | End: 2025-04-09

## 2025-04-09 RX ORDER — ETHYL ALCOHOL 62 %
1 SWAB, MEDICATED TOPICAL 2 TIMES DAILY
Status: DISCONTINUED | OUTPATIENT
Start: 2025-04-09 | End: 2025-04-13

## 2025-04-09 RX ORDER — OXYCODONE HYDROCHLORIDE 10 MG/1
10 TABLET ORAL
Refills: 0 | Status: DISCONTINUED | OUTPATIENT
Start: 2025-04-09 | End: 2025-04-11

## 2025-04-09 RX ORDER — LOSARTAN POTASSIUM 50 MG/1
50 TABLET ORAL 2 TIMES DAILY
Status: DISCONTINUED | OUTPATIENT
Start: 2025-04-09 | End: 2025-04-21 | Stop reason: HOSPADM

## 2025-04-09 RX ORDER — METOPROLOL TARTRATE 25 MG/1
25 TABLET, FILM COATED ORAL ONCE
Status: DISCONTINUED | OUTPATIENT
Start: 2025-04-09 | End: 2025-04-09

## 2025-04-09 RX ORDER — HYDROMORPHONE HYDROCHLORIDE 1 MG/ML
0.5 INJECTION, SOLUTION INTRAMUSCULAR; INTRAVENOUS; SUBCUTANEOUS
Status: DISCONTINUED | OUTPATIENT
Start: 2025-04-09 | End: 2025-04-11

## 2025-04-09 RX ORDER — LOSARTAN POTASSIUM 50 MG/1
50 TABLET ORAL
Status: DISCONTINUED | OUTPATIENT
Start: 2025-04-09 | End: 2025-04-09

## 2025-04-09 RX ORDER — METOPROLOL TARTRATE 25 MG/1
25 TABLET, FILM COATED ORAL 2 TIMES DAILY
Status: DISCONTINUED | OUTPATIENT
Start: 2025-04-09 | End: 2025-04-09

## 2025-04-09 RX ADMIN — VALPROIC ACID 250 MG: 250 CAPSULE, LIQUID FILLED ORAL at 21:34

## 2025-04-09 RX ADMIN — OXYCODONE HYDROCHLORIDE 10 MG: 10 TABLET ORAL at 21:45

## 2025-04-09 RX ADMIN — ENOXAPARIN SODIUM 120 MG: 150 INJECTION SUBCUTANEOUS at 05:08

## 2025-04-09 RX ADMIN — METOPROLOL TARTRATE 37.5 MG: 25 TABLET, FILM COATED ORAL at 12:23

## 2025-04-09 RX ADMIN — OXYCODONE HYDROCHLORIDE 10 MG: 10 TABLET ORAL at 18:36

## 2025-04-09 RX ADMIN — CEFAZOLIN 2 G: 2 INJECTION, POWDER, FOR SOLUTION INTRAMUSCULAR; INTRAVENOUS at 05:20

## 2025-04-09 RX ADMIN — CEFAZOLIN 2 G: 2 INJECTION, POWDER, FOR SOLUTION INTRAMUSCULAR; INTRAVENOUS at 13:35

## 2025-04-09 RX ADMIN — OXYCODONE HYDROCHLORIDE 10 MG: 10 TABLET ORAL at 13:45

## 2025-04-09 RX ADMIN — POTASSIUM CHLORIDE 40 MEQ: 1500 TABLET, EXTENDED RELEASE ORAL at 05:07

## 2025-04-09 RX ADMIN — LABETALOL HYDROCHLORIDE 20 MG: 5 INJECTION, SOLUTION INTRAVENOUS at 06:25

## 2025-04-09 RX ADMIN — LOSARTAN POTASSIUM 50 MG: 50 TABLET, FILM COATED ORAL at 03:49

## 2025-04-09 RX ADMIN — OXYCODONE HYDROCHLORIDE 10 MG: 10 TABLET ORAL at 08:49

## 2025-04-09 RX ADMIN — LABETALOL HYDROCHLORIDE 20 MG: 5 INJECTION, SOLUTION INTRAVENOUS at 14:13

## 2025-04-09 RX ADMIN — FUROSEMIDE 20 MG: 10 INJECTION, SOLUTION INTRAVENOUS at 12:23

## 2025-04-09 RX ADMIN — HYDROMORPHONE HYDROCHLORIDE 0.5 MG: 1 INJECTION, SOLUTION INTRAMUSCULAR; INTRAVENOUS; SUBCUTANEOUS at 19:42

## 2025-04-09 RX ADMIN — CEFAZOLIN 2 G: 2 INJECTION, POWDER, FOR SOLUTION INTRAMUSCULAR; INTRAVENOUS at 21:43

## 2025-04-09 RX ADMIN — Medication 1 APPLICATOR: at 17:06

## 2025-04-09 RX ADMIN — METOPROLOL TARTRATE 37.5 MG: 25 TABLET, FILM COATED ORAL at 21:33

## 2025-04-09 RX ADMIN — METOPROLOL TARTRATE 25 MG: 25 TABLET, FILM COATED ORAL at 05:07

## 2025-04-09 RX ADMIN — LABETALOL HYDROCHLORIDE 20 MG: 5 INJECTION, SOLUTION INTRAVENOUS at 01:06

## 2025-04-09 RX ADMIN — VALPROIC ACID 250 MG: 250 CAPSULE, LIQUID FILLED ORAL at 13:33

## 2025-04-09 RX ADMIN — VALPROIC ACID 250 MG: 250 CAPSULE, LIQUID FILLED ORAL at 05:06

## 2025-04-09 RX ADMIN — METOPROLOL TARTRATE 25 MG: 25 TABLET, FILM COATED ORAL at 03:49

## 2025-04-09 RX ADMIN — ENOXAPARIN SODIUM 120 MG: 150 INJECTION SUBCUTANEOUS at 17:05

## 2025-04-09 RX ADMIN — POTASSIUM CHLORIDE 40 MEQ: 1500 TABLET, EXTENDED RELEASE ORAL at 03:49

## 2025-04-09 RX ADMIN — HYDROMORPHONE HYDROCHLORIDE 0.5 MG: 1 INJECTION, SOLUTION INTRAMUSCULAR; INTRAVENOUS; SUBCUTANEOUS at 23:06

## 2025-04-09 RX ADMIN — LOSARTAN POTASSIUM 50 MG: 50 TABLET, FILM COATED ORAL at 17:05

## 2025-04-09 RX ADMIN — Medication 1 APPLICATOR: at 05:06

## 2025-04-09 ASSESSMENT — COGNITIVE AND FUNCTIONAL STATUS - GENERAL
SUGGESTED CMS G CODE MODIFIER DAILY ACTIVITY: CL
EATING MEALS: A LOT
MOVING TO AND FROM BED TO CHAIR: A LOT
DRESSING REGULAR UPPER BODY CLOTHING: TOTAL
MOVING FROM LYING ON BACK TO SITTING ON SIDE OF FLAT BED: A LOT
HELP NEEDED FOR BATHING: TOTAL
PERSONAL GROOMING: A LOT
DRESSING REGULAR LOWER BODY CLOTHING: TOTAL
CLIMB 3 TO 5 STEPS WITH RAILING: A LOT
SUGGESTED CMS G CODE MODIFIER MOBILITY: CL
MOBILITY SCORE: 13
STANDING UP FROM CHAIR USING ARMS: A LOT
DAILY ACTIVITIY SCORE: 9
TOILETING: A LOT
WALKING IN HOSPITAL ROOM: A LOT
TURNING FROM BACK TO SIDE WHILE IN FLAT BAD: A LITTLE

## 2025-04-09 ASSESSMENT — PAIN DESCRIPTION - PAIN TYPE
TYPE: CHRONIC PAIN
TYPE: CHRONIC PAIN
TYPE: ACUTE PAIN;CHRONIC PAIN
TYPE: OTHER (COMMENT)
TYPE: ACUTE PAIN
TYPE: ACUTE PAIN
TYPE: CHRONIC PAIN

## 2025-04-09 ASSESSMENT — FIBROSIS 4 INDEX: FIB4 SCORE: 0.6

## 2025-04-09 NOTE — CARE PLAN
The patient is Watcher - Medium risk of patient condition declining or worsening    Shift Goals  Clinical Goals: hemodynamic stability, improved neuro status  Patient Goals: unable to assess  Family Goals: updates, patient comfort, patient pain management    Progress made toward(s) clinical / shift goals:    Problem: Knowledge Deficit - Standard  Goal: Patient and family/care givers will demonstrate understanding of plan of care, disease process/condition, diagnostic tests and medications  Outcome: Progressing     Problem: Skin Integrity  Goal: Skin integrity is maintained or improved  Outcome: Progressing     Problem: Fall Risk  Goal: Patient will remain free from falls  Outcome: Progressing     Problem: Pain - Standard  Goal: Alleviation of pain or a reduction in pain to the patient’s comfort goal  Outcome: Progressing     Problem: Communication  Goal: The ability to communicate needs accurately and effectively will improve  Outcome: Progressing     Problem: Safety  Goal: Will remain free from injury  Outcome: Progressing     Problem: Pain Management  Goal: Pain level will decrease to patient's comfort goal  Outcome: Progressing     Problem: Infection  Goal: Will remain free from infection  Outcome: Progressing     Problem: Respiratory:  Goal: Respiratory status will improve  Outcome: Progressing     Problem: Bowel/Gastric:  Goal: Normal bowel function is maintained or improved  Outcome: Progressing     Problem: Urinary Elimination:  Goal: Ability to reestablish a normal urinary elimination pattern will improve  Outcome: Progressing     Problem: Skin Integrity  Goal: Risk for impaired skin integrity will decrease  Outcome: Progressing     Problem: Mobility  Goal: Risk for activity intolerance will decrease  Outcome: Progressing     Problem: Knowledge Deficit  Goal: Knowledge of disease process/condition, treatment plan, diagnostic tests, and medications will improve  Outcome: Progressing         Patient is not  progressing towards the following goals:

## 2025-04-09 NOTE — PROGRESS NOTES
[facility] Medicine Daily Progress Note    Date of Service  4/9/2025    Chief Complaint  Roberto Braswell is a 44 y.o. male admitted 4/3/2025 with cardiac arrest    Hospital Course  Mr. Braswell has a past medical history of mitral valve repair 1/24/24 by Dr. Santana, multiple admissions for upper GI bleeds and an admission here in October though normal MRI and he was discharged with no neurological deficits.    He was in his usual state of health and on 3/17 had a urologic procedure on his epididymis. He was in the process of weaning off of narcotics for the previous 3 days.  On 4/3 he was watching TV with his wife, Destin. He got up and became dizzy. He sat back down and developed convulsions. His wife called 911. He stopped breathing and turned blue. She is a nurse and started CPR for 5 minutes until paramedics came. He was in Vfib and underwent defibrillation x 3 with 3 rounds of epi, IV amio and transferred to the ER where he was intubated.  In the emergency room, CT of the chest revealed distal right main pulmonary embolism and CT of the abdomen pelvis revealed ileus.  He was admitted to the intensive care unit on mechanical ventilation.  Ejection fraction was found to be 38% by echocardiogram and cardiology was consulted.  He was initiated on IV heparin drip due to the pulmonary emboli.  He was febrile and initiated on empiric IV Zosyn with apparent aspiration pneumonitis.  CT of the head was negative for acute processes.  EEG was negative for seizures.  He was successfully extubated on 4/5/2025.  After extubation he has required a Precedex drip due to agitation and remains encephalopathic.    Interval Problem Update  Patient seen and examined today.  Data, Medication data reviewed.  Case discussed with nursing as available.  Plan of Care reviewed with patient and notified of changes.  4/7: Mr. Braswell was evaluated in the ICU. He has required an IV Precedex drip due to agitation. Due to a prolonged QTc of 553  he is not a candidate for antipsychotic meds. Depakote 250 mg IV BID was started and will be transitioned to oral now that he has passed the swallow eval. His wife is at bedside. Advance diet.   4/8 the patient is off Precedex, still very confused, confabulating, moving all 4 extremities, still with Bai and rectal tube, discussed current status with wife at bedside, the patient is afebrile, heart rate in the 70s, respiration unlabored, blood pressure 140s to 150s over 80s, the patient is saturating well on 3 L, WBC 13.5 hemoglobin 12.2 hematocrit 36.3 platelet count 281 sodium 137 potassium 3.4 chloride 103 bicarb 19 glucose 94 BUN 11 creatinine 0.82 AST 50 ALT 71 alk phos 100  4/9 the patient with continued confusion, erratic speech, moving all 4 extremities strongly, does not appear to have a focal deficit, is not sleeping apparently since Precedex was stopped the patient restless, continues   Elevated blood pressure overnight with need for additional intervention, currently afebrile, heart rate in the 70s, respiration unlabored, on room air saturating the mid 90s, blood pressure in the 140s to 160s over 90s  WBC 9.8 hemoglobin 12.3 platelet count 327 sodium 139 potassium 3.3 chloride 107 bicarb 19 glucose 95   Obtaining neurology opinion  I have discussed this patient's plan of care and discharge plan at IDT rounds today with Case Management, Nursing, Nursing leadership, and other members of the IDT team.    Consultants/Specialty  Critical care  Cardiology, needs to be reevaluated prior to discharge to consider a AICD  Neurology    Code Status  Full Code    Disposition  The patient is not medically cleared for discharge to home or a post-acute facility.  Anticipate discharge to: skilled nursing facility    I have placed the appropriate orders for post-discharge needs.    Review of Systems  Review of Systems   Unable to perform ROS: Mental status change   Patient is confused, denies current pain or  verbalizing any complaints    Physical Exam  Temp:  [37.1 °C (98.7 °F)] 37.1 °C (98.7 °F)  Pulse:  [] 71  Resp:  [23-77] 39  BP: (137-175)/() 167/91  SpO2:  [88 %-96 %] 96 %    Physical Exam  Vitals and nursing note reviewed.   Constitutional:       Appearance: He is well-developed. He is ill-appearing.      Comments: Pt seen and examined.   HENT:      Head: Normocephalic and atraumatic.   Eyes:      Pupils: Pupils are equal, round, and reactive to light.   Cardiovascular:      Rate and Rhythm: Normal rate and regular rhythm.      Heart sounds: Normal heart sounds.   Pulmonary:      Effort: Pulmonary effort is normal.      Breath sounds: Normal breath sounds.      Comments: Nasal cannula oxygen  Abdominal:      General: Bowel sounds are normal. There is no distension.      Palpations: Abdomen is soft.      Tenderness: There is no abdominal tenderness.   Musculoskeletal:         General: Normal range of motion.      Cervical back: Normal range of motion and neck supple.      Right lower leg: No edema.      Left lower leg: No edema.   Skin:     General: Skin is warm and dry.   Neurological:      General: No focal deficit present.      Mental Status: He is alert.      Motor: Weakness present.      Comments: Moves extremities equally, confused   Psychiatric:      Comments: Restless,         Fluids    Intake/Output Summary (Last 24 hours) at 4/9/2025 0748  Last data filed at 4/8/2025 2356  Gross per 24 hour   Intake --   Output 1250 ml   Net -1250 ml        Laboratory  Recent Labs     04/07/25  0456 04/08/25  0230 04/09/25  0100   WBC 11.6* 13.5* 9.8   RBC 4.07* 4.44* 4.60*   HEMOGLOBIN 11.2* 12.2* 12.3*   HEMATOCRIT 33.7* 36.3* 37.8*   MCV 82.8 81.8 82.2   MCH 27.5 27.5 26.7*   MCHC 33.2 33.6 32.5   RDW 50.2* 49.5 49.5   PLATELETCT 256 281 327   MPV 9.0 8.6* 8.9*     Recent Labs     04/07/25  0456 04/08/25  0230 04/09/25  0100   SODIUM 136 137 139   POTASSIUM 3.9 3.4* 3.3*   CHLORIDE 105 103 107   CO2 22 19*  19*   GLUCOSE 111* 94 95   BUN 11 11 13   CREATININE 0.83 0.82 0.74   CALCIUM 8.1* 8.5 8.5                     Imaging  SP-RALAROO-6 VIEW   Final Result         1.  Nonspecific bowel gas pattern in the upper abdomen.      DX-CHEST-PORTABLE (1 VIEW)   Final Result         1.  Pulmonary edema and/or infiltrates are identified, which are stable since the prior exam.   2.  Cardiomegaly      DX-CHEST-PORTABLE (1 VIEW)   Final Result         1.  Pulmonary edema and/or infiltrates are identified, which appear somewhat increased since the prior exam.   2.  Cardiomegaly      CT-CTA NECK WITH & W/O-POST PROCESSING   Final Result         1.  CT angiogram of the neck within normal limits.   2.  Patchy bilateral upper lobe infiltrates         CT-CTA HEAD WITH & W/O-POST PROCESS   Final Result         1.  No large vessel occlusion or aneurysm identified   2.  Bilateral sinusitis changes      US-EXTREMITY VENOUS LOWER BILAT   Final Result      EC-ECHOCARDIOGRAM COMPLETE W/ CONT   Final Result      CT-ABDOMEN-PELVIS WITH   Final Result         1.  Fluid-filled prominence of proximal small bowel with distal small bowel decompression, appearance suggests ileus and/or enteritis versus evolving obstructive changes. Recommend radiographic follow-up to resolution as clinically appropriate.   2.  Irregular hepatic contour favoring changes of cirrhosis   3.  Fat-containing bilateral inguinal hernias      CT-CTA CHEST PULMONARY ARTERY W/ RECONS   Final Result         1.  Distal right main pulmonary embolus extending into right upper lobe and lower lobe segmental and subsegmental branches. 1.4 right to left ventricular ratio suggesting component of right heart strain.   2.  Evaluation of the subsegmental branches is essentially nondiagnostic due to motion artifacts. Additional imaging would be required for definitive evaluation of small distal pulmonary emboli.   3.  Scattered patchy bilateral pulmonary infiltrates.   4.  Bilateral dependent  consolidations, appearance suggests atelectasis, component of infiltrate not excluded.      These findings were discussed with the patient's clinician, Misbah Cardoza, on 4/3/2025 9:27 PM.      CT-HEAD W/O   Final Result      No acute intracranial findings.               DX-CHEST-PORTABLE (1 VIEW)   Final Result      Interval placement of a right IJ central line without evidence of complication.      DX-CHEST-PORTABLE (1 VIEW)   Final Result      1.  Support devices present.      2.  Cardiomegaly.      3.  Bibasilar atelectasis.           Assessment/Plan  * Cardiac arrest (HCC)- (present on admission)  Assessment & Plan  Cardiac arrest with CPR, defibrillation x 3 and 3 rounds epinephrine  Loaded with amiodarone  Now in sinus rhythm  Cardiology consulted  Decreased ejection fraction 38% likely transient given stunned myocardium  Continues telemetry monitoring  Optimize electrolytes specifically potassium and magnesium    Acute respiratory failure with hypoxia (HCC)- (present on admission)  Assessment & Plan  Requiring intubation on admission was extubated on 4/5/2025  Secondary to cardiac arrest  He continues to require supplemental oxygen will have continuous pulse oximetry in the IMCU    Encephalopathy acute- (present on admission)  Assessment & Plan  Acute metabolic encephalopathy in the setting of cardiac arrest  CT head negative  EEG negative for seizure activity  Remains on Precedex drip for safety which will be utilized during the nights and weaned off during the days  Valproic acid has been initiated for mood stabilization and increase to 250 mg TID (QTc is 550 thus not a candidate for antipsychotics).  He has passed the swallow eval  He may require MRI of the brain though certainly not safe at this point in time but may help in neuro prognostication if his mentation does not improve.    Cirrhosis of liver (HCC)  Assessment & Plan  Noted on imaging  Outpatient follow-up    Aspiration into airway- (present  on admission)  Assessment & Plan  Treated with Zosyn and if stable then consider de-escalation to Augmentin on 4/7/2025    Pulmonary embolus (HCC)- (present on admission)  Assessment & Plan  Noted on CTA  Status post IV heparin drip  He was transitioned to lovenox though given the possible drug-drug interaction with valproic acid, change to weight-based lovenox for now.  He has a history of multiple admissions for GI bleeds will need to be monitored closely    Seizure-like activity (HCC)- (present on admission)  Assessment & Plan  Seizure type activity likely due to anoxia  EEG negative    Ventricular fibrillation (HCC)- (present on admission)  Assessment & Plan  With defibrillation x 3  Now in sinus  Continuous telemetry monitoring    Pain, chronic- (present on admission)  Assessment & Plan  He had weaned himself off narcotics for 3 days prior to this event    History of MVR with cardiopulmonary bypass  Assessment & Plan  By Dr. Santana Jan 2024    Plan  4/9  The patient continues to have erratic speech, moves all 4 extremities strongly, very encephalopathic, confused  Continue with empiric antibiotics,  Full anticoagulation with Lovenox, monitor closely  Agree with beta-blockade, the patient will need to see cardiology prior to discharge to evaluate for possible AICD placement  Neurology consultation requested  At this time the patient is most likely still suffering from a degree of brain dysfunction from his resuscitation efforts, yet does not have profound anoxic injury  Ongoing PT OT mobilization, efforts to improve  Discussed with family at the bedside  AM labs  See orders  Patient is has a high medical complexity, complex decision making and is at high risk for complication, morbidity, and mortality.  I spent 54 minutes, reviewing the chart, obtaining and/or reviewing separately obtained history. Performing a medically appropriate examination and evaluation.  Counseling and educating the patient. Ordering  and reviewing medications, tests, or procedures.   Documenting clinical information in EPIC. Independently interpreting results and communicating results to patient. Discussing future disposition of care with patient, RN and case management.      VTE prophylaxis: Full dose Lovenox    I have performed a physical exam and reviewed and updated ROS and Plan today (4/9/2025). In review of yesterday's note (4/8/2025), there are no changes except as documented above.      Please note that this dictation was created using voice recognition software. I have made every reasonable attempt to correct obvious errors, but I expect that there are errors of grammar and possibly context that I did not discover before finalizing the note.

## 2025-04-09 NOTE — CARE PLAN
The patient is Watcher - Medium risk of patient condition declining or worsening  Problem: Fall Risk  Goal: Patient will remain free from falls  Outcome: Progressing     Problem: Pain - Standard  Goal: Alleviation of pain or a reduction in pain to the patient’s comfort goal  Outcome: Progressing     Problem: Communication  Goal: The ability to communicate needs accurately and effectively will improve  Outcome: Not Progressing     Problem: Safety  Goal: Will remain free from injury  Outcome: Progressing  Goal: Will remain free from falls  Outcome: Progressing     Problem: Pain Management  Goal: Pain level will decrease to patient's comfort goal  Outcome: Progressing       Shift Goals  Clinical Goals: hemodynamic stability, improved neuro status  Patient Goals: eloise  Family Goals: updates, pt get better    Progress made toward(s) clinical / shift goals:    Problem: Knowledge Deficit - Standard  Goal: Patient and family/care givers will demonstrate understanding of plan of care, disease process/condition, diagnostic tests and medications  Outcome: Progressing     Problem: Skin Integrity  Goal: Skin integrity is maintained or improved  Outcome: Progressing       Patient is not progressing towards the following goals:    Pt is still unable to communicate needs     Problem: Communication  Goal: The ability to communicate needs accurately and effectively will improve  Outcome: Not Progressing

## 2025-04-09 NOTE — CONSULTS
Neurology Initial Consult H&P  Neurohospitalist Service, [facility]    Referring Physician: Иван Del Real M.D.    Chief Complaint   Patient presents with    Cardiac Arrest     BIB [facility] from [place] for witnessed arrest. Pt was eating lunch and collapsed in front of his wife, CPR started by wife. On EMS arrival pt was in vfib. EMS administered 3 shocks, 3 rounds of EPI, 450mg amiodarone, 5mg Versed, 4mg Narcan and 40mg etomidate through IO. +ROSC.  In route, [facility] administered an additional 5mg Versed. FSBG 159. Pt arrives tachycardic in the 120s, 130/85, 99% IGEL in place.       HPI: Roberto Braswell is a 44 y.o. male admitted for cardiac arrest 04/03/2025, for whom neurology has been consulted for persistent delirium and agitation. Patient is seen with wife at bedside, history primarily obtained from patient's wife as patient is altered, speaking nonsensical words. He is alert and oriented to person and place only. Per patient's wife, he has been persistently confused since extubation 04/05/2025, speaking nonsensical words, intermittently following commands, unclear if he is able to understand conversation. It is unclear if patient has any other neurological symptoms as he is unable to participate in history. Patient's wife reports she witnessed possible seizure-like activity prior to initiation of CPR. Denies history of prior seizures, alcohol or drug use.     Review of systems: In addition to what is detailed in the HPI above, all other systems reviewed and are negative.    Past Medical History:    has a past medical history of Arthritis, Breath shortness (11/03/2023), Drug-seeking behavior (07/30/2023), Duodenal ulcer (07/30/2023), Gastric ulcer, Heart valve disease, Hemorrhagic disorder (HCC), Hypertension, Pain, and Seizure (HCC).    FHx:  family history includes Heart Disease in his mother; No Known Problems in his  father.    SHx:   reports that he has never smoked. He has never used smokeless tobacco. He reports that he does not currently use alcohol. He reports that he does not use drugs.    Allergies:  Allergies   Allergen Reactions    Morphine Vomiting    Tramadol Unspecified     Seizure  [id number]       Medications:    Current Facility-Administered Medications:     Pharmacy Consult Request ...Pain Management Review 1 Each, 1 Each, Other, PHARMACY TO DOSE, Tye Youssef M.D.    oxyCODONE immediate-release (Roxicodone) tablet 5 mg, 5 mg, Oral, Q3HRS PRN **OR** oxyCODONE immediate release (Roxicodone) tablet 10 mg, 10 mg, Oral, Q3HRS PRN, 10 mg at 04/09/25 0849 **OR** HYDROmorphone (Dilaudid) injection 0.5 mg, 0.5 mg, Intravenous, Q3HRS PRN, Tye Youssef M.D.    hydrALAZINE (Apresoline) injection 10 mg, 10 mg, Intravenous, Q4HRS PRN, Tye Youssef M.D.    Nozin nasal  swab, 1 Applicator, Each Nostril, BID, Иван Del Real M.D., 1 Applicator at 04/09/25 0506    losartan (Cozaar) tablet 50 mg, 50 mg, Oral, BID, Иван Del Real M.D.    metoprolol tartrate (Lopressor) tablet 37.5 mg, 37.5 mg, Oral, Q8HRS, Иван Del Real M.D.    furosemide (Lasix) injection 20 mg, 20 mg, Intravenous, Once, Иван Del Real M.D.    valproic acid (Depakene) IR capsule 250 mg, 250 mg, Oral, Q8HRS, Ray Thibodeaux M.D., 250 mg at 04/09/25 0506    enoxaparin (Lovenox) inj 120 mg, 1 mg/kg, Subcutaneous, Q12HRS, Ray Thibodeaux M.D., 120 mg at 04/09/25 0508    ceFAZolin (Ancef) 2 g in  mL IVPB, 2 g, Intravenous, Q8HRS, Ray Thibodeaux M.D., Stopped at 04/09/25 0550    senna-docusate (Pericolace Or Senokot S) 8.6-50 MG per tablet 2 Tablet, 2 Tablet, Oral, Q EVENING, 2 Tablet at 04/06/25 1712 **AND** polyethylene glycol/lytes (Miralax) Packet 1 Packet, 1 Packet, Oral, QDAY PRN, Barbara Gutierrez M.D.    acetaminophen (Tylenol) tablet 650 mg, 650 mg, Oral, Q4HRS PRN, Berto Ovalle M.D.    labetalol (Normodyne/Trandate)  "injection 20 mg, 20 mg, Intravenous, Q4HRS PRN, Barbara Gutierrez M.D., 20 mg at 04/09/25 0625    acetaminophen (Tylenol) suppository 650 mg, 650 mg, Rectal, Q4HRS PRN, Jessi Lopez M.D., 650 mg at 04/05/25 2255    Respiratory Therapy Consult, , Nebulization, Continuous RT, Misbah Cardoza M.D.    MD Alert...ICU Electrolyte Replacement per Pharmacy, , Other, PHARMACY TO DOSE, Misbah Cardoza M.D.    Physical Examination:     General: Patient is awake and in no acute distress  Eye: Examination of optic disks not indicated at this time given acuity of consult  Neck: There is normal range of motion  CV: regular rate   Extremities:  clear, dry, intact, without peripheral edema    NEUROLOGICAL EXAM:     BP (!) 167/91   Pulse 71   Temp 37.1 °C (98.7 °F) (Temporal)   Resp (!) 39   Ht 1.905 m (6' 3\")   Wt 118 kg (260 lb 12.9 oz)   SpO2 96%   BMI 32.60 kg/m²       Mental status: Awake, alert and oriented to person and place only. Impaired attention.  Speech and language: Speech is clear but nonsensical. The patient is able to intermittently follow commands  Cranial nerve exam: Pupils are equal, round and reactive to light bilaterally. Face is symmetric. Exam is limited secondary to impaired attention.  Motor exam: Moving all 4 extremities spontaneously. No abnormal movements were seen on exam.  Exam is limited secondary to impaired attention.  Sensory exam: Reacts to tactile in all 4 extremities.  Deep tendon reflexes:  2+ throughout. Toes down-going bilaterally.  Coordination: Unable to assess due to impaired attention.  Gait: Deferred due to patient preference        Objective Data:    Labs:  Lab Results   Component Value Date/Time    PROTHROMBTM 14.8 (H) 04/03/2025 10:30 PM    INR 1.16 (H) 04/03/2025 10:30 PM      Lab Results   Component Value Date/Time    WBC 9.8 04/09/2025 01:00 AM    RBC 4.60 (L) 04/09/2025 01:00 AM    HEMOGLOBIN 12.3 (L) 04/09/2025 01:00 AM    HEMATOCRIT 37.8 (L) 04/09/2025 01:00 AM "    MCV 82.2 04/09/2025 01:00 AM    MCH 26.7 (L) 04/09/2025 01:00 AM    MCHC 32.5 04/09/2025 01:00 AM    MPV 8.9 (L) 04/09/2025 01:00 AM    NEUTSPOLYS 64.60 04/09/2025 01:00 AM    LYMPHOCYTES 19.80 (L) 04/09/2025 01:00 AM    MONOCYTES 12.40 04/09/2025 01:00 AM    EOSINOPHILS 0.20 04/09/2025 01:00 AM    BASOPHILS 0.80 04/09/2025 01:00 AM    HYPOCHROMIA 1+ 06/20/2024 02:02 PM    ANISOCYTOSIS 1+ 04/03/2025 05:30 PM      Lab Results   Component Value Date/Time    SODIUM 139 04/09/2025 01:00 AM    POTASSIUM 3.3 (L) 04/09/2025 01:00 AM    CHLORIDE 107 04/09/2025 01:00 AM    CO2 19 (L) 04/09/2025 01:00 AM    GLUCOSE 95 04/09/2025 01:00 AM    BUN 13 04/09/2025 01:00 AM    CREATININE 0.74 04/09/2025 01:00 AM      Lab Results   Component Value Date/Time    CHOLSTRLTOT 117 04/04/2025 04:15 AM    LDL 42 04/04/2025 04:15 AM    HDL 50 04/04/2025 04:15 AM    TRIGLYCERIDE 282 (H) 04/05/2025 03:02 AM       Lab Results   Component Value Date/Time    ALKPHOSPHAT 84 04/09/2025 01:00 AM    ASTSGOT 32 04/09/2025 01:00 AM    ALTSGPT 49 04/09/2025 01:00 AM    TBILIRUBIN 0.3 04/09/2025 01:00 AM        Imaging/Testing:    I interpreted and/or reviewed the patient's neuroimaging    AP-FOMTUEL-0 VIEW   Final Result         1.  Nonspecific bowel gas pattern in the upper abdomen.      DX-CHEST-PORTABLE (1 VIEW)   Final Result         1.  Pulmonary edema and/or infiltrates are identified, which are stable since the prior exam.   2.  Cardiomegaly      DX-CHEST-PORTABLE (1 VIEW)   Final Result         1.  Pulmonary edema and/or infiltrates are identified, which appear somewhat increased since the prior exam.   2.  Cardiomegaly      CT-CTA NECK WITH & W/O-POST PROCESSING   Final Result         1.  CT angiogram of the neck within normal limits.   2.  Patchy bilateral upper lobe infiltrates         CT-CTA HEAD WITH & W/O-POST PROCESS   Final Result         1.  No large vessel occlusion or aneurysm identified   2.  Bilateral sinusitis changes       US-EXTREMITY VENOUS LOWER BILAT   Final Result      EC-ECHOCARDIOGRAM COMPLETE W/ CONT   Final Result      CT-ABDOMEN-PELVIS WITH   Final Result         1.  Fluid-filled prominence of proximal small bowel with distal small bowel decompression, appearance suggests ileus and/or enteritis versus evolving obstructive changes. Recommend radiographic follow-up to resolution as clinically appropriate.   2.  Irregular hepatic contour favoring changes of cirrhosis   3.  Fat-containing bilateral inguinal hernias      CT-CTA CHEST PULMONARY ARTERY W/ RECONS   Final Result         1.  Distal right main pulmonary embolus extending into right upper lobe and lower lobe segmental and subsegmental branches. 1.4 right to left ventricular ratio suggesting component of right heart strain.   2.  Evaluation of the subsegmental branches is essentially nondiagnostic due to motion artifacts. Additional imaging would be required for definitive evaluation of small distal pulmonary emboli.   3.  Scattered patchy bilateral pulmonary infiltrates.   4.  Bilateral dependent consolidations, appearance suggests atelectasis, component of infiltrate not excluded.      These findings were discussed with the patient's clinician, Misbah Cardoza, on 4/3/2025 9:27 PM.      CT-HEAD W/O   Final Result      No acute intracranial findings.               DX-CHEST-PORTABLE (1 VIEW)   Final Result      Interval placement of a right IJ central line without evidence of complication.      DX-CHEST-PORTABLE (1 VIEW)   Final Result      1.  Support devices present.      2.  Cardiomegaly.      3.  Bibasilar atelectasis.          Impression and Recommendations: Roberto Braswell is a 44 y.o. male admitted for cardiac arrest whom neurology has been consulted for persistent delirium and agitation.    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 Presents with symptoms of persistent confusion and impaired attention, ongoing  since 04/05/2025. Previously requiring Precedex GTT for agitation, however symptoms of confusion have persisted despite discontinuation 04/08/2025. Valproic acid was initiated on 04/06/2025 this hospital admission. Prior work-up this admission with unremarkable EEG and CTA head/neck. He also received a 3-day course of IV thiamine this admission. Of note, he was seen by neurology on prior hospitalization 10/2024 for an amnestic event, exhibiting odd behaviors.   -Recommend repeat EEG and MRI brain when patient is able to rule out any underlying diagnosis of seizure  -Will defer to psychiatry for management of inpatient medications for behavioral issues while inpatient  -Neurology will continue to follow      Yen Baptiste MD  Internal medicine resident PGY-3

## 2025-04-09 NOTE — THERAPY
Speech Language Pathology   Daily Treatment     Patient Name: Roberto Braswell  AGE:  44 y.o., SEX:  male  Medical Record #: [id number]  Date of Service: 4/9/2025      Precautions:  Precautions: Fall Risk, Swallow Precautions         Subjective  Supportive spouse at bedside. She endorsed that pt's cognition has not improved since last seen by this clinician and that he is typically only oriented to self.      Assessment  Pt consumed minimal trials of his regular/thin liquid breakfast when fed by SO with no overt s/sx of aspiration. He required encouragement for PO intake and cues to chew and swallow which spouse provided appropriately. She denied overt s/sx of aspiration but did endorse that pt has been requiring cues to chew/swallow and encouragement for PO intake. She reported that he has had poor PO intake over the last few days. Offered to speak with MD regarding supplement shakes which she declined at this time but would consider if PO intake did not improve.      Clinical Impressions  Pt is presenting with an acute change in swallow function 2/2 AMS. Recommend continuation of a regular/thin liquid diet with 1:1 feeding for implementation of cues to chew/swallow. Pt may benefit from a cognitive evaluation given cognition has not yet returned to baseline after being titrated off dex.      Recommendations  Treatment Completed: Dysphagia Treatment       Dysphagia Treatment  Diet Consistency: regular/thin liquids  Instrumentation: None indicated at this time  Medication: As tolerated  Supervision: Careful 1:1 hand feeding, 1:1 feeding with constant supervision  Positioning: Fully upright and midline during oral intake  Risk Management : Small bites/sips, Slow rate of intake                     SLP Treatment Plan  Treatment Plan: Dysphagia Treatment  SLP Frequency: 3x Per Week  Estimated Duration: Until Therapy Goals Met      Anticipated Discharge Needs  Discharge Recommendations: Recommend post-acute placement for  "additional speech therapy services prior to discharge home  Therapy Recommendations Upon DC: Dysphagia Training, Community Re-Integration, Patient / Family / Caregiver Education      Patient / Family Goals  Patient / Family Goal #1: \"water!\"  Goal #1 Outcome: Progressing as expected  Short Term Goals  Short Term Goal # 1: Patient will consume a full liquid diet with no overt s/sx of aspiration given careful 1:1 feeding.  Goal Outcome # 1: Goal met  Short Term Goal # 2: Pt will consume a regular/thin liquid diet with no overt s/sx of aspiration  Goal Outcome # 2 : Progressing as expected      Ally Calixto, SLP  "

## 2025-04-10 ENCOUNTER — APPOINTMENT (OUTPATIENT)
Dept: RADIOLOGY | Facility: MEDICAL CENTER | Age: 45
DRG: 208 | End: 2025-04-10
Attending: NURSE PRACTITIONER
Payer: COMMERCIAL

## 2025-04-10 LAB
ALBUMIN SERPL BCP-MCNC: 3.4 G/DL (ref 3.2–4.9)
ALBUMIN/GLOB SERPL: 1.1 G/DL
ALP SERPL-CCNC: 74 U/L (ref 30–99)
ALT SERPL-CCNC: 38 U/L (ref 2–50)
ANION GAP SERPL CALC-SCNC: 11 MMOL/L (ref 7–16)
AST SERPL-CCNC: 24 U/L (ref 12–45)
BASOPHILS # BLD AUTO: 0.9 % (ref 0–1.8)
BASOPHILS # BLD: 0.09 K/UL (ref 0–0.12)
BILIRUB SERPL-MCNC: 0.3 MG/DL (ref 0.1–1.5)
BUN SERPL-MCNC: 15 MG/DL (ref 8–22)
CALCIUM ALBUM COR SERPL-MCNC: 9.4 MG/DL (ref 8.5–10.5)
CALCIUM SERPL-MCNC: 8.9 MG/DL (ref 8.5–10.5)
CHLORIDE SERPL-SCNC: 107 MMOL/L (ref 96–112)
CO2 SERPL-SCNC: 21 MMOL/L (ref 20–33)
CREAT SERPL-MCNC: 0.8 MG/DL (ref 0.5–1.4)
EOSINOPHIL # BLD AUTO: 0.12 K/UL (ref 0–0.51)
EOSINOPHIL NFR BLD: 1.2 % (ref 0–6.9)
ERYTHROCYTE [DISTWIDTH] IN BLOOD BY AUTOMATED COUNT: 50.5 FL (ref 35.9–50)
GFR SERPLBLD CREATININE-BSD FMLA CKD-EPI: 112 ML/MIN/1.73 M 2
GLOBULIN SER CALC-MCNC: 3.2 G/DL (ref 1.9–3.5)
GLUCOSE BLD STRIP.AUTO-MCNC: 93 MG/DL (ref 65–99)
GLUCOSE SERPL-MCNC: 108 MG/DL (ref 65–99)
HCT VFR BLD AUTO: 39.6 % (ref 42–52)
HGB BLD-MCNC: 12.9 G/DL (ref 14–18)
IMM GRANULOCYTES # BLD AUTO: 0.25 K/UL (ref 0–0.11)
IMM GRANULOCYTES NFR BLD AUTO: 2.4 % (ref 0–0.9)
LYMPHOCYTES # BLD AUTO: 2.12 K/UL (ref 1–4.8)
LYMPHOCYTES NFR BLD: 20.7 % (ref 22–41)
MAGNESIUM SERPL-MCNC: 2.2 MG/DL (ref 1.5–2.5)
MCH RBC QN AUTO: 26.8 PG (ref 27–33)
MCHC RBC AUTO-ENTMCNC: 32.6 G/DL (ref 32.3–36.5)
MCV RBC AUTO: 82.2 FL (ref 81.4–97.8)
MONOCYTES # BLD AUTO: 1.31 K/UL (ref 0–0.85)
MONOCYTES NFR BLD AUTO: 12.8 % (ref 0–13.4)
NEUTROPHILS # BLD AUTO: 6.35 K/UL (ref 1.82–7.42)
NEUTROPHILS NFR BLD: 62 % (ref 44–72)
NRBC # BLD AUTO: 0 K/UL
NRBC BLD-RTO: 0 /100 WBC (ref 0–0.2)
PHOSPHATE SERPL-MCNC: 3.2 MG/DL (ref 2.5–4.5)
PLATELET # BLD AUTO: 388 K/UL (ref 164–446)
PMV BLD AUTO: 9.1 FL (ref 9–12.9)
POTASSIUM SERPL-SCNC: 3.4 MMOL/L (ref 3.6–5.5)
PROT SERPL-MCNC: 6.6 G/DL (ref 6–8.2)
RBC # BLD AUTO: 4.82 M/UL (ref 4.7–6.1)
SODIUM SERPL-SCNC: 139 MMOL/L (ref 135–145)
WBC # BLD AUTO: 10.2 K/UL (ref 4.8–10.8)

## 2025-04-10 PROCEDURE — 700111 HCHG RX REV CODE 636 W/ 250 OVERRIDE (IP): Mod: JZ | Performed by: STUDENT IN AN ORGANIZED HEALTH CARE EDUCATION/TRAINING PROGRAM

## 2025-04-10 PROCEDURE — 97167 OT EVAL HIGH COMPLEX 60 MIN: CPT

## 2025-04-10 PROCEDURE — A9270 NON-COVERED ITEM OR SERVICE: HCPCS | Performed by: HOSPITALIST

## 2025-04-10 PROCEDURE — 700111 HCHG RX REV CODE 636 W/ 250 OVERRIDE (IP): Performed by: HOSPITALIST

## 2025-04-10 PROCEDURE — 83735 ASSAY OF MAGNESIUM: CPT

## 2025-04-10 PROCEDURE — 36415 COLL VENOUS BLD VENIPUNCTURE: CPT

## 2025-04-10 PROCEDURE — 80053 COMPREHEN METABOLIC PANEL: CPT

## 2025-04-10 PROCEDURE — 700102 HCHG RX REV CODE 250 W/ 637 OVERRIDE(OP): Performed by: STUDENT IN AN ORGANIZED HEALTH CARE EDUCATION/TRAINING PROGRAM

## 2025-04-10 PROCEDURE — 700102 HCHG RX REV CODE 250 W/ 637 OVERRIDE(OP): Performed by: HOSPITALIST

## 2025-04-10 PROCEDURE — 97535 SELF CARE MNGMENT TRAINING: CPT

## 2025-04-10 PROCEDURE — 97163 PT EVAL HIGH COMPLEX 45 MIN: CPT

## 2025-04-10 PROCEDURE — 92523 SPEECH SOUND LANG COMPREHEN: CPT

## 2025-04-10 PROCEDURE — A9270 NON-COVERED ITEM OR SERVICE: HCPCS | Performed by: STUDENT IN AN ORGANIZED HEALTH CARE EDUCATION/TRAINING PROGRAM

## 2025-04-10 PROCEDURE — 82962 GLUCOSE BLOOD TEST: CPT

## 2025-04-10 PROCEDURE — 99233 SBSQ HOSP IP/OBS HIGH 50: CPT | Performed by: STUDENT IN AN ORGANIZED HEALTH CARE EDUCATION/TRAINING PROGRAM

## 2025-04-10 PROCEDURE — 85025 COMPLETE CBC W/AUTO DIFF WBC: CPT

## 2025-04-10 PROCEDURE — 700105 HCHG RX REV CODE 258: Performed by: HOSPITALIST

## 2025-04-10 PROCEDURE — 71045 X-RAY EXAM CHEST 1 VIEW: CPT

## 2025-04-10 PROCEDURE — 92526 ORAL FUNCTION THERAPY: CPT

## 2025-04-10 PROCEDURE — 770020 HCHG ROOM/CARE - TELE (206)

## 2025-04-10 PROCEDURE — 700111 HCHG RX REV CODE 636 W/ 250 OVERRIDE (IP): Mod: JZ | Performed by: NURSE PRACTITIONER

## 2025-04-10 PROCEDURE — 99233 SBSQ HOSP IP/OBS HIGH 50: CPT | Mod: GC | Performed by: PSYCHIATRY & NEUROLOGY

## 2025-04-10 PROCEDURE — 84100 ASSAY OF PHOSPHORUS: CPT

## 2025-04-10 PROCEDURE — 97602 WOUND(S) CARE NON-SELECTIVE: CPT

## 2025-04-10 RX ORDER — CYCLOBENZAPRINE HCL 10 MG
5 TABLET ORAL ONCE
Status: ACTIVE | OUTPATIENT
Start: 2025-04-10 | End: 2025-04-11

## 2025-04-10 RX ORDER — POTASSIUM CHLORIDE 1500 MG/1
20 TABLET, EXTENDED RELEASE ORAL DAILY
Status: COMPLETED | OUTPATIENT
Start: 2025-04-10 | End: 2025-04-14

## 2025-04-10 RX ORDER — QUETIAPINE FUMARATE 25 MG/1
50 TABLET, FILM COATED ORAL ONCE
Status: COMPLETED | OUTPATIENT
Start: 2025-04-11 | End: 2025-04-11

## 2025-04-10 RX ORDER — HALOPERIDOL 5 MG/ML
1 INJECTION INTRAMUSCULAR ONCE
Status: DISCONTINUED | OUTPATIENT
Start: 2025-04-10 | End: 2025-04-10

## 2025-04-10 RX ORDER — VALPROIC ACID 250 MG/5ML
250 SOLUTION ORAL 3 TIMES DAILY
Status: DISCONTINUED | OUTPATIENT
Start: 2025-04-10 | End: 2025-04-11

## 2025-04-10 RX ORDER — HYDROMORPHONE HYDROCHLORIDE 1 MG/ML
0.5 INJECTION, SOLUTION INTRAMUSCULAR; INTRAVENOUS; SUBCUTANEOUS ONCE
Status: COMPLETED | OUTPATIENT
Start: 2025-04-10 | End: 2025-04-10

## 2025-04-10 RX ADMIN — CEFAZOLIN 2 G: 2 INJECTION, POWDER, FOR SOLUTION INTRAMUSCULAR; INTRAVENOUS at 06:21

## 2025-04-10 RX ADMIN — ENOXAPARIN SODIUM 120 MG: 150 INJECTION SUBCUTANEOUS at 16:57

## 2025-04-10 RX ADMIN — HYDROMORPHONE HYDROCHLORIDE 0.5 MG: 1 INJECTION, SOLUTION INTRAMUSCULAR; INTRAVENOUS; SUBCUTANEOUS at 21:05

## 2025-04-10 RX ADMIN — HYDROMORPHONE HYDROCHLORIDE 0.5 MG: 1 INJECTION, SOLUTION INTRAMUSCULAR; INTRAVENOUS; SUBCUTANEOUS at 20:35

## 2025-04-10 RX ADMIN — Medication 1 APPLICATOR: at 06:07

## 2025-04-10 RX ADMIN — LOSARTAN POTASSIUM 50 MG: 50 TABLET, FILM COATED ORAL at 06:07

## 2025-04-10 RX ADMIN — VALPROIC ACID 250 MG: 250 SOLUTION ORAL at 16:58

## 2025-04-10 RX ADMIN — CEFAZOLIN 2 G: 2 INJECTION, POWDER, FOR SOLUTION INTRAMUSCULAR; INTRAVENOUS at 22:32

## 2025-04-10 RX ADMIN — LOSARTAN POTASSIUM 50 MG: 50 TABLET, FILM COATED ORAL at 16:57

## 2025-04-10 RX ADMIN — VALPROIC ACID 250 MG: 250 SOLUTION ORAL at 11:22

## 2025-04-10 RX ADMIN — VALPROIC ACID 250 MG: 250 SOLUTION ORAL at 06:07

## 2025-04-10 RX ADMIN — Medication 1 APPLICATOR: at 18:00

## 2025-04-10 RX ADMIN — OXYCODONE HYDROCHLORIDE 10 MG: 10 TABLET ORAL at 06:07

## 2025-04-10 RX ADMIN — POTASSIUM CHLORIDE 20 MEQ: 1500 TABLET, EXTENDED RELEASE ORAL at 08:56

## 2025-04-10 RX ADMIN — METOPROLOL TARTRATE 37.5 MG: 25 TABLET, FILM COATED ORAL at 06:07

## 2025-04-10 RX ADMIN — OXYCODONE HYDROCHLORIDE 10 MG: 10 TABLET ORAL at 11:22

## 2025-04-10 RX ADMIN — METOPROLOL TARTRATE 37.5 MG: 25 TABLET, FILM COATED ORAL at 14:44

## 2025-04-10 RX ADMIN — HYDROMORPHONE HYDROCHLORIDE 0.5 MG: 1 INJECTION, SOLUTION INTRAMUSCULAR; INTRAVENOUS; SUBCUTANEOUS at 12:18

## 2025-04-10 RX ADMIN — HYDROMORPHONE HYDROCHLORIDE 0.5 MG: 1 INJECTION, SOLUTION INTRAMUSCULAR; INTRAVENOUS; SUBCUTANEOUS at 16:58

## 2025-04-10 RX ADMIN — CEFAZOLIN 2 G: 2 INJECTION, POWDER, FOR SOLUTION INTRAMUSCULAR; INTRAVENOUS at 14:44

## 2025-04-10 RX ADMIN — OXYCODONE HYDROCHLORIDE 10 MG: 10 TABLET ORAL at 15:19

## 2025-04-10 RX ADMIN — ACETAMINOPHEN 650 MG: 325 TABLET, FILM COATED ORAL at 16:57

## 2025-04-10 RX ADMIN — ENOXAPARIN SODIUM 120 MG: 150 INJECTION SUBCUTANEOUS at 06:08

## 2025-04-10 SDOH — ECONOMIC STABILITY: TRANSPORTATION INSECURITY
IN THE PAST 12 MONTHS, HAS THE LACK OF TRANSPORTATION KEPT YOU FROM MEDICAL APPOINTMENTS OR FROM GETTING MEDICATIONS?: PATIENT UNABLE TO ANSWER

## 2025-04-10 SDOH — ECONOMIC STABILITY: TRANSPORTATION INSECURITY
IN THE PAST 12 MONTHS, HAS LACK OF RELIABLE TRANSPORTATION KEPT YOU FROM MEDICAL APPOINTMENTS, MEETINGS, WORK OR FROM GETTING THINGS NEEDED FOR DAILY LIVING?: PATIENT UNABLE TO ANSWER

## 2025-04-10 ASSESSMENT — PAIN DESCRIPTION - PAIN TYPE
TYPE: ACUTE PAIN

## 2025-04-10 ASSESSMENT — FIBROSIS 4 INDEX: FIB4 SCORE: 0.43

## 2025-04-10 ASSESSMENT — COGNITIVE AND FUNCTIONAL STATUS - GENERAL
HELP NEEDED FOR BATHING: A LOT
STANDING UP FROM CHAIR USING ARMS: A LOT
TOILETING: A LOT
DAILY ACTIVITIY SCORE: 12
CLIMB 3 TO 5 STEPS WITH RAILING: TOTAL
WALKING IN HOSPITAL ROOM: A LOT
DRESSING REGULAR LOWER BODY CLOTHING: A LOT
SUGGESTED CMS G CODE MODIFIER DAILY ACTIVITY: CL
MOBILITY SCORE: 11
TURNING FROM BACK TO SIDE WHILE IN FLAT BAD: A LOT
DRESSING REGULAR UPPER BODY CLOTHING: A LOT
PERSONAL GROOMING: A LOT
EATING MEALS: A LOT
SUGGESTED CMS G CODE MODIFIER MOBILITY: CL
MOVING TO AND FROM BED TO CHAIR: A LOT
MOVING FROM LYING ON BACK TO SITTING ON SIDE OF FLAT BED: A LOT

## 2025-04-10 ASSESSMENT — SOCIAL DETERMINANTS OF HEALTH (SDOH)
WITHIN THE PAST 12 MONTHS, THE FOOD YOU BOUGHT JUST DIDN'T LAST AND YOU DIDN'T HAVE MONEY TO GET MORE: PATIENT UNABLE TO ANSWER
WITHIN THE LAST YEAR, HAVE YOU BEEN AFRAID OF YOUR PARTNER OR EX-PARTNER?: PATIENT UNABLE TO ANSWER
IN THE PAST 12 MONTHS, HAS THE ELECTRIC, GAS, OIL, OR WATER COMPANY THREATENED TO SHUT OFF SERVICE IN YOUR HOME?: PATIENT UNABLE TO ANSWER
WITHIN THE LAST YEAR, HAVE YOU BEEN HUMILIATED OR EMOTIONALLY ABUSED IN OTHER WAYS BY YOUR PARTNER OR EX-PARTNER?: PATIENT UNABLE TO ANSWER
WITHIN THE LAST YEAR, HAVE TO BEEN RAPED OR FORCED TO HAVE ANY KIND OF SEXUAL ACTIVITY BY YOUR PARTNER OR EX-PARTNER?: PATIENT UNABLE TO ANSWER
WITHIN THE LAST YEAR, HAVE YOU BEEN KICKED, HIT, SLAPPED, OR OTHERWISE PHYSICALLY HURT BY YOUR PARTNER OR EX-PARTNER?: PATIENT UNABLE TO ANSWER
WITHIN THE PAST 12 MONTHS, YOU WORRIED THAT YOUR FOOD WOULD RUN OUT BEFORE YOU GOT THE MONEY TO BUY MORE: PATIENT UNABLE TO ANSWER

## 2025-04-10 ASSESSMENT — ACTIVITIES OF DAILY LIVING (ADL): TOILETING: INDEPENDENT

## 2025-04-10 ASSESSMENT — GAIT ASSESSMENTS: GAIT LEVEL OF ASSIST: UNABLE TO PARTICIPATE

## 2025-04-10 NOTE — THERAPY
Physical Therapy   Initial Evaluation     Patient Name: Roberto Braswell  Age:  44 y.o., Sex:  male  Medical Record #: [id number]  Today's Date: 4/10/2025     Precautions  Precautions: Fall Risk;Swallow Precautions  Comments: encephalopathic    Assessment  Patient is 44 y.o. male admitted after cardiac arrest with CPR and defibrilation. Extubated 4/5. Neurology suspects an underlying element of post-anoxic encephalopathy with some degree of hyperactive delirium. Pmhx of arthritis, GI issues, HTN, pain, seizure. Pt mobilized as detailed below, grossly Max/total A x2 person for bed mobility and ModA x2 for standing. Pt presents with impaired cognition, impulsivity, weakness, impaired balance, activity tolerance, and endurance. Recommend placement. Pt would benefit from continued acute IP PT services to address said deficits.     Plan    Physical Therapy Initial Treatment Plan   Treatment Plan : Bed Mobility, Equipment, Family / Caregiver Training, Gait Training, Manual Therapy, Neuro Re-Education / Balance, Self Care / Home Evaluation, Stair Training, Therapeutic Activities, Therapeutic Exercise  Treatment Frequency: 4 Times per Week  Duration: Until Therapy Goals Met    DC Equipment Recommendations: Unable to determine at this time  Discharge Recommendations: Recommend post-acute placement for additional physical therapy services prior to discharge home       Subjective    Spouse reporting this is pt's first time even mobilizing to EOB despite being in hospital for 7 days; appears so per chart.     Objective     04/10/25 1055   Vitals   O2 (LPM) 4   O2 Delivery Device Nasal Cannula   Vitals Comments new O2 needs, on RA at baseline   Pain 0 - 10 Group   Therapist Pain Assessment Post Activity Pain Same as Prior to Activity;Nurse Notified;0   Prior Living Situation   Housing / Facility 3 Story House   Steps Into Home   (can place ramp)   Steps In Home 6  (to bedroom/kitchen. 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 Spouse is RN but currently unable to work from car accident few months ago. Sister is RN supervisor at [employer], very supportive but has own family to care for. pt's father is also RN and supportive   Prior Level of Functional Mobility   Bed Mobility Independent   Transfer Status Independent   Ambulation Independent   Ambulation Distance   (community)   Assistive Devices Used None   Stairs Independent   Cognition    Cognition / Consciousness X   Orientation Level   (per RN intermittently A&O 1-3. was able to state in hospital)   Level of Consciousness Responds to voice  (initially very lethargic, spouse stating 2/2 lack of sleep. Once aroused was alert for remainder of eval)   Ability To Follow Commands 1 Step  (intermittment, pt often stating \"I don't want to\" in jovial manner. Wife stating partially his personality, however, would not refuse to perform a command at baseline)   Safety Awareness Impaired;Impulsive   New Learning Impaired   Attention Impaired   Sequencing Impaired   Initiation Impaired   Comments Pleasant and cooperative. Appeared some of pt's personality coming out, joking throughout eval. Redirectable mostly, at times fully refusing to complete task but did not become agitated   Passive ROM Lower Body   Passive ROM Lower Body WDL   Active ROM Lower Body    Comments difficulty assessing 2/2 cognition, intermittently able to perform RLE LAQ and BLE heel slide   Strength Lower Body   Comments no buckling noted in standing, grossly 3+/5 BLE   Sensation Lower Body   Lower Extremity Sensation   Not Tested   Lower Body Muscle Tone   Lower Body Muscle Tone  Not Tested   Comments no overt increase in tone noted "   Neurological Concerns   Neurological Concerns Yes   Sitting Posture During ADL's Lateral Lean Left;Posterior Lean;Posterior Push;Anterior Lean  (fluctating trunk positioning, able to correct with cues. No overt uncontrolled LOB in any direction, however, therapist provided cues often to prevent)   Standing Posture During ADL's Posterior Lean   Equilibrium Reaction Impaired   Coordination Lower Body    Comments impaired BLE, however, appears mainly 2/2 cognition currently   Vision   Vision Comments unable to formally assess. Able to make eye contact with spouse and attend to her. Seemed to have L gaze preference in supine   Balance Assessment   Sitting Balance (Static) Poor -   Sitting Balance (Dynamic) Trace +   Standing Balance (Static) Trace   Standing Balance (Dynamic) Trace   Weight Shift Sitting Poor   Weight Shift Standing Poor   Comments via B HHA, was able to demonstrate standing lateral L/R weight shifting for 30 seconds approx   Bed Mobility    Supine to Sit Maximal Assist   Sit to Supine Total Assist   Scooting Maximal Assist   Rolling Maximal Assist to Rt.   Comments x2 person   Gait Analysis   Gait Level Of Assist Unable to Participate   Functional Mobility   Sit to Stand Moderate Assist  (x2 person)   Bed, Chair, Wheelchair Transfer Unable to Participate   Mobility supine, EOB, stand, weight shifting, EOB, supine   6 Clicks Assessment - How much HELP from from another person do you currently need... (If the patient hasn't done an activity recently, how much help from another person do you think he/she would need if he/she tried?)   Turning from your back to your side while in a flat bed without using bedrails? 2   Moving from lying on your back to sitting on the side of a flat bed without using bedrails? 2   Moving to and from a bed to a chair (including a wheelchair)? 2   Standing up from a chair using your arms (e.g., wheelchair, or bedside chair)? 2   Walking in hospital room? 2   Climbing 3-5  steps with a railing? 1   6 clicks Mobility Score 11   Activity Tolerance   Sitting Edge of Bed 5+ min   Standing 1-2 min   Short Term Goals    Short Term Goal # 1 Pt will perform supine <> sit with [place] in 6 visits to get in/out of bed   Short Term Goal # 2 Pt will perform stand step transfers with FWW and [place] in 6 visits to get in/out of chair   Short Term Goal # 3 Pt will ambulate 15ft with FWW and ModA in 6 visits to access home environment   Education Group   Education Provided Role of Physical Therapist   Role of Physical Therapist Patient Response Patient;Acceptance;Explanation;Verbal Demonstration;Significant Other   Additional Comments Pt and spouse receptive to self management and compensatory strategies with mobility, neuromuscular re-education, attending to task, cues for safety with mobility   Physical Therapy Initial Treatment Plan    Treatment Plan  Bed Mobility;Equipment;Family / Caregiver Training;Gait Training;Manual Therapy;Neuro Re-Education / Balance;Self Care / Home Evaluation;Stair Training;Therapeutic Activities;Therapeutic Exercise   Treatment Frequency 4 Times per Week   Duration Until Therapy Goals Met   Problem List    Problems Impaired Bed Mobility;Impaired Transfers;Impaired Ambulation;Functional Strength Deficit;Impaired Balance;Decreased Activity Tolerance;Safety Awareness Deficits / Cognition;Impaired Coordination;Impaired Vision;Motor Planning / Sequencing   Anticipated Discharge Equipment and Recommendations   DC Equipment Recommendations Unable to determine at this time   Discharge Recommendations Recommend post-acute placement for additional physical therapy services prior to discharge home   Interdisciplinary Plan of Care Collaboration   IDT Collaboration with  Nursing;Occupational Therapist;Family / Caregiver  (Co-eval with OT given high pt complexity, requiring 2 skilled therapist to mobilize given impaired cognition and impulsivity, max/total assist for mobility, and  monitoring vitals)   Patient Position at End of Therapy In Bed;Bed Alarm On;Call Light within Reach;Tray Table within Reach;Phone within Reach;Family / Friend in Room   Collaboration Comments RN updated   Session Information   Date / Session Number  4/10- 1 (1/4, 4/16)

## 2025-04-10 NOTE — THERAPY
"Speech Language Pathology   Cognitive-Linguistic Evaluation & Swallow Treatment     Patient Name: Roberto Braswell  AGE:  44 y.o., SEX:  male  Medical Record #: [id number]  Date of Service: 4/10/2025    History of Present Illness  44 y.o. male with past medical history of a GDA bleed in 2023 status post IR embolization 5/24, severe degenerative mitral valve prolapse status post mitral valve repair 1/2024, and chronic pain presenting with out-of-hospital cardiac arrest on 4/3. Intubated 4/3 - 4/5    Head CT 4/4/25: \"1.  No large vessel occlusion or aneurysm identified  2.  Bilateral sinusitis changes\"     Per neuro note 4/09: \"Neuro-behavioral signs and symptoms that have emerged post-cardiac arrest. I suspect an underlying element of post-anoxic encephalopathy with some degree of hyperactive delirium seemingly improving (I.e. significantly less agitation today) perhaps with additional functional features noted on examination\". Recommended MRI. Subsequently a ST Cognitive-Linguistic Evaluation was placed.     Previous ST Services:  Aphasia Evaluation on 10/25/24: \"Patient presents with a mild receptive and moderate expressive aphasia, most consistent with transcortical motor aphasia per WAB scoring criteria. However, there were some inconsistencies in his presentation and abilities across tasks on repeated attempts at the same task after a short period of time. SLP will follow for aphasia tx\"   - Of note, pt's head imaging (MRI, CT) was all negative for acute abnormality during this admission. Spouse reports pt's deficits resolved spontaneously and completely a few days after this evaluation was completed    General Information  Vitals  O2 (LPM): 5  O2 Delivery Device: Nasal Cannula  Level of Consciousness: Alert, Awake  Patient Behaviors: Confused, Restless     Follows Directives: Yes - simple commands only    Prior Living Situation & Level of Function  Prior Services: None  Housing / Facility: [address] [facility] " "with - Patient's Self Care Capacity: Spouse     Communication: WNL  Swallowing: WNL     Oral Mechanism Evaluation  Dentition: Good, Natural dentition  Facial Symmetry: Equal  Facial Sensation: Pt did not follow commands to assess  Labial Observations: WFL  Lingual Observations: Midline  Motor Speech: WNL    Laryngeal Function Exam  Secretion Management: Adequate  Voice Quality: WFL  Cough: Perceptually WNL    Subjective  Pt seen alert, restless and confused w/ his souse at bedside. He was saturating on 5L NC and his breakfast tray was available.    Communication Domain(s)  Expressive Language: Moderate  Receptive Language: Moderate  Cognitive-Linguistic: Severe  Reading: Moderate  Social/Pragmatic: Mild    Assessment  The patient was seen this date for a Speech-Language/Cognitive evaluation. Due to the severity of his deficits, no entire standardized test could be given the therapist administered parts of the Cognistat, bedside WAB and other informal measures for this assessment.     Expressive: He was able to name functional items also w/ 70% accuracy. Noted w/ intermittent semantic paraphasias for some items. His verbalizations were c/b rapid mumbling with some rote phrases \"Hi how are you\" and nonsensical speech. He was able to express his basic wants/needs w/ mod cueing, but required increased cueing for more complex ideas. He engaged in simple automatic speech tasks (e.g., counting) w/ mod cueing faded to modified independence  Receptive: The pt followed one-step commands with 90% accuracy, 0% accuracy for two-step commands. The pt answered simple yes/no questions with ~ 70% accuracy- accuracy notably declined with more complex questions.   Motor Speech: WNL  Cognition: The pt was restless w/ impaired attention to structured tasks. He required consistent mod tactile and verbal cueing to maintain attention to tasks such as eating breakfast and completing evaluation items. He was easily distracted by " environmental stimuli (e.g., noises in hallway, door opening). He would benefit from completing one task at a time in a quiet environment.   Reading: The pt was able to read single large-print words with 70% accuracy (noted w/ intermittent paraphasias) and matched +4/5 items to their corresponding word correctly.   Writing: This was not assessed d/t the pt's upper extremity deficits- he is unable to hold a pen but he did exhibit pointing w/ min tactile cueing    The patient was also seen on this date for swallow treatment. His breakfast tray was present and SLP provided 1:1 feeding. Pt w/ fair bolus stripping and containment- spouse reported he needs increased cueing at the end of the day for consistent bolus containment, mastication and clearance. There was 1x instance of throat clearing, no other overt s/s of aspiration noted.     Clinical Impressions   - Signs of mixed cognitive-linguistic deficits causing expressive and receptive deficits. Suspect etiology is cognitive as the pt is suspected w/ anoxia per neuro note. (MRI is still pending) Recommend to target basic yes/no questions and command following. The pt requires mod assistance from his conversational partner to consistently express his basic wants/needs and would currently require assistance w/ all iADLs. ST to f/u to maximize independence post d/c.     - Ongoing signs of oral stage dysphagia in the context of neuro deficits of unknown etiology w/ motor sx and confusion. Diet modification does not appear indicated, but pt requires careful 1:1 feeding with general swallow/aspiration precautions (e.g., upright, slow rate, small bites/sips). Pt's swallowing appears to wax and wane (spouse reports decline towards the end of the day (dinner))- consider holding meals if pt not clearing his oral cavity completely. The spouse has been providing 1:1 feeding and demo'd excellent understanding of swallow precautions and feeding strategies.     NOTE: It is not  "within the scope of practice of Speech-Language Pathologists to determine patient capacity. Please defer to the physician or psych to complete this assessment.     Recommendations  Supervision Needs Upons Discharge: Direct assistance with IADLs (see below)  IADLs: Medication management, Financial management, Appointment management, Household chores, Cooking    Dysphagia Treatment  Diet Consistency: regular/thin liquids  Instrumentation: None indicated at this time  Medication: As tolerated  Supervision: Careful 1:1 hand feeding, 1:1 feeding with constant supervision  Positioning: Fully upright and midline during oral intake  Risk Management : Small bites/sips, Slow rate of intake     SLP Treatment Plan  Treatment Plan: Dysphagia Treatment, Speech-Language Treatment, Patient/Family/Caregiver Training  SLP Frequency: 3x Per Week  Estimated Duration: Until Therapy Goals Met    Anticipated Discharge Needs  Discharge Recommendations: Recommend post-acute placement for additional speech therapy services prior to discharge home  Therapy Recommendations Upon DC: Dysphagia Training, Comprehension Training, Expression Training, Cognitive-Linguistic Training, Reading Training    Patient / Family Goals  Patient / Family Goal #1: \"water!\"  Goal #1 Outcome: Goal met  Short Term Goal # 1: Patient will consume a full liquid diet with no overt s/sx of aspiration given careful 1:1 feeding.  Goal Outcome # 1: Goal met  Short Term Goal # 2: Pt will consume a regular/thin liquid diet with no overt s/sx of aspiration  Goal Outcome # 2 : Progressing as expected  Short Term Goal # 3: Pt will follow two-step commands with > 80% accuracy and min cueing  Short Term Goal # 4: Pt will answer simple yes/no questions with > 80% accuracy and min cueing    MAVIS Sevilla  "

## 2025-04-10 NOTE — PROGRESS NOTES
[facility] Medicine Daily Progress Note    Date of Service  4/10/2025    Chief Complaint  Roberto Braswell is a 44 y.o. male admitted 4/3/2025 with cardiac arrest    Hospital Course  Mr. Braswell has a past medical history of mitral valve repair 1/24/24 by Dr. Santana, multiple admissions for upper GI bleeds and an admission here in October for expressive aphasia and abnormal behavior though normal MRI and he was discharged with no neurological deficits.    He was in his usual state of health and on 3/17 had a urologic procedure on his epididymis. He was in the process of weaning off of narcotics for the previous 3 days per report. On 4/3 he was watching TV with his wife, Destin. He got up and became dizzy. He sat back down and developed convulsions. His wife called 911. He stopped breathing and turned blue. She is a nurse and started CPR for 5 minutes until paramedics came. He was in Vfib on their arrival and underwent defibrillation x 3 with 3 rounds of epi, IV amio and transferred to the ER where he was intubated.  In the emergency room, CT of the chest revealed distal right main pulmonary embolism and CT of the abdomen pelvis revealed ileus.  He was admitted to the intensive care unit on mechanical ventilation.  Ejection fraction was found to be 38% by echocardiogram and cardiology was consulted.  He was initiated on IV heparin drip due to the pulmonary emboli which has since been transitioned to Subq Lovenox.  He was febrile and initiated on empiric IV Zosyn with apparent aspiration pneumonitis transitioned to ancef as tracheal aspirate growing MSSA .  CT of the head was negative for acute processes.  EEG was negative for seizures.  He was successfully extubated on 4/5/2025.  After extubation he has required a Precedex drip due to agitation and remains encephalopathic. He has since been transferred to tele floor.     Interval Problem Update  Patient seen at bedside. His wife is present. He is working with  "speech therapy on my entrance to the room. He responds to my entrance and states \"Hi\", responds okay when asked how he is doing. Extremities are restless but he is moving all extremities. Opted to come back after speech evaluation and on my return he was sleeping. Per report pt has gotten very little sleep thus I did not wake him up for evaluation.   - discussed case with transferring MD, neurology and patients wife.   - depakote dosing increased, will trial melatonin to help with sleep as he has not been getting any which is likely contributing to his encephalopathy  - will complete ancef tomorrow    - his labs are stable, K 3.4 this AM, replacement given   - neuro recommending repeat spot EEG and MRI to complete workup however holding for now as he will be unlikely to complete these tests with with current restlessness.   - does have waxing and waning symptoms that do not fit a clear neurologic pattern, likely a functional component. May benefit from psychiatry consult to held aid in recovery. Will discuss with wife and patient prior to consultation.     I have discussed this patient's plan of care and discharge plan at IDT rounds today with Case Management, Nursing, Nursing leadership, and other members of the IDT team.    Consultants/Specialty  Critical care  Cardiology, needs to be reevaluated prior to discharge to consider a AICD  Neurology    Code Status  Full Code    Disposition  The patient is not medically cleared for discharge to home or a post-acute facility.  Anticipate discharge to: an inpatient rehabilitation hospital    I have placed the appropriate orders for post-discharge needs.    Review of Systems  Review of Systems   Unable to perform ROS: Mental status change       Physical Exam  Temp:  [36.2 °C (97.2 °F)-37.1 °C (98.7 °F)] 36.6 °C (97.9 °F)  Pulse:  [78-87] 79  Resp:  [16-25] 16  BP: (122-153)/(80-96) 122/80  SpO2:  [92 %-97 %] 95 %    Physical Exam  Vitals and nursing note reviewed. "   Constitutional:       Appearance: He is well-developed. He is ill-appearing.      Comments: Pt seen and examined.   HENT:      Head: Normocephalic and atraumatic.   Eyes:      Pupils: Pupils are equal, round, and reactive to light.   Cardiovascular:      Rate and Rhythm: Normal rate and regular rhythm.      Heart sounds: Normal heart sounds.   Pulmonary:      Effort: Pulmonary effort is normal.      Breath sounds: Normal breath sounds.      Comments: Nasal cannula oxygen  Abdominal:      General: Bowel sounds are normal. There is no distension.      Palpations: Abdomen is soft.      Tenderness: There is no abdominal tenderness.   Musculoskeletal:         General: Normal range of motion.      Cervical back: Normal range of motion and neck supple.      Right lower leg: No edema.      Left lower leg: No edema.   Skin:     General: Skin is warm and dry.   Neurological:      General: No focal deficit present.      Mental Status: He is alert.      Comments: Moves extremities equally, confused   Psychiatric:      Comments: Restless,         Fluids    Intake/Output Summary (Last 24 hours) at 4/10/2025 1657  Last data filed at 4/9/2025 1942  Gross per 24 hour   Intake 800 ml   Output 800 ml   Net 0 ml        Laboratory  Recent Labs     04/08/25  0230 04/09/25  0100 04/10/25  0434   WBC 13.5* 9.8 10.2   RBC 4.44* 4.60* 4.82   HEMOGLOBIN 12.2* 12.3* 12.9*   HEMATOCRIT 36.3* 37.8* 39.6*   MCV 81.8 82.2 82.2   MCH 27.5 26.7* 26.8*   MCHC 33.6 32.5 32.6   RDW 49.5 49.5 50.5*   PLATELETCT 281 327 388   MPV 8.6* 8.9* 9.1     Recent Labs     04/08/25  0230 04/09/25  0100 04/10/25  0434   SODIUM 137 139 139   POTASSIUM 3.4* 3.3* 3.4*   CHLORIDE 103 107 107   CO2 19* 19* 21   GLUCOSE 94 95 108*   BUN 11 13 15   CREATININE 0.82 0.74 0.80   CALCIUM 8.5 8.5 8.9                     Imaging  [id number] VIEW   Final Result         1.  Nonspecific bowel gas pattern in the upper abdomen.      DX-CHEST-PORTABLE (1 VIEW)   Final Result          1.  Pulmonary edema and/or infiltrates are identified, which are stable since the prior exam.   2.  Cardiomegaly      DX-CHEST-PORTABLE (1 VIEW)   Final Result         1.  Pulmonary edema and/or infiltrates are identified, which appear somewhat increased since the prior exam.   2.  Cardiomegaly      CT-CTA NECK WITH & W/O-POST PROCESSING   Final Result         1.  CT angiogram of the neck within normal limits.   2.  Patchy bilateral upper lobe infiltrates         CT-CTA HEAD WITH & W/O-POST PROCESS   Final Result         1.  No large vessel occlusion or aneurysm identified   2.  Bilateral sinusitis changes      US-EXTREMITY VENOUS LOWER BILAT   Final Result      EC-ECHOCARDIOGRAM COMPLETE W/ CONT   Final Result      CT-ABDOMEN-PELVIS WITH   Final Result         1.  Fluid-filled prominence of proximal small bowel with distal small bowel decompression, appearance suggests ileus and/or enteritis versus evolving obstructive changes. Recommend radiographic follow-up to resolution as clinically appropriate.   2.  Irregular hepatic contour favoring changes of cirrhosis   3.  Fat-containing bilateral inguinal hernias      CT-CTA CHEST PULMONARY ARTERY W/ RECONS   Final Result         1.  Distal right main pulmonary embolus extending into right upper lobe and lower lobe segmental and subsegmental branches. 1.4 right to left ventricular ratio suggesting component of right heart strain.   2.  Evaluation of the subsegmental branches is essentially nondiagnostic due to motion artifacts. Additional imaging would be required for definitive evaluation of small distal pulmonary emboli.   3.  Scattered patchy bilateral pulmonary infiltrates.   4.  Bilateral dependent consolidations, appearance suggests atelectasis, component of infiltrate not excluded.      These findings were discussed with the patient's clinician, Misbah Cardoza, on 4/3/2025 9:27 PM.      CT-HEAD W/O   Final Result      No acute intracranial findings.                DX-CHEST-PORTABLE (1 VIEW)   Final Result      Interval placement of a right IJ central line without evidence of complication.      DX-CHEST-PORTABLE (1 VIEW)   Final Result      1.  Support devices present.      2.  Cardiomegaly.      3.  Bibasilar atelectasis.           Assessment/Plan  * Cardiac arrest (HCC)- (present on admission)  Assessment & Plan  Cardiac arrest with CPR, defibrillation x 3 and 3 rounds epinephrine, unclear etiology   Loaded with amiodarone  Now in sinus rhythm  Cardiology consulted  Decreased ejection fraction 38% likely transient given stunned myocardium  Continues telemetry monitoring  Optimize electrolytes specifically potassium and magnesium    Acute respiratory failure with hypoxia (HCC)- (present on admission)  Assessment & Plan  Requiring intubation on admission was extubated on 4/5/2025  Secondary to cardiac arrest  He continues to require supplemental oxygen will have continuous pulse oximetry in the IMCU    Encephalopathy acute- (present on admission)  Assessment & Plan  Acute metabolic encephalopathy in the setting of cardiac arrest  CT head negative  EEG negative for seizure activity  Off Precedex drip   Valproic acid has been initiated for mood stabilization and increase to 500 mg TID  He may require MRI of the brain though certainly not safe at this point in time but may help in neuro prognostication if his mentation does not improve, will hold until he is better able to tolerate for a better quality study   Likely multifactorial, possible mild hypoxic injury, delerium and likely functional component as well. Discussed with neurology     Cirrhosis of liver (HCC)  Assessment & Plan  Noted on imaging  Outpatient follow-up    Aspiration into airway- (present on admission)  Assessment & Plan  Treated with Zosyn, de-escalation to ancef, respiratory cx with MSSA, will complete tomorrow   Speech following, diet per speech    Pulmonary embolus (HCC)- (present on  admission)  Assessment & Plan  Noted on CTA  Status post IV heparin drip  He was transitioned to lovenox though given the possible drug-drug interaction with valproic acid, change to weight-based lovenox for now.  He has a history of multiple admissions for GI bleeds will need to be monitored closely    Seizure-like activity (HCC)- (present on admission)  Assessment & Plan  Seizure type activity likely due to anoxia  EEG negative  Plan to repeat EEG and MRI once patient is clarence able to tolerate   Seizure and fall precautions    Ventricular fibrillation (HCC)- (present on admission)  Assessment & Plan  With defibrillation x 3  Now in sinus  Continuous telemetry monitoring  Cardiology eval prior to dc to discuss AICD    Pain, chronic- (present on admission)  Assessment & Plan  He had weaned himself off narcotics for 3 days prior to this event  Has been on chronic oxycodone 10 mg 5 x daily (last fill 3/24)   Prn oxy     History of MVR with cardiopulmonary bypass  Assessment & Plan  By Dr. Santana Jan 2024  Echo reviewed Known mitral valve repair that is functioning normally with appropriate transvalvular gradient. Reduced EF and hypokinesis present likely from cardiac stunning         VTE prophylaxis: Full dose Lovenox    I have performed a physical exam and reviewed and updated ROS and Plan today (4/10/2025). In review of yesterday's note (4/9/2025), there are no changes except as documented above.

## 2025-04-10 NOTE — CARE PLAN
The patient is Watcher - Medium risk of patient condition declining or worsening    Shift Goals  Clinical Goals: hemodynamic stability; monitor neuro status  Patient Goals: comfort  Family Goals: updates, comfort, improved mentation    Progress made toward(s) clinical / shift goals:    Problem: Knowledge Deficit - Standard  Goal: Patient and family/care givers will demonstrate understanding of plan of care, disease process/condition, diagnostic tests and medications  Outcome: Progressing  Note: Patient, family, and white board updated with POC and care team information during bedside report.     Problem: Skin Integrity  Goal: Skin integrity is maintained or improved  Outcome: Progressing  Note: Pt self repositions. Incontinence care provided and barrier paste applied as needed.      Problem: Fall Risk  Goal: Patient will remain free from falls  Outcome: Progressing  Note: Fall precautions in place. Bed in lowest position. Non-skid socks in place. Personal possessions within reach. Mobility sign on door. Bed-alarm on. Call light within reach. Pt and family educated regarding fall prevention and states understanding. Telesitter in place.     Problem: Pain - Standard  Goal: Alleviation of pain or a reduction in pain to the patient’s comfort goal  Outcome: Progressing  Note: Pt assessed for pain regularly and medicated PRN per MAR.      Problem: Psychosocial Needs:  Goal: Level of anxiety will decrease  Outcome: Progressing  Note: Family at bedside for pt comfort       Patient is not progressing towards the following goals:

## 2025-04-10 NOTE — THERAPY
Occupational Therapy   Initial Evaluation     Patient Name: Roberto Braswell  Age:  44 y.o., Sex:  male  Medical Record #: [id number]  Today's Date: 4/10/2025     Precautions  Precautions: Fall Risk, Swallow Precautions  Comments: encephalopathic    Assessment  Patient is 44 y.o. male with a diagnosis of admit 4/3 from [place] for witnessed cardiac arrest, seizure-like activity; CPR started by wife. He was intubated 4/3-4/5. He was found to have a PE which was managed medically. 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 He has a hx of MVR, chronic pain,liver cirrhosis, HTN, restless leg syndrome.    Today, he was pleasant and cooperative, however with childlike affect throughout and impaired sequencing, initiation, and motor planning. He was difficult to formally assess due to this. He was observed to have R UE deficits including impaired proprioception, possible R neglect, impaired R coordination, and impaired strength. He was able to stand w/ therapist assist and weight shift in place at the bedside. He needed assistance to complete all ADLs. See below for more details.    Pt is currently limited by weakness, fatigue, impaired balance, impaired cognition, impaired safety awareness, impaired vision, and impaired RUE function, which impacts ability to complete ADLs, ADL txfs, and functional mobility.      Plan    Occupational Therapy Initial Treatment Plan   Treatment Interventions: Self Care / Activities of Daily Living, Adaptive Equipment, Neuro Re-Education / Balance, Therapeutic Exercises, Therapeutic Activity, Family / Caregiver Training, Sensory Integration Techniques, Manual Therapy Techniques, Cognitive Skill Development  Treatment Frequency: 5 Times per Week  Duration: Until Therapy Goals Met    DC Equipment Recommendations: Unable to determine at this time  Discharge Recommendations: Recommend post-acute placement for additional occupational therapy services prior to discharge home  "(PM&R)     Subjective    \"I don't wannaaaaaaaa!\"     Objective       04/10/25 1057   Prior Living Situation   Prior Services Home-Independent   Housing / Facility 3 Story House   Bathroom Set up Walk In Shower;Shower Chair   Equipment Owned Tub / Shower Seat;Front-Wheel Walker;4-Wheel Walker;Wheelchair;Single Point Cane   Lives with - Patient's Self Care Capacity Spouse   Comments 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 Spouse is an RN at [employer] but currently unable to work from car accident few months ago. Sister is RN supervisor at [employer], very supportive but has own family to care for. pt's father is also RN and supportive.   Prior Level of ADL Function   Self Feeding Independent   Grooming / Hygiene Independent   Bathing Independent   Dressing Independent   Toileting Independent   Prior Level of IADL Function   Medication Management Independent   Laundry Independent   Kitchen Mobility Independent   Finances Independent   Home Management Independent   Shopping Independent   Prior Level Of Mobility Independent Without Device in Community;Independent Without Device in Home   Driving / Transportation Driving Independent   Occupation (Pre-Hospital Vocational) Employed Full Time   Precautions   Precautions Fall Risk;Swallow Precautions   Comments encephalopathic   Vitals   Vitals Comments new o2 needs; 4L NC   Pain 0 - 10 Group   Therapist Pain Assessment Nurse Notified;0   Cognition    Cognition / Consciousness X   Orientation Level Not Oriented to Reason   Level of Consciousness Responds to voice   Ability To Follow Commands 1 Step   Safety Awareness Impulsive;Impaired   New Learning Impaired   Attention Impaired   Sequencing Impaired   Initiation Impaired   Comments pleasant and cooperative. initially lethargic, but more alert once woken up. he was goofing off throughout the evaluation. at times childlike behavior in terms of joking around stating \"I " "don't wanna!\" Wife participating, pt mostly redirectable.   Active ROM Upper Body   Active ROM Upper Body  X   Dominant Hand Right   Comments Poor participation due to impaired direction following, did raise arms to <1/2 range when asked to but would not follow for the rest of formal testing stating \"I don't wanna!\"   Strength Upper Body   Upper Body Strength  X   Comments unable to formally assess. CHRISTIANO appeared to be grossly weaker based off of observation.   Sensation Upper Body   Upper Extremity Sensation  X   Comments Unable to formally assess, CHRISTIANO appeared to have impaired proprioception. It was often hanging off the bed with pt unaware. Able to correct it when cued.   Neurological Concerns   Neurological Concerns Yes   Sitting Posture During ADL's Lateral Lean Left;Posterior Lean;Posterior Push;Anterior Lean  (tended to favor lateral lean left and fluctuating anterior/posterior; when cued he could correct it and find midline for short periods of time)   Standing Posture During ADL's Posterior Lean;Lateral Lean Left   Coordination Upper Body   Coordination X   Comments Unable to formally assess but R UE observed to have impaired fine and gross motor often over/under shooting   Balance Assessment   Sitting Balance (Static) Poor -   Sitting Balance (Dynamic) Trace +   Standing Balance (Static) Trace   Standing Balance (Dynamic) Trace   Weight Shift Sitting Poor   Weight Shift Standing Poor   Comments w/ B HHA, able to weight shift in standing for about 30 sec on his own volition   Bed Mobility    Supine to Sit Maximal Assist   Sit to Supine Total Assist   Scooting Maximal Assist   Rolling Maximal Assist to Rt.   Comments x2 person; appeared to be due to more impaired sequencing, initiation, and motor planning rather than gross weakness   ADL Assessment   Grooming Maximal Assist;Seated  (oral care; wife assisting; pt would not brush his teeth despite max cueing stating \"I don't wanna!\")   Upper Body Dressing " Moderate Assist  (gown change)   Lower Body Dressing Maximal Assist  (socks)   Toileting   (declined need)   Comments appeared physically capable of being able to complete ADLs more independently, but again limited by impaired initiation, sequencing, motor planning   How much help from another person does the patient currently need...   Putting on and taking off regular lower body clothing? 2   Bathing (including washing, rinsing, and drying)? 2   Toileting, which includes using a toilet, bedpan, or urinal? 2   Putting on and taking off regular upper body clothing? 2   Taking care of personal grooming such as brushing teeth? 2   Eating meals? 2   6 Clicks Daily Activity Score 12   Functional Mobility   Sit to Stand Moderate Assist  (x2)   Bed, Chair, Wheelchair Transfer Unable to Participate   Mobility EOB>STS, weight shifting>BTB   Comments w/ B HHA   Visual Perception   Visual Perception  X   Neglect Mild Right   Comments appeared to have some R neglect, but again difficult to assess due to impaired cognition. does not wear glasses.   Short Term Goals   Short Term Goal # 1 pt will follow 1 step directions w/o cues   Short Term Goal # 2 pt will demo grooming seated EOB w/ setup   Short Term Goal # 3 pt will demo ADL txfs w/ Jose Alfredo   Short Term Goal # 4 pt will dress LB w/ Jose Alfredo   Pt seen for OT [place] in coordination with PT due to the need for two skilled therapists due to profound cognitive deficits and limited mobility

## 2025-04-10 NOTE — PROGRESS NOTES
Eugenia CULLENZ  [address] [place] [address]    To Whom It May Concern,    This is to certify that Eugenia was seen on 11/30/2022.    We recommend isolation until the COVID-19 test result becomes available. This can take up 36 hours and Eugenia can download the results using our [employer] rashad. Please follow the latest CDC guidelines as listed below:    If COVID-19 test result is positive OR Eugenia still has symptoms with a known exposure to COVID-19, then She must isolate for 5 full days. After these 5 days, She may discontinue isolation if the following criteria are met:  1. At least 24 hours have passed since last fever without the use of fever reducing medication and  2. Improvement in symptoms.    The loss of taste and smell may persist for weeks or months after recovery and do not need to delay the end of isolation.     Eugenia can return to work/school, but must wear a mask around others for an additional 5 days. She is advised to follow up with the primary care provider or Urgent Care for further assessment if symptoms do not improve. Per CDC recommendations, employers should not require a negative COVID-19 test result or a healthcare provider’s note for employees to return to work.    If COVID-19 test result is negative, then Eugenia may discontinue isolation if symptoms have improved and She remains fever-free for 24 hours without the use of fever-reducing medications.    The Coronavirus is a rapidly evolving situation and recommendations are changing regularly to prevent spread of the disease and further loss of life. Thank you for your understanding during these unusual times.     Gerald Modi MD  [address] [facility] [address]  [place] [address] [phone]             Bedside report received from off going RN/ [place]    Fall Risk Score: HIGH RISK  Fall risk interventions in place: Place yellow fall risk ID band on patient, Provide patient/family education based on risk assessment, Place fall precaution signage outside patient door, Place patient in room close to nursing station, and Utilize bed/chair fall alarm  Bed type: Low air loss (Corwin Score less than 17 interventions in place)  Patient on cardiac monitor: Yes  IVF/IV medications: Not Applicable   Oxygen: How many liters 5L  Bedside sitter: Not Applicable   Isolation: Not applicable

## 2025-04-10 NOTE — CARE PLAN
The patient is Watcher - Medium risk of patient condition declining or worsening    Shift Goals  Clinical Goals: Monitor neuro status and hemodynamics  Patient Goals: comfort  Family Goals: updates, comfort, improved mentation    Progress made toward(s) clinical / shift goals:       Problem: Knowledge Deficit - Standard  Goal: Patient and family/care givers will demonstrate understanding of plan of care, disease process/condition, diagnostic tests and medications  Outcome: Progressing      Problem: Skin Integrity  Goal: Skin integrity is maintained or improved  4/10/2025 1528 by Cande Cid R.N.  Outcome: Progressing  Note: Skin integrity maintained. Q2 turns and appropriate preventions in place.  4/10/2025 1528 by Cande Cid R.N.  Outcome: Progressing  4/10/2025 1525 by Cande Cid R.N.  Outcome: Progressing     Problem: Fall Risk  Goal: Patient will remain free from falls  4/10/2025 1528 by Cande Cid R.N.  Outcome: Progressing  Note: Bed locked and low in position, appropriate alarms in place  4/10/2025 1528 by Cande Cid R.N.  Outcome: Progressing  4/10/2025 1525 by Cande Cid R.N.  Outcome: Progressing     Problem: Pain - Standard  Goal: Alleviation of pain or a reduction in pain to the patient’s comfort goal  4/10/2025 1528 by Cande Cid R.N.  Outcome: Progressing  Note: Pt communicates pain and appropriate interventions in place as needed  4/10/2025 1528 by Cande Cid R.N.  Outcome: Progressing  4/10/2025 1525 by Cande Cid R.N.  Outcome: Progressing     Problem: Mobility  Goal: Risk for activity intolerance will decrease  4/10/2025 1528 by Cande Cid R.N.  Outcome: Progressing  4/10/2025 1528 by Cande Cid R.N.  Outcome: Progressing  Intervention: Assess and monitor signs of activity intolerance  Note: General weakness and tires quickly; pivots up to chair and back to bed with 2+ assist  Intervention: Provide rest periods  Note: Pt provided with pillows for support and  repositioning  Intervention: Encourage patient to increase activity level in collaboration with Interdisciplinary Team  Note: Pt stands up and up to chair with required assistance of 2 or more

## 2025-04-10 NOTE — PROGRESS NOTES
4 Eyes Skin Assessment Completed by KARSON Lovelace and VIRI Thompson.    Head WDL  Ears WDL  Nose WDL  Mouth WDL  Neck WDL  Breast/Chest WDL  Shoulder Blades WDL  Spine WDL  (R) Arm/Elbow/Hand WDL  (L) Arm/Elbow/Hand WDL  Abdomen WDL  Groin WDL  Scrotum/Coccyx/Buttocks WDL  (R) Leg Bruising  (L) Leg Bruising  (R) Heel/Foot/Toe WDL  (L) Heel/Foot/Toe WDL          Devices In Places Tele Box, Blood Pressure Cuff, Pulse Ox, BMS, and Nasal Cannula      Interventions In Place NC W/Ear Foams, Heel Float Boots, Pillows, and Low Air Loss Mattress    Possible Skin Injury No    Pictures Uploaded Into Epic N/A  Wound Consult Placed N/A  RN Wound Prevention Protocol Ordered No

## 2025-04-10 NOTE — WOUND TEAM
[facility]  Inpatient Services  Wound and Skin Care Brief Evaluation    Admission Date: 4/3/2025     Last order of IP CONSULT TO WOUND CARE was found on 4/10/2025 from Hospital Encounter on 4/3/2025     HPI, PMH, SH: Reviewed    Chief Complaint   Patient presents with    Cardiac Arrest     BIB [facility] [address] from [place] for witnessed arrest. Pt was eating lunch and collapsed in front of his wife, CPR started by wife. On EMS arrival pt was in vfib. EMS administered 3 shocks, 3 rounds of EPI, 450mg amiodarone, 5mg Versed, 4mg Narcan and 40mg etomidate through IO. +ROSC.  In route, [facility] administered an additional 5mg Versed. FSBG 159. Pt arrives tachycardic in the 120s, 130/85, 99% IGEL in place.     Diagnosis: Cardiac arrest (HCC) [I46.9]    Unit where seen by Wound Team: T802/00     Wound consult placed regarding L foot. Chart and images reviewed. This discussed with bedside RN. This clinician in to assess patient. Patient pleasant and agreeable. L foot intact and blanching.    No pressure injuries or advanced wound care needs identified. Wound consult completed. Wound team signing off, re-consult if patient has further advanced wound care needs.         PREVENTATIVE INTERVENTIONS:    Q shift Corwin - performed per nursing policy  Q shift pressure point assessments - performed per nursing policy    Surface/Positioning  Standard/trauma mattress - Currently in Place  Reposition q 2 hours - Currently in Place    Offloading/Redistribution  Heel float boots (Prevalon boot) - Currently in Place    Mobilization      Seen by PT today

## 2025-04-10 NOTE — PROGRESS NOTES
"Neurology Follow-up  Neurohospitalist Service, [facility]    Referring Physician: Иван Del Real M.D.     Chief Complaint   Patient presents with    Cardiac Arrest     BIB [facility] and [facility] from [place] for witnessed arrest. Pt was eating lunch and collapsed in front of his wife, CPR started by wife. On EMS arrival pt was in vfib. EMS administered 3 shocks, 3 rounds of EPI, 450mg amiodarone, 5mg Versed, 4mg Narcan and 40mg etomidate through IO. +ROSC.  In route, [facility] administered an additional 5mg Versed. FSBG 159. Pt arrives tachycardic in the 120s, 130/85, 99% IGEL in place.       Interval history:   Per patient's wife, patient did not sleep much last night, restless but not agitated, continues to have nonsensical speech. Mentation remains similar to prior encounter, participates in conversation but with nonsensical speech. Less participatory in conversation, falling asleep in conversation. He does state \"I'm still alive\" when asked \"How are you?\"    Review of systems: In addition to what is detailed in the HPI above, all other systems reviewed and are negative.    Past Medical History:    has a past medical history of Arthritis, Breath shortness (11/03/2023), Drug-seeking behavior (07/30/2023), Duodenal ulcer (07/30/2023), Gastric ulcer, Heart valve disease, Hemorrhagic disorder (HCC), Hypertension, Pain, and Seizure (HCC).    FHx:  family history includes Heart Disease in his mother; No Known Problems in his father.    SHx:   reports that he has never smoked. He has never used smokeless tobacco. He reports that he does not currently use alcohol. He reports that he does not use drugs.    Allergies:  Allergies   Allergen Reactions    Morphine Vomiting    Tramadol Unspecified     Seizure  UCI=[id number]       Medications:    Current Facility-Administered Medications:     valproic acid (Depakene) IR oral solution 250 mg, 250 mg, Oral, TID, Ray Thibodeaux, " M.D., 250 mg at 04/10/25 0607    Pharmacy Consult Request ...Pain Management Review 1 Each, 1 Each, Other, PHARMACY TO DOSE, Tye Youssef M.D.    oxyCODONE immediate-release (Roxicodone) tablet 5 mg, 5 mg, Oral, Q3HRS PRN **OR** oxyCODONE immediate release (Roxicodone) tablet 10 mg, 10 mg, Oral, Q3HRS PRN, 10 mg at 04/10/25 0607 **OR** HYDROmorphone (Dilaudid) injection 0.5 mg, 0.5 mg, Intravenous, Q3HRS PRN, Tye Youssef M.D., 0.5 mg at 04/09/25 2306    hydrALAZINE (Apresoline) injection 10 mg, 10 mg, Intravenous, Q4HRS PRN, Tye Youssef M.D.    Nozin nasal  swab, 1 Applicator, Each Nostril, BID, Ивна Del Real M.D., 1 Applicator at 04/10/25 0607    losartan (Cozaar) tablet 50 mg, 50 mg, Oral, BID, Иван Del Real M.D., 50 mg at 04/10/25 0607    metoprolol tartrate (Lopressor) tablet 37.5 mg, 37.5 mg, Oral, Q8HRS, Иван Del Real M.D., 37.5 mg at 04/10/25 0607    enoxaparin (Lovenox) inj 120 mg, 1 mg/kg, Subcutaneous, Q12HRS, Ray Thibodeaux M.D., 120 mg at 04/10/25 0608    ceFAZolin (Ancef) 2 g in  mL IVPB, 2 g, Intravenous, Q8HRS, Ray Thibodeaux M.D., Stopped at 04/10/25 0651    senna-docusate (Pericolace Or Senokot S) 8.6-50 MG per tablet 2 Tablet, 2 Tablet, Oral, Q EVENING, 2 Tablet at 04/06/25 1712 **AND** polyethylene glycol/lytes (Miralax) Packet 1 Packet, 1 Packet, Oral, QDAY PRN, ALEX CuellarD.    acetaminophen (Tylenol) tablet 650 mg, 650 mg, Oral, Q4HRS PRN, Berto Ovalle M.D.    labetalol (Normodyne/Trandate) injection 20 mg, 20 mg, Intravenous, Q4HRS PRN, Barbara Gutierrez M.D., 20 mg at 04/09/25 1413    acetaminophen (Tylenol) suppository 650 mg, 650 mg, Rectal, Q4HRS PRN, Jessi Lopez M.D., 650 mg at 04/05/25 2253    Respiratory Therapy Consult, , Nebulization, Continuous RT, Misbah Cardoza M.D.    MD Alert...ICU Electrolyte Replacement per Pharmacy, , Other, PHARMACY TO DOSE, Misbah Cardoza M.D.    Physical Examination:     General: Patient is  "awake and in no acute distress  Eye: Examination of optic disks not indicated at this time given acuity of consult  Neck: There is normal range of motion  CV: regular rate   Extremities:  clear, dry, intact, without peripheral edema    NEUROLOGICAL EXAM:     BP (!) 147/92   Pulse 82   Temp 36.7 °C (98.1 °F) (Temporal)   Resp 18   Ht 1.905 m (6' 3\")   Wt 111 kg (245 lb 2.4 oz)   SpO2 92%   BMI 30.64 kg/m²       Mental status: Awake, alert and oriented to person and place only. Impaired attention.  Speech and language: Speech is clear but nonsensical. The patient is able to intermittently follow commands  Cranial nerve exam: Pupils are equal, round and reactive to light bilaterally. Face is symmetric. Exam is limited secondary to impaired attention.  Motor exam: Moving all 4 extremities spontaneously. No abnormal movements were seen on exam.  Exam is limited secondary to impaired attention.  Sensory exam: Reacts to tactile in all 4 extremities.  Deep tendon reflexes:  2+ throughout. Toes down-going bilaterally.  Coordination: Unable to assess due to impaired attention.  Gait: Deferred due to patient preference        Objective Data:    Labs:  Lab Results   Component Value Date/Time    PROTHROMBTM 14.8 (H) 04/03/2025 10:30 PM    INR 1.16 (H) 04/03/2025 10:30 PM      Lab Results   Component Value Date/Time    WBC 10.2 04/10/2025 04:34 AM    RBC 4.82 04/10/2025 04:34 AM    HEMOGLOBIN 12.9 (L) 04/10/2025 04:34 AM    HEMATOCRIT 39.6 (L) 04/10/2025 04:34 AM    MCV 82.2 04/10/2025 04:34 AM    MCH 26.8 (L) 04/10/2025 04:34 AM    MCHC 32.6 04/10/2025 04:34 AM    MPV 9.1 04/10/2025 04:34 AM    NEUTSPOLYS 62.00 04/10/2025 04:34 AM    LYMPHOCYTES 20.70 (L) 04/10/2025 04:34 AM    MONOCYTES 12.80 04/10/2025 04:34 AM    EOSINOPHILS 1.20 04/10/2025 04:34 AM    BASOPHILS 0.90 04/10/2025 04:34 AM    HYPOCHROMIA 1+ 06/20/2024 02:02 PM    ANISOCYTOSIS 1+ 04/03/2025 05:30 PM      Lab Results   Component Value Date/Time    SODIUM " 139 04/10/2025 04:34 AM    POTASSIUM 3.4 (L) 04/10/2025 04:34 AM    CHLORIDE 107 04/10/2025 04:34 AM    CO2 21 04/10/2025 04:34 AM    GLUCOSE 108 (H) 04/10/2025 04:34 AM    BUN 15 04/10/2025 04:34 AM    CREATININE 0.80 04/10/2025 04:34 AM      Lab Results   Component Value Date/Time    CHOLSTRLTOT 117 04/04/2025 04:15 AM    LDL 42 04/04/2025 04:15 AM    HDL 50 04/04/2025 04:15 AM    TRIGLYCERIDE 282 (H) 04/05/2025 03:02 AM       Lab Results   Component Value Date/Time    ALKPHOSPHAT 74 04/10/2025 04:34 AM    ASTSGOT 24 04/10/2025 04:34 AM    ALTSGPT 38 04/10/2025 04:34 AM    TBILIRUBIN 0.3 04/10/2025 04:34 AM        Imaging/Testing:    I interpreted and/or reviewed the patient's neuroimaging    [id number] VIEW   Final Result         1.  Nonspecific bowel gas pattern in the upper abdomen.      DX-CHEST-PORTABLE (1 VIEW)   Final Result         1.  Pulmonary edema and/or infiltrates are identified, which are stable since the prior exam.   2.  Cardiomegaly      DX-CHEST-PORTABLE (1 VIEW)   Final Result         1.  Pulmonary edema and/or infiltrates are identified, which appear somewhat increased since the prior exam.   2.  Cardiomegaly      CT-CTA NECK WITH & W/O-POST PROCESSING   Final Result         1.  CT angiogram of the neck within normal limits.   2.  Patchy bilateral upper lobe infiltrates         CT-CTA HEAD WITH & W/O-POST PROCESS   Final Result         1.  No large vessel occlusion or aneurysm identified   2.  Bilateral sinusitis changes      US-EXTREMITY VENOUS LOWER BILAT   Final Result      EC-ECHOCARDIOGRAM COMPLETE W/ CONT   Final Result      CT-ABDOMEN-PELVIS WITH   Final Result         1.  Fluid-filled prominence of proximal small bowel with distal small bowel decompression, appearance suggests ileus and/or enteritis versus evolving obstructive changes. Recommend radiographic follow-up to resolution as clinically appropriate.   2.  Irregular hepatic contour favoring changes of cirrhosis   3.   Fat-containing bilateral inguinal hernias      CT-CTA CHEST PULMONARY ARTERY W/ RECONS   Final Result         1.  Distal right main pulmonary embolus extending into right upper lobe and lower lobe segmental and subsegmental branches. 1.4 right to left ventricular ratio suggesting component of right heart strain.   2.  Evaluation of the subsegmental branches is essentially nondiagnostic due to motion artifacts. Additional imaging would be required for definitive evaluation of small distal pulmonary emboli.   3.  Scattered patchy bilateral pulmonary infiltrates.   4.  Bilateral dependent consolidations, appearance suggests atelectasis, component of infiltrate not excluded.      These findings were discussed with the patient's clinician, Misbah Cardoza, on 4/3/2025 9:27 PM.      CT-HEAD W/O   Final Result      No acute intracranial findings.               DX-CHEST-PORTABLE (1 VIEW)   Final Result      Interval placement of a right IJ central line without evidence of complication.      DX-CHEST-PORTABLE (1 VIEW)   Final Result      1.  Support devices present.      2.  Cardiomegaly.      3.  Bibasilar atelectasis.          Impression and Recommendations: Roberto Braswell is a 44 y.o. male admitted for cardiac arrest whom neurology has been consulted for persistent delirium and agitation.     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 Presents with symptoms of persistent confusion and impaired attention, ongoing since 04/05/2025. Previously requiring Precedex GTT for agitation, however symptoms of confusion have persisted despite discontinuation 04/08/2025. Valproic acid was initiated on 04/06/2025 this hospital admission. Prior work-up this admission with unremarkable EEG and CTA head/neck. He also received a 3-day course of IV thiamine this admission. Of note, he was seen by neurology on prior hospitalization 10/2024 for an amnestic event, exhibiting odd behaviors.    -Recommend repeat EEG and MRI brain when patient is able to rule out any underlying diagnosis of seizure  -Will defer to psychiatry for management of inpatient medications for behavioral issues while inpatient  -Neurology will continue to follow        Yen Baptiste MD  Internal medicine resident PGY-3

## 2025-04-11 PROBLEM — E87.6 HYPOKALEMIA: Status: ACTIVE | Noted: 2025-04-11

## 2025-04-11 LAB
ALBUMIN SERPL BCP-MCNC: 3.4 G/DL (ref 3.2–4.9)
ALBUMIN/GLOB SERPL: 1.2 G/DL
ALP SERPL-CCNC: 65 U/L (ref 30–99)
ALT SERPL-CCNC: 30 U/L (ref 2–50)
ANION GAP SERPL CALC-SCNC: 11 MMOL/L (ref 7–16)
AST SERPL-CCNC: 21 U/L (ref 12–45)
BACTERIA BLD CULT: NORMAL
BILIRUB SERPL-MCNC: 0.3 MG/DL (ref 0.1–1.5)
BUN SERPL-MCNC: 18 MG/DL (ref 8–22)
CALCIUM ALBUM COR SERPL-MCNC: 9.4 MG/DL (ref 8.5–10.5)
CALCIUM SERPL-MCNC: 8.9 MG/DL (ref 8.5–10.5)
CHLORIDE SERPL-SCNC: 110 MMOL/L (ref 96–112)
CO2 SERPL-SCNC: 21 MMOL/L (ref 20–33)
CREAT SERPL-MCNC: 0.88 MG/DL (ref 0.5–1.4)
EKG IMPRESSION: NORMAL
ERYTHROCYTE [DISTWIDTH] IN BLOOD BY AUTOMATED COUNT: 51.8 FL (ref 35.9–50)
GFR SERPLBLD CREATININE-BSD FMLA CKD-EPI: 109 ML/MIN/1.73 M 2
GLOBULIN SER CALC-MCNC: 2.9 G/DL (ref 1.9–3.5)
GLUCOSE SERPL-MCNC: 96 MG/DL (ref 65–99)
HCT VFR BLD AUTO: 39.6 % (ref 42–52)
HGB BLD-MCNC: 12.7 G/DL (ref 14–18)
MCH RBC QN AUTO: 26.7 PG (ref 27–33)
MCHC RBC AUTO-ENTMCNC: 32.1 G/DL (ref 32.3–36.5)
MCV RBC AUTO: 83.4 FL (ref 81.4–97.8)
PLATELET # BLD AUTO: 427 K/UL (ref 164–446)
PMV BLD AUTO: 9.1 FL (ref 9–12.9)
POTASSIUM SERPL-SCNC: 3.5 MMOL/L (ref 3.6–5.5)
PROCALCITONIN SERPL-MCNC: 0.3 NG/ML
PROT SERPL-MCNC: 6.3 G/DL (ref 6–8.2)
RBC # BLD AUTO: 4.75 M/UL (ref 4.7–6.1)
SIGNIFICANT IND 70042: NORMAL
SITE SITE: NORMAL
SODIUM SERPL-SCNC: 142 MMOL/L (ref 135–145)
SOURCE SOURCE: NORMAL
WBC # BLD AUTO: 12 K/UL (ref 4.8–10.8)

## 2025-04-11 PROCEDURE — 36415 COLL VENOUS BLD VENIPUNCTURE: CPT

## 2025-04-11 PROCEDURE — A9270 NON-COVERED ITEM OR SERVICE: HCPCS | Performed by: HOSPITALIST

## 2025-04-11 PROCEDURE — A9270 NON-COVERED ITEM OR SERVICE: HCPCS | Performed by: NURSE PRACTITIONER

## 2025-04-11 PROCEDURE — 99233 SBSQ HOSP IP/OBS HIGH 50: CPT | Performed by: STUDENT IN AN ORGANIZED HEALTH CARE EDUCATION/TRAINING PROGRAM

## 2025-04-11 PROCEDURE — 95816 EEG AWAKE AND DROWSY: CPT | Performed by: STUDENT IN AN ORGANIZED HEALTH CARE EDUCATION/TRAINING PROGRAM

## 2025-04-11 PROCEDURE — 770020 HCHG ROOM/CARE - TELE (206)

## 2025-04-11 PROCEDURE — 700111 HCHG RX REV CODE 636 W/ 250 OVERRIDE (IP): Mod: JZ | Performed by: STUDENT IN AN ORGANIZED HEALTH CARE EDUCATION/TRAINING PROGRAM

## 2025-04-11 PROCEDURE — 700102 HCHG RX REV CODE 250 W/ 637 OVERRIDE(OP): Performed by: HOSPITALIST

## 2025-04-11 PROCEDURE — A9270 NON-COVERED ITEM OR SERVICE: HCPCS

## 2025-04-11 PROCEDURE — A9270 NON-COVERED ITEM OR SERVICE: HCPCS | Performed by: STUDENT IN AN ORGANIZED HEALTH CARE EDUCATION/TRAINING PROGRAM

## 2025-04-11 PROCEDURE — 4A10X4Z MONITORING OF CENTRAL NERVOUS ELECTRICAL ACTIVITY, EXTERNAL APPROACH: ICD-10-PCS | Performed by: STUDENT IN AN ORGANIZED HEALTH CARE EDUCATION/TRAINING PROGRAM

## 2025-04-11 PROCEDURE — 99223 1ST HOSP IP/OBS HIGH 75: CPT | Performed by: PHYSICAL MEDICINE & REHABILITATION

## 2025-04-11 PROCEDURE — 93005 ELECTROCARDIOGRAM TRACING: CPT | Mod: TC | Performed by: STUDENT IN AN ORGANIZED HEALTH CARE EDUCATION/TRAINING PROGRAM

## 2025-04-11 PROCEDURE — 700102 HCHG RX REV CODE 250 W/ 637 OVERRIDE(OP): Performed by: STUDENT IN AN ORGANIZED HEALTH CARE EDUCATION/TRAINING PROGRAM

## 2025-04-11 PROCEDURE — 700105 HCHG RX REV CODE 258: Performed by: HOSPITALIST

## 2025-04-11 PROCEDURE — 99223 1ST HOSP IP/OBS HIGH 75: CPT | Performed by: STUDENT IN AN ORGANIZED HEALTH CARE EDUCATION/TRAINING PROGRAM

## 2025-04-11 PROCEDURE — 700102 HCHG RX REV CODE 250 W/ 637 OVERRIDE(OP): Performed by: NURSE PRACTITIONER

## 2025-04-11 PROCEDURE — 700111 HCHG RX REV CODE 636 W/ 250 OVERRIDE (IP): Performed by: HOSPITALIST

## 2025-04-11 PROCEDURE — 84145 PROCALCITONIN (PCT): CPT

## 2025-04-11 PROCEDURE — 97602 WOUND(S) CARE NON-SELECTIVE: CPT

## 2025-04-11 PROCEDURE — 95816 EEG AWAKE AND DROWSY: CPT | Mod: 26 | Performed by: STUDENT IN AN ORGANIZED HEALTH CARE EDUCATION/TRAINING PROGRAM

## 2025-04-11 PROCEDURE — 700102 HCHG RX REV CODE 250 W/ 637 OVERRIDE(OP)

## 2025-04-11 PROCEDURE — 99233 SBSQ HOSP IP/OBS HIGH 50: CPT | Mod: 25,GC | Performed by: PSYCHIATRY & NEUROLOGY

## 2025-04-11 PROCEDURE — 85027 COMPLETE CBC AUTOMATED: CPT

## 2025-04-11 PROCEDURE — 80053 COMPREHEN METABOLIC PANEL: CPT

## 2025-04-11 RX ORDER — OXYCODONE HYDROCHLORIDE 10 MG/1
10 TABLET ORAL
Refills: 0 | Status: DISCONTINUED | OUTPATIENT
Start: 2025-04-11 | End: 2025-04-14

## 2025-04-11 RX ORDER — QUETIAPINE FUMARATE 25 MG/1
50 TABLET, FILM COATED ORAL NIGHTLY
Status: DISCONTINUED | OUTPATIENT
Start: 2025-04-11 | End: 2025-04-21 | Stop reason: HOSPADM

## 2025-04-11 RX ORDER — OXYCODONE HYDROCHLORIDE 5 MG/1
5 TABLET ORAL
Refills: 0 | Status: DISCONTINUED | OUTPATIENT
Start: 2025-04-11 | End: 2025-04-14

## 2025-04-11 RX ORDER — VALPROIC ACID 250 MG/5ML
500 SOLUTION ORAL 3 TIMES DAILY
Status: DISCONTINUED | OUTPATIENT
Start: 2025-04-11 | End: 2025-04-14

## 2025-04-11 RX ORDER — HYDROMORPHONE HYDROCHLORIDE 1 MG/ML
1 INJECTION, SOLUTION INTRAMUSCULAR; INTRAVENOUS; SUBCUTANEOUS
Status: DISCONTINUED | OUTPATIENT
Start: 2025-04-11 | End: 2025-04-14

## 2025-04-11 RX ORDER — QUETIAPINE FUMARATE 25 MG/1
25 TABLET, FILM COATED ORAL NIGHTLY PRN
Status: DISCONTINUED | OUTPATIENT
Start: 2025-04-11 | End: 2025-04-21 | Stop reason: HOSPADM

## 2025-04-11 RX ADMIN — HYDROMORPHONE HYDROCHLORIDE 1 MG: 1 INJECTION, SOLUTION INTRAMUSCULAR; INTRAVENOUS; SUBCUTANEOUS at 16:44

## 2025-04-11 RX ADMIN — ENOXAPARIN SODIUM 120 MG: 150 INJECTION SUBCUTANEOUS at 06:13

## 2025-04-11 RX ADMIN — VALPROIC ACID 250 MG: 250 SOLUTION ORAL at 06:13

## 2025-04-11 RX ADMIN — Medication 5 MG: at 21:25

## 2025-04-11 RX ADMIN — LOSARTAN POTASSIUM 50 MG: 50 TABLET, FILM COATED ORAL at 18:37

## 2025-04-11 RX ADMIN — VALPROIC ACID 500 MG: 250 SOLUTION ORAL at 11:54

## 2025-04-11 RX ADMIN — METOPROLOL TARTRATE 37.5 MG: 25 TABLET, FILM COATED ORAL at 21:25

## 2025-04-11 RX ADMIN — CEFAZOLIN 2 G: 2 INJECTION, POWDER, FOR SOLUTION INTRAMUSCULAR; INTRAVENOUS at 06:36

## 2025-04-11 RX ADMIN — OXYCODONE HYDROCHLORIDE 10 MG: 10 TABLET ORAL at 18:32

## 2025-04-11 RX ADMIN — Medication 5 MG: at 00:00

## 2025-04-11 RX ADMIN — LOSARTAN POTASSIUM 50 MG: 50 TABLET, FILM COATED ORAL at 06:13

## 2025-04-11 RX ADMIN — HYDROMORPHONE HYDROCHLORIDE 1 MG: 1 INJECTION, SOLUTION INTRAMUSCULAR; INTRAVENOUS; SUBCUTANEOUS at 11:54

## 2025-04-11 RX ADMIN — HYDROMORPHONE HYDROCHLORIDE 1 MG: 1 INJECTION, SOLUTION INTRAMUSCULAR; INTRAVENOUS; SUBCUTANEOUS at 07:55

## 2025-04-11 RX ADMIN — OXYCODONE HYDROCHLORIDE 10 MG: 10 TABLET ORAL at 00:05

## 2025-04-11 RX ADMIN — POTASSIUM CHLORIDE 20 MEQ: 1500 TABLET, EXTENDED RELEASE ORAL at 06:13

## 2025-04-11 RX ADMIN — QUETIAPINE FUMARATE 50 MG: 25 TABLET ORAL at 21:25

## 2025-04-11 RX ADMIN — QUETIAPINE FUMARATE 50 MG: 25 TABLET ORAL at 00:00

## 2025-04-11 RX ADMIN — Medication 1 APPLICATOR: at 06:14

## 2025-04-11 RX ADMIN — HYDROMORPHONE HYDROCHLORIDE 0.5 MG: 1 INJECTION, SOLUTION INTRAMUSCULAR; INTRAVENOUS; SUBCUTANEOUS at 04:22

## 2025-04-11 RX ADMIN — OXYCODONE HYDROCHLORIDE 10 MG: 10 TABLET ORAL at 03:12

## 2025-04-11 RX ADMIN — HYDROMORPHONE HYDROCHLORIDE 0.5 MG: 1 INJECTION, SOLUTION INTRAMUSCULAR; INTRAVENOUS; SUBCUTANEOUS at 01:50

## 2025-04-11 RX ADMIN — METOPROLOL TARTRATE 37.5 MG: 25 TABLET, FILM COATED ORAL at 06:13

## 2025-04-11 RX ADMIN — VALPROIC ACID 500 MG: 250 SOLUTION ORAL at 18:37

## 2025-04-11 RX ADMIN — HYDROMORPHONE HYDROCHLORIDE 1 MG: 1 INJECTION, SOLUTION INTRAMUSCULAR; INTRAVENOUS; SUBCUTANEOUS at 21:15

## 2025-04-11 RX ADMIN — OXYCODONE HYDROCHLORIDE 10 MG: 10 TABLET ORAL at 06:40

## 2025-04-11 RX ADMIN — OXYCODONE HYDROCHLORIDE 10 MG: 10 TABLET ORAL at 15:15

## 2025-04-11 RX ADMIN — Medication 1 APPLICATOR: at 18:39

## 2025-04-11 RX ADMIN — ENOXAPARIN SODIUM 120 MG: 150 INJECTION SUBCUTANEOUS at 18:32

## 2025-04-11 RX ADMIN — METOPROLOL TARTRATE 37.5 MG: 25 TABLET, FILM COATED ORAL at 14:10

## 2025-04-11 RX ADMIN — OXYCODONE HYDROCHLORIDE 10 MG: 10 TABLET ORAL at 10:21

## 2025-04-11 ASSESSMENT — LIFESTYLE VARIABLES
HAVE PEOPLE ANNOYED YOU BY CRITICIZING YOUR DRINKING: NO
ALCOHOL_USE: YES
HAVE YOU EVER FELT YOU SHOULD CUT DOWN ON YOUR DRINKING: NO
TOTAL SCORE: 0
AVERAGE NUMBER OF DAYS PER WEEK YOU HAVE A DRINK CONTAINING ALCOHOL: 1
EVER FELT BAD OR GUILTY ABOUT YOUR DRINKING: NO
HOW MANY TIMES IN THE PAST YEAR HAVE YOU HAD 5 OR MORE DRINKS IN A DAY: 0
ON A TYPICAL DAY WHEN YOU DRINK ALCOHOL HOW MANY DRINKS DO YOU HAVE: 1
EVER HAD A DRINK FIRST THING IN THE MORNING TO STEADY YOUR NERVES TO GET RID OF A HANGOVER: NO
CONSUMPTION TOTAL: NEGATIVE
TOTAL SCORE: 0
TOTAL SCORE: 0

## 2025-04-11 ASSESSMENT — PAIN DESCRIPTION - PAIN TYPE
TYPE: ACUTE PAIN

## 2025-04-11 ASSESSMENT — ENCOUNTER SYMPTOMS
HEADACHES: 1
DIARRHEA: 1

## 2025-04-11 ASSESSMENT — FIBROSIS 4 INDEX: FIB4 SCORE: 0.43

## 2025-04-11 NOTE — PROGRESS NOTES
[facility] Medicine Daily Progress Note    Date of Service  4/11/2025    Chief Complaint  Roberto Braswell is a 44 y.o. male admitted 4/3/2025 with cardiac arrest    Hospital Course  Mr. Braswell has a past medical history of mitral valve repair 1/24/24 by Dr. Santana, multiple admissions for upper GI bleeds and an admission here in October for expressive aphasia and abnormal behavior though normal MRI and he was discharged with no neurological deficits.    He was in his usual state of health and on 3/17 had a urologic procedure on his epididymis. He was in the process of weaning off of narcotics for the previous 3 days per report. On 4/3 he was watching TV with his wife, Destin. He got up and became dizzy. He sat back down and developed convulsions. His wife called 911. He stopped breathing and turned blue. She is a nurse and started CPR for 5 minutes until paramedics came. He was in Vfib on their arrival and underwent defibrillation x 3 with 3 rounds of epi, IV amio and transferred to the ER where he was intubated.  In the emergency room, CT of the chest revealed distal right main pulmonary embolism and CT of the abdomen pelvis revealed ileus.  He was admitted to the intensive care unit on mechanical ventilation.  Ejection fraction was found to be 38% by echocardiogram and cardiology was consulted.  He was initiated on IV heparin drip due to the pulmonary emboli which has since been transitioned to Subq Lovenox.  He was febrile and initiated on empiric IV Zosyn with apparent aspiration pneumonitis transitioned to ancef as tracheal aspirate growing MSSA .  CT of the head was negative for acute processes.  EEG was negative for seizures.  He was successfully extubated on 4/5/2025.  After extubation he has required a Precedex drip due to agitation and remains encephalopathic. He has since been transferred to tele floor.     Interval Problem Update  Patient seen at bedside. His wife is present. Per wife he had a very  restless/agitated night. He is sleeping on my arrival but does awaken to stimulation, he is  all flailing all extremities but calms to wifes interactions. Was reportedly complaining of pain in his legs to wife, notes retless leg hx. Requesting increased in pain medications to help with comfort. His dilaudid IV prn was increased   - Depakote increased to 500 mg TID today   - stop Ancef, has received 7 days of antibiotics for aspiration PNA. Monitor off   - K 3.5, continue replacement   - neuro recommending repeat spot EEG and MRI to complete workup . EE today, holding MRI as he will be unlikely to complete with his current restlessness.   - does have waxing and waning symptoms that do not fit a clear neurologic pattern, likely a functional component/disorder. I have placed a consult with psychiatry, I discussed this with wife who is agreeable.     Wife requesting letter for patients work to extend his leave. I have written a provided a letter regarding patients hospitalization to the wife.       I have discussed this patient's plan of care and discharge plan at IDT rounds today with Case Management, Nursing, Nursing leadership, and other members of the IDT team.    Consultants/Specialty  Critical care  Cardiology, needs to be reevaluated prior to discharge to consider a AICD  Neurology    Code Status  Full Code    Disposition  The patient is not medically cleared for discharge to home or a post-acute facility.      I have placed the appropriate orders for post-discharge needs.    Review of Systems  Review of Systems   Unable to perform ROS: Mental status change       Physical Exam  Temp:  [36.3 °C (97.4 °F)-36.7 °C (98 °F)] 36.3 °C (97.4 °F)  Pulse:  [] 66  Resp:  [16-20] 20  BP: ()/(67-89) 99/67  SpO2:  [90 %-98 %] 90 %    Physical Exam  Vitals and nursing note reviewed.   Constitutional:       Appearance: He is well-developed. He is ill-appearing.   HENT:      Head: Normocephalic and atraumatic.       Mouth/Throat:      Mouth: Mucous membranes are moist.   Eyes:      General: No scleral icterus.     Extraocular Movements: Extraocular movements intact.      Conjunctiva/sclera: Conjunctivae normal.   Cardiovascular:      Rate and Rhythm: Normal rate and regular rhythm.      Heart sounds: Normal heart sounds.   Pulmonary:      Effort: Pulmonary effort is normal.      Breath sounds: Normal breath sounds.      Comments: Nasal cannula oxygen, increased WOB   Abdominal:      General: Bowel sounds are normal. There is no distension.      Palpations: Abdomen is soft.      Tenderness: There is no abdominal tenderness. There is no guarding or rebound.   Musculoskeletal:         General: Normal range of motion.      Cervical back: Normal range of motion and neck supple.      Right lower leg: No edema.      Left lower leg: No edema.   Skin:     General: Skin is warm and dry.   Neurological:      Mental Status: He is alert.      Comments: Flailing all 4 extremities, not having purposeful movement on exam. Moaning but not speaking, restless, confused   Psychiatric:      Comments: Restless         Fluids    Intake/Output Summary (Last 24 hours) at 4/11/2025 1331  Last data filed at 4/11/2025 1021  Gross per 24 hour   Intake 240 ml   Output 500 ml   Net -260 ml        Laboratory  Recent Labs     04/09/25  0100 04/10/25  0434 04/11/25  0733   WBC 9.8 10.2 12.0*   RBC 4.60* 4.82 4.75   HEMOGLOBIN 12.3* 12.9* 12.7*   HEMATOCRIT 37.8* 39.6* 39.6*   MCV 82.2 82.2 83.4   MCH 26.7* 26.8* 26.7*   MCHC 32.5 32.6 32.1*   RDW 49.5 50.5* 51.8*   PLATELETCT 327 388 427   MPV 8.9* 9.1 9.1     Recent Labs     04/09/25  0100 04/10/25  0434 04/11/25  0733   SODIUM 139 139 142   POTASSIUM 3.3* 3.4* 3.5*   CHLORIDE 107 107 110   CO2 19* 21 21   GLUCOSE 95 108* 96   BUN 13 15 18   CREATININE 0.74 0.80 0.88   CALCIUM 8.5 8.9 8.9                     Imaging  DX-CHEST-PORTABLE (1 VIEW)   Final Result         1.  Hazy right lower lobe infiltrate,  slightly decreased since prior study   2.  Cardiomegaly      [id number] VIEW   Final Result         1.  Nonspecific bowel gas pattern in the upper abdomen.      DX-CHEST-PORTABLE (1 VIEW)   Final Result         1.  Pulmonary edema and/or infiltrates are identified, which are stable since the prior exam.   2.  Cardiomegaly      DX-CHEST-PORTABLE (1 VIEW)   Final Result         1.  Pulmonary edema and/or infiltrates are identified, which appear somewhat increased since the prior exam.   2.  Cardiomegaly      CT-CTA NECK WITH & W/O-POST PROCESSING   Final Result         1.  CT angiogram of the neck within normal limits.   2.  Patchy bilateral upper lobe infiltrates         CT-CTA HEAD WITH & W/O-POST PROCESS   Final Result         1.  No large vessel occlusion or aneurysm identified   2.  Bilateral sinusitis changes      US-EXTREMITY VENOUS LOWER BILAT   Final Result      EC-ECHOCARDIOGRAM COMPLETE W/ CONT   Final Result      CT-ABDOMEN-PELVIS WITH   Final Result         1.  Fluid-filled prominence of proximal small bowel with distal small bowel decompression, appearance suggests ileus and/or enteritis versus evolving obstructive changes. Recommend radiographic follow-up to resolution as clinically appropriate.   2.  Irregular hepatic contour favoring changes of cirrhosis   3.  Fat-containing bilateral inguinal hernias      CT-CTA CHEST PULMONARY ARTERY W/ RECONS   Final Result         1.  Distal right main pulmonary embolus extending into right upper lobe and lower lobe segmental and subsegmental branches. 1.4 right to left ventricular ratio suggesting component of right heart strain.   2.  Evaluation of the subsegmental branches is essentially nondiagnostic due to motion artifacts. Additional imaging would be required for definitive evaluation of small distal pulmonary emboli.   3.  Scattered patchy bilateral pulmonary infiltrates.   4.  Bilateral dependent consolidations, appearance suggests atelectasis, component  of infiltrate not excluded.      These findings were discussed with the patient's clinician, Misbah Cardoza, on 4/3/2025 9:27 PM.      CT-HEAD W/O   Final Result      No acute intracranial findings.               DX-CHEST-PORTABLE (1 VIEW)   Final Result      Interval placement of a right IJ central line without evidence of complication.      DX-CHEST-PORTABLE (1 VIEW)   Final Result      1.  Support devices present.      2.  Cardiomegaly.      3.  Bibasilar atelectasis.           Assessment/Plan  * Cardiac arrest (HCC)- (present on admission)  Assessment & Plan  Cardiac arrest with CPR, defibrillation x 3 and 3 rounds epinephrine, unclear etiology   Loaded with amiodarone  Now in sinus rhythm  Cardiology consulted  Decreased ejection fraction 38% likely transient given stunned myocardium  Continues telemetry monitoring  Optimize electrolytes specifically potassium and magnesium    Hypokalemia  Assessment & Plan  Low K, on oral replacement   Monitor BMP     Acute respiratory failure with hypoxia (HCC)- (present on admission)  Assessment & Plan  Requiring intubation on admission was extubated on 4/5/2025  Secondary to cardiac arrest  He continues to require supplemental oxygen will have continuous pulse oximetry  Out of IMCU, on tele     Encephalopathy acute- (present on admission)  Assessment & Plan  Acute metabolic encephalopathy in the setting of cardiac arrest  CT head negative  EEG negative for seizure activity  Off Precedex drip   Valproic acid has been initiated for mood stabilization and increase to 500 mg TID  He may require MRI of the brain though certainly not safe at this point in time but may help in neuro prognostication if his mentation does not improve, will hold until he is better able to tolerate for a better quality study   Likely multifactorial, possible mild hypoxic injury, delerium and likely functional component as well. Discussed with neurology   I have consulted with psychiatry to help  with suspected FND and agitation/restlessness     Cirrhosis of liver (HCC)  Assessment & Plan  Noted on imaging  Outpatient follow-up  Ammonia level normal     Aspiration into airway- (present on admission)  Assessment & Plan  Treated with Zosyn, de-escalation to ancef, respiratory cx with MSSA  Discontinued today, completed 7 days   Speech following, diet per speech    Pulmonary embolus (HCC)- (present on admission)  Assessment & Plan  Noted on CTA  Status post IV heparin drip  He was transitioned to lovenox though given the possible drug-drug interaction with valproic acid, change to weight-based lovenox for now.  He has a history of multiple admissions for GI bleeds will need to be monitored closely    Seizure-like activity (HCC)- (present on admission)  Assessment & Plan  Seizure type activity likely due to anoxia  EEG negative  Plan to repeat EEG and MRI once patient is clarence able to tolerate   Seizure and fall precautions    Ventricular fibrillation (HCC)- (present on admission)  Assessment & Plan  With defibrillation x 3  Now in sinus  Continuous telemetry monitoring  Cardiology eval prior to dc to discuss AICD    Pain, chronic- (present on admission)  Assessment & Plan  He had weaned himself off narcotics for 3 days prior to this event  Has been on chronic oxycodone 10 mg 5 x daily (last fill 3/24)   Prn oxy     History of MVR with cardiopulmonary bypass  Assessment & Plan  By Dr. Santana Jan 2024  Echo reviewed Known mitral valve repair that is functioning normally with appropriate transvalvular gradient. Reduced EF and hypokinesis present likely from cardiac stunning         VTE prophylaxis: Full dose Lovenox    I have performed a physical exam and reviewed and updated ROS and Plan today (4/11/2025). In review of yesterday's note (4/10/2025), there are no changes except as documented above.

## 2025-04-11 NOTE — CARE PLAN
The patient is Watcher - Medium risk of patient condition declining or worsening    Shift Goals  Clinical Goals: Monitor Neuro, Remain free from falls  Patient Goals: rest  Family Goals: updates on POC, rest, improved LOC    Progress made toward(s) clinical / shift goals:        Problem: Knowledge Deficit - Standard  Goal: Patient and family/care givers will demonstrate understanding of plan of care, disease process/condition, diagnostic tests and medications  Outcome: Progressing  Note: Family understands POC and updates regarding pt's status of condition     Problem: Skin Integrity  Goal: Skin integrity is maintained or improved  Outcome: Progressing  Note: Appropriate skin preventative measures in place     Problem: Fall Risk  Goal: Patient will remain free from falls  Outcome: Progressing  Note: Appropriate fall preventative measures in place     Problem: Pain - Standard  Goal: Alleviation of pain or a reduction in pain to the patient’s comfort goal  Outcome: Progressing     Problem: Knowledge Deficit  Goal: Knowledge of disease process/condition, treatment plan, diagnostic tests, and medications will improve  Outcome: Progressing

## 2025-04-11 NOTE — PROGRESS NOTES
Neurology Follow-up  Neurohospitalist Service, [facility]    Referring Physician: Иван Del Real M.D.     Chief Complaint   Patient presents with    Cardiac Arrest     BIB [facility] and [facility] from [place] for witnessed arrest. Pt was eating lunch and collapsed in front of his wife, CPR started by wife. On EMS arrival pt was in vfib. EMS administered 3 shocks, 3 rounds of EPI, 450mg amiodarone, 5mg Versed, 4mg Narcan and 40mg etomidate through IO. +ROSC.  In route, [facility] administered an additional 5mg Versed. FSBG 159. Pt arrives tachycardic in the 120s, 130/85, 99% IGEL in place.       Interval history:   Per patient's wife, patient had another restless night. She states he had an aspiration event last night, CXR was obtained shortly after that showed improving RLL infiltrate, however he was restless and agitated because of this. Patient is unable to provide any history, opens eyes to voice but does not respond to any questions. Patient's wife reports he has had bilateral leg pain, suggests if gabapentin and muscle relaxants would help address his pain (she states he was previously on gabapentin as outpatient, but this was continued due to side effects of ED).     Review of systems: In addition to what is detailed in the HPI above, all other systems reviewed and are negative.    Past Medical History:    has a past medical history of Arthritis, Breath shortness (11/03/2023), Drug-seeking behavior (07/30/2023), Duodenal ulcer (07/30/2023), Gastric ulcer, Heart valve disease, Hemorrhagic disorder (HCC), Hypertension, Pain, and Seizure (HCC).    FHx:  family history includes Heart Disease in his mother; No Known Problems in his father.    SHx:   reports that he has never smoked. He has never used smokeless tobacco. He reports that he does not currently use alcohol. He reports that he does not use drugs.    Allergies:  Allergies   Allergen Reactions     Morphine Vomiting    Tramadol Unspecified     Seizure  [id number]       Medications:    Current Facility-Administered Medications:     valproic acid (Depakene) IR oral solution 250 mg, 250 mg, Oral, TID, Ray Thibodeaux M.D., 250 mg at 04/11/25 0613    potassium chloride SA (Kdur) tablet 20 mEq, 20 mEq, Oral, DAILY, Berna Malik M.D., 20 mEq at 04/11/25 0613    melatonin tablet 5 mg, 5 mg, Oral, Nightly, VERONIKA Macias.P.RTANVI    cyclobenzaprine (Flexeril) tablet 5 mg, 5 mg, Oral, Once, VERONIKA Macias.P.R.ESTER    Pharmacy Consult Request ...Pain Management Review 1 Each, 1 Each, Other, PHARMACY TO DOSE, Tye Youssef M.D.    oxyCODONE immediate-release (Roxicodone) tablet 5 mg, 5 mg, Oral, Q3HRS PRN **OR** oxyCODONE immediate release (Roxicodone) tablet 10 mg, 10 mg, Oral, Q3HRS PRN, 10 mg at 04/11/25 0640 **OR** HYDROmorphone (Dilaudid) injection 0.5 mg, 0.5 mg, Intravenous, Q3HRS PRN, Tye Youssef M.D., 0.5 mg at 04/11/25 0422    hydrALAZINE (Apresoline) injection 10 mg, 10 mg, Intravenous, Q4HRS PRN, Tye Youssef M.D.    Nozin nasal  swab, 1 Applicator, Each Nostril, BID, Иван Del Real M.D., 1 Applicator at 04/11/25 0614    losartan (Cozaar) tablet 50 mg, 50 mg, Oral, BID, Иван Del Real M.D., 50 mg at 04/11/25 0613    metoprolol tartrate (Lopressor) tablet 37.5 mg, 37.5 mg, Oral, Q8HRS, Иван Del Real M.D., 37.5 mg at 04/11/25 0613    enoxaparin (Lovenox) inj 120 mg, 1 mg/kg, Subcutaneous, Q12HRS, Ray Thibodeaux M.D., 120 mg at 04/11/25 0613    senna-docusate (Pericolace Or Senokot S) 8.6-50 MG per tablet 2 Tablet, 2 Tablet, Oral, Q EVENING, 2 Tablet at 04/06/25 1712 **AND** polyethylene glycol/lytes (Miralax) Packet 1 Packet, 1 Packet, Oral, QDAY PRN, Barbara Gutierrez M.D.    acetaminophen (Tylenol) tablet 650 mg, 650 mg, Oral, Q4HRS PRN, Berto Ovalle M.D., 650 mg at 04/10/25 1657    labetalol (Normodyne/Trandate) injection 20 mg, 20 mg, Intravenous, Q4HRS PRN, Barbara  "ASHLYN Gutierrez, 20 mg at 04/09/25 1413    acetaminophen (Tylenol) suppository 650 mg, 650 mg, Rectal, Q4HRS PRN, Jessi Lopez M.D., 650 mg at 04/05/25 4655    Respiratory Therapy Consult, , Nebulization, Continuous RT, Misbah Cardoza M.D.    Physical Examination:     General: Patient is awake and in no acute distress  Eye: Examination of optic disks not indicated at this time given acuity of consult  Neck: There is normal range of motion  CV: regular rate   Extremities:  clear, dry, intact, without peripheral edema    NEUROLOGICAL EXAM:     /89   Pulse (!) 102   Temp 36.5 °C (97.7 °F) (Temporal)   Resp 17   Ht 1.905 m (6' 3\")   Wt 105 kg (232 lb 9.4 oz)   SpO2 92%   BMI 29.07 kg/m²       Mental status: Awake, opens eyes to voice, does not respond to questions or participate in conversation. Impaired attention.  Speech and language: Speech is clear but nonsensical. The patient is able to intermittently follow commands  Cranial nerve exam: Pupils are equal, round and reactive to light bilaterally. Face is symmetric.   Motor exam: Moving all 4 extremities spontaneously. Witnessed intermittent brief 1-2 second episodes of jerking movements of all 4 extremities. Movements of extremities are notably more pronounced when staff enters the room.  Sensory exam: Reacts to tactile in all 4 extremities.  Coordination: Unable to assess due to impaired attention.  Gait: Deferred due to patient preference        Objective Data:    Labs:  Lab Results   Component Value Date/Time    PROTHROMBTM 14.8 (H) 04/03/2025 10:30 PM    INR 1.16 (H) 04/03/2025 10:30 PM      Lab Results   Component Value Date/Time    WBC 10.2 04/10/2025 04:34 AM    RBC 4.82 04/10/2025 04:34 AM    HEMOGLOBIN 12.9 (L) 04/10/2025 04:34 AM    HEMATOCRIT 39.6 (L) 04/10/2025 04:34 AM    MCV 82.2 04/10/2025 04:34 AM    MCH 26.8 (L) 04/10/2025 04:34 AM    MCHC 32.6 04/10/2025 04:34 AM    MPV 9.1 04/10/2025 04:34 AM    NEUTSPOLYS 62.00 04/10/2025 " 04:34 AM    LYMPHOCYTES 20.70 (L) 04/10/2025 04:34 AM    MONOCYTES 12.80 04/10/2025 04:34 AM    EOSINOPHILS 1.20 04/10/2025 04:34 AM    BASOPHILS 0.90 04/10/2025 04:34 AM    HYPOCHROMIA 1+ 06/20/2024 02:02 PM    ANISOCYTOSIS 1+ 04/03/2025 05:30 PM      Lab Results   Component Value Date/Time    SODIUM 139 04/10/2025 04:34 AM    POTASSIUM 3.4 (L) 04/10/2025 04:34 AM    CHLORIDE 107 04/10/2025 04:34 AM    CO2 21 04/10/2025 04:34 AM    GLUCOSE 108 (H) 04/10/2025 04:34 AM    BUN 15 04/10/2025 04:34 AM    CREATININE 0.80 04/10/2025 04:34 AM      Lab Results   Component Value Date/Time    CHOLSTRLTOT 117 04/04/2025 04:15 AM    LDL 42 04/04/2025 04:15 AM    HDL 50 04/04/2025 04:15 AM    TRIGLYCERIDE 282 (H) 04/05/2025 03:02 AM       Lab Results   Component Value Date/Time    ALKPHOSPHAT 74 04/10/2025 04:34 AM    ASTSGOT 24 04/10/2025 04:34 AM    ALTSGPT 38 04/10/2025 04:34 AM    TBILIRUBIN 0.3 04/10/2025 04:34 AM        Imaging/Testing:    I interpreted and/or reviewed the patient's neuroimaging    DX-CHEST-PORTABLE (1 VIEW)   Final Result         1.  Hazy right lower lobe infiltrate, slightly decreased since prior study   2.  Cardiomegaly      PA-ACUFDEQ-9 VIEW   Final Result         1.  Nonspecific bowel gas pattern in the upper abdomen.      DX-CHEST-PORTABLE (1 VIEW)   Final Result         1.  Pulmonary edema and/or infiltrates are identified, which are stable since the prior exam.   2.  Cardiomegaly      DX-CHEST-PORTABLE (1 VIEW)   Final Result         1.  Pulmonary edema and/or infiltrates are identified, which appear somewhat increased since the prior exam.   2.  Cardiomegaly      CT-CTA NECK WITH & W/O-POST PROCESSING   Final Result         1.  CT angiogram of the neck within normal limits.   2.  Patchy bilateral upper lobe infiltrates         CT-CTA HEAD WITH & W/O-POST PROCESS   Final Result         1.  No large vessel occlusion or aneurysm identified   2.  Bilateral sinusitis changes      US-EXTREMITY VENOUS  LOWER BILAT   Final Result      EC-ECHOCARDIOGRAM COMPLETE W/ CONT   Final Result      CT-ABDOMEN-PELVIS WITH   Final Result         1.  Fluid-filled prominence of proximal small bowel with distal small bowel decompression, appearance suggests ileus and/or enteritis versus evolving obstructive changes. Recommend radiographic follow-up to resolution as clinically appropriate.   2.  Irregular hepatic contour favoring changes of cirrhosis   3.  Fat-containing bilateral inguinal hernias      CT-CTA CHEST PULMONARY ARTERY W/ RECONS   Final Result         1.  Distal right main pulmonary embolus extending into right upper lobe and lower lobe segmental and subsegmental branches. 1.4 right to left ventricular ratio suggesting component of right heart strain.   2.  Evaluation of the subsegmental branches is essentially nondiagnostic due to motion artifacts. Additional imaging would be required for definitive evaluation of small distal pulmonary emboli.   3.  Scattered patchy bilateral pulmonary infiltrates.   4.  Bilateral dependent consolidations, appearance suggests atelectasis, component of infiltrate not excluded.      These findings were discussed with the patient's clinician, Misbah Cardoza, on 4/3/2025 9:27 PM.      CT-HEAD W/O   Final Result      No acute intracranial findings.               DX-CHEST-PORTABLE (1 VIEW)   Final Result      Interval placement of a right IJ central line without evidence of complication.      DX-CHEST-PORTABLE (1 VIEW)   Final Result      1.  Support devices present.      2.  Cardiomegaly.      3.  Bibasilar atelectasis.          Impression and Recommendations: Roberto Braswell is a 44 y.o. male admitted for cardiac arrest whom neurology has been consulted for persistent delirium and agitation.     #Acute encephalopathy  Concerning for post-cardiac arrest encephalopathy with delirium with possible component of functional disorder. Presented with symptoms of persistent confusion and  impaired attention, ongoing since 04/05/2025. Previously requiring Precedex GTT for agitation, however symptoms of confusion have persisted despite discontinuation 04/08/2025. Valproic acid was initiated on 04/06/2025 this hospital admission. Prior work-up this admission with unremarkable EEG and CTA head/neck. He also received a 3-day course of IV thiamine this admission. Of note, he was seen by neurology on prior hospitalization 10/2024 for an amnestic event, exhibiting odd behaviors.   -Continue valproic acid 500 mg TID  -Quetiapine PRN for agitation  -Consider initiation of gabapentin 100 mg TID to address bilateral lower extremity pain (patient's wife reports this was a prior home medication)  -Recommend repeat EEG and MRI brain when patient is able to rule out any underlying diagnosis of seizure  -Avoid neuropsychiatric medications such as haloperidol  -Will defer to psychiatry for management of inpatient medications for behavioral issues while inpatient  -PT/OT/ST as appropriate  -Neurology will continue to follow        Yen Baptiste MD  Internal medicine resident PGY-3

## 2025-04-11 NOTE — PROGRESS NOTES
Bedside report received from off going RN [place]    Fall Risk Score: HIGH RISK  Fall risk interventions in place: Place yellow fall risk ID band on patient, Educate patient/family to call staff for assistance when getting out of bed, Place fall precaution signage outside patient door, Place patient in room close to nursing station, and Utilize bed/chair fall alarm  Bed type: Low air loss (Corwin Score less than 17 interventions in place)  Patient on cardiac monitor: Yes  IVF/IV medications: No  Oxygen: Room Air, Oxygen during the night  Bedside sitter: Telesitter  Isolation: Not applicable

## 2025-04-11 NOTE — DISCHARGE PLANNING
[facility] Coordination    Anticipate Physiatry consult Saturday, 4/12.  Please reach out sooner if PMR consult requested for medical management.

## 2025-04-11 NOTE — PROCEDURES
INPATIENT ROUTINE VIDEO ELECTROENCEPHALOGRAM REPORT    REFERRING PROVIDER: Berna Malik M.d. ; Dr. Gregory   DOS: 04/11/25   ROOM: T802/00  TOTAL RECORDING TIME: 0 hours and 27 minutes of total recording time    INDICATION:  Roberto Braswell 44 y.o. male presenting with seizure(s) and altered mental status    RELEVANT TREATMENTS/MEDICATIONS:  valproic acid, 500 mg, TID  potassium chloride SA, 20 mEq, DAILY  melatonin, 5 mg, Nightly  cyclobenzaprine, 5 mg, Once  Pharmacy Consult Request, 1 Each, PHARMACY TO DOSE  Nozin nasal  swab, 1 Applicator, BID  losartan, 50 mg, BID  metoprolol tartrate, 37.5 mg, Q8HRS  enoxaparin (LOVENOX) injection, 1 mg/kg, Q12HRS  senna-docusate, 2 Tablet, Q EVENING         Intravenous Meds:       TECHNIQUE:   Routine VEEG was set up by a Neurodiagnostic technologist who performed education to the patient and staff. A minimum of 23 electrodes and 23 channel recording was setup and performed by Neurodiagnostic technologist, in accordance with the international 10-20 system. The study was reviewed in bipolar and referential montages. The recording examined the patient in the  awake and drowsy state(s).     DESCRIPTION OF THE RECORD:  EEG background: The background was continuous, intermittently asymmetrical, and poorly defined and/or fragmented 6 Hz posterior dominant rhythm, with a mixture of polymorphic theta/delta activity . Reactivity was seen. Spontaneous variability was seen. State changes  were seen.  During drowsiness, a loss of myogenic artifact and theta/delta frequencies were seen.     EEG Sleep: N2 sleep architecture was not seen.    ICTAL AND INTERICTAL FINDINGS:   No focal or generalized epileptiform activity noted.     No regional slowing or persistent focal asymmetries were seen.    No definite seizures.     ACTIVATION PROCEDURES:   NA    EKG: Sampling of the EKG recording showed sinus rhythm    EVENTS:    No clinical events were captured or reported.    NOTE:  This is a technically-limited study due to abundant myogenic, movement, and lead artifact obscuring most of the recording.      INTERPRETATION:  Abnormal, technically-limited video EEG recording in the awake and drowsy state(s):  -Mild to moderate background slowing suggestive of diffuse/multifocal cerebral dysfunction and consistent with a nonspecific encephalopathy.   -No persistent focal asymmetries seen.  -Epileptiform discharges : No definitive epileptiform discharges were seen.  -No seizures.   -Clinical Events: None          Tye Pelayo MD  Department of Neurology at [facility]  General Neurologist and Epileptologist  Director of [facility] Level III Comprehensive Epilepsy Program  Professor of Clinical Neurology, [facility].   Phone: [phone]  Fax: [phone]  E-mail: [email or web address]
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
## 2025-04-11 NOTE — CARE PLAN
The patient is Watcher - Medium risk of patient condition declining or worsening    Shift Goals  Clinical Goals: safety, monitor neuro status  Patient Goals: comfort  Family Goals: comfort, improved mentation    Progress made toward(s) clinical / shift goals:    Problem: Knowledge Deficit - Standard  Goal: Patient and family/care givers will demonstrate understanding of plan of care, disease process/condition, diagnostic tests and medications  Outcome: Progressing  Note: White board updated with POC and care team information during bedside report.     Problem: Skin Integrity  Goal: Skin integrity is maintained or improved  Outcome: Progressing  Note: Pt turned and positioned every two hours. Incontinence care provided and barrier paste applied as needed.      Problem: Fall Risk  Goal: Patient will remain free from falls  Outcome: Progressing  Note: Fall precautions in place. Bed in lowest position. Non-skid socks in place. Personal possessions within reach. Mobility sign on door. Bed-alarm on. Call light within reach. Pt educated regarding fall prevention and states understanding.      Problem: Pain - Standard  Goal: Alleviation of pain or a reduction in pain to the patient’s comfort goal  Outcome: Progressing  Note: Pt assessed for pain regularly and medicated PRN per MAR.        Patient is not progressing towards the following goals:
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
## 2025-04-12 PROBLEM — R19.7 DIARRHEA: Status: ACTIVE | Noted: 2025-04-12

## 2025-04-12 LAB
ADV 40+41 DNA STL QL NAA+NON-PROBE: NOT DETECTED
ANION GAP SERPL CALC-SCNC: 11 MMOL/L (ref 7–16)
ASTRO TYP 1-8 RNA STL QL NAA+NON-PROBE: NOT DETECTED
BUN SERPL-MCNC: 16 MG/DL (ref 8–22)
C CAYETANENSIS DNA STL QL NAA+NON-PROBE: NOT DETECTED
C COLI+JEJ+UPSA DNA STL QL NAA+NON-PROBE: NOT DETECTED
CALCIUM SERPL-MCNC: 8.8 MG/DL (ref 8.5–10.5)
CHLORIDE SERPL-SCNC: 107 MMOL/L (ref 96–112)
CO2 SERPL-SCNC: 22 MMOL/L (ref 20–33)
CREAT SERPL-MCNC: 0.79 MG/DL (ref 0.5–1.4)
CRYPTOSP DNA STL QL NAA+NON-PROBE: NOT DETECTED
E HISTOLYT DNA STL QL NAA+NON-PROBE: NOT DETECTED
EAEC PAA PLAS AGGR+AATA ST NAA+NON-PRB: NOT DETECTED
EC STX1+STX2 GENES STL QL NAA+NON-PROBE: NOT DETECTED
EPEC EAE GENE STL QL NAA+NON-PROBE: NOT DETECTED
ERYTHROCYTE [DISTWIDTH] IN BLOOD BY AUTOMATED COUNT: 52.3 FL (ref 35.9–50)
ETEC LTA+ST1A+ST1B TOX ST NAA+NON-PROBE: NOT DETECTED
G LAMBLIA DNA STL QL NAA+NON-PROBE: NOT DETECTED
GFR SERPLBLD CREATININE-BSD FMLA CKD-EPI: 112 ML/MIN/1.73 M 2
GLUCOSE SERPL-MCNC: 94 MG/DL (ref 65–99)
HCT VFR BLD AUTO: 41.2 % (ref 42–52)
HGB BLD-MCNC: 13.2 G/DL (ref 14–18)
MAGNESIUM SERPL-MCNC: 2.1 MG/DL (ref 1.5–2.5)
MCH RBC QN AUTO: 27.2 PG (ref 27–33)
MCHC RBC AUTO-ENTMCNC: 32 G/DL (ref 32.3–36.5)
MCV RBC AUTO: 84.8 FL (ref 81.4–97.8)
NOROVIRUS GI+II RNA STL QL NAA+NON-PROBE: NOT DETECTED
P SHIGELLOIDES DNA STL QL NAA+NON-PROBE: NOT DETECTED
PLATELET # BLD AUTO: 423 K/UL (ref 164–446)
PMV BLD AUTO: 9.4 FL (ref 9–12.9)
POTASSIUM SERPL-SCNC: 4.3 MMOL/L (ref 3.6–5.5)
RBC # BLD AUTO: 4.86 M/UL (ref 4.7–6.1)
RVA RNA STL QL NAA+NON-PROBE: NOT DETECTED
S ENT+BONG DNA STL QL NAA+NON-PROBE: NOT DETECTED
SAPO I+II+IV+V RNA STL QL NAA+NON-PROBE: NOT DETECTED
SHIGELLA SP+EIEC IPAH ST NAA+NON-PROBE: NOT DETECTED
SODIUM SERPL-SCNC: 140 MMOL/L (ref 135–145)
V CHOL+PARA+VUL DNA STL QL NAA+NON-PROBE: NOT DETECTED
V CHOLERAE DNA STL QL NAA+NON-PROBE: NOT DETECTED
WBC # BLD AUTO: 11 K/UL (ref 4.8–10.8)
Y ENTEROCOL DNA STL QL NAA+NON-PROBE: NOT DETECTED

## 2025-04-12 PROCEDURE — 36415 COLL VENOUS BLD VENIPUNCTURE: CPT

## 2025-04-12 PROCEDURE — A9270 NON-COVERED ITEM OR SERVICE: HCPCS | Performed by: NURSE PRACTITIONER

## 2025-04-12 PROCEDURE — 99233 SBSQ HOSP IP/OBS HIGH 50: CPT | Performed by: STUDENT IN AN ORGANIZED HEALTH CARE EDUCATION/TRAINING PROGRAM

## 2025-04-12 PROCEDURE — 80048 BASIC METABOLIC PNL TOTAL CA: CPT

## 2025-04-12 PROCEDURE — 770020 HCHG ROOM/CARE - TELE (206)

## 2025-04-12 PROCEDURE — 700102 HCHG RX REV CODE 250 W/ 637 OVERRIDE(OP): Performed by: STUDENT IN AN ORGANIZED HEALTH CARE EDUCATION/TRAINING PROGRAM

## 2025-04-12 PROCEDURE — 700102 HCHG RX REV CODE 250 W/ 637 OVERRIDE(OP): Performed by: NURSE PRACTITIONER

## 2025-04-12 PROCEDURE — A9270 NON-COVERED ITEM OR SERVICE: HCPCS

## 2025-04-12 PROCEDURE — 700102 HCHG RX REV CODE 250 W/ 637 OVERRIDE(OP): Performed by: HOSPITALIST

## 2025-04-12 PROCEDURE — A9270 NON-COVERED ITEM OR SERVICE: HCPCS | Performed by: HOSPITALIST

## 2025-04-12 PROCEDURE — 99233 SBSQ HOSP IP/OBS HIGH 50: CPT | Performed by: PSYCHIATRY & NEUROLOGY

## 2025-04-12 PROCEDURE — 700102 HCHG RX REV CODE 250 W/ 637 OVERRIDE(OP)

## 2025-04-12 PROCEDURE — 87507 IADNA-DNA/RNA PROBE TQ 12-25: CPT

## 2025-04-12 PROCEDURE — 700111 HCHG RX REV CODE 636 W/ 250 OVERRIDE (IP): Performed by: HOSPITALIST

## 2025-04-12 PROCEDURE — 85027 COMPLETE CBC AUTOMATED: CPT

## 2025-04-12 PROCEDURE — A9270 NON-COVERED ITEM OR SERVICE: HCPCS | Performed by: STUDENT IN AN ORGANIZED HEALTH CARE EDUCATION/TRAINING PROGRAM

## 2025-04-12 PROCEDURE — 700111 HCHG RX REV CODE 636 W/ 250 OVERRIDE (IP): Mod: JZ | Performed by: STUDENT IN AN ORGANIZED HEALTH CARE EDUCATION/TRAINING PROGRAM

## 2025-04-12 PROCEDURE — 83735 ASSAY OF MAGNESIUM: CPT

## 2025-04-12 RX ORDER — ROPINIROLE 2 MG/1
2 TABLET, FILM COATED ORAL ONCE
Status: COMPLETED | OUTPATIENT
Start: 2025-04-12 | End: 2025-04-12

## 2025-04-12 RX ADMIN — OXYCODONE HYDROCHLORIDE 10 MG: 10 TABLET ORAL at 22:39

## 2025-04-12 RX ADMIN — OXYCODONE HYDROCHLORIDE 10 MG: 10 TABLET ORAL at 17:30

## 2025-04-12 RX ADMIN — METOPROLOL TARTRATE 37.5 MG: 25 TABLET, FILM COATED ORAL at 13:49

## 2025-04-12 RX ADMIN — OXYCODONE HYDROCHLORIDE 10 MG: 10 TABLET ORAL at 14:27

## 2025-04-12 RX ADMIN — LOSARTAN POTASSIUM 50 MG: 50 TABLET, FILM COATED ORAL at 17:30

## 2025-04-12 RX ADMIN — VALPROIC ACID 500 MG: 250 SOLUTION ORAL at 17:31

## 2025-04-12 RX ADMIN — QUETIAPINE FUMARATE 50 MG: 25 TABLET ORAL at 21:22

## 2025-04-12 RX ADMIN — ROPINIROLE HYDROCHLORIDE 2 MG: 2 TABLET, FILM COATED ORAL at 03:10

## 2025-04-12 RX ADMIN — HYDROMORPHONE HYDROCHLORIDE 1 MG: 1 INJECTION, SOLUTION INTRAMUSCULAR; INTRAVENOUS; SUBCUTANEOUS at 23:57

## 2025-04-12 RX ADMIN — METOPROLOL TARTRATE 37.5 MG: 25 TABLET, FILM COATED ORAL at 05:15

## 2025-04-12 RX ADMIN — HYDROMORPHONE HYDROCHLORIDE 1 MG: 1 INJECTION, SOLUTION INTRAMUSCULAR; INTRAVENOUS; SUBCUTANEOUS at 01:28

## 2025-04-12 RX ADMIN — ENOXAPARIN SODIUM 120 MG: 150 INJECTION SUBCUTANEOUS at 05:15

## 2025-04-12 RX ADMIN — OXYCODONE HYDROCHLORIDE 10 MG: 10 TABLET ORAL at 00:12

## 2025-04-12 RX ADMIN — METOPROLOL TARTRATE 37.5 MG: 25 TABLET, FILM COATED ORAL at 21:21

## 2025-04-12 RX ADMIN — ENOXAPARIN SODIUM 120 MG: 150 INJECTION SUBCUTANEOUS at 17:31

## 2025-04-12 RX ADMIN — QUETIAPINE FUMARATE 25 MG: 25 TABLET ORAL at 00:12

## 2025-04-12 RX ADMIN — VALPROIC ACID 500 MG: 250 SOLUTION ORAL at 06:10

## 2025-04-12 RX ADMIN — HYDROMORPHONE HYDROCHLORIDE 1 MG: 1 INJECTION, SOLUTION INTRAMUSCULAR; INTRAVENOUS; SUBCUTANEOUS at 18:36

## 2025-04-12 RX ADMIN — Medication 1 APPLICATOR: at 17:31

## 2025-04-12 RX ADMIN — LOSARTAN POTASSIUM 50 MG: 50 TABLET, FILM COATED ORAL at 05:16

## 2025-04-12 RX ADMIN — Medication 5 MG: at 21:22

## 2025-04-12 RX ADMIN — QUETIAPINE FUMARATE 25 MG: 25 TABLET ORAL at 23:57

## 2025-04-12 RX ADMIN — Medication 1 APPLICATOR: at 05:20

## 2025-04-12 RX ADMIN — POTASSIUM CHLORIDE 20 MEQ: 1500 TABLET, EXTENDED RELEASE ORAL at 05:16

## 2025-04-12 RX ADMIN — VALPROIC ACID 500 MG: 250 SOLUTION ORAL at 12:07

## 2025-04-12 RX ADMIN — OXYCODONE HYDROCHLORIDE 10 MG: 10 TABLET ORAL at 09:58

## 2025-04-12 ASSESSMENT — PAIN DESCRIPTION - PAIN TYPE
TYPE: ACUTE PAIN

## 2025-04-12 ASSESSMENT — FIBROSIS 4 INDEX: FIB4 SCORE: 0.39

## 2025-04-12 NOTE — PROGRESS NOTES
Bedside report received from off going RN Kera    Fall Risk Score: HIGH RISK  Fall risk interventions in place: Place yellow fall risk ID band on patient, Provide patient/family education based on risk assessment, Educate patient/family to call staff for assistance when getting out of bed, Place fall precaution signage outside patient door, Place patient in room close to nursing station, Utilize bed/chair fall alarm, and Bed alarm connected correctly  Bed type: Low air loss (Corwin Score less than 17 interventions in place)  Patient on cardiac monitor: Yes  IVF/IV medications: Not Applicable   Oxygen: Room Air  Bedside sitter: Telesitter  Isolation: Not applicable

## 2025-04-12 NOTE — CARE PLAN
The patient is Watcher - Medium risk of patient condition declining or worsening    Shift Goals  Clinical Goals: neuro checks, pain management, monitor VS and labs  Patient Goals: eloise  Family Goals: updates    Progress made toward(s) clinical / shift goals:    Problem: Skin Integrity  Goal: Skin integrity is maintained or improved  Outcome: Progressing     Problem: Fall Risk  Goal: Patient will remain free from falls  Outcome: Progressing     Problem: Pain - Standard  Goal: Alleviation of pain or a reduction in pain to the patient’s comfort goal  Outcome: Progressing

## 2025-04-12 NOTE — PROGRESS NOTES
Referring Physician: Berna Malik M.D.    S: Slept a little better last night.  Sleeping this morning as well.  No new or progressive neurologic symptoms.  Had some restless leg symptoms last night.  Overall significantly improved behavior and agitation as per the patient's wife.  According to the nursing team patient was more interactive this morning and was able to speak complete incorrect sentences.  No new neurologic concerns reported.  EEG was completed yesterday as well.  Physiatry has evaluated the patient.    Scheduled Medications   Medication Dose Frequency    valproic acid  500 mg TID    QUEtiapine  50 mg Nightly    potassium chloride SA  20 mEq DAILY    melatonin  5 mg Nightly    Pharmacy Consult Request  1 Each PHARMACY TO DOSE    Nozin nasal  swab  1 Applicator BID    losartan  50 mg BID    metoprolol tartrate  37.5 mg Q8HRS    enoxaparin (LOVENOX) injection  1 mg/kg Q12HRS    senna-docusate  2 Tablet Q EVENING     O:    Vitals:    04/12/25 0958   BP:    Pulse:    Resp: 16   Temp:    SpO2:      Asleep and appears to be resting comfortably.  Briefly arouses and moves all extremities spontaneously.  Neurologic examination otherwise deferred as the patient finally is getting some much-needed rest.    EEG INTERPRETATION:  Abnormal, technically-limited video EEG recording in the awake and drowsy state(s):  -Mild to moderate background slowing suggestive of diffuse/multifocal cerebral dysfunction and consistent with a nonspecific encephalopathy.   -No persistent focal asymmetries seen.  -Epileptiform discharges : No definitive epileptiform discharges were seen.  -No seizures.   -Clinical Events: None    Impression & Recommendations:  Improving neuro-behavioral signs and symptoms that have emerged post-cardiac arrest. Possible element of post-anoxic encephalopathy with some degree of hyperactive delirium; seemingly resolved. Neurologic examination with functional features as previously described.      Overall favorable trend that seems to correspond with time as well as rest.  Patient has not developed any new or progressive neurologic symptoms.  Interval repeat EEG with mild slowing of the background.  This is a nonspecific finding that can be associated with encephalopathy among other considerations such as a sedation effect or drowsiness.  There were no epileptiform features.    - Continue Depakote 500 mg every 8 hours.  - Continue melatonin.  - MRI brain to be completed when able to tolerate.  - Continue PT, OT and speech.  Rehab is following.  - Agree with quetiapine at bedtime.  - Consider therapeutic trial with gabapentin for persistent restless leg type symptoms.    Neurology will follow along peripherally to review MRI brain data.  Please reach out with any questions.    I provided a total of 50 minutes of acute neurologic care for this patient encounter reviewing medical records, diagnostic studies, direct face-to-face time with the patient and/or family, documentation, communicating and coordinating plan of care.      Lai Gregory MD  Neurohospitalist, Acute Care Services          Please note that this dictation was created using voice recognition software.  I have made every reasonable attempt to correct obvious errors, but I expect that there are errors of grammar and possibly content that I did not discover before finalizing the note.    Clindamycin Counseling: I counseled the patient regarding use of clindamycin as an antibiotic for prophylactic and/or therapeutic purposes. Clindamycin is active against numerous classes of bacteria, including skin bacteria. Side effects may include nausea, diarrhea, gastrointestinal upset, rash, hives, yeast infections, and in rare cases, colitis.

## 2025-04-12 NOTE — CARE PLAN
The patient is Watcher - Medium risk of patient condition declining or worsening    Shift Goals  Clinical Goals: Monitor neuro status, painmanagement,  Patient Goals: eloise  Family Goals: updates on POC    Progress made toward(s) clinical / shift goals:  Involvement in own care, improved communication verbalizes needs      Problem: Knowledge Deficit - Standard  Goal: Patient and family/care givers will demonstrate understanding of plan of care, disease process/condition, diagnostic tests and medications  Outcome: Progressing     Problem: Skin Integrity  Goal: Skin integrity is maintained or improved  Outcome: Progressing     Problem: Fall Risk  Goal: Patient will remain free from falls  Outcome: Progressing     Problem: Pain - Standard  Goal: Alleviation of pain or a reduction in pain to the patient’s comfort goal  Outcome: Progressing     Problem: Communication  Goal: The ability to communicate needs accurately and effectively will improve  Outcome: Progressing     Problem: Safety  Goal: Will remain free from injury  Outcome: Progressing  Intervention: Provide assistance with mobility  Flowsheets (Taken 4/12/2025 4749)  Assistance: Assistance of One       Patient is not progressing towards the following goals:

## 2025-04-12 NOTE — ASSESSMENT & PLAN NOTE
Ongoing diarrhea since admission, unclear etiology   GI PCR negative   Consistent does not appear consistent with C.diff , is improving   Imodium prn   Now with abd pain, He does have h/o esophageal ulcer. Check LFTs, lipase, CT AP. Change omeprazole to PPI and add carafate

## 2025-04-12 NOTE — PROGRESS NOTES
RN notified that pt's wife stated that her  told her that he was having chest pain. RN came to bedside and assessed pt and tried to do an assessment asking specifically where the pain was, if it felt like pressure or pain, etc. Pt was unable to answer RN's questions. RN ordered a stat EKG and notified on call hospitalist.

## 2025-04-12 NOTE — PROGRESS NOTES
[facility] Medicine Daily Progress Note    Date of Service  4/12/2025    Chief Complaint  Roberto Braswell is a 44 y.o. male admitted 4/3/2025 with cardiac arrest    Hospital Course  Mr. Braswell has a past medical history of mitral valve repair 1/24/24 by Dr. Santana, multiple admissions for upper GI bleeds and an admission here in October for expressive aphasia and abnormal behavior though normal MRI and he was discharged with no neurological deficits.    He was in his usual state of health and on 3/17 had a urologic procedure on his epididymis. He was in the process of weaning off of narcotics for the previous 3 days per report. On 4/3 he was watching TV with his wife, Destin. He got up and became dizzy. He sat back down and developed convulsions. His wife called 911. He stopped breathing and turned blue. She is a nurse and started CPR for 5 minutes until paramedics came. He was in Vfib on their arrival and underwent defibrillation x 3 with 3 rounds of epi, IV amio and transferred to the ER where he was intubated.  In the emergency room, CT of the chest revealed distal right main pulmonary embolism and CT of the abdomen pelvis revealed ileus.  He was admitted to the intensive care unit on mechanical ventilation.  Ejection fraction was found to be 38% by echocardiogram and cardiology was consulted.  He was initiated on IV heparin drip due to the pulmonary emboli which has since been transitioned to Subq Lovenox.  He was febrile and initiated on empiric IV Zosyn with apparent aspiration pneumonitis transitioned to ancef as tracheal aspirate growing MSSA .  CT of the head was negative for acute processes.  EEG was negative for seizures.  He was successfully extubated on 4/5/2025.  After extubation he has required a Precedex drip due to agitation and remains encephalopathic. He has since been transferred to tele floor.     Interval Problem Update  Patient seen at bedside. His wife is present but sleeping. Patient is  awake, he and alert. He appears calm, he is making eye contact and conversing this AM. Able to have small talk with me today, having some pain/cramping in legs. Denies chest pain, denies headache. Laughed at my joke. Following and moving all extremities equally. Reportedly did get some sleep overnight.   - EEG done yesterday, non-specific encephalopathy, no seizure activity  - will get MRI brain, will need anasthesia, likely will not happen till early next week.   - continue depakote 500 mg TID, continue Seroquel 50 mg at night   - is having ongoing loose stools, check GI PCR   - PMR following, pending MRI Brain       I have discussed this patient's plan of care and discharge plan at IDT rounds today with Case Management, Nursing, Nursing leadership, and other members of the IDT team.    Consultants/Specialty  Critical care  Cardiology, needs to be reevaluated prior to discharge to consider a AICD  Neurology    Code Status  Full Code    Disposition  The patient is not medically cleared for discharge to home or a post-acute facility.      I have placed the appropriate orders for post-discharge needs.    Review of Systems  Review of Systems   Unable to perform ROS: Mental status change   Denies chest pain or headache    Physical Exam  Temp:  [36.2 °C (97.2 °F)-36.8 °C (98.2 °F)] 36.7 °C (98.1 °F)  Pulse:  [] 73  Resp:  [16-20] 16  BP: (109-146)/(68-95) 143/95  SpO2:  [92 %-97 %] 97 %    Physical Exam  Vitals and nursing note reviewed.   Constitutional:       Appearance: He is well-developed. He is ill-appearing.   HENT:      Head: Normocephalic and atraumatic.      Mouth/Throat:      Mouth: Mucous membranes are moist.   Eyes:      General: No scleral icterus.     Extraocular Movements: Extraocular movements intact.      Conjunctiva/sclera: Conjunctivae normal.   Cardiovascular:      Rate and Rhythm: Normal rate and regular rhythm.      Heart sounds: Normal heart sounds.   Pulmonary:      Effort: Pulmonary effort  is normal.      Breath sounds: Normal breath sounds.      Comments: On RA, normal WOB   Abdominal:      General: Bowel sounds are normal. There is no distension.      Palpations: Abdomen is soft.      Tenderness: There is no abdominal tenderness. There is no guarding or rebound.   Musculoskeletal:         General: Normal range of motion.      Cervical back: Normal range of motion and neck supple.      Right lower leg: No edema.      Left lower leg: No edema.   Skin:     General: Skin is warm and dry.   Neurological:      Mental Status: He is alert.      Comments: Answering simple questions, moving all extremities, still slowed but does appear improved during my exam   Psychiatric:      Comments: Calm, flat but did smile/laugh at joke         Fluids    Intake/Output Summary (Last 24 hours) at 4/12/2025 1236  Last data filed at 4/12/2025 0452  Gross per 24 hour   Intake --   Output 250 ml   Net -250 ml        Laboratory  Recent Labs     04/10/25  0434 04/11/25  0733 04/12/25  0032   WBC 10.2 12.0* 11.0*   RBC 4.82 4.75 4.86   HEMOGLOBIN 12.9* 12.7* 13.2*   HEMATOCRIT 39.6* 39.6* 41.2*   MCV 82.2 83.4 84.8   MCH 26.8* 26.7* 27.2   MCHC 32.6 32.1* 32.0*   RDW 50.5* 51.8* 52.3*   PLATELETCT 388 427 423   MPV 9.1 9.1 9.4     Recent Labs     04/10/25  0434 04/11/25  0733 04/12/25  0032   SODIUM 139 142 140   POTASSIUM 3.4* 3.5* 4.3   CHLORIDE 107 110 107   CO2 21 21 22   GLUCOSE 108* 96 94   BUN 15 18 16   CREATININE 0.80 0.88 0.79   CALCIUM 8.9 8.9 8.8                     Imaging  DX-CHEST-PORTABLE (1 VIEW)   Final Result         1.  Hazy right lower lobe infiltrate, slightly decreased since prior study   2.  Cardiomegaly      [id number] VIEW   Final Result         1.  Nonspecific bowel gas pattern in the upper abdomen.      DX-CHEST-PORTABLE (1 VIEW)   Final Result         1.  Pulmonary edema and/or infiltrates are identified, which are stable since the prior exam.   2.  Cardiomegaly      DX-CHEST-PORTABLE (1 VIEW)    Final Result         1.  Pulmonary edema and/or infiltrates are identified, which appear somewhat increased since the prior exam.   2.  Cardiomegaly      CT-CTA NECK WITH & W/O-POST PROCESSING   Final Result         1.  CT angiogram of the neck within normal limits.   2.  Patchy bilateral upper lobe infiltrates         CT-CTA HEAD WITH & W/O-POST PROCESS   Final Result         1.  No large vessel occlusion or aneurysm identified   2.  Bilateral sinusitis changes      US-EXTREMITY VENOUS LOWER BILAT   Final Result      EC-ECHOCARDIOGRAM COMPLETE W/ CONT   Final Result      CT-ABDOMEN-PELVIS WITH   Final Result         1.  Fluid-filled prominence of proximal small bowel with distal small bowel decompression, appearance suggests ileus and/or enteritis versus evolving obstructive changes. Recommend radiographic follow-up to resolution as clinically appropriate.   2.  Irregular hepatic contour favoring changes of cirrhosis   3.  Fat-containing bilateral inguinal hernias      CT-CTA CHEST PULMONARY ARTERY W/ RECONS   Final Result         1.  Distal right main pulmonary embolus extending into right upper lobe and lower lobe segmental and subsegmental branches. 1.4 right to left ventricular ratio suggesting component of right heart strain.   2.  Evaluation of the subsegmental branches is essentially nondiagnostic due to motion artifacts. Additional imaging would be required for definitive evaluation of small distal pulmonary emboli.   3.  Scattered patchy bilateral pulmonary infiltrates.   4.  Bilateral dependent consolidations, appearance suggests atelectasis, component of infiltrate not excluded.      These findings were discussed with the patient's clinician, Misbah Cardoza, on 4/3/2025 9:27 PM.      CT-HEAD W/O   Final Result      No acute intracranial findings.               DX-CHEST-PORTABLE (1 VIEW)   Final Result      Interval placement of a right IJ central line without evidence of complication.       DX-CHEST-PORTABLE (1 VIEW)   Final Result      1.  Support devices present.      2.  Cardiomegaly.      3.  Bibasilar atelectasis.      MR-BRAIN-WITH & W/O    (Results Pending)        Assessment/Plan  * Cardiac arrest (HCC)- (present on admission)  Assessment & Plan  Cardiac arrest with CPR, defibrillation x 3 and 3 rounds epinephrine, unclear etiology   Loaded with amiodarone  Now in sinus rhythm  Cardiology consulted  Decreased ejection fraction 38% likely transient given stunned myocardium  Continues telemetry monitoring  Optimize electrolytes specifically potassium and magnesium    Diarrhea  Assessment & Plan  Ongoing diarrhea since admission, unclear etiology   Check GI PCR  Consistent does not appear consistent with C.diff   Pending PCR panel could trial imodium if persistent     Hypokalemia  Assessment & Plan  Low K, on oral replacement   Monitor BMP     Acute respiratory failure with hypoxia (HCC)- (present on admission)  Assessment & Plan  Requiring intubation on admission was extubated on 4/5/2025  Secondary to cardiac arrest  He continues to require supplemental oxygen will have continuous pulse oximetry  Out of IMCU, on tele   Currently on RA    Encephalopathy acute- (present on admission)  Assessment & Plan  Acute metabolic encephalopathy in the setting of cardiac arrest  CT head negative  EEG negative for seizure activity  Off Precedex drip   Valproic acid has been initiated for mood stabilization and increase to 500 mg TID  MRI of the brain has been ordered, will need general anaesthesia   Likely multifactorial, possible mild hypoxic injury, delerium and likely functional component as well. Discussed with neurology   I have consulted with psychiatry to help agitation/restlessness, case with discussed with team, started on seroquel overnight    Cirrhosis of liver (HCC)  Assessment & Plan  Noted on imaging  Outpatient follow-up  Ammonia level normal     Aspiration into airway- (present on  admission)  Assessment & Plan  Treated with Zosyn, de-escalation to ancef, respiratory cx with MSSA  Discontinued today, completed 7 days   Speech following, diet per speech    Pulmonary embolus (HCC)- (present on admission)  Assessment & Plan  Noted on CTA  Status post IV heparin drip  He was transitioned to lovenox though given the possible drug-drug interaction with valproic acid, change to weight-based lovenox for now.  He has a history of multiple admissions for GI bleeds will need to be monitored closely    Seizure-like activity (HCC)- (present on admission)  Assessment & Plan  Seizure type activity likely due to anoxia  EEG negative on early admit   Repeat EEG 4/11 showing non-specific encephalopathy, no seizure activity seen    Plan for brain MRI with sedation, I have ordered, likely to be done early next week   Seizure and fall precautions    Ventricular fibrillation (HCC)- (present on admission)  Assessment & Plan  With defibrillation x 3  Now in sinus  Continuous telemetry monitoring  Cardiology eval prior to dc to discuss AICD    Pain, chronic- (present on admission)  Assessment & Plan  He had weaned himself off narcotics for 3 days prior to this event  Has been on chronic oxycodone 10 mg 5 x daily (last fill 3/24)   Prn oxy     History of MVR with cardiopulmonary bypass  Assessment & Plan  By Dr. Santana Jan 2024  Echo reviewed Known mitral valve repair that is functioning normally with appropriate transvalvular gradient. Reduced EF and hypokinesis present likely from cardiac stunning         VTE prophylaxis: Full dose Lovenox    I have performed a physical exam and reviewed and updated ROS and Plan today (4/12/2025). In review of yesterday's note (4/11/2025), there are no changes except as documented above.

## 2025-04-12 NOTE — PROGRESS NOTES
Bedside report received from off going RN/tech: [place] RN, assumed care of patient.     Fall Risk Score: HIGH RISK  Fall risk interventions in place: Place yellow fall risk ID band on patient, Provide patient/family education based on risk assessment, Educate patient/family to call staff for assistance when getting out of bed, Place fall precaution signage outside patient door, Utilize bed/chair fall alarm, Notify charge of high risk for huddle, Tele-sitter, and Bed alarm connected correctly  Bed type: Low air loss (Corwin Score less than 17 interventions in place)  Patient on cardiac monitor: Yes  IVF/IV medications: Not Applicable   Oxygen: Room Air  Bedside sitter: Not Applicable   Isolation: Not applicable

## 2025-04-12 NOTE — WOUND TEAM
Wound team note:   Pt seen for placement of BMS. MD order verified. Pt assessed, noted to be incontinent of loose brown stool. Sacrum/buttocks pink and intact. Sphincter tone determined to be adequate. BMS placed without difficulty, 40 mL of tap water placed in retention balloon, irrigated with 60 mL warm tap water. Confirmed irrigant/stool output in tube. Nursing to irrigate q shift with 60 mL-120 mL warm tap water or until patent.

## 2025-04-13 PROCEDURE — A9270 NON-COVERED ITEM OR SERVICE: HCPCS | Performed by: STUDENT IN AN ORGANIZED HEALTH CARE EDUCATION/TRAINING PROGRAM

## 2025-04-13 PROCEDURE — 700102 HCHG RX REV CODE 250 W/ 637 OVERRIDE(OP): Performed by: STUDENT IN AN ORGANIZED HEALTH CARE EDUCATION/TRAINING PROGRAM

## 2025-04-13 PROCEDURE — 700111 HCHG RX REV CODE 636 W/ 250 OVERRIDE (IP): Performed by: HOSPITALIST

## 2025-04-13 PROCEDURE — 700111 HCHG RX REV CODE 636 W/ 250 OVERRIDE (IP): Mod: JZ | Performed by: STUDENT IN AN ORGANIZED HEALTH CARE EDUCATION/TRAINING PROGRAM

## 2025-04-13 PROCEDURE — 770020 HCHG ROOM/CARE - TELE (206)

## 2025-04-13 PROCEDURE — 99232 SBSQ HOSP IP/OBS MODERATE 35: CPT | Performed by: PHYSICAL MEDICINE & REHABILITATION

## 2025-04-13 PROCEDURE — 700102 HCHG RX REV CODE 250 W/ 637 OVERRIDE(OP): Performed by: HOSPITALIST

## 2025-04-13 PROCEDURE — A9270 NON-COVERED ITEM OR SERVICE: HCPCS | Performed by: NURSE PRACTITIONER

## 2025-04-13 PROCEDURE — 700102 HCHG RX REV CODE 250 W/ 637 OVERRIDE(OP): Performed by: NURSE PRACTITIONER

## 2025-04-13 PROCEDURE — 99232 SBSQ HOSP IP/OBS MODERATE 35: CPT | Performed by: PSYCHIATRY & NEUROLOGY

## 2025-04-13 PROCEDURE — 99233 SBSQ HOSP IP/OBS HIGH 50: CPT | Performed by: STUDENT IN AN ORGANIZED HEALTH CARE EDUCATION/TRAINING PROGRAM

## 2025-04-13 PROCEDURE — A9270 NON-COVERED ITEM OR SERVICE: HCPCS | Performed by: HOSPITALIST

## 2025-04-13 RX ORDER — NYSTATIN 100000 [USP'U]/G
POWDER TOPICAL 2 TIMES DAILY
Status: COMPLETED | OUTPATIENT
Start: 2025-04-13 | End: 2025-04-18

## 2025-04-13 RX ORDER — GABAPENTIN 100 MG/1
200 CAPSULE ORAL EVERY EVENING
Status: DISCONTINUED | OUTPATIENT
Start: 2025-04-13 | End: 2025-04-21 | Stop reason: HOSPADM

## 2025-04-13 RX ADMIN — QUETIAPINE FUMARATE 50 MG: 25 TABLET ORAL at 21:13

## 2025-04-13 RX ADMIN — VALPROIC ACID 500 MG: 250 SOLUTION ORAL at 05:38

## 2025-04-13 RX ADMIN — METOPROLOL TARTRATE 37.5 MG: 25 TABLET, FILM COATED ORAL at 05:38

## 2025-04-13 RX ADMIN — LOSARTAN POTASSIUM 50 MG: 50 TABLET, FILM COATED ORAL at 17:13

## 2025-04-13 RX ADMIN — Medication 1 APPLICATOR: at 05:38

## 2025-04-13 RX ADMIN — METOPROLOL TARTRATE 37.5 MG: 25 TABLET, FILM COATED ORAL at 21:15

## 2025-04-13 RX ADMIN — ENOXAPARIN SODIUM 120 MG: 150 INJECTION SUBCUTANEOUS at 17:14

## 2025-04-13 RX ADMIN — METOPROLOL TARTRATE 37.5 MG: 25 TABLET, FILM COATED ORAL at 15:35

## 2025-04-13 RX ADMIN — Medication 5 MG: at 21:13

## 2025-04-13 RX ADMIN — OXYCODONE HYDROCHLORIDE 10 MG: 10 TABLET ORAL at 09:25

## 2025-04-13 RX ADMIN — ENOXAPARIN SODIUM 120 MG: 150 INJECTION SUBCUTANEOUS at 05:38

## 2025-04-13 RX ADMIN — NYSTATIN: 100000 POWDER TOPICAL at 18:06

## 2025-04-13 RX ADMIN — LOSARTAN POTASSIUM 50 MG: 50 TABLET, FILM COATED ORAL at 05:38

## 2025-04-13 RX ADMIN — POTASSIUM CHLORIDE 20 MEQ: 1500 TABLET, EXTENDED RELEASE ORAL at 05:38

## 2025-04-13 RX ADMIN — VALPROIC ACID 500 MG: 250 SOLUTION ORAL at 17:13

## 2025-04-13 RX ADMIN — OXYCODONE HYDROCHLORIDE 10 MG: 10 TABLET ORAL at 12:59

## 2025-04-13 RX ADMIN — VALPROIC ACID 500 MG: 250 SOLUTION ORAL at 12:58

## 2025-04-13 RX ADMIN — OXYCODONE HYDROCHLORIDE 10 MG: 10 TABLET ORAL at 17:13

## 2025-04-13 RX ADMIN — HYDROMORPHONE HYDROCHLORIDE 1 MG: 1 INJECTION, SOLUTION INTRAMUSCULAR; INTRAVENOUS; SUBCUTANEOUS at 10:35

## 2025-04-13 RX ADMIN — GABAPENTIN 200 MG: 100 CAPSULE ORAL at 17:13

## 2025-04-13 ASSESSMENT — FIBROSIS 4 INDEX: FIB4 SCORE: 0.39

## 2025-04-13 ASSESSMENT — PAIN DESCRIPTION - PAIN TYPE
TYPE: ACUTE PAIN

## 2025-04-13 NOTE — CARE PLAN
The patient is Watcher - Medium risk of patient condition declining or worsening    Shift Goals  Clinical Goals: manage pain, monitor neuro status  Patient Goals: eloise  Family Goals: POC updates, sleep    Progress made toward(s) clinical / shift goals:    Problem: Skin Integrity  Goal: Skin integrity is maintained or improved  Outcome: Progressing  Note: Skin integrity and turgor assessed, pressure points and bony prominences assessed by this RN. Corwin score evaluated and appropriate interventions implemented to maintain skin integrity. Pillows for positioning, Low Air Loss, and Mobility encouraged Education provided on skin integrity and risk for decubitus ulcers. Measures implemented to improve nutrition as needed. CMS assessed. Positioning pad in use to reduce shearing. RN wound protocol ordered / in place and Moisture control measures in use       Problem: Fall Risk  Goal: Patient will remain free from falls  Outcome: Progressing  Note: Fall prevention measures in place, bed alarm on and connected correctly, call light within reach, hourly rounding, Education provided on fall prevention. Pt instructed to use call light prior to exiting the bed, educated on use of bed alarms, and risks and complications related to falls. Medication orders evaluated for fall risk, gait/mobility assessed, sensory deficits assessed, mental status assessed, assessed for hypotension, hypovolemia, weakness, fatigue, elimination, and confusion.       Problem: Hemodynamics  Goal: Patient's hemodynamics, fluid balance and neurologic status will be stable or improve  Outcome: Progressing  Note: Vital signs stable, CMS intact, signs of bleeding assessed. I/O monitored. IV fluids not in use. Oral intake adequate. Peripheral pulses assessed and monitored. Capillary refill assessed. PRN blood pressure control meds given as needed.

## 2025-04-13 NOTE — CONSULTS
PSYCHIATRIC FOLLOW-UP:(established)  *Reason for admission:     Chief Complaint   Patient presents with    Cardiac Arrest     BIB [facility] and [facility] from [place] for witnessed arrest. Pt was eating lunch and collapsed in front of his wife, CPR started by wife. On EMS arrival pt was in vfib. EMS administered 3 shocks, 3 rounds of EPI, 450mg amiodarone, 5mg Versed, 4mg Narcan and 40mg etomidate through IO. +ROSC.  In route, [facility] administered an additional 5mg Versed. FSBG 159. Pt arrives tachycardic in the 120s, 130/85, 99% IGEL in place.          *Legal Hold Status:  n/a    Chart reviewed.         *HPI: Pt was Sleeping when I entered the room; not on restrains. I allowed him to sleep for therapeutic benefit.      His wife was at bedside.  She reported that his agitation is improving, but is still present, mostly at sundowning and overnight.  She feels that her restless leg syndrome is contributing to his agitation.  She also notes that patient regularly has a night shift schedule, he leaves the house around 4 PM and returns at 6 AM.  He usually takes Ambien in the morning to sleep.  Yesterday he slept about a total of 2 hours and 45 minutes during the day.  He is still feeling confused, delirious.  She is inquiring about the Requip for blood restless syndrome and possibly going up on Seroquel.  I expressed my concerns with going up on Seroquel after cardiac arrest.  Informed that I will discuss case with the hospitalist.          Medical ROS (as pertinent):     ROS  unable to obtain      *Psychiatric Examination:  Vitals:   Vitals:    04/13/25 1240   BP: 114/70   Pulse: 82   Resp: 16   Temp: 36.5 °C (97.7 °F)   SpO2: 98%   Pt was sleeping, no retrains.       *EKG:   Results for orders placed or performed during the hospital encounter of 04/03/25   EKG   Result Value Ref Range    Report       [facility] Emergency Dept.    Test Date:  2025-04-03  Pt Name:     PATI LAUREN           Department: ER  MRN:        [id number]                      Room:        08  Gender:     Male                         Technician: [id number]  :        1980                   Requested By:JANIYA LOPEZ  Order #:    [id number]                    Reading MD:    Measurements  Intervals                                Axis  Rate:       134                          P:          52  ME:         172                          QRS:        18  QRSD:       88                           T:          69  QT:         300  QTc:        448    Interpretive Statements  Sinus tachycardia  Probable left atrial enlargement  Probable anteroseptal infarct, old  Compared to ECG 2024 16:09:32  Myocardial infarct finding now present  Sinus bradycardia no longer present  Possible ischemia no longer present     EKG   Result Value Ref Range    Report       [facility]    Test Date:  2025  Pt Name:    PATI [facility]           Department: 161  MRN:        [id number]                      Room:       T622  Gender:     Male                         Technician: VON  :        1980                   Requested By:PAT HERNANDEZ  Order #:    [id number]                    Reading MD: Yinka Mitchell MD    Measurements  Intervals                                Axis  Rate:       108                          P:          63  ME:         159                          QRS:        -25  QRSD:       97                           T:          34  QT:         330  QTc:        443    Interpretive Statements  Sinus tachycardia  Inferior infarct, old    Electronically Signed On 2025 23:11:46 PDT by Yinka Mitchell MD     EKG (IP)   Result Value Ref Range    Report       [facility]    Test Date:  2025  Pt Name:    PATI MERCEDES           Department: 161  MRN:        [id number]                      Room:       T622  Gender:     Male                         Technician: JHON  :         1980                   Requested By:LAURIE LITTLE  Order #:    [id number]                    Reading MD: Roberto Pantoja MD    Measurements  Intervals                                Axis  Rate:       81                           P:          49  NY:         168                          QRS:        -25  QRSD:       75                           T:          0  QT:         454  QTc:        527    Interpretive Statements  Sinus rhythm  Inferior infarct, old  Anterior infarct, age indeterminate  Prolonged QT interval  Compared to ECG 2025 22:03:00  NO SIGNIFICANT CHANGES  Electronically Signed On 2025 14:59:10 PDT by Roberto Pantoja MD     EKG   Result Value Ref Range    Report       [facility]    Test Date:  2025  Pt Name:    ROBERTO MERCEDES           Department: [facility]  MRN:        [id number]                      Room:       T622  Gender:     Male                         Technician: JHON  :        1980                   Requested By:LAURIE LITTLE  Order #:    [id number]                    Reading MD: Roberto Pantoja MD    Measurements  Intervals                                Axis  Rate:       100                          P:          64  NY:         164                          QRS:        -24  QRSD:       79                           T:          0  QT:         428  QTc:        553    Interpretive Statements  Sinus tachycardia  Inferior infarct, old  Prolonged QT interval  Compared to ECG 2025 19:17:19  NO SIGNIFICANT CHANGES  Electronically Signed On 2025 15:00:12 PDT by Roberto Pantoja MD     EKG   Result Value Ref Range    Report       [facility]    Test Date:  2025  Pt Name:    ROBERTO MERCEDES           Department: [facility]  MRN:        [id number]                      Room:       XYH341  Gender:     Male                         Technician: FRANK  :        1980                   Requested By:JOSE BROWN  Order #:    [id number]                     Reading MD: Osmar Maria MD    Measurements  Intervals                                Axis  Rate:       70                           P:          54  NC:         156                          QRS:        -43  QRSD:       97                           T:          12  QT:         415  QTc:        448    Interpretive Statements  Sinus rhythm  Consider left atrial enlargement  Inferior infarct, old  Anterior infarct, old  Compared to ECG 2025 22:45:59  Sinus tachycardia no longer present  Prolonged QT interval no longer present  Myocardial infarct finding still present  Electronically Signed On 2025 15:48:44 PDT by Osmar Maria MD     EKG   Result Value Ref Range    Report       [facility]    Test Date:  2025  Pt Name:    PATI MERCEDES           Department: 183  MRN:        [id number]                      Room:       T802  Gender:     Male                         Technician: JHON  :        1980                   Requested By:KAMILAH COTTON  Order #:    [id number]                    Reading MD: Yisel Hooks    Measurements  Intervals                                Axis  Rate:       98                           P:          0  NC:         185                          QRS:        2  QRSD:       85                           T:          45  QT:         326  QTc:        417    Interpretive Statements  Sinus rhythm  Nonspecific T abnrm, anterolateral leads  Borderline inferior ST elevation    Electronically Signed On 2025 22:03:12 PDT by Yisel Hooks        *Imaging:   DX-CHEST-PORTABLE (1 VIEW)   Final Result         1.  Hazy right lower lobe infiltrate, slightly decreased since prior study   2.  Cardiomegaly      [id number] VIEW   Final Result         1.  Nonspecific bowel gas pattern in the upper abdomen.      DX-CHEST-PORTABLE (1 VIEW)   Final Result         1.  Pulmonary edema and/or infiltrates are identified, which are stable since the prior exam.   2.   Cardiomegaly      DX-CHEST-PORTABLE (1 VIEW)   Final Result         1.  Pulmonary edema and/or infiltrates are identified, which appear somewhat increased since the prior exam.   2.  Cardiomegaly      CT-CTA NECK WITH & W/O-POST PROCESSING   Final Result         1.  CT angiogram of the neck within normal limits.   2.  Patchy bilateral upper lobe infiltrates         CT-CTA HEAD WITH & W/O-POST PROCESS   Final Result         1.  No large vessel occlusion or aneurysm identified   2.  Bilateral sinusitis changes      US-EXTREMITY VENOUS LOWER BILAT   Final Result      EC-ECHOCARDIOGRAM COMPLETE W/ CONT   Final Result      CT-ABDOMEN-PELVIS WITH   Final Result         1.  Fluid-filled prominence of proximal small bowel with distal small bowel decompression, appearance suggests ileus and/or enteritis versus evolving obstructive changes. Recommend radiographic follow-up to resolution as clinically appropriate.   2.  Irregular hepatic contour favoring changes of cirrhosis   3.  Fat-containing bilateral inguinal hernias      CT-CTA CHEST PULMONARY ARTERY W/ RECONS   Final Result         1.  Distal right main pulmonary embolus extending into right upper lobe and lower lobe segmental and subsegmental branches. 1.4 right to left ventricular ratio suggesting component of right heart strain.   2.  Evaluation of the subsegmental branches is essentially nondiagnostic due to motion artifacts. Additional imaging would be required for definitive evaluation of small distal pulmonary emboli.   3.  Scattered patchy bilateral pulmonary infiltrates.   4.  Bilateral dependent consolidations, appearance suggests atelectasis, component of infiltrate not excluded.      These findings were discussed with the patient's clinician, Misbah Cardoza, on 4/3/2025 9:27 PM.      CT-HEAD W/O   Final Result      No acute intracranial findings.               DX-CHEST-PORTABLE (1 VIEW)   Final Result      Interval placement of a right IJ central line  without evidence of complication.      DX-CHEST-PORTABLE (1 VIEW)   Final Result      1.  Support devices present.      2.  Cardiomegaly.      3.  Bibasilar atelectasis.      MR-BRAIN-WITH & W/O    (Results Pending)        EEG:  nonspecific encephalopathy on 4/11     *Labs personally reviewed:   No results found for this or any previous visit (from the past 24 hours).    Current medications:  Current Facility-Administered Medications   Medication Dose Route Frequency Provider Last Rate Last Admin    valproic acid (Depakene) IR oral solution 500 mg  500 mg Oral TID Berna Malik M.D.   500 mg at 04/13/25 1258    oxyCODONE immediate-release (Roxicodone) tablet 5 mg  5 mg Oral Q3HRS PRN Berna Malik M.D.        Or    oxyCODONE immediate release (Roxicodone) tablet 10 mg  10 mg Oral Q3HRS PRN Berna Malik M.D.   10 mg at 04/13/25 1259    Or    HYDROmorphone (Dilaudid) injection 1 mg  1 mg Intravenous Q3HRS PRN Berna Malik M.D.   1 mg at 04/13/25 1035    QUEtiapine (SEROquel) tablet 50 mg  50 mg Oral Nightly Berna Malik M.D.   50 mg at 04/12/25 2122    QUEtiapine (SEROquel) tablet 25 mg  25 mg Oral HS PRN Berna Malik M.D.   25 mg at 04/12/25 2357    potassium chloride SA (Kdur) tablet 20 mEq  20 mEq Oral DAILY Berna Malik M.D.   20 mEq at 04/13/25 0538    melatonin tablet 5 mg  5 mg Oral Nightly Deana Lepe, A.P.R.N.   5 mg at 04/12/25 2122    Pharmacy Consult Request ...Pain Management Review 1 Each  1 Each Other PHARMACY TO DOSE Tye Youssef M.D.        hydrALAZINE (Apresoline) injection 10 mg  10 mg Intravenous Q4HRS PRN Tye Youssef M.D.        Nosandra nasal  swab  1 Applicator Each Nostril BID Иван Del Real M.D.   1 Applicator at 04/13/25 0538    losartan (Cozaar) tablet 50 mg  50 mg Oral BID Иван Del Real M.D.   50 mg at 04/13/25 0538    metoprolol tartrate (Lopressor) tablet 37.5 mg  37.5 mg Oral Q8HRS Иван Del Real M.D.   37.5 mg at 04/13/25  1535    enoxaparin (Lovenox) inj 120 mg  1 mg/kg Subcutaneous Q12HRS Ray Thibodeaux M.D.   120 mg at 04/13/25 0538    senna-docusate (Pericolace Or Senokot S) 8.6-50 MG per tablet 2 Tablet  2 Tablet Oral Q EVENING Barbara Gutierrez M.D.   2 Tablet at 04/06/25 1712    And    polyethylene glycol/lytes (Miralax) Packet 1 Packet  1 Packet Oral QDAY PRN Barbara Gutierrez M.D.        acetaminophen (Tylenol) tablet 650 mg  650 mg Oral Q4HRS PRN Berto Ovalle M.D.   650 mg at 04/10/25 1657    labetalol (Normodyne/Trandate) injection 20 mg  20 mg Intravenous Q4HRS PRN Barbara Gutierrez M.D.   20 mg at 04/09/25 1413    acetaminophen (Tylenol) suppository 650 mg  650 mg Rectal Q4HRS PRN Jessi Lopez M.D.   650 mg at 04/05/25 2255    Respiratory Therapy Consult   Nebulization Continuous RT Misbah Cardoza M.D.           Assessment: Still delirious, with sundowning agitation. Per wife, agitation is improving. Per Dr Malik, he is doing much better compared to previous days. Discussed case extensively with Dr Malik, at this time we believe that patient does not need further management for agitation, but will consider propanolol in the future if he agitation does not improve. The concerns per Dr Malik for anoxic brain injury is low, but will wait for MRI results. There is concern that patient's wife is possibly diverging some of pt's home medication.       Dx:  encephalopathy  R/o anoxic brain injury   Insomnia  R/o FND    Medical :  Principal Problem:    Cardiac arrest (HCC) (POA: Yes)  Active Problems:    Chronic pain syndrome (POA: Yes)    History of MVR with cardiopulmonary bypass (POA: Unknown)    Pain, chronic (POA: Yes)    Ventricular fibrillation (HCC) (POA: Yes)    Seizure-like activity (HCC) (POA: Yes)    Pulmonary embolus (HCC) (POA: Yes)    Aspiration into airway (POA: Yes)    Cirrhosis of liver (HCC) (POA: Unknown)    Encephalopathy acute (POA: Yes)    Acute respiratory failure with hypoxia  (HCC) (POA: Yes)    Hypokalemia (POA: Unknown)    Diarrhea (POA: Unknown)  Resolved Problems:    Aspiration pneumonia of both lungs (HCC) (POA: Unknown)         Plan:  1- Legal hold:n/a  2- Psychotropic medications:   Continue seroquel 50mg PO QHS for agitation/sleep  If insomnia persist, increase melatonin to 10mg PO QHS  Continue depakote 500mg PO TID for mood and agitation (titrated on 4/11)  3- Obtain VPA trough level in 2 days  4- Discussed the case with:  5- Psychiatry will follow up    Thank you for the consult.     This note was created using voice recognition software (Dragon). The accuracy of the dictation is limited by the abilities of the software. I have reviewed the note prior to signing. However, error related to voice recognition software and /or scribes may still exist and should be interpreted within the appropriate context.

## 2025-04-13 NOTE — CARE PLAN
The patient is Watcher - Medium risk of patient condition declining or worsening    Shift Goals  Clinical Goals: skin care, hemodynamic stabiliy  Patient Goals: Pain control  Family Goals: POC updates, sleep    Progress made toward(s) clinical / shift goals:    Problem: Skin Integrity  Goal: Skin integrity is maintained or improved  Outcome: Progressing     Problem: Fall Risk  Goal: Patient will remain free from falls  Outcome: Progressing  Note: Pt was educated on fall precautions and is much more steady on his feet today. Pt was able to move him self from the bed to the chair today with only standby assist.      Problem: Communication  Goal: The ability to communicate needs accurately and effectively will improve  Outcome: Progressing  Note: Pt is able to communicate more clearly today and is able to verbalize needs, wants, and pain.        Patient is not progressing towards the following goals:      Problem: Knowledge Deficit - Standard  Goal: Patient and family/care givers will demonstrate understanding of plan of care, disease process/condition, diagnostic tests and medications  Outcome: Not Progressing  Note: The patient demonstrates a progressing knowledge deficit regarding in his POC. Despite previous education, they continue to show confusion about his POC and cannot remember why he is here. This lack of understanding is affecting their ability to manage their health and follow the prescribed care plan effectively.
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
## 2025-04-13 NOTE — PROGRESS NOTES
[facility] Medicine Daily Progress Note    Date of Service  4/13/2025    Chief Complaint  Roberto Braswell is a 44 y.o. male admitted 4/3/2025 with cardiac arrest    Hospital Course  Mr. Braswell has a past medical history of mitral valve repair 1/24/24 by Dr. Santana, multiple admissions for upper GI bleeds and an admission here in October for expressive aphasia and abnormal behavior though normal MRI and he was discharged with no neurological deficits.    He was in his usual state of health and on 3/17 had a urologic procedure on his epididymis. He was in the process of weaning off of narcotics for the previous 3 days per report. On 4/3 he was watching TV with his wife, Destin. He got up and became dizzy. He sat back down and developed convulsions. His wife called 911. He stopped breathing and turned blue. She is a nurse and started CPR for 5 minutes until paramedics came. He was in Vfib on their arrival and underwent defibrillation x 3 with 3 rounds of epi, IV amio and transferred to the ER where he was intubated.  In the emergency room, CT of the chest revealed distal right main pulmonary embolism and CT of the abdomen pelvis revealed ileus.  He was admitted to the intensive care unit on mechanical ventilation.  Ejection fraction was found to be 38% by echocardiogram and cardiology was consulted.  He was initiated on IV heparin drip due to the pulmonary emboli which has since been transitioned to Subq Lovenox.  He was febrile and initiated on empiric IV Zosyn with apparent aspiration pneumonitis transitioned to ancef as tracheal aspirate growing MSSA .  CT of the head was negative for acute processes.  EEG was negative for seizures.  He was successfully extubated on 4/5/2025.  After extubation he has required a Precedex drip due to agitation and remains encephalopathic. He has since been transferred to tele floor.     Interval Problem Update  Patient seen at bedside. His wife is present. He seems to be improving  on my exam. He is awake and alert, he is able to focus his attention, does not appear restless, he is calm and answering with simple answers.   - pt spouse requesting medication for restless leg, discussed requip/gabapentin. Will start nightly low dose gabapentin for now Requip can cause agitation and he has previously tolerated gabapentin without this side effect.   - continue to encourage OOB/up to chair to help prevent deconditioning   - plan for MRI with sedation, however he continues to improve, could trial without.   - continue depakote 500 mg TID, continue Seroquel 50 mg at night   - GI PCR panel neg, soft/semi firm stool today   - PMR following, pending MRI Brain       I have discussed this patient's plan of care and discharge plan at IDT rounds today with Case Management, Nursing, Nursing leadership, and other members of the IDT team.    Consultants/Specialty  Critical care  Cardiology, needs to be reevaluated prior to discharge to consider a AICD  Neurology    Code Status  Full Code    Disposition  The patient is not medically cleared for discharge to home or a post-acute facility.      I have placed the appropriate orders for post-discharge needs.    Review of Systems  Review of Systems   Unable to perform ROS: Mental status change   Denies chest pain or headache    Physical Exam  Temp:  [36.5 °C (97.7 °F)-36.8 °C (98.2 °F)] 36.5 °C (97.7 °F)  Pulse:  [70-83] 82  Resp:  [16-18] 16  BP: (114-139)/(70-87) 114/70  SpO2:  [92 %-98 %] 98 %    Physical Exam  Vitals and nursing note reviewed.   Constitutional:       Appearance: He is well-developed and normal weight. He is not ill-appearing.   HENT:      Head: Normocephalic and atraumatic.      Mouth/Throat:      Mouth: Mucous membranes are moist.   Eyes:      General: No scleral icterus.     Extraocular Movements: Extraocular movements intact.      Conjunctiva/sclera: Conjunctivae normal.   Cardiovascular:      Rate and Rhythm: Normal rate and regular rhythm.       Heart sounds: Normal heart sounds.   Pulmonary:      Effort: Pulmonary effort is normal.      Breath sounds: Normal breath sounds.      Comments: On RA, normal WOB   Abdominal:      General: Bowel sounds are normal. There is no distension.      Palpations: Abdomen is soft.      Tenderness: There is no abdominal tenderness. There is no guarding or rebound.   Musculoskeletal:         General: Normal range of motion.      Cervical back: Normal range of motion and neck supple.      Right lower leg: No edema.      Left lower leg: No edema.   Skin:     General: Skin is warm and dry.   Neurological:      General: No focal deficit present.      Mental Status: He is alert. He is disoriented.      Cranial Nerves: No cranial nerve deficit.      Comments: Answering simple questions, moving all extremities, still slowed but does appear improved during my exam   Psychiatric:      Comments: Calm, flat         Fluids    Intake/Output Summary (Last 24 hours) at 4/13/2025 1547  Last data filed at 4/13/2025 1500  Gross per 24 hour   Intake 452 ml   Output 325 ml   Net 127 ml        Laboratory  Recent Labs     04/11/25  0733 04/12/25  0032   WBC 12.0* 11.0*   RBC 4.75 4.86   HEMOGLOBIN 12.7* 13.2*   HEMATOCRIT 39.6* 41.2*   MCV 83.4 84.8   MCH 26.7* 27.2   MCHC 32.1* 32.0*   RDW 51.8* 52.3*   PLATELETCT 427 423   MPV 9.1 9.4     Recent Labs     04/11/25  0733 04/12/25  0032   SODIUM 142 140   POTASSIUM 3.5* 4.3   CHLORIDE 110 107   CO2 21 22   GLUCOSE 96 94   BUN 18 16   CREATININE 0.88 0.79   CALCIUM 8.9 8.8                     Imaging  DX-CHEST-PORTABLE (1 VIEW)   Final Result         1.  Hazy right lower lobe infiltrate, slightly decreased since prior study   2.  Cardiomegaly      [id number] VIEW   Final Result         1.  Nonspecific bowel gas pattern in the upper abdomen.      DX-CHEST-PORTABLE (1 VIEW)   Final Result         1.  Pulmonary edema and/or infiltrates are identified, which are stable since the prior exam.   2.   Cardiomegaly      DX-CHEST-PORTABLE (1 VIEW)   Final Result         1.  Pulmonary edema and/or infiltrates are identified, which appear somewhat increased since the prior exam.   2.  Cardiomegaly      CT-CTA NECK WITH & W/O-POST PROCESSING   Final Result         1.  CT angiogram of the neck within normal limits.   2.  Patchy bilateral upper lobe infiltrates         CT-CTA HEAD WITH & W/O-POST PROCESS   Final Result         1.  No large vessel occlusion or aneurysm identified   2.  Bilateral sinusitis changes      US-EXTREMITY VENOUS LOWER BILAT   Final Result      EC-ECHOCARDIOGRAM COMPLETE W/ CONT   Final Result      CT-ABDOMEN-PELVIS WITH   Final Result         1.  Fluid-filled prominence of proximal small bowel with distal small bowel decompression, appearance suggests ileus and/or enteritis versus evolving obstructive changes. Recommend radiographic follow-up to resolution as clinically appropriate.   2.  Irregular hepatic contour favoring changes of cirrhosis   3.  Fat-containing bilateral inguinal hernias      CT-CTA CHEST PULMONARY ARTERY W/ RECONS   Final Result         1.  Distal right main pulmonary embolus extending into right upper lobe and lower lobe segmental and subsegmental branches. 1.4 right to left ventricular ratio suggesting component of right heart strain.   2.  Evaluation of the subsegmental branches is essentially nondiagnostic due to motion artifacts. Additional imaging would be required for definitive evaluation of small distal pulmonary emboli.   3.  Scattered patchy bilateral pulmonary infiltrates.   4.  Bilateral dependent consolidations, appearance suggests atelectasis, component of infiltrate not excluded.      These findings were discussed with the patient's clinician, Misbah Cardoza, on 4/3/2025 9:27 PM.      CT-HEAD W/O   Final Result      No acute intracranial findings.               DX-CHEST-PORTABLE (1 VIEW)   Final Result      Interval placement of a right IJ central line  without evidence of complication.      DX-CHEST-PORTABLE (1 VIEW)   Final Result      1.  Support devices present.      2.  Cardiomegaly.      3.  Bibasilar atelectasis.      MR-BRAIN-WITH & W/O    (Results Pending)        Assessment/Plan  * Cardiac arrest (HCC)- (present on admission)  Assessment & Plan  Cardiac arrest with CPR, defibrillation x 3 and 3 rounds epinephrine, unclear etiology   Loaded with amiodarone  Now in sinus rhythm  Cardiology consulted  Decreased ejection fraction 38% likely transient given stunned myocardium  Continues telemetry monitoring  Optimize electrolytes specifically potassium and magnesium    Diarrhea  Assessment & Plan  Ongoing diarrhea since admission, unclear etiology   GI PCR negative   Consistent does not appear consistent with C.diff , is improving     Hypokalemia  Assessment & Plan  Low K, on oral replacement   Monitor BMP     Acute respiratory failure with hypoxia (HCC)- (present on admission)  Assessment & Plan  Requiring intubation on admission was extubated on 4/5/2025  Secondary to cardiac arrest  He continues to require supplemental oxygen will have continuous pulse oximetry  Out of IMCU, on tele   Currently on RA    Encephalopathy acute- (present on admission)  Assessment & Plan  Acute metabolic encephalopathy in the setting of cardiac arrest  CT head negative  EEG negative for seizure activity  Off Precedex drip   Valproic acid has been initiated for mood stabilization and increase to 500 mg TID  MRI of the brain has been ordered, will need general anaesthesia   Likely multifactorial, possible mild hypoxic injury, delerium and likely functional component as well. Discussed with neurology   I have consulted with psychiatry to help agitation/restlessness, case with discussed with team, started on seroquel overnight  Appears to be improving     Cirrhosis of liver (HCC)  Assessment & Plan  Noted on imaging  Outpatient follow-up  Ammonia level normal     Aspiration into  airway- (present on admission)  Assessment & Plan  Treated with Zosyn, de-escalation to ancef, respiratory cx with MSSA  Discontinued today, completed 7 days   Speech following, diet per speech    Pulmonary embolus (HCC)- (present on admission)  Assessment & Plan  Noted on CTA, distal segmental PE, not saddle   Status post IV heparin drip  He was transitioned to lovenox though given the possible drug-drug interaction with valproic acid, change to weight-based lovenox for now.  He has a history of multiple admissions for GI bleeds will need to be monitored closely    Seizure-like activity (HCC)- (present on admission)  Assessment & Plan  Seizure type activity likely due to anoxia  EEG negative on early admit   Repeat EEG 4/11 showing non-specific encephalopathy, no seizure activity seen    Plan for brain MRI with sedation, I have ordered, likely to be done early next week   Seizure and fall precautions    Ventricular fibrillation (HCC)- (present on admission)  Assessment & Plan  With defibrillation x 3  Now in sinus  Continuous telemetry monitoring  Cardiology eval prior to dc to discuss AICD  Per cardiology, no ischemic evaluation necessary given clear LHC prior to MV repair last year, did request to reach out when closer to dc will discuss with cards tomorrow     Pain, chronic- (present on admission)  Assessment & Plan  Per wife, He had weaned himself off narcotics for 3 days prior to this event  Of note, His UDS on admission was negative as have previous other UDS despite PDMP showing regular prescribing   Has been on chronic oxycodone 10 mg 5 x daily (last fill 3/24)   Prn oxy     History of MVR with cardiopulmonary bypass  Assessment & Plan  By Dr. Santana Jan 2024  Echo reviewed Known mitral valve repair that is functioning normally with appropriate transvalvular gradient. Reduced EF and hypokinesis present likely from cardiac stunning         VTE prophylaxis: Full dose Lovenox    I have performed a physical  exam and reviewed and updated ROS and Plan today (4/13/2025). In review of yesterday's note (4/12/2025), there are no changes except as documented above.

## 2025-04-13 NOTE — PROGRESS NOTES
Bedside report received from off going RN/tech: KARSON Castellon, assumed care of patient.     Fall Risk Score: HIGH RISK  Fall risk interventions in place: Provide patient/family education based on risk assessment, Educate patient/family to call staff for assistance when getting out of bed, Utilize bed/chair fall alarm, and Bed alarm connected correctly  Bed type: BABAR  Patient on cardiac monitor: Yes  IVF/IV medications: Not Applicable   Oxygen: Room Air  Bedside sitter: Not Applicable   Isolation: Not applicable

## 2025-04-13 NOTE — PROGRESS NOTES
Bedside report received from off going RN/tech: Sonia, assumed care of patient.     Fall Risk Score: HIGH RISK  Fall risk interventions in place: Place yellow fall risk ID band on patient, Provide patient/family education based on risk assessment, Educate patient/family to call staff for assistance when getting out of bed, Place fall precaution signage outside patient door, Place patient in room close to nursing station, Utilize bed/chair fall alarm, Notify charge of high risk for huddle, and Bed alarm connected correctly  Bed type: Low air loss (Corwin Score less than 17 interventions in place)  Patient on cardiac monitor: Yes  IVF/IV medications: Not Applicable   Oxygen: Room Air  Bedside sitter: Not Applicable   Isolation: Not applicable

## 2025-04-14 ENCOUNTER — APPOINTMENT (OUTPATIENT)
Dept: CARDIOLOGY | Facility: MEDICAL CENTER | Age: 45
DRG: 208 | End: 2025-04-14
Attending: STUDENT IN AN ORGANIZED HEALTH CARE EDUCATION/TRAINING PROGRAM
Payer: COMMERCIAL

## 2025-04-14 LAB
ANION GAP SERPL CALC-SCNC: 12 MMOL/L (ref 7–16)
BUN SERPL-MCNC: 15 MG/DL (ref 8–22)
CALCIUM SERPL-MCNC: 8.4 MG/DL (ref 8.5–10.5)
CHLORIDE SERPL-SCNC: 108 MMOL/L (ref 96–112)
CO2 SERPL-SCNC: 21 MMOL/L (ref 20–33)
CREAT SERPL-MCNC: 0.79 MG/DL (ref 0.5–1.4)
ERYTHROCYTE [DISTWIDTH] IN BLOOD BY AUTOMATED COUNT: 51 FL (ref 35.9–50)
GFR SERPLBLD CREATININE-BSD FMLA CKD-EPI: 112 ML/MIN/1.73 M 2
GLUCOSE SERPL-MCNC: 114 MG/DL (ref 65–99)
HCT VFR BLD AUTO: 40.9 % (ref 42–52)
HGB BLD-MCNC: 13.3 G/DL (ref 14–18)
LV EJECT FRACT  99904: 65
LV EJECT FRACT MOD 2C 99903: 71.93
LV EJECT FRACT MOD 4C 99902: 67.87
LV EJECT FRACT MOD BP 99901: 69.92
MCH RBC QN AUTO: 27.3 PG (ref 27–33)
MCHC RBC AUTO-ENTMCNC: 32.5 G/DL (ref 32.3–36.5)
MCV RBC AUTO: 84 FL (ref 81.4–97.8)
PLATELET # BLD AUTO: 498 K/UL (ref 164–446)
PMV BLD AUTO: 9.4 FL (ref 9–12.9)
POTASSIUM SERPL-SCNC: 3.8 MMOL/L (ref 3.6–5.5)
RBC # BLD AUTO: 4.87 M/UL (ref 4.7–6.1)
SODIUM SERPL-SCNC: 141 MMOL/L (ref 135–145)
WBC # BLD AUTO: 8.1 K/UL (ref 4.8–10.8)

## 2025-04-14 PROCEDURE — 700102 HCHG RX REV CODE 250 W/ 637 OVERRIDE(OP): Performed by: NURSE PRACTITIONER

## 2025-04-14 PROCEDURE — A9270 NON-COVERED ITEM OR SERVICE: HCPCS | Performed by: STUDENT IN AN ORGANIZED HEALTH CARE EDUCATION/TRAINING PROGRAM

## 2025-04-14 PROCEDURE — 93306 TTE W/DOPPLER COMPLETE: CPT

## 2025-04-14 PROCEDURE — 700111 HCHG RX REV CODE 636 W/ 250 OVERRIDE (IP): Performed by: HOSPITALIST

## 2025-04-14 PROCEDURE — 93306 TTE W/DOPPLER COMPLETE: CPT | Mod: 26 | Performed by: INTERNAL MEDICINE

## 2025-04-14 PROCEDURE — 85027 COMPLETE CBC AUTOMATED: CPT

## 2025-04-14 PROCEDURE — 99232 SBSQ HOSP IP/OBS MODERATE 35: CPT | Performed by: STUDENT IN AN ORGANIZED HEALTH CARE EDUCATION/TRAINING PROGRAM

## 2025-04-14 PROCEDURE — 700102 HCHG RX REV CODE 250 W/ 637 OVERRIDE(OP): Performed by: HOSPITALIST

## 2025-04-14 PROCEDURE — 700117 HCHG RX CONTRAST REV CODE 255: Performed by: STUDENT IN AN ORGANIZED HEALTH CARE EDUCATION/TRAINING PROGRAM

## 2025-04-14 PROCEDURE — 99232 SBSQ HOSP IP/OBS MODERATE 35: CPT | Performed by: PHYSICAL MEDICINE & REHABILITATION

## 2025-04-14 PROCEDURE — A9270 NON-COVERED ITEM OR SERVICE: HCPCS | Performed by: HOSPITALIST

## 2025-04-14 PROCEDURE — 80048 BASIC METABOLIC PNL TOTAL CA: CPT

## 2025-04-14 PROCEDURE — 770020 HCHG ROOM/CARE - TELE (206)

## 2025-04-14 PROCEDURE — A9270 NON-COVERED ITEM OR SERVICE: HCPCS | Performed by: NURSE PRACTITIONER

## 2025-04-14 PROCEDURE — 700102 HCHG RX REV CODE 250 W/ 637 OVERRIDE(OP): Performed by: STUDENT IN AN ORGANIZED HEALTH CARE EDUCATION/TRAINING PROGRAM

## 2025-04-14 PROCEDURE — 36415 COLL VENOUS BLD VENIPUNCTURE: CPT

## 2025-04-14 RX ORDER — OXYCODONE HYDROCHLORIDE 5 MG/1
5 TABLET ORAL EVERY 4 HOURS PRN
Refills: 0 | Status: DISCONTINUED | OUTPATIENT
Start: 2025-04-14 | End: 2025-04-21 | Stop reason: HOSPADM

## 2025-04-14 RX ORDER — DIVALPROEX SODIUM 500 MG/1
500 TABLET, DELAYED RELEASE ORAL EVERY 8 HOURS
Status: DISCONTINUED | OUTPATIENT
Start: 2025-04-14 | End: 2025-04-21 | Stop reason: HOSPADM

## 2025-04-14 RX ORDER — OXYCODONE HYDROCHLORIDE 10 MG/1
10 TABLET ORAL EVERY 4 HOURS PRN
Refills: 0 | Status: DISCONTINUED | OUTPATIENT
Start: 2025-04-14 | End: 2025-04-21 | Stop reason: HOSPADM

## 2025-04-14 RX ORDER — HYDROMORPHONE HYDROCHLORIDE 1 MG/ML
1 INJECTION, SOLUTION INTRAMUSCULAR; INTRAVENOUS; SUBCUTANEOUS EVERY 4 HOURS PRN
Status: DISCONTINUED | OUTPATIENT
Start: 2025-04-14 | End: 2025-04-21 | Stop reason: HOSPADM

## 2025-04-14 RX ORDER — LOPERAMIDE HYDROCHLORIDE 2 MG/1
2 CAPSULE ORAL 2 TIMES DAILY PRN
Status: DISCONTINUED | OUTPATIENT
Start: 2025-04-14 | End: 2025-04-21 | Stop reason: HOSPADM

## 2025-04-14 RX ADMIN — OXYCODONE HYDROCHLORIDE 10 MG: 10 TABLET ORAL at 11:46

## 2025-04-14 RX ADMIN — LOSARTAN POTASSIUM 50 MG: 50 TABLET, FILM COATED ORAL at 17:56

## 2025-04-14 RX ADMIN — LOSARTAN POTASSIUM 50 MG: 50 TABLET, FILM COATED ORAL at 05:36

## 2025-04-14 RX ADMIN — VALPROIC ACID 500 MG: 250 SOLUTION ORAL at 13:23

## 2025-04-14 RX ADMIN — ENOXAPARIN SODIUM 120 MG: 150 INJECTION SUBCUTANEOUS at 17:56

## 2025-04-14 RX ADMIN — GABAPENTIN 200 MG: 100 CAPSULE ORAL at 17:56

## 2025-04-14 RX ADMIN — QUETIAPINE FUMARATE 50 MG: 25 TABLET ORAL at 21:12

## 2025-04-14 RX ADMIN — NYSTATIN: 100000 POWDER TOPICAL at 09:00

## 2025-04-14 RX ADMIN — Medication 5 MG: at 21:12

## 2025-04-14 RX ADMIN — METOPROLOL TARTRATE 37.5 MG: 25 TABLET, FILM COATED ORAL at 05:35

## 2025-04-14 RX ADMIN — OXYCODONE HYDROCHLORIDE 10 MG: 10 TABLET ORAL at 18:00

## 2025-04-14 RX ADMIN — OXYCODONE HYDROCHLORIDE 10 MG: 10 TABLET ORAL at 05:34

## 2025-04-14 RX ADMIN — VALPROIC ACID 500 MG: 250 SOLUTION ORAL at 05:35

## 2025-04-14 RX ADMIN — HUMAN ALBUMIN MICROSPHERES AND PERFLUTREN 3 ML: 10; .22 INJECTION, SOLUTION INTRAVENOUS at 19:00

## 2025-04-14 RX ADMIN — NYSTATIN: 100000 POWDER TOPICAL at 18:00

## 2025-04-14 RX ADMIN — POTASSIUM CHLORIDE 20 MEQ: 1500 TABLET, EXTENDED RELEASE ORAL at 05:36

## 2025-04-14 RX ADMIN — DIVALPROEX SODIUM 500 MG: 500 TABLET, DELAYED RELEASE ORAL at 21:12

## 2025-04-14 RX ADMIN — ENOXAPARIN SODIUM 120 MG: 150 INJECTION SUBCUTANEOUS at 05:36

## 2025-04-14 RX ADMIN — METOPROLOL TARTRATE 37.5 MG: 25 TABLET, FILM COATED ORAL at 13:22

## 2025-04-14 RX ADMIN — METOPROLOL TARTRATE 37.5 MG: 25 TABLET, FILM COATED ORAL at 21:12

## 2025-04-14 RX ADMIN — LOPERAMIDE HYDROCHLORIDE 2 MG: 2 CAPSULE ORAL at 17:56

## 2025-04-14 ASSESSMENT — PAIN DESCRIPTION - PAIN TYPE
TYPE: ACUTE PAIN
TYPE: ACUTE PAIN
TYPE: ACUTE PAIN;CHRONIC PAIN
TYPE: ACUTE PAIN
TYPE: ACUTE PAIN

## 2025-04-14 ASSESSMENT — ENCOUNTER SYMPTOMS: SHORTNESS OF BREATH: 0

## 2025-04-14 ASSESSMENT — FIBROSIS 4 INDEX
FIB4 SCORE: 0.33
FIB4 SCORE: 0.33

## 2025-04-14 NOTE — PROGRESS NOTES
Bedside report received from off going RN/tech: libby, assumed care of patient.     Fall Risk Score: HIGH RISK  Fall risk interventions in place: Place yellow fall risk ID band on patient, Provide patient/family education based on risk assessment, Educate patient/family to call staff for assistance when getting out of bed, Place fall precaution signage outside patient door, Place patient in room close to nursing station, Utilize bed/chair fall alarm, Notify charge of high risk for huddle, and Bed alarm connected correctly  Bed type: Regular and Low air loss (Corwin Score less than 17 interventions in place)  Patient on cardiac monitor: Yes  IVF/IV medications: Not Applicable   Oxygen: Room Air  Bedside sitter: Not Applicable   Isolation: Not applicable

## 2025-04-14 NOTE — PROGRESS NOTES
Bedside report received from off going RN/tech: Ally, assumed care of patient.     Fall Risk Score: HIGH RISK  Fall risk interventions in place: Place yellow fall risk ID band on patient, Provide patient/family education based on risk assessment, Educate patient/family to call staff for assistance when getting out of bed, Place fall precaution signage outside patient door, Place patient in room close to nursing station, Utilize bed/chair fall alarm, and Bed alarm connected correctly  Bed type: Low air loss (Corwin Score less than 17 interventions in place)  Patient on cardiac monitor: Yes  IVF/IV medications: Not Applicable   Oxygen: Room Air  Bedside sitter: Not Applicable   Isolation: Not applicable

## 2025-04-14 NOTE — PROGRESS NOTES
[facility] Medicine Daily Progress Note    Date of Service  4/14/2025    Chief Complaint  Roberto Braswell is a 44 y.o. male admitted 4/3/2025 with cardiac arrest    Hospital Course  Mr. Braswell has a past medical history of mitral valve repair 1/24/24 by Dr. Santana, multiple admissions for upper GI bleeds and an admission here in October for expressive aphasia and abnormal behavior though normal MRI and he was discharged with no neurological deficits.    He was in his usual state of health and on 3/17 had a urologic procedure on his epididymis. He was in the process of weaning off of narcotics for the previous 3 days per report. On 4/3 he was watching TV with his wife, Destin. He got up and became dizzy. He sat back down and developed convulsions. His wife called 911. He stopped breathing and turned blue. She is a nurse and started CPR for 5 minutes until paramedics came. He was in Vfib on their arrival and underwent defibrillation x 3 with 3 rounds of epi, IV amio and transferred to the ER where he was intubated.  In the emergency room, CT of the chest revealed distal right main pulmonary embolism and CT of the abdomen pelvis revealed ileus.  He was admitted to the intensive care unit on mechanical ventilation.  Ejection fraction was found to be 38% by echocardiogram and cardiology was consulted.  He was initiated on IV heparin drip due to the pulmonary emboli which has since been transitioned to Subq Lovenox.  He was febrile and initiated on empiric IV Zosyn with apparent aspiration pneumonitis transitioned to ancef as tracheal aspirate growing MSSA .  CT of the head was negative for acute processes.  EEG was negative for seizures.  He was successfully extubated on 4/5/2025.  After extubation he has required a Precedex drip due to agitation and remains encephalopathic. He has since been transferred to tele floor.     Interval Problem Update  Patient seen at bedside. His wife is present. He is awake and alert to  "self/place. He is answering my questions appropriately. He feels unwell but denies specific complaints, \"does feel right\"  - vitals stable, labs stable   - continue current med regiment. I have adjusted his analgesia, decrease frequency of dosing, he is not in distress on my exam  - continue to encourage OOB/up to chair to help prevent deconditioning   - pt wife declines MRI with sedation. As his mentation is improved, MRI brain without sedation ordered and appropriate.   - follow up echo orders  will need to re consult pending resulst and dc plan per cards previous recommendations   - continue depakote 500 mg TID, continue Seroquel 50 mg at night   - GI PCR panel neg, imodium ordered   - PMR following, pending MRI Brain       I have discussed this patient's plan of care and discharge plan at IDT rounds today with Case Management, Nursing, Nursing leadership, and other members of the IDT team.    Consultants/Specialty  Critical care  Cardiology, needs to be reevaluated prior to discharge to consider a AICD  Neurology    Code Status  Full Code    Disposition  The patient is not medically cleared for discharge to home or a post-acute facility.      I have placed the appropriate orders for post-discharge needs.    Review of Systems  Review of Systems   Unable to perform ROS: Mental status change   Respiratory:  Negative for shortness of breath.    Cardiovascular:  Negative for chest pain.       Physical Exam  Temp:  [36.4 °C (97.5 °F)-36.5 °C (97.7 °F)] 36.4 °C (97.5 °F)  Pulse:  [77-89] 88  Resp:  [16-18] 18  BP: (104-131)/(64-83) 120/73  SpO2:  [93 %-99 %] 93 %    Physical Exam  Vitals and nursing note reviewed.   Constitutional:       Appearance: He is well-developed and normal weight. He is not ill-appearing.   HENT:      Head: Normocephalic and atraumatic.      Mouth/Throat:      Mouth: Mucous membranes are moist.   Eyes:      General: No scleral icterus.     Extraocular Movements: Extraocular movements intact.    "   Conjunctiva/sclera: Conjunctivae normal.   Cardiovascular:      Rate and Rhythm: Normal rate.   Pulmonary:      Effort: Pulmonary effort is normal.      Breath sounds: Normal breath sounds.      Comments: On RA, normal WOB   Abdominal:      General: Bowel sounds are normal. There is no distension.      Palpations: Abdomen is soft.      Tenderness: There is no abdominal tenderness. There is no guarding or rebound.   Musculoskeletal:         General: Normal range of motion.      Cervical back: Normal range of motion and neck supple.      Right lower leg: No edema.      Left lower leg: No edema.   Skin:     General: Skin is warm and dry.   Neurological:      General: No focal deficit present.      Mental Status: He is alert. He is disoriented.      Cranial Nerves: No cranial nerve deficit.      Comments: Answering simple questions, moving all extremities, still slowed but continues to improve.   Psychiatric:      Comments: Calm, flat         Fluids    Intake/Output Summary (Last 24 hours) at 4/14/2025 1512  Last data filed at 4/14/2025 0900  Gross per 24 hour   Intake 150 ml   Output 200 ml   Net -50 ml        Laboratory  Recent Labs     04/12/25  0032 04/14/25  0011   WBC 11.0* 8.1   RBC 4.86 4.87   HEMOGLOBIN 13.2* 13.3*   HEMATOCRIT 41.2* 40.9*   MCV 84.8 84.0   MCH 27.2 27.3   MCHC 32.0* 32.5   RDW 52.3* 51.0*   PLATELETCT 423 498*   MPV 9.4 9.4     Recent Labs     04/12/25  0032 04/14/25  0011   SODIUM 140 141   POTASSIUM 4.3 3.8   CHLORIDE 107 108   CO2 22 21   GLUCOSE 94 114*   BUN 16 15   CREATININE 0.79 0.79   CALCIUM 8.8 8.4*                     Imaging  DX-CHEST-PORTABLE (1 VIEW)   Final Result         1.  Hazy right lower lobe infiltrate, slightly decreased since prior study   2.  Cardiomegaly      AB-GLRINNI-9 VIEW   Final Result         1.  Nonspecific bowel gas pattern in the upper abdomen.      DX-CHEST-PORTABLE (1 VIEW)   Final Result         1.  Pulmonary edema and/or infiltrates are identified,  which are stable since the prior exam.   2.  Cardiomegaly      DX-CHEST-PORTABLE (1 VIEW)   Final Result         1.  Pulmonary edema and/or infiltrates are identified, which appear somewhat increased since the prior exam.   2.  Cardiomegaly      CT-CTA NECK WITH & W/O-POST PROCESSING   Final Result         1.  CT angiogram of the neck within normal limits.   2.  Patchy bilateral upper lobe infiltrates         CT-CTA HEAD WITH & W/O-POST PROCESS   Final Result         1.  No large vessel occlusion or aneurysm identified   2.  Bilateral sinusitis changes      US-EXTREMITY VENOUS LOWER BILAT   Final Result      EC-ECHOCARDIOGRAM COMPLETE W/ CONT   Final Result      CT-ABDOMEN-PELVIS WITH   Final Result         1.  Fluid-filled prominence of proximal small bowel with distal small bowel decompression, appearance suggests ileus and/or enteritis versus evolving obstructive changes. Recommend radiographic follow-up to resolution as clinically appropriate.   2.  Irregular hepatic contour favoring changes of cirrhosis   3.  Fat-containing bilateral inguinal hernias      CT-CTA CHEST PULMONARY ARTERY W/ RECONS   Final Result         1.  Distal right main pulmonary embolus extending into right upper lobe and lower lobe segmental and subsegmental branches. 1.4 right to left ventricular ratio suggesting component of right heart strain.   2.  Evaluation of the subsegmental branches is essentially nondiagnostic due to motion artifacts. Additional imaging would be required for definitive evaluation of small distal pulmonary emboli.   3.  Scattered patchy bilateral pulmonary infiltrates.   4.  Bilateral dependent consolidations, appearance suggests atelectasis, component of infiltrate not excluded.      These findings were discussed with the patient's clinician, Misbah Cardoza, on 4/3/2025 9:27 PM.      CT-HEAD W/O   Final Result      No acute intracranial findings.               DX-CHEST-PORTABLE (1 VIEW)   Final Result       Interval placement of a right IJ central line without evidence of complication.      DX-CHEST-PORTABLE (1 VIEW)   Final Result      1.  Support devices present.      2.  Cardiomegaly.      3.  Bibasilar atelectasis.      EC-ECHOCARDIOGRAM COMPLETE W/O CONT    (Results Pending)   MR-BRAIN-WITH & W/O    (Results Pending)        Assessment/Plan  * Cardiac arrest (HCC)- (present on admission)  Assessment & Plan  Cardiac arrest with CPR, defibrillation x 3 and 3 rounds epinephrine, unclear etiology   Loaded with amiodarone  Now in sinus rhythm  Cardiology consulted  Decreased ejection fraction 38% likely transient given stunned myocardium  Continues telemetry monitoring  Optimize electrolytes specifically potassium and magnesium  Repeat echo to evaluate for recovery     Diarrhea  Assessment & Plan  Ongoing diarrhea since admission, unclear etiology   GI PCR negative   Consistent does not appear consistent with C.diff , is improving   Imodium prn     Hypokalemia  Assessment & Plan  Low K, on oral replacement   Monitor BMP     Acute respiratory failure with hypoxia (HCC)- (present on admission)  Assessment & Plan  Requiring intubation on admission was extubated on 4/5/2025  Secondary to cardiac arrest  He continues to require supplemental oxygen will have continuous pulse oximetry  Out of IMCU, on tele   Currently on RA    Encephalopathy acute- (present on admission)  Assessment & Plan  Acute metabolic encephalopathy in the setting of cardiac arrest  CT head negative  EEG negative for seizure activity  Off Precedex drip   Valproic acid has been initiated for mood stabilization and increase to 500 mg TID  MRI of the brain has been ordered, will try without sedation   Likely multifactorial, possible mild hypoxic injury, delerium and likely functional component as well. Discussed with neurology   I have consulted with psychiatry to help agitation/restlessness, case with discussed with team, started on seroquel  overnight  Continues to imrpove    Cirrhosis of liver (HCC)  Assessment & Plan  Noted on imaging  Outpatient follow-up  Ammonia level normal     Aspiration into airway- (present on admission)  Assessment & Plan  Treated with Zosyn, de-escalation to ancef, respiratory cx with MSSA  Discontinued today, completed 7 days   Speech following, diet per speech    Pulmonary embolus (HCC)- (present on admission)  Assessment & Plan  Noted on CTA, distal segmental PE, not saddle   Status post IV heparin drip  He was transitioned to lovenox though given the possible drug-drug interaction with valproic acid, change to weight-based lovenox for now.  He has a history of multiple admissions for GI bleeds will need to be monitored closely    Seizure-like activity (HCC)- (present on admission)  Assessment & Plan  Seizure type activity likely due to anoxia  EEG negative on early admit   Repeat EEG 4/11 showing non-specific encephalopathy, no seizure activity seen    Plan for brain MRI with sedation, I have ordered, likely to be done early next week   Seizure and fall precautions    Ventricular fibrillation (HCC)- (present on admission)  Assessment & Plan  With defibrillation x 3  Now in sinus  Continuous telemetry monitoring  Cardiology eval prior to dc to discuss AICD  Per cardiology, no ischemic evaluation necessary given clear LHC prior to MV repair last year, did request to reach out when closer to dc   Repeat echo to evaluate for recovery, cards consult pending results     Pain, chronic- (present on admission)  Assessment & Plan  Per wife, He had weaned himself off narcotics for 3 days prior to this event  Of note, His UDS on admission was negative as have previous other UDS despite PDMP showing regular prescribing   Has been on chronic oxycodone 10 mg 5 x daily (last fill 3/24)   Prn oxy     History of MVR with cardiopulmonary bypass  Assessment & Plan  By Dr. Santana Jan 2024  Echo reviewed Known mitral valve repair that is  functioning normally with appropriate transvalvular gradient. Reduced EF and hypokinesis present likely from cardiac stunning         VTE prophylaxis: Full dose Lovenox    I have performed a physical exam and reviewed and updated ROS and Plan today (4/14/2025). In review of yesterday's note (4/13/2025), there are no changes except as documented above.         13-Sep-2022 19:01

## 2025-04-14 NOTE — PROGRESS NOTES
"During the beginning of shift this RN assessed pt at 2100. At the time was A&Ox1, to person. This RN asked the pt is he was in any pain, \" pt said no.\" RN further assessed pt for non verbal cues of pain (agitation, restlessness, grimacing, etc.) Pt expressed none of these signs as he was resting in bed. Upon leaving the room pt's wife asked me,\" to please give her  and oxycodone 10mg in the morning to prevent him from withdrawing from opioids.\" This RN was taken back from this statement and went to charge to ask what the appropriate answer to this situation would be. Charge RN informed this RN to explain to pt that we have parameters that we need to follow and if he does not line up with that then we cannot give the med.      Later in the evening around 0000, this RN was informed by CNA that the wife is requesting a pain med for her . This RN came into the room to assess the patient, he seemed a bit restless, I asked him if he needed anything, pt stated that he needed to go pee. Upon helping use the urinal, and afterwards repositioning in bed, I asked the patient if he was in any pain and he said no. Patient seemed calm. Wife then asked this RN if I could get him a pain med. I stated to wife, \" I do not feel it is appropriate to give him a pain medication at this time, he is not showing any signs of pain and he just told me no that he isn't in any pain.\" Wife seemed to be upset at my comment stating, \" oh okay, he has anoxic brain injury he can't properly tell you how he is feeling and he is agitated.\" I responded, \" I understand, but at the same time he is also very calm right now, not restless, and to me not showing signs of agitation I do not feel it is appropriate to give the pain medication.\"     Soon after, this RN was notified by the telesitter that she would like me to check on the patient because it looked like his legs were potentially out of bed. This RN came into the room to assess the " "patient, legs were not out of bed, RN helped pt get repositioned in bed for better comfort and wife (while in the room the whole time) asked ,\" what I was doing.\" I explained to her I was just helping him get more comfortable his legs were close to the side rail. Wife then stated, \" can you please just go get him a pain pill.\" I re-explained to wife again how I feel about this situation and that he is still showing no signs of pain and continuing to say no he isn't in any. Wife then stated, \"okay, can you please go get the charge nurse.\"     RN went to go get charge nurse, wife followed this RN throughout the unit floor while RN was trying to find charge. RN went to look in the break room, RN stated to wife, \" this is the break room, you need to give me a minute.\" RN entered and wife continued to come into the break room and stopped the door with her foot and was standing in the door way. Charge was in the break room and took over the situation. Charge talked to patient's wife outside of the break room. This RN was then asked to be removed from patient's care by patient's wife.     On call hospitalist and charge RN went to talked to patients wife in the patients room. Shortly after this RN was notified by CNA that it looked like she was recording the conversation. He was getting the patients vitals and said a phone was propped up and on recording. Charge RN notified.   "

## 2025-04-15 ENCOUNTER — APPOINTMENT (OUTPATIENT)
Dept: RADIOLOGY | Facility: MEDICAL CENTER | Age: 45
DRG: 208 | End: 2025-04-15
Attending: INTERNAL MEDICINE
Payer: COMMERCIAL

## 2025-04-15 ENCOUNTER — APPOINTMENT (OUTPATIENT)
Dept: RADIOLOGY | Facility: MEDICAL CENTER | Age: 45
DRG: 208 | End: 2025-04-15
Attending: STUDENT IN AN ORGANIZED HEALTH CARE EDUCATION/TRAINING PROGRAM
Payer: COMMERCIAL

## 2025-04-15 PROCEDURE — 700102 HCHG RX REV CODE 250 W/ 637 OVERRIDE(OP): Performed by: NURSE PRACTITIONER

## 2025-04-15 PROCEDURE — 71045 X-RAY EXAM CHEST 1 VIEW: CPT

## 2025-04-15 PROCEDURE — 97530 THERAPEUTIC ACTIVITIES: CPT

## 2025-04-15 PROCEDURE — 700111 HCHG RX REV CODE 636 W/ 250 OVERRIDE (IP): Performed by: HOSPITALIST

## 2025-04-15 PROCEDURE — A9270 NON-COVERED ITEM OR SERVICE: HCPCS | Performed by: HOSPITALIST

## 2025-04-15 PROCEDURE — 770001 HCHG ROOM/CARE - MED/SURG/GYN PRIV*

## 2025-04-15 PROCEDURE — 99232 SBSQ HOSP IP/OBS MODERATE 35: CPT | Performed by: INTERNAL MEDICINE

## 2025-04-15 PROCEDURE — A9270 NON-COVERED ITEM OR SERVICE: HCPCS | Performed by: STUDENT IN AN ORGANIZED HEALTH CARE EDUCATION/TRAINING PROGRAM

## 2025-04-15 PROCEDURE — 700102 HCHG RX REV CODE 250 W/ 637 OVERRIDE(OP): Performed by: STUDENT IN AN ORGANIZED HEALTH CARE EDUCATION/TRAINING PROGRAM

## 2025-04-15 PROCEDURE — 700102 HCHG RX REV CODE 250 W/ 637 OVERRIDE(OP): Performed by: INTERNAL MEDICINE

## 2025-04-15 PROCEDURE — 97116 GAIT TRAINING THERAPY: CPT

## 2025-04-15 PROCEDURE — 700102 HCHG RX REV CODE 250 W/ 637 OVERRIDE(OP): Performed by: HOSPITALIST

## 2025-04-15 PROCEDURE — A9270 NON-COVERED ITEM OR SERVICE: HCPCS | Performed by: NURSE PRACTITIONER

## 2025-04-15 PROCEDURE — 70551 MRI BRAIN STEM W/O DYE: CPT

## 2025-04-15 PROCEDURE — A9270 NON-COVERED ITEM OR SERVICE: HCPCS | Performed by: INTERNAL MEDICINE

## 2025-04-15 RX ORDER — LORAZEPAM 1 MG/1
1 TABLET ORAL EVERY 4 HOURS PRN
Status: COMPLETED | OUTPATIENT
Start: 2025-04-15 | End: 2025-04-15

## 2025-04-15 RX ORDER — OMEPRAZOLE 20 MG/1
20 CAPSULE, DELAYED RELEASE ORAL DAILY
Status: DISCONTINUED | OUTPATIENT
Start: 2025-04-15 | End: 2025-04-19

## 2025-04-15 RX ADMIN — QUETIAPINE FUMARATE 50 MG: 25 TABLET ORAL at 21:06

## 2025-04-15 RX ADMIN — OXYCODONE 5 MG: 5 TABLET ORAL at 05:10

## 2025-04-15 RX ADMIN — Medication 5 MG: at 21:06

## 2025-04-15 RX ADMIN — METOPROLOL TARTRATE 37.5 MG: 25 TABLET, FILM COATED ORAL at 15:19

## 2025-04-15 RX ADMIN — OMEPRAZOLE 20 MG: 20 CAPSULE, DELAYED RELEASE ORAL at 15:19

## 2025-04-15 RX ADMIN — NYSTATIN: 100000 POWDER TOPICAL at 05:10

## 2025-04-15 RX ADMIN — OXYCODONE HYDROCHLORIDE 10 MG: 10 TABLET ORAL at 10:34

## 2025-04-15 RX ADMIN — DIVALPROEX SODIUM 500 MG: 500 TABLET, DELAYED RELEASE ORAL at 05:11

## 2025-04-15 RX ADMIN — DIVALPROEX SODIUM 500 MG: 500 TABLET, DELAYED RELEASE ORAL at 15:19

## 2025-04-15 RX ADMIN — LOSARTAN POTASSIUM 50 MG: 50 TABLET, FILM COATED ORAL at 16:46

## 2025-04-15 RX ADMIN — GABAPENTIN 200 MG: 100 CAPSULE ORAL at 16:46

## 2025-04-15 RX ADMIN — LOSARTAN POTASSIUM 50 MG: 50 TABLET, FILM COATED ORAL at 05:10

## 2025-04-15 RX ADMIN — ENOXAPARIN SODIUM 120 MG: 150 INJECTION SUBCUTANEOUS at 05:10

## 2025-04-15 RX ADMIN — DIVALPROEX SODIUM 500 MG: 500 TABLET, DELAYED RELEASE ORAL at 21:06

## 2025-04-15 RX ADMIN — NYSTATIN: 100000 POWDER TOPICAL at 16:47

## 2025-04-15 RX ADMIN — LORAZEPAM 1 MG: 1 TABLET ORAL at 10:59

## 2025-04-15 RX ADMIN — METOPROLOL TARTRATE 37.5 MG: 25 TABLET, FILM COATED ORAL at 21:06

## 2025-04-15 RX ADMIN — METOPROLOL TARTRATE 37.5 MG: 25 TABLET, FILM COATED ORAL at 05:10

## 2025-04-15 RX ADMIN — ENOXAPARIN SODIUM 120 MG: 150 INJECTION SUBCUTANEOUS at 16:46

## 2025-04-15 ASSESSMENT — ENCOUNTER SYMPTOMS
HEADACHES: 0
ABDOMINAL PAIN: 0
MEMORY LOSS: 1
NAUSEA: 0
SHORTNESS OF BREATH: 0

## 2025-04-15 ASSESSMENT — COGNITIVE AND FUNCTIONAL STATUS - GENERAL
MOVING FROM LYING ON BACK TO SITTING ON SIDE OF FLAT BED: A LITTLE
WALKING IN HOSPITAL ROOM: A LITTLE
STANDING UP FROM CHAIR USING ARMS: A LITTLE
TURNING FROM BACK TO SIDE WHILE IN FLAT BAD: A LITTLE
SUGGESTED CMS G CODE MODIFIER MOBILITY: CK
MOVING TO AND FROM BED TO CHAIR: A LITTLE
CLIMB 3 TO 5 STEPS WITH RAILING: A LITTLE
MOBILITY SCORE: 18

## 2025-04-15 ASSESSMENT — GAIT ASSESSMENTS
DISTANCE (FEET): 100
GAIT LEVEL OF ASSIST: MINIMAL ASSIST
ASSISTIVE DEVICE: FRONT WHEEL WALKER

## 2025-04-15 ASSESSMENT — FIBROSIS 4 INDEX: FIB4 SCORE: 0.33

## 2025-04-15 ASSESSMENT — PAIN DESCRIPTION - PAIN TYPE
TYPE: ACUTE PAIN

## 2025-04-15 NOTE — THERAPY
Physical Therapy   Daily Treatment     Patient Name: Roberto Braswell  Age:  44 y.o., Sex:  male  Medical Record #: [id number]  Today's Date: 4/15/2025     Precautions  Precautions: Fall Risk;Swallow Precautions  Comments: encephalopathic    Assessment    Today, pt is showing improved activity tolerance, but remains limited by confusion with use of walker and unsteady gait, and poor safety awareness. See details below.     Plan    Treatment Plan Status: Continue Current Treatment Plan  Type of Treatment: Bed Mobility, Equipment, Family / Caregiver Training, Gait Training, Manual Therapy, Neuro Re-Education / Balance, Self Care / Home Evaluation, Stair Training, Therapeutic Activities, Therapeutic Exercise  Treatment Frequency: 4 Times per Week  Treatment Duration: Until Therapy Goals Met    DC Equipment Recommendations: Unable to determine at this time  Discharge Recommendations: Recommend post-acute placement for additional physical therapy services prior to discharge home         Objective       04/15/25 1028   Precautions   Precautions Fall Risk;Swallow Precautions   Comments encephalopathic   Vitals   O2 Delivery Device None - Room Air   Pain 0 - 10 Group   Therapist Pain Assessment Nurse Notified;Post Activity  (c/o abdominal pain after ambulation, nsg updated)   Cognition    Cognition / Consciousness X   Level of Consciousness Alert   Ability To Follow Commands 1 Step  (inconsistent command following)   Safety Awareness Impulsive;Impaired   New Learning Impaired   Attention Impaired   Sequencing Impaired   Initiation Impaired   Comments uses FWW appropriately at start, but lifting it and bumping into objects when in smaller spaces.   Active ROM Lower Body    Comments functional for transfers, ambulation.   Strength Lower Body   Comments functional for ambulation with no buckling noted   Neurological Concerns   Neurological Concerns Yes   Comments showing L neglect today, leaving L hand supinated under his L hip  during seated scoot to EOB and does not follow cues to pull up socks using both hands, uses only his Right hand. Needs assist to find  on L side of walker with L hand.   Balance   Sitting Balance (Static) Fair -  (able to sit EOB self supported)   Sitting Balance (Dynamic) Fair -   Standing Balance (Static) Fair -   Standing Balance (Dynamic) Fair -   Weight Shift Sitting Fair   Weight Shift Standing Fair   Skilled Intervention Verbal Cuing;Sequencing   Comments standing with FWW   Bed Mobility    Supine to Sit Standby Assist   Sit to Supine   (up chair)   Scooting Supervised   Skilled Intervention Verbal Cuing   Gait Analysis   Gait Level Of Assist Minimal Assist   Assistive Device Front Wheel Walker   Distance (Feet) 100   # of Times Distance was Traveled 1   Deviation   (bumping into objects, sometimes on his right, sometimes on left. Lifting walker by end, confused by walker by end, but had been  using it appropriately when out in open area.)   Weight Bearing Status no restrictions   Skilled Intervention Verbal Cuing;Sequencing   Functional Mobility   Sit to Stand Minimal Assist   Bed, Chair, Wheelchair Transfer Minimal Assist   Transfer Method Stand Step   Skilled Intervention Verbal Cuing;Sequencing;Facilitation   6 Clicks Assessment - How much HELP from from another person do you currently need... (If the patient hasn't done an activity recently, how much help from another person do you think he/she would need if he/she tried?)   Turning from your back to your side while in a flat bed without using bedrails? 3   Moving from lying on your back to sitting on the side of a flat bed without using bedrails? 3   Moving to and from a bed to a chair (including a wheelchair)? 3   Standing up from a chair using your arms (e.g., wheelchair, or bedside chair)? 3   Walking in hospital room? 3   Climbing 3-5 steps with a railing? 3   6 clicks Mobility Score 18   Activity Tolerance   Standing 5+   Short Term Goals     Short Term Goal # 1 Pt will perform supine <> sit with [place]don in 6 visits to get in/out of bed   Goal Outcome # 1 Goal met   Short Term Goal # 2 Pt will perform stand step transfers with FWW and Abdon in 6 visits to get in/out of chair   Goal Outcome # 2 Goal met, new goal added   Short Term Goal # 2 B  Pt will perform all transfers with supervision in 6 visits to improve functional indep.   Goal Outcome # 2 B Goal not met   Short Term Goal # 3 Pt will ambulate 15ft with FWW and ModA in 6 visits to access home environment   Goal Outcome # 3 Goal met, new goal added   Short Term Goal # 3 B Pt will ambulate x 200 feet using fWW vs no AD with supervision in 6 visits to improve functional indep.   Education Group   Role of Physical Therapist Patient Response Patient;Acceptance;Explanation;Verbal Demonstration   Physical Therapy Treatment Plan   Physical Therapy Treatment Plan Continue Current Treatment Plan   Anticipated Discharge Equipment and Recommendations   DC Equipment Recommendations Unable to determine at this time   Discharge Recommendations Recommend post-acute placement for additional physical therapy services prior to discharge home   Interdisciplinary Plan of Care Collaboration   IDT Collaboration with  Nursing   Patient Position at End of Therapy Seated;Chair Alarm On;Call Light within Reach;Tray Table within Reach;Phone within Reach;Family / Friend in Room   Collaboration Comments nsg updated   Session Information   Date / Session Number  4/15-2 (2/4, 4/16)

## 2025-04-15 NOTE — PROGRESS NOTES
Bedside report received from off going RN/tech: Ally, assumed care of patient.     Fall Risk Score: HIGH RISK  Fall risk interventions in place: Provide patient/family education based on risk assessment, Educate patient/family to call staff for assistance when getting out of bed, Place fall precaution signage outside patient door, Place patient in room close to nursing station, and Utilize bed/chair fall alarm  Bed type: Low air loss (Corwin Score less than 17 interventions in place)  Patient on cardiac monitor: Yes  IVF/IV medications: Not Applicable   Oxygen: Room Air  Bedside sitter: Not Applicable   Isolation: Not applicable

## 2025-04-15 NOTE — DISCHARGE PLANNING
-[phone]  DPA requested for Nancy at [facility] to submit for insurance auth. Nancy will submit for auth, facility needs updated therapy notes for insurance to approve request.

## 2025-04-15 NOTE — CARE PLAN
The patient is Stable - Low risk of patient condition declining or worsening    Shift Goals  Clinical Goals: MRI  Patient Goals: ambulate  Family Goals: POC    Progress made toward(s) clinical / shift goals:    Problem: Knowledge Deficit - Standard  Goal: Patient and family/care givers will demonstrate understanding of plan of care, disease process/condition, diagnostic tests and medications  Outcome: Progressing  Note: Pt is A&O3 this morning, which waxes and wanes, however he is able to communicate and participate in his care.      Problem: Skin Integrity  Goal: Skin integrity is maintained or improved  Outcome: Progressing     Problem: Communication  Goal: The ability to communicate needs accurately and effectively will improve  Outcome: Not Progressing     Problem: Safety  Goal: Will remain free from injury  Outcome: Progressing  Note: Pt has remained free from falls today.   Goal: Will remain free from falls  Outcome: Progressing       Patient is not progressing towards the following goals:      Problem: Communication  Goal: The ability to communicate needs accurately and effectively will improve  Outcome: Not Progressing

## 2025-04-15 NOTE — PROGRESS NOTES
Hospital Medicine Daily Progress Note    Date of Service  4/15/2025    Chief Complaint  Roberto Braswell is a 44 y.o. male admitted 4/3/2025 with cardiac arrest    Hospital Course  45 yo man with a history of mitral valve repair, previous upper GI bleeds who went into V-fib arrest on 4/3 and achieved ROSC on scene, intubated for airway protection.  CTA chest showed right main PE, and TTE showed 38%.  He was started on anticoagulation. Cardiology was consulted and cardiac arrest and cardiomyopathy likely due to PE.  He was treated for aspiration pneumonitis, tracheal aspirate grew MSSA.  He was extubated 4/5 to a Precedex drip as he had agitation and encephalopathy. 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 Patient was unable to complete MRI without sedation. He was started on depakote and seroquel, his mentation did improve some.Follow up TTE showed his EF improved to 65%.     Interval Problem Update  Patient has been calm, cooperative. Tangential in his speech but follows commands. Wife bedside and updated on improved EF on TTE. MRI brain today neg for ischemia    I have discussed this patient's plan of care and discharge plan at IDT rounds today with Case Management, Nursing, Nursing leadership, and other members of the IDT team.    Consultants/Specialty  cardiology, critical care, and neurology    Code Status  Full Code    Disposition  The patient is medically cleared for discharge to home or a post-acute facility.  Anticipate discharge to: an inpatient rehabilitation hospital    I have placed the appropriate orders for post-discharge needs.    Review of Systems  Review of Systems   Constitutional:  Negative for malaise/fatigue.   Respiratory:  Negative for shortness of breath.    Cardiovascular:  Negative for chest pain.   Gastrointestinal:  Negative for abdominal pain and nausea.   Neurological:  Negative for headaches.   Psychiatric/Behavioral:  Positive for memory  loss.         Physical Exam  Temp:  [36.5 °C (97.7 °F)-36.7 °C (98.1 °F)] (P) 36.7 °C (98.1 °F)  Pulse:  [68-95] (P) 76  Resp:  [18-20] (P) 18  BP: (111-142)/(58-88) (P) 116/71  SpO2:  [90 %-97 %] (P) 97 %    Physical Exam  Vitals and nursing note reviewed.   Constitutional:       General: He is not in acute distress.     Appearance: He is not toxic-appearing.   HENT:      Head: Normocephalic.      Mouth/Throat:      Mouth: Mucous membranes are moist.   Eyes:      General:         Right eye: No discharge.         Left eye: No discharge.   Cardiovascular:      Rate and Rhythm: Normal rate and regular rhythm.   Pulmonary:      Effort: Pulmonary effort is normal. No respiratory distress.      Breath sounds: No wheezing or rales.   Abdominal:      Palpations: Abdomen is soft.      Tenderness: There is no abdominal tenderness.   Musculoskeletal:         General: No swelling.      Cervical back: Neck supple.   Skin:     General: Skin is warm and dry.   Neurological:      Mental Status: He is alert and oriented to person, place, and time.      Comments: Oriented to self and wife and [employer] and president. Unable to tell me month or year.   Equal strength UE and LE, following simple commands. Able to answer some questions appropriately but often tangential train of thought         Fluids    Intake/Output Summary (Last 24 hours) at 4/15/2025 1416  Last data filed at 4/14/2025 2000  Gross per 24 hour   Intake 200 ml   Output --   Net 200 ml        Laboratory  Recent Labs     04/14/25  0011   WBC 8.1   RBC 4.87   HEMOGLOBIN 13.3*   HEMATOCRIT 40.9*   MCV 84.0   MCH 27.3   MCHC 32.5   RDW 51.0*   PLATELETCT 498*   MPV 9.4     Recent Labs     04/14/25  0011   SODIUM 141   POTASSIUM 3.8   CHLORIDE 108   CO2 21   GLUCOSE 114*   BUN 15   CREATININE 0.79   CALCIUM 8.4*                   Imaging  DX-CHEST-PORTABLE (1 VIEW)   Final Result      1.  Hypoinflation without other evidence for acute cardiopulmonary disease.   2.  Prior open  heart surgery.      MR-BRAIN-W/O   Final Result      1.  Unremarkable noncontrast MR examination of the brain.   2.  There is no evidence of restricted diffusion to suggest any hypoxic ischemic injury.   3.  Paranasal sinus inflammatory disease.      EC-ECHOCARDIOGRAM COMPLETE W/ CONT   Final Result      DX-CHEST-PORTABLE (1 VIEW)   Final Result         1.  Hazy right lower lobe infiltrate, slightly decreased since prior study   2.  Cardiomegaly      [id number]-3 VIEW   Final Result         1.  Nonspecific bowel gas pattern in the upper abdomen.      DX-CHEST-PORTABLE (1 VIEW)   Final Result         1.  Pulmonary edema and/or infiltrates are identified, which are stable since the prior exam.   2.  Cardiomegaly      DX-CHEST-PORTABLE (1 VIEW)   Final Result         1.  Pulmonary edema and/or infiltrates are identified, which appear somewhat increased since the prior exam.   2.  Cardiomegaly      CT-CTA NECK WITH & W/O-POST PROCESSING   Final Result         1.  CT angiogram of the neck within normal limits.   2.  Patchy bilateral upper lobe infiltrates         CT-CTA HEAD WITH & W/O-POST PROCESS   Final Result         1.  No large vessel occlusion or aneurysm identified   2.  Bilateral sinusitis changes      US-EXTREMITY VENOUS LOWER BILAT   Final Result      EC-ECHOCARDIOGRAM COMPLETE W/ CONT   Final Result      CT-ABDOMEN-PELVIS WITH   Final Result         1.  Fluid-filled prominence of proximal small bowel with distal small bowel decompression, appearance suggests ileus and/or enteritis versus evolving obstructive changes. Recommend radiographic follow-up to resolution as clinically appropriate.   2.  Irregular hepatic contour favoring changes of cirrhosis   3.  Fat-containing bilateral inguinal hernias      CT-CTA CHEST PULMONARY ARTERY W/ RECONS   Final Result         1.  Distal right main pulmonary embolus extending into right upper lobe and lower lobe segmental and subsegmental branches. 1.4 right to left  ventricular ratio suggesting component of right heart strain.   2.  Evaluation of the subsegmental branches is essentially nondiagnostic due to motion artifacts. Additional imaging would be required for definitive evaluation of small distal pulmonary emboli.   3.  Scattered patchy bilateral pulmonary infiltrates.   4.  Bilateral dependent consolidations, appearance suggests atelectasis, component of infiltrate not excluded.      These findings were discussed with the patient's clinician, Misbah Cardoza, on 4/3/2025 9:27 PM.      CT-HEAD W/O   Final Result      No acute intracranial findings.               DX-CHEST-PORTABLE (1 VIEW)   Final Result      Interval placement of a right IJ central line without evidence of complication.      DX-CHEST-PORTABLE (1 VIEW)   Final Result      1.  Support devices present.      2.  Cardiomegaly.      3.  Bibasilar atelectasis.           Assessment/Plan  * Cardiac arrest (HCC)- (present on admission)  Assessment & Plan  Cardiac arrest with CPR, defibrillation x 3 and 3 rounds epinephrine, unclear etiology   Loaded with amiodarone  Now in sinus rhythm  Cardiology consulted, thought to be secondary to PE  Stunned decreased ejection fraction 38%, now 65% on repeat TTE    Diarrhea  Assessment & Plan  Ongoing diarrhea since admission, unclear etiology   GI PCR negative   Consistent does not appear consistent with C.diff , is improving   Imodium prn     Hypokalemia  Assessment & Plan  Low K, on oral replacement   Monitor BMP     Acute respiratory failure with hypoxia (HCC)- (present on admission)  Assessment & Plan  Requiring intubation on admission was extubated on 4/5/2025  Secondary to cardiac arrest  Now on room air    Encephalopathy acute- (present on admission)  Assessment & Plan  Acute metabolic encephalopathy in the setting of cardiac arrest  CT head negative  EEG negative for seizure activity  Off Precedex drip   Valproic acid has been initiated for mood stabilization and  increase to 500 mg TID  MRI of the brain neg for any ischemia  Likely multifactorial, possible mild hypoxic injury, delirium and likely functional component as well. Discussed with neurology   Consulted  psychiatry to help agitation/restlessness, case with discussed with team, started on seroquel overnight  Continues to improve  PMR also consulted    Cirrhosis of liver (HCC)  Assessment & Plan  Noted on imaging  Outpatient follow-up  Ammonia level normal     Aspiration into airway- (present on admission)  Assessment & Plan  Treated with Zosyn completed 7 days   Speech following, diet per speech    Pulmonary embolus (HCC)- (present on admission)  Assessment & Plan  Noted on CTA, distal segmental PE, not saddle   Status post IV heparin drip  He was transitioned to lovenox though given the possible drug-drug interaction with valproic acid, change to weight-based lovenox for now.  He has a history of multiple admissions for GI bleeds will need to be monitored closely. Had esophageal ulcer 9/2024. Order for omeprazole    Seizure-like activity (HCC)- (present on admission)  Assessment & Plan  Seizure type activity likely due to anoxia  EEG negative on early admit   Repeat EEG 4/11 showing non-specific encephalopathy, no seizure activity seen    MRI brain neg for any ischemia  Seizure and fall precautions    Ventricular fibrillation (HCC)- (present on admission)  Assessment & Plan  With defibrillation x 3  Now in sinus, had no events on telemetry  Cardiology eval prior to dc to discuss AICD  Per cardiology, no ischemic evaluation necessary given clear LHC prior to MV repair last year  Repeat echo now recovered to 65%    Pain, chronic- (present on admission)  Assessment & Plan  Per wife, He had weaned himself off narcotics for 3 days prior to this event  Of note, His UDS on admission was negative as have previous other UDS despite PDMP showing regular prescribing   Has been on chronic oxycodone 10 mg 5 x daily (last fill  3/24)   Prn oxy     History of MVR with cardiopulmonary bypass  Assessment & Plan  By Dr. Santana Jan 2024  Echo reviewed Known mitral valve repair that is functioning normally with appropriate transvalvular gradient. Reduced EF and hypokinesis present likely from cardiac stunning          VTE prophylaxis:    therapeutic anticoagulation with weight-based lovenox BID, 1 mg/kg      I have performed a physical exam and reviewed and updated ROS and Plan today (4/15/2025). In review of yesterday's note (4/14/2025), there are no changes except as documented above.

## 2025-04-15 NOTE — PROGRESS NOTES
Bedside report received from off going RN/tech: Kassidy, assumed care of patient.     Fall Risk Score: HIGH RISK  Fall risk interventions in place: Place yellow fall risk ID band on patient, Provide patient/family education based on risk assessment, Educate patient/family to call staff for assistance when getting out of bed, Place fall precaution signage outside patient door, Place patient in room close to nursing station, Utilize bed/chair fall alarm, Notify charge of high risk for huddle, and Bed alarm connected correctly  Bed type: Low air loss (Corwin Score less than 17 interventions in place)  Patient on cardiac monitor: Yes  IVF/IV medications: Not Applicable   Oxygen: Room Air  Bedside sitter: Not Applicable   Isolation: Not applicable

## 2025-04-15 NOTE — DISCHARGE PLANNING
Case Management Discharge Planning    Admission Date: 4/3/2025  GMLOS: 3.4  ALOS: 12    6-Clicks ADL Score: 12  6-Clicks Mobility Score: 11  PT and/or OT Eval ordered: Yes  PT/OT:Recommending post acute   Post-acute Referrals Ordered: Yes  Post-acute Choice Obtained: NA  Has referral(s) been sent to post-acute provider:  NA      Anticipated Discharge Dispo: Discharge Disposition: D/T to SNF with Medicare cert in anticipation of skilled care (03)     DME Needed: Pending hospital course     Action(s) Taken: Pt discussed in IDT rounds pt is not [facility] pt pending MRI without sedation now. IPR following. Pt has one accepting SNF. SNF vs IPR.    1108 Pt discussed in 1045 IDT rounds that PT saw pt and is leaning towards SNF. LMSW collaborated with DPA to start auth [facility].     Escalations Completed: None    Medically Clear: No    Next Steps: F/U with HCM needs     Barriers to Discharge: Medical clearance    Is the patient up for discharge tomorrow: No         DISCHARGE

## 2025-04-15 NOTE — PROGRESS NOTES
Monitor Summary     Rhythm: SR  Heart Rate: 72-91  Ectopy: R PVC  Measurement: .16/.05/.37
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
## 2025-04-16 ENCOUNTER — HOME HEALTH ADMISSION (OUTPATIENT)
Dept: HOME HEALTH SERVICES | Facility: HOME HEALTHCARE | Age: 45
End: 2025-04-16
Payer: COMMERCIAL

## 2025-04-16 LAB
ALBUMIN SERPL BCP-MCNC: 3.8 G/DL (ref 3.2–4.9)
BUN SERPL-MCNC: 11 MG/DL (ref 8–22)
CALCIUM ALBUM COR SERPL-MCNC: 9.4 MG/DL (ref 8.5–10.5)
CALCIUM SERPL-MCNC: 9.2 MG/DL (ref 8.5–10.5)
CHLORIDE SERPL-SCNC: 111 MMOL/L (ref 96–112)
CO2 SERPL-SCNC: 21 MMOL/L (ref 20–33)
CREAT SERPL-MCNC: 0.95 MG/DL (ref 0.5–1.4)
GFR SERPLBLD CREATININE-BSD FMLA CKD-EPI: 101 ML/MIN/1.73 M 2
GLUCOSE SERPL-MCNC: 90 MG/DL (ref 65–99)
PHOSPHATE SERPL-MCNC: 3.3 MG/DL (ref 2.5–4.5)
POTASSIUM SERPL-SCNC: 3.9 MMOL/L (ref 3.6–5.5)
SODIUM SERPL-SCNC: 145 MMOL/L (ref 135–145)
VALPROATE SERPL-MCNC: 86.6 UG/ML (ref 50–100)

## 2025-04-16 PROCEDURE — 99232 SBSQ HOSP IP/OBS MODERATE 35: CPT | Performed by: STUDENT IN AN ORGANIZED HEALTH CARE EDUCATION/TRAINING PROGRAM

## 2025-04-16 PROCEDURE — 700102 HCHG RX REV CODE 250 W/ 637 OVERRIDE(OP): Performed by: STUDENT IN AN ORGANIZED HEALTH CARE EDUCATION/TRAINING PROGRAM

## 2025-04-16 PROCEDURE — 99232 SBSQ HOSP IP/OBS MODERATE 35: CPT | Performed by: INTERNAL MEDICINE

## 2025-04-16 PROCEDURE — 770001 HCHG ROOM/CARE - MED/SURG/GYN PRIV*

## 2025-04-16 PROCEDURE — 80069 RENAL FUNCTION PANEL: CPT

## 2025-04-16 PROCEDURE — A9270 NON-COVERED ITEM OR SERVICE: HCPCS | Performed by: INTERNAL MEDICINE

## 2025-04-16 PROCEDURE — A9270 NON-COVERED ITEM OR SERVICE: HCPCS | Performed by: STUDENT IN AN ORGANIZED HEALTH CARE EDUCATION/TRAINING PROGRAM

## 2025-04-16 PROCEDURE — A9270 NON-COVERED ITEM OR SERVICE: HCPCS | Performed by: HOSPITALIST

## 2025-04-16 PROCEDURE — 80164 ASSAY DIPROPYLACETIC ACD TOT: CPT

## 2025-04-16 PROCEDURE — 97530 THERAPEUTIC ACTIVITIES: CPT

## 2025-04-16 PROCEDURE — A9270 NON-COVERED ITEM OR SERVICE: HCPCS | Performed by: NURSE PRACTITIONER

## 2025-04-16 PROCEDURE — 97535 SELF CARE MNGMENT TRAINING: CPT

## 2025-04-16 PROCEDURE — 700102 HCHG RX REV CODE 250 W/ 637 OVERRIDE(OP): Performed by: INTERNAL MEDICINE

## 2025-04-16 PROCEDURE — 36415 COLL VENOUS BLD VENIPUNCTURE: CPT

## 2025-04-16 PROCEDURE — 700102 HCHG RX REV CODE 250 W/ 637 OVERRIDE(OP): Performed by: HOSPITALIST

## 2025-04-16 PROCEDURE — 700111 HCHG RX REV CODE 636 W/ 250 OVERRIDE (IP): Performed by: HOSPITALIST

## 2025-04-16 PROCEDURE — 700102 HCHG RX REV CODE 250 W/ 637 OVERRIDE(OP): Performed by: NURSE PRACTITIONER

## 2025-04-16 RX ORDER — QUETIAPINE FUMARATE 25 MG/1
25 TABLET, FILM COATED ORAL NIGHTLY PRN
Qty: 60 TABLET | Refills: 0 | Status: SHIPPED | OUTPATIENT
Start: 2025-04-16

## 2025-04-16 RX ORDER — NYSTATIN 100000 [USP'U]/G
1 POWDER TOPICAL 2 TIMES DAILY
Status: SHIPPED
Start: 2025-04-16 | End: 2025-04-16

## 2025-04-16 RX ORDER — LOSARTAN POTASSIUM 50 MG/1
50 TABLET ORAL 2 TIMES DAILY
Qty: 200 TABLET | Refills: 0 | Status: SHIPPED | OUTPATIENT
Start: 2025-04-16 | End: 2026-05-21

## 2025-04-16 RX ORDER — OMEPRAZOLE 20 MG/1
20 CAPSULE, DELAYED RELEASE ORAL DAILY
Qty: 30 CAPSULE | Refills: 3 | Status: SHIPPED | OUTPATIENT
Start: 2025-04-17

## 2025-04-16 RX ORDER — GABAPENTIN 100 MG/1
200 CAPSULE ORAL EVERY EVENING
Status: SHIPPED
Start: 2025-04-16 | End: 2025-04-16

## 2025-04-16 RX ORDER — METOPROLOL TARTRATE 37.5 MG/1
37.5 TABLET ORAL EVERY 8 HOURS
Status: SHIPPED
Start: 2025-04-16 | End: 2025-04-16

## 2025-04-16 RX ORDER — METOPROLOL SUCCINATE 100 MG/1
50 TABLET, EXTENDED RELEASE ORAL DAILY
Qty: 30 TABLET | Refills: 0 | Status: SHIPPED | OUTPATIENT
Start: 2025-04-16

## 2025-04-16 RX ORDER — QUETIAPINE FUMARATE 50 MG/1
50 TABLET, FILM COATED ORAL NIGHTLY
Status: SHIPPED
Start: 2025-04-16 | End: 2025-04-16

## 2025-04-16 RX ORDER — LOSARTAN POTASSIUM 50 MG/1
50 TABLET ORAL 2 TIMES DAILY
Status: SHIPPED
Start: 2025-04-16 | End: 2025-04-16

## 2025-04-16 RX ORDER — OMEPRAZOLE 20 MG/1
20 CAPSULE, DELAYED RELEASE ORAL DAILY
Status: SHIPPED
Start: 2025-04-17 | End: 2025-04-16

## 2025-04-16 RX ORDER — DIVALPROEX SODIUM 500 MG/1
500 TABLET, DELAYED RELEASE ORAL EVERY 8 HOURS
Status: SHIPPED
Start: 2025-04-16 | End: 2025-04-16

## 2025-04-16 RX ORDER — QUETIAPINE FUMARATE 50 MG/1
50 TABLET, FILM COATED ORAL NIGHTLY
Qty: 60 TABLET | Refills: 0 | Status: SHIPPED | OUTPATIENT
Start: 2025-04-16

## 2025-04-16 RX ORDER — OXYCODONE HYDROCHLORIDE 5 MG/1
5 TABLET ORAL EVERY 4 HOURS PRN
Status: SHIPPED
Start: 2025-04-16 | End: 2025-04-16

## 2025-04-16 RX ORDER — GABAPENTIN 100 MG/1
200 CAPSULE ORAL EVERY EVENING
Qty: 90 CAPSULE | Refills: 0 | Status: SHIPPED | OUTPATIENT
Start: 2025-04-16

## 2025-04-16 RX ORDER — OXYCODONE HYDROCHLORIDE 5 MG/1
5 TABLET ORAL EVERY 4 HOURS PRN
Qty: 20 TABLET | Refills: 0 | Status: SHIPPED | OUTPATIENT
Start: 2025-04-16 | End: 2025-04-21

## 2025-04-16 RX ORDER — QUETIAPINE FUMARATE 25 MG/1
25 TABLET, FILM COATED ORAL NIGHTLY PRN
Status: SHIPPED
Start: 2025-04-16 | End: 2025-04-16

## 2025-04-16 RX ORDER — DIVALPROEX SODIUM 500 MG/1
500 TABLET, DELAYED RELEASE ORAL EVERY 8 HOURS
Qty: 90 TABLET | Refills: 0 | Status: SHIPPED | OUTPATIENT
Start: 2025-04-16

## 2025-04-16 RX ADMIN — OXYCODONE HYDROCHLORIDE 10 MG: 10 TABLET ORAL at 23:56

## 2025-04-16 RX ADMIN — LOSARTAN POTASSIUM 50 MG: 50 TABLET, FILM COATED ORAL at 17:29

## 2025-04-16 RX ADMIN — GABAPENTIN 200 MG: 100 CAPSULE ORAL at 17:29

## 2025-04-16 RX ADMIN — DIVALPROEX SODIUM 500 MG: 500 TABLET, DELAYED RELEASE ORAL at 06:27

## 2025-04-16 RX ADMIN — OXYCODONE 5 MG: 5 TABLET ORAL at 08:44

## 2025-04-16 RX ADMIN — NYSTATIN: 100000 POWDER TOPICAL at 06:28

## 2025-04-16 RX ADMIN — QUETIAPINE FUMARATE 50 MG: 25 TABLET ORAL at 21:04

## 2025-04-16 RX ADMIN — APIXABAN 5 MG: 5 TABLET, FILM COATED ORAL at 17:29

## 2025-04-16 RX ADMIN — OXYCODONE 5 MG: 5 TABLET ORAL at 15:11

## 2025-04-16 RX ADMIN — QUETIAPINE FUMARATE 25 MG: 25 TABLET ORAL at 23:56

## 2025-04-16 RX ADMIN — DIVALPROEX SODIUM 500 MG: 500 TABLET, DELAYED RELEASE ORAL at 21:04

## 2025-04-16 RX ADMIN — METOPROLOL TARTRATE 37.5 MG: 25 TABLET, FILM COATED ORAL at 21:04

## 2025-04-16 RX ADMIN — Medication 5 MG: at 21:04

## 2025-04-16 RX ADMIN — OMEPRAZOLE 20 MG: 20 CAPSULE, DELAYED RELEASE ORAL at 06:27

## 2025-04-16 RX ADMIN — ACETAMINOPHEN 650 MG: 325 TABLET, FILM COATED ORAL at 07:52

## 2025-04-16 RX ADMIN — NYSTATIN: 100000 POWDER TOPICAL at 17:29

## 2025-04-16 RX ADMIN — METOPROLOL TARTRATE 37.5 MG: 25 TABLET, FILM COATED ORAL at 06:27

## 2025-04-16 RX ADMIN — METOPROLOL TARTRATE 37.5 MG: 25 TABLET, FILM COATED ORAL at 15:16

## 2025-04-16 RX ADMIN — DIVALPROEX SODIUM 500 MG: 500 TABLET, DELAYED RELEASE ORAL at 15:16

## 2025-04-16 RX ADMIN — OXYCODONE 5 MG: 5 TABLET ORAL at 19:49

## 2025-04-16 RX ADMIN — QUETIAPINE FUMARATE 25 MG: 25 TABLET ORAL at 00:51

## 2025-04-16 RX ADMIN — OXYCODONE 5 MG: 5 TABLET ORAL at 00:50

## 2025-04-16 RX ADMIN — ENOXAPARIN SODIUM 120 MG: 150 INJECTION SUBCUTANEOUS at 06:28

## 2025-04-16 ASSESSMENT — ENCOUNTER SYMPTOMS
RESPIRATORY NEGATIVE: 1
CARDIOVASCULAR NEGATIVE: 1
MUSCULOSKELETAL NEGATIVE: 1
GASTROINTESTINAL NEGATIVE: 1
EYES NEGATIVE: 1
ABDOMINAL PAIN: 0
MEMORY LOSS: 1
CONSTITUTIONAL NEGATIVE: 1
NEUROLOGICAL NEGATIVE: 1

## 2025-04-16 ASSESSMENT — COGNITIVE AND FUNCTIONAL STATUS - GENERAL
DAILY ACTIVITIY SCORE: 18
PERSONAL GROOMING: A LITTLE
TOILETING: A LITTLE
HELP NEEDED FOR BATHING: A LITTLE
DRESSING REGULAR LOWER BODY CLOTHING: A LITTLE
DRESSING REGULAR UPPER BODY CLOTHING: A LITTLE
SUGGESTED CMS G CODE MODIFIER DAILY ACTIVITY: CK
EATING MEALS: A LITTLE

## 2025-04-16 ASSESSMENT — PAIN DESCRIPTION - PAIN TYPE
TYPE: ACUTE PAIN

## 2025-04-16 ASSESSMENT — FIBROSIS 4 INDEX: FIB4 SCORE: 0.33

## 2025-04-16 NOTE — PROGRESS NOTES
Referring Physician: Berna Malik M.D.    S: Patient states he is looking forward to going back to work and home. States he's thinking about going back to school as well, through his employer, though wife at the bedside denies this being possible. Wife thinks that he is overall improving, appetite improving, and able to ambulate and go to the bathroom with limited support. Though she does acknowledge that he is still fluctuating throughout the day. Wife also shared her significant frustrations with post-acute rehabilitation choices.     Depakote level checked and wnl.     Scheduled Medications   Medication Dose Frequency    apixaban  5 mg BID    omeprazole  20 mg DAILY    divalproex  500 mg Q8HRS    gabapentin  200 mg Q EVENING    nystatin   BID    QUEtiapine  50 mg Nightly    melatonin  5 mg Nightly    Pharmacy Consult Request  1 Each PHARMACY TO DOSE    losartan  50 mg BID    metoprolol tartrate  37.5 mg Q8HRS    senna-docusate  2 Tablet Q EVENING     O:    Vitals:    04/16/25 1516   BP: 116/74   Pulse: 95   Resp:    Temp:    SpO2:      Awake, alert, following commands. Able to engage in simple conversations and appears to be forward thinking. Moving all limbs spontaneously in the plane of the bed and antigravity.  EOMI, face symmetric, hearing in tact, no dysarthria, no paraphasic errors.     EEG INTERPRETATION:  Abnormal, technically-limited video EEG recording in the awake and drowsy state(s):  -Mild to moderate background slowing suggestive of diffuse/multifocal cerebral dysfunction and consistent with a nonspecific encephalopathy.   -No persistent focal asymmetries seen.  -Epileptiform discharges : No definitive epileptiform discharges were seen.  -No seizures.   -Clinical Events: None    Impression & Recommendations:  Improving neuro-behavioral signs and symptoms that have emerged post-cardiac arrest. Possible element of post-anoxic encephalopathy with some degree of hyperactive delirium; seemingly  resolved. Neurologic examination with functional features as previously described.     Overall favorable trend that seems to correspond with time as well as rest.  Patient has not developed any new or progressive neurologic symptoms.  Interval repeat EEG with mild slowing of the background.  This is a nonspecific finding that can be associated with encephalopathy among other considerations such as a sedation effect or drowsiness.  There were no epileptiform features. MRI Brain does not reveal any contributing structural abnormalities or evidence of anoxic injury.     - Continue Depakote 500 mg every 8 hours.  - Continue melatonin.  - Continue PT, OT and speech.  - Agree with quetiapine at bedtime.  - Consider therapeutic trial with gabapentin for persistent restless leg type symptoms.    Neurology will sign off at this time.    Signed:  Keny Alvarado DO  Department of Neurology  [facility]      Total time spent: 35 minutes.

## 2025-04-16 NOTE — PROGRESS NOTES
Bedside report received from off going RN/tech: Ally, assumed care of patient.     Fall Risk Score: HIGH RISK  Fall risk interventions in place: Provide patient/family education based on risk assessment, Educate patient/family to call staff for assistance when getting out of bed, Place fall precaution signage outside patient door, Place patient in room close to nursing station, and Utilize bed/chair fall alarm  Bed type: Low air loss (Corwin Score less than 17 interventions in place)  Patient on cardiac monitor: No   IVF/IV medications: Not Applicable   Oxygen: Room Air  Bedside sitter: Not Applicable   Isolation: Not applicable

## 2025-04-16 NOTE — FACE TO FACE
Face to Face Note  -  Durable Medical Equipment    Cornelius Bassett M.D. - NPI: [id number]  I certify that this patient is under my care and that they had a durable medical equipment(DME)face to face encounter by myself that meets the physician DME face-to-face encounter requirements with this patient on:    Date of encounter:   Patient:                    MRN:                       YOB: 2024  Roberto Braswell  [id number]  1980     The encounter with the patient was in whole, or in part, for the following medical condition, which is the primary reason for durable medical equipment:  Other - functional neurologic disorder    I certify that, based on my findings, the following durable medical equipment is medically necessary:    Walkers.    My Clinical findings support the need for the above equipment due to:  Abnormal Gait

## 2025-04-16 NOTE — PROGRESS NOTES
Hospital Medicine Daily Progress Note    Date of Service  4/16/2025    Chief Complaint  Roberto Braswell is a 44 y.o. male admitted 4/3/2025 with cardiac arrest    Hospital Course  43 yo man with a history of mitral valve repair, previous upper GI bleeds who went into V-fib arrest on 4/3 and achieved ROSC on scene, intubated for airway protection.  CTA chest showed right main PE, and TTE showed 38%.  He was started on anticoagulation. Cardiology was consulted and cardiac arrest and cardiomyopathy likely due to PE.  He was treated for aspiration pneumonitis, tracheal aspirate grew MSSA.  He was extubated 4/5 to a Precedex drip as he had agitation and encephalopathy. 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 Patient was unable to complete MRI without sedation. He was started on depakote and seroquel, his mentation did improve some.Follow up TTE showed his EF improved to 65%.     Interval Problem Update  Patient able to follow commands during exam, said some tangential words during. Wife says he was able to use the toilet and feed himself a little today  I discussed with psychiatry, probably has some residual delirium. I ordered repeat EEG  I discussed recent TTE with Dr. Louie, he recommends outpatient cardiology follow up  Acute rehab declined. [facility] accepting, no other accepting at this time. We had meeting with patient and wife with case management, psychiatry, nursing manager, nurse advocate, bedside nurse to discuss option. Wife is agreeble to submitting auth for [facility]    I have discussed this patient's plan of care and discharge plan at IDT rounds today with Case Management, Nursing, Nursing leadership, and other members of the IDT team.    Consultants/Specialty  cardiology, critical care, and neurology    Code Status  Full Code    Disposition  The patient is medically cleared for discharge to home or a post-acute facility.  Anticipate discharge to: skilled  nursing facility    I have placed the appropriate orders for post-discharge needs.    Review of Systems  Review of Systems   Constitutional:  Negative for malaise/fatigue.   Gastrointestinal:  Negative for abdominal pain.   Psychiatric/Behavioral:  Positive for memory loss.         Physical Exam  Temp:  [36.5 °C (97.7 °F)-36.8 °C (98.2 °F)] 36.5 °C (97.7 °F)  Pulse:  [69-88] 69  Resp:  [18] 18  BP: (112-129)/(65-86) 124/80  SpO2:  [93 %-97 %] 93 %    Physical Exam  Vitals and nursing note reviewed.   Constitutional:       General: He is not in acute distress.     Appearance: He is not toxic-appearing.   HENT:      Head: Normocephalic.      Mouth/Throat:      Mouth: Mucous membranes are moist.   Eyes:      General:         Right eye: No discharge.         Left eye: No discharge.   Cardiovascular:      Rate and Rhythm: Normal rate and regular rhythm.   Pulmonary:      Effort: Pulmonary effort is normal. No respiratory distress.      Breath sounds: No wheezing or rales.   Abdominal:      Palpations: Abdomen is soft.      Tenderness: There is no abdominal tenderness.   Musculoskeletal:         General: No swelling.      Cervical back: Neck supple.   Skin:     General: Skin is warm and dry.   Neurological:      Mental Status: He is alert and oriented to person, place, and time.      Comments: Oriented to self and wife and [facility], past 2 presidents. Unable to tell me the date  Equal strength UE and LE, following simple commands. At times able to hold some conversation, at times tangential          Fluids    Intake/Output Summary (Last 24 hours) at 4/16/2025 1417  Last data filed at 4/16/2025 0755  Gross per 24 hour   Intake 400 ml   Output 400 ml   Net 0 ml        Laboratory  Recent Labs     04/14/25  0011   WBC 8.1   RBC 4.87   HEMOGLOBIN 13.3*   HEMATOCRIT 40.9*   MCV 84.0   MCH 27.3   MCHC 32.5   RDW 51.0*   PLATELETCT 498*   MPV 9.4     Recent Labs     04/14/25  0011 04/16/25  0808   SODIUM 141 145   POTASSIUM 3.8 3.9    CHLORIDE 108 111   CO2 21 21   GLUCOSE 114* 90   BUN 15 11   CREATININE 0.79 0.95   CALCIUM 8.4* 9.2                   Imaging  DX-CHEST-PORTABLE (1 VIEW)   Final Result      1.  Hypoinflation without other evidence for acute cardiopulmonary disease.   2.  Prior open heart surgery.      MR-BRAIN-W/O   Final Result      1.  Unremarkable noncontrast MR examination of the brain.   2.  There is no evidence of restricted diffusion to suggest any hypoxic ischemic injury.   3.  Paranasal sinus inflammatory disease.      EC-ECHOCARDIOGRAM COMPLETE W/ CONT   Final Result      DX-CHEST-PORTABLE (1 VIEW)   Final Result         1.  Hazy right lower lobe infiltrate, slightly decreased since prior study   2.  Cardiomegaly      [id number] VIEW   Final Result         1.  Nonspecific bowel gas pattern in the upper abdomen.      DX-CHEST-PORTABLE (1 VIEW)   Final Result         1.  Pulmonary edema and/or infiltrates are identified, which are stable since the prior exam.   2.  Cardiomegaly      DX-CHEST-PORTABLE (1 VIEW)   Final Result         1.  Pulmonary edema and/or infiltrates are identified, which appear somewhat increased since the prior exam.   2.  Cardiomegaly      CT-CTA NECK WITH & W/O-POST PROCESSING   Final Result         1.  CT angiogram of the neck within normal limits.   2.  Patchy bilateral upper lobe infiltrates         CT-CTA HEAD WITH & W/O-POST PROCESS   Final Result         1.  No large vessel occlusion or aneurysm identified   2.  Bilateral sinusitis changes      US-EXTREMITY VENOUS LOWER BILAT   Final Result      EC-ECHOCARDIOGRAM COMPLETE W/ CONT   Final Result      CT-ABDOMEN-PELVIS WITH   Final Result         1.  Fluid-filled prominence of proximal small bowel with distal small bowel decompression, appearance suggests ileus and/or enteritis versus evolving obstructive changes. Recommend radiographic follow-up to resolution as clinically appropriate.   2.  Irregular hepatic contour favoring changes of  cirrhosis   3.  Fat-containing bilateral inguinal hernias      CT-CTA CHEST PULMONARY ARTERY W/ RECONS   Final Result         1.  Distal right main pulmonary embolus extending into right upper lobe and lower lobe segmental and subsegmental branches. 1.4 right to left ventricular ratio suggesting component of right heart strain.   2.  Evaluation of the subsegmental branches is essentially nondiagnostic due to motion artifacts. Additional imaging would be required for definitive evaluation of small distal pulmonary emboli.   3.  Scattered patchy bilateral pulmonary infiltrates.   4.  Bilateral dependent consolidations, appearance suggests atelectasis, component of infiltrate not excluded.      These findings were discussed with the patient's clinician, Misbah Cardoza, on 4/3/2025 9:27 PM.      CT-HEAD W/O   Final Result      No acute intracranial findings.               DX-CHEST-PORTABLE (1 VIEW)   Final Result      Interval placement of a right IJ central line without evidence of complication.      DX-CHEST-PORTABLE (1 VIEW)   Final Result      1.  Support devices present.      2.  Cardiomegaly.      3.  Bibasilar atelectasis.           Assessment/Plan  * Cardiac arrest (HCC)- (present on admission)  Assessment & Plan  Cardiac arrest with CPR, defibrillation x 3 and 3 rounds epinephrine  Loaded with amiodarone  Now in sinus rhythm  Cardiology consulted, thought to be secondary to PE  Stunned decreased ejection fraction 38%, now 65% on repeat TTE  4/16 - I discussed TTE with Dr. Louie, he recommends outpatient cardiology follow up    Diarrhea  Assessment & Plan  Ongoing diarrhea since admission, unclear etiology   GI PCR negative   Consistent does not appear consistent with C.diff , is improving   Imodium prn     Hypokalemia  Assessment & Plan  Low K, on oral replacement   Monitor BMP     Acute respiratory failure with hypoxia (HCC)- (present on admission)  Assessment & Plan  Requiring intubation on admission was  extubated on 4/5/2025  Secondary to cardiac arrest  Now on room air    Encephalopathy acute- (present on admission)  Assessment & Plan  Acute metabolic encephalopathy in the setting of cardiac arrest  CT head negative  EEG negative for seizure activity  Off Precedex drip   Valproic acid has been initiated for mood stabilization and increase to 500 mg TID  MRI of the brain neg for any ischemia  Likely multifactorial, possible mild hypoxic injury, delirium and likely functional component as well. Discussed with neurology   Consulted  psychiatry to help agitation/restlessness, started on seroquel overnight., Repeat EEG to assess delirium  Continues to improve  Pending SNF    Cirrhosis of liver (HCC)  Assessment & Plan  Noted on imaging  Outpatient follow-up  Ammonia level normal     Aspiration into airway- (present on admission)  Assessment & Plan  Treated with Zosyn completed 7 days   Speech following, diet per speech    Pulmonary embolus (HCC)- (present on admission)  Assessment & Plan  Noted on CTA, distal segmental PE, not saddle   Status post IV heparin drip  He was transitioned to lovenox, now Eliquis  He has a history of multiple admissions for GI bleeds will need to be monitored closely. Had esophageal ulcer 9/2024. Order for omeprazole    Seizure-like activity (HCC)- (present on admission)  Assessment & Plan  Seizure type activity likely due to anoxia  EEG negative on early admit   Repeat EEG 4/11 showing non-specific encephalopathy, no seizure activity seen    MRI brain neg for any ischemia  Seizure and fall precautions    Ventricular fibrillation (HCC)- (present on admission)  Assessment & Plan  With defibrillation x 3  Now in sinus  Per cardiology, no ischemic evaluation necessary given clear LHC prior to MV repair last year  Repeat echo now recovered to 65%  I discussed with Dr Louie, f/u outpatient    Pain, chronic- (present on admission)  Assessment & Plan  Per wife, He had weaned himself off narcotics for  3 days prior to this event  Of note, His UDS on admission was negative as have previous other UDS despite PDMP showing regular prescribing   Has been on chronic oxycodone 10 mg 5 x daily (last fill 3/24)   Prn oxy     History of MVR with cardiopulmonary bypass  Assessment & Plan  By Dr. Santana Jan 2024  Echo reviewed Known mitral valve repair that is functioning normally with appropriate transvalvular gradient. Reduced EF and hypokinesis present likely from cardiac stunning          VTE prophylaxis:    therapeutic anticoagulation with eliquis 5 mg BID      I have performed a physical exam and reviewed and updated ROS and Plan today (4/16/2025). In review of yesterday's note (4/15/2025), there are no changes except as documented above.

## 2025-04-16 NOTE — DISCHARGE PLANNING
ATTN: Case Management  RE: Referral for Home Health    Reason for referral denial: Patient lives Out of our service area              Unfortunately, we are not able to accept this referral for the reason listed above. If further clarity is needed, our Transitional Care Specialists are available to discuss any barriers to service at [phone].      We look forward to collaborating with you in the future,  [facility] Team

## 2025-04-16 NOTE — DISCHARGE PLANNING
"Case Management Discharge Planning    Admission Date: 4/3/2025  GMLOS: 3.4  ALOS: 13    6-Clicks ADL Score: 12  6-Clicks Mobility Score: 18  PT and/or OT Eval ordered: Yes  PT/OT:Recommending post acute placement   Post-acute Referrals Ordered: Yes  Post-acute Choice Obtained: NA  Has referral(s) been sent to post-acute provider:  NA      Anticipated Discharge Dispo: Discharge Disposition: D/T to SNF with Medicare cert in anticipation of skilled care (03)    DME Needed: Pending hospital course    Action(s) Taken: LMSW was notified that [facility] was going to start insurance auth . LMSW then read note that [facility] is now declining pt and no longer following. Per [facility] they have denied due to admitting diagnosis.  They have spoken with our admin and they are declining.     Pt and spouse are only wanting [facility] or [facility]. DPA spoke up with [facility] and per justino they are not contracted with his insurance so they are declining. The only accepting facility is [facility].     1052 LMSW spoke with spouse, pt, and MD at bedside. Spouse is very upset on [facility] decision on no longer accepting pt. Spouse visibly upset and stated that \"I am appealing DC from this hospital and not sending [facility]\". \"I need to know why\" LMSW explained to her that it is due to his admitting diagnosis. Spouse stated \"no he needs rehab he has a brain injury\". LMSW informed her that she can not appeal DC and her second option would be going home with HH if we do not send to [facility]. Spouse continued to say \"No I am not doing that\". Spouse requested phone number of [facility] to get more information. LMSW provided spouse with number for [facility]. LMSW notified care team.    1238 LMSW received HH order. L[facility] attempting [facility]. If [facility] does not have openings plan for normal HH.    1245 [facility] has declined due to pt living in [place].      Escalations Completed: None    Medically Clear: Yes    Next Steps: F/U with placement "     Barriers to Discharge: Pending Placement

## 2025-04-16 NOTE — CARE PLAN
The patient is Stable - Low risk of patient condition declining or worsening    Shift Goals  Clinical Goals: improve mentation, increase mobility  Patient Goals: JJ  Family Goals: pain control, no benzos    Progress made toward(s) clinical / shift goals:    Problem: Knowledge Deficit - Standard  Goal: Patient and family/care givers will demonstrate understanding of plan of care, disease process/condition, diagnostic tests and medications  Outcome: Progressing     Problem: Skin Integrity  Goal: Skin integrity is maintained or improved  Outcome: Progressing     Problem: Fall Risk  Goal: Patient will remain free from falls  Outcome: Progressing     Problem: Pain - Standard  Goal: Alleviation of pain or a reduction in pain to the patient’s comfort goal  Outcome: Progressing     Problem: Safety  Goal: Will remain free from injury  Outcome: Progressing  Goal: Will remain free from falls  Outcome: Progressing     Problem: Pain Management  Goal: Pain level will decrease to patient's comfort goal  Outcome: Progressing     Problem: Mobility  Goal: Risk for activity intolerance will decrease  Outcome: Progressing       Patient is not progressing towards the following goals:

## 2025-04-16 NOTE — DISCHARGE PLANNING
-[phone]  DPA informed Danica at [facility] that pt is requesting [facility] and [facility] is submitting for insurance auth.     -[phone]  DPA informed by Pancho with [facility] that facility is not contracted with pt's insurance.     [phone]  DPA requested for Nancy at [facility] to re-submit for insurance auth.

## 2025-04-16 NOTE — CONSULTS
"PSYCHIATRIC FOLLOW-UP:(established)  *Reason for admission:     Cardiac arrest (HCC) [I46.9]  *Legal Hold Status on Admission:          n/a  Chart reviewed.         *HPI:          No acute events overnight.  Patient was able to sleep overnight with Seroquel.  This morning able to follow commands and oriented x 3 with deficits in recognizing year.  Patient does endorse that he is worried about his wife and tearful upon discussion of returning home.  Endorses concern about returning to work and wanting to get back to daily life.  Concerned about how he will return to work as soon as possible.      Medical ROS (as pertinent):     Review of Systems   Constitutional: Negative.    HENT: Negative.     Eyes: Negative.    Respiratory: Negative.     Cardiovascular: Negative.    Gastrointestinal: Negative.    Genitourinary: Negative.    Musculoskeletal: Negative.    Skin: Negative.    Neurological: Negative.    Endo/Heme/Allergies: Negative.            *Psychiatric Examination:  Vitals:    04/16/25 1516   BP: 116/74   Pulse: 95   Resp:    Temp:    SpO2:        General:   - Grooming and hygiene: Appropriate hygiene and grooming  - Apparent distress: NAD   - Behavior: Calm and appropriate  - Eye Contact: Within normal limits  - no psychomotor agitation or retardation  - Participation: Active verbal participation  Orientation: Grossly oriented to person, place.  Thinks is 2003  Mood: \"Okay\"  Affect: Congruent, does become tearful at times upon discussion of his primary concerns  Thought Process: Mostly linear.  Waxing and waning participation in conversation.  Will demonstrate times of lucidity and then demonstrate disorganized thought patterns   thought Content: Denies suicidal or homicidal ideations, intent or plan.  No evidence of paranoia  Perception: Denies auditory or visual hallucinations. No delusions noted   Attention span and concentration: Intact   Speech: Regular rate, volume and prosody  Language: Appropriate "   Insight: Good  Judgment: Good  Recent and remote memory: Grossly intact    Unable to perform spelling world backwards.  Abstraction somewhat impaired on recognizing similarities between train and bicycle.  Able to count backwards from 60 x 3 for approximately 4 times.  Then create small errors in calculation  Current Medications:  Current Facility-Administered Medications   Medication Dose Route Frequency Provider Last Rate Last Admin    apixaban (Eliquis) tablet 5 mg  5 mg Oral BID Cornelius Bassett M.D.        omeprazole (PriLOSEC) capsule 20 mg  20 mg Oral DAILY Cornelius Bassett M.D.   20 mg at 04/16/25 0627    oxyCODONE immediate-release (Roxicodone) tablet 5 mg  5 mg Oral Q4HRS PRN Berna Malik M.D.   5 mg at 04/16/25 1511    Or    oxyCODONE immediate release (Roxicodone) tablet 10 mg  10 mg Oral Q4HRS PRN Berna Malik M.D.   10 mg at 04/15/25 1034    Or    HYDROmorphone (Dilaudid) injection 1 mg  1 mg Intravenous Q4HRS PRN Berna Malik M.D.        loperamide (Imodium) capsule 2 mg  2 mg Oral BID PRN Berna Malik M.D.   2 mg at 04/14/25 1756    divalproex (Depakote) delayed-release tablet 500 mg  500 mg Oral Q8HRS Berna Malik M.D.   500 mg at 04/16/25 1516    gabapentin (Neurontin) capsule 200 mg  200 mg Oral Q EVENING Berna Malik M.D.   200 mg at 04/15/25 1646    nystatin (Mycostatin) powder   Topical BID Berna Malik M.D.   Given at 04/16/25 0628    QUEtiapine (SEROquel) tablet 50 mg  50 mg Oral Nightly Berna Malik M.D.   50 mg at 04/15/25 2106    QUEtiapine (SEROquel) tablet 25 mg  25 mg Oral HS PRN Berna Malik M.D.   25 mg at 04/16/25 0051    melatonin tablet 5 mg  5 mg Oral Nightly Deana Lepe, A.P.R.N.   5 mg at 04/15/25 2106    Pharmacy Consult Request ...Pain Management Review 1 Each  1 Each Other PHARMACY TO DOSE Tye Youssef M.D.        hydrALAZINE (Apresoline) injection 10 mg  10 mg Intravenous Q4HRS PRN Tye Youssef M.D.        losartan (Cozaar) tablet  50 mg  50 mg Oral BID Иван Del Real M.D.   50 mg at 04/15/25 1646    metoprolol tartrate (Lopressor) tablet 37.5 mg  37.5 mg Oral Q8HRS Иван Del Real M.D.   37.5 mg at 04/16/25 1516    senna-docusate (Pericolace Or Senokot S) 8.6-50 MG per tablet 2 Tablet  2 Tablet Oral Q EVENING Barbara Gutierrez M.D.   2 Tablet at 04/06/25 1712    And    polyethylene glycol/lytes (Miralax) Packet 1 Packet  1 Packet Oral QDAY PRN Barbara Gutierrez M.D.        acetaminophen (Tylenol) tablet 650 mg  650 mg Oral Q4HRS PRN Berto Ovalle M.D.   650 mg at 04/16/25 0752    labetalol (Normodyne/Trandate) injection 20 mg  20 mg Intravenous Q4HRS PRN Barbara Gutierrze M.D.   20 mg at 04/09/25 1413    acetaminophen (Tylenol) suppository 650 mg  650 mg Rectal Q4HRS PRN Jessi Lopez M.D.   650 mg at 04/05/25 2255    Respiratory Therapy Consult   Nebulization Continuous RT Misbah Cardoza M.D.            Allergies:  Morphine and Tramadol       Labs personally reviewed:   Recent Results (from the past 24 hours)   VALPROIC ACID    Collection Time: 04/16/25  8:08 AM   Result Value Ref Range    Valproic Acid 86.6 50.0 - 100.0 ug/mL   Renal Function Panel    Collection Time: 04/16/25  8:08 AM   Result Value Ref Range    Sodium 145 135 - 145 mmol/L    Potassium 3.9 3.6 - 5.5 mmol/L    Chloride 111 96 - 112 mmol/L    Co2 21 20 - 33 mmol/L    Glucose 90 65 - 99 mg/dL    Creatinine 0.95 0.50 - 1.40 mg/dL    Bun 11 8 - 22 mg/dL    Calcium 9.2 8.5 - 10.5 mg/dL    Correct Calcium 9.4 8.5 - 10.5 mg/dL    Phosphorus 3.3 2.5 - 4.5 mg/dL    Albumin 3.8 3.2 - 4.9 g/dL   ESTIMATED GFR    Collection Time: 04/16/25  8:08 AM   Result Value Ref Range    GFR (CKD-EPI) 101 >60 mL/min/1.73 m 2          *EKG:   Results for orders placed or performed during the hospital encounter of 04/03/25   EKG   Result Value Ref Range    Report       [facility] Emergency Dept.    Test Date:  2025-04-03  Pt Name:    PATI MERCEDES            Department: ER  MRN:        [id number]                      Room:        08  Gender:     Male                         Technician: [id number]  :        1980                   Requested By:JANIYA LOPEZ  Order #:    [id number]                    Reading MD:    Measurements  Intervals                                Axis  Rate:       134                          P:          52  KS:         172                          QRS:        18  QRSD:       88                           T:          69  QT:         300  QTc:        448    Interpretive Statements  Sinus tachycardia  Probable left atrial enlargement  Probable anteroseptal infarct, old  Compared to ECG 2024 16:09:32  Myocardial infarct finding now present  Sinus bradycardia no longer present  Possible ischemia no longer present     EKG   Result Value Ref Range    Report       [facility]    Test Date:  2025  Pt Name:    PATI MERCEDES           Department: 161  MRN:        [id number]                      Room:       T622  Gender:     Male                         Technician: VON  :        1980                   Requested By:PAT HERNANDEZ  Order #:    [id number]                    Reading MD: Yinka Mitchell MD    Measurements  Intervals                                Axis  Rate:       108                          P:          63  KS:         159                          QRS:        -25  QRSD:       97                           T:          34  QT:         330  QTc:        443    Interpretive Statements  Sinus tachycardia  Inferior infarct, old    Electronically Signed On 2025 23:11:46 PDT by Yinka Mitchell MD     EKG (IP)   Result Value Ref Range    Report       [facility]    Test Date:  2025  Pt Name:    PATI MERCEDES           Department: 161  MRN:        [id number]                      Room:       T622  Gender:     Male                         Technician: JHON  :        1980                    Requested By:LAURIE LITTLE  Order #:    [id number]                    Reading MD: Roberto Pantoja MD    Measurements  Intervals                                Axis  Rate:       81                           P:          49  DE:         168                          QRS:        -25  QRSD:       75                           T:          0  QT:         454  QTc:        527    Interpretive Statements  Sinus rhythm  Inferior infarct, old  Anterior infarct, age indeterminate  Prolonged QT interval  Compared to ECG 2025 22:03:00  NO SIGNIFICANT CHANGES  Electronically Signed On 2025 14:59:10 PDT by Roberto Pantoja MD     EKG   Result Value Ref Range    Report       [facility]    Test Date:  2025  Pt Name:    ARMENAGATHA MAGO           Department: [facility]  MRN:        [id number]                      Room:       T622  Gender:     Male                         Technician: JHON  :        1980                   Requested By:LAURIE LITTLE  Order #:    [id number]                    Reading MD: Roberto Pantoja MD    Measurements  Intervals                                Axis  Rate:       100                          P:          64  DE:         164                          QRS:        -24  QRSD:       79                           T:          0  QT:         428  QTc:        553    Interpretive Statements  Sinus tachycardia  Inferior infarct, old  Prolonged QT interval  Compared to ECG 2025 19:17:19  NO SIGNIFICANT CHANGES  Electronically Signed On 2025 15:00:12 PDT by Roberto Pantoja MD     EKG   Result Value Ref Range    Report       [facility]    Test Date:  2025  Pt Name:    ROBERTO MERCEDES           Department: [facility]  MRN:        [id number]                      Room:       FLR556  Gender:     Male                         Technician: FRANK  :        1980                   Requested By:JOSE BROWN  Order #:    [id number]                    Reading MD:  Osmar Maria MD    Measurements  Intervals                                Axis  Rate:       70                           P:          54  MA:         156                          QRS:        -43  QRSD:       97                           T:          12  QT:         415  QTc:        448    Interpretive Statements  Sinus rhythm  Consider left atrial enlargement  Inferior infarct, old  Anterior infarct, old  Compared to ECG 2025 22:45:59  Sinus tachycardia no longer present  Prolonged QT interval no longer present  Myocardial infarct finding still present  Electronically Signed On 2025 15:48:44 PDT by Osmar Maria MD     EKG   Result Value Ref Range    Report       [facility]    Test Date:  2025  Pt Name:    PATI MERCEDES           Department: 183  MRN:        [id number]                      Room:       [facility]  Gender:     Male                         Technician: JHON  :        1980                   Requested By:KAMILAH COTTON  Order #:    [id number]                    Reading MD: Yisel Hooks    Measurements  Intervals                                Axis  Rate:       98                           P:          0  MA:         185                          QRS:        2  QRSD:       85                           T:          45  QT:         326  QTc:        417    Interpretive Statements  Sinus rhythm  Nonspecific T abnrm, anterolateral leads  Borderline inferior ST elevation    Electronically Signed On 2025 22:03:12 PDT by Yisel Hooks       Recent brain MRI within normal limits.  No evidence of hypoxic brain injury         Assessment:  Patient's cognition is markedly improved.  Patient is able to participate in meaningful interview.  There is a significant waxing and waning of his attention span and ability to participate in concentration.  While the waxing and waning component of his symptoms of delirium due appear to be rapid, not outside the realm of expected for residual  encephalopathy.  Cannot rule out functional neurological disorder at this time and there does appear to be an emotional component to patient's presentation particularly due to tearfulness and concerns about returning home.  However patient continues to have difficulty participating in extensive and meaningful interview to elicit potential etiologies to a functional neurological disorder.  Ideally, repeat EEG should provide additional information if patient's current symptoms are residual encephalopathy.    However, overall functional status is improving.  Patient would benefit from continued neuropsychiatric follow-up and cognitive rehab on outpatient basis in order to return to baseline functioning     Emotional lability and non-participation may reflect comorbid PTSD and anxiety, with functional overlay not excluded. No compelling signs of a primary functional neurological disorder at this stage, and ongoing observation and neurorehabilitation are recommended.    Dx:  Encephalopathy,Improving  R.o FND    Medical:  Principal Problem:    Cardiac arrest (HCC) (POA: Yes)  Active Problems:    Chronic pain syndrome (POA: Yes)    History of MVR with cardiopulmonary bypass (POA: Unknown)    Pain, chronic (POA: Yes)    Ventricular fibrillation (HCC) (POA: Yes)    Seizure-like activity (HCC) (POA: Yes)    Pulmonary embolus (HCC) (POA: Yes)    Aspiration into airway (POA: Yes)    Cirrhosis of liver (HCC) (POA: Unknown)    Encephalopathy acute (POA: Yes)    Acute respiratory failure with hypoxia (HCC) (POA: Yes)    Hypokalemia (POA: Unknown)    Diarrhea (POA: Unknown)  Resolved Problems:    Aspiration pneumonia of both lungs (HCC) (POA: Unknown)          Plan:  Legal hold: Not applicable  Psychotropic medications  No changes  Brief supportive psychotherapy provided (60 minutes.  Discussed plan of care with family processed emotions with family and patient regarding concerns returning home)  Labs ordered/reviewed:  EKG  ordered/reviewed  Old records ordered/reviewed/summarized  Collateral obtained  Recommend neuropsychiatric outpatient follow up after delirium resolved  Discussed the case with: Dr. Bassett  Psychiatry will follow up    Thank you for the consult.           I spent a total of 75 minutes on this case which included direct patient contact, chart review, interdisciplinary communication and documentation    This note was created using voice recognition software (Dragon). The accuracy of the dictation is limited by the abilities of the software. I have reviewed the note prior to signing. However, error related to voice recognition software and /or scribes may still exist and should be interpreted within the appropriate context.

## 2025-04-16 NOTE — DISCHARGE INSTRUCTIONS
Discharge Instructions per Cornelius Bassett M.D.    1.You had cardiac arrest and temporary stunting of your heart. You are to follow up with [facility] in 1-2 weeks.  2. Your cardiac arrest is thought to be secondary to pulmonary embolism. You will continue on the blood thinner Eliquis. You will receive a phone call from our Anticoagulation Clinic for monitoring. Because of your history of GI bleeding, please take Omeprazole as a precaution.  3. You were seen by neurology and psychiatry for possible anoxic brain injury vs functional neurologic disorder. You will have ongoing physical, occupation, and cognitive and speech therapy. I have placed referrals for you to follow up with neurology and neuropsychiatry, who can also reevaluate when you can return to work and driving.

## 2025-04-16 NOTE — THERAPY
Occupational Therapy  Daily Treatment     Patient Name: Roberto Braswell  Age:  44 y.o., Sex:  male  Medical Record #: [id number]  Today's Date: 4/16/2025     Precautions  Precautions: Fall Risk, Swallow Precautions  Comments: encephalopathic    Assessment    Pt continues to be limited by impaired cognition, impaired activity tolerance, impaired balance, impaired RUE function during ADLs. He participated in functional mobility to the bathroom and seated/standing ADLs. CAM-ICU positive for delirium. See below for more info.    Plan    Treatment Plan Status: Continue Current Treatment Plan  Type of Treatment: Self Care / Activities of Daily Living, Adaptive Equipment, Neuro Re-Education / Balance, Therapeutic Exercises, Therapeutic Activity, Family / Caregiver Training, Sensory Integration Techniques, Manual Therapy Techniques, Cognitive Skill Development  Treatment Frequency: 5 Times per Week  Treatment Duration: Until Therapy Goals Met    DC Equipment Recommendations: Unable to determine at this time  Discharge Recommendations: Recommend post-acute placement for additional occupational therapy services prior to discharge home     Objective       04/16/25 1425   Vitals   Vitals Comments vss   Pain 0 - 10 Group   Therapist Pain Assessment Nurse Notified;0   Cognition    Cognition / Consciousness X   Level of Consciousness Alert   Ability To Follow Commands 1 Step  (50-75%)   Safety Awareness Impaired;Impulsive   New Learning Impaired   Attention Impaired   Sequencing Impaired   Initiation Impaired   Comments cooperative with treatment, multiple instances where he did not understand therapist appropriately; for instance, therapist asked him to sit on the toilet to check his balance while doing so, and he threw paper towels at it, and said that paper towels don't go in the toilet. per wife, pt was eating reeses peanut butter cups yesterday and she was working on having him unwrap them, and he ate one whole without  unwrapping the foil and she had to cue him that it was happening   Active ROM Upper Body   Comments RUE grossly impaired   Strength Upper Body   Comments RUE grossly impaired   Other Treatments   Other Treatments Provided CAM-ICU positive for delirium   Balance   Sitting Balance (Static) Fair   Sitting Balance (Dynamic) Fair -   Standing Balance (Static) Fair -   Standing Balance (Dynamic) Poor +   Weight Shift Sitting Fair   Weight Shift Standing Fair   Skilled Intervention Verbal Cuing   Comments no AD   Bed Mobility    Supine to Sit Standby Assist   Scooting Standby Assist   Skilled Intervention Verbal Cuing   Comments extra time   Activities of Daily Living   Grooming Contact Guard Assist;Standing   Lower Body Dressing Minimal Assist   Skilled Intervention Verbal Cuing;Tactile Cuing;Sequencing   How much help from another person does the patient currently need...   Putting on and taking off regular lower body clothing? 3   Bathing (including washing, rinsing, and drying)? 3   Toileting, which includes using a toilet, bedpan, or urinal? 3   Putting on and taking off regular upper body clothing? 3   Taking care of personal grooming such as brushing teeth? 3   Eating meals? 3   6 Clicks Daily Activity Score 18   Functional Mobility   Sit to Stand Minimal Assist   Bed, Chair, Wheelchair Transfer Minimal Assist   Mobility EOB>BR>BTB   Skilled Intervention Verbal Cuing;Tactile Cuing   Comments no AD   Short Term Goals   Short Term Goal # 1 pt will follow 1 step directions w/o cues   Goal Outcome # 1 Progressing as expected   Short Term Goal # 2 pt will demo grooming seated EOB w/ setup   Goal Outcome # 2 Progressing as expected   Short Term Goal # 3 pt will demo ADL txfs w/ Jose Alfredo   Goal Outcome # 3 Progressing as expected   Short Term Goal # 4 pt will dress LB w/ Jose Alfredo   Goal Outcome # 4 Progressing as expected

## 2025-04-16 NOTE — PREADMISSION SCREENING NOTE
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    Neutrophils-Polys 62.00 44.00 - 72.00 %    Lymphocytes 20.70 (L) 22.00 - 41.00 %    Monocytes 12.80 0.00 - 13.40 %    Eosinophils 1.20 0.00 - 6.90 %    Basophils 0.90 0.00 - 1.80 %    Immature Granulocytes 2.40 (H) 0.00 - 0.90 %    Nucleated RBC 0.00 0.00 - 0.20 /100 WBC    Neutrophils (Absolute) 6.35 1.82 - 7.42 K/uL    Lymphs (Absolute) 2.12 1.00 - 4.80 K/uL    Monos (Absolute) 1.31 (H) 0.00 - 0.85 K/uL    Eos (Absolute) 0.12 0.00 - 0.51 K/uL    Baso (Absolute) 0.09 0.00 - 0.12 K/uL    Immature Granulocytes (abs) 0.25 (H) 0.00 - 0.11 K/uL    NRBC (Absolute) 0.00 K/uL  Comp Metabolic Panel    Collection Time: 04/10/25  4:34 AM  Result Value Ref Range    Sodium 139 135 - 145 mmol/L    Potassium 3.4 (L) 3.6 - 5.5 mmol/L    Chloride 107 96 - 112 mmol/L    Co2 21 20 - 33 mmol/L    Anion Gap 11.0 7.0 - 16.0    Glucose 108 (H) 65 - 99 mg/dL    Bun 15 8 - 22 mg/dL    Creatinine 0.80 0.50 - 1.40 mg/dL    Calcium 8.9 8.5 - 10.5 mg/dL    Correct Calcium 9.4 8.5 - 10.5 mg/dL    AST(SGOT) 24 12 - 45 U/L    ALT(SGPT) 38 2 - 50 U/L    Alkaline Phosphatase 74 30 - 99 U/L    Total Bilirubin 0.3 0.1 - 1.5 mg/dL    Albumin 3.4 3.2 - 4.9 g/dL    Total Protein 6.6 6.0 - 8.2 g/dL    Globulin 3.2 1.9 - 3.5 g/dL    A-G Ratio 1.1 g/dL  MAGNESIUM    Collection Time: 04/10/25  4:34 AM  Result Value Ref Range    Magnesium 2.2 1.5 - 2.5 mg/dL  PHOSPHORUS    Collection Time: 04/10/25  4:34 AM  Result Value Ref Range    Phosphorus 3.2 2.5 - 4.5 mg/dL  ESTIMATED GFR    Collection Time: 04/10/25  4:34 AM  Result Value Ref Range    GFR (CKD-EPI) 112 >60 mL/min/1.73 m 2  POCT glucose device results    Collection Time: 04/10/25  8:25 PM  Result Value Ref Range    POC Glucose, Blood 93 65 - 99 mg/dL  Comp Metabolic Panel    Collection Time: 04/11/25  7:33 AM  Result Value Ref Range    Sodium 142 135 - 145 mmol/L    Potassium 3.5 (L) 3.6 - 5.5 mmol/L    Chloride 110 96  - 112 mmol/L    Co2 21 20 - 33 mmol/L    Anion Gap 11.0 7.0 - 16.0    Glucose 96 65 - 99 mg/dL    Bun 18 8 - 22 mg/dL    Creatinine 0.88 0.50 - 1.40 mg/dL    Calcium 8.9 8.5 - 10.5 mg/dL    Correct Calcium 9.4 8.5 - 10.5 mg/dL    AST(SGOT) 21 12 - 45 U/L    ALT(SGPT) 30 2 - 50 U/L    Alkaline Phosphatase 65 30 - 99 U/L    Total Bilirubin 0.3 0.1 - 1.5 mg/dL    Albumin 3.4 3.2 - 4.9 g/dL    Total Protein 6.3 6.0 - 8.2 g/dL    Globulin 2.9 1.9 - 3.5 g/dL    A-G Ratio 1.2 g/dL  CBC WITHOUT DIFFERENTIAL    Collection Time: 25  7:33 AM  Result Value Ref Range    WBC 12.0 (H) 4.8 - 10.8 K/uL    RBC 4.75 4.70 - 6.10 M/uL    Hemoglobin 12.7 (L) 14.0 - 18.0 g/dL    Hematocrit 39.6 (L) 42.0 - 52.0 %    MCV 83.4 81.4 - 97.8 fL    MCH 26.7 (L) 27.0 - 33.0 pg    MCHC 32.1 (L) 32.3 - 36.5 g/dL    RDW 51.8 (H) 35.9 - 50.0 fL    Platelet Count 427 164 - 446 K/uL    MPV 9.1 9.0 - 12.9 fL  PROCALCITONIN    Collection Time: 25  7:33 AM  Result Value Ref Range    Procalcitonin 0.30 (H) <0.25 ng/mL  ESTIMATED GFR    Collection Time: 25  7:33 AM  Result Value Ref Range    GFR (CKD-EPI) 109 >60 mL/min/1.73 m 2                                    EKG:     Results for orders placed or performed during the hospital encounter of 25  EKG  Result Value Ref Range    Report          [facility] Emergency Dept.     Test Date:  2025  Pt Name:    PATI MERCEDES           Department: ER  MRN:        [id number]                      Room:       [facility]  Gender:     Male                         Technician: [id number]  :        1980                   Requested By:JANIYA Z LOPEZ  Order #:    [id number]                    Reading MD:     Measurements  Intervals                                Axis  Rate:       134                          P:          52  CA:         172                          QRS:        18  QRSD:       88                           T:          69  QT:         300  QTc:        448      Interpretive Statements  Sinus tachycardia  Probable left atrial enlargement  Probable anteroseptal infarct, old  Compared to ECG 2024 16:09:32  Myocardial infarct finding now present  Sinus bradycardia no longer present  Possible ischemia no longer present     EKG  Result Value Ref Range    Report          [facility]     Test Date:  2025  Pt Name:    ROBERTO MERCEDES           Department: 161  MRN:        [id number]                      Room:       T622  Gender:     Male                         Technician: VON  :        1980                   Requested By:PAT HERNANDEZ  Order #:    [id number]                    Reading MD: Yinka Mitchell MD     Measurements  Intervals                                Axis  Rate:       108                          P:          63  AZ:         159                          QRS:        -25  QRSD:       97                           T:          34  QT:         330  QTc:        443     Interpretive Statements  Sinus tachycardia  Inferior infarct, old     Electronically Signed On 2025 23:11:46 PDT by Yinka Mitchell MD     EKG (IP)  Result Value Ref Range    Report          [facility]     Test Date:  2025  Pt Name:    ROBERTO MERCEDES           Department: 161  MRN:        [id number]                      Room:       22  Gender:     Male                         Technician: JHON  :        1980                   Requested By:LAURIE LITTLE  Order #:    [id number]                    Reading MD: Roberto Pantoja MD     Measurements  Intervals                                Axis  Rate:       81                           P:          49  AZ:         168                          QRS:        -25  QRSD:       75                           T:          0  QT:         454  QTc:        527     Interpretive Statements  Sinus rhythm  Inferior infarct, old  Anterior infarct, age indeterminate  Prolonged QT interval  Compared to ECG  2025 22:03:00  NO SIGNIFICANT CHANGES  Electronically Signed On 2025 14:59:10 PDT by Roberto Pantoja MD     EKG  Result Value Ref Range    Report          [facility]     Test Date:  2025  Pt Name:    ROBERTO MERCEDES           Department: [facility]  MRN:        [id number]                      Room:       T622  Gender:     Male                         Technician: JHON  :        1980                   Requested By:LAURIE LITTLE  Order #:    [id number]                    Reading MD: Roberto Pantoja MD     Measurements  Intervals                                Axis  Rate:       100                          P:          64  RI:         164                          QRS:        -24  QRSD:       79                           T:          0  QT:         428  QTc:        553     Interpretive Statements  Sinus tachycardia  Inferior infarct, old  Prolonged QT interval  Compared to ECG 2025 19:17:19  NO SIGNIFICANT CHANGES  Electronically Signed On 2025 15:00:12 PDT by Roberto Pantoja MD     EKG  Result Value Ref Range    Report          [facility]     Test Date:  2025  Pt Name:    ROBERTO MERCEDES           Department: IM  MRN:        [id number]                      Room:       PEC331  Gender:     Male                         Technician: FRANK  :        1980                   Requested By:JOSE BROWN  Order #:    [id number]                    Reading MD: Osmar Maria MD     Measurements  Intervals                                Axis  Rate:       70                           P:          54  RI:         156                          QRS:        -43  QRSD:       97                           T:          12  QT:         415  QTc:        448     Interpretive Statements  Sinus rhythm  Consider left atrial enlargement  Inferior infarct, old  Anterior infarct, old  Compared to ECG 2025 22:45:59  Sinus tachycardia no longer present  Prolonged QT interval  no longer present  Myocardial infarct finding still present  Electronically Signed On 04- 15:48:44 PDT by Osmar Maria MD        *Note: Due to a large number of results and/or encounters for the requested time period, some results have not been displayed. A complete set of results can be found in Results Review.     EEG conducted today  INTERPRETATION:  Abnormal, technically-limited video EEG recording in the awake and drowsy state(s):  -Mild to moderate background slowing suggestive of diffuse/multifocal cerebral dysfunction and consistent with a nonspecific encephalopathy.   -No persistent focal asymmetries seen.  -Epileptiform discharges : No definitive epileptiform discharges were seen.  -No seizures.   -Clinical Events: None                 Assessment:  Patient is presenting with a complex presentation of altered mental status.  Unable to participate in meaningful psychiatric interview and majority of evaluation conducted through physical examination and collateral information.  There does appear to be a significant aspect of encephalopathy impairing psychiatric evaluation which is confirmed by EEG and supported by plausible anoxic brain injury postcardiac arrest.  Unable to elicit symptoms of a functional neurological disorder during evaluation due to patient's altered mental status.  However this does not exclude a diagnosis of FND.  May need to can continue evaluation for functional symptoms as patient's encephalopathic state improves.     At this time, patient does become agitated in the evenings which causes patient's significant distress.  This is likely exacerbated as patient is dependent on Ambien for sleep and has been unable to sleep during his hospitalization for adequate duration.  Advising against Ambien at this time as it may worsen his altered mental status.  Additionally, concern regarding benzodiazepines as these also may worsen patient's encephalopathy and increased sleep apneic  episodes.  Consequently, remaining sedation medications would include quetiapine.  Given risks and benefits of quetiapine versus risk of worsening encephalopathic state due to inadequate sleep, recommending initiating quetiapine with dosage titration to achieve symptomatic improvement     Dx:  encephalopathy   R/o Anoxic Brain Injury   R/o Functional Neurological Disorder, will continue to monitor as encephalopathy improves     Medical:     Cardiac arrest (HCC)  Acute respiratory failure with hypoxia (HCC)  Aspiration into airway  Chronic pain syndrome  Cirrhosis of liver (HCC)  Encephalopathy acute  History of MVR with cardiopulmonary bypass  Hypokalemia  Pain, chronic  Pulmonary embolus (HCC)  Seizure-like activity (HCC)  Ventricular fibrillation (HCC)           Plan:  ? Legal hold: None  ? Psychotropic medications  ? Continue Melatonin PO 5 mg for insomnia   ? Start Quetiapine 50 mg PO at bedtime, repeat 25mg in 2 hours if ineffective for agitation. Discussed risks and benefits with patient and wife  ? Labs:  ? Vitamin D  ? Vitamin B12  ? Depakote Level  ? EKG reviewed  ? Old records ordered/reviewed/summarized  ? Collateral obtained: Wife   ?  Discussed the case with: Dr. Malik  ?  Psychiatry will follow up.      Thank you for the consult.                   Attestation signed by Lai Gregory M.D. at 4/9/2025  2:29 PM     Referring Physician: Иван Del Real M.D.     I saw and examined the patient and discussed the management with the resident.  I reviewed the resident's note and agree with the documented findings and plan of care.     Additional attending comments: Neuro-behavioral signs and symptoms that have emerged post-cardiac arrest. Awake, alert and interactive with intact simple attention. Follows simple motor commands with encouragement. Paucity of spontaneous speech; however was able to provide short answers with mostly intelligible responses. Not agitated on my examination; thought at times  appeared mildly restless. Moves all extremities spontaneously and without clear lateralizing features. Face appears symmetric and hearing is intact to conversation. Eyes appear conjugate and was able to visually track the examiner.      Of note, patient had considerable variability in his responses during neurologic examination with significant improvement with encouragement and given extra time to respond.      Neuro-behavioral signs and symptoms that have emerged post-cardiac arrest. I suspect an underlying element of post-anoxic encephalopathy with some degree of hyperactive delirium seemingly improving (I.e. significantly less agitation today) perhaps with additional functional features noted on examination.      I do not recommend escalation of pharmacotherapy for neuro-behavioral symptoms at this time. Follow clinically. Would repeat a spot EEG and complete MRI brain without contrast when able to tolerate. PT/OT/SLP. Neurology will follow along.       I provided a total of 90 minutes of acute neurologic care for this patient encounter reviewing medical records, diagnostic studies, direct face-to-face time with the patient and/or family, documentation, communicating and coordinating plan of care.           Expand All Collapse All    Show:  WrittenTemplatedCopied    Added by:  Yen Baptiste M.D.    [facility] for details  Neurology Initial Consult H&P  Neurohospitalist Service, [facility]     Referring Physician: Иван Del Real M.D.       Chief Complaint  Patient presents with   Cardiac Arrest      BIB [facility] from [place] for witnessed arrest. Pt was eating lunch and collapsed in front of his wife, CPR started by wife. On EMS arrival pt was in vfib. EMS administered 3 shocks, 3 rounds of EPI, 450mg amiodarone, 5mg Versed, 4mg Narcan and 40mg etomidate through IO. +ROSC.  In route, [facility] administered an additional 5mg Versed. FSBG 159. Pt  arrives tachycardic in the 120s, 130/85, 99% IGEL in place.        HPI: Roberto Braswell is a 44 y.o. male admitted for cardiac arrest 04/03/2025, for whom neurology has been consulted for persistent delirium and agitation. Patient is seen with wife at bedside, history primarily obtained from patient's wife as patient is altered, speaking nonsensical words. He is alert and oriented to person and place only. Per patient's wife, he has been persistently confused since extubation 04/05/2025, speaking nonsensical words, intermittently following commands, unclear if he is able to understand conversation. It is unclear if patient has any other neurological symptoms as he is unable to participate in history. Patient's wife reports she witnessed possible seizure-like activity prior to initiation of CPR. Denies history of prior seizures, alcohol or drug use.      Review of systems: In addition to what is detailed in the HPI above, all other systems reviewed and are negative.     Past Medical History:    has a past medical history of Arthritis, Breath shortness (11/03/2023), Drug-seeking behavior (07/30/2023), Duodenal ulcer (07/30/2023), Gastric ulcer, Heart valve disease, Hemorrhagic disorder (HCC), Hypertension, Pain, and Seizure ([facility]).     FHx:  family history includes Heart Disease in his mother; No Known Problems in his father.     SHx:   reports that he has never smoked. He has never used smokeless tobacco. He reports that he does not currently use alcohol. He reports that he does not use drugs.     Allergies:    Allergies  Allergies  Allergen Reactions   Morphine Vomiting   Tramadol Unspecified      Seizure  QRO=[id number]          Medications:      Current Medications     Current Facility-Administered Medications:     Pharmacy Consult Request ...Pain Management Review 1 Each, 1 Each, Other, PHARMACY TO DOSE, Tye Youssef M.D.    oxyCODONE immediate-release (Roxicodone) tablet 5 mg, 5 mg, Oral, Q3HRS PRN **OR** oxyCODONE  immediate release (Roxicodone) tablet 10 mg, 10 mg, Oral, Q3HRS PRN, 10 mg at 04/09/25 0849 **OR** HYDROmorphone (Dilaudid) injection 0.5 mg, 0.5 mg, Intravenous, Q3HRS PRN, Tye Youssef M.D.    hydrALAZINE (Apresoline) injection 10 mg, 10 mg, Intravenous, Q4HRS PRN, Tye Youssef M.D.    Nozin nasal  swab, 1 Applicator, Each Nostril, BID, Иван Del Real M.D., 1 Applicator at 04/09/25 0506    losartan (Cozaar) tablet 50 mg, 50 mg, Oral, BID, Иван Del Real M.D.    metoprolol tartrate (Lopressor) tablet 37.5 mg, 37.5 mg, Oral, Q8HRS, Иван Del Real M.D.    furosemide (Lasix) injection 20 mg, 20 mg, Intravenous, Once, Иван Del Real M.D.    valproic acid (Depakene) IR capsule 250 mg, 250 mg, Oral, Q8HRS, Ray Thibodeaux M.D., 250 mg at 04/09/25 0506    enoxaparin (Lovenox) inj 120 mg, 1 mg/kg, Subcutaneous, Q12HRS, Ray Thibodeaux M.D., 120 mg at 04/09/25 0508    ceFAZolin (Ancef) 2 g in  mL IVPB, 2 g, Intravenous, Q8HRS, Ray Thibodeaux M.D., Stopped at 04/09/25 0550    senna-docusate (Pericolace Or Senokot S) 8.6-50 MG per tablet 2 Tablet, 2 Tablet, Oral, Q EVENING, 2 Tablet at 04/06/25 1712 **AND** polyethylene glycol/lytes (Miralax) Packet 1 Packet, 1 Packet, Oral, QDAY PRN, Barbara Gutierrez M.D.    acetaminophen (Tylenol) tablet 650 mg, 650 mg, Oral, Q4HRS PRN, Berto Ovalle M.D.    labetalol (Normodyne/Trandate) injection 20 mg, 20 mg, Intravenous, Q4HRS PRN, Barbara Gutierrez M.D., 20 mg at 04/09/25 0625    acetaminophen (Tylenol) suppository 650 mg, 650 mg, Rectal, Q4HRS PRN, Jessi Lopez M.D., 650 mg at 04/05/25 2255    Respiratory Therapy Consult, , Nebulization, Continuous RT, Misbah Cardoza M.D.    MD Alert...ICU Electrolyte Replacement per Pharmacy, , Other, PHARMACY TO DOSE, Misbah Cardoza M.D.       Physical Examination:      General: Patient is awake and in no acute distress  Eye: Examination of optic disks not indicated at this time given acuity of  "consult  Neck: There is normal range of motion  CV: regular rate   Extremities:  clear, dry, intact, without peripheral edema     NEUROLOGICAL EXAM:      BP (!) 167/91   Pulse 71   Temp 37.1 °C (98.7 °F) (Temporal)   Resp (!) 39   Ht 1.905 m (6' 3\")   Wt 118 kg (260 lb 12.9 oz)   SpO2 96%   BMI 32.60 kg/m²         Mental status: Awake, alert and oriented to person and place only. Impaired attention.  Speech and language: Speech is clear but nonsensical. The patient is able to intermittently follow commands  Cranial nerve exam: Pupils are equal, round and reactive to light bilaterally. Face is symmetric. Exam is limited secondary to impaired attention.  Motor exam: Moving all 4 extremities spontaneously. No abnormal movements were seen on exam.  Exam is limited secondary to impaired attention.  Sensory exam: Reacts to tactile in all 4 extremities.  Deep tendon reflexes:  2+ throughout. Toes down-going bilaterally.  Coordination: Unable to assess due to impaired attention.  Gait: Deferred due to patient preference           Objective Data:     Labs:    Lab Results  Component Value Date/Time    PROTHROMBTM 14.8 (H) 04/03/2025 10:30 PM    INR 1.16 (H) 04/03/2025 10:30 PM       Lab Results  Component Value Date/Time    WBC 9.8 04/09/2025 01:00 AM    RBC 4.60 (L) 04/09/2025 01:00 AM    HEMOGLOBIN 12.3 (L) 04/09/2025 01:00 AM    HEMATOCRIT 37.8 (L) 04/09/2025 01:00 AM    MCV 82.2 04/09/2025 01:00 AM    MCH 26.7 (L) 04/09/2025 01:00 AM    MCHC 32.5 04/09/2025 01:00 AM    MPV 8.9 (L) 04/09/2025 01:00 AM    NEUTSPOLYS 64.60 04/09/2025 01:00 AM    LYMPHOCYTES 19.80 (L) 04/09/2025 01:00 AM    MONOCYTES 12.40 04/09/2025 01:00 AM    EOSINOPHILS 0.20 04/09/2025 01:00 AM    BASOPHILS 0.80 04/09/2025 01:00 AM    HYPOCHROMIA 1+ 06/20/2024 02:02 PM    ANISOCYTOSIS 1+ 04/03/2025 05:30 PM       Lab Results  Component Value Date/Time    SODIUM 139 04/09/2025 01:00 AM    POTASSIUM 3.3 (L) 04/09/2025 01:00 AM   "  CHLORIDE 107 04/09/2025 01:00 AM    CO2 19 (L) 04/09/2025 01:00 AM    GLUCOSE 95 04/09/2025 01:00 AM    BUN 13 04/09/2025 01:00 AM    CREATININE 0.74 04/09/2025 01:00 AM       Lab Results  Component Value Date/Time    CHOLSTRLTOT 117 04/04/2025 04:15 AM    LDL 42 04/04/2025 04:15 AM    HDL 50 04/04/2025 04:15 AM    TRIGLYCERIDE 282 (H) 04/05/2025 03:02 AM        Lab Results  Component Value Date/Time    ALKPHOSPHAT 84 04/09/2025 01:00 AM    ASTSGOT 32 04/09/2025 01:00 AM    ALTSGPT 49 04/09/2025 01:00 AM    TBILIRUBIN 0.3 04/09/2025 01:00 AM        Imaging/Testing:     I interpreted and/or reviewed the patient's neuroimaging       PY-CWCYCUB-0 VIEW  Final Result        1.  Nonspecific bowel gas pattern in the upper abdomen.     DX-CHEST-PORTABLE (1 VIEW)  Final Result        1.  Pulmonary edema and/or infiltrates are identified, which are stable since the prior exam.  2.  Cardiomegaly     DX-CHEST-PORTABLE (1 VIEW)  Final Result        1.  Pulmonary edema and/or infiltrates are identified, which appear somewhat increased since the prior exam.  2.  Cardiomegaly     CT-CTA NECK WITH & W/O-POST PROCESSING  Final Result        1.  CT angiogram of the neck within normal limits.  2.  Patchy bilateral upper lobe infiltrates        CT-CTA HEAD WITH & W/O-POST PROCESS  Final Result        1.  No large vessel occlusion or aneurysm identified  2.  Bilateral sinusitis changes     US-EXTREMITY VENOUS LOWER BILAT  Final Result     EC-ECHOCARDIOGRAM COMPLETE W/ CONT  Final Result     CT-ABDOMEN-PELVIS WITH  Final Result        1.  Fluid-filled prominence of proximal small bowel with distal small bowel decompression, appearance suggests ileus and/or enteritis versus evolving obstructive changes. Recommend radiographic follow-up to resolution as clinically appropriate.  2.  Irregular hepatic contour favoring changes of cirrhosis  3.  Fat-containing bilateral inguinal hernias     CT-CTA CHEST PULMONARY ARTERY W/ RECONS  Final  Result        1.  Distal right main pulmonary embolus extending into right upper lobe and lower lobe segmental and subsegmental branches. 1.4 right to left ventricular ratio suggesting component of right heart strain.  2.  Evaluation of the subsegmental branches is essentially nondiagnostic due to motion artifacts. Additional imaging would be required for definitive evaluation of small distal pulmonary emboli.  3.  Scattered patchy bilateral pulmonary infiltrates.  4.  Bilateral dependent consolidations, appearance suggests atelectasis, component of infiltrate not excluded.     These findings were discussed with the patient's clinician, Misbah Cardoza, on 4/3/2025 9:27 PM.     CT-HEAD W/O  Final Result     No acute intracranial findings.              DX-CHEST-PORTABLE (1 VIEW)  Final Result     Interval placement of a right IJ central line without evidence of complication.     DX-CHEST-PORTABLE (1 VIEW)  Final Result     1.  Support devices present.     2.  Cardiomegaly.     3.  Bibasilar atelectasis.           Impression and Recommendations: Roberto Braswell is a 44 y.o. male admitted for cardiac arrest whom neurology has been consulted for persistent delirium and agitation.     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 Presents with symptoms of persistent confusion and impaired attention, ongoing since 04/05/2025. Previously requiring Precedex GTT for agitation, however symptoms of confusion have persisted despite discontinuation 04/08/2025. Valproic acid was initiated on 04/06/2025 this hospital admission. Prior work-up this admission with unremarkable EEG and CTA head/neck. He also received a 3-day course of IV thiamine this admission. Of note, he was seen by neurology on prior hospitalization 10/2024 for an amnestic event, exhibiting odd behaviors.   -Recommend repeat EEG and MRI brain when patient is able to rule out any underlying diagnosis of seizure  -Will  defer to psychiatry for management of inpatient medications for behavioral issues while inpatient  -Neurology will continue to follow        Yne Baptiste MD  Internal medicine resident PGY-3                  Hospital Medicine Consultation     Date of Service  4/6/2025     Referring Physician  Barbara Gutierrez M.D.     Consulting Physician  Ray Thibodeaux M.D.     Reason for Consultation  encephalopathy     History of Presenting Illness  44 y.o. male who presented 4/3/2025 with cardiac arrest.  Mr. Braswell has a past medical history of mitral valve repair 1/24/24 by Dr. Santana, multiple admissions for upper GI bleeds and an admission here in October though normal MRI and he was discharged with no neurological deficits.    He was in his usual state of health and on 3/17 had a urologic procedure on his epididymis. He was in the process of weaning off of narcotics for the previous 3 days.  On 4/3 he was watching TV with his wife, Destin. He got up and became dizzy. He sat back down and developed convulsions. His wife called 911. He stopped breathing and turned blue. She is a nurse and started CPR for 5 minutes until paramedics came. He was in Vfib and underwent defibrillation x 3 with 3 rounds of epi, IV amio and transferred to the ER where he was intubated.  In the emergency room, CT of the chest revealed distal right main pulmonary embolism and CT of the abdomen pelvis revealed ileus.  He was admitted to the intensive care unit on mechanical ventilation.  Ejection fraction was found to be 38% by echocardiogram and cardiology was consulted.  He was initiated on IV heparin drip due to the pulmonary emboli.  He was febrile and initiated on empiric IV Zosyn with apparent aspiration pneumonitis.  CT of the head was negative for acute processes.  EEG was negative for seizures.  He was successfully extubated on 4/5/2025.  After extubation he has required a Precedex drip due to agitation and remains encephalopathic.         Review of Systems  Review of Systems   Unable to perform ROS: Mental acuity         Past Medical History   has a past medical history of Arthritis, Breath shortness (11/03/2023), Drug-seeking behavior (07/30/2023), Duodenal ulcer (07/30/2023), Gastric ulcer, Heart valve disease, Hemorrhagic disorder (HCC), Hypertension, Pain, and Seizure (HCC).     Surgical History   has a past surgical history that includes other orthopedic surgery; other abdominal surgery; ankle arthroscopy (Right, 5/21/2018); biopsy ortho (Right, 5/21/2018); pr upper gi endoscopy,diagnosis (3/28/2022); pr upper gi endoscopy,biopsy (3/28/2022); pr upper gi endoscopy,biopsy (N/A, 7/30/2023); pr upper gi endoscopy,diagnosis (N/A, 9/14/2023); pr upper gi endoscopy,biopsy (N/A, 9/14/2023); pr replacement of mitral valve (1/25/2024); echocardiogram, transesophageal, intraoperative (1/25/2024); pr upper gi endoscopy,diagnosis (N/A, 5/18/2024); pr upper gi endoscopy,biopsy (N/A, 5/18/2024); gastroscopy with endostat (N/A, 5/18/2024); pr upper gi endoscopy,diagnosis (N/A, 5/25/2024); pr upper gi endoscopy,biopsy (N/A, 5/25/2024); pr colonoscopy-flexible (5/27/2024); capsule endoscopy administration (N/A, 5/28/2024); capsule endoscopy retrieval (5/28/2024); and pr upper gi endoscopy,diagnosis (N/A, 9/18/2024).     Family History  family history includes Heart Disease in his mother; No Known Problems in his father.     Social History   reports that he has never smoked. He has never used smokeless tobacco. He reports that he does not currently use alcohol. He reports that he does not use drugs.     Medications  Prior to Admission Medications  Prescriptions Last Dose Informant Patient Reported? Taking?  meloxicam (MOBIC) 15 MG tablet Unknown Patient's Home Pharmacy Yes No  Sig: Take 15 mg by mouth every day.  methocarbamol (ROBAXIN) 500 MG Tab Unknown Patient's Home Pharmacy Yes No  Sig: Take 500 mg by mouth 3 times a day. 1,000 mg = 2 tabs  oxyCODONE  immediate release (ROXICODONE) 10 MG immediate release tablet Unknown Patient's Home Pharmacy Yes No  Sig: Take 10 mg by mouth 5 Times a Day.  tadalafil (CIALIS) 10 MG tablet Unknown Patient's Home Pharmacy No No  Sig: Take 1 Tablet by mouth 1 time a day as needed for Erectile Dysfunction.  zolpidem (AMBIEN) 10 MG Tab Unknown Patient's Home Pharmacy No No  Sig: Take 1 Tablet by mouth at bedtime as needed for Sleep for up to 90 days.    Facility-Administered Medications: None        Allergies  Allergies  Allergies  Allergen Reactions   Morphine Vomiting   Tramadol Unspecified      Seizure  [id number]          Physical Exam  Temp:  [36.4 °C (97.5 °F)-38.4 °C (101.1 °F)] 37.4 °C (99.3 °F)  Pulse:  [] 74  Resp:  [9-28] 18  BP: ()/(51-88) 133/67  SpO2:  [92 %-100 %] 93 %     Physical Exam  Vitals and nursing note reviewed.   Constitutional:       Appearance: He is ill-appearing.   Neck:      Comments: Right IJ  Cardiovascular:      Rate and Rhythm: Normal rate and regular rhythm.   Pulmonary:      Effort: Pulmonary effort is normal.      Breath sounds: Normal breath sounds.      Comments: Nasal cannula  Abdominal:      General: There is no distension.      Tenderness: There is no abdominal tenderness.   Musculoskeletal:      Right lower leg: No edema.      Left lower leg: No edema.   Neurological:      Mental Status: He is alert.      Comments: Non-historian             Fluids  Date 04/06/25 0700 - 04/07/25 0659  Shift 6608-7261 2427-2683 0805-8172 24 Hour Total  INTAKE  I.V. 25.6     25.6  Shift Total 25.6     25.6  OUTPUT  Shift Total          Weight (kg) 123.6 123.6 123.6 123.6        Laboratory  Recent Labs    04/04/25  0415 04/05/25  0302 04/06/25  0415  WBC 7.1 11.6* 12.1*  RBC 5.33 5.05 3.92*  HEMOGLOBIN 14.6 13.7* 10.6*  HEMATOCRIT 45.0 42.9 32.8*  MCV 84.4 85.0 83.7  MCH 27.4 27.1 27.0  MCHC 32.4 31.9* 32.3  RDW 53.7* 56.0* 54.5*  PLATELETCT 290 294 259  MPV 8.8* 9.1 9.0     Recent Labs     04/04/25  1407 04/05/25  0302 04/06/25  0415  SODIUM 138 135 139  POTASSIUM 4.8 4.6 3.8  CHLORIDE 112 107 108  CO2 18* 18* 23  GLUCOSE 107* 132* 126*  BUN 10 14 14  CREATININE 0.76 0.97 0.93  CALCIUM 7.3* 7.8* 7.5*     Recent Labs    04/03/25  1730 04/03/25  2230  APTT 21.0* 23.8*  INR 1.07 1.16*         Recent Labs    04/03/25  2230 04/04/25  0415 04/05/25  0302  TRIGLYCERIDE 164* 126 282*  HDL  --  50  --   LDL  --  42  --         Imaging  [id number] VIEW  Final Result        1.  Nonspecific bowel gas pattern in the upper abdomen.     DX-CHEST-PORTABLE (1 VIEW)  Final Result        1.  Pulmonary edema and/or infiltrates are identified, which are stable since the prior exam.  2.  Cardiomegaly     DX-CHEST-PORTABLE (1 VIEW)  Final Result        1.  Pulmonary edema and/or infiltrates are identified, which appear somewhat increased since the prior exam.  2.  Cardiomegaly     CT-CTA NECK WITH & W/O-POST PROCESSING  Final Result        1.  CT angiogram of the neck within normal limits.  2.  Patchy bilateral upper lobe infiltrates        CT-CTA HEAD WITH & W/O-POST PROCESS  Final Result        1.  No large vessel occlusion or aneurysm identified  2.  Bilateral sinusitis changes     US-EXTREMITY VENOUS LOWER BILAT  Final Result     EC-ECHOCARDIOGRAM COMPLETE W/ CONT  Final Result     CT-ABDOMEN-PELVIS WITH  Final Result        1.  Fluid-filled prominence of proximal small bowel with distal small bowel decompression, appearance suggests ileus and/or enteritis versus evolving obstructive changes. Recommend radiographic follow-up to resolution as clinically appropriate.  2.  Irregular hepatic contour favoring changes of cirrhosis  3.  Fat-containing bilateral inguinal hernias     CT-CTA CHEST PULMONARY ARTERY W/ RECONS  Final Result        1.  Distal right main pulmonary embolus extending into right upper lobe and lower lobe segmental and subsegmental branches. 1.4 right to left ventricular ratio suggesting component of  right heart strain.  2.  Evaluation of the subsegmental branches is essentially nondiagnostic due to motion artifacts. Additional imaging would be required for definitive evaluation of small distal pulmonary emboli.  3.  Scattered patchy bilateral pulmonary infiltrates.  4.  Bilateral dependent consolidations, appearance suggests atelectasis, component of infiltrate not excluded.     These findings were discussed with the patient's clinician, Misbah Cardoza, on 4/3/2025 9:27 PM.     CT-HEAD W/O  Final Result     No acute intracranial findings.              DX-CHEST-PORTABLE (1 VIEW)  Final Result     Interval placement of a right IJ central line without evidence of complication.     DX-CHEST-PORTABLE (1 VIEW)  Final Result     1.  Support devices present.     2.  Cardiomegaly.     3.  Bibasilar atelectasis.     IR-MIDLINE CATHETER INSERTION WO GUIDANCE > AGE 3    (Results Pending)        Assessment/Plan  * Cardiac arrest (HCC)- (present on admission)  Assessment & Plan  Cardiac arrest with CPR, defibrillation x 3 and 3 rounds epinephrine  Loaded with amiodarone  Now in sinus rhythm  Cardiology consulted  Decreased ejection fraction 38% likely transient given stunned myocardium  Continues telemetry monitoring  Optimize electrolytes specifically potassium and magnesium     Pulmonary embolus (HCC)- (present on admission)  Assessment & Plan  Noted on CTA  Status post IV heparin drip  Now on Eliquis loading dose  He has a history of multiple admissions for GI bleeds will need to be monitored closely     Acute respiratory failure with hypoxia (HCC)- (present on admission)  Assessment & Plan  Requiring intubation on admission was extubated on 4/5/2025  Secondary to cardiac arrest  He continues to require supplemental oxygen will have continuous pulse oximetry in the IMCU     Encephalopathy acute- (present on admission)  Assessment & Plan  Acute metabolic encephalopathy in the setting of cardiac arrest  CT head  negative  EEG negative for seizure activity  Remains on Precedex drip for safety which will be utilized during the nights and weaned off during the days  Valproic acid has been initiated for mood stabilization  He may require MRI of the brain though certainly not safe at this point in time but may help in neuro prognostication if his mentation does not improve.     Cirrhosis of liver (HCC)  Assessment & Plan  Noted on imaging  Outpatient follow-up     Aspiration into airway- (present on admission)  Assessment & Plan  Treated with Zosyn and if stable then consider de-escalation to Augmentin on 4/7/2025     Seizure-like activity (HCC)- (present on admission)  Assessment & Plan  Seizure type activity likely due to anoxia  EEG negative     Ventricular fibrillation (HCC)- (present on admission)  Assessment & Plan  With defibrillation x 3  Now in sinus  Continue telemetry monitoring     Pain, chronic- (present on admission)  Assessment & Plan  He had weaned himself off narcotics for 3 days prior to this event     History of MVR with cardiopulmonary bypass  Assessment & Plan  By Dr. Santana Jan 2024                        Reason for Consult:  Asked by Dr Misbah Cardoza M.D. to see this patient with VF arrest with.NEXRESP               Patient's PCP: Sal Schafer D.O.     CC:   Chief Complaint  Patient presents with   Cardiac Arrest      BIB [facility] from [place] for witnessed arrest. Pt was eating lunch and collapsed in front of his wife, CPR started by wife. On EMS arrival pt was in vfib. EMS administered 3 shocks, 3 rounds of EPI, 450mg amiodarone, 5mg Versed, 4mg Narcan and 40mg etomidate through IO. +ROSC.  In route, [facility] administered an additional 5mg Versed. FSBG 159. Pt arrives tachycardic in the 120s, 130/85, 99% IGEL in place.        HPI: This is a 44-year-old gentleman with a known history of mitral regurgitation status post repair in 2024 with a history of  duodenal ulcer bleeding who follows with us in clinic and unfortunately had collapse but immediate CPR by his wife.  He was witnessed arrest initial presenting rhythm was V-fib by ambulance he had 3 defibrillations and obtained ROSC he was found to have pulmonary embolism in the ER and has been admitted to the ICU and echocardiogram shows reduced RV function and enlargement of the RV     He is unable to contribute to history given his intubated and on sedation his wife is at the bedside who is a nurse     Medications / Drug list prior to admission:  Medications Ordered Prior to Encounter  No current facility-administered medications on file prior to encounter.       Current Outpatient Medications on File Prior to Encounter  Medication Sig Dispense Refill   meloxicam (MOBIC) 15 MG tablet Take 15 mg by mouth every day.       [START ON 4/16/2025] zolpidem (AMBIEN) 10 MG Tab Take 1 Tablet by mouth at bedtime as needed for Sleep for up to 90 days. 30 Tablet 2   tadalafil (CIALIS) 10 MG tablet Take 1 Tablet by mouth 1 time a day as needed for Erectile Dysfunction. 20 Tablet 3   methocarbamol (ROBAXIN) 500 MG Tab Take 500 mg by mouth 3 times a day. 1,000 mg = 2 tabs       oxyCODONE immediate release (ROXICODONE) 10 MG immediate release tablet Take 10 mg by mouth 5 Times a Day.              Current list of administered Medications:    Current Medications     Current Facility-Administered Medications:     fentaNYL (Sublimaze) injection 100 mcg, 100 mcg, Intravenous, Q HOUR PRN, Roverto [place], D.O.    fentanyl 50 mcg/mL infusion,  mcg/hr, Intravenous, Continuous, Roverto [place], D.O., Last Rate: 4 mL/hr at 04/04/25 0659, 200 mcg/hr at 04/04/25 0659    vecuronium (Norcuron) injection 8.45 mg, 0.1 mg/kg (Ideal), Intravenous, Once **FOLLOWED BY** vecuronium (Norcuron) 60 mg in dextrose 5% 500 mL Infusion, 1-2 mcg/kg/min (Ideal), Intravenous, Continuous, Roverto [place], D.O., Last Rate: 84.5 mL/hr at 04/04/25 0703, 2 mcg/kg/min  at 04/04/25 0703    [COMPLETED] amiodarone (Nexterone) 360 mg/200 mL infusion, 1 mg/min, Intravenous, Once, Stopped at 04/04/25 0059 **FOLLOWED BY** amiodarone (Nexterone) 360 mg/200 mL infusion, 0.5 mg/min, Intravenous, Continuous, Kumar Johnson M.D., Last Rate: 16.7 mL/hr at 04/04/25 0101, 0.5 mg/min at 04/04/25 0101    Respiratory Therapy Consult, , Nebulization, Continuous RT, Misbah Cardoza M.D. MD Alert...ICU Electrolyte Replacement per Pharmacy, , Other, PHARMACY TO DOSE, Misbah Cardoza M.D.    lidocaine (Xylocaine) 1 % injection 2 mL, 2 mL, Tracheal Tube, Q30 MIN PRN, Misbah Cardoza M.D.    insulin lispro (HumaLOG,AdmeLOG) subcutaneous injection, 2-9 Units, Subcutaneous, Q6HRS **AND** POC blood glucose manual result, , , Q6H **AND** NOTIFY MD and PharmD, , , Once **AND** Administer 20 grams of glucose (approximately 8 ounces of fruit juice) every 15 minutes PRN FSBG less than 70 mg/dL, , , PRN **AND** dextrose 50 % (D50W) injection 25 g, 25 g, Intravenous, Q15 MIN PRN, Misbah Cardoza M.D.    midazolam (Versed) injection, , , ,     magnesium sulfate 20 g/500mL infusion, 0.5-2 g/hr, Intravenous, Continuous, Misbah Cardoza M.D., Last Rate: 13 mL/hr at 04/04/25 0045, 0.5 g/hr at 04/04/25 0045    heparin infusion 25,000 units in 500 mL 0.45% NACL, 0-30 Units/kg/hr (Adjusted), Intravenous, Continuous, Misbah Cardoza M.D., Last Rate: 31.2 mL/hr at 04/04/25 0659, 16 Units/kg/hr at 04/04/25 0659    heparin injection 3,900 Units, 40 Units/kg (Adjusted), Intravenous, PRN, Misbah Cardoza M.D.    ampicillin/sulbactam (Unasyn) 3 g in  mL IVPB, 3 g, Intravenous, Q6HRS, Misbah Cardoza M.D., Stopped at 04/04/25 0543    HYDROmorphone (Dilaudid) injection 1-2 mg, 1-2 mg, Intravenous, Q2HRS PRN, Misbah Cardoza M.D., 2 mg at 04/04/25 0045    propofol (DIPRIVAN) injection, 0-80 mcg/kg/min (Ideal), Intravenous, Continuous, Last Rate: 40.6 mL/hr at 04/04/25 0522, 80 mcg/kg/min at  04/04/25 0522 **AND** Triglycerides Starting now and then Every 3 Days, , , Every 3 Days (0300), Misbah Cardoaz M.D.    midazolam (Versed) injection 2-4 mg, 2-4 mg, Intravenous, Q HOUR PRN, Misbah Cardoza M.D., 4 mg at 04/04/25 0032    thiamine (B-1) injection 200 mg, 200 mg, Intravenous, BID, Misbah Cardoza M.D., 200 mg at 04/04/25 0514    acetaminophen (Tylenol) tablet 1,000 mg, 1,000 mg, Enteral Tube, Q6HRS, 1,000 mg at 04/04/25 0515 **OR** acetaminophen (Tylenol) suppository 975 mg, 975 mg, Rectal, Q6HRS, Misbah Cardoza M.D.    busPIRone (Buspar) tablet 15 mg, 15 mg, Enteral Tube, BID, Misbah Cardoza M.D.    famotidine (Pepcid) tablet 20 mg, 20 mg, Enteral Tube, Q12HRS, 20 mg at 04/04/25 0515 **OR** famotidine (Pepcid) injection 20 mg, 20 mg, Intravenous, Q12HRS, Misbah Cardoza M.D.    senna-docusate (Pericolace Or Senokot S) 8.6-50 MG per tablet 2 Tablet, 2 Tablet, Enteral Tube, Q EVENING **AND** polyethylene glycol/lytes (Miralax) Packet 1 Packet, 1 Packet, Enteral Tube, QDAY PRN, Misbah Cardoza M.D.    oxyCODONE immediate release (Roxicodone) tablet 10 mg, 10 mg, Enteral Tube, Q6HRS, Misbah Cardoza M.D., 10 mg at 04/04/25 0514       Past Medical History  Past Medical History:  Diagnosis Date   Arthritis      Right ankle   Breath shortness 11/03/2023    With exertion.   Drug-seeking behavior 07/30/2023    Demanding narcotics for a duodenal ulcer July 2023. Refused antacids or sucralfate.    Duodenal ulcer 07/30/2023   Gastric ulcer     Heart valve disease      Mitral valve prolapse   Hemorrhagic disorder (HCC)      GI bleed   Hypertension     Pain      Resolved; Right ankle   Seizure (HCC)      while taking tramadol          Past Surgical History  Past Surgical History:  Procedure Laterality Date   ME UPPER GI ENDOSCOPY,DIAGNOSIS N/A 9/18/2024    Procedure: GASTROSCOPY;  Surgeon: Janae Oliver M.D.;  Location: SURGERY SAME DAY [facility];  Service: Gastroenterology   CAPSULE  ENDOSCOPY ADMINISTRATION N/A 5/28/2024    Procedure: ADMINISTRATION, CAPSULE, FOR CAPSULE ENDOSCOPY;  Surgeon: Janae Oliver M.D.;  Location: SURGERY SAME DAY [facility];  Service: Gastroenterology   CAPSULE ENDOSCOPY RETRIEVAL   5/28/2024    Procedure: RETRIEVAL, ENDOSCOPY CAPSULE;  Surgeon: Janae Oliver M.D.;  Location: SURGERY SAME DAY [facility];  Service: Gastroenterology   CA COLONOSCOPY-FLEXIBLE   5/27/2024    Procedure: COLONOSCOPY;  Surgeon: Janae Oliver M.D.;  Location: SURGERY [facility];  Service: Gastroenterology   CA UPPER GI ENDOSCOPY,DIAGNOSIS N/A 5/25/2024    Procedure: GASTROSCOPY;  Surgeon: Janae Oliver M.D.;  Location: [facility];  Service: Gastroenterology   CA UPPER GI ENDOSCOPY,BIOPSY N/A 5/25/2024    Procedure: GASTROSCOPY, WITH BIOPSY;  Surgeon: Janae Oliver M.D.;  Location: SURGERY [facility];  Service: Gastroenterology   CA UPPER GI ENDOSCOPY,DIAGNOSIS N/A 5/18/2024    Procedure: GASTROSCOPY;  Surgeon: Kaitlyn Ontiveros M.D.;  Location: SURGERY [facility];  Service: Gastroenterology   CA UPPER GI ENDOSCOPY,BIOPSY N/A 5/18/2024    Procedure: GASTROSCOPY, WITH BIOPSY;  Surgeon: Kaitlyn Ontiveros M.D.;  Location: [facility];  Service: Gastroenterology   GASTROSCOPY WITH ENDOSTAT N/A 5/18/2024    Procedure: EGD, WITH CAUTERIZATION;  Surgeon: Kaitlyn Ontiveros M.D.;  Location: [facility];  Service: Gastroenterology   CA REPLACEMENT OF MITRAL VALVE   1/25/2024    Procedure: MITRAL VALVE REPAIR, TRANSESOPHAGEAL ECHOCARDIOGRAM;  Surgeon: Bigg Santana M.D.;  Location: [facility];  Service: Cardiothoracic   ECHOCARDIOGRAM, TRANSESOPHAGEAL, INTRAOPERATIVE   1/25/2024    Procedure: ECHOCARDIOGRAM, TRANSESOPHAGEAL, INTRAOPERATIVE;  Surgeon: Bigg Santana M.D.;  Location: [facility];  Service: Cardiothoracic   CA UPPER GI ENDOSCOPY,DIAGNOSIS N/A 9/14/2023    Procedure: GASTROSCOPY;  Surgeon: Johnny Dalton M.D.;   Location: SURGERY SAME DAY [facility];  Service: Gastroenterology   ID UPPER GI ENDOSCOPY,BIOPSY N/A 9/14/2023    Procedure: GASTROSCOPY, WITH BIOPSY;  Surgeon: Johnny Dalton M.D.;  Location: SURGERY SAME DAY [facility];  Service: Gastroenterology   ID UPPER GI ENDOSCOPY,BIOPSY N/A 7/30/2023    Procedure: GASTROSCOPY, WITH BIOPSY;  Surgeon: Kumar Hope M.D.;  Location: SURGERY [facility];  Service: Gastroenterology   ID UPPER GI ENDOSCOPY,DIAGNOSIS   3/28/2022    Procedure: GASTROSCOPY;  Surgeon: Paco Wiggins M.D.;  Location: SURGERY SAME DAY [facility];  Service: Gastroenterology   ID UPPER GI ENDOSCOPY,BIOPSY   3/28/2022    Procedure: GASTROSCOPY, WITH BIOPSY;  Surgeon: Paco Wiggins M.D.;  Location: SURGERY SAME DAY [facility];  Service: Gastroenterology   ANKLE ARTHROSCOPY Right 5/21/2018    Procedure: ANKLE ARTHROSCOPY, LATERAL LIGAMENT RECONSTRUCTION;  Surgeon: Seth Cruz M.D.;  Location: SURGERY [facility];  Service: Orthopedics   BIOPSY ORTHO Right 5/21/2018    Procedure: BIOPSY ORTHO/ FOR CARTILAGE AND DENOVO PROCEDURE;  Surgeon: Seth Cruz M.D.;  Location: SURGERY [facility];  Service: Orthopedics   OTHER ABDOMINAL SURGERY        Cholecystectomy February 2017   OTHER ORTHOPEDIC SURGERY        2 previous ankle surgeries (2007, 2009)          Family History  Family History  Problem Relation Age of Onset   Heart Disease Mother     No Known Problems Father     Clotting Disorder Neg Hx         Patient family history was personally reviewed, no pertinent family history to current presentation     Social History  Social History       Tobacco Use   Smoking status: Never   Smokeless tobacco: Never  Vaping Use   Vaping status: Never Used  Substance Use Topics   Alcohol use: Not Currently   Drug use: Never          ALLERGIES:  Allergies  Allergies  Allergen Reactions   Morphine Vomiting   Tramadol Unspecified      Seizure  [id number]          Review of systems:  A presentation focused review of  symptoms was reviewed with patient. This is reviewed in H&P and PMH. ALL OTHERS reviewed and negative     Physical exam:  Patient Vitals for the past 24 hrs:    BP Temp Temp src Pulse Resp SpO2 Height Weight  04/04/25 0600 (!) 146/79 37.2 °C (99 °F) Bladder 97 16 100 % -- --  04/04/25 0500 112/72 -- -- 97 14 100 % -- --  04/04/25 0400 125/69 37.9 °C (100.2 °F) Bladder 94 16 100 % -- --  04/04/25 0324 -- -- -- 96 19 100 % -- --  04/04/25 0300 113/79 37.9 °C (100.2 °F) Bladder 97 (!) 36 100 % -- --  04/04/25 0248 -- -- -- 95 18 100 % -- --  04/04/25 0200 107/64 37.9 °C (100.2 °F) Bladder 94 20 100 % -- --  04/04/25 0126 -- -- -- 93 (!) 26 100 % -- --  04/04/25 0115 -- -- -- 100 18 99 % -- --  04/04/25 0100 102/64 37.9 °C (100.2 °F) Bladder 100 17 99 % -- --  04/04/25 0000 128/74 (!) 38.2 °C (100.8 °F) Bladder (!) 105 (!) 23 92 % -- --  04/03/25 2300 131/80 -- -- (!) 107 (!) 22 90 % -- --  04/03/25 2240 -- (!) 38.5 °C (101.3 °F) Bladder (!) 108 (!) 24 93 % -- --  04/03/25 2200 -- (!) 38.2 °C (100.8 °F) -- (!) 109 (!) 25 93 % -- --  04/03/25 2108 -- -- -- (!) 114 (!) 24 94 % -- --  04/03/25 2100 -- 37.2 °C (99 °F) Bladder (!) 116 (!) 33 92 % -- 117 kg (257 lb 0.9 oz)  04/03/25 2030 (!) 142/93 -- -- (!) 128 (!) 41 98 % -- --  04/03/25 2025 (!) 145/87 -- -- (!) 127 (!) 41 94 % -- --  04/03/25 2020 139/85 -- -- (!) 125 (!) 44 100 % -- --  04/03/25 2015 (!) 131/90 -- -- (!) 124 (!) 35 100 % -- --  04/03/25 2010 125/85 -- -- (!) 124 (!) 39 100 % -- --  04/03/25 2006 134/88 -- -- (!) 124 (!) 33 99 % -- --  04/03/25 2004 134/77 -- -- (!) 125 (!) 33 100 % -- --  04/03/25 2002 (!) 140/83 -- -- (!) 124 (!) 35 98 % -- --  04/03/25 2000 139/79 -- -- (!) 125 (!) 36 100 % -- --  04/03/25 1950 118/83 -- -- (!) 127 (!) 35 98 % -- --  04/03/25 1940 121/67 -- -- (!) 127 (!) 37 97 % -- --  04/03/25 1930 115/73 -- -- (!) 125 (!) 33 97 % -- --  04/03/25 1920 111/75 -- -- (!) 121 (!) 31 98 % -- --  04/03/25 1918 114/76 -- -- (!) 121 (!)  32 97 % -- --  04/03/25 1916 120/77 -- -- (!) 121 (!) 31 97 % -- --  04/03/25 1914 121/81 -- -- (!) 119 (!) 31 95 % -- --  04/03/25 1911 139/83 -- -- (!) 118 (!) 31 97 % -- --  04/03/25 1854 132/67 -- -- (!) 119 (!) 29 97 % -- --  04/03/25 1853 129/74 -- -- (!) 120 (!) 27 97 % -- --  04/03/25 1850 (!) 147/65 -- -- (!) 128 (!) 36 98 % -- --  04/03/25 1849 (!) 145/61 -- -- (!) 128 (!) 37 99 % -- --  04/03/25 1847 (!) 156/73 -- -- (!) 128 (!) 36 99 % -- --  04/03/25 1845 (!) 150/81 -- -- (!) 127 (!) 39 99 % -- --  04/03/25 1842 128/77 -- -- (!) 125 -- 97 % -- --  04/03/25 1836 125/79 -- -- (!) 125 (!) 35 96 % -- --  04/03/25 1834 123/76 -- -- (!) 124 (!) 32 96 % -- --  04/03/25 1832 114/80 -- -- (!) 124 (!) 34 97 % -- --  04/03/25 1830 112/84 -- -- (!) 124 (!) 32 100 % -- --  04/03/25 1828 121/81 -- -- (!) 124 (!) 33 96 % -- --  04/03/25 1824 100/83 -- -- (!) 124 -- 97 % -- --  04/03/25 1823 (!) 132/99 -- -- (!) 124 -- 95 % -- --  04/03/25 1820 (!) 141/58 -- -- (!) 124 (!) 33 96 % -- --  04/03/25 1818 (!) 163/80 -- -- (!) 124 (!) 33 96 % -- --  04/03/25 1817 127/88 -- -- (!) 124 (!) 34 97 % -- --  04/03/25 1814 126/85 -- -- (!) 124 (!) 32 96 % -- --  04/03/25 1810 127/88 -- -- (!) 124 (!) 33 96 % -- --  04/03/25 1808 123/81 -- -- (!) 124 (!) 31 96 % -- --  04/03/25 1806 125/78 -- -- (!) 123 (!) 31 96 % -- --  04/03/25 1804 128/77 -- -- (!) 124 (!) 30 96 % -- --  04/03/25 1758 (!) 159/103 -- -- (!) 133 (!) 39 98 % -- --  04/03/25 1756 (!) 154/96 -- -- (!) 131 (!) 34 98 % -- --  04/03/25 1754 (!) 173/103 -- -- (!) 134 (!) 34 98 % -- --  04/03/25 1753 -- -- -- (!) 133 (!) 33 98 % -- --  04/03/25 1752 (!) 166/107 -- -- (!) 133 (!) 37 98 % -- --  04/03/25 1744 (!) 133/96 -- -- (!) 125 (!) 40 97 % -- --  04/03/25 1741 (!) 161/87 -- -- (!) 120 -- 93 % -- --  04/03/25 1721 (!) 130/90 -- -- (!) 120 -- 96 % -- --  04/03/25 1716 (!) 134/91 -- -- (!) 123 (!) 38 98 % -- --  04/03/25 1711 (!) 153/84 -- -- (!) 122 (!) 23 15  "% -- --  25 1706 (!) 135/97 -- -- (!) 124 (!) 35 99 % -- --  25 1701 (!) 164/103 -- -- (!) 126 (!) 30 100 % -- --  25 1656 (!) 194/120 -- -- (!) 129 (!) 23 100 % -- --  25 1651 (!) 186/123 -- -- (!) 129 (!) 24 100 % -- --  25 1650 (!) 195/121 -- -- (!) 129 (!) 23 99 % -- --  25 1648 (!) 193/118 -- -- (!) 133 (!) 31 100 % -- --  25 1646 (!) 189/126 -- -- (!) 134 20 99 % -- --  25 1644 (!) 181/123 -- -- (!) 135 -- 98 % -- --  25 1631 -- 36.2 °C (97.2 °F) Temporal (!) 132 (!) 21 95 % -- --  25 1630 -- -- -- (!) 127 20 96 % -- --  25 1629 -- -- -- (!) 127 20 96 % -- --  25 1624 114/66 -- -- -- -- -- -- --  25 1612 (!) 124/91 -- -- (!) 117 (!) 41 99 % -- --  25 1600 -- -- -- -- -- -- 1.905 m (6' 3\") --     General: Comatose with EEG monitoring on sedation  EYES: no jaundice  HEENT: ET tube  Neck: Central access  CVS:  [  RRR.   Resp: Vent sounds  Abdomen: ND,  Skin: Grossly nothing acute no obvious rashes  Neurological: Comatose  Extremities:   [  no edema. No cyanosis.         Data:  Laboratory studies personally reviewed by me:  Recent Results  Recent Results (from the past 24 hours)  EKG    Collection Time: 25  4:45 PM  Result Value Ref Range    Report          [facility] Emergency Dept.     Test Date:  2025  Pt Name:    PATI MERCEDES           Department: ER  MRN:        [id number]                      Room:       [facility]  Gender:     Male                         Technician: [id number]  :        1980                   Requested By:JANIYA LOPEZ  Order #:    [id number]                    Reading MD:     Measurements  Intervals                                Axis  Rate:       134                          P:          52  ME:         172                          QRS:        18  QRSD:       88                           T:          69  QT:         300  QTc:        448     Interpretive Statements  Sinus " tachycardia  Probable left atrial enlargement  Probable anteroseptal infarct, old  Compared to ECG 11/03/2024 16:09:32  Myocardial infarct finding now present  Sinus bradycardia no longer present  Possible ischemia no longer present     URINALYSIS    Collection Time: 04/03/25  4:46 PM    Specimen: Urine  Result Value Ref Range    Color Yellow      Character Cloudy (A)      Specific Gravity 1.029 <1.035    Ph 5.5 5.0 - 8.0    Glucose 100 (A) Negative mg/dL    Ketones Negative Negative mg/dL    Protein 300 (A) Negative mg/dL    Bilirubin Negative Negative    Urobilinogen, Urine 1.0 <=1.0 EU/dL    Nitrite Negative Negative    Leukocyte Esterase Negative Negative    Occult Blood Trace (A) Negative    Micro Urine Req Microscopic    URINE DRUG SCREEN    Collection Time: 04/03/25  4:46 PM  Result Value Ref Range    Amphetamines Urine Negative Negative    Barbiturates Negative Negative    Benzodiazepines Positive (A) Negative    Cocaine Metabolite Negative Negative    Fentanyl, Urine Negative Negative    Methadone Negative Negative    Opiates Negative Negative    Oxycodone Negative Negative    Phencyclidine -Pcp Negative Negative    Propoxyphene Negative Negative    Cannabinoid Metab Negative Negative  URINE MICROSCOPIC (W/UA)    Collection Time: 04/03/25  4:46 PM  Result Value Ref Range    WBC 6-10 (A) /hpf    RBC 11-20 (A) 0 - 2 /hpf    Bacteria Many (A) None /hpf    Epithelial Cells 6-10 (A) 0 - 5 /hpf    Amorphous Crystal Present (A) Absent /hpf    Urine Casts 3-5 (A) 0 - 2 /lpf    Hyaline Cast Present /lpf    Granular Casts Present (A) Absent /lpf  POCT arterial blood gas device results    Collection Time: 04/03/25  4:57 PM  Result Value Ref Range    Ph 7.153 (LL) 7.350 - 7.450    Pco2 52.7 (HH) 32.0 - 48.0 mmHg    Po2 47 (LL) 83 - 108 mmHg    Tco2 20 (L) 32 - 48 mmol/L    S02 70 (L) 93 - 99 %    Hco3 18.5 (L) 21.0 - 28.0 mmol/L    BE -11 (L) -4 - 3 mmol/L    Body Temp 36.2 C degrees    O2 Therapy 100 %    iPF  Ratio 47      Ph Temp Cedric 7.164 (LL) 7.350 - 7.450    Pco2 Temp Co 50.9 (H) 32.0 - 48.0 mmHg    Po2 Temp Cor 44 (LL) 83 - 108 mmHg    Specimen Arterial      Kanu Test Pass      DelSys Vent      Tidal Volume 490 mL    Peep End Expiratory Pressure 8 cmh20    Set Rate 20      Mode APV-CMV    POCT lactate device results    Collection Time: 04/03/25  4:57 PM  Result Value Ref Range    iStat Lactate 4.2 (HH) 0.5 - 2.0 mmol/L  MAGNESIUM    Collection Time: 04/03/25  5:30 PM  Result Value Ref Range    Magnesium 2.1 1.5 - 2.5 mg/dL  CBC WITH DIFFERENTIAL    Collection Time: 04/03/25  5:30 PM  Result Value Ref Range    WBC 11.6 (H) 4.8 - 10.8 K/uL    RBC 5.55 4.70 - 6.10 M/uL    Hemoglobin 14.9 14.0 - 18.0 g/dL    Hematocrit 46.2 42.0 - 52.0 %    MCV 83.2 81.4 - 97.8 fL    MCH 26.8 (L) 27.0 - 33.0 pg    MCHC 32.3 32.3 - 36.5 g/dL    RDW 52.6 (H) 35.9 - 50.0 fL    Platelet Count 415 164 - 446 K/uL    MPV 9.2 9.0 - 12.9 fL    Neutrophils-Polys 70.50 44.00 - 72.00 %    Lymphocytes 16.10 (L) 22.00 - 41.00 %    Monocytes 7.10 0.00 - 13.40 %    Eosinophils 0.00 0.00 - 6.90 %    Basophils 0.00 0.00 - 1.80 %    Nucleated RBC 0.00 0.00 - 0.20 /100 WBC    Neutrophils (Absolute) 8.60 (H) 1.82 - 7.42 K/uL    Lymphs (Absolute) 1.87 1.00 - 4.80 K/uL    Monos (Absolute) 0.82 0.00 - 0.85 K/uL    Eos (Absolute) 0.00 0.00 - 0.51 K/uL    Baso (Absolute) 0.00 0.00 - 0.12 K/uL    NRBC (Absolute) 0.00 K/uL    Anisocytosis 1+      Microcytosis 1+    Comp Metabolic Panel    Collection Time: 04/03/25  5:30 PM  Result Value Ref Range    Sodium 146 (H) 135 - 145 mmol/L    Potassium 4.1 3.6 - 5.5 mmol/L    Chloride 116 (H) 96 - 112 mmol/L    Co2 17 (L) 20 - 33 mmol/L    Anion Gap 13.0 7.0 - 16.0    Glucose 122 (H) 65 - 99 mg/dL    Bun 18 8 - 22 mg/dL    Creatinine 1.21 0.50 - 1.40 mg/dL    Calcium 8.2 (L) 8.5 - 10.5 mg/dL    Correct Calcium 8.3 (L) 8.5 - 10.5 mg/dL    AST(SGOT) 170 (H) 12 - 45 U/L    ALT(SGPT) 168 (H) 2 - 50 U/L    Alkaline  Phosphatase 72 30 - 99 U/L    Total Bilirubin 0.3 0.1 - 1.5 mg/dL    Albumin 3.9 3.2 - 4.9 g/dL    Total Protein 6.7 6.0 - 8.2 g/dL    Globulin 2.8 1.9 - 3.5 g/dL    A-G Ratio 1.4 g/dL  TROPONIN    Collection Time: 04/03/25  5:30 PM  Result Value Ref Range    Troponin T 80 (H) 6 - 19 ng/L  Prothrombin Time    Collection Time: 04/03/25  5:30 PM  Result Value Ref Range    PT 13.9 12.0 - 14.6 sec    INR 1.07 0.87 - 1.13  APTT    Collection Time: 04/03/25  5:30 PM  Result Value Ref Range    APTT 21.0 (L) 24.7 - 36.0 sec  DIAGNOSTIC ALCOHOL    Collection Time: 04/03/25  5:30 PM  Result Value Ref Range    Diagnostic Alcohol <10.1 <10.1 mg/dL  Triglyceride    Collection Time: 04/03/25  5:30 PM  Result Value Ref Range    Triglycerides 139 0 - 149 mg/dL  ESTIMATED GFR    Collection Time: 04/03/25  5:30 PM  Result Value Ref Range    GFR (CKD-EPI) 76 >60 mL/min/1.73 m 2  DIFFERENTIAL MANUAL    Collection Time: 04/03/25  5:30 PM  Result Value Ref Range    Bands-Stabs 3.60 0.00 - 10.00 %    Metamyelocytes 1.80 %    Myelocytes 0.90 %    Manual Diff Status PERFORMED    PERIPHERAL SMEAR REVIEW    Collection Time: 04/03/25  5:30 PM  Result Value Ref Range    Peripheral Smear Review see below    PLATELET ESTIMATE    Collection Time: 04/03/25  5:30 PM  Result Value Ref Range    Plt Estimation Normal    MORPHOLOGY    Collection Time: 04/03/25  5:30 PM  Result Value Ref Range    RBC Morphology Present      Poikilocytosis 1+      Ovalocytes 1+    proBrain Natriuretic Peptide, NT    Collection Time: 04/03/25  5:30 PM  Result Value Ref Range    NT-proBNP 78 0 - 125 pg/mL  HEMOGLOBIN A1C    Collection Time: 04/03/25  5:30 PM  Result Value Ref Range    Glycohemoglobin 6.0 (H) 4.0 - 5.6 %    Est Avg Glucose 126 mg/dL  LACTIC ACID    Collection Time: 04/03/25  8:01 PM  Result Value Ref Range    Lactic Acid 3.1 (H) 0.5 - 2.0 mmol/L  POCT arterial blood gas device results    Collection Time: 04/03/25  8:06 PM  Result Value Ref Range    Ph 7.310  (L) 7.350 - 7.450    Pco2 26.1 (L) 32.0 - 48.0 mmHg    Po2 116 (H) 83 - 108 mmHg    Tco2 14 (L) 32 - 48 mmol/L    S02 98 93 - 99 %    Hco3 13.1 (L) 21.0 - 28.0 mmol/L    BE -11 (L) -4 - 3 mmol/L    Body Temp 37.6 C degrees    O2 Therapy 70 %    iPF Ratio 166      Ph Temp Cedric 7.302 (L) 7.350 - 7.450    Pco2 Temp Co 26.8 (L) 32.0 - 48.0 mmHg    Po2 Temp Cor 120 (H) 83 - 108 mmHg    Specimen Arterial      Kanu Test Pass      DelSys Vent      Tidal Volume 480 mL    Peep End Expiratory Pressure 10 cmh20    Set Rate 24      Mode APV-CMV    POCT lactate device results    Collection Time: 04/03/25  8:06 PM  Result Value Ref Range    iStat Lactate 2.8 (H) 0.5 - 2.0 mmol/L  ACETAMINOPHEN    Collection Time: 04/03/25  9:50 PM  Result Value Ref Range    Acetaminophen -Tylenol <5.0 (L) 10.0 - 30.0 ug/mL  Comp Metabolic Panel    Collection Time: 04/03/25  9:50 PM  Result Value Ref Range    Sodium 142 135 - 145 mmol/L    Potassium 4.1 3.6 - 5.5 mmol/L    Chloride 115 (H) 96 - 112 mmol/L    Co2 17 (L) 20 - 33 mmol/L    Anion Gap 10.0 7.0 - 16.0    Glucose 110 (H) 65 - 99 mg/dL    Bun 17 8 - 22 mg/dL    Creatinine 0.94 0.50 - 1.40 mg/dL    Calcium 7.7 (L) 8.5 - 10.5 mg/dL    Correct Calcium 8.2 (L) 8.5 - 10.5 mg/dL    AST(SGOT) 128 (H) 12 - 45 U/L    ALT(SGPT) 132 (H) 2 - 50 U/L    Alkaline Phosphatase 55 30 - 99 U/L    Total Bilirubin 0.3 0.1 - 1.5 mg/dL    Albumin 3.4 3.2 - 4.9 g/dL    Total Protein 5.8 (L) 6.0 - 8.2 g/dL    Globulin 2.4 1.9 - 3.5 g/dL    A-G Ratio 1.4 g/dL  CBC WITH DIFFERENTIAL    Collection Time: 04/03/25  9:50 PM  Result Value Ref Range    WBC 4.8 4.8 - 10.8 K/uL    RBC 5.34 4.70 - 6.10 M/uL    Hemoglobin 14.6 14.0 - 18.0 g/dL    Hematocrit 44.6 42.0 - 52.0 %    MCV 83.5 81.4 - 97.8 fL    MCH 27.3 27.0 - 33.0 pg    MCHC 32.7 32.3 - 36.5 g/dL    RDW 52.9 (H) 35.9 - 50.0 fL    Platelet Count 319 164 - 446 K/uL    MPV 9.0 9.0 - 12.9 fL    Neutrophils-Polys 66.70 44.00 - 72.00 %    Lymphocytes 21.40 (L) 22.00 -  41.00 %    Monocytes 8.20 0.00 - 13.40 %    Eosinophils 2.50 0.00 - 6.90 %    Basophils 0.80 0.00 - 1.80 %    Immature Granulocytes 0.40 0.00 - 0.90 %    Nucleated RBC 0.00 0.00 - 0.20 /100 WBC    Neutrophils (Absolute) 3.17 1.82 - 7.42 K/uL    Lymphs (Absolute) 1.02 1.00 - 4.80 K/uL    Monos (Absolute) 0.39 0.00 - 0.85 K/uL    Eos (Absolute) 0.12 0.00 - 0.51 K/uL    Baso (Absolute) 0.04 0.00 - 0.12 K/uL    Immature Granulocytes (abs) 0.02 0.00 - 0.11 K/uL    NRBC (Absolute) 0.00 K/uL  LACTIC ACID    Collection Time: 04/03/25  9:50 PM  Result Value Ref Range    Lactic Acid 2.8 (H) 0.5 - 2.0 mmol/L  MAGNESIUM    Collection Time: 04/03/25  9:50 PM  Result Value Ref Range    Magnesium 1.6 1.5 - 2.5 mg/dL  TROPONIN    Collection Time: 04/03/25  9:50 PM  Result Value Ref Range    Troponin T 284 (H) 6 - 19 ng/L  ESTIMATED GFR    Collection Time: 04/03/25  9:50 PM  Result Value Ref Range    GFR (CKD-EPI) 103 >60 mL/min/1.73 m 2  POCT glucose device results    Collection Time: 04/03/25  9:55 PM  Result Value Ref Range    POC Glucose, Blood 98 65 - 99 mg/dL  aPTT    Collection Time: 04/03/25 10:30 PM  Result Value Ref Range    APTT 23.8 (L) 24.7 - 36.0 sec  Prothrombin Time    Collection Time: 04/03/25 10:30 PM  Result Value Ref Range    PT 14.8 (H) 12.0 - 14.6 sec    INR 1.16 (H) 0.87 - 1.13  Heparin Xa (Unfractionated)    Collection Time: 04/03/25 10:30 PM  Result Value Ref Range    Heparin Xa (UFH) <0.10 IU/mL  Triglycerides Starting now and then Every 3 Days    Collection Time: 04/03/25 10:30 PM  Result Value Ref Range    Triglycerides 164 (H) 0 - 149 mg/dL  CREATINE KINASE    Collection Time: 04/03/25 10:30 PM  Result Value Ref Range    CPK Total 401 (H) 0 - 154 U/L  BLOOD CULTURE    Collection Time: 04/03/25 11:10 PM    Specimen: Peripheral; Blood  Result Value Ref Range    Significant Indicator NEG      Source BLD      Site PERIPHERAL      Culture Result          No Growth  Note: Blood cultures are incubated for 5  days and  are monitored continuously.Positive blood cultures  are called to the RN and reported as soon as  they are identified.     EKG    Collection Time: 25 11:11 PM  Result Value Ref Range    Report          [facility]     Test Date:  2025  Pt Name:    PATI MERCEDES           Department: 161  MRN:        [id number]                      Room:       22  Gender:     Male                         Technician: VON  :        1980                   Requested By:PAT HERNANDEZ  Order #:    [id number]                    Reading MD: Yinka Mitchell MD     Measurements  Intervals                                Axis  Rate:       108                          P:          63  NV:         159                          QRS:        -25  QRSD:       97                           T:          34  QT:         330  QTc:        443     Interpretive Statements  Sinus tachycardia  Inferior infarct, old     Electronically Signed On 2025 23:11:46 PDT by Yinka Mitchell MD     BLOOD CULTURE    Collection Time: 25 11:30 PM    Specimen: Peripheral; Blood  Result Value Ref Range    Significant Indicator NEG      Source BLD      Site PERIPHERAL      Culture Result          No Growth  Note: Blood cultures are incubated for 5 days and  are monitored continuously.Positive blood cultures  are called to the RN and reported as soon as  they are identified.     POCT glucose device results    Collection Time: 25 11:35 PM  Result Value Ref Range    POC Glucose, Blood 81 65 - 99 mg/dL  POCT arterial blood gas device results    Collection Time: 25  3:50 AM  Result Value Ref Range    Ph 7.371 7.350 - 7.450    Pco2 25.3 (L) 32.0 - 48.0 mmHg    Po2 85 83 - 108 mmHg    Tco2 15 (L) 32 - 48 mmol/L    S02 96 93 - 99 %    Hco3 14.7 (L) 21.0 - 28.0 mmol/L    BE -9 (L) -4 - 3 mmol/L    Body Temp 37.0 C degrees    O2 Therapy 50 %    iPF Ratio 170      Ph Temp Cedric 7.371 7.350 - 7.450    Pco2 Temp  Co 25.3 (L) 32.0 - 48.0 mmHg    Po2 Temp Cor 85 83 - 108 mmHg    Specimen Arterial      DelSys Vent      Tidal Volume 510 mL    Peep End Expiratory Pressure 10 cmh20    Set Rate 20      Mode APV-CMV    POCT lactate device results    Collection Time: 04/04/25  3:50 AM  Result Value Ref Range    iStat Lactate 3.1 (H) 0.5 - 2.0 mmol/L  LACTIC ACID    Collection Time: 04/04/25  4:15 AM  Result Value Ref Range    Lactic Acid 3.1 (H) 0.5 - 2.0 mmol/L  CBC with Differential    Collection Time: 04/04/25  4:15 AM  Result Value Ref Range    WBC 7.1 4.8 - 10.8 K/uL    RBC 5.33 4.70 - 6.10 M/uL    Hemoglobin 14.6 14.0 - 18.0 g/dL    Hematocrit 45.0 42.0 - 52.0 %    MCV 84.4 81.4 - 97.8 fL    MCH 27.4 27.0 - 33.0 pg    MCHC 32.4 32.3 - 36.5 g/dL    RDW 53.7 (H) 35.9 - 50.0 fL    Platelet Count 290 164 - 446 K/uL    MPV 8.8 (L) 9.0 - 12.9 fL    Neutrophils-Polys 72.20 (H) 44.00 - 72.00 %    Lymphocytes 14.40 (L) 22.00 - 41.00 %    Monocytes 8.50 0.00 - 13.40 %    Eosinophils 3.80 0.00 - 6.90 %    Basophils 0.70 0.00 - 1.80 %    Immature Granulocytes 0.40 0.00 - 0.90 %    Nucleated RBC 0.00 0.00 - 0.20 /100 WBC    Neutrophils (Absolute) 5.09 1.82 - 7.42 K/uL    Lymphs (Absolute) 1.02 1.00 - 4.80 K/uL    Monos (Absolute) 0.60 0.00 - 0.85 K/uL    Eos (Absolute) 0.27 0.00 - 0.51 K/uL    Baso (Absolute) 0.05 0.00 - 0.12 K/uL    Immature Granulocytes (abs) 0.03 0.00 - 0.11 K/uL    NRBC (Absolute) 0.00 K/uL  Magnesium    Collection Time: 04/04/25  4:15 AM  Result Value Ref Range    Magnesium 2.4 1.5 - 2.5 mg/dL  Phosphorus    Collection Time: 04/04/25  4:15 AM  Result Value Ref Range    Phosphorus 3.1 2.5 - 4.5 mg/dL  Comp Metabolic Panel    Collection Time: 04/04/25  4:15 AM  Result Value Ref Range    Sodium 138 135 - 145 mmol/L    Potassium 4.6 3.6 - 5.5 mmol/L    Chloride 111 96 - 112 mmol/L    Co2 15 (L) 20 - 33 mmol/L    Anion Gap 12.0 7.0 - 16.0    Glucose 109 (H) 65 - 99 mg/dL    Bun 14 8 - 22 mg/dL    Creatinine 0.89 0.50 - 1.40  mg/dL    Calcium 7.7 (L) 8.5 - 10.5 mg/dL    Correct Calcium 8.3 (L) 8.5 - 10.5 mg/dL    AST(SGOT) 82 (H) 12 - 45 U/L    ALT(SGPT) 113 (H) 2 - 50 U/L    Alkaline Phosphatase 57 30 - 99 U/L    Total Bilirubin 0.3 0.1 - 1.5 mg/dL    Albumin 3.2 3.2 - 4.9 g/dL    Total Protein 5.7 (L) 6.0 - 8.2 g/dL    Globulin 2.5 1.9 - 3.5 g/dL    A-G Ratio 1.3 g/dL  Heparin Anti-Xa    Collection Time: 04/04/25  4:15 AM  Result Value Ref Range    Heparin Xa (UFH) 0.90 IU/mL  Lipid Profile    Collection Time: 04/04/25  4:15 AM  Result Value Ref Range    Cholesterol,Tot 117 100 - 199 mg/dL    Triglycerides 126 0 - 149 mg/dL    HDL 50 >=40 mg/dL    LDL 42 <100 mg/dL  ESTIMATED GFR    Collection Time: 04/04/25  4:15 AM  Result Value Ref Range    GFR (CKD-EPI) 108 >60 mL/min/1.73 m 2  POCT glucose device results    Collection Time: 04/04/25  4:23 AM  Result Value Ref Range    POC Glucose, Blood 102 (H) 65 - 99 mg/dL          Imaging:  CT-CTA NECK WITH & W/O-POST PROCESSING  Final Result        1.  CT angiogram of the neck within normal limits.  2.  Patchy bilateral upper lobe infiltrates        CT-CTA HEAD WITH & W/O-POST PROCESS  Final Result        1.  No large vessel occlusion or aneurysm identified  2.  Bilateral sinusitis changes     US-EXTREMITY VENOUS LOWER BILAT        EC-ECHOCARDIOGRAM COMPLETE W/ CONT        CT-ABDOMEN-PELVIS WITH  Final Result        1.  Fluid-filled prominence of proximal small bowel with distal small bowel decompression, appearance suggests ileus and/or enteritis versus evolving obstructive changes. Recommend radiographic follow-up to resolution as clinically appropriate.  2.  Irregular hepatic contour favoring changes of cirrhosis  3.  Fat-containing bilateral inguinal hernias     CT-CTA CHEST PULMONARY ARTERY W/ RECONS  Final Result        1.  Distal right main pulmonary embolus extending into right upper lobe and lower lobe segmental and subsegmental branches. 1.4 right to left ventricular ratio suggesting  component of right heart strain.  2.  Evaluation of the subsegmental branches is essentially nondiagnostic due to motion artifacts. Additional imaging would be required for definitive evaluation of small distal pulmonary emboli.  3.  Scattered patchy bilateral pulmonary infiltrates.  4.  Bilateral dependent consolidations, appearance suggests atelectasis, component of infiltrate not excluded.     These findings were discussed with the patient's clinician, Misbha Cardoza, on 4/3/2025 9:27 PM.     CT-HEAD W/O  Final Result     No acute intracranial findings.              DX-CHEST-PORTABLE (1 VIEW)  Final Result     Interval placement of a right IJ central line without evidence of complication.     DX-CHEST-PORTABLE (1 VIEW)  Final Result     1.  Support devices present.     2.  Cardiomegaly.     3.  Bibasilar atelectasis.                 EKG tracings personally reviewed by me sinus tach Axis intervals are the same as November 2024     Echocardiogram images personally reviewed by me show reduced ejection fraction EF 35 to 40% with RV enlargement and reduced RV function compared to echocardiogram from October 2024     All pertinent features of laboratory and imaging reviewed including primary images where applicable        Principal Problem:    Cardiac arrest (HCC) (POA: Yes)  Active Problems:    Chronic pain syndrome (POA: Yes)    History of MVR with cardiopulmonary bypass (POA: Unknown)    Pain, chronic (POA: Unknown)    Ventricular fibrillation (HCC) (POA: Unknown)    Seizure-like activity (HCC) (POA: Unknown)    Pulmonary embolus (HCC) (POA: Unknown)    Aspiration into airway (POA: Unknown)    Aspiration pneumonia of both lungs (HCC) (POA: Unknown)    Cirrhosis of liver (HCC) (POA: Unknown)  Resolved Problems:    * No resolved hospital problems. *        Assessment / Plan:  Cardiac arrest likely from pulmonary embolism his blood pressure is stable on no inotropes currently  Amiodarone IV is reasonable for 24  hours  Reduced ejection fraction is likely due to stunning from cardiac arrest  He had normal coronary arteries prior to mitral valve repair with good result of his mitral valve regurgitation and so he does not need invasive angiogram     I defer the management of his pulmonary embolism to the intensive team     I would check an echo prior to discharge and see the recovery of LV function if it remains low or he has full neurologic recovery a LifeVest is reasonable please reconsult prior to discharge     Cardiology will sign off but please reconsult prior to discharge, he should follow-up with Dr. Yinka Reyna or Ms. Robledo within a couple weeks of discharge        Future Appointments  Date Time Provider Department Center  9/25/2025  8:20 AM Sal Schafer D.O. [facility]        It is my pleasure to participate in the care of Mr. Braswell.  Please do not hesitate to contact me with questions or concerns.     Roberto Pantoja MD PhD [facility]  Cardiologist [facility]     4/4/2025                          Critical Care Consultation     Date of consult: 4/3/2025     Referring Physician  Elizabeth     Reason for Consultation  VF arrest     History of Presenting Illness  44 y.o. male who presented 4/3/2025 with past medical history of dyslipidemia, iron deficiency, history of a GDA bleed and 2023 status post IR embolization (5/24), as well as severe degenerative MR and MVP s/p mitral valve repair ([place], P2 quadrangular resection, cord reimplantation, 38 mm Syed flexible annuloplasty band 1/2024), chronic pain who presents with out-of-hospital cardiac arrest.     Patient was in his normal state health today, throughout the day there was no preceding symptoms or events.  His wife is an RN and provides a history, she was with him, he got up out of the bed stood up, told her that he felt dizzy and then he lost consciousness in front of him and has started to have a little  bit of convulsing activity temporarily and was nonresponsive but was still breathing.  She immediately called EMS and moments later when she reassessed him he was not breathing and she immediately started CPR.  Medics arrived shortly thereafter, they placed him on mechanical CPR device and first rhythm was ventricular fibrillation.  He underwent defibrillation x 3 and 3 rounds of ACLS with ongoing mechanical CPR, and achieved ROSC on route to ED.  Overall low flow time ~ 30 minutes, essentially zero no flow time.     Of note the patient takes ~50 mg of oxycodone per day in divided dose, and has not taken any for two days per wife in effort to wean himself off.     At arrival to ED, patient's EKG is sinus tachycardia without STEMI.  H he has received 10 mg of Versed in the field and received etomidate for placement of an LMA, at arrival he was intubated and placed on propofol for tachypnea and is now on high dose propofol and still breathing over the vent.     He had a noncontrast head CT which was negative, chest x-ray generally unremarkable.     POCUS: Suboptimal images, left ventricular function appears hyperdynamic, flow acceleration out the LVOT without over evidence of obstruction, right ventricle difficult to see but appears hyperkinetic at the base free wall not well-visualized, mildly dilated with probably mildly reduced function, IVC is very thin and collapsible low filling pressure, no clinically relevant effusion.        Code Status  Full Code     Review of Systems  Review of Systems   Unable to perform ROS: Critical illness         Past Medical History   has a past medical history of Arthritis, Breath shortness (11/03/2023), Drug-seeking behavior (07/30/2023), Duodenal ulcer (07/30/2023), Gastric ulcer, Heart valve disease, Hemorrhagic disorder (HCC), Hypertension, Pain, and Seizure ([facility]).     Surgical History   has a past surgical history that includes other orthopedic surgery; other abdominal surgery;  ankle arthroscopy (Right, 5/21/2018); biopsy ortho (Right, 5/21/2018); pr upper gi endoscopy,diagnosis (3/28/2022); pr upper gi endoscopy,biopsy (3/28/2022); pr upper gi endoscopy,biopsy (N/A, 7/30/2023); pr upper gi endoscopy,diagnosis (N/A, 9/14/2023); pr upper gi endoscopy,biopsy (N/A, 9/14/2023); pr replacement of mitral valve (1/25/2024); echocardiogram, transesophageal, intraoperative (1/25/2024); pr upper gi endoscopy,diagnosis (N/A, 5/18/2024); pr upper gi endoscopy,biopsy (N/A, 5/18/2024); gastroscopy with endostat (N/A, 5/18/2024); pr upper gi endoscopy,diagnosis (N/A, 5/25/2024); pr upper gi endoscopy,biopsy (N/A, 5/25/2024); pr colonoscopy-flexible (5/27/2024); capsule endoscopy administration (N/A, 5/28/2024); capsule endoscopy retrieval (5/28/2024); and pr upper gi endoscopy,diagnosis (N/A, 9/18/2024).     Family History  family history includes Heart Disease in his mother; No Known Problems in his father.     Social History   reports that he has never smoked. He has never used smokeless tobacco. He reports that he does not currently use alcohol. He reports that he does not use drugs.     Medications  Home Medications          Reviewed by Ana Gallegos R.N. (Registered Nurse) on 04/03/25 at 2015  Med List Status: Partial       Medication Last Dose Status  meloxicam (MOBIC) 15 MG tablet Unknown Active  methocarbamol (ROBAXIN) 500 MG Tab Unknown Active  oxyCODONE immediate release (ROXICODONE) 10 MG immediate release tablet Unknown Active  tadalafil (CIALIS) 10 MG tablet Unknown Active  zolpidem (AMBIEN) 10 MG Tab Unknown Active                     Audit from Redirected Encounters   **Home medications have not yet been reviewed for this encounter**        Current Medications  Current Facility-Administered Medications  Medication Dose Route Frequency Provider Last Rate Last Admin   amiodarone (Nexterone) 360 mg/200 mL infusion  1 mg/min Intravenous Once Kumar Johnson M.D. 33.3 mL/hr at 04/03/25  1956 1 mg/min at 04/03/25 1956    Followed by   [START ON 4/4/2025] amiodarone (Nexterone) 360 mg/200 mL infusion  0.5 mg/min Intravenous Continuous Kumar Johnson M.D.       Respiratory Therapy Consult   Nebulization Continuous RT Misbah Cardoza M.D. MD Alert...ICU Electrolyte Replacement per Pharmacy   Other PHARMACY TO DOSE Misbah Cardoza M.D.       lidocaine (Xylocaine) 1 % injection 2 mL  2 mL Tracheal Tube Q30 MIN PRN Misbah Cardoza M.D.       insulin lispro (HumaLOG,AdmeLOG) subcutaneous injection  2-9 Units Subcutaneous Q6HRS Misbah Cardoza M.D.        And   dextrose 50 % (D50W) injection 25 g  25 g Intravenous Q15 MIN PRN Misbah Cardoza M.D.       senna-docusate (Pericolace Or Senokot S) 8.6-50 MG per tablet 2 Tablet  2 Tablet Oral Q EVENING Misbah Cardoza M.D.   2 Tablet at 04/03/25 2133    And   polyethylene glycol/lytes (Miralax) Packet 1 Packet  1 Packet Oral QDAY PRN Misbah Cardoza M.D.       [START ON 4/4/2025] famotidine (Pepcid) tablet 20 mg  20 mg Oral Q12HRS Misbah Cardoza M.D.        Or   [START ON 4/4/2025] famotidine (Pepcid) injection 20 mg  20 mg Intravenous Q12HRS Misbah Cardoza M.D.       midazolam (Versed) injection               acetaminophen (Tylenol) tablet 1,000 mg  1,000 mg Oral Q6HRS Misbah Cardoza M.D.   1,000 mg at 04/03/25 2133    Or   acetaminophen (Tylenol) suppository 975 mg  975 mg Rectal Q6HRS Misbah Cardoza M.D.       busPIRone (Buspar) tablet 15 mg  15 mg Oral BID Misbah Cardoza M.D.       magnesium sulfate 20 g/500mL infusion  0.5-2 g/hr Intravenous Continuous Misbah Cardoza M.D.   Dose not Required at 04/03/25 2130   meperidine (Demerol) injection 25 mg  25 mg Intravenous Q2HRS PRN Misbah Cardoza M.D.       fentaNYL (Sublimaze) injection 100 mcg  100 mcg Intravenous Q HOUR PRN Misbah Cardoza M.D.       fentanyl 50 mcg/mL infusion   mcg/hr Intravenous Continuous Misbah Cardoza M.D.   Dose not  Required at 04/03/25 2130   propofol (DIPRIVAN) injection  0-80 mcg/kg/min (Ideal) Intravenous Continuous Misbah Cardoza M.D.   Dose not Required at 04/03/25 2130   vecuronium (Norcuron) injection 8.45 mg  0.1 mg/kg (Ideal) Intravenous Once Misbah Cardoza M.D.        Followed by   vecuronium (Norcuron) 60 mg in dextrose 5% 500 mL Infusion  1-2 mcg/kg/min (Ideal) Intravenous Continuous Misbah Cardoza M.D.   Dose not Required at 04/03/25 2130   heparin infusion 25,000 units in 500 mL 0.45% NACL  0-30 Units/kg/hr (Adjusted) Intravenous Continuous Misbah Cardoza M.D.       heparin injection 7,800 Units  80 Units/kg (Adjusted) Intravenous Once Misbah Cardoza M.D.       heparin injection 3,900 Units  40 Units/kg (Adjusted) Intravenous PRN Misbah Cardoza M.D.              Allergies  Allergies  Allergies  Allergen Reactions   Morphine Vomiting   Tramadol Unspecified      Seizure  ICR=[id number]          Vital Signs last 24 hours  Temp:  [36.2 °C (97.2 °F)] 36.2 °C (97.2 °F)  Pulse:  [114-135] 114  Resp:  [20-44] 24  BP: (100-195)/() 142/93  SpO2:  [93 %-100 %] 94 %     Physical Exam  Physical Exam  Constitutional:       Appearance: He is ill-appearing.   HENT:      Head: Normocephalic.      Nose: No congestion.      Mouth/Throat:      Mouth: Mucous membranes are moist.   Eyes:      Pupils: Pupils are equal, round, and reactive to light.   Cardiovascular:      Rate and Rhythm: Regular rhythm. Tachycardia present.   Pulmonary:      Effort: Pulmonary effort is normal. No respiratory distress.   Abdominal:      General: Abdomen is flat.      Palpations: Abdomen is soft.   Musculoskeletal:      Right lower leg: No edema.      Left lower leg: No edema.   Skin:     General: Skin is warm.      Capillary Refill: Capillary refill takes less than 2 seconds.   Neurological:      Comments: Patient is on 80 of prop at the time of exam     He does not open eyes to stim or voice  Pupils are equal and  respond  +corneal, gag and cough     He is withdrawing to pain in all four  No clonus or rigidity            Fluids     Intake/Output Summary (Last 24 hours) at 4/3/2025 2235  Last data filed at 4/3/2025 1649  Gross per 24 hour  Intake --  Output 60 ml  Net -60 ml        Laboratory  Recent Results  Recent Results (from the past 48 hours)  EKG    Collection Time: 25  4:45 PM  Result Value Ref Range    Report          [facility]     Test Date:  2025  Pt Name:    PATI MERCEDES           Department: ER  MRN:        [id number]                      Room:       [facility]  Gender:     Male                         Technician: [id number]  :        1980                   Requested By:JANIYA LOPEZ  Order #:    [id number]                    Reading MD:     Measurements  Intervals                                Axis  Rate:       134                          P:          52  NC:         172                          QRS:        18  QRSD:       88                           T:          69  QT:         300  QTc:        448     Interpretive Statements  Sinus tachycardia  Probable left atrial enlargement  Probable anteroseptal infarct, old  Compared to ECG 2024 16:09:32  Myocardial infarct finding now present  Sinus bradycardia no longer present  Possible ischemia no longer present     URINALYSIS    Collection Time: 25  4:46 PM    Specimen: Urine  Result Value Ref Range    Color Yellow      Character Cloudy (A)      Specific Gravity 1.029 <1.035    Ph 5.5 5.0 - 8.0    Glucose 100 (A) Negative mg/dL    Ketones Negative Negative mg/dL    Protein 300 (A) Negative mg/dL    Bilirubin Negative Negative    Urobilinogen, Urine 1.0 <=1.0 EU/dL    Nitrite Negative Negative    Leukocyte Esterase Negative Negative    Occult Blood Trace (A) Negative    Micro Urine Req Microscopic    URINE DRUG SCREEN    Collection Time: 25  4:46 PM  Result Value Ref Range    Amphetamines  Urine Negative Negative    Barbiturates Negative Negative    Benzodiazepines Positive (A) Negative    Cocaine Metabolite Negative Negative    Fentanyl, Urine Negative Negative    Methadone Negative Negative    Opiates Negative Negative    Oxycodone Negative Negative    Phencyclidine -Pcp Negative Negative    Propoxyphene Negative Negative    Cannabinoid Metab Negative Negative  URINE MICROSCOPIC (W/UA)    Collection Time: 04/03/25  4:46 PM  Result Value Ref Range    WBC 6-10 (A) /hpf    RBC 11-20 (A) 0 - 2 /hpf    Bacteria Many (A) None /hpf    Epithelial Cells 6-10 (A) 0 - 5 /hpf    Amorphous Crystal Present (A) Absent /hpf    Urine Casts 3-5 (A) 0 - 2 /lpf    Hyaline Cast Present /lpf    Granular Casts Present (A) Absent /lpf  POCT arterial blood gas device results    Collection Time: 04/03/25  4:57 PM  Result Value Ref Range    Ph 7.153 (LL) 7.350 - 7.450    Pco2 52.7 (HH) 32.0 - 48.0 mmHg    Po2 47 (LL) 83 - 108 mmHg    Tco2 20 (L) 32 - 48 mmol/L    S02 70 (L) 93 - 99 %    Hco3 18.5 (L) 21.0 - 28.0 mmol/L    BE -11 (L) -4 - 3 mmol/L    Body Temp 36.2 C degrees    O2 Therapy 100 %    iPF Ratio 47      Ph Temp Cedric 7.164 (LL) 7.350 - 7.450    Pco2 Temp Co 50.9 (H) 32.0 - 48.0 mmHg    Po2 Temp Cor 44 (LL) 83 - 108 mmHg    Specimen Arterial      Kanu Test Pass      DelSys Vent      Tidal Volume 490 mL    Peep End Expiratory Pressure 8 cmh20    Set Rate 20      Mode APV-CMV    POCT lactate device results    Collection Time: 04/03/25  4:57 PM  Result Value Ref Range    iStat Lactate 4.2 (HH) 0.5 - 2.0 mmol/L  MAGNESIUM    Collection Time: 04/03/25  5:30 PM  Result Value Ref Range    Magnesium 2.1 1.5 - 2.5 mg/dL  CBC WITH DIFFERENTIAL    Collection Time: 04/03/25  5:30 PM  Result Value Ref Range    WBC 11.6 (H) 4.8 - 10.8 K/uL    RBC 5.55 4.70 - 6.10 M/uL    Hemoglobin 14.9 14.0 - 18.0 g/dL    Hematocrit 46.2 42.0 - 52.0 %    MCV 83.2 81.4 - 97.8 fL    MCH 26.8 (L) 27.0 - 33.0 pg    MCHC 32.3 32.3 - 36.5 g/dL     RDW 52.6 (H) 35.9 - 50.0 fL    Platelet Count 415 164 - 446 K/uL    MPV 9.2 9.0 - 12.9 fL    Neutrophils-Polys 70.50 44.00 - 72.00 %    Lymphocytes 16.10 (L) 22.00 - 41.00 %    Monocytes 7.10 0.00 - 13.40 %    Eosinophils 0.00 0.00 - 6.90 %    Basophils 0.00 0.00 - 1.80 %    Nucleated RBC 0.00 0.00 - 0.20 /100 WBC    Neutrophils (Absolute) 8.60 (H) 1.82 - 7.42 K/uL    Lymphs (Absolute) 1.87 1.00 - 4.80 K/uL    Monos (Absolute) 0.82 0.00 - 0.85 K/uL    Eos (Absolute) 0.00 0.00 - 0.51 K/uL    Baso (Absolute) 0.00 0.00 - 0.12 K/uL    NRBC (Absolute) 0.00 K/uL    Anisocytosis 1+      Microcytosis 1+    Comp Metabolic Panel    Collection Time: 04/03/25  5:30 PM  Result Value Ref Range    Sodium 146 (H) 135 - 145 mmol/L    Potassium 4.1 3.6 - 5.5 mmol/L    Chloride 116 (H) 96 - 112 mmol/L    Co2 17 (L) 20 - 33 mmol/L    Anion Gap 13.0 7.0 - 16.0    Glucose 122 (H) 65 - 99 mg/dL    Bun 18 8 - 22 mg/dL    Creatinine 1.21 0.50 - 1.40 mg/dL    Calcium 8.2 (L) 8.5 - 10.5 mg/dL    Correct Calcium 8.3 (L) 8.5 - 10.5 mg/dL    AST(SGOT) 170 (H) 12 - 45 U/L    ALT(SGPT) 168 (H) 2 - 50 U/L    Alkaline Phosphatase 72 30 - 99 U/L    Total Bilirubin 0.3 0.1 - 1.5 mg/dL    Albumin 3.9 3.2 - 4.9 g/dL    Total Protein 6.7 6.0 - 8.2 g/dL    Globulin 2.8 1.9 - 3.5 g/dL    A-G Ratio 1.4 g/dL  TROPONIN    Collection Time: 04/03/25  5:30 PM  Result Value Ref Range    Troponin T 80 (H) 6 - 19 ng/L  Prothrombin Time    Collection Time: 04/03/25  5:30 PM  Result Value Ref Range    PT 13.9 12.0 - 14.6 sec    INR 1.07 0.87 - 1.13  APTT    Collection Time: 04/03/25  5:30 PM  Result Value Ref Range    APTT 21.0 (L) 24.7 - 36.0 sec  DIAGNOSTIC ALCOHOL    Collection Time: 04/03/25  5:30 PM  Result Value Ref Range    Diagnostic Alcohol <10.1 <10.1 mg/dL  Triglyceride    Collection Time: 04/03/25  5:30 PM  Result Value Ref Range    Triglycerides 139 0 - 149 mg/dL  ESTIMATED GFR    Collection Time: 04/03/25  5:30 PM  Result Value Ref Range    GFR  (CKD-EPI) 76 >60 mL/min/1.73 m 2  DIFFERENTIAL MANUAL    Collection Time: 04/03/25  5:30 PM  Result Value Ref Range    Bands-Stabs 3.60 0.00 - 10.00 %    Metamyelocytes 1.80 %    Myelocytes 0.90 %    Manual Diff Status PERFORMED    PERIPHERAL SMEAR REVIEW    Collection Time: 04/03/25  5:30 PM  Result Value Ref Range    Peripheral Smear Review see below    PLATELET ESTIMATE    Collection Time: 04/03/25  5:30 PM  Result Value Ref Range    Plt Estimation Normal    MORPHOLOGY    Collection Time: 04/03/25  5:30 PM  Result Value Ref Range    RBC Morphology Present      Poikilocytosis 1+      Ovalocytes 1+    proBrain Natriuretic Peptide, NT    Collection Time: 04/03/25  5:30 PM  Result Value Ref Range    NT-proBNP 78 0 - 125 pg/mL  HEMOGLOBIN A1C    Collection Time: 04/03/25  5:30 PM  Result Value Ref Range    Glycohemoglobin 6.0 (H) 4.0 - 5.6 %    Est Avg Glucose 126 mg/dL  LACTIC ACID    Collection Time: 04/03/25  8:01 PM  Result Value Ref Range    Lactic Acid 3.1 (H) 0.5 - 2.0 mmol/L  POCT arterial blood gas device results    Collection Time: 04/03/25  8:06 PM  Result Value Ref Range    Ph 7.310 (L) 7.350 - 7.450    Pco2 26.1 (L) 32.0 - 48.0 mmHg    Po2 116 (H) 83 - 108 mmHg    Tco2 14 (L) 32 - 48 mmol/L    S02 98 93 - 99 %    Hco3 13.1 (L) 21.0 - 28.0 mmol/L    BE -11 (L) -4 - 3 mmol/L    Body Temp 37.6 C degrees    O2 Therapy 70 %    iPF Ratio 166      Ph Temp Cedric 7.302 (L) 7.350 - 7.450    Pco2 Temp Co 26.8 (L) 32.0 - 48.0 mmHg    Po2 Temp Cor 120 (H) 83 - 108 mmHg    Specimen Arterial      Kanu Test Pass      DelSys Vent      Tidal Volume 480 mL    Peep End Expiratory Pressure 10 cmh20    Set Rate 24      Mode APV-CMV    POCT lactate device results    Collection Time: 04/03/25  8:06 PM  Result Value Ref Range    iStat Lactate 2.8 (H) 0.5 - 2.0 mmol/L  CBC WITH DIFFERENTIAL    Collection Time: 04/03/25  9:50 PM  Result Value Ref Range    WBC 4.8 4.8 - 10.8 K/uL    RBC 5.34 4.70 - 6.10 M/uL    Hemoglobin 14.6 14.0  - 18.0 g/dL    Hematocrit 44.6 42.0 - 52.0 %    MCV 83.5 81.4 - 97.8 fL    MCH 27.3 27.0 - 33.0 pg    MCHC 32.7 32.3 - 36.5 g/dL    RDW 52.9 (H) 35.9 - 50.0 fL    Platelet Count 319 164 - 446 K/uL    MPV 9.0 9.0 - 12.9 fL    Neutrophils-Polys 66.70 44.00 - 72.00 %    Lymphocytes 21.40 (L) 22.00 - 41.00 %    Monocytes 8.20 0.00 - 13.40 %    Eosinophils 2.50 0.00 - 6.90 %    Basophils 0.80 0.00 - 1.80 %    Immature Granulocytes 0.40 0.00 - 0.90 %    Nucleated RBC 0.00 0.00 - 0.20 /100 WBC    Neutrophils (Absolute) 3.17 1.82 - 7.42 K/uL    Lymphs (Absolute) 1.02 1.00 - 4.80 K/uL    Monos (Absolute) 0.39 0.00 - 0.85 K/uL    Eos (Absolute) 0.12 0.00 - 0.51 K/uL    Baso (Absolute) 0.04 0.00 - 0.12 K/uL    Immature Granulocytes (abs) 0.02 0.00 - 0.11 K/uL    NRBC (Absolute) 0.00 K/uL  EKG    Collection Time: 25 10:03 PM  Result Value Ref Range    Report          [facility]     Test Date:  2025  Pt Name:    PATI MERCEDES           Department: [facility]  MRN:        [id number]                      Room:       T622  Gender:     Male                         Technician: VON  :        1980                   Requested By:PAT HERNANDEZ  Order #:    [id number]                    Reading MD:     Measurements  Intervals                                Axis  Rate:       108                          P:          63  MA:         159                          QRS:        -25  QRSD:       97                           T:          34  QT:         330  QTc:        443     Interpretive Statements  Sinus tachycardia  Inferior infarct, old  Baseline wander in lead(s) V6  Compared to ECG 2025 16:45:13  No significant changes             Imaging  EC-ECHOCARDIOGRAM COMPLETE W/ CONT        CT-ABDOMEN-PELVIS WITH  Final Result        1.  Fluid-filled prominence of proximal small bowel with distal small bowel decompression, appearance suggests ileus and/or enteritis versus evolving obstructive changes. Recommend  radiographic follow-up to resolution as clinically appropriate.  2.  Irregular hepatic contour favoring changes of cirrhosis  3.  Fat-containing bilateral inguinal hernias     CT-CTA CHEST PULMONARY ARTERY W/ RECONS  Final Result        1.  Distal right main pulmonary embolus extending into right upper lobe and lower lobe segmental and subsegmental branches. 1.4 right to left ventricular ratio suggesting component of right heart strain.  2.  Evaluation of the subsegmental branches is essentially nondiagnostic due to motion artifacts. Additional imaging would be required for definitive evaluation of small distal pulmonary emboli.  3.  Scattered patchy bilateral pulmonary infiltrates.  4.  Bilateral dependent consolidations, appearance suggests atelectasis, component of infiltrate not excluded.     These findings were discussed with the patient's clinician, Misbah Cardoza, on 4/3/2025 9:27 PM.     CT-HEAD W/O  Final Result     No acute intracranial findings.              DX-CHEST-PORTABLE (1 VIEW)  Final Result     Interval placement of a right IJ central line without evidence of complication.     DX-CHEST-PORTABLE (1 VIEW)  Final Result     1.  Support devices present.     2.  Cardiomegaly.     3.  Bibasilar atelectasis.     DX-CHEST-PORTABLE (1 VIEW)    (Results Pending)  US-EXTREMITY VENOUS LOWER BILAT    (Results Pending)        Assessment/Plan  * Cardiac arrest (HCC)- (present on admission)  Assessment & Plan  Witnessed out-of-hospital cardiac arrest, with immediate CPR, zero no flow time, initial rhythm VF, early application of mechanical CPR, underwent defib x3 with 2 rounds of EPI and ACLS reportedly, EKG without STEMI, POCUS hyperdynamic LV with RV dilation and mildly depressed function though not well visualized     Post cardiac arrest global care:     -Ventilation: Goal PCO2 40-45, PaO2 ~100, LPV     -Hemodynamic: Goal SBP >90 mmHg, MAP >65 mmHg; fluid rescucitation and vasoactives as guided by bedside  exam, ultrasound and hemodynamics     -Cardiac:   EKG without STEMI, initial trop 80, initial POCUS with hyperdynamic function, no obvious RWMA  Cardiology following to determine timing for angiography     -Neurologic:   Initial NCHCT normal, intact brainstem function at arrival, withdraws in all four  Will order cEEG and TTM for now until we can desedate and get better neuro assessment, appears to be responding to pain but not following  STAT cEEG for clinical seizure like activity     Serial neurologic exams, targeted temperature management, sedation is required to control shivering or ventilator dyssynchrony     -Metabolic: Serial lactate, goal blood glucose <180, maintain euvolemia, monitor urine output, maintain potassium greater than 3.5, maintain magnesium greater than 2     Neuro prognostication in 48-72 hours, will consider neurology consult and MRI at that time.     Cardiology consult  Cardiac ischemia evaluation when indicated        Pulmonary embolus (HCC)  Assessment & Plan  Incidentally noted on CTPA done post arrest  Clinical picture and VF arrest not entirely consistent with PE  Unclear if arrest caused by PE or not  No evidence of ongoing shock or hemodynamic compromise     Intermediate risk pulmonary embolism with myocardial injury and right heart-strain as evidenced by a troponin of 80, BNP of 126, and RV/LV ratio 1.4 on CT     Felt to be at elevated risk of decompensation based on:  Syncope with subsequent VF  Clot burden on CT mostly distal, small right main clot extending into RLL segments  Lactate 3.5  Shock index initially (HR/SBP) >1 but HR imroved with volume resus and opiods  Need for Vasopressors none  Clot in transit not seen     The treatment options were discussed with patient, family, pharmacy including: CDT, Suction thrombectomy, half-dose TPA + heparin, and heparin alone     I dont think he warrants advanced therapies at this time, will treat with AC alone and consider rescue  therapies if worsens hemodynamically     Start heparin infusion  Cardiac monitoring   RT/O2 Protocols  Titrate supplemental FiO2 to maintain SpO2 >88%  BLE Dopplers  TTE     Seizure-like activity (HCC)  Assessment & Plan  At time of evnet had what is described as convulsive activity followed by loss of conciousness prior to arrest  Has ha some seizure like activity in the past and negative short EEG without neuro follow up     At arrival to ICU had change in neuro exam, upward gaze and rigidity in all four, no overt convulsions but no longer responding to pain in extremities temporarily  Given 5 of versed during this event  with improvement     cEEG  Will hold off on ASM for now and wait for EEG data  Brain MRI when able     Ventricular fibrillation (HCC)  Assessment & Plan  Presented following OHCA, initial rhythm VF  Unclear etiology or mechanism     [facility] in 2023 in preparation for MVR with clean coronaries  Initial EKG without STEMI, minimal trop elevation     Cycle trop  Will discuss with cardiology timing for and/or indications for angiography        Pain, chronic  Assessment & Plan  Mostly chronic chest pain which began following CPB for his MVR in 1/2024  Has been opioid dependent since     Takes oxycodone (without APAP) 10mg approximately 5x per day and recently has been trying to auto-wean, no opioids for two days per wife  Also takes methocarbamol still     Possibly experiencing some degree of withdrawal here with adrenergic state  Multi modal analgesia  Will use short acting opioids for now to facilitate neuro assessments, but will likely need higher doses than is typical        History of MVR with cardiopulmonary bypass  Assessment & Plan  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 s/p mitral valve repair (Maggie, P2 quadrangular resection, cord reimplantation, 38 mm Syed flexible annuloplasty band 1/2024)     Follow up formal TTE to assess valve function                                      Current Vital  "Signs:   Temperature: 36.6 °C (97.9 °F) Pulse: 79 Respiration: 18 Blood Pressure: 116/86  Weight: 108 kg (238 lb 12.1 oz) Height: 190.5 cm (6' 3\")  Pulse Oximetry: 97 % O2 (LPM): 0      Completed Laboratory Reports:  Recent Labs     04/14/25  0011   WBC 8.1   HEMOGLOBIN 13.3*   HEMATOCRIT 40.9*   PLATELETCT 498*   SODIUM 141   POTASSIUM 3.8   BUN 15   CREATININE 0.79   GLUCOSE 114*     Additional Labs: Not Applicable    Prior Living Situation:   Housing / Facility: 3 Story House  Steps Into Home:  (can place ramp)  Steps In Home: 6 (to bedroom/kitchen. flight to 1st and 3rd floors that pt does not need to access per spouse)  Lives with - Patient's Self Care Capacity: Spouse  Equipment Owned: Tub / Shower Seat, Front-Wheel Walker, 4-Wheel Walker, Wheelchair, Single Point Cane    Prior Level of Function / Living Situation:   Physical Therapy: Prior Services: Home-Independent  Housing / Facility: 3 Story House  Steps Into Home:  (can place ramp)  Steps In Home: 6 (to bedroom/kitchen. flight to 1st and 3rd floors that pt does not need to access per spouse)  Rail:  (rail to bedroom)  Bathroom Set up: Walk In Shower, Shower Chair  Equipment Owned: Tub / Shower Seat, Front-Wheel Walker, 4-Wheel Walker, Wheelchair, Single Point Cane  Lives with - Patient's Self Care Capacity: Spouse  Bed Mobility: Independent  Transfer Status: Independent  Ambulation: Independent  Assistive Devices Used: None  Stairs: Independent  Current Level of Function:   Gait Level Of Assist: Minimal Assist  Assistive Device: Front Wheel Walker  Distance (Feet): 100  Deviation:  (bumping into objects, sometimes on his right, sometimes on left. Lifting walker by end, confused by walker by end, but had been  using it appropriately when out in open area.)  Weight Bearing Status: no restrictions  Skilled Intervention: Verbal Cuing, Sequencing  Supine to Sit: Standby Assist  Sit to Supine:  (up chair)  Scooting: Supervised  Rolling: Maximal Assist to " Rt.  Skilled Intervention: Verbal Cuing  Comments: x2 person; appeared to be due to more impaired sequencing, initiation, and motor planning rather than gross weakness  Sit to Stand: Minimal Assist  Bed, Chair, Wheelchair Transfer: Minimal Assist  Transfer Method: Stand Step  Skilled Intervention: Verbal Cuing, Sequencing, Facilitation  Sitting Edge of Bed: 5+ min  Standin+  Occupational Therapy:   Self Feeding: Independent  Grooming / Hygiene: Independent  Bathing: Independent  Dressing: Independent  Toileting: Independent  Medication Management: Independent  Laundry: Independent  Kitchen Mobility: Independent  Finances: Independent  Home Management: Independent  Shopping: Independent  Prior Level Of Mobility: Independent Without Device in Community, Independent Without Device in Home  Driving / Transportation: Driving Independent  Prior Services: Home-Independent  Housing / Facility: [address]  Occupation (Pre-Hospital Vocational): Employed Full Time  Current Level of Function:   Upper Body Dressing: Moderate Assist (gown change)  Lower Body Dressing: Maximal Assist (socks)  Toileting:  (declined need)  Speech Language Pathology:      Rehabilitation Prognosis/Potential: Good  Estimated Length of Stay: 12-14 days    Nursing:      [facility]    Scope/Intensity of Services Recommended:  Physical Therapy: 1 hr / day  5 days / week. Therapeutic Interventions Required: Maximize Endurance, Mobility, Strength, and Safety  Occupational Therapy: 1 hr / day 5 days / week. Therapeutic Interventions Required: Maximize Self Care, ADLs, IADLs, and Energy Conservation  Speech & Language Pathology: 1 hr / day 5 days / week. Therapeutic Interventions Required: Maximize Cognition, Swallowing, and Safety  Rehabilitation Nursin/7. Therapeutic Interventions Required: Monitor Pain, Skin, Wound(s), Vital Signs, Intake and Output, Labs, Safety, Aspiration Risk, and Family Training  Rehabilitation Physician: 3 - 5 days / week.  Therapeutic Interventions Required: Medical Management  Respiratory Care: consult. Therapeutic Interventions Required: Pulmonary Toileting  Dietician: consult. Therapeutic Interventions Required: please advise on a diet that is both healthy and promotes healing     He requires 24-hour rehabilitation nursing to manage bowel and bladder function, skin care, nutrition and fluid intake, pulmonary hygiene, pain control, safety, medication management, and patient/family goals. In addition, rehabilitation nursing will reiterate and reinforce therapy skills and equipment use, including ADLs, as well as provide education to the patient and family. Roberto Braswell is willing to participate in and is able to tolerate the proposed plan of care.    Rehabilitation Goals and Plan (Expected frequency & duration of treatment in the IRF):   Return to the Community and Family Able to Provide 24/7 Assistance  Anticipated Date of Rehabilitation Admission: unknown  Patient/Family oriented IRF level of care/facility/plan: Yes  Patient/Family willing to participate in IRF care/facility/plan: Yes  Patient able to tolerate IRF level of care proposed: Yes  Patient has potential to benefit IRF level of care proposed: Yes  Comments:       Special Needs or Precautions - Medical Necessity:  Safety Concerns/Precautions:  Fall Risk / High Risk for Falls  Diet:   DIET ORDERS (From admission to next 24h)       Start     Ordered    04/16/25 0717  Diet Order Diet: Regular (1:1 supervision, small bites and slow sips, sit fully upright)  ALL MEALS        Question:  Diet:  Answer:  Regular  Comment:  1:1 supervision, small bites and slow sips, sit fully upright    04/16/25 0716    04/09/25 1721  Supplements  ONCE        Comments: Chocolate only   Question Answer Comment   Which Supplement Ensure    Ensure: Ensure Plus Carton        04/09/25 1721                    Anticipated Discharge Destination / Patient/Family Goal:  Destination: Home with  Assistance Support System: Spouse  Anticipated home health services: OT, PT, SLP, and Nursing  Previously used HH service/ provider: Not Applicable  Anticipated DME Needs:  unknown  Outpatient Services: OT, PT, and SLP  Alternative resources to address additional identified needs:     Pre-Screen Completed: 4/16/2025 8:34 AM Kim Peralta L.P.N.

## 2025-04-16 NOTE — PROGRESS NOTES
Bedside report received from off going RN/tech: KARSON Salcido, assumed care of patient.     Fall Risk Score: HIGH RISK  Fall risk interventions in place: Place yellow fall risk ID band on patient, Provide patient/family education based on risk assessment, Educate patient/family to call staff for assistance when getting out of bed, Place fall precaution signage outside patient door, Place patient in room close to nursing station, Utilize bed/chair fall alarm, Notify charge of high risk for huddle, and Bed alarm connected correctly  Bed type: Regular (Corwin Score less than 17 interventions in place)  Patient on cardiac monitor: No   IVF/IV medications: Not Applicable   Oxygen: Room Air  Bedside sitter: Not Applicable   Isolation: Not applicable

## 2025-04-16 NOTE — DISCHARGE PLANNING
Voalte message to RAJEEV ANDERS regarding patients choice for SNF vs [facility]    Stated choice is IPR.patient refusing SNF  Advised we would submit for insurance auth.

## 2025-04-16 NOTE — DISCHARGE PLANNING
Per medical director  recommendation patient does not meet criteria for admitting diagnosis  Kera Eddy throughput manager aware  TCC no longer following

## 2025-04-17 PROCEDURE — 700102 HCHG RX REV CODE 250 W/ 637 OVERRIDE(OP): Performed by: STUDENT IN AN ORGANIZED HEALTH CARE EDUCATION/TRAINING PROGRAM

## 2025-04-17 PROCEDURE — A9270 NON-COVERED ITEM OR SERVICE: HCPCS | Performed by: INTERNAL MEDICINE

## 2025-04-17 PROCEDURE — A9270 NON-COVERED ITEM OR SERVICE: HCPCS | Performed by: HOSPITALIST

## 2025-04-17 PROCEDURE — 770001 HCHG ROOM/CARE - MED/SURG/GYN PRIV*

## 2025-04-17 PROCEDURE — 95816 EEG AWAKE AND DROWSY: CPT | Performed by: STUDENT IN AN ORGANIZED HEALTH CARE EDUCATION/TRAINING PROGRAM

## 2025-04-17 PROCEDURE — A9270 NON-COVERED ITEM OR SERVICE: HCPCS | Performed by: STUDENT IN AN ORGANIZED HEALTH CARE EDUCATION/TRAINING PROGRAM

## 2025-04-17 PROCEDURE — 95816 EEG AWAKE AND DROWSY: CPT | Mod: 26 | Performed by: STUDENT IN AN ORGANIZED HEALTH CARE EDUCATION/TRAINING PROGRAM

## 2025-04-17 PROCEDURE — A9270 NON-COVERED ITEM OR SERVICE: HCPCS | Performed by: NURSE PRACTITIONER

## 2025-04-17 PROCEDURE — 4A10X4Z MONITORING OF CENTRAL NERVOUS ELECTRICAL ACTIVITY, EXTERNAL APPROACH: ICD-10-PCS | Performed by: STUDENT IN AN ORGANIZED HEALTH CARE EDUCATION/TRAINING PROGRAM

## 2025-04-17 PROCEDURE — 700102 HCHG RX REV CODE 250 W/ 637 OVERRIDE(OP): Performed by: NURSE PRACTITIONER

## 2025-04-17 PROCEDURE — 700102 HCHG RX REV CODE 250 W/ 637 OVERRIDE(OP): Performed by: INTERNAL MEDICINE

## 2025-04-17 PROCEDURE — 99232 SBSQ HOSP IP/OBS MODERATE 35: CPT | Performed by: INTERNAL MEDICINE

## 2025-04-17 PROCEDURE — 700102 HCHG RX REV CODE 250 W/ 637 OVERRIDE(OP): Performed by: HOSPITALIST

## 2025-04-17 RX ADMIN — OXYCODONE 5 MG: 5 TABLET ORAL at 13:03

## 2025-04-17 RX ADMIN — APIXABAN 5 MG: 5 TABLET, FILM COATED ORAL at 17:16

## 2025-04-17 RX ADMIN — OXYCODONE 5 MG: 5 TABLET ORAL at 08:06

## 2025-04-17 RX ADMIN — METOPROLOL TARTRATE 37.5 MG: 25 TABLET, FILM COATED ORAL at 13:04

## 2025-04-17 RX ADMIN — OMEPRAZOLE 20 MG: 20 CAPSULE, DELAYED RELEASE ORAL at 04:06

## 2025-04-17 RX ADMIN — METOPROLOL TARTRATE 37.5 MG: 25 TABLET, FILM COATED ORAL at 23:03

## 2025-04-17 RX ADMIN — Medication 5 MG: at 20:10

## 2025-04-17 RX ADMIN — GABAPENTIN 200 MG: 100 CAPSULE ORAL at 17:16

## 2025-04-17 RX ADMIN — DIVALPROEX SODIUM 500 MG: 500 TABLET, DELAYED RELEASE ORAL at 23:04

## 2025-04-17 RX ADMIN — QUETIAPINE FUMARATE 50 MG: 25 TABLET ORAL at 20:10

## 2025-04-17 RX ADMIN — OXYCODONE 5 MG: 5 TABLET ORAL at 18:33

## 2025-04-17 RX ADMIN — OXYCODONE HYDROCHLORIDE 10 MG: 10 TABLET ORAL at 23:04

## 2025-04-17 RX ADMIN — METOPROLOL TARTRATE 37.5 MG: 25 TABLET, FILM COATED ORAL at 04:06

## 2025-04-17 RX ADMIN — APIXABAN 5 MG: 5 TABLET, FILM COATED ORAL at 04:06

## 2025-04-17 RX ADMIN — DIVALPROEX SODIUM 500 MG: 500 TABLET, DELAYED RELEASE ORAL at 13:04

## 2025-04-17 RX ADMIN — NYSTATIN: 100000 POWDER TOPICAL at 17:16

## 2025-04-17 RX ADMIN — ACETAMINOPHEN 650 MG: 325 TABLET, FILM COATED ORAL at 10:57

## 2025-04-17 RX ADMIN — DIVALPROEX SODIUM 500 MG: 500 TABLET, DELAYED RELEASE ORAL at 04:06

## 2025-04-17 RX ADMIN — LIDOCAINE HYDROCHLORIDE 30 ML: 20 SOLUTION ORAL; TOPICAL at 20:11

## 2025-04-17 RX ADMIN — QUETIAPINE FUMARATE 25 MG: 25 TABLET ORAL at 23:04

## 2025-04-17 RX ADMIN — NYSTATIN: 100000 POWDER TOPICAL at 04:06

## 2025-04-17 ASSESSMENT — PAIN DESCRIPTION - PAIN TYPE
TYPE: ACUTE PAIN

## 2025-04-17 ASSESSMENT — ENCOUNTER SYMPTOMS
MEMORY LOSS: 1
SHORTNESS OF BREATH: 0
MYALGIAS: 1

## 2025-04-17 ASSESSMENT — FIBROSIS 4 INDEX: FIB4 SCORE: 0.33

## 2025-04-17 NOTE — DISCHARGE PLANNING
"-0915  DPA informed by Nancy with [facility] that pt's insurance has denied insurance auth. Per Nancy, insurance thinks that pt is \"too acute\" and not ready to be discharged to SNF.   "

## 2025-04-17 NOTE — DISCHARGE PLANNING
"Case Management Discharge Planning    Admission Date: 4/3/2025  GMLOS: 3.4  ALOS: 14    6-Clicks ADL Score: 18  6-Clicks Mobility Score: 18      Anticipated Discharge Dispo: Discharge Disposition: D/T to SNF with Medicare cert in anticipation of skilled care (03)    DME Needed: Pending hospital course     Action(s) Taken: RAUL reached out to CM leadership. RAUL spoke with [facility] to see if it is possible to have pt receive their services. RAUL spoke with Olga from [facility]. Olga stated that they would just need a referral from us. Olga stated CM can send over pt's Face sheet and H&P and they will keep patient on their radar because they do not have a team available. Olga stated that they typically start pt with their HH services until they have a team available for pt and then they will take on pt. LMSW informed olga that pt lives in [place]. Olga will have to verify with her Comprehensive HH team. RAUL faxed over clinical to Olga at [phone]. LMSW uploaded information on pt's H&P. LMSW submitted info on pt's AVS.     [id number] Per [facility] pt's insurance has denied insurance auth. Per Nancy, insurance thinks that pt is \"too acute\" and not ready to be discharged to SNF. LMSW spoke with CM leaders. Plan to appeal denial of SNF placement. Will need updated speech notes for appeal. LMSW reached out to care team.    1130 Due to pt's insurance denying SNF stay  and [facility] is working on appealing denial. Pt is no longer requiring 1 on 1 feeds and has been up for meal.    Peer to Peer is in the works. Per UR MD to MD will not happen today but is being worked on to be scheduled for tomorrow  or Monday 11-1400pst or 1400-1600pst. LMSW updated MD.      Escalations Completed: Leadership    Medically Clear: Yes    Next Steps: F/U with insurance auth for [facility] and OP follow ups.            "

## 2025-04-17 NOTE — PROGRESS NOTES
Bedside report received from off going RN/tech: Verónica, assumed care of patient.     Fall Risk Score: HIGH RISK  Fall risk interventions in place: Provide patient/family education based on risk assessment, Educate patient/family to call staff for assistance when getting out of bed, Place fall precaution signage outside patient door, Place patient in room close to nursing station, and Utilize bed/chair fall alarm  Bed type: Low air loss (Corwin Score less than 17 interventions in place)  Patient on cardiac monitor: No   IVF/IV medications: Not Applicable   Oxygen: Room Air  Bedside sitter: Not Applicable   Isolation: Not applicable

## 2025-04-17 NOTE — PROGRESS NOTES
Hospital Medicine Daily Progress Note    Date of Service  4/17/2025    Chief Complaint  Roberto Braswell is a 44 y.o. male admitted 4/3/2025 with cardiac arrest    Hospital Course  45 yo man with a history of mitral valve repair, previous upper GI bleeds who went into V-fib arrest on 4/3 and achieved ROSC on scene, intubated for airway protection.  CTA chest showed right main PE, and TTE showed 38%.  He was started on anticoagulation. Cardiology was consulted and cardiac arrest and cardiomyopathy likely due to PE.  He was treated for aspiration pneumonitis, tracheal aspirate grew MSSA.  He was extubated 4/5 to a Precedex drip as he had agitation and encephalopathy. 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 Patient was unable to complete MRI without sedation. He was started on depakote and seroquel, his mentation did improve some.Follow up TTE showed his EF improved to 65%.     Interval Problem Update  Patient slept better last night per wife. Did get mor agitated in the evening. He has been able to feed himself without assistance  EEG today  SNF is pending auth    I have discussed this patient's plan of care and discharge plan at IDT rounds today with Case Management, Nursing, Nursing leadership, and other members of the IDT team.    Consultants/Specialty  cardiology, critical care, and neurology, psychiatry    Code Status  Full Code    Disposition  The patient is medically cleared for discharge to home or a post-acute facility.  Anticipate discharge to: skilled nursing facility    I have placed the appropriate orders for post-discharge needs.    Review of Systems  Review of Systems   Constitutional:  Negative for malaise/fatigue.   Respiratory:  Negative for shortness of breath.    Cardiovascular:  Positive for chest pain.   Musculoskeletal:  Positive for myalgias.   Psychiatric/Behavioral:  Positive for memory loss.         Physical Exam  Temp:  [36.2 °C (97.2 °F)-36.8  °C (98.2 °F)] 36.6 °C (97.8 °F)  Pulse:  [66-95] 66  Resp:  [18] 18  BP: (102-131)/(66-81) 123/67  SpO2:  [94 %-99 %] 98 %    Physical Exam  Vitals and nursing note reviewed.   Constitutional:       General: He is not in acute distress.     Appearance: He is not toxic-appearing.   HENT:      Head: Normocephalic.      Mouth/Throat:      Mouth: Mucous membranes are moist.   Eyes:      General:         Right eye: No discharge.         Left eye: No discharge.   Cardiovascular:      Rate and Rhythm: Normal rate and regular rhythm.   Pulmonary:      Effort: Pulmonary effort is normal. No respiratory distress.      Breath sounds: No wheezing.   Abdominal:      Palpations: Abdomen is soft.      Tenderness: There is no abdominal tenderness.   Musculoskeletal:         General: No swelling.      Cervical back: Neck supple.   Skin:     General: Skin is warm and dry.   Neurological:      Mental Status: He is alert and oriented to person, place, and time.      Comments: Oriented to self and hospital.  Equal strength UE and LE, following simple commands. Able to hold some conversation         Fluids    Intake/Output Summary (Last 24 hours) at 4/17/2025 1237  Last data filed at 4/16/2025 1900  Gross per 24 hour   Intake 240 ml   Output --   Net 240 ml        Laboratory        Recent Labs     04/16/25  0808   SODIUM 145   POTASSIUM 3.9   CHLORIDE 111   CO2 21   GLUCOSE 90   BUN 11   CREATININE 0.95   CALCIUM 9.2                   Imaging  DX-CHEST-PORTABLE (1 VIEW)   Final Result      1.  Hypoinflation without other evidence for acute cardiopulmonary disease.   2.  Prior open heart surgery.      MR-BRAIN-W/O   Final Result      1.  Unremarkable noncontrast MR examination of the brain.   2.  There is no evidence of restricted diffusion to suggest any hypoxic ischemic injury.   3.  Paranasal sinus inflammatory disease.      EC-ECHOCARDIOGRAM COMPLETE W/ CONT   Final Result      DX-CHEST-PORTABLE (1 VIEW)   Final Result         1.   Hazy right lower lobe infiltrate, slightly decreased since prior study   2.  Cardiomegaly      [id number] VIEW   Final Result         1.  Nonspecific bowel gas pattern in the upper abdomen.      DX-CHEST-PORTABLE (1 VIEW)   Final Result         1.  Pulmonary edema and/or infiltrates are identified, which are stable since the prior exam.   2.  Cardiomegaly      DX-CHEST-PORTABLE (1 VIEW)   Final Result         1.  Pulmonary edema and/or infiltrates are identified, which appear somewhat increased since the prior exam.   2.  Cardiomegaly      CT-CTA NECK WITH & W/O-POST PROCESSING   Final Result         1.  CT angiogram of the neck within normal limits.   2.  Patchy bilateral upper lobe infiltrates         CT-CTA HEAD WITH & W/O-POST PROCESS   Final Result         1.  No large vessel occlusion or aneurysm identified   2.  Bilateral sinusitis changes      US-EXTREMITY VENOUS LOWER BILAT   Final Result      EC-ECHOCARDIOGRAM COMPLETE W/ CONT   Final Result      CT-ABDOMEN-PELVIS WITH   Final Result         1.  Fluid-filled prominence of proximal small bowel with distal small bowel decompression, appearance suggests ileus and/or enteritis versus evolving obstructive changes. Recommend radiographic follow-up to resolution as clinically appropriate.   2.  Irregular hepatic contour favoring changes of cirrhosis   3.  Fat-containing bilateral inguinal hernias      CT-CTA CHEST PULMONARY ARTERY W/ RECONS   Final Result         1.  Distal right main pulmonary embolus extending into right upper lobe and lower lobe segmental and subsegmental branches. 1.4 right to left ventricular ratio suggesting component of right heart strain.   2.  Evaluation of the subsegmental branches is essentially nondiagnostic due to motion artifacts. Additional imaging would be required for definitive evaluation of small distal pulmonary emboli.   3.  Scattered patchy bilateral pulmonary infiltrates.   4.  Bilateral dependent consolidations,  appearance suggests atelectasis, component of infiltrate not excluded.      These findings were discussed with the patient's clinician, Misbah Cardoza, on 4/3/2025 9:27 PM.      CT-HEAD W/O   Final Result      No acute intracranial findings.               DX-CHEST-PORTABLE (1 VIEW)   Final Result      Interval placement of a right IJ central line without evidence of complication.      DX-CHEST-PORTABLE (1 VIEW)   Final Result      1.  Support devices present.      2.  Cardiomegaly.      3.  Bibasilar atelectasis.           Assessment/Plan  * Cardiac arrest (HCC)- (present on admission)  Assessment & Plan  Cardiac arrest with CPR, defibrillation x 3 and 3 rounds epinephrine  Loaded with amiodarone  Now in sinus rhythm  Cardiology consulted, thought to be secondary to PE  Stunned decreased ejection fraction 38%, now 65% on repeat TTE  4/16 - I discussed TTE with Dr. Louie, he recommends outpatient cardiology follow up    Diarrhea  Assessment & Plan  Ongoing diarrhea since admission, unclear etiology   GI PCR negative   Consistent does not appear consistent with C.diff , is improving   Imodium prn     Hypokalemia  Assessment & Plan  Low K, on oral replacement   Monitor BMP     Acute respiratory failure with hypoxia (HCC)- (present on admission)  Assessment & Plan  Requiring intubation on admission was extubated on 4/5/2025  Secondary to cardiac arrest  Now on room air    Encephalopathy acute- (present on admission)  Assessment & Plan  Acute metabolic encephalopathy in the setting of cardiac arrest  CT head negative  EEG negative for seizure activity  Off Precedex drip   Valproic acid has been initiated for mood stabilization and increase to 500 mg TID  MRI of the brain neg for any ischemia  Likely multifactorial, possible mild hypoxic injury, delirium and likely functional component as well. Discussed with neurology   Consulted  psychiatry to help agitation/restlessness, started on seroquel overnight., Repeat EEG to  assess delirium  Continues to improve  Pending SNF    Cirrhosis of liver (HCC)  Assessment & Plan  Noted on imaging  Outpatient follow-up  Ammonia level normal     Aspiration into airway- (present on admission)  Assessment & Plan  Treated with Zosyn completed 7 days   Speech following, diet per speech    Pulmonary embolus (HCC)- (present on admission)  Assessment & Plan  Noted on CTA, distal segmental PE, not saddle   Status post IV heparin drip  He was transitioned to lovenox, now Eliquis  He has a history of multiple admissions for GI bleeds will need to be monitored closely. Had esophageal ulcer 9/2024. Order for omeprazole    Seizure-like activity (HCC)- (present on admission)  Assessment & Plan  Seizure type activity likely due to anoxia  EEG negative on early admit   Repeat EEG 4/11 showing non-specific encephalopathy, no seizure activity seen    MRI brain neg for any ischemia  Seizure and fall precautions    Ventricular fibrillation (HCC)- (present on admission)  Assessment & Plan  With defibrillation x 3  Now in sinus  Per cardiology, no ischemic evaluation necessary given clear LHC prior to MV repair last year  Repeat echo now recovered to 65%  I discussed with Dr Louie, f/u outpatient    Pain, chronic- (present on admission)  Assessment & Plan  Per wife, He had weaned himself off narcotics for 3 days prior to this event  Of note, His UDS on admission was negative as have previous other UDS despite PDMP showing regular prescribing   Has been on chronic oxycodone 10 mg 5 x daily (last fill 3/24)   Prn oxy     History of MVR with cardiopulmonary bypass  Assessment & Plan  By Dr. Santana Jan 2024  Echo reviewed Known mitral valve repair that is functioning normally with appropriate transvalvular gradient. Reduced EF and hypokinesis present likely from cardiac stunning          VTE prophylaxis:    therapeutic anticoagulation with eliquis 5 mg BID      I have performed a physical exam and reviewed and updated ROS  and Plan today (4/17/2025). In review of yesterday's note (4/16/2025), there are no changes except as documented above.

## 2025-04-17 NOTE — CARE PLAN
The patient is Stable - Low risk of patient condition declining or worsening    Shift Goals  Clinical Goals: improve mentation, increase mobility, discharge  Patient Goals: JJ  Family Goals: pain control, safe discharge plan    Progress made toward(s) clinical / shift goals:    Problem: Knowledge Deficit - Standard  Goal: Patient and family/care givers will demonstrate understanding of plan of care, disease process/condition, diagnostic tests and medications  Outcome: Progressing     Problem: Skin Integrity  Goal: Skin integrity is maintained or improved  Outcome: Progressing     Problem: Fall Risk  Goal: Patient will remain free from falls  Outcome: Progressing     Problem: Pain - Standard  Goal: Alleviation of pain or a reduction in pain to the patient’s comfort goal  Outcome: Progressing     Problem: Communication  Goal: The ability to communicate needs accurately and effectively will improve  Outcome: Progressing     Problem: Safety  Goal: Will remain free from injury  Outcome: Progressing  Goal: Will remain free from falls  Outcome: Progressing     Problem: Pain Management  Goal: Pain level will decrease to patient's comfort goal  Outcome: Progressing     Problem: Skin Integrity  Goal: Risk for impaired skin integrity will decrease  Outcome: Progressing     Problem: Mobility  Goal: Risk for activity intolerance will decrease  Outcome: Progressing     Problem: Discharge Barriers/Planning  Goal: Patient's continuum of care needs will be met  Outcome: Progressing     Problem: Hemodynamics  Goal: Patient's hemodynamics, fluid balance and neurologic status will be stable or improve  Outcome: Progressing       Patient is not progressing towards the following goals:

## 2025-04-17 NOTE — PROGRESS NOTES
Bedside report received from off going RN/tech: KARSON trujillo, assumed care of patient.     Fall Risk Score: HIGH RISK  Fall risk interventions in place: Place yellow fall risk ID band on patient, Provide patient/family education based on risk assessment, Educate patient/family to call staff for assistance when getting out of bed, Place fall precaution signage outside patient door, Place patient in room close to nursing station, Utilize bed/chair fall alarm, Notify charge of high risk for huddle, and Bed alarm connected correctly  Bed type: Low air loss (Corwin Score less than 17 interventions in place)  Patient on cardiac monitor: No   IVF/IV medications: Not Applicable   Oxygen: Room Air  Bedside sitter: Not Applicable   Isolation: Not applicable

## 2025-04-17 NOTE — PROCEDURES
INPATIENT ROUTINE VIDEO ELECTROENCEPHALOGRAM REPORT    REFERRING PROVIDER: Cornelius Bassett M.d.  DOS: 4/17/2025  STUDY DURATION: 0 hours and 29 minutes of total recording time.     INDICATION:  Roberto Braswell 44 y.o. male presenting with altered mental status    RELEVANT MEDICATIONS/TREATMENTS:   Scheduled Medications   Medication Dose Frequency    apixaban  5 mg BID    omeprazole  20 mg DAILY    divalproex  500 mg Q8HRS    gabapentin  200 mg Q EVENING    nystatin   BID    QUEtiapine  50 mg Nightly    melatonin  5 mg Nightly    Pharmacy Consult Request  1 Each PHARMACY TO DOSE    losartan  50 mg BID    metoprolol tartrate  37.5 mg Q8HRS    senna-docusate  2 Tablet Q EVENING       TECHNIQUE:   Routine VEEG was set up by a Neurodiagnostic technologist who performed education to the patient and staff. A minimum of 23 electrodes and 23 channel recording was setup and performed by Neurodiagnostic technologist, in accordance with the international 10-20 system. The study was reviewed in bipolar and referential montages. The recording examined the patient in the  awake state(s).     DESCRIPTION OF THE RECORD:  During wakefulness, the background was continuous and showed a 6.5 Hz posterior dominant rhythm.  There was reactivity to eye closure/opening.  An anterior-posterior gradient was noted with faster frequencies seen anteriorly.      EEG Sleep: N2 sleep architecture was not seen.    ICTAL AND INTERICTAL FINDINGS:   No focal or generalized epileptiform activity noted.     No regional slowing or persistent focal asymmetries were seen.    No seizures.     ACTIVATION PROCEDURES:   Intermittent Photic stimulation was performed in a stepwise fashion from 1 to 30 Hz and did not elicited any abnormalities on EEG.     EKG: Sampling of the EKG recording showed sinus rhythm    EVENTS:  None    INTERPRETATION:   Abnormal video EEG recording in the awake state(s):  - Mild continuous generalized slowing of the background suggestive  of mild nonspecific cerebral dysfunction.  - No regional slowing or persistent focal asymmetries were seen.  - No epileptiform discharges were seen.  - No seizures.       Note: This EEG does not rule out the possibility of seizures or exclude a diagnosis of epilepsy.  If the clinical suspicion remains high for seizures, a prolonged video EEG recording to capture clinical or subclinical events may be helpful.    Kate Cantor MD  Department of Neurology at [facility]  General Neurologist and Epileptologist   of Clinical Neurology, [facility].

## 2025-04-17 NOTE — PROGRESS NOTES
Patient is tolerating regular diet, with a good appetite, and eating independently. No indications for a speech evaluation noted.  Patient has been up to chair for all meals, standby assist for mobility.

## 2025-04-17 NOTE — DISCHARGE INSTR - CASE MGT
[facility]    sent a referral.  Follow up upon DC to set up appointment for rehab services.   [address], [id number], [place] [address] · [address]  [phone]

## 2025-04-18 PROCEDURE — 770001 HCHG ROOM/CARE - MED/SURG/GYN PRIV*

## 2025-04-18 PROCEDURE — A9270 NON-COVERED ITEM OR SERVICE: HCPCS | Performed by: STUDENT IN AN ORGANIZED HEALTH CARE EDUCATION/TRAINING PROGRAM

## 2025-04-18 PROCEDURE — 97530 THERAPEUTIC ACTIVITIES: CPT

## 2025-04-18 PROCEDURE — 700102 HCHG RX REV CODE 250 W/ 637 OVERRIDE(OP): Performed by: INTERNAL MEDICINE

## 2025-04-18 PROCEDURE — 99232 SBSQ HOSP IP/OBS MODERATE 35: CPT | Performed by: INTERNAL MEDICINE

## 2025-04-18 PROCEDURE — A9270 NON-COVERED ITEM OR SERVICE: HCPCS | Performed by: INTERNAL MEDICINE

## 2025-04-18 PROCEDURE — 700102 HCHG RX REV CODE 250 W/ 637 OVERRIDE(OP): Performed by: NURSE PRACTITIONER

## 2025-04-18 PROCEDURE — A9270 NON-COVERED ITEM OR SERVICE: HCPCS | Performed by: NURSE PRACTITIONER

## 2025-04-18 PROCEDURE — 700102 HCHG RX REV CODE 250 W/ 637 OVERRIDE(OP): Performed by: STUDENT IN AN ORGANIZED HEALTH CARE EDUCATION/TRAINING PROGRAM

## 2025-04-18 PROCEDURE — A9270 NON-COVERED ITEM OR SERVICE: HCPCS | Performed by: HOSPITALIST

## 2025-04-18 PROCEDURE — 700102 HCHG RX REV CODE 250 W/ 637 OVERRIDE(OP): Performed by: HOSPITALIST

## 2025-04-18 RX ADMIN — METOPROLOL TARTRATE 37.5 MG: 25 TABLET, FILM COATED ORAL at 13:44

## 2025-04-18 RX ADMIN — OXYCODONE HYDROCHLORIDE 10 MG: 10 TABLET ORAL at 04:06

## 2025-04-18 RX ADMIN — DIVALPROEX SODIUM 500 MG: 500 TABLET, DELAYED RELEASE ORAL at 13:44

## 2025-04-18 RX ADMIN — OMEPRAZOLE 20 MG: 20 CAPSULE, DELAYED RELEASE ORAL at 04:06

## 2025-04-18 RX ADMIN — OXYCODONE HYDROCHLORIDE 10 MG: 10 TABLET ORAL at 12:00

## 2025-04-18 RX ADMIN — APIXABAN 5 MG: 5 TABLET, FILM COATED ORAL at 04:06

## 2025-04-18 RX ADMIN — LOSARTAN POTASSIUM 50 MG: 50 TABLET, FILM COATED ORAL at 04:06

## 2025-04-18 RX ADMIN — GABAPENTIN 200 MG: 100 CAPSULE ORAL at 17:44

## 2025-04-18 RX ADMIN — NYSTATIN: 100000 POWDER TOPICAL at 06:00

## 2025-04-18 RX ADMIN — DIVALPROEX SODIUM 500 MG: 500 TABLET, DELAYED RELEASE ORAL at 04:06

## 2025-04-18 RX ADMIN — QUETIAPINE FUMARATE 50 MG: 25 TABLET ORAL at 22:15

## 2025-04-18 RX ADMIN — DIVALPROEX SODIUM 500 MG: 500 TABLET, DELAYED RELEASE ORAL at 22:16

## 2025-04-18 RX ADMIN — APIXABAN 5 MG: 5 TABLET, FILM COATED ORAL at 17:44

## 2025-04-18 RX ADMIN — OXYCODONE HYDROCHLORIDE 10 MG: 10 TABLET ORAL at 08:26

## 2025-04-18 RX ADMIN — METOPROLOL TARTRATE 37.5 MG: 25 TABLET, FILM COATED ORAL at 22:15

## 2025-04-18 RX ADMIN — OXYCODONE HYDROCHLORIDE 10 MG: 10 TABLET ORAL at 22:16

## 2025-04-18 RX ADMIN — METOPROLOL TARTRATE 37.5 MG: 25 TABLET, FILM COATED ORAL at 04:06

## 2025-04-18 RX ADMIN — Medication 5 MG: at 22:16

## 2025-04-18 RX ADMIN — OXYCODONE HYDROCHLORIDE 10 MG: 10 TABLET ORAL at 17:56

## 2025-04-18 ASSESSMENT — ENCOUNTER SYMPTOMS
DIARRHEA: 0
NAUSEA: 0
ABDOMINAL PAIN: 1
MEMORY LOSS: 1
VOMITING: 0
MYALGIAS: 1

## 2025-04-18 ASSESSMENT — GAIT ASSESSMENTS
ASSISTIVE DEVICE: FRONT WHEEL WALKER;NONE
GAIT LEVEL OF ASSIST: STANDBY ASSIST
DISTANCE (FEET): 350

## 2025-04-18 ASSESSMENT — PAIN DESCRIPTION - PAIN TYPE
TYPE: ACUTE PAIN

## 2025-04-18 ASSESSMENT — COGNITIVE AND FUNCTIONAL STATUS - GENERAL
STANDING UP FROM CHAIR USING ARMS: A LITTLE
MOVING FROM LYING ON BACK TO SITTING ON SIDE OF FLAT BED: A LITTLE
MOBILITY SCORE: 18
CLIMB 3 TO 5 STEPS WITH RAILING: A LITTLE
TURNING FROM BACK TO SIDE WHILE IN FLAT BAD: A LITTLE
MOVING TO AND FROM BED TO CHAIR: A LITTLE
WALKING IN HOSPITAL ROOM: A LITTLE
SUGGESTED CMS G CODE MODIFIER MOBILITY: CK

## 2025-04-18 ASSESSMENT — FIBROSIS 4 INDEX: FIB4 SCORE: 0.33

## 2025-04-18 NOTE — CARE PLAN
The patient is Stable - Low risk of patient condition declining or worsening    Shift Goals  Clinical Goals: Improve mentation  Patient Goals: Go home  Family Goals: Pain control    Progress made toward(s) clinical / shift goals:    Problem: Skin Integrity  Goal: Skin integrity is maintained or improved  Description: Target End Date:  Prior to discharge or change in level of careDocument interventions on Skin Risk/Corwin flowsheet groups and corresponding LDA1.  Assess and monitor skin integrity, appearance and/or temperature2.  Assess risk factors for impaired skin integrity and/or pressures ulcers3.  Implement precautions to protect skin integrity in collaboration with interdisciplinary team4.  Implement pressure ulcer prevention protocol if at risk for skin breakdown5.  Confirm wound care consult if at risk for skin breakdown6.  Ensure patient use of pressure relieving devices  (Low air loss bed, waffle overlay, heel protectors, ROHO cushion, etc)  Outcome: Progressing     Problem: Fall Risk  Goal: Patient will remain free from falls  Description: Target End Date:  Prior to discharge or change in level of careDocument interventions on the Verito Galdamez Fall Risk Assessment1.  Assess for fall risk factors2.  Implement fall precautions  Outcome: Progressing       Patient is not progressing towards the following goals:

## 2025-04-18 NOTE — PROGRESS NOTES
Bedside report received from off going RNYisel     Fall Risk Score: HIGH RISK  Fall risk interventions in place: Provide patient/family education based on risk assessment, Educate patient/family to call staff for assistance when getting out of bed, Place patient in room close to nursing station, and Utilize bed/chair fall alarm  Bed type: Low air loss (Corwin Score less than 17 interventions in place)  Patient on cardiac monitor: No   IVF/IV medications: Not Applicable   Oxygen: Room Air  Bedside sitter: Not Applicable   Isolation: Not applicable

## 2025-04-18 NOTE — CARE PLAN
The patient is Watcher - Medium risk of patient condition declining or worsening    Shift Goals  Clinical Goals: Improve neuro status  Patient Goals: Comfort, rest  Family Goals: updates POC    Progress made toward(s) clinical / shift goals: Ambulation, Bed alarm on, bed locked in lowest position, reinforce falls education and call light use      Problem: Knowledge Deficit - Standard  Goal: Patient and family/care givers will demonstrate understanding of plan of care, disease process/condition, diagnostic tests and medications  Outcome: Progressing     Problem: Skin Integrity  Goal: Skin integrity is maintained or improved  Outcome: Progressing     Problem: Fall Risk  Goal: Patient will remain free from falls  Outcome: Progressing     Problem: Pain - Standard  Goal: Alleviation of pain or a reduction in pain to the patient’s comfort goal  Outcome: Progressing       Patient is not progressing towards the following goals:

## 2025-04-18 NOTE — DISCHARGE PLANNING
-[phone]  DPA notified Danica at [facility] that peer to peer has been completed and facility may need to contact insurance for auth again. Danica agreeable and informed DPA that facility's liaison will be visiting pt/spouse today.

## 2025-04-18 NOTE — PROGRESS NOTES
Hospital Medicine Daily Progress Note    Date of Service  4/18/2025    Chief Complaint  Roberto Braswell is a 44 y.o. male admitted 4/3/2025 with cardiac arrest    Hospital Course  This is a 44 y.o. male here with history of mitral valve repair, HTN, prior GI bleeding, chronic pain who had witnessed cardiac arrest by his wife who initiated CPR prior to EMS arrival and was found in v-fib. He underwent 3 defibrillations and 4 rounds of ACLS with 4 doses of epinephrine and after around 12 minutes had return of spontaneous circulation with a narrow perfusing rhythm. He was intubated and admitted to ICU. Patient has prior prescriptions for oxycodone and ambien. Urine drug screen was positive for benzodiazepines. CTA chest showed right main PE, and TTE showed 38%.  CTA head and neck were negative for large occlusions. LE doppler negative for DVT. He was started on anticoagulation. Cardiology was consulted and cardiac arrest likely due to PE and cardiomyopathy likely stunting from cardiac arrest. They did not recommend cardiac cath given he had normal cath at the time of his mitral valve repair. He was given IV amiodarone. He was treated for aspiration pneumonitis, tracheal aspirate grew MSSA. He was extubated 4/5 to a Precedex drip as he had agitation and encephalopathy. He had 2 EEGs that were negative for seizure. CT showed nodular liver concerning for cirrhosis, ammonia was normal. Neurology and psychiatry were consulted for anoxic brain injury vs functional neurologic disorder. He was started on depakote, seroquel and melatonin, his mentation did improve. He continues to have cognitive impairment. His MRI brain was negative for any acute findings and no hypoxic ischemic injury. Follow up TTE also showed improvement of his EF to 65%, which I discussed with Dr. Louie and he recommends outpatient cardiology followup.   PTOT recommended further inpatient therapy. He was declined by [facility] acute rehab. He was accepted by  [facility]    Interval Problem Update  He is sleeping ok, feeding himself. EEG with mild background slowing  Did peer to peer and SNF has been approved    I have discussed this patient's plan of care and discharge plan at IDT rounds today with Case Management, Nursing, Nursing leadership, and other members of the IDT team.    Consultants/Specialty  cardiology, critical care, and neurology, psychiatry    Code Status  Full Code    Disposition  The patient is medically cleared for discharge to home or a post-acute facility.  Anticipate discharge to: skilled nursing facility    I have placed the appropriate orders for post-discharge needs.    Review of Systems  Review of Systems   Constitutional:  Positive for malaise/fatigue.   Gastrointestinal:  Positive for abdominal pain. Negative for diarrhea, nausea and vomiting.   Musculoskeletal:  Positive for joint pain (both shoulders) and myalgias.   Psychiatric/Behavioral:  Positive for memory loss.         Physical Exam  Temp:  [36.2 °C (97.2 °F)-36.8 °C (98.2 °F)] 36.7 °C (98.1 °F)  Pulse:  [71-99] 71  Resp:  [18] 18  BP: ()/(62-75) 120/62  SpO2:  [90 %-97 %] 93 %    Physical Exam  Vitals and nursing note reviewed.   Constitutional:       General: He is not in acute distress.     Appearance: He is not toxic-appearing.   HENT:      Head: Normocephalic.      Mouth/Throat:      Mouth: Mucous membranes are moist.   Eyes:      General:         Right eye: No discharge.         Left eye: No discharge.   Cardiovascular:      Rate and Rhythm: Normal rate and regular rhythm.   Pulmonary:      Effort: Pulmonary effort is normal. No respiratory distress.      Breath sounds: No wheezing.   Abdominal:      General: There is no distension.   Musculoskeletal:         General: No swelling.      Cervical back: Neck supple.      Comments: Pain with passive ROM of shoulders   Skin:     General: Skin is warm and dry.   Neurological:      Mental Status: He is alert and oriented to person,  place, and time.      Comments: Oriented to self and hospital         Fluids    Intake/Output Summary (Last 24 hours) at 4/18/2025 1513  Last data filed at 4/18/2025 0352  Gross per 24 hour   Intake 240 ml   Output 300 ml   Net -60 ml        Laboratory        Recent Labs     04/16/25  0808   SODIUM 145   POTASSIUM 3.9   CHLORIDE 111   CO2 21   GLUCOSE 90   BUN 11   CREATININE 0.95   CALCIUM 9.2                   Imaging  DX-CHEST-PORTABLE (1 VIEW)   Final Result      1.  Hypoinflation without other evidence for acute cardiopulmonary disease.   2.  Prior open heart surgery.      MR-BRAIN-W/O   Final Result      1.  Unremarkable noncontrast MR examination of the brain.   2.  There is no evidence of restricted diffusion to suggest any hypoxic ischemic injury.   3.  Paranasal sinus inflammatory disease.      EC-ECHOCARDIOGRAM COMPLETE W/ CONT   Final Result      DX-CHEST-PORTABLE (1 VIEW)   Final Result         1.  Hazy right lower lobe infiltrate, slightly decreased since prior study   2.  Cardiomegaly      [id number]-2 VIEW   Final Result         1.  Nonspecific bowel gas pattern in the upper abdomen.      DX-CHEST-PORTABLE (1 VIEW)   Final Result         1.  Pulmonary edema and/or infiltrates are identified, which are stable since the prior exam.   2.  Cardiomegaly      DX-CHEST-PORTABLE (1 VIEW)   Final Result         1.  Pulmonary edema and/or infiltrates are identified, which appear somewhat increased since the prior exam.   2.  Cardiomegaly      CT-CTA NECK WITH & W/O-POST PROCESSING   Final Result         1.  CT angiogram of the neck within normal limits.   2.  Patchy bilateral upper lobe infiltrates         CT-CTA HEAD WITH & W/O-POST PROCESS   Final Result         1.  No large vessel occlusion or aneurysm identified   2.  Bilateral sinusitis changes      US-EXTREMITY VENOUS LOWER BILAT   Final Result      EC-ECHOCARDIOGRAM COMPLETE W/ CONT   Final Result      CT-ABDOMEN-PELVIS WITH   Final Result         1.   Fluid-filled prominence of proximal small bowel with distal small bowel decompression, appearance suggests ileus and/or enteritis versus evolving obstructive changes. Recommend radiographic follow-up to resolution as clinically appropriate.   2.  Irregular hepatic contour favoring changes of cirrhosis   3.  Fat-containing bilateral inguinal hernias      CT-CTA CHEST PULMONARY ARTERY W/ RECONS   Final Result         1.  Distal right main pulmonary embolus extending into right upper lobe and lower lobe segmental and subsegmental branches. 1.4 right to left ventricular ratio suggesting component of right heart strain.   2.  Evaluation of the subsegmental branches is essentially nondiagnostic due to motion artifacts. Additional imaging would be required for definitive evaluation of small distal pulmonary emboli.   3.  Scattered patchy bilateral pulmonary infiltrates.   4.  Bilateral dependent consolidations, appearance suggests atelectasis, component of infiltrate not excluded.      These findings were discussed with the patient's clinician, Misbah Cardoza, on 4/3/2025 9:27 PM.      CT-HEAD W/O   Final Result      No acute intracranial findings.               DX-CHEST-PORTABLE (1 VIEW)   Final Result      Interval placement of a right IJ central line without evidence of complication.      DX-CHEST-PORTABLE (1 VIEW)   Final Result      1.  Support devices present.      2.  Cardiomegaly.      3.  Bibasilar atelectasis.           Assessment/Plan  * Cardiac arrest (HCC)- (present on admission)  Assessment & Plan  Cardiac arrest with CPR, defibrillation x 3 and 3 rounds epinephrine  Loaded with amiodarone  Now in sinus rhythm  Cardiology consulted, thought to be secondary to PE  Stunned decreased ejection fraction 38%, now 65% on repeat TTE  4/16 - I discussed TTE with Dr. Louie, he recommends outpatient cardiology follow up    Diarrhea  Assessment & Plan  Ongoing diarrhea since admission, unclear etiology   GI PCR negative    Consistent does not appear consistent with C.diff , is improving   Imodium prn     Hypokalemia  Assessment & Plan  Low K, on oral replacement   Monitor BMP     Acute respiratory failure with hypoxia (HCC)- (present on admission)  Assessment & Plan  Requiring intubation on admission was extubated on 4/5/2025  Secondary to cardiac arrest  Now on room air    Encephalopathy acute- (present on admission)  Assessment & Plan  Acute metabolic encephalopathy in the setting of cardiac arrest  CT head negative  EEG negative for seizure activity  Off Precedex drip   Valproic acid has been initiated for mood stabilization and increase to 500 mg TID  MRI of the brain neg for any ischemia  Likely multifactorial, possible mild hypoxic injury, delirium and likely functional component as well. Discussed with neurology   Consulted  psychiatry to help agitation/restlessness, started on seroquel overnight., Repeat EEG showed mild background slwoing  Continues to improve  Pending SNF    Cirrhosis of liver (HCC)  Assessment & Plan  Noted on imaging  Outpatient follow-up  Ammonia level normal     Aspiration into airway- (present on admission)  Assessment & Plan  Treated with Zosyn completed 7 days   Speech following, diet per speech    Pulmonary embolus (HCC)- (present on admission)  Assessment & Plan  Noted on CTA, distal segmental PE, not saddle   Status post IV heparin drip  He was transitioned to lovenox, now Eliquis  He has a history of multiple admissions for GI bleeds will need to be monitored closely. Had esophageal ulcer 9/2024. Order for omeprazole    Seizure-like activity (HCC)- (present on admission)  Assessment & Plan  Seizure type activity likely due to anoxia  EEG negative on early admit   Repeat EEG 4/11 showing non-specific encephalopathy, no seizure activity seen    MRI brain neg for any ischemia  Seizure and fall precautions    Ventricular fibrillation (HCC)- (present on admission)  Assessment & Plan  With  defibrillation x 3  Now in sinus  Per cardiology, no ischemic evaluation necessary given clear LHC prior to MV repair last year  Repeat echo now recovered to 65%  I discussed with Dr Louie, f/u outpatient    Pain, chronic- (present on admission)  Assessment & Plan  Per wife, He had weaned himself off narcotics for 3 days prior to this event  Of note, His UDS on admission was negative as have previous other UDS despite PDMP showing regular prescribing   Has been on chronic oxycodone 10 mg 5 x daily (last fill 3/24)   Prn oxy     History of MVR with cardiopulmonary bypass  Assessment & Plan  By Dr. Santana Jan 2024  Echo reviewed Known mitral valve repair that is functioning normally with appropriate transvalvular gradient. Reduced EF and hypokinesis present likely from cardiac stunning          VTE prophylaxis:    therapeutic anticoagulation with eliquis 5 mg BID      I have performed a physical exam and reviewed and updated ROS and Plan today (4/18/2025). In review of yesterday's note (4/17/2025), there are no changes except as documented above.

## 2025-04-18 NOTE — THERAPY
Physical Therapy   Daily Treatment     Patient Name: Roberto Braswell  Age:  44 y.o., Sex:  male  Medical Record #: [id number]  Today's Date: 4/18/2025     Precautions  Precautions: Fall Risk;Swallow Precautions  Comments: encephalopathic    Assessment    Patient is progressing with functional mobility. He mobilized as detailed below; was able to progress from FWW to no AD during ambulation. He remains limited by impaired cognition and decreased insight and safety awareness. Will continue to follow.    Plan    Treatment Plan Status: Continue Current Treatment Plan  Type of Treatment: Bed Mobility, Equipment, Family / Caregiver Training, Gait Training, Manual Therapy, Neuro Re-Education / Balance, Self Care / Home Evaluation, Stair Training, Therapeutic Activities, Therapeutic Exercise  Treatment Frequency: 4 Times per Week  Treatment Duration: Until Therapy Goals Met    DC Equipment Recommendations: Unable to determine at this time  Discharge Recommendations: Recommend post-acute placement for additional physical therapy services prior to discharge home (vs home with 24/7 supervision)      Subjective    Patient received in bed and agreeable to treatment     Objective       04/18/25 1139   Vitals   O2 (LPM) 0   O2 Delivery Device None - Room Air   Pain 0 - 10 Group   Therapist Pain Assessment   (no pain complaint)   Cognition    Cognition / Consciousness X   Level of Consciousness Alert   Ability To Follow Commands 1 Step   Safety Awareness Impaired   New Learning Impaired   Attention Impaired   Sequencing Impaired   Initiation Impaired   Comments pleasant, cooperative. joking throughout. gross delay and difficulty remembering things (STM and LTM)   Active ROM Lower Body    Comments functional for mobility   Strength Lower Body   Comments functional for mobility   Balance   Sitting Balance (Static) Fair +   Sitting Balance (Dynamic) Fair +   Standing Balance (Static) Fair   Standing Balance (Dynamic) Fair   Weight  Shift Sitting Good   Weight Shift Standing Good   Skilled Intervention Verbal Cuing;Compensatory Strategies;Sequencing   Comments no LOB. used FWW then no AD during standing activity   Bed Mobility    Supine to Sit Standby Assist   Sit to Supine Standby Assist   Scooting Standby Assist   Skilled Intervention Verbal Cuing;Compensatory Strategies;Sequencing   Gait Analysis   Gait Level Of Assist Standby Assist   Assistive Device Front Wheel Walker;None   Distance (Feet) 350   # of Times Distance was Traveled 1   Deviation   (inconsistent MARC and step length. improved awareness of obstacles, no collisions)   # of Stairs Climbed 0   Weight Bearing Status no restrictions   Vision Deficits Impacting Mobility NT   Skilled Intervention Verbal Cuing;Compensatory Strategies;Sequencing   Functional Mobility   Sit to Stand Contact Guard Assist   Bed, Chair, Wheelchair Transfer Contact Guard Assist   Transfer Method Stand Step   Skilled Intervention Verbal Cuing;Compensatory Strategies;Sequencing   6 Clicks Assessment - How much HELP from from another person do you currently need... (If the patient hasn't done an activity recently, how much help from another person do you think he/she would need if he/she tried?)   Turning from your back to your side while in a flat bed without using bedrails? 3   Moving from lying on your back to sitting on the side of a flat bed without using bedrails? 3   Moving to and from a bed to a chair (including a wheelchair)? 3   Standing up from a chair using your arms (e.g., wheelchair, or bedside chair)? 3   Walking in hospital room? 3   Climbing 3-5 steps with a railing? 3   6 clicks Mobility Score 18   Short Term Goals    Short Term Goal # 1 Pt will perform supine <> sit with [place] in 6 visits to get in/out of bed   Goal Outcome # 1 Goal met   Short Term Goal # 2 Pt will perform stand step transfers with FWW and Abdon in 6 visits to get in/out of chair   Goal Outcome # 2 Goal met   Short Term Goal #  2 B  Pt will perform all transfers with supervision in 6 visits to improve functional indep.   Goal Outcome # 2 B Progressing as expected   Short Term Goal # 3 Pt will ambulate 15ft with FWW and ModA in 6 visits to access home environment   Goal Outcome # 3 Goal met   Short Term Goal # 3 B Pt will ambulate x 200 feet using fWW vs no AD with supervision in 6 visits to improve functional indep.   Goal Outcome # 3 B Progressing as expected

## 2025-04-18 NOTE — DISCHARGE PLANNING
"Case Management Discharge Planning    Admission Date: 4/3/2025  GMLOS: 2  ALOS: 15    6-Clicks ADL Score: 18  6-Clicks Mobility Score: 18      Anticipated Discharge Dispo: Discharge Disposition: D/T to SNF with Medicare cert in anticipation of skilled care (03)    DME Needed: Pending hospital course     Action(s) Taken: LMSW collaborated with MD. MD stated that she received the phone call for the peer to peer and it went well. LMSW collaborated with DPA to notify [facility] that the peer to peer has been done. DPA notified [facility] and per [facility] they are going to send their Liaison over to speak with wife to help clarify any concerns they have about the facility.       1531 LMSW met with Sylvie from [facility] and she stated that the conversation with the spouse about going to [facility] went very well and they [facility] is willing to provide pt with a private room in order for spouse to stay with pt while receiving rehab.     1534 LMSW rounded with pt to discuss how the conversation with sylvie from [facility]. Spouse was not in the room and pt stated that she left and does not know when she will be back. LMSW asked pt if they ended up deciding on going to [facility] and pt stated \"I do not know she's spoke with her\". DC plan is to go to [facility] when they have a private room. [facility] does not have a private room today.    Escalations Completed: None    Medically Clear: Yes    Next Steps: f/u with insurance auth     Barriers to Discharge: Pending Placement    Is the patient up for discharge tomorrow: Yes    Is transport arranged for discharge disposition: No        "

## 2025-04-19 ENCOUNTER — APPOINTMENT (OUTPATIENT)
Dept: RADIOLOGY | Facility: MEDICAL CENTER | Age: 45
DRG: 208 | End: 2025-04-19
Attending: INTERNAL MEDICINE
Payer: COMMERCIAL

## 2025-04-19 LAB — LIPASE SERPL-CCNC: 57 U/L (ref 11–82)

## 2025-04-19 PROCEDURE — 700117 HCHG RX CONTRAST REV CODE 255: Performed by: INTERNAL MEDICINE

## 2025-04-19 PROCEDURE — 83690 ASSAY OF LIPASE: CPT

## 2025-04-19 PROCEDURE — 770001 HCHG ROOM/CARE - MED/SURG/GYN PRIV*

## 2025-04-19 PROCEDURE — A9270 NON-COVERED ITEM OR SERVICE: HCPCS | Performed by: NURSE PRACTITIONER

## 2025-04-19 PROCEDURE — 700102 HCHG RX REV CODE 250 W/ 637 OVERRIDE(OP): Performed by: NURSE PRACTITIONER

## 2025-04-19 PROCEDURE — 99232 SBSQ HOSP IP/OBS MODERATE 35: CPT | Performed by: INTERNAL MEDICINE

## 2025-04-19 PROCEDURE — A9270 NON-COVERED ITEM OR SERVICE: HCPCS | Performed by: STUDENT IN AN ORGANIZED HEALTH CARE EDUCATION/TRAINING PROGRAM

## 2025-04-19 PROCEDURE — 36415 COLL VENOUS BLD VENIPUNCTURE: CPT

## 2025-04-19 PROCEDURE — 74177 CT ABD & PELVIS W/CONTRAST: CPT

## 2025-04-19 PROCEDURE — 700102 HCHG RX REV CODE 250 W/ 637 OVERRIDE(OP): Performed by: STUDENT IN AN ORGANIZED HEALTH CARE EDUCATION/TRAINING PROGRAM

## 2025-04-19 PROCEDURE — A9270 NON-COVERED ITEM OR SERVICE: HCPCS | Performed by: HOSPITALIST

## 2025-04-19 PROCEDURE — 700102 HCHG RX REV CODE 250 W/ 637 OVERRIDE(OP): Performed by: INTERNAL MEDICINE

## 2025-04-19 PROCEDURE — 700102 HCHG RX REV CODE 250 W/ 637 OVERRIDE(OP): Performed by: HOSPITALIST

## 2025-04-19 PROCEDURE — A9270 NON-COVERED ITEM OR SERVICE: HCPCS | Performed by: INTERNAL MEDICINE

## 2025-04-19 RX ORDER — OMEPRAZOLE 20 MG/1
20 CAPSULE, DELAYED RELEASE ORAL 2 TIMES DAILY
Status: DISCONTINUED | OUTPATIENT
Start: 2025-04-19 | End: 2025-04-21 | Stop reason: HOSPADM

## 2025-04-19 RX ORDER — SUCRALFATE ORAL 1 G/10ML
1 SUSPENSION ORAL
Status: DISCONTINUED | OUTPATIENT
Start: 2025-04-19 | End: 2025-04-21 | Stop reason: HOSPADM

## 2025-04-19 RX ADMIN — METOPROLOL TARTRATE 37.5 MG: 25 TABLET, FILM COATED ORAL at 12:54

## 2025-04-19 RX ADMIN — IOHEXOL 100 ML: 350 INJECTION, SOLUTION INTRAVENOUS at 16:10

## 2025-04-19 RX ADMIN — DIVALPROEX SODIUM 500 MG: 500 TABLET, DELAYED RELEASE ORAL at 04:08

## 2025-04-19 RX ADMIN — QUETIAPINE FUMARATE 25 MG: 25 TABLET ORAL at 01:26

## 2025-04-19 RX ADMIN — SUCRALFATE ORAL SUSPENSION 1 G: 1 SUSPENSION ORAL at 21:33

## 2025-04-19 RX ADMIN — OMEPRAZOLE 20 MG: 20 CAPSULE, DELAYED RELEASE ORAL at 17:15

## 2025-04-19 RX ADMIN — OXYCODONE HYDROCHLORIDE 10 MG: 10 TABLET ORAL at 07:44

## 2025-04-19 RX ADMIN — APIXABAN 5 MG: 5 TABLET, FILM COATED ORAL at 17:15

## 2025-04-19 RX ADMIN — GABAPENTIN 200 MG: 100 CAPSULE ORAL at 17:16

## 2025-04-19 RX ADMIN — QUETIAPINE FUMARATE 50 MG: 25 TABLET ORAL at 21:33

## 2025-04-19 RX ADMIN — DIVALPROEX SODIUM 500 MG: 500 TABLET, DELAYED RELEASE ORAL at 21:33

## 2025-04-19 RX ADMIN — DIVALPROEX SODIUM 500 MG: 500 TABLET, DELAYED RELEASE ORAL at 12:55

## 2025-04-19 RX ADMIN — Medication 5 MG: at 21:33

## 2025-04-19 RX ADMIN — OXYCODONE HYDROCHLORIDE 10 MG: 10 TABLET ORAL at 17:15

## 2025-04-19 RX ADMIN — SUCRALFATE ORAL SUSPENSION 1 G: 1 SUSPENSION ORAL at 17:15

## 2025-04-19 RX ADMIN — METOPROLOL TARTRATE 37.5 MG: 25 TABLET, FILM COATED ORAL at 21:33

## 2025-04-19 RX ADMIN — OXYCODONE HYDROCHLORIDE 10 MG: 10 TABLET ORAL at 12:53

## 2025-04-19 ASSESSMENT — PAIN DESCRIPTION - PAIN TYPE
TYPE: ACUTE PAIN

## 2025-04-19 ASSESSMENT — ENCOUNTER SYMPTOMS
VOMITING: 0
DIARRHEA: 0
ABDOMINAL PAIN: 1
MYALGIAS: 1
NAUSEA: 0
MEMORY LOSS: 1

## 2025-04-19 ASSESSMENT — FIBROSIS 4 INDEX
FIB4 SCORE: 0.33
FIB4 SCORE: 0.33

## 2025-04-19 NOTE — CARE PLAN
The patient is Stable - Low risk of patient condition declining or worsening    Shift Goals  Clinical Goals: Monitor neuro status  Patient Goals: Rest, sleep, pain control  Family Goals: updates POC    Progress made toward(s) clinical / shift goals:    Problem: Skin Integrity  Goal: Skin integrity is maintained or improved  Description: Target End Date:  Prior to discharge or change in level of careDocument interventions on Skin Risk/Corwin flowsheet groups and corresponding LDA1.  Assess and monitor skin integrity, appearance and/or temperature2.  Assess risk factors for impaired skin integrity and/or pressures ulcers3.  Implement precautions to protect skin integrity in collaboration with interdisciplinary team4.  Implement pressure ulcer prevention protocol if at risk for skin breakdown5.  Confirm wound care consult if at risk for skin breakdown6.  Ensure patient use of pressure relieving devices  (Low air loss bed, waffle overlay, heel protectors, ROHO cushion, etc)  Outcome: Progressing     Problem: Fall Risk  Goal: Patient will remain free from falls  Description: Target End Date:  Prior to discharge or change in level of careDocument interventions on the Sellers Rudolph Fall Risk Assessment1.  Assess for fall risk factors2.  Implement fall precautions  Outcome: Progressing     Problem: Pain Management  Goal: Pain level will decrease to patient's comfort goal  Outcome: Progressing       Patient is not progressing towards the following goals:

## 2025-04-19 NOTE — PROGRESS NOTES
Hospital Medicine Daily Progress Note    Date of Service  4/19/2025    Chief Complaint  Roberto Braswell is a 44 y.o. male admitted 4/3/2025 with cardiac arrest    Hospital Course  This is a 44 y.o. male here with history of mitral valve repair, HTN, prior GI bleeding, chronic pain who had witnessed cardiac arrest by his wife who initiated CPR prior to EMS arrival and was found in v-fib. He underwent 3 defibrillations and 4 rounds of ACLS with 4 doses of epinephrine and after around 12 minutes had return of spontaneous circulation with a narrow perfusing rhythm. He was intubated and admitted to ICU. Patient has prior prescriptions for oxycodone and ambien. Urine drug screen was positive for benzodiazepines. CTA chest showed right main PE, and TTE showed 38%.  CTA head and neck were negative for large occlusions. LE doppler negative for DVT. He was started on anticoagulation. Cardiology was consulted and cardiac arrest likely due to PE and cardiomyopathy likely stunting from cardiac arrest. They did not recommend cardiac cath given he had normal cath at the time of his mitral valve repair. He was given IV amiodarone. He was treated for aspiration pneumonitis, tracheal aspirate grew MSSA. He was extubated 4/5 to a Precedex drip as he had agitation and encephalopathy. He had 2 EEGs that were negative for seizure. CT showed nodular liver concerning for cirrhosis, ammonia was normal. Neurology and psychiatry were consulted for anoxic brain injury vs functional neurologic disorder. He was started on depakote, seroquel and melatonin, his mentation did improve. He continues to have cognitive impairment. His MRI brain was negative for any acute findings and no hypoxic ischemic injury. Follow up TTE also showed improvement of his EF to 65%, which I discussed with Dr. Louie and he recommends outpatient cardiology followup.   PTOT recommended further inpatient therapy. He was declined by [facility] acute rehab. He was accepted by  [facility]    Interval Problem Update  He has ongoing abd pain. Doesn't seem to correlate with eating, GI cocktail did not help. Havign BM, solid. Denies nausea or difficulty eating  SNF bed pending    I have discussed this patient's plan of care and discharge plan at IDT rounds today with Case Management, Nursing, Nursing leadership, and other members of the IDT team.    Consultants/Specialty  cardiology, critical care, and neurology, psychiatry    Code Status  Full Code    Disposition  The patient is medically cleared for discharge to home or a post-acute facility.  Anticipate discharge to: skilled nursing facility    I have placed the appropriate orders for post-discharge needs.    Review of Systems  Review of Systems   Constitutional:  Positive for malaise/fatigue.   Cardiovascular:  Positive for chest pain.   Gastrointestinal:  Positive for abdominal pain. Negative for diarrhea, nausea and vomiting.   Musculoskeletal:  Positive for myalgias.   Psychiatric/Behavioral:  Positive for memory loss.         Physical Exam  Temp:  [36.3 °C (97.4 °F)-36.7 °C (98.1 °F)] 36.3 °C (97.4 °F)  Pulse:  [69-87] 69  Resp:  [16-18] 18  BP: (112-127)/(53-80) 114/53  SpO2:  [93 %-98 %] 98 %    Physical Exam  Vitals and nursing note reviewed.   Constitutional:       General: He is not in acute distress.     Appearance: He is not toxic-appearing.      Comments: Calm, cooperative   HENT:      Head: Normocephalic.      Mouth/Throat:      Mouth: Mucous membranes are moist.   Eyes:      General:         Right eye: No discharge.         Left eye: No discharge.   Cardiovascular:      Rate and Rhythm: Normal rate and regular rhythm.   Pulmonary:      Effort: Pulmonary effort is normal. No respiratory distress.      Breath sounds: No wheezing.   Abdominal:      Palpations: Abdomen is soft.      Tenderness: There is abdominal tenderness (right sided). There is no guarding or rebound.   Musculoskeletal:         General: No swelling.       Cervical back: Neck supple.   Skin:     General: Skin is warm and dry.   Neurological:      Mental Status: He is alert and oriented to person, place, and time.      Comments: Oriented to self and hospital         Fluids    Intake/Output Summary (Last 24 hours) at 4/19/2025 1253  Last data filed at 4/19/2025 1000  Gross per 24 hour   Intake 200 ml   Output --   Net 200 ml        Laboratory                            Imaging  DX-CHEST-PORTABLE (1 VIEW)   Final Result      1.  Hypoinflation without other evidence for acute cardiopulmonary disease.   2.  Prior open heart surgery.      MR-BRAIN-W/O   Final Result      1.  Unremarkable noncontrast MR examination of the brain.   2.  There is no evidence of restricted diffusion to suggest any hypoxic ischemic injury.   3.  Paranasal sinus inflammatory disease.      EC-ECHOCARDIOGRAM COMPLETE W/ CONT   Final Result      DX-CHEST-PORTABLE (1 VIEW)   Final Result         1.  Hazy right lower lobe infiltrate, slightly decreased since prior study   2.  Cardiomegaly      [id number] VIEW   Final Result         1.  Nonspecific bowel gas pattern in the upper abdomen.      DX-CHEST-PORTABLE (1 VIEW)   Final Result         1.  Pulmonary edema and/or infiltrates are identified, which are stable since the prior exam.   2.  Cardiomegaly      DX-CHEST-PORTABLE (1 VIEW)   Final Result         1.  Pulmonary edema and/or infiltrates are identified, which appear somewhat increased since the prior exam.   2.  Cardiomegaly      CT-CTA NECK WITH & W/O-POST PROCESSING   Final Result         1.  CT angiogram of the neck within normal limits.   2.  Patchy bilateral upper lobe infiltrates         CT-CTA HEAD WITH & W/O-POST PROCESS   Final Result         1.  No large vessel occlusion or aneurysm identified   2.  Bilateral sinusitis changes      US-EXTREMITY VENOUS LOWER BILAT   Final Result      EC-ECHOCARDIOGRAM COMPLETE W/ CONT   Final Result      CT-ABDOMEN-PELVIS WITH   Final Result         1.   Fluid-filled prominence of proximal small bowel with distal small bowel decompression, appearance suggests ileus and/or enteritis versus evolving obstructive changes. Recommend radiographic follow-up to resolution as clinically appropriate.   2.  Irregular hepatic contour favoring changes of cirrhosis   3.  Fat-containing bilateral inguinal hernias      CT-CTA CHEST PULMONARY ARTERY W/ RECONS   Final Result         1.  Distal right main pulmonary embolus extending into right upper lobe and lower lobe segmental and subsegmental branches. 1.4 right to left ventricular ratio suggesting component of right heart strain.   2.  Evaluation of the subsegmental branches is essentially nondiagnostic due to motion artifacts. Additional imaging would be required for definitive evaluation of small distal pulmonary emboli.   3.  Scattered patchy bilateral pulmonary infiltrates.   4.  Bilateral dependent consolidations, appearance suggests atelectasis, component of infiltrate not excluded.      These findings were discussed with the patient's clinician, Misbah Cardoza, on 4/3/2025 9:27 PM.      CT-HEAD W/O   Final Result      No acute intracranial findings.               DX-CHEST-PORTABLE (1 VIEW)   Final Result      Interval placement of a right IJ central line without evidence of complication.      DX-CHEST-PORTABLE (1 VIEW)   Final Result      1.  Support devices present.      2.  Cardiomegaly.      3.  Bibasilar atelectasis.      CT-ABDOMEN-PELVIS WITH    (Results Pending)        Assessment/Plan  * Cardiac arrest (HCC)- (present on admission)  Assessment & Plan  Cardiac arrest with CPR, defibrillation x 3 and 3 rounds epinephrine  Loaded with amiodarone  Now in sinus rhythm  Cardiology consulted, thought to be secondary to PE  Stunned decreased ejection fraction 38%, now 65% on repeat TTE  4/16 - I discussed TTE with Dr. Louie, he recommends outpatient cardiology follow up    Diarrhea  Assessment & Plan  Ongoing diarrhea since  admission, unclear etiology   GI PCR negative   Consistent does not appear consistent with C.diff , is improving   Imodium prn   Now with abd pain, He does have h/o esophageal ulcer. Check LFTs, lipase, CT AP. Change omeprazole to PPI and add carafate    Hypokalemia  Assessment & Plan  Low K, on oral replacement   Monitor BMP     Acute respiratory failure with hypoxia (HCC)- (present on admission)  Assessment & Plan  Requiring intubation on admission was extubated on 4/5/2025  Secondary to cardiac arrest  Now on room air    Encephalopathy acute- (present on admission)  Assessment & Plan  Acute metabolic encephalopathy in the setting of cardiac arrest  CT head negative  EEG negative for seizure activity  Off Precedex drip   Valproic acid has been initiated for mood stabilization and increase to 500 mg TID  MRI of the brain neg for any ischemia  Likely multifactorial, possible mild hypoxic injury, delirium and likely functional component as well. Discussed with neurology   Consulted  psychiatry to help agitation/restlessness, started on seroquel overnight., Repeat EEG showed mild background slwoing  Continues to improve  Pending SNF    Cirrhosis of liver (HCC)  Assessment & Plan  Noted on imaging  Outpatient follow-up  Ammonia level normal     Aspiration into airway- (present on admission)  Assessment & Plan  Treated with Zosyn completed 7 days   Speech following, diet per speech    Pulmonary embolus (HCC)- (present on admission)  Assessment & Plan  Noted on CTA, distal segmental PE, not saddle   Status post IV heparin drip  He was transitioned to lovenox, now Eliquis  He has a history of multiple admissions for GI bleeds will need to be monitored closely. Had esophageal ulcer 9/2024. Order for omeprazole    Seizure-like activity (HCC)- (present on admission)  Assessment & Plan  Seizure type activity likely due to anoxia  EEG negative on early admit   Repeat EEG 4/11 showing non-specific encephalopathy, no seizure  activity seen    MRI brain neg for any ischemia  Seizure and fall precautions    Ventricular fibrillation (HCC)- (present on admission)  Assessment & Plan  With defibrillation x 3  Now in sinus  Per cardiology, no ischemic evaluation necessary given clear LHC prior to MV repair last year  Repeat echo now recovered to 65%  I discussed with Dr Louie, f/u outpatient    Pain, chronic- (present on admission)  Assessment & Plan  Per wife, He had weaned himself off narcotics for 3 days prior to this event  Of note, His UDS on admission was negative as have previous other UDS despite PDMP showing regular prescribing   Has been on chronic oxycodone 10 mg 5 x daily (last fill 3/24)   Prn oxy     History of MVR with cardiopulmonary bypass  Assessment & Plan  By Dr. Santana Jan 2024  Echo reviewed Known mitral valve repair that is functioning normally with appropriate transvalvular gradient. Reduced EF and hypokinesis present likely from cardiac stunning          VTE prophylaxis:    therapeutic anticoagulation with eliquis 5 mg BID      I have performed a physical exam and reviewed and updated ROS and Plan today (4/19/2025). In review of yesterday's note (4/18/2025), there are no changes except as documented above.

## 2025-04-19 NOTE — PROGRESS NOTES
Bedside report received from off going RN Yisel     Fall Risk Score: MODERATE RISK  Fall risk interventions in place: Place fall precaution signage outside patient door, Utilize bed/chair fall alarm, and Notify charge of high risk for huddle  Bed type: Low air loss (Corwin Score less than 17 interventions in place)  Patient on cardiac monitor: No   IVF/IV medications: Not Applicable   Oxygen: Room Air  Bedside sitter: Not Applicable   Isolation: Not applicable

## 2025-04-19 NOTE — DISCHARGE PLANNING
0917  Agency/Facility Name: [facility]  Spoke To: Danica  Outcome: DPA inquired about pending INS AUTH. Per Danica they have INS AUTH. Per Danica they do not have Private room for Pt at this time. Per Danica wont have a private bed available until Monday 04/21/25. DPA provided call back number if it changes.  RN CM notified via Voalte.

## 2025-04-19 NOTE — CARE PLAN
The patient is Watcher - Medium risk of patient condition declining or worsening    Shift Goals  Clinical Goals: monitor neuro, remian free from falls  Patient Goals: Comfort, rest  Family Goals: updates POC    Progress made toward(s) clinical / shift goals:  Bed locked and low in position, bed alarm on. Pt verbalizes understanding to call before getting out of bed. POC education reinforced, pt's family understands POC and updates regarding condition.      Problem: Knowledge Deficit - Standard  Goal: Patient and family/care givers will demonstrate understanding of plan of care, disease process/condition, diagnostic tests and medications  Outcome: Progressing     Problem: Skin Integrity  Goal: Skin integrity is maintained or improved  Outcome: Progressing     Problem: Fall Risk  Goal: Patient will remain free from falls  Outcome: Progressing     Problem: Pain - Standard  Goal: Alleviation of pain or a reduction in pain to the patient’s comfort goal  Outcome: Progressing     Problem: Safety  Goal: Will remain free from injury  Outcome: Progressing

## 2025-04-20 LAB
ALBUMIN SERPL BCP-MCNC: 3.2 G/DL (ref 3.2–4.9)
ALBUMIN/GLOB SERPL: 1.2 G/DL
ALP SERPL-CCNC: 67 U/L (ref 30–99)
ALT SERPL-CCNC: 11 U/L (ref 2–50)
ANION GAP SERPL CALC-SCNC: 14 MMOL/L (ref 7–16)
AST SERPL-CCNC: 14 U/L (ref 12–45)
BILIRUB SERPL-MCNC: <0.2 MG/DL (ref 0.1–1.5)
BUN SERPL-MCNC: 9 MG/DL (ref 8–22)
CALCIUM ALBUM COR SERPL-MCNC: 9 MG/DL (ref 8.5–10.5)
CALCIUM SERPL-MCNC: 8.4 MG/DL (ref 8.5–10.5)
CHLORIDE SERPL-SCNC: 112 MMOL/L (ref 96–112)
CO2 SERPL-SCNC: 19 MMOL/L (ref 20–33)
CREAT SERPL-MCNC: 0.87 MG/DL (ref 0.5–1.4)
ERYTHROCYTE [DISTWIDTH] IN BLOOD BY AUTOMATED COUNT: 54.6 FL (ref 35.9–50)
GFR SERPLBLD CREATININE-BSD FMLA CKD-EPI: 109 ML/MIN/1.73 M 2
GLOBULIN SER CALC-MCNC: 2.6 G/DL (ref 1.9–3.5)
GLUCOSE SERPL-MCNC: 148 MG/DL (ref 65–99)
HCT VFR BLD AUTO: 38.9 % (ref 42–52)
HGB BLD-MCNC: 12.2 G/DL (ref 14–18)
MCH RBC QN AUTO: 27.4 PG (ref 27–33)
MCHC RBC AUTO-ENTMCNC: 31.4 G/DL (ref 32.3–36.5)
MCV RBC AUTO: 87.4 FL (ref 81.4–97.8)
PLATELET # BLD AUTO: 505 K/UL (ref 164–446)
PMV BLD AUTO: 9.2 FL (ref 9–12.9)
POTASSIUM SERPL-SCNC: 3.8 MMOL/L (ref 3.6–5.5)
PROT SERPL-MCNC: 5.8 G/DL (ref 6–8.2)
RBC # BLD AUTO: 4.45 M/UL (ref 4.7–6.1)
SODIUM SERPL-SCNC: 145 MMOL/L (ref 135–145)
WBC # BLD AUTO: 5.2 K/UL (ref 4.8–10.8)

## 2025-04-20 PROCEDURE — 80053 COMPREHEN METABOLIC PANEL: CPT

## 2025-04-20 PROCEDURE — 770001 HCHG ROOM/CARE - MED/SURG/GYN PRIV*

## 2025-04-20 PROCEDURE — 700102 HCHG RX REV CODE 250 W/ 637 OVERRIDE(OP): Performed by: HOSPITALIST

## 2025-04-20 PROCEDURE — A9270 NON-COVERED ITEM OR SERVICE: HCPCS | Performed by: NURSE PRACTITIONER

## 2025-04-20 PROCEDURE — A9270 NON-COVERED ITEM OR SERVICE: HCPCS | Performed by: HOSPITALIST

## 2025-04-20 PROCEDURE — 99232 SBSQ HOSP IP/OBS MODERATE 35: CPT | Performed by: FAMILY MEDICINE

## 2025-04-20 PROCEDURE — A9270 NON-COVERED ITEM OR SERVICE: HCPCS | Performed by: STUDENT IN AN ORGANIZED HEALTH CARE EDUCATION/TRAINING PROGRAM

## 2025-04-20 PROCEDURE — A9270 NON-COVERED ITEM OR SERVICE: HCPCS | Performed by: INTERNAL MEDICINE

## 2025-04-20 PROCEDURE — 94660 CPAP INITIATION&MGMT: CPT

## 2025-04-20 PROCEDURE — 700102 HCHG RX REV CODE 250 W/ 637 OVERRIDE(OP): Performed by: INTERNAL MEDICINE

## 2025-04-20 PROCEDURE — 85027 COMPLETE CBC AUTOMATED: CPT

## 2025-04-20 PROCEDURE — 36415 COLL VENOUS BLD VENIPUNCTURE: CPT

## 2025-04-20 PROCEDURE — 700102 HCHG RX REV CODE 250 W/ 637 OVERRIDE(OP): Performed by: STUDENT IN AN ORGANIZED HEALTH CARE EDUCATION/TRAINING PROGRAM

## 2025-04-20 PROCEDURE — 700102 HCHG RX REV CODE 250 W/ 637 OVERRIDE(OP): Performed by: NURSE PRACTITIONER

## 2025-04-20 RX ADMIN — APIXABAN 5 MG: 5 TABLET, FILM COATED ORAL at 16:29

## 2025-04-20 RX ADMIN — OXYCODONE HYDROCHLORIDE 10 MG: 10 TABLET ORAL at 14:40

## 2025-04-20 RX ADMIN — SENNOSIDES AND DOCUSATE SODIUM 2 TABLET: 50; 8.6 TABLET ORAL at 16:30

## 2025-04-20 RX ADMIN — QUETIAPINE FUMARATE 50 MG: 25 TABLET ORAL at 21:20

## 2025-04-20 RX ADMIN — METOPROLOL TARTRATE 37.5 MG: 25 TABLET, FILM COATED ORAL at 04:42

## 2025-04-20 RX ADMIN — DIVALPROEX SODIUM 500 MG: 500 TABLET, DELAYED RELEASE ORAL at 21:18

## 2025-04-20 RX ADMIN — SUCRALFATE ORAL SUSPENSION 1 G: 1 SUSPENSION ORAL at 08:04

## 2025-04-20 RX ADMIN — Medication 5 MG: at 21:20

## 2025-04-20 RX ADMIN — OXYCODONE HYDROCHLORIDE 10 MG: 10 TABLET ORAL at 09:09

## 2025-04-20 RX ADMIN — SUCRALFATE ORAL SUSPENSION 1 G: 1 SUSPENSION ORAL at 16:28

## 2025-04-20 RX ADMIN — DIVALPROEX SODIUM 500 MG: 500 TABLET, DELAYED RELEASE ORAL at 14:39

## 2025-04-20 RX ADMIN — SUCRALFATE ORAL SUSPENSION 1 G: 1 SUSPENSION ORAL at 21:20

## 2025-04-20 RX ADMIN — SUCRALFATE ORAL SUSPENSION 1 G: 1 SUSPENSION ORAL at 12:43

## 2025-04-20 RX ADMIN — METOPROLOL TARTRATE 37.5 MG: 25 TABLET, FILM COATED ORAL at 14:39

## 2025-04-20 RX ADMIN — GABAPENTIN 200 MG: 100 CAPSULE ORAL at 16:29

## 2025-04-20 RX ADMIN — OXYCODONE HYDROCHLORIDE 10 MG: 10 TABLET ORAL at 19:41

## 2025-04-20 RX ADMIN — OMEPRAZOLE 20 MG: 20 CAPSULE, DELAYED RELEASE ORAL at 04:43

## 2025-04-20 RX ADMIN — LOSARTAN POTASSIUM 50 MG: 50 TABLET, FILM COATED ORAL at 16:36

## 2025-04-20 RX ADMIN — APIXABAN 5 MG: 5 TABLET, FILM COATED ORAL at 04:42

## 2025-04-20 RX ADMIN — OXYCODONE HYDROCHLORIDE 10 MG: 10 TABLET ORAL at 04:43

## 2025-04-20 RX ADMIN — METOPROLOL TARTRATE 37.5 MG: 25 TABLET, FILM COATED ORAL at 21:19

## 2025-04-20 RX ADMIN — DIVALPROEX SODIUM 500 MG: 500 TABLET, DELAYED RELEASE ORAL at 04:42

## 2025-04-20 RX ADMIN — OMEPRAZOLE 20 MG: 20 CAPSULE, DELAYED RELEASE ORAL at 16:29

## 2025-04-20 RX ADMIN — LOSARTAN POTASSIUM 50 MG: 50 TABLET, FILM COATED ORAL at 04:42

## 2025-04-20 ASSESSMENT — ENCOUNTER SYMPTOMS
FEVER: 0
CHILLS: 0
DEPRESSION: 1
ABDOMINAL PAIN: 1

## 2025-04-20 ASSESSMENT — PAIN DESCRIPTION - PAIN TYPE
TYPE: ACUTE PAIN
TYPE: ACUTE PAIN;CHRONIC PAIN
TYPE: ACUTE PAIN

## 2025-04-20 NOTE — PROGRESS NOTES
4 Eyes Skin Assessment Completed by KARSON Victoria and KARSON Shields.    Head WDL  Ears WDL  Nose WDL  Mouth WDL  Neck WDL  Breast/Chest WDL  Shoulder Blades WDL  Spine scar  (R) Arm/Elbow/Hand Redness and Blanching  (L) Arm/Elbow/Hand Redness and Blanching  Abdomen Bruising, pink at panus area  Groin WDL  Scrotum/Coccyx/Buttocks Redness and Blanching  (R) Leg Scar and Bruising  (L) Leg Scar and Bruising  (R) Heel/Foot/Toe Redness and Blanching, dry, calloused  (L) Heel/Foot/Toe Redness and Blanching,  dry, calloused          Devices In Places Blood Pressure Cuff      Interventions In Place Barrier Cream    Possible Skin Injury No    Pictures Uploaded Into Epic Yes  Wound Consult Placed N/A  RN Wound Prevention Protocol Ordered Yes

## 2025-04-20 NOTE — HOSPITAL COURSE
This is a 44 y.o. male here with history of mitral valve repair, HTN, prior GI bleeding, chronic pain who had witnessed cardiac arrest by his wife who initiated CPR prior to EMS arrival and was found in v-fib. He underwent 3 defibrillations and 4 rounds of ACLS with 4 doses of epinephrine and after around 12 minutes had return of spontaneous circulation with a narrow perfusing rhythm. He was intubated and admitted to ICU. Patient has prior prescriptions for oxycodone and ambien. Urine drug screen was positive for benzodiazepines. CTA chest showed right main PE, and TTE showed 38%.  CTA head and neck were negative for large occlusions. LE doppler negative for DVT. He was started on anticoagulation. Cardiology was consulted and cardiac arrest likely due to PE and cardiomyopathy likely stunting from cardiac arrest. They did not recommend cardiac cath given he had normal cath at the time of his mitral valve repair. He was given IV amiodarone. He was treated for aspiration pneumonitis, tracheal aspirate grew MSSA. He was extubated 4/5 to a Precedex drip as he had agitation and encephalopathy. He had 2 EEGs that were negative for seizure. CT showed nodular liver concerning for cirrhosis, ammonia was normal. Neurology and psychiatry were consulted for anoxic brain injury vs functional neurologic disorder. He was started on depakote, seroquel and melatonin, his mentation did improve. He continues to have cognitive impairment. His MRI brain was negative for any acute findings and no hypoxic ischemic injury. Follow up TTE also showed improvement of his EF to 65%, which I discussed with Dr. Louie and he recommends outpatient cardiology followup.   PTOT recommended further inpatient therapy. He was declined by [facility] acute rehab. He was accepted by [facility]

## 2025-04-20 NOTE — CARE PLAN
The patient is Stable - Low risk of patient condition declining or worsening    Shift Goals  Clinical Goals: Monitor / improve neuro status  Patient Goals: Rest / sleep  Family Goals: updates POC    Progress made toward(s) clinical / shift goals:  Patient was transfer from [facility], denies any pain, resting comfortably, bed in lowest position, call light within reach.     Patient is not progressing towards the following goals:

## 2025-04-20 NOTE — PROGRESS NOTES
Hospital Medicine Daily Progress Note    Date of Service  4/20/2025    Chief Complaint      Hospital Course  This is a 44 y.o. male here with history of mitral valve repair, HTN, prior GI bleeding, chronic pain who had witnessed cardiac arrest by his wife who initiated CPR prior to EMS arrival and was found in v-fib. He underwent 3 defibrillations and 4 rounds of ACLS with 4 doses of epinephrine and after around 12 minutes had return of spontaneous circulation with a narrow perfusing rhythm. He was intubated and admitted to ICU. Patient has prior prescriptions for oxycodone and ambien. Urine drug screen was positive for benzodiazepines. CTA chest showed right main PE, and TTE showed 38%.  CTA head and neck were negative for large occlusions. LE doppler negative for DVT. He was started on anticoagulation. Cardiology was consulted and cardiac arrest likely due to PE and cardiomyopathy likely stunting from cardiac arrest. They did not recommend cardiac cath given he had normal cath at the time of his mitral valve repair. He was given IV amiodarone. He was treated for aspiration pneumonitis, tracheal aspirate grew MSSA. He was extubated 4/5 to a Precedex drip as he had agitation and encephalopathy. He had 2 EEGs that were negative for seizure. CT showed nodular liver concerning for cirrhosis, ammonia was normal. Neurology and psychiatry were consulted for anoxic brain injury vs functional neurologic disorder. He was started on depakote, seroquel and melatonin, his mentation did improve. He continues to have cognitive impairment. His MRI brain was negative for any acute findings and no hypoxic ischemic injury. Follow up TTE also showed improvement of his EF to 65%, which I discussed with Dr. Louie and he recommends outpatient cardiology followup.   PTOT recommended further inpatient therapy. He was declined by [facility] acute rehab. He was accepted by [facility]    Interval Problem Update  Had a long conversation with patient  Patient ID: Braden Meyers is a 51 y o  female  Assessment/Plan:    No problem-specific Assessment & Plan notes found for this encounter  Diagnoses and all orders for this visit:    History of CVA (cerebrovascular accident)  -     Thrombosis Panel; Future    Essential hypertension    Vision changes    Other orders  -     omeprazole (PriLOSEC) 40 MG capsule; Take 40 mg by mouth daily       Patient Instructions   Stroke:  ΣΑΡΑΝΤΙ presents for a follow-up evaluation with regard to her recent embolic stroke which is in the posterior circulation, 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 complicated by hypertension  In the interval since her last visit to the office she has had some vision changes described below, but otherwise no symptoms concerning for recurrent TIA or stroke  She is taking her medications  Her blood pressure is now in range  She did not describe any severe bleeding or bruising issues  -for ongoing stroke prevention she should continue her current combination of aspirin and appropriate blood pressure control  -we will defer to the good judgment of her primary care and cardiology teams for monitoring of her cholesterol panel, blood sugar numbers, and blood pressure  I would suggest targeting a blood pressure of less than 130/80 on a regular basis  -at this point she is 5 months from her initial stroke  Her vertebral artery dissection appears to have appropriately recanalized  There is no current indication for dual anti-platelet therapy  She can discontinue the use of Plavix at this point in time  She does not have any indication for a statin medication because she does not have significant atherosclerotic disease and this stroke is not related to intracranial lipohyalinosis  -considering that her family history is relatively unknown I would suggest that a 1 time thrombosis panel would be reasonable  I will provide her with that prescription      Visual disturbances: She reports that she has had 4-5 episodes of vision changes which are reasonably stereotyped  We will begin with wavy lines and then subsequently she will have loss of vision coming down from above affecting both eyes  She had these episodes are at times accompanied by headache and nausea and will typically last for approximately 10 minutes, but they are also accompanied by flushing, sweating, and potentially some rebound hypertension   -ultimately, these are not consistent with seizures, and most likely represent migraine equivalents  That having been said, because of the flushing and sweating accompanying these episodes it would be reasonable to rule out any type of accompanying arrhythmia or cardiac change  If she has any additional episodes I would like for her to review this with her cardiologist and consideration should be given to 2-4 weeks of Holter monitoring in order to capture an event of possible  I will plan for her to return to the office to see us in 6 months time but we would be happy to see her sooner if the need should arise  If she has any symptoms concerning for TIA or stroke such as sudden painless vision loss that is unlike which she is already experienced, difficulty speaking or swallowing, vertigo/room spinning that does not quickly resolve, or weakness/numbness affecting 1 side of the face or body she should proceed by ambulance to the nearest emergency room immediately  Subjective:    HPI     Elizabet Berg presents for a follow-up evaluation with regard to her recent stroke  I reviewed her prior records  She presents to the hospital with multiple episodes of vision loss, and eventually had an MRI brain which showed multifocal strokes including the right cerebellum, left parietal lobe, and bilateral occipital lobes  This clearly appeared to be an embolic stroke of undetermined source    Initial CT angiography showed decreased flow in the left vertebral artery at approximately the and wife room today.  Patient doing well  No complaints    afebrile vital signs stable  Tolerating p.o. well  Patient complaining of right abdominal pain which is chronic.    I have discussed this patient's plan of care and discharge plan at IDT rounds today with Case Management, Nursing, Nursing leadership, and other members of the IDT team.    Consultants/Specialty  Cardiology, critical care, neurology, psychiatry    Code Status  Full Code    Disposition  The patient is medically cleared for discharge to home or a post-acute facility.  Anticipate discharge to: skilled nursing facility    I have placed the appropriate orders for post-discharge needs.    Review of Systems  Review of Systems   Constitutional:  Negative for chills and fever.   Cardiovascular:  Negative for chest pain.   Gastrointestinal:  Positive for abdominal pain.   Psychiatric/Behavioral:  Positive for depression.         Physical Exam  Temp:  [36.1 °C (96.9 °F)-36.6 °C (97.9 °F)] 36.1 °C (96.9 °F)  Pulse:  [71-91] 74  Resp:  [16-18] 18  BP: (115-134)/(60-92) 115/60  SpO2:  [90 %-98 %] 98 %    Physical Exam  Vitals and nursing note reviewed.   Constitutional:       Appearance: Normal appearance. He is obese.   HENT:      Head: Normocephalic and atraumatic.   Eyes:      Extraocular Movements: Extraocular movements intact.   Cardiovascular:      Rate and Rhythm: Normal rate.   Musculoskeletal:      Cervical back: Normal range of motion.   Neurological:      Mental Status: He is alert.   Psychiatric:      Comments: Relatively flat affect         Fluids    Intake/Output Summary (Last 24 hours) at 4/20/2025 1427  Last data filed at 4/20/2025 0442  Gross per 24 hour   Intake 640 ml   Output --   Net 640 ml        Laboratory  Recent Labs     04/20/25  0547   WBC 5.2   RBC 4.45*   HEMOGLOBIN 12.2*   HEMATOCRIT 38.9*   MCV 87.4   MCH 27.4   MCHC 31.4*   RDW 54.6*   PLATELETCT 505*   MPV 9.2     Recent Labs     04/20/25  0547   SODIUM 145   POTASSIUM 3.8    CHLORIDE 112   CO2 19*   GLUCOSE 148*   BUN 9   CREATININE 0.87   CALCIUM 8.4*                       Assessment/Plan  * Cardiac arrest (HCC)- (present on admission)  Assessment & Plan  Cardiac arrest with CPR, defibrillation x 3 and 3 rounds epinephrine  Loaded with amiodarone  Now in sinus rhythm  Cardiology consulted, thought to be secondary to PE  Stunned decreased ejection fraction 38%, now 65% on repeat TTE  4/16 - I discussed TTE with Dr. Louie, he recommends outpatient cardiology follow up    Diarrhea  Assessment & Plan  Ongoing diarrhea since admission, unclear etiology   GI PCR negative   Consistent does not appear consistent with C.diff , is improving   Imodium prn   Now with abd pain, He does have h/o esophageal ulcer. Check LFTs, lipase, CT AP. Change omeprazole to PPI and add carafate    Hypokalemia  Assessment & Plan  Low K, on oral replacement   Monitor BMP     Acute respiratory failure with hypoxia (HCC)- (present on admission)  Assessment & Plan  Requiring intubation on admission was extubated on 4/5/2025  Secondary to cardiac arrest  Now on room air    Encephalopathy acute- (present on admission)  Assessment & Plan  Acute metabolic encephalopathy in the setting of cardiac arrest  CT head negative  EEG negative for seizure activity  Off Precedex drip   Valproic acid has been initiated for mood stabilization and increase to 500 mg TID  MRI of the brain neg for any ischemia  Likely multifactorial, possible mild hypoxic injury, delirium and likely functional component as well. Discussed with neurology   Consulted  psychiatry to help agitation/restlessness, started on seroquel overnight., Repeat EEG showed mild background slwoing  Continues to improve  Pending SNF    Cirrhosis of liver (HCC)  Assessment & Plan  Noted on imaging  Outpatient follow-up  Ammonia level normal     Aspiration into airway- (present on admission)  Assessment & Plan  Treated with Zosyn completed 7 days   Speech following, diet per  C2 level which resolved on subsequent CTA and was felt to likely represent a vertebral artery dissection  Since the time of her last visit she reports some ongoing issues with intermittent numbness in 1 arm or the other  She confirms that she has been experiencing a little bit of anxiety/hypervigilance which is most likely to blame  She is also experiencing some ocular symptoms  Particularly she will get wavy lines in her vision which then progresses to bilateral vision loss as a curtain coming down from above  This will last for approximately 10 minutes and then resolved  It is accompanied by flushing and sweating, and when she checks her blood pressure subsequently she is hypertensive  She notes that occasion she gets a headache with this, and occasionally she is nauseous with it, but denies typical photo and phonophobia  With regard to stroke etiology, this is most likely related to a vertebral artery dissection however her family history is relatively unknown as she is adopted      Past Medical History:   Diagnosis Date    CVA [facility]cere[facility] vascular accident) ([facility] [address] )     GERD (gastroesophageal reflux disease)     occasionally    Hypertension     controlling with diet and exercise       Social History     Socioeconomic History    Marital status: /Civil Union     Spouse name: None    Number of children: None    Years of education: None    Highest education level: None   Occupational History    None   Social Needs    Financial resource strain: None    Food insecurity:     Worry: None     Inability: None    Transportation needs:     Medical: None     Non-medical: None   Tobacco Use    Smoking status: Never Smoker    Smokeless tobacco: Never Used   Substance and Sexual Activity    Alcohol use: Yes     Comment: SOCIAL-1-2 drinks per week    Drug use: No    Sexual activity: None   Lifestyle    Physical activity:     Days per week: None     Minutes per session: None    Stress: None Relationships    Social connections:     Talks on phone: None     Gets together: None     Attends Yazidi service: None     Active member of club or organization: None     Attends meetings of clubs or organizations: None     Relationship status: None    Intimate partner violence:     Fear of current or ex partner: None     Emotionally abused: None     Physically abused: None     Forced sexual activity: None   Other Topics Concern    None   Social History Narrative    PROBLEMS CONCERNING ADOPTED CHILD    CAFFEINE USE    MODERATE EXERCISING LESS THAN 3x WEEK       Family History   Adopted: Yes   Problem Relation Age of Onset    No Known Problems Mother     No Known Problems Father     No Known Problems Sister     No Known Problems Brother     No Known Problems Maternal Aunt     No Known Problems Maternal Uncle     No Known Problems Paternal Aunt     No Known Problems Paternal Uncle     No Known Problems Maternal Grandmother     No Known Problems Maternal Grandfather     No Known Problems Paternal Grandmother     No Known Problems Paternal Grandfather          Current Outpatient Medications:     ALPRAZolam (XANAX) 0 5 mg tablet, Take 0 5 mg by mouth 4 (four) times a day as needed for anxiety (Patient takes one at night) , Disp: , Rfl:     amLODIPine (NORVASC) 10 mg tablet, Take 1 tablet (10 mg total) by mouth daily for 14 days, Disp: 14 tablet, Rfl: 0    aspirin (ECOTRIN LOW STRENGTH) 81 mg EC tablet, Take 1 tablet (81 mg total) by mouth daily, Disp: , Rfl:     atorvastatin (LIPITOR) 80 mg tablet, Take 1 tablet (80 mg total) by mouth daily with dinner, Disp: 30 tablet, Rfl: 11    clopidogrel (PLAVIX) 75 mg tablet, Take 1 tablet (75 mg total) by mouth daily, Disp: 90 tablet, Rfl: 3    Docusate Calcium (STOOL SOFTENER PO), Take 1 tablet by mouth as needed (once a week prn) , Disp: , Rfl:     hydrochlorothiazide (HYDRODIURIL) 25 mg tablet, Take 25 mg by mouth daily, Disp: , Rfl: 0    Potassium speech    Pulmonary embolus (HCC)- (present on admission)  Assessment & Plan  Noted on CTA, distal segmental PE, not saddle   Status post IV heparin drip  He was transitioned to lovenox, now Eliquis  He has a history of multiple admissions for GI bleeds will need to be monitored closely. Had esophageal ulcer 9/2024. Order for omeprazole    Seizure-like activity (HCC)- (present on admission)  Assessment & Plan  Seizure type activity likely due to anoxia  EEG negative on early admit   Repeat EEG 4/11 showing non-specific encephalopathy, no seizure activity seen    MRI brain neg for any ischemia  Seizure and fall precautions    Ventricular fibrillation (HCC)- (present on admission)  Assessment & Plan  With defibrillation x 3  Now in sinus  Per cardiology, no ischemic evaluation necessary given clear LHC prior to MV repair last year  Repeat echo now recovered to 65%  I discussed with Dr Louie, f/u outpatient    Pain, chronic- (present on admission)  Assessment & Plan  Per wife, He had weaned himself off narcotics for 3 days prior to this event  Of note, His UDS on admission was negative as have previous other UDS despite PDMP showing regular prescribing   Has been on chronic oxycodone 10 mg 5 x daily (last fill 3/24)   Prn oxy     History of MVR with cardiopulmonary bypass  Assessment & Plan  By Dr. Santana Jan 2024  Echo reviewed Known mitral valve repair that is functioning normally with appropriate transvalvular gradient. Reduced EF and hypokinesis present likely from cardiac stunning          VTE prophylaxis:    therapeutic anticoagulation with eliquis 5 mg BID    Hopefully to life care at Monday    I have performed a physical exam and reviewed and updated ROS and Plan today (4/20/2025). In review of yesterday's note (4/19/2025), there are no changes except as documented above.         Chloride ER 20 MEQ TBCR, take 1 tablet by mouth twice a day take with food, Disp: , Rfl: 0    omeprazole (PriLOSEC) 40 MG capsule, Take 40 mg by mouth daily, Disp: , Rfl: 0        The following portions of the patient's history were reviewed: allergies, current medications, past family history, past medical history, past social history and problem list            Objective:    Blood pressure 110/80, pulse 72, height 4' 11" (1 499 m), weight 56 4 kg (124 lb 4 8 oz)  Physical Exam    Neurological Exam    At the time of my evaluation she was awake, alert, and in no distress  There is no dysarthria or aphasia  Cranial nerves 2-12 were symmetrically intact bilaterally  Motor testing revealed 5/5 strength in the bilateral upper lower extremities with no drift  Finger-to-nose testing showed no ataxia  Sensation was intact to temperature and vibration in the distal bilateral upper and lower extremities  Her gait was narrow based and stable  ROS:    Review of Systems   Constitutional: Negative  Negative for appetite change and fever  HENT: Negative  Negative for hearing loss, tinnitus, trouble swallowing and voice change  Eyes: Negative  Negative for photophobia and pain  Respiratory: Negative  Negative for shortness of breath  Cardiovascular: Negative  Negative for palpitations  Gastrointestinal: Negative  Negative for nausea and vomiting  Endocrine: Negative  Negative for cold intolerance and heat intolerance  Genitourinary: Negative  Negative for dysuria, frequency and urgency  Musculoskeletal: Negative  Negative for myalgias and neck pain  Skin: Negative  Negative for rash  Neurological: Positive for headaches (occasional)  Negative for dizziness, tremors, seizures, syncope, facial asymmetry, speech difficulty, weakness, light-headedness and numbness  Hematological: Negative  Does not bruise/bleed easily  Psychiatric/Behavioral: Negative    Negative for confusion, hallucinations and sleep disturbance  Reviewed ROS as entered by medical assistant  I have spent 35 minutes with Patient  today in which greater than 50% of this time was spent in counseling/coordination of care regarding Diagnostic results, Intructions for management, Patient and family education and Impressions

## 2025-04-21 VITALS
DIASTOLIC BLOOD PRESSURE: 70 MMHG | OXYGEN SATURATION: 95 % | SYSTOLIC BLOOD PRESSURE: 104 MMHG | TEMPERATURE: 98.4 F | WEIGHT: 256.62 LBS | HEIGHT: 75 IN | BODY MASS INDEX: 31.91 KG/M2 | HEART RATE: 81 BPM | RESPIRATION RATE: 16 BRPM

## 2025-04-21 PROBLEM — I49.01 VENTRICULAR FIBRILLATION (HCC): Status: RESOLVED | Noted: 2025-04-03 | Resolved: 2025-04-21

## 2025-04-21 PROBLEM — I46.9 CARDIAC ARREST (HCC): Status: RESOLVED | Noted: 2025-04-03 | Resolved: 2025-04-21

## 2025-04-21 PROBLEM — J96.01 ACUTE RESPIRATORY FAILURE WITH HYPOXIA (HCC): Status: RESOLVED | Noted: 2025-04-06 | Resolved: 2025-04-21

## 2025-04-21 PROBLEM — R19.7 DIARRHEA: Status: RESOLVED | Noted: 2025-04-12 | Resolved: 2025-04-21

## 2025-04-21 PROBLEM — T17.908A ASPIRATION INTO AIRWAY: Status: RESOLVED | Noted: 2025-04-03 | Resolved: 2025-04-21

## 2025-04-21 PROBLEM — R56.9 SEIZURE-LIKE ACTIVITY (HCC): Status: RESOLVED | Noted: 2025-04-03 | Resolved: 2025-04-21

## 2025-04-21 PROBLEM — E87.6 HYPOKALEMIA: Status: RESOLVED | Noted: 2025-04-11 | Resolved: 2025-04-21

## 2025-04-21 PROCEDURE — 700102 HCHG RX REV CODE 250 W/ 637 OVERRIDE(OP): Performed by: INTERNAL MEDICINE

## 2025-04-21 PROCEDURE — A9270 NON-COVERED ITEM OR SERVICE: HCPCS | Performed by: HOSPITALIST

## 2025-04-21 PROCEDURE — A9270 NON-COVERED ITEM OR SERVICE: HCPCS | Performed by: STUDENT IN AN ORGANIZED HEALTH CARE EDUCATION/TRAINING PROGRAM

## 2025-04-21 PROCEDURE — 99239 HOSP IP/OBS DSCHRG MGMT >30: CPT | Performed by: FAMILY MEDICINE

## 2025-04-21 PROCEDURE — 700102 HCHG RX REV CODE 250 W/ 637 OVERRIDE(OP): Performed by: STUDENT IN AN ORGANIZED HEALTH CARE EDUCATION/TRAINING PROGRAM

## 2025-04-21 PROCEDURE — A9270 NON-COVERED ITEM OR SERVICE: HCPCS | Performed by: INTERNAL MEDICINE

## 2025-04-21 PROCEDURE — 700102 HCHG RX REV CODE 250 W/ 637 OVERRIDE(OP): Performed by: HOSPITALIST

## 2025-04-21 RX ORDER — OXYCODONE HYDROCHLORIDE 5 MG/1
5 TABLET ORAL EVERY 4 HOURS PRN
Qty: 12 TABLET | Refills: 0 | Status: SHIPPED | OUTPATIENT
Start: 2025-04-21 | End: 2025-04-24

## 2025-04-21 RX ADMIN — OMEPRAZOLE 20 MG: 20 CAPSULE, DELAYED RELEASE ORAL at 04:52

## 2025-04-21 RX ADMIN — METOPROLOL TARTRATE 37.5 MG: 25 TABLET, FILM COATED ORAL at 13:06

## 2025-04-21 RX ADMIN — OXYCODONE HYDROCHLORIDE 10 MG: 10 TABLET ORAL at 11:31

## 2025-04-21 RX ADMIN — OXYCODONE HYDROCHLORIDE 10 MG: 10 TABLET ORAL at 02:13

## 2025-04-21 RX ADMIN — OXYCODONE HYDROCHLORIDE 10 MG: 10 TABLET ORAL at 15:35

## 2025-04-21 RX ADMIN — APIXABAN 5 MG: 5 TABLET, FILM COATED ORAL at 04:52

## 2025-04-21 RX ADMIN — SUCRALFATE ORAL SUSPENSION 1 G: 1 SUSPENSION ORAL at 08:41

## 2025-04-21 RX ADMIN — DIVALPROEX SODIUM 500 MG: 500 TABLET, DELAYED RELEASE ORAL at 04:52

## 2025-04-21 RX ADMIN — DIVALPROEX SODIUM 500 MG: 500 TABLET, DELAYED RELEASE ORAL at 13:06

## 2025-04-21 RX ADMIN — POLYETHYLENE GLYCOL 3350 1 PACKET: 17 POWDER, FOR SOLUTION ORAL at 08:41

## 2025-04-21 RX ADMIN — SUCRALFATE ORAL SUSPENSION 1 G: 1 SUSPENSION ORAL at 13:06

## 2025-04-21 ASSESSMENT — PAIN DESCRIPTION - PAIN TYPE
TYPE: ACUTE PAIN

## 2025-04-21 ASSESSMENT — SOCIAL DETERMINANTS OF HEALTH (SDOH)
WITHIN THE LAST YEAR, HAVE YOU BEEN KICKED, HIT, SLAPPED, OR OTHERWISE PHYSICALLY HURT BY YOUR PARTNER OR EX-PARTNER?: NO
IN THE PAST 12 MONTHS, HAS THE ELECTRIC, GAS, OIL, OR WATER COMPANY THREATENED TO SHUT OFF SERVICE IN YOUR HOME?: NO
WITHIN THE LAST YEAR, HAVE TO BEEN RAPED OR FORCED TO HAVE ANY KIND OF SEXUAL ACTIVITY BY YOUR PARTNER OR EX-PARTNER?: NO
WITHIN THE PAST 12 MONTHS, YOU WORRIED THAT YOUR FOOD WOULD RUN OUT BEFORE YOU GOT THE MONEY TO BUY MORE: NEVER TRUE
WITHIN THE LAST YEAR, HAVE YOU BEEN AFRAID OF YOUR PARTNER OR EX-PARTNER?: NO
WITHIN THE PAST 12 MONTHS, THE FOOD YOU BOUGHT JUST DIDN'T LAST AND YOU DIDN'T HAVE MONEY TO GET MORE: NEVER TRUE
WITHIN THE LAST YEAR, HAVE YOU BEEN HUMILIATED OR EMOTIONALLY ABUSED IN OTHER WAYS BY YOUR PARTNER OR EX-PARTNER?: NO

## 2025-04-21 NOTE — DISCHARGE SUMMARY
Discharge Summary    CHIEF COMPLAINT ON ADMISSION  Chief Complaint   Patient presents with    Cardiac Arrest     BIB [facility] from [place] for witnessed arrest. Pt was eating lunch and collapsed in front of his wife, CPR started by wife. On EMS arrival pt was in vfib. EMS administered 3 shocks, 3 rounds of EPI, 450mg amiodarone, 5mg Versed, 4mg Narcan and 40mg etomidate through IO. +ROSC.  In route, [facility] administered an additional 5mg Versed. FSBG 159. Pt arrives tachycardic in the 120s, 130/85, 99% IGEL in place.       Reason for Admission  Cardiac arrest (HCC)     Admission Date  4/3/2025    CODE STATUS  Full Code    HPI & HOSPITAL COURSE    This is a 44 y.o. male here with history of mitral valve repair, HTN, prior GI bleeding, chronic pain who had witnessed cardiac arrest by his wife who initiated CPR prior to EMS arrival and was found in v-fib. He underwent 3 defibrillations and 4 rounds of ACLS with 4 doses of epinephrine and after around 12 minutes had return of spontaneous circulation with a narrow perfusing rhythm. He was intubated and admitted to ICU. Patient has prior prescriptions for oxycodone and ambien. Urine drug screen was positive for benzodiazepines. CTA chest showed right main PE, and TTE showed 38%.  CTA head and neck were negative for large occlusions. LE doppler negative for DVT. He was started on anticoagulation. Cardiology was consulted and cardiac arrest likely due to PE and cardiomyopathy likely stunting from cardiac arrest. They did not recommend cardiac cath given he had normal cath at the time of his mitral valve repair. He was given IV amiodarone. He was treated for aspiration pneumonitis, tracheal aspirate grew MSSA. He was extubated 4/5 to a Precedex drip as he had agitation and encephalopathy. He had 2 EEGs that were negative for seizure. CT showed nodular liver concerning for cirrhosis, ammonia was normal. Neurology and psychiatry were  consulted for anoxic brain injury vs functional neurologic disorder. He was started on depakote, seroquel and melatonin, his mentation did improve. He continues to have cognitive impairment. His MRI brain was negative for any acute findings and no hypoxic ischemic injury. Follow up TTE also showed improvement of his EF to 65%, which I discussed with Dr. Louie and he recommends outpatient cardiology followup.   PTOT recommended further inpatient therapy. He was declined by [facility] acute [facility]hab. He was accepted by [facility]    After the patient was extubated and transferred from the ICU to the floor the patient has continued to improve.  Patient does have some cognitive deficits and memory impairment according to his wife.  Patient is also exhibiting some abnormal angry outbursts that his wife is attributing to the anoxic brain injury..     Patient has been improving well over the course of the stay.  Patient seen and examined today he is afebrile his vitals are stable patient is stable for transfer to [facility] care today.    Therefore, he is discharged in good and stable condition to skilled nursing facility.    The patient met 2-midnight criteria for an inpatient stay at the time of discharge.    Discharge Date  4/21/2025    FOLLOW UP ITEMS POST DISCHARGE  PCP, neurology, psych, cardiology    DISCHARGE DIAGNOSES  Principal Problem (Resolved):    Cardiac arrest (HCC) (POA: Yes)  Active Problems:    Chronic pain syndrome (POA: Yes)    History of MVR with cardiopulmonary bypass (POA: Unknown)    Pain, chronic (POA: Yes)    Pulmonary embolus (HCC) (POA: Yes)    Cirrhosis of liver (HCC) (POA: Unknown)    Encephalopathy acute (POA: Yes)  Resolved Problems:    Ventricular fibrillation (HCC) (POA: Yes)    Seizure-like activity (HCC) (POA: Yes)    Aspiration into airway (POA: Yes)    Aspiration pneumonia of both lungs (HCC) (POA: Unknown)    Acute respiratory failure with hypoxia (HCC) (POA: Yes)    Hypokalemia (POA: Unknown)     Diarrhea (POA: Unknown)      FOLLOW UP  Future Appointments   Date Time Provider Department Center   5/14/2025  1:00 PM Jordyn Larson, Ph.D. [facility] [address]   9/25/2025  8:20 AM Sal Schafer D.O. [facility]  [address]  [facility] [address]  [place] [address]  [phone]    Schedule an appointment as soon as possible for a visit      [address]  [place] [address]  [phone]          MEDICATIONS ON DISCHARGE     Medication List        START taking these medications        Instructions   apixaban 5mg Tabs  Commonly known as: Eliquis   Take 1 Tablet by mouth 2 times a day. Indications: Thromboembolism secondary to Atrial Fibrillation  Dose: 5 mg     divalproex 500 MG Tbec  Commonly known as: Depakote   Take 1 Tablet by mouth every 8 hours.  Dose: 500 mg     gabapentin 100 MG Caps  Commonly known as: Neurontin   Take 2 Capsules by mouth every evening.  Dose: 200 mg     losartan 50 MG Tabs  Commonly known as: Cozaar   Take 1 Tablet by mouth 2 times a day.  Dose: 50 mg     melatonin 5 MG Tabs   Take 1 Tablet by mouth every evening.  Dose: 5 mg     metoprolol  MG Tb24  Commonly known as: Toprol XL   Take 0.5 Tablets by mouth every day.  Dose: 50 mg     omeprazole 20 MG delayed-release capsule  Commonly known as: PriLOSEC   Take 1 Capsule by mouth every day.  Dose: 20 mg     * QUEtiapine 25 MG Tabs  Commonly known as: SEROquel   Take 1 Tablet by mouth at bedtime as needed (if initial dose after 2 hours is ineffective for insomnia and agitation).  Dose: 25 mg     * QUEtiapine 50 MG tablet  Commonly known as: SEROquel   Take 1 Tablet by mouth every evening.  Dose: 50 mg           * This list has 2 medication(s) that are the same as other medications prescribed for you. Read the directions carefully, and ask your doctor or other care provider to review them with you.                CHANGE how you take these medications         Instructions   oxyCODONE immediate-release 5 MG Tabs  What changed:   medication strength  how much to take  when to take this  reasons to take this  Commonly known as: Roxicodone   Take 1 Tablet by mouth every four hours as needed for Severe Pain for up to 7 days.  Dose: 5 mg            STOP taking these medications      meloxicam 15 MG tablet  Commonly known as: Mobic     methocarbamol 500 MG Tabs  Commonly known as: Robaxin     tadalafil 10 MG tablet  Commonly known as: Cialis     zolpidem 10 MG Tabs  Commonly known as: Ambien              Allergies  Allergies   Allergen Reactions    Morphine Vomiting    Tramadol Unspecified     Seizure  [id number]       DIET  Orders Placed This Encounter   Procedures    Diet Order Diet: Regular     Standing Status:   Standing     Number of Occurrences:   1     Diet::   Regular [1]       ACTIVITY  As tolerated.  Weight bearing as tolerated    CONSULTATIONS  Critical care, cardiology, neurology, psychiatry, physical medicine and rehab,    PROCEDURES  Multiple video EEGs    LABORATORY  Lab Results   Component Value Date    SODIUM 145 04/20/2025    POTASSIUM 3.8 04/20/2025    CHLORIDE 112 04/20/2025    CO2 19 (L) 04/20/2025    GLUCOSE 148 (H) 04/20/2025    BUN 9 04/20/2025    CREATININE 0.87 04/20/2025        Lab Results   Component Value Date    WBC 5.2 04/20/2025    HEMOGLOBIN 12.2 (L) 04/20/2025    HEMATOCRIT 38.9 (L) 04/20/2025    PLATELETCT 505 (H) 04/20/2025        Total time of the discharge process exceeds 40 minutes.

## 2025-04-21 NOTE — PROGRESS NOTES
Assumed care of patient this morning. Bedside report completed. Bed alarm is ON. Hourly rounding in place.

## 2025-04-21 NOTE — DISCHARGE PLANNING
@0820  Agency/Facility Name: [facility]  Spoke To: Danica  Outcome: Danica will check on a private room and give this DPA a call back.    @1216  Agency/Facility Name: [facility]  Spoke To: Campos  Outcome: [facility] does have a room available today for pt.  Campos will check on insurance auth and give this DPA a call back.    @1318  Agency/Facility Name: [facility]  Spoke To: Nancy  Outcome: [facility] has a private room today and requested transport is 1600.    @1609  DPA notified Nancy that the pt will transport at 1700.

## 2025-04-21 NOTE — DISCHARGE PLANNING
DC Transport Scheduled    Transport Company Scheduled:  [employer]  Spoke with Josiane at [facility] to schedule transport.    Scheduled Date: 4/21/2025  Scheduled Time: 1700    Transport Type: [facility]  Destination: [facility]   Destination address: [place] [address]    Notified care team of scheduled transport via [employer].     If there are any changes needed to the DC transportation scheduled, please contact [facility] at ext. [phone] between the hours of 2039-0082. If outside those hours, contact the ED Case Manager at ext. [phone].

## 2025-04-21 NOTE — DISCHARGE PLANNING
Case Management Discharge Planning    Admission Date: 4/3/2025  GMLOS: 2  ALOS: 18    6-Clicks ADL Score: 18  6-Clicks Mobility Score: 18      Campos with [facility] updated on admit time change from 1600 to 1700.

## 2025-04-21 NOTE — PROGRESS NOTES
Pt dc education complete. All questions answered. Belongings brought down to wife's care by this RN. Pt dc with ANDRES.

## 2025-04-21 NOTE — CARE PLAN
The patient is Stable - Low risk of patient condition declining or worsening    Shift Goals  Clinical Goals: Patient will remain free from fall this shift  Patient Goals: Rest  Family Goals: JJ    Progress made toward(s) clinical / shift goals:  Patient medicated for pain, ambulates to the hallway, resting comfortably, wife at bedside, call light within reach.     Patient is not progressing towards the following goals:

## 2025-04-28 ENCOUNTER — TELEPHONE (OUTPATIENT)
Dept: VASCULAR LAB | Facility: MEDICAL CENTER | Age: 45
End: 2025-04-28
Payer: COMMERCIAL

## 2025-04-28 NOTE — TELEPHONE ENCOUNTER
[facility]     Received anticoagulation referral from hospitalist on 4/16/25.     Pt is not new to [facility] - previously seen in 2024.  Pt was restarted on anticoagulation for PE. He has been d/c'd to [facility]  Called [facility] - pt is still admitted     Insurance: Mary  PCP: [facility]  Locations to be seen: Any    If no response by 1 month post d/c from SNF OR 2 no shows/cancellations, will remove from referral list    Jenni Vasquez, PharmD  [facility]  Phone [phone]

## 2025-05-01 ENCOUNTER — TELEPHONE (OUTPATIENT)
Dept: HEALTH INFORMATION MANAGEMENT | Facility: OTHER | Age: 45
End: 2025-05-01
Payer: COMMERCIAL

## 2025-05-01 ENCOUNTER — APPOINTMENT (OUTPATIENT)
Dept: BEHAVIORAL HEALTH | Facility: CLINIC | Age: 45
End: 2025-05-01
Payer: COMMERCIAL

## 2025-05-01 DIAGNOSIS — F02.80 MAJOR NEUROCOGNITIVE DISORDER DUE TO ANOTHER MEDICAL CONDITION (HCC): ICD-10-CM

## 2025-05-01 DIAGNOSIS — G93.1: ICD-10-CM

## 2025-05-01 PROCEDURE — 96138 PSYCL/NRPSYC TECH 1ST: CPT | Performed by: COUNSELOR

## 2025-05-01 PROCEDURE — 96116 NUBHVL XM PHYS/QHP 1ST HR: CPT | Performed by: COUNSELOR

## 2025-05-01 PROCEDURE — 96139 PSYCL/NRPSYC TST TECH EA: CPT | Performed by: COUNSELOR

## 2025-05-06 NOTE — PROGRESS NOTES
NEUROPSYCHOLOGICAL EVALUATION  ** CONFIDENTIAL **     Name: Roberto Braswell    Ethnicity: White/   : 1980    Handedness: Right   RED: 2025    Education: High school diploma   MRN:        [id number] Occupation: Supervisor      Identifying Information and Reason for Referral:   Mr. Roberto Braswell is a 44 y.o. male referred for neuropsychological evaluation by Cornelius Bassett MD to assess cognitive functioning and assist with diagnostic impressions and treatment recommendations in the context of out-of-hospital cardiac arrest on 4/3/2025. His past medical history is otherwise significant for mitral valve repair, dyslipidemia, chronic opoid use, HTN, prior GI bleed, and chronic pain. Please refer to medical records for additional background information. Mr. Braswell and his wife (present for the clinical interview) were informed of the nature and purpose of the evaluation and limits of confidentiality. Please see the table at the end of this report for CPT codes billed.       RELEVANT BACKGROUND  The following information was obtained from a clinical interview and review of select medical records.        History of Presenting Problem and Current Cognitive Concerns: Note, the patient was a limited historian and the majority of the information was provided by the patient's wife.     The patient sustained an out-of-hospital, witnessed, cardiac arrest on 4/3/2025. He reportedly experienced a brief (around 30 seconds) generalized tonic-clonic seizure immediately before his wife (nurse) initiated CPR.  She performed CPR for 6 minutes until EMS arrived and ROSC was achieved at 28 minutes (medical records indicate 12 minutes). He was intubated and eventually extubated on 2025. He experienced hyperactive delirium post extubation for about 1 week. Patient was discharged 25 to [facility] (records not available in chart) and anticipated to discharge next week.    His wife reported  abrupt onset of cognitive difficulties following extubation on 4/5/2025 that are mildly improving.  Her most salient concern is recent memory (forgetting conversations), however remote memory is also variable. Hints have not assisted with memory retrieval. She also reported reduced processing speed and attention span. She also emphasized that his insight into his difficulties is reduced. She reported occasional word finding difficulties but otherwise denied language changes. She denied difficulties with his executive functioning such as impulsivity (resolved), as well as visual-spatial abilities. She denied current symptoms consistent with delirium. Prior to his hospitalization, his wife reported that he was fully independent in all activities of daily living.      Relevant Diagnostic Work-up:     Per Emergency Medicine (4/3/2025), the patient presented after out-of-hospital cardiac arrest. The patient was apparently was at home, felt odd, and vomited followed by tonic-clonic seizure activity and then immediately became obtunded.  His wife (nurse, trained in CPR) immediately initiated high-quality CPR and activated 911, and EMS arrived within 5 minutes. Upon arrival he was noted to be in fine V-fib, underwent 3 defibrillations and 4 rounds of ACLS with 4 doses of epinephrine. After around 12 minutes, had return ROSC with narrow perfusing rhythm. He was intubated and airlifted to the hospital within 20 minutes and admitted to the ICU. There was no report of trauma to the head or neck.     Head CT W/O Cont. (4/3/25); Impression: No acute intracranial findings.     Inpatient vEEG (4/4/25) Interpretation: ... taken together, these findings are suggestive of diffuse/multifocal cerebral dysfunction and consistent with a superimposed encephalopathy.  The effects of sedation may be contributing.    Inpatient vEEG (4/11/2025): Interpretation: Abnormal, technically-limited video EEG recording in the awake and drowsy state(s):  -Mild to moderate background slowing suggestive of diffuse/multifocal cerebral dysfunction and consistent with a nonspecific encephalopathy.  -No persistent focal asymmetries seen. Epileptiform discharges : No definitive epileptiform discharges were seen. -No seizures. -Clinical Events: None    Brain MRI W/O Cont. (4/14/25); Impression: 1.  Unremarkable noncontrast MR examination of the brain. 2.  There is no evidence of restricted diffusion to suggest any hypoxic ischemic injury. 3.  Paranasal sinus inflammatory disease.    Per Inpatient Psychiatry (4/16/2025), the patient had markedly improved cognition but still waxing and waning attention, largely consistent with delirium.  Functional neurological disorder was on the differential diagnosis given emotional component and atypical presentation, as well as participation with medical staff.    Inpatient vEEG (4/17/2025): Interpretation:Abnormal video EEG recording in the awake state(s):  - Mild continuous generalized slowing of the background suggestive of mild nonspecific cerebral dysfunction.  - No regional slowing or persistent focal asymmetries were seen.  - No epileptiform discharges were seen.  - No seizures.     Per Discharge Summary (4/21/2025), agitation and encephalopathy were noted after extubation on 4/5/2025, which improved over the course of hospital stay and was discharged to SNF in good stable condition.  Hospital course was positive for urine drug screen (benzodiazepines) and prior prescriptions for oxycodone and Ambien.  Neuroimaging was negative for an acute process or hypoxic/ischemic injury.  EEGs were negative for seizure.  CT showed nodular liver concerning for cirrhosis, ammonia was normal.  Neurology and psychiatry were consulted for anoxic brain injury versus functional neurological disorder.  He was started on Depakote, melatonin, and Seroquel, and his mentation improved.    Current Psychological Functioning/Psychiatric History/Substance  Use:   Current Mood: “Feeling fine.”  His wife denied observing significant personality changes post cardiac arrest.  Anger and impulsivity in the context of hospital delirium has reportedly resolved.    Sleep: He denied sleep difficulties. He reported reduced energy following cardiac arrest.    Psychiatric History: The patient reported a previous history of PTSD while in the  and [facility]  His wife stated that he experienced a flashback in the hospital during delirium.  The patient denied prior mental health treatment for PTSD (therapy or medications).  Current medication includes Depakote from mood stabilization; he no longer takes Seroquel. He denied a history of suicidal/homicidal ideation, hallucinations, or delusions.      Substance Use: The patient reported that he rarely drink alcohol prior to his hospitalization.  He denied a history of alcohol use problems, illicit drug use, or prescription abuse/misuse.  His wife reported that he takes 10mg oxycodone 5x/day due to residual sternum pain. They denied issues with tolerance or dependency. As mentioned, benzodiazepines were found in urine drug screen while he was in the hospital.    Physical Functioning:   Sensory: The patient denied having problems with or changes in his vision, hearing, taste, or smell.  Motor: He reported mild balance difficulties and generalized weakness since his hospitalization.  He otherwise denied problems with or changes in his gait or other motor functions.   Pain: Denied.   Other: He endorsed dizziness but otherwise denied denied urinary/bowel incontinence, appetite changes, or other autonomic changes.      Additional Medical History:   Developmental History: The patient met developmental milestones (i.e., crawling, walking, talking, toileting) on time and denied complications with his mother's birth.     Other Neurological History: He reported that he was diagnosed with a TIA in September/October 2024  following an episode of confusion. They denied residual cognitive or physical deficits. The patient denied a history of traumatic brain injury, stroke, vascular malformation, seizure, brain tumor, brain infection, or toxic exposure.        MEDICAL HISTORY (Per Medical Record)     Medical History   Past Medical History:   Diagnosis Date    Arthritis     Right ankle    Breath shortness 11/03/2023    With exertion.    Drug-seeking behavior 07/30/2023    Demanding narcotics for a duodenal ulcer July 2023. Refused antacids or sucralfate.     Duodenal ulcer 07/30/2023    Gastric ulcer     Heart valve disease     Mitral valve prolapse    Hemorrhagic disorder (HCC)     GI bleed    Hypertension     Pain     Resolved; Right ankle    Seizure (HCC)     while taking tramadol        Problem List:   Patient Active Problem List   Diagnosis    Iron deficiency anemia due to chronic blood loss    Essential hypertension    H/O mitral valve repair    Chronic pain syndrome    Pure hypercholesterolemia    Drug-induced erectile dysfunction    Primary insomnia    Esophagitis with esophageal ulcer    Dyslipidemia (high LDL; low HDL)    Restless leg syndrome due to iron deficiency anemia    Narcotic dependency, continuous (HCC)    Hypnotic dependence with current use (HCC)    History of GI bleed    History of MVR with cardiopulmonary bypass    Pain, chronic    Pulmonary embolus (HCC)    Cirrhosis of liver (HCC)    Encephalopathy acute        Medications:   Current Outpatient Medications   Medication Sig Refill Last Dispense    apixaban (ELIQUIS) 5mg Tab Take 1 Tablet by mouth 2 times a day. Indications: Thromboembolism secondary to Atrial Fibrillation 3 Never dispensed    divalproex (DEPAKOTE) 500 MG Tablet Delayed Response Take 1 Tablet by mouth every 8 hours. 0 Never dispensed    gabapentin (NEURONTIN) 100 MG Cap Take 2 Capsules by mouth every evening. 0 Never dispensed    losartan (COZAAR) 50 MG Tab Take 1 Tablet by mouth 2 times a day. 0  Never dispensed    melatonin 5 mg Tab Take 1 Tablet by mouth every evening. 0 Never dispensed    metoprolol SR (TOPROL XL) 100 MG TABLET SR 24 HR Take 0.5 Tablets by mouth every day. 0 Never dispensed    omeprazole (PRILOSEC) 20 MG delayed-release capsule Take 1 Capsule by mouth every day. 3 Never dispensed    QUEtiapine (SEROQUEL) 25 MG Tab Take 1 Tablet by mouth at bedtime as needed (if initial dose after 2 hours is ineffective for insomnia and agitation). 0 Never dispensed    QUEtiapine (SEROQUEL) 50 MG tablet Take 1 Tablet by mouth every evening. 0 Never dispensed        Family Medical/Psychiatric History:   Family History   Problem Relation Age of Onset    Heart Disease Mother     No Known Problems Father     Clotting Disorder Neg Hx         Additional reported family history includes lethal drug overdose (father) and epilepsy (mother).       PSYCHOSOCIAL HISTORY    Origin: The patient was born and raised in [place].  Native Language: English  Educational: The patient reported his highest level of formal education as 12 years (high school diploma). He reported that he typically earned good grades in school and never had any learning problems or repeated a grade in school. He was never diagnosed with ADHD.  Occupational History: Prior to his hospitalization, he was working as a  at [employer].    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  He was honorably discharged in 2007. As mentioned, he reported a prior diagnosis of PTSD.  Marital Status:  for 5 years.   Children: 2.      BEHAVIORAL OBSERVATIONS    General appearance: He presented on time and accompanied by his wife. He appeared his stated age, was casually dressed, and appropriately groomed.  Alertness and Orientation: Alert but fatigued; grossly oriented to time and place.  Attention and Concentration: Focused and engaged.  Eye contact: Within normal limits.  Language  comprehension: Intact.  Speech: Clear and fluent with normal rate, rhythm, volume, and prosody. No word-finding problems or paraphasic errors observed.   Gait and Balance: Unremarkable, ambulated independently.   Posture: Unremarkable.   Psychomotor: Unremarkable.  Sensory:  No gross difficulties with seeing test stimuli. No gross difficulties hearing conversations or hearing test instructions/stimuli.   Thought process: Slow, minimal spontaneous speech.  Insight: Fair.   Historian: Reduced, wife provided majority of information.  Psychotic symptoms: None.  Mood/Affect: Blunted, anxious at times during testing.  Demeanor: Interpersonally, the patient was friendly and pleasant.    Approach to tasks: Within normal limits.  Validity: Emotional factors did not appear to interfere with his performance on any tests. He displayed adequate cooperation and motivation throughout the testing session. He appeared to give good effort throughout testing and this was supported by normal performance on embedded and standalone performance validity measures. As such, test scores are considered a valid representation of the patient's current neuropsychological functioning.     TEST ADMINISTRATION    Tests Administered (administered by psychometrist, Adriane Castellon):  Aburto Depression Inventory, 2nd revision (BDI-2)  Aburto Anxiety Inventory (JACKLYN)  [place] Naming Test-Second Edition (BNT-II)  Brief Visuospatial Memory Test-Revised (BVMT-R)  [place] Verbal Learning Test-Third Edition (CVLT-III)  Controlled Oral Word Association Test (COWAT; FAS)  Category Fluency (Animals)  Kayleen-[place] Executive Function System (D-KEFS)-select subtests  Grooved Pegboard (GPB)  [place] Multiphasic Personality Inventory-Third Edition (MMPI-3)   Repeatable Battery for the Assessment of Neuropsychological Status (RBANS)  Test of Premorbid Functioning (ToPF)  Trail Making Test (TMT)  Wechsler Adult Intelligence Test-Fourth Edition (WAIS-IV)-select  subtests    Test Results:  Please see the attached data table for a summary of all test scores. When possible, the results have been compared to age- and education-based normative comparisons. In accordance with 2020 AACN guidelines, the recommended test score labels (classifications of performances) correspond to the following percentiles: exceptionally low (<2nd percentile); below average (2nd-8th percentile); low average (9th-24th percentile); average (25th-74th percentile); high average (75th-90th percentile); above average (91st-97th percentile); exceptionally high (>98th percentile). Please refer to the Summary of Findings and Impressions section of this report for an informed integration and interpretation of the data.    Summary of Findings: Results of testing, along with the patient's educational and occupational histories, suggest that his estimated long-standing intellectual functioning was in the average range. In this context, summary of his scores by domain:    Global Cognition: Exceptionally low  Fine Motor: Speeded fine motor dexterity was below average for the dominant (right) hand and exceptionally low for the nondominant (left) hand  Attention/working memory: Low average to average  Processing speed: Below average for both psychomotor and verbal tasks  Language: Below expectation  Verbal fluency: Exceptionally low to below average semantic fluency; exceptionally low phonemic fluency  Confrontation naming: Low average  Visuoperception: Average  Memory: Below expectation  [place] auditory learning/memory: Below average immediate recall, exceptionally low delayed recall (with marginal benefit from cueing), and below average recognition (9 hits, 7 false positives).  Contextual learning/memory: Below average immediate recall and exceptionally low delayed recall.  Visual learning/memory: For geometric shapes, exceptionally low immediate and delayed recall, average recognition (6 hits, 0 false positives).   Delayed recall of a complex figure was average.  Executive Functioning: Variable; exceptionally low to below average response inhibition/switching, low average verbal abstraction, and average visual abstraction  Psychological symptoms:   Anxiety: Mild  Depression: Minimal  Personality testing: Invalid  This protocol is invalid due to evidence of excessive fixed, content inconsistent false responding. Possible reasons include uncooperative test taking approach and/or difficulties with double negatives.  Additionally, responses reflected evidence of  possible underreporting. As a result, this protocol is uninterpretable.        IMPRESSIONS    Roberto Braswell is a 44 y.o., right-handed male referred for a neuropsychological evaluation by Cornelius Bassett MD to assess cognitive functioning and assist with diagnostic impressions and treatment recommendations in the context of cardiac arrest. Results from a comprehensive battery of neuropsychological tests reflect multiple areas of cognitive impairment including psychomotor and verbal processing speed, memory, language, and certain aspects of executive functioning. Attention and visual-spatial abilities were largely intact.  Psychologically, he endorsed minimal to mild symptoms of anxiety and depression, though a comprehensive personality inventory was invalid/not interpretable due to strong evidence of excessive, fixed inconsistent false responding. Functional abilities were not assessed as the patient is currently in a SNF; he was reported to be fully intact prior to his hospitalization.  As such, a diagnosis of major neurocognitive disorder seems most appropriate at this time.    The nature and severity of the patient's neuropsychological deficits - including processing speed (cognitive and motoric), memory, and executive dysfunction - are consistent with hypoxic/anoxic brain injury. Although functional neurological disorder (FND) cannot be fully ruled out based some  inconsistencies in his medical chart, there is sufficient evidence based on the severity of his injury to explain current cognitive deficits.  Memory deficits were particularly salient, and are very common neuropsychological sequela following hypoxic/anoxic brain injury given high metabolic demands of the hippocampus.  Anosognosia (impaired self awareness) is also common early post injury and may persist as a long-term feature. Other brain regions particularly vulnerable to hypoxia based on their arterial supply and location of the brain (e.g., watershed territory involvement) include the basal ganglia, primary visual cortex, frontal regions, thalamus, and cerebellum. Other cognitive risk factors include history of chronic opioid use and untreated PTSD, which themselves can cause cognitive difficulties.    Though initial radiological studies were negative, repeat neuroimaging is recommended in the future to assess for cerebral abnormalities including white matter changes, corpus callosum atrophy, cortical edema, cerebellar lesions, basal ganglia lesions, thalamic lesions, and/or hippocampal atrophy. These abnormalities often take weeks or months before they can be visualized on traditional scans.    Prognostically, recovery from hypoxic/anoxic brain injury is highly variable. Outcomes can depend on multiple factors including initial severity (e.g., loss of consciousness), brain regions most severely damaged, age, and medical comorbidities.The majority of cognitive recovery occurs within the first year, and more modest improvements can continue over time. Nonetheless, a significant percentage of individuals display some degree of persistent cognitive impairment.    Diagnoses:  Major Neurocognitive Disorder, due to another medical condition (suspected hypoxic brain injury)  Hypoxia of the brain  R/O Functional Neurological Disorder      RECOMMENDATIONS  Additional follow-up:?   Feedback: The patient has a feedback  session scheduled for 5/21/2025 to review the findings of this report. He is also encouraged to follow up with his medical team in light of the results of this evaluation. A copy of the patient's report has been made available in [employer].   A repeat comprehensive neuropsychological assessment is recommended in 18-24 months to monitor his cognitive trajectory over time and assist with differential diagnosis.   At the discretion of his physician, repeat brain MRI is recommended given lesions can evolve over weeks to months following hypoxic brain injury.    Outpatient cognitive rehabilitation for compensatory strategy development is highly recommended. This service is often provided by a speech language pathologist. See cognitive compensatory strategies below.     Treatment and management:  Activities of Daily Living: From a cognitive perspective, Mr. Braswell will likely have difficulty completing instrumental activities of daily living independently following his discharge given his cognitive deficits and anosognosia. Close supervision is recommended following discharge to maintain his safety and health needs (e.g., cooking, medication management). He should not return to driving until he is cleared by his physicians, and has met criteria to resume driving according to state regulations, and a driving test is recommended before driving in the future.   Return to Work: He is strongly encouraged not to return to work at this time and focus on his recovery and rehabilitation. His presenting deficits in processing speed, memory, and executive functioning would likely negatively impact his ability to engage in his previous level of employment at this time.    Mood management: Though he largely denied symptoms of depression or anxiety, close monitoring of mood symptoms as recommended, as neuropsychiatric sequela is common post hypoxic brain injury.  He may also consider formal treatment of PTSD in the future.  Substance  use: He is also encouraged to follow up with his medical team regarding his daily opioid use and the potential cognitive and psychological side effects versus reported benefits.    Compensatory strategies to improve daily functioning:  Minimize distractions when possible.  Focus on one task at a time.  Maintain a list of tasks to complete, prioritizing most important tasks.  Complete cognitively challenging tasks when his attention/focus are optimal.  Take more time when completing multi-step activities (avoid rushing to complete tasks).  Increased structure in his home such as a place to put his keys, an area to put his wallet, and an area for other items may be helpful.   He could keep a notebook or a whiteboard to make lists of his upcoming appointments, errands, necessary tasks, and medications and dosages.  He may also wish to use a notebook to write down any thoughts, questions, or concerns he may have as he thinks about them to reduce his reliance on memory.     Risk Factor Management: It is critical for Mr. Braswell to follow medical recommendations for cerebrovascular health and mitigate effects of complicating risk factors including maintaining a healthy diet, remaining abstinent from alcohol, and engaging in regular exercise as deemed appropriate by his physician.     Thank you for allowing me to participate in Roberto Braswell's care.  If you have any questions about the information contained in this report, please do not hesitate to contact me.    Jatinder Singh Psy.D.  Clinical Neuropsychologist  [facility]   Licensed Psychologist [place] #[id number]     PVT/SVT Raw BNL/WNL   RDS  9 WNL   CVLT-3 Standard FC 16 WNL   TOMM  (T1=48, T2=50) WNL   Premorbid Intellectual Functioning Raw SS Demo T Pred w/ Sim. Demo. SS Base rate %   TOPF Reading Performance 49 106   102 40.3   Global Cognition Raw ss / SS / z   [place]   RBANS-Update Total Scale   66   1   Motor Functions  Raw Rule Violations Demo T [place]   GPB - Dominant  Hand (drops) 93 (0) 0 30 2   GPB - Non-Dominant Hand (drops) 122 (0) 0 26 1   Attention & Working Memory  Raw ss / SS Demo T IN   WAIS-IV Digit Span  24 8 42 21           Forward (DSF) 10 9   37           Backward (DSB) 8 9   37           Sequencing (DSS) 6 7   16   RBANS- Update Attention   79   8        Digit Span 9 8   25        Coding 36 5   5   Processing Speed Raw ss / z / SS Demo T IN   WAIS-IV Symbol Search (errors) 19 5 32 4   D-KEFS C-WIT Color 40 5   5   D-KEFS C-WIT Word 32 4   2   Language Raw  ss / SS / z Demo T  IN   COWAT - FAS 5   16 0.03   Category Fluency - Animals  7   13 0.01   BNT-II 55   42 21   RBANS-Update Language   83   13        Picture Naming 9     17-25        Semantic Fluency 12 4   2   Visuospatial/Constructional Raw ss / z / SS Demo T  IN   RBANS-Update Visuo/Construct   96   39        Figure Copy 19 11   63        Line Orientation 15     26-50   Learning and Memory Raw ss / SS  T IN   CVLT 3 Standard Form Raw ss / SS Demo T IN   ???? Trial 1???  7 11 58 79   ???? Trial 2???  4 4 32 4   ???? Trial 3???  5 3 25 1   ???? Trial 4???  7 5 36 8   ???? Trial 5???  6 3 29 2   ???? Trial 1-5 Correct??  29 71 36 8   ???? List B???  3 6 38 12   ???? SDFR 3 3 29 2   ???? SDCR 7 6 40 16   ???? LDFR 0 1 20 0.13   ???? LDCR 3 2 26 1   ??? Total Hits??  9 3 30 2   ??? Total False Positives??  7 6 41 18       Recognition Discriminability??  0.9 3 30 2   BVMT-R Raw ss T IN        Trial 1 3   34 5        Trial 2 5   30 2        Trial 3 5   20 0.13        Total Recall (1-3) 13   26 1        Delayed Recall 5   26 1        Percent Retained 100     >16        Recognition Hits 6     >16        Recognition FP 0     >16        Recognition Discrimination 6     >16   RBANS-Update Imm Mem   57   0.21        List Learning 14 2   0.38        Story Memory 10 4   2   RBANS-Update Delayed Memory   52   0.07        List Recall 0     <3        List Recognition  15     <3        Story Recall 2 1   0.13        Figure  Recall 13 8   25   Executive Functions  Raw ss / z / SS Demo T AR   WAIS-IV Similarities 19 7 38 12   WAIS-IV Matrix Reasoning 16 9 47 38   D-KEFS C-WIT C/W 96 1   0.13        Total Errors 1 10   50   D-KEFS C-WIT C/W Switching 97 4   2        Total Errors 4 8   25   Personality    MMPI-2-RF/MMPI-3    Validity Scale T-score        VRIN-r 47        CELESTE-r 85F        F-r 47        Fp-r 67        Fs 58        FBS-r 62        RBS 76        L-r 77        K-r 56    Clinical Scale T-score        RCd 48        RC1 59        RC2 65        RC4 50        RC6 50        RC7 41        RC8 37        RC9 42   Mood Raw Descriptor   BDI-II 3 minimal   JACKLYN 8 mild     Neuropsychological Services Provided Code Total Units Date/Time   Clinical Interview/Neurobehavioral Status Exam          Hour 1 29535 1  5/1 830-910   Neuropsychological Test Administration and Scoring by Technician         First 30-minutes 24856 1  5/1 910-940       Additional 30-minutes 66931 6  5/1 950-12;

## 2025-05-07 ENCOUNTER — PHARMACY VISIT (OUTPATIENT)
Dept: PHARMACY | Facility: MEDICAL CENTER | Age: 45
End: 2025-05-07
Payer: MEDICARE

## 2025-05-07 PROCEDURE — RXMED WILLOW AMBULATORY MEDICATION CHARGE: Performed by: NURSE PRACTITIONER

## 2025-05-07 RX ORDER — OXYCODONE HYDROCHLORIDE 10 MG/1
TABLET ORAL
Qty: 150 TABLET | Refills: 0 | OUTPATIENT
Start: 2025-05-07 | End: 2025-05-10

## 2025-05-08 ENCOUNTER — PATIENT MESSAGE (OUTPATIENT)
Dept: SCHEDULING | Facility: IMAGING CENTER | Age: 45
End: 2025-05-08
Payer: COMMERCIAL

## 2025-05-09 PROBLEM — Z86.711 HISTORY OF PULMONARY EMBOLISM: Status: ACTIVE | Noted: 2025-04-03

## 2025-05-09 PROBLEM — G89.29 PAIN, CHRONIC: Status: RESOLVED | Noted: 2025-04-03 | Resolved: 2025-05-09

## 2025-05-09 PROBLEM — E78.00 PURE HYPERCHOLESTEROLEMIA: Status: RESOLVED | Noted: 2024-06-13 | Resolved: 2025-05-09

## 2025-05-10 ENCOUNTER — HOSPITAL ENCOUNTER (INPATIENT)
Facility: MEDICAL CENTER | Age: 45
LOS: 2 days | DRG: 682 | End: 2025-05-12
Attending: EMERGENCY MEDICINE | Admitting: STUDENT IN AN ORGANIZED HEALTH CARE EDUCATION/TRAINING PROGRAM
Payer: COMMERCIAL

## 2025-05-10 ENCOUNTER — APPOINTMENT (OUTPATIENT)
Dept: RADIOLOGY | Facility: MEDICAL CENTER | Age: 45
DRG: 682 | End: 2025-05-10
Attending: STUDENT IN AN ORGANIZED HEALTH CARE EDUCATION/TRAINING PROGRAM
Payer: COMMERCIAL

## 2025-05-10 ENCOUNTER — APPOINTMENT (OUTPATIENT)
Dept: RADIOLOGY | Facility: MEDICAL CENTER | Age: 45
DRG: 682 | End: 2025-05-10
Attending: EMERGENCY MEDICINE
Payer: COMMERCIAL

## 2025-05-10 DIAGNOSIS — G93.40 ENCEPHALOPATHY, UNSPECIFIED TYPE: ICD-10-CM

## 2025-05-10 DIAGNOSIS — Z79.899 POLYPHARMACY: ICD-10-CM

## 2025-05-10 PROBLEM — N17.9 AKI (ACUTE KIDNEY INJURY) (HCC): Status: ACTIVE | Noted: 2025-05-10

## 2025-05-10 PROBLEM — G93.41 ACUTE METABOLIC ENCEPHALOPATHY: Status: ACTIVE | Noted: 2025-05-10

## 2025-05-10 LAB
ALBUMIN SERPL BCP-MCNC: 3.7 G/DL (ref 3.2–4.9)
ALBUMIN/GLOB SERPL: 1.4 G/DL
ALP SERPL-CCNC: 56 U/L (ref 30–99)
ALT SERPL-CCNC: 10 U/L (ref 2–50)
AMMONIA PLAS-SCNC: 23 UMOL/L (ref 11–45)
AMPHET UR QL SCN: NEGATIVE
ANION GAP SERPL CALC-SCNC: 9 MMOL/L (ref 7–16)
APPEARANCE UR: CLEAR
AST SERPL-CCNC: 17 U/L (ref 12–45)
BARBITURATES UR QL SCN: NEGATIVE
BASOPHILS # BLD AUTO: 1.3 % (ref 0–1.8)
BASOPHILS # BLD: 0.07 K/UL (ref 0–0.12)
BENZODIAZ UR QL SCN: NEGATIVE
BILIRUB SERPL-MCNC: 0.4 MG/DL (ref 0.1–1.5)
BILIRUB UR QL STRIP.AUTO: NEGATIVE
BUN SERPL-MCNC: 29 MG/DL (ref 8–22)
BZE UR QL SCN: NEGATIVE
CALCIUM ALBUM COR SERPL-MCNC: 8.9 MG/DL (ref 8.5–10.5)
CALCIUM SERPL-MCNC: 8.7 MG/DL (ref 8.5–10.5)
CANNABINOIDS UR QL SCN: NEGATIVE
CHLORIDE SERPL-SCNC: 107 MMOL/L (ref 96–112)
CO2 SERPL-SCNC: 23 MMOL/L (ref 20–33)
COLOR UR: YELLOW
CREAT SERPL-MCNC: 1.51 MG/DL (ref 0.5–1.4)
EKG IMPRESSION: NORMAL
EOSINOPHIL # BLD AUTO: 0.1 K/UL (ref 0–0.51)
EOSINOPHIL NFR BLD: 1.8 % (ref 0–6.9)
ERYTHROCYTE [DISTWIDTH] IN BLOOD BY AUTOMATED COUNT: 50.1 FL (ref 35.9–50)
ETHANOL BLD-MCNC: <10.1 MG/DL
FENTANYL UR QL: NEGATIVE
GFR SERPLBLD CREATININE-BSD FMLA CKD-EPI: 58 ML/MIN/1.73 M 2
GLOBULIN SER CALC-MCNC: 2.6 G/DL (ref 1.9–3.5)
GLUCOSE SERPL-MCNC: 96 MG/DL (ref 65–99)
GLUCOSE UR STRIP.AUTO-MCNC: NEGATIVE MG/DL
HCT VFR BLD AUTO: 38.7 % (ref 42–52)
HGB BLD-MCNC: 12.2 G/DL (ref 14–18)
IMM GRANULOCYTES # BLD AUTO: 0.03 K/UL (ref 0–0.11)
IMM GRANULOCYTES NFR BLD AUTO: 0.6 % (ref 0–0.9)
KETONES UR STRIP.AUTO-MCNC: NEGATIVE MG/DL
LEUKOCYTE ESTERASE UR QL STRIP.AUTO: NEGATIVE
LYMPHOCYTES # BLD AUTO: 1.03 K/UL (ref 1–4.8)
LYMPHOCYTES NFR BLD: 18.9 % (ref 22–41)
MAGNESIUM SERPL-MCNC: 2.2 MG/DL (ref 1.5–2.5)
MCH RBC QN AUTO: 27.5 PG (ref 27–33)
MCHC RBC AUTO-ENTMCNC: 31.5 G/DL (ref 32.3–36.5)
MCV RBC AUTO: 87.4 FL (ref 81.4–97.8)
METHADONE UR QL SCN: NEGATIVE
MICRO URNS: NORMAL
MONOCYTES # BLD AUTO: 0.77 K/UL (ref 0–0.85)
MONOCYTES NFR BLD AUTO: 14.1 % (ref 0–13.4)
NEUTROPHILS # BLD AUTO: 3.45 K/UL (ref 1.82–7.42)
NEUTROPHILS NFR BLD: 63.3 % (ref 44–72)
NITRITE UR QL STRIP.AUTO: NEGATIVE
NRBC # BLD AUTO: 0 K/UL
NRBC BLD-RTO: 0 /100 WBC (ref 0–0.2)
OPIATES UR QL SCN: NEGATIVE
OXYCODONE UR QL SCN: POSITIVE
PCP UR QL SCN: NEGATIVE
PH UR STRIP.AUTO: 7 [PH] (ref 5–8)
PHOSPHATE SERPL-MCNC: 2.7 MG/DL (ref 2.5–4.5)
PLATELET # BLD AUTO: 227 K/UL (ref 164–446)
PMV BLD AUTO: 9.4 FL (ref 9–12.9)
POTASSIUM SERPL-SCNC: 5.1 MMOL/L (ref 3.6–5.5)
PROPOXYPH UR QL SCN: NEGATIVE
PROT SERPL-MCNC: 6.3 G/DL (ref 6–8.2)
PROT UR QL STRIP: NEGATIVE MG/DL
RBC # BLD AUTO: 4.43 M/UL (ref 4.7–6.1)
RBC UR QL AUTO: NEGATIVE
SODIUM SERPL-SCNC: 139 MMOL/L (ref 135–145)
SP GR UR STRIP.AUTO: 1.01
TROPONIN T SERPL-MCNC: 8 NG/L (ref 6–19)
UROBILINOGEN UR STRIP.AUTO-MCNC: 0.2 EU/DL
VALPROATE SERPL-MCNC: 78.1 UG/ML (ref 50–100)
WBC # BLD AUTO: 5.5 K/UL (ref 4.8–10.8)

## 2025-05-10 PROCEDURE — 80164 ASSAY DIPROPYLACETIC ACD TOT: CPT

## 2025-05-10 PROCEDURE — 93922 UPR/L XTREMITY ART 2 LEVELS: CPT

## 2025-05-10 PROCEDURE — 84484 ASSAY OF TROPONIN QUANT: CPT

## 2025-05-10 PROCEDURE — 83735 ASSAY OF MAGNESIUM: CPT

## 2025-05-10 PROCEDURE — 81003 URINALYSIS AUTO W/O SCOPE: CPT

## 2025-05-10 PROCEDURE — 770020 HCHG ROOM/CARE - TELE (206)

## 2025-05-10 PROCEDURE — 80053 COMPREHEN METABOLIC PANEL: CPT

## 2025-05-10 PROCEDURE — 80307 DRUG TEST PRSMV CHEM ANLYZR: CPT

## 2025-05-10 PROCEDURE — 99223 1ST HOSP IP/OBS HIGH 75: CPT | Performed by: STUDENT IN AN ORGANIZED HEALTH CARE EDUCATION/TRAINING PROGRAM

## 2025-05-10 PROCEDURE — 700105 HCHG RX REV CODE 258: Performed by: STUDENT IN AN ORGANIZED HEALTH CARE EDUCATION/TRAINING PROGRAM

## 2025-05-10 PROCEDURE — 82077 ASSAY SPEC XCP UR&BREATH IA: CPT

## 2025-05-10 PROCEDURE — 84100 ASSAY OF PHOSPHORUS: CPT

## 2025-05-10 PROCEDURE — 99285 EMERGENCY DEPT VISIT HI MDM: CPT

## 2025-05-10 PROCEDURE — 36415 COLL VENOUS BLD VENIPUNCTURE: CPT

## 2025-05-10 PROCEDURE — 85025 COMPLETE CBC W/AUTO DIFF WBC: CPT

## 2025-05-10 PROCEDURE — 82140 ASSAY OF AMMONIA: CPT

## 2025-05-10 PROCEDURE — 71045 X-RAY EXAM CHEST 1 VIEW: CPT

## 2025-05-10 PROCEDURE — 93005 ELECTROCARDIOGRAM TRACING: CPT | Mod: TC | Performed by: EMERGENCY MEDICINE

## 2025-05-10 PROCEDURE — 70450 CT HEAD/BRAIN W/O DYE: CPT

## 2025-05-10 PROCEDURE — 700102 HCHG RX REV CODE 250 W/ 637 OVERRIDE(OP): Performed by: STUDENT IN AN ORGANIZED HEALTH CARE EDUCATION/TRAINING PROGRAM

## 2025-05-10 PROCEDURE — A9270 NON-COVERED ITEM OR SERVICE: HCPCS | Performed by: STUDENT IN AN ORGANIZED HEALTH CARE EDUCATION/TRAINING PROGRAM

## 2025-05-10 RX ORDER — METOPROLOL SUCCINATE 50 MG/1
50 TABLET, EXTENDED RELEASE ORAL DAILY
Status: DISCONTINUED | OUTPATIENT
Start: 2025-05-10 | End: 2025-05-12 | Stop reason: HOSPADM

## 2025-05-10 RX ORDER — SODIUM CHLORIDE, SODIUM LACTATE, POTASSIUM CHLORIDE, CALCIUM CHLORIDE 600; 310; 30; 20 MG/100ML; MG/100ML; MG/100ML; MG/100ML
INJECTION, SOLUTION INTRAVENOUS CONTINUOUS
Status: ACTIVE | OUTPATIENT
Start: 2025-05-10 | End: 2025-05-11

## 2025-05-10 RX ORDER — METHOCARBAMOL 500 MG/1
500 TABLET, FILM COATED ORAL 3 TIMES DAILY PRN
COMMUNITY
Start: 2025-05-07

## 2025-05-10 RX ORDER — OXYCODONE HYDROCHLORIDE 10 MG/1
10 TABLET ORAL EVERY 4 HOURS PRN
COMMUNITY

## 2025-05-10 RX ORDER — QUETIAPINE FUMARATE 25 MG/1
25 TABLET, FILM COATED ORAL NIGHTLY PRN
Status: DISCONTINUED | OUTPATIENT
Start: 2025-05-10 | End: 2025-05-10

## 2025-05-10 RX ORDER — CELECOXIB 200 MG/1
200 CAPSULE ORAL DAILY
COMMUNITY
Start: 2025-05-07 | End: 2025-05-23

## 2025-05-10 RX ORDER — METOPROLOL SUCCINATE 50 MG/1
50 TABLET, EXTENDED RELEASE ORAL DAILY
COMMUNITY
Start: 2025-05-07 | End: 2025-05-28 | Stop reason: SDUPTHER

## 2025-05-10 RX ORDER — DIVALPROEX SODIUM 500 MG/1
500 TABLET, DELAYED RELEASE ORAL EVERY 8 HOURS
Status: DISCONTINUED | OUTPATIENT
Start: 2025-05-10 | End: 2025-05-12 | Stop reason: HOSPADM

## 2025-05-10 RX ORDER — ACETAMINOPHEN 500 MG
1000 TABLET ORAL EVERY 6 HOURS PRN
COMMUNITY

## 2025-05-10 RX ORDER — LABETALOL HYDROCHLORIDE 5 MG/ML
10 INJECTION, SOLUTION INTRAVENOUS EVERY 4 HOURS PRN
Status: DISCONTINUED | OUTPATIENT
Start: 2025-05-10 | End: 2025-05-12 | Stop reason: HOSPADM

## 2025-05-10 RX ORDER — OMEPRAZOLE 20 MG/1
20 CAPSULE, DELAYED RELEASE ORAL DAILY
Status: DISCONTINUED | OUTPATIENT
Start: 2025-05-10 | End: 2025-05-10

## 2025-05-10 RX ADMIN — DIVALPROEX SODIUM 500 MG: 500 TABLET, DELAYED RELEASE ORAL at 22:25

## 2025-05-10 RX ADMIN — METOPROLOL SUCCINATE 50 MG: 50 TABLET, EXTENDED RELEASE ORAL at 12:08

## 2025-05-10 RX ADMIN — APIXABAN 5 MG: 5 TABLET, FILM COATED ORAL at 22:25

## 2025-05-10 RX ADMIN — APIXABAN 5 MG: 5 TABLET, FILM COATED ORAL at 12:08

## 2025-05-10 RX ADMIN — DIVALPROEX SODIUM 500 MG: 500 TABLET, DELAYED RELEASE ORAL at 14:35

## 2025-05-10 RX ADMIN — SODIUM CHLORIDE, POTASSIUM CHLORIDE, SODIUM LACTATE AND CALCIUM CHLORIDE: 600; 310; 30; 20 INJECTION, SOLUTION INTRAVENOUS at 12:08

## 2025-05-10 ASSESSMENT — ENCOUNTER SYMPTOMS
WEAKNESS: 0
VOMITING: 0
SEIZURES: 0
NAUSEA: 0
SHORTNESS OF BREATH: 0

## 2025-05-10 ASSESSMENT — PAIN DESCRIPTION - PAIN TYPE: TYPE: ACUTE PAIN

## 2025-05-10 ASSESSMENT — FIBROSIS 4 INDEX: FIB4 SCORE: 0.36

## 2025-05-10 NOTE — ED NOTES
Bedside report received from off going RN/tech: Mitzy, assumed care of patient.  POC discussed with patient. Call light within reach, all needs addressed at this time.       Fall risk interventions in place: Move the patient closer to the nurse's station, Patient's personal possessions are with in their safe reach, Place socks on patient, Place fall risk sign on patient's door, Give patient urinal if applicable, and Keep floor surfaces clean and dry (all applicable per [place] Fall risk assessment)   Continuous monitoring: Cardiac Leads, Pulse Ox, or Blood Pressure  IVF/IV medications: Not Applicable   Oxygen: How many liters 2L, Traced the line to wall oxygen, and No oxygen tank in room  Bedside sitter: Not Applicable   Isolation: Not Applicable

## 2025-05-10 NOTE — CARE PLAN
The patient is Stable - Low risk of patient condition declining or worsening    Shift Goals  Clinical Goals: reorient as needed, safety, IVFs, PT/OT evals    Progress made toward(s) clinical / shift goals:    Problem: Knowledge Deficit - Standard  Goal: Patient and family/care givers will demonstrate understanding of plan of care, disease process/condition, diagnostic tests and medications  Outcome: Progressing     Problem: Pain Management  Goal: Pain level will decrease to patient's comfort goal  Outcome: Progressing       14:15-19:15  Patient arrived from ED at approximately 14:15. Patient A&Ox3. Denies pain at this time. Patient arrived on 2L oxymask, patient now on RA. Telemetry placed on patient and monitor tech made aware. IVFs infusing. Skin check completed. Bed locked in lowest position with bed alarm activated. Call light within reach. Safety precautions maintained.

## 2025-05-10 NOTE — ASSESSMENT & PLAN NOTE
Likely metabolic encephalopathy, concern for polypharmacy  MRI on last admission reviewed noted to have consistent with an occipital injury  No meningeal findings on exam  CT head unremarkable  Normal ammonia level, normal valproic acid level normal alcohol level  Check U-Tox,  Neurochecks every 4 hours, frequent reassessment for vital signs  Repeat BMP in a.m. to monitor electrolytes and renal function  Repeat CBC in a.m. to monitor white count and hemoglobin  EEG to evaluate for nonconvulsive seizures, toxic/metabolic encephalopathy  Reviewed all current medication including over-the-counter drug and supplement which could be contributing recentmental status changes.

## 2025-05-10 NOTE — ED NOTES
Chief Complaint   Patient presents with    ALOC     Pt BIB [facility] after being found by wife crawling on floor. Per report wife requesting narcan on scene. 0.5mg given by EMS. Recent reported cardiac arrest last month with extended in patient stay. A&O x 3 per EMS. Arrives A&O x 3, knows where he is, but unable to recall event leading to transport, or why he is here.     Unsure if pt had fall prior to EMS arriving.

## 2025-05-10 NOTE — ASSESSMENT & PLAN NOTE
Mostly due to  dehydration   Intravenous fluids  Avoid nephrotoxins, monitor inputs and outputs  Renal ultrasound  Bladder scan  Repeat BMP in a.m.

## 2025-05-10 NOTE — H&P
Hospital Medicine History & Physical Note    Date of Service  5/10/2025    Primary Care Physician  Sal Schafer D.O.        Code Status  Full Code    Chief Complaint  Chief Complaint   Patient presents with    ALOC     Pt [facility] after being found by wife crawling on floor. Per report wife requesting narcan on scene. 0.5mg given by EMS. Recent reported cardiac arrest last month with extended in patient stay. A&O x 3 per EMS. Arrives A&O x 3, knows where he is, but unable to recall event leading to transport, or why he is here.       History of Presenting Illness  Roberto Braswell is a 44 y.o. male with extensive past medical history including PE on Eliquis, cardiomyopathy, Endosac brain injury cardiac arrest (recent admission from 4/32025 to 4/21/2025 )who was brought by his wife on 5/10/2025 with altered mental status.  Patient recently discharged from [facility] nursing home 3 days ago.  Per the patient's spouse he was doing well until yesterday night when he was noted to be confused and today he was found laying in the floor.  Patient is spouse is concerned as patient might reportedly taken extra oxycodone.  On EMS arrival patient received Narcan.  On arrival to ED patient remained AO x 3, poor historian, requiring 2 L oxygen, labs with normal white count, hemoglobin 12.2, sodium 139 potassium 5.1, elevated BUN/creatinine, chest x-ray noted with atelectasis, CT head with no acute process, EKG normal sinus rhythm with nonspecific ST changes.Patient will be admitted to the hospital for further evaluation and management for acute metabolic encephalopathy, ELISABET.       I spoke and discussed the case with the ER physician, Dr. Miranda  I discussed the plan of care with family.    Review of Systems  Patient is poor historian.  Most of the history provided by spouse at bedside.  Review of Systems   Respiratory:  Negative for shortness of breath.    Cardiovascular:  Negative for chest pain.   Gastrointestinal:   Negative for nausea and vomiting.   Neurological:  Negative for seizures and weakness.       Past Medical History   has a past medical history of Arthritis, Breath shortness (11/03/2023), Drug-seeking behavior (07/30/2023), Duodenal ulcer (07/30/2023), Gastric ulcer, Heart valve disease, Hemorrhagic disorder (HCC), Hypertension, Pain, and Seizure (HCC).    Surgical History   has a past surgical history that includes other orthopedic surgery; other abdominal surgery; ankle arthroscopy (Right, 5/21/2018); biopsy ortho (Right, 5/21/2018); pr upper gi endoscopy,diagnosis (3/28/2022); pr upper gi endoscopy,biopsy (3/28/2022); pr upper gi endoscopy,biopsy (N/A, 7/30/2023); pr upper gi endoscopy,diagnosis (N/A, 9/14/2023); pr upper gi endoscopy,biopsy (N/A, 9/14/2023); pr replacement of mitral valve (1/25/2024); echocardiogram, transesophageal, intraoperative (1/25/2024); pr upper gi endoscopy,diagnosis (N/A, 5/18/2024); pr upper gi endoscopy,biopsy (N/A, 5/18/2024); gastroscopy with endostat (N/A, 5/18/2024); pr upper gi endoscopy,diagnosis (N/A, 5/25/2024); pr upper gi endoscopy,biopsy (N/A, 5/25/2024); pr colonoscopy-flexible (5/27/2024); capsule endoscopy administration (N/A, 5/28/2024); capsule endoscopy retrieval (5/28/2024); and pr upper gi endoscopy,diagnosis (N/A, 9/18/2024).     Family History  family history includes Heart Disease in his mother; No Known Problems in his father.   Family history reviewed with patient. There is no family history that is pertinent to the chief complaint.     Social History   reports that he has never smoked. He has never used smokeless tobacco. He reports that he does not currently use alcohol. He reports that he does not use drugs.    Allergies  Allergies   Allergen Reactions    Morphine Vomiting    Tramadol Unspecified     Seizure  [id number]       Medications  Prior to Admission Medications   Prescriptions Last Dose Informant Patient Reported? Taking?   QUEtiapine (SEROQUEL) 25  MG Tab   No No   Sig: Take 1 Tablet by mouth at bedtime as needed (if initial dose after 2 hours is ineffective for insomnia and agitation).   QUEtiapine (SEROQUEL) 50 MG tablet   No No   Sig: Take 1 Tablet by mouth every evening.   apixaban (ELIQUIS) 5mg Tab   No No   Sig: Take 1 Tablet by mouth 2 times a day. Indications: Thromboembolism secondary to Atrial Fibrillation   divalproex (DEPAKOTE) 500 MG Tablet Delayed Response   No No   Sig: Take 1 Tablet by mouth every 8 hours.   gabapentin (NEURONTIN) 100 MG Cap   No No   Sig: Take 2 Capsules by mouth every evening.   losartan (COZAAR) 50 MG Tab   No No   Sig: Take 1 Tablet by mouth 2 times a day.   melatonin 5 mg Tab   No No   Sig: Take 1 Tablet by mouth every evening.   metoprolol SR (TOPROL XL) 100 MG TABLET SR 24 HR   No No   Sig: Take 0.5 Tablets by mouth every day.   omeprazole (PRILOSEC) 20 MG delayed-release capsule   No No   Sig: Take 1 Capsule by mouth every day.   oxyCODONE immediate release (ROXICODONE) 10 MG immediate release tablet   No No   Sig: Take 1 tablet by mouth every 4 to 6 hours as needed for pain. max 5/day. 150 tabs/30days      Facility-Administered Medications: None       Physical Exam  Temp:  [36.6 °C (97.8 °F)] 36.6 °C (97.8 °F)  Pulse:  [69-91] 69  Resp:  [14-20] 15  BP: (139-148)/(69-87) 144/80  SpO2:  [92 %-100 %] 100 %  Blood Pressure: (!) 144/80   Temperature: 36.6 °C (97.8 °F)   Pulse: 69   Respiration: 15   Pulse Oximetry: 100 %       Physical Exam  Constitutional:       Appearance: Normal appearance.   Cardiovascular:      Rate and Rhythm: Normal rate.      Pulses: Normal pulses.      Heart sounds: Normal heart sounds.   Pulmonary:      Effort: Pulmonary effort is normal.   Abdominal:      General: Abdomen is flat. There is no distension.   Musculoskeletal:      Right lower leg: No edema.      Left lower leg: Edema present.   Neurological:      General: No focal deficit present.      Mental Status: He is alert and oriented to  "person, place, and time.         Laboratory:  Recent Labs     05/10/25  0813   WBC 5.5   RBC 4.43*   HEMOGLOBIN 12.2*   HEMATOCRIT 38.7*   MCV 87.4   MCH 27.5   MCHC 31.5*   RDW 50.1*   PLATELETCT 227   MPV 9.4     Recent Labs     05/10/25  0813   SODIUM 139   POTASSIUM 5.1   CHLORIDE 107   CO2 23   GLUCOSE 96   BUN 29*   CREATININE 1.51*   CALCIUM 8.7     Recent Labs     05/10/25  0813   ALTSGPT 10   ASTSGOT 17   ALKPHOSPHAT 56   TBILIRUBIN 0.4   GLUCOSE 96         No results for input(s): \"NTPROBNP\" in the last 72 hours.      Recent Labs     05/10/25  0813   TROPONINT 8       Imaging:  CT-HEAD W/O   Final Result         1. No acute process in the brain evident.               DX-CHEST-PORTABLE (1 VIEW)   Final Result      Low lung volume with hypoventilatory change.          X-Ray:  I have personally reviewed the images and compared with prior images.  EKG:  I have personally reviewed the images and compared with prior images.    Assessment/Plan:  Justification for Admission Status  I anticipate this patient will require at least two midnights for appropriate medical management, necessitating inpatient  for further evaluation and management for acute metabolic encephalopathy, ELISABET. Need close monitoring of blood counts, electrolytes and renal function .  Need IV fluid for ELISABET.  Need close telemetry monitoring for possibility for high risk for deterioration due to ongoing severe medical issues, need close hemodynamic monitoring, need daily laboratory testing including CBC, BMP .  High risk of deterioration /Arrhythmias need to be monitor closely under telemetry .          * Acute metabolic encephalopathy- (present on admission)  Assessment & Plan  Likely metabolic encephalopathy, concern for polypharmacy  MRI on last admission reviewed noted to have consistent with an occipital injury  No meningeal findings on exam  CT head unremarkable  Normal ammonia level, normal valproic acid level normal alcohol level  Check " U-Tox,  Neurochecks every 4 hours, frequent reassessment for vital signs  Repeat BMP in a.m. to monitor electrolytes and renal function  Repeat CBC in a.m. to monitor white count and hemoglobin  EEG to evaluate for nonconvulsive seizures, toxic/metabolic encephalopathy  Reviewed all current medication including over-the-counter drug and supplement which could be contributing recentmental status changes.    ELISABET (acute kidney injury) (HCC)  Assessment & Plan    Mostly due to  dehydration   Intravenous fluids  Avoid nephrotoxins, monitor inputs and outputs  Renal ultrasound  Bladder scan  Repeat BMP in a.m.      History of pulmonary embolism- (present on admission)  Assessment & Plan  On Eliquis  Monitor oxygen requirement    History of MVR with cardiopulmonary bypass- (present on admission)  Assessment & Plan  By Dr. Santana Jan 2024    Cardiac arrest (HCC)- (present on admission)  Assessment & Plan  Recent cardiac arrest secondary PE  Repeat echocardiogram with EF 65%                VTE prophylaxis: SCDs/TEDs and therapeutic anticoagulation with eliquis    I independently reviewed pertinent clinical lab tests since admission and ordered additional follow up clinical lab tests.  Admission order was placed.  Labs ordered for follow-up.  I independently reviewed pertinent radiographic images and the radiologist's reports since admission and ordered additional follow up radiographic studies.  The details of the available patient records in [facility] (including laboratory tests, culture data, medications, imaging, and other pertinent diagnostic tests) and that information was utilized as warranted in today's medical decision making for this patient.  I personally evaluated the patient condition at bedside.     Care interventions include:   Review of interval medical and surgical history, current medications and outpatient medication reconciliation, interval imaging studies and radiologist interpretation, and interval  laboratory values.

## 2025-05-10 NOTE — PROGRESS NOTES
4 Eyes Skin Assessment Completed by KARSON Love and KARSON Santos.    Head WDL  Ears WDL  Nose WDL  Mouth WDL  Neck WDL  Breast/Chest WDL  Shoulder Blades WDL  Spine WDL  (R) Arm/Elbow/Hand WDL  (L) Arm/Elbow/Hand WDL  Abdomen Bruising  Groin WDL  Scrotum/Coccyx/Buttocks WDL  (R) Leg WDL  (L) Leg Edema  (R) Heel/Foot/Toe Redness and Blanching  (L) Heel/Foot/Toe Redness and Blanching          Devices In Places Tele Box      Interventions In Place Pillows    Possible Skin Injury No    Pictures Uploaded Into Epic N/A  Wound Consult Placed N/A  RN Wound Prevention Protocol Ordered No

## 2025-05-10 NOTE — ED NOTES
Med rec is PARTIALLY complete per patient's spouse at bedside. Per spouse, patient was discharged from [facility] on 5/6/2025. Reviewed medication use at home since time of discharge with spouse. Unable to verify other medication use at [facility] prior to discharge at this time; MAR was requested from staff at facility ([phone]) at 11:32.    Allergies reviewed with patient's spouse.    ANTICOAGULANTS: Patient is on ELIQUIS. Last dose 5/9/2025 at around 22:30.    Preferred pharmacy per spouse: [facility] on [address].    *Spouse states that she held patient's losartan and metoprolol yesterday due to low blood pressure.    Addendum: Med rec updated per Medication Review Report from [facility]. MAR was not received. Medication review report shows that patient had also been on omeprazole (20 mg, 1 tablet every morning) at facility. Spouse did state patient has not been receiving this medication at home. Unable to confirm time of last dose. Med rec is complete.

## 2025-05-10 NOTE — ED NOTES
Report given to Kate TY.  Pt to T713-2, on 2 L oxymask in stable condition with transport, all belongings with pt.

## 2025-05-10 NOTE — ED PROVIDER NOTES
"ED Provider Note    ED PHYSICIAN NOTE    CHIEF COMPLAINT  Chief Complaint   Patient presents with    ALOC     Pt [facility], [place] after being found by wife crawling on floor. Per report wife requesting narcan on scene. 0.5mg given by EMS. Recent reported cardiac arrest last month with extended in patient stay. A&O x 3 per EMS. Arrives A&O x 3, knows where he is, but unable to recall event leading to transport, or why he is here.       EXTERNAL RECORDS REVIEWED  Inpatient Notes patient was admitted to April 3, 2025 after cardiac arrest, noted history of hypertension prior FEROZ chronic pain, had witnessed cardiac arrest found to be in V-fib.  Ultimately 3 defibrillations and 4 rounds of epinephrine with return of spontaneous circulation.  He did have a CTA that showed PE and was started on anticoagulation and discharged to rehab facility    HPI/ROS  LIMITATION TO HISTORY   Select: Altered mental status / Confusion  OUTSIDE HISTORIAN(S):  Family as below    Roberto Braswell is a 44 y.o. male who presents with confusion after he was found on the floor this morning.  Patient had recent complicated hospital stay as above, had been discharged from rehab facility 3 days ago and then at home.  Wife notes that he has had some episodes of confusion since that point.  Last night he seemed fine when he went to bed around 9:00, he asked her this morning around 5 if she wanted to eat breakfast and then she found him \"passed out on the floor\" around 630 after this.  Patient does not remember any of these things.  She notes no seizure activity, no vomiting.  He has not been complaining of any headaches, chest pain no recent illness fevers chills cough or congestion.  Patient reports no concerns at this time, no headache, no chest pain, no abdominal pain, no focal weakness or numbness but he does not remember any of these events.    Potential for opioid misuse, he is prescribed oxycodone, had been taking 5 mg although wife states " "that when she went through his medications it looks like they had been \"gone through\" and he may have taken more than he was supposed to        PAST MEDICAL HISTORY  Past Medical History:   Diagnosis Date    Arthritis     Right ankle    Breath shortness 11/03/2023    With exertion.    Drug-seeking behavior 07/30/2023    Demanding narcotics for a duodenal ulcer July 2023. Refused antacids or sucralfate.     Duodenal ulcer 07/30/2023    Gastric ulcer     Heart valve disease     Mitral valve prolapse    Hemorrhagic disorder (HCC)     GI bleed    Hypertension     Pain     Resolved; Right ankle    Seizure (HCC)     while taking tramadol       SOCIAL HISTORY  Social History     Tobacco Use    Smoking status: Never    Smokeless tobacco: Never   Vaping Use    Vaping status: Never Used   Substance Use Topics    Alcohol use: Not Currently    Drug use: Never       CURRENT MEDICATIONS  Home Medications       Reviewed by Christie Snyder R.N. (Registered Nurse) on 05/10/25 at 0745  Med List Status: Partial     Medication Last Dose Status   apixaban (ELIQUIS) 5mg Tab  Active   divalproex (DEPAKOTE) 500 MG Tablet Delayed Response  Active   gabapentin (NEURONTIN) 100 MG Cap  Active   losartan (COZAAR) 50 MG Tab  Active   melatonin 5 mg Tab  Active   metoprolol SR (TOPROL XL) 100 MG TABLET SR 24 HR  Active   omeprazole (PRILOSEC) 20 MG delayed-release capsule  Active   oxyCODONE immediate release (ROXICODONE) 10 MG immediate release tablet  Active   QUEtiapine (SEROQUEL) 25 MG Tab  Active   QUEtiapine (SEROQUEL) 50 MG tablet  Active                  Audit from Redirected Encounters    **Home medications have not yet been reviewed for this encounter**         ALLERGIES  Allergies   Allergen Reactions    Morphine Vomiting    Tramadol Unspecified     Seizure  [id number]       PHYSICAL EXAM  VITAL SIGNS: BP (!) 144/80   Pulse 69   Temp 36.6 °C (97.8 °F) (Temporal)   Resp 15   Ht 1.93 m (6' 4\")   Wt 118 kg (260 lb)   SpO2 100%   BMI " 31.65 kg/m²    Constitutional: Sleepy but conversant  HENT: Normal inspection, no signs of trauma  Eyes: Normal inspection, Pupils equal, non-icteric  Neck: Grossly normal range of motion. No stridor  Cardiovascular: Regular rate and rhythm, no murmurs.  Symmetric peripheral pulses at radial  Thorax & Lungs: No respiratory distress, No wheezing, No rales, No rhonchi, No chest tenderness.   Abdomen:  soft, non-distended, nontender, no mass  Skin: No obvious rash. Warm. Dry.   Back: No tenderness, No CVA tenderness.   Extremities: No cyanosis, no edema  Neurologic: Sleepy but easily arousable and conversant, oriented to person and place although not advanced.  Cranial nerves are intact and speech is fluent, no drift of the upper extremities, no drift of the lower extremities, intact  strength, sensation is intact to light touch throughout the upper and lower extremities  Psychiatric: Normal affect for situation        DIAGNOSTIC STUDIES / PROCEDURES  LABS/EKG  Results for orders placed or performed during the hospital encounter of 05/10/25   CBC with Differential    Collection Time: 05/10/25  8:13 AM   Result Value Ref Range    WBC 5.5 4.8 - 10.8 K/uL    RBC 4.43 (L) 4.70 - 6.10 M/uL    Hemoglobin 12.2 (L) 14.0 - 18.0 g/dL    Hematocrit 38.7 (L) 42.0 - 52.0 %    MCV 87.4 81.4 - 97.8 fL    MCH 27.5 27.0 - 33.0 pg    MCHC 31.5 (L) 32.3 - 36.5 g/dL    RDW 50.1 (H) 35.9 - 50.0 fL    Platelet Count 227 164 - 446 K/uL    MPV 9.4 9.0 - 12.9 fL    Neutrophils-Polys 63.30 44.00 - 72.00 %    Lymphocytes 18.90 (L) 22.00 - 41.00 %    Monocytes 14.10 (H) 0.00 - 13.40 %    Eosinophils 1.80 0.00 - 6.90 %    Basophils 1.30 0.00 - 1.80 %    Immature Granulocytes 0.60 0.00 - 0.90 %    Nucleated RBC 0.00 0.00 - 0.20 /100 WBC    Neutrophils (Absolute) 3.45 1.82 - 7.42 K/uL    Lymphs (Absolute) 1.03 1.00 - 4.80 K/uL    Monos (Absolute) 0.77 0.00 - 0.85 K/uL    Eos (Absolute) 0.10 0.00 - 0.51 K/uL    Baso (Absolute) 0.07 0.00 - 0.12  K/uL    Immature Granulocytes (abs) 0.03 0.00 - 0.11 K/uL    NRBC (Absolute) 0.00 K/uL   Comp Metabolic Panel    Collection Time: 05/10/25  8:13 AM   Result Value Ref Range    Sodium 139 135 - 145 mmol/L    Potassium 5.1 3.6 - 5.5 mmol/L    Chloride 107 96 - 112 mmol/L    Co2 23 20 - 33 mmol/L    Anion Gap 9.0 7.0 - 16.0    Glucose 96 65 - 99 mg/dL    Bun 29 (H) 8 - 22 mg/dL    Creatinine 1.51 (H) 0.50 - 1.40 mg/dL    Calcium 8.7 8.5 - 10.5 mg/dL    Correct Calcium 8.9 8.5 - 10.5 mg/dL    AST(SGOT) 17 12 - 45 U/L    ALT(SGPT) 10 2 - 50 U/L    Alkaline Phosphatase 56 30 - 99 U/L    Total Bilirubin 0.4 0.1 - 1.5 mg/dL    Albumin 3.7 3.2 - 4.9 g/dL    Total Protein 6.3 6.0 - 8.2 g/dL    Globulin 2.6 1.9 - 3.5 g/dL    A-G Ratio 1.4 g/dL   Diagnostic Alcohol    Collection Time: 05/10/25  8:13 AM   Result Value Ref Range    Diagnostic Alcohol <10.1 <10.1 mg/dL   AMMONIA    Collection Time: 05/10/25  8:13 AM   Result Value Ref Range    Ammonia 23 11 - 45 umol/L   VALPROIC ACID    Collection Time: 05/10/25  8:13 AM   Result Value Ref Range    Valproic Acid 78.1 50.0 - 100.0 ug/mL   TROPONIN    Collection Time: 05/10/25  8:13 AM   Result Value Ref Range    Troponin T 8 6 - 19 ng/L   ESTIMATED GFR    Collection Time: 05/10/25  8:13 AM   Result Value Ref Range    GFR (CKD-EPI) 58 (A) >60 mL/min/1.73 m 2   EKG    Collection Time: 05/10/25 11:17 AM   Result Value Ref Range    Report       [facility]    Test Date:  2025-05-10  Pt Name:    PATI MERCEDES           Department: ER  MRN:        [id number]                      Room:        15  Gender:     Male                         Technician: [id number]  :        1980                   Requested By:ER TRIAGE PROTOCOL  Order #:    [id number]                    Reading MD: TOBI RAMOS MD    Measurements  Intervals                                Axis  Rate:       77                           P:          35  NE:         158                           QRS:        -14  QRSD:       87                           T:          44  QT:         370  QTc:        419    Interpretive Statements  Sinus rhythm  Inferior infarct, old  Anteroseptal infarct, old  Compared to ECG 04/11/2025 20:59:04  ST (T wave) deviation no longer present  Electronically Signed On 05- 11:17:06 PDT by TOBI RAMOS MD         RADIOLOGY  I have independently interpreted the diagnostic imaging associated with this visit and am waiting the final reading from the radiologist.   My preliminary interpretation is as follows: No intracranial bleed    CT-HEAD W/O   Final Result         1. No acute process in the brain evident.               DX-CHEST-PORTABLE (1 VIEW)   Final Result      Low lung volume with hypoventilatory change.            COURSE & MEDICAL DECISION MAKING    INITIAL ASSESSMENT, COURSE AND PLAN  Care Narrative: 7:54 AM  Patient presents with an unwitnessed episode this morning where he was found on the floor.  At this point consideration for intracranial bleed as he is on Eliquis although he has no focal findings does meet TBI protocol and will proceed with CT head.  Additional consideration for electrolyte or metabolic derangement, arrhythmia/syncope, anemia, medication effect, seizure.  Aborted for diagnostic labs, ECG, CT head.  At this point he is conversant and while sleeping does not require naloxone    1045 AM  Patient is reevaluated, he remains sleepy but easily arousable and conversant.  Discussed all results with patient and family, plan for hospitalization    Case is discussed with Dr. Kapadia for admission    Interventions  Medications - No data to display           PROBLEM LIST    # Encephalopathy.  Patient with some ongoing encephalopathy after he had cardiac arrest with concern for some anoxic injury.  He did have an episode last night where he was found on the ground is unclear what led to this.  CT visit shows no intracranial bleed which was obtained as he  is on Eliquis.  He has no focal deficits noted on exam.  There is no significant electrolyte derangement noted on his exam today and has no fevers or infectious symptoms.  Consideration for polypharmacy as wife does report that he has had prior use of heavier opioids and potential some access to this.  Regardless given his current mental status and is unclear events and certainly higher risk with consideration for syncope as well he will be hospitalized for ongoing care    # History of pulmonary embolus.  On Eliquis    # Acute renal insufficiency.  Hydration as inpatient        DISPOSITION AND DISCUSSIONS  I have discussed management of the patient with the following physicians and CARTER's:  as noted above    Patient is admitted in stable condition    FINAL DIAGNOSIS  1. Encephalopathy, unspecified type        2. Polypharmacy               This dictation was created using voice recognition software. The accuracy of the dictation is limited to the abilities of the software. I expect there may be some errors of grammar and possibly content. The nursing notes were reviewed and certain aspects of this information were incorporated into this note.    Electronically signed by: Judd Miranda M.D., 5/10/2025

## 2025-05-10 NOTE — ED NOTES
Pt wife requesting 1L O2 due to obstructive sleep apnea - mask placed 1L O2. SPO2 > 90% on room air

## 2025-05-11 LAB
ANION GAP SERPL CALC-SCNC: 10 MMOL/L (ref 7–16)
BUN SERPL-MCNC: 18 MG/DL (ref 8–22)
CALCIUM SERPL-MCNC: 9 MG/DL (ref 8.5–10.5)
CHLORIDE SERPL-SCNC: 109 MMOL/L (ref 96–112)
CO2 SERPL-SCNC: 19 MMOL/L (ref 20–33)
CREAT SERPL-MCNC: 1.14 MG/DL (ref 0.5–1.4)
EKG IMPRESSION: NORMAL
ERYTHROCYTE [DISTWIDTH] IN BLOOD BY AUTOMATED COUNT: 48.2 FL (ref 35.9–50)
GFR SERPLBLD CREATININE-BSD FMLA CKD-EPI: 81 ML/MIN/1.73 M 2
GLUCOSE SERPL-MCNC: 87 MG/DL (ref 65–99)
HCT VFR BLD AUTO: 41.8 % (ref 42–52)
HGB BLD-MCNC: 13.7 G/DL (ref 14–18)
MCH RBC QN AUTO: 28.2 PG (ref 27–33)
MCHC RBC AUTO-ENTMCNC: 32.8 G/DL (ref 32.3–36.5)
MCV RBC AUTO: 86.2 FL (ref 81.4–97.8)
PLATELET # BLD AUTO: 232 K/UL (ref 164–446)
PMV BLD AUTO: 9.7 FL (ref 9–12.9)
POTASSIUM SERPL-SCNC: 4.7 MMOL/L (ref 3.6–5.5)
RBC # BLD AUTO: 4.85 M/UL (ref 4.7–6.1)
SODIUM SERPL-SCNC: 138 MMOL/L (ref 135–145)
TROPONIN T SERPL-MCNC: 7 NG/L (ref 6–19)
WBC # BLD AUTO: 4.6 K/UL (ref 4.8–10.8)

## 2025-05-11 PROCEDURE — A9270 NON-COVERED ITEM OR SERVICE: HCPCS | Performed by: STUDENT IN AN ORGANIZED HEALTH CARE EDUCATION/TRAINING PROGRAM

## 2025-05-11 PROCEDURE — 700105 HCHG RX REV CODE 258: Performed by: STUDENT IN AN ORGANIZED HEALTH CARE EDUCATION/TRAINING PROGRAM

## 2025-05-11 PROCEDURE — 93010 ELECTROCARDIOGRAM REPORT: CPT | Performed by: INTERNAL MEDICINE

## 2025-05-11 PROCEDURE — 80048 BASIC METABOLIC PNL TOTAL CA: CPT

## 2025-05-11 PROCEDURE — 700102 HCHG RX REV CODE 250 W/ 637 OVERRIDE(OP)

## 2025-05-11 PROCEDURE — 770020 HCHG ROOM/CARE - TELE (206)

## 2025-05-11 PROCEDURE — 93005 ELECTROCARDIOGRAM TRACING: CPT | Mod: TC | Performed by: INTERNAL MEDICINE

## 2025-05-11 PROCEDURE — 84484 ASSAY OF TROPONIN QUANT: CPT

## 2025-05-11 PROCEDURE — 99233 SBSQ HOSP IP/OBS HIGH 50: CPT | Performed by: INTERNAL MEDICINE

## 2025-05-11 PROCEDURE — A9270 NON-COVERED ITEM OR SERVICE: HCPCS

## 2025-05-11 PROCEDURE — 93922 UPR/L XTREMITY ART 2 LEVELS: CPT | Mod: 26 | Performed by: INTERNAL MEDICINE

## 2025-05-11 PROCEDURE — 700102 HCHG RX REV CODE 250 W/ 637 OVERRIDE(OP): Performed by: STUDENT IN AN ORGANIZED HEALTH CARE EDUCATION/TRAINING PROGRAM

## 2025-05-11 PROCEDURE — 85027 COMPLETE CBC AUTOMATED: CPT

## 2025-05-11 RX ORDER — NITROGLYCERIN 0.4 MG/1
0.4 TABLET SUBLINGUAL
Status: DISCONTINUED | OUTPATIENT
Start: 2025-05-11 | End: 2025-05-12 | Stop reason: HOSPADM

## 2025-05-11 RX ORDER — ACETAMINOPHEN 325 MG/1
650 TABLET ORAL EVERY 6 HOURS PRN
Status: DISCONTINUED | OUTPATIENT
Start: 2025-05-11 | End: 2025-05-12 | Stop reason: HOSPADM

## 2025-05-11 RX ADMIN — METOPROLOL SUCCINATE 50 MG: 50 TABLET, EXTENDED RELEASE ORAL at 05:22

## 2025-05-11 RX ADMIN — DIVALPROEX SODIUM 500 MG: 500 TABLET, DELAYED RELEASE ORAL at 13:53

## 2025-05-11 RX ADMIN — ACETAMINOPHEN 650 MG: 325 TABLET ORAL at 23:38

## 2025-05-11 RX ADMIN — APIXABAN 5 MG: 5 TABLET, FILM COATED ORAL at 05:22

## 2025-05-11 RX ADMIN — APIXABAN 5 MG: 5 TABLET, FILM COATED ORAL at 16:21

## 2025-05-11 RX ADMIN — DIVALPROEX SODIUM 500 MG: 500 TABLET, DELAYED RELEASE ORAL at 05:22

## 2025-05-11 RX ADMIN — SODIUM CHLORIDE, POTASSIUM CHLORIDE, SODIUM LACTATE AND CALCIUM CHLORIDE: 600; 310; 30; 20 INJECTION, SOLUTION INTRAVENOUS at 00:25

## 2025-05-11 RX ADMIN — DIVALPROEX SODIUM 500 MG: 500 TABLET, DELAYED RELEASE ORAL at 23:38

## 2025-05-11 ASSESSMENT — COGNITIVE AND FUNCTIONAL STATUS - GENERAL
CLIMB 3 TO 5 STEPS WITH RAILING: A LITTLE
SUGGESTED CMS G CODE MODIFIER DAILY ACTIVITY: CH
WALKING IN HOSPITAL ROOM: A LITTLE
DAILY ACTIVITIY SCORE: 24
MOBILITY SCORE: 22
SUGGESTED CMS G CODE MODIFIER MOBILITY: CJ

## 2025-05-11 ASSESSMENT — PAIN DESCRIPTION - PAIN TYPE
TYPE: ACUTE PAIN

## 2025-05-11 ASSESSMENT — FIBROSIS 4 INDEX: FIB4 SCORE: 1.02

## 2025-05-11 NOTE — PROGRESS NOTES
Patient made medical during rounds.    1125: Call from monitors that patient had four beats of vtach.     MD notified. Pt made back to telemetry status.

## 2025-05-11 NOTE — PROGRESS NOTES
Hospital Medicine Daily Progress Note    Date of Service  5/11/2025    Chief Complaint  Roberto Braswell is a 44 y.o. male admitted 5/10/2025 with altered mental status     Hospital Course  44 y.o. male with extensive past medical history including PE on Eliquis, cardiomyopathy, Endosac brain injury cardiac arrest (recent admission from 4/32025 to 4/21/2025 )who was brought by his wife on 5/10/2025 with altered mental status.  Patient recently discharged from [facility] 3 days ago.  Per the patient's spouse he was doing well until yesterday night when he was noted to be confused and today he was found laying in the floor.  Patient is spouse is concerned as patient might reportedly taken extra oxycodone.  On EMS arrival patient received Narcan.  On arrival to ED patient remained AO x 3, poor historian, requiring 2 L oxygen, labs with normal white count, hemoglobin 12.2, sodium 139 potassium 5.1, elevated BUN/creatinine, chest x-ray noted with atelectasis, CT head with no acute process, EKG normal sinus rhythm with nonspecific ST changes.Patient will be admitted to the hospital for further evaluation and management for acute metabolic encephalopathy, ELISABET.     Interval Problem Update  No acute events.  Patient's mentation is improving.  Vitals have been stable.  Continue cardiac monitoring.  CT head without contrast was negative for acute process.  UA is negative for infection.  Valproic acid levels within normal limits.  Ammonia within normal limits.    I have discussed this patient's plan of care and discharge plan at IDT rounds today with Case Management, Nursing, Nursing leadership, and other members of the IDT team.    Consultants/Specialty  none    Code Status  Full Code    Disposition  The patient is not medically cleared for discharge to home or a post-acute facility.      I have placed the appropriate orders for post-discharge needs.    Review of Systems  Review of Systems   Constitutional:   Positive for malaise/fatigue.        Physical Exam  Temp:  [36.4 °C (97.5 °F)-36.8 °C (98.2 °F)] 36.6 °C (97.9 °F)  Pulse:  [56-74] 60  Resp:  [12-19] 14  BP: (119-155)/(76-96) 144/96  SpO2:  [92 %-100 %] 95 %    Physical Exam  Vitals and nursing note reviewed.   Constitutional:       General: He is not in acute distress.  HENT:      Head: Normocephalic.      Mouth/Throat:      Mouth: Mucous membranes are moist.   Eyes:      Pupils: Pupils are equal, round, and reactive to light.   Cardiovascular:      Rate and Rhythm: Normal rate and regular rhythm.      Pulses: Normal pulses.      Heart sounds: Normal heart sounds.   Pulmonary:      Effort: Pulmonary effort is normal.      Breath sounds: Normal breath sounds.   Abdominal:      Palpations: Abdomen is soft.      Tenderness: There is no abdominal tenderness.   Musculoskeletal:         General: No swelling.      Cervical back: Neck supple.   Skin:     General: Skin is warm.      Coloration: Skin is not jaundiced.   Neurological:      General: No focal deficit present.      Mental Status: He is alert and oriented to person, place, and time.   Psychiatric:         Mood and Affect: Mood normal.         Behavior: Behavior normal.         Fluids    Intake/Output Summary (Last 24 hours) at 5/11/2025 1157  Last data filed at 5/11/2025 0519  Gross per 24 hour   Intake 600 ml   Output 700 ml   Net -100 ml        Laboratory  Recent Labs     05/10/25  0813 05/11/25  0635   WBC 5.5 4.6*   RBC 4.43* 4.85   HEMOGLOBIN 12.2* 13.7*   HEMATOCRIT 38.7* 41.8*   MCV 87.4 86.2   MCH 27.5 28.2   MCHC 31.5* 32.8   RDW 50.1* 48.2   PLATELETCT 227 232   MPV 9.4 9.7     Recent Labs     05/10/25  0813 05/11/25  0635   SODIUM 139 138   POTASSIUM 5.1 4.7   CHLORIDE 107 109   CO2 23 19*   GLUCOSE 96 87   BUN 29* 18   CREATININE 1.51* 1.14   CALCIUM 8.7 9.0                   Imaging  US-MICHAEL SINGLE LEVEL BILAT   Final Result      CT-HEAD W/O   Final Result         1. No acute process in the brain  evident.               DX-CHEST-PORTABLE (1 VIEW)   Final Result      Low lung volume with hypoventilatory change.           Assessment/Plan  * Acute metabolic encephalopathy- (present on admission)  Assessment & Plan  Likely metabolic encephalopathy, concern for polypharmacy  MRI on last admission reviewed noted to have consistent with an occipital injury  No meningeal findings on exam  CT head unremarkable  Normal ammonia level, normal valproic acid level normal alcohol level  Check U-Tox,  Neurochecks every 4 hours, frequent reassessment for vital signs  Repeat BMP in a.m. to monitor electrolytes and renal function  Repeat CBC in a.m. to monitor white count and hemoglobin  EEG to evaluate for nonconvulsive seizures, toxic/metabolic encephalopathy  Reviewed all current medication including over-the-counter drug and supplement which could be contributing recentmental status changes.    ELISABET (acute kidney injury) ([facility])  Assessment & Plan    Mostly due to  dehydration   Intravenous fluids  Avoid nephrotoxins, monitor inputs and outputs  Renal ultrasound  Bladder scan  Repeat BMP in a.m.      History of pulmonary embolism- (present on admission)  Assessment & Plan  On Eliquis  Monitor oxygen requirement    History of MVR with cardiopulmonary bypass- (present on admission)  Assessment & Plan  By Dr. Santana Jan 2024    Cardiac arrest ([facility])- (present on admission)  Assessment & Plan  Recent cardiac arrest secondary PE  Repeat echocardiogram with EF 65%         VTE prophylaxis: eliquis    I have performed a physical exam and reviewed and updated ROS and Plan today (5/11/2025). In review of yesterday's note (5/10/2025), there are no changes except as documented above.    I spent 52 minutes, reviewing the chart, obtaining and/or reviewing separately obtained history. Performing a medically appropriate examination and evaluation.  Counseling and educating the patient. Ordering and reviewing medications, tests, or  procedures. Documenting clinical information in EPIC. Independently interpreting results and communicating results to patient. Discussing future disposition of care with patient, RN and case management.

## 2025-05-11 NOTE — CARE PLAN
The patient is Stable - Low risk of patient condition declining or worsening    Shift Goals  Clinical Goals: Q4 arlene smith,  Patient Goals: pain control  Family Goals: eloise    Progress made toward(s) clinical / shift goals:    Problem: Pain Management  Goal: Pain level will decrease to patient's comfort goal  Outcome: Not Progressing       Patient is not progressing towards the following goals:      Problem: Pain Management  Goal: Pain level will decrease to patient's comfort goal  Outcome: Not Progressing

## 2025-05-11 NOTE — PROGRESS NOTES
Bedside report recieved and patient care assumed. Pt is resting in bed, A&0 3,  with no complaints of pain, and is on RA. Tele box on, all fall precautions are in place, belongings at bedisde table. Pt was updated on POC, no questions or concerns. Patient educated on the use of call light for assistance.

## 2025-05-11 NOTE — CARE PLAN
The patient is Watcher - Medium risk of patient condition declining or worsening    Shift Goals  Clinical Goals: Safety  Patient Goals: Remain updated on POC    Progress made toward(s) clinical / shift goals:     Problem: Knowledge Deficit - Standard  Goal: Patient and family/care givers will demonstrate understanding of plan of care, disease process/condition, diagnostic tests and medications  Outcome: Progressing     Problem: Fall Risk  Goal: Patient will remain free from falls  Outcome: Progressing     Problem: Skin Integrity  Goal: Skin integrity is maintained or improved  Outcome: Progressing

## 2025-05-12 VITALS
HEIGHT: 76 IN | HEART RATE: 60 BPM | BODY MASS INDEX: 30.95 KG/M2 | SYSTOLIC BLOOD PRESSURE: 135 MMHG | DIASTOLIC BLOOD PRESSURE: 93 MMHG | WEIGHT: 254.19 LBS | TEMPERATURE: 98 F | OXYGEN SATURATION: 95 % | RESPIRATION RATE: 17 BRPM

## 2025-05-12 PROBLEM — G93.41 ACUTE METABOLIC ENCEPHALOPATHY: Status: RESOLVED | Noted: 2025-05-10 | Resolved: 2025-05-12

## 2025-05-12 PROBLEM — I46.9 CARDIAC ARREST (HCC): Status: RESOLVED | Noted: 2025-04-03 | Resolved: 2025-05-12

## 2025-05-12 PROBLEM — N17.9 AKI (ACUTE KIDNEY INJURY) (HCC): Status: RESOLVED | Noted: 2025-05-10 | Resolved: 2025-05-12

## 2025-05-12 PROCEDURE — 97161 PT EVAL LOW COMPLEX 20 MIN: CPT

## 2025-05-12 PROCEDURE — A9270 NON-COVERED ITEM OR SERVICE: HCPCS | Performed by: STUDENT IN AN ORGANIZED HEALTH CARE EDUCATION/TRAINING PROGRAM

## 2025-05-12 PROCEDURE — 99239 HOSP IP/OBS DSCHRG MGMT >30: CPT | Performed by: INTERNAL MEDICINE

## 2025-05-12 PROCEDURE — 700102 HCHG RX REV CODE 250 W/ 637 OVERRIDE(OP): Performed by: STUDENT IN AN ORGANIZED HEALTH CARE EDUCATION/TRAINING PROGRAM

## 2025-05-12 PROCEDURE — 97535 SELF CARE MNGMENT TRAINING: CPT

## 2025-05-12 RX ADMIN — APIXABAN 5 MG: 5 TABLET, FILM COATED ORAL at 04:38

## 2025-05-12 RX ADMIN — METOPROLOL SUCCINATE 50 MG: 50 TABLET, EXTENDED RELEASE ORAL at 04:38

## 2025-05-12 RX ADMIN — DIVALPROEX SODIUM 500 MG: 500 TABLET, DELAYED RELEASE ORAL at 04:38

## 2025-05-12 ASSESSMENT — GAIT ASSESSMENTS
DISTANCE (FEET): 400
GAIT LEVEL OF ASSIST: STANDBY ASSIST

## 2025-05-12 ASSESSMENT — COGNITIVE AND FUNCTIONAL STATUS - GENERAL
MOBILITY SCORE: 22
CLIMB 3 TO 5 STEPS WITH RAILING: A LITTLE
WALKING IN HOSPITAL ROOM: A LITTLE
SUGGESTED CMS G CODE MODIFIER MOBILITY: CJ

## 2025-05-12 ASSESSMENT — FIBROSIS 4 INDEX: FIB4 SCORE: 1

## 2025-05-12 NOTE — PROGRESS NOTES
Monitor Summary:   SB/ SR 52-65  O PVC, R bigem, R trigem  4 beats of VT  .14/.08/.35        Strip report per monitor room

## 2025-05-12 NOTE — CARE PLAN
The patient is Stable - Low risk of patient condition declining or worsening    Shift Goals  Clinical Goals: Safety, monitor mentation,  Patient Goals: Pain control  Family Goals: JJ    Progress made toward(s) clinical / shift goals:        Problem: Knowledge Deficit - Standard  Goal: Patient and family/care givers will demonstrate understanding of plan of care, disease process/condition, diagnostic tests and medications  Outcome: Progressing     Problem: Pain Management  Goal: Pain level will decrease to patient's comfort goal  Outcome: Progressing     Problem: Fall Risk  Goal: Patient will remain free from falls  Outcome: Progressing     Problem: Skin Integrity  Goal: Skin integrity is maintained or improved  Outcome: Progressing       Patient is not progressing towards the following goals:

## 2025-05-12 NOTE — THERAPY
Physical Therapy   Initial Evaluation     Patient Name: Roberto Braswell  Age:  44 y.o., Sex:  male  Medical Record #: [id number]  Today's Date: 5/12/2025     Precautions  Precautions: (P) Fall Risk    Assessment  Patient is 44 y.o. male admit with AMS with a diagnosis of acute metabolic encephalopathy, ELISABET.   Pt demo's safe and steady gait cleared on stairs. No DME needed. Pt with flat affect and does not recall event that brought him to hospital. Recommend home with spouse, avoid stairs in home due to fall risk. See below flowsheet for eval findings.       Plan         DC Equipment Recommendations: (P) None  Discharge Recommendations: (P) Anticipate that the patient will have no further physical therapy needs after discharge from the hospital        05/12/25 0920   Time In/Time Out   Therapy Start Time 0920   Therapy End Time 0950   Total Therapy Time 30   Charge Group   PT Evaluation PT Evaluation Low   PT Self Care / Home Evaluation (Units) 1   Total Time Spent   PT Evaluation Time Spent (Mins) 20   PT Self Care/Home Evaluation Time Spent (Mins) 10    Services   Is patient using  services for this encounter? No   Initial Contact Note    Initial Contact Note Order Received and Verified, Evaluation Only - Patient Does Not Require Further Acute Physical Therapy at this Time.  However, May Benefit from Post Acute Therapy for Higher Level Functional Deficits.   Precautions   Precautions Fall Risk   Vitals   O2 Delivery Device None - Room Air   Pain 0 - 10 Group   Therapist Pain Assessment 0;Nurse Notified;Post Activity Pain Same as Prior to Activity   Prior Living Situation   Prior Services Home-Independent   Housing / Facility 3 Story House   Steps In Home 6   Lives with - Patient's Self Care Capacity Spouse   Comments pt works at [employer]. supportive family spouse is an RN   Prior Level of Functional Mobility   Bed Mobility Independent   Transfer Status Independent   Ambulation Independent    Assistive Devices Used None   Stairs Independent   Cognition    Cognition / Consciousness X   Orientation Level Not Oriented to Reason   Level of Consciousness Alert   New Learning Impaired   Comments pt cooperative, flat affect. unable to recall event that lead him to hospital. Pt states there has been several other events in past that lead to hopsital admits but unable to recall why.   Passive ROM Lower Body   Passive ROM Lower Body WDL   Active ROM Lower Body    Active ROM Lower Body  WDL   Strength Lower Body   Lower Body Strength  WDL   Comments functional. R ankle fusion years ago affecting gait pattern.   Neurological Concerns   Equilibrium Reaction Impaired   Coordination Lower Body    Coordination Lower Body  WDL   Other Treatments   Other Treatments Provided paced activity, home safety   Balance Assessment   Sitting Balance (Static) Fair +   Sitting Balance (Dynamic) Fair +   Standing Balance (Static) Fair   Standing Balance (Dynamic) Fair   Weight Shift Sitting Good   Weight Shift Standing Good   Comments no overt LOB no AD   Bed Mobility    Supine to Sit Supervised   Sit to Supine Supervised   Scooting Supervised   Rolling Supervised   Gait Analysis   Gait Level Of Assist Standby Assist   Assistive Device None   Distance (Feet) 400   # of Times Distance was Traveled 1   # of Stairs Climbed 5   Level of Assist with Stairs Standby Assist  (with rail)   Weight Bearing Status no restrictions   Functional Mobility   Sit to Stand Standby Assist   Bed, Chair, Wheelchair Transfer Standby Assist   6 Clicks Assessment - How much HELP from from another person do you currently need... (If the patient hasn't done an activity recently, how much help from another person do you think he/she would need if he/she tried?)   Turning from your back to your side while in a flat bed without using bedrails? 4   Moving from lying on your back to sitting on the side of a flat bed without using bedrails? 4   Moving to and from a  bed to a chair (including a wheelchair)? 4   Standing up from a chair using your arms (e.g., wheelchair, or bedside chair)? 4   Walking in hospital room? 3   Climbing 3-5 steps with a railing? 3   6 clicks Mobility Score 22   Activity Tolerance   Standing 10 +   Education Group   Role of Physical Therapist Patient Response Patient;Acceptance;Explanation;Verbal Demonstration   Problem List    Problems Safety Awareness Deficits / Cognition;Decreased Activity Tolerance   Anticipated Discharge Equipment and Recommendations   DC Equipment Recommendations None   Discharge Recommendations Anticipate that the patient will have no further physical therapy needs after discharge from the hospital   Interdisciplinary Plan of Care Collaboration   IDT Collaboration with  Nursing   Patient Position at End of Therapy In Bed;Bed Alarm On;Call Light within Reach;Tray Table within Reach;Phone within Reach   Session Information   Date / Session Number  5/12/25- eval only

## 2025-05-12 NOTE — PROGRESS NOTES
Received report from NOC shift RN. Patient is A&O4, VSS, no signs of distress. All questions and concerns answered, call light in reach, bed alarm on, plan of care ongoing.

## 2025-05-12 NOTE — DISCHARGE SUMMARY
Discharge Summary    CHIEF COMPLAINT ON ADMISSION  Chief Complaint   Patient presents with    ALOC     Pt [facility] after being found by wife crawling on floor. Per report wife requesting narcan on scene. 0.5mg given by EMS. Recent reported cardiac arrest last month with extended in patient stay. A&O x 3 per EMS. Arrives A&O x 3, knows where he is, but unable to recall event leading to transport, or why he is here.       Reason for Admission  ems     Admission Date  5/10/2025    CODE STATUS  Full Code    HPI & HOSPITAL COURSE  44 y.o. male with extensive past medical history including PE on Eliquis, cardiomyopathy, Endosac brain injury cardiac arrest (recent admission from 4/32025 to 4/21/2025 )who was brought by his wife on 5/10/2025 with altered mental status. Patient recently discharged from [facility] nursing home 3 days ago. Per the patient's spouse he was doing well until yesterday night when he was noted to be confused and today he was found laying in the floor. Patient is spouse is concerned as patient might reportedly taken extra oxycodone. On EMS arrival patient received Narcan. On arrival to ED patient remained AO x 3, poor historian, requiring 2 L oxygen, labs with normal white count, hemoglobin 12.2, sodium 139 potassium 5.1, elevated BUN/creatinine, chest x-ray noted with atelectasis, CT head with no acute process, EKG normal sinus rhythm with nonspecific ST changes.Patient will be admitted to the hospital for further evaluation and management for acute metabolic encephalopathy, ELISABET.  Patient was treated with IV fluid hydration and monitored on telemetry with continuous cardiac monitoring which revealed no evidence of arrhythmias.  His valproic acid levels were within normal limits.  Ammonia was normal.  UA was negative for infection.  Patient reported some chest pain, EKG was negative for ischemia.  His troponins remain negative.  Likely musculoskeletal etiology.  His symptoms improved and  vitals were stable.  He was able to ambulate independently therefore he will be discharged home with close outpatient follow-up with his PCP    Therefore, he is discharged in good and stable condition to home with close outpatient follow-up.    The patient met 2-midnight criteria for an inpatient stay at the time of discharge.    Discharge Date  5/12/25    FOLLOW UP ITEMS POST DISCHARGE  PCP    DISCHARGE DIAGNOSES  Principal Problem (Resolved):    Acute metabolic encephalopathy (POA: Yes)  Active Problems:    History of MVR with cardiopulmonary bypass (POA: Yes)    History of pulmonary embolism (POA: Yes)  Resolved Problems:    Cardiac arrest (HCC) (POA: Yes)    ELISABET (acute kidney injury) (HCC) (POA: Unknown)      FOLLOW UP  Future Appointments   Date Time Provider Department Center   5/21/2025  2:00 PM Jatinder Singh PsyD [facility]   9/25/2025  8:20 AM Sal Schafer D.O. [facility]     No follow-up provider specified.    MEDICATIONS ON DISCHARGE     Medication List        CONTINUE taking these medications        Instructions   acetaminophen 500 MG Tabs  Commonly known as: Tylenol   Take 1,000 mg by mouth every 6 hours as needed for Mild Pain. 2 tablets = 1,000 mg.  Dose: 1,000 mg     apixaban 5mg Tabs  Commonly known as: Eliquis   Take 1 Tablet by mouth 2 times a day. Indications: Thromboembolism secondary to Atrial Fibrillation  Dose: 5 mg     celecoxib 200 MG Caps  Commonly known as: CeleBREX   Take 200 mg by mouth every day.  Dose: 200 mg     divalproex 500 MG Tbec  Commonly known as: Depakote   Take 1 Tablet by mouth every 8 hours.  Dose: 500 mg     gabapentin 100 MG Caps  Commonly known as: Neurontin   Take 2 Capsules by mouth every evening.  Dose: 200 mg     losartan 50 MG Tabs  Commonly known as: Cozaar   Take 1 Tablet by mouth 2 times a day.  Dose: 50 mg     melatonin 5 MG Tabs   Take 1 Tablet by mouth every evening.  Dose: 5 mg     methocarbamol 500 MG Tabs  Commonly known as: Robaxin    Take 500 mg by mouth 3 times a day as needed (Muscle Spasms).  Dose: 500 mg     metoprolol SR 50 MG Tb24  Commonly known as: Toprol XL   Take 50 mg by mouth every day.  Dose: 50 mg     omeprazole 20 MG Tbec delayed-release tablet  Commonly known as: PriLOSEC   Take 20 mg by mouth every morning. Indications: Gastroesophageal Reflux Disease  Dose: 20 mg     oxyCODONE immediate release 10 MG immediate release tablet  Commonly known as: Roxicodone   Take 10 mg by mouth every four hours as needed for Moderate Pain or Severe Pain. Max of 5 tablets per day.  Dose: 10 mg              Allergies  Allergies   Allergen Reactions    Morphine Vomiting    Tramadol Unspecified     Seizure  [id number]       DIET  Orders Placed This Encounter   Procedures    Diet Order Diet: Regular     Standing Status:   Standing     Number of Occurrences:   1     Diet::   Regular [1]       ACTIVITY  As tolerated.  Weight bearing as tolerated    CONSULTATIONS  none    PROCEDURES  none    LABORATORY  Lab Results   Component Value Date    SODIUM 138 05/11/2025    POTASSIUM 4.7 05/11/2025    CHLORIDE 109 05/11/2025    CO2 19 (L) 05/11/2025    GLUCOSE 87 05/11/2025    BUN 18 05/11/2025    CREATININE 1.14 05/11/2025        Lab Results   Component Value Date    WBC 4.6 (L) 05/11/2025    HEMOGLOBIN 13.7 (L) 05/11/2025    HEMATOCRIT 41.8 (L) 05/11/2025    PLATELETCT 232 05/11/2025        Total time of the discharge process exceeds 32 minutes.

## 2025-05-12 NOTE — PROGRESS NOTES
Monitor Summary  Rhythm: SR/SB  Rate: 52-77  Ectopy: (R) PVC, 4 bts vtach  Measurements: .17/.07/.40

## 2025-05-12 NOTE — THERAPY
Occupational Therapy Contact Note    Patient Name: Roberto Braswell  Age:  44 y.o., Sex:  male  Medical Record #: [id number]  Today's Date: 5/12/2025    Discussed missed therapy with PT      05/12/25 4330   Initial Contact Note    Initial Contact Note Order Received and Verified. Occupational Therapy Evaluation NOT Completed Because Patient Does Not Require Acute Occupational Therapy at this Time.   Interdisciplinary Plan of Care Collaboration   IDT Collaboration with  Other (See Comments);Physical Therapist  (EMR)   Collaboration Comments OT consult received and chart reviewed. EMR indicates 24/24 6 clicks score. Per PT, pt with no OT needs. Eval deferred. Will sign off; please re-consult should status change.   Session Information   Date / Session Number  5/12 - DC OT 2/2 not indicated

## 2025-05-13 ENCOUNTER — TELEPHONE (OUTPATIENT)
Dept: VASCULAR LAB | Facility: MEDICAL CENTER | Age: 45
End: 2025-05-13
Payer: COMMERCIAL

## 2025-05-13 NOTE — TELEPHONE ENCOUNTER
[facility] Pharmacotherapy [facility]     Received anticoagulation referral from hospitalist on 4/16/25.     Pt is not new to [facility] - previously seen in 2024.  Pt was restarted on anticoagulation for PE. He has been d/c'd to [facility]  Pt was admitted 5/10 then discharged home    LV with pt to establish care     Insurance: Aetna  PCP: [facility]  Locations to be seen: Any    If no response by 1 month post d/c from SNF OR 2 no shows/cancellations, will remove from referral list    Jenni Vasquez, PharmD  [facility]  Phone [phone]

## 2025-05-14 ENCOUNTER — TELEPHONE (OUTPATIENT)
Dept: CARDIOLOGY | Facility: MEDICAL CENTER | Age: 45
End: 2025-05-14
Payer: COMMERCIAL

## 2025-05-14 DIAGNOSIS — I10 ESSENTIAL HYPERTENSION: Primary | ICD-10-CM

## 2025-05-14 PROCEDURE — RXMED WILLOW AMBULATORY MEDICATION CHARGE: Performed by: INTERNAL MEDICINE

## 2025-05-14 NOTE — TELEPHONE ENCOUNTER
LH    PT WAS TOO LATE TO [facility]OSP [facility] TODAY TO BE SEEN, R/S FOR 5/20, PT NEEDS MED REFILL FOR LOSARTIN 50 MG, PLEASE SEND REFILL TO [facility] ON [address], ROUTED TO [place], SCANNED WALK IN FORM INTO MEDIA AND GIVEN TO AKILA CORONEL

## 2025-05-14 NOTE — TELEPHONE ENCOUNTER
Is the patient due for a refill? Yes    Was the patient seen the last 15 months? Yes    Date of last office visit: 9/9/2024    Does the patient have an upcoming appointment?  Yes   If yes, When? 5/20/2025    Provider to refill:LH    Does the patient have detention Plus and need 100-day supply? (This applies to ALL medications) Patient does not have SCP

## 2025-05-15 ENCOUNTER — PHARMACY VISIT (OUTPATIENT)
Dept: PHARMACY | Facility: MEDICAL CENTER | Age: 45
End: 2025-05-15
Payer: MEDICARE

## 2025-05-15 RX ORDER — LOSARTAN POTASSIUM 50 MG/1
50 TABLET ORAL 2 TIMES DAILY
Qty: 180 TABLET | Refills: 0 | Status: SHIPPED | OUTPATIENT
Start: 2025-05-15 | End: 2025-05-19 | Stop reason: SDUPTHER

## 2025-05-15 NOTE — TELEPHONE ENCOUNTER
Courtesy Rx refill for Losartan placed and sent to preferred pharmacy.     Hospitalized twice since last follow-up:  4/3/25 to 4/21/25:  Cardiac Arrest  5/10/25 to 5/12/25: Acute Metabolic Encephalopathy    Last OV 9/9/24. Current annual labs. Next OV 5/20/25. Reminders sent via [employer]DNA.

## 2025-05-19 ENCOUNTER — OFFICE VISIT (OUTPATIENT)
Dept: MEDICAL GROUP | Facility: LAB | Age: 45
End: 2025-05-19
Payer: COMMERCIAL

## 2025-05-19 VITALS
OXYGEN SATURATION: 96 % | DIASTOLIC BLOOD PRESSURE: 62 MMHG | HEART RATE: 74 BPM | SYSTOLIC BLOOD PRESSURE: 110 MMHG | WEIGHT: 258 LBS | TEMPERATURE: 97.4 F | RESPIRATION RATE: 16 BRPM | BODY MASS INDEX: 31.42 KG/M2 | HEIGHT: 76 IN

## 2025-05-19 DIAGNOSIS — Z09 HOSPITAL DISCHARGE FOLLOW-UP: Primary | ICD-10-CM

## 2025-05-19 DIAGNOSIS — F43.0 ACUTE STRESS DISORDER: ICD-10-CM

## 2025-05-19 DIAGNOSIS — G93.1 ANOXIC BRAIN INJURY (HCC): ICD-10-CM

## 2025-05-19 DIAGNOSIS — R45.4 ANGER: ICD-10-CM

## 2025-05-19 DIAGNOSIS — I10 ESSENTIAL HYPERTENSION: ICD-10-CM

## 2025-05-19 RX ORDER — LOSARTAN POTASSIUM 50 MG/1
50 TABLET ORAL 2 TIMES DAILY
Qty: 180 TABLET | Refills: 2 | Status: SHIPPED | OUTPATIENT
Start: 2025-05-19 | End: 2025-05-23 | Stop reason: SDUPTHER

## 2025-05-19 RX ORDER — LOSARTAN POTASSIUM 50 MG/1
50 TABLET ORAL 2 TIMES DAILY
Qty: 180 TABLET | Refills: 2 | Status: SHIPPED | OUTPATIENT
Start: 2025-05-19 | End: 2025-05-19

## 2025-05-19 ASSESSMENT — ENCOUNTER SYMPTOMS
FEVER: 0
HALLUCINATIONS: 0
CHILLS: 0
HEADACHES: 1
PHOTOPHOBIA: 0
SHORTNESS OF BREATH: 0
MEMORY LOSS: 1

## 2025-05-19 ASSESSMENT — FIBROSIS 4 INDEX: FIB4 SCORE: 1

## 2025-05-19 NOTE — PROGRESS NOTES
"Subjective:     Roberto Braswell is a 44 y.o. male who presents for Hospital Follow-up.    Transitional Care Management          HPI:   Recently hospitalized for acute metabolic encephalopathy on 5/10 for 3 days.  He was also found to have an ELISABET and was started on IV fluid hydration. DC summary below \"44 y.o. male with extensive past medical history including PE on Eliquis, cardiomyopathy, Endosac brain injury cardiac arrest (recent admission from 4/32025 to 4/21/2025 )who was brought by his wife on 5/10/2025 with altered mental status. Patient recently discharged from [facility] 3 days ago. Per the patient's spouse he was doing well until yesterday night when he was noted to be confused and today he was found laying in the floor. Patient is spouse is concerned as patient might reportedly taken extra oxycodone. On EMS arrival patient received Narcan. On arrival to ED patient remained AO x 3, poor historian, requiring 2 L oxygen, labs with normal white count, hemoglobin 12.2, sodium 139 potassium 5.1, elevated BUN/creatinine, chest x-ray noted with atelectasis, CT head with no acute process, EKG normal sinus rhythm with nonspecific ST changes.Patient will be admitted to the hospital for further evaluation and management for acute metabolic encephalopathy, ELISABET.  Patient was treated with IV fluid hydration and monitored on telemetry with continuous cardiac monitoring which revealed no evidence of arrhythmias.  His valproic acid levels were within normal limits.  Ammonia was normal.  UA was negative for infection.  Patient reported some chest pain, EKG was negative for ischemia.  His troponins remain negative.  Likely musculoskeletal etiology.  His symptoms improved and vitals were stable.  He was able to ambulate independently therefore he will be discharged home with close outpatient follow-up with his PCP\"    Interval history:    -patient's wife reports he is struggling with anger  -he is having memory " issues. Walking into a room and forgetting why he went in the room. Wanting to cook something, the pan is on and there is no food in the pan   -he had cognititive testing on 5/1/25    -pt will be starting PT this week  -he has an upcoming appointment with cardiology       Current medicines (including reconciliation performed today)  Current Outpatient Medications   Medication Sig Dispense Refill    losartan (COZAAR) 50 MG Tab Take 1 Tablet by mouth 2 times a day. Take 1 Tablet by mouth 2 times a day. 180 Tablet 2    methocarbamol (ROBAXIN) 500 MG Tab Take 500 mg by mouth 3 times a day as needed (Muscle Spasms).      metoprolol SR (TOPROL XL) 50 MG TABLET SR 24 HR Take 50 mg by mouth every day.      celecoxib (CELEBREX) 200 MG Cap Take 200 mg by mouth every day.      oxyCODONE immediate release (ROXICODONE) 10 MG immediate release tablet Take 10 mg by mouth every four hours as needed for Moderate Pain or Severe Pain. Max of 5 tablets per day.      acetaminophen (TYLENOL) 500 MG Tab Take 1,000 mg by mouth every 6 hours as needed for Mild Pain. 2 tablets = 1,000 mg.      apixaban (ELIQUIS) 5mg Tab Take 1 Tablet by mouth 2 times a day. Indications: Thromboembolism secondary to Atrial Fibrillation 60 Tablet 3    divalproex (DEPAKOTE) 500 MG Tablet Delayed Response Take 1 Tablet by mouth every 8 hours. 90 Tablet 0    melatonin 5 mg Tab Take 1 Tablet by mouth every evening. 30 Tablet 0    omeprazole (PRILOSEC) 20 MG Tablet Delayed Response delayed-release tablet Take 20 mg by mouth every morning. Indications: Gastroesophageal Reflux Disease (Patient not taking: Reported on 5/19/2025)      gabapentin (NEURONTIN) 100 MG Cap Take 2 Capsules by mouth every evening. (Patient not taking: Reported on 5/19/2025) 90 Capsule 0     No current facility-administered medications for this visit.       Allergies:   Morphine and Tramadol    Social History     Tobacco Use    Smoking status: Never    Smokeless tobacco: Never   Vaping Use     "Vaping status: Never Used   Substance Use Topics    Alcohol use: Not Currently    Drug use: Never       ROS:  Review of Systems   Constitutional:  Negative for chills and fever.   Eyes:  Negative for photophobia.   Respiratory:  Negative for shortness of breath.    Cardiovascular:  Negative for chest pain.   Neurological:  Positive for headaches.   Psychiatric/Behavioral:  Positive for memory loss. Negative for hallucinations and suicidal ideas.          Objective:     Vitals:    05/19/25 1316   BP: 110/62   BP Location: Right arm   Patient Position: Sitting   BP Cuff Size: Large adult   Pulse: 74   Resp: 16   Temp: 36.3 °C (97.4 °F)   TempSrc: Temporal   SpO2: 96%   Weight: 117 kg (258 lb)   Height: 1.93 m (6' 4\")     Body mass index is 31.4 kg/m².    Physical Exam:  Physical Exam  Constitutional:       General: He is not in acute distress.     Appearance: He is not ill-appearing.   Pulmonary:      Effort: Pulmonary effort is normal.   Neurological:      Mental Status: He is alert.   Psychiatric:         Mood and Affect: Mood normal.         Behavior: Behavior normal.         Thought Content: Thought content normal.         Judgment: Judgment normal.           Assessment and Plan:   1. Hospital discharge follow-up    2. Essential hypertension  - losartan (COZAAR) 50 MG Tab; Take 1 Tablet by mouth 2 times a day. Take 1 Tablet by mouth 2 times a day.  Dispense: 180 Tablet; Refill: 2    3. Acute stress disorder  - Referral to Behavioral Health    4. Anger  - Referral to Behavioral Health    5. Anoxic brain injury (HCC)  - MR-BRAIN-W/O; Future  - Referral to Speech Therapy    - Chart and discharge summary were reviewed.   - Hospitalization and results reviewed with patient.   - Medications reviewed including instructions regarding high risk medications, dosing and side effects.  - Recommended Services: No services needed at this time  - Advance directive/POLST on file?  No     Follow-up:No follow-ups on " file.    Face-to-face transitional care management services with HIGH (today's visit is within days post discharge & LACE+ score 59+) medical decision complexity were provided.

## 2025-05-20 ENCOUNTER — APPOINTMENT (OUTPATIENT)
Dept: CARDIOLOGY | Facility: MEDICAL CENTER | Age: 45
End: 2025-05-20
Payer: COMMERCIAL

## 2025-05-20 ENCOUNTER — TELEPHONE (OUTPATIENT)
Dept: VASCULAR LAB | Facility: MEDICAL CENTER | Age: 45
End: 2025-05-20
Payer: COMMERCIAL

## 2025-05-20 NOTE — TELEPHONE ENCOUNTER
[facility] Pharmacotherapy [facility]     Received anticoagulation referral from hospitalist on 4/16/25.     Pt is not new to [facility] - previously seen in 2024.  Pt was restarted on anticoagulation with Eliquis for PE. He has been d/c'd to [facility]  Pt was admitted 5/10 then discharged home    LVM with pt to establish care    2nd call     Insurance: Aetna  PCP: [facility]  Locations to be seen: Any    If no response by 1 month post d/c from SNF OR 2 no shows/cancellations, will remove from referral list    Jenni Vasquez, PharmD  [facility]  Phone [phone]

## 2025-05-20 NOTE — Clinical Note
REFERRAL APPROVAL NOTICE         Sent on May 20, 2025                   Roberto Braswell  [address]  [facility] [address]                   Dear Mr. Braswell,    After a careful review of the medical information and benefit coverage, [facility] has processed your referral. See below for additional details.    If applicable, you must be actively enrolled with your insurance for coverage of the authorized service. If you have any questions regarding your coverage, please contact your insurance directly.    REFERRAL INFORMATION   Referral #:  [id number]  Referred-To Department    Referred-By Provider:  Speech Therapy    Sal Schafer D.O.   Speech Therapy [address]      [address]  [place] [id number] [phone] [address]  [address]  [place] [address] [phone]    Referral Start Date:  05/19/2025  Referral End Date:   05/19/2026             SCHEDULING  If you do not already have an appointment, please call [phone] to make an appointment.     MORE INFORMATION  If you do not already have a [employer] account, sign up at: [email or web address]  You can access your medical information, make appointments, see lab results, billing information, and more.  If you have questions regarding this referral, please contact  the [facility] Referrals department at:             [phone]. Monday - Friday 8:00AM - 5:00PM.     Sincerely,    [facility]

## 2025-05-20 NOTE — Clinical Note
REFERRAL APPROVAL NOTICE         Sent on May 20, 2025                   Roberto Braswell  [address]  [facility] [address]                   Dear Mr. Braswell,    After a careful review of the medical information and benefit coverage, [facility] has processed your referral. See below for additional details.    If applicable, you must be actively enrolled with your insurance for coverage of the authorized service. If you have any questions regarding your coverage, please contact your insurance directly.    REFERRAL INFORMATION   Referral #:  [id number]  Referred-To Department    Referred-By Provider:  Behavioral Health    Sal Schafer D.O.   Behavioral Health Outpatient      [address]  [place] [address]  [phone] [address]  [place] [id number] [phone]    Referral Start Date:  05/19/2025  Referral End Date:   05/19/2026             SCHEDULING  If you do not already have an appointment, please call [phone] to make an appointment.     MORE INFORMATION  If you do not already have a [employer] account, sign up at: [email or web address]  You can access your medical information, make appointments, see lab results, billing information, and more.  If you have questions regarding this referral, please contact  the [facility] Referrals department at:             [phone]. Monday - Friday 8:00AM - 5:00PM.     Sincerely,    [facility]

## 2025-05-21 ENCOUNTER — OFFICE VISIT (OUTPATIENT)
Dept: BEHAVIORAL HEALTH | Facility: CLINIC | Age: 45
End: 2025-05-21
Payer: COMMERCIAL

## 2025-05-21 DIAGNOSIS — F02.80 MAJOR NEUROCOGNITIVE DISORDER DUE TO ANOTHER MEDICAL CONDITION (HCC): Primary | ICD-10-CM

## 2025-05-21 DIAGNOSIS — G93.1: ICD-10-CM

## 2025-05-21 PROCEDURE — 96132 NRPSYC TST EVAL PHYS/QHP 1ST: CPT | Performed by: COUNSELOR

## 2025-05-21 PROCEDURE — 96133 NRPSYC TST EVAL PHYS/QHP EA: CPT | Performed by: COUNSELOR

## 2025-05-21 NOTE — PROGRESS NOTES
INTERACTIVE NEUROPSYCHOLOGICAL FEEDBACK SESSION  Name: Roberto Braswell    MRN: [id number]   YOB: 1980   Date of Feedback: 5/21/25  Referred by: Cornelius Bassett MD   Evaluated by: Jatinder Singh Psy.D.      IDENTIFYING AND REFERRAL INFORMATION:                                                The patient is a 44 y.o., right-handed man previously seen on 5/1/25 for a neuropsychological evaluation to assess cognitive functioning and assist with diagnostic impressions and treatment recommendations in the context of hypoxic brain injury. Please see full neuropsychological assessment report dated 5/1/25 for specifics regarding summary, diagnosis, and recommendations.        BRIEF DESCRIPTION OF SESSION:  Met with Roberto Braswell and his wife to review assessment results, diagnostic impressions, and recommendations. The patient demonstrated understanding of the evaluation results and agreement with the recommendations. The patient and his wife were afforded time to ask questions and all their questions were addressed.    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 Repeat neuroimaging is recommended given abnormalities can take weeks or months before visualized on traditional scans. Periodic monitoring by neurology is recommended. Outpatient cognitive rehabilitation was also recommended and was consult was previously placed by PCP. Mood and anxiety symptoms were discussed as well as strategies to maintain a positive mood (e.g., behavioral activation).  Encouraged follow up with mental health and reviewed sleep hygiene strategies. Recommended cognitive compensatory strategies were reviewed. Strategies to promote brain health and maximize quality of life were explored. There was no report of SI/HI. Follow up evaluation is recommended in 18-24 months to monitor cognitive trajectory.    The patient indicated that he will contact me with any additional  questions or concerns.     Referrals placed: Neurology, repeat NP eval.       Jatinder Singh Psy.D.        Clinical Neuropsychologist          Department of Behavioral Health [facility]        Neuropsychological Services Provided Code Total Units Date/Time   Professional Neuropsychological Testing Evaluation Services         Hour 1 92147 1  5/1 158-254       Each Additional Hour  86556 4  5/1 254-558; 5/21 2-253*   * 53 minutes was spent today (5/21/25) delivering interactive feedback to the patient and his wife, which is included in the charges above (58374 = 1).

## 2025-05-23 ENCOUNTER — TELEPHONE (OUTPATIENT)
Dept: CARDIOLOGY | Facility: MEDICAL CENTER | Age: 45
End: 2025-05-23

## 2025-05-23 ENCOUNTER — OFFICE VISIT (OUTPATIENT)
Dept: CARDIOLOGY | Facility: MEDICAL CENTER | Age: 45
End: 2025-05-23
Payer: COMMERCIAL

## 2025-05-23 VITALS
SYSTOLIC BLOOD PRESSURE: 112 MMHG | RESPIRATION RATE: 16 BRPM | BODY MASS INDEX: 30.81 KG/M2 | DIASTOLIC BLOOD PRESSURE: 76 MMHG | WEIGHT: 253 LBS | HEART RATE: 62 BPM | HEIGHT: 76 IN | OXYGEN SATURATION: 98 %

## 2025-05-23 DIAGNOSIS — I10 ESSENTIAL HYPERTENSION: ICD-10-CM

## 2025-05-23 DIAGNOSIS — E78.5 DYSLIPIDEMIA (HIGH LDL; LOW HDL): ICD-10-CM

## 2025-05-23 DIAGNOSIS — Z98.890 H/O MITRAL VALVE REPAIR: Primary | ICD-10-CM

## 2025-05-23 DIAGNOSIS — Z86.711 HISTORY OF PULMONARY EMBOLISM: ICD-10-CM

## 2025-05-23 PROBLEM — Z95.2 HISTORY OF MVR WITH CARDIOPULMONARY BYPASS: Status: RESOLVED | Noted: 2025-04-03 | Resolved: 2025-05-23

## 2025-05-23 PROCEDURE — 99212 OFFICE O/P EST SF 10 MIN: CPT

## 2025-05-23 RX ORDER — LOSARTAN POTASSIUM 50 MG/1
50 TABLET ORAL 2 TIMES DAILY
Qty: 200 TABLET | Refills: 3 | Status: SHIPPED | OUTPATIENT
Start: 2025-05-23

## 2025-05-23 ASSESSMENT — ENCOUNTER SYMPTOMS
GASTROINTESTINAL NEGATIVE: 1
SHORTNESS OF BREATH: 0
PND: 0
NERVOUS/ANXIOUS: 0
PALPITATIONS: 0
DEPRESSION: 0
EYES NEGATIVE: 1
ORTHOPNEA: 0
MUSCULOSKELETAL NEGATIVE: 1
CONSTITUTIONAL NEGATIVE: 1
NEUROLOGICAL NEGATIVE: 1

## 2025-05-23 ASSESSMENT — FIBROSIS 4 INDEX: FIB4 SCORE: 1

## 2025-05-23 NOTE — TELEPHONE ENCOUNTER
Disability paperwork completed and faxed to [facility]. Scanned into [employer].     Patient called and informed that paperwork has been completed and faxed to [facility]. No further questions at this time.

## 2025-05-23 NOTE — TELEPHONE ENCOUNTER
Received Refill PA request via [employer]  for apixaban (ELIQUIS) 5mg Tab . (Quantity:180, Day Supply:90)     Insurance: [employer]  Member ID:  [id number]  BIN: [id number]  PCN: [id number]  Group: [id number]     Ran Test claim via [employer] 6/6., Called and Lvm to offer copay card and our services
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
## 2025-05-27 ENCOUNTER — TELEPHONE (OUTPATIENT)
Dept: VASCULAR LAB | Facility: MEDICAL CENTER | Age: 45
End: 2025-05-27
Payer: COMMERCIAL

## 2025-05-27 NOTE — TELEPHONE ENCOUNTER
Medication: [phone]  Type of Insurance: Commercial  Type of Financial assistance requested Copay Card  Source: [email or web address]  Source Phone #: [phone]  Outcome: Approved  Effective dates: 05/27/25 until 05/27/25  Details/Billing Information:   [id number]  PCN: [id number]  GRP: [id number]  ID: [id number]  Max Award Amount: $4100  Final Copay: $10 per month    Pt fill at [facility]

## 2025-05-27 NOTE — LETTER
Roberto Braswell  [address]  [facility] [address]      05/27/25        Dear Roberto Braswell ,    We have been unsuccessful in our attempts to contact you regarding your Anticoagulation Service referral.  Anticoagulants are medications that can cause potential harmful side effects when not monitored properly. Without proper monitoring there is potential for serious, sometimes life-threatening bleeding problems or life-threatening blood clots or stroke could result.    Please contact our clinic so we may assist you.  We are open Monday-Friday 8 am until 5 pm.  You may reach our Service at [phone].    To monitor your anticoagulant effectively, we need to be able to communicate with you.  This is a requirement to be followed by our Service.  Please contact the clinic as soon as possible to establish care and provide your current contact information.  Thank you.        Sincerely,      Jenni Vasquez, PharmD, BCACP  Pharmacy Clinical Supervisor  [facility]  Outpatient Anticoagulation Service

## 2025-05-27 NOTE — TELEPHONE ENCOUNTER
[facility]     Received anticoagulation referral from hospitalist on 4/16/25.     Pt is not new to [facility] - previously seen in 2024.  Pt was restarted on anticoagulation with Eliquis for PE. He has been d/c'd to [facility]  Pt was admitted 5/10 then discharged home    LVM with pt to establish care  Sent letter    3rd call     Insurance: [employer]  PCP: [facility]  Locations to be seen: Any    If no response by 1 month post d/c from SNF (06/10/25) OR 2 no shows/cancellations, will remove from referral list    Jenni Vasquez, PharmD  [facility]armac[facility]  Phone [phone]

## 2025-05-28 DIAGNOSIS — I10 ESSENTIAL HYPERTENSION: Primary | ICD-10-CM

## 2025-05-28 PROCEDURE — RXMED WILLOW AMBULATORY MEDICATION CHARGE

## 2025-05-28 RX ORDER — METOPROLOL SUCCINATE 50 MG/1
100 TABLET, EXTENDED RELEASE ORAL DAILY
Qty: 180 TABLET | Refills: 3 | Status: SHIPPED | OUTPATIENT
Start: 2025-05-28

## 2025-05-28 NOTE — TELEPHONE ENCOUNTER
Rx refill for Metoprolol SR updated to 90 days per protocol and sent to preferred pharmacy. Last OV 5/23/25. Latest labs 5/11/25, results in chart. Next OV 8/19/25. Reminders sent via [facility].     To . Metoprolol dose increased to 100 mg daily per last visit's notes. Kindly review and approve if appropriate.  Thank you.

## 2025-05-28 NOTE — Clinical Note
REFERRAL APPROVAL NOTICE         Sent on May 28, 2025                   Roberto Braswell  [address]  [facility] [address]                   Dear Mr. Braswell,    After a careful review of the medical information and benefit coverage, [facility] has processed your referral. See below for additional details.    If applicable, you must be actively enrolled with your insurance for coverage of the authorized service. If you have any questions regarding your coverage, please contact your insurance directly.    REFERRAL INFORMATION   Referral #:  [id number]  Referred-To Department    Referred-By Provider:  Behavioral Health    Jatinder Singh PsyD   Behavioral Health Outpatient      [address]  [facility] [address]  [place] [id number] [phone] [address] [facility] [facility] [address]  [place] [id number] [phone]    Referral Start Date:  05/21/2025  Referral End Date:   05/21/2026             SCHEDULING  If you do not already have an appointment, please call [phone] to make an appointment.     MORE INFORMATION  If you do not already have a [employer] account, sign up at: [email or web address]  You can access your medical information, make appointments, see lab results, billing information, and more.  If you have questions regarding this referral, please contact  the [facility] Referrals department at:             [phone]. Monday - Friday 8:00AM - 5:00PM.     Sincerely,    [facility]

## 2025-06-01 ENCOUNTER — PHARMACY VISIT (OUTPATIENT)
Dept: PHARMACY | Facility: MEDICAL CENTER | Age: 45
End: 2025-06-01
Payer: MEDICARE

## 2025-06-03 PROCEDURE — RXMED WILLOW AMBULATORY MEDICATION CHARGE: Performed by: NURSE PRACTITIONER

## 2025-06-04 ENCOUNTER — PHARMACY VISIT (OUTPATIENT)
Dept: PHARMACY | Facility: MEDICAL CENTER | Age: 45
End: 2025-06-04
Payer: MEDICARE

## 2025-06-06 ENCOUNTER — PHARMACY VISIT (OUTPATIENT)
Dept: PHARMACY | Facility: MEDICAL CENTER | Age: 45
End: 2025-06-06
Payer: MEDICARE

## 2025-06-06 PROCEDURE — RXMED WILLOW AMBULATORY MEDICATION CHARGE: Performed by: NURSE PRACTITIONER

## 2025-06-09 ENCOUNTER — TELEPHONE (OUTPATIENT)
Dept: HEALTH INFORMATION MANAGEMENT | Facility: OTHER | Age: 45
End: 2025-06-09
Payer: COMMERCIAL

## 2025-06-10 ENCOUNTER — DOCUMENTATION (OUTPATIENT)
Dept: VASCULAR LAB | Facility: MEDICAL CENTER | Age: 45
End: 2025-06-10
Payer: COMMERCIAL

## 2025-06-10 NOTE — PROGRESS NOTES
Received anticoagulation referral.    We are unable to reach the patient after multiple attempts.    Will 'close' referral and await pt contact.    Jenni Vasquez, TrevorD

## 2025-06-12 ENCOUNTER — PHARMACY VISIT (OUTPATIENT)
Dept: PHARMACY | Facility: MEDICAL CENTER | Age: 45
End: 2025-06-12
Payer: MEDICARE

## 2025-06-12 PROCEDURE — RXMED WILLOW AMBULATORY MEDICATION CHARGE: Performed by: INTERNAL MEDICINE

## 2025-06-12 PROCEDURE — RXMED WILLOW AMBULATORY MEDICATION CHARGE

## 2025-06-24 ENCOUNTER — OFFICE VISIT (OUTPATIENT)
Dept: MEDICAL GROUP | Facility: LAB | Age: 45
End: 2025-06-24
Payer: COMMERCIAL

## 2025-06-24 VITALS
WEIGHT: 256.4 LBS | OXYGEN SATURATION: 97 % | SYSTOLIC BLOOD PRESSURE: 114 MMHG | BODY MASS INDEX: 31.22 KG/M2 | DIASTOLIC BLOOD PRESSURE: 78 MMHG | RESPIRATION RATE: 18 BRPM | TEMPERATURE: 97.2 F | HEIGHT: 76 IN | HEART RATE: 64 BPM

## 2025-06-24 DIAGNOSIS — Z86.711 HISTORY OF PULMONARY EMBOLISM: ICD-10-CM

## 2025-06-24 DIAGNOSIS — I46.9 CARDIAC ARREST (HCC): ICD-10-CM

## 2025-06-24 DIAGNOSIS — G93.1 ANOXIC BRAIN INJURY (HCC): Primary | ICD-10-CM

## 2025-06-24 PROCEDURE — 99214 OFFICE O/P EST MOD 30 MIN: CPT | Performed by: STUDENT IN AN ORGANIZED HEALTH CARE EDUCATION/TRAINING PROGRAM

## 2025-06-24 PROCEDURE — 3078F DIAST BP <80 MM HG: CPT | Performed by: STUDENT IN AN ORGANIZED HEALTH CARE EDUCATION/TRAINING PROGRAM

## 2025-06-24 PROCEDURE — 3074F SYST BP LT 130 MM HG: CPT | Performed by: STUDENT IN AN ORGANIZED HEALTH CARE EDUCATION/TRAINING PROGRAM

## 2025-06-24 ASSESSMENT — ENCOUNTER SYMPTOMS
FEVER: 0
SHORTNESS OF BREATH: 0
CHILLS: 0

## 2025-06-24 ASSESSMENT — FIBROSIS 4 INDEX: FIB4 SCORE: 1.02

## 2025-06-24 NOTE — PROGRESS NOTES
Subjective:     CC:   Chief Complaint   Patient presents with    Paperwork     Disability         HPI:   History of Present Illness  The patient presents for evaluation of cardiac arrest, anoxic brain injury, pulmonary embolism, and testicular pain.    Cardiac Arrest  - He reports a gradual improvement in his condition, although he has not yet returned to his baseline health status.  - He experiences significant mental and physical fatigue, often requiring immediate sleep after performing daily activities such as driving to and from appointments.  - He struggles with memory issues, which have shown some improvement but remain a concern.  - He is currently on short-term disability, with the hospitalist extending it for 4 months, the SNF until 10/2025, and the cardiologist until the end of 08/2025.  - He has been informed that his approval is only for 2 weeks at a time.  - His neurologist has recommended against returning to work due to his cardiac arrest and brain injury, suggesting a recovery period of at least 3 months.  - He is seeking assistance with paperwork related to his short-term disability and return to work.    Dental Procedures  - He is requesting information on premedication for dental procedures, as he requires a crown and cleaning.  - His dentist, Dr. Lozano, has advised a 6-month wait post-heart attack before proceeding with further dental work.  - He is currently taking amoxicillin 500 mg as a premedication for dental procedures.    Pulmonary Embolism  - He underwent a right spermatic cord mass removal surgery 2 weeks prior to his current health issues, which is believed to have caused a blood clot leading to a pulmonary embolism.  - He is currently on Eliquis and omeprazole.    Testicular Pain  - He is experiencing pain in his testicles, which is exacerbated by walking.    Supplemental information: PAST SURGICAL HISTORY:  Right spermatic cord mass removal surgery: 04/2025       No problems  "updated.    Current Medications and Prescriptions Ordered in Epic[1]        ROS:  Review of Systems   Constitutional:  Negative for chills and fever.   Respiratory:  Negative for shortness of breath.    Cardiovascular:  Negative for chest pain.       Objective:     Exam:  /78 (BP Location: Right arm, Patient Position: Sitting, BP Cuff Size: Large adult)   Pulse 64   Temp 36.2 °C (97.2 °F) (Temporal)   Resp 18   Ht 1.93 m (6' 4\")   Wt 116 kg (256 lb 6.4 oz)   SpO2 97%   BMI 31.21 kg/m²  Body mass index is 31.21 kg/m².    Physical Exam  Constitutional:       General: He is not in acute distress.     Appearance: He is not ill-appearing.   Pulmonary:      Effort: Pulmonary effort is normal.   Neurological:      Mental Status: He is alert.   Psychiatric:         Mood and Affect: Mood normal.         Behavior: Behavior normal.         Thought Content: Thought content normal.         Judgment: Judgment normal.                   Assessment & Plan:     Assessment & Plan  1. Cardiac arrest: Acute.  - Experiencing significant mental and physical exhaustion, as well as memory issues.  - Neurologist's report recommends not returning to work.  - Paperwork will be completed to extend short-term disability until the end of August.  - Continue follow-up with cardiologist and neurologist as needed.    2. Anoxic brain injury: Acute.  - Reports ongoing memory issues and mental fatigue.  - Brain MRI scheduled for 07/03/2025 to further assess condition.  - Continue to rest and avoid strenuous activities.    3. Pulmonary embolism: Chronic.  - Currently on Eliquis for anticoagulation.  - Continue taking Eliquis as prescribed.  - Report any changes in symptoms or new symptoms immediately.  - Regular monitoring of anticoagulation status.    4. Testicular pain: Postoperative.  - Reports pain in the testicular area following previous surgery.  - Issue to be addressed by the urology department.  - Monitor for any changes in pain or " new symptoms.    5. Dental procedure.  - Needs a crown and cleaning.  - Recommended to wait 6 months from the date of cardiac event in April before proceeding with dental work.  - Premedication with antibiotics not generally required unless there is a history of endocarditis.  - Further clarification to be sought from the dentist.    Follow-up  - Brain MRI scheduled for 07/03/2025.  - Follow-up with cardiologist and neurologist as needed.  - Follow-up with urology for further evaluation and management.      Problem List Items Addressed This Visit       History of pulmonary embolism     Other Visit Diagnoses         Anoxic brain injury (HCC)    -  Primary      Cardiac arrest (HCC)                31 minutes for chart review, patient interaction, reviewing notes, filling out paperwork, and documentation of this encounter.     Please note that this dictation was created using voice recognition software. I have made every reasonable attempt to correct obvious errors, but I expect that there are errors of grammar and possibly content that I did not discover before finalizing the note.        [1]   Current Outpatient Medications Ordered in Epic   Medication Sig Dispense Refill    methocarbamol (ROBAXIN) 500 MG Tab Take 1 tablet 3 times a day by mouth as needed for 30 days, for muscle spasms. 90 Tablet 0    oxyCODONE immediate release (ROXICODONE) 10 MG immediate release tablet Take 1 tablet by mouth every  4 to 6 hours as needed for  pain/maximum of 4 to 5 tablets per day. #140 for 30 days 140 Tablet 0    metoprolol SR (TOPROL XL) 50 MG TABLET SR 24 HR Take 2 Tablets by mouth every day. Indications: High Blood Pressure 180 Tablet 3    apixaban (ELIQUIS) 5mg Tab Take 1 Tablet by mouth 2 times a day. 200 Tablet 1    losartan (COZAAR) 50 MG Tab Take 1 Tablet by mouth 2 times a day. 200 Tablet 3    methocarbamol (ROBAXIN) 500 MG Tab Take 500 mg by mouth 3 times a day as needed (Muscle Spasms).      oxyCODONE immediate release  (ROXICODONE) 10 MG immediate release tablet Take 10 mg by mouth every four hours as needed for Moderate Pain or Severe Pain. Max of 5 tablets per day.      acetaminophen (TYLENOL) 500 MG Tab Take 1,000 mg by mouth every 6 hours as needed for Mild Pain. 2 tablets = 1,000 mg.      omeprazole (PRILOSEC) 20 MG Tablet Delayed Response delayed-release tablet Take 20 mg by mouth every morning. Indications: Gastroesophageal Reflux Disease      divalproex (DEPAKOTE) 500 MG Tablet Delayed Response Take 1 Tablet by mouth every 8 hours. 90 Tablet 0    melatonin 5 MG Tab Take 1 Tablet by mouth every evening. 30 Tablet 0     No current Epic-ordered facility-administered medications on file.

## 2025-07-03 ENCOUNTER — PHARMACY VISIT (OUTPATIENT)
Dept: PHARMACY | Facility: MEDICAL CENTER | Age: 45
End: 2025-07-03
Payer: MEDICARE

## 2025-07-03 ENCOUNTER — HOSPITAL ENCOUNTER (OUTPATIENT)
Dept: RADIOLOGY | Facility: MEDICAL CENTER | Age: 45
End: 2025-07-03
Attending: STUDENT IN AN ORGANIZED HEALTH CARE EDUCATION/TRAINING PROGRAM
Payer: COMMERCIAL

## 2025-07-03 DIAGNOSIS — G93.1 ANOXIC BRAIN INJURY (HCC): ICD-10-CM

## 2025-07-03 PROCEDURE — 70551 MRI BRAIN STEM W/O DYE: CPT

## 2025-07-03 PROCEDURE — RXMED WILLOW AMBULATORY MEDICATION CHARGE: Performed by: NURSE PRACTITIONER

## 2025-07-03 RX ORDER — OXYCODONE HYDROCHLORIDE 10 MG/1
TABLET ORAL
Qty: 140 TABLET | Refills: 0 | OUTPATIENT
Start: 2025-07-03

## 2025-07-03 RX ORDER — METHOCARBAMOL 500 MG/1
TABLET, FILM COATED ORAL
Qty: 90 TABLET | Refills: 0 | Status: SHIPPED | OUTPATIENT
Start: 2025-07-03 | End: 2025-07-31 | Stop reason: SDUPTHER

## 2025-07-15 ENCOUNTER — OFFICE VISIT (OUTPATIENT)
Dept: CARDIOLOGY | Facility: MEDICAL CENTER | Age: 45
End: 2025-07-15
Payer: COMMERCIAL

## 2025-07-15 VITALS
SYSTOLIC BLOOD PRESSURE: 114 MMHG | RESPIRATION RATE: 14 BRPM | BODY MASS INDEX: 31.33 KG/M2 | DIASTOLIC BLOOD PRESSURE: 72 MMHG | WEIGHT: 252 LBS | HEIGHT: 75 IN | HEART RATE: 72 BPM | OXYGEN SATURATION: 98 %

## 2025-07-15 DIAGNOSIS — E78.5 DYSLIPIDEMIA (HIGH LDL; LOW HDL): ICD-10-CM

## 2025-07-15 DIAGNOSIS — I10 ESSENTIAL HYPERTENSION: ICD-10-CM

## 2025-07-15 DIAGNOSIS — Z98.890 H/O MITRAL VALVE REPAIR: Primary | ICD-10-CM

## 2025-07-15 PROCEDURE — 99212 OFFICE O/P EST SF 10 MIN: CPT

## 2025-07-15 PROCEDURE — 3074F SYST BP LT 130 MM HG: CPT

## 2025-07-15 PROCEDURE — 99214 OFFICE O/P EST MOD 30 MIN: CPT

## 2025-07-15 PROCEDURE — 3078F DIAST BP <80 MM HG: CPT

## 2025-07-15 ASSESSMENT — ENCOUNTER SYMPTOMS
MUSCULOSKELETAL NEGATIVE: 1
PALPITATIONS: 0
DEPRESSION: 0
ORTHOPNEA: 0
NEUROLOGICAL NEGATIVE: 1
CONSTITUTIONAL NEGATIVE: 1
EYES NEGATIVE: 1
SHORTNESS OF BREATH: 0
NERVOUS/ANXIOUS: 0
PND: 0
GASTROINTESTINAL NEGATIVE: 1

## 2025-07-15 ASSESSMENT — FIBROSIS 4 INDEX: FIB4 SCORE: 1.02
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
## 2025-07-16 ENCOUNTER — TELEPHONE (OUTPATIENT)
Dept: CARDIOLOGY | Facility: MEDICAL CENTER | Age: 45
End: 2025-07-16
Payer: COMMERCIAL

## 2025-07-17 NOTE — TELEPHONE ENCOUNTER
Phone Number Called: [phone]    Call outcome: spoke w/ Denise      Message: Called to Michael. Updated them with return to work date of September 1st without restrictions. Denise explained that they were requesting updated paperwork for the patient as the leave was only approved until 7/29. Submitted OV note from 7/15 along with signature page from . Faxed to [phone]. Confirmation scanned to [facility].

## 2025-07-28 PROCEDURE — RXMED WILLOW AMBULATORY MEDICATION CHARGE

## 2025-07-29 PROCEDURE — RXMED WILLOW AMBULATORY MEDICATION CHARGE: Performed by: STUDENT IN AN ORGANIZED HEALTH CARE EDUCATION/TRAINING PROGRAM

## 2025-07-29 RX ORDER — DIVALPROEX SODIUM 500 MG/1
500 TABLET, DELAYED RELEASE ORAL EVERY 8 HOURS
Qty: 90 TABLET | Refills: 0 | Status: SHIPPED | OUTPATIENT
Start: 2025-07-29

## 2025-07-31 ENCOUNTER — PHARMACY VISIT (OUTPATIENT)
Dept: PHARMACY | Facility: MEDICAL CENTER | Age: 45
End: 2025-07-31
Payer: MEDICARE

## 2025-07-31 PROCEDURE — RXMED WILLOW AMBULATORY MEDICATION CHARGE: Performed by: NURSE PRACTITIONER

## 2025-07-31 RX ORDER — METHOCARBAMOL 500 MG/1
TABLET, FILM COATED ORAL
Qty: 90 TABLET | Refills: 0 | OUTPATIENT
Start: 2025-07-31

## 2025-07-31 RX ORDER — OXYCODONE HYDROCHLORIDE 10 MG/1
TABLET ORAL
Qty: 130 TABLET | Refills: 0 | OUTPATIENT
Start: 2025-08-05

## 2025-08-05 ENCOUNTER — PHARMACY VISIT (OUTPATIENT)
Dept: PHARMACY | Facility: MEDICAL CENTER | Age: 45
End: 2025-08-05
Payer: MEDICARE

## 2025-08-05 PROCEDURE — RXMED WILLOW AMBULATORY MEDICATION CHARGE: Performed by: NURSE PRACTITIONER

## 2025-08-08 ENCOUNTER — TELEPHONE (OUTPATIENT)
Dept: CARDIOLOGY | Facility: MEDICAL CENTER | Age: 45
End: 2025-08-08
Payer: COMMERCIAL

## 2025-08-11 ENCOUNTER — TELEPHONE (OUTPATIENT)
Dept: CARDIOLOGY | Facility: MEDICAL CENTER | Age: 45
End: 2025-08-11
Payer: COMMERCIAL

## 2025-08-19 ENCOUNTER — APPOINTMENT (OUTPATIENT)
Dept: CARDIOLOGY | Facility: MEDICAL CENTER | Age: 45
End: 2025-08-19
Payer: COMMERCIAL

## (undated) DEVICE — WATER IRRIGATION STERILE 1000ML (12EA/CA)

## (undated) DEVICE — SYRINGE 30 ML LL (56/BX)

## (undated) DEVICE — TUBING CLEARLINK DUO-VENT - C-FLO (48EA/CA)

## (undated) DEVICE — NEPTUNE 4 PORT MANIFOLD - (20/PK)

## (undated) DEVICE — SOD. CHL. INJ. 0.9% 1000 ML - (14EA/CA 60CA/PF)

## (undated) DEVICE — SUTURE 3-0 SILK SH C/R 18 IN - (12/BX)

## (undated) DEVICE — GLOVE BIOGEL SZ 6.5 SURGICAL PF LTX (50PR/BX 4BX/CA)

## (undated) DEVICE — TRAY SURESTEP FOLEY TEMP SENSING 16FR (10EA/CA) ORDER  #18764 FOR TEMP FOLEY ONLY

## (undated) DEVICE — MASK  AIRWAY SIZE 5 UNIQUE SILICON (10EA/BX)

## (undated) DEVICE — KIT ANESTHESIA W/CIRCUIT & 3/LT BAG W/FILTER (20EA/CA)

## (undated) DEVICE — GLOVE SZ 7 BIOGEL PI MICRO - PF LF (50PR/BX 4BX/CA)

## (undated) DEVICE — WRAP CO-FLEX 4IN X 5YD STERIL - SELF-ADHERENT (18/CA)

## (undated) DEVICE — CATHETER IV 20 GA X 1-1/4 ---SURG.& SDS ONLY--- (50EA/BX)

## (undated) DEVICE — TOWEL STOP TIMEOUT SAFETY FLAG (40EA/CA)

## (undated) DEVICE — TRANSDUCER BIFURCATED MONITORING KIT (10EA/CA)

## (undated) DEVICE — GLOVE BIOGEL PI ORTHO SZ 8 PF LF (40PR/BX)

## (undated) DEVICE — GOWN SURGEONS X-LARGE - DISP. (30/CA)

## (undated) DEVICE — MASK WITH FACE SHIELD (25/BX 4BX/CA)

## (undated) DEVICE — GOLD PROBE 7FR

## (undated) DEVICE — SENSOR OXIMETER ADULT SPO2 RD SET (20EA/BX)

## (undated) DEVICE — CANNULA O2 COMFORT SOFT EAR ADULT 7 FT TUBING (50/CA)

## (undated) DEVICE — FORCEP RADIAL JAW 4 STANDARD CAPACITY W/NEEDLE 240CM (40EA/BX)

## (undated) DEVICE — TUBING PATIENT W/CONNECTOR REDEUCE (1EA)

## (undated) DEVICE — SLEEVE, VASO, THIGH, MED

## (undated) DEVICE — MASK ANESTHESIA ADULT  - (100/CA)

## (undated) DEVICE — SET LEADWIRE 5 LEAD BEDSIDE DISPOSABLE ECG (1SET OF 5/EA)

## (undated) DEVICE — KIT CATHETERIZATION TWO-LUMEN CENTRAL VENOUS PI CVC (5EA/CA)

## (undated) DEVICE — BLADE SURGICAL #15 - (50/BX 3BX/CA)

## (undated) DEVICE — SYS DLV COST CLS RM TEMP - INJECTATE (CO-SET II) (10EA/CA)

## (undated) DEVICE — MICRODRIP PRIMARY VENTED 60 (48EA/CA) THIS WAS PART #2C8428 WHICH WAS DISCONTINUED

## (undated) DEVICE — FIBRILLAR SURGICEL 4X4 - 10/CA

## (undated) DEVICE — BUTTON ENDOSCOPY DISPOSABLE

## (undated) DEVICE — STOCKINET BIAS 6 IN STERILE - (20/CA)

## (undated) DEVICE — KIT CUSTOM PROCEDURE SINGLE FOR ENDO  (15/CA)

## (undated) DEVICE — TUBE CONNECTING SUCTION - CLEAR PLASTIC STERILE 72 IN (50EA/CA)

## (undated) DEVICE — SODIUM CHL IRRIGATION 0.9% 1000ML (12EA/CA)

## (undated) DEVICE — CANISTER SUCTION RIGID RED 1500CC (40EA/CA)

## (undated) DEVICE — ELECTRODE DUAL RETURN W/ CORD - (50/PK)

## (undated) DEVICE — LACTATED RINGERS INJ 1000 ML - (14EA/CA 60CA/PF)

## (undated) DEVICE — SPONGE GAUZE NON-STERILE 4X4 - (2000/CA 10PK/CA)

## (undated) DEVICE — PROTECTOR ULNA NERVE - (36PR/CA)

## (undated) DEVICE — SENSOR SPO2 NEO LNCS ADHESIVE (20/BX) SEE USER NOTES

## (undated) DEVICE — SUTURE 4-0 30CM STRATAFIX SPIRAL PS-2 (12EA/BX)

## (undated) DEVICE — LEAD PACING TEMP MYO - (12/BX)

## (undated) DEVICE — GLOVE BIOGEL PI ORTHO SZ 6 SURGICAL PF LF (40PR/BX)

## (undated) DEVICE — TUBE SUCTION YANKAUER  1/4 X 6FT (20EA/CA)"

## (undated) DEVICE — GLOVE BIOGEL PI ORTHO SZ 7 PF LF (40PR/BX)

## (undated) DEVICE — CHLORAPREP 26 ML APPLICATOR - ORANGE TINT(25/CA)

## (undated) DEVICE — BANDAGE ELASTIC 4 IN X 5 YDS - LATEX FREE(10/BX 5BX/CA)

## (undated) DEVICE — CONTAINER, SPECIMEN, STERILE

## (undated) DEVICE — DRESSING ABDOMINAL PAD STERILE 8 X 10" (360EA/CA)"

## (undated) DEVICE — BITE BLOCK, DISP.

## (undated) DEVICE — CUFF BP ADULT LARGE DISPOSABLE (20EA/CA)

## (undated) DEVICE — SUTURE GENERAL

## (undated) DEVICE — SUTURE 6-0 PROLENE RB-2 D/A 30 (36PK/BX)"

## (undated) DEVICE — FILM CASSETTE ENDO

## (undated) DEVICE — ELECTRODE 850 FOAM ADHESIVE - HYDROGEL RADIOTRNSPRNT (50/PK)

## (undated) DEVICE — GLOVE BIOGEL PI INDICATOR SZ 6.5 SURGICAL PF LF - (50/BX 4BX/CA)

## (undated) DEVICE — DRESSING 3X8 ADAPTIC GAUZE - NON-ADHERING (36/PK 6PK/BX)

## (undated) DEVICE — KIT CUSTOM PROCEDURE SINGLE FOR ENDO (15/CA)

## (undated) DEVICE — BLOCK BITE MAXI DENTAL RETENTION RIM (100EA/BX)

## (undated) DEVICE — BAG RESUSCITATION DISPOSABLE - WITH MASK (10 EA/CA)

## (undated) DEVICE — SUTURE 5-0 PROLENE C-1 D/A 24 (36PK/BX)"

## (undated) DEVICE — SET FLUID WARMING STANDARD FLOW - (10/CA)

## (undated) DEVICE — BLOCK

## (undated) DEVICE — ELECTRODE RADIOLUCNT SOLID GEL DEFIB PADS (12EA/CA)

## (undated) DEVICE — SHAVER 3.5 AGGRESSIVE + FORMLA (5EA/SP)

## (undated) DEVICE — PADDING CAST 6 IN STERILE - 6 X 4 YDS (24/CA)

## (undated) DEVICE — Device

## (undated) DEVICE — POUCH FLUID COLLECTION INVISISHIELD - (10/BX)

## (undated) DEVICE — SUTURE 4-0 PROLENE RB-1 D/A 36 (36PK/BX)"

## (undated) DEVICE — SUTURE 4-0 PROLENE V-7 D/A (36PK/BX)

## (undated) DEVICE — SENSOR SPO2 ADULT LNCS ADTX (20/BX) ORDER ITEM #19593

## (undated) DEVICE — SUTURE 3-0 VICRYL PLUS SH - 8X 18 INCH (12/BX)

## (undated) DEVICE — TUBING PUMP WITH CONNECTOR REDEUCE (1EA)

## (undated) DEVICE — MASK O2 VNYL ADLT RBRTH HI - (50/CS)

## (undated) DEVICE — HEAD HOLDER JUNIOR/ADULT

## (undated) DEVICE — ARMBOARD  SMALL IV 9 INLONG - (25EA/CA)

## (undated) DEVICE — DRESSING TRANSPARENT FILM TEGADERM 4 X 4.75" (50EA/BX)"

## (undated) DEVICE — PACK CV DRAPING/BASIN 2PART - (1/CA)

## (undated) DEVICE — COVER LIGHT HANDLE ALC PLUS DISP (18EA/BX)

## (undated) DEVICE — GOWN SURGEONS LARGE - (32/CA)

## (undated) DEVICE — MASK PANORAMIC OXYGEN PRO2 (30EA/CA)

## (undated) DEVICE — SYRINGE DISP. 60 CC LL - (30/BX, 12BX/CA)**WHEN THESE ARE GONE ORDER #500206**

## (undated) DEVICE — CAP ENDOSCOPE SEALING REVEAL STERILE DISPOSABLE PINK 4MMXOD11.35MM (10EA/BX)

## (undated) DEVICE — BLADE STERNUM SAW SURGICAL 32.0 X 6.4 MM STERILE (1/EA)

## (undated) DEVICE — SUTURE 2-0 ETHIBOND V-5 - (6/BX)

## (undated) DEVICE — CANISTER SUCTION 3000ML MECHANICAL FILTER AUTO SHUTOFF MEDI-VAC NONSTERILE LF DISP  (40EA/CA)

## (undated) DEVICE — SET EXTENSION WITH 2 PORTS (48EA/CA) ***PART #2C8610 IS A SUBSTITUTE*****

## (undated) DEVICE — WIRE STEEL 5-0 B&S 20 OHS - 5/PK 12PK/BX ITEM. D5329 OR D6625 CAN BE USED AS A SUB

## (undated) DEVICE — SUTURE 3-0 ETHILON PS-1 (36PK/BX)

## (undated) DEVICE — KIT ROOM DECONTAMINATION

## (undated) DEVICE — GLOVE BIOGEL ECLIPSE PF LATEX SIZE 8.0  (50PR/BX)

## (undated) DEVICE — SUCTION INSTRUMENT YANKAUER BULBOUS TIP W/O VENT (50EA/CA)

## (undated) DEVICE — SYSTEM CHEST DRAIN ADULT/PEDS W/AUTO TRANSFUSION CAPABILITY SAHARA (6EA/CA)

## (undated) DEVICE — GOWN WARMING STANDARD FLEX - (30/CA)

## (undated) DEVICE — NEEDLE SAFETY 18 GA X 1 1/2 IN (100EA/BX)

## (undated) DEVICE — INSERT STEALTH #5 - (10/BX)

## (undated) DEVICE — PORT AUXILLARY WATER (50EA/BX)

## (undated) DEVICE — SET BIFURCATED BLOOD - (48EA/CS)

## (undated) DEVICE — TOURNIQUET CUFF 34 X 4 ONE PORT DISP - STERILE (10/BX)

## (undated) DEVICE — PACK LOWER EXTREMITY - (2/CA)

## (undated) DEVICE — DRAPE LARGE 3 QUARTER - (20/CA)

## (undated) DEVICE — GLOVE BIOGEL ECLIPSE  PF LATEX SIZE 6.5 (50PR/BX)

## (undated) DEVICE — DERMABOND ADVANCED - (12EA/BX)

## (undated) DEVICE — GLOVE BIOGEL PI INDICATOR SZ 7.0 SURGICAL PF LF - (50/BX 4BX/CA)

## (undated) DEVICE — GLOVE BIOGEL SZ 7.5 SURGICAL PF LTX - (50PR/BX 4BX/CA)

## (undated) DEVICE — MASK, LARYNGEAL AIRWAY #4

## (undated) DEVICE — D-5-W INJ. 500 ML - (24EA/CA)

## (undated) DEVICE — SET TUBING PNEUMOCLEAR HIGH FLOW SMOKE EVACUATION (10EA/BX)

## (undated) DEVICE — SODIUM CHL. INJ. 0.9% 500ML (24EA/CA 50CA/PF)

## (undated) DEVICE — BITE BLOCK ADULT 60FR (100EA/CA)

## (undated) DEVICE — TUBE CHEST 32FR. RIGHT ANGLED (10EA/CA)

## (undated) DEVICE — DEVICE KIT COR-KNOT COMBO2 DEVICES & 12 KNOTS PER KIT (6KT/CA)

## (undated) DEVICE — SUTURE OHS

## (undated) DEVICE — MASK OXYGEN VNYL ADLT MED CONC WITH 7 FOOT TUBING - (50EA/CA)

## (undated) DEVICE — GLOVE BIOGEL INDICATOR SZ 8 SURGICAL PF LTX - (50/BX 4BX/CA)

## (undated) DEVICE — EVICEL 2ML - (1/BX) REFRIGERATE UPON RECPT

## (undated) DEVICE — DRESSING TRANSPARENT FILM TEGADERM 2.375 X 2.75"  (100EA/BX)"

## (undated) DEVICE — STOPCOCK MALE 4-WAY - (50/CA)

## (undated) DEVICE — SENSOR CEREBRAL AND SOMATIC MONITORING (20/CA)

## (undated) DEVICE — KIT INTROPERCUTANEOUS SHEATH - 8.5 FR W/MAX BARRIER AND BIOPATCH  (5/CA)

## (undated) DEVICE — MANIFOLD NEPTUNE 1 PORT (20/PK)

## (undated) DEVICE — PAD LAP STERILE 18 X 18 - (5/PK 40PK/CA)

## (undated) DEVICE — GLOVE BIOGEL PI INDICATOR SZ 8.0 SURGICAL PF LF -(50/BX 4BX/CA)

## (undated) DEVICE — ORGANIZER SUTURE GABBAY-FRAT - ER STERILE (3/SET 4ST/BX)

## (undated) DEVICE — CAPSULE VIDEO PILLCAM (10EA/BX)

## (undated) DEVICE — KIT RADIAL ARTERY 20GA W/MAX BARRIER AND BIOPATCH  (5EA/CA) #10740 IS FOR THE SET RADIAL ARTERIAL

## (undated) DEVICE — CATHETER THERMALDILUTION SWAN - (5EA/CA)

## (undated) DEVICE — SPEAR EYE SPNG 3ANG MLBL HNDL - (10/ST18ST/PK 180/PK 1PK/SP)

## (undated) DEVICE — PAD PREP 24 X 48 CUFFED - (100/CA)

## (undated) DEVICE — TUBE CHEST 32FR. STRAIGHT - (10EA/CA)

## (undated) DEVICE — SODIUM CHL. IRRIGATION 0.9% 3000ML (4EA/CA 65CA/PF)

## (undated) DEVICE — DRAPE SLUSH WARMER DISC (24EA/CA)

## (undated) DEVICE — SUTURE NONABSORBABLE ETHIBOND EXCEL 2-0 V-5 2 ARM BRAID GREEN WHITE (6EA/BX)

## (undated) DEVICE — SYRINGE SAFETY 5 ML 18 GA X 1-1/2 BLUNT LL (100/BX 4BX/CA)

## (undated) DEVICE — SUTURE 2-0 ETHIBOND V-5 30 (12EA/BX)"

## (undated) DEVICE — BLOCK BITE ENDOSCOPIC 2809 - (100/BX) INTERMEDIATE